# Patient Record
Sex: FEMALE | Race: BLACK OR AFRICAN AMERICAN | Employment: OTHER | URBAN - METROPOLITAN AREA
[De-identification: names, ages, dates, MRNs, and addresses within clinical notes are randomized per-mention and may not be internally consistent; named-entity substitution may affect disease eponyms.]

---

## 2017-01-09 ENCOUNTER — TELEPHONE (OUTPATIENT)
Dept: CARDIOLOGY CLINIC | Age: 82
End: 2017-01-09

## 2017-01-09 NOTE — TELEPHONE ENCOUNTER
Please CONTINUE WITH PLAVIX. Please check if she is on Plavix or brilinta. can hold asa for couple days. Please  Advise patient if bleeding continues  she needs to go the ER. She needs to call us back in couple days and inform how is she doing. Per Dr. Abdi Vela.

## 2017-01-09 NOTE — TELEPHONE ENCOUNTER
Patient c/o epistaxis in two times lat one this AM.     Last stent 06/16 RCA  RENEE she is on Plavix. Can she stop plavix? Or she needs to continue with this medication. She is concern because her nose bled. Thanks     FINDINGS:6/2016  5. 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 It  bifurcates into a large PL and PDA branch. We administered intracoronary  adenosine to evaluate for any spasm in there, which was negative. The  patient had critical stenosis. Hence, we performed PTCA using a Trek 2.0 mm  x 15 mm Sprinter balloon, followed by a Trek 2.75 mm x 15 mm balloon. A  Xience 3.5 mm x 23 mm stent was deployed about 13 atmospheres. Post-PCI  PTCA was performed using a noncompliant Trek 3.5 mm x 15 mm balloon at  about 18 atmospheres. Lesion reduced to 0%. DUSTIN-3 flow was noted at the  end of the procedure.
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
## 2017-01-09 NOTE — TELEPHONE ENCOUNTER
Patient states that she had a nose bleed twice yesterday morning. The one in the morning was not bad and didn't last long then last night patient had it again and it lasted about 50 minutes and it had clots in it. EMS came to her house and patient decided not to go to ER and it finally stopped about an half an hour after EMT's left. Patient is on Plavix and she wants to know if she needs to take her plavix today or not. Please Advise.

## 2017-01-17 ENCOUNTER — HOSPITAL ENCOUNTER (EMERGENCY)
Age: 82
Discharge: HOME | End: 2017-01-17
Attending: EMERGENCY MEDICINE
Payer: MEDICARE

## 2017-01-17 ENCOUNTER — APPOINTMENT (OUTPATIENT)
Dept: CT IMAGING | Age: 82
End: 2017-01-17
Attending: EMERGENCY MEDICINE
Payer: MEDICARE

## 2017-01-17 VITALS
SYSTOLIC BLOOD PRESSURE: 137 MMHG | OXYGEN SATURATION: 98 % | RESPIRATION RATE: 18 BRPM | WEIGHT: 252 LBS | BODY MASS INDEX: 39.55 KG/M2 | HEART RATE: 80 BPM | TEMPERATURE: 98.2 F | DIASTOLIC BLOOD PRESSURE: 62 MMHG | HEIGHT: 67 IN

## 2017-01-17 DIAGNOSIS — R51.9 NONINTRACTABLE HEADACHE, UNSPECIFIED CHRONICITY PATTERN, UNSPECIFIED HEADACHE TYPE: Primary | ICD-10-CM

## 2017-01-17 LAB
ANION GAP BLD CALC-SCNC: 5 MMOL/L (ref 3–18)
ATRIAL RATE: 100 BPM
BASOPHILS # BLD AUTO: 0 K/UL (ref 0–0.06)
BASOPHILS # BLD: 1 % (ref 0–2)
BUN SERPL-MCNC: 9 MG/DL (ref 7–18)
BUN/CREAT SERPL: 15 (ref 12–20)
CALCIUM SERPL-MCNC: 8.9 MG/DL (ref 8.5–10.1)
CALCULATED P AXIS, ECG09: 29 DEGREES
CALCULATED R AXIS, ECG10: -22 DEGREES
CALCULATED T AXIS, ECG11: 17 DEGREES
CHLORIDE SERPL-SCNC: 105 MMOL/L (ref 100–108)
CO2 SERPL-SCNC: 32 MMOL/L (ref 21–32)
CREAT SERPL-MCNC: 0.61 MG/DL (ref 0.6–1.3)
DIAGNOSIS, 93000: NORMAL
DIFFERENTIAL METHOD BLD: ABNORMAL
EOSINOPHIL # BLD: 0.1 K/UL (ref 0–0.4)
EOSINOPHIL NFR BLD: 3 % (ref 0–5)
ERYTHROCYTE [DISTWIDTH] IN BLOOD BY AUTOMATED COUNT: 11.6 % (ref 11.6–14.5)
GLUCOSE SERPL-MCNC: 138 MG/DL (ref 74–99)
HCT VFR BLD AUTO: 38.3 % (ref 35–45)
HGB BLD-MCNC: 12.1 G/DL (ref 12–16)
LYMPHOCYTES # BLD AUTO: 40 % (ref 21–52)
LYMPHOCYTES # BLD: 1.9 K/UL (ref 0.9–3.6)
MCH RBC QN AUTO: 30.7 PG (ref 24–34)
MCHC RBC AUTO-ENTMCNC: 31.6 G/DL (ref 31–37)
MCV RBC AUTO: 97.2 FL (ref 74–97)
MONOCYTES # BLD: 0.4 K/UL (ref 0.05–1.2)
MONOCYTES NFR BLD AUTO: 8 % (ref 3–10)
NEUTS SEG # BLD: 2.4 K/UL (ref 1.8–8)
NEUTS SEG NFR BLD AUTO: 48 % (ref 40–73)
P-R INTERVAL, ECG05: 116 MS
PLATELET # BLD AUTO: 230 K/UL (ref 135–420)
PMV BLD AUTO: 10.4 FL (ref 9.2–11.8)
POTASSIUM SERPL-SCNC: 3.5 MMOL/L (ref 3.5–5.5)
Q-T INTERVAL, ECG07: 368 MS
QRS DURATION, ECG06: 78 MS
QTC CALCULATION (BEZET), ECG08: 474 MS
RBC # BLD AUTO: 3.94 M/UL (ref 4.2–5.3)
SODIUM SERPL-SCNC: 142 MMOL/L (ref 136–145)
VENTRICULAR RATE, ECG03: 100 BPM
WBC # BLD AUTO: 4.8 K/UL (ref 4.6–13.2)

## 2017-01-17 PROCEDURE — 85025 COMPLETE CBC W/AUTO DIFF WBC: CPT | Performed by: EMERGENCY MEDICINE

## 2017-01-17 PROCEDURE — 93005 ELECTROCARDIOGRAM TRACING: CPT

## 2017-01-17 PROCEDURE — 99284 EMERGENCY DEPT VISIT MOD MDM: CPT

## 2017-01-17 PROCEDURE — 80048 BASIC METABOLIC PNL TOTAL CA: CPT | Performed by: EMERGENCY MEDICINE

## 2017-01-17 PROCEDURE — 70450 CT HEAD/BRAIN W/O DYE: CPT

## 2017-01-17 NOTE — DISCHARGE INSTRUCTIONS
Headache: Care Instructions  Your Care Instructions    Headaches have many possible causes. Most headaches aren't a sign of a more serious problem, and they will get better on their own. Home treatment may help you feel better faster. The doctor has checked you carefully, but problems can develop later. If you notice any problems or new symptoms, get medical treatment right away. Follow-up care is a key part of your treatment and safety. Be sure to make and go to all appointments, and call your doctor if you are having problems. It's also a good idea to know your test results and keep a list of the medicines you take. How can you care for yourself at home? · Do not drive if you have taken a prescription pain medicine. · Rest in a quiet, dark room until your headache is gone. Close your eyes and try to relax or go to sleep. Don't watch TV or read. · Put a cold, moist cloth or cold pack on the painful area for 10 to 20 minutes at a time. Put a thin cloth between the cold pack and your skin. · Use a warm, moist towel or a heating pad set on low to relax tight shoulder and neck muscles. · Have someone gently massage your neck and shoulders. · Take pain medicines exactly as directed. ¨ If the doctor gave you a prescription medicine for pain, take it as prescribed. ¨ If you are not taking a prescription pain medicine, ask your doctor if you can take an over-the-counter medicine. · Be careful not to take pain medicine more often than the instructions allow, because you may get worse or more frequent headaches when the medicine wears off. · Do not ignore new symptoms that occur with a headache, such as a fever, weakness or numbness, vision changes, or confusion. These may be signs of a more serious problem. To prevent headaches  · Keep a headache diary so you can figure out what triggers your headaches. Avoiding triggers may help you prevent headaches.  Record when each headache began, how long it lasted, and what the pain was like (throbbing, aching, stabbing, or dull). Write down any other symptoms you had with the headache, such as nausea, flashing lights or dark spots, or sensitivity to bright light or loud noise. Note if the headache occurred near your period. List anything that might have triggered the headache, such as certain foods (chocolate, cheese, wine) or odors, smoke, bright light, stress, or lack of sleep. · Find healthy ways to deal with stress. Headaches are most common during or right after stressful times. Take time to relax before and after you do something that has caused a headache in the past.  · Try to keep your muscles relaxed by keeping good posture. Check your jaw, face, neck, and shoulder muscles for tension, and try relaxing them. When sitting at a desk, change positions often, and stretch for 30 seconds each hour. · Get plenty of sleep and exercise. · Eat regularly and well. Long periods without food can trigger a headache. · Treat yourself to a massage. Some people find that regular massages are very helpful in relieving tension. · Limit caffeine by not drinking too much coffee, tea, or soda. But don't quit caffeine suddenly, because that can also give you headaches. · Reduce eyestrain from computers by blinking frequently and looking away from the computer screen every so often. Make sure you have proper eyewear and that your monitor is set up properly, about an arm's length away. · Seek help if you have depression or anxiety. Your headaches may be linked to these conditions. Treatment can both prevent headaches and help with symptoms of anxiety or depression. When should you call for help? Call 911 anytime you think you may need emergency care. For example, call if:  · You have signs of a stroke. These may include:  ¨ Sudden numbness, paralysis, or weakness in your face, arm, or leg, especially on only one side of your body. ¨ Sudden vision changes.   ¨ Sudden trouble speaking. ¨ Sudden confusion or trouble understanding simple statements. ¨ Sudden problems with walking or balance. ¨ A sudden, severe headache that is different from past headaches. Call your doctor now or seek immediate medical care if:  · You have a new or worse headache. · Your headache gets much worse. Where can you learn more? Go to http://cristina-mignon.info/. Enter M271 in the search box to learn more about \"Headache: Care Instructions. \"  Current as of: February 19, 2016  Content Version: 11.1  © 5421-2766 [employer]. Care instructions adapted under license by [employer] (which disclaims liability or warranty for this information). If you have questions about a medical condition or this instruction, always ask your healthcare professional. [address] any warranty or liability for your use of this information.
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
## 2017-01-17 NOTE — ED TRIAGE NOTES
Triage: dizziness x 3 days, right sided dull headache with intermittent sharp shooting pains. Pt denies injury. Pt reports nose bleed today and 3 days ago. Pt takes Plavix.

## 2017-01-17 NOTE — ED NOTES
Patient states headache at this time is just achy, talking with family and animated while waiting for lab results.

## 2017-01-20 ENCOUNTER — OFFICE VISIT (OUTPATIENT)
Dept: FAMILY MEDICINE CLINIC | Age: 82
End: 2017-01-20

## 2017-01-20 VITALS
BODY MASS INDEX: 41.44 KG/M2 | RESPIRATION RATE: 18 BRPM | SYSTOLIC BLOOD PRESSURE: 162 MMHG | DIASTOLIC BLOOD PRESSURE: 84 MMHG | OXYGEN SATURATION: 93 % | TEMPERATURE: 97.6 F | HEART RATE: 106 BPM | WEIGHT: 264 LBS | HEIGHT: 67 IN

## 2017-01-20 DIAGNOSIS — R42 DIZZINESS: Primary | ICD-10-CM

## 2017-01-20 DIAGNOSIS — H93.8X1 EAR FULLNESS, RIGHT: ICD-10-CM

## 2017-01-20 RX ORDER — FAMOTIDINE 20 MG/1
20 TABLET, FILM COATED ORAL
COMMUNITY
End: 2019-01-22
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
## 2017-01-20 NOTE — PROGRESS NOTES
1. Have you been to the ER, urgent care clinic since your last visit? Hospitalized since your last visit? Yes refer to nursing note    2. Have you seen or consulted any other health care providers outside of the [address] [place] [address] [place] since your last visit? Include any pap smears or colon screening. Yes Reason for visit: Dizziness    Is someone accompanying this pt? no    Is the patient using any DME equipment during OV? no      Chief Complaint   Patient presents with    Other     Pt was seen at [address] [place] [address] for Dizziness and H/A on 1/16/17.

## 2017-01-20 NOTE — MR AVS SNAPSHOT
Visit Information Date & Time Provider Department Dept. Phone Encounter #  
 1/20/2017 11:45 AM Sheri Sullivan [facility] 77 [id number] Follow-up Instructions Return in about 2 weeks (around 2/3/2017). Your Appointments 2/14/2017 10:15 AM  
Follow Up with Dayanara Kilgore MD  
[facility] [place] ([facility] [place]) Appt Note: 6 month follow up  
 [address] [place] [address] [facility] [address]  
  
   
 [address] [id number] [address] [address] Upcoming Health Maintenance Date Due MICROALBUMIN Q1 8/8/2015 HEMOGLOBIN A1C Q6M 11/28/2016 FOOT EXAM Q1 7/20/2017 EYE EXAM RETINAL OR DILATED Q1 7/25/2017 MEDICARE YEARLY EXAM 8/18/2017 Pneumococcal 65+ Low/Medium Risk (2 of 2 - PPSV23) 12/2/2017 LIPID PANEL Q1 12/3/2017 GLAUCOMA SCREENING Q2Y 7/25/2018 DTaP/Tdap/Td series (2 - Td) 11/15/2023 Allergies as of 1/20/2017  Review Complete On: 1/20/2017 By: Maxime Odor, LPN Severity Noted Reaction Type Reactions Niacin High 03/26/2013    Palpitations, Other (comments) Stomach irritation Ace Inhibitors  05/19/2010    Cough Avapro [Irbesartan]  05/19/2010    Myalgia Bystolic [Nebivolol]  60/03/7463    Other (comments) Felt like throat closing Catapres [Clonidine]  05/19/2010    Cough Codeine  05/19/2010    Nausea and Vomiting Cozaar [Losartan]    Not Reported This Time Crestor [Rosuvastatin]  05/19/2010    Other (comments) Cramps, aches Alec Hick [Propoxyphene N-acetaminophen]  05/19/2010    Unknown (comments) Diovan [Valsartan]  05/19/2010    Cough Flagyl [Metronidazole]  05/19/2010    Other (comments) Mouth and throat irritation Gabapentin  03/16/2016    Other (comments) Abdominal pain and burning Iodinated Contrast Media - Oral And Iv Dye    Other (comments) Throat swelling Iodine    Unknown (comments) Lescol [Fluvastatin]  05/19/2010    Other (comments) Leg cramps Lipitor [Atorvastatin]  05/19/2010    Myalgia, Other (comments) Cramps, aches Lovastatin  05/19/2010    Other (comments) Leg cramps Nexium [Esomeprazole Magnesium]  05/20/2010    Other (comments) Stomach upset, burning Pravachol [Pravastatin]  05/19/2010    Other (comments) Leg cramps Reglan [Metoclopramide]  05/19/2010    Nausea Only Trazodone  05/19/2010    Other (comments) Patient states she feels drugged Zetia [Ezetimibe]  05/19/2010    Other (comments) Cramps, aches Zocor [Simvastatin]  05/19/2010    Other (comments) Cramps, aches Current Immunizations  Reviewed on 7/20/2016 Name Date Influenza High Dose Vaccine PF 12/2/2016 Influenza Vaccine 12/16/2015 11:20 AM, 9/15/2014 Influenza Vaccine Split 11/6/2012, 10/5/2010 Influenza Vaccine Whole 9/1/2009 Tdap 11/15/2013 Not reviewed this visit You Were Diagnosed With   
  
 Codes Comments Dizziness    -  Primary ICD-10-CM: F04 ICD-9-CM: 780.4 Ear fullness, right     ICD-10-CM: H93.8X1 ICD-9-CM: 388. 8 Vitals BP Pulse Temp Resp Height(growth percentile) Weight(growth percentile) 162/84 (BP 1 Location: Right arm, BP Patient Position: Sitting) (!) 106 97.6 °F (36.4 °C) (Oral) 18 5' 7\" (1.702 m) 264 lb (119.7 kg) SpO2 BMI OB Status Smoking Status 93% 41.35 kg/m2 Hysterectomy Former Smoker BMI and BSA Data Body Mass Index Body Surface Area  
 41.35 kg/m 2 2.38 m 2 Preferred Pharmacy Pharmacy Name Phone [facility] [place], [place] [phone] [facility] Your Updated Medication List  
  
   
This list is accurate as of: 1/20/17 12:54 PM.  Always use your most recent med list.  
  
  
  
  
 albuterol 90 mcg/actuation inhaler Commonly known as:  PROAIR HFA Take 1 Puff by inhalation every four (4) hours as needed for Wheezing or Shortness of Breath. aspirin delayed-release 81 mg tablet Take 81 mg by mouth daily. capsaicin 0.075 % topical cream  
Apply  to affected area three (3) times daily. clopidogrel 75 mg Tab Commonly known as:  PLAVIX Take 1 Tab by mouth daily. FISH OIL PO Take 1,000 mg by mouth two (2) times a day. furosemide 20 mg tablet Commonly known as:  LASIX Use daily as needed for leg swelling LANTUS 100 unit/mL injection Generic drug:  insulin glargine Take 20 units every morning and 40 units at bedtime  Indications: TYPE 2 DIABETES MELLITUS  
  
 levothyroxine 88 mcg tablet Commonly known as:  SYNTHROID Take 1 Tab by mouth Daily (before breakfast). lidocaine 5 % Commonly known as:  LIDODERM  
1 Patch by TransDERmal route every twenty-four (24) hours. montelukast 10 mg tablet Commonly known as:  SINGULAIR Take 1 Tab by mouth daily as needed. Indications: ALLERGIC RHINITIS  
  
 NORVASC 5 mg tablet Generic drug:  amLODIPine Take 5 mg by mouth daily. PEPCID 20 mg tablet Generic drug:  famotidine Take 20 mg by mouth nightly. potassium chloride 20 mEq tablet Commonly known as:  K-DUR, KLOR-CON Take 1 Tab by mouth two (2) times a day. VITAMIN D3 1,000 unit tablet Generic drug:  cholecalciferol Take 5,000 Units by mouth two (2) times a day. Follow-up Instructions Return in about 2 weeks (around 2/3/2017). To-Do List   
 01/20/2017 Imaging:  MRI IAC WO CONT   
  
 01/27/2017 11:15 AM  
  Appointment with CORDELIA HENDRICKS  2 at [address] [place] [address] ([phone]) OUTSIDE FILMS  - Any outside films related to the study being scheduled should be brought with you on the day of the exam.  If this cannot be done there may be a delay in the reading of the study.   MEDICATIONS  - Patient must bring a complete list of all medications currently taking to include prescriptions, over-the-counter meds, herbals, vitamins & any dietary supplements  GENERAL INSTRUCTIONS  - On the day of your exam do not use any bath powder, deodorant or lotions on the armpit area. -Tenderness of breasts may cause an increase of discomfort during procedure. If you are experiencing breast tenderness on the day of your appointment and would like to reschedule, please call [phone]. Patient Instructions 1.) They will call to set up the MRI of the internal auditory canals. You can see if they can set it up for the same day as your mammogram.  
 
2.) Return in 2 weeks for follow up. Dizziness: Care Instructions Your Care Instructions Dizziness is the feeling of unsteadiness or fuzziness in your head. It is different than having vertigo, which is a feeling that the room is spinning or that you are moving or falling. It is also different from lightheadedness, which is the feeling that you are about to faint. It can be hard to know what causes dizziness. Some people feel dizzy when they have migraine headaches. Sometimes bouts of flu can make you feel dizzy. Some medical conditions, such as heart problems or high blood pressure, can make you feel dizzy. Many medicines can cause dizziness, including medicines for high blood pressure, pain, or anxiety. If a medicine causes your symptoms, your doctor may recommend that you stop or change the medicine. If it is a problem with your heart, you may need medicine to help your heart work better. If there is no clear reason for your symptoms, your doctor may suggest watching and waiting for a while to see if the dizziness goes away on its own. Follow-up care is a key part of your treatment and safety. Be sure to make and go to all appointments, and call your doctor if you are having problems. It's also a good idea to know your test results and keep a list of the medicines you take. How can you care for yourself at home? · If your doctor recommends or prescribes medicine, take it exactly as directed. Call your doctor if you think you are having a problem with your medicine. · Do not drive while you feel dizzy. · Try to prevent falls. Steps you can take include: ¨ Using nonskid mats, adding grab bars near the tub, and using night-lights. ¨ Clearing your home so that walkways are free of anything you might trip on. ¨ Letting family and friends know that you have been feeling dizzy. This will help them know how to help you. When should you call for help? Call 911 anytime you think you may need emergency care. For example, call if: 
· You passed out (lost consciousness). · You have dizziness along with symptoms of a heart attack. These may include: ¨ Chest pain or pressure, or a strange feeling in the chest. 
¨ Sweating. ¨ Shortness of breath. ¨ Nausea or vomiting. ¨ Pain, pressure, or a strange feeling in the back, neck, jaw, or upper belly or in one or both shoulders or arms. ¨ Lightheadedness or sudden weakness. ¨ A fast or irregular heartbeat. · You have symptoms of a stroke. These may include: 
¨ Sudden numbness, tingling, weakness, or loss of movement in your face, arm, or leg, especially on only one side of your body. ¨ Sudden vision changes. ¨ Sudden trouble speaking. ¨ Sudden confusion or trouble understanding simple statements. ¨ Sudden problems with walking or balance. ¨ A sudden, severe headache that is different from past headaches. Call your doctor now or seek immediate medical care if: 
· You feel dizzy and have a fever, headache, or ringing in your ears. · You have new or increased nausea and vomiting. · Your dizziness does not go away or comes back. Watch closely for changes in your health, and be sure to contact your doctor if: 
· You do not get better as expected. Where can you learn more? Go to http://cristina-mignon.info/. Enter E122 in the search box to learn more about \"Dizziness: Care Instructions. \" Current as of: May 27, 2016 Content Version: 11.1 © 8879-6738 Agent Partner, [employer]. Care instructions adapted under license by [employer] (which disclaims liability or warranty for this information). If you have questions about a medical condition or this instruction, always ask your healthcare professional. [address] any warranty or liability for your use of this information. Introducing [facility]! Dear Lilian Montes De Oca: Thank you for requesting a [email or web address] account. Our records indicate that you already have an active [email or web address] account. You can access your account anytime at [email or web address] Did you know that you can access your hospital and ER discharge instructions at any time in [email or web address]? You can also review all of your test results from your hospital stay or ER visit. Additional Information If you have questions, please visit the Frequently Asked Questions section of the [email or web address] website at [email or web address] Remember, [email or web address] is NOT to be used for urgent needs. For medical emergencies, dial 911. Now available from your iPhone and Android! Please provide this summary of care documentation to your next provider. Your primary care clinician is listed as Christina Miller. If you have any questions after today's visit, please call [phone].

## 2017-01-20 NOTE — PATIENT INSTRUCTIONS
1.) They will call to set up the MRI of the internal auditory canals. You can see if they can set it up for the same day as your mammogram.     2.) Return in 2 weeks for follow up. Dizziness: Care Instructions  Your Care Instructions  Dizziness is the feeling of unsteadiness or fuzziness in your head. It is different than having vertigo, which is a feeling that the room is spinning or that you are moving or falling. It is also different from lightheadedness, which is the feeling that you are about to faint. It can be hard to know what causes dizziness. Some people feel dizzy when they have migraine headaches. Sometimes bouts of flu can make you feel dizzy. Some medical conditions, such as heart problems or high blood pressure, can make you feel dizzy. Many medicines can cause dizziness, including medicines for high blood pressure, pain, or anxiety. If a medicine causes your symptoms, your doctor may recommend that you stop or change the medicine. If it is a problem with your heart, you may need medicine to help your heart work better. If there is no clear reason for your symptoms, your doctor may suggest watching and waiting for a while to see if the dizziness goes away on its own. Follow-up care is a key part of your treatment and safety. Be sure to make and go to all appointments, and call your doctor if you are having problems. It's also a good idea to know your test results and keep a list of the medicines you take. How can you care for yourself at home? · If your doctor recommends or prescribes medicine, take it exactly as directed. Call your doctor if you think you are having a problem with your medicine. · Do not drive while you feel dizzy. · Try to prevent falls. Steps you can take include:  ¨ Using nonskid mats, adding grab bars near the tub, and using night-lights. ¨ Clearing your home so that walkways are free of anything you might trip on.   ¨ Letting family and friends know that you have been feeling dizzy. This will help them know how to help you. When should you call for help? Call 911 anytime you think you may need emergency care. For example, call if:  · You passed out (lost consciousness). · You have dizziness along with symptoms of a heart attack. These may include:  ¨ Chest pain or pressure, or a strange feeling in the chest.  ¨ Sweating. ¨ Shortness of breath. ¨ Nausea or vomiting. ¨ Pain, pressure, or a strange feeling in the back, neck, jaw, or upper belly or in one or both shoulders or arms. ¨ Lightheadedness or sudden weakness. ¨ A fast or irregular heartbeat. · You have symptoms of a stroke. These may include:  ¨ Sudden numbness, tingling, weakness, or loss of movement in your face, arm, or leg, especially on only one side of your body. ¨ Sudden vision changes. ¨ Sudden trouble speaking. ¨ Sudden confusion or trouble understanding simple statements. ¨ Sudden problems with walking or balance. ¨ A sudden, severe headache that is different from past headaches. Call your doctor now or seek immediate medical care if:  · You feel dizzy and have a fever, headache, or ringing in your ears. · You have new or increased nausea and vomiting. · Your dizziness does not go away or comes back. Watch closely for changes in your health, and be sure to contact your doctor if:  · You do not get better as expected. Where can you learn more? Go to http://cristina-mignon.info/. Enter D585 in the search box to learn more about \"Dizziness: Care Instructions. \"  Current as of: May 27, 2016  Content Version: 11.1  © 8048-7526 [employer]weem[employer]. Care instructions adapted under license by [employer] (which disclaims liability or warranty for this information).  If you have questions about a medical condition or this instruction, always ask your healthcare professional. [address] any warranty or liability for your use of this information.

## 2017-01-24 ENCOUNTER — TELEPHONE (OUTPATIENT)
Dept: FAMILY MEDICINE CLINIC | Age: 82
End: 2017-01-24

## 2017-01-24 ENCOUNTER — TELEPHONE (OUTPATIENT)
Dept: CARDIOLOGY CLINIC | Age: 82
End: 2017-01-24

## 2017-01-24 DIAGNOSIS — D75.89 MACROCYTOSIS: ICD-10-CM

## 2017-01-24 DIAGNOSIS — R53.1 WEAKNESS: Primary | ICD-10-CM

## 2017-01-24 NOTE — TELEPHONE ENCOUNTER
Patient called and stated she has several nose bleeds and this pass Monday was so severe she had to go ER d/t bleeding over an hour and having a lot of clots. Patient states she feel very tired to put shes get very dizzy and light headed.  Please Advise

## 2017-01-24 NOTE — TELEPHONE ENCOUNTER
Pt has been feeling weak for a while now and wants to know if Dr. Manjeet Perez can prescribe something for her. She had mentioned this at her last appointment. Elvia.

## 2017-01-25 RX ORDER — LOPERAMIDE HCL 2 MG
1000 TABLET ORAL DAILY
Qty: 30 TAB | Refills: 3 | Status: SHIPPED | OUTPATIENT
Start: 2017-01-25

## 2017-01-25 NOTE — TELEPHONE ENCOUNTER
Geraldo Goldberg,   Please let the patient know that I called in Vitamin B12 extended release supplement to [facility]. Thanks.     JAMIE

## 2017-02-01 ENCOUNTER — TELEPHONE (OUTPATIENT)
Dept: CARDIOLOGY CLINIC | Age: 82
End: 2017-02-01

## 2017-02-01 NOTE — TELEPHONE ENCOUNTER
Patient calling to inquire about Plavix hold. She has an office visit on 2/14/17. She has had no further nose bleeding since Plavix hold began on 1/24/17. Please advise.

## 2017-02-01 NOTE — TELEPHONE ENCOUNTER
Patient called to discuss orders from Dr. Henrry Salazar. She will restart Plavix and call if any further bleeding occurs. She voices understanding and acceptance of this advice and will call back if any further questions or concerns.

## 2017-02-03 ENCOUNTER — OFFICE VISIT (OUTPATIENT)
Dept: FAMILY MEDICINE CLINIC | Age: 82
End: 2017-02-03

## 2017-02-03 VITALS
DIASTOLIC BLOOD PRESSURE: 83 MMHG | WEIGHT: 264 LBS | HEART RATE: 121 BPM | TEMPERATURE: 99.2 F | OXYGEN SATURATION: 98 % | RESPIRATION RATE: 18 BRPM | HEIGHT: 67 IN | SYSTOLIC BLOOD PRESSURE: 151 MMHG | BODY MASS INDEX: 41.44 KG/M2

## 2017-02-03 DIAGNOSIS — I25.10 ATHEROSCLEROSIS OF NATIVE CORONARY ARTERY OF NATIVE HEART WITHOUT ANGINA PECTORIS: ICD-10-CM

## 2017-02-03 DIAGNOSIS — H93.8X9 EAR FULLNESS, UNSPECIFIED LATERALITY: ICD-10-CM

## 2017-02-03 DIAGNOSIS — E03.8 OTHER SPECIFIED HYPOTHYROIDISM: ICD-10-CM

## 2017-02-03 DIAGNOSIS — M89.9 DISORDER OF BONE AND CARTILAGE: ICD-10-CM

## 2017-02-03 DIAGNOSIS — M16.11 PRIMARY OSTEOARTHRITIS OF RIGHT HIP: ICD-10-CM

## 2017-02-03 DIAGNOSIS — M94.9 DISORDER OF BONE AND CARTILAGE: ICD-10-CM

## 2017-02-03 DIAGNOSIS — R42 DIZZINESS: Primary | ICD-10-CM

## 2017-02-03 DIAGNOSIS — I10 ESSENTIAL HYPERTENSION: ICD-10-CM

## 2017-02-03 RX ORDER — MECLIZINE HYDROCHLORIDE 25 MG/1
25 TABLET ORAL
Qty: 30 TAB | Refills: 0 | Status: CANCELLED | OUTPATIENT
Start: 2017-02-03 | End: 2017-02-13
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
## 2017-02-03 NOTE — MR AVS SNAPSHOT
Visit Information Date & Time Provider Department Dept. Phone Encounter #  
 2/3/2017 10:30 AM Sheri Gonzalez 79 [id number] Follow-up Instructions Return in about 3 months (around 5/3/2017) for Regular Follow Up . Your Appointments 2/14/2017 10:15 AM  
Follow Up with Robert Aguilar MD  
[facility] ([address]) Appt Note: 6 month follow up  
 [address] [facility] [address]  
  
   
 [address] [place] [id number] Upcoming Health Maintenance Date Due MICROALBUMIN Q1 8/8/2015 HEMOGLOBIN A1C Q6M 11/28/2016 FOOT EXAM Q1 7/20/2017 EYE EXAM RETINAL OR DILATED Q1 7/25/2017 MEDICARE YEARLY EXAM 8/18/2017 Pneumococcal 65+ Low/Medium Risk (2 of 2 - PPSV23) 12/2/2017 LIPID PANEL Q1 12/3/2017 GLAUCOMA SCREENING Q2Y 7/25/2018 DTaP/Tdap/Td series (2 - Td) 11/15/2023 Allergies as of 2/3/2017  Review Complete On: 2/3/2017 By: Moris Floyd LPN Severity Noted Reaction Type Reactions Niacin High 03/26/2013    Palpitations, Other (comments) Stomach irritation Ace Inhibitors  05/19/2010    Cough Avapro [Irbesartan]  05/19/2010    Myalgia Bystolic [Nebivolol]  05/36/6737    Other (comments) Felt like throat closing Catapres [Clonidine]  05/19/2010    Cough Codeine  05/19/2010    Nausea and Vomiting Cozaar [Losartan]    Not Reported This Time Crestor [Rosuvastatin]  05/19/2010    Other (comments) Cramps, aches [place] Fair [Propoxyphene N-acetaminophen]  05/19/2010    Unknown (comments) Diovan [Valsartan]  05/19/2010    Cough Flagyl [Metronidazole]  05/19/2010    Other (comments) Mouth and throat irritation Gabapentin  03/16/2016    Other (comments) Abdominal pain and burning Iodinated Contrast Media - Oral And Iv Dye    Other (comments) Throat swelling Iodine    Unknown (comments) Lescol [Fluvastatin]  05/19/2010    Other (comments) Leg cramps Lipitor [Atorvastatin]  05/19/2010    Myalgia, Other (comments) Cramps, aches Lovastatin  05/19/2010    Other (comments) Leg cramps Nexium [Esomeprazole Magnesium]  05/20/2010    Other (comments) Stomach upset, burning Pravachol [Pravastatin]  05/19/2010    Other (comments) Leg cramps Reglan [Metoclopramide]  05/19/2010    Nausea Only Trazodone  05/19/2010    Other (comments) Patient states she feels drugged Zetia [Ezetimibe]  05/19/2010    Other (comments) Cramps, aches Zocor [Simvastatin]  05/19/2010    Other (comments) Cramps, aches Current Immunizations  Reviewed on 7/20/2016 Name Date Influenza High Dose Vaccine PF 12/2/2016 Influenza Vaccine 12/16/2015 11:20 AM, 9/15/2014 Influenza Vaccine Split 11/6/2012, 10/5/2010 Influenza Vaccine Whole 9/1/2009 Tdap 11/15/2013 Not reviewed this visit You Were Diagnosed With   
  
 Codes Comments Dizziness    -  Primary ICD-10-CM: C28 ICD-9-CM: 780.4 Ear fullness, unspecified laterality     ICD-10-CM: V06.3T2 ICD-9-CM: 388. 8 Atherosclerosis of native coronary artery of native heart without angina pectoris     ICD-10-CM: I25.10 ICD-9-CM: 414.01 Other specified hypothyroidism     ICD-10-CM: E03.8 ICD-9-CM: 244.8 Primary osteoarthritis of right hip     ICD-10-CM: M16.11 
ICD-9-CM: 715.15 Essential hypertension     ICD-10-CM: I10 
ICD-9-CM: 401.9 Disorder of bone and cartilage     ICD-10-CM: M89.9, M94.9 ICD-9-CM: 733.90 Insulin dependent diabetes mellitus (HCC)     ICD-10-CM: E11.9, Z79.4 ICD-9-CM: 250.00, V58.67 Vitals BP Pulse Temp Resp Height(growth percentile) Weight(growth percentile) 151/83 (BP 1 Location: Right arm, BP Patient Position: Sitting) (!) 121 99.2 °F (37.3 °C) (Oral) 18 5' 7\" (1.702 m) 264 lb (119.7 kg) SpO2 BMI OB Status Smoking Status 98% 41.35 kg/m2 Hysterectomy Former Smoker BMI and BSA Data Body Mass Index Body Surface Area  
 41.35 kg/m 2 2.38 m 2 Preferred Pharmacy Pharmacy Name Phone [facility] [address] [facility] Your Updated Medication List  
  
   
This list is accurate as of: 2/3/17 10:58 AM.  Always use your most recent med list.  
  
  
  
  
 albuterol 90 mcg/actuation inhaler Commonly known as:  PROAIR HFA Take 1 Puff by inhalation every four (4) hours as needed for Wheezing or Shortness of Breath. aspirin delayed-release 81 mg tablet Take 81 mg by mouth daily. capsaicin 0.075 % topical cream  
Apply  to affected area three (3) times daily. clopidogrel 75 mg Tab Commonly known as:  PLAVIX Take 1 Tab by mouth daily. cyanocobalamin ER 1,000 mcg tablet Take 1 Tab by mouth daily. FISH OIL PO Take 1,000 mg by mouth two (2) times a day. furosemide 20 mg tablet Commonly known as:  LASIX Use daily as needed for leg swelling LANTUS 100 unit/mL injection Generic drug:  insulin glargine Take 20 units every morning and 40 units at bedtime  Indications: TYPE 2 DIABETES MELLITUS  
  
 levothyroxine 88 mcg tablet Commonly known as:  SYNTHROID Take 1 Tab by mouth Daily (before breakfast). lidocaine 5 % Commonly known as:  LIDODERM  
1 Patch by TransDERmal route every twenty-four (24) hours. montelukast 10 mg tablet Commonly known as:  SINGULAIR Take 1 Tab by mouth daily as needed. Indications: ALLERGIC RHINITIS  
  
 NORVASC 5 mg tablet Generic drug:  amLODIPine Take 5 mg by mouth daily. PEPCID 20 mg tablet Generic drug:  famotidine Take 20 mg by mouth nightly. potassium chloride 20 mEq tablet Commonly known as:  K-DUR, KLOR-CON Take 1 Tab by mouth two (2) times a day. VITAMIN D3 1,000 unit tablet Generic drug:  cholecalciferol Take 5,000 Units by mouth two (2) times a day. Follow-up Instructions Return in about 3 months (around 5/3/2017) for Regular Follow Up . To-Do List   
 02/03/2017 Lab:  HEMOGLOBIN A1C WITH EAG   
  
 02/03/2017 Lab:  LIPID CASCADE W/REFLEX APO B   
  
 02/03/2017 Lab:  VITAMIN D, 25 HYDROXY Around 05/04/2017 Lab:  CBC WITH AUTOMATED DIFF Around 05/04/2017 Lab:  METABOLIC PANEL, COMPREHENSIVE Around 05/04/2017 Lab:  SED RATE (ESR) Around 05/04/2017 Lab:  T4, FREE Around 05/04/2017 Lab:  TSH 3RD GENERATION Patient Instructions 1.) Please get labs a week before next visit 2.) Return in 3 months for follow up Vertigo: Exercises Your Care Instructions Here are some examples of typical rehabilitation exercises for your condition. Start each exercise slowly. Ease off the exercise if you start to have pain. Your doctor or physical therapist will tell you when you can start these exercises and which ones will work best for you. How to do the exercises Note: Do these exercises twice a day. Try to progress to doing each head movement 15 to 20 times. Then try to do them with your eyes closed. Exercise 1 1. Stand with a chair in front of you and a wall behind you. If you begin to fall, you may use them for support. 2. Stand with your feet together and your arms at your sides. 3. Move your head up and down 10 times. Exercise 2 Move your head side to side 10 times. Exercise 3 Move your head diagonally up and down 10 times. Exercise 4 Move your head diagonally up and down 10 times on the other side. Follow-up care is a key part of your treatment and safety. Be sure to make and go to all appointments, and call your doctor if you are having problems. It's also a good idea to know your test results and keep a list of the medicines you take. Where can you learn more? Go to http://cristina-mignon.info/. Enter F349 in the search box to learn more about \"Vertigo: Exercises. \" Current as of: July 29, 2016 Content Version: 11.1 © 9264-9924 [employer]. Care instructions adapted under license by [employer] (which disclaims liability or warranty for this information). If you have questions about a medical condition or this instruction, always ask your healthcare professional. [facility] any warranty or liability for your use of this information. Introducing [facility]! Dear Fede Cevallos: Thank you for requesting a [employer] account. Our records indicate that you already have an active [employer] account. You can access your account anytime at [email or web address] Did you know that you can access your hospital and ER discharge instructions at any time in [employer]? You can also review all of your test results from your hospital stay or ER visit. Additional Information If you have questions, please visit the Frequently Asked Questions section of the [employer] website at [email or web address] Remember, [employer] is NOT to be used for urgent needs. For medical emergencies, dial 911. Now available from your iPhone and Android! Please provide this summary of care documentation to your next provider. Your primary care clinician is listed as Dia Coreas. If you have any questions after today's visit, please call [phone].

## 2017-02-03 NOTE — PATIENT INSTRUCTIONS
1.) Please get labs a week before next visit    2.) Return in 3 months for follow up      Vertigo: Exercises  Your Care Instructions  Here are some examples of typical rehabilitation exercises for your condition. Start each exercise slowly. Ease off the exercise if you start to have pain. Your doctor or physical therapist will tell you when you can start these exercises and which ones will work best for you. How to do the exercises  Note: Do these exercises twice a day. Try to progress to doing each head movement 15 to 20 times. Then try to do them with your eyes closed. Exercise 1    1. Stand with a chair in front of you and a wall behind you. If you begin to fall, you may use them for support. 2. Stand with your feet together and your arms at your sides. 3. Move your head up and down 10 times. Exercise 2    Move your head side to side 10 times. Exercise 3    Move your head diagonally up and down 10 times. Exercise 4    Move your head diagonally up and down 10 times on the other side. Follow-up care is a key part of your treatment and safety. Be sure to make and go to all appointments, and call your doctor if you are having problems. It's also a good idea to know your test results and keep a list of the medicines you take. Where can you learn more? Go to http://cristina-mignon.info/. Enter F349 in the search box to learn more about \"Vertigo: Exercises. \"  Current as of: July 29, 2016  Content Version: 11.1  © 8151-6985 [employer]. Care instructions adapted under license by [employer] (which disclaims liability or warranty for this information). If you have questions about a medical condition or this instruction, always ask your healthcare professional. [place] [address] any warranty or liability for your use of this information.

## 2017-02-03 NOTE — PROGRESS NOTES
1. Have you been to the ER, urgent care clinic since your last visit? Hospitalized since your last visit? No    2. Have you seen or consulted any other health care providers outside of the [address] [place] [address] since your last visit? Include any pap smears or colon screening. No    Is someone accompanying this pt? no    Is the patient using any DME equipment during OV? no      Chief Complaint   Patient presents with    Fatigue     Pt states that she has been feeling weak. Pt states that she has not picked up the B12 yet. Pt states that she got side tracked by Dr. Allen Bowden office.

## 2017-02-06 ENCOUNTER — HOSPITAL ENCOUNTER (INPATIENT)
Age: 82
LOS: 2 days | Discharge: HOME | DRG: 313 | End: 2017-02-08
Attending: EMERGENCY MEDICINE | Admitting: EMERGENCY MEDICINE
Payer: MEDICARE

## 2017-02-06 ENCOUNTER — APPOINTMENT (OUTPATIENT)
Dept: GENERAL RADIOLOGY | Age: 82
DRG: 313 | End: 2017-02-06
Attending: PHYSICIAN ASSISTANT
Payer: MEDICARE

## 2017-02-06 DIAGNOSIS — R07.9 CHEST PAIN, UNSPECIFIED TYPE: Primary | ICD-10-CM

## 2017-02-06 DIAGNOSIS — E03.9 HYPOTHYROIDISM, UNSPECIFIED TYPE: ICD-10-CM

## 2017-02-06 DIAGNOSIS — E87.6 HYPOKALEMIA: ICD-10-CM

## 2017-02-06 LAB
ANION GAP BLD CALC-SCNC: 11 MMOL/L (ref 3–18)
APPEARANCE UR: CLEAR
BACTERIA URNS QL MICRO: ABNORMAL /HPF
BASOPHILS # BLD AUTO: 0 K/UL (ref 0–0.06)
BASOPHILS # BLD: 0 % (ref 0–2)
BILIRUB UR QL: NEGATIVE
BUN SERPL-MCNC: 6 MG/DL (ref 7–18)
BUN/CREAT SERPL: 10 (ref 12–20)
CALCIUM SERPL-MCNC: 9.2 MG/DL (ref 8.5–10.1)
CAOX CRY URNS QL MICRO: ABNORMAL
CHLORIDE SERPL-SCNC: 103 MMOL/L (ref 100–108)
CK MB CFR SERPL CALC: ABNORMAL % (ref 0–4)
CK MB SERPL-MCNC: <0.5 NG/ML (ref 0.5–3.6)
CK SERPL-CCNC: 58 U/L (ref 26–192)
CO2 SERPL-SCNC: 28 MMOL/L (ref 21–32)
COLOR UR: YELLOW
CREAT SERPL-MCNC: 0.58 MG/DL (ref 0.6–1.3)
D DIMER PPP FEU-MCNC: 0.58 UG/ML(FEU)
DIFFERENTIAL METHOD BLD: ABNORMAL
EOSINOPHIL # BLD: 0.2 K/UL (ref 0–0.4)
EOSINOPHIL NFR BLD: 3 % (ref 0–5)
EPITH CASTS URNS QL MICRO: ABNORMAL /LPF (ref 0–5)
ERYTHROCYTE [DISTWIDTH] IN BLOOD BY AUTOMATED COUNT: 12 % (ref 11.6–14.5)
GLUCOSE SERPL-MCNC: 136 MG/DL (ref 74–99)
GLUCOSE UR STRIP.AUTO-MCNC: NEGATIVE MG/DL
HCT VFR BLD AUTO: 39.2 % (ref 35–45)
HGB BLD-MCNC: 12.5 G/DL (ref 12–16)
HGB UR QL STRIP: NEGATIVE
HYALINE CASTS URNS QL MICRO: ABNORMAL /LPF (ref 0–2)
KETONES UR QL STRIP.AUTO: NEGATIVE MG/DL
LEUKOCYTE ESTERASE UR QL STRIP.AUTO: ABNORMAL
LYMPHOCYTES # BLD AUTO: 47 % (ref 21–52)
LYMPHOCYTES # BLD: 2.6 K/UL (ref 0.9–3.6)
MCH RBC QN AUTO: 30.6 PG (ref 24–34)
MCHC RBC AUTO-ENTMCNC: 31.9 G/DL (ref 31–37)
MCV RBC AUTO: 95.8 FL (ref 74–97)
MONOCYTES # BLD: 0.4 K/UL (ref 0.05–1.2)
MONOCYTES NFR BLD AUTO: 7 % (ref 3–10)
MUCOUS THREADS URNS QL MICRO: ABNORMAL /LPF
NEUTS SEG # BLD: 2.4 K/UL (ref 1.8–8)
NEUTS SEG NFR BLD AUTO: 43 % (ref 40–73)
NITRITE UR QL STRIP.AUTO: NEGATIVE
PH UR STRIP: 5 [PH] (ref 5–8)
PLATELET # BLD AUTO: 232 K/UL (ref 135–420)
PMV BLD AUTO: 10.5 FL (ref 9.2–11.8)
POTASSIUM SERPL-SCNC: 3.1 MMOL/L (ref 3.5–5.5)
PROT UR STRIP-MCNC: 30 MG/DL
RBC # BLD AUTO: 4.09 M/UL (ref 4.2–5.3)
RBC #/AREA URNS HPF: ABNORMAL /HPF (ref 0–5)
SODIUM SERPL-SCNC: 142 MMOL/L (ref 136–145)
SP GR UR REFRACTOMETRY: 1.02 (ref 1–1.03)
TROPONIN I SERPL-MCNC: <0.02 NG/ML (ref 0–0.06)
UROBILINOGEN UR QL STRIP.AUTO: 1 EU/DL (ref 0.2–1)
WBC # BLD AUTO: 5.7 K/UL (ref 4.6–13.2)
WBC URNS QL MICRO: ABNORMAL /HPF (ref 0–4)

## 2017-02-06 PROCEDURE — 85379 FIBRIN DEGRADATION QUANT: CPT | Performed by: EMERGENCY MEDICINE

## 2017-02-06 PROCEDURE — 74011250637 HC RX REV CODE- 250/637: Performed by: EMERGENCY MEDICINE

## 2017-02-06 PROCEDURE — 71020 XR CHEST PA LAT: CPT

## 2017-02-06 PROCEDURE — 65660000000 HC RM CCU STEPDOWN

## 2017-02-06 PROCEDURE — 81001 URINALYSIS AUTO W/SCOPE: CPT | Performed by: EMERGENCY MEDICINE

## 2017-02-06 PROCEDURE — 74011250636 HC RX REV CODE- 250/636: Performed by: EMERGENCY MEDICINE

## 2017-02-06 PROCEDURE — 80048 BASIC METABOLIC PNL TOTAL CA: CPT | Performed by: PHYSICIAN ASSISTANT

## 2017-02-06 PROCEDURE — 82550 ASSAY OF CK (CPK): CPT | Performed by: PHYSICIAN ASSISTANT

## 2017-02-06 PROCEDURE — 93005 ELECTROCARDIOGRAM TRACING: CPT

## 2017-02-06 PROCEDURE — 85025 COMPLETE CBC W/AUTO DIFF WBC: CPT | Performed by: PHYSICIAN ASSISTANT

## 2017-02-06 PROCEDURE — 99285 EMERGENCY DEPT VISIT HI MDM: CPT

## 2017-02-06 PROCEDURE — 96360 HYDRATION IV INFUSION INIT: CPT

## 2017-02-06 RX ORDER — POTASSIUM CHLORIDE 20 MEQ/1
40 TABLET, EXTENDED RELEASE ORAL
Status: DISCONTINUED | OUTPATIENT
Start: 2017-02-06 | End: 2017-02-07

## 2017-02-06 RX ORDER — SODIUM CHLORIDE 9 MG/ML
125 INJECTION, SOLUTION INTRAVENOUS ONCE
Status: COMPLETED | OUTPATIENT
Start: 2017-02-06 | End: 2017-02-07

## 2017-02-06 RX ADMIN — SODIUM CHLORIDE 125 ML/HR: 900 INJECTION, SOLUTION INTRAVENOUS at 20:27

## 2017-02-06 RX ADMIN — NITROGLYCERIN 0.5 INCH: 20 OINTMENT TOPICAL at 20:27

## 2017-02-06 NOTE — IP AVS SNAPSHOT
[address] Patient: Jeniffer Jauregui MRN: [id number] You are allergic to the following Allergen Reactions Niacin Palpitations Other (comments) Stomach irritation Ace Inhibitors Cough Avapro (Irbesartan) Myalgia Bystolic (Nebivolol) Other (comments) Felt like throat closing Catapres (Clonidine) Cough Codeine Nausea and Vomiting Cozaar (Losartan) Not Reported This Time Crestor (Rosuvastatin) Other (comments) Cramps, aches Darvocet A500 (Propoxyphene N-Acetaminophen) Unknown (comments) Diovan (Valsartan) Cough Flagyl (Metronidazole) Other (comments) Mouth and throat irritation Gabapentin Other (comments) Abdominal pain and burning Iodinated Contrast Media - Oral And Iv Dye Other (comments) Throat swelling Iodine Unknown (comments) Lescol (Fluvastatin) Other (comments) Leg cramps Lipitor (Atorvastatin) Myalgia Other (comments) Cramps, aches Lovastatin Other (comments) Leg cramps Nexium (Esomeprazole Magnesium) Other (comments) Stomach upset, burning Pravachol (Pravastatin) Other (comments) Leg cramps Reglan (Metoclopramide) Nausea Only Trazodone Other (comments) Patient states she feels drugged Zetia (Ezetimibe) Other (comments) Cramps, aches Zocor (Simvastatin) Other (comments) Cramps, aches Recent Documentation Height Weight Breastfeeding? BMI OB Status Smoking Status 1.702 m 117.9 kg No 40.72 kg/m2 Hysterectomy Former Smoker Emergency Contacts Name Discharge Info Relation Home Work Mobile Subha Stephen DISCHARGE CAREGIVER [3] Other Relative [6] [phone] Kana Edge  Other Relative [6] [phone] [facility] Zafar  Other Relative [6] [phone] About your hospitalization You were admitted on:  February 6, 2017 You last received care in the:  [facility] [place] [facility] You were discharged on:  February 8, 2017 Unit phone number:  [phone] Why you were hospitalized Your primary diagnosis was:  Not on File Your diagnoses also included:  Chest Pain Providers Seen During Your Hospitalizations Provider Role Specialty Primary office phone Naima Hale MD Attending Provider Emergency Medicine [phone] Kobi Franco MD Attending Provider Internal Medicine [phone] Your Primary Care Physician (PCP) Primary Care Physician Office Phone Office Fax Jackie Flores [phone] Follow-up Information Follow up With Details Comments Contact Info Anuja Case DO On 2/14/2017 @ 9:15 am Partha Washington [address] 200 [facility] 
[phone] Matilde Montanez MD On 2/13/2017 @8:00 am.  Will be done at the [place] office: [address] [place] [address], 10 [facility] [phone]) [address] [place] [address] SUITE [facility] [address] 
[phone] Your Appointments Monday February 13, 2017  8:00 AM EST PROCEDURE with [facility] [place] ([facility] anniviit 112 200 [facility]  
[phone] Tuesday February 14, 2017  9:15 AM EST TRANSITIONAL CARE MANAGEMENT with Anuja Case DO [address] (--)  
 [address] [address] [address] [address] [phone] Tuesday February 14, 2017 10:15 AM EST Follow Up with Matilde Montanez MD  
[facility] [place] ([facility]) Q[address] 112 200 [facility]  
[phone] Current Discharge Medication List  
  
START taking these medications Dose & Instructions Dispensing Information Comments Morning Noon Evening Bedtime  
 cloNIDine HCl 0.1 mg tablet Commonly known as:  CATAPRES Your next dose is: Today, Tomorrow Other:  _________  
   
   
 0.1 mg po twice daily, do not take it if upper blood pressure is less than 110 Quantity:  60 Tab Refills:  0  
     
   
   
   
  
 meclizine 12.5 mg tablet Commonly known as:  ANTIVERT Your next dose is: Today, Tomorrow Other:  _________ Dose:  12.5 mg Take 1 Tab by mouth three (3) times daily as needed for Dizziness for up to 10 days. Quantity:  30 Tab Refills:  0 CONTINUE these medications which have CHANGED Dose & Instructions Dispensing Information Comments Morning Noon Evening Bedtime  
 amLODIPine 2.5 mg tablet Commonly known as:  Vannesa Fraction What changed:   
- medication strength 
- how much to take Your next dose is: Today, Tomorrow Other:  _________ Dose:  2.5 mg Take 1 Tab by mouth daily. Quantity:  30 Tab Refills:  0  
     
   
   
   
  
 insulin glargine 100 unit/mL injection Commonly known as:  LANTUS What changed:  additional instructions Your next dose is: Today, Tomorrow Other:  _________ Take 15 units every morning  Indications: type 2 diabetes mellitus Quantity:  1 Vial  
Refills:  0  
     
   
   
   
  
 levothyroxine 75 mcg tablet Commonly known as:  SYNTHROID What changed:   
- medication strength 
- how much to take Your next dose is: Today, Tomorrow Other:  _________ Dose:  75 mcg Take 1 Tab by mouth Daily (before breakfast). Quantity:  30 Tab Refills:  0  
     
   
   
   
  
 potassium chloride 20 mEq tablet Commonly known as:  K-JOB, RIO-MIRIAM What changed:  when to take this Your next dose is: Today, Tomorrow Other:  _________ Dose:  20 mEq Take 1 Tab by mouth daily. Quantity:  60 Tab Refills:  3 CONTINUE these medications which have NOT CHANGED Dose & Instructions Dispensing Information Comments Morning Noon Evening Bedtime  
 albuterol 90 mcg/actuation inhaler Commonly known as:  PROAIR HFA Your next dose is: Today, Tomorrow Other:  _________ Dose:  1 Puff Take 1 Puff by inhalation every four (4) hours as needed for Wheezing or Shortness of Breath. Quantity:  1 Inhaler Refills:  3 Only Branded version of ProAir  
    
   
   
   
  
 aspirin delayed-release 81 mg tablet Your next dose is: Today, Tomorrow Other:  _________ Dose:  81 mg Take 81 mg by mouth daily. Refills:  0  
     
   
   
   
  
 capsaicin 0.075 % topical cream  
   
Your next dose is: Today, Tomorrow Other:  _________ Apply  to affected area three (3) times daily. Quantity:  60 g Refills:  0  
     
   
   
   
  
 clopidogrel 75 mg Tab Commonly known as:  PLAVIX Your next dose is: Today, Tomorrow Other:  _________ Dose:  75 mg Take 1 Tab by mouth daily. Quantity:  30 Tab Refills:  3  
     
   
   
   
  
 cyanocobalamin ER 1,000 mcg tablet Your next dose is: Today, Tomorrow Other:  _________ Dose:  1000 mcg Take 1 Tab by mouth daily. Quantity:  30 Tab Refills:  3 FISH OIL PO Your next dose is: Today, Tomorrow Other:  _________ Dose:  1000 mg Take 1,000 mg by mouth two (2) times a day. Refills:  0  
     
   
   
   
  
 furosemide 20 mg tablet Commonly known as:  LASIX Your next dose is: Today, Tomorrow Other:  _________ Use daily as needed for leg swelling Quantity:  30 Tab Refills:  2  
     
   
   
   
  
 lidocaine 5 % Commonly known as:  Hamp [place] Your next dose is: Today, Tomorrow Other:  _________ Dose:  1 Patch 1 Patch by TransDERmal route every twenty-four (24) hours. Quantity:  90 Each Refills:  1  
     
   
   
   
  
 montelukast 10 mg tablet Commonly known as:  SINGULAIR Your next dose is: Today, Tomorrow Other:  _________ Dose:  10 mg Take 1 Tab by mouth daily as needed. Indications: ALLERGIC RHINITIS Quantity:  30 Tab Refills:  3 PEPCID 20 mg tablet Generic drug:  famotidine Your next dose is: Today, Tomorrow Other:  _________ Dose:  20 mg Take 20 mg by mouth nightly. Refills:  0  
     
   
   
   
  
 VITAMIN D3 1,000 unit tablet Generic drug:  cholecalciferol Your next dose is: Today, Tomorrow Other:  _________ Dose:  5000 Units Take 5,000 Units by mouth two (2) times a day. Refills:  0 Where to Get Your Medications Information on where to get these meds will be given to you by the nurse or doctor. ! Ask your nurse or doctor about these medications  
  amLODIPine 2.5 mg tablet  
 cloNIDine HCl 0.1 mg tablet  
 insulin glargine 100 unit/mL injection  
 levothyroxine 75 mcg tablet  
 meclizine 12.5 mg tablet  
 potassium chloride 20 mEq tablet Discharge Instructions DISCHARGE SUMMARY from Nurse The following personal items are in your possession at time of discharge: 
 
Dental Appliances: Uppers, With patient Visual Aid: With patient, Glasses Home Medications: None Jewelry: Earrings, Necklace, Ring, With patient, Other (comment) (4 yellow , anklet) Clothing: At bedside, Footwear, Jacket/Coat, Pants, Socks, Undergarments, Other (comment) (blouse) Other Valuables: Cell Phone, [employer], Money (comment), Purse, At bedside ($25.00) Personal Items Sent to Safe: no PATIENT INSTRUCTIONS: 
 
 
F-face looks uneven A-arms unable to move or move unevenly S-speech slurred or non-existent T-time-call 911 as soon as signs and symptoms begin-DO NOT go Back to bed or wait to see if you get better-TIME IS BRAIN. Warning Signs of HEART ATTACK Call 911 if you have these symptoms: 
? Chest discomfort. Most heart attacks involve discomfort in the center of the chest that lasts more than a few minutes, or that goes away and comes back. It can feel like uncomfortable pressure, squeezing, fullness, or pain. ? Discomfort in other areas of the upper body. Symptoms can include pain or discomfort in one or both arms, the back, neck, jaw, or stomach. ? Shortness of breath with or without chest discomfort. ? Other signs may include breaking out in a cold sweat, nausea, or lightheadedness. Don't wait more than five minutes to call [address]! Fast action can save your life. Calling 911 is almost always the fastest way to get lifesaving treatment. Emergency Medical Services staff can begin treatment when they arrive  up to an hour sooner than if someone gets to the hospital by car. The discharge information has been reviewed with the patient. The patient verbalized understanding. Discharge medications reviewed with the patient and appropriate educational materials and side effects teaching were provided. Patient armband removed and shredded MyChart Activation Thank you for requesting access to righTune. Please follow the instructions below to securely access and download your online medical record.  righTune allows you to send messages to your doctor, view your test results, renew your prescriptions, schedule appointments, and more. How Do I Sign Up? 1. In your internet browser, go to [email or web address] 
2. Click on the First Time User? Click Here link in the Sign In box. You will be redirect to the New Member Sign Up page. 3. Enter your [employer] Access Code exactly as it appears below. You will not need to use this code after youve completed the sign-up process. If you do not sign up before the expiration date, you must request a new code. MyChart Access Code: Activation code not generated Current [employer] Status: Active (This is the date your Navutt access code will ) 4. Enter the last four digits of your Social Security Number (xxxx) and Date of Birth (mm/dd/yyyy) as indicated and click Submit. You will be taken to the next sign-up page. 5. Create a [employer] ID. This will be your [employer] login ID and cannot be changed, so think of one that is secure and easy to remember. 6. Create a [employer] password. You can change your password at any time. 7. Enter your Password Reset Question and Answer. This can be used at a later time if you forget your password. 8. Enter your e-mail address. You will receive e-mail notification when new information is available in [address] 9. Click Sign Up. You can now view and download portions of your medical record. 10. Click the Download Summary menu link to download a portable copy of your medical information. Additional Information If you have questions, please visit the Frequently Asked Questions section of the [employer] website at [email or web address] "[email or web address] Remember, [employer] is NOT to be used for urgent needs. For medical emergencies, dial 911. Chest Pain: Care Instructions Your Care Instructions There are many things that can cause chest pain. Some are not serious and will get better on their own in a few days.  But some kinds of chest pain need more testing and treatment. Your doctor may have recommended a follow-up visit in the next 8 to 12 hours. If you are not getting better, you may need more tests or treatment. Even though your doctor has released you, you still need to watch for any problems. The doctor carefully checked you, but sometimes problems can develop later. If you have new symptoms or if your symptoms do not get better, get medical care right away. If you have worse or different chest pain or pressure that lasts more than 5 minutes or you passed out (lost consciousness), call 911 or seek other emergency help right away. A medical visit is only one step in your treatment. Even if you feel better, you still need to do what your doctor recommends, such as going to all suggested follow-up appointments and taking medicines exactly as directed. This will help you recover and help prevent future problems. How can you care for yourself at home? · Rest until you feel better. · Take your medicine exactly as prescribed. Call your doctor if you think you are having a problem with your medicine. · Do not drive after taking a prescription pain medicine. When should you call for help? Call 911 if: 
· You passed out (lost consciousness). · You have severe difficulty breathing. · You have symptoms of a heart attack. These may include: ¨ Chest pain or pressure, or a strange feeling in your chest. 
¨ Sweating. ¨ Shortness of breath. ¨ Nausea or vomiting. ¨ Pain, pressure, or a strange feeling in your back, neck, jaw, or upper belly or in one or both shoulders or arms. ¨ Lightheadedness or sudden weakness. ¨ A fast or irregular heartbeat. After you call 911, the  may tell you to chew 1 adult-strength or 2 to 4 low-dose aspirin. Wait for an ambulance. Do not try to drive yourself. Call your doctor today if: 
· You have any trouble breathing. · Your chest pain gets worse. · You are dizzy or lightheaded, or you feel like you may faint. · You are not getting better as expected. · You are having new or different chest pain. Where can you learn more? Go to http://cristina-mignon.info/. Enter A120 in the search box to learn more about \"Chest Pain: Care Instructions. \" Current as of: May 27, 2016 Content Version: 11.1 © 5458-1481 Link [employer]igger. Care instructions adapted under license by [employer] (which disclaims liability or warranty for this information). If you have questions about a medical condition or this instruction, always ask your healthcare professional. [place] [address] any warranty or liability for your use of this information. Discharge Orders Procedure Order Date Status Priority Quantity Spec Type Associated Dx METABOLIC PANEL, BASIC 21/42/88 1213 Future Routine 1 Blood Comments:  On 2/13/17. Call report to PCP Reason: DM, Hypokalemia DIET DIABETIC CONSISTENT CARB Regular; AHA-LOW-CHOL FAT 02/08/17 1213 Normal Routine 1 Questions: Texture:  Regular Cardiac:  AHA-LOW-CHOL FAT [employer] Announcement We are excited to announce that we are making your provider's discharge notes available to you in [employer]. You will see these notes when they are completed and signed by the physician that discharged you from your recent hospital stay. If you have any questions or concerns about any information you see in [employer], please call the Health Information Department where you were seen or reach out to your Primary Care Provider for more information about your plan of care. Introducing [facility]! Dear Tima Shine: Thank you for requesting a [employer] account. Our records indicate that you already have an active [employer] account. You can access your account anytime at [email or web address] [employer]/ViRTUAL INTERACTiVE Did you know that you can access your hospital and ER discharge instructions at any time in aitainment? You can also review all of your test results from your hospital stay or ER visit. Additional Information If you have questions, please visit the Frequently Asked Questions section of the aitainment website at [email or web address] Remember, PlayerTakesAllhart is NOT to be used for urgent needs. For medical emergencies, dial 911. Now available from your iPhone and Android! General Information Please provide this summary of care documentation to your next provider. Patient Signature:  ____________________________________________________________ Date:  ____________________________________________________________  
  
[place] [facility] Provider Signature:  ____________________________________________________________ Date:  ____________________________________________________________

## 2017-02-06 NOTE — IP AVS SNAPSHOT
Current Discharge Medication List  
  
Take these medications at their scheduled times Dose & Instructions Dispensing Information Comments Morning Noon Evening Bedtime  
 amLODIPine 2.5 mg tablet Commonly known as:  Wandra Brianda Your next dose is: Today, Tomorrow Other:  ____________ Dose:  2.5 mg Take 1 Tab by mouth daily. Quantity:  30 Tab Refills:  0  
     
   
   
   
  
 aspirin delayed-release 81 mg tablet Your next dose is: Today, Tomorrow Other:  ____________ Dose:  81 mg Take 81 mg by mouth daily. Refills:  0  
     
   
   
   
  
 capsaicin 0.075 % topical cream  
   
Your next dose is: Today, Tomorrow Other:  ____________ Apply  to affected area three (3) times daily. Quantity:  60 g Refills:  0  
     
   
   
   
  
 clopidogrel 75 mg Tab Commonly known as:  PLAVIX Your next dose is: Today, Tomorrow Other:  ____________ Dose:  75 mg Take 1 Tab by mouth daily. Quantity:  30 Tab Refills:  3  
     
   
   
   
  
 cyanocobalamin ER 1,000 mcg tablet Your next dose is: Today, Tomorrow Other:  ____________ Dose:  1000 mcg Take 1 Tab by mouth daily. Quantity:  30 Tab Refills:  3 FISH OIL PO Your next dose is: Today, Tomorrow Other:  ____________ Dose:  1000 mg Take 1,000 mg by mouth two (2) times a day. Refills:  0  
     
   
   
   
  
 levothyroxine 75 mcg tablet Commonly known as:  SYNTHROID Your next dose is: Today, Tomorrow Other:  ____________ Dose:  75 mcg Take 1 Tab by mouth Daily (before breakfast). Quantity:  30 Tab Refills:  0  
     
   
   
   
  
 lidocaine 5 % Commonly known as:  Jurline Nam Your next dose is: Today, Tomorrow Other:  ____________ Dose:  1 Patch 1 Patch by TransDERmal route every twenty-four (24) hours. Quantity:  90 Each Refills:  1 PEPCID 20 mg tablet Generic drug:  famotidine Your next dose is: Today, Tomorrow Other:  ____________ Dose:  20 mg Take 20 mg by mouth nightly. Refills:  0  
     
   
   
   
  
 potassium chloride 20 mEq tablet Commonly known as:  K-DUR, KLOR-CON Your next dose is: Today, Tomorrow Other:  ____________ Dose:  20 mEq Take 1 Tab by mouth daily. Quantity:  60 Tab Refills:  3 VITAMIN D3 1,000 unit tablet Generic drug:  cholecalciferol Your next dose is: Today, Tomorrow Other:  ____________ Dose:  5000 Units Take 5,000 Units by mouth two (2) times a day. Refills:  0 Take these medications as needed Dose & Instructions Dispensing Information Comments Morning Noon Evening Bedtime  
 albuterol 90 mcg/actuation inhaler Commonly known as:  PROAIR HFA Your next dose is: Today, Tomorrow Other:  ____________ Dose:  1 Puff Take 1 Puff by inhalation every four (4) hours as needed for Wheezing or Shortness of Breath. Quantity:  1 Inhaler Refills:  3 Only Branded version of ProAir  
    
   
   
   
  
 meclizine 12.5 mg tablet Commonly known as:  ANTIVERT Your next dose is: Today, Tomorrow Other:  ____________ Dose:  12.5 mg Take 1 Tab by mouth three (3) times daily as needed for Dizziness for up to 10 days. Quantity:  30 Tab Refills:  0  
     
   
   
   
  
 montelukast 10 mg tablet Commonly known as:  SINGULAIR Your next dose is: Today, Tomorrow Other:  ____________ Dose:  10 mg Take 1 Tab by mouth daily as needed. Indications: ALLERGIC RHINITIS Quantity:  30 Tab Refills:  3 Take these medications as directed Dose & Instructions Dispensing Information Comments Morning Noon Evening Bedtime  
 cloNIDine HCl 0.1 mg tablet Commonly known as:  CATAPRES Your next dose is: Today, Tomorrow Other:  ____________  
   
   
 0.1 mg po twice daily, do not take it if upper blood pressure is less than 110 Quantity:  60 Tab Refills:  0  
     
   
   
   
  
 furosemide 20 mg tablet Commonly known as:  LASIX Your next dose is: Today, Tomorrow Other:  ____________ Use daily as needed for leg swelling Quantity:  30 Tab Refills:  2  
     
   
   
   
  
 insulin glargine 100 unit/mL injection Commonly known as:  LANTUS Your next dose is: Today, Tomorrow Other:  ____________ Take 15 units every morning  Indications: type 2 diabetes mellitus Quantity:  1 Vial  
Refills:  0 Where to Get Your Medications Information about where to get these medications is not yet available ! Ask your nurse or doctor about these medications  
  amLODIPine 2.5 mg tablet  
 cloNIDine HCl 0.1 mg tablet  
 insulin glargine 100 unit/mL injection  
 levothyroxine 75 mcg tablet  
 meclizine 12.5 mg tablet  
 potassium chloride 20 mEq tablet

## 2017-02-06 NOTE — ED NOTES
I performed a brief evaluation, including history and physical, of the patient here in triage and I have determined that pt will need further treatment and evaluation from the main side ER physician. I have placed initial orders to help in expediting patients care.      February 06, 2017 at 4:38 PM - Becca Morris PA-C        Visit Vitals    /88 (BP 1 Location: Left arm, BP Patient Position: Sitting)    Pulse 95    Temp 98.5 °F (36.9 °C)    Resp 18    Ht 5' 7\" (1.702 m)    Wt 114.3 kg (252 lb)    SpO2 97%    BMI 39.47 kg/m2 Report given to CAROLINA Nayak.

## 2017-02-07 ENCOUNTER — APPOINTMENT (OUTPATIENT)
Dept: NUCLEAR MEDICINE | Age: 82
DRG: 313 | End: 2017-02-07
Attending: EMERGENCY MEDICINE
Payer: MEDICARE

## 2017-02-07 LAB
ATRIAL RATE: 110 BPM
ATRIAL RATE: 81 BPM
CALCULATED P AXIS, ECG09: 78 DEGREES
CALCULATED P AXIS, ECG09: 81 DEGREES
CALCULATED R AXIS, ECG10: -14 DEGREES
CALCULATED R AXIS, ECG10: 3 DEGREES
CALCULATED T AXIS, ECG11: 18 DEGREES
CALCULATED T AXIS, ECG11: 32 DEGREES
CHOLEST SERPL-MCNC: 179 MG/DL
CK MB CFR SERPL CALC: 1 % (ref 0–4)
CK MB CFR SERPL CALC: ABNORMAL % (ref 0–4)
CK MB SERPL-MCNC: 0.7 NG/ML (ref 0.5–3.6)
CK MB SERPL-MCNC: <0.5 NG/ML (ref 0.5–3.6)
CK SERPL-CCNC: 57 U/L (ref 26–192)
CK SERPL-CCNC: 69 U/L (ref 26–192)
DIAGNOSIS, 93000: NORMAL
DIAGNOSIS, 93000: NORMAL
EST. AVERAGE GLUCOSE BLD GHB EST-MCNC: 194 MG/DL
GLUCOSE BLD STRIP.AUTO-MCNC: 132 MG/DL (ref 70–110)
GLUCOSE BLD STRIP.AUTO-MCNC: 164 MG/DL (ref 70–110)
GLUCOSE BLD STRIP.AUTO-MCNC: 184 MG/DL (ref 70–110)
GLUCOSE BLD STRIP.AUTO-MCNC: 246 MG/DL (ref 70–110)
HBA1C MFR BLD: 8.4 % (ref 4.2–5.6)
HDLC SERPL-MCNC: 37 MG/DL (ref 40–60)
HDLC SERPL: 4.8 {RATIO} (ref 0–5)
LDLC SERPL CALC-MCNC: 126.6 MG/DL (ref 0–100)
LIPID PROFILE,FLP: ABNORMAL
P-R INTERVAL, ECG05: 138 MS
P-R INTERVAL, ECG05: 146 MS
Q-T INTERVAL, ECG07: 348 MS
Q-T INTERVAL, ECG07: 400 MS
QRS DURATION, ECG06: 80 MS
QRS DURATION, ECG06: 82 MS
QTC CALCULATION (BEZET), ECG08: 464 MS
QTC CALCULATION (BEZET), ECG08: 470 MS
T4 FREE SERPL-MCNC: 1.4 NG/DL (ref 0.7–1.5)
TRIGL SERPL-MCNC: 77 MG/DL (ref ?–150)
TROPONIN I SERPL-MCNC: 0.02 NG/ML (ref 0–0.04)
TROPONIN I SERPL-MCNC: <0.02 NG/ML (ref 0–0.06)
TSH SERPL DL<=0.05 MIU/L-ACNC: 0.02 UIU/ML (ref 0.36–3.74)
VENTRICULAR RATE, ECG03: 110 BPM
VENTRICULAR RATE, ECG03: 81 BPM
VLDLC SERPL CALC-MCNC: 15.4 MG/DL

## 2017-02-07 PROCEDURE — 93880 EXTRACRANIAL BILAT STUDY: CPT

## 2017-02-07 PROCEDURE — 83036 HEMOGLOBIN GLYCOSYLATED A1C: CPT | Performed by: EMERGENCY MEDICINE

## 2017-02-07 PROCEDURE — 74011250637 HC RX REV CODE- 250/637: Performed by: EMERGENCY MEDICINE

## 2017-02-07 PROCEDURE — 36415 COLL VENOUS BLD VENIPUNCTURE: CPT | Performed by: EMERGENCY MEDICINE

## 2017-02-07 PROCEDURE — 82550 ASSAY OF CK (CPK): CPT | Performed by: EMERGENCY MEDICINE

## 2017-02-07 PROCEDURE — 74011250637 HC RX REV CODE- 250/637: Performed by: INTERNAL MEDICINE

## 2017-02-07 PROCEDURE — A9567 TECHNETIUM TC-99M AEROSOL: HCPCS

## 2017-02-07 PROCEDURE — 82962 GLUCOSE BLOOD TEST: CPT

## 2017-02-07 PROCEDURE — 74011000258 HC RX REV CODE- 258: Performed by: EMERGENCY MEDICINE

## 2017-02-07 PROCEDURE — 84443 ASSAY THYROID STIM HORMONE: CPT | Performed by: INTERNAL MEDICINE

## 2017-02-07 PROCEDURE — 74011000250 HC RX REV CODE- 250: Performed by: EMERGENCY MEDICINE

## 2017-02-07 PROCEDURE — 93005 ELECTROCARDIOGRAM TRACING: CPT

## 2017-02-07 PROCEDURE — 65660000000 HC RM CCU STEPDOWN

## 2017-02-07 PROCEDURE — 74011250637 HC RX REV CODE- 250/637

## 2017-02-07 PROCEDURE — 80061 LIPID PANEL: CPT | Performed by: EMERGENCY MEDICINE

## 2017-02-07 PROCEDURE — 74011250636 HC RX REV CODE- 250/636: Performed by: EMERGENCY MEDICINE

## 2017-02-07 PROCEDURE — 74011636637 HC RX REV CODE- 636/637: Performed by: EMERGENCY MEDICINE

## 2017-02-07 PROCEDURE — 93306 TTE W/DOPPLER COMPLETE: CPT

## 2017-02-07 PROCEDURE — 84439 ASSAY OF FREE THYROXINE: CPT | Performed by: INTERNAL MEDICINE

## 2017-02-07 RX ORDER — MONTELUKAST SODIUM 10 MG/1
10 TABLET ORAL DAILY
Status: DISCONTINUED | OUTPATIENT
Start: 2017-02-07 | End: 2017-02-08 | Stop reason: HOSPADM

## 2017-02-07 RX ORDER — LANOLIN ALCOHOL/MO/W.PET/CERES
1000 CREAM (GRAM) TOPICAL DAILY
Status: DISCONTINUED | OUTPATIENT
Start: 2017-02-07 | End: 2017-02-08 | Stop reason: HOSPADM

## 2017-02-07 RX ORDER — ACETAMINOPHEN 325 MG/1
TABLET ORAL
Status: COMPLETED
Start: 2017-02-07 | End: 2017-02-07

## 2017-02-07 RX ORDER — HEPARIN SODIUM 5000 [USP'U]/ML
5000 INJECTION, SOLUTION INTRAVENOUS; SUBCUTANEOUS EVERY 8 HOURS
Status: DISCONTINUED | OUTPATIENT
Start: 2017-02-07 | End: 2017-02-08 | Stop reason: HOSPADM

## 2017-02-07 RX ORDER — CLOPIDOGREL BISULFATE 75 MG/1
75 TABLET ORAL DAILY
Status: DISCONTINUED | OUTPATIENT
Start: 2017-02-07 | End: 2017-02-08 | Stop reason: HOSPADM

## 2017-02-07 RX ORDER — ALBUTEROL SULFATE 90 UG/1
1 AEROSOL, METERED RESPIRATORY (INHALATION)
Status: DISCONTINUED | OUTPATIENT
Start: 2017-02-07 | End: 2017-02-07 | Stop reason: CLARIF

## 2017-02-07 RX ORDER — ACETAMINOPHEN 325 MG/1
650 TABLET ORAL
Status: DISCONTINUED | OUTPATIENT
Start: 2017-02-07 | End: 2017-02-08 | Stop reason: HOSPADM

## 2017-02-07 RX ORDER — DOCUSATE SODIUM 100 MG/1
100 CAPSULE, LIQUID FILLED ORAL 2 TIMES DAILY
Status: DISCONTINUED | OUTPATIENT
Start: 2017-02-07 | End: 2017-02-08 | Stop reason: HOSPADM

## 2017-02-07 RX ORDER — ASPIRIN 81 MG/1
81 TABLET ORAL DAILY
Status: DISCONTINUED | OUTPATIENT
Start: 2017-02-07 | End: 2017-02-08 | Stop reason: HOSPADM

## 2017-02-07 RX ORDER — INSULIN LISPRO 100 [IU]/ML
INJECTION, SOLUTION INTRAVENOUS; SUBCUTANEOUS
Status: DISCONTINUED | OUTPATIENT
Start: 2017-02-07 | End: 2017-02-08 | Stop reason: HOSPADM

## 2017-02-07 RX ORDER — POTASSIUM CHLORIDE 20 MEQ/1
40 TABLET, EXTENDED RELEASE ORAL
Status: COMPLETED | OUTPATIENT
Start: 2017-02-07 | End: 2017-02-07

## 2017-02-07 RX ORDER — MELATONIN
1000 2 TIMES DAILY
Status: DISCONTINUED | OUTPATIENT
Start: 2017-02-07 | End: 2017-02-08 | Stop reason: HOSPADM

## 2017-02-07 RX ORDER — LOPERAMIDE HCL 2 MG
1000 TABLET ORAL DAILY
Status: DISCONTINUED | OUTPATIENT
Start: 2017-02-07 | End: 2017-02-07 | Stop reason: RX

## 2017-02-07 RX ORDER — SODIUM CHLORIDE 9 MG/ML
10 INJECTION INTRAMUSCULAR; INTRAVENOUS; SUBCUTANEOUS
Status: DISPENSED | OUTPATIENT
Start: 2017-02-07 | End: 2017-02-07

## 2017-02-07 RX ORDER — AMLODIPINE BESYLATE 5 MG/1
5 TABLET ORAL DAILY
Status: DISCONTINUED | OUTPATIENT
Start: 2017-02-07 | End: 2017-02-08

## 2017-02-07 RX ORDER — DEXTROSE 50 % IN WATER (D50W) INTRAVENOUS SYRINGE
25-50 AS NEEDED
Status: DISCONTINUED | OUTPATIENT
Start: 2017-02-07 | End: 2017-02-08 | Stop reason: HOSPADM

## 2017-02-07 RX ORDER — CLONIDINE HYDROCHLORIDE 0.1 MG/1
0.1 TABLET ORAL 2 TIMES DAILY
Status: DISCONTINUED | OUTPATIENT
Start: 2017-02-07 | End: 2017-02-08 | Stop reason: HOSPADM

## 2017-02-07 RX ORDER — FAMOTIDINE 20 MG/1
20 TABLET, FILM COATED ORAL
Status: DISCONTINUED | OUTPATIENT
Start: 2017-02-07 | End: 2017-02-08 | Stop reason: HOSPADM

## 2017-02-07 RX ORDER — POTASSIUM CHLORIDE 1.5 G/1.77G
20 POWDER, FOR SOLUTION ORAL
Status: COMPLETED | OUTPATIENT
Start: 2017-02-07 | End: 2017-02-07

## 2017-02-07 RX ORDER — MAGNESIUM SULFATE 100 %
4 CRYSTALS MISCELLANEOUS AS NEEDED
Status: DISCONTINUED | OUTPATIENT
Start: 2017-02-07 | End: 2017-02-08 | Stop reason: HOSPADM

## 2017-02-07 RX ORDER — MECLIZINE HCL 12.5 MG 12.5 MG/1
12.5 TABLET ORAL
Status: DISCONTINUED | OUTPATIENT
Start: 2017-02-07 | End: 2017-02-08 | Stop reason: HOSPADM

## 2017-02-07 RX ORDER — INSULIN LISPRO 100 [IU]/ML
INJECTION, SOLUTION INTRAVENOUS; SUBCUTANEOUS EVERY 6 HOURS
Status: DISCONTINUED | OUTPATIENT
Start: 2017-02-07 | End: 2017-02-07

## 2017-02-07 RX ORDER — SODIUM CHLORIDE 9 MG/ML
40 INJECTION, SOLUTION INTRAVENOUS CONTINUOUS
Status: DISCONTINUED | OUTPATIENT
Start: 2017-02-07 | End: 2017-02-07

## 2017-02-07 RX ORDER — MONTELUKAST SODIUM 10 MG/1
10 TABLET ORAL
Status: DISCONTINUED | OUTPATIENT
Start: 2017-02-07 | End: 2017-02-07

## 2017-02-07 RX ORDER — ALBUTEROL SULFATE 0.83 MG/ML
2.5 SOLUTION RESPIRATORY (INHALATION)
Status: DISCONTINUED | OUTPATIENT
Start: 2017-02-07 | End: 2017-02-08 | Stop reason: HOSPADM

## 2017-02-07 RX ORDER — POTASSIUM CHLORIDE 1.5 G/1.77G
20 POWDER, FOR SOLUTION ORAL DAILY
Status: DISCONTINUED | OUTPATIENT
Start: 2017-02-07 | End: 2017-02-07

## 2017-02-07 RX ADMIN — VITAMIN D, TAB 1000IU (100/BT) 1000 UNITS: 25 TAB at 12:11

## 2017-02-07 RX ADMIN — VITAMIN D, TAB 1000IU (100/BT) 1000 UNITS: 25 TAB at 17:01

## 2017-02-07 RX ADMIN — POTASSIUM CHLORIDE 20 MEQ: 1.5 POWDER, FOR SOLUTION ORAL at 02:38

## 2017-02-07 RX ADMIN — HEPARIN SODIUM 5000 UNITS: 5000 INJECTION, SOLUTION INTRAVENOUS; SUBCUTANEOUS at 14:17

## 2017-02-07 RX ADMIN — ACETAMINOPHEN 650 MG: 325 TABLET, FILM COATED ORAL at 23:29

## 2017-02-07 RX ADMIN — MECLIZINE 12.5 MG: 12.5 TABLET ORAL at 17:01

## 2017-02-07 RX ADMIN — INSULIN LISPRO 2 UNITS: 100 INJECTION, SOLUTION INTRAVENOUS; SUBCUTANEOUS at 17:48

## 2017-02-07 RX ADMIN — SODIUM CHLORIDE 40 ML/HR: 900 INJECTION, SOLUTION INTRAVENOUS at 06:03

## 2017-02-07 RX ADMIN — DOCUSATE SODIUM 100 MG: 100 CAPSULE, LIQUID FILLED ORAL at 12:11

## 2017-02-07 RX ADMIN — Medication 1 CAPSULE: at 12:10

## 2017-02-07 RX ADMIN — HEPARIN SODIUM 5000 UNITS: 5000 INJECTION, SOLUTION INTRAVENOUS; SUBCUTANEOUS at 06:54

## 2017-02-07 RX ADMIN — LIDOCAINE HYDROCHLORIDE: 10 INJECTION, SOLUTION EPIDURAL; INFILTRATION; INTRACAUDAL; PERINEURAL at 12:10

## 2017-02-07 RX ADMIN — AMLODIPINE BESYLATE 5 MG: 5 TABLET ORAL at 12:10

## 2017-02-07 RX ADMIN — CLOPIDOGREL BISULFATE 75 MG: 75 TABLET ORAL at 12:10

## 2017-02-07 RX ADMIN — LIDOCAINE HYDROCHLORIDE: 10 INJECTION, SOLUTION EPIDURAL; INFILTRATION; INTRACAUDAL; PERINEURAL at 11:00

## 2017-02-07 RX ADMIN — Medication 1 CAPSULE: at 17:03

## 2017-02-07 RX ADMIN — POTASSIUM CHLORIDE 40 MEQ: 1500 TABLET, EXTENDED RELEASE ORAL at 14:17

## 2017-02-07 RX ADMIN — MONTELUKAST SODIUM 10 MG: 10 TABLET, FILM COATED ORAL at 12:11

## 2017-02-07 RX ADMIN — CLONIDINE HYDROCHLORIDE 0.1 MG: 0.1 TABLET ORAL at 17:01

## 2017-02-07 RX ADMIN — FAMOTIDINE 20 MG: 20 TABLET ORAL at 21:39

## 2017-02-07 RX ADMIN — CYANOCOBALAMIN TAB 1000 MCG 1000 MCG: 1000 TAB at 12:11

## 2017-02-07 RX ADMIN — ASPIRIN 81 MG: 81 TABLET, COATED ORAL at 12:10

## 2017-02-07 RX ADMIN — INSULIN LISPRO 4 UNITS: 100 INJECTION, SOLUTION INTRAVENOUS; SUBCUTANEOUS at 21:39

## 2017-02-07 NOTE — DIABETES MGMT
NUTRITIONAL ASSESSMENT & GLYCEMIC CONTROL CARE PLAN     Maria Elena Acosta           [de-identified] y.o.           2/6/2017                 Patient Active Problem List   Diagnosis Code    Essential hypertension I10    Unspecified hypothyroidism E03.9    Unspecified vitamin D deficiency E55.9    Unspecified asthma(493.90)     Esophageal reflux K21.9    Noncompliance with medication regimen Z91.14    Arm pain M79.603    Neck pain M54.2    Anxiety F41.9    S/P joint replacement Z96.60    DJD (degenerative joint disease) M19.90    Polyneuropathy in diabetes(357.2)     Dizziness R42    Chronic neck pain M54.2, G89.29    Chronic back pain M54.9, G89.29    Leg pain, right M79.604    Hypothyroidism E03.9    Diabetes mellitus type 2, controlled ([facility]) E11.9    Morbid obesity ([facility]) E66.01    Unspecified transient cerebral ischemia G45.9    Congestive heart failure ([facility]) I50.9    Mixed hyperlipidemia E78.2    Coronary atherosclerosis of native coronary artery I25.10    Chronic diastolic heart failure ([facility]) I50.32    Benign hypertensive heart disease without heart failure I11.9    Seasonal and perennial allergic rhinitis J30.89, J30.2    Medication monitoring encounter Z51.81    Tear of left rotator cuff K05.498    Uncomplicated asthma J23.164    Moderate persistent asthma with status asthmaticus J45.42    NSTEMI (non-ST elevated myocardial infarction) ([facility]) I21.4    S/P drug eluting coronary stent placement Z95.5    Advanced care planning/counseling discussion Z71.89    Chest pain R07.9      INTERVENTIONS/PLAN:     Diabetic education. [facility] now that pt has a diet order. Will continue to monitor inpatient for intervention. ASSESSMENT:   Nutritional Status:     Pt is overweight related to excess caloric intake as evidenced by 193% ideal weight and BMI= 40.8kg/m2. Pt meets criteria for morbid obesity.     Diabetes Management:   Pt is [de-identified] yr old female admitted on 2/6/17  With left sided chest pain. Pt with past medical history significant for polyneuropathy, DM, Body mass index is 40.69 kg/(m^2). - Morbid obesity, hypothyroidism, vitamin D deficiency, NSTEMI, DJD. Recent blood glucose:   Results for Nba Ivan (MRN [id number]) as of 2/7/2017 15:50   Ref.  Range 2/7/2017 06:01 2/7/2017 12:12   GLUCOSE,FAST - POC Latest Ref Range: 70 - 110 mg/dL 184 (H) 132 (H)     Within target range (non-ICU: <140; ICU<180): [] Yes   [x]  No    Current Insulin regimen: correctional lispro every 6 hours- normal insulin sensitivity scale    Home medication/insulin regimen per pt report: 20 units lantus in the morning, 35 units at night    HbA1c: 8.4% equivalent  to ave Blood Glucose of 194mg/dl for 2-3 months prior to admission    Adequate glycemic control PTA:  [] Yes  [x] No       SUBJECTIVE/OBJECTIVE:   Information obtained from: pt, chart review    Diet: diabetic consistent carbohydrate, aha low chol fat      Medications: [x]                Reviewed     Most Recent POC Glucose:   Recent Labs      02/06/17 1955   GLU  136*         Labs:   Lab Results   Component Value Date/Time    Hemoglobin A1c 8.4 02/07/2017 06:15 AM    Hemoglobin A1c, External 8.3 04/07/2015     Lab Results   Component Value Date/Time    Sodium 142 02/06/2017 07:55 PM    Potassium 3.1 02/06/2017 07:55 PM    Chloride 103 02/06/2017 07:55 PM    CO2 28 02/06/2017 07:55 PM    Anion gap 11 02/06/2017 07:55 PM    Glucose 136 02/06/2017 07:55 PM    BUN 6 02/06/2017 07:55 PM    Creatinine 0.58 02/06/2017 07:55 PM    Calcium 9.2 02/06/2017 07:55 PM    Magnesium 2.0 09/04/2016 04:55 PM    Phosphorus 4.2 06/10/2016 10:36 AM    Albumin 3.4 11/14/2016 01:11 PM       Anthropometrics: IBW : 61 kg (134 lb 7.7 oz), % IBW (Calculated): 193.19 %, BMI (calculated): 40.8  Wt Readings from Last 1 Encounters:   02/07/17 117.8 kg (259 lb 12.8 oz)      Ht Readings from Last 1 Encounters:   02/07/17 5' 7\" (1.702 m) Estimated Nutrition Needs:   [x] MSJ x 1.2-1.3 (minus 500 kcal for weight loss) 1632-7734 kcal Protein (0.8-1 g/kg)  g/day   Based on:   [x]          Actual BW (117.8 kg)   []          SBW   []            Adjusted BW        Nutrition Diagnoses:       Altered nutrition related lab values related to HbA1c as evidenced by HbA1c of 8.4%    Nutrition Interventions: Diabetic education  Goal: Patient/family will demonstrate understanding of Diabetes Self Management Skills by: (date) _2/14/17  Intervention :Food/Nutrient Delivery: Yes  Intervention: Nutrition Education: Yes  Intervention: Nutrition Counseling: Yes     Nutrition Monitoring and Evaluation      [x]     Monitor po intake   [x]     Continue inpatient monitoring and intervention  [x]     BG Within target range (non-ICU: <140; ICU<180) BY 2/10/17    True Tyson, MS, RD, CDE

## 2017-02-07 NOTE — PROGRESS NOTES
Complete 2D echocardiogram study was performed on patient. Report to follow. Patient went back to room with armband still in place.

## 2017-02-07 NOTE — INTERDISCIPLINARY ROUNDS
IDR/SLIDR Summary          Patient: Evan Moreira MRN: [id number]    Age: [de-identified] y.o. YOB: 1935 Room/Bed: [facility]/   Admit Diagnosis: Chest pain  Principal Diagnosis: <principal problem not specified>   Goals: safety, no ain  Readmission: NO  Quality Measure: Not applicable  VTE Prophylaxis: Not needed  Influenza Vaccine screening completed? YES  Pneumococcal Vaccine screening completed? NO  Mobility needs: No   Nutrition plan:No  Consults:P.T, O.T. and Case Management    Financial concerns:No  Escalated to CM? NO  RRAT Score: 48   Interventions:Home Health  Testing due for pt today?  NO  LOS: 1 days Expected length of stay 5 days  Discharge plan: home   PCP: Nishant Mg DO  Transportation needs: No    Days before discharge:discharge pending  Discharge disposition: Home    Signed:     Lori Ash  2/7/2017  5:08 AM
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
## 2017-02-07 NOTE — ED NOTES
Pt affect remains calm, skin warm/dry, respirations regular/non labored. Denies dizziness/shortness of breath/chest pain/other concerns. No distress noted.

## 2017-02-07 NOTE — ROUTINE PROCESS
Admitted from [facility] ED per stretcher, awake, alert and oriented with chief complaints of chest pain. Not in acute distress, will monitor.

## 2017-02-07 NOTE — PROGRESS NOTES
Care Management Interventions  PCP Verified by CM: Yes  Transition of Care Consult (CM Consult): Discharge Planning  Discharge Durable Medical Equipment: No (Pt uses a cane.)  Health Maintenance Reviewed: Yes  Physical Therapy Consult: Yes  Occupational Therapy Consult: Yes  Speech Therapy Consult: No  Current Support Network: Lives Alone  Confirm Follow Up Transport: Family  Plan discussed with Pt/Family/Caregiver: Yes  Discharge Location  Discharge Placement: Home  Pt is alone in room 216. Pt verified her home address as listed on face sheet. Pt's NOK and granddaughter is [place] Espinoza- [phone]. Pt states she cares for herself at home. Pt is planning to return home upon d/c.

## 2017-02-07 NOTE — ROUTINE PROCESS
TRANSFER - OUT REPORT:    Verbal report given to [address] RN(name) on Carina Pickett  being transferred to (unit) for routine progression of care for chest pain    Report consisted of patients Situation, Background, Assessment and   Recommendations(SBAR). Current treatments initiated/plan of care explained. Information from the following report(s) SBAR was reviewed with the receiving nurse. Lines:   Peripheral IV 02/06/17 Left Antecubital (Active)   Site Assessment Clean, dry, & intact 2/6/2017  7:55 PM   Phlebitis Assessment 0 2/6/2017  7:55 PM   Infiltration Assessment 0 2/6/2017  7:55 PM   Dressing Status Clean, dry, & intact; Occlusive 2/6/2017  7:55 PM   Dressing Type Tape;Transparent 2/6/2017  7:55 PM   Hub Color/Line Status Patent; Flushed;Blue 2/6/2017  7:55 PM   Action Taken Blood drawn 2/6/2017  7:55 PM        Opportunity for questions and clarification was provided.       Patient transported with:  Monitor/iv infusion/cannula

## 2017-02-07 NOTE — PROGRESS NOTES
SUBJECTIVE:    No chest pain. No SOB or cough. No nausea and vomiting. Off and on dizziness for last 2 months. She has caregivers during daytime. She states she does not have anyone to take her home today and would like to go home tomorrow. OBJECTIVE:    Visit Vitals    /82 (BP 1 Location: Right arm, BP Patient Position: Standing)    Pulse (!) 122    Temp 98.3 °F (36.8 °C)    Resp 18    Ht 5' 7\" (1.702 m)    Wt 117.8 kg (259 lb 12.8 oz)    SpO2 96%    Breastfeeding No    BMI 40.69 kg/m2     RRR  CTA bilaterally  No orthostatic hypotension  NT, BS +  No pedal edema  NAD    ASSESSMENT:    1. Atypical Chest pain, no ACS. 2. Coronary artery disease. 3. Hypothyroidism. 4. Hypertension. 5. Chronic diastolic congestive heart failure, with EF of 45 to 50%. 6. Type 2 diabetes. 7. Dizziness  8. Hypokalemia     PLAN:    Negative VQ scan. Talked to dr. Brian Gregory - stress test can be done as an outpatient  Carotid doppler ordered  ECHO report reviewed  Clonidine. Patient has tolerated it before. Only had cough.   Will monitor her her  Meclizine trial  Discharge home tomorrow if remains stable overnight    CMP:   Lab Results   Component Value Date/Time     02/06/2017 07:55 PM    K 3.1 (L) 02/06/2017 07:55 PM     02/06/2017 07:55 PM    CO2 28 02/06/2017 07:55 PM    AGAP 11 02/06/2017 07:55 PM     (H) 02/06/2017 07:55 PM    BUN 6 (L) 02/06/2017 07:55 PM    CREA 0.58 (L) 02/06/2017 07:55 PM    GFRAA >60 02/06/2017 07:55 PM    GFRNA >60 02/06/2017 07:55 PM    CA 9.2 02/06/2017 07:55 PM     CBC:   Lab Results   Component Value Date/Time    WBC 5.7 02/06/2017 07:55 PM    HGB 12.5 02/06/2017 07:55 PM    HCT 39.2 02/06/2017 07:55 PM     02/06/2017 07:55 PM

## 2017-02-07 NOTE — PROGRESS NOTES
conducted an initial consultation and Spiritual Assessment for Dominic Vasquez, who is a [de-identified] y.o.,female. Patients Primary Language is: [place]. According to the patients EMR Jain Affiliation is: [facility].     The reason the Patient came to the hospital is:   Patient Active Problem List    Diagnosis Date Noted    Chest pain 02/06/2017    Advanced care planning/counseling discussion 08/17/2016    S/P drug eluting coronary stent placement 06/10/2016    NSTEMI (non-ST elevated myocardial infarction) ([facility] [address] 05/28/2016    Moderate persistent asthma with status asthmaticus 98/05/3948    Uncomplicated asthma 38/86/4606    Tear of left rotator cuff 03/08/2016    Medication monitoring encounter 01/12/2015    Seasonal and perennial allergic rhinitis 07/30/2013    Morbid obesity ([facility] [address]     Unspecified transient cerebral ischemia     Congestive heart failure (HCC)     Mixed hyperlipidemia     Coronary atherosclerosis of native coronary artery     Chronic diastolic heart failure (HCC)     Benign hypertensive heart disease without heart failure     Diabetes mellitus type 2, controlled ([facility] [address] 12/04/2012    Hypothyroidism     Leg pain, right 09/05/2012    Polyneuropathy in diabetes(357.2) 04/20/2012    Dizziness 04/20/2012    Chronic neck pain 04/20/2012    Chronic back pain 04/20/2012    DJD (degenerative joint disease)     S/P joint replacement     Noncompliance with medication regimen 02/08/2011    Arm pain 02/08/2011    Neck pain 02/08/2011    Anxiety 02/08/2011    Essential hypertension 05/20/2010    Unspecified hypothyroidism 05/20/2010    Unspecified vitamin D deficiency 05/20/2010    Unspecified asthma(493.90) 05/20/2010    Esophageal reflux 05/20/2010        The  provided the following Interventions:  Initiated a relationship of care and support. Explored issues of vinod, belief, spirituality and Pentecostal/ritual needs while hospitalized.   Listened empathically. Offered prayer and assurance of continued prayers on patient's behalf. Chart reviewed. The following outcomes where achieved:  Patient shared limited information about both their medical narrative and spiritual journey/beliefs.  confirmed Patient's Moravian Affiliation. Patient processed feeling about current hospitalization. Patient expressed gratitude for 's visit. Assessment:  Patient does not have any Lutheran/cultural needs that will affect patients preferences in health care. There are no spiritual or Lutheran issues which require intervention at this time. Plan:  Chaplains will continue to follow and will provide pastoral care on an as needed/requested basis.  recommends bedside caregivers page  on duty if patient shows signs of acute spiritual or emotional distress.       [address] [facility]   [phone]

## 2017-02-07 NOTE — H&P
[facility]  ROUTINE H AND PS    Name:  Mary Vernon  MR#:  [id number]  :  1935  Account #:  [de-identified]  Date of Adm:  2017      CHIEF COMPLAINT: Left-sided chest pain. HISTORY OF PRESENT ILLNESS: This is an 73-year-old [facility]  American female who presented to [place] [address] ER with complaints  of left-sided chest pain. The patient states the pain started 2 days ago  and has been intermittent. The patient describes it in the left lower  chest. The patient describes it as a dull pain with episodes lasting  around 10 minutes. The patient describes it as nonradiating and  nonreproducible. No associated shortness of breath or palpitations. No  excessive sweating. No cough. No fever or chills. No nausea or  vomiting. No severe headaches, numbness, or focal weakness. No  history of abdominal pain, urinary complaints, rectal bleeding. The  patient does get some intermittent diarrhea. No history of any leg  swelling. No history of any rash. PAST MEDICAL HISTORY: Significant for coronary artery disease with  a history of stent placement in the past, hypothyroidism, dyslipidemia,  hypertension, chronic diastolic CHF, EF of 23%, degenerative joint  disease, type 2 diabetes, GERD, dyslipidemia, kidney stones, and  obesity. PAST SURGICAL HISTORY: Includes a hernia repair, a left elbow  surgery, right wrist surgery, left knee replacement, left hip replacement,  appendectomy, hysterectomy, cardiac catheterization and stent. ALLERGIES  INCLUDE:  1. NIACIN. 2. ACE INHIBITORS. 3. AVAPRO. 4. BYSTOLIC. 5. CATAPRES. 6. CODEINE. 7. COZAAR. 8. CRESTOR. 9. DARVOCET. 10. DIOVAN. 11. FLAGYL. 12. GABAPENTIN.  13. IODINATED CONTRAST MEDIA. 14. IODINE. 15. LESCOL. 16. LIPITOR. 17. LOVASTATIN.  18. NEXIUM. 19. PRAVACHOL. 20. REGLAN. 21. TRAZODONE. [address] 21. ZOCOR.     REVIEW OF SYSTEMS: Apart from complaints mentioned above, a  complete review of systems was done and is negative. FAMILY HISTORY: Mother had heart disease, heart failure and  diabetes, and father had heart disease and degenerative joint disease. SOCIAL HISTORY: The patient has a remote history of smoking, quit  around 35 years ago. the patient denies any alcohol use. The patient  denies any illicit drug use. PRIOR TO ADMISSION MEDICATIONS  As per the chart include:  1. Lasix 20 mg daily as needed. 2. Lidoderm patch as needed. 3. K-Dur 20 mEq p.o. twice daily. 4. Lantus insulin. The patient states she takes 20 units in the morning  and 35 units at bedtime. 5. Capsaicin topical cream.  6. Pepcid 20 mg p.o. daily. 8. Plavix 75 mg p.o. daily. 9. Norvasc 5 mg p.o. daily. 10. Levothyroxine 125 mcg p.o. daily. 11. Albuterol 1 puff inhaled every 4 hours as needed. 12. Singulair 10 mg p.o. daily. 13. Fish oil 1 capsule p.o. daily. 14. Aspirin 81 mg p.o. daily. 15. Vitamin D3, 5000 units p.o. twice daily. 16. Vitamin D. 17. Vitamin B12. List of medications was obtained from the chart. The patient states that  a few days ago she had some epistaxis and she had held the Plavix for  a few days, but restarted last Thursday. The patient denies any current  epistaxis. PHYSICAL EXAMINATION  VITAL SIGNS: Today show temperature of 97.8, pulse rate 82, blood  pressure 149/77, respiratory rate 20, oxygen saturations of 98%. GENERAL: This is an 82-year-old female who is lying in bed in no  apparent distress at this time. HEAD: Normocephalic, atraumatic. EYES: Pupils are reactive to light. EARS, NOSE AND THROAT: No ear or nasal discharge is seen. Mucous membranes are moist.  NECK: Supple. No JVD, no carotid bruit, no thyromegaly, no  lymphadenopathy. LUNGS: Clear to auscultation bilaterally. No rales, no rhonchi, no  wheezing is heard. HEART: S1, S2 were heard. Regular rate and rhythm. ABDOMEN: Soft. Bowel sounds positive. Obese, nontender,  nondistended.   EXTREMITIES: No lower extremity edema is noted. Pulses are 1+  bilaterally equal.  NEUROLOGIC: The patient is awake, alert and oriented x3. No focal  neurological deficit was identified. LABORATORY DATA: Today show WBC count of 5.7, hemoglobin  12.5, hematocrit 39.2, platelet count 357. Urinalysis showed negative  nitrites and small leukocyte esterase. D-dimer 0.58. Sodium 142,  potassium 3.1, chloride of 103, CO2 of 28, glucose 136, BUN 6,  creatinine 0.58. Troponin first 2 sets are negative. IMAGING: Chest x-ray, as per my reading, showed no infiltrate, no  effusion. DIAGNOSTIC DATA: EKG showed sinus tachycardia at the rate of 110  beats per minute, as per my reading, nonspecific ST-T changes were  noted. EKG was read myself. IMPRESSION  1. Chest pain. 2. Coronary artery disease. 3. Hypothyroidism. 4. Hypertension. 5. Chronic diastolic congestive heart failure, which is compensated. 6. Type 2 diabetes. PLAN: The patient has been admitted to telemetry. Cardiac biomarkers  will be trended. Cardiology has been consulted. We will keep the  patient n.p.o. in case Cardiology wants to proceed with a stress test  today. We will hold off on the patient's basal insulin. At this time, the  patient will be on sliding scale coverage. We will continue aspirin and  Plavix. We will continue other pre-admission medications. The patient  will be on DVT prophylaxis. While the patient is n.p.o., we will place the  patient on some very gentle IV fluids cautiously given the history of  heart failure. The rest depending on the patient's further hospital  course. Plan of care was discussed with the patient and she verbalized  understanding and agreed.  The patient wishes to be a MD Pa Marsh / Jeanine Aponte  D:  02/07/2017   08:00  T:  02/07/2017   08:37  Job #:  [id number]

## 2017-02-07 NOTE — ROUTINE PROCESS
TRANSFER - IN REPORT:    Verbal report received from [address] [place] [address] RN(name) on Krysta Setter  being received from [facility] ED(unit) for routine progression of care      Report consisted of patients Situation, Background, Assessment and   Recommendations(SBAR). Information from the following report(s) SBAR, Kardex, ED Summary, Intake/Output, MAR and Recent Results was reviewed with the receiving nurse. Opportunity for questions and clarification was provided. Assessment will be completed upon patients arrival to unit and care will be assumed.

## 2017-02-07 NOTE — PROGRESS NOTES
TRANSFER - OUT REPORT:    Verbal report given to VashtiRN(name) on Tiana Dasilva  being transferred to Angio Lab(unit) for ordered procedure       Report consisted of patients Situation, Background, Assessment and   Recommendations(SBAR). Information from the following report(s) SBAR, Kardex, Intake/Output, MAR, Accordion, Recent Results and Cardiac Rhythm NSR. was reviewed with the receiving nurse. Lines:   Peripheral IV 02/06/17 Left Antecubital (Active)   Site Assessment Clean, dry, & intact 2/7/2017  4:50 AM   Phlebitis Assessment 0 2/7/2017  4:50 AM   Infiltration Assessment 0 2/7/2017  4:50 AM   Dressing Status Clean, dry, & intact 2/7/2017  4:50 AM   Dressing Type Transparent 2/7/2017  4:50 AM   Hub Color/Line Status Blue 2/7/2017  4:50 AM   Action Taken Blood drawn 2/6/2017  7:55 PM        Opportunity for questions and clarification was provided. Patient transported with:   [facility] transport staff.

## 2017-02-07 NOTE — ED NOTES
Report received/care assumed. Pt is awake/alert/oriented; denies chest pain/dizziness/shortness of breath/other concerns. Plan of care/staff change explained/understood. No distress noted.
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
## 2017-02-07 NOTE — CONSULTS
[address]    Name:  Delmy Swift  MR#:  [id number]  :  1935  Account #:  [de-identified]  Date of Adm:  2017  Date of Consultation:  2017      REASON FOR EVALUATION:  Chest pain. HISTORY OF PRESENT ILLNESS:  The patient is an 82-year-old   patient with a history of coronary disease with a  coronary stent in 2016 for right coronary stenosis, and  hypertension. She came to the hospital with a complaint of chest pain  described as left-sided epigastric and pain in the left subcostal angle. The pain is on and off, going on for the past few days, although  yesterday was on and off, lasting a few minutes at a time. No  radiation. No nausea or vomiting. She has shortness of breath on  exertion. No orthopnea or PND. No fever, chills, or cough. PAST MEDICAL HISTORY:  She has a past history of coronary artery  disease with coronary intervention, hypothyroidism, dyslipidemia,  hypertension, chronic diastolic heart failure, arthritis, diabetes, GERD,  and dyslipidemia. PAST SURGICAL HISTORY:  Hernia repair, elbow surgery, knee  surgery, and stents. ALLERGIES TO  1. NIACIN. 2. ACE INHIBITOR. 3. AVAPRO. 4. BYSTOLIC. 5. CATAPRES. 6. CODEINE. 7. CRESTOR. 8. DARVOCET. 9. DIOVAN. 10. FLAGYL. 11. GABAPENTIN.  12. IODINE. 13. LESCOL. 14. LIPITOR. 15. LOVASTATIN.  16. NEXIUM. 17. PRAVACHOL. 18. REGLAN. 19. TRAZODONE. [address] 21. ZOCOR. MEDICATIONS PRIOR TO ADMISSION  1. Lasix. 2. Lidoderm. 3. K-Dur.  4. Lantus. 5. Pepcid. 6. Plavix. 7. Norvasc. 8. Levothyroxine. 9. Albuterol. 10. Singulair. 11. Fish oil. 12. Aspirin. 13. Vitamin. REVIEW OF SYSTEMS  CONSTITUTIONAL:  No fever or chills. HEENT:  No dizziness, headache, or visual disturbance. PULMONARY:  As above. GASTROINTESTINAL:  No nausea or vomiting. GENITOURINARY:  No dysuria or hematuria. MUSCULOSKELETAL:  No edema.     PHYSICAL EXAMINATION  GENERAL:  Physical exam reveals an alert patient in no acute distress  at the present time. VITAL SIGNS:  Blood pressure of 140/76. Pulse 80. Respiratory rate  20. Afebrile. HEAD:  Normal.  EYES:  No pallor. NECK:  No JVD. Carotids full. LUNGS:  Clear. HEART:  Sounds normal.  No gallop, murmur, or rub. ABDOMEN:  Soft and nontender. EXTREMITIES:  No edema. NEUROLOGIC:  Alert. PSYCHIATRIC:  Normal mood and affect. LABORATORY DATA:  Lab work revealed a white count of 5,  hemoglobin 12, and platelets 224,168. Electrolytes revealed potassium  3.1, BUN 6, and creatinine 0.5. Troponin x2 and CPK-MB were  negative. . EKG revealed sinus tachycardia, with nonspecific ST-T changes. Subsequent EKG was reported as normal.    IMPRESSION  1. Chest pain, atypical:  Possible atypical angina. Myocardial infarction  is ruled out. 2. Coronary artery disease, with coronary intervention of the right  coronary stent in 2016 in June, with possible restenosis. 3. Hypertensive heart disease. 4. Diastolic heart failure, chronic:  Compensated. 5. Dyslipidemia:  INTOLERANT TO STATINS AND ASPIRIN. RECOMMENDATIONS:  At the present time, I will proceed with a  nuclear stress test to assess for ischemia. Continue other  antihypertensive and antiplatelet therapy. Monitor for epistaxis. The  patient had a mild nosebleed today, which was self-limited. Further  plans based on clinic course. Many thanks for allowing us to participate in the care of this patient.         Ashley Castañeda MD    AP / [address]  D:  02/07/2017   10:10  T:  02/07/2017   10:37  Job #:  [id number]

## 2017-02-07 NOTE — DIABETES MGMT
Diabetes Patient/Family Education Record    Factors That  May Influence Patients Ability  to Learn or  Comply With  Recommendations:    []   Language barrier    []   Cultural needs   []   Motivation    []   Cognitive limitation    []   Physical   [x]   Education    []   Physiological factors   []   Hearing/vision/speaking impairment   []   Yarsani beliefs    []   Financial factors   []  Other:   []  No factors identified at this time. Person Instructed:   [x]   Patient   []   Family   []  Other     Preference for Learning:   [x]   Verbal   [x]   Written   []  Demonstration     Level of Comprehension & Competence:    []  Good                                      [x] Fair                                     []  Poor                             []  Needs Reinforcement   [x]  Teachback completed    Education Component:   [x]  Medication management, pt reports taking 20 units of lantus in the morning and 35 units at night night    [x]  Nutritional management Pt reports having diarrhea 20-30 minutes after eating no matter what she eats so she does not eat very much and often suffers from hypoglycemia.  Pt reports she made Dr. Mason Antoine and PCP aware.   []  Exercise   [x]  Signs, symptoms, and treatment of hyperglycemia and hypoglycemia   [x]  Prevention, recognition and treatment of hyperglycemia and hypoglycemia   [x]  Importance of blood glucose monitoring and how to obtain a blood glucose meter    []  Instruction on use of blood glucose meter   [x]  Discuss the importance of HbA1C monitoring and provide patient with  results   []  Sick day guidelines   []  Proper use and disposal of lancets, needles, syringes or insulin pens (if appropriate)   [x]  Potential long-term complications (retinopathy, kidney disease, neuropathy, heart disease, stroke, vascular disease, foot care)   [x] Provide emergency contact number and contact number for more information    [x]  Goal:  Patient/family will demonstrate understanding of Diabetes Self Management Skills by: (date) _2/14/17______  Plan for post-discharge education or self-management support:    [x] Outpatient class schedule provided            [] Patient Declined    [] Scheduled for outpatient classes (date) _______     Melinda Andres MS, RD, CDE

## 2017-02-08 VITALS
HEART RATE: 80 BPM | BODY MASS INDEX: 40.81 KG/M2 | RESPIRATION RATE: 20 BRPM | WEIGHT: 260.02 LBS | TEMPERATURE: 97.7 F | HEIGHT: 67 IN | SYSTOLIC BLOOD PRESSURE: 115 MMHG | OXYGEN SATURATION: 95 % | DIASTOLIC BLOOD PRESSURE: 71 MMHG

## 2017-02-08 LAB
ALBUMIN SERPL BCP-MCNC: 2.7 G/DL (ref 3.4–5)
ALBUMIN/GLOB SERPL: 0.7 {RATIO} (ref 0.8–1.7)
ALP SERPL-CCNC: 84 U/L (ref 45–117)
ALT SERPL-CCNC: 13 U/L (ref 13–56)
ANION GAP BLD CALC-SCNC: 6 MMOL/L (ref 3–18)
AST SERPL W P-5'-P-CCNC: 10 U/L (ref 15–37)
BILIRUB SERPL-MCNC: 0.6 MG/DL (ref 0.2–1)
BUN SERPL-MCNC: 8 MG/DL (ref 7–18)
BUN/CREAT SERPL: 13 (ref 12–20)
CALCIUM SERPL-MCNC: 8.9 MG/DL (ref 8.5–10.1)
CHLORIDE SERPL-SCNC: 108 MMOL/L (ref 100–108)
CO2 SERPL-SCNC: 27 MMOL/L (ref 21–32)
CREAT SERPL-MCNC: 0.61 MG/DL (ref 0.6–1.3)
GLOBULIN SER CALC-MCNC: 3.9 G/DL (ref 2–4)
GLUCOSE BLD STRIP.AUTO-MCNC: 140 MG/DL (ref 70–110)
GLUCOSE BLD STRIP.AUTO-MCNC: 191 MG/DL (ref 70–110)
GLUCOSE SERPL-MCNC: 156 MG/DL (ref 74–99)
INR PPP: 1.1 (ref 0.8–1.2)
MAGNESIUM SERPL-MCNC: 2.1 MG/DL (ref 1.8–2.4)
POTASSIUM SERPL-SCNC: 3.8 MMOL/L (ref 3.5–5.5)
PROT SERPL-MCNC: 6.6 G/DL (ref 6.4–8.2)
PROTHROMBIN TIME: 13.6 SEC (ref 11.5–15.2)
SODIUM SERPL-SCNC: 141 MMOL/L (ref 136–145)

## 2017-02-08 PROCEDURE — 97116 GAIT TRAINING THERAPY: CPT

## 2017-02-08 PROCEDURE — 80053 COMPREHEN METABOLIC PANEL: CPT | Performed by: EMERGENCY MEDICINE

## 2017-02-08 PROCEDURE — 97165 OT EVAL LOW COMPLEX 30 MIN: CPT

## 2017-02-08 PROCEDURE — 85610 PROTHROMBIN TIME: CPT | Performed by: EMERGENCY MEDICINE

## 2017-02-08 PROCEDURE — 74011250637 HC RX REV CODE- 250/637: Performed by: INTERNAL MEDICINE

## 2017-02-08 PROCEDURE — 97161 PT EVAL LOW COMPLEX 20 MIN: CPT

## 2017-02-08 PROCEDURE — 82962 GLUCOSE BLOOD TEST: CPT

## 2017-02-08 PROCEDURE — 74011636637 HC RX REV CODE- 636/637: Performed by: EMERGENCY MEDICINE

## 2017-02-08 PROCEDURE — 74011250637 HC RX REV CODE- 250/637: Performed by: HOSPITALIST

## 2017-02-08 PROCEDURE — 99218 HC RM OBSERVATION: CPT

## 2017-02-08 PROCEDURE — 36415 COLL VENOUS BLD VENIPUNCTURE: CPT | Performed by: EMERGENCY MEDICINE

## 2017-02-08 PROCEDURE — 74011250637 HC RX REV CODE- 250/637: Performed by: EMERGENCY MEDICINE

## 2017-02-08 PROCEDURE — 83735 ASSAY OF MAGNESIUM: CPT | Performed by: EMERGENCY MEDICINE

## 2017-02-08 RX ORDER — LEVOTHYROXINE SODIUM 75 UG/1
75 TABLET ORAL
Qty: 30 TAB | Refills: 0 | Status: ON HOLD | OUTPATIENT
Start: 2017-02-08 | End: 2020-02-25 | Stop reason: DRUGHIGH

## 2017-02-08 RX ORDER — MECLIZINE HCL 12.5 MG 12.5 MG/1
12.5 TABLET ORAL
Qty: 30 TAB | Refills: 0 | Status: SHIPPED | OUTPATIENT
Start: 2017-02-08 | End: 2017-02-18

## 2017-02-08 RX ORDER — AMLODIPINE BESYLATE 2.5 MG/1
2.5 TABLET ORAL DAILY
Qty: 30 TAB | Refills: 0 | Status: SHIPPED | OUTPATIENT
Start: 2017-02-08 | End: 2017-03-06 | Stop reason: DRUGHIGH

## 2017-02-08 RX ORDER — AMLODIPINE BESYLATE 2.5 MG/1
2.5 TABLET ORAL DAILY
Status: DISCONTINUED | OUTPATIENT
Start: 2017-02-09 | End: 2017-02-08 | Stop reason: HOSPADM

## 2017-02-08 RX ORDER — POTASSIUM CHLORIDE 20 MEQ/1
20 TABLET, EXTENDED RELEASE ORAL DAILY
Qty: 60 TAB | Refills: 3 | Status: SHIPPED
Start: 2017-02-08 | End: 2018-04-02

## 2017-02-08 RX ORDER — CLONIDINE HYDROCHLORIDE 0.1 MG/1
TABLET ORAL
Qty: 60 TAB | Refills: 0 | Status: SHIPPED | OUTPATIENT
Start: 2017-02-08 | End: 2017-04-06

## 2017-02-08 RX ORDER — INSULIN GLARGINE 100 [IU]/ML
INJECTION, SOLUTION SUBCUTANEOUS
Qty: 1 VIAL | Refills: 0 | Status: SHIPPED
Start: 2017-02-08 | End: 2019-01-17 | Stop reason: SDUPTHER

## 2017-02-08 RX ADMIN — LEVOTHYROXINE SODIUM 125 MCG: 100 TABLET ORAL at 08:23

## 2017-02-08 RX ADMIN — ASPIRIN 81 MG: 81 TABLET, COATED ORAL at 08:23

## 2017-02-08 RX ADMIN — AMLODIPINE BESYLATE 5 MG: 5 TABLET ORAL at 08:23

## 2017-02-08 RX ADMIN — VITAMIN D, TAB 1000IU (100/BT) 1000 UNITS: 25 TAB at 08:23

## 2017-02-08 RX ADMIN — CYANOCOBALAMIN TAB 1000 MCG 1000 MCG: 1000 TAB at 08:23

## 2017-02-08 RX ADMIN — INSULIN LISPRO 2 UNITS: 100 INJECTION, SOLUTION INTRAVENOUS; SUBCUTANEOUS at 11:51

## 2017-02-08 RX ADMIN — DOCUSATE SODIUM 100 MG: 100 CAPSULE, LIQUID FILLED ORAL at 08:23

## 2017-02-08 RX ADMIN — ACETAMINOPHEN 650 MG: 325 TABLET, FILM COATED ORAL at 11:51

## 2017-02-08 RX ADMIN — Medication 1 CAPSULE: at 08:23

## 2017-02-08 RX ADMIN — CLOPIDOGREL BISULFATE 75 MG: 75 TABLET ORAL at 08:23

## 2017-02-08 RX ADMIN — MONTELUKAST SODIUM 10 MG: 10 TABLET, FILM COATED ORAL at 08:23

## 2017-02-08 RX ADMIN — CLONIDINE HYDROCHLORIDE 0.1 MG: 0.1 TABLET ORAL at 08:23

## 2017-02-08 NOTE — PROGRESS NOTES
SUBJECTIVE:    Sitting up in chair. No chest or abdominal pain. No SOB and cough. No N/V/D. Only takes Lantus 20 units a day. OBJECTIVE:    Visit Vitals    /71 (BP 1 Location: Right arm, BP Patient Position: At rest)    Pulse 80    Temp 97.7 °F (36.5 °C)    Resp 20    Ht 5' 7\" (1.702 m)    Wt 117.9 kg (260 lb 0.3 oz)    SpO2 95%    Breastfeeding No    BMI 40.72 kg/m2     RRR  CTA bilaterally  No orthostatic hypotension  NT, BS +  No pedal edema  NAD    ASSESSMENT:    1. Atypical Chest pain, no ACS. 2. Coronary artery disease. 3. Hypothyroidism. 4. Hypertension. 5. Chronic diastolic congestive heart failure, with EF of 45 to 50%. 6. Type 2 diabetes. 7. Dizziness  8. Hypokalemia     PLAN:    Negative VQ scan. Talked to dr. Lyssa Sim on 2/7/17- stress test can be done as an outpatient  Carotid doppler normal per technician [talked to Khang]  ECHO report reviewed  Discharge patient home today. Does not want [facility].      CMP:   Lab Results   Component Value Date/Time     02/08/2017 02:07 AM    K 3.8 02/08/2017 02:07 AM     02/08/2017 02:07 AM    CO2 27 02/08/2017 02:07 AM    AGAP 6 02/08/2017 02:07 AM     (H) 02/08/2017 02:07 AM    BUN 8 02/08/2017 02:07 AM    CREA 0.61 02/08/2017 02:07 AM    GFRAA >60 02/08/2017 02:07 AM    GFRNA >60 02/08/2017 02:07 AM    CA 8.9 02/08/2017 02:07 AM    MG 2.1 02/08/2017 02:07 AM    ALB 2.7 (L) 02/08/2017 02:07 AM    TP 6.6 02/08/2017 02:07 AM    GLOB 3.9 02/08/2017 02:07 AM    AGRAT 0.7 (L) 02/08/2017 02:07 AM    SGOT 10 (L) 02/08/2017 02:07 AM    ALT 13 02/08/2017 02:07 AM     CBC:   No results found for: WBC, HGB, HGBEXT, HCT, HCTEXT, PLT, PLTEXT, HGBEXT, HCTEXT, PLTEXT    Current Discharge Medication List      START taking these medications    Details   cloNIDine HCl (CATAPRES) 0.1 mg tablet 0.1 mg po twice daily, do not take it if upper blood pressure is less than 110  Qty: 60 Tab, Refills: 0      meclizine (ANTIVERT) 12.5 mg tablet Take 1 Tab by mouth three (3) times daily as needed for Dizziness for up to 10 days. Qty: 30 Tab, Refills: 0         CONTINUE these medications which have CHANGED    Details   levothyroxine (SYNTHROID) 75 mcg tablet Take 1 Tab by mouth Daily (before breakfast). Qty: 30 Tab, Refills: 0    Associated Diagnoses: Hypothyroidism, unspecified type      amLODIPine (NORVASC) 2.5 mg tablet Take 1 Tab by mouth daily. Qty: 30 Tab, Refills: 0      potassium chloride (K-DUR, KLOR-CON) 20 mEq tablet Take 1 Tab by mouth daily. Qty: 60 Tab, Refills: 3    Associated Diagnoses: Hypokalemia      insulin glargine (LANTUS) 100 unit/mL injection Take 15 units every morning  Indications: type 2 diabetes mellitus  Qty: 1 Vial, Refills: 0         CONTINUE these medications which have NOT CHANGED    Details   famotidine (PEPCID) 20 mg tablet Take 20 mg by mouth nightly. clopidogrel (PLAVIX) 75 mg tablet Take 1 Tab by mouth daily. Qty: 30 Tab, Refills: 3    Associated Diagnoses: S/P coronary artery stent placement      furosemide (LASIX) 20 mg tablet Use daily as needed for leg swelling  Qty: 30 Tab, Refills: 2      lidocaine (LIDODERM) 5 % 1 Patch by TransDERmal route every twenty-four (24) hours. Qty: 90 Each, Refills: 1    Associated Diagnoses: Leg pain, right; Chronic neck pain      albuterol (PROAIR HFA) 90 mcg/actuation inhaler Take 1 Puff by inhalation every four (4) hours as needed for Wheezing or Shortness of Breath. Qty: 1 Inhaler, Refills: 3    Comments: Only Branded version of ProAir  Associated Diagnoses: Moderate intermittent asthma, with acute exacerbation      montelukast (SINGULAIR) 10 mg tablet Take 1 Tab by mouth daily as needed. Indications: ALLERGIC RHINITIS  Qty: 30 Tab, Refills: 3    Associated Diagnoses: Uncomplicated asthma, unspecified asthma severity      DOCOSAHEXANOIC ACID/EPA (FISH OIL PO) Take 1,000 mg by mouth two (2) times a day. aspirin delayed-release 81 mg tablet Take 81 mg by mouth daily. cholecalciferol, vitamin d3, (VITAMIN D) 1,000 unit tablet Take 5,000 Units by mouth two (2) times a day. cyanocobalamin ER 1,000 mcg tablet Take 1 Tab by mouth daily. Qty: 30 Tab, Refills: 3    Associated Diagnoses: Weakness; Macrocytosis      capsaicin 0.075 % topical cream Apply  to affected area three (3) times daily.   Qty: 60 g, Refills: 0

## 2017-02-08 NOTE — DISCHARGE INSTRUCTIONS
DISCHARGE SUMMARY from Nurse    The following personal items are in your possession at time of discharge:    Dental Appliances: Uppers, With patient  Visual Aid: With patient, Glasses     Home Medications: None  Jewelry: Earrings, Necklace, Ring, With patient, Other (comment) (4 yellow , anklet)  Clothing: At bedside, Footwear, Jacket/Coat, Pants, Socks, Undergarments, Other (comment) (blouse)  Other Valuables: Cell Phone, [address], Money (comment), Purse, At bedside ($25.00)  Personal Items Sent to Safe: no          PATIENT INSTRUCTIONS:    After general anesthesia or intravenous sedation, for 24 hours or while taking prescription Narcotics:  · Limit your activities  · Do not drive and operate hazardous machinery  · Do not make important personal or business decisions  · Do  not drink alcoholic beverages  · If you have not urinated within 8 hours after discharge, please contact your surgeon on call. Report the following to your surgeon:  · Excessive pain, swelling, redness or odor of or around the surgical area  · Temperature over 100.5  · Nausea and vomiting lasting longer than 4 hours or if unable to take medications  · Any signs of decreased circulation or nerve impairment to extremity: change in color, persistent  numbness, tingling, coldness or increase pain  · Any questions        What to do at Home:  Recommended activity: Activity as tolerated    If you experience any of the following symptoms short of breath, chest pain, faint/dizzy/weak, nausea/vomiting, abnormal bleeding, swelling to arms/hands/feet/legs, please follow up with primary care physician or emergency room. *  Please give a list of your current medications to your Primary Care Provider. *  Please update this list whenever your medications are discontinued, doses are      changed, or new medications (including over-the-counter products) are added.     *  Please carry medication information at all times in case of emergency situations. These are general instructions for a healthy lifestyle:    No smoking/ No tobacco products/ Avoid exposure to second hand smoke    Surgeon General's Warning:  Quitting smoking now greatly reduces serious risk to your health. Obesity, smoking, and sedentary lifestyle greatly increases your risk for illness    A healthy diet, regular physical exercise & weight monitoring are important for maintaining a healthy lifestyle    You may be retaining fluid if you have a history of heart failure or if you experience any of the following symptoms:  Weight gain of 3 pounds or more overnight or 5 pounds in a week, increased swelling in our hands or feet or shortness of breath while lying flat in bed. Please call your doctor as soon as you notice any of these symptoms; do not wait until your next office visit. Recognize signs and symptoms of STROKE:    F-face looks uneven    A-arms unable to move or move unevenly    S-speech slurred or non-existent    T-time-call 911 as soon as signs and symptoms begin-DO NOT go       Back to bed or wait to see if you get better-TIME IS BRAIN. Warning Signs of HEART ATTACK     Call 911 if you have these symptoms:   Chest discomfort. Most heart attacks involve discomfort in the center of the chest that lasts more than a few minutes, or that goes away and comes back. It can feel like uncomfortable pressure, squeezing, fullness, or pain.  Discomfort in other areas of the upper body. Symptoms can include pain or discomfort in one or both arms, the back, neck, jaw, or stomach.  Shortness of breath with or without chest discomfort.  Other signs may include breaking out in a cold sweat, nausea, or lightheadedness. Don't wait more than five minutes to call 911 - MINUTES MATTER! Fast action can save your life. Calling 911 is almost always the fastest way to get lifesaving treatment.  Emergency Medical Services staff can begin treatment when they arrive -- up to an hour sooner than if someone gets to the hospital by car. The discharge information has been reviewed with the patient. The patient verbalized understanding. Discharge medications reviewed with the patient and appropriate educational materials and side effects teaching were provided. Patient armband removed and shredded    MyChart Activation    Thank you for requesting access to [employer]. Please follow the instructions below to securely access and download your online medical record. [employer] allows you to send messages to your doctor, view your test results, renew your prescriptions, schedule appointments, and more. How Do I Sign Up? 1. In your internet browser, go to [email or web address]  2. Click on the First Time User? Click Here link in the Sign In box. You will be redirect to the New Member Sign Up page. 3. Enter your [employer] Access Code exactly as it appears below. You will not need to use this code after youve completed the sign-up process. If you do not sign up before the expiration date, you must request a new code. [employer] Access Code: Activation code not generated  Current [employer] Status: Active (This is the date your [employer] access code will )    4. Enter the last four digits of your Social Security Number (xxxx) and Date of Birth (mm/dd/yyyy) as indicated and click Submit. You will be taken to the next sign-up page. 5. Create a [employer] ID. This will be your [employer] login ID and cannot be changed, so think of one that is secure and easy to remember. 6. Create a [employer] password. You can change your password at any time. 7. Enter your Password Reset Question and Answer. This can be used at a later time if you forget your password. 8. Enter your e-mail address. You will receive e-mail notification when new information is available in [address] 9. Click Sign Up. You can now view and download portions of your medical record.   10. Click the Download Summary menu link to download a portable copy of your medical information. Additional Information    If you have questions, please visit the Frequently Asked Questions section of the [employer] website at [email or web address] Remember, [employer] is NOT to be used for urgent needs. For medical emergencies, dial 911. Chest Pain: Care Instructions  Your Care Instructions  There are many things that can cause chest pain. Some are not serious and will get better on their own in a few days. But some kinds of chest pain need more testing and treatment. Your doctor may have recommended a follow-up visit in the next 8 to 12 hours. If you are not getting better, you may need more tests or treatment. Even though your doctor has released you, you still need to watch for any problems. The doctor carefully checked you, but sometimes problems can develop later. If you have new symptoms or if your symptoms do not get better, get medical care right away. If you have worse or different chest pain or pressure that lasts more than 5 minutes or you passed out (lost consciousness), call 911 or seek other emergency help right away. A medical visit is only one step in your treatment. Even if you feel better, you still need to do what your doctor recommends, such as going to all suggested follow-up appointments and taking medicines exactly as directed. This will help you recover and help prevent future problems. How can you care for yourself at home? · Rest until you feel better. · Take your medicine exactly as prescribed. Call your doctor if you think you are having a problem with your medicine. · Do not drive after taking a prescription pain medicine. When should you call for help? Call 911 if:  · You passed out (lost consciousness). · You have severe difficulty breathing. · You have symptoms of a heart attack.  These may include:  ¨ Chest pain or pressure, or a strange feeling in your chest.  ¨ Sweating. ¨ Shortness of breath. ¨ Nausea or vomiting. ¨ Pain, pressure, or a strange feeling in your back, neck, jaw, or upper belly or in one or both shoulders or arms. ¨ Lightheadedness or sudden weakness. ¨ A fast or irregular heartbeat. After you call 911, the  may tell you to chew 1 adult-strength or 2 to 4 low-dose aspirin. Wait for an ambulance. Do not try to drive yourself. Call your doctor today if:  · You have any trouble breathing. · Your chest pain gets worse. · You are dizzy or lightheaded, or you feel like you may faint. · You are not getting better as expected. · You are having new or different chest pain. Where can you learn more? Go to http://cristina-mignon.info/. Enter A120 in the search box to learn more about \"Chest Pain: Care Instructions. \"  Current as of: May 27, 2016  Content Version: 11.1  © 7721-4004 [employer]. Care instructions adapted under license by [employer] (which disclaims liability or warranty for this information). If you have questions about a medical condition or this instruction, always ask your healthcare professional. [place] [address] any warranty or liability for your use of this information.
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
## 2017-02-08 NOTE — PROGRESS NOTES
Bedside and Verbal shift change report given to LYNN Ash RN (oncoming nurse) by Chiara Rai RN   (offgoing nurse). Report included the following information SBAR, Kardex, Intake/Output, MAR, Accordion, Recent Results, Med Rec Status and Cardiac Rhythm NSR. Nuha Ellis

## 2017-02-08 NOTE — DISCHARGE SUMMARY
PATIENT DISCHARGE INSTRUCTIONS      PATIENT DISCHARGE INSTRUCTIONS    Rayo Sanchez / [id number] : 1935    Admitted 2017 Discharged: 2017     Dictated # [id number]    · It is important that you take the medication exactly as they are prescribed. · Keep your medication in the bottles provided by the pharmacist and keep a list of the medication names, dosages, and times to be taken in your wallet. · Do not take other medications without consulting your doctor. What to do at Home    Recommended Diet: Cardiac Diet and Diabetic Diet    Recommended Activity: Activity as tolerated    Current Discharge Medication List      START taking these medications    Details   cloNIDine HCl (CATAPRES) 0.1 mg tablet 0.1 mg po twice daily, do not take it if upper blood pressure is less than 110  Qty: 60 Tab, Refills: 0      meclizine (ANTIVERT) 12.5 mg tablet Take 1 Tab by mouth three (3) times daily as needed for Dizziness for up to 10 days. Qty: 30 Tab, Refills: 0         CONTINUE these medications which have CHANGED    Details   levothyroxine (SYNTHROID) 75 mcg tablet Take 1 Tab by mouth Daily (before breakfast). Qty: 30 Tab, Refills: 0    Associated Diagnoses: Hypothyroidism, unspecified type      amLODIPine (NORVASC) 2.5 mg tablet Take 1 Tab by mouth daily. Qty: 30 Tab, Refills: 0      potassium chloride (K-DUR, KLOR-CON) 20 mEq tablet Take 1 Tab by mouth daily. Qty: 60 Tab, Refills: 3    Associated Diagnoses: Hypokalemia      insulin glargine (LANTUS) 100 unit/mL injection Take 15 units every morning  Indications: type 2 diabetes mellitus  Qty: 1 Vial, Refills: 0         CONTINUE these medications which have NOT CHANGED    Details   famotidine (PEPCID) 20 mg tablet Take 20 mg by mouth nightly. clopidogrel (PLAVIX) 75 mg tablet Take 1 Tab by mouth daily.   Qty: 30 Tab, Refills: 3    Associated Diagnoses: S/P coronary artery stent placement      furosemide (LASIX) 20 mg tablet Use daily as needed for leg swelling  Qty: 30 Tab, Refills: 2      lidocaine (LIDODERM) 5 % 1 Patch by TransDERmal route every twenty-four (24) hours. Qty: 90 Each, Refills: 1    Associated Diagnoses: Leg pain, right; Chronic neck pain      albuterol (PROAIR HFA) 90 mcg/actuation inhaler Take 1 Puff by inhalation every four (4) hours as needed for Wheezing or Shortness of Breath. Qty: 1 Inhaler, Refills: 3    Comments: Only Branded version of ProAir  Associated Diagnoses: Moderate intermittent asthma, with acute exacerbation      montelukast (SINGULAIR) 10 mg tablet Take 1 Tab by mouth daily as needed. Indications: ALLERGIC RHINITIS  Qty: 30 Tab, Refills: 3    Associated Diagnoses: Uncomplicated asthma, unspecified asthma severity      DOCOSAHEXANOIC ACID/EPA (FISH OIL PO) Take 1,000 mg by mouth two (2) times a day. aspirin delayed-release 81 mg tablet Take 81 mg by mouth daily. cholecalciferol, vitamin d3, (VITAMIN D) 1,000 unit tablet Take 5,000 Units by mouth two (2) times a day. cyanocobalamin ER 1,000 mcg tablet Take 1 Tab by mouth daily. Qty: 30 Tab, Refills: 3    Associated Diagnoses: Weakness; Macrocytosis      capsaicin 0.075 % topical cream Apply  to affected area three (3) times daily.   Qty: 60 g, Refills: 0               Signed By: Mick Watson MD     February 8, 2017

## 2017-02-08 NOTE — PROGRESS NOTES
3:04 PM  Reviewed discharge instructions and medications with patient, who verbalizes understanding. Patient to discharge home via wheelchair. IV and tele box removed.
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
## 2017-02-09 NOTE — PROCEDURES
[facility]  *** FINAL REPORT ***    Name: Anshu Marte  MRN: [id number]    Inpatient  : 1935  HIS Order #: [id number]  [address] [place] [facility] Visit #: [id number]  Date: 2017    TYPE OF TEST: Cerebrovascular Duplex    REASON FOR TEST    Right Carotid:-             Proximal               Mid                 Distal  cm/s  Systolic  Diastolic  Systolic  Diastolic  Systolic  Diastolic  CCA:     13.1      16.2       83.7      11.1       79.8      11.1  Bulb:  ECA:     75.2       7.5  ICA:     54.3      11.5       51.6      13.3       51.6      13.3  ICA/CCA:  0.6       0.7    ICA Stenosis: Normal    Right Vertebral:-  Finding: Antegrade  Sys:       42.5  Nicolle:        7.5    Right Subclavian: Normal    Left Carotid:-            Proximal                Mid                 Distal  cm/s  Systolic  Diastolic  Systolic  Diastolic  Systolic  Diastolic  CCA:     74.4      13.0       90.4      18.4       81.6      15.9  Bulb:  ECA:     72.0       8.9  ICA:     59.2      20.1       61.1      17.0       57.1      17.5  ICA/CCA:  0.7       0.9    ICA Stenosis: <50%    Left Vertebral:-  Finding: Antegrade  Sys:       55.5  Nicolle:       13.4    Left Subclavian: Normal    INTERPRETATION/FINDINGS  Duplex images were obtained using 2-D gray scale, color flow, and  spectral Doppler analysis. 1. No significant stenosis of the right internal carotid artery. 2. <50% stenosis in the left internal carotid artery. 3. No significant stenosis in the external carotid arteries  bilaterally. 4. Antegrade flow in both vertebral arteries. 5. Normal flow in both subclavian arteries. Plaque Morphology:  1. Irregular hyperechoic plaque in the bulb and left ICA. ADDITIONAL COMMENTS  No change when compared to study on 12. I have personally reviewed the data relevant to the interpretation of  this  study.     TECHNOLOGIST: Ernst Aguilar RVT  Signed:    PHYSICIAN: Nathalie Gomez MD  Signed: 2017 08:40 AM

## 2017-02-09 NOTE — DISCHARGE SUMMARY
[facility]  [address] [place] Jasbir Salomon SUMMARY    Name:  Arron Garcias  MR#:  [id number]  :  1935  Account #:  [de-identified]  Date of Adm:  2017  Date of Discharge:  2017      DISPOSITION:  Discharged to home. DISCHARGE CONDITION:  Stable. DISCHARGE DIAGNOSES  1. Atypical chest pain, resolved, most likely musculoskeletal.  2. Coronary artery disease:  No acute coronary syndrome at this time. 3. Hypothyroidism. 4. Hypertension. 5. Chronic diastolic congestive heart failure, with an ejection fraction of  45% to 50%, compensated. 6. Type 2 diabetes mellitus. 7. Intermittent dizziness for the last two months or more. 8. Hypokalemia. DISCHARGE MEDICATIONS  1. Norvasc 2.5 mg daily. 2. Clonidine 0.1 mg 2 times a day. (However, the patient has been  advised not to take it if systolic blood pressure is less than 110.)  3. Meclizine 12.5 mg 3 times a day as needed for dizziness. 4. Potassium chloride 20 mEq daily. 5. Insulin Lantus 15 units subcutaneous in the morning. 6. Synthroid 75 mcg daily. 7. Pepcid 20 mg p.o. daily. 8. Plavix 75 mg daily. 9. Lasix 20 mg daily as needed for leg swelling. 10. Lidoderm 5% patch every 24 hours. 11. Albuterol inhaler every 4 hours p.r.n. for wheezing or shortness of  breath. 12. Singular 10 mg daily as needed. 13. Fish oil 1000 mg twice a day. 14. Aspirin 81 mg daily. 15. Vitamin D 5000 units 2 times a day. 16. Vitamin B12 1000 mcg daily. 17. Bengay topically 3 times a day as needed for muscle pain. IMAGING AND PROCEDURES  1. A chest x-ray was done and reported no acute active  cardiopulmonary process. 2. A V/Q scan was done, with a low probability for pulmonary  embolism. 3. Bilateral carotid artery Dopplers were unremarkable. 4. An echocardiogram was done and showed an ejection fraction of  45% to 50%, with grade 1 diastolic dysfunction. [address] [facility], [facility]  Gerri Epps M.D.:  I spoke to him on February 7, 2017. He told me that a stress test can be done as an outpatient as  the patient has had three sets of cardiac enzymes negative and no  acute coronary syndrome. HOSPITAL COURSE:  The patient was admitted to the hospital with a  complaint of chest pain. Please refer to the hospital admission history  and physical done by Meche Manning M.D., for further details. The  patient had three sets of cardiac enzymes which were negative. The  initial plan was to do a stress test here, but the patient got a V/Q scan  in the emergency room, so she cannot have a stress test at least for 48  hours. Because of the patient having had three sets of cardiac  enzymes negative and no more chest pain, the Cardiologist decided to  do a stress test as an outpatient next week. Their office will arrange it. The patient has dyslipidemia, but SHE CANNOT TAKE ANY STATIN  DRUGS. She will be continued on fish oil. SHE IS ALSO ALLERGIC  TO BETA BLOCKER. However, she has had some cough with  clonidine in the past, but I gave her clonidine here without any  problem. She states she has had intermittent dizziness at least for the  last two to three months. She had a CT scan of the head last month  which was negative. She did not have any syncope. I have started  her on meclizine p.r.n. for that, as well as started her on clonidine. I  went down on Norvasc to 2.5 mg daily. The patient told me she was  taking Lantus 20 units only at home, which will be continued at 15 units  at this point. Then, the patient's primary care physician can rearrange  it. I did thyroid function tests here, which showed a TSH of 0.02 and  free T4 of 1.4. She was taking 88 mcg of Synthroid. I will cut it down  to 75. The patient may need another thyroid function test in the next  two to three weeks. The patient's Primary Care Physician can readjust  the medications again. The patient was walking without any problem.   I offered her PT, OT, and home health care. She does not want this. She will be continued on home medications for her other  comorbidities. She was feeling much better and wanted to go home. She has a caregiver at home. DISCHARGE INSTRUCTIONS  DIET:  ADA and cardiac diet. ACTIVITY:  As tolerated. FOLLOWUP  1. Follow with the Primary Care Physician in a week. 2. Follow up with the office of Melinda Andres. Amos Mauricio M.D., next week for an  outpatient stress test.    TIME SPENT:  The total time was greater than 35 minutes.         Anitha Stark MD     / [address]  D:  02/08/2017   12:34  T:  02/08/2017   22:38  Job #:  [id number]

## 2017-02-13 ENCOUNTER — CLINICAL SUPPORT (OUTPATIENT)
Dept: CARDIOLOGY CLINIC | Age: 82
End: 2017-02-13

## 2017-02-13 ENCOUNTER — OFFICE VISIT (OUTPATIENT)
Dept: CARDIOLOGY CLINIC | Age: 82
End: 2017-02-13

## 2017-02-13 VITALS
SYSTOLIC BLOOD PRESSURE: 151 MMHG | DIASTOLIC BLOOD PRESSURE: 79 MMHG | HEART RATE: 91 BPM | WEIGHT: 262 LBS | BODY MASS INDEX: 41.12 KG/M2 | HEIGHT: 67 IN

## 2017-02-13 DIAGNOSIS — E78.2 MIXED HYPERLIPIDEMIA: ICD-10-CM

## 2017-02-13 DIAGNOSIS — I25.10 ATHEROSCLEROSIS OF NATIVE CORONARY ARTERY OF NATIVE HEART WITHOUT ANGINA PECTORIS: Primary | ICD-10-CM

## 2017-02-13 DIAGNOSIS — I50.32 CHRONIC DIASTOLIC HEART FAILURE (HCC): ICD-10-CM

## 2017-02-13 DIAGNOSIS — R07.9 CHEST PAIN, UNSPECIFIED TYPE: Primary | ICD-10-CM

## 2017-02-13 DIAGNOSIS — I50.9 CONGESTIVE HEART FAILURE, UNSPECIFIED CONGESTIVE HEART FAILURE CHRONICITY, UNSPECIFIED CONGESTIVE HEART FAILURE TYPE: ICD-10-CM

## 2017-02-13 DIAGNOSIS — I10 ESSENTIAL HYPERTENSION: ICD-10-CM

## 2017-02-13 NOTE — PROGRESS NOTES
1. Have you been to the ER, urgent care clinic since your last visit? Hospitalized since your last visit? Yes Where: [facility] Reason for visit: Chest pain    2. Have you seen or consulted any other health care providers outside of the [address] [place] [address] since your last visit? Include any pap smears or colon screening. Yes Where: [facility]     3. Since your last visit, have you had any of the following symptoms? chest pains, palpitations, shortness of breath and dizziness. 4.  Have you had any blood work, X-rays or cardiac testing? Yes Where: [facility]              5.  Where do you normally have your labs drawn? [facility]    6. Do you need any refills today?    No

## 2017-02-13 NOTE — LETTER
April Schulte 1935 2/13/2017 Dear Lynne Schmidt, DO Odell Ferraris, MD Zorita Parry, MD Sheryle Putty, MD Sydelle Doom, MD Kristie Pilot, MD Teddi Berke, MD Charlsie Oiler, MD 
 
I had the pleasure of evaluating  Ms. Pawel Crump in office today. Below are the relevant portions of my assessment and plan of care. ICD-10-CM ICD-9-CM 1. Atherosclerosis of native coronary artery of native heart without angina pectoris I25.10 414.01   
 stable 2. Chronic diastolic heart failure (HCC) I50.32 428.32   
 stable 3. Mixed hyperlipidemia E78.2 272.2   
 discussed starting pcsk9 inhibitor Current Outpatient Prescriptions Medication Sig Dispense Refill  regadenoson (LEXISCAN) 0.4 mg/5 mL syrg injection 5 mL by IntraVENous route once for 1 dose. 5 mL 0  
 levothyroxine (SYNTHROID) 75 mcg tablet Take 1 Tab by mouth Daily (before breakfast). 30 Tab 0  
 meclizine (ANTIVERT) 12.5 mg tablet Take 1 Tab by mouth three (3) times daily as needed for Dizziness for up to 10 days. 30 Tab 0  
 potassium chloride (K-DUR, KLOR-CON) 20 mEq tablet Take 1 Tab by mouth daily. 60 Tab 3  
 insulin glargine (LANTUS) 100 unit/mL injection Take 15 units every morning  Indications: type 2 diabetes mellitus 1 Vial 0  
 cyanocobalamin ER 1,000 mcg tablet Take 1 Tab by mouth daily. 30 Tab 3  
 famotidine (PEPCID) 20 mg tablet Take 20 mg by mouth nightly.  furosemide (LASIX) 20 mg tablet Use daily as needed for leg swelling 30 Tab 2  
 lidocaine (LIDODERM) 5 % 1 Patch by TransDERmal route every twenty-four (24) hours. 90 Each 1  
 montelukast (SINGULAIR) 10 mg tablet Take 1 Tab by mouth daily as needed. Indications: ALLERGIC RHINITIS 30 Tab 3  
 DOCOSAHEXANOIC ACID/EPA (FISH OIL PO) Take 1,000 mg by mouth two (2) times a day.  aspirin delayed-release 81 mg tablet Take 81 mg by mouth daily.     
 cholecalciferol, vitamin d3, (VITAMIN D) 1,000 unit tablet Take 5,000 Units by mouth two (2) times a day.  amLODIPine (NORVASC) 2.5 mg tablet Take 1 Tab by mouth daily. 30 Tab 0  cloNIDine HCl (CATAPRES) 0.1 mg tablet 0.1 mg po twice daily, do not take it if upper blood pressure is less than 110 60 Tab 0  clopidogrel (PLAVIX) 75 mg tablet Take 1 Tab by mouth daily. 30 Tab 3  
 albuterol (PROAIR HFA) 90 mcg/actuation inhaler Take 1 Puff by inhalation every four (4) hours as needed for Wheezing or Shortness of Breath. 1 Inhaler 3  capsaicin 0.075 % topical cream Apply  to affected area three (3) times daily. 60 g 0 No orders of the defined types were placed in this encounter. If you have questions, please do not hesitate to call me. I look forward to following Ms. April Rabia along with you. Sincerely, Nani Rebollar MD
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
## 2017-02-13 NOTE — PROGRESS NOTES
[facility]  [address], [address] [place] [address], [place], [address] [place] [address]  [phone] [place]  [phone] [place]       Name: Flory Guy         MRN#: [id number]        YOB: 1935   Gender: female Ht:5'7\" Wt:260 lbs       . Date of Rest/Stress Images: 2/13/2017   Referring Physician: Aden Whitehead DO  Ordering Physician: Jannette Barnett. Shirlene Massey MD, [facility]  Technologist: Veronika Collins. GLORIA Hooper, C.N.M.T  Diagnosis:  1. Chest pain, unspecified type    2. Congestive heart failure, unspecified congestive heart failure chronicity, unspecified congestive heart failure type ([address]    3. Mixed hyperlipidemia    4. Essential hypertension            Rest/Stress Myoview SPECT Myocardial Perfusion Imaging with  Lexiscan Stress and gated SPECT Imaging      PROCEDURE:        Myocardial perfusion imaging was performed at rest approximately 30 mins following the intravenous injection,(Right hand ) of 12.0 mCi of Tc99m Myoview for evaluation of myocardial function and perfusion at rest.    Baseline Data:    Baseline EKG reveals sinus rhythm, within normal limits. Baseline heart rate is 89. Baseline blood pressure is 148/94. Procedure: The patient was injected with 0.4 mg IV Lexiscan over a 30 second period. The patient had no significant symptoms. Heart rate increased from baseline to a heart rate of 108. Blood pressure decreased to 142/68. Electrocardiogram showed no significant ST-T changes or arrhythmia during the procedure. Diagnosis:   1. Negative EKG portion of Lexiscan stress test.    2. Nuclear imaging report to follow. Pharmacological:  Patient was injected with . 4 mg/mL with Lexiscan intravenously over a period 10 to 20 sec. After pharmacologic stress, the patient was injected intravenously with 37.4 mCi of Tc99m Myoview.  Gating post stress tomographic imaging performed approximately 45 minutes post tracer injection. The data was reconstructed in the short, horizontal long and vertical long axis views and tomographic slices were generated. NUCLEAR IMAGING:    Findings:  1. Post-stress imaging in short axis, horizontal and vertical long axis views reveals mild decreased Isotope uptake along the inferior wall. 2. Resting images also show mild decreased Isotope uptake along the inferior wall. 3. Gated images show normal left ventricular size, wall motion and systolic function. The ejection fraction is 83%. Diagnosis:   1. Probably normal scan. 2. Evidence of mild fixed inferior wall defect noted from his nuclear study suggestive of tissue attenuation with normal wall motion in the area. 3. No reversible defects suggestive of ischemia noted from his nuclear study. 4. Low risk scan. Thank you for the referral.    E-signed and Interpreting Physician:    Hilaria Kussmaul.  Misty Mistry MD, [facility]     Date of interpretation: 2/13/2017  Date of final report: 2/13/2017

## 2017-02-13 NOTE — MR AVS SNAPSHOT
Visit Information Date & Time Provider Department Dept. Phone Encounter #  
 2/13/2017 10:45 AM Saumya Zapien MD [facility] [place] [id number] Follow-up Instructions Return in about 3 months (around 5/13/2017). Your Appointments 5/3/2017  9:30 AM  
Follow Up with Guzman Swann DO [address] (--) Appt Note: 3 month follow up Partha Padmini Lock[place] [id number]  
[phone]  
  
   
 Kianajavier Lockhart [id number] 5/11/2017 10:30 AM  
Follow Up with Saumya Zapien MD  
[facility] [place] ([facility]) Appt Note: 3 month [address] [place] [address] [facility] [address]  
  
   
 [address] [facility] [address]  
  
    
  
 2/14/2017  9:15 AM  
TRANSITIONAL CARE MANAGEMENT with Guzman Swann DO [facility] (--) Appt Note: hosp f/u, d'c from [facility] 02/08/2017--seen for chest pains Partha Padmini Lockhart [id number] [phone]  
  
   
 [place]javier Lock[place] [id number] Upcoming Health Maintenance Date Due MICROALBUMIN Q1 8/8/2015 FOOT EXAM Q1 7/20/2017 EYE EXAM RETINAL OR DILATED Q1 7/25/2017 HEMOGLOBIN A1C Q6M 8/7/2017 MEDICARE YEARLY EXAM 8/18/2017 Pneumococcal 65+ Low/Medium Risk (2 of 2 - PPSV23) 12/2/2017 LIPID PANEL Q1 2/7/2018 GLAUCOMA SCREENING Q2Y 7/25/2018 DTaP/Tdap/Td series (2 - Td) 11/15/2023 Allergies as of 2/13/2017  Review Complete On: 2/13/2017 By: Saumya Zapien MD  
  
 Severity Noted Reaction Type Reactions Niacin High 03/26/2013    Palpitations, Other (comments) Stomach irritation Ace Inhibitors  05/19/2010    Cough Avapro [Irbesartan]  05/19/2010    Myalgia Bystolic [Nebivolol]  06/69/1661    Other (comments) Felt like throat closing Catapres [Clonidine]  05/19/2010    Cough Codeine  05/19/2010    Nausea and Vomiting Cozaar [Losartan]    Not Reported This Time Crestor [Rosuvastatin]  05/19/2010    Other (comments) Cramps, aches [place] [Propoxyphene N-acetaminophen]  05/19/2010    Unknown (comments) Diovan [Valsartan]  05/19/2010    Cough Flagyl [Metronidazole]  05/19/2010    Other (comments) Mouth and throat irritation Gabapentin  03/16/2016    Other (comments) Abdominal pain and burning Iodinated Contrast Media - Oral And Iv Dye    Other (comments) Throat swelling Iodine    Unknown (comments) Lescol [Fluvastatin]  05/19/2010    Other (comments) Leg cramps Lipitor [Atorvastatin]  05/19/2010    Myalgia, Other (comments) Cramps, aches Lovastatin  05/19/2010    Other (comments) Leg cramps Nexium [Esomeprazole Magnesium]  05/20/2010    Other (comments) Stomach upset, burning Pravachol [Pravastatin]  05/19/2010    Other (comments) Leg cramps Reglan [Metoclopramide]  05/19/2010    Nausea Only Trazodone  05/19/2010    Other (comments) Patient states she feels drugged Zetia [Ezetimibe]  05/19/2010    Other (comments) Cramps, aches Zocor [Simvastatin]  05/19/2010    Other (comments) Cramps, aches Current Immunizations  Reviewed on 2/7/2017 Name Date Influenza High Dose Vaccine PF 12/2/2016 Influenza Vaccine 12/16/2015 11:20 AM, 9/15/2014 Influenza Vaccine Split 11/6/2012, 10/5/2010 Influenza Vaccine Whole 9/1/2009 Tdap 11/15/2013 Not reviewed this visit You Were Diagnosed With   
  
 Codes Comments Atherosclerosis of native coronary artery of native heart without angina pectoris    -  Primary ICD-10-CM: I25.10 ICD-9-CM: 414.01 stable Chronic diastolic heart failure (HCC)     ICD-10-CM: I50.32 
ICD-9-CM: 428.32 stable Mixed hyperlipidemia     ICD-10-CM: E78.2 ICD-9-CM: 272.2 discussed starting pcsk9 inhibitor Vitals BP Pulse Height(growth percentile) Weight(growth percentile) BMI OB Status 151/79 91 5' 7\" (1.702 m) 262 lb (118.8 kg) 41.04 kg/m2 Hysterectomy Smoking Status Former Smoker Vitals History BMI and BSA Data Body Mass Index Body Surface Area 41.04 kg/m 2 2.37 m 2 Preferred Pharmacy [facility]acy Name [facility] Your Updated Medication List  
  
   
This list is accurate as of: 2/13/17 11:59 AM.  Always use your most recent med list.  
  
  
  
  
 albuterol 90 mcg/actuation inhaler Commonly known as:  PROAIR HFA Take 1 Puff by inhalation every four (4) hours as needed for Wheezing or Shortness of Breath. amLODIPine 2.5 mg tablet Commonly known as:  Arlyss Madison Take 1 Tab by mouth daily. aspirin delayed-release 81 mg tablet Take 81 mg by mouth daily. capsaicin 0.075 % topical cream  
Apply  to affected area three (3) times daily. cloNIDine HCl 0.1 mg tablet Commonly known as:  CATAPRES  
0.1 mg po twice daily, do not take it if upper blood pressure is less than 110  
  
 clopidogrel 75 mg Tab Commonly known as:  PLAVIX Take 1 Tab by mouth daily. cyanocobalamin ER 1,000 mcg tablet Take 1 Tab by mouth daily. FISH OIL PO Take 1,000 mg by mouth two (2) times a day. furosemide 20 mg tablet Commonly known as:  LASIX Use daily as needed for leg swelling  
  
 insulin glargine 100 unit/mL injection Commonly known as:  LANTUS Take 15 units every morning  Indications: type 2 diabetes mellitus  
  
 levothyroxine 75 mcg tablet Commonly known as:  SYNTHROID Take 1 Tab by mouth Daily (before breakfast). lidocaine 5 % Commonly known as:  LIDODERM  
1 Patch by TransDERmal route every twenty-four (24) hours. meclizine 12.5 mg tablet Commonly known as:  ANTIVERT Take 1 Tab by mouth three (3) times daily as needed for Dizziness for up to 10 days. montelukast 10 mg tablet Commonly known as:  SINGULAIR Take 1 Tab by mouth daily as needed. Indications: ALLERGIC RHINITIS PEPCID 20 mg tablet Generic drug:  famotidine Take 20 mg by mouth nightly. potassium chloride 20 mEq tablet Commonly known as:  K-DUR, KLOR-CON Take 1 Tab by mouth daily. regadenoson 0.4 mg/5 mL Syrg injection Commonly known as:  LEXISCAN  
5 mL by IntraVENous route once for 1 dose. VITAMIN D3 1,000 unit tablet Generic drug:  cholecalciferol Take 5,000 Units by mouth two (2) times a day. Follow-up Instructions Return in about 3 months (around 5/13/2017). Introducing [facility]! Dear Mildred Mcdonnell: Thank you for requesting a [employer] account. Our records indicate that you already have an active [employer] account. You can access your account anytime at [email or web address] Did you know that you can access your hospital and ER discharge instructions at any time in [employer]? You can also review all of your test results from your hospital stay or ER visit. Additional Information If you have questions, please visit the Frequently Asked Questions section of the [employer] website at [email or web address] Remember, [employer] is NOT to be used for urgent needs. For medical emergencies, dial 911. Now available from your iPhone and Android! Please provide this summary of care documentation to your next provider. Your primary care clinician is listed as Anna Lao. If you have any questions after today's visit, please call [phone].

## 2017-03-05 ENCOUNTER — APPOINTMENT (OUTPATIENT)
Dept: GENERAL RADIOLOGY | Age: 82
End: 2017-03-05
Attending: EMERGENCY MEDICINE
Payer: MEDICARE

## 2017-03-05 ENCOUNTER — HOSPITAL ENCOUNTER (EMERGENCY)
Age: 82
Discharge: HOME | End: 2017-03-05
Attending: EMERGENCY MEDICINE | Admitting: EMERGENCY MEDICINE
Payer: MEDICARE

## 2017-03-05 VITALS
DIASTOLIC BLOOD PRESSURE: 88 MMHG | WEIGHT: 260 LBS | HEART RATE: 88 BPM | RESPIRATION RATE: 26 BRPM | TEMPERATURE: 98.3 F | OXYGEN SATURATION: 97 % | HEIGHT: 67 IN | SYSTOLIC BLOOD PRESSURE: 134 MMHG | BODY MASS INDEX: 40.81 KG/M2

## 2017-03-05 DIAGNOSIS — J20.9 ACUTE BRONCHITIS, UNSPECIFIED ORGANISM: Primary | ICD-10-CM

## 2017-03-05 PROCEDURE — 74011000250 HC RX REV CODE- 250: Performed by: EMERGENCY MEDICINE

## 2017-03-05 PROCEDURE — 99282 EMERGENCY DEPT VISIT SF MDM: CPT

## 2017-03-05 PROCEDURE — 77030013140 HC MSK NEB VYRM -A

## 2017-03-05 PROCEDURE — 74011250636 HC RX REV CODE- 250/636: Performed by: EMERGENCY MEDICINE

## 2017-03-05 PROCEDURE — 71020 XR CHEST PA LAT: CPT

## 2017-03-05 PROCEDURE — 94640 AIRWAY INHALATION TREATMENT: CPT

## 2017-03-05 PROCEDURE — 96372 THER/PROPH/DIAG INJ SC/IM: CPT

## 2017-03-05 RX ORDER — PREDNISONE 50 MG/1
50 TABLET ORAL DAILY
Qty: 5 TAB | Refills: 0 | Status: SHIPPED | OUTPATIENT
Start: 2017-03-05 | End: 2017-03-10

## 2017-03-05 RX ORDER — IPRATROPIUM BROMIDE AND ALBUTEROL SULFATE 2.5; .5 MG/3ML; MG/3ML
3 SOLUTION RESPIRATORY (INHALATION) ONCE
Status: COMPLETED | OUTPATIENT
Start: 2017-03-05 | End: 2017-03-05

## 2017-03-05 RX ADMIN — IPRATROPIUM BROMIDE AND ALBUTEROL SULFATE 3 ML: .5; 3 SOLUTION RESPIRATORY (INHALATION) at 12:31

## 2017-03-05 RX ADMIN — METHYLPREDNISOLONE SODIUM SUCCINATE 125 MG: 125 INJECTION, POWDER, FOR SOLUTION INTRAMUSCULAR; INTRAVENOUS at 11:39

## 2017-03-05 NOTE — ED NOTES
Introduced myself as Primary Nurse. Encouraged to voice any concerns and/or change in their condition. Call bell in easy reach. Patient's fall risk assessed, bed locked and in lowest position. Lights are on in room and area around bed dry and free of clutter. Informed staff will be making rounds every half hour or sooner if condition warrants. Will explain tests and reason why they are to be done. Patient knows that as soon as MD reviews results he will be in to update patient. Hand hygiene maintained prior to and after entering patient's room.
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
## 2017-03-05 NOTE — ED NOTES
I have reviewed discharge instructions with the patient. The patient verbalized understanding. Current Discharge Medication List      START taking these medications    Details   predniSONE (DELTASONE) 50 mg tablet Take 1 Tab by mouth daily for 5 days. Qty: 5 Tab, Refills: 0         CONTINUE these medications which have NOT CHANGED    Details   levothyroxine (SYNTHROID) 75 mcg tablet Take 1 Tab by mouth Daily (before breakfast). Qty: 30 Tab, Refills: 0    Associated Diagnoses: Hypothyroidism, unspecified type      amLODIPine (NORVASC) 2.5 mg tablet Take 1 Tab by mouth daily. Qty: 30 Tab, Refills: 0      cloNIDine HCl (CATAPRES) 0.1 mg tablet 0.1 mg po twice daily, do not take it if upper blood pressure is less than 110  Qty: 60 Tab, Refills: 0      potassium chloride (K-DUR, KLOR-CON) 20 mEq tablet Take 1 Tab by mouth daily. Qty: 60 Tab, Refills: 3    Associated Diagnoses: Hypokalemia      insulin glargine (LANTUS) 100 unit/mL injection Take 15 units every morning  Indications: type 2 diabetes mellitus  Qty: 1 Vial, Refills: 0      cyanocobalamin ER 1,000 mcg tablet Take 1 Tab by mouth daily. Qty: 30 Tab, Refills: 3    Associated Diagnoses: Weakness; Macrocytosis      famotidine (PEPCID) 20 mg tablet Take 20 mg by mouth nightly. clopidogrel (PLAVIX) 75 mg tablet Take 1 Tab by mouth daily. Qty: 30 Tab, Refills: 3    Associated Diagnoses: S/P coronary artery stent placement      furosemide (LASIX) 20 mg tablet Use daily as needed for leg swelling  Qty: 30 Tab, Refills: 2      lidocaine (LIDODERM) 5 % 1 Patch by TransDERmal route every twenty-four (24) hours. Qty: 90 Each, Refills: 1    Associated Diagnoses: Leg pain, right; Chronic neck pain      albuterol (PROAIR HFA) 90 mcg/actuation inhaler Take 1 Puff by inhalation every four (4) hours as needed for Wheezing or Shortness of Breath. Qty: 1 Inhaler, Refills: 3    Comments: Only Branded version of ProAir  Associated Diagnoses:  Moderate intermittent asthma, with acute exacerbation      montelukast (SINGULAIR) 10 mg tablet Take 1 Tab by mouth daily as needed. Indications: ALLERGIC RHINITIS  Qty: 30 Tab, Refills: 3    Associated Diagnoses: Uncomplicated asthma, unspecified asthma severity      capsaicin 0.075 % topical cream Apply  to affected area three (3) times daily. Qty: 60 g, Refills: 0      DOCOSAHEXANOIC ACID/EPA (FISH OIL PO) Take 1,000 mg by mouth two (2) times a day. aspirin delayed-release 81 mg tablet Take 81 mg by mouth daily. cholecalciferol, vitamin d3, (VITAMIN D) 1,000 unit tablet Take 5,000 Units by mouth two (2) times a day.          Patient armband removed and shredded

## 2017-03-05 NOTE — DISCHARGE INSTRUCTIONS
Bronchitis: Care Instructions  Your Care Instructions    Bronchitis is inflammation of the bronchial tubes, which carry air to the lungs. The tubes swell and produce mucus, or phlegm. The mucus and inflamed bronchial tubes make you cough. You may have trouble breathing. Most cases of bronchitis are caused by viruses like those that cause colds. Antibiotics usually do not help and they may be harmful. Bronchitis usually develops rapidly and lasts about 2 to 3 weeks in otherwise healthy people. Follow-up care is a key part of your treatment and safety. Be sure to make and go to all appointments, and call your doctor if you are having problems. It's also a good idea to know your test results and keep a list of the medicines you take. How can you care for yourself at home? · Take all medicines exactly as prescribed. Call your doctor if you think you are having a problem with your medicine. · Get some extra rest.  · Take an over-the-counter pain medicine, such as acetaminophen (Tylenol), ibuprofen (Advil, Motrin), or naproxen (Aleve) to reduce fever and relieve body aches. Read and follow all instructions on the label. · Do not take two or more pain medicines at the same time unless the doctor told you to. Many pain medicines have acetaminophen, which is Tylenol. Too much acetaminophen (Tylenol) can be harmful. · Take an over-the-counter cough medicine that contains dextromethorphan to help quiet a dry, hacking cough so that you can sleep. Avoid cough medicines that have more than one active ingredient. Read and follow all instructions on the label. · Breathe moist air from a humidifier, hot shower, or sink filled with hot water. The heat and moisture will thin mucus so you can cough it out. · Do not smoke. Smoking can make bronchitis worse. If you need help quitting, talk to your doctor about stop-smoking programs and medicines. These can increase your chances of quitting for good.   When should you call for help? Call 911 anytime you think you may need emergency care. For example, call if:  · You have severe trouble breathing. Call your doctor now or seek immediate medical care if:  · You have new or worse trouble breathing. · You cough up dark brown or bloody mucus (sputum). · You have a new or higher fever. · You have a new rash. Watch closely for changes in your health, and be sure to contact your doctor if:  · You cough more deeply or more often, especially if you notice more mucus or a change in the color of your mucus. · You are not getting better as expected. Where can you learn more? Go to http://cristina-mignon.info/. Enter H333 in the search box to learn more about \"Bronchitis: Care Instructions. \"  Current as of: May 23, 2016  Content Version: 11.1  © 8552-4429 [employer]. Care instructions adapted under license by [employer] (which disclaims liability or warranty for this information). If you have questions about a medical condition or this instruction, always ask your healthcare professional. [address] any warranty or liability for your use of this information.

## 2017-03-05 NOTE — ED TRIAGE NOTES
Pt presents to the ED with productive thick white cough onset x1 week. Pt reports increasing cough and SOB for x4 fever. Pt reports night sweats. Pt denies fever.

## 2017-03-06 ENCOUNTER — TELEPHONE (OUTPATIENT)
Dept: FAMILY MEDICINE CLINIC | Age: 82
End: 2017-03-06

## 2017-03-06 ENCOUNTER — OFFICE VISIT (OUTPATIENT)
Dept: FAMILY MEDICINE CLINIC | Age: 82
End: 2017-03-06

## 2017-03-06 VITALS
RESPIRATION RATE: 18 BRPM | HEART RATE: 91 BPM | SYSTOLIC BLOOD PRESSURE: 144 MMHG | TEMPERATURE: 98.6 F | WEIGHT: 258 LBS | BODY MASS INDEX: 40.49 KG/M2 | HEIGHT: 67 IN | OXYGEN SATURATION: 100 % | DIASTOLIC BLOOD PRESSURE: 82 MMHG

## 2017-03-06 DIAGNOSIS — J20.9 ACUTE BRONCHITIS, UNSPECIFIED ORGANISM: Primary | ICD-10-CM

## 2017-03-06 DIAGNOSIS — R05.9 COUGH: ICD-10-CM

## 2017-03-06 DIAGNOSIS — Z09 HOSPITAL DISCHARGE FOLLOW-UP: ICD-10-CM

## 2017-03-06 DIAGNOSIS — E11.21 CONTROLLED TYPE 2 DIABETES MELLITUS WITH DIABETIC NEPHROPATHY, WITHOUT LONG-TERM CURRENT USE OF INSULIN (HCC): ICD-10-CM

## 2017-03-06 DIAGNOSIS — I10 ESSENTIAL HYPERTENSION WITH GOAL BLOOD PRESSURE LESS THAN 140/90: ICD-10-CM

## 2017-03-06 RX ORDER — BENZONATATE 200 MG/1
200 CAPSULE ORAL
Qty: 30 CAP | Refills: 0 | Status: SHIPPED | OUTPATIENT
Start: 2017-03-06 | End: 2017-03-13

## 2017-03-06 RX ORDER — AMLODIPINE BESYLATE 5 MG/1
5 TABLET ORAL DAILY
Qty: 90 TAB | Refills: 3 | Status: SHIPPED | OUTPATIENT
Start: 2017-03-06 | End: 2018-01-24 | Stop reason: SDUPTHER

## 2017-03-06 NOTE — TELEPHONE ENCOUNTER
Geraldo Jenkins,   Please let the patient know that I will call in Tessalon pearls for her to use for any lingering cough. She can continue using her ProAir. Thanks.     JAMIE

## 2017-03-06 NOTE — PATIENT INSTRUCTIONS
Please contact our office if you have any questions about your visit today. 1.) Increase Amlodipine (Norvasc) to 5 mg per day. 2.) Please keep regular follow up in May.

## 2017-03-06 NOTE — MR AVS SNAPSHOT
Visit Information Date & Time Provider Department Dept. Phone Encounter #  
 3/6/2017 10:00 AM Julianna GrandeSheri Aretha 77 [id number] Follow-up Instructions Return for Patient will keep May appointment . Your Appointments 5/3/2017  9:30 AM  
Follow Up with Julianna Grande DO [address] (--) Appt Note: 3 month follow up Partha Washington [address] [place] [id number]  
[phone]  
  
   
 Partha Washington [address] [place] [id number] 5/11/2017 10:30 AM  
Follow Up with Clinton Arcos MD  
[facility] ([facility]) Appt Note: 3 month [address] [facility] [address]  
  
   
 [address] [facility]  
  
    
 8/21/2017 10:00 AM  
Office Visit with Julianna Grande DO [address] [facility] (--) Appt Note: Medicare Wellness Partha Washington [address] [address] [id number] [phone]  
  
   
 Partha Washington [address] [address] [address] Upcoming Health Maintenance Date Due MICROALBUMIN Q1 8/8/2015 FOOT EXAM Q1 7/20/2017 EYE EXAM RETINAL OR DILATED Q1 7/25/2017 HEMOGLOBIN A1C Q6M 8/7/2017 MEDICARE YEARLY EXAM 8/18/2017 Pneumococcal 65+ Low/Medium Risk (2 of 2 - PPSV23) 12/2/2017 LIPID PANEL Q1 2/7/2018 GLAUCOMA SCREENING Q2Y 7/25/2018 DTaP/Tdap/Td series (2 - Td) 11/15/2023 Allergies as of 3/6/2017  Review Complete On: 0/6/3698 By: Alejandra Mancilla. Sri Weems LPN Severity Noted Reaction Type Reactions Niacin High 03/26/2013    Palpitations, Other (comments) Stomach irritation Ace Inhibitors  05/19/2010    Cough Avapro [Irbesartan]  05/19/2010    Myalgia Bystolic [Nebivolol]  95/07/4381    Other (comments) Felt like throat closing Catapres [Clonidine]  05/19/2010    Cough Codeine  05/19/2010    Nausea and Vomiting Cozaar [Losartan]    Not Reported This Time Crestor [Rosuvastatin]  05/19/2010    Other (comments) Cramps, aches [place] [Propoxyphene N-acetaminophen]  05/19/2010    Unknown (comments) Diovan [Valsartan]  05/19/2010    Cough Flagyl [Metronidazole]  05/19/2010    Other (comments) Mouth and throat irritation Gabapentin  03/16/2016    Other (comments) Abdominal pain and burning Iodinated Contrast Media - Oral And Iv Dye    Other (comments) Throat swelling Iodine    Unknown (comments) Lescol [Fluvastatin]  05/19/2010    Other (comments) Leg cramps Lipitor [Atorvastatin]  05/19/2010    Myalgia, Other (comments) Cramps, aches Lovastatin  05/19/2010    Other (comments) Leg cramps Nexium [Esomeprazole Magnesium]  05/20/2010    Other (comments) Stomach upset, burning Pravachol [Pravastatin]  05/19/2010    Other (comments) Leg cramps Reglan [Metoclopramide]  05/19/2010    Nausea Only Trazodone  05/19/2010    Other (comments) Patient states she feels drugged Zetia [Ezetimibe]  05/19/2010    Other (comments) Cramps, aches Zocor [Simvastatin]  05/19/2010    Other (comments) Cramps, aches Current Immunizations  Reviewed on 2/7/2017 Name Date Influenza High Dose Vaccine PF 12/2/2016 Influenza Vaccine 12/16/2015 11:20 AM, 9/15/2014 Influenza Vaccine Split 11/6/2012, 10/5/2010 Influenza Vaccine Whole 9/1/2009 Tdap 11/15/2013 Not reviewed this visit You Were Diagnosed With   
  
 Codes Comments Acute bronchitis, unspecified organism    -  Primary ICD-10-CM: J20.9 ICD-9-CM: 466.0 Hospital discharge follow-up     ICD-10-CM: [facility] ICD-9-CM: V67.59 Controlled type 2 diabetes mellitus with diabetic nephropathy, without long-term current use of insulin (HCC)     ICD-10-CM: E11.21 
ICD-9-CM: 250.40, 583.81 Essential hypertension with goal blood pressure less than 140/90     ICD-10-CM: I10 
ICD-9-CM: 401.9 Vitals BP Pulse Temp Resp Height(growth percentile) Weight(growth percentile) 144/82 (BP 1 Location: Right arm, BP Patient Position: Sitting) 91 98.6 °F (37 °C) (Oral) 18 5' 6.5\" (1.689 m) 258 lb (117 kg) SpO2 BMI OB Status Smoking Status 100% 41.02 kg/m2 Hysterectomy Former Smoker BMI and BSA Data Body Mass Index Body Surface Area 41.02 kg/m 2 2.34 m 2 Preferred Pharmacy Pharmacy Name [facility] [address] [place] [address] Your Updated Medication List  
  
   
This list is accurate as of: 3/6/17 10:38 AM.  Always use your most recent med list.  
  
  
  
  
 albuterol 90 mcg/actuation inhaler Commonly known as:  PROAIR HFA Take 1 Puff by inhalation every four (4) hours as needed for Wheezing or Shortness of Breath. amLODIPine 5 mg tablet Commonly known as:  Lennis Eagles Take 1 Tab by mouth daily. aspirin delayed-release 81 mg tablet Take 81 mg by mouth daily. capsaicin 0.075 % topical cream  
Apply  to affected area three (3) times daily. cloNIDine HCl 0.1 mg tablet Commonly known as:  CATAPRES  
0.1 mg po twice daily, do not take it if upper blood pressure is less than 110  
  
 clopidogrel 75 mg Tab Commonly known as:  PLAVIX Take 1 Tab by mouth daily. cyanocobalamin ER 1,000 mcg tablet Take 1 Tab by mouth daily. FISH OIL PO Take 1,000 mg by mouth two (2) times a day. furosemide 20 mg tablet Commonly known as:  LASIX Use daily as needed for leg swelling  
  
 insulin glargine 100 unit/mL injection Commonly known as:  LANTUS Take 15 units every morning  Indications: type 2 diabetes mellitus  
  
 levothyroxine 75 mcg tablet Commonly known as:  SYNTHROID Take 1 Tab by mouth Daily (before breakfast). lidocaine 5 % Commonly known as:  LIDODERM  
1 Patch by TransDERmal route every twenty-four (24) hours. montelukast 10 mg tablet Commonly known as:  SINGULAIR Take 1 Tab by mouth daily as needed. Indications: ALLERGIC RHINITIS PEPCID 20 mg tablet Generic drug:  famotidine Take 20 mg by mouth nightly. potassium chloride 20 mEq tablet Commonly known as:  K-DUR, KLOR-CON Take 1 Tab by mouth daily. predniSONE 50 mg tablet Commonly known as:  Kadeem Kaur Take 1 Tab by mouth daily for 5 days. VITAMIN D3 1,000 unit tablet Generic drug:  cholecalciferol Take 5,000 Units by mouth two (2) times a day. Prescriptions Sent to Pharmacy Refills  
 amLODIPine (NORVASC) 5 mg tablet 3 Sig: Take 1 Tab by mouth daily. Class: Normal  
 Pharmacy: [address] #: [phone] Route: Oral  
  
Follow-up Instructions Return for Patient will keep May appointment . Patient Instructions Please contact our office if you have any questions about your visit today. 1.) Increase Amlodipine (Norvasc) to 5 mg per day. 2.) Please keep regular follow up in May. Introducing \[facility] [place] [facility]\"" & HEALTH SERVICES! Dear Urvashi Rizzo: Thank you for requesting a [employer] account. Our records indicate that you already have an active [employer] account. You can access your account anytime at [email or web address] Did you know that you can access your hospital and ER discharge instructions at any time in [employer] You can also review all of your test results from your hospital stay or ER visit. Additional Information If you have questions, please visit the Frequently Asked Questions section of the [employer] website at [email or web address] Remember, [employer] is NOT to be used for urgent needs. For medical emergencies, dial 911. Now available from your iPhone and Android! Please provide this summary of care documentation to your next provider. Your primary care clinician is listed as Julianna Grande.  If you have any questions after today's visit, please call [phone].

## 2017-03-06 NOTE — PROGRESS NOTES
Chief Complaint   Patient presents with   [facility] Follow Up     was seen yesterday HV DX bronchitis    High Blood Sugar     states that the reading this am was 330, this is after being on prednisone     1. Have you been to the ER, urgent care clinic since your last visit? Hospitalized since your last visit? No    2. Have you seen or consulted any other health care providers outside of the [address] [place] [address] since your last visit? Include any pap smears or colon screening.  No

## 2017-03-06 NOTE — PROGRESS NOTES
Progress Note    Patient: Charles Hernandez MRN: [id number]  SSN: [id number]    YOB: 1935  Age: [de-identified] y.o. Sex: female          Subjective:   Charles Hernandez is a [de-identified] y.o. female who is here for an acute care visit for follow up on ER visit. Charles Hernandez states that the incident occurred yesterday. She states that she developed cough yesterday. She mentions that she would feel strangled with coughing. She state that she tried all of her meds with no improvement. She states that she eventually was able to get something up. She later went to the ER and they did an x-ray and was diagnosed with bronchitis. She states that she was placed on prednisone which unfortunately spiked her blood sugar. She also believes that Clonidine may have been contributing to her cough as well. Objective:     Visit Vitals    /82 (BP 1 Location: Right arm, BP Patient Position: Sitting)    Pulse 91    Temp 98.6 °F (37 °C) (Oral)    Resp 18    Ht 5' 6.5\" (1.689 m)    Wt 258 lb (117 kg)    SpO2 100%    BMI 41.02 kg/m2           Review of Systems:  Pertinent items are noted in the History of Present Illness. Physical Exam:   GENERAL: alert, cooperative, no distress, appears stated age, moderately obese  ENT: No fluid behind tympanic membranes bilaterally. No cerumen impaction noted either    EYE: conjunctivae/corneas clear. PERRL, EOM's intact. Fundi benign  THROAT & NECK: normal and no erythema or exudates noted. LUNG: clear to auscultation bilaterally  HEART: regular rate and rhythm, S1, S2 normal, no murmur, click, rub or gallop  ABDOMEN: soft, non-tender. Bowel sounds normal. No masses,  no organomegaly, abnormal findings:  obese  EXTREMITIES:  extremities normal, atraumatic, no cyanosis or edema     Lab/Data Review:  No new labs to review        Assessment:     1.) Acute Bronchitis: Patient previously placed on prednisone---she will complete this course.      2.) Essential Hypertension with goal BP less than 150/90, Amlodipine reordered.      Patient will keep regular May appointment     Plan:     Orders Placed This Encounter    amLODIPine (NORVASC) 5 mg tablet           Signed By: Umesh Cuellar DO     March 6, 2017

## 2017-03-09 DIAGNOSIS — Z95.5 S/P CORONARY ARTERY STENT PLACEMENT: ICD-10-CM

## 2017-03-09 RX ORDER — CLOPIDOGREL BISULFATE 75 MG/1
TABLET ORAL
Qty: 30 TAB | Refills: 0 | Status: SHIPPED | OUTPATIENT
Start: 2017-03-09 | End: 2017-04-07

## 2017-03-11 ENCOUNTER — HOSPITAL ENCOUNTER (EMERGENCY)
Age: 82
Discharge: HOME | End: 2017-03-11
Attending: EMERGENCY MEDICINE
Payer: MEDICARE

## 2017-03-11 ENCOUNTER — APPOINTMENT (OUTPATIENT)
Dept: GENERAL RADIOLOGY | Age: 82
End: 2017-03-11
Attending: EMERGENCY MEDICINE
Payer: MEDICARE

## 2017-03-11 VITALS
HEIGHT: 67 IN | SYSTOLIC BLOOD PRESSURE: 130 MMHG | DIASTOLIC BLOOD PRESSURE: 75 MMHG | RESPIRATION RATE: 20 BRPM | WEIGHT: 256 LBS | HEART RATE: 112 BPM | OXYGEN SATURATION: 97 % | BODY MASS INDEX: 40.18 KG/M2

## 2017-03-11 DIAGNOSIS — J06.9 ACUTE UPPER RESPIRATORY INFECTION: Primary | ICD-10-CM

## 2017-03-11 LAB
ANION GAP BLD CALC-SCNC: 10 MMOL/L (ref 3–18)
BASOPHILS # BLD AUTO: 0 K/UL (ref 0–0.06)
BASOPHILS # BLD: 0 % (ref 0–2)
BNP SERPL-MCNC: 37 PG/ML (ref 0–1800)
BUN SERPL-MCNC: 13 MG/DL (ref 7–18)
BUN/CREAT SERPL: 14 (ref 12–20)
CALCIUM SERPL-MCNC: 9.7 MG/DL (ref 8.5–10.1)
CHLORIDE SERPL-SCNC: 101 MMOL/L (ref 100–108)
CO2 SERPL-SCNC: 29 MMOL/L (ref 21–32)
CREAT SERPL-MCNC: 0.9 MG/DL (ref 0.6–1.3)
DIFFERENTIAL METHOD BLD: NORMAL
EOSINOPHIL # BLD: 0.1 K/UL (ref 0–0.4)
EOSINOPHIL NFR BLD: 1 % (ref 0–5)
ERYTHROCYTE [DISTWIDTH] IN BLOOD BY AUTOMATED COUNT: 11.9 % (ref 11.6–14.5)
GLUCOSE SERPL-MCNC: 186 MG/DL (ref 74–99)
HCT VFR BLD AUTO: 41.1 % (ref 35–45)
HGB BLD-MCNC: 12.8 G/DL (ref 12–16)
LYMPHOCYTES # BLD AUTO: 31 % (ref 21–52)
LYMPHOCYTES # BLD: 2.8 K/UL (ref 0.9–3.6)
MCH RBC QN AUTO: 29.8 PG (ref 24–34)
MCHC RBC AUTO-ENTMCNC: 31.1 G/DL (ref 31–37)
MCV RBC AUTO: 95.8 FL (ref 74–97)
MONOCYTES # BLD: 0.5 K/UL (ref 0.05–1.2)
MONOCYTES NFR BLD AUTO: 6 % (ref 3–10)
NEUTS SEG # BLD: 5.7 K/UL (ref 1.8–8)
NEUTS SEG NFR BLD AUTO: 62 % (ref 40–73)
PLATELET # BLD AUTO: 310 K/UL (ref 135–420)
PMV BLD AUTO: 10.6 FL (ref 9.2–11.8)
POTASSIUM SERPL-SCNC: 3.3 MMOL/L (ref 3.5–5.5)
RBC # BLD AUTO: 4.29 M/UL (ref 4.2–5.3)
SODIUM SERPL-SCNC: 140 MMOL/L (ref 136–145)
TROPONIN I SERPL-MCNC: <0.02 NG/ML (ref 0–0.06)
WBC # BLD AUTO: 9.1 K/UL (ref 4.6–13.2)

## 2017-03-11 PROCEDURE — 84484 ASSAY OF TROPONIN QUANT: CPT | Performed by: EMERGENCY MEDICINE

## 2017-03-11 PROCEDURE — 80048 BASIC METABOLIC PNL TOTAL CA: CPT | Performed by: EMERGENCY MEDICINE

## 2017-03-11 PROCEDURE — 99284 EMERGENCY DEPT VISIT MOD MDM: CPT

## 2017-03-11 PROCEDURE — 85025 COMPLETE CBC W/AUTO DIFF WBC: CPT | Performed by: EMERGENCY MEDICINE

## 2017-03-11 PROCEDURE — 83880 ASSAY OF NATRIURETIC PEPTIDE: CPT | Performed by: EMERGENCY MEDICINE

## 2017-03-11 PROCEDURE — 93005 ELECTROCARDIOGRAM TRACING: CPT
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
## 2017-03-11 NOTE — DISCHARGE INSTRUCTIONS

## 2017-03-12 LAB
ATRIAL RATE: 103 BPM
CALCULATED P AXIS, ECG09: 68 DEGREES
CALCULATED R AXIS, ECG10: -18 DEGREES
CALCULATED T AXIS, ECG11: 24 DEGREES
DIAGNOSIS, 93000: NORMAL
P-R INTERVAL, ECG05: 126 MS
Q-T INTERVAL, ECG07: 346 MS
QRS DURATION, ECG06: 80 MS
QTC CALCULATION (BEZET), ECG08: 453 MS
VENTRICULAR RATE, ECG03: 103 BPM

## 2017-03-13 ENCOUNTER — TELEPHONE (OUTPATIENT)
Dept: FAMILY MEDICINE CLINIC | Age: 82
End: 2017-03-13

## 2017-03-13 NOTE — TELEPHONE ENCOUNTER
Geraldo Jenkins,   Please let the patient know that she can take OTC iron at least once a day but a max of three times a day. This may help with her weakness. Thanks.     JAMIE

## 2017-03-13 NOTE — TELEPHONE ENCOUNTER
Pt ask if dr Franklyn Nuñez can help with her extreme weakness,said she mentioned at last appt.  Is very weak

## 2017-03-20 NOTE — TELEPHONE ENCOUNTER
Called the patient and advised her that the provider has suggested that she try iron to help with her lack of energy.

## 2017-04-03 ENCOUNTER — TELEPHONE (OUTPATIENT)
Dept: CARDIOLOGY CLINIC | Age: 82
End: 2017-04-03

## 2017-04-03 NOTE — TELEPHONE ENCOUNTER
Patient states she is having a colonoscopy on 4/7/17 with Dr. Merlinda Ferrara. Patient would like to know when can she stop taking her Plavix for this procedure. Please Advise.

## 2017-04-03 NOTE — TELEPHONE ENCOUNTER
Please send to Dr. Pam Reed she had Double vessel coronary artery disease and S/P angioplasty/stent to mid right coronary artery using a drug-eluting stent in 06/16

## 2017-04-04 NOTE — TELEPHONE ENCOUNTER
Patient called and message left regarding holding plavix. Return call requested for any questions or concerns.

## 2017-04-11 DIAGNOSIS — Z95.5 S/P CORONARY ARTERY STENT PLACEMENT: ICD-10-CM

## 2017-04-12 RX ORDER — CLOPIDOGREL BISULFATE 75 MG/1
TABLET ORAL
Qty: 30 TAB | Refills: 6 | Status: SHIPPED | OUTPATIENT
Start: 2017-04-12 | End: 2017-11-09 | Stop reason: SDUPTHER

## 2017-04-22 ENCOUNTER — HOSPITAL ENCOUNTER (EMERGENCY)
Age: 82
Discharge: HOME | End: 2017-04-22
Attending: EMERGENCY MEDICINE
Payer: MEDICARE

## 2017-04-22 ENCOUNTER — APPOINTMENT (OUTPATIENT)
Dept: GENERAL RADIOLOGY | Age: 82
End: 2017-04-22
Attending: EMERGENCY MEDICINE
Payer: MEDICARE

## 2017-04-22 VITALS
OXYGEN SATURATION: 97 % | BODY MASS INDEX: 40.82 KG/M2 | HEART RATE: 104 BPM | TEMPERATURE: 98.9 F | DIASTOLIC BLOOD PRESSURE: 72 MMHG | HEIGHT: 66 IN | SYSTOLIC BLOOD PRESSURE: 165 MMHG | RESPIRATION RATE: 20 BRPM | WEIGHT: 254 LBS

## 2017-04-22 DIAGNOSIS — M54.31 SCIATICA OF RIGHT SIDE: Primary | ICD-10-CM

## 2017-04-22 PROCEDURE — 72100 X-RAY EXAM L-S SPINE 2/3 VWS: CPT

## 2017-04-22 PROCEDURE — 99282 EMERGENCY DEPT VISIT SF MDM: CPT

## 2017-04-22 PROCEDURE — 73502 X-RAY EXAM HIP UNI 2-3 VIEWS: CPT

## 2017-04-22 RX ORDER — PREDNISONE 10 MG/1
TABLET ORAL
Qty: 1 PACKAGE | Refills: 0 | Status: SHIPPED | OUTPATIENT
Start: 2017-04-22 | End: 2018-01-17 | Stop reason: ALTCHOICE
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
## 2017-04-22 NOTE — DISCHARGE INSTRUCTIONS
Sciatica: Care Instructions  Your Care Instructions    Sciatica (say \"orv-KL-zc-kuh\") is an irritation of one of the sciatic nerves, which come from the spinal cord in the lower back. The sciatic nerves and their branches extend down through the buttock to the foot. Sciatica can develop when an injured disc in the back presses against a spinal nerve root. Its main symptom is pain, numbness, or weakness that is often worse in the leg or foot than in the back. Sciatica often will improve and go away with time. Early treatment usually includes medicines and exercises to relieve pain. Follow-up care is a key part of your treatment and safety. Be sure to make and go to all appointments, and call your doctor if you are having problems. It's also a good idea to know your test results and keep a list of the medicines you take. How can you care for yourself at home? · Take pain medicines exactly as directed. ¨ If the doctor gave you a prescription medicine for pain, take it as prescribed. ¨ If you are not taking a prescription pain medicine, ask your doctor if you can take an over-the-counter medicine. · Use heat or ice to relieve pain. ¨ To apply heat, put a warm water bottle, heating pad set on low, or warm cloth on your back. Do not go to sleep with a heating pad on your skin. ¨ To use ice, put ice or a cold pack on the area for 10 to 20 minutes at a time. Put a thin cloth between the ice and your skin. · Avoid sitting if possible, unless it feels better than standing. · Alternate lying down with short walks. Increase your walking distance as you are able to without making your symptoms worse. · Do not do anything that makes your symptoms worse. When should you call for help? Call 911 anytime you think you may need emergency care. For example, call if:  · You are unable to move a leg at all.   Call your doctor now or seek immediate medical care if:  · You have new or worse symptoms in your legs or buttocks. Symptoms may include:  ¨ Numbness or tingling. ¨ Weakness. ¨ Pain. · You lose bladder or bowel control. Watch closely for changes in your health, and be sure to contact your doctor if:  · You are not getting better as expected. Where can you learn more? Go to http://cristian-mignon.info/. Enter [phone] in the search box to learn more about \"Sciatica: Care Instructions. \"  Current as of: May 23, 2016  Content Version: 11.2  © 9220-5286 [employer]. Care instructions adapted under license by [employer] (which disclaims liability or warranty for this information). If you have questions about a medical condition or this instruction, always ask your healthcare professional. [address] any warranty or liability for your use of this information.

## 2017-04-22 NOTE — ED TRIAGE NOTES
Pt c/o right leg pain from hip down to foot with tingling & \"burning\" x ~1 month s/p fall. Pt states she noted swelling to right leg a couple of nights ago. Took Tylenol @ 0600 with minimal relief from pain.

## 2017-04-22 NOTE — ED NOTES
Patient armband removed and shredded  Pt discharged home with after care instructions given to pt . Pt verbalizes understand of after care instructions.  Return to the ED for any worsening symptoms and follow with primary care doctor as directed   Gertrude Avila RN

## 2017-04-24 ENCOUNTER — PATIENT OUTREACH (OUTPATIENT)
Dept: FAMILY MEDICINE CLINIC | Age: 82
End: 2017-04-24

## 2017-04-24 NOTE — PROGRESS NOTES
NNTOCED    S/P Ed visit on 4/22/17 for sciatica of right side    Presenting symptoms: pain, swelling and numbness of right leg    New medications/changes to current medications: Prednisone; patient acknowledge filling script for and taking prednisone as prescribed. Rate current pain level 7.5/10 on 0-10 numeric scale. ED admissions in the past 6 months: > / = 5    Contacted patient for ED follow up. Verified 2 patient identifiers. Introduced self, role and reason for call. Patient reports: numbness and burning in right leg. Sate that her fasting blood sugar before breakfast today was 149; state that checks her blood glucose at least 4 times a day. Patient denies: signs and symptoms of hypoglycemia and hyperglycemia. Additionally, deny having raised toilet seat (s) and/or grab bars in shower. ADL's: Patient reports:  Feeds self: yes  Ambulates: yes  Self grooming: yes  Toileting: yes    DME/ Assistive devices: cane, bedside commode and shower chair. Support:  Grand-daughter, son      Educated patient to monitor and report the following Red flags: chest pain, dizziness, or any new or concerning symptoms. Patient verbalized understanding of information discussed and is aware of  when to seek medical attention from PCP, urgent care or ED. Reviewed new medications or changes to previous medications. Discussed fall precaution and reinforced the benefits of not taking showers while alone, wearing shoes with rubber soles and using cane as recommended to prevent falls. Opportunity to ask questions was provided. Contact information was provided for future reference or further questions. Appointment(s):  Dr. Kala Quiros on 4/25/17 at [address] [place] [address]  Patient aware. [address] daughter will provide transportation per patient.

## 2017-04-25 ENCOUNTER — OFFICE VISIT (OUTPATIENT)
Dept: FAMILY MEDICINE CLINIC | Age: 82
End: 2017-04-25

## 2017-04-25 VITALS
OXYGEN SATURATION: 98 % | TEMPERATURE: 98.6 F | BODY MASS INDEX: 41.14 KG/M2 | SYSTOLIC BLOOD PRESSURE: 152 MMHG | DIASTOLIC BLOOD PRESSURE: 85 MMHG | WEIGHT: 256 LBS | HEART RATE: 93 BPM | RESPIRATION RATE: 17 BRPM | HEIGHT: 66 IN

## 2017-04-25 DIAGNOSIS — M79.604 LEG PAIN, RIGHT: ICD-10-CM

## 2017-04-25 DIAGNOSIS — R60.0 BILATERAL LOWER EXTREMITY EDEMA: ICD-10-CM

## 2017-04-25 DIAGNOSIS — M79.2 NEUROPATHIC PAIN: ICD-10-CM

## 2017-04-25 DIAGNOSIS — M25.551 RIGHT HIP PAIN: Primary | ICD-10-CM

## 2017-04-25 DIAGNOSIS — F41.9 ANXIETY: ICD-10-CM

## 2017-04-25 DIAGNOSIS — G89.29 CHRONIC NECK PAIN: ICD-10-CM
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
## 2017-04-25 DIAGNOSIS — M54.2 CHRONIC NECK PAIN: ICD-10-CM

## 2017-04-25 DIAGNOSIS — E11.42 DIABETIC PERIPHERAL NEUROPATHY (HCC): ICD-10-CM

## 2017-04-25 RX ORDER — HYDROXYZINE 25 MG/1
25 TABLET, FILM COATED ORAL
Qty: 30 TAB | Refills: 0 | Status: SHIPPED | OUTPATIENT
Start: 2017-04-25 | End: 2018-02-27 | Stop reason: ALTCHOICE

## 2017-04-25 RX ORDER — LIDOCAINE 50 MG/G
1 PATCH TOPICAL EVERY 24 HOURS
Qty: 90 EACH | Refills: 1 | Status: SHIPPED | OUTPATIENT
Start: 2017-04-25 | End: 2018-02-27 | Stop reason: SDUPTHER

## 2017-04-25 RX ORDER — FUROSEMIDE 20 MG/1
TABLET ORAL
Qty: 30 TAB | Refills: 2 | Status: SHIPPED | OUTPATIENT
Start: 2017-04-25 | End: 2020-11-23 | Stop reason: ALTCHOICE

## 2017-04-25 NOTE — MR AVS SNAPSHOT
Visit Information Date & Time Provider Department Dept. Phone Encounter #  
 4/25/2017  9:45 AM Sheri Christy Sylvester Carias 77 [id number] Follow-up Instructions Return for Patient will keep May appointment. .  
  
Your Appointments 5/3/2017  9:30 AM  
Follow Up with Cassidy Meyer DO [address] (--) Appt Note: 3 month follow up Partha Washington [address] [address]  
  
    
 5/22/2017 11:30 AM  
Follow Up with Huey Swann MD  
[facility] ([facility]) Appt Note: 3 month; 3 month [address] [facility] [address]  
  
   
 [address] [facility] [address]  
  
    
 8/21/2017 10:00 AM  
Office Visit with Cassidy Meyer DO [address] (--) Appt Note: Medicare Wellness Partha Washington [facility] [phone]  
[phone]  
  
   
 Partha Washington [facility] [id number] Upcoming Health Maintenance Date Due MICROALBUMIN Q1 8/8/2015 FOOT EXAM Q1 7/20/2017 EYE EXAM RETINAL OR DILATED Q1 7/25/2017 HEMOGLOBIN A1C Q6M 8/7/2017 MEDICARE YEARLY EXAM 8/18/2017 Pneumococcal 65+ Low/Medium Risk (2 of 2 - PPSV23) 12/2/2017 LIPID PANEL Q1 2/7/2018 GLAUCOMA SCREENING Q2Y 7/25/2018 DTaP/Tdap/Td series (2 - Td) 11/15/2023 Allergies as of 4/25/2017  Review Complete On: 4/25/2017 By: Cassidy Meyer DO Severity Noted Reaction Type Reactions Niacin High 03/26/2013    Palpitations, Other (comments) Stomach irritation Ace Inhibitors  05/19/2010    Cough Avapro [Irbesartan]  05/19/2010    Myalgia Bystolic [Nebivolol]  31/62/4437    Other (comments) Felt like throat closing Catapres [Clonidine]  05/19/2010    Cough Codeine  05/19/2010    Nausea and Vomiting Cozaar [Losartan]    Not Reported This Time Crestor [Rosuvastatin]  05/19/2010    Other (comments) Cramps, aches Kandee Zhou [Propoxyphene N-acetaminophen]  05/19/2010    Unknown (comments) Diovan [Valsartan]  05/19/2010    Cough Flagyl [Metronidazole]  05/19/2010    Other (comments) Mouth and throat irritation Gabapentin  03/16/2016    Other (comments) Abdominal pain and burning Iodinated Contrast Media - Oral And Iv Dye    Other (comments) Throat swelling Iodine    Unknown (comments) Lescol [Fluvastatin]  05/19/2010    Other (comments) Leg cramps Lipitor [Atorvastatin]  05/19/2010    Myalgia, Other (comments) Cramps, aches Lovastatin  05/19/2010    Other (comments) Leg cramps Nexium [Esomeprazole Magnesium]  05/20/2010    Other (comments) Stomach upset, burning Pravachol [Pravastatin]  05/19/2010    Other (comments) Leg cramps Reglan [Metoclopramide]  05/19/2010    Nausea Only Trazodone  05/19/2010    Other (comments) Patient states she feels drugged Zetia [Ezetimibe]  05/19/2010    Other (comments) Cramps, aches Zocor [Simvastatin]  05/19/2010    Other (comments) Cramps, aches Current Immunizations  Reviewed on 2/7/2017 Name Date Influenza High Dose Vaccine PF 12/2/2016 Influenza Vaccine 12/16/2015 11:20 AM, 9/15/2014 Influenza Vaccine Split 11/6/2012, 10/5/2010 Influenza Vaccine Whole 9/1/2009 Tdap 11/15/2013 Not reviewed this visit You Were Diagnosed With   
  
 Codes Comments Right hip pain    -  Primary ICD-10-CM: M25.551 ICD-9-CM: 719.45 Fall, initial encounter     ICD-10-CM: W19. Mckayla Flakita ICD-9-CM: E888.9 Bilateral lower extremity edema     ICD-10-CM: R60.0 ICD-9-CM: 782.3 Neuropathic pain     ICD-10-CM: M79.2 ICD-9-CM: 729.2 Diabetic peripheral neuropathy (HCC)     ICD-10-CM: E11.42 
ICD-9-CM: 250.60, 357.2 Leg pain, right     ICD-10-CM: M79.604 ICD-9-CM: 729.5  Chronic neck pain     ICD-10-CM: M54.2, G89.29 
 ICD-9-CM: 723.1, 338.29 Anxiety     ICD-10-CM: F41.9 ICD-9-CM: 300.00 Vitals BP Pulse Temp Resp Height(growth percentile) Weight(growth percentile) 152/85 (BP 1 Location: Right arm, BP Patient Position: Sitting) 93 98.6 °F (37 °C) (Oral) 17 5' 6\" (1.676 m) 256 lb (116.1 kg) SpO2 BMI OB Status Smoking Status 98% 41.32 kg/m2 Hysterectomy Former Smoker BMI and BSA Data Body Mass Index Body Surface Area  
 41.32 kg/m 2 2.33 m 2 Preferred Pharmacy Pharmacy Name Phone Tre [place], [address] Your Updated Medication List  
  
   
This list is accurate as of: 4/25/17 10:27 AM.  Always use your most recent med list.  
  
  
  
  
 albuterol 90 mcg/actuation inhaler Commonly known as:  PROAIR HFA Take 1 Puff by inhalation every four (4) hours as needed for Wheezing or Shortness of Breath. amLODIPine 5 mg tablet Commonly known as:  Villagran Daniel Take 1 Tab by mouth daily. aspirin delayed-release 81 mg tablet Take 81 mg by mouth daily. capsaicin 0.075 % topical cream  
Apply  to affected area three (3) times daily. clopidogrel 75 mg Tab Commonly known as:  PLAVIX TAKE ONE TABLET BY MOUTH EVERY DAY  
  
 cyanocobalamin ER 1,000 mcg tablet Take 1 Tab by mouth daily. FISH OIL PO Take 1,000 mg by mouth two (2) times a day. furosemide 20 mg tablet Commonly known as:  LASIX Use daily as needed for leg swelling  
  
 hydrOXYzine HCl 25 mg tablet Commonly known as:  ATARAX Take 1 Tab by mouth two (2) times daily as needed for Anxiety. insulin glargine 100 unit/mL injection Commonly known as:  LANTUS Take 15 units every morning  Indications: type 2 diabetes mellitus  
  
 levothyroxine 75 mcg tablet Commonly known as:  SYNTHROID Take 1 Tab by mouth Daily (before breakfast). lidocaine 5 % Commonly known as:  Dorothy Yuen  
 1 Patch by TransDERmal route every twenty-four (24) hours. montelukast 10 mg tablet Commonly known as:  SINGULAIR Take 1 Tab by mouth daily as needed. Indications: ALLERGIC RHINITIS PEPCID 20 mg tablet Generic drug:  famotidine Take 20 mg by mouth nightly. potassium chloride 20 mEq tablet Commonly known as:  K-DUR, KLOR-CON Take 1 Tab by mouth daily. predniSONE 10 mg dose pack Commonly known as:  STERAPRED DS  
6 day dose pack per package instructions VITAMIN D3 1,000 unit tablet Generic drug:  cholecalciferol Take 5,000 Units by mouth two (2) times a day. Prescriptions Sent to Pharmacy Refills  
 furosemide (LASIX) 20 mg tablet 2 Sig: Use daily as needed for leg swelling Class: Normal  
 Pharmacy: [address] [place] [address] Ph #: [phone]  
 lidocaine (LIDODERM) 5 % 1 Si Patch by TransDERmal route every twenty-four (24) hours. Class: Normal  
 Pharmacy: [address], [place] Ph #: [phone] Route: TransDERmal  
 hydrOXYzine HCl (ATARAX) 25 mg tablet 0 Sig: Take 1 Tab by mouth two (2) times daily as needed for Anxiety. Class: Normal  
 Pharmacy: [address], [place] Ph #: [phone] Route: Oral  
  
Follow-up Instructions Return for Patient will keep May appointment. .  
  
  
Patient Instructions Please contact our office if you have any questions about your visit today. 1.) Sarah Chaparro will call concerning sending you the topical cream. 
 
2.) Use Hydroxyzine as needed for anxiety. 3.) Return in May for regular appointment. Neuropathic Pain: Care Instructions Your Care Instructions Neuropathic pain is caused by pressure on or damage to your nerves. It's often simply called nerve pain. Some people feel this type of pain all the time. For others, it comes and goes. Diabetes, shingles, or an injury can cause nerve pain. Many people say the pain feels sharp, burning, or stabbing. But some people feel it as a dull ache. In some cases, it makes your skin very sensitive. So touch, pressure, and other sensations that did not hurt before may now cause pain. It's important to know that this kind of pain is real and can affect your quality of life. It's also important to know that treatment can help. Treatment includes pain medicines, exercise, and physical therapy. Medicines can help reduce the number of pain signals that travel over the nerves. This can make the painful areas less sensitive. It can also help you sleep better and improve your mood. But medicines are only one part of successful treatment. Most people do best with more than one kind of treatment. Your doctor may recommend that you try cognitive-behavioral therapy and stress management. Or, if needed, you may decide to try to quit smoking, lower your blood pressure, or better control blood sugar. These kinds of healthy changes can also make a difference. If you feel that your treatment is not working, talk to your doctor. And be sure to tell your doctor if you think you might be depressed or anxious. These are common problems that can also be treated. Follow-up care is a key part of your treatment and safety. Be sure to make and go to all appointments, and call your doctor if you are having problems. It's also a good idea to know your test results and keep a list of the medicines you take. How can you care for yourself at home? · Be safe with medicines. Read and follow all instructions on the label. ¨ If the doctor gave you a prescription medicine for pain, take it as prescribed. ¨ If you are not taking a prescription pain medicine, ask your doctor if you can take an over-the-counter medicine. · Save hard tasks for days when you have less pain. Follow a hard task with an easy task. And remember to take breaks. · Relax, and reduce stress. You may want to try deep breathing or meditation. These can help. · Keep moving. Gentle, daily exercise can help reduce pain. Your doctor or physical therapist can tell you what type of exercise is best for you. This may include walking, swimming, and stationary biking. It may also include stretches and range-of-motion exercises. · Try heat, cold packs, and massage. · Get enough sleep. Constant pain can make you more tired. If the pain makes it hard to sleep, talk with your doctor. · Think positively. Your thoughts can affect your pain. Do fun things to distract yourself from the pain. See a movie, read a book, listen to music, or spend time with a friend. · Keep a pain diary. Try to write down how strong your pain is and what it feels like. Also try to notice and write down how your moods, thoughts, sleep, activities, and medicine affect your pain. These notes can help you and your doctor find the best ways to treat your pain. Reducing constipation caused by pain medicine Pain medicines often cause constipation. To reduce constipation: 
· Include fruits, vegetables, beans, and whole grains in your diet each day. These foods are high in fiber. · Drink plenty of fluids, enough so that your urine is light yellow or clear like water. If you have kidney, heart, or liver disease and have to limit fluids, talk with your doctor before you increase the amount of fluids you drink. · Get some exercise every day. Build up slowly to 30 to 60 minutes a day on 5 or more days of the week. · Take a fiber supplement, such as Citrucel or Metamucil, every day if needed. Read and follow all instructions on the label. · Schedule time each day for a bowel movement. Having a daily routine may help. Take your time and do not strain when having a bowel movement. · Ask your doctor about a laxative.  The goal is to have one easy bowel movement every 1 to 2 days. Do not let constipation go untreated for more than 3 days. When should you call for help? Call your doctor now or seek immediate medical care if: 
· You feel sad, anxious, or hopeless for more than a few days. This could mean you are depressed. Depression is common in people who have a lot of pain. But it can be treated. · You have trouble with bowel movements, such as: 
¨ No bowel movement in 3 days. ¨ Blood in the anal area, in your stool, or on the toilet paper. ¨ Diarrhea for more than 24 hours. Watch closely for changes in your health, and be sure to contact your doctor if: 
· Your pain is getting worse. · You can't sleep because of pain. · You are very worried or anxious about your pain. · You have trouble taking your pain medicine. · You have any concerns about your pain medicine or its side effects. · You have vomiting or cramps for more than 2 hours. Where can you learn more? Go to http://cristina-mignon.info/. Enter K614 in the search box to learn more about \"Neuropathic Pain: Care Instructions. \" Current as of: October 14, 2016 Content Version: 11.2 © 8612-3777 [employer]. Care instructions adapted under license by [employer] (which disclaims liability or warranty for this information). If you have questions about a medical condition or this instruction, always ask your healthcare professional. [place] [address] any warranty or liability for your use of this information. Introducing \[facility] [place] [facility]\"" & HEALTH SERVICES! Dear Zack Barnes: Thank you for requesting a [facility] account. Our records indicate that you already have an active [facility] account. You can access your account anytime at [email or web address] Did you know that you can access your hospital and ER discharge instructions at any time in [facility]? You can also review all of your test results from your hospital stay or ER visit. Additional Information If you have questions, please visit the Frequently Asked Questions section of the [email or web address] website at [email or web address] Remember, [email or web address] is NOT to be used for urgent needs. For medical emergencies, dial 911. Now available from your iPhone and Android! Please provide this summary of care documentation to your next provider. Your primary care clinician is listed as [address] [place] [address] . If you have any questions after today's visit, please call [phone].

## 2017-04-25 NOTE — PROGRESS NOTES
Chief Complaint   Patient presents with    ED Follow-up     was seen at [facility] for fall      1. Have you been to the ER, urgent care clinic since your last visit? Hospitalized since your last visit? Yes When: 4-22-17 Where: [facility] Reason for visit: fall    2. Have you seen or consulted any other health care providers outside of the [address] [place] [address] since your last visit? Include any pap smears or colon screening.  No

## 2017-04-25 NOTE — PROGRESS NOTES
Progress Note    Patient: Rika Nair MRN: [id number]  SSN: [id number]    YOB: 1935  Age: [de-identified] y.o. Sex: female          Subjective:   Rika Nair is a [de-identified] y.o. female who is here for an acute care visit. Rika Nair states that the incident occurred a few weeks ago. She has been having some right sided hip pain. She has been having to lift up the leg to move it around. The patient was seen in the ER for the persistent right hip pain. She states that they performed any x-ray but did not see anything on the x-ray. She also mentions that her knee has been bothering her as well. She states that she sleeps on her left side as well to help her symptoms. She also admits to some swelling in her legs from time to time. Additionally she does admit to some anxiety with fluttering in her stomach. Objective:     Vitals:    04/25/17 0957   BP: 152/85   Pulse: 93   Resp: 17   Temp: 98.6 °F (37 °C)   TempSrc: Oral   SpO2: 98%   Weight: 256 lb (116.1 kg)   Height: 5' 6\" (1.676 m)        Review of Systems:  Pertinent items are noted in the History of Present Illness. Physical Exam:   GENERAL: alert, cooperative, no distress, appears stated age, moderately obese  ENT: No fluid behind tympanic membranes bilaterally. No cerumen impaction noted either    EYE: conjunctivae/corneas clear. PERRL, EOM's intact. Fundi benign  THROAT & NECK: normal and no erythema or exudates noted. LUNG: clear to auscultation bilaterally  HEART: regular rate and rhythm, S1, S2 normal, no murmur, click, rub or gallop  ABDOMEN: soft, non-tender. Bowel sounds normal. No masses,  no organomegaly, abnormal findings:  obese  EXTREMITIES:  Mild edema in lower extremities bilaterally       Lab/Data Review:  I reviewed x-ray results from her ER visit.      XR Results (most recent):    Results from Hospital Encounter encounter on 04/22/17   XR HIP RT W OR WO PELV 2-3 VWS   Narrative Right Hip Two Views    HISTORY: Right leg pain from the hip to the foot with tingling and burning  sensation x1 month status post fall. COMPARISON: Right femur January 13, 2015, right hip October 30, 2007. CT abdomen  and pelvis October 15, 2016    FINDINGS:     AP pelvis and frog view of the right hip obtained. The bony pelvic ring is  intact. The right hip joint space is unremarkable. There is no evidence of  fracture or dislocation. Mineralization is normal. Left hip total arthroplasty  intact. Small linear metallic opacities project over the pelvis to overlapping  the right iliac wing and one in the midline. Each measures 2.3 x 0.3 cm. 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 Satisfactory appearance of a total left hip arthroplasty. 3 small metallic opacities project over the pelvis of unknown etiology. Are  they some type of ingested foreign body? Clinical correlation please. Possibly  within colon. Assessment:     1.) Right Hip Pain: Patient was placed on prednisone by the ER. She will contact her endocrinologist about adjusting her dose of insulin due to blood sugar fluctuation. 2.) Neuropathic Pain/ Diabetic Neuropathy: The patient was placed on Lidoderm patches and also Topical Cream was ordered from [facility]. 3.)  Peripheral Edema: Patient placed on furosemide for use as needed. 4.) Anxiety: Patient placed on hydroxyzine for use as needed. Patient will return in May for her regular appointment.      Plan:     Orders Placed This Encounter    furosemide (LASIX) 20 mg tablet    lidocaine (LIDODERM) 5 %    hydrOXYzine HCl (ATARAX) 25 mg tablet         Signed By: Gisele Ornelas DO     April 25, 2017

## 2017-04-25 NOTE — PATIENT INSTRUCTIONS
Please contact our office if you have any questions about your visit today. 1.) Elidia Gastelum will call concerning sending you the topical cream.    2.) Use Hydroxyzine as needed for anxiety. 3.) Return in May for regular appointment. Neuropathic Pain: Care Instructions  Your Care Instructions  Neuropathic pain is caused by pressure on or damage to your nerves. It's often simply called nerve pain. Some people feel this type of pain all the time. For others, it comes and goes. Diabetes, shingles, or an injury can cause nerve pain. Many people say the pain feels sharp, burning, or stabbing. But some people feel it as a dull ache. In some cases, it makes your skin very sensitive. So touch, pressure, and other sensations that did not hurt before may now cause pain. It's important to know that this kind of pain is real and can affect your quality of life. It's also important to know that treatment can help. Treatment includes pain medicines, exercise, and physical therapy. Medicines can help reduce the number of pain signals that travel over the nerves. This can make the painful areas less sensitive. It can also help you sleep better and improve your mood. But medicines are only one part of successful treatment. Most people do best with more than one kind of treatment. Your doctor may recommend that you try cognitive-behavioral therapy and stress management. Or, if needed, you may decide to try to quit smoking, lower your blood pressure, or better control blood sugar. These kinds of healthy changes can also make a difference. If you feel that your treatment is not working, talk to your doctor. And be sure to tell your doctor if you think you might be depressed or anxious. These are common problems that can also be treated. Follow-up care is a key part of your treatment and safety. Be sure to make and go to all appointments, and call your doctor if you are having problems.  It's also a good idea to know your test results and keep a list of the medicines you take. How can you care for yourself at home? · Be safe with medicines. Read and follow all instructions on the label. ¨ If the doctor gave you a prescription medicine for pain, take it as prescribed. ¨ If you are not taking a prescription pain medicine, ask your doctor if you can take an over-the-counter medicine. · Save hard tasks for days when you have less pain. Follow a hard task with an easy task. And remember to take breaks. · Relax, and reduce stress. You may want to try deep breathing or meditation. These can help. · Keep moving. Gentle, daily exercise can help reduce pain. Your doctor or physical therapist can tell you what type of exercise is best for you. This may include walking, swimming, and stationary biking. It may also include stretches and range-of-motion exercises. · Try heat, cold packs, and massage. · Get enough sleep. Constant pain can make you more tired. If the pain makes it hard to sleep, talk with your doctor. · Think positively. Your thoughts can affect your pain. Do fun things to distract yourself from the pain. See a movie, read a book, listen to music, or spend time with a friend. · Keep a pain diary. Try to write down how strong your pain is and what it feels like. Also try to notice and write down how your moods, thoughts, sleep, activities, and medicine affect your pain. These notes can help you and your doctor find the best ways to treat your pain. Reducing constipation caused by pain medicine  Pain medicines often cause constipation. To reduce constipation:  · Include fruits, vegetables, beans, and whole grains in your diet each day. These foods are high in fiber. · Drink plenty of fluids, enough so that your urine is light yellow or clear like water. If you have kidney, heart, or liver disease and have to limit fluids, talk with your doctor before you increase the amount of fluids you drink.   · Get some exercise every day. Build up slowly to 30 to 60 minutes a day on 5 or more days of the week. · Take a fiber supplement, such as Citrucel or Metamucil, every day if needed. Read and follow all instructions on the label. · Schedule time each day for a bowel movement. Having a daily routine may help. Take your time and do not strain when having a bowel movement. · Ask your doctor about a laxative. The goal is to have one easy bowel movement every 1 to 2 days. Do not let constipation go untreated for more than 3 days. When should you call for help? Call your doctor now or seek immediate medical care if:  · You feel sad, anxious, or hopeless for more than a few days. This could mean you are depressed. Depression is common in people who have a lot of pain. But it can be treated. · You have trouble with bowel movements, such as:  ¨ No bowel movement in 3 days. ¨ Blood in the anal area, in your stool, or on the toilet paper. ¨ Diarrhea for more than 24 hours. Watch closely for changes in your health, and be sure to contact your doctor if:  · Your pain is getting worse. · You can't sleep because of pain. · You are very worried or anxious about your pain. · You have trouble taking your pain medicine. · You have any concerns about your pain medicine or its side effects. · You have vomiting or cramps for more than 2 hours. Where can you learn more? Go to http://cristina-mignon.info/. Enter A417 in the search box to learn more about \"Neuropathic Pain: Care Instructions. \"  Current as of: October 14, 2016  Content Version: 11.2  © 1212-0331 [employer]. Care instructions adapted under license by [employer] (which disclaims liability or warranty for this information). If you have questions about a medical condition or this instruction, always ask your healthcare professional. [address] any warranty or liability for your use of this information.

## 2017-05-04 ENCOUNTER — PATIENT OUTREACH (OUTPATIENT)
Dept: FAMILY MEDICINE CLINIC | Age: 82
End: 2017-05-04

## 2017-05-04 NOTE — PROGRESS NOTES
S/P ED visit on 4/22/17 for sciatica of right side     ED admissions in the past 6 months: 7 with 2 hospitalizations. Contacted patient for post JJ follow up. Verified 2 patient identifiers. Introduced self, role and reason for call. Patient reports: that she is still having pain on her right hip, side and groin area. Rates current pain at 7/10 on 0-10 numeric scale. Report that she uses lidocaine patch and Tylenol as needed for pain relief with moderate effect. State that she wants to avoid using stronger pain medications including narcotics. Acknowledge that she completed steroid therapy and her blood sugar is \"back to normal.\" State that checks her blood sugar at least 4 times daily and her fasting blood sugar before breakfast today was 128. Report that her medic alert device is now working. Patient denies: Receiving phone call from [facility] . NN submitted referral to [facility] contact on 4/25/17. Patient encouraged to contact [facility]  to follow-up on unresolved issues from their last meeting. ADL's:  Feeds self: yes  Ambulates: yes    Self grooming: yes  Toileting: yes    DME: Cane and bedside commode    Support: family      Educated patient to monitor and contact Dr. Xander Medina about worsening/unrelieved pain or any new or concerning symptoms. Patient verbalized understanding of information discussed and is aware of  when to seek medical attention from PCP, urgent care or ED. Reviewed new medications or changes to previous medications and allergies reviewed. Opportunity to ask questions was provided. Contact information was provided for future reference or further questions. Appointment(s):  Dr. Xander Medina on 8/21/17 at 10:00 am. Patient to call for earlier appointment if needed. Patient aware and family will provide transportation.

## 2017-05-17 ENCOUNTER — HOSPITAL ENCOUNTER (EMERGENCY)
Age: 82
Discharge: HOME | End: 2017-05-17
Attending: EMERGENCY MEDICINE
Payer: MEDICARE

## 2017-05-17 VITALS
HEIGHT: 67 IN | SYSTOLIC BLOOD PRESSURE: 141 MMHG | HEART RATE: 95 BPM | DIASTOLIC BLOOD PRESSURE: 67 MMHG | OXYGEN SATURATION: 96 % | BODY MASS INDEX: 38.92 KG/M2 | RESPIRATION RATE: 22 BRPM | WEIGHT: 248 LBS | TEMPERATURE: 98.2 F

## 2017-05-17 DIAGNOSIS — M54.31 SCIATICA OF RIGHT SIDE: Primary | ICD-10-CM

## 2017-05-17 PROCEDURE — 99281 EMR DPT VST MAYX REQ PHY/QHP: CPT

## 2017-05-17 RX ORDER — TRAMADOL HYDROCHLORIDE 50 MG/1
50 TABLET ORAL
Qty: 12 TAB | Refills: 0 | Status: SHIPPED | OUTPATIENT
Start: 2017-05-17 | End: 2018-02-27 | Stop reason: ALTCHOICE

## 2017-05-17 NOTE — DISCHARGE INSTRUCTIONS
Back Pain: Care Instructions  Your Care Instructions    Back pain has many possible causes. It is often related to problems with muscles and ligaments of the back. It may also be related to problems with the nerves, discs, or bones of the back. Moving, lifting, standing, sitting, or sleeping in an awkward way can strain the back. Sometimes you don't notice the injury until later. Arthritis is another common cause of back pain. Although it may hurt a lot, back pain usually improves on its own within several weeks. Most people recover in 12 weeks or less. Using good home treatment and being careful not to stress your back can help you feel better sooner. Follow-up care is a key part of your treatment and safety. Be sure to make and go to all appointments, and call your doctor if you are having problems. Its also a good idea to know your test results and keep a list of the medicines you take. How can you care for yourself at home? · Sit or lie in positions that are most comfortable and reduce your pain. Try one of these positions when you lie down:  ¨ Lie on your back with your knees bent and supported by large pillows. ¨ Lie on the floor with your legs on the seat of a sofa or chair. Delia Donath on your side with your knees and hips bent and a pillow between your legs. ¨ Lie on your stomach if it does not make pain worse. · Do not sit up in bed, and avoid soft couches and twisted positions. Bed rest can help relieve pain at first, but it delays healing. Avoid bed rest after the first day of back pain. · Change positions every 30 minutes. If you must sit for long periods of time, take breaks from sitting. Get up and walk around, or lie in a comfortable position. · Try using a heating pad on a low or medium setting for 15 to 20 minutes every 2 or 3 hours. Try a warm shower in place of one session with the heating pad. · You can also try an ice pack for 10 to 15 minutes every 2 to 3 hours.  Put a thin cloth between the ice pack and your skin. · Take pain medicines exactly as directed. ¨ If the doctor gave you a prescription medicine for pain, take it as prescribed. ¨ If you are not taking a prescription pain medicine, ask your doctor if you can take an over-the-counter medicine. · Take short walks several times a day. You can start with 5 to 10 minutes, 3 or 4 times a day, and work up to longer walks. Walk on level surfaces and avoid hills and stairs until your back is better. · Return to work and other activities as soon as you can. Continued rest without activity is usually not good for your back. · To prevent future back pain, do exercises to stretch and strengthen your back and stomach. Learn how to use good posture, safe lifting techniques, and proper body mechanics. When should you call for help? Call your doctor now or seek immediate medical care if:  · You have new or worsening numbness in your legs. · You have new or worsening weakness in your legs. (This could make it hard to stand up.)  · You lose control of your bladder or bowels. Watch closely for changes in your health, and be sure to contact your doctor if:  · Your pain gets worse. · You are not getting better after 2 weeks. Where can you learn more? Go to http://cristina-mignon.info/. Enter X621 in the search box to learn more about \"Back Pain: Care Instructions. \"  Current as of: May 23, 2016  Content Version: 11.2  © 8092-2898 [employer]. Care instructions adapted under license by [employer] (which disclaims liability or warranty for this information). If you have questions about a medical condition or this instruction, always ask your healthcare professional. [place] [address] any warranty or liability for your use of this information.          Sciatica: Care Instructions  Your Care Instructions    Sciatica (say \"wfc-JV-rx-kuh\") is an irritation of one of the sciatic nerves, which come from the spinal cord in the lower back. The sciatic nerves and their branches extend down through the buttock to the foot. Sciatica can develop when an injured disc in the back presses against a spinal nerve root. Its main symptom is pain, numbness, or weakness that is often worse in the leg or foot than in the back. Sciatica often will improve and go away with time. Early treatment usually includes medicines and exercises to relieve pain. Follow-up care is a key part of your treatment and safety. Be sure to make and go to all appointments, and call your doctor if you are having problems. It's also a good idea to know your test results and keep a list of the medicines you take. How can you care for yourself at home? · Take pain medicines exactly as directed. ¨ If the doctor gave you a prescription medicine for pain, take it as prescribed. ¨ If you are not taking a prescription pain medicine, ask your doctor if you can take an over-the-counter medicine. · Use heat or ice to relieve pain. ¨ To apply heat, put a warm water bottle, heating pad set on low, or warm cloth on your back. Do not go to sleep with a heating pad on your skin. ¨ To use ice, put ice or a cold pack on the area for 10 to 20 minutes at a time. Put a thin cloth between the ice and your skin. · Avoid sitting if possible, unless it feels better than standing. · Alternate lying down with short walks. Increase your walking distance as you are able to without making your symptoms worse. · Do not do anything that makes your symptoms worse. When should you call for help? Call 911 anytime you think you may need emergency care. For example, call if:  · You are unable to move a leg at all. Call your doctor now or seek immediate medical care if:  · You have new or worse symptoms in your legs or buttocks. Symptoms may include:  ¨ Numbness or tingling. ¨ Weakness. ¨ Pain. · You lose bladder or bowel control.   Watch closely for changes in your health, and be sure to contact your doctor if:  · You are not getting better as expected. Where can you learn more? Go to http://cristina-mignon.info/. Enter [phone] in the search box to learn more about \"Sciatica: Care Instructions. \"  Current as of: May 23, 2016  Content Version: 11.2  © 6013-2183 [employer]. Care instructions adapted under license by [employer] (which disclaims liability or warranty for this information). If you have questions about a medical condition or this instruction, always ask your healthcare professional. [place] [address] any warranty or liability for your use of this information.

## 2017-05-17 NOTE — ED TRIAGE NOTES
Patient states pain to right hip that radiates into right groin and down right leg. She states pain has been present for greater than 3 weeks. Patient states:  \"it feels like something is pulling\". She states a fall 2 months ago.

## 2017-05-17 NOTE — ED NOTES
Alan Bah is a [de-identified] y.o. female that was discharged in good condition. The patients diagnosis, condition and treatment were explained to  patient and aftercare instructions were given. The patient verbalized understanding. Patient armband removed and shredded.
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
## 2017-06-07 ENCOUNTER — OFFICE VISIT (OUTPATIENT)
Dept: ORTHOPEDIC SURGERY | Age: 82
End: 2017-06-07

## 2017-06-07 VITALS
DIASTOLIC BLOOD PRESSURE: 90 MMHG | TEMPERATURE: 98 F | HEART RATE: 95 BPM | WEIGHT: 248 LBS | BODY MASS INDEX: 38.92 KG/M2 | SYSTOLIC BLOOD PRESSURE: 161 MMHG | HEIGHT: 67 IN

## 2017-06-07 DIAGNOSIS — R10.31 RIGHT GROIN PAIN: ICD-10-CM

## 2017-06-07 DIAGNOSIS — G89.29 CHRONIC LEFT SHOULDER PAIN: ICD-10-CM

## 2017-06-07 DIAGNOSIS — M12.812 LEFT ROTATOR CUFF TEAR ARTHROPATHY: ICD-10-CM

## 2017-06-07 DIAGNOSIS — M43.16 SPONDYLOLISTHESIS AT L4-L5 LEVEL: ICD-10-CM

## 2017-06-07 DIAGNOSIS — M25.551 RIGHT HIP PAIN: ICD-10-CM

## 2017-06-07 DIAGNOSIS — M25.512 CHRONIC LEFT SHOULDER PAIN: ICD-10-CM

## 2017-06-07 DIAGNOSIS — Z98.890 S/P INGUINAL HERNIA REPAIR: ICD-10-CM

## 2017-06-07 DIAGNOSIS — M75.102 LEFT ROTATOR CUFF TEAR ARTHROPATHY: ICD-10-CM

## 2017-06-07 DIAGNOSIS — Z96.642 S/P HIP REPLACEMENT, LEFT: ICD-10-CM

## 2017-06-07 DIAGNOSIS — M51.36 DDD (DEGENERATIVE DISC DISEASE), LUMBAR: ICD-10-CM

## 2017-06-07 DIAGNOSIS — M47.818 SI JOINT ARTHRITIS: ICD-10-CM

## 2017-06-07 DIAGNOSIS — Z87.19 S/P INGUINAL HERNIA REPAIR: ICD-10-CM

## 2017-06-07 DIAGNOSIS — M17.11 PRIMARY OSTEOARTHRITIS OF RIGHT KNEE: ICD-10-CM

## 2017-06-07 DIAGNOSIS — M75.102 TEAR OF LEFT ROTATOR CUFF, UNSPECIFIED TEAR EXTENT: ICD-10-CM

## 2017-06-07 DIAGNOSIS — M16.11 PRIMARY OSTEOARTHRITIS OF RIGHT HIP: Primary | ICD-10-CM

## 2017-06-07 RX ORDER — BETAMETHASONE SODIUM PHOSPHATE AND BETAMETHASONE ACETATE 3; 3 MG/ML; MG/ML
3 INJECTION, SUSPENSION INTRA-ARTICULAR; INTRALESIONAL; INTRAMUSCULAR; SOFT TISSUE ONCE
Qty: 0.5 ML | Refills: 0
Start: 2017-06-07 | End: 2017-06-07

## 2017-06-07 RX ORDER — BUPIVACAINE HYDROCHLORIDE 2.5 MG/ML
4 INJECTION, SOLUTION EPIDURAL; INFILTRATION; INTRACAUDAL ONCE
Qty: 4 ML | Refills: 0
Start: 2017-06-07 | End: 2017-06-07

## 2017-06-07 NOTE — MR AVS SNAPSHOT
Visit Information Date & Time Provider Department Dept. Phone Encounter #  
 6/7/2017 10:30 AM Bart Sutton MD [facility] [phone] [id number] Follow-up Instructions Return if symptoms worsen or fail to improve. Your Appointments 8/21/2017 10:00 AM  
Office Visit with Israel Alexis DO General acute hospital (--) Appt Note: Medicare Wellness [place] [id number]  
[phone]  
  
   
 [place] [id number] Upcoming Health Maintenance Date Due MICROALBUMIN Q1 8/8/2015 FOOT EXAM Q1 7/20/2017 EYE EXAM RETINAL OR DILATED Q1 7/25/2017 INFLUENZA AGE 9 TO ADULT 8/1/2017 HEMOGLOBIN A1C Q6M 8/7/2017 MEDICARE YEARLY EXAM 8/18/2017 Pneumococcal 65+ Low/Medium Risk (2 of 2 - PPSV23) 12/2/2017 LIPID PANEL Q1 2/7/2018 GLAUCOMA SCREENING Q2Y 7/25/2018 DTaP/Tdap/Td series (2 - Td) 11/15/2023 Allergies as of 6/7/2017  Review Complete On: 6/7/2017 By: Bart Sutton MD  
  
 Severity Noted Reaction Type Reactions Niacin High 03/26/2013    Palpitations, Other (comments) Stomach irritation Ace Inhibitors  05/19/2010    Cough Avapro [Irbesartan]  05/19/2010    Myalgia Bystolic [Nebivolol]  04/60/2077    Other (comments) Felt like throat closing Catapres [Clonidine]  05/19/2010    Cough Codeine  05/19/2010    Nausea and Vomiting Cozaar [Losartan]    Not Reported This Time Crestor [Rosuvastatin]  05/19/2010    Other (comments) Cramps, aches Noretta Diogenes [Propoxyphene N-acetaminophen]  05/19/2010    Unknown (comments) Diovan [Valsartan]  05/19/2010    Cough Flagyl [Metronidazole]  05/19/2010    Other (comments) Mouth and throat irritation Gabapentin  03/16/2016    Other (comments) Abdominal pain and burning Iodinated Contrast Media - Oral And Iv Dye    Other (comments) Throat swelling Iodine    Unknown (comments) Lescol [Fluvastatin]  05/19/2010    Other (comments) Leg cramps Lipitor [Atorvastatin]  05/19/2010    Myalgia, Other (comments) Cramps, aches Lovastatin  05/19/2010    Other (comments) Leg cramps Nexium [Esomeprazole Magnesium]  05/20/2010    Other (comments) Stomach upset, burning Pravachol [Pravastatin]  05/19/2010    Other (comments) Leg cramps Reglan [Metoclopramide]  05/19/2010    Nausea Only Trazodone  05/19/2010    Other (comments) Patient states she feels drugged Zetia [Ezetimibe]  05/19/2010    Other (comments) Cramps, aches Zocor [Simvastatin]  05/19/2010    Other (comments) Cramps, aches Current Immunizations  Reviewed on 2/7/2017 Name Date Influenza High Dose Vaccine PF 12/2/2016 Influenza Vaccine 12/16/2015 11:20 AM, 9/15/2014 Influenza Vaccine Split 11/6/2012, 10/5/2010 Influenza Vaccine Whole 9/1/2009 Tdap 11/15/2013 Not reviewed this visit You Were Diagnosed With   
  
 Codes Comments Primary osteoarthritis of right hip    -  Primary ICD-10-CM: M16.11 
ICD-9-CM: 715.15 Moderate Right hip pain     ICD-10-CM: M25.551 ICD-9-CM: 719.45 Left rotator cuff tear arthropathy     ICD-10-CM: M12.812 ICD-9-CM: 716.81 Tear of left rotator cuff, unspecified tear extent     ICD-10-CM: M75.102 ICD-9-CM: 308. 4 Chronic left shoulder pain     ICD-10-CM: M25.512, G89.29 ICD-9-CM: 719.41, 338.29 BMI 38.0-38.9,adult     ICD-10-CM: H74.91 
ICD-9-CM: V85.38 S/P hip replacement, left     ICD-10-CM: E05.742 ICD-9-CM: V43.64 Spondylolisthesis at L4-L5 level     ICD-10-CM: M43.16 
ICD-9-CM: 756.12 Grade 1  
 DDD (degenerative disc disease), lumbar     ICD-10-CM: M51.36 
ICD-9-CM: 722.52 Mild, multilevel SI joint arthritis ([address]     ICD-10-CM: M46.98 
ICD-9-CM: 721.3 Right groin pain     ICD-10-CM: R10.30 ICD-9-CM: 789.09   
 S/P inguinal hernia repair     ICD-10-CM: Z98.890, Z87.19 ICD-9-CM: V45.89 Primary osteoarthritis of right knee     ICD-10-CM: M17.11 ICD-9-CM: 715.16 Vitals BP Pulse Temp Height(growth percentile) Weight(growth percentile) BMI  
 161/90 95 98 °F (36.7 °C) (Oral) 5' 7\" (1.702 m) 248 lb (112.5 kg) 38.84 kg/m2 OB Status Smoking Status Hysterectomy Former Smoker BMI and BSA Data Body Mass Index Body Surface Area  
 38.84 kg/m 2 2.31 m 2 Preferred Pharmacy Pharmacy Name Phone [facility] [address] [facility] Your Updated Medication List  
  
   
This list is accurate as of: 6/7/17 11:12 AM.  Always use your most recent med list.  
  
  
  
  
 albuterol 90 mcg/actuation inhaler Commonly known as:  PROAIR HFA Take 1 Puff by inhalation every four (4) hours as needed for Wheezing or Shortness of Breath. amLODIPine 5 mg tablet Commonly known as:  Viktoriya Punter Take 1 Tab by mouth daily. aspirin delayed-release 81 mg tablet Take 81 mg by mouth daily. betamethasone 6 mg/mL injection Commonly known as:  CELESTONE SOLUSPAN  
0.5 mL by Intra artICUlar route once for 1 dose. bupivacaine (PF) 0.25 % (2.5 mg/mL) injection Commonly known as:  MARCAINE (PF)  
4 mL by Intra artICUlar route once for 1 dose. capsaicin 0.075 % topical cream  
Apply  to affected area three (3) times daily. clopidogrel 75 mg Tab Commonly known as:  PLAVIX TAKE ONE TABLET BY MOUTH EVERY DAY  
  
 cyanocobalamin ER 1,000 mcg tablet Take 1 Tab by mouth daily. FISH OIL PO Take 1,000 mg by mouth two (2) times a day. furosemide 20 mg tablet Commonly known as:  LASIX Use daily as needed for leg swelling  
  
 hydrOXYzine HCl 25 mg tablet Commonly known as:  ATARAX Take 1 Tab by mouth two (2) times daily as needed for Anxiety. insulin glargine 100 unit/mL injection Commonly known as:  LANTUS Take 15 units every morning  Indications: type 2 diabetes mellitus  
  
 levothyroxine 75 mcg tablet Commonly known as:  SYNTHROID Take 1 Tab by mouth Daily (before breakfast). lidocaine 5 % Commonly known as:  LIDODERM  
1 Patch by TransDERmal route every twenty-four (24) hours. montelukast 10 mg tablet Commonly known as:  SINGULAIR Take 1 Tab by mouth daily as needed. Indications: ALLERGIC RHINITIS PEPCID 20 mg tablet Generic drug:  famotidine Take 20 mg by mouth nightly. potassium chloride 20 mEq tablet Commonly known as:  K-DUR, KLOR-CON Take 1 Tab by mouth daily. predniSONE 10 mg dose pack Commonly known as:  STERAPRED DS  
6 day dose pack per package instructions  
  
 traMADol 50 mg tablet Commonly known as:  ULTRAM  
Take 1 Tab by mouth every six (6) hours as needed for Pain. Max Daily Amount: 200 mg. VITAMIN D3 1,000 unit tablet Generic drug:  cholecalciferol Take 5,000 Units by mouth two (2) times a day. We Performed the Following BETAMETHASONE ACETATE & SODIUM PHOSPHATE INJECTION 3 MG EA. [ [facility]] DRAIN/INJECT LARGE JOINT/BURSA [id number] CPT(R)] REFERRAL TO GENERAL SURGERY [REF27 Custom] Comments:  
 Please refer to Dr. Carlie Wood to r/o inguinal hernia Follow-up Instructions Return if symptoms worsen or fail to improve. Referral Information Referral ID Referred By Referred To  
  
 [id number] Susan CARIAS Not Available Visits Status Start Date End Date 1 New Request 6/7/17 6/7/18 If your referral has a status of pending review or denied, additional information will be sent to support the outcome of this decision. Patient Instructions Hip Arthritis: Exercises Your Care Instructions Here are some examples of exercises for hip arthritis. Start each exercise slowly. Ease off the exercise if you start to have pain. Your doctor or physical therapist will tell you when you can start these exercises and which ones will work best for you. How to do the exercises Straight-leg raises to the outside 1. Lie on your side, with your affected hip on top. 2. Tighten the front thigh muscles of your top leg to keep your knee straight. 3. Keep your hip and your leg straight in line with the rest of your body, and keep your knee pointing forward. Do not drop your hip back. 4. Lift your top leg straight up toward the ceiling, about 12 inches off the floor. Hold for about 6 seconds, then slowly lower your leg. 5. Repeat 8 to 12 times. 6. Switch legs and repeat steps 1 through 5, even if only one hip is sore. Straight-leg raises to the inside 1. Lie on your side with your affected hip on the floor. 2. You can either prop up your other leg on a chair, or you can bend that knee and put that foot in front of your other knee. Do not drop your hip back. 3. Tighten the muscles on the front thigh of your bottom leg to straighten that knee. 4. Keep your kneecap pointing forward and your leg straight, and lift your bottom leg up toward the ceiling about 6 inches. Hold for about 6 seconds, then lower slowly. 5. Repeat 8 to 12 times. 6. Switch legs and repeat steps 1 through 5, even if only one hip is sore. Hip hike 1. Stand sideways on the bottom step of a staircase, and hold on to the banister or wall. 2. Keeping both knees straight, lift your good leg off the step and let it hang down. Then hike your good hip up to the same level as your affected hip or a little higher. 3. Repeat 8 to 12 times. 4. Switch legs and repeat steps 1 through 3, even if only one hip is sore. Bridging 1. Lie on your back with both knees bent. Your knees should be bent about 90 degrees. 2. Then push your feet into the floor, squeeze your buttocks, and lift your hips off the floor until your shoulders, hips, and knees are all in a straight line. 3. Hold for about 6 seconds as you continue to breathe normally, and then slowly lower your hips back down to the floor and rest for up to 10 seconds. 4. Repeat 8 to 12 times. Hamstring stretch (lying down) 1. Lie flat on your back with your legs straight. If you feel discomfort in your back, place a small towel roll under your lower back. 2. Holding the back of your affected leg, lift your leg straight up and toward your body until you feel a stretch at the back of your thigh. 3. Hold the stretch for at least 30 seconds. 4. Repeat 2 to 4 times. 5. Switch legs and repeat steps 1 through 4, even if only one hip is sore. Standing quadriceps stretch 1. If you are not steady on your feet, hold on to a chair, counter, or wall. You can also lie on your stomach or your side to do this exercise. 2. Bend the knee of the leg you want to stretch, and reach behind you to grab the front of your foot or ankle with the hand on the same side. For example, if you are stretching your right leg, use your right hand. 3. Keeping your knees next to each other, pull your foot toward your buttock until you feel a gentle stretch across the front of your hip and down the front of your thigh. Your knee should be pointed directly to the ground, and not out to the side. 4. Hold the stretch for at least 15 to 30 seconds. 5. Repeat 2 to 4 times. 6. Switch legs and repeat steps 1 through 5, even if only one hip is sore. Hip rotator stretch 1. Lie on your back with both knees bent and your feet flat on the floor. 2. Put the ankle of your affected leg on your opposite thigh near your knee. 3. Use your hand to gently push your knee away from your body until you feel a gentle stretch around your hip. 4. Hold the stretch for 15 to 30 seconds. 5. Repeat 2 to 4 times. 6. Repeat steps 1 through 5, but this time use your hand to gently pull your knee toward your opposite shoulder. 7. Switch legs and repeat steps 1 through 6, even if only one hip is sore. Knee-to-chest 
 
1. Lie on your back with your knees bent and your feet flat on the floor. 2. Bring your affected leg to your chest, keeping the other foot flat on the floor (or keeping the other leg straight, whichever feels better on your lower back). 3. Keep your lower back pressed to the floor. Hold for at least 15 to 30 seconds. 4. Relax, and lower the knee to the starting position. 5. Repeat 2 to 4 times. 6. Switch legs and repeat steps 1 through 5, even if only one hip is sore. 7. To get more stretch, put your other leg flat on the floor while pulling your knee to your chest. 
Clamshell 1. Lie on your side, with your affected hip on top. Keep your feet and knees together and your knees bent. 2. Raise your top knee, but keep your feet together. Do not let your hips roll back. Your legs should open up like a clamshell. 3. Hold for 6 seconds. 4. Slowly lower your knee back down. Rest for 10 seconds. 5. Repeat 8 to 12 times. 6. Switch legs and repeat steps 1 through 5, even if only one hip is sore. Follow-up care is a key part of your treatment and safety. Be sure to make and go to all appointments, and call your doctor if you are having problems. It's also a good idea to know your test results and keep a list of the medicines you take. Where can you learn more? Go to http://cristina-mignon.info/. Enter X895 in the search box to learn more about \"Hip Arthritis: Exercises. \" Current as of: May 23, 2016 Content Version: 11.2 © 0987-7837 [employer]. Care instructions adapted under license by [employer] (which disclaims liability or warranty for this information). If you have questions about a medical condition or this instruction, always ask your healthcare professional. [address] any warranty or liability for your use of this information. Joint Injections: Care Instructions Your Care Instructions Joint injections are shots into a joint, such as the knee. They may be used to put in medicines, such as pain relievers. Or they can be used to take out fluid. Sometimes the fluid is tested in a lab. This can help find the cause of a joint problem. A corticosteroid, or steroid, shot is used to reduce inflammation in tendons or joints. It is often used to treat problems such as arthritis, tendinitis, and bursitis. Steroids can be injected directly into a painful, inflamed joint. They can also help reduce inflammation of a bursa. A bursa is a sac of fluid. It cushions and lubricates areas where tendons, ligaments, skin, muscles, or bones rub against each other. A steroid shot can sometimes help with short-term pain relief when other treatments haven't worked. If steroid shots help, pain may improve for weeks or months. Follow-up care is a key part of your treatment and safety. Be sure to make and go to all appointments, and call your doctor if you are having problems. It's also a good idea to know your test results and keep a list of the medicines you take. How can you care for yourself at home? · Put ice or a cold pack on the area for 10 to 20 minutes at a time. Put a thin cloth between the ice and your skin. · Take anti-inflammatory medicines to reduce pain, swelling, or inflammation. These include ibuprofen (Advil, Motrin) and naproxen (Aleve). Read and follow all instructions on the label. · Avoid strenuous activities for several days, especially those that put stress on the area where you got the shot. · If you have dressings over the area, keep them clean and dry. You may remove them when your doctor tells you to. When should you call for help? Call your doctor now or seek immediate medical care if: 
· You have signs of infection, such as: 
¨ Increased pain, swelling, warmth, or redness. ¨ Red streaks leading from the site. ¨ Pus draining from the site. ¨ A fever. Watch closely for changes in your health, and be sure to contact your doctor if you have any problems. Where can you learn more? Go to http://cristina-mignon.info/. Enter N616 in the search box to learn more about \"Joint Injections: Care Instructions. \" Current as of: May 23, 2016 Content Version: 11.2 © 3322-1229 [employer]. Care instructions adapted under license by [employer] (which disclaims liability or warranty for this information). If you have questions about a medical condition or this instruction, always ask your healthcare professional. [address] any warranty or liability for your use of this information. Introducing [facility]! Dear Gena Sánchez: Thank you for requesting a [employer] account. Our records indicate that you already have an active [employer] account. You can access your account anytime at [email or web address] Did you know that you can access your hospital and ER discharge instructions at any time in [employer]? You can also review all of your test results from your hospital stay or ER visit. Additional Information If you have questions, please visit the Frequently Asked Questions section of the [employer] website at [email or web address] Remember, [employer] is NOT to be used for urgent needs. For medical emergencies, dial 911. Now available from your iPhone and Android! Please provide this summary of care documentation to your next provider. Your primary care clinician is listed as Gisele Ornelas. If you have any questions after today's visit, please call [phone].

## 2017-06-07 NOTE — PATIENT INSTRUCTIONS
Hip Arthritis: Exercises  Your Care Instructions  Here are some examples of exercises for hip arthritis. Start each exercise slowly. Ease off the exercise if you start to have pain. Your doctor or physical therapist will tell you when you can start these exercises and which ones will work best for you. How to do the exercises  Straight-leg raises to the outside    1. Lie on your side, with your affected hip on top. 2. Tighten the front thigh muscles of your top leg to keep your knee straight. 3. Keep your hip and your leg straight in line with the rest of your body, and keep your knee pointing forward. Do not drop your hip back. 4. Lift your top leg straight up toward the ceiling, about 12 inches off the floor. Hold for about 6 seconds, then slowly lower your leg. 5. Repeat 8 to 12 times. 6. Switch legs and repeat steps 1 through 5, even if only one hip is sore. Straight-leg raises to the inside    1. Lie on your side with your affected hip on the floor. 2. You can either prop up your other leg on a chair, or you can bend that knee and put that foot in front of your other knee. Do not drop your hip back. 3. Tighten the muscles on the front thigh of your bottom leg to straighten that knee. 4. Keep your kneecap pointing forward and your leg straight, and lift your bottom leg up toward the ceiling about 6 inches. Hold for about 6 seconds, then lower slowly. 5. Repeat 8 to 12 times. 6. Switch legs and repeat steps 1 through 5, even if only one hip is sore. Hip hike    1. Stand sideways on the bottom step of a staircase, and hold on to the banister or wall. 2. Keeping both knees straight, lift your good leg off the step and let it hang down. Then hike your good hip up to the same level as your affected hip or a little higher. 3. Repeat 8 to 12 times. 4. Switch legs and repeat steps 1 through 3, even if only one hip is sore. Bridging    1. Lie on your back with both knees bent.  Your knees should be bent about 90 degrees. 2. Then push your feet into the floor, squeeze your buttocks, and lift your hips off the floor until your shoulders, hips, and knees are all in a straight line. 3. Hold for about 6 seconds as you continue to breathe normally, and then slowly lower your hips back down to the floor and rest for up to 10 seconds. 4. Repeat 8 to 12 times. Hamstring stretch (lying down)    1. Lie flat on your back with your legs straight. If you feel discomfort in your back, place a small towel roll under your lower back. 2. Holding the back of your affected leg, lift your leg straight up and toward your body until you feel a stretch at the back of your thigh. 3. Hold the stretch for at least 30 seconds. 4. Repeat 2 to 4 times. 5. Switch legs and repeat steps 1 through 4, even if only one hip is sore. Standing quadriceps stretch    1. If you are not steady on your feet, hold on to a chair, counter, or wall. You can also lie on your stomach or your side to do this exercise. 2. Bend the knee of the leg you want to stretch, and reach behind you to grab the front of your foot or ankle with the hand on the same side. For example, if you are stretching your right leg, use your right hand. 3. Keeping your knees next to each other, pull your foot toward your buttock until you feel a gentle stretch across the front of your hip and down the front of your thigh. Your knee should be pointed directly to the ground, and not out to the side. 4. Hold the stretch for at least 15 to 30 seconds. 5. Repeat 2 to 4 times. 6. Switch legs and repeat steps 1 through 5, even if only one hip is sore. Hip rotator stretch    1. Lie on your back with both knees bent and your feet flat on the floor. 2. Put the ankle of your affected leg on your opposite thigh near your knee. 3. Use your hand to gently push your knee away from your body until you feel a gentle stretch around your hip.   4. Hold the stretch for 15 to 30 seconds. 5. Repeat 2 to 4 times. 6. Repeat steps 1 through 5, but this time use your hand to gently pull your knee toward your opposite shoulder. 7. Switch legs and repeat steps 1 through 6, even if only one hip is sore. Knee-to-chest    1. Lie on your back with your knees bent and your feet flat on the floor. 2. Bring your affected leg to your chest, keeping the other foot flat on the floor (or keeping the other leg straight, whichever feels better on your lower back). 3. Keep your lower back pressed to the floor. Hold for at least 15 to 30 seconds. 4. Relax, and lower the knee to the starting position. 5. Repeat 2 to 4 times. 6. Switch legs and repeat steps 1 through 5, even if only one hip is sore. 7. To get more stretch, put your other leg flat on the floor while pulling your knee to your chest.  Clamshell    1. Lie on your side, with your affected hip on top. Keep your feet and knees together and your knees bent. 2. Raise your top knee, but keep your feet together. Do not let your hips roll back. Your legs should open up like a clamshell. 3. Hold for 6 seconds. 4. Slowly lower your knee back down. Rest for 10 seconds. 5. Repeat 8 to 12 times. 6. Switch legs and repeat steps 1 through 5, even if only one hip is sore. Follow-up care is a key part of your treatment and safety. Be sure to make and go to all appointments, and call your doctor if you are having problems. It's also a good idea to know your test results and keep a list of the medicines you take. Where can you learn more? Go to http://cristina-mignon.info/. Enter V421 in the search box to learn more about \"Hip Arthritis: Exercises. \"  Current as of: May 23, 2016  Content Version: 11.2  © 2254-3575 [employer]. Care instructions adapted under license by [employer] (which disclaims liability or warranty for this information).  If you have questions about a medical condition or this instruction, always ask your healthcare professional. [address] any warranty or liability for your use of this information. Joint Injections: Care Instructions  Your Care Instructions  Joint injections are shots into a joint, such as the knee. They may be used to put in medicines, such as pain relievers. Or they can be used to take out fluid. Sometimes the fluid is tested in a lab. This can help find the cause of a joint problem. A corticosteroid, or steroid, shot is used to reduce inflammation in tendons or joints. It is often used to treat problems such as arthritis, tendinitis, and bursitis. Steroids can be injected directly into a painful, inflamed joint. They can also help reduce inflammation of a bursa. A bursa is a sac of fluid. It cushions and lubricates areas where tendons, ligaments, skin, muscles, or bones rub against each other. A steroid shot can sometimes help with short-term pain relief when other treatments haven't worked. If steroid shots help, pain may improve for weeks or months. Follow-up care is a key part of your treatment and safety. Be sure to make and go to all appointments, and call your doctor if you are having problems. It's also a good idea to know your test results and keep a list of the medicines you take. How can you care for yourself at home? · Put ice or a cold pack on the area for 10 to 20 minutes at a time. Put a thin cloth between the ice and your skin. · Take anti-inflammatory medicines to reduce pain, swelling, or inflammation. These include ibuprofen (Advil, Motrin) and naproxen (Aleve). Read and follow all instructions on the label. · Avoid strenuous activities for several days, especially those that put stress on the area where you got the shot. · If you have dressings over the area, keep them clean and dry. You may remove them when your doctor tells you to. When should you call for help?   Call your doctor now or seek immediate medical care if:  · You have signs of infection, such as:  ¨ Increased pain, swelling, warmth, or redness. ¨ Red streaks leading from the site. ¨ Pus draining from the site. ¨ A fever. Watch closely for changes in your health, and be sure to contact your doctor if you have any problems. Where can you learn more? Go to http://cristina-mignon.info/. Enter N616 in the search box to learn more about \"Joint Injections: Care Instructions. \"  Current as of: May 23, 2016  Content Version: 11.2  © 6564-7815 [employer]. Care instructions adapted under license by [employer] (which disclaims liability or warranty for this information). If you have questions about a medical condition or this instruction, always ask your healthcare professional. [address] any warranty or liability for your use of this information.

## 2017-06-07 NOTE — PROGRESS NOTES
Patient: Lesly Luna                MRN: [id number]       SSN: [id number]  YOB: 1935        AGE: [de-identified] y.o. SEX: female    PCP: [id number] Jenkins DO  06/07/17    Chief Complaint   Patient presents with    Hip Pain     Right     HISTORY:  Lesly Luna is a [de-identified] y.o. female who is seen for right hip pain. She is s/p left OFE in 2006 after sustaining an acetabular fracture. She is s/p left TKR in 2005. She reports increased pain radiating from her right hip into her groin and down her right leg. She states that she slipped and fell about two months ago with increased right hip pain afterward. She was seen at [facility] ED on 4/22/17 where x rays revealed no fracture. She walks using a four-point cane. She notes pain she rolls over on her right side. Pain Assessment  6/7/2017   Location of Pain Hip   Location Modifiers Right   Severity of Pain 9   Quality of Pain Aching; Throbbing; Sharp   Quality of Pain Comment -   Duration of Pain -   Frequency of Pain Constant   Date Pain First Started -   Aggravating Factors Stairs; Walking;Standing;Exercise   Aggravating Factors Comment -   Limiting Behavior Yes   Relieving Factors Nothing   Result of Injury Yes   Work-Related Injury No   Type of Injury Slip     Occupation, etc:  Ms. 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 She is insulin-dependent diabetic and hypertensive. Current weight is 248 pounds. She is 5'7\" tall. She reports a h/o inguinal hernia repair by Dr. Adis Feliz. She reports that she had a cardiac stent placed and has a h/o heart disease for which she takes blood thinners. She enjoys writing poetry in her spare time. She no longer drives.       Lab Results   Component Value Date/Time    Hemoglobin A1c 8.4 02/07/2017 06:15 AM    Hemoglobin A1c, External 8.3 04/07/2015     Weight Metrics 6/7/2017 5/17/2017 4/25/2017 4/22/2017 4/7/2017 4/6/2017 3/11/2017 Weight 248 lb 248 lb 256 lb 254 lb 259 lb 4.2 oz 260 lb 256 lb   BMI 38.84 kg/m2 38.84 kg/m2 41.32 kg/m2 41 kg/m2 41.85 kg/m2 40.72 kg/m2 40.1 kg/m2     Patient Active Problem List   Diagnosis Code    Essential hypertension I10    Unspecified hypothyroidism E03.9    Unspecified vitamin D deficiency E55.9    Unspecified asthma J45.909    Esophageal reflux K21.9    Noncompliance with medication regimen Z91.14    Arm pain M79.603    Neck pain M54.2    Anxiety F41.9    S/P joint replacement Z96.60    DJD (degenerative joint disease) M19.90    Polyneuropathy in diabetes ([facility] [address] E11.42    Dizziness R42    Chronic neck pain M54.2, G89.29    Chronic back pain M54.9, G89.29    Leg pain, right M79.604    Hypothyroidism E03.9    Diabetes mellitus type 2, controlled ([facility]) E11.9    Morbid obesity ([facility]) E66.01    Unspecified transient cerebral ischemia G45.9    Congestive heart failure ([facility]) I50.9    Mixed hyperlipidemia E78.2    Coronary atherosclerosis of native coronary artery I25.10    Chronic diastolic heart failure ([facility]) I50.32    Benign hypertensive heart disease without heart failure I11.9    Seasonal and perennial allergic rhinitis J30.89, J30.2    Medication monitoring encounter Z51.81    Tear of left rotator cuff A34.520    Uncomplicated asthma U68.748    Moderate persistent asthma with status asthmaticus J45.42    NSTEMI (non-ST elevated myocardial infarction) ([facility]) I21.4    S/P drug eluting coronary stent placement Z95.5    Advanced care planning/counseling discussion Z71.89    Chest pain R07.9    Bilateral lower extremity edema R60.0    BMI 38.0-38.9,adult Z68.38     REVIEW OF SYSTEMS: All Below are Negative except: See HPI   Constitutional: negative for fever, chills, and weight loss.    Cardiovascular: negative for chest pain, claudication, leg swelling, SOB, INIGUEZ   Gastrointestinal: Negative for pain, N/V/C/D, Blood in stool or urine, dysuria,  hematuria, incontinence, pelvic pain.   Musculoskeletal: See HPI   Neurological: Negative for dizziness and weakness. Negative for headaches, Visual changes, confusion, seizures   Phychiatric/Behavioral: Negative for depression, memory loss, substance  abuse. Extremities: Negative for hair changes, rash, or skin lesion changes. Hematologic: Negative for bleeding problems, bruising, pallor or swollen lymph  nodes   Peripheral Vascular: No calf pain, no circulation deficits. Social History     Social History    Marital status:      Spouse name: N/A    Number of children: N/A    Years of education: N/A     Occupational History    Not on file.      Social History Main Topics    Smoking status: Former Smoker     Packs/day: 1.00     Years: 20.00     Types: Cigarettes     Quit date: 4/5/1980    Smokeless tobacco: Never Used    Alcohol use No    Drug use: Yes     Special: Prescription, OTC    Sexual activity: Not on file     Other Topics Concern    Not on file     Social History Narrative      Allergies   Allergen Reactions    Niacin Palpitations and Other (comments)     Stomach irritation    Ace Inhibitors Cough    Avapro [Irbesartan] Myalgia    Bystolic [Nebivolol] Other (comments)     Felt like throat closing    Catapres [Clonidine] Cough    Codeine Nausea and Vomiting    Cozaar [Losartan] Not Reported This Time    Crestor [Rosuvastatin] Other (comments)     Cramps, aches    Darvocet A500 [Propoxyphene N-Acetaminophen] Unknown (comments)    Diovan [Valsartan] Cough    Flagyl [Metronidazole] Other (comments)     Mouth and throat irritation    Gabapentin Other (comments)     Abdominal pain and burning     Iodinated Contrast Media - Oral And Iv Dye Other (comments)     Throat swelling    Iodine Unknown (comments)    Lescol [Fluvastatin] Other (comments)     Leg cramps    Lipitor [Atorvastatin] Myalgia and Other (comments)     Cramps, aches    Lovastatin Other (comments)     Leg cramps    Nexium [Esomeprazole Magnesium] Other (comments)     Stomach upset, burning    Pravachol [Pravastatin] Other (comments)     Leg cramps    Reglan [Metoclopramide] Nausea Only    Trazodone Other (comments)     Patient states she feels drugged    Zetia [Ezetimibe] Other (comments)     Cramps, aches    Zocor [Simvastatin] Other (comments)     Cramps, aches      Current Outpatient Prescriptions   Medication Sig    traMADol (ULTRAM) 50 mg tablet Take 1 Tab by mouth every six (6) hours as needed for Pain. Max Daily Amount: 200 mg.  furosemide (LASIX) 20 mg tablet Use daily as needed for leg swelling    lidocaine (LIDODERM) 5 % 1 Patch by TransDERmal route every twenty-four (24) hours.  hydrOXYzine HCl (ATARAX) 25 mg tablet Take 1 Tab by mouth two (2) times daily as needed for Anxiety.  predniSONE (STERAPRED DS) 10 mg dose pack 6 day dose pack per package instructions    clopidogrel (PLAVIX) 75 mg tab TAKE ONE TABLET BY MOUTH EVERY DAY    amLODIPine (NORVASC) 5 mg tablet Take 1 Tab by mouth daily.  levothyroxine (SYNTHROID) 75 mcg tablet Take 1 Tab by mouth Daily (before breakfast). (Patient taking differently: Take 125 mcg by mouth Daily (before breakfast). )    potassium chloride (K-DUR, KLOR-CON) 20 mEq tablet Take 1 Tab by mouth daily. (Patient taking differently: Take 20 mEq by mouth daily. When taking Lasix)    insulin glargine (LANTUS) 100 unit/mL injection Take 15 units every morning  Indications: type 2 diabetes mellitus (Patient taking differently: by SubCUTAneous route two (2) times a day. Takes 20 units in the morning and 35 units in the evening. Indications: type 2 diabetes mellitus)    cyanocobalamin ER 1,000 mcg tablet Take 1 Tab by mouth daily.  famotidine (PEPCID) 20 mg tablet Take 20 mg by mouth nightly.  albuterol (PROAIR HFA) 90 mcg/actuation inhaler Take 1 Puff by inhalation every four (4) hours as needed for Wheezing or Shortness of Breath.     montelukast (SINGULAIR) 10 mg tablet Take 1 Tab by mouth daily as needed. Indications: ALLERGIC RHINITIS    capsaicin 0.075 % topical cream Apply  to affected area three (3) times daily.  DOCOSAHEXANOIC ACID/EPA (FISH OIL PO) Take 1,000 mg by mouth two (2) times a day.  aspirin delayed-release 81 mg tablet Take 81 mg by mouth daily.  cholecalciferol, vitamin d3, (VITAMIN D) 1,000 unit tablet Take 5,000 Units by mouth two (2) times a day. No current facility-administered medications for this visit.        PHYSICAL EXAMINATION:  Visit Vitals    /90    Pulse 95    Temp 98 °F (36.7 °C) (Oral)    Ht 5' 7\" (1.702 m)    Wt 248 lb (112.5 kg)    BMI 38.84 kg/m2      ORTHO EXAMINATION:  Examination Lumbar   Skin Intact   Tenderness +   Tightness -   Lordosis Normal   Kyphosis N/A   Scoliosis -   Flexion Fingertips to ankle   Extension 10   Knee reflexes Normal   Ankle reflexes Normal   Straight leg raise -   Calf tenderness -     Examination Right hip Left hip   Skin Intact Intact   External Rotation ROM 10, with pain 20   Internal Rotation ROM 10, with pain 10   Trochanteric tenderness + -   Hip flexion contracture - -   Antalgic gait - -   Trendelenberg sign - -   Lumbar tenderness - -   Straight leg raise - -   Calf tenderness - -   Neurovascular Intact Intact   Ambulates using a four-point cane  Difficulty standing from a seated position  No pain on left hip rotation    Examination Right knee Left knee   Skin Intact Intact, well-healed anterior TKR incision site   Range of motion 115-0 100-0   Effusion - -   Medial joint line tenderness + +   Lateral joint line tenderness - -   Popliteal tenderness - -   Osteophytes palpable - -   Deions - -   Patella crepitus + -   Anterior drawer - -   Lateral laxity - -   Medial laxity - -   Varus deformity - -   Valgus deformity - -   Pretibial edema 2+ -   Calf tenderness - -     PROCEDURE:  After discussing treatment options, patient's right hip was injected with 4 cc Marcaine and 1/2 cc Celestone. Chart reviewed for the following:   Chance Alamo MD, have reviewed the History, Physical and updated the Allergic reactions for Marissa Espinoza performed immediately prior to start of procedure:  Chance Alamo MD, have performed the following reviews on [place] prior to the start of the procedure:            * Patient was identified by name and date of birth   * Agreement on procedure being performed was verified  * Risks and Benefits explained to the patient  * Procedure site verified and marked as necessary  * Patient was positioned for comfort  * Consent was obtained     Time: 11:08 AM     Date of procedure: 6/7/2017    Procedure performed by:  Anant Vieira MD    Ms. Minesh Dial tolerated the procedure well with no complications. RADIOGRAPHS:  XR RIGHT HIP 4/22/17  IMPRESSION:  Negative right hip. Satisfactory appearance of a total left hip arthroplasty. 3 small metallic opacities project over the pelvis of unknown etiology. Are they some type of ingested foreign body? Clinical correlation please. Possibly within colon. Per Dr. Maria Teresa Hoffman: Moderate joint space narrowing, sacroiliac sclerosis. -I have independently reviewed these images during this office visit. -Dr. Harriett Pereira 4/22/17  IMPRESSION:  Grade 1 spondylolisthesis L4/5. Mild multilevel degenerative disc changes. 4 small cylindrical shaped radiopaque foreign bodies of unknown etiology. Suspect ingested foreign bodies. Clinical correlation please. Arthritis of the SI joints. IMPRESSION:      ICD-10-CM ICD-9-CM    1. Primary osteoarthritis of right hip M16.11 715.15 betamethasone (CELESTONE SOLUSPAN) 6 mg/mL injection      BETAMETHASONE ACETATE & SODIUM PHOSPHATE INJECTION 3 MG EA.      DRAIN/INJECT LARGE JOINT/BURSA      bupivacaine, PF, (MARCAINE, PF,) 0.25 % (2.5 mg/mL) injection    Moderate   2.  Right hip pain M25.551 719.45 betamethasone (CELESTONE SOLUSPAN) 6 mg/mL injection      BETAMETHASONE ACETATE & SODIUM PHOSPHATE INJECTION 3 MG EA.      DRAIN/INJECT LARGE JOINT/BURSA      bupivacaine, PF, (MARCAINE, PF,) 0.25 % (2.5 mg/mL) injection   3. Left rotator cuff tear arthropathy M12.812 716.81    4. Tear of left rotator cuff, unspecified tear extent M75.102 840.4    5. Chronic left shoulder pain M25.512 719.41     G89.29 338.29    6. BMI 38.0-38.9,adult Z68.38 V85.38    7. S/P hip replacement, left Z96.642 V43.64    8. Spondylolisthesis at L4-L5 level M43.16 756.12     Grade 1   9. DDD (degenerative disc disease), lumbar M51.36 722. 52     Mild, multilevel   10. SI joint arthritis (HCC) M46.98 721.3    11. Right groin pain R10.30 789.09 REFERRAL TO GENERAL SURGERY   12. S/P inguinal hernia repair Z98.890 V45.89 REFERRAL TO GENERAL SURGERY    Z87.19     13. Primary osteoarthritis of right knee M17.11 715.16      PLAN:  After discussing treatment options, patient's lateral right hip was injected with 4 cc Marcaine and 1/2 cc Celestone. She will follow up as needed. She will follow up with general surgeon Dr. Jamey Gama to r/o inguinal hernia. She will follow up with her primary care physician for high blood pressure.       Scribed by [employer] ([address]) as dictated by Caitlyn Malagon MD

## 2017-06-16 ENCOUNTER — OFFICE VISIT (OUTPATIENT)
Dept: SURGERY | Age: 82
End: 2017-06-16

## 2017-06-16 VITALS
RESPIRATION RATE: 15 BRPM | WEIGHT: 248 LBS | BODY MASS INDEX: 38.92 KG/M2 | SYSTOLIC BLOOD PRESSURE: 142 MMHG | DIASTOLIC BLOOD PRESSURE: 70 MMHG | HEIGHT: 67 IN

## 2017-06-16 DIAGNOSIS — R10.31 RIGHT GROIN PAIN: Primary | ICD-10-CM
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
## 2017-07-03 RX ORDER — POTASSIUM CHLORIDE 750 MG/1
TABLET, FILM COATED, EXTENDED RELEASE ORAL
Qty: 120 TAB | Refills: 0 | Status: SHIPPED | OUTPATIENT
Start: 2017-07-03 | End: 2018-03-15 | Stop reason: SDUPTHER

## 2017-09-08 RX ORDER — ALBUTEROL SULFATE 90 UG/1
AEROSOL, METERED RESPIRATORY (INHALATION)
Qty: 1 INHALER | Refills: 3 | Status: SHIPPED | OUTPATIENT
Start: 2017-09-08 | End: 2019-01-22 | Stop reason: SDUPTHER

## 2017-10-25 ENCOUNTER — OFFICE VISIT (OUTPATIENT)
Dept: FAMILY MEDICINE CLINIC | Age: 82
End: 2017-10-25

## 2017-10-25 VITALS
BODY MASS INDEX: 39.39 KG/M2 | HEIGHT: 67 IN | HEART RATE: 102 BPM | TEMPERATURE: 98.5 F | WEIGHT: 251 LBS | SYSTOLIC BLOOD PRESSURE: 147 MMHG | OXYGEN SATURATION: 98 % | DIASTOLIC BLOOD PRESSURE: 80 MMHG | RESPIRATION RATE: 17 BRPM

## 2017-10-25 DIAGNOSIS — R79.9 ABNORMAL FINDING OF BLOOD CHEMISTRY: ICD-10-CM

## 2017-10-25 DIAGNOSIS — Z23 ENCOUNTER FOR IMMUNIZATION: ICD-10-CM

## 2017-10-25 DIAGNOSIS — M13.0 POLYARTICULAR ARTHRITIS: ICD-10-CM

## 2017-10-25 DIAGNOSIS — E09.610: ICD-10-CM

## 2017-10-25 DIAGNOSIS — Z00.00 MEDICARE ANNUAL WELLNESS VISIT, SUBSEQUENT: Primary | ICD-10-CM
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
## 2017-10-25 NOTE — MR AVS SNAPSHOT
Visit Information Date & Time Provider Department Dept. Phone Encounter #  
 10/25/2017 10:00 AM [facility] [phone] [id number] Follow-up Instructions Return in about 3 months (around 1/25/2018) for Regular Follow Up . Upcoming Health Maintenance Date Due Pneumococcal 65+ Low/Medium Risk (2 of 2 - PPSV23) 12/2/2017 LIPID PANEL Q1 4/14/2018 HEMOGLOBIN A1C Q6M 4/24/2018 EYE EXAM RETINAL OR DILATED Q1 10/12/2018 FOOT EXAM Q1 10/24/2018 MICROALBUMIN Q1 10/24/2018 MEDICARE YEARLY EXAM 10/26/2018 GLAUCOMA SCREENING Q2Y 10/12/2019 DTaP/Tdap/Td series (2 - Td) 11/15/2023 Allergies as of 10/25/2017  Review Complete On: 10/25/2017 By: Brenda Reason, DO Severity Noted Reaction Type Reactions Niacin High 03/26/2013    Palpitations, Other (comments) Stomach irritation Ace Inhibitors  05/19/2010    Cough Avapro [Irbesartan]  05/19/2010    Myalgia Bystolic [Nebivolol]  72/15/0894    Other (comments) Felt like throat closing Catapres [Clonidine]  05/19/2010    Cough Codeine  05/19/2010    Nausea and Vomiting Cozaar [Losartan]    Not Reported This Time Crestor [Rosuvastatin]  05/19/2010    Other (comments) Cramps, aches Wallace Gottron [Propoxyphene N-acetaminophen]  05/19/2010    Unknown (comments) Diovan [Valsartan]  05/19/2010    Cough Flagyl [Metronidazole]  05/19/2010    Other (comments) Mouth and throat irritation Gabapentin  03/16/2016    Other (comments) Abdominal pain and burning Iodinated Contrast- Oral And Iv Dye    Other (comments) Throat swelling Iodine    Unknown (comments) Lescol [Fluvastatin]  05/19/2010    Other (comments) Leg cramps Lipitor [Atorvastatin]  05/19/2010    Myalgia, Other (comments) Cramps, aches Lovastatin  05/19/2010    Other (comments) Leg cramps Nexium [Esomeprazole Magnesium]  05/20/2010    Other (comments) Stomach upset, burning Pravachol [Pravastatin]  05/19/2010    Other (comments) Leg cramps Reglan [Metoclopramide]  05/19/2010    Nausea Only Trazodone  05/19/2010    Other (comments) Patient states she feels drugged Zetia [Ezetimibe]  05/19/2010    Other (comments) Cramps, aches Zocor [Simvastatin]  05/19/2010    Other (comments) Cramps, aches Current Immunizations  Reviewed on 2/7/2017 Name Date Influenza High Dose Vaccine PF 12/2/2016 Influenza Vaccine 12/16/2015 11:20 AM, 9/15/2014 Influenza Vaccine Split 11/6/2012, 10/5/2010 Influenza Vaccine Whole 9/1/2009 Tdap 11/15/2013 Not reviewed this visit You Were Diagnosed With   
  
 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  
 39.31 kg/m 2 2.32 m 2 Preferred Pharmacy Pharmacy Name Phone [facility], [facility] Your Updated Medication List  
  
   
This list is accurate as of: 10/25/17 11:12 AM.  Always use your most recent med list.  
  
  
  
  
 amLODIPine 5 mg tablet Commonly known as:  Vannesa Fraction Take 1 Tab by mouth daily. aspirin delayed-release 81 mg tablet Take 81 mg by mouth daily. capsaicin 0.075 % topical cream  
Apply  to affected area three (3) times daily. clopidogrel 75 mg Tab Commonly known as:  PLAVIX TAKE ONE TABLET BY MOUTH EVERY DAY  
  
 cyanocobalamin ER 1,000 mcg tablet Take 1 Tab by mouth daily. FISH OIL PO Take 1,000 mg by mouth two (2) times a day. furosemide 20 mg tablet Commonly known as:  LASIX Use daily as needed for leg swelling  
  
 hydrOXYzine HCl 25 mg tablet Commonly known as:  ATARAX Take 1 Tab by mouth two (2) times daily as needed for Anxiety. insulin glargine 100 unit/mL injection Commonly known as:  LANTUS Take 15 units every morning  Indications: type 2 diabetes mellitus  
  
 levothyroxine 75 mcg tablet Commonly known as:  SYNTHROID Take 1 Tab by mouth Daily (before breakfast). lidocaine 5 % Commonly known as:  LIDODERM  
1 Patch by TransDERmal route every twenty-four (24) hours. montelukast 10 mg tablet Commonly known as:  SINGULAIR Take 1 Tab by mouth daily as needed. Indications: ALLERGIC RHINITIS PEPCID 20 mg tablet Generic drug:  famotidine Take 20 mg by mouth nightly. * potassium chloride 20 mEq tablet Commonly known as:  K-DUR, KLOR-CON Take 1 Tab by mouth daily. * potassium chloride SR 10 mEq tablet Commonly known as:  KLOR-CON 10  
TAKE TWO TABLETS BY MOUTH 2 TIMES A DAY  
  
 predniSONE 10 mg dose pack Commonly known as:  STERAPRED DS  
6 day dose pack per package instructions PROAIR HFA 90 mcg/actuation inhaler Generic drug:  albuterol INHALE 1 PUFF BY MOUTH EVERY 4 HOURS AS NEEDED FOR WHEEZING OR SHORTNESS OF BREATH  
  
 traMADol 50 mg tablet Commonly known as:  ULTRAM  
Take 1 Tab by mouth every six (6) hours as needed for Pain. Max Daily Amount: 200 mg. VITAMIN D3 1,000 unit tablet Generic drug:  cholecalciferol Take 5,000 Units by mouth two (2) times a day. * Notice: This list has 2 medication(s) that are the same as other medications prescribed for you. Read the directions carefully, and ask your doctor or other care provider to review them with you. Follow-up Instructions Return in about 3 months (around 1/25/2018) for Regular Follow Up . To-Do List   
 10/25/2017 Lab:  HEMOGLOBIN A1C WITH EAG Around 01/23/2018 Lab:  LIPID PANEL Around 01/23/2018 Lab:  METABOLIC PANEL, COMPREHENSIVE Around 01/23/2018 Lab:  SED RATE (ESR) Patient Instructions Please contact our office if you have any questions about your visit today. 1.) Please call if you would like a referral to the Rheumatologist. 
 
2.) Return in 3 months for next visit. Please get labs before next visit. Medicare Wellness Visit, Female The best way to live healthy is to have a healthy lifestyle by eating a well-balanced diet, exercising regularly, limiting alcohol and stopping smoking. Regular physical exams and screening tests are another way to keep healthy. Preventive exams provided by your health care provider can find health problems before they become diseases or illnesses. Preventive services including immunizations, screening tests, monitoring and exams can help you take care of your own health. All people over age 72 should have a pneumovax  and and a prevnar shot to prevent pneumonia. These are once in a lifetime unless you and your provider decide differently. All people over 65 should have a yearly flu shot and a tetanus vaccine every 10 years. A bone mass density to screen for osteoporosis or thinning of the bones should be done every 2 years after 65. Screening for diabetes mellitus with a blood sugar test should be done every year. Glaucoma is a disease of the eye due to increased ocular pressure that can lead to blindness and it should be done every year by an eye professional. 
 
Cardiovascular screening tests that check for elevated lipids (fatty part of blood) which can lead to heart disease and strokes should be done every 5 years. Colorectal screening that evaluates for blood or polyps in your colon should be done yearly as a stool test or every five years as a flexible sigmoidoscope or every 10 years as a colonoscopy up to age 76. Breast cancer screening with a mammogram is recommended biennially  for women age 54-69. Screening for cervical cancer with a pap smear and pelvic exam is recommended for women after age 72 years every 2 years up to age 79 or when the provider and patient decide to stop. If there is a history of cervical abnormalities or other increased risk for cancer then the test is recommended yearly. Hepatitis C screening is also recommended for anyone born between 80 through [address]. A shingles vaccine is also recommended once in a lifetime after age 61. Your Medicare Wellness Exam is recommended annually. Here is a list of your current Health Maintenance items with a due date: 
Health Maintenance Due Topic Date Due  
 Albumin Urine Test  08/08/2015 [address] [place] [address] Diabetic Foot Care  07/20/2017  Flu Vaccine  08/01/2017 [address] [place] [address] Annual Well Visit  08/18/2017  Hemoglobin A1C    10/14/2017 Introducing [facility]! Dear Goran Patel: Thank you for requesting a [employer] account. Our records indicate that you already have an active [employer] account. You can access your account anytime at [email or web address] Did you know that you can access your hospital and ER discharge instructions at any time in [employer]? You can also review all of your test results from your hospital stay or ER visit. Additional Information If you have questions, please visit the Frequently Asked Questions section of the [employer] website at [email or web address] Remember, [employer] is NOT to be used for urgent needs. For medical emergencies, dial 911. Now available from your iPhone and Android! Please provide this summary of care documentation to your next provider. Your primary care clinician is listed as Issa Dickey. If you have any questions after today's visit, please call [phone].

## 2017-10-25 NOTE — PATIENT INSTRUCTIONS
Please contact our office if you have any questions about your visit today. 1.) Please call if you would like a referral to the Rheumatologist.    2.) Return in 3 months for next visit. Please get labs before next visit. Medicare Wellness Visit, Female    The best way to live healthy is to have a healthy lifestyle by eating a well-balanced diet, exercising regularly, limiting alcohol and stopping smoking. Regular physical exams and screening tests are another way to keep healthy. Preventive exams provided by your health care provider can find health problems before they become diseases or illnesses. Preventive services including immunizations, screening tests, monitoring and exams can help you take care of your own health. All people over age 72 should have a pneumovax  and and a prevnar shot to prevent pneumonia. These are once in a lifetime unless you and your provider decide differently. All people over 65 should have a yearly flu shot and a tetanus vaccine every 10 years. A bone mass density to screen for osteoporosis or thinning of the bones should be done every 2 years after 65. Screening for diabetes mellitus with a blood sugar test should be done every year. Glaucoma is a disease of the eye due to increased ocular pressure that can lead to blindness and it should be done every year by an eye professional.    Cardiovascular screening tests that check for elevated lipids (fatty part of blood) which can lead to heart disease and strokes should be done every 5 years. Colorectal screening that evaluates for blood or polyps in your colon should be done yearly as a stool test or every five years as a flexible sigmoidoscope or every 10 years as a colonoscopy up to age 76. Breast cancer screening with a mammogram is recommended biennially  for women age 54-69.     Screening for cervical cancer with a pap smear and pelvic exam is recommended for women after age 72 years every 2 years up to age 79 or when the provider and patient decide to stop. If there is a history of cervical abnormalities or other increased risk for cancer then the test is recommended yearly. Hepatitis C screening is also recommended for anyone born between 80 through [address]. A shingles vaccine is also recommended once in a lifetime after age 61. Your Medicare Wellness Exam is recommended annually.     Here is a list of your current Health Maintenance items with a due date:  Health Maintenance Due   Topic Date Due    Albumin Urine Test  08/08/2015    Diabetic Foot Care  07/20/2017    Flu Vaccine  08/01/2017    Annual Well Visit  08/18/2017    Hemoglobin A1C    10/14/2017

## 2017-11-09 DIAGNOSIS — Z95.5 S/P CORONARY ARTERY STENT PLACEMENT: ICD-10-CM

## 2017-11-10 RX ORDER — CLOPIDOGREL BISULFATE 75 MG/1
TABLET ORAL
Qty: 30 TAB | Refills: 0 | Status: SHIPPED | OUTPATIENT
Start: 2017-11-10 | End: 2017-12-07 | Stop reason: SDUPTHER

## 2017-11-19 ENCOUNTER — APPOINTMENT (OUTPATIENT)
Dept: GENERAL RADIOLOGY | Age: 82
End: 2017-11-19
Attending: PHYSICIAN ASSISTANT
Payer: MEDICARE

## 2017-11-19 ENCOUNTER — HOSPITAL ENCOUNTER (EMERGENCY)
Age: 82
Discharge: HOME | End: 2017-11-19
Attending: EMERGENCY MEDICINE
Payer: MEDICARE

## 2017-11-19 VITALS
WEIGHT: 248 LBS | HEIGHT: 67 IN | BODY MASS INDEX: 38.92 KG/M2 | RESPIRATION RATE: 20 BRPM | HEART RATE: 99 BPM | TEMPERATURE: 98.5 F | DIASTOLIC BLOOD PRESSURE: 83 MMHG | SYSTOLIC BLOOD PRESSURE: 146 MMHG

## 2017-11-19 DIAGNOSIS — M25.511 PAIN IN JOINT OF RIGHT SHOULDER: Primary | ICD-10-CM

## 2017-11-19 PROCEDURE — 73030 X-RAY EXAM OF SHOULDER: CPT

## 2017-11-19 PROCEDURE — 99283 EMERGENCY DEPT VISIT LOW MDM: CPT

## 2017-11-19 PROCEDURE — 93005 ELECTROCARDIOGRAM TRACING: CPT

## 2017-11-19 RX ORDER — OXYCODONE HYDROCHLORIDE 5 MG/1
5 TABLET ORAL
Qty: 6 TAB | Refills: 0 | Status: SHIPPED | OUTPATIENT
Start: 2017-11-19 | End: 2017-11-19

## 2017-11-19 RX ORDER — PREDNISONE 10 MG/1
TABLET ORAL
Qty: 21 TAB | Refills: 0 | Status: SHIPPED | OUTPATIENT
Start: 2017-11-19 | End: 2018-01-17 | Stop reason: ALTCHOICE

## 2017-11-19 NOTE — ED NOTES
Matilde Tafoya is a [de-identified] y.o. female that was discharged in stable condition. The patients diagnosis, condition and treatment were explained to  patient and aftercare instructions were given. The patient verbalized understanding. Patient armband removed and shredded.

## 2017-11-19 NOTE — ED TRIAGE NOTES
Patient reports to ED with complaints of right shoulder pain. Patient state she fell approx 1.5 months ago and has not had any relief with any OTC medications.

## 2017-11-19 NOTE — DISCHARGE INSTRUCTIONS
Shoulder Pain: Care Instructions  Your Care Instructions    You can hurt your shoulder by using it too much during an activity, such as fishing or baseball. It can also happen as part of the everyday wear and tear of getting older. Shoulder injuries can be slow to heal, but your shoulder should get better with time. Your doctor may recommend a sling to rest your shoulder. If you have injured your shoulder, you may need testing and treatment. Follow-up care is a key part of your treatment and safety. Be sure to make and go to all appointments, and call your doctor if you are having problems. It's also a good idea to know your test results and keep a list of the medicines you take. How can you care for yourself at home? · Take pain medicines exactly as directed. ¨ If the doctor gave you a prescription medicine for pain, take it as prescribed. ¨ If you are not taking a prescription pain medicine, ask your doctor if you can take an over-the-counter medicine. ¨ Do not take two or more pain medicines at the same time unless the doctor told you to. Many pain medicines contain acetaminophen, which is Tylenol. Too much acetaminophen (Tylenol) can be harmful. · If your doctor recommends that you wear a sling, use it as directed. Do not take it off before your doctor tells you to. · Put ice or a cold pack on the sore area for 10 to 20 minutes at a time. Put a thin cloth between the ice and your skin. · If there is no swelling, you can put moist heat, a heating pad, or a warm cloth on your shoulder. Some doctors suggest alternating between hot and cold. · Rest your shoulder for a few days. If your doctor recommends it, you can then begin gentle exercise of the shoulder, but do not lift anything heavy. When should you call for help? Call 911 anytime you think you may need emergency care. For example, call if:  ? · You have chest pain or pressure. This may occur with:  ¨ Sweating.   ¨ Shortness of breath. ¨ Nausea or vomiting. ¨ Pain that spreads from the chest to the neck, jaw, or one or both shoulders or arms. ¨ Dizziness or lightheadedness. ¨ A fast or uneven pulse. After calling 911, chew 1 adult-strength aspirin. Wait for an ambulance. Do not try to drive yourself. ? · Your arm or hand is cool or pale or changes color. ?Call your doctor now or seek immediate medical care if:  ? · You have signs of infection, such as:  ¨ Increased pain, swelling, warmth, or redness in your shoulder. ¨ Red streaks leading from a place on your shoulder. ¨ Pus draining from an area of your shoulder. ¨ Swollen lymph nodes in your neck, armpits, or groin. ¨ A fever. ? Watch closely for changes in your health, and be sure to contact your doctor if:  ? · You cannot use your shoulder. ? · Your shoulder does not get better as expected. Where can you learn more? Go to http://cristina-mignon.info/. Enter T150 in the search box to learn more about \"Shoulder Pain: Care Instructions. \"  Current as of: March 21, 2017  Content Version: 11.4  © 7970-5162 [employer]. Care instructions adapted under license by [employer] (which disclaims liability or warranty for this information). If you have questions about a medical condition or this instruction, always ask your healthcare professional. [place] [address] any warranty or liability for your use of this information. Joint Pain: Care Instructions  Your Care Instructions    Many people have small aches and pains from overuse or injury to muscles and joints. Joint injuries often happen during sports or recreation, work tasks, or projects around the home. An overuse injury can happen when you put too much stress on a joint or when you do an activity that stresses the joint over and over, such as using the computer or rowing a boat. You can take action at home to help your muscles and joints get better.  You should feel better in 1 to 2 weeks, but it can take 3 months or more to heal completely. Follow-up care is a key part of your treatment and safety. Be sure to make and go to all appointments, and call your doctor if you are having problems. It's also a good idea to know your test results and keep a list of the medicines you take. How can you care for yourself at home? · Do not put weight on the injured joint for at least a day or two. · For the first day or two after an injury, do not take hot showers or baths, and do not use hot packs. The heat could make swelling worse. · Put ice or a cold pack on the sore joint for 10 to 20 minutes at a time. Try to do this every 1 to 2 hours for the next 3 days (when you are awake) or until the swelling goes down. Put a thin cloth between the ice and your skin. · Wrap the injury in an elastic bandage. Do not wrap it too tightly because this can cause more swelling. · Prop up the sore joint on a pillow when you ice it or anytime you sit or lie down during the next 3 days. Try to keep it above the level of your heart. This will help reduce swelling. · Take an over-the-counter pain medicine, such as acetaminophen (Tylenol), ibuprofen (Advil, Motrin), or naproxen (Aleve). Read and follow all instructions on the label. · After 1 or 2 days of rest, begin moving the joint gently. While the joint is still healing, you can begin to exercise using activities that do not strain or hurt the painful joint. When should you call for help? Call your doctor now or seek immediate medical care if:  ? · You have signs of infection, such as:  ¨ Increased pain, swelling, warmth, and redness. ¨ Red streaks leading from the joint. ¨ A fever. ? Watch closely for changes in your health, and be sure to contact your doctor if:  ? · Your movement or symptoms are not getting better after 1 to 2 weeks of home treatment. Where can you learn more?   Go to http://cristina-mignon.info/. Enter P205 in the search box to learn more about \"Joint Pain: Care Instructions. \"  Current as of: March 21, 2017  Content Version: 11.4  © 2984-2743 [employer]. Care instructions adapted under license by [employer] (which disclaims liability or warranty for this information). If you have questions about a medical condition or this instruction, always ask your healthcare professional. [place] [address] any warranty or liability for your use of this information.

## 2017-11-19 NOTE — ED NOTES
Pt asked if she could get an EKG done because she has a stent and had a little discomfort on her left side last night. Thinks it was indigestion. Took a pepcid. Denies any pain at this time.
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
## 2017-11-20 LAB
ATRIAL RATE: 90 BPM
CALCULATED P AXIS, ECG09: 77 DEGREES
CALCULATED R AXIS, ECG10: -2 DEGREES
CALCULATED T AXIS, ECG11: 22 DEGREES
DIAGNOSIS, 93000: NORMAL
P-R INTERVAL, ECG05: 140 MS
Q-T INTERVAL, ECG07: 376 MS
QRS DURATION, ECG06: 80 MS
QTC CALCULATION (BEZET), ECG08: 459 MS
VENTRICULAR RATE, ECG03: 90 BPM

## 2017-12-07 DIAGNOSIS — Z95.5 S/P CORONARY ARTERY STENT PLACEMENT: ICD-10-CM

## 2017-12-08 RX ORDER — CLOPIDOGREL BISULFATE 75 MG/1
TABLET ORAL
Qty: 30 TAB | Refills: 0 | Status: SHIPPED | OUTPATIENT
Start: 2017-12-08 | End: 2018-01-08 | Stop reason: SDUPTHER

## 2018-01-08 DIAGNOSIS — Z95.5 S/P CORONARY ARTERY STENT PLACEMENT: ICD-10-CM

## 2018-01-09 RX ORDER — CLOPIDOGREL BISULFATE 75 MG/1
TABLET ORAL
Qty: 30 TAB | Refills: 0 | Status: SHIPPED | OUTPATIENT
Start: 2018-01-09 | End: 2018-02-08 | Stop reason: SDUPTHER

## 2018-01-17 ENCOUNTER — APPOINTMENT (OUTPATIENT)
Dept: GENERAL RADIOLOGY | Age: 83
End: 2018-01-17
Attending: PHYSICIAN ASSISTANT
Payer: MEDICARE

## 2018-01-17 ENCOUNTER — HOSPITAL ENCOUNTER (EMERGENCY)
Age: 83
Discharge: HOME | End: 2018-01-17
Attending: EMERGENCY MEDICINE | Admitting: EMERGENCY MEDICINE
Payer: MEDICARE

## 2018-01-17 VITALS
SYSTOLIC BLOOD PRESSURE: 143 MMHG | TEMPERATURE: 98.4 F | HEIGHT: 67 IN | OXYGEN SATURATION: 95 % | HEART RATE: 85 BPM | DIASTOLIC BLOOD PRESSURE: 77 MMHG | BODY MASS INDEX: 37.67 KG/M2 | RESPIRATION RATE: 16 BRPM | WEIGHT: 240 LBS

## 2018-01-17 DIAGNOSIS — M19.011 OSTEOARTHRITIS OF BOTH SHOULDERS, UNSPECIFIED OSTEOARTHRITIS TYPE: Primary | ICD-10-CM

## 2018-01-17 DIAGNOSIS — M19.012 OSTEOARTHRITIS OF BOTH SHOULDERS, UNSPECIFIED OSTEOARTHRITIS TYPE: Primary | ICD-10-CM

## 2018-01-17 DIAGNOSIS — M25.511 PAIN OF BOTH SHOULDER JOINTS: ICD-10-CM

## 2018-01-17 DIAGNOSIS — M25.512 PAIN OF BOTH SHOULDER JOINTS: ICD-10-CM

## 2018-01-17 DIAGNOSIS — S29.019S THORACIC MYOFASCIAL STRAIN, SEQUELA: ICD-10-CM

## 2018-01-17 PROCEDURE — 99282 EMERGENCY DEPT VISIT SF MDM: CPT

## 2018-01-17 PROCEDURE — 73030 X-RAY EXAM OF SHOULDER: CPT

## 2018-01-17 RX ORDER — METHYLPREDNISOLONE 4 MG/1
TABLET ORAL
Qty: 1 DOSE PACK | Refills: 0 | Status: SHIPPED | OUTPATIENT
Start: 2018-01-17 | End: 2018-02-27 | Stop reason: ALTCHOICE
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
## 2018-01-17 NOTE — ED TRIAGE NOTES
Pt presents for ongoing bilateral shoulder pain that extends down her arms x 2 months. Patient denies any injury. Patient has been seen here before and dx with arthritis. Patient presents in NAD.

## 2018-01-17 NOTE — DISCHARGE INSTRUCTIONS
Joint Pain: Care Instructions  Your Care Instructions    Many people have small aches and pains from overuse or injury to muscles and joints. Joint injuries often happen during sports or recreation, work tasks, or projects around the home. An overuse injury can happen when you put too much stress on a joint or when you do an activity that stresses the joint over and over, such as using the computer or rowing a boat. You can take action at home to help your muscles and joints get better. You should feel better in 1 to 2 weeks, but it can take 3 months or more to heal completely. Follow-up care is a key part of your treatment and safety. Be sure to make and go to all appointments, and call your doctor if you are having problems. It's also a good idea to know your test results and keep a list of the medicines you take. How can you care for yourself at home? · Do not put weight on the injured joint for at least a day or two. · For the first day or two after an injury, do not take hot showers or baths, and do not use hot packs. The heat could make swelling worse. · Put ice or a cold pack on the sore joint for 10 to 20 minutes at a time. Try to do this every 1 to 2 hours for the next 3 days (when you are awake) or until the swelling goes down. Put a thin cloth between the ice and your skin. · Wrap the injury in an elastic bandage. Do not wrap it too tightly because this can cause more swelling. · Prop up the sore joint on a pillow when you ice it or anytime you sit or lie down during the next 3 days. Try to keep it above the level of your heart. This will help reduce swelling. · Take an over-the-counter pain medicine, such as acetaminophen (Tylenol), ibuprofen (Advil, Motrin), or naproxen (Aleve). Read and follow all instructions on the label. · After 1 or 2 days of rest, begin moving the joint gently.  While the joint is still healing, you can begin to exercise using activities that do not strain or hurt the painful joint. When should you call for help? Call your doctor now or seek immediate medical care if:  ? · You have signs of infection, such as:  ¨ Increased pain, swelling, warmth, and redness. ¨ Red streaks leading from the joint. ¨ A fever. ? Watch closely for changes in your health, and be sure to contact your doctor if:  ? · Your movement or symptoms are not getting better after 1 to 2 weeks of home treatment. Where can you learn more? Go to http://cristina-mignon.info/. Enter P205 in the search box to learn more about \"Joint Pain: Care Instructions. \"  Current as of: March 21, 2017  Content Version: 11.4  © 3834-3263 [employer]. Care instructions adapted under license by [employer] (which disclaims liability or warranty for this information). If you have questions about a medical condition or this instruction, always ask your healthcare professional. [place] [address] any warranty or liability for your use of this information. Osteoarthritis: Care Instructions  Your Care Instructions    Arthritis is a common health problem in which the joints are inflamed. There are several kinds of arthritis. Osteoarthritis is caused by a breakdown of cartilage, the hard, thick tissue that cushions the joints. It causes pain, stiffness, and swelling, often in the spine, fingers, hips, and knees. Osteoarthritis can happen at any age, but it is most common in older people. Osteoarthritis never goes away completely, but it can be controlled. Medicine and home treatment can reduce the pain and prevent the arthritis from getting worse. Follow-up care is a key part of your treatment and safety. Be sure to make and go to all appointments, and call your doctor if you are having problems. It's also a good idea to know your test results and keep a list of the medicines you take. How can you care for yourself at home?   · Take a warm shower or bath in the morning to relieve stiffness. Avoid sitting still afterwards. · If the joint is not swollen, use moist heat, like a warm, damp towel, for 20 to 30 minutes, 2 or 3 times a day. Do not use heat on a swollen joint. · If the joint is swollen, use ice or cold packs for 10 to 20 minutes, once an hour. Cold will help relieve pain and reduce inflammation. Put a thin cloth between the ice and your skin. · To prevent stiffness, gently move the joint through its full range of motion several times a day. · If the joint hurts, avoid activities that put a strain on it for a few days. Take rest breaks throughout the day. · Get regular exercise. Walking, swimming, yoga, biking, kal chi, and water aerobics are good exercises that are gentle on the joints. · Reach and stay at a healthy weight. If you need to lose or maintain weight, regular exercise and a healthy diet will help. Extra weight can strain the joints, especially the knees and hips, and make the pain worse. Losing even a few pounds may help. · Take pain medicines exactly as directed. ¨ If the doctor gave you a prescription medicine for pain, take it as prescribed. ¨ If you are not taking a prescription pain medicine, ask your doctor if you can take an over-the-counter medicine. When should you call for help? Call your doctor now or seek immediate medical care if:  ? · The pain is so bad that you cannot use the joint. ? · You have sudden back pain with weakness in your legs or loss of bowel or bladder control. ? · Your stools are black and tarlike or have streaks of blood. ? · You have severe pain and swelling in more than one joint. ? Watch closely for changes in your health, and be sure to contact your doctor if:  ? · You have side effects from the medicines, like belly pain, ongoing heartburn, or nausea. ? · Joint pain continues for more than 6 weeks, and home treatment is not helping. Where can you learn more?   Go to http://cristina-mignon.info/. Enter K194 in the search box to learn more about \"Osteoarthritis: Care Instructions. \"  Current as of: October 31, 2016  Content Version: 11.4  © 0466-1951 [employer], [employer]. Care instructions adapted under license by [employer] (which disclaims liability or warranty for this information). If you have questions about a medical condition or this instruction, always ask your healthcare professional. [place] [address] any warranty or liability for your use of this information.

## 2018-01-17 NOTE — ED NOTES
Jeffrey Nayak is a [de-identified] y.o. female that was discharged in stable condition. The patients diagnosis, condition and treatment were explained to  patient and aftercare instructions were given. The patient verbalized understanding. Patient armband removed and shredded.

## 2018-01-18 ENCOUNTER — PATIENT OUTREACH (OUTPATIENT)
Dept: FAMILY MEDICINE CLINIC | Age: 83
End: 2018-01-18

## 2018-01-18 NOTE — PROGRESS NOTES
Transition of care coordination/ED Admission    Patient admitted to [address] [place] [address], 1/17/18 to 1/17/18 for osteoarthritis, bilateral shoulders. Presenting symptoms:   Radiating bilateral arm pain  Back pain     New medications/changes to current medications:  methylPREDNISolone (MEDROL, LAURIE,) 4 mg tablet    ED utilization in past 6 months: 1    Contacted patient for transition of care post ED admission. Verified 2 patient identifiers. Introduced self, role and reason for call. Patient reports:  9.5/10 shoulder/shoulder blade pain  Taking Aleve and Tylenol arthritis  Started Medrol dose laurie this AM  Applying heat     Patient denies:  CP  SOB  N/V  Fever/chills  Diaphoresis  Dizziness  Headache  Changes in vision     ADL's:  Feeds self: independent  Ambulates: cane    Self grooming: independent  Toileting: independent    DME:   MYRNA Salas     Support:   Family     Educated patient to monitor and report the following Red flags: increased pain, chills, fever (> 100.5), nausea/vomiting that prevents eating/drinking/taking medications, SOB, diaphoresis, chest pain/pressure or any new or concerning symptoms. Patient verbalizes understanding of information discussed and is aware of  when to seek medical attention from ortho, PCP or ED. Educated patient on the importance of completing Medrol dose laurie. Patient reports she is also taking Tylenol arthritis for pain. Advised patient not to exceed 4000 mg of Tylenol daily. Informed patient of upcoming PCP appointment. Patient communicates she will call Dr. Alicia Landis, ortho today. Offered contact number. Patient voices she has it. Patient verbalizes her aide or brother will take her to appointments. Opportunity to ask questions was provided. Patient has PCP contact information for future reference or further questions. Appointment(s):  Dr. Ced Rodriguez on 1/25/18 at 10:15 AM  Will call Dr. Alicia Landis to schedule appointment  Patient aware. Aide or brother  will provide transportation. Plan:  Goals Addressed             Most Recent       Post ED     Coordinate Pain Management Plan for Patient. On track (1/18/2018)             Plan: Maintain tolerable pain level (6/10) Use capsaicin and warm moist heat. 1/18/18: Progressing towards goal: Yes: Comment: Patient taking Aleve and Tylenol Arthritis         Establish PCP relationships and regularly scheduled appointments. On track (1/18/2018)             Plan: Inform of upcoming PCP appointment and verify transportation. Encourage patient to schedule appointment with Dr. Libertad Nathan.  Knowledge and adherence to medication plan. On track (1/18/2018)             Plan: Educate patient on the importance completing full course of Prednisone. Post Hospitalization     COMPLETED: Attends follow-up appointments as directed.  COMPLETED: Prevent complications post hospitalization.  COMPLETED: Understands red flags post discharge.

## 2018-01-24 DIAGNOSIS — I10 ESSENTIAL HYPERTENSION WITH GOAL BLOOD PRESSURE LESS THAN 140/90: ICD-10-CM

## 2018-01-24 RX ORDER — AMLODIPINE BESYLATE 5 MG/1
TABLET ORAL
Qty: 90 TAB | Refills: 0 | Status: SHIPPED | OUTPATIENT
Start: 2018-01-24 | End: 2018-02-27 | Stop reason: DRUGHIGH

## 2018-02-08 DIAGNOSIS — Z95.5 S/P CORONARY ARTERY STENT PLACEMENT: ICD-10-CM

## 2018-02-08 RX ORDER — CLOPIDOGREL BISULFATE 75 MG/1
TABLET ORAL
Qty: 30 TAB | Refills: 0 | Status: SHIPPED | OUTPATIENT
Start: 2018-02-08 | End: 2018-03-13 | Stop reason: SDUPTHER

## 2018-02-19 ENCOUNTER — PATIENT OUTREACH (OUTPATIENT)
Dept: FAMILY MEDICINE CLINIC | Age: 83
End: 2018-02-19

## 2018-02-27 ENCOUNTER — HOSPITAL ENCOUNTER (OUTPATIENT)
Dept: LAB | Age: 83
Discharge: HOME | End: 2018-02-27
Payer: MEDICARE

## 2018-02-27 ENCOUNTER — OFFICE VISIT (OUTPATIENT)
Dept: FAMILY MEDICINE CLINIC | Age: 83
End: 2018-02-27

## 2018-02-27 VITALS
HEART RATE: 103 BPM | HEIGHT: 67 IN | DIASTOLIC BLOOD PRESSURE: 84 MMHG | WEIGHT: 252 LBS | SYSTOLIC BLOOD PRESSURE: 155 MMHG | RESPIRATION RATE: 17 BRPM | BODY MASS INDEX: 39.55 KG/M2 | OXYGEN SATURATION: 98 % | TEMPERATURE: 97 F

## 2018-02-27 DIAGNOSIS — M85.822 OTHER SPECIFIED DISORDERS OF BONE DENSITY AND STRUCTURE, LEFT UPPER ARM: ICD-10-CM

## 2018-02-27 DIAGNOSIS — R26.2 AMBULATORY DYSFUNCTION: ICD-10-CM

## 2018-02-27 DIAGNOSIS — M79.2 NEUROPATHIC PAIN: ICD-10-CM

## 2018-02-27 DIAGNOSIS — R79.9 ABNORMAL FINDING OF BLOOD CHEMISTRY: ICD-10-CM

## 2018-02-27 DIAGNOSIS — R68.89 OTHER GENERAL SYMPTOMS AND SIGNS: ICD-10-CM

## 2018-02-27 DIAGNOSIS — I10 ESSENTIAL HYPERTENSION WITH GOAL BLOOD PRESSURE LESS THAN 140/90: ICD-10-CM

## 2018-02-27 DIAGNOSIS — L65.9 ALOPECIA: ICD-10-CM
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
## 2018-02-27 DIAGNOSIS — R53.83 FATIGUE, UNSPECIFIED TYPE: Primary | ICD-10-CM

## 2018-02-27 DIAGNOSIS — E03.9 HYPOTHYROIDISM, UNSPECIFIED TYPE: ICD-10-CM

## 2018-02-27 DIAGNOSIS — E11.42 DIABETIC PERIPHERAL NEUROPATHY (HCC): ICD-10-CM

## 2018-02-27 DIAGNOSIS — R53.83 FATIGUE, UNSPECIFIED TYPE: ICD-10-CM

## 2018-02-27 LAB
ALBUMIN SERPL-MCNC: 3.6 G/DL (ref 3.4–5)
ALBUMIN/GLOB SERPL: 0.9 {RATIO} (ref 0.8–1.7)
ALP SERPL-CCNC: 108 U/L (ref 45–117)
ALT SERPL-CCNC: 13 U/L (ref 13–56)
ANION GAP SERPL CALC-SCNC: 5 MMOL/L (ref 3–18)
AST SERPL-CCNC: 9 U/L (ref 15–37)
BASOPHILS # BLD: 0 K/UL (ref 0–0.06)
BASOPHILS NFR BLD: 0 % (ref 0–2)
BILIRUB SERPL-MCNC: 0.5 MG/DL (ref 0.2–1)
BUN SERPL-MCNC: 7 MG/DL (ref 7–18)
BUN/CREAT SERPL: 12 (ref 12–20)
CALCIUM SERPL-MCNC: 9.3 MG/DL (ref 8.5–10.1)
CHLORIDE SERPL-SCNC: 103 MMOL/L (ref 100–108)
CO2 SERPL-SCNC: 31 MMOL/L (ref 21–32)
CREAT SERPL-MCNC: 0.6 MG/DL (ref 0.6–1.3)
DIFFERENTIAL METHOD BLD: ABNORMAL
EOSINOPHIL # BLD: 0.2 K/UL (ref 0–0.4)
EOSINOPHIL NFR BLD: 4 % (ref 0–5)
ERYTHROCYTE [DISTWIDTH] IN BLOOD BY AUTOMATED COUNT: 12.3 % (ref 11.6–14.5)
EST. AVERAGE GLUCOSE BLD GHB EST-MCNC: 209 MG/DL
FERRITIN SERPL-MCNC: 76 NG/ML (ref 8–388)
FOLATE SERPL-MCNC: 4.5 NG/ML (ref 3.1–17.5)
GLOBULIN SER CALC-MCNC: 4.2 G/DL (ref 2–4)
GLUCOSE SERPL-MCNC: 148 MG/DL (ref 74–99)
HBA1C MFR BLD: 8.9 % (ref 4.2–5.6)
HCT VFR BLD AUTO: 38.3 % (ref 35–45)
HGB BLD-MCNC: 12.3 G/DL (ref 12–16)
IRON SATN MFR SERPL: 37 %
IRON SERPL-MCNC: 115 UG/DL (ref 50–175)
LYMPHOCYTES # BLD: 2 K/UL (ref 0.9–3.6)
LYMPHOCYTES NFR BLD: 33 % (ref 21–52)
MCH RBC QN AUTO: 31.6 PG (ref 24–34)
MCHC RBC AUTO-ENTMCNC: 32.1 G/DL (ref 31–37)
MCV RBC AUTO: 98.5 FL (ref 74–97)
MONOCYTES # BLD: 0.4 K/UL (ref 0.05–1.2)
MONOCYTES NFR BLD: 7 % (ref 3–10)
NEUTS SEG # BLD: 3.4 K/UL (ref 1.8–8)
NEUTS SEG NFR BLD: 56 % (ref 40–73)
PLATELET # BLD AUTO: 251 K/UL (ref 135–420)
PMV BLD AUTO: 10.9 FL (ref 9.2–11.8)
POTASSIUM SERPL-SCNC: 3.6 MMOL/L (ref 3.5–5.5)
PROT SERPL-MCNC: 7.8 G/DL (ref 6.4–8.2)
RBC # BLD AUTO: 3.89 M/UL (ref 4.2–5.3)
SODIUM SERPL-SCNC: 139 MMOL/L (ref 136–145)
T4 FREE SERPL-MCNC: 1.3 NG/DL (ref 0.7–1.5)
TIBC SERPL-MCNC: 315 UG/DL (ref 250–450)
TSH SERPL DL<=0.05 MIU/L-ACNC: 0.51 UIU/ML (ref 0.36–3.74)
VIT B12 SERPL-MCNC: 992 PG/ML (ref 211–911)
WBC # BLD AUTO: 6 K/UL (ref 4.6–13.2)

## 2018-02-27 PROCEDURE — 84439 ASSAY OF FREE THYROXINE: CPT | Performed by: INTERNAL MEDICINE

## 2018-02-27 PROCEDURE — 86038 ANTINUCLEAR ANTIBODIES: CPT | Performed by: INTERNAL MEDICINE

## 2018-02-27 PROCEDURE — 83036 HEMOGLOBIN GLYCOSYLATED A1C: CPT | Performed by: INTERNAL MEDICINE

## 2018-02-27 PROCEDURE — 82306 VITAMIN D 25 HYDROXY: CPT | Performed by: INTERNAL MEDICINE

## 2018-02-27 PROCEDURE — 86225 DNA ANTIBODY NATIVE: CPT | Performed by: INTERNAL MEDICINE

## 2018-02-27 PROCEDURE — 36415 COLL VENOUS BLD VENIPUNCTURE: CPT | Performed by: INTERNAL MEDICINE

## 2018-02-27 PROCEDURE — 82607 VITAMIN B-12: CPT | Performed by: INTERNAL MEDICINE

## 2018-02-27 PROCEDURE — 83540 ASSAY OF IRON: CPT | Performed by: INTERNAL MEDICINE

## 2018-02-27 PROCEDURE — 80053 COMPREHEN METABOLIC PANEL: CPT | Performed by: INTERNAL MEDICINE

## 2018-02-27 PROCEDURE — 85025 COMPLETE CBC W/AUTO DIFF WBC: CPT | Performed by: INTERNAL MEDICINE

## 2018-02-27 PROCEDURE — 84425 ASSAY OF VITAMIN B-1: CPT | Performed by: INTERNAL MEDICINE

## 2018-02-27 PROCEDURE — 82728 ASSAY OF FERRITIN: CPT | Performed by: INTERNAL MEDICINE

## 2018-02-27 PROCEDURE — 84443 ASSAY THYROID STIM HORMONE: CPT | Performed by: INTERNAL MEDICINE

## 2018-02-27 RX ORDER — AMLODIPINE BESYLATE 5 MG/1
TABLET ORAL
Qty: 90 TAB | Refills: 0 | Status: CANCELLED | OUTPATIENT
Start: 2018-02-27

## 2018-02-27 RX ORDER — LIDOCAINE 50 MG/G
1 PATCH TOPICAL EVERY 24 HOURS
Qty: 90 EACH | Refills: 1 | Status: SHIPPED | OUTPATIENT
Start: 2018-02-27 | End: 2019-01-22

## 2018-02-27 RX ORDER — AMLODIPINE BESYLATE 10 MG/1
10 TABLET ORAL DAILY
Qty: 90 TAB | Refills: 1 | Status: SHIPPED | OUTPATIENT
Start: 2018-02-27 | End: 2018-06-18

## 2018-02-27 RX ORDER — LEVOTHYROXINE SODIUM 75 UG/1
75 TABLET ORAL
Qty: 30 TAB | Refills: 0 | Status: CANCELLED | OUTPATIENT
Start: 2018-02-27

## 2018-02-27 NOTE — PROGRESS NOTES
Progress Note    Patient: Opal Marks MRN: [id number]  SSN: [id number]    YOB: 1935  Age: 82 y.o. Sex: female          Subjective:   Opal Marks is a 82 y.o. female who is here for follow up. She states that her endocrinologist switched her to 112 mcg of Synthroid. She states that this change was made a few months ago. While she was in the hospital her synthroid was in 75 mcg dose range. She states that she also has had her insulin adjusted. She mentions that she feels fatigued constantly and she cannot rest. She mentions that she has been losing her hair as well--she reaches in her hair and strands come out. Visit from 10/25/2017  The patient is here for an acute care visit. Opal Marks states that the incident occurred a few weeks ago. She has been having some right sided hip pain. She has been having to lift up the leg to move it around. The patient was seen in the ER for the persistent right hip pain. She states that they performed any x-ray but did not see anything on the x-ray. She also mentions that her knee has been bothering her as well. She states that she sleeps on her left side as well to help her symptoms. She also admits to some swelling in her legs from time to time. Additionally she does admit to some anxiety with fluttering in her stomach. Objective:     Vitals:    02/27/18 0936   BP: 155/84   Pulse: (!) 103   Resp: 17   Temp: 97 °F (36.1 °C)   TempSrc: Oral   SpO2: 98%   Weight: 252 lb (114.3 kg)   Height: 5' 7\" (1.702 m)        Review of Systems:  Pertinent items are noted in the History of Present Illness. Physical Exam:   GENERAL: alert, cooperative, no distress, appears stated age, moderately obese  ENT: No fluid behind tympanic membranes bilaterally. No cerumen impaction noted either    EYE: conjunctivae/corneas clear. PERRL, EOM's intact. Fundi benign  THROAT & NECK: normal and no erythema or exudates noted.    LUNG: clear to auscultation bilaterally  HEART: regular rate and rhythm, S1, S2 normal, no murmur, click, rub or gallop  ABDOMEN: soft, non-tender. Bowel sounds normal. No masses,  no organomegaly, abnormal findings:  obese  EXTREMITIES:  No gross edema appreciated       Lab/Data Review:  I reviewed x-ray results from her ER visit. XR Results (most recent):    Results from Hospital Encounter encounter on 01/17/18   XR SHOULDER LT AP/LAT MIN 2 V   Narrative Left shoulder, 2 views:        INDICATION:    Pain in both shoulders. No known trauma. History of diabetes, hypertension and  osteoarthritis. COMPARISON STUDY: None. FINDINGS:    At the left acromioclavicular joint there are findings of mild-to-moderate  osteoarthritis. At the left glenohumeral joint there are also findings of mild  osteoarthritis. The greater tuberosity of head of left humerus there are significant sclerotic  changes with hypertrophic spur formation, most likely chronic reactive changes  secondary to chronic rotator cuff disease. There is no evidence of fracture, dislocation or subluxation at left shoulder. Impression IMPRESSION:    Significant sclerotic and hypertrophic changes at the superolateral aspect of  head of left humerus, most likely secondary to chronic rotator cuff disease. Clinical correlation suggested. Mild osteoarthritis in the left glenohumeral joint. Mild to moderate osteoarthritis at the left [facility] joint. Assessment:     1.) Essential Hypertension: Patient's amlodipine increased to 10 mg.      2.) Neuropathic Pain/ Diabetic Neuropathy: The patient was placed on Lidoderm patches--these were reordered. 3.) Ambulatory Dysfunction: Patient referred to [facility] Physical and Occupational Therapy follow up.    4.) Fatigue: Patient ordered thyroid studies, CBC, Ferritin, and B12 levels. Lupus work up ordered as well.      5.) Alopecia: Patient ordered Vitamin B1 (Thiamine) levels to assess for any deficiencies. 6.) Hypothyroidism: Thyroid studies ordered. She will continue to follow with her endocrinologist.     Patient will return in 1 week for follow up.      Plan:     Orders Placed This Encounter    IRON PROFILE    CBC WITH AUTOMATED DIFF    DAVINA QL, W/REFLEX CASCADE    DNA AB, DOUBLE STRANDED, IGG    VITAMIN D, 25 HYDROXY    HEMOGLOBIN A1C WITH EAG    TSH 3RD GENERATION    T4, FREE    VITAMIN B12 & FOLATE    VITAMIN B1, WHOLE BLOOD    FERRITIN    METABOLIC PANEL, COMPREHENSIVE    LIPID PANEL    REFERRAL TO HOME HEALTH    lidocaine (LIDODERM) 5 %    amLODIPine (NORVASC) 10 mg tablet         Signed By: Bert Solorzano DO     February 27, 2018

## 2018-02-27 NOTE — PROGRESS NOTES
Chief Complaint   Patient presents with    Hypertension    Fall     states that she will fall on/off denies injury   [facility]     would like to have a new referral for home health aid     1. Have you been to the ER, urgent care clinic since your last visit? Hospitalized since your last visit? No    2. Have you seen or consulted any other health care providers outside of the [address] [place] [address] since your last visit? Include any pap smears or colon screening.  No

## 2018-02-27 NOTE — PATIENT INSTRUCTIONS
Please contact our office if you have any questions about your visit today. 1.) Your Amlodipine has been increased to 10 mg.     2.) Return in 1 week for follow up on your labs and fatigue.

## 2018-02-27 NOTE — MR AVS SNAPSHOT
[facility] 
 
 
 [address] [facility] [place] [id number] [phone] Patient: Opal Marks MRN: [id number] YMN:[id number] Visit Information Date & Time Provider Department Dept. Phone Encounter #  
 2/27/2018  9:15 AM Sheri Rossiggenluis [id number] Follow-up Instructions Return in about 1 week (around 3/6/2018) for Follow Up on Labs . Upcoming Health Maintenance Date Due Pneumococcal 65+ Low/Medium Risk (2 of 2 - PPSV23) 12/2/2017 LIPID PANEL Q1 4/14/2018 HEMOGLOBIN A1C Q6M 4/24/2018 EYE EXAM RETINAL OR DILATED Q1 10/12/2018 FOOT EXAM Q1 10/24/2018 MICROALBUMIN Q1 10/24/2018 MEDICARE YEARLY EXAM 10/26/2018 GLAUCOMA SCREENING Q2Y 10/12/2019 DTaP/Tdap/Td series (2 - Td) 11/15/2023 Allergies as of 2/27/2018  Review Complete On: 8/00/4946 By: Nayeli Jacques. Audra Isidro LPN Severity Noted Reaction Type Reactions Niacin High 03/26/2013    Palpitations, Other (comments) Stomach irritation Ace Inhibitors  05/19/2010    Cough Avapro [Irbesartan]  05/19/2010    Myalgia Bystolic [Nebivolol]  08/99/1002    Other (comments) Felt like throat closing Catapres [Clonidine]  05/19/2010    Cough Codeine  05/19/2010    Nausea and Vomiting Cozaar [Losartan]    Not Reported This Time Crestor [Rosuvastatin]  05/19/2010    Other (comments) Cramps, aches Lary Koyanagi [Propoxyphene N-acetaminophen]  05/19/2010    Unknown (comments) Diovan [Valsartan]  05/19/2010    Cough Flagyl [Metronidazole]  05/19/2010    Other (comments) Mouth and throat irritation Gabapentin  03/16/2016    Other (comments) Abdominal pain and burning Iodinated Contrast- Oral And Iv Dye    Other (comments) Throat swelling Iodine    Unknown (comments) Lescol [Fluvastatin]  05/19/2010    Other (comments) Leg cramps Lipitor [Atorvastatin]  05/19/2010    Myalgia, Other (comments) Cramps, aches Lovastatin  05/19/2010    Other (comments) Leg cramps Nexium [Esomeprazole Magnesium]  05/20/2010    Other (comments) Stomach upset, burning Pravachol [Pravastatin]  05/19/2010    Other (comments) Leg cramps Reglan [Metoclopramide]  05/19/2010    Nausea Only Trazodone  05/19/2010    Other (comments) Patient states she feels drugged Zetia [Ezetimibe]  05/19/2010    Other (comments) Cramps, aches Zocor [Simvastatin]  05/19/2010    Other (comments) Cramps, aches Current Immunizations  Reviewed on 2/7/2017 Name Date Influenza High Dose Vaccine PF 12/2/2016 Influenza Vaccine 12/16/2015 11:20 AM, 9/15/2014 Influenza Vaccine Split 11/6/2012, 10/5/2010 Influenza Vaccine Whole 9/1/2009 Tdap 11/15/2013 Not reviewed this visit You Were Diagnosed With   
  
 Codes Comments Fatigue, unspecified type    -  Primary ICD-10-CM: R53.83 ICD-9-CM: 780.79 Essential hypertension with goal blood pressure less than 140/90     ICD-10-CM: I10 
ICD-9-CM: 401.9 Hypothyroidism, unspecified type     ICD-10-CM: E03.9 ICD-9-CM: 244.9 Diabetic peripheral neuropathy (HCC)     ICD-10-CM: E11.42 
ICD-9-CM: 250.60, 357.2 Neuropathic pain     ICD-10-CM: M79.2 ICD-9-CM: 729.2 Fall, subsequent encounter     ICD-10-CM: W19. Wilfrido Javier ICD-9-CM: V58.89, E888.9 Ambulatory dysfunction     ICD-10-CM: R26.2 ICD-9-CM: 719.7 Alopecia     ICD-10-CM: L65.9 ICD-9-CM: 704.00 Abnormal finding of blood chemistry     ICD-10-CM: R79.9 ICD-9-CM: 790.6 Abnormal finding of blood chemistry     ICD-10-CM: R79.9 ICD-9-CM: 790.6 Other specified disorders of bone density and structure, left upper arm     ICD-10-CM: D29.159 ICD-9-CM: 733.99 Other general symptoms and signs     ICD-10-CM: R68.89 ICD-9-CM: 780.99 Vitals BP Pulse Temp Resp Height(growth percentile) Weight(growth percentile) 155/84 (BP 1 Location: Right arm, BP Patient Position: Sitting) (!) 103 97 °F (36.1 °C) (Oral) 17 5' 7\" (1.702 m) 252 lb (114.3 kg) SpO2 BMI OB Status Smoking Status 98% 39.47 kg/m2 Hysterectomy Former Smoker BMI and BSA Data Body Mass Index Body Surface Area  
 39.47 kg/m 2 2.32 m 2 [facility] Your Updated Medication List  
  
   
This list is accurate as of 2/27/18 10:03 AM.  Always use your most recent med list. amLODIPine 10 mg tablet Commonly known as:  Cydney Bojorquez Take 1 Tab by mouth daily. aspirin delayed-release 81 mg tablet Take 81 mg by mouth daily. capsaicin 0.075 % topical cream  
Apply  to affected area three (3) times daily. clopidogrel 75 mg Tab Commonly known as:  PLAVIX TAKE ONE TABLET BY MOUTH EVERY DAY  
  
 cyanocobalamin ER 1,000 mcg tablet Take 1 Tab by mouth daily. FISH OIL PO Take 1,000 mg by mouth two (2) times a day. furosemide 20 mg tablet Commonly known as:  LASIX Use daily as needed for leg swelling  
  
 insulin glargine 100 unit/mL injection Commonly known as:  LANTUS U-100 INSULIN Take 15 units every morning  Indications: type 2 diabetes mellitus  
  
 levothyroxine 75 mcg tablet Commonly known as:  SYNTHROID Take 1 Tab by mouth Daily (before breakfast). lidocaine 5 % Commonly known as:  LIDODERM  
1 Patch by TransDERmal route every twenty-four (24) hours. montelukast 10 mg tablet Commonly known as:  SINGULAIR Take 1 Tab by mouth daily as needed. Indications: ALLERGIC RHINITIS PEPCID 20 mg tablet Generic drug:  famotidine Take 20 mg by mouth nightly. * potassium chloride 20 mEq tablet Commonly known as:  K-DUR, KLOR-CON Take 1 Tab by mouth daily. * potassium chloride SR 10 mEq tablet Commonly known as:  KLOR-CON 10  
TAKE TWO TABLETS BY MOUTH 2 TIMES A DAY PROAIR HFA 90 mcg/actuation inhaler Generic drug:  albuterol INHALE 1 PUFF BY MOUTH EVERY 4 HOURS AS NEEDED FOR WHEEZING OR SHORTNESS OF BREATH  
  
 VITAMIN D3 1,000 unit tablet Generic drug:  cholecalciferol Take 5,000 Units by mouth two (2) times a day. * Notice: This list has 2 medication(s) that are the same as other medications prescribed for you. Read the directions carefully, and ask your doctor or other care provider to review them with you. Prescriptions Sent to Pharmacy Refills  
 lidocaine (LIDODERM) 5 % 1 Si Patch by TransDERmal route every twenty-four (24) hours. Class: Normal  
 [facility] [address] #: [phone] Route: TransDERmal  
 amLODIPine (NORVASC) 10 mg tablet 1 Sig: Take 1 Tab by mouth daily. Class: Normal  
 Pharmacy: [address] #: [phone] Route: Oral  
  
We Performed the Following [address] [place] [address] Comments:  
 Referral for PT and OT evaluation. Referral to [facility]. Follow-up Instructions Return in about 1 week (around 3/6/2018) for Follow Up on Labs . To-Do List   
 2018 Lab:  DNA AB, DOUBLE STRANDED, IGG   
  
 2018 Lab:  FERRITIN   
  
 2018 Lab:  HEMOGLOBIN A1C WITH EAG   
  
 2018 Lab:  IRON PROFILE   
  
 2018 Lab:  VITAMIN B1, WHOLE BLOOD   
  
 2018 Lab:  VITAMIN B12 & FOLATE   
  
 2018 Lab:  VITAMIN D, 25 HYDROXY   
  
 2018 Lab:  DAVINA QL, W/REFLEX CASCADE Around 2018 Lab:  CBC WITH AUTOMATED DIFF Around 2018 Lab:  LIPID PANEL Around 2018 Lab:  METABOLIC PANEL, COMPREHENSIVE Around 2018   Lab:  T4, FREE   
  
 Around 05/28/2018 Lab:  TSH 3RD GENERATION Referral Information Referral ID Referred By Referred To  
  
 [id number] Estefany HargroveLINDA Not Available Visits Status Start Date End Date 1 New Request 2/27/18 2/27/19 If your referral has a status of pending review or denied, additional information will be sent to support the outcome of this decision. Patient Instructions Please contact our office if you have any questions about your visit today. 1.) Your Amlodipine has been increased to 10 mg.  
 
2.) Return in 1 week for follow up on your labs and fatigue. Introducing [facility]! Dear Yvrose Moy: Thank you for requesting a [employer] account. Our records indicate that you already have an active Therapy[employer] account. You can access your account anytime at [email or web address] Did you know that you can access your hospital and ER discharge instructions at any time in Therapydia? You can also review all of your test results from your hospital stay or ER visit. Additional Information If you have questions, please visit the Frequently Asked Questions section of the Therapy[employer] website at [email or web address] Remember, Therapydia is NOT to be used for urgent needs. For medical emergencies, dial 911. Now available from your iPhone and Android! Please provide this summary of care documentation to your next provider. Your primary care clinician is listed as Mony Hernandes. If you have any questions after today's visit, please call [phone].

## 2018-02-28 LAB
25(OH)D3 SERPL-MCNC: 24.8 NG/ML (ref 30–100)
ANA SER QL: NEGATIVE
DSDNA AB SER-ACNC: 1 IU/ML (ref 0–9)
SEE BELOW:, 164879: NORMAL

## 2018-03-01 LAB — VIT B1 BLD-SCNC: 71.3 NMOL/L (ref 66.5–200)

## 2018-03-05 ENCOUNTER — TELEPHONE (OUTPATIENT)
Dept: FAMILY MEDICINE CLINIC | Age: 83
End: 2018-03-05

## 2018-03-05 DIAGNOSIS — M62.838 MUSCLE SPASM: Primary | ICD-10-CM

## 2018-03-05 RX ORDER — CYCLOBENZAPRINE HCL 10 MG
10 TABLET ORAL
Qty: 30 TAB | Refills: 1 | Status: SHIPPED | OUTPATIENT
Start: 2018-03-05 | End: 2018-04-29

## 2018-03-05 RX ORDER — TIZANIDINE 4 MG/1
4 TABLET ORAL
Qty: 30 TAB | Refills: 1 | Status: SHIPPED | OUTPATIENT
Start: 2018-03-05 | End: 2018-03-05 | Stop reason: SINTOL

## 2018-03-05 NOTE — TELEPHONE ENCOUNTER
I called the patient to inform her of her lab results. She complains of continued body-wide aches. I will place her on Cyclobenzaprine for use as needed for muscle spasm. Will reassess in two days to see how she is doing before I place her on any steroid pack.      JAMIE

## 2018-03-07 ENCOUNTER — TELEPHONE (OUTPATIENT)
Dept: FAMILY MEDICINE CLINIC | Age: 83
End: 2018-03-07

## 2018-03-07 DIAGNOSIS — M79.10 MYALGIA: ICD-10-CM

## 2018-03-07 DIAGNOSIS — M13.0 POLYARTICULAR ARTHRITIS: Primary | ICD-10-CM

## 2018-03-07 NOTE — TELEPHONE ENCOUNTER
Pt called and stated that she wants to give results of medicatio Flexeril. Pt stated that she would like physician to give her a call. Please advise.

## 2018-03-13 DIAGNOSIS — Z95.5 S/P CORONARY ARTERY STENT PLACEMENT: ICD-10-CM

## 2018-03-13 RX ORDER — CLOPIDOGREL BISULFATE 75 MG/1
75 TABLET ORAL DAILY
Qty: 30 TAB | Refills: 3 | Status: ON HOLD | OUTPATIENT
Start: 2018-03-13 | End: 2018-06-18 | Stop reason: SDUPTHER

## 2018-03-13 NOTE — TELEPHONE ENCOUNTER
Requested Prescriptions     Pending Prescriptions Disp Refills    clopidogrel (PLAVIX) 75 mg tab 30 Tab 0   ;

## 2018-03-15 ENCOUNTER — OFFICE VISIT (OUTPATIENT)
Dept: CARDIOLOGY CLINIC | Age: 83
End: 2018-03-15

## 2018-03-15 VITALS
BODY MASS INDEX: 39.71 KG/M2 | HEIGHT: 67 IN | DIASTOLIC BLOOD PRESSURE: 71 MMHG | WEIGHT: 253 LBS | HEART RATE: 119 BPM | SYSTOLIC BLOOD PRESSURE: 140 MMHG

## 2018-03-15 DIAGNOSIS — I50.32 CHRONIC DIASTOLIC HEART FAILURE (HCC): ICD-10-CM

## 2018-03-15 DIAGNOSIS — I10 ESSENTIAL HYPERTENSION: ICD-10-CM

## 2018-03-15 DIAGNOSIS — I25.10 ATHEROSCLEROSIS OF NATIVE CORONARY ARTERY OF NATIVE HEART WITHOUT ANGINA PECTORIS: Primary | ICD-10-CM

## 2018-03-15 DIAGNOSIS — E78.2 MIXED HYPERLIPIDEMIA: ICD-10-CM

## 2018-03-15 DIAGNOSIS — Z95.5 S/P DRUG ELUTING CORONARY STENT PLACEMENT: ICD-10-CM

## 2018-03-15 NOTE — MR AVS SNAPSHOT
[address] [facility] 
 
 
 [address] [facility] 
[phone] Patient: Saba Nuñez MRN: [id number] Visit Information Date & Time Provider Department Dept. Phone Encounter #  
 3/15/2018 11:00 AM Rc Contreras MD [facility] [id number] Follow-up Instructions Return in about 6 months (around 9/15/2018). Upcoming Health Maintenance Date Due Pneumococcal 65+ Low/Medium Risk (2 of 2 - PPSV23) 12/2/2017 LIPID PANEL Q1 4/14/2018 HEMOGLOBIN A1C Q6M 8/27/2018 EYE EXAM RETINAL OR DILATED Q1 10/12/2018 FOOT EXAM Q1 10/24/2018 MICROALBUMIN Q1 10/24/2018 MEDICARE YEARLY EXAM 10/26/2018 GLAUCOMA SCREENING Q2Y 10/12/2019 DTaP/Tdap/Td series (2 - Td) 11/15/2023 Allergies as of 3/15/2018  Review Complete On: 3/15/2018 By: Rc Contreras MD  
  
 Severity Noted Reaction Type Reactions Niacin High 03/26/2013    Palpitations, Other (comments) Stomach irritation Ace Inhibitors  05/19/2010    Cough Avapro [Irbesartan]  05/19/2010    Myalgia Bystolic [Nebivolol]  13/17/5626    Other (comments) Felt like throat closing Catapres [Clonidine]  05/19/2010    Cough Codeine  05/19/2010    Nausea and Vomiting Cozaar [Losartan]    Not Reported This Time Crestor [Rosuvastatin]  05/19/2010    Other (comments) Cramps, aches Esdras Beams [Propoxyphene N-acetaminophen]  05/19/2010    Unknown (comments) Diovan [Valsartan]  05/19/2010    Cough Flagyl [Metronidazole]  05/19/2010    Other (comments) Mouth and throat irritation Gabapentin  03/16/2016    Other (comments) Abdominal pain and burning Iodinated Contrast- Oral And Iv Dye    Other (comments) Throat swelling Iodine    Unknown (comments) Lescol [Fluvastatin]  05/19/2010    Other (comments) Leg cramps Lipitor [Atorvastatin]  05/19/2010    Myalgia, Other (comments) Cramps, aches Lovastatin  05/19/2010    Other (comments) Leg cramps Nexium [Esomeprazole Magnesium]  05/20/2010    Other (comments) Stomach upset, burning Pravachol [Pravastatin]  05/19/2010    Other (comments) Leg cramps Reglan [Metoclopramide]  05/19/2010    Nausea Only Trazodone  05/19/2010    Other (comments) Patient states she feels drugged Zetia [Ezetimibe]  05/19/2010    Other (comments) Cramps, aches Zocor [Simvastatin]  05/19/2010    Other (comments) Cramps, aches Current Immunizations  Reviewed on 2/7/2017 Name Date Influenza High Dose Vaccine PF 12/2/2016 Influenza Vaccine 12/16/2015 11:20 AM, 9/15/2014 Influenza Vaccine Split 11/6/2012, 10/5/2010 Influenza Vaccine Whole 9/1/2009 Tdap 11/15/2013 Not reviewed this visit You Were Diagnosed With   
  
 Codes Comments Atherosclerosis of native coronary artery of native heart without angina pectoris    -  Primary ICD-10-CM: I25.10 ICD-9-CM: 414.01 stable Chronic diastolic heart failure (HCC)     ICD-10-CM: I50.32 
ICD-9-CM: 428.32 stable Essential hypertension     ICD-10-CM: I10 
ICD-9-CM: 401.9 stable Mixed hyperlipidemia     ICD-10-CM: E78.2 ICD-9-CM: 272.2 stable S/P drug eluting coronary stent placement     ICD-10-CM: Z95.5 ICD-9-CM: V45.82 Vitals BP Pulse Height(growth percentile) Weight(growth percentile) BMI OB Status 140/71 (!) 119 5' 7\" (1.702 m) 253 lb (114.8 kg) 39.63 kg/m2 Hysterectomy Smoking Status Former Smoker Vitals History BMI and BSA Data Body Mass Index Body Surface Area  
 39.63 kg/m 2 2.33 m 2 Preferred Pharmacy Pharmacy Name Phone [facility] [phone] [facility] Your Updated Medication List  
  
   
This list is accurate as of 3/15/18 11:29 AM.  Always use your most recent med list. amLODIPine 10 mg tablet Commonly known as:  Larayne Eitan Take 1 Tab by mouth daily. aspirin delayed-release 81 mg tablet Take 81 mg by mouth daily. capsaicin 0.075 % topical cream  
Apply  to affected area three (3) times daily. clopidogrel 75 mg Tab Commonly known as:  PLAVIX Take 1 Tab by mouth daily. cyanocobalamin ER 1,000 mcg tablet Take 1 Tab by mouth daily. cyclobenzaprine 10 mg tablet Commonly known as:  FLEXERIL Take 1 Tab by mouth two (2) times daily as needed for Muscle Spasm(s). FISH OIL PO Take 1,000 mg by mouth two (2) times a day. furosemide 20 mg tablet Commonly known as:  LASIX Use daily as needed for leg swelling  
  
 insulin glargine 100 unit/mL injection Commonly known as:  LANTUS U-100 INSULIN Take 15 units every morning  Indications: type 2 diabetes mellitus  
  
 levothyroxine 75 mcg tablet Commonly known as:  SYNTHROID Take 1 Tab by mouth Daily (before breakfast). lidocaine 5 % Commonly known as:  LIDODERM  
1 Patch by TransDERmal route every twenty-four (24) hours. montelukast 10 mg tablet Commonly known as:  SINGULAIR Take 1 Tab by mouth daily as needed. Indications: ALLERGIC RHINITIS PEPCID 20 mg tablet Generic drug:  famotidine Take 20 mg by mouth as needed. potassium chloride 20 mEq tablet Commonly known as:  K-DUR, KLOR-CON Take 1 Tab by mouth daily. PROAIR HFA 90 mcg/actuation inhaler Generic drug:  albuterol INHALE 1 PUFF BY MOUTH EVERY 4 HOURS AS NEEDED FOR WHEEZING OR SHORTNESS OF BREATH  
  
 VITAMIN D3 1,000 unit tablet Generic drug:  cholecalciferol Take 5,000 Units by mouth two (2) times a day. Follow-up Instructions Return in about 6 months (around 9/15/2018). Introducing [facility]! Dear Akbar Mabry: Thank you for requesting a [employer] account. Our records indicate that you already have an active [employer] account.   You can access your account anytime at [email or web address] Did you know that you can access your hospital and ER discharge instructions at any time in [employer]? You can also review all of your test results from your hospital stay or ER visit. Additional Information If you have questions, please visit the Frequently Asked Questions section of the [employer] website at [email or web address] Remember, [employer] is NOT to be used for urgent needs. For medical emergencies, dial 911. Now available from your iPhone and Android! Please provide this summary of care documentation to your next provider. Your primary care clinician is listed as Gely Rizzo. If you have any questions after today's visit, please call [phone].
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
## 2018-03-15 NOTE — PROGRESS NOTES
1. Have you been to the ER, urgent care clinic since your last visit? Hospitalized since your last visit?  no    2. Have you seen or consulted any other health care providers outside of the [address] [place] [address] since your last visit? Include any pap smears or colon screening. Yes Where: pcp     3. Since your last visit, have you had any of the following symptoms? chest pains and shortness of breath.

## 2018-03-15 NOTE — LETTER
[place] 1935 
 
3/15/2018 Dear Paulina Santiago, MD Stella Almazan MD Nilsa Boston, MD Darroll Perone, MD Elesa Butler, MD Maximo Amezcua MD 
 
I had the pleasure of evaluating  Ms. Liliana Rodriguez in office today. Below are the relevant portions of my assessment and plan of care. ICD-10-CM ICD-9-CM 1. Atherosclerosis of native coronary artery of native heart without angina pectoris I25.10 414.01   
 stable 2. Chronic diastolic heart failure (HCC) I50.32 428.32   
 stable 3. Essential hypertension I10 401.9   
 stable 4. Mixed hyperlipidemia E78.2 272.2   
 stable 5. S/P drug eluting coronary stent placement Z95.5 V45.82 Current Outpatient Prescriptions Medication Sig Dispense Refill  clopidogrel (PLAVIX) 75 mg tab Take 1 Tab by mouth daily. 30 Tab 3  cyclobenzaprine (FLEXERIL) 10 mg tablet Take 1 Tab by mouth two (2) times daily as needed for Muscle Spasm(s). (Patient taking differently: Take 10 mg by mouth as needed for Muscle Spasm(s).) 30 Tab 1  
 amLODIPine (NORVASC) 10 mg tablet Take 1 Tab by mouth daily. 90 Tab 1  
 PROAIR HFA 90 mcg/actuation inhaler INHALE 1 PUFF BY MOUTH EVERY 4 HOURS AS NEEDED FOR WHEEZING OR SHORTNESS OF BREATH 1 Inhaler 3  
 furosemide (LASIX) 20 mg tablet Use daily as needed for leg swelling 30 Tab 2  
 levothyroxine (SYNTHROID) 75 mcg tablet Take 1 Tab by mouth Daily (before breakfast). (Patient taking differently: Take 125 mcg by mouth Daily (before breakfast). ) 30 Tab 0  
 potassium chloride (K-DUR, KLOR-CON) 20 mEq tablet Take 1 Tab by mouth daily. (Patient taking differently: Take 20 mEq by mouth daily. When taking Lasix) 60 Tab 3  
 insulin glargine (LANTUS) 100 unit/mL injection Take 15 units every morning  Indications: type 2 diabetes mellitus (Patient taking differently: by SubCUTAneous route two (2) times a day.  Takes 20 units in the morning and 35 units in the evening. Indications: type 2 diabetes mellitus) 1 Vial 0  
 cyanocobalamin ER 1,000 mcg tablet Take 1 Tab by mouth daily. 30 Tab 3  
 famotidine (PEPCID) 20 mg tablet Take 20 mg by mouth as needed.  montelukast (SINGULAIR) 10 mg tablet Take 1 Tab by mouth daily as needed. Indications: ALLERGIC RHINITIS 30 Tab 3  capsaicin 0.075 % topical cream Apply  to affected area three (3) times daily. 60 g 0  
 DOCOSAHEXANOIC ACID/EPA (FISH OIL PO) Take 1,000 mg by mouth two (2) times a day.  aspirin delayed-release 81 mg tablet Take 81 mg by mouth daily.  cholecalciferol, vitamin d3, (VITAMIN D) 1,000 unit tablet Take 5,000 Units by mouth two (2) times a day.  lidocaine (LIDODERM) 5 % 1 Patch by TransDERmal route every twenty-four (24) hours. 90 Each 1 No orders of the defined types were placed in this encounter. If you have questions, please do not hesitate to call me. I look forward to following Ms. Maria Isabel Osorio along with you. Sincerely, Delon Isaac MD

## 2018-03-18 ENCOUNTER — HOSPITAL ENCOUNTER (EMERGENCY)
Age: 83
Discharge: HOME | End: 2018-03-18
Attending: EMERGENCY MEDICINE | Admitting: EMERGENCY MEDICINE
Payer: MEDICARE

## 2018-03-18 VITALS
HEART RATE: 112 BPM | OXYGEN SATURATION: 96 % | SYSTOLIC BLOOD PRESSURE: 132 MMHG | DIASTOLIC BLOOD PRESSURE: 95 MMHG | HEIGHT: 67 IN | TEMPERATURE: 98.5 F | WEIGHT: 250 LBS | RESPIRATION RATE: 18 BRPM | BODY MASS INDEX: 39.24 KG/M2

## 2018-03-18 DIAGNOSIS — M54.10 RADICULOPATHY AFFECTING UPPER EXTREMITY: Primary | ICD-10-CM

## 2018-03-18 DIAGNOSIS — M12.812 ROTATOR CUFF ARTHROPATHY, LEFT: ICD-10-CM

## 2018-03-18 PROCEDURE — 74011250636 HC RX REV CODE- 250/636

## 2018-03-18 PROCEDURE — 96372 THER/PROPH/DIAG INJ SC/IM: CPT

## 2018-03-18 PROCEDURE — 74011636637 HC RX REV CODE- 636/637: Performed by: EMERGENCY MEDICINE

## 2018-03-18 PROCEDURE — 74011250636 HC RX REV CODE- 250/636: Performed by: EMERGENCY MEDICINE

## 2018-03-18 PROCEDURE — A9270 NON-COVERED ITEM OR SERVICE: HCPCS | Performed by: EMERGENCY MEDICINE

## 2018-03-18 PROCEDURE — 99283 EMERGENCY DEPT VISIT LOW MDM: CPT

## 2018-03-18 RX ORDER — PREDNISONE 20 MG/1
20 TABLET ORAL DAILY
Qty: 5 TAB | Refills: 0 | Status: SHIPPED | OUTPATIENT
Start: 2018-03-18 | End: 2018-03-23

## 2018-03-18 RX ORDER — PREDNISONE 20 MG/1
60 TABLET ORAL
Status: COMPLETED | OUTPATIENT
Start: 2018-03-18 | End: 2018-03-18

## 2018-03-18 RX ORDER — HYDROMORPHONE HYDROCHLORIDE 2 MG/ML
0.5 INJECTION, SOLUTION INTRAMUSCULAR; INTRAVENOUS; SUBCUTANEOUS ONCE
Status: DISCONTINUED | OUTPATIENT
Start: 2018-03-18 | End: 2018-03-18 | Stop reason: HOSPADM

## 2018-03-18 RX ORDER — ONDANSETRON 2 MG/ML
4 INJECTION INTRAMUSCULAR; INTRAVENOUS
Status: COMPLETED | OUTPATIENT
Start: 2018-03-18 | End: 2018-03-18

## 2018-03-18 RX ORDER — PREDNISONE 20 MG/1
20 TABLET ORAL DAILY
Qty: 5 TAB | Refills: 0 | Status: SHIPPED | OUTPATIENT
Start: 2018-03-18 | End: 2018-03-18

## 2018-03-18 RX ORDER — HYDROMORPHONE HYDROCHLORIDE 1 MG/ML
INJECTION, SOLUTION INTRAMUSCULAR; INTRAVENOUS; SUBCUTANEOUS
Status: COMPLETED
Start: 2018-03-18 | End: 2018-03-18

## 2018-03-18 RX ADMIN — ONDANSETRON 4 MG: 2 INJECTION INTRAMUSCULAR; INTRAVENOUS at 10:14

## 2018-03-18 RX ADMIN — HYDROMORPHONE HYDROCHLORIDE 0.5 MG: 1 INJECTION, SOLUTION INTRAMUSCULAR; INTRAVENOUS; SUBCUTANEOUS at 10:14

## 2018-03-18 RX ADMIN — PREDNISONE 60 MG: 20 TABLET ORAL at 11:53
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
## 2018-03-18 NOTE — ED NOTES
Jinny Henderson is a 82 y.o. female that was discharged in stable condition. The patients diagnosis, condition and treatment were explained to  patient and aftercare instructions were given. The patient verbalized understanding. Patient armband removed and shredded.

## 2018-03-18 NOTE — ED TRIAGE NOTES
Left shoulder pain that \"I have been dealing with a long time\". States pain radiates down left arm to wrist and down left chest which is newer (3-4 days). Denies injury.

## 2018-03-19 NOTE — TELEPHONE ENCOUNTER
The patient mentions that the Flexeril did not help with her pain and spasm. She was later seen in the ER for the pain. She was placed on a 5-day prednisone pack. She mentions that she has polyarticular joint pain still along with muscle aches. She was referred to Dr. Elicia Orellana for further evaluation. I will also refer her to rheumatology for further evaluation.     JAMIE

## 2018-03-20 ENCOUNTER — PATIENT OUTREACH (OUTPATIENT)
Dept: FAMILY MEDICINE CLINIC | Age: 83
End: 2018-03-20

## 2018-03-20 NOTE — PROGRESS NOTES
ED Discharge Follow-Up    Date/Time:  3/20/2018 3:46 PM    Patient was admitted to [facility] ED on 3/18/18 and discharged on 3/18/18 for radiculopathy of upper extremity; rotator cuff arthropathy. Presenting symptoms: arm pain, radiating left arm pain to wrist and across left chest    Nurse Navigator(NN) contacted the patient  by telephone to perform post ED discharge assessment. Verified name and  with patient as identifiers. Provided introduction to self, and explanation of the Nurse Navigator role. Patient contacted within 2 business day(s) of discharge. Subjective data: 8/10 left shoulder pain (\"goes up the back of my neck\"), numbness/tingling to left arm/hand, right arm pain 6/10    Pertinent negatives: CP, SOB,  N/V, fever, chills    Medication:   New medications at discharge include:  Prednisone   Medication changes (dose adjustments or discontinued meds): none     Reviewed discharge instructions and red flags with  patient who provided understanding. Patient given an opportunity to ask questions and does not have any further questions or concerns at this time. The patient agrees to contact the PCP office for questions related to their healthcare. Patient reminded that there are physicians on call 24 hours a day / 7 days a week (M-F 5pm to 8am and from Friday 5pm until Monday 8a for the weekend) should the patient have questions or concerns. NN provided contact information for future reference. Offered follow up appointment with PCP: no; to follow up with ortho 3/21/18     Future Appointments  Date Time Provider Chelle Reeves   3/21/2018 10:40 AM MD [facility] [address]   3/26/2018 10:45 AM Jose Manuel Cottrell DO [facility] None   2018 11:15 AM Rc Contreras MD [address] [place] [address] ED     Establish PCP relationships and regularly scheduled appointments. Plan: Notify of appt with ortho and verify transportation.   3/20/18: Patient aware of ortho appt. Aide or family member will provide transportation.

## 2018-03-21 ENCOUNTER — TELEPHONE (OUTPATIENT)
Dept: CARDIOLOGY CLINIC | Age: 83
End: 2018-03-21

## 2018-03-21 NOTE — TELEPHONE ENCOUNTER
Patient called to discuss orders from Dr. Stella Jackson. She will hold plavix for 2-3 day and report any further bleeding. She voices understanding and acceptance of this advice and will call back if any further questions or concerns.

## 2018-03-21 NOTE — TELEPHONE ENCOUNTER
Patient called to report nose bleed that lasted 45minutes last night, she was taken to the ER, but it stopped and they left, it started bleeding again when she got home, bleeding time about 45 minutes. She stated that she did ot take her Plavix yesterday and wonders is she should take it today. Please advise.

## 2018-03-26 ENCOUNTER — OFFICE VISIT (OUTPATIENT)
Dept: FAMILY MEDICINE CLINIC | Age: 83
End: 2018-03-26

## 2018-03-26 VITALS
DIASTOLIC BLOOD PRESSURE: 79 MMHG | WEIGHT: 250 LBS | HEART RATE: 64 BPM | TEMPERATURE: 97.6 F | RESPIRATION RATE: 18 BRPM | BODY MASS INDEX: 39.24 KG/M2 | OXYGEN SATURATION: 97 % | SYSTOLIC BLOOD PRESSURE: 155 MMHG | HEIGHT: 67 IN

## 2018-03-26 DIAGNOSIS — M13.0 POLYARTICULAR ARTHRITIS: Primary | ICD-10-CM

## 2018-03-26 DIAGNOSIS — E66.01 MORBID OBESITY (HCC): ICD-10-CM

## 2018-03-26 NOTE — PROGRESS NOTES
Progress Note    Patient: Soheila Hernandes MRN: [id number]  SSN: [id number]    YOB: 1935  Age: 82 y.o. Sex: female          Subjective:   Soheila Hernandes is a 82 y.o. female who is here for follow up. She was recently dealing with a long nosebleed that last 45 minutes. She was later instructed by her cardiologist to stop Plavix for 2-3 days. She mentions that she previously was on Brilinta and had similar episodes of nosebleeds. She states that she was later shifted to Plavix due to the nosebleeds. She mentions that her cardiologist suggested that she go back on the Plavix if her bleeding stopped. In regard to her polyarticular joint pain she states that she just completed the prednisone pack. She mentions that the highest her BS went up during her prednisone use it got up to the 390 mg/dL range. She mentions that she does feel weak and tired. She has tried [facility] as well. She mentions that she still gets refills from the compound pharmacy. The patient mentions that she does use Fish Oil and a probiotic. She does take a Vitamin B supplement as well. Visit from 2/27/18  She states that her endocrinologist switched her to 112 mcg of Synthroid. She states that this change was made a few months ago. While she was in the hospital her synthroid was in 75 mcg dose range. She states that she also has had her insulin adjusted. She mentions that she feels fatigued constantly and she cannot rest. She mentions that she has been losing her hair as well--she reaches in her hair and strands come out. Visit from 10/25/2017  The patient is here for an acute care visit. Soheila Hernandes states that the incident occurred a few weeks ago. She has been having some right sided hip pain. She has been having to lift up the leg to move it around. The patient was seen in the ER for the persistent right hip pain.  She states that they performed any x-ray but did not see anything on the x-ray. She also mentions that her knee has been bothering her as well. She states that she sleeps on her left side as well to help her symptoms. She also admits to some swelling in her legs from time to time. Additionally she does admit to some anxiety with fluttering in her stomach. Objective:     Vitals:    03/26/18 1101   BP: 155/79   Pulse: 64   Resp: 18   Temp: 97.6 °F (36.4 °C)   TempSrc: Oral   SpO2: 97%   Weight: 250 lb (113.4 kg)   Height: 5' 7\" (1.702 m)        Review of Systems:  Pertinent items are noted in the History of Present Illness. Physical Exam:   GENERAL: alert, cooperative, no distress, appears stated age, moderately obese  ENT: No fluid behind tympanic membranes bilaterally. No cerumen impaction noted either    EYE: conjunctivae/corneas clear. PERRL, EOM's intact. Fundi benign  THROAT & NECK: normal and no erythema or exudates noted. LUNG: clear to auscultation bilaterally  HEART: regular rate and rhythm, S1, S2 normal, no murmur, click, rub or gallop  ABDOMEN: soft, non-tender. Bowel sounds normal. No masses,  no organomegaly, abnormal findings:  obese  EXTREMITIES:  No gross edema appreciated       Lab/Data Review:  I reviewed x-ray results from her ER visit. XR Results (most recent):    Results from Hospital Encounter encounter on 01/17/18   XR SHOULDER LT AP/LAT MIN 2 V   Narrative Left shoulder, 2 views:        INDICATION:    Pain in both shoulders. No known trauma. History of diabetes, hypertension and  osteoarthritis. COMPARISON STUDY: None. FINDINGS:    At the left acromioclavicular joint there are findings of mild-to-moderate  osteoarthritis. At the left glenohumeral joint there are also findings of mild  osteoarthritis. The greater tuberosity of head of left humerus there are significant sclerotic  changes with hypertrophic spur formation, most likely chronic reactive changes  secondary to chronic rotator cuff disease.     There is no evidence of fracture, dislocation or subluxation at left shoulder. Impression IMPRESSION:    Significant sclerotic and hypertrophic changes at the superolateral aspect of  head of left humerus, most likely secondary to chronic rotator cuff disease. Clinical correlation suggested. Mild osteoarthritis in the left glenohumeral joint. Mild to moderate osteoarthritis at the left [facility] joint. Assessment:     1.) Polyarticular Arthritis: Patient will continue using muscle relaxer as well as topical cream from [facility] [address]. Patient will return in 3 months for follow up. Plan:   No orders of the defined types were placed in this encounter.       Signed By: Clementine Garrett DO     March 26, 2018

## 2018-03-26 NOTE — PATIENT INSTRUCTIONS
Please contact our office if you have any questions about your visit today. 1.) Please call if you continue to have the arthritis flare up. We can refer you to a rheumatologist.     2.) Return in 3 months for follow up.

## 2018-03-26 NOTE — MR AVS SNAPSHOT
[address] 
 
 
 [address] [address] [address] [address] [phone] Patient: Piper Bailey MRN: [id number] Visit Information Date & Time Provider Department Dept. Phone Encounter #  
 3/26/2018 10:45 AM Sheri Floyd 77 [id number] Follow-up Instructions Return in about 3 months (around 6/26/2018) for Regular Follow Up . Your Appointments 9/11/2018 11:15 AM  
Office Visit with Delon Isaac MD  
[facility] ([address]) Appt Note: 6 month follow up  
 [address] [facility] [address]  
  
   
 [address] [address] Upcoming Health Maintenance Date Due Pneumococcal 65+ Low/Medium Risk (2 of 2 - PPSV23) 12/2/2017 LIPID PANEL Q1 4/14/2018 HEMOGLOBIN A1C Q6M 8/27/2018 EYE EXAM RETINAL OR DILATED Q1 10/12/2018 FOOT EXAM Q1 10/24/2018 MICROALBUMIN Q1 10/24/2018 MEDICARE YEARLY EXAM 10/26/2018 GLAUCOMA SCREENING Q2Y 10/12/2019 DTaP/Tdap/Td series (2 - Td) 11/15/2023 Allergies as of 3/26/2018  Review Complete On: 5/09/9519 By: Jenaro Calix. Irina Arechiga LPN Severity Noted Reaction Type Reactions Niacin High 03/26/2013    Palpitations, Other (comments) Stomach irritation Ace Inhibitors  05/19/2010    Cough Avapro [Irbesartan]  05/19/2010    Myalgia Bystolic [Nebivolol]  83/39/6813    Other (comments) Felt like throat closing Catapres [Clonidine]  05/19/2010    Cough Codeine  05/19/2010    Nausea and Vomiting Cozaar [Losartan]    Not Reported This Time Crestor [Rosuvastatin]  05/19/2010    Other (comments) Cramps, aches Samuel Hem [Propoxyphene N-acetaminophen]  05/19/2010    Unknown (comments) Diovan [Valsartan]  05/19/2010    Cough Flagyl [Metronidazole]  05/19/2010    Other (comments) Mouth and throat irritation Gabapentin  03/16/2016    Other (comments) Abdominal pain and burning Iodinated Contrast- Oral And Iv Dye    Other (comments) Throat swelling Iodine    Unknown (comments) Lescol [Fluvastatin]  05/19/2010    Other (comments) Leg cramps Lipitor [Atorvastatin]  05/19/2010    Myalgia, Other (comments) Cramps, aches Lovastatin  05/19/2010    Other (comments) Leg cramps Nexium [Esomeprazole Magnesium]  05/20/2010    Other (comments) Stomach upset, burning Pravachol [Pravastatin]  05/19/2010    Other (comments) Leg cramps Reglan [Metoclopramide]  05/19/2010    Nausea Only Trazodone  05/19/2010    Other (comments) Patient states she feels drugged Zetia [Ezetimibe]  05/19/2010    Other (comments) Cramps, aches Zocor [Simvastatin]  05/19/2010    Other (comments) Cramps, aches Current Immunizations  Reviewed on 2/7/2017 Name Date Influenza High Dose Vaccine PF 12/2/2016 Influenza Vaccine 12/16/2015 11:20 AM, 9/15/2014 Influenza Vaccine Split 11/6/2012, 10/5/2010 Influenza Vaccine Whole 9/1/2009 Tdap 11/15/2013 Not reviewed this visit You Were Diagnosed With   
  
 Codes Comments Polyarticular arthritis    -  Primary ICD-10-CM: M13.0 ICD-9-CM: 716.50 [facility]     ICD-10-CM: E66.01 
ICD-9-CM: 278.01 Vitals BP Pulse Temp Resp Height(growth percentile) Weight(growth percentile) 155/79 (BP 1 Location: Right arm, BP Patient Position: Sitting) 64 97.6 °F (36.4 °C) (Oral) 18 5' 7\" (1.702 m) 250 lb (113.4 kg) SpO2 BMI OB Status Smoking Status 97% 39.16 kg/m2 Hysterectomy Former Smoker BMI and BSA Data Body Mass Index Body Surface Area  
 39.16 kg/m 2 2.32 m 2 Preferred Pharmacy Pharmacy Name Phone [facility] [place] [address] [facility] Your Updated Medication List  
  
   
 This list is accurate as of 3/26/18 11:37 AM.  Always use your most recent med list. amLODIPine 10 mg tablet Commonly known as:  Cindy Denton Take 1 Tab by mouth daily. aspirin delayed-release 81 mg tablet Take 81 mg by mouth daily. capsaicin 0.075 % topical cream  
Apply  to affected area three (3) times daily. clopidogrel 75 mg Tab Commonly known as:  PLAVIX Take 1 Tab by mouth daily. cyanocobalamin ER 1,000 mcg tablet Take 1 Tab by mouth daily. cyclobenzaprine 10 mg tablet Commonly known as:  FLEXERIL Take 1 Tab by mouth two (2) times daily as needed for Muscle Spasm(s). FISH OIL PO Take 1,000 mg by mouth two (2) times a day. furosemide 20 mg tablet Commonly known as:  LASIX Use daily as needed for leg swelling  
  
 insulin glargine 100 unit/mL injection Commonly known as:  LANTUS U-100 INSULIN Take 15 units every morning  Indications: type 2 diabetes mellitus  
  
 levothyroxine 75 mcg tablet Commonly known as:  SYNTHROID Take 1 Tab by mouth Daily (before breakfast). lidocaine 5 % Commonly known as:  LIDODERM  
1 Patch by TransDERmal route every twenty-four (24) hours. montelukast 10 mg tablet Commonly known as:  SINGULAIR Take 1 Tab by mouth daily as needed. Indications: ALLERGIC RHINITIS PEPCID 20 mg tablet Generic drug:  famotidine Take 20 mg by mouth as needed. potassium chloride 20 mEq tablet Commonly known as:  K-DUR, KLOR-CON Take 1 Tab by mouth daily. PROAIR HFA 90 mcg/actuation inhaler Generic drug:  albuterol INHALE 1 PUFF BY MOUTH EVERY 4 HOURS AS NEEDED FOR WHEEZING OR SHORTNESS OF BREATH  
  
 VITAMIN D3 1,000 unit tablet Generic drug:  cholecalciferol Take 5,000 Units by mouth two (2) times a day. Follow-up Instructions Return in about 3 months (around 6/26/2018) for Regular Follow Up . Patient Instructions Please contact our office if you have any questions about your visit today. 1.) Please call if you continue to have the arthritis flare up. We can refer you to a rheumatologist.  
 
2.) Return in 3 months for follow up. Introducing [facility]! Dear Corey Villavicencio: Thank you for requesting a [employer] account. Our records indicate that you already have an active [employer] account. You can access your account anytime at [email or web address] Did you know that you can access your hospital and ER discharge instructions at any time in [employer]? You can also review all of your test results from your hospital stay or ER visit. Additional Information If you have questions, please visit the Frequently Asked Questions section of the [employer] website at [email or web address] Remember, [employer] is NOT to be used for urgent needs. For medical emergencies, dial 911. Now available from your iPhone and Android! Please provide this summary of care documentation to your next provider. Your primary care clinician is listed as Jes Torres. If you have any questions after today's visit, please call [phone].

## 2018-03-26 NOTE — PROGRESS NOTES
Chief Complaint   Patient presents with    Hypertension    Shoulder Pain     states that this has improved with meds    Arm Pain     improved with meds     1. Have you been to the ER, urgent care clinic since your last visit? Hospitalized since your last visit? Yes When: 3/15/18 Where: [facility] Reason for visit: arm and shoulder and chest pain    2. Have you seen or consulted any other health care providers outside of the [address] [place] [address] since your last visit? Include any pap smears or colon screening.  No

## 2018-04-02 ENCOUNTER — APPOINTMENT (OUTPATIENT)
Dept: GENERAL RADIOLOGY | Age: 83
End: 2018-04-02
Attending: NURSE PRACTITIONER
Payer: MEDICARE

## 2018-04-02 ENCOUNTER — HOSPITAL ENCOUNTER (EMERGENCY)
Age: 83
Discharge: HOME | End: 2018-04-02
Attending: EMERGENCY MEDICINE
Payer: MEDICARE

## 2018-04-02 VITALS
OXYGEN SATURATION: 95 % | DIASTOLIC BLOOD PRESSURE: 77 MMHG | SYSTOLIC BLOOD PRESSURE: 154 MMHG | RESPIRATION RATE: 25 BRPM | BODY MASS INDEX: 38.92 KG/M2 | HEIGHT: 67 IN | WEIGHT: 248 LBS | HEART RATE: 97 BPM | TEMPERATURE: 98.6 F

## 2018-04-02 DIAGNOSIS — R07.9 ACUTE CHEST PAIN: Primary | ICD-10-CM

## 2018-04-02 DIAGNOSIS — E87.6 HYPOKALEMIA: ICD-10-CM

## 2018-04-02 DIAGNOSIS — R73.9 HYPERGLYCEMIA: ICD-10-CM

## 2018-04-02 LAB
ALBUMIN SERPL-MCNC: 3.1 G/DL (ref 3.4–5)
ALBUMIN/GLOB SERPL: 0.7 {RATIO} (ref 0.8–1.7)
ALP SERPL-CCNC: 101 U/L (ref 45–117)
ALT SERPL-CCNC: 14 U/L (ref 13–56)
ANION GAP SERPL CALC-SCNC: 10 MMOL/L (ref 3–18)
AST SERPL-CCNC: 10 U/L (ref 15–37)
ATRIAL RATE: 103 BPM
BASOPHILS # BLD: 0 K/UL (ref 0–0.06)
BASOPHILS NFR BLD: 0 % (ref 0–2)
BILIRUB SERPL-MCNC: 0.4 MG/DL (ref 0.2–1)
BNP SERPL-MCNC: 29 PG/ML (ref 0–1800)
BUN SERPL-MCNC: 7 MG/DL (ref 7–18)
BUN/CREAT SERPL: 11 (ref 12–20)
CALCIUM SERPL-MCNC: 9.3 MG/DL (ref 8.5–10.1)
CALCULATED P AXIS, ECG09: 64 DEGREES
CALCULATED R AXIS, ECG10: -19 DEGREES
CALCULATED T AXIS, ECG11: 0 DEGREES
CHLORIDE SERPL-SCNC: 104 MMOL/L (ref 100–108)
CK MB CFR SERPL CALC: NORMAL % (ref 0–4)
CK MB SERPL-MCNC: <1 NG/ML (ref 5–25)
CK SERPL-CCNC: 44 U/L (ref 26–192)
CO2 SERPL-SCNC: 28 MMOL/L (ref 21–32)
CREAT SERPL-MCNC: 0.61 MG/DL (ref 0.6–1.3)
DIAGNOSIS, 93000: NORMAL
DIFFERENTIAL METHOD BLD: ABNORMAL
EOSINOPHIL # BLD: 0.2 K/UL (ref 0–0.4)
EOSINOPHIL NFR BLD: 5 % (ref 0–5)
ERYTHROCYTE [DISTWIDTH] IN BLOOD BY AUTOMATED COUNT: 12 % (ref 11.6–14.5)
GLOBULIN SER CALC-MCNC: 4.3 G/DL (ref 2–4)
GLUCOSE SERPL-MCNC: 234 MG/DL (ref 74–99)
HCT VFR BLD AUTO: 38.6 % (ref 35–45)
HGB BLD-MCNC: 12 G/DL (ref 12–16)
LYMPHOCYTES # BLD: 1.9 K/UL (ref 0.9–3.6)
LYMPHOCYTES NFR BLD: 38 % (ref 21–52)
MCH RBC QN AUTO: 30.3 PG (ref 24–34)
MCHC RBC AUTO-ENTMCNC: 31.1 G/DL (ref 31–37)
MCV RBC AUTO: 97.5 FL (ref 74–97)
MONOCYTES # BLD: 0.4 K/UL (ref 0.05–1.2)
MONOCYTES NFR BLD: 7 % (ref 3–10)
NEUTS SEG # BLD: 2.4 K/UL (ref 1.8–8)
NEUTS SEG NFR BLD: 50 % (ref 40–73)
P-R INTERVAL, ECG05: 138 MS
PLATELET # BLD AUTO: 232 K/UL (ref 135–420)
PMV BLD AUTO: 10.2 FL (ref 9.2–11.8)
POTASSIUM SERPL-SCNC: 3.3 MMOL/L (ref 3.5–5.5)
PROT SERPL-MCNC: 7.4 G/DL (ref 6.4–8.2)
Q-T INTERVAL, ECG07: 354 MS
QRS DURATION, ECG06: 78 MS
QTC CALCULATION (BEZET), ECG08: 463 MS
RBC # BLD AUTO: 3.96 M/UL (ref 4.2–5.3)
SODIUM SERPL-SCNC: 142 MMOL/L (ref 136–145)
TROPONIN I SERPL-MCNC: <0.02 NG/ML (ref 0–0.06)
VENTRICULAR RATE, ECG03: 103 BPM
WBC # BLD AUTO: 4.9 K/UL (ref 4.6–13.2)

## 2018-04-02 PROCEDURE — 99284 EMERGENCY DEPT VISIT MOD MDM: CPT

## 2018-04-02 PROCEDURE — 82550 ASSAY OF CK (CPK): CPT | Performed by: NURSE PRACTITIONER

## 2018-04-02 PROCEDURE — 83880 ASSAY OF NATRIURETIC PEPTIDE: CPT | Performed by: NURSE PRACTITIONER

## 2018-04-02 PROCEDURE — 93005 ELECTROCARDIOGRAM TRACING: CPT

## 2018-04-02 PROCEDURE — 85025 COMPLETE CBC W/AUTO DIFF WBC: CPT | Performed by: NURSE PRACTITIONER

## 2018-04-02 PROCEDURE — 80053 COMPREHEN METABOLIC PANEL: CPT | Performed by: NURSE PRACTITIONER

## 2018-04-02 PROCEDURE — 71046 X-RAY EXAM CHEST 2 VIEWS: CPT

## 2018-04-02 RX ORDER — POTASSIUM CHLORIDE 1500 MG/1
20 TABLET, FILM COATED, EXTENDED RELEASE ORAL DAILY
Qty: 7 TAB | Refills: 0 | Status: SHIPPED | OUTPATIENT
Start: 2018-04-02 | End: 2018-04-09
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
## 2018-04-02 NOTE — DISCHARGE INSTRUCTIONS
Chest Pain: Care Instructions  Your Care Instructions    There are many things that can cause chest pain. Some are not serious and will get better on their own in a few days. But some kinds of chest pain need more testing and treatment. Your doctor may have recommended a follow-up visit in the next 8 to 12 hours. If you are not getting better, you may need more tests or treatment. Even though your doctor has released you, you still need to watch for any problems. The doctor carefully checked you, but sometimes problems can develop later. If you have new symptoms or if your symptoms do not get better, get medical care right away. If you have worse or different chest pain or pressure that lasts more than 5 minutes or you passed out (lost consciousness), call 911 or seek other emergency help right away. A medical visit is only one step in your treatment. Even if you feel better, you still need to do what your doctor recommends, such as going to all suggested follow-up appointments and taking medicines exactly as directed. This will help you recover and help prevent future problems. How can you care for yourself at home? · Rest until you feel better. · Take your medicine exactly as prescribed. Call your doctor if you think you are having a problem with your medicine. · Do not drive after taking a prescription pain medicine. When should you call for help? Call 911 if:  ? · You passed out (lost consciousness). ? · You have severe difficulty breathing. ? · You have symptoms of a heart attack. These may include:  ¨ Chest pain or pressure, or a strange feeling in your chest.  ¨ Sweating. ¨ Shortness of breath. ¨ Nausea or vomiting. ¨ Pain, pressure, or a strange feeling in your back, neck, jaw, or upper belly or in one or both shoulders or arms. ¨ Lightheadedness or sudden weakness. ¨ A fast or irregular heartbeat.   After you call 911, the  may tell you to chew 1 adult-strength or 2 to 4 low-dose aspirin. Wait for an ambulance. Do not try to drive yourself. ?Call your doctor today if:  ? · You have any trouble breathing. ? · Your chest pain gets worse. ? · You are dizzy or lightheaded, or you feel like you may faint. ? · You are not getting better as expected. ? · You are having new or different chest pain. Where can you learn more? Go to http://cristina-mignon.info/. Enter A120 in the search box to learn more about \"Chest Pain: Care Instructions. \"  Current as of: 2017  Content Version: 11.4  © 3241-1788 [employer]. Care instructions adapted under license by [employer] (which disclaims liability or warranty for this information). If you have questions about a medical condition or this instruction, always ask your healthcare professional. [address] any warranty or liability for your use of this information. ONtheAIR Activation    Thank you for requesting access to ONtheAIR. Please follow the instructions below to securely access and download your online medical record. ONtheAIR allows you to send messages to your doctor, view your test results, renew your prescriptions, schedule appointments, and more. How Do I Sign Up? 1. In your internet browser, go to [email or web address]  2. Click on the First Time User? Click Here link in the Sign In box. You will be redirect to the New Member Sign Up page. 3. Enter your ONtheAIR Access Code exactly as it appears below. You will not need to use this code after youve completed the sign-up process. If you do not sign up before the expiration date, you must request a new code. ONtheAIR Access Code: Activation code not generated  Current ONtheAIR Status: Active (This is the date your ONtheAIR access code will )    4. Enter the last four digits of your Social Security Number (xxxx) and Date of Birth (mm/dd/yyyy) as indicated and click Submit.  You will be taken to the next sign-up page. 5. Create a Sarata ID. This will be your Sarata login ID and cannot be changed, so think of one that is secure and easy to remember. 6. Create a Sarata password. You can change your password at any time. 7. Enter your Password Reset Question and Answer. This can be used at a later time if you forget your password. 8. Enter your e-mail address. You will receive e-mail notification when new information is available in [address]. 9. Click Sign Up. You can now view and download portions of your medical record. 10. Click the Download Summary menu link to download a portable copy of your medical information. Additional Information    If you have questions, please visit the Frequently Asked Questions section of the Sarata website at [email or web address] Remember, Sarata is NOT to be used for urgent needs. For medical emergencies, dial 911.

## 2018-04-02 NOTE — ED NOTES
Rashi Hills is a 82 y.o. female that was discharged in stable. Pt was accompanied by daughter. Pt is not driving. The patients diagnosis, condition and treatment were explained to  patient and aftercare instructions were given. The patient verbalized understanding. Patient armband removed and shredded.

## 2018-04-02 NOTE — ED TRIAGE NOTES
Pt presents with left side chest pain that started about 4am today. Pt denies shortness of breath at present time states was short of breath earlier today.

## 2018-04-11 ENCOUNTER — OFFICE VISIT (OUTPATIENT)
Dept: ORTHOPEDIC SURGERY | Age: 83
End: 2018-04-11

## 2018-04-11 VITALS
TEMPERATURE: 98.5 F | DIASTOLIC BLOOD PRESSURE: 73 MMHG | RESPIRATION RATE: 20 BRPM | HEART RATE: 101 BPM | SYSTOLIC BLOOD PRESSURE: 149 MMHG | OXYGEN SATURATION: 95 %

## 2018-04-11 DIAGNOSIS — M19.011 PRIMARY OSTEOARTHRITIS OF RIGHT SHOULDER: ICD-10-CM

## 2018-04-11 DIAGNOSIS — M79.7 FIBROMYALGIA: ICD-10-CM

## 2018-04-11 DIAGNOSIS — M25.512 CHRONIC LEFT SHOULDER PAIN: ICD-10-CM

## 2018-04-11 DIAGNOSIS — G89.29 CHRONIC LEFT SHOULDER PAIN: ICD-10-CM

## 2018-04-11 DIAGNOSIS — M19.012 PRIMARY OSTEOARTHRITIS OF LEFT SHOULDER: Primary | ICD-10-CM

## 2018-04-11 DIAGNOSIS — M19.012 ARTHROSIS OF LEFT ACROMIOCLAVICULAR JOINT: ICD-10-CM

## 2018-04-11 RX ORDER — BETAMETHASONE SODIUM PHOSPHATE AND BETAMETHASONE ACETATE 3; 3 MG/ML; MG/ML
6 INJECTION, SUSPENSION INTRA-ARTICULAR; INTRALESIONAL; INTRAMUSCULAR; SOFT TISSUE ONCE
Qty: 0.5 ML | Refills: 0 | Status: CANCELLED
Start: 2018-04-11 | End: 2018-04-11

## 2018-04-11 RX ORDER — BUPIVACAINE HYDROCHLORIDE 2.5 MG/ML
4 INJECTION, SOLUTION EPIDURAL; INFILTRATION; INTRACAUDAL ONCE
Qty: 4 ML | Refills: 0
Start: 2018-04-11 | End: 2018-04-11

## 2018-04-11 RX ORDER — BUPIVACAINE HYDROCHLORIDE 2.5 MG/ML
8 INJECTION, SOLUTION EPIDURAL; INFILTRATION; INTRACAUDAL ONCE
Qty: 8 ML | Refills: 0 | Status: CANCELLED
Start: 2018-04-11 | End: 2018-04-11

## 2018-04-11 RX ORDER — BETAMETHASONE SODIUM PHOSPHATE AND BETAMETHASONE ACETATE 3; 3 MG/ML; MG/ML
6 INJECTION, SUSPENSION INTRA-ARTICULAR; INTRALESIONAL; INTRAMUSCULAR; SOFT TISSUE ONCE
Qty: 0.5 ML | Refills: 0
Start: 2018-04-11 | End: 2018-04-11

## 2018-04-11 NOTE — MR AVS SNAPSHOT
[address], [address] 200 [facility] 
[phone] Patient: Hazel Osorio MRN: [id number] WLN:[id number] Visit Information Date & Time Provider Department Dept. Phone Encounter #  
 4/11/2018 11:00 AM Eve Briscoe, 27 [facility] [phone] [id number] Follow-up Instructions Return if symptoms worsen or fail to improve. Your Appointments 6/26/2018  9:30 AM  
Follow Up with Nayely Castellanos DO [facility] (--) Appt Note: fernando  
 [address] [id number] [address] [id number] [phone]  
  
   
 [address] [phone] [address] [address] 9/11/2018 11:15 AM  
Office Visit with Za Hong MD  
[facility] ([facility]) Appt Note: 6 month follow up  
 [address] [facility] [address]  
  
   
 [address] [address] [address] [id number] Upcoming Health Maintenance Date Due Pneumococcal 65+ Low/Medium Risk (2 of 2 - PPSV23) 12/2/2017 LIPID PANEL Q1 4/14/2018 HEMOGLOBIN A1C Q6M 8/27/2018 EYE EXAM RETINAL OR DILATED Q1 10/12/2018 FOOT EXAM Q1 10/24/2018 MICROALBUMIN Q1 10/24/2018 MEDICARE YEARLY EXAM 10/26/2018 GLAUCOMA SCREENING Q2Y 10/12/2019 DTaP/Tdap/Td series (2 - Td) 11/15/2023 Allergies as of 4/11/2018  Review Complete On: 4/11/2018 By: Eve Briscoe MD  
  
 Severity Noted Reaction Type Reactions Niacin High 03/26/2013    Palpitations, Other (comments) Stomach irritation Ace Inhibitors  05/19/2010    Cough Avapro [Irbesartan]  05/19/2010    Myalgia Bystolic [Nebivolol]  83/71/2578    Other (comments) Felt like throat closing Catapres [Clonidine]  05/19/2010    Cough Codeine  05/19/2010    Nausea and Vomiting Cozaar [Losartan]    Not Reported This Time Crestor [Rosuvastatin]  05/19/2010    Other (comments) Cramps, aches Mueller Downy [Propoxyphene N-acetaminophen]  05/19/2010    Unknown (comments) Diovan [Valsartan]  05/19/2010    Cough Flagyl [Metronidazole]  05/19/2010    Other (comments) Mouth and throat irritation Gabapentin  03/16/2016    Other (comments) Abdominal pain and burning Iodinated Contrast- Oral And Iv Dye    Other (comments) Throat swelling Iodine    Unknown (comments) Lescol [Fluvastatin]  05/19/2010    Other (comments) Leg cramps Lipitor [Atorvastatin]  05/19/2010    Myalgia, Other (comments) Cramps, aches Lovastatin  05/19/2010    Other (comments) Leg cramps Nexium [Esomeprazole Magnesium]  05/20/2010    Other (comments) Stomach upset, burning Pravachol [Pravastatin]  05/19/2010    Other (comments) Leg cramps Reglan [Metoclopramide]  05/19/2010    Nausea Only Trazodone  05/19/2010    Other (comments) Patient states she feels drugged Zetia [Ezetimibe]  05/19/2010    Other (comments) Cramps, aches Zocor [Simvastatin]  05/19/2010    Other (comments) Cramps, aches Current Immunizations  Reviewed on 2/7/2017 Name Date Influenza High Dose Vaccine PF 12/2/2016 Influenza Vaccine 12/16/2015 11:20 AM, 9/15/2014 Influenza Vaccine Split 11/6/2012, 10/5/2010 Influenza Vaccine Whole 9/1/2009 Tdap 11/15/2013 Not reviewed this visit You Were Diagnosed With   
  
 Codes Comments Primary osteoarthritis of left shoulder    -  Primary ICD-10-CM: M19.012 
ICD-9-CM: 715.11 moderate Chronic left shoulder pain     ICD-10-CM: M25.512, G89.29 ICD-9-CM: 719.41, 338.29 Arthrosis of left acromioclavicular joint     ICD-10-CM: M19.012 
ICD-9-CM: 715.91   
 BMI 38.0-38.9,adult     ICD-10-CM: F76.85 
ICD-9-CM: V85.38 Primary osteoarthritis of right shoulder     ICD-10-CM: M19.011 
ICD-9-CM: 715.11 moderate Fibromyalgia     ICD-10-CM: M79.7 ICD-9-CM: 729.1 Vitals BP Pulse Temp Resp SpO2 OB Status 149/73 (BP 1 Location: Right arm, BP Patient Position: Sitting) (!) 101 98.5 °F (36.9 °C) (Oral) 20 95% Hysterectomy Smoking Status Former Smoker Preferred Pharmacy Pharmacy Name Phone [facility] Your Updated Medication List  
  
   
This list is accurate as of 4/11/18 11:37 AM.  Always use your most recent med list. amLODIPine 10 mg tablet Commonly known as:  [place] Take 1 Tab by mouth daily. aspirin delayed-release 81 mg tablet Take 81 mg by mouth daily. capsaicin 0.075 % topical cream  
Apply  to affected area three (3) times daily. clopidogrel 75 mg Tab Commonly known as:  PLAVIX Take 1 Tab by mouth daily. cyanocobalamin ER 1,000 mcg tablet Take 1 Tab by mouth daily. cyclobenzaprine 10 mg tablet Commonly known as:  FLEXERIL Take 1 Tab by mouth two (2) times daily as needed for Muscle Spasm(s). FISH OIL PO Take 1,000 mg by mouth two (2) times a day. furosemide 20 mg tablet Commonly known as:  LASIX Use daily as needed for leg swelling  
  
 insulin glargine 100 unit/mL injection Commonly known as:  LANTUS U-100 INSULIN Take 15 units every morning  Indications: type 2 diabetes mellitus  
  
 levothyroxine 75 mcg tablet Commonly known as:  SYNTHROID Take 1 Tab by mouth Daily (before breakfast). lidocaine 5 % Commonly known as:  LIDODERM  
1 Patch by TransDERmal route every twenty-four (24) hours. montelukast 10 mg tablet Commonly known as:  SINGULAIR Take 1 Tab by mouth daily as needed. Indications: ALLERGIC RHINITIS PEPCID 20 mg tablet Generic drug:  famotidine Take 20 mg by mouth as needed. PROAIR HFA 90 mcg/actuation inhaler Generic drug:  albuterol INHALE 1 PUFF BY MOUTH EVERY 4 HOURS AS NEEDED FOR WHEEZING OR SHORTNESS OF BREATH  
  
 VITAMIN D3 1,000 unit tablet Generic drug:  cholecalciferol Take 5,000 Units by mouth two (2) times a day. Follow-up Instructions Return if symptoms worsen or fail to improve. Patient Instructions Shoulder Arthritis: Exercises Your Care Instructions Here are some examples of typical rehabilitation exercises for your condition. Start each exercise slowly. Ease off the exercise if you start to have pain. Your doctor or physical therapist will tell you when you can start these exercises and which ones will work best for you. How to do the exercises Shoulder flexion (lying down) To make a wand for this exercise, use a piece of PVC pipe or a broom handle with the broom removed. Make the wand about a foot wider than your shoulders. 1. Lie on your back, holding a wand with both hands. Your palms should face down as you hold the wand. 2. Keeping your elbows straight, slowly raise your arms over your head. Raise them until you feel a stretch in your shoulders, upper back, and chest. 
3. Hold for 15 to 30 seconds. 4. Repeat 2 to 4 times. Shoulder rotation (lying down) To make a wand for this exercise, use a piece of PVC pipe or a broom handle with the broom removed. Make the wand about a foot wider than your shoulders. 1. Lie on your back. Hold a wand with both hands with your elbows bent and palms up. 2. Keep your elbows close to your body, and move the wand across your body toward the sore arm. 3. Hold for 8 to 12 seconds. 4. Repeat 2 to 4 times. Shoulder internal rotation with towel 1. Hold a towel above and behind your head with the arm that is not sore. 2. With your sore arm, reach behind your back and grasp the towel. 3. With the arm above your head, pull the towel upward. Do this until you feel a stretch on the front and outside of your sore shoulder. 4. Hold 15 to 30 seconds. 5. Repeat 2 to 4 times. Shoulder blade squeeze 1. Stand with your arms at your sides, and squeeze your shoulder blades together. Do not raise your shoulders up as you squeeze. 2. Hold 6 seconds. 3. Repeat 8 to 12 times. Resisted rows For this exercise, you will need elastic exercise material, such as surgical tubing or Thera-Band. 1. Put the band around a solid object at about waist level. (A bedpost will work well.) Each hand should hold an end of the band. 2. With your elbows at your sides and bent to 90 degrees, pull the band back. Your shoulder blades should move toward each other. Return to the starting position. 3. Repeat 8 to 12 times. External rotator strengthening exercise 1. Start by tying a piece of elastic exercise material to a doorknob. You can use surgical tubing or Thera-Band. (You may also hold one end of the band in each hand.) 2. Stand or sit with your shoulder relaxed and your elbow bent 90 degrees. Your upper arm should rest comfortably against your side. Squeeze a rolled towel between your elbow and your body for comfort. This will help keep your arm at your side. 3. Hold one end of the elastic band with the hand of the painful arm. 4. Start with your forearm across your belly. Slowly rotate the forearm out away from your body. Keep your elbow and upper arm tucked against the towel roll or the side of your body until you begin to feel tightness in your shoulder. Slowly move your arm back to where you started. 5. Repeat 8 to 12 times. Internal rotator strengthening exercise 1. Start by tying a piece of elastic exercise material to a doorknob. You can use surgical tubing or Thera-Band. 2. Stand or sit with your shoulder relaxed and your elbow bent 90 degrees. Your upper arm should rest comfortably against your side. Squeeze a rolled towel between your elbow and your body for comfort. This will help keep your arm at your side. 3. Hold one end of the elastic band in the hand of the painful arm. 4. Slowly rotate your forearm toward your body until it touches your belly. Slowly move it back to where you started. 5. Keep your elbow and upper arm firmly tucked against the towel roll or at your side. 6. Repeat 8 to 12 times. Pendulum swing If you have pain in your back, do not do this exercise. 1. Hold on to a table or the back of a chair with your good arm. Then bend forward a little and let your sore arm hang straight down. This exercise does not use the arm muscles. Rather, use your legs and your hips to create movement that makes your arm swing freely. 2. Use the movement from your hips and legs to guide the slightly swinging arm back and forth like a pendulum (or elephant trunk). Then guide it in circles that start small (about the size of a dinner plate). Make the circles a bit larger each day, as your pain allows. 3. Do this exercise for 5 minutes, 5 to 7 times each day. 4. As you have less pain, try bending over a little farther to do this exercise. This will increase the amount of movement at your shoulder. Follow-up care is a key part of your treatment and safety. Be sure to make and go to all appointments, and call your doctor if you are having problems. It's also a good idea to know your test results and keep a list of the medicines you take. Where can you learn more? Go to http://cristina-mignon.info/. Enter H562 in the search box to learn more about \"Shoulder Arthritis: Exercises. \" Current as of: March 21, 2017 Content Version: 11.4 © 4895-3556 [employer]. Care instructions adapted under license by [employer] (which disclaims liability or warranty for this information). If you have questions about a medical condition or this instruction, always ask your healthcare professional. [address] any warranty or liability for your use of this information. Joint Injections: Care Instructions Your Care Instructions Joint injections are shots into a joint, such as the knee. They may be used to put in medicines, such as pain relievers. Or they can be used to take out fluid. Sometimes the fluid is tested in a lab. This can help find the cause of a joint problem. A corticosteroid, or steroid, shot is used to reduce inflammation in tendons or joints. It is often used to treat problems such as arthritis, tendinitis, and bursitis. Steroids can be injected directly into a painful, inflamed joint. They can also help reduce inflammation of a bursa. A bursa is a sac of fluid. It cushions and lubricates areas where tendons, ligaments, skin, muscles, or bones rub against each other. A steroid shot can sometimes help with short-term pain relief when other treatments haven't worked. If steroid shots help, pain may improve for weeks or months. Follow-up care is a key part of your treatment and safety. Be sure to make and go to all appointments, and call your doctor if you are having problems. It's also a good idea to know your test results and keep a list of the medicines you take. How can you care for yourself at home? · Put ice or a cold pack on the area for 10 to 20 minutes at a time. Put a thin cloth between the ice and your skin. · Take anti-inflammatory medicines to reduce pain, swelling, or inflammation. These include ibuprofen (Advil, Motrin) and naproxen (Aleve). Read and follow all instructions on the label. · Avoid strenuous activities for several days, especially those that put stress on the area where you got the shot. · If you have dressings over the area, keep them clean and dry. You may remove them when your doctor tells you to. When should you call for help? Call your doctor now or seek immediate medical care if: 
? · You have signs of infection, such as: 
¨ Increased pain, swelling, warmth, or redness. ¨ Red streaks leading from the site. ¨ Pus draining from the site. ¨ A fever. ?Watch closely for changes in your health, and be sure to contact your doctor if you have any problems. Where can you learn more? Go to http://cristina-mignon.info/. Enter N616 in the search box to learn more about \"Joint Injections: Care Instructions. \" Current as of: March 21, 2017 Content Version: 11.4 © 7139-7716 Met[employer]. Care instructions adapted under license by [employer] (which disclaims liability or warranty for this information). If you have questions about a medical condition or this instruction, always ask your healthcare professional. [address] any warranty or liability for your use of this information. Introducing [facility]! Dear Merna Mike: Thank you for requesting a [employer] account. Our records indicate that you already have an active [employer] account. You can access your account anytime at [email or web address] Did you know that you can access your hospital and ER discharge instructions at any time in [employer]? You can also review all of your test results from your hospital stay or ER visit. Additional Information If you have questions, please visit the Frequently Asked Questions section of the [employer] website at [email or web address] Remember, [employer] is NOT to be used for urgent needs. For medical emergencies, dial 911. Now available from your iPhone and Android! Please provide this summary of care documentation to your next provider. Your primary care clinician is listed as [facility]. If you have any questions after today's visit, please call [phone].

## 2018-04-11 NOTE — PATIENT INSTRUCTIONS
Shoulder Arthritis: Exercises  Your Care Instructions  Here are some examples of typical rehabilitation exercises for your condition. Start each exercise slowly. Ease off the exercise if you start to have pain. Your doctor or physical therapist will tell you when you can start these exercises and which ones will work best for you. How to do the exercises  Shoulder flexion (lying down)    To make a wand for this exercise, use a piece of PVC pipe or a broom handle with the broom removed. Make the wand about a foot wider than your shoulders. 1. Lie on your back, holding a wand with both hands. Your palms should face down as you hold the wand. 2. Keeping your elbows straight, slowly raise your arms over your head. Raise them until you feel a stretch in your shoulders, upper back, and chest.  3. Hold for 15 to 30 seconds. 4. Repeat 2 to 4 times. Shoulder rotation (lying down)    To make a wand for this exercise, use a piece of PVC pipe or a broom handle with the broom removed. Make the wand about a foot wider than your shoulders. 1. Lie on your back. Hold a wand with both hands with your elbows bent and palms up. 2. Keep your elbows close to your body, and move the wand across your body toward the sore arm. 3. Hold for 8 to 12 seconds. 4. Repeat 2 to 4 times. Shoulder internal rotation with towel    1. Hold a towel above and behind your head with the arm that is not sore. 2. With your sore arm, reach behind your back and grasp the towel. 3. With the arm above your head, pull the towel upward. Do this until you feel a stretch on the front and outside of your sore shoulder. 4. Hold 15 to 30 seconds. 5. Repeat 2 to 4 times. Shoulder blade squeeze    1. Stand with your arms at your sides, and squeeze your shoulder blades together. Do not raise your shoulders up as you squeeze. 2. Hold 6 seconds. 3. Repeat 8 to 12 times.   Resisted rows    For this exercise, you will need elastic exercise material, such as surgical tubing or Thera-Band. 1. Put the band around a solid object at about waist level. (A bedpost will work well.) Each hand should hold an end of the band. 2. With your elbows at your sides and bent to 90 degrees, pull the band back. Your shoulder blades should move toward each other. Return to the starting position. 3. Repeat 8 to 12 times. External rotator strengthening exercise    1. Start by tying a piece of elastic exercise material to a doorknob. You can use surgical tubing or Thera-Band. (You may also hold one end of the band in each hand.)  2. Stand or sit with your shoulder relaxed and your elbow bent 90 degrees. Your upper arm should rest comfortably against your side. Squeeze a rolled towel between your elbow and your body for comfort. This will help keep your arm at your side. 3. Hold one end of the elastic band with the hand of the painful arm. 4. Start with your forearm across your belly. Slowly rotate the forearm out away from your body. Keep your elbow and upper arm tucked against the towel roll or the side of your body until you begin to feel tightness in your shoulder. Slowly move your arm back to where you started. 5. Repeat 8 to 12 times. Internal rotator strengthening exercise    1. Start by tying a piece of elastic exercise material to a doorknob. You can use surgical tubing or Thera-Band. 2. Stand or sit with your shoulder relaxed and your elbow bent 90 degrees. Your upper arm should rest comfortably against your side. Squeeze a rolled towel between your elbow and your body for comfort. This will help keep your arm at your side. 3. Hold one end of the elastic band in the hand of the painful arm. 4. Slowly rotate your forearm toward your body until it touches your belly. Slowly move it back to where you started. 5. Keep your elbow and upper arm firmly tucked against the towel roll or at your side. 6. Repeat 8 to 12 times.   Pendulum swing    If you have pain in your back, do not do this exercise. 1. Hold on to a table or the back of a chair with your good arm. Then bend forward a little and let your sore arm hang straight down. This exercise does not use the arm muscles. Rather, use your legs and your hips to create movement that makes your arm swing freely. 2. Use the movement from your hips and legs to guide the slightly swinging arm back and forth like a pendulum (or elephant trunk). Then guide it in circles that start small (about the size of a dinner plate). Make the circles a bit larger each day, as your pain allows. 3. Do this exercise for 5 minutes, 5 to 7 times each day. 4. As you have less pain, try bending over a little farther to do this exercise. This will increase the amount of movement at your shoulder. Follow-up care is a key part of your treatment and safety. Be sure to make and go to all appointments, and call your doctor if you are having problems. It's also a good idea to know your test results and keep a list of the medicines you take. Where can you learn more? Go to http://cristina-mignon.info/. Enter H562 in the search box to learn more about \"Shoulder Arthritis: Exercises. \"  Current as of: March 21, 2017  Content Version: 11.4  © 8518-3407 [employer]. Care instructions adapted under license by [employer] (which disclaims liability or warranty for this information). If you have questions about a medical condition or this instruction, always ask your healthcare professional. [place] [address] any warranty or liability for your use of this information. Joint Injections: Care Instructions  Your Care Instructions  Joint injections are shots into a joint, such as the knee. They may be used to put in medicines, such as pain relievers. Or they can be used to take out fluid. Sometimes the fluid is tested in a lab. This can help find the cause of a joint problem.   A corticosteroid, or steroid, shot is used to reduce inflammation in tendons or joints. It is often used to treat problems such as arthritis, tendinitis, and bursitis. Steroids can be injected directly into a painful, inflamed joint. They can also help reduce inflammation of a bursa. A bursa is a sac of fluid. It cushions and lubricates areas where tendons, ligaments, skin, muscles, or bones rub against each other. A steroid shot can sometimes help with short-term pain relief when other treatments haven't worked. If steroid shots help, pain may improve for weeks or months. Follow-up care is a key part of your treatment and safety. Be sure to make and go to all appointments, and call your doctor if you are having problems. It's also a good idea to know your test results and keep a list of the medicines you take. How can you care for yourself at home? · Put ice or a cold pack on the area for 10 to 20 minutes at a time. Put a thin cloth between the ice and your skin. · Take anti-inflammatory medicines to reduce pain, swelling, or inflammation. These include ibuprofen (Advil, Motrin) and naproxen (Aleve). Read and follow all instructions on the label. · Avoid strenuous activities for several days, especially those that put stress on the area where you got the shot. · If you have dressings over the area, keep them clean and dry. You may remove them when your doctor tells you to. When should you call for help? Call your doctor now or seek immediate medical care if:  ? · You have signs of infection, such as:  ¨ Increased pain, swelling, warmth, or redness. ¨ Red streaks leading from the site. ¨ Pus draining from the site. ¨ A fever. ? Watch closely for changes in your health, and be sure to contact your doctor if you have any problems. Where can you learn more? Go to http://cristina-mignon.info/. Enter N616 in the search box to learn more about \"Joint Injections: Care Instructions. \"  Current as of: March 21, 2017  Content Version: 11.4  © 8750-8800 [employer]. Care instructions adapted under license by [employer] (which disclaims liability or warranty for this information). If you have questions about a medical condition or this instruction, always ask your healthcare professional. [address] any warranty or liability for your use of this information.

## 2018-04-11 NOTE — PROGRESS NOTES
Patient: Alexei Tobias                MRN: [id number]       SSN: [id number]  YOB: 1935        AGE: 82 y.o. SEX: female    PCP: Kary Escobar DO  04/11/18    Chief Complaint   Patient presents with    Shoulder Pain     left     HISTORY:  Alexei Tobias is a 82 y.o. female who is seen for bilateral shoulder L>R pain. She reports pain radiating down through her left arm. She reports pain for the past three months with no history of injury. She notes some relief with Tylenol and topical treatments. She notes increased pain at night. She reports pain with shoulder ROM. She reports a h/o fibromyalgia. She states she was recently prescribed a prednisone dose pack at the ED with minimal relief. She was previously seen for right hip pain. She is s/p left OFE in 2006 after sustaining an acetabular fracture. She is s/p left TKR in 2005. She reports increased pain radiating from her right hip into her groin and down her right leg. She states that she slipped and fell about two months ago with increased right hip pain afterward. She was [facility] on 4/22/17 where x rays revealed no fracture. She walks using a four-point cane. She notes pain when she rolls over on her right side. Pain Assessment  6/7/2017   Location of Pain Hip   Location Modifiers Right   Severity of Pain 9   Quality of Pain Aching; Throbbing; Sharp   Quality of Pain Comment -   Duration of Pain -   Frequency of Pain Constant   Date Pain First Started -   Aggravating Factors Stairs; Walking;Standing;Exercise   Aggravating Factors Comment -   Limiting Behavior Yes   Relieving Factors Nothing   Result of Injury Yes   Work-Related Injury No   Type of Injury Slip     Occupation, etc:  Ms. 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  She is insulin-dependent diabetic and hypertensive. She states she lost her brother on 12/11/17 and is still mourning his loss. She states that she was talking to the doctor on the phone when her brother started coding. Current weight is 248 pounds. She is 5'7\" tall. She reports a h/o inguinal hernia repair by Dr. Demetri Garcia. She reports that she had a cardiac stent placed and has a h/o heart disease for which she takes blood thinners. She enjoys writing poetry in her spare time. She no longer drives.      Lab Results   Component Value Date/Time    Hemoglobin A1c 8.9 (H) 02/27/2018 10:11 AM    Hemoglobin A1c, External 8.3 04/07/2015     Weight Metrics 4/2/2018 3/26/2018 3/18/2018 3/15/2018 2/27/2018 1/17/2018 11/19/2017   Weight 248 lb 250 lb 250 lb 253 lb 252 lb 240 lb 248 lb   BMI 38.84 kg/m2 39.16 kg/m2 39.16 kg/m2 39.63 kg/m2 39.47 kg/m2 37.59 kg/m2 38.84 kg/m2     Patient Active Problem List   Diagnosis Code    Essential hypertension I10    Unspecified hypothyroidism E03.9    Unspecified vitamin D deficiency E55.9    Unspecified asthma(493.90) J45.909    Esophageal reflux K21.9    Noncompliance with medication regimen Z91.14    Arm pain M79.603    Neck pain M54.2    Anxiety F41.9    S/P joint replacement Z96.60    DJD (degenerative joint disease) M19.90    Polyneuropathy in diabetes(357.2) E11.42    Dizziness R42    Chronic neck pain M54.2, G89.29    Chronic back pain M54.9, G89.29    Leg pain, right M79.604    Hypothyroidism E03.9    Diabetes mellitus type 2, controlled (HCC) E11.9    Morbid obesity (HCC) E66.01    Unspecified transient cerebral ischemia G45.9    Congestive heart failure (HCC) I50.9    Mixed hyperlipidemia E78.2    Coronary atherosclerosis of native coronary artery I25.10    Chronic diastolic heart failure (HCC) I50.32    Benign hypertensive heart disease without heart failure I11.9    Seasonal and perennial allergic rhinitis J30.89, J30.2    Medication monitoring encounter Z51.81    Tear of left rotator cuff H25.394    Uncomplicated asthma E61.697    Moderate persistent asthma with status asthmaticus J45.42    NSTEMI (non-ST elevated myocardial infarction) ([facility]) I21.4    S/P drug eluting coronary stent placement Z95.5    Advanced care planning/counseling discussion Z71.89    Chest pain R07.9    Bilateral lower extremity edema R60.0    BMI 38.0-38.9,adult Z68.38    Right groin pain R10.31     REVIEW OF SYSTEMS: All Below are Negative except: See HPI   Constitutional: negative for fever, chills, and weight loss. Cardiovascular: negative for chest pain, claudication, leg swelling, SOB, INIGUEZ   Gastrointestinal: Negative for pain, N/V/C/D, Blood in stool or urine, dysuria,  hematuria, incontinence, pelvic pain. Musculoskeletal: See HPI   Neurological: Negative for dizziness and weakness. Negative for headaches, Visual changes, confusion, seizures   Phychiatric/Behavioral: Negative for depression, memory loss, substance  abuse. Extremities: Negative for hair changes, rash, or skin lesion changes. Hematologic: Negative for bleeding problems, bruising, pallor or swollen lymph  nodes   Peripheral Vascular: No calf pain, no circulation deficits. Social History     Social History    Marital status: SINGLE     Spouse name: N/A    Number of children: N/A    Years of education: N/A     Occupational History    Not on file.      Social History Main Topics    Smoking status: Former Smoker     Packs/day: 1.00     Years: 20.00     Types: Cigarettes     Quit date: 4/5/1980    Smokeless tobacco: Never Used    Alcohol use No    Drug use: Yes     Special: Prescription, OTC    Sexual activity: Not on file     Other Topics Concern    Not on file     Social History Narrative      Allergies   Allergen Reactions    Niacin Palpitations and Other (comments)     Stomach irritation    Ace Inhibitors Cough    Avapro [Irbesartan] Myalgia    Bystolic [Nebivolol] Other (comments)     Felt like throat closing  Catapres [Clonidine] Cough    Codeine Nausea and Vomiting    Cozaar [Losartan] Not Reported This Time    Crestor [Rosuvastatin] Other (comments)     Cramps, aches    Darvocet A500 [Propoxyphene N-Acetaminophen] Unknown (comments)    Diovan [Valsartan] Cough    Flagyl [Metronidazole] Other (comments)     Mouth and throat irritation    Gabapentin Other (comments)     Abdominal pain and burning     Iodinated Contrast- Oral And Iv Dye Other (comments)     Throat swelling    Iodine Unknown (comments)    Lescol [Fluvastatin] Other (comments)     Leg cramps    Lipitor [Atorvastatin] Myalgia and Other (comments)     Cramps, aches    Lovastatin Other (comments)     Leg cramps    Nexium [Esomeprazole Magnesium] Other (comments)     Stomach upset, burning    Pravachol [Pravastatin] Other (comments)     Leg cramps    Reglan [Metoclopramide] Nausea Only    Trazodone Other (comments)     Patient states she feels drugged    Zetia [Ezetimibe] Other (comments)     Cramps, aches    Zocor [Simvastatin] Other (comments)     Cramps, aches      Current Outpatient Prescriptions   Medication Sig    clopidogrel (PLAVIX) 75 mg tab Take 1 Tab by mouth daily.  cyclobenzaprine (FLEXERIL) 10 mg tablet Take 1 Tab by mouth two (2) times daily as needed for Muscle Spasm(s). (Patient taking differently: Take 10 mg by mouth as needed for Muscle Spasm(s). )    lidocaine (LIDODERM) 5 % 1 Patch by TransDERmal route every twenty-four (24) hours.  amLODIPine (NORVASC) 10 mg tablet Take 1 Tab by mouth daily.  PROAIR HFA 90 mcg/actuation inhaler INHALE 1 PUFF BY MOUTH EVERY 4 HOURS AS NEEDED FOR WHEEZING OR SHORTNESS OF BREATH    furosemide (LASIX) 20 mg tablet Use daily as needed for leg swelling    levothyroxine (SYNTHROID) 75 mcg tablet Take 1 Tab by mouth Daily (before breakfast). (Patient taking differently: Take 125 mcg by mouth Daily (before breakfast). )    insulin glargine (LANTUS) 100 unit/mL injection Take 15 units every morning  Indications: type 2 diabetes mellitus (Patient taking differently: by SubCUTAneous route two (2) times a day. Takes 20 units in the morning and 35 units in the evening. Indications: type 2 diabetes mellitus)    cyanocobalamin ER 1,000 mcg tablet Take 1 Tab by mouth daily.  famotidine (PEPCID) 20 mg tablet Take 20 mg by mouth as needed.  montelukast (SINGULAIR) 10 mg tablet Take 1 Tab by mouth daily as needed. Indications: ALLERGIC RHINITIS    capsaicin 0.075 % topical cream Apply  to affected area three (3) times daily.  DOCOSAHEXANOIC ACID/EPA (FISH OIL PO) Take 1,000 mg by mouth two (2) times a day.  aspirin delayed-release 81 mg tablet Take 81 mg by mouth daily.  cholecalciferol, vitamin d3, (VITAMIN D) 1,000 unit tablet Take 5,000 Units by mouth two (2) times a day. No current facility-administered medications for this visit. PHYSICAL EXAMINATION:  Visit Vitals    /73 (BP 1 Location: Right arm, BP Patient Position: Sitting)    Pulse (!) 101    Temp 98.5 °F (36.9 °C) (Oral)    Resp 20    SpO2 95%      ORTHO EXAMINATION:  Examination Right shoulder Left shoulder   Skin Intact Intact   Effusion - -   Biceps deformity - -   Atrophy - -   AC joint tenderness - -   Acromial tenderness + +   Biceps tenderness - -   Forward flexion/Elevation , with crepitation 165, with crepitation   Active abduction  165   External rotation ROM 15 15   Internal rotation ROM 80 80   Apprehension - -   Impingement - -   Drop Arm Test - -   Neurovascular Intact Intact   Pain with returning arm to side.    Examination Lumbar   Skin Intact   Tenderness +   Tightness -   Lordosis Normal   Kyphosis N/A   Scoliosis -   Flexion Fingertips to mid shin   Extension 10   Knee reflexes Normal   Ankle reflexes Normal   Straight leg raise -   Calf tenderness -     Examination Right hip Left hip   Skin Intact Intact   External Rotation ROM 10, with pain 20   Internal Rotation ROM 10, with pain 10   Trochanteric tenderness + -   Hip flexion contracture - -   Antalgic gait - -   Trendelenberg sign - -   Lumbar tenderness - -   Straight leg raise - -   Calf tenderness - -   Neurovascular Intact Intact   Ambulates using a four-point cane  Difficulty standing from a seated position  No pain on left hip rotation    Examination Right knee Left knee   Skin Intact Intact, well-healed anterior TKR incision site   Range of motion 115-0 100-0   Effusion - -   Medial joint line tenderness + +   Lateral joint line tenderness - -   Popliteal tenderness - -   Osteophytes palpable - -   Deions - -   Patella crepitus + -   Anterior drawer - -   Lateral laxity - -   Medial laxity - -   Varus deformity - -   Valgus deformity - -   Pretibial edema 2+ -   Calf tenderness - -     PROCEDURE:  After discussing treatment options, patient's left shoulder was injected with 4 cc Marcaine and 1/2 cc Celestone. Chart reviewed for the following:   Ozzy Zapata MD, have reviewed the History, Physical and updated the Allergic reactions for Marissa Tomasz [address] performed immediately prior to start of procedure:  Ozzy Zapata MD, have performed the following reviews on [facility] prior to the start of the procedure:            * Patient was identified by name and date of birth   * Agreement on procedure being performed was verified  * Risks and Benefits explained to the patient  * Procedure site verified and marked as necessary  * Patient was positioned for comfort  * Consent was obtained     Time: 11:31 AM     Date of procedure: 4/11/2018    Procedure performed by:  Libertad Warren MD    Ms. Minesh Dial tolerated the procedure well with no complications. RADIOGRAPHS:  XR LEFT SHOULDER 1/17/18  IMPRESSION:   Significant sclerotic and hypertrophic changes at the superolateral aspect of head of left humerus, most likely secondary to chronic rotator cuff disease.  Clinical correlation suggested. Mild osteoarthritis in the left glenohumeral joint. Mild to moderate osteoarthritis at the left [facility] joint.  -I have independently reviewed these images during this office visit. -Dr. Daryn Lou  Moderate OA    XR RIGHT SHOULDER 1/17/18  IMPRESSION:   Osteoarthritis in right shoulder as described. Findings suggestive of chronic reactive changes in superolateral aspect of head of right humerus most likely secondary to chronic rotator cuff disease.  -I have independently reviewed these images during this office visit. -Dr. Daryn Lou  Moderate OA    XR RIGHT HIP 4/22/17  IMPRESSION:  Negative right hip. Satisfactory appearance of a total left hip arthroplasty. 3 small metallic opacities project over the pelvis of unknown etiology. Are they some type of ingested foreign body? Clinical correlation please. Possibly within colon. Per Dr. Daryn Lou: Moderate joint space narrowing, sacroiliac sclerosis. -I have independently reviewed these images during this office visit. -Dr. Susi Bernardo 4/22/17  IMPRESSION:  Grade 1 spondylolisthesis L4/5. Mild multilevel degenerative disc changes. 4 small cylindrical shaped radiopaque foreign bodies of unknown etiology. Suspect ingested foreign bodies. Clinical correlation please. Arthritis of the SI joints. IMPRESSION:      ICD-10-CM ICD-9-CM    1. Primary osteoarthritis of right shoulder M19.011 715.11 betamethasone (CELESTONE SOLUSPAN) 6 mg/mL injection      BETAMETHASONE ACETATE & SODIUM PHOSPHATE INJECTION 3 MG EA.      DRAIN/INJECT LARGE JOINT/BURSA      bupivacaine, PF, (MARCAINE, PF,) 0.25 % (2.5 mg/mL) injection    moderate    2. Left rotator cuff tear arthropathy M12.812 716.81 betamethasone (CELESTONE SOLUSPAN) 6 mg/mL injection      BETAMETHASONE ACETATE & SODIUM PHOSPHATE INJECTION 3 MG EA.      DRAIN/INJECT LARGE JOINT/BURSA      bupivacaine, PF, (MARCAINE, PF,) 0.25 % (2.5 mg/mL) injection   3.  Chronic left shoulder pain M25.512 719.41 betamethasone (CELESTONE SOLUSPAN) 6 mg/mL injection    G89.29 338.29 BETAMETHASONE ACETATE & SODIUM PHOSPHATE INJECTION 3 MG EA.      DRAIN/INJECT LARGE JOINT/BURSA      bupivacaine, PF, (MARCAINE, PF,) 0.25 % (2.5 mg/mL) injection   4. Arthrosis of left acromioclavicular joint M19.012 715.91 betamethasone (CELESTONE SOLUSPAN) 6 mg/mL injection      BETAMETHASONE ACETATE & SODIUM PHOSPHATE INJECTION 3 MG EA.      DRAIN/INJECT LARGE JOINT/BURSA      bupivacaine, PF, (MARCAINE, PF,) 0.25 % (2.5 mg/mL) injection   5. Primary osteoarthritis of left shoulder M19.012 715.11 betamethasone (CELESTONE SOLUSPAN) 6 mg/mL injection      BETAMETHASONE ACETATE & SODIUM PHOSPHATE INJECTION 3 MG EA.      DRAIN/INJECT LARGE JOINT/BURSA      bupivacaine, PF, (MARCAINE, PF,) 0.25 % (2.5 mg/mL) injection    moderate    6. BMI 38.0-38.9,adult Z68.38 V85.38    7. Fibromyalgia M79.7 729.1      PLAN:  After discussing treatment options, patient's left shoulder was injected with 4 cc Marcaine and 1/2 cc Celestone. She will follow up as needed. She will follow up with her primary care physician for high blood pressure.       Scribed by Chepe Jimenez ([facility]) as dictated by Gradie Aase, MD

## 2018-04-12 RX ORDER — LIDOCAINE 50 MG/G
PATCH TOPICAL
Qty: 90 EACH | Refills: 1 | Status: SHIPPED | OUTPATIENT
Start: 2018-04-12 | End: 2018-09-09

## 2018-04-12 NOTE — TELEPHONE ENCOUNTER
Pt called requesting refill for medication . Requested Prescriptions     Pending Prescriptions Disp Refills    lidocaine (LIDODERM) 5 % 90 Each 1     Sig: Apply patch to the affected area for 12 hours a day and remove for 12 hours a day.

## 2018-04-19 ENCOUNTER — PATIENT OUTREACH (OUTPATIENT)
Dept: INTERNAL MEDICINE CLINIC | Age: 83
End: 2018-04-19

## 2018-04-19 NOTE — PROGRESS NOTES
4/19/2018 Left message with Jennifer Ferreira to have her grandmother (patient) call me so that I can check on how she's doing. Subha aware that she had gone to ED on 4/2/18 & to Dr. Shae Molina on 4/11/18, but she's not sure how she's doing now. As far as she knows she's ok. The role of the nurse navigator was explained. She stated that she would tell her (patient) to return my call.

## 2018-04-20 ENCOUNTER — PATIENT OUTREACH (OUTPATIENT)
Dept: INTERNAL MEDICINE CLINIC | Age: 83
End: 2018-04-20

## 2018-04-20 DIAGNOSIS — E11.42 DIABETIC PERIPHERAL NEUROPATHY (HCC): ICD-10-CM

## 2018-04-20 DIAGNOSIS — M79.2 NEUROPATHIC PAIN: ICD-10-CM

## 2018-04-20 RX ORDER — DEXTROMETHORPHAN HYDROBROMIDE, GUAIFENESIN 5; 100 MG/5ML; MG/5ML
650 LIQUID ORAL EVERY 8 HOURS
COMMUNITY
End: 2021-01-25

## 2018-04-20 NOTE — PROGRESS NOTES
ED Discharge Follow-Up    Date/Time:  2018 10:50 AM    Patient was admitted to [place] [address] ED on 18 and discharged on 18 for chest pain and SOB. Nurse Navigator(NN) contacted the patient  by telephone to perform post ED discharge assessment. Verified name and  with patient as identifiers. Provided introduction to self, and explanation of the Nurse Navigator role. Subjective data: Currently, she reports that she's not doing well. She complaints of bilateral shoulder (L>R) chronic pain that it's worse than usual. States \"I don't know what I did to it. \" Reports that she only sleeps approx. 1 1/2 hr. Related to pain. Tylenol & flexeril not effective. She also doesn't like the \"grogginess\" she feels with flexeril. She also states that the injection performed by Dr. Ryan Arellano (on 18) on her L. Shoulder so far has not helped. She states that the Lidoderm helps, but it hasn't been refilled by her pharmacy because \"the doctor needs to send a letter. \" She also reports occasional nose bleeds. Disease management (CHF, HTN, & DM) was discussed. She states that she's unable to weight herself daily because the battery in her scale  today- her granddaughter stated that she'll get new batteries. Weight from this morning was 150lbs. Reinforced the importance of weighing daily. Glucose range 102-214 in last few days. /70 from this morning. Pertinent negatives: listed in assessment of each disease process    Barriers: The patient states that her aide has not been showing up. She's considering changing the company. Advised to notify company because she needs to have an aide since she lives alone, and reports needing help. Medication:   Taking medication(s) prescribed at discharge: yes     patient verbalizes understanding of administration of home medications. There were barriers to obtaining medications identified at this time.     Pharmacy consult for polypharm needed?: no   Medication changes (dose adjustments or discontinued meds): Lantus 30u in AM & Lantus 36u PM as per pt. Instructed by endocrinology    Reviewed discharge instructions and red flags with  patient who provided teach back. Patient given an opportunity to ask questions and does not have any further questions or concerns at this time. The patient agrees to contact the PCP office for questions related to their healthcare. Patient reminded that there are physicians on call 24 hours a day / 7 days a week (M-F 5pm to 8am and from Friday 5pm until Monday 8a for the weekend) should the patient have questions or concerns. NN provided contact information for future reference. Offered follow up appointment with PCP: yes     Future Appointments  Date Time Provider Chelle Reeves   4/27/2018 11:15 AM Taylor Kennedy DO [facility] None   6/26/2018 9:30 AM Taylor Kennedy DO [facility] None   9/11/2018 11:15 AM Eugene Pitt MD [address] [place] [address]    Initial rheumatology appt. 5/4/18. Goals      Establish PCP relationships and regularly scheduled appointments. Plan: Notify of appt with ortho and verify transportation. 3/20/18: Patient aware of ortho appt. Aide or family member will provide transportation. 3/26/18 Attended appt. W/ Dr. Walt Miranda, PCP  4/20/2018 F/U appt.  Scheduled w/ Dr. Walt Miranda

## 2018-04-27 ENCOUNTER — OFFICE VISIT (OUTPATIENT)
Dept: FAMILY MEDICINE CLINIC | Age: 83
End: 2018-04-27

## 2018-04-27 ENCOUNTER — PATIENT OUTREACH (OUTPATIENT)
Dept: FAMILY MEDICINE CLINIC | Age: 83
End: 2018-04-27

## 2018-04-27 VITALS
OXYGEN SATURATION: 96 % | BODY MASS INDEX: 38.92 KG/M2 | TEMPERATURE: 98.9 F | WEIGHT: 248 LBS | RESPIRATION RATE: 17 BRPM | DIASTOLIC BLOOD PRESSURE: 87 MMHG | SYSTOLIC BLOOD PRESSURE: 145 MMHG | HEIGHT: 67 IN | HEART RATE: 102 BPM

## 2018-04-27 DIAGNOSIS — M13.0 POLYARTICULAR ARTHRITIS: Primary | ICD-10-CM

## 2018-04-27 DIAGNOSIS — E11.21 CONTROLLED TYPE 2 DIABETES MELLITUS WITH DIABETIC NEPHROPATHY, WITHOUT LONG-TERM CURRENT USE OF INSULIN (HCC): ICD-10-CM

## 2018-04-27 NOTE — PATIENT INSTRUCTIONS
Please contact our office if you have any questions about your visit today. 1.) OK to use topical cream as needed for arthritis pain. 2.) Take half a tab as needed for muscle spasms of the Flexeril (Cyclobenzaprine). 3.) Please make sure to keep the Rheumatologist appointment on May 4th with Dr. Neri Marx at 11 am. Their phone number is [phone]     4.) Please keep June appointment.

## 2018-04-27 NOTE — PROGRESS NOTES
Goals Addressed             Most Recent     Establish PCP relationships and regularly scheduled appointments. On track (4/27/2018)             Plan: Notify of appt with ortho and verify transportation. 3/20/18: Patient aware of ortho appt. Aide or family member will provide transportation. 3/26/18 Attended appt. W/ Dr. Marely Lama, PCP  4/27/18 F/U appt. Scheduled w/ Dr. Marely Lama          Patient attended appt. With Dr. Marely Lama today.

## 2018-04-27 NOTE — MR AVS SNAPSHOT
[facility] 
 
 
 [place] [address] 
[phone] Patient: José Miguel Coto MRN: [id number] WFF:[id number] Visit Information Date & Time Provider Department Dept. Phone Encounter #  
 4/27/2018 11:15 AM Sheri Ac 77 [id number] Follow-up Instructions Return for Patient will return in June for regular appointment . Your Appointments 6/26/2018  9:30 AM  
Follow Up with Alphonso Jenkins,  [address] (--) Appt Note: [place] [id number] [phone]  
  
   
 [place] [address] 9/11/2018 11:15 AM  
Office Visit with Jaycob See MD  
[facility] ([facility]) Appt Note: 6 month follow up  
 [address] [facility] [address]  
  
   
 [address] [place] [id number] Upcoming Health Maintenance Date Due Pneumococcal 65+ Low/Medium Risk (2 of 2 - PPSV23) 12/2/2017 LIPID PANEL Q1 4/14/2018 Influenza Age 5 to Adult 8/1/2018 HEMOGLOBIN A1C Q6M 8/27/2018 EYE EXAM RETINAL OR DILATED Q1 10/12/2018 FOOT EXAM Q1 10/24/2018 MICROALBUMIN Q1 10/24/2018 MEDICARE YEARLY EXAM 10/26/2018 GLAUCOMA SCREENING Q2Y 10/12/2019 DTaP/Tdap/Td series (2 - Td) 11/15/2023 Allergies as of 4/27/2018  Review Complete On: 3/69/7430 By: Lteicia Cervantes. Viktoria Forbes LPN Severity Noted Reaction Type Reactions Niacin High 03/26/2013    Palpitations, Other (comments) Stomach irritation Ace Inhibitors  05/19/2010    Cough Avapro [Irbesartan]  05/19/2010    Myalgia Bystolic [Nebivolol]  64/20/0370    Other (comments) Felt like throat closing Catapres [Clonidine]  05/19/2010    Cough Codeine  05/19/2010    Nausea and Vomiting Cozaar [Losartan]    Not Reported This Time Crestor [Rosuvastatin]  05/19/2010    Other (comments) Cramps, aches Pavel Olp [Propoxyphene N-acetaminophen]  05/19/2010    Unknown (comments) Diovan [Valsartan]  05/19/2010    Cough Flagyl [Metronidazole]  05/19/2010    Other (comments) Mouth and throat irritation Gabapentin  03/16/2016    Other (comments) Abdominal pain and burning Iodinated Contrast- Oral And Iv Dye    Other (comments) Throat swelling Iodine    Unknown (comments) Lescol [Fluvastatin]  05/19/2010    Other (comments) Leg cramps Lipitor [Atorvastatin]  05/19/2010    Myalgia, Other (comments) Cramps, aches Lovastatin  05/19/2010    Other (comments) Leg cramps Nexium [Esomeprazole Magnesium]  05/20/2010    Other (comments) Stomach upset, burning Pravachol [Pravastatin]  05/19/2010    Other (comments) Leg cramps Reglan [Metoclopramide]  05/19/2010    Nausea Only Trazodone  05/19/2010    Other (comments) Patient states she feels drugged Zetia [Ezetimibe]  05/19/2010    Other (comments) Cramps, aches Zocor [Simvastatin]  05/19/2010    Other (comments) Cramps, aches Current Immunizations  Reviewed on 2/7/2017 Name Date Influenza High Dose Vaccine PF 12/2/2016 Influenza Vaccine 12/16/2015 11:20 AM, 9/15/2014 Influenza Vaccine Split 11/6/2012, 10/5/2010 Influenza Vaccine Whole 9/1/2009 Tdap 11/15/2013 Not reviewed this visit You Were Diagnosed With   
  
 Codes Comments Polyarticular arthritis    -  Primary ICD-10-CM: M13.0 ICD-9-CM: 716.50 Controlled type 2 diabetes mellitus with diabetic nephropathy, without long-term current use of insulin (HCC)     ICD-10-CM: E11.21 
ICD-9-CM: 250.40, 583.81 Vitals BP Pulse Temp Resp Height(growth percentile) Weight(growth percentile) 145/87 (BP 1 Location: Right arm, BP Patient Position: Sitting) (!) 102 98.9 °F (37.2 °C) (Oral) 17 5' 7\" (1.702 m) 248 lb (112.5 kg) SpO2 BMI OB Status Smoking Status 96% 38.84 kg/m2 Hysterectomy Former Smoker BMI and BSA Data Body Mass Index Body Surface Area  
 38.84 kg/m 2 2.31 m 2 [facility] Your Updated Medication List  
  
   
This list is accurate as of 4/27/18 12:16 PM.  Always use your most recent med list. amLODIPine 10 mg tablet Commonly known as:  Madeline Ape Take 1 Tab by mouth daily. aspirin delayed-release 81 mg tablet Take 81 mg by mouth daily. capsaicin 0.075 % topical cream  
Apply  to affected area three (3) times daily. clopidogrel 75 mg Tab Commonly known as:  PLAVIX Take 1 Tab by mouth daily. cyanocobalamin ER 1,000 mcg tablet Take 1 Tab by mouth daily. cyclobenzaprine 10 mg tablet Commonly known as:  FLEXERIL Take 1 Tab by mouth two (2) times daily as needed for Muscle Spasm(s). FISH OIL PO Take 1,000 mg by mouth two (2) times a day. furosemide 20 mg tablet Commonly known as:  LASIX Use daily as needed for leg swelling  
  
 insulin glargine 100 unit/mL injection Commonly known as:  LANTUS U-100 INSULIN Take 15 units every morning  Indications: type 2 diabetes mellitus  
  
 levothyroxine 75 mcg tablet Commonly known as:  SYNTHROID Take 1 Tab by mouth Daily (before breakfast). * lidocaine 5 % Commonly known as:  LIDODERM  
1 Patch by TransDERmal route every twenty-four (24) hours. * lidocaine 5 % Commonly known as:  LIDODERM  
APPLY 3 PATCHES TO AFFECTED AREA(S) EVERY 24 HOURS FOR 30 DAYS. WEAR FOR 12 HOURS THEN REMOVE FOR 12 HOURS.  
  
 montelukast 10 mg tablet Commonly known as:  SINGULAIR Take 1 Tab by mouth daily as needed. Indications: ALLERGIC RHINITIS PEPCID 20 mg tablet Generic drug:  famotidine Take 20 mg by mouth as needed. PROAIR HFA 90 mcg/actuation inhaler Generic drug:  albuterol INHALE 1 PUFF BY MOUTH EVERY 4 HOURS AS NEEDED FOR WHEEZING OR SHORTNESS OF BREATH  
  
 TYLENOL ARTHRITIS PAIN 650 mg Tber Generic drug:  acetaminophen Take 650 mg by mouth every eight (8) hours. VITAMIN D3 1,000 unit tablet Generic drug:  cholecalciferol Take 5,000 Units by mouth two (2) times a day. * Notice: This list has 2 medication(s) that are the same as other medications prescribed for you. Read the directions carefully, and ask your doctor or other care provider to review them with you. Follow-up Instructions Return for Patient will return in June for regular appointment . Patient Instructions Please contact our office if you have any questions about your visit today. 1.) OK to use topical cream as needed for arthritis pain. 2.) Take half a tab as needed for muscle spasms of the Flexeril (Cyclobenzaprine). 3.) Please make sure to keep the Rheumatologist appointment on May 4th with Dr. Lang Stark at 11 am. Their phone number is [phone]  
 
4.) Please keep June appointment. Introducing [facility] & HEALTH [facility]! Dear Radha Ramires: Thank you for requesting a [employer] account. Our records indicate that you already have an active [employer] account. You can access your account anytime at [email or web address] Did you know that you can access your hospital and ER discharge instructions at any time in [employer]? You can also review all of your test results from your hospital stay or ER visit. Additional Information If you have questions, please visit the Frequently Asked Questions section of the [employer] website at [email or web address] Remember, [employer] is NOT to be used for urgent needs. For medical emergencies, dial 911. Now available from your iPhone and Android! Please provide this summary of care documentation to your next provider. Your primary care clinician is listed as [id number] Wong Jenkins. If you have any questions after today's visit, please call [phone].

## 2018-04-27 NOTE — PROGRESS NOTES
Chief Complaint   Patient presents with    ED Follow-up     was seen 4/2/18 for chest pain, which is now resolved    Pain (Chronic)     states that she has pain all over her body and her insurance no longer pays for the patches that really help with the pain     1. Have you been to the ER, urgent care clinic since your last visit? Hospitalized since your last visit? Yes When: 4/2/18 Where: [facility] Reason for visit: chest pain    2. Have you seen or consulted any other health care providers outside of the [address] [place] [address] since your last visit? Include any pap smears or colon screening.  No

## 2018-04-27 NOTE — PROGRESS NOTES
Progress Note    Patient: Julieta Hernandez MRN: [id number]  SSN: [id number]    YOB: 1935  Age: 82 y.o. Sex: female          Subjective:   Julieta Hernandez is a 82 y.o. female who is here for follow up for bilateral shoulder osteoarthritis. The patient was previously referred to rheumatologist. She mentions hat she did get a left shoulder injection as well. The patient mentions that her insurance company no longer covers the compound cream. She mentions that sometimes the Flexeril works but it can make her feel drowsy. Visit from 3/26/18  She was recently dealing with a long nosebleed that last 45 minutes. She was later instructed by her cardiologist to stop Plavix for 2-3 days. She mentions that she previously was on Brilinta and had similar episodes of nosebleeds. She states that she was later shifted to Plavix due to the nosebleeds. She mentions that her cardiologist suggested that she go back on the Plavix if her bleeding stopped. In regard to her polyarticular joint pain she states that she just completed the prednisone pack. She mentions that the highest her BS went up during her prednisone use it got up to the 390 mg/dL range. She mentions that she does feel weak and tired. She has tried [employer] as well. She mentions that she still gets refills from the compound pharmacy. The patient mentions that she does use Fish Oil and a probiotic. She does take a Vitamin B supplement as well. Visit from 2/27/18  She states that her endocrinologist switched her to 112 mcg of Synthroid. She states that this change was made a few months ago. While she was in the hospital her synthroid was in 75 mcg dose range. She states that she also has had her insulin adjusted. She mentions that she feels fatigued constantly and she cannot rest. She mentions that she has been losing her hair as well--she reaches in her hair and strands come out.       Visit from 10/25/2017  The patient is here for an acute care visit. Ehsan Chiu states that the incident occurred a few weeks ago. She has been having some right sided hip pain. She has been having to lift up the leg to move it around. The patient was seen in the ER for the persistent right hip pain. She states that they performed any x-ray but did not see anything on the x-ray. She also mentions that her knee has been bothering her as well. She states that she sleeps on her left side as well to help her symptoms. She also admits to some swelling in her legs from time to time. Additionally she does admit to some anxiety with fluttering in her stomach. Objective:     Vitals:    04/27/18 1122   BP: 145/87   Pulse: (!) 102   Resp: 17   Temp: 98.9 °F (37.2 °C)   TempSrc: Oral   SpO2: 96%   Weight: 248 lb (112.5 kg)   Height: 5' 7\" (1.702 m)        Review of Systems:  Pertinent items are noted in the History of Present Illness. Physical Exam:   GENERAL: alert, cooperative, no distress, appears stated age, moderately obese  ENT: No fluid behind tympanic membranes bilaterally. No cerumen impaction noted either    EYE: conjunctivae/corneas clear. PERRL, EOM's intact. Fundi benign  THROAT & NECK: normal and no erythema or exudates noted. LUNG: clear to auscultation bilaterally  HEART: regular rate and rhythm, S1, S2 normal, no murmur, click, rub or gallop  ABDOMEN: soft, non-tender. Bowel sounds normal. No masses,  no organomegaly, abnormal findings:  obese  EXTREMITIES:  No gross edema appreciated       Lab/Data Review:  I reviewed x-ray results from her ER visit. XR Results (most recent):    Results from Hospital Encounter encounter on 04/02/18   XR CHEST PA LAT   Narrative PROCEDURE:  Chest Frontal and Lateral.    CPT code 99886. INDICATION:  Chest pain      COMPARISON:  3/5/17       FINDINGS:  No airspace opacities are identified. The cardiac silhouette is  mildly prominent.   Costophrenic angles and posterior sulci are sharp.  No  pneumothorax is detected. Impression IMPRESSION:    1. Mild prominence of the cardiac silhouette. 2.  No acute findings. Assessment:     1.) Polyarticular Arthritis: Patient will continue using muscle relaxer as well as over the counter arthritis cream.     Patient will return in June for regular follow up    Plan:     No orders of the defined types were placed in this encounter.         Signed By: [id number] Wong Jenkins DO     April 27, 2018

## 2018-04-29 ENCOUNTER — HOSPITAL ENCOUNTER (EMERGENCY)
Age: 83
Discharge: HOME | End: 2018-04-29
Attending: EMERGENCY MEDICINE
Payer: MEDICARE

## 2018-04-29 VITALS
WEIGHT: 249 LBS | HEART RATE: 104 BPM | SYSTOLIC BLOOD PRESSURE: 139 MMHG | OXYGEN SATURATION: 95 % | TEMPERATURE: 98.8 F | RESPIRATION RATE: 28 BRPM | DIASTOLIC BLOOD PRESSURE: 89 MMHG | BODY MASS INDEX: 39 KG/M2

## 2018-04-29 DIAGNOSIS — R04.0 EPISTAXIS: Primary | ICD-10-CM

## 2018-04-29 LAB
BASOPHILS # BLD: 0 K/UL (ref 0–0.06)
BASOPHILS NFR BLD: 1 % (ref 0–2)
DIFFERENTIAL METHOD BLD: ABNORMAL
EOSINOPHIL # BLD: 0.3 K/UL (ref 0–0.4)
EOSINOPHIL NFR BLD: 5 % (ref 0–5)
ERYTHROCYTE [DISTWIDTH] IN BLOOD BY AUTOMATED COUNT: 11.8 % (ref 11.6–14.5)
HCT VFR BLD AUTO: 37.8 % (ref 35–45)
HGB BLD-MCNC: 12 G/DL (ref 12–16)
INR PPP: 0.9 (ref 0.8–1.2)
LYMPHOCYTES # BLD: 2.4 K/UL (ref 0.9–3.6)
LYMPHOCYTES NFR BLD: 42 % (ref 21–52)
MCH RBC QN AUTO: 31.2 PG (ref 24–34)
MCHC RBC AUTO-ENTMCNC: 31.7 G/DL (ref 31–37)
MCV RBC AUTO: 98.2 FL (ref 74–97)
MONOCYTES # BLD: 0.4 K/UL (ref 0.05–1.2)
MONOCYTES NFR BLD: 8 % (ref 3–10)
NEUTS SEG # BLD: 2.7 K/UL (ref 1.8–8)
NEUTS SEG NFR BLD: 44 % (ref 40–73)
PLATELET # BLD AUTO: 221 K/UL (ref 135–420)
PMV BLD AUTO: 10.4 FL (ref 9.2–11.8)
PROTHROMBIN TIME: 11.5 SEC (ref 11.5–15.2)
RBC # BLD AUTO: 3.85 M/UL (ref 4.2–5.3)
WBC # BLD AUTO: 5.8 K/UL (ref 4.6–13.2)

## 2018-04-29 PROCEDURE — 85025 COMPLETE CBC W/AUTO DIFF WBC: CPT | Performed by: EMERGENCY MEDICINE

## 2018-04-29 PROCEDURE — 85610 PROTHROMBIN TIME: CPT | Performed by: EMERGENCY MEDICINE

## 2018-04-29 PROCEDURE — 99282 EMERGENCY DEPT VISIT SF MDM: CPT

## 2018-04-29 NOTE — ED TRIAGE NOTES
Pt reports intermittent nose bleeds; 3 episodes since Tuesday, an episode the previous.   States she has applied pressure which has stopped bleeding

## 2018-04-29 NOTE — ED TRIAGE NOTES
Dr Ita Doty notified of positive sepsis checklist; per MD no sepsis protocol to be done at this time.

## 2018-04-29 NOTE — ED NOTES
Pt instructed to follow up with her PCP in the AM, to take her medications as prescribed, and to return for worsening/uncontrollable bleeding/other concerns. Pt affect remains calm, respirations regular/non labored/skin warm and dry. Pt able to transition to/from bed to wheelchair to car. No bleeding noted since her arrival to ED. Recommendation made to have family stay with her until seen by her PCP.
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
## 2018-05-21 ENCOUNTER — PATIENT OUTREACH (OUTPATIENT)
Dept: FAMILY MEDICINE CLINIC | Age: 83
End: 2018-05-21

## 2018-05-21 NOTE — PROGRESS NOTES
Transitions of Care Coordination  Follow-Up    Date/Time:  5/21/2018 3:38 PM     NN contacted patient for Transitions of Care Coordination  follow up. Spoke to patient. Introduced self/role and reason for call. Patient reported: \"I'm not feeling too good. I have palpitations and L. Chest spasms for a few months now\"- (it's intermittent, and usually goes away on it's own as per patient). \"I'm on prednisone & flexeril- it's helping a little (with L. Chest spasms). I went to Dr. Jami Herrera, rheumatology last week and she did an EKG- I have an irregular rhythm it skips a beat. She referred me to pain management last week- they called me today to set-up an appt. I've been having nose bleeds for a while the last one was last Tuesday- I held pressure for 45 minutes, and it stopped. So far this year I've had 3 nose bleeds. My blood sugar the other day was around 80- I made sure I ate. \"    Pertinent negatives: SOB, chest pain, edema, hypo/hyperglycemia    Medication:   Medications reconciliation during this outreach:no   patient verbalizes understanding of administration of home medications. New medications since last outreach: no  Does patient need refills on any medications: no  Medication changes since last outreach (dose adjustments or discontinued meds): no    Home Health company: n/a    Barriers to care? 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 yes  If so, specialist and date: Dr. Jami Herrera, rheumatology- last week    Patient reminded that there are physicians on call 24 hours a day / 7 days a week (M-F 5pm to 8am and from Friday 5pm until Monday 8a for the weekend) should the patient have questions or concerns. She stated that she will call if she needs to.     Future Appointments  Date Time Provider Chelle Reeves   6/7/2018 11:00 AM Nayely Castellanos DO [facility] None   9/11/2018 11:15 AM Za Hong MD [address] [place] [address] Advised patient to call if she needed to see PCP sooner- though I moved her appt. Earlier to this date from her original date 6/26/18- this was the first available with Dr. Anu Matos based on the patient's availability. Other upcoming appointments:  Pending pain management appt. as per patient    Goals     None        Patient has graduated from the Transitions of Care Coordination  program on 5/21/18.

## 2018-06-07 ENCOUNTER — OFFICE VISIT (OUTPATIENT)
Dept: FAMILY MEDICINE CLINIC | Age: 83
End: 2018-06-07

## 2018-06-07 VITALS
OXYGEN SATURATION: 96 % | HEIGHT: 67 IN | WEIGHT: 255 LBS | HEART RATE: 87 BPM | RESPIRATION RATE: 17 BRPM | BODY MASS INDEX: 40.02 KG/M2 | TEMPERATURE: 97 F | SYSTOLIC BLOOD PRESSURE: 151 MMHG | DIASTOLIC BLOOD PRESSURE: 81 MMHG

## 2018-06-07 DIAGNOSIS — M13.0 POLYARTICULAR ARTHRITIS: ICD-10-CM

## 2018-06-07 DIAGNOSIS — I50.32 CHRONIC DIASTOLIC HEART FAILURE (HCC): ICD-10-CM

## 2018-06-07 DIAGNOSIS — I21.4 NSTEMI (NON-ST ELEVATED MYOCARDIAL INFARCTION) (HCC): ICD-10-CM

## 2018-06-07 DIAGNOSIS — R06.02 SHORTNESS OF BREATH ON EXERTION: Primary | ICD-10-CM

## 2018-06-07 NOTE — MR AVS SNAPSHOT
[address] 
 
 
 [address] [address] [address] [address] [phone] Patient: Keith Bartlett MRN: [id number] Visit Information Date & Time Provider Department Dept. Phone Encounter #  
 6/7/2018 11:00 AM [address] [address] [id number] Follow-up Instructions Return for patient will call for follow up after getting echo . Your Appointments 9/11/2018 11:15 AM  
Office Visit with Joseph Gallagher MD  
[facility] ([address] [facility]) Appt Note: 6 month follow up  
 [address] [place] [address] [facility] [address]  
  
   
 [address] [address] [address] [address] Upcoming Health Maintenance Date Due Pneumococcal 65+ Low/Medium Risk (2 of 2 - PPSV23) 12/2/2017 LIPID PANEL Q1 4/14/2018 Influenza Age 5 to Adult 8/1/2018 HEMOGLOBIN A1C Q6M 8/27/2018 EYE EXAM RETINAL OR DILATED Q1 10/12/2018 FOOT EXAM Q1 10/24/2018 MICROALBUMIN Q1 10/24/2018 MEDICARE YEARLY EXAM 10/26/2018 GLAUCOMA SCREENING Q2Y 10/12/2019 DTaP/Tdap/Td series (2 - Td) 11/15/2023 Allergies as of 6/7/2018  Review Complete On: 4/29/2018 By: [id number] Wong Jenkins, DO Severity Noted Reaction Type Reactions Niacin High 03/26/2013    Palpitations, Other (comments) Stomach irritation Ace Inhibitors  05/19/2010    Cough Avapro [Irbesartan]  05/19/2010    Myalgia Bystolic [Nebivolol]  92/33/3444    Other (comments) Felt like throat closing Catapres [Clonidine]  05/19/2010    Cough Codeine  05/19/2010    Nausea and Vomiting Cozaar [Losartan]    Not Reported This Time Crestor [Rosuvastatin]  05/19/2010    Other (comments) Cramps, aches [place] [address] [Propoxyphene N-acetaminophen]  05/19/2010    Unknown (comments) Diovan [Valsartan]  05/19/2010    Cough Flagyl [Metronidazole]  05/19/2010    Other (comments) Mouth and throat irritation Gabapentin  03/16/2016    Other (comments) Abdominal pain and burning Iodinated Contrast- Oral And Iv Dye    Other (comments) Throat swelling Iodine    Unknown (comments) Lescol [Fluvastatin]  05/19/2010    Other (comments) Leg cramps Lipitor [Atorvastatin]  05/19/2010    Myalgia, Other (comments) Cramps, aches Lovastatin  05/19/2010    Other (comments) Leg cramps Nexium [Esomeprazole Magnesium]  05/20/2010    Other (comments) Stomach upset, burning Pravachol [Pravastatin]  05/19/2010    Other (comments) Leg cramps Reglan [Metoclopramide]  05/19/2010    Nausea Only Trazodone  05/19/2010    Other (comments) Patient states she feels drugged Zetia [Ezetimibe]  05/19/2010    Other (comments) Cramps, aches Zocor [Simvastatin]  05/19/2010    Other (comments) Cramps, aches Current Immunizations  Reviewed on 2/7/2017 Name Date Influenza High Dose Vaccine PF 12/2/2016 Influenza Vaccine 12/16/2015 11:20 AM, 9/15/2014 Influenza Vaccine Split 11/6/2012, 10/5/2010 Influenza Vaccine Whole 9/1/2009 Tdap 11/15/2013 Not reviewed this visit You Were Diagnosed With   
  
 Codes Comments Polyarticular arthritis    -  Primary ICD-10-CM: M13.0 ICD-9-CM: 716.50 NSTEMI (non-ST elevated myocardial infarction) ([facility]     ICD-10-CM: I21.4 ICD-9-CM: 410.70 Chronic diastolic heart failure (HCC)     ICD-10-CM: I50.32 
ICD-9-CM: 428.32 Shortness of breath on exertion     ICD-10-CM: R06.02 
ICD-9-CM: 786.05 Vitals BP Pulse Temp Resp Height(growth percentile) Weight(growth percentile) 151/81 (BP 1 Location: Right arm, BP Patient Position: Sitting) 87 97 °F (36.1 °C) (Oral) 17 5' 7\" (1.702 m) 255 lb (115.7 kg) SpO2 BMI OB Status Smoking Status 96% 39.94 kg/m2 Hysterectomy Former Smoker BMI and BSA Data  Body Mass Index Body Surface Area  
 39.94 kg/m 2 2.34 m 2  
  
  
 Preferred Pharmacy Pharmacy Name Phone [facility] [address] [facility] Your Updated Medication List  
  
   
This list is accurate as of 6/7/18 12:15 PM.  Always use your most recent med list. amLODIPine 10 mg tablet Commonly known as:  Janet Sevilla Take 1 Tab by mouth daily. aspirin delayed-release 81 mg tablet Take 81 mg by mouth daily. capsaicin 0.075 % topical cream  
Apply  to affected area three (3) times daily. clopidogrel 75 mg Tab Commonly known as:  PLAVIX Take 1 Tab by mouth daily. cyanocobalamin ER 1,000 mcg tablet Take 1 Tab by mouth daily. FISH OIL PO Take 1,000 mg by mouth two (2) times a day. furosemide 20 mg tablet Commonly known as:  LASIX Use daily as needed for leg swelling  
  
 insulin glargine 100 unit/mL injection Commonly known as:  LANTUS U-100 INSULIN Take 15 units every morning  Indications: type 2 diabetes mellitus  
  
 levothyroxine 75 mcg tablet Commonly known as:  SYNTHROID Take 1 Tab by mouth Daily (before breakfast). * lidocaine 5 % Commonly known as:  LIDODERM  
1 Patch by TransDERmal route every twenty-four (24) hours. * lidocaine 5 % Commonly known as:  LIDODERM  
APPLY 3 PATCHES TO AFFECTED AREA(S) EVERY 24 HOURS FOR 30 DAYS. WEAR FOR 12 HOURS THEN REMOVE FOR 12 HOURS.  
  
 montelukast 10 mg tablet Commonly known as:  SINGULAIR Take 1 Tab by mouth daily as needed. Indications: ALLERGIC RHINITIS PEPCID 20 mg tablet Generic drug:  famotidine Take 20 mg by mouth as needed. PROAIR HFA 90 mcg/actuation inhaler Generic drug:  albuterol INHALE 1 PUFF BY MOUTH EVERY 4 HOURS AS NEEDED FOR WHEEZING OR SHORTNESS OF BREATH  
  
 TYLENOL ARTHRITIS PAIN 650 mg Tber Generic drug:  acetaminophen Take 650 mg by mouth every eight (8) hours. VITAMIN D3 1,000 unit tablet Generic drug:  cholecalciferol Take 5,000 Units by mouth two (2) times a day. * Notice: This list has 2 medication(s) that are the same as other medications prescribed for you. Read the directions carefully, and ask your doctor or other care provider to review them with you. Follow-up Instructions Return for patient will call for follow up after getting echo . To-Do List   
 07/08/2018 ECHO:  2D ECHO COMPLETE ADULT (TTE) W OR WO CONTR Patient Instructions 1.) They will call to set up your echo.  
 
2.) Please call for follow up after getting the echo. Transthoracic Echocardiogram: About This Test 
What is it? An echocardiogram (also called an echo) uses sound waves to make an image of your heart. A device called a transducer sends sound waves that echo off your heart and back to the transducer. These echoes are turned into moving pictures of your heart that can be seen on a video screen. In a transthoracic echocardiogram (TTE), the transducer is moved across your chest or belly. A TTE is the most common type of echocardiogram. 
Why is this test done? This test is done to check your heart health. It's used for many reasons. Your doctor may do an echocardiogram to: · Check a heart murmur. · Look for the cause of shortness of breath or unexplained chest pain or pressure. · Check how well your heart is pumping blood. · Check to see how well your heart valves are working. · Look for blood clots inside your heart. What happens during the test? 
· You will remove your clothes above your waist. You may be given a cloth or paper covering to use during the test. 
· You will lie on your back or on your left side on a bed or table. · You may receive medicine through a vein (intravenously, or IV). The IV can be used to give you a contrast material, which helps your doctor get good views of your heart.  
· Small pads or patches (electrodes) will be taped to your arms and legs to record your heart rate during the test. 
· A small amount of gel will be rubbed on the side of your chest to help  the sound waves. · The transducer will be pressed firmly against your chest and moved slowly back and forth. It is usually moved to different areas on your chest or belly to get specific views of your heart. · You will be asked to do several things, such as hold very still, breathe in and out very slowly, hold your breath, or lie on your left side. This test usually takes 30 to 60 minutes. What else should you know about the test? 
· You will not have any pain from an echocardiogram. You may have a brief, sharp pain if an intravenous (IV) needle is placed in a vein in your arm. · No electricity passes through your body during the test. There is no danger of getting an electrical shock. · You do not receive any radiation. What happens after the test? 
· You will probably be able to go home right away. · You can go back to your usual activities right away. Follow-up care is a key part of your treatment and safety. Be sure to make and go to all appointments, and call your doctor if you are having problems. It's also a good idea to keep a list of the medicines you take. Ask your doctor when you can expect to have your test results. Where can you learn more? Go to http://cristina-mignon.info/. Enter E130 in the search box to learn more about \"Transthoracic Echocardiogram: About This Test.\" Current as of: September 21, 2016 Content Version: 11.4 © 3483-7543 [employer]. Care instructions adapted under license by [employer] (which disclaims liability or warranty for this information). If you have questions about a medical condition or this instruction, always ask your healthcare professional. [place] [address] any warranty or liability for your use of this information. Introducing [facility]! Dear Dariusz Thomas: Thank you for requesting a [employer] account. Our records indicate that you already have an active [employer] account. You can access your account anytime at [email or web address] Did you know that you can access your hospital and ER discharge instructions at any time in [employer]? You can also review all of your test results from your hospital stay or ER visit. Additional Information If you have questions, please visit the Frequently Asked Questions section of the [employer] website at [email or web address] Remember, [employer] is NOT to be used for urgent needs. For medical emergencies, dial 911. Now available from your iPhone and Android! Please provide this summary of care documentation to your next provider. Your primary care clinician is listed as Tiffanie Herrmann. If you have any questions after today's visit, please call [phone].

## 2018-06-07 NOTE — PROGRESS NOTES
Progress Note    Patient: Elizabeth Tavares MRN: [id number]  SSN: [id number]    YOB: 1935  Age: 82 y.o. Sex: female          Subjective:   Elizabeth Tavares is a 82 y.o. female who is here for follow up. The patient was seen in the ER recently for epistaxis. She mentions that she has had these episodes since being placed on blood thinners. She is currently on Plavix. She mentions that she did skip a day of the Plavix after being discharged from the ER. Additionally, the patient mentions that she saw the rheumatologist. She states that the experience went well. She mentions that the rheumatologist placed her on prednisone and also referred her to Pain Management---Dr. Sidra Barker. She mentions that she has been busy with helping with [employer] activities. She mentions that she sometimes can go to the door and get short of breath. She mentions that she has not had an echo recently. Visit from 4/27/18  Patient is here for follow up on bilateral shoulder osteoarthritis. The patient was previously referred to rheumatologist. She mentions hat she did get a left shoulder injection as well. The patient mentions that her insurance company no longer covers the compound cream. She mentions that sometimes the Flexeril works but it can make her feel drowsy. Visit from 3/26/18  She was recently dealing with a long nosebleed that last 45 minutes. She was later instructed by her cardiologist to stop Plavix for 2-3 days. She mentions that she previously was on Brilinta and had similar episodes of nosebleeds. She states that she was later shifted to Plavix due to the nosebleeds. She mentions that her cardiologist suggested that she go back on the Plavix if her bleeding stopped. In regard to her polyarticular joint pain she states that she just completed the prednisone pack. She mentions that the highest her BS went up during her prednisone use it got up to the 390 mg/dL range.  She mentions that she does feel weak and tired. She has tried [employer] as well. She mentions that she still gets refills from the compound pharmacy. The patient mentions that she does use Fish Oil and a probiotic. She does take a Vitamin B supplement as well. Visit from 2/27/18  She states that her endocrinologist switched her to 112 mcg of Synthroid. She states that this change was made a few months ago. While she was in the hospital her synthroid was in 75 mcg dose range. She states that she also has had her insulin adjusted. She mentions that she feels fatigued constantly and she cannot rest. She mentions that she has been losing her hair as well--she reaches in her hair and strands come out. Visit from 10/25/2017  The patient is here for an acute care visit. Matt Loving states that the incident occurred a few weeks ago. She has been having some right sided hip pain. She has been having to lift up the leg to move it around. The patient was seen in the ER for the persistent right hip pain. She states that they performed any x-ray but did not see anything on the x-ray. She also mentions that her knee has been bothering her as well. She states that she sleeps on her left side as well to help her symptoms. She also admits to some swelling in her legs from time to time. Additionally she does admit to some anxiety with fluttering in her stomach. Objective:     Vitals:    06/07/18 1132   BP: 151/81   Pulse: 87   Resp: 17   Temp: 97 °F (36.1 °C)   TempSrc: Oral   SpO2: 96%   Weight: 255 lb (115.7 kg)   Height: 5' 7\" (1.702 m)        Review of Systems:  Pertinent items are noted in the History of Present Illness. Physical Exam:   GENERAL: alert, cooperative, no distress, appears stated age, moderately obese  ENT: No fluid behind tympanic membranes bilaterally. No cerumen impaction noted either    EYE: conjunctivae/corneas clear. PERRL, EOM's intact.  Fundi benign  THROAT & NECK: normal and no erythema or exudates noted. LUNG: clear to auscultation bilaterally  HEART: regular rate and rhythm, S1, S2 normal, no murmur, click, rub or gallop  ABDOMEN: soft, non-tender. Bowel sounds normal. No masses,  no organomegaly, abnormal findings:  obese  EXTREMITIES:  No gross edema appreciated       Lab/Data Review:  I reviewed x-ray results from her ER visit. XR Results (most recent):    Results from Hospital Encounter encounter on 04/02/18   XR CHEST PA LAT   Narrative PROCEDURE:  Chest Frontal and Lateral.    CPT code 58239. INDICATION:  Chest pain      COMPARISON:  3/5/17       FINDINGS:  No airspace opacities are identified. The cardiac silhouette is  mildly prominent. Costophrenic angles and posterior sulci are sharp. No  pneumothorax is detected. Impression IMPRESSION:    1. Mild prominence of the cardiac silhouette. 2.  No acute findings. Assessment:     1.) Shortness of Breath on Exertion: Patient ordered an echo because of her previous history of heart failure. 2.) Polyarticular Arthritis: Patient will continue to follow up with Rheumatology. 3.) Essential Hypertension: Patient stable on amlodipine 10 mg.     4.) NSTEMI: Patient will continue to follow up with cardiology. Patient will call for follow up after getting echo.      Plan:     Orders Placed This Encounter    2D ECHO COMPLETE ADULT (TTE) W OR WO CONTR         Signed By: Tiffanie Herrmann DO     June 7, 2018

## 2018-06-07 NOTE — PROGRESS NOTES
Chief Complaint   Patient presents with    Epistaxis     states that this has resolved for the most part but was a problem about 2-3 weeks ago    Breathing Problem     INIGUEZ with minmal activity     1. Have you been to the ER, urgent care clinic since your last visit? Hospitalized since your last visit? Yes When: 4/29/18 Where:  Reason for visit: eptaxis    2. Have you seen or consulted any other health care providers outside of the [address] [place] [address] since your last visit? Include any pap smears or colon screening.  Yes at a [facility]

## 2018-06-07 NOTE — PATIENT INSTRUCTIONS
1.) They will call to set up your echo.     2.) Please call for follow up after getting the echo. Transthoracic Echocardiogram: About This Test  What is it? An echocardiogram (also called an echo) uses sound waves to make an image of your heart. A device called a transducer sends sound waves that echo off your heart and back to the transducer. These echoes are turned into moving pictures of your heart that can be seen on a video screen. In a transthoracic echocardiogram (TTE), the transducer is moved across your chest or belly. A TTE is the most common type of echocardiogram.  Why is this test done? This test is done to check your heart health. It's used for many reasons. Your doctor may do an echocardiogram to:  · Check a heart murmur. · Look for the cause of shortness of breath or unexplained chest pain or pressure. · Check how well your heart is pumping blood. · Check to see how well your heart valves are working. · Look for blood clots inside your heart. What happens during the test?  · You will remove your clothes above your waist. You may be given a cloth or paper covering to use during the test.  · You will lie on your back or on your left side on a bed or table. · You may receive medicine through a vein (intravenously, or IV). The IV can be used to give you a contrast material, which helps your doctor get good views of your heart. · Small pads or patches (electrodes) will be taped to your arms and legs to record your heart rate during the test.  · A small amount of gel will be rubbed on the side of your chest to help  the sound waves. · The transducer will be pressed firmly against your chest and moved slowly back and forth. It is usually moved to different areas on your chest or belly to get specific views of your heart. · You will be asked to do several things, such as hold very still, breathe in and out very slowly, hold your breath, or lie on your left side.   This test usually takes 30 to 60 minutes. What else should you know about the test?  · You will not have any pain from an echocardiogram. You may have a brief, sharp pain if an intravenous (IV) needle is placed in a vein in your arm. · No electricity passes through your body during the test. There is no danger of getting an electrical shock. · You do not receive any radiation. What happens after the test?  · You will probably be able to go home right away. · You can go back to your usual activities right away. Follow-up care is a key part of your treatment and safety. Be sure to make and go to all appointments, and call your doctor if you are having problems. It's also a good idea to keep a list of the medicines you take. Ask your doctor when you can expect to have your test results. Where can you learn more? Go to http://cristina-mignon.info/. Enter E130 in the search box to learn more about \"Transthoracic Echocardiogram: About This Test.\"  Current as of: September 21, 2016  Content Version: 11.4  © 3794-8447 [employer]. Care instructions adapted under license by [employer] (which disclaims liability or warranty for this information). If you have questions about a medical condition or this instruction, always ask your healthcare professional. [address] any warranty or liability for your use of this information.

## 2018-06-10 ENCOUNTER — HOSPITAL ENCOUNTER (INPATIENT)
Age: 83
LOS: 8 days | Discharge: NURSING, REHAB OR LONG-TERM CARE | DRG: 480 | End: 2018-06-18
Attending: EMERGENCY MEDICINE | Admitting: INTERNAL MEDICINE
Payer: MEDICARE

## 2018-06-10 ENCOUNTER — APPOINTMENT (OUTPATIENT)
Dept: GENERAL RADIOLOGY | Age: 83
DRG: 480 | End: 2018-06-10
Attending: EMERGENCY MEDICINE
Payer: MEDICARE

## 2018-06-10 DIAGNOSIS — M97.8XXA PERIPROSTHETIC FRACTURE OF FEMUR AT TIP OF PROSTHESIS, INITIAL ENCOUNTER: Primary | ICD-10-CM

## 2018-06-10 DIAGNOSIS — G89.18 POST-OPERATIVE PAIN: ICD-10-CM

## 2018-06-10 DIAGNOSIS — Z96.649 PERIPROSTHETIC FRACTURE OF FEMUR AT TIP OF PROSTHESIS, INITIAL ENCOUNTER: Primary | ICD-10-CM

## 2018-06-10 LAB
ALBUMIN SERPL-MCNC: 2.9 G/DL (ref 3.4–5)
ALBUMIN/GLOB SERPL: 0.7 {RATIO} (ref 0.8–1.7)
ALP SERPL-CCNC: 94 U/L (ref 45–117)
ALT SERPL-CCNC: 12 U/L (ref 13–56)
ANION GAP SERPL CALC-SCNC: 7 MMOL/L (ref 3–18)
AST SERPL-CCNC: 9 U/L (ref 15–37)
BASOPHILS # BLD: 0 K/UL (ref 0–0.1)
BASOPHILS NFR BLD: 0 % (ref 0–2)
BILIRUB SERPL-MCNC: 0.4 MG/DL (ref 0.2–1)
BUN SERPL-MCNC: 8 MG/DL (ref 7–18)
BUN/CREAT SERPL: 12 (ref 12–20)
CALCIUM SERPL-MCNC: 8.8 MG/DL (ref 8.5–10.1)
CHLORIDE SERPL-SCNC: 108 MMOL/L (ref 100–108)
CO2 SERPL-SCNC: 26 MMOL/L (ref 21–32)
CREAT SERPL-MCNC: 0.69 MG/DL (ref 0.6–1.3)
DIFFERENTIAL METHOD BLD: ABNORMAL
EOSINOPHIL # BLD: 0.1 K/UL (ref 0–0.4)
EOSINOPHIL NFR BLD: 1 % (ref 0–5)
ERYTHROCYTE [DISTWIDTH] IN BLOOD BY AUTOMATED COUNT: 11.9 % (ref 11.6–14.5)
EST. AVERAGE GLUCOSE BLD GHB EST-MCNC: 232 MG/DL
GLOBULIN SER CALC-MCNC: 4.2 G/DL (ref 2–4)
GLUCOSE BLD STRIP.AUTO-MCNC: 238 MG/DL (ref 70–110)
GLUCOSE BLD STRIP.AUTO-MCNC: 256 MG/DL (ref 70–110)
GLUCOSE BLD STRIP.AUTO-MCNC: 281 MG/DL (ref 70–110)
GLUCOSE SERPL-MCNC: 244 MG/DL (ref 74–99)
HBA1C MFR BLD: 9.7 % (ref 4.2–5.6)
HCT VFR BLD AUTO: 34.2 % (ref 35–45)
HGB BLD-MCNC: 11.4 G/DL (ref 12–16)
INR PPP: 1 (ref 0.8–1.2)
LYMPHOCYTES # BLD: 2.6 K/UL (ref 0.9–3.6)
LYMPHOCYTES NFR BLD: 36 % (ref 21–52)
MCH RBC QN AUTO: 31 PG (ref 24–34)
MCHC RBC AUTO-ENTMCNC: 33.3 G/DL (ref 31–37)
MCV RBC AUTO: 92.9 FL (ref 74–97)
MONOCYTES # BLD: 0.4 K/UL (ref 0.05–1.2)
MONOCYTES NFR BLD: 6 % (ref 3–10)
NEUTS SEG # BLD: 4 K/UL (ref 1.8–8)
NEUTS SEG NFR BLD: 57 % (ref 40–73)
PLATELET # BLD AUTO: 236 K/UL (ref 135–420)
PMV BLD AUTO: 10.3 FL (ref 9.2–11.8)
POTASSIUM SERPL-SCNC: 3.2 MMOL/L (ref 3.5–5.5)
PROT SERPL-MCNC: 7.1 G/DL (ref 6.4–8.2)
PROTHROMBIN TIME: 12.7 SEC (ref 11.5–15.2)
RBC # BLD AUTO: 3.68 M/UL (ref 4.2–5.3)
SODIUM SERPL-SCNC: 141 MMOL/L (ref 136–145)
WBC # BLD AUTO: 7.2 K/UL (ref 4.6–13.2)

## 2018-06-10 PROCEDURE — 71045 X-RAY EXAM CHEST 1 VIEW: CPT

## 2018-06-10 PROCEDURE — 85025 COMPLETE CBC W/AUTO DIFF WBC: CPT | Performed by: EMERGENCY MEDICINE

## 2018-06-10 PROCEDURE — 85610 PROTHROMBIN TIME: CPT | Performed by: EMERGENCY MEDICINE

## 2018-06-10 PROCEDURE — 80053 COMPREHEN METABOLIC PANEL: CPT | Performed by: EMERGENCY MEDICINE

## 2018-06-10 PROCEDURE — 74011250636 HC RX REV CODE- 250/636: Performed by: EMERGENCY MEDICINE

## 2018-06-10 PROCEDURE — 74011250636 HC RX REV CODE- 250/636: Performed by: INTERNAL MEDICINE

## 2018-06-10 PROCEDURE — 82962 GLUCOSE BLOOD TEST: CPT

## 2018-06-10 PROCEDURE — 96374 THER/PROPH/DIAG INJ IV PUSH: CPT

## 2018-06-10 PROCEDURE — 74011250637 HC RX REV CODE- 250/637: Performed by: INTERNAL MEDICINE

## 2018-06-10 PROCEDURE — 99284 EMERGENCY DEPT VISIT MOD MDM: CPT

## 2018-06-10 PROCEDURE — 65270000029 HC RM PRIVATE

## 2018-06-10 PROCEDURE — 83036 HEMOGLOBIN GLYCOSYLATED A1C: CPT | Performed by: INTERNAL MEDICINE

## 2018-06-10 PROCEDURE — 73562 X-RAY EXAM OF KNEE 3: CPT

## 2018-06-10 PROCEDURE — 74011636637 HC RX REV CODE- 636/637: Performed by: INTERNAL MEDICINE

## 2018-06-10 RX ORDER — ALBUTEROL SULFATE 90 UG/1
2 AEROSOL, METERED RESPIRATORY (INHALATION)
Status: DISCONTINUED | OUTPATIENT
Start: 2018-06-10 | End: 2018-06-10 | Stop reason: CLARIF

## 2018-06-10 RX ORDER — MORPHINE SULFATE 2 MG/ML
1 INJECTION, SOLUTION INTRAMUSCULAR; INTRAVENOUS
Status: DISCONTINUED | OUTPATIENT
Start: 2018-06-10 | End: 2018-06-18 | Stop reason: HOSPADM

## 2018-06-10 RX ORDER — MELATONIN
5000 2 TIMES DAILY
Status: DISCONTINUED | OUTPATIENT
Start: 2018-06-11 | End: 2018-06-11 | Stop reason: DRUGHIGH

## 2018-06-10 RX ORDER — INSULIN GLARGINE 100 [IU]/ML
30 INJECTION, SOLUTION SUBCUTANEOUS 2 TIMES DAILY
Status: DISCONTINUED | OUTPATIENT
Start: 2018-06-10 | End: 2018-06-13

## 2018-06-10 RX ORDER — ALBUTEROL SULFATE 0.83 MG/ML
2.5 SOLUTION RESPIRATORY (INHALATION)
Status: DISCONTINUED | OUTPATIENT
Start: 2018-06-10 | End: 2018-06-18 | Stop reason: HOSPADM

## 2018-06-10 RX ORDER — ONDANSETRON 2 MG/ML
4 INJECTION INTRAMUSCULAR; INTRAVENOUS
Status: DISCONTINUED | OUTPATIENT
Start: 2018-06-10 | End: 2018-06-18 | Stop reason: HOSPADM

## 2018-06-10 RX ORDER — FUROSEMIDE 20 MG/1
20 TABLET ORAL DAILY
Status: DISCONTINUED | OUTPATIENT
Start: 2018-06-11 | End: 2018-06-11

## 2018-06-10 RX ORDER — LOPERAMIDE HCL 2 MG
1000 TABLET ORAL DAILY
Status: DISCONTINUED | OUTPATIENT
Start: 2018-06-11 | End: 2018-06-11 | Stop reason: DRUGHIGH

## 2018-06-10 RX ORDER — DEXTROSE 50 % IN WATER (D50W) INTRAVENOUS SYRINGE
25-50 AS NEEDED
Status: DISCONTINUED | OUTPATIENT
Start: 2018-06-10 | End: 2018-06-12 | Stop reason: SDUPTHER

## 2018-06-10 RX ORDER — TRAMADOL HYDROCHLORIDE 50 MG/1
50 TABLET ORAL
Status: DISCONTINUED | OUTPATIENT
Start: 2018-06-10 | End: 2018-06-11 | Stop reason: SDUPTHER

## 2018-06-10 RX ORDER — HEPARIN SODIUM 5000 [USP'U]/ML
5000 INJECTION, SOLUTION INTRAVENOUS; SUBCUTANEOUS EVERY 8 HOURS
Status: DISCONTINUED | OUTPATIENT
Start: 2018-06-10 | End: 2018-06-12

## 2018-06-10 RX ORDER — ONDANSETRON HYDROCHLORIDE 4 MG/2ML
4 INJECTION, SOLUTION INTRAMUSCULAR; INTRAVENOUS
Status: COMPLETED | OUTPATIENT
Start: 2018-06-10 | End: 2018-06-10

## 2018-06-10 RX ORDER — MAGNESIUM SULFATE 100 %
4 CRYSTALS MISCELLANEOUS AS NEEDED
Status: DISCONTINUED | OUTPATIENT
Start: 2018-06-10 | End: 2018-06-12 | Stop reason: CLARIF

## 2018-06-10 RX ORDER — INSULIN LISPRO 100 [IU]/ML
INJECTION, SOLUTION INTRAVENOUS; SUBCUTANEOUS
Status: DISCONTINUED | OUTPATIENT
Start: 2018-06-10 | End: 2018-06-18 | Stop reason: HOSPADM

## 2018-06-10 RX ORDER — MONTELUKAST SODIUM 10 MG/1
10 TABLET ORAL
Status: DISCONTINUED | OUTPATIENT
Start: 2018-06-10 | End: 2018-06-18 | Stop reason: HOSPADM

## 2018-06-10 RX ORDER — FENTANYL CITRATE 50 UG/ML
50 INJECTION, SOLUTION INTRAMUSCULAR; INTRAVENOUS
Status: DISCONTINUED | OUTPATIENT
Start: 2018-06-10 | End: 2018-06-10 | Stop reason: SDUPTHER

## 2018-06-10 RX ORDER — IBUPROFEN 400 MG/1
400 TABLET ORAL
Status: DISCONTINUED | OUTPATIENT
Start: 2018-06-10 | End: 2018-06-16 | Stop reason: SDUPTHER

## 2018-06-10 RX ORDER — FAMOTIDINE 20 MG/1
20 TABLET, FILM COATED ORAL
Status: DISCONTINUED | OUTPATIENT
Start: 2018-06-10 | End: 2018-06-16 | Stop reason: SDUPTHER

## 2018-06-10 RX ORDER — BISACODYL 5 MG
10 TABLET, DELAYED RELEASE (ENTERIC COATED) ORAL DAILY PRN
Status: DISCONTINUED | OUTPATIENT
Start: 2018-06-10 | End: 2018-06-18 | Stop reason: HOSPADM

## 2018-06-10 RX ORDER — LEVOTHYROXINE SODIUM 125 UG/1
125 TABLET ORAL
Status: DISCONTINUED | OUTPATIENT
Start: 2018-06-11 | End: 2018-06-18 | Stop reason: HOSPADM

## 2018-06-10 RX ORDER — AMLODIPINE BESYLATE 10 MG/1
10 TABLET ORAL DAILY
Status: DISCONTINUED | OUTPATIENT
Start: 2018-06-11 | End: 2018-06-11

## 2018-06-10 RX ORDER — FENTANYL CITRATE 50 UG/ML
100 INJECTION, SOLUTION INTRAMUSCULAR; INTRAVENOUS ONCE
Status: DISPENSED | OUTPATIENT
Start: 2018-06-10 | End: 2018-06-10

## 2018-06-10 RX ADMIN — INSULIN LISPRO 6 UNITS: 100 INJECTION, SOLUTION INTRAVENOUS; SUBCUTANEOUS at 19:20

## 2018-06-10 RX ADMIN — ONDANSETRON 4 MG: 2 INJECTION INTRAMUSCULAR; INTRAVENOUS at 22:25

## 2018-06-10 RX ADMIN — TRAMADOL HYDROCHLORIDE 50 MG: 50 TABLET, FILM COATED ORAL at 19:23

## 2018-06-10 RX ADMIN — MONTELUKAST SODIUM 10 MG: 10 TABLET, FILM COATED ORAL at 21:11

## 2018-06-10 RX ADMIN — INSULIN GLARGINE 30 UNITS: 100 INJECTION, SOLUTION SUBCUTANEOUS at 21:13

## 2018-06-10 RX ADMIN — HEPARIN SODIUM 5000 UNITS: 5000 INJECTION, SOLUTION INTRAVENOUS; SUBCUTANEOUS at 21:12

## 2018-06-10 RX ADMIN — Medication 1 MG: at 22:27

## 2018-06-10 RX ADMIN — INSULIN LISPRO 4 UNITS: 100 INJECTION, SOLUTION INTRAVENOUS; SUBCUTANEOUS at 23:18

## 2018-06-10 RX ADMIN — ONDANSETRON HYDROCHLORIDE 4 MG: 4 INJECTION, SOLUTION INTRAMUSCULAR; INTRAVENOUS at 11:26

## 2018-06-10 RX ADMIN — IBUPROFEN 400 MG: 400 TABLET, FILM COATED ORAL at 21:12

## 2018-06-10 RX ADMIN — FAMOTIDINE 20 MG: 20 TABLET ORAL at 21:11

## 2018-06-10 NOTE — IP AVS SNAPSHOT
[address] Patient: Marvin Berumen MRN: [id number] BRENNA:[id number] About your hospitalization You were admitted on:  Milady 10, 2018 You last received care in the:  [facility] 5 [facility] 1978 You were discharged on:  June 18, 2018 Why you were hospitalized Your primary diagnosis was:  Periprosthetic Supracondylar Fracture Of Femur Your diagnoses also included:  Essential Hypertension, Hypovitaminosis D, Noncompliance With Medication Regimen, Anxiety, S/P Joint Replacement, Djd (Degenerative Joint Disease), Diabetic Neuropathy (Hcc), Iddm (Insulin Dependent Diabetes Mellitus) (Hcc), Morbid Obesity (Hcc), Chronic Diastolic Heart Failure (Hcc), Coronary Atherosclerosis Of Native Coronary Artery, Uncomplicated Asthma, Bilateral Lower Extremity Edema, Callie-Prosthetic Femur Fracture At Tip Of Prosthesis, Preoperative Cardiovascular Examination Follow-up Information Follow up With Details Comments Contact Info Lakeisha Banks DO  Patient is being transferred to [facility]. No need for appointment. [address] [facility] 
[phone] 966 [facility]   [address] [place] [address] 200 [facility] 
[phone] Braden Cee PA-C On 6/27/2018 Appointment at 3:30 pm [address] [place] [address] [address] [facility] [place] [address] 
[phone] Your Scheduled Appointments Wednesday June 27, 2018  3:30 PM EDT  
POST OP with Braden Cee PA-C  
[facility] ([facility]) 340 [facility] 1 [facility] [address]  
[phone] Thursday July 05, 2018  9:15 AM EDT Office Visit with Lyly Mace MD  
[facility] ([facility]) [address] 200 [facility]  
[phone] Discharge Orders None A check chey indicates which time of day the medication should be taken. My Medications START taking these medications Instructions Each Dose to Equal  
 Morning Noon Evening Bedtime  
 dilTIAZem  mg ER capsule Commonly known as:  CARDIZEM CD Your last dose was:  6/18/2018 @ 8:00 a.m. Take 1 Cap by mouth daily. 120 mg  
    
   
   
6/18/2018 @ 5:00 p.m.  
   
  
 docusate sodium 100 mg capsule Commonly known as:  Cecilio Nishant Your last dose was:  6/18/2018 @ 8:00 a.m. Take 1 Cap by mouth two (2) times a day for 90 days. 100 mg  
    
   
   
6/18/2018 @ 5:00 p.m. * enoxaparin 30 mg/0.3 mL injection Commonly known as:  LOVENOX  
   
 0.3 mL by SubCUTAneous route daily. Indications: DEEP VEIN THROMBOSIS PREVENTION 30 mg  
    
   
   
   
  
 * enoxaparin 30 mg/0.3 mL injection Commonly known as:  LOVENOX  
   
 0.3 mL by SubCUTAneous route daily. Indications: DEEP VEIN THROMBOSIS PREVENTION 30 mg  
    
   
   
   
  
 oxyCODONE IR 5 mg immediate release tablet Commonly known as:  Arti Maid Your last dose was:  6/18/2018 @ 7:08 a.m. Take 1 Tab by mouth every four (4) hours as needed. Max Daily Amount: 30 mg.  
 5 mg * Notice: This list has 2 medication(s) that are the same as other medications prescribed for you. Read the directions carefully, and ask your doctor or other care provider to review them with you. CHANGE how you take these medications Instructions Each Dose to Equal  
 Morning Noon Evening Bedtime  
 insulin glargine 100 unit/mL injection Commonly known as:  LANTUS U-100 INSULIN What changed:   
- how to take this - when to take this 
- additional instructions Your last dose was:  6/18/2018 @ 8:00 a.m. Take 15 units every morning  Indications: type 2 diabetes mellitus  
     
   
   
   
  
 levothyroxine 75 mcg tablet Commonly known as:  SYNTHROID  
 What changed:  how much to take Your last dose was:  6/18/2018 before breakfast.   
   
 Take 1 Tab by mouth Daily (before breakfast). 75 mcg 6/19/2018 before breakfast.   
   
   
   
  
  
CONTINUE taking these medications Instructions Each Dose to Equal  
 Morning Noon Evening Bedtime  
 aspirin delayed-release 81 mg tablet Take 81 mg by mouth daily. 81 mg  
    
6/19/2018 
  
   
   
   
  
 capsaicin 0.075 % topical cream  
   
 Apply  to affected area three (3) times daily. cyanocobalamin ER 1,000 mcg tablet Take 1 Tab by mouth daily. 1000 mcg FISH OIL PO Take 1,000 mg by mouth two (2) times a day. 1000 mg  
    
   
   
   
  
 furosemide 20 mg tablet Commonly known as:  LASIX Your last dose was:  6/18/2018 @ 8:00 a.m. Use daily as needed for leg swelling 6/19/2018 use daily as needed. * lidocaine 5 % Commonly known as:  LIDODERM  
   
 1 Patch by TransDERmal route every twenty-four (24) hours. 1 Patch * lidocaine 5 % Commonly known as:  LIDODERM  
   
 APPLY 3 PATCHES TO AFFECTED AREA(S) EVERY 24 HOURS FOR 30 DAYS. WEAR FOR 12 HOURS THEN REMOVE FOR 12 HOURS.  
     
   
   
   
  
 montelukast 10 mg tablet Commonly known as:  SINGULAIR Take 1 Tab by mouth daily as needed. Indications: ALLERGIC RHINITIS 10 mg PEPCID 20 mg tablet Generic drug:  famotidine Take 20 mg by mouth as needed. 20 mg  
    
   
   
   
  
 PROAIR HFA 90 mcg/actuation inhaler Generic drug:  albuterol INHALE 1 PUFF BY MOUTH EVERY 4 HOURS AS NEEDED FOR WHEEZING OR SHORTNESS OF BREATH  
     
   
   
   
  
 TYLENOL ARTHRITIS PAIN 650 mg Tber Generic drug:  acetaminophen Take 650 mg by mouth every eight (8) hours. 650 mg  
    
   
   
   
  
 VITAMIN D3 1,000 unit tablet Generic drug:  cholecalciferol Your last dose was:  6/18/2018 @ 8:00 a. .m. Take 5,000 Units by mouth two (2) times a day. 5000 Units 6/18/2018 @ 5:00 p.m. * Notice: This list has 2 medication(s) that are the same as other medications prescribed for you. Read the directions carefully, and ask your doctor or other care provider to review them with you. STOP taking these medications   
 amLODIPine 10 mg tablet Commonly known as:  NORVASC  
   
  
 clopidogrel 75 mg Tab Commonly known as:  PLAVIX Where to Get Your Medications These medications were sent to [address], [address] [place] [id number] Phone:  [phone]  
  docusate sodium 100 mg capsule  
 enoxaparin 30 mg/0.3 mL injection Information on where to get these meds will be given to you by the nurse or doctor. ! Ask your nurse or doctor about these medications  
  dilTIAZem  mg ER capsule  
 enoxaparin 30 mg/0.3 mL injection  
 oxyCODONE IR 5 mg immediate release tablet Opioid Education Prescription Opioids: What You Need to Know: 
 
 
 
F-face looks uneven A-arms unable to move or move unevenly S-speech slurred or non-existent T-time-call 911 as soon as signs and symptoms begin-DO NOT go Back to bed or wait to see if you get better-TIME IS BRAIN. Warning Signs of HEART ATTACK Call 911 if you have these symptoms: ? Chest discomfort. Most heart attacks involve discomfort in the center of the chest that lasts more than a few minutes, or that goes away and comes back. It can feel like uncomfortable pressure, squeezing, fullness, or pain. ? Discomfort in other areas of the upper body. Symptoms can include pain or discomfort in one or both arms, the back, neck, jaw, or stomach. ? Shortness of breath with or without chest discomfort. ? Other signs may include breaking out in a cold sweat, nausea, or lightheadedness. Don't wait more than five minutes to call [address]! Fast action can save your life. Calling 911 is almost always the fastest way to get lifesaving treatment. Emergency Medical Services staff can begin treatment when they arrive  up to an hour sooner than if someone gets to the hospital by car. The discharge information has been reviewed with the patient. The patient verbalized understanding. Discharge medications reviewed with the patient and appropriate educational materials and side effects teaching were provided. ___________________________________________________________________________________________________________________________________ PulseSockshart Announcement We are excited to announce that we are making your provider's discharge notes available to you in Fluxome. You will see these notes when they are completed and signed by the physician that discharged you from your recent hospital stay. If you have any questions or concerns about any information you see in PulseSockshart, please call the Health Information Department where you were seen or reach out to your Primary Care Provider for more information about your plan of care. Introducing [facility]! Dear Ernestine Mike: Thank you for requesting a Fluxome account. Our records indicate that you already have an active Fluxome account. You can access your account anytime at [email or web address] Did you know that you can access your hospital and ER discharge instructions at any time in Damballa? You can also review all of your test results from your hospital stay or ER visit. Additional Information If you have questions, please visit the Frequently Asked Questions section of the [employer] Swedent website at [email or web address] Remember, Damballa is NOT to be used for urgent needs. For medical emergencies, dial 911. Now available from your iPhone and Android! Introducing Cameron Valle As a [facility] patient, I wanted to make you aware of our electronic visit tool called Cameron Valle. [facility] 24/7 allows you to connect within minutes with a medical provider 24 hours a day, seven days a week via a mobile device or tablet or logging into a secure website from your computer. You can access Cameron Valle from anywhere in the [place]. A virtual visit might be right for you when you have a simple condition and feel like you just dont want to get out of bed, or cant get away from work for an appointment, when your regular [facility] provider is not available (evenings, weekends or holidays), or when youre out of town and need minor care. Electronic visits cost only $49 and if the [place] [address]/ provider determines a prescription is needed to treat your condition, one can be electronically transmitted to a nearby pharmacy*. Please take a moment to enroll today if you have not already done so. The enrollment process is free and takes just a few minutes. To enroll, please download the [facility] 24/7 vanesa to your tablet or phone, or visit [email or web address] to enroll on your computer. And, as an [address] [place] [address] patient with a AppSocially account, the results of your visits will be scanned into your electronic medical record and your primary care provider will be able to view the scanned results. We urge you to continue to see your regular [employer] provider for your ongoing medical care. And while your primary care provider may not be the one available when you seek a Cameron Maritomadison virtual visit, the peace of mind you get from getting a real diagnosis real time can be priceless. For more information on Cameron Devianatoliyfin, view our Frequently Asked Questions (FAQs) at [email or web address]. Sincerely, 
 
Ziggy Azul MD 
Chief Medical Officer Jose Rene *:  certain medications cannot be prescribed via Cameron Valle Unresulted tests-please follow up with your PCP on these results Procedure/Test Authorizing Provider  2D ECHO COMPLETE ADULT (TTE) W OR WO CONTR John Pickett MD  
 CARDIAC PANEL,(CK, CKMB & TROPONIN) Ling Thomas NP  
 CARDIAC PANEL,(CK, CKMB & TROPONIN) Ling Thomas NP  
 CBC W/O DIFF Jenn Lowery MD  
 CBC WITH AUTOMATED DIFF Gina Baez MD  
 CBC WITH AUTOMATED DIFF Jose Matthews MD  
 EKG, 12 LEAD, INITIAL John Pickett MD  
 EKG, 12 LEAD, SUBSEQUENT Grady Palacios MD  
 EKG, 12 LEAD, SUBSEQUENT Grady Palacios MD  
 32 Venus Scott MD  
 HEMOGLOBIN A1C WITH EAG Jenn Lowery MD  
 HGB & HCT Eve Briscoe MD  
 METABOLIC PANEL, BASIC Jenn Lowery MD  
 METABOLIC PANEL, COREY Lowery MD  
 METABOLIC PANEL, BASIC Jenn Lowery MD  
 METABOLIC PANEL, COREY Lowery MD  
 METABOLIC PANEL, COMPREHENSIVE Gina Baez MD  
 NC XR TECHNOLOGIST Laurie Camarillo MD  
 NT-PRO BNP Muna Hull NP  
 PROTHROMBIN TIME + INR Gina Baez MD  
 URINALYSIS W/MICROSCOPIC Jenn Lowery MD  
 XR CHEST PA LAT Jenn Lowery MD  
 XR CHEST PORT Gina Baez MD  
 XR KNEE LT 3 V Gina Baez MD  
 XR KNEE LT MAX 2 VWS Eve Briscoe MD  
  
 Providers Seen During Your Hospitalization Provider Specialty Primary office phone Bynum Bosworth, MD Emergency Medicine [phone] Autumn Rizo MD Internal Medicine [phone] Tamela Gray MD [facility] [phone] Tressa Mancera MD Internal Medicine [phone] Fabiana Fontanez MD Infectious Diseases [phone] Grace Ndiaye MD [facility] [phone] Your Primary Care Physician (PCP) Primary Care Physician Office Phone Office Fax Evangelina Melara [phone] You are allergic to the following Allergen Reactions Niacin Palpitations Other (comments) Stomach irritation Ace Inhibitors Cough Avapro (Irbesartan) Myalgia Bystolic (Nebivolol) Other (comments) Felt like throat closing Catapres (Clonidine) Cough Codeine Nausea and Vomiting Cozaar (Losartan) Not Reported This Time Crestor (Rosuvastatin) Other (comments) Cramps, aches Darvocet A500 (Propoxyphene N-Acetaminophen) Unknown (comments) Diovan (Valsartan) Cough Flagyl (Metronidazole) Other (comments) Mouth and throat irritation Gabapentin Other (comments) Abdominal pain and burning Iodinated Contrast- Oral And Iv Dye Other (comments) Throat swelling Iodine Unknown (comments) Lescol (Fluvastatin) Other (comments) Leg cramps Lipitor (Atorvastatin) Myalgia Other (comments) Cramps, aches Lovastatin Other (comments) Leg cramps Nexium (Esomeprazole Magnesium) Other (comments) Stomach upset, burning Pravachol (Pravastatin) Other (comments) Leg cramps Reglan (Metoclopramide) Nausea Only Trazodone Other (comments) Patient states she feels drugged Zetia (Ezetimibe) Other (comments) Cramps, aches Zocor (Simvastatin) Other (comments) Cramps, aches Recent Documentation Weight Breastfeeding? BMI OB Status Smoking Status 117.9 kg No 40.72 kg/m2 Hysterectomy Former Smoker Emergency Contacts Name Discharge Info Relation Home Work Mobile Subha Stephen DISCHARGE CAREGIVER [3] Other Relative [6] [phone] Kana Edge N/A  AT THIS TIME [6] Other Relative [6] [phone] Patient Belongings The following personal items are in your possession at time of discharge: 
  Dental Appliances: At bedside  Visual Aid: Glasses      Home Medications: None   Jewelry: None  Clothing: None (inpt)    Other Valuables: None  Personal Items Sent to Safe: n/a Discharge Instructions Attachments/References ORIF (OPEN REDUCTION WITH INTERNAL FIXATION): POST-OP (ENGLISH) DIABETES DIET MEAL PLANNING: GENERAL INFO (ENGLISH) MEFS - OXYCODONE, RAPID RELEASE (ETH-OXYDOSE, OXY IR, ROXICODONE) - (BY MOUTH) (ENGLISH) ENOXAPARIN (BY INJECTION) (ENGLISH) MEFS - DILTIAZEM (CARDIZEM, CARDIZEM CD, CARDIZEM LA, CARDIZEM SR) - (BY MOUTH) (ENGLISH) Patient Handouts Open Reduction With Internal Fixation of a Limb: What to Expect at Home Your Recovery You can expect some pain and swelling around the cut (incision) the doctor made. This should get better within a few days after your surgery. But it is normal to have some pain for 2 to 3 weeks after surgery and mild pain for up to 6 weeks after surgery. How soon you can return to work and your normal routine depends on your job and how long it takes the bone to heal. For example, if you have a fractured leg and you sit at work, you may be able to go back in 1 to 2 weeks. But if your job requires you to walk or stand a lot, you will need to wait until your fracture has healed before you go back to work. This care sheet gives you a general idea about how long it will take for you to recover. But each person recovers at a different pace.  Follow the steps below to get better as quickly as possible. How can you care for yourself at home? Activity ? · Rest when you feel tired. Getting enough sleep will help you recover. ? · Increase your activity as recommended by your doctor. Being active boosts blood flow and helps prevent pneumonia and constipation. It is usually okay to exercise other parts of your body as soon as you feel well enough. ? · Avoid putting weight on your repaired bone until your doctor says it is okay. ? · You will probably need to take 1 to 2 weeks off from work. It depends on the type of work you do and how you feel. ? · Do not shower for 1 or 2 days after surgery. When you shower, keep your dressing and incisions dry. If you have a cast, tape a sheet of plastic to cover it so that it does not get wet. It may help to sit on a shower stool. ? · Do not take a bath, swim, use a hot tub, or soak your affected limb until your incision is healed. This usually takes 1 to 2 weeks. Diet ? · You can eat your normal diet. If your stomach is upset, try bland, low-fat foods like plain rice, broiled chicken, toast, and yogurt. Medicines ? · Your doctor will tell you if and when you can restart your medicines. He or she will also give you instructions about taking any new medicines. ? · If you take blood thinners, such as warfarin (Coumadin), clopidogrel (Plavix), or aspirin, be sure to talk to your doctor. He or she will tell you if and when to start taking those medicines again. Make sure that you understand exactly what your doctor wants you to do. ? · Take pain medicines exactly as directed. ¨ If the doctor gave you a prescription medicine for pain, take it as prescribed. ¨ If you are not taking a prescription pain medicine, ask your doctor if you can take an over-the-counter medicine.   
? · If you think your pain medicine is making you sick to your stomach: 
 ¨ Take your medicine after meals (unless your doctor has told you not to). ¨ Ask your doctor for a different pain medicine. ? · If your doctor prescribed antibiotics, take them as directed. Do not stop taking them just because you feel better. You need to take the full course of antibiotics. Incision care ? · If you have strips of tape on the incision, leave the tape on for a week or until it falls off.  
? · If you do not have a cast, clean the incision 2 times a day after your doctor allows you to remove the bandage. Use only soap and water to clean the incision unless your doctor gives you different instructions. Don't use hydrogen peroxide or alcohol, which can slow healing. Exercise ? · Do exercises as instructed by your doctor or physical therapist. These exercises will help keep your muscles strong and your joints flexible while your bone is healing. ? · Wiggle your fingers or toes on the injured arm or leg often. This helps reduce swelling and stiffness. Ice and elevation ? · Prop up the injured arm or leg on a pillow when you ice it or anytime you sit or lie down during the first 1 to 2 weeks after your surgery. Try to keep it above the level of your heart. This will help reduce swelling and pain. Other instructions ? · If you have a cast or splint: 
¨ Keep it dry. ¨ If you have a removable splint, ask your doctor if it is okay to take it off to bathe. Your doctor may want you to keep it on as much as possible. Be careful not to put the splint on too tight. ¨ Do not stick objects such as pencils or coat hangers in your cast or splint to scratch your skin. ¨ Do not put powder into your cast or splint to relieve itchy skin. ¨ Never cut or alter your cast or splint. Follow-up care is a key part of your treatment and safety. Be sure to make and go to all appointments, and call your doctor if you are having problems.  It's also a good idea to know your test results and keep a list of the medicines you take. When should you call for help? Call 911 anytime you think you may need emergency care. For example, call if: 
? · You passed out (lost consciousness). ? · You have severe trouble breathing. ? · You have sudden chest pain and shortness of breath, or you cough up blood. ?Call your doctor now or seek immediate medical care if: 
? · You have pain that does not get better after you take pain medicine. ? · Your fingers or toes on the injured arm or leg are cool, pale, or change color. ? · You have tingling or numbness in your fingers or toes. ? · You cannot move your fingers or toes. ? · Your cast or splint feels too tight. ? · The skin under your cast or splint is burning or stinging. ? · You have signs of infection, such as: 
¨ Increased pain, swelling, warmth, or redness. ¨ Red streaks leading from the incision. ¨ Pus draining from the incision. ¨ A fever. ? · You have drainage or a bad smell coming from the cast or splint. ? · You have signs of a blood clot, such as: 
¨ Pain in your calf, back of the knee, thigh, or groin. ¨ Redness and swelling in your leg or groin. ? Watch closely for any changes in your health, and be sure to contact your doctor if: 
? · You have any problems with your cast or splint. Where can you learn more? Go to http://cristina-mignon.info/. Enter F467 in the search box to learn more about \"Open Reduction With Internal Fixation of a Limb: What to Expect at Home. \" Current as of: March 21, 2017 Content Version: 11.4 © 9239-4655 Legions. Care instructions adapted under license by [employer]ideMail (which disclaims liability or warranty for this information). If you have questions about a medical condition or this instruction, always ask your healthcare professional. [place] [address] any warranty or liability for your use of this information. Learning About Meal Planning for Diabetes Why plan your meals? Meal planning can be a key part of managing diabetes. Planning meals and snacks with the right balance of carbohydrate, protein, and fat can help you keep your blood sugar at the target level you set with your doctor. You don't have to eat special foods. You can eat what your family eats, including sweets once in a while. But you do have to pay attention to how often you eat and how much you eat of certain foods. You may want to work with a dietitian or a certified diabetes educator. He or she can give you tips and meal ideas and can answer your questions about meal planning. This health professional can also help you reach a healthy weight if that is one of your goals. What plan is right for you? Your dietitian or diabetes educator may suggest that you start with the plate format or carbohydrate counting. The plate format The plate format is a simple way to help you manage how you eat. You plan meals by learning how much space each food should take on a plate. Using the plate format helps you spread carbohydrate throughout the day. It can make it easier to keep your blood sugar level within your target range. It also helps you see if you're eating healthy portion sizes. To use the plate format, you put non-starchy vegetables on half your plate. Add meat or meat substitutes on one-quarter of the plate. Put a grain or starchy vegetable (such as brown rice or a potato) on the final quarter of the plate. You can add a small piece of fruit and some low-fat or fat-free milk or yogurt, depending on your carbohydrate goal for each meal. 
Here are some tips for using the plate format: · Make sure that you are not using an oversized plate. A 9-inch plate is best. Many restaurants use larger plates. · Get used to using the plate format at home. Then you can use it when you eat out. · Write down your questions about using the plate format.  Talk to your doctor, a dietitian, or a diabetes educator about your concerns. Carbohydrate counting With carbohydrate counting, you plan meals based on the amount of carbohydrate in each food. Carbohydrate raises blood sugar higher and more quickly than any other nutrient. It is found in desserts, breads and cereals, and fruit. It's also found in starchy vegetables such as potatoes and corn, grains such as rice and pasta, and milk and yogurt. Spreading carbohydrate throughout the day helps keep your blood sugar levels within your target range. Your daily amount depends on several things, including your weight, how active you are, which diabetes medicines you take, and what your goals are for your blood sugar levels. A registered dietitian or diabetes educator can help you plan how much carbohydrate to include in each meal and snack. A guideline for your daily amount of carbohydrate is: · 45 to 60 grams at each meal. That's about the same as 3 to 4 carbohydrate servings. · 15 to 20 grams at each snack. That's about the same as 1 carbohydrate serving. The Nutrition Facts label on packaged foods tells you how much carbohydrate is in a serving of the food. First, look at the serving size on the food label. Is that the amount you eat in a serving? All of the nutrition information on a food label is based on that serving size. So if you eat more or less than that, you'll need to adjust the other numbers. Total carbohydrate is the next thing you need to look for on the label. If you count carbohydrate servings, one serving of carbohydrate is 15 grams. For foods that don't come with labels, such as fresh fruits and vegetables, you'll need a guide that lists carbohydrate in these foods. Ask your doctor, dietitian, or diabetes educator about books or other nutrition guides you can use.  
If you take insulin, you need to know how many grams of carbohydrate are in a meal. This lets you know how much rapid-acting insulin to take before you eat. If you use an insulin pump, you get a constant rate of insulin during the day. So the pump must be programmed at meals to give you extra insulin to cover the rise in blood sugar after meals. When you know how much carbohydrate you will eat, you can take the right amount of insulin. Or, if you always use the same amount of insulin, you need to make sure that you eat the same amount of carbohydrate at meals. If you need more help to understand carbohydrate counting and food labels, ask your doctor, dietitian, or diabetes educator. How do you get started with meal planning? Here are some tips to get started: 
· Plan your meals a week at a time. Don't forget to include snacks too. · Use cookbooks or online recipes to plan several main meals. Plan some quick meals for busy nights. You also can double some recipes that freeze well. Then you can save half for other busy nights when you don't have time to cook. · Make sure you have the ingredients you need for your recipes. If you're running low on basic items, put these items on your shopping list too. · List foods that you use to make breakfasts, lunches, and snacks. List plenty of fruits and vegetables. · Post this list on the refrigerator. Add to it as you think of more things you need. · Take the list to the store to do your weekly shopping. Follow-up care is a key part of your treatment and safety. Be sure to make and go to all appointments, and call your doctor if you are having problems. It's also a good idea to know your test results and keep a list of the medicines you take. Where can you learn more? Go to http://cristina-mignon.info/. Guzman Murguia in the search box to learn more about \"Learning About Meal Planning for Diabetes. \" Current as of: March 13, 2017 Content Version: 11.4 © 7696-9132 [employer].  Care instructions adapted under license by [address] (which disclaims liability or warranty for this information). If you have questions about a medical condition or this instruction, always ask your healthcare professional. [address] any warranty or liability for your use of this information. Oxycodone, Rapid Release (ETH-Oxydose, Oxy IR, Roxicodone) - (By mouth) Why this medicine is used:  
Treats moderate to severe pain. This medicine is a narcotic pain reliever. Contact a nurse or doctor right away if you have: 
· Fast or slow heart beat, shallow breathing, blue lips, skin or fingernails · Anxiety, restlessness, fever, sweating, muscle spasms, twitching, seeing or hearing things that are not there · Extreme weakness, shallow breathing, slow heartbeat · Severe confusion, lightheadedness, dizziness, fainting · Sweating or cold, clammy skin, seizures · Severe constipation, stomach pain, nausea, vomiting Common side effects: · Mild constipation · Sleepiness, tiredness © 2017 [facility] Information is for End User's use only and may not be sold, redistributed or otherwise used for commercial purposes. Enoxaparin (By injection) Enoxaparin (ee-nox-a-PAR-in) Prevents and treats blood clots. Also treats heart attacks. This medicine is a blood thinner. Brand Name(s): Amerinet Choice Enoxaparin Sodium, Enoxaparin Sodium Novaplus, Lovenox, PremierPro Rx Lovenox There may be other brand names for this medicine. When This Medicine Should Not Be Used: You should not use this medicine if you have had an allergic reaction to enoxaparin, heparin, benzyl alcohol, or products made from pork. You should not use enoxaparin if you have bleeding disorders or any active bleeding. How to Use This Medicine:  
Injectable · Your doctor will prescribe your exact dose and tell you how often it should be given. This medicine is given as a shot under your skin. · A nurse or other health provider will give you this medicine. It may also be given by a home health caregiver. · You may be taught how to give your medicine at home. Make sure you understand all instructions before giving yourself an injection. Do not use more medicine or use it more often than your doctor tells you to. · You will be shown the body areas where this shot can be given. Use a different body area each time you give yourself a shot. Keep track of where you give each shot to make sure you rotate body areas. · Use a new needle and syringe each time you inject your medicine. If a dose is missed:  
· You must use this medicine on a fixed schedule. Call your doctor or pharmacist if you miss a dose. How to Store and Dispose of This Medicine: · If you store this medicine at home, keep it at room temperature, away from heat and direct light. · If you were given a bottle of medicine to use with your syringes, you must use the medicine within 28 days after the first shot. Throw away the unused medicine in the bottle after 28 days. · Throw away used needles in a hard, closed container that the needles cannot poke through. Keep this container away from children and pets. · Ask your pharmacist, doctor, or health caregiver about the best way to dispose of any leftover medicine, containers, and other supplies. Throw away old medicine after the expiration date has passed. · Keep all medicine out of the reach of children. Never share your medicine with anyone. Drugs and Foods to Avoid: Ask your doctor or pharmacist before using any other medicine, including over-the-counter medicines, vitamins, and herbal products. · Make sure your doctor knows if you are also using blood thinners (such as clopidogrel, warfarin, or Coumadin®). Tell your doctor if you are also using dipyridamole (Persantine®), ketorolac (Toradol®), or sulfinpyrazone (Anturane®). · Make sure your doctor knows if you are using pain or arthritis medicine (such as aspirin, Advil®, Aleve®, Motrin®, Orudis®, Dolobid®, West ashly, Indocin®, Relafen®, or Voltaren®). Avoid taking aspirin or medicines that contain aspirin, unless your doctor tells you to. Warnings While Using This Medicine: · Make sure your doctor knows if you are pregnant or breastfeeding, or if you have liver disease, kidney disease, blood vessel problems, diabetes, a heart infection, uncontrolled high blood pressure, a stomach ulcer or bleeding, or a bleeding disorder such as hemophilia. Tell your doctor if you have a bleeding disorder caused by heparin. · Make sure your doctor knows if you have recently had a stroke, or surgery on your eyes, brain, or spine. Tell your doctor if you have had a heart valve replaced. · This medicine may cause bleeding or bruising. This risk is higher if you have a catheter in your back for pain medicine or anesthesia (sometimes called an \"epidural\"), or if you have kidney problems. The risk of bleeding increases if your kidney problems get worse. Discuss this with your doctor if you are concerned. · You may bleed and bruise more easily while you are using this medicine. Be extra careful to avoid injuries until the effects of the medicine have worn off. Stay away from rough sports or other situations where you could be bruised, cut, or injured. Brush and floss your teeth gently. Be careful when using sharp objects, including razors and fingernail clippers. Avoid picking your nose. If you need to blow your nose, blow it gently. · Watch for any bleeding from open areas such as around the injection site. Also check for blood in your urine or stool. If you have any bleeding or injuries, tell your doctor right away. · Tell any doctor or dentist who treats you that you are using this medicine. You may need to stop using this medicine several days before you have surgery or medical tests. · Your doctor will do lab tests at regular visits to check on the effects of this medicine. Keep all appointments. Possible Side Effects While Using This Medicine:  
Call your doctor right away if you notice any of these side effects: · Allergic reaction: Itching or hives, swelling in your face or hands, swelling or tingling in your mouth or throat, chest tightness, trouble breathing · Blood in your urine. · Bloody or black, tarry stools. · Chest pain, shortness of breath, or coughing up blood. · Fever. · Large, flat, blue or purplish patches in the skin. · Numbness or weakness in your arm or leg, or on one side of your body. · Pain in your lower leg (calf). · Sudden or severe headache, problems with vision, speech, or walking. · Swelling in your hands, ankles, or feet. · Uneven heartbeat. · Unusual bleeding or bruising. · Vomiting blood or material that looks like coffee grounds. · Warmth or redness in your face, neck, arms, or upper chest. 
If you notice these less serious side effects, talk with your doctor: · Confusion. · Nausea or diarrhea. · Pain, redness, bruising, swelling, or a lump under your skin where the shot was given. If you notice other side effects that you think are caused by this medicine, tell your doctor. Call your doctor for medical advice about side effects. You may report side effects to FDA at 6-862-FDA-8779 © 2017 [facility] Information is for End User's use only and may not be sold, redistributed or otherwise used for commercial purposes. The above information is an  only. It is not intended as medical advice for individual conditions or treatments. Talk to your doctor, nurse or pharmacist before following any medical regimen to see if it is safe and effective for you. Diltiazem (Cardizem, Cardizem CD, Cardizem LA, Cardizem SR) - (By mouth) Why this medicine is used:  
Treats high blood pressure and angina (chest pain). Contact a nurse or doctor right away if you have: 
· Fast, slow, uneven, or pounding heartbeat · Rapid weight gain; swelling in your hands, ankles, or feet · Dark urine or pale stools · Nausea, vomiting, loss of appetite, stomach pain, · Yellow skin or eyes Common side effects: 
· Dizziness · Tiredness © 2017 [facility] Information is for End User's use only and may not be sold, redistributed or otherwise used for commercial purposes. Please provide this summary of care documentation to your next provider. Signatures-by signing, you are acknowledging that this After Visit Summary has been reviewed with you and you have received a copy. Patient Signature:  ____________________________________________________________ Date:  ____________________________________________________________  
  
Ivana Henderson Provider Signature:  ____________________________________________________________ Date:  ____________________________________________________________
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
## 2018-06-10 NOTE — IP AVS SNAPSHOT
[address] [place] [address] Patient: Keith Bartlett MRN: [id number] UCP:1/32/8915 A check chey indicates which time of day the medication should be taken. My Medications START taking these medications Instructions Each Dose to Equal  
 Morning Noon Evening Bedtime  
 dilTIAZem  mg ER capsule Commonly known as:  CARDIZEM CD Your last dose was:  6/18/2018 @ 8:00 a.m. Take 1 Cap by mouth daily. 120 mg  
    
   
   
6/18/2018 @ 5:00 p.m.  
   
  
 docusate sodium 100 mg capsule Commonly known as:  Aminta Haro Your last dose was:  6/18/2018 @ 8:00 a.m. Take 1 Cap by mouth two (2) times a day for 90 days. 100 mg  
    
   
   
6/18/2018 @ 5:00 p.m. * enoxaparin 30 mg/0.3 mL injection Commonly known as:  LOVENOX  
   
 0.3 mL by SubCUTAneous route daily. Indications: DEEP VEIN THROMBOSIS PREVENTION 30 mg  
    
   
   
   
  
 * enoxaparin 30 mg/0.3 mL injection Commonly known as:  LOVENOX  
   
 0.3 mL by SubCUTAneous route daily. Indications: DEEP VEIN THROMBOSIS PREVENTION 30 mg  
    
   
   
   
  
 oxyCODONE IR 5 mg immediate release tablet Commonly known as:  Claressa Dills Your last dose was:  6/18/2018 @ 7:08 a.m. Take 1 Tab by mouth every four (4) hours as needed. Max Daily Amount: 30 mg.  
 5 mg * Notice: This list has 2 medication(s) that are the same as other medications prescribed for you. Read the directions carefully, and ask your doctor or other care provider to review them with you. CHANGE how you take these medications Instructions Each Dose to Equal  
 Morning Noon Evening Bedtime  
 insulin glargine 100 unit/mL injection Commonly known as:  LANTUS U-100 INSULIN What changed:   
- how to take this - when to take this 
- additional instructions Your last dose was:  6/18/2018 @ 8:00 a.m. Take 15 units every morning  Indications: type 2 diabetes mellitus  
     
   
   
   
  
 levothyroxine 75 mcg tablet Commonly known as:  SYNTHROID What changed:  how much to take Your last dose was:  6/18/2018 before breakfast.   
   
 Take 1 Tab by mouth Daily (before breakfast). 75 mcg 6/19/2018 before breakfast.   
   
   
   
  
  
CONTINUE taking these medications Instructions Each Dose to Equal  
 Morning Noon Evening Bedtime  
 aspirin delayed-release 81 mg tablet Take 81 mg by mouth daily. 81 mg  
    
6/19/2018 
  
   
   
   
  
 capsaicin 0.075 % topical cream  
   
 Apply  to affected area three (3) times daily. cyanocobalamin ER 1,000 mcg tablet Take 1 Tab by mouth daily. 1000 mcg FISH OIL PO Take 1,000 mg by mouth two (2) times a day. 1000 mg  
    
   
   
   
  
 furosemide 20 mg tablet Commonly known as:  LASIX Your last dose was:  6/18/2018 @ 8:00 a.m. Use daily as needed for leg swelling 6/19/2018 use daily as needed. * lidocaine 5 % Commonly known as:  LIDODERM  
   
 1 Patch by TransDERmal route every twenty-four (24) hours. 1 Patch * lidocaine 5 % Commonly known as:  LIDODERM  
   
 APPLY 3 PATCHES TO AFFECTED AREA(S) EVERY 24 HOURS FOR 30 DAYS. WEAR FOR 12 HOURS THEN REMOVE FOR 12 HOURS.  
     
   
   
   
  
 montelukast 10 mg tablet Commonly known as:  SINGULAIR Take 1 Tab by mouth daily as needed. Indications: ALLERGIC RHINITIS 10 mg PEPCID 20 mg tablet Generic drug:  famotidine Take 20 mg by mouth as needed. 20 mg  
    
   
   
   
  
 PROAIR HFA 90 mcg/actuation inhaler Generic drug:  albuterol INHALE 1 PUFF BY MOUTH EVERY 4 HOURS AS NEEDED FOR WHEEZING OR SHORTNESS OF BREATH  
     
   
   
   
  
 TYLENOL ARTHRITIS PAIN 650 mg Tber Generic drug:  acetaminophen Take 650 mg by mouth every eight (8) hours. 650 mg  
    
   
   
   
  
 VITAMIN D3 1,000 unit tablet Generic drug:  cholecalciferol Your last dose was:  6/18/2018 @ 8:00 a. .m. Take 5,000 Units by mouth two (2) times a day. 5000 Units 6/18/2018 @ 5:00 p.m. * Notice: This list has 2 medication(s) that are the same as other medications prescribed for you. Read the directions carefully, and ask your doctor or other care provider to review them with you. STOP taking these medications   
 amLODIPine 10 mg tablet Commonly known as:  NORVASC  
   
  
 clopidogrel 75 mg Tab Commonly known as:  PLAVIX Where to Get Your Medications These medications were sent to [address], [address] [place] [id number] Phone:  [phone]  
  docusate sodium 100 mg capsule  
 enoxaparin 30 mg/0.3 mL injection Information on where to get these meds will be given to you by the nurse or doctor. ! Ask your nurse or doctor about these medications  
  dilTIAZem  mg ER capsule  
 enoxaparin 30 mg/0.3 mL injection  
 oxyCODONE IR 5 mg immediate release tablet

## 2018-06-10 NOTE — ED NOTES
TRANSFER - OUT REPORT:    Verbal report given to Dorothy Cook RN (name) on León Fulton  being transferred to [facility] (unit) for routine progression of care       Report consisted of patients Situation, Background, Assessment and   Recommendations(SBAR). Information from the following report(s) SBAR, ED Summary, [facility] and Recent Results was reviewed with the receiving nurse. Lines:   Peripheral IV 06/10/18 Left Hand (Active)   Site Assessment Clean, dry, & intact 6/10/2018  3:14 PM   Phlebitis Assessment 0 6/10/2018  3:14 PM   Infiltration Assessment 0 6/10/2018  3:14 PM   Dressing Status Clean, dry, & intact 6/10/2018  3:14 PM   Dressing Type Transparent 6/10/2018  3:14 PM        Opportunity for questions and clarification was provided.       Patient transported with:
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
## 2018-06-10 NOTE — CONSULTS
83 yo female with L periprosthetic distal femur fracture just above L TKR  Insulin dependent diabetic with HGA1C 8.9 2/27/18  CAD + stent  Hold Plavix and ASA for ORIF Tuesday 6/12/18  Admit to hospitalist service  Full note to follow

## 2018-06-10 NOTE — ED TRIAGE NOTES
Pt brought in via EMS c/o a fall that caused left knee trauma with deformity. Pt was in kitchen when she slipped on some liquid. Pt denies hip pain.

## 2018-06-11 ENCOUNTER — ANESTHESIA EVENT (OUTPATIENT)
Dept: SURGERY | Age: 83
DRG: 480 | End: 2018-06-11
Payer: MEDICARE

## 2018-06-11 LAB
ANION GAP SERPL CALC-SCNC: 6 MMOL/L (ref 3–18)
ATRIAL RATE: 105 BPM
BNP SERPL-MCNC: 38 PG/ML (ref 0–1800)
BUN SERPL-MCNC: 13 MG/DL (ref 7–18)
BUN/CREAT SERPL: 15 (ref 12–20)
CALCIUM SERPL-MCNC: 8.3 MG/DL (ref 8.5–10.1)
CALCULATED P AXIS, ECG09: 67 DEGREES
CALCULATED R AXIS, ECG10: -8 DEGREES
CALCULATED T AXIS, ECG11: 24 DEGREES
CHLORIDE SERPL-SCNC: 110 MMOL/L (ref 100–108)
CK MB CFR SERPL CALC: NORMAL % (ref 0–4)
CK MB CFR SERPL CALC: NORMAL % (ref 0–4)
CK MB SERPL-MCNC: <1 NG/ML (ref 5–25)
CK MB SERPL-MCNC: <1 NG/ML (ref 5–25)
CK SERPL-CCNC: 56 U/L (ref 26–192)
CK SERPL-CCNC: 57 U/L (ref 26–192)
CO2 SERPL-SCNC: 27 MMOL/L (ref 21–32)
CREAT SERPL-MCNC: 0.85 MG/DL (ref 0.6–1.3)
DIAGNOSIS, 93000: NORMAL
ERYTHROCYTE [DISTWIDTH] IN BLOOD BY AUTOMATED COUNT: 12.2 % (ref 11.6–14.5)
GLUCOSE BLD STRIP.AUTO-MCNC: 107 MG/DL (ref 70–110)
GLUCOSE BLD STRIP.AUTO-MCNC: 110 MG/DL (ref 70–110)
GLUCOSE BLD STRIP.AUTO-MCNC: 124 MG/DL (ref 70–110)
GLUCOSE BLD STRIP.AUTO-MCNC: 125 MG/DL (ref 70–110)
GLUCOSE SERPL-MCNC: 127 MG/DL (ref 74–99)
HCT VFR BLD AUTO: 30.5 % (ref 35–45)
HGB BLD-MCNC: 9.9 G/DL (ref 12–16)
MCH RBC QN AUTO: 30.7 PG (ref 24–34)
MCHC RBC AUTO-ENTMCNC: 32.5 G/DL (ref 31–37)
MCV RBC AUTO: 94.4 FL (ref 74–97)
P-R INTERVAL, ECG05: 122 MS
PLATELET # BLD AUTO: 206 K/UL (ref 135–420)
PMV BLD AUTO: 10.2 FL (ref 9.2–11.8)
POTASSIUM SERPL-SCNC: 3.5 MMOL/L (ref 3.5–5.5)
Q-T INTERVAL, ECG07: 346 MS
QRS DURATION, ECG06: 80 MS
QTC CALCULATION (BEZET), ECG08: 457 MS
RBC # BLD AUTO: 3.23 M/UL (ref 4.2–5.3)
SODIUM SERPL-SCNC: 143 MMOL/L (ref 136–145)
TROPONIN I SERPL-MCNC: <0.02 NG/ML (ref 0–0.04)
TROPONIN I SERPL-MCNC: <0.02 NG/ML (ref 0–0.04)
VENTRICULAR RATE, ECG03: 105 BPM
WBC # BLD AUTO: 6 K/UL (ref 4.6–13.2)

## 2018-06-11 PROCEDURE — 85027 COMPLETE CBC AUTOMATED: CPT | Performed by: INTERNAL MEDICINE

## 2018-06-11 PROCEDURE — 80048 BASIC METABOLIC PNL TOTAL CA: CPT | Performed by: INTERNAL MEDICINE

## 2018-06-11 PROCEDURE — 83880 ASSAY OF NATRIURETIC PEPTIDE: CPT | Performed by: NURSE PRACTITIONER

## 2018-06-11 PROCEDURE — 74011250637 HC RX REV CODE- 250/637: Performed by: NURSE PRACTITIONER

## 2018-06-11 PROCEDURE — 36415 COLL VENOUS BLD VENIPUNCTURE: CPT | Performed by: INTERNAL MEDICINE

## 2018-06-11 PROCEDURE — 74011636637 HC RX REV CODE- 636/637: Performed by: INTERNAL MEDICINE

## 2018-06-11 PROCEDURE — 82962 GLUCOSE BLOOD TEST: CPT

## 2018-06-11 PROCEDURE — 74011250637 HC RX REV CODE- 250/637: Performed by: INTERNAL MEDICINE

## 2018-06-11 PROCEDURE — 82550 ASSAY OF CK (CPK): CPT | Performed by: NURSE PRACTITIONER

## 2018-06-11 PROCEDURE — C8929 TTE W OR WO FOL WCON,DOPPLER: HCPCS

## 2018-06-11 PROCEDURE — 74011250636 HC RX REV CODE- 250/636: Performed by: INTERNAL MEDICINE

## 2018-06-11 PROCEDURE — 65270000029 HC RM PRIVATE

## 2018-06-11 PROCEDURE — 93005 ELECTROCARDIOGRAM TRACING: CPT

## 2018-06-11 RX ORDER — POTASSIUM CHLORIDE 20 MEQ/1
40 TABLET, EXTENDED RELEASE ORAL
Status: COMPLETED | OUTPATIENT
Start: 2018-06-11 | End: 2018-06-11

## 2018-06-11 RX ORDER — FUROSEMIDE 40 MG/1
40 TABLET ORAL DAILY
Status: DISCONTINUED | OUTPATIENT
Start: 2018-06-12 | End: 2018-06-11

## 2018-06-11 RX ORDER — LANOLIN ALCOHOL/MO/W.PET/CERES
500 CREAM (GRAM) TOPICAL DAILY
Status: DISCONTINUED | OUTPATIENT
Start: 2018-06-11 | End: 2018-06-18 | Stop reason: HOSPADM

## 2018-06-11 RX ORDER — CEFAZOLIN SODIUM 2 G/50ML
2 SOLUTION INTRAVENOUS
Status: COMPLETED | OUTPATIENT
Start: 2018-06-12 | End: 2018-06-12

## 2018-06-11 RX ORDER — AMLODIPINE BESYLATE 2.5 MG/1
2.5 TABLET ORAL DAILY
Status: DISCONTINUED | OUTPATIENT
Start: 2018-06-12 | End: 2018-06-15

## 2018-06-11 RX ORDER — MELATONIN
5000 DAILY
Status: DISCONTINUED | OUTPATIENT
Start: 2018-06-11 | End: 2018-06-18 | Stop reason: HOSPADM

## 2018-06-11 RX ORDER — FUROSEMIDE 20 MG/1
20 TABLET ORAL DAILY
Status: DISCONTINUED | OUTPATIENT
Start: 2018-06-12 | End: 2018-06-18 | Stop reason: HOSPADM

## 2018-06-11 RX ORDER — TRAMADOL HYDROCHLORIDE 50 MG/1
50 TABLET ORAL
Status: DISCONTINUED | OUTPATIENT
Start: 2018-06-11 | End: 2018-06-12 | Stop reason: SDUPTHER

## 2018-06-11 RX ORDER — FUROSEMIDE 20 MG/1
20 TABLET ORAL ONCE
Status: COMPLETED | OUTPATIENT
Start: 2018-06-11 | End: 2018-06-11

## 2018-06-11 RX ADMIN — PERFLUTREN 2 ML: 6.52 INJECTION, SUSPENSION INTRAVENOUS at 15:32

## 2018-06-11 RX ADMIN — INSULIN GLARGINE 30 UNITS: 100 INJECTION, SOLUTION SUBCUTANEOUS at 08:33

## 2018-06-11 RX ADMIN — Medication 500 MCG: at 16:12

## 2018-06-11 RX ADMIN — INSULIN GLARGINE 30 UNITS: 100 INJECTION, SOLUTION SUBCUTANEOUS at 18:26

## 2018-06-11 RX ADMIN — POTASSIUM CHLORIDE 40 MEQ: 1500 TABLET, FILM COATED, EXTENDED RELEASE ORAL at 10:28

## 2018-06-11 RX ADMIN — MONTELUKAST SODIUM 10 MG: 10 TABLET, FILM COATED ORAL at 21:12

## 2018-06-11 RX ADMIN — ONDANSETRON 4 MG: 2 INJECTION INTRAMUSCULAR; INTRAVENOUS at 10:54

## 2018-06-11 RX ADMIN — TRAMADOL HYDROCHLORIDE 50 MG: 50 TABLET, FILM COATED ORAL at 21:12

## 2018-06-11 RX ADMIN — HEPARIN SODIUM 5000 UNITS: 5000 INJECTION, SOLUTION INTRAVENOUS; SUBCUTANEOUS at 03:38

## 2018-06-11 RX ADMIN — LEVOTHYROXINE SODIUM 125 MCG: 25 TABLET ORAL at 08:28

## 2018-06-11 RX ADMIN — HEPARIN SODIUM 5000 UNITS: 5000 INJECTION, SOLUTION INTRAVENOUS; SUBCUTANEOUS at 18:27

## 2018-06-11 RX ADMIN — TRAMADOL HYDROCHLORIDE 50 MG: 50 TABLET, FILM COATED ORAL at 12:25

## 2018-06-11 RX ADMIN — Medication 1 MG: at 10:28

## 2018-06-11 RX ADMIN — Medication 1 MG: at 16:11

## 2018-06-11 RX ADMIN — AMLODIPINE BESYLATE 10 MG: 10 TABLET ORAL at 08:30

## 2018-06-11 RX ADMIN — VITAMIN D, TAB 1000IU (100/BT) 5000 UNITS: 25 TAB at 16:12

## 2018-06-11 RX ADMIN — HEPARIN SODIUM 5000 UNITS: 5000 INJECTION, SOLUTION INTRAVENOUS; SUBCUTANEOUS at 10:28

## 2018-06-11 RX ADMIN — FUROSEMIDE 20 MG: 20 TABLET ORAL at 16:12

## 2018-06-11 NOTE — ROUTINE PROCESS
Bedside and Verbal shift change report given to Oswaldo Cee RN (oncoming nurse) by Pop Vasquez RN (offgoing nurse). Report included the following information SBAR, Kardex, Intake/Output and MAR.
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
## 2018-06-11 NOTE — PROGRESS NOTES
Echocardiogram with definity completed by Jaret Lazcano. Report to follow. Patient to be transported back to room.

## 2018-06-11 NOTE — PROGRESS NOTES
Report given by Shae Candelaria RN regarding patients current medical situations for continuations of care.

## 2018-06-11 NOTE — ROUTINE PROCESS
Bedside and Verbal shift change report given to Oswaldo Cee RN (oncoming nurse) by Kaylee Keen RN (offgoing nurse). Report included the following information SBAR, Kardex, Intake/Output and MAR.

## 2018-06-11 NOTE — PROGRESS NOTES
[facility] Hospitalist Group  Progress Note    Patient: Tori Arzate Age: 82 y.o. : 1935 MR#: [id number] SSN: [id number]  Date/Time: 2018     Subjective:     Review of systems    No CP   NO NVD - improved since admission    NO Cough     Progressive SOB developed even before fall       Assessment/Plan:   1. S/p Left femur fracture / following fall  - followed by ortho   2 Chronic systolic heart failure   3 HTN   4 DM2  5 hypothyroid   6 morbid obesity - Body mass index is 40.25 kg/(m^2). PLAN   - Last ECHO reviewed - \"Systolic function was mildly reduced. Ejection fraction  was estimated in the range of 45 % to 50 %. There was mild diffuse  hypokinesis. Wall thickness was mildly to moderately increased. There was  mild concentric hypertrophy. Doppler parameters were consistent with  abnormal left ventricular relaxation (grade 1 diastolic dysfunction\"    - Follow ECG - no admission ECG     - Consult cardiology - cardiology clearance     - C/o SOB or decrease ET - chest xray no acute findings reported , follow ECHO     - For surgery in AM     - [facility]     Continue home meds       Case discussed with:  [x]Patient  []Family  []Nursing  []Case Management  DVT Prophylaxis:  []Lovenox  []Hep SQ  []SCDs  []Coumadin   []On Heparin gtt          Objective:   VS:   Visit Vitals    /66    Pulse 100    Temp 97.6 °F (36.4 °C)    Resp 24    Wt 116.6 kg (257 lb)    SpO2 93%    BMI 40.25 kg/m2      Tmax/24hrs: Temp (24hrs), Av.7 °F (36.5 °C), Min:97 °F (36.1 °C), Max:98.1 °F (36.7 °C)  IOBRIEF  Intake/Output Summary (Last 24 hours) at 18 0925  Last data filed at 18 0618   Gross per 24 hour   Intake              140 ml   Output              300 ml   Net             -160 ml       General:  Alert, cooperative, no acute distress   HEENT:  NC, Atraumatic. PERRLA, anicteric sclerae. Pulmonary:  Bilateral air entry present .  No Wheezing/Rhonchi/Rales. Cardiovascular: Regular rate and Rhythm. GI:  Soft, Non distended, Non tender. + Bowel sounds. Extremities:  Left LL cast   Psych:  Not anxious or agitated. Neurologic: Grossly - Motor and Sensory functions are intact .       Medications:   Current Facility-Administered Medications   Medication Dose Route Frequency    potassium chloride (K-DUR, KLOR-CON) SR tablet 40 mEq  40 mEq Oral NOW    [START ON 6/12/2018] vancomycin (VANCOCIN) 1,000 mg in 0.9% sodium chloride (MBP/ADV) 250 mL adv  1,000 mg IntraVENous ONCE    [START ON 6/12/2018] ceFAZolin (ANCEF) 1 g in sterile water (preservative free) 10 mL IV syringe  1 g IntraVENous ON CALL TO OR    amLODIPine (NORVASC) tablet 10 mg  10 mg Oral DAILY    cholecalciferol (VITAMIN D3) tablet 5,000 Units (Patient Supplied)  5,000 Units Oral BID    cyanocobalamin ER tablet 1,000 mcg (Patient Supplied)  1,000 mcg Oral DAILY    famotidine (PEPCID) tablet 20 mg  20 mg Oral DAILY PRN    furosemide (LASIX) tablet 20 mg  20 mg Oral DAILY    insulin glargine (LANTUS) injection 30 Units  30 Units SubCUTAneous BID    levothyroxine (SYNTHROID) tablet 125 mcg  125 mcg Oral ACB    montelukast (SINGULAIR) tablet 10 mg  10 mg Oral QHS    ibuprofen (MOTRIN) tablet 400 mg  400 mg Oral Q4H PRN    traMADol (ULTRAM) tablet 50 mg  50 mg Oral Q6H PRN    morphine injection 1 mg  1 mg IntraVENous Q4H PRN    heparin (porcine) injection 5,000 Units  5,000 Units SubCUTAneous Q8H    ondansetron (ZOFRAN) injection 4 mg  4 mg IntraVENous Q4H PRN    bisacodyl (DULCOLAX) tablet 10 mg  10 mg Oral DAILY PRN    insulin lispro (HUMALOG) injection   SubCUTAneous AC&HS    glucose chewable tablet 16 g  4 Tab Oral PRN    glucagon (GLUCAGEN) injection 1 mg  1 mg IntraMUSCular PRN    dextrose (D50W) injection syrg 12.5-25 g  25-50 mL IntraVENous PRN    albuterol (PROVENTIL VENTOLIN) nebulizer solution 2.5 mg  2.5 mg Nebulization Q6H PRN       Labs:    Recent Labs 06/11/18   0240  06/10/18   1132   WBC  6.0  7.2   HGB  9.9*  11.4*   HCT  30.5*  34.2*   PLT  206  236     Recent Labs      06/11/18   0240  06/10/18   1132   NA  143  141   K  3.5  3.2*   CL  110*  108   CO2  27  26   GLU  127*  244*   BUN  13  8   CREA  0.85  0.69   CA  8.3*  8.8   ALB   --   2.9*   SGOT   --   9*   ALT   --   12*   INR   --   1.0         Signed By: Salma Worthy MD     June 11, 2018

## 2018-06-11 NOTE — PROGRESS NOTES
Procedural:    LLE poorly fitting posterior splint. Trimmed at ankle and foot component removed    Skin intact LLE posterior / anterior from mid thigh LLE to ankle. No evidence skin breakdown no cellulitis. (+) soft tissue swelling noted circumferential left knee. Cast padding re placed LLE and posterior splint replaced with PROPERLY fitting ACE wrap.     Recommend elevation LLE to comfort

## 2018-06-11 NOTE — PROGRESS NOTES
Patients arrives via bed from ED alert awake and oriented,R leg on soft fiber glass able to wiggle foot and warm to touch. No s/s of distress or sob.

## 2018-06-11 NOTE — ROUTINE PROCESS
Bedside and Verbal shift change report given to Emili Gottlieb (oncoming nurse) by Karan Dunn (offgoing nurse).  Report included the following information SBAR, Kardex, Intake/Output, MAR and Cardiac Rhythm ST.

## 2018-06-12 ENCOUNTER — APPOINTMENT (OUTPATIENT)
Dept: GENERAL RADIOLOGY | Age: 83
DRG: 480 | End: 2018-06-12
Attending: SPECIALIST
Payer: MEDICARE

## 2018-06-12 ENCOUNTER — ANESTHESIA (OUTPATIENT)
Dept: SURGERY | Age: 83
DRG: 480 | End: 2018-06-12
Payer: MEDICARE

## 2018-06-12 LAB
GLUCOSE BLD STRIP.AUTO-MCNC: 108 MG/DL (ref 70–110)
GLUCOSE BLD STRIP.AUTO-MCNC: 123 MG/DL (ref 70–110)
GLUCOSE BLD STRIP.AUTO-MCNC: 129 MG/DL (ref 70–110)
GLUCOSE BLD STRIP.AUTO-MCNC: 189 MG/DL (ref 70–110)
GLUCOSE BLD STRIP.AUTO-MCNC: 92 MG/DL (ref 70–110)
HCT VFR BLD AUTO: 27.5 % (ref 35–45)
HGB BLD-MCNC: 8.9 G/DL (ref 12–16)

## 2018-06-12 PROCEDURE — 74011250636 HC RX REV CODE- 250/636: Performed by: SPECIALIST

## 2018-06-12 PROCEDURE — 74011250637 HC RX REV CODE- 250/637: Performed by: NURSE ANESTHETIST, CERTIFIED REGISTERED

## 2018-06-12 PROCEDURE — 74011250637 HC RX REV CODE- 250/637: Performed by: INTERNAL MEDICINE

## 2018-06-12 PROCEDURE — 36415 COLL VENOUS BLD VENIPUNCTURE: CPT | Performed by: SPECIALIST

## 2018-06-12 PROCEDURE — 76060000036 HC ANESTHESIA 2.5 TO 3 HR: Performed by: SPECIALIST

## 2018-06-12 PROCEDURE — C1713 ANCHOR/SCREW BN/BN,TIS/BN: HCPCS | Performed by: SPECIALIST

## 2018-06-12 PROCEDURE — 74011250636 HC RX REV CODE- 250/636: Performed by: NURSE ANESTHETIST, CERTIFIED REGISTERED

## 2018-06-12 PROCEDURE — 65270000029 HC RM PRIVATE

## 2018-06-12 PROCEDURE — 77030003862 HC BIT DRL SYNT -B: Performed by: SPECIALIST

## 2018-06-12 PROCEDURE — 74011000250 HC RX REV CODE- 250: Performed by: SPECIALIST

## 2018-06-12 PROCEDURE — 77030026438 HC STYL ET INTUB CARD -A: Performed by: NURSE ANESTHETIST, CERTIFIED REGISTERED

## 2018-06-12 PROCEDURE — 74011250637 HC RX REV CODE- 250/637: Performed by: SPECIALIST

## 2018-06-12 PROCEDURE — 74011250636 HC RX REV CODE- 250/636

## 2018-06-12 PROCEDURE — 74011000250 HC RX REV CODE- 250

## 2018-06-12 PROCEDURE — 77030016470 HC BIT DRL QC1 SYNT -C: Performed by: SPECIALIST

## 2018-06-12 PROCEDURE — 77030011265 HC ELECTRD BLD HEX COVD -A: Performed by: SPECIALIST

## 2018-06-12 PROCEDURE — 77030008467 HC STPLR SKN COVD -B: Performed by: SPECIALIST

## 2018-06-12 PROCEDURE — 73560 X-RAY EXAM OF KNEE 1 OR 2: CPT

## 2018-06-12 PROCEDURE — 76210000006 HC OR PH I REC 0.5 TO 1 HR: Performed by: SPECIALIST

## 2018-06-12 PROCEDURE — 74011636637 HC RX REV CODE- 636/637: Performed by: INTERNAL MEDICINE

## 2018-06-12 PROCEDURE — 76010000132 HC OR TIME 2.5 TO 3 HR: Performed by: SPECIALIST

## 2018-06-12 PROCEDURE — 74011250636 HC RX REV CODE- 250/636: Performed by: PHYSICIAN ASSISTANT

## 2018-06-12 PROCEDURE — C1769 GUIDE WIRE: HCPCS | Performed by: SPECIALIST

## 2018-06-12 PROCEDURE — 77030011640 HC PAD GRND REM COVD -A: Performed by: SPECIALIST

## 2018-06-12 PROCEDURE — 0QSC04Z REPOSITION LEFT LOWER FEMUR WITH INTERNAL FIXATION DEVICE, OPEN APPROACH: ICD-10-PCS | Performed by: SPECIALIST

## 2018-06-12 PROCEDURE — 82962 GLUCOSE BLOOD TEST: CPT

## 2018-06-12 PROCEDURE — 77030008683 HC TU ET CUF COVD -A: Performed by: NURSE ANESTHETIST, CERTIFIED REGISTERED

## 2018-06-12 PROCEDURE — 85018 HEMOGLOBIN: CPT | Performed by: SPECIALIST

## 2018-06-12 PROCEDURE — 74011000258 HC RX REV CODE- 258: Performed by: SPECIALIST

## 2018-06-12 PROCEDURE — 77030018836 HC SOL IRR NACL ICUM -A: Performed by: SPECIALIST

## 2018-06-12 PROCEDURE — 77030012012 HC AIRWY OP SFT TELE -A: Performed by: NURSE ANESTHETIST, CERTIFIED REGISTERED

## 2018-06-12 PROCEDURE — 77030020782 HC GWN BAIR PAWS FLX 3M -B: Performed by: SPECIALIST

## 2018-06-12 DEVICE — SCREW BNE L36MM DIA4.5MM PROX CORT TIB S STL ST LOK FULL: Type: IMPLANTABLE DEVICE | Site: FEMUR | Status: FUNCTIONAL

## 2018-06-12 DEVICE — IMPLANTABLE DEVICE: Type: IMPLANTABLE DEVICE | Site: FEMUR | Status: FUNCTIONAL

## 2018-06-12 DEVICE — PLATE BNE L195MM 8 H ST L CNDYL S STL CRV LOK COMPR VAR ANG: Type: IMPLANTABLE DEVICE | Site: FEMUR | Status: FUNCTIONAL

## 2018-06-12 DEVICE — SCREW BNE L40MM DIA4.5MM PROX CORT TIB S STL ST LOK FULL: Type: IMPLANTABLE DEVICE | Site: FEMUR | Status: FUNCTIONAL

## 2018-06-12 DEVICE — SCREW BNE L38MM DIA5MM CNDYL S STL ST VAR ANG LOK T25: Type: IMPLANTABLE DEVICE | Site: FEMUR | Status: FUNCTIONAL

## 2018-06-12 RX ORDER — OXYCODONE HYDROCHLORIDE 15 MG/1
TABLET ORAL
Status: DISPENSED
Start: 2018-06-12 | End: 2018-06-13

## 2018-06-12 RX ORDER — FAMOTIDINE 20 MG/1
20 TABLET, FILM COATED ORAL ONCE
Status: COMPLETED | OUTPATIENT
Start: 2018-06-12 | End: 2018-06-12

## 2018-06-12 RX ORDER — MAGNESIUM SULFATE 100 %
4 CRYSTALS MISCELLANEOUS AS NEEDED
Status: DISCONTINUED | OUTPATIENT
Start: 2018-06-12 | End: 2018-06-12 | Stop reason: HOSPADM

## 2018-06-12 RX ORDER — PHENYLEPHRINE HCL IN 0.9% NACL 1 MG/10 ML
SYRINGE (ML) INTRAVENOUS AS NEEDED
Status: DISCONTINUED | OUTPATIENT
Start: 2018-06-12 | End: 2018-06-12 | Stop reason: HOSPADM

## 2018-06-12 RX ORDER — LIDOCAINE HYDROCHLORIDE 10 MG/ML
3 INJECTION, SOLUTION EPIDURAL; INFILTRATION; INTRACAUDAL; PERINEURAL ONCE
Status: DISCONTINUED | OUTPATIENT
Start: 2018-06-12 | End: 2018-06-12 | Stop reason: HOSPADM

## 2018-06-12 RX ORDER — DEXTROSE 50 % IN WATER (D50W) INTRAVENOUS SYRINGE
25-50 AS NEEDED
Status: DISCONTINUED | OUTPATIENT
Start: 2018-06-12 | End: 2018-06-12 | Stop reason: HOSPADM

## 2018-06-12 RX ORDER — ROPIVACAINE HYDROCHLORIDE 5 MG/ML
30 INJECTION, SOLUTION EPIDURAL; INFILTRATION; PERINEURAL
Status: DISCONTINUED | OUTPATIENT
Start: 2018-06-12 | End: 2018-06-12 | Stop reason: HOSPADM

## 2018-06-12 RX ORDER — FENTANYL CITRATE 50 UG/ML
100 INJECTION, SOLUTION INTRAMUSCULAR; INTRAVENOUS ONCE
Status: DISCONTINUED | OUTPATIENT
Start: 2018-06-12 | End: 2018-06-12 | Stop reason: SDUPTHER

## 2018-06-12 RX ORDER — EPHEDRINE SULFATE/0.9% NACL/PF 25 MG/5 ML
SYRINGE (ML) INTRAVENOUS AS NEEDED
Status: DISCONTINUED | OUTPATIENT
Start: 2018-06-12 | End: 2018-06-12 | Stop reason: HOSPADM

## 2018-06-12 RX ORDER — SODIUM CHLORIDE 0.9 % (FLUSH) 0.9 %
5-10 SYRINGE (ML) INJECTION EVERY 8 HOURS
Status: DISCONTINUED | OUTPATIENT
Start: 2018-06-12 | End: 2018-06-12 | Stop reason: HOSPADM

## 2018-06-12 RX ORDER — OXYCODONE HYDROCHLORIDE 5 MG/1
5 TABLET ORAL
Status: DISCONTINUED | OUTPATIENT
Start: 2018-06-12 | End: 2018-06-12

## 2018-06-12 RX ORDER — SODIUM CHLORIDE, SODIUM LACTATE, POTASSIUM CHLORIDE, CALCIUM CHLORIDE 600; 310; 30; 20 MG/100ML; MG/100ML; MG/100ML; MG/100ML
50 INJECTION, SOLUTION INTRAVENOUS CONTINUOUS
Status: DISPENSED | OUTPATIENT
Start: 2018-06-12 | End: 2018-06-13

## 2018-06-12 RX ORDER — SUCCINYLCHOLINE CHLORIDE 20 MG/ML
INJECTION INTRAMUSCULAR; INTRAVENOUS AS NEEDED
Status: DISCONTINUED | OUTPATIENT
Start: 2018-06-12 | End: 2018-06-12 | Stop reason: HOSPADM

## 2018-06-12 RX ORDER — OXYCODONE HYDROCHLORIDE 5 MG/1
5-15 TABLET ORAL
Status: DISCONTINUED | OUTPATIENT
Start: 2018-06-12 | End: 2018-06-18 | Stop reason: HOSPADM

## 2018-06-12 RX ORDER — SODIUM CHLORIDE 0.9 % (FLUSH) 0.9 %
5-10 SYRINGE (ML) INJECTION EVERY 8 HOURS
Status: DISCONTINUED | OUTPATIENT
Start: 2018-06-12 | End: 2018-06-18 | Stop reason: HOSPADM

## 2018-06-12 RX ORDER — SODIUM CHLORIDE, SODIUM LACTATE, POTASSIUM CHLORIDE, CALCIUM CHLORIDE 600; 310; 30; 20 MG/100ML; MG/100ML; MG/100ML; MG/100ML
75 INJECTION, SOLUTION INTRAVENOUS CONTINUOUS
Status: DISCONTINUED | OUTPATIENT
Start: 2018-06-12 | End: 2018-06-12 | Stop reason: HOSPADM

## 2018-06-12 RX ORDER — MORPHINE SULFATE 8 MG/ML
INJECTION, SOLUTION INTRAMUSCULAR; INTRAVENOUS AS NEEDED
Status: DISCONTINUED | OUTPATIENT
Start: 2018-06-12 | End: 2018-06-12 | Stop reason: HOSPADM

## 2018-06-12 RX ORDER — ROCURONIUM BROMIDE 10 MG/ML
INJECTION, SOLUTION INTRAVENOUS AS NEEDED
Status: DISCONTINUED | OUTPATIENT
Start: 2018-06-12 | End: 2018-06-12 | Stop reason: HOSPADM

## 2018-06-12 RX ORDER — PROPOFOL 10 MG/ML
INJECTION, EMULSION INTRAVENOUS AS NEEDED
Status: DISCONTINUED | OUTPATIENT
Start: 2018-06-12 | End: 2018-06-12 | Stop reason: HOSPADM

## 2018-06-12 RX ORDER — LABETALOL HYDROCHLORIDE 5 MG/ML
INJECTION, SOLUTION INTRAVENOUS AS NEEDED
Status: DISCONTINUED | OUTPATIENT
Start: 2018-06-12 | End: 2018-06-12 | Stop reason: HOSPADM

## 2018-06-12 RX ORDER — ROPIVACAINE HYDROCHLORIDE 2 MG/ML
30 INJECTION, SOLUTION EPIDURAL; INFILTRATION; PERINEURAL
Status: DISCONTINUED | OUTPATIENT
Start: 2018-06-12 | End: 2018-06-12 | Stop reason: SDUPTHER

## 2018-06-12 RX ORDER — SODIUM CHLORIDE 0.9 % (FLUSH) 0.9 %
5-10 SYRINGE (ML) INJECTION AS NEEDED
Status: DISCONTINUED | OUTPATIENT
Start: 2018-06-12 | End: 2018-06-12 | Stop reason: HOSPADM

## 2018-06-12 RX ORDER — MIDAZOLAM HYDROCHLORIDE 1 MG/ML
2 INJECTION, SOLUTION INTRAMUSCULAR; INTRAVENOUS ONCE
Status: DISCONTINUED | OUTPATIENT
Start: 2018-06-12 | End: 2018-06-12 | Stop reason: SDUPTHER

## 2018-06-12 RX ORDER — SCOLOPAMINE TRANSDERMAL SYSTEM 1 MG/1
1 PATCH, EXTENDED RELEASE TRANSDERMAL ONCE
Status: COMPLETED | OUTPATIENT
Start: 2018-06-12 | End: 2018-06-13

## 2018-06-12 RX ORDER — ONDANSETRON 2 MG/ML
4 INJECTION INTRAMUSCULAR; INTRAVENOUS ONCE
Status: DISCONTINUED | OUTPATIENT
Start: 2018-06-12 | End: 2018-06-12 | Stop reason: HOSPADM

## 2018-06-12 RX ORDER — ONDANSETRON 2 MG/ML
INJECTION INTRAMUSCULAR; INTRAVENOUS AS NEEDED
Status: DISCONTINUED | OUTPATIENT
Start: 2018-06-12 | End: 2018-06-12 | Stop reason: HOSPADM

## 2018-06-12 RX ORDER — ENOXAPARIN SODIUM 100 MG/ML
30 INJECTION SUBCUTANEOUS EVERY 24 HOURS
Status: DISCONTINUED | OUTPATIENT
Start: 2018-06-13 | End: 2018-06-17

## 2018-06-12 RX ORDER — MAGNESIUM SULFATE 100 %
4 CRYSTALS MISCELLANEOUS AS NEEDED
Status: DISCONTINUED | OUTPATIENT
Start: 2018-06-12 | End: 2018-06-12 | Stop reason: SDUPTHER

## 2018-06-12 RX ORDER — FAMOTIDINE 20 MG/1
20 TABLET, FILM COATED ORAL ONCE
Status: DISCONTINUED | OUTPATIENT
Start: 2018-06-12 | End: 2018-06-12 | Stop reason: SDUPTHER

## 2018-06-12 RX ORDER — INSULIN LISPRO 100 [IU]/ML
INJECTION, SOLUTION INTRAVENOUS; SUBCUTANEOUS ONCE
Status: DISCONTINUED | OUTPATIENT
Start: 2018-06-12 | End: 2018-06-12 | Stop reason: HOSPADM

## 2018-06-12 RX ORDER — DEXTROSE 50 % IN WATER (D50W) INTRAVENOUS SYRINGE
25-50 AS NEEDED
Status: DISCONTINUED | OUTPATIENT
Start: 2018-06-12 | End: 2018-06-12 | Stop reason: SDUPTHER

## 2018-06-12 RX ORDER — LIDOCAINE HYDROCHLORIDE 10 MG/ML
3 INJECTION INFILTRATION; PERINEURAL ONCE
Status: DISCONTINUED | OUTPATIENT
Start: 2018-06-12 | End: 2018-06-12 | Stop reason: SDUPTHER

## 2018-06-12 RX ORDER — ROPIVACAINE HYDROCHLORIDE 5 MG/ML
30 INJECTION, SOLUTION EPIDURAL; INFILTRATION; PERINEURAL
Status: DISCONTINUED | OUTPATIENT
Start: 2018-06-12 | End: 2018-06-12 | Stop reason: SDUPTHER

## 2018-06-12 RX ORDER — SODIUM CHLORIDE 0.9 % (FLUSH) 0.9 %
5-10 SYRINGE (ML) INJECTION AS NEEDED
Status: DISCONTINUED | OUTPATIENT
Start: 2018-06-12 | End: 2018-06-18 | Stop reason: HOSPADM

## 2018-06-12 RX ORDER — ACETAMINOPHEN 325 MG/1
TABLET ORAL
Status: DISPENSED
Start: 2018-06-12 | End: 2018-06-13

## 2018-06-12 RX ORDER — FENTANYL CITRATE 50 UG/ML
INJECTION, SOLUTION INTRAMUSCULAR; INTRAVENOUS AS NEEDED
Status: DISCONTINUED | OUTPATIENT
Start: 2018-06-12 | End: 2018-06-12 | Stop reason: HOSPADM

## 2018-06-12 RX ORDER — LIDOCAINE HYDROCHLORIDE 20 MG/ML
INJECTION, SOLUTION EPIDURAL; INFILTRATION; INTRACAUDAL; PERINEURAL AS NEEDED
Status: DISCONTINUED | OUTPATIENT
Start: 2018-06-12 | End: 2018-06-12 | Stop reason: HOSPADM

## 2018-06-12 RX ORDER — SODIUM CHLORIDE 0.9 % (FLUSH) 0.9 %
5-10 SYRINGE (ML) INJECTION AS NEEDED
Status: DISCONTINUED | OUTPATIENT
Start: 2018-06-12 | End: 2018-06-12 | Stop reason: SDUPTHER

## 2018-06-12 RX ORDER — ACETAMINOPHEN 325 MG/1
650 TABLET ORAL EVERY 6 HOURS
Status: DISCONTINUED | OUTPATIENT
Start: 2018-06-12 | End: 2018-06-18 | Stop reason: HOSPADM

## 2018-06-12 RX ORDER — INSULIN LISPRO 100 [IU]/ML
INJECTION, SOLUTION INTRAVENOUS; SUBCUTANEOUS ONCE
Status: DISCONTINUED | OUTPATIENT
Start: 2018-06-12 | End: 2018-06-12 | Stop reason: SDUPTHER

## 2018-06-12 RX ORDER — DOCUSATE SODIUM 100 MG/1
100 CAPSULE, LIQUID FILLED ORAL 2 TIMES DAILY
Status: DISCONTINUED | OUTPATIENT
Start: 2018-06-12 | End: 2018-06-18 | Stop reason: HOSPADM

## 2018-06-12 RX ORDER — ROPIVACAINE HYDROCHLORIDE 2 MG/ML
30 INJECTION, SOLUTION EPIDURAL; INFILTRATION; PERINEURAL
Status: DISCONTINUED | OUTPATIENT
Start: 2018-06-12 | End: 2018-06-12 | Stop reason: HOSPADM

## 2018-06-12 RX ORDER — FENTANYL CITRATE 50 UG/ML
25 INJECTION, SOLUTION INTRAMUSCULAR; INTRAVENOUS
Status: DISCONTINUED | OUTPATIENT
Start: 2018-06-12 | End: 2018-06-12 | Stop reason: HOSPADM

## 2018-06-12 RX ORDER — MIDAZOLAM HYDROCHLORIDE 1 MG/ML
2 INJECTION, SOLUTION INTRAMUSCULAR; INTRAVENOUS ONCE
Status: DISCONTINUED | OUTPATIENT
Start: 2018-06-12 | End: 2018-06-12 | Stop reason: HOSPADM

## 2018-06-12 RX ORDER — CEFAZOLIN SODIUM 2 G/50ML
2 SOLUTION INTRAVENOUS EVERY 8 HOURS
Status: COMPLETED | OUTPATIENT
Start: 2018-06-12 | End: 2018-06-13

## 2018-06-12 RX ORDER — FENTANYL CITRATE 50 UG/ML
100 INJECTION, SOLUTION INTRAMUSCULAR; INTRAVENOUS ONCE
Status: DISCONTINUED | OUTPATIENT
Start: 2018-06-12 | End: 2018-06-12 | Stop reason: HOSPADM

## 2018-06-12 RX ORDER — SODIUM CHLORIDE, SODIUM LACTATE, POTASSIUM CHLORIDE, CALCIUM CHLORIDE 600; 310; 30; 20 MG/100ML; MG/100ML; MG/100ML; MG/100ML
75 INJECTION, SOLUTION INTRAVENOUS CONTINUOUS
Status: DISCONTINUED | OUTPATIENT
Start: 2018-06-12 | End: 2018-06-12 | Stop reason: SDUPTHER

## 2018-06-12 RX ORDER — SODIUM CHLORIDE 0.9 % (FLUSH) 0.9 %
5-10 SYRINGE (ML) INJECTION EVERY 8 HOURS
Status: DISCONTINUED | OUTPATIENT
Start: 2018-06-12 | End: 2018-06-12 | Stop reason: SDUPTHER

## 2018-06-12 RX ADMIN — LEVOTHYROXINE SODIUM 125 MCG: 25 TABLET ORAL at 06:46

## 2018-06-12 RX ADMIN — FENTANYL CITRATE 50 MCG: 50 INJECTION, SOLUTION INTRAMUSCULAR; INTRAVENOUS at 13:03

## 2018-06-12 RX ADMIN — CEFAZOLIN SODIUM 2 G: 2 SOLUTION INTRAVENOUS at 18:24

## 2018-06-12 RX ADMIN — SODIUM CHLORIDE, SODIUM LACTATE, POTASSIUM CHLORIDE, AND CALCIUM CHLORIDE 50 ML/HR: 600; 310; 30; 20 INJECTION, SOLUTION INTRAVENOUS at 22:08

## 2018-06-12 RX ADMIN — TRANEXAMIC ACID 1 G: 100 INJECTION, SOLUTION INTRAVENOUS at 12:35

## 2018-06-12 RX ADMIN — MONTELUKAST SODIUM 10 MG: 10 TABLET, FILM COATED ORAL at 21:51

## 2018-06-12 RX ADMIN — FENTANYL CITRATE 50 MCG: 50 INJECTION, SOLUTION INTRAMUSCULAR; INTRAVENOUS at 12:18

## 2018-06-12 RX ADMIN — ACETAMINOPHEN 650 MG: 325 TABLET ORAL at 21:52

## 2018-06-12 RX ADMIN — SODIUM CHLORIDE, SODIUM LACTATE, POTASSIUM CHLORIDE, AND CALCIUM CHLORIDE 75 ML/HR: 600; 310; 30; 20 INJECTION, SOLUTION INTRAVENOUS at 10:45

## 2018-06-12 RX ADMIN — Medication 10 ML: at 15:54

## 2018-06-12 RX ADMIN — Medication 10 MG: at 11:42

## 2018-06-12 RX ADMIN — MORPHINE SULFATE 1 MG: 8 INJECTION, SOLUTION INTRAMUSCULAR; INTRAVENOUS at 13:47

## 2018-06-12 RX ADMIN — CEFAZOLIN SODIUM 2 G: 2 SOLUTION INTRAVENOUS at 11:22

## 2018-06-12 RX ADMIN — LIDOCAINE HYDROCHLORIDE 60 MG: 20 INJECTION, SOLUTION EPIDURAL; INFILTRATION; INTRACAUDAL; PERINEURAL at 11:31

## 2018-06-12 RX ADMIN — DOCUSATE SODIUM 100 MG: 100 CAPSULE, LIQUID FILLED ORAL at 18:24

## 2018-06-12 RX ADMIN — LABETALOL HYDROCHLORIDE 5 MG: 5 INJECTION, SOLUTION INTRAVENOUS at 12:00

## 2018-06-12 RX ADMIN — ONDANSETRON 4 MG: 2 INJECTION INTRAMUSCULAR; INTRAVENOUS at 13:52

## 2018-06-12 RX ADMIN — Medication 10 MG: at 11:51

## 2018-06-12 RX ADMIN — TRAMADOL HYDROCHLORIDE 50 MG: 50 TABLET, FILM COATED ORAL at 06:46

## 2018-06-12 RX ADMIN — OXYCODONE HYDROCHLORIDE 15 MG: 15 TABLET ORAL at 16:46

## 2018-06-12 RX ADMIN — SODIUM CHLORIDE, SODIUM LACTATE, POTASSIUM CHLORIDE, AND CALCIUM CHLORIDE 50 ML/HR: 600; 310; 30; 20 INJECTION, SOLUTION INTRAVENOUS at 17:13

## 2018-06-12 RX ADMIN — PROPOFOL 120 MG: 10 INJECTION, EMULSION INTRAVENOUS at 11:31

## 2018-06-12 RX ADMIN — Medication 100 MCG: at 11:50

## 2018-06-12 RX ADMIN — Medication 10 MG: at 11:36

## 2018-06-12 RX ADMIN — INSULIN LISPRO 2 UNITS: 100 INJECTION, SOLUTION INTRAVENOUS; SUBCUTANEOUS at 21:58

## 2018-06-12 RX ADMIN — LABETALOL HYDROCHLORIDE 5 MG: 5 INJECTION, SOLUTION INTRAVENOUS at 13:25

## 2018-06-12 RX ADMIN — FAMOTIDINE 20 MG: 20 TABLET ORAL at 10:47

## 2018-06-12 RX ADMIN — ROCURONIUM BROMIDE 5 MG: 10 INJECTION, SOLUTION INTRAVENOUS at 11:31

## 2018-06-12 RX ADMIN — Medication 100 MCG: at 12:01

## 2018-06-12 RX ADMIN — ACETAMINOPHEN 650 MG: 325 TABLET ORAL at 16:45

## 2018-06-12 RX ADMIN — INSULIN GLARGINE 30 UNITS: 100 INJECTION, SOLUTION SUBCUTANEOUS at 18:24

## 2018-06-12 RX ADMIN — TRANEXAMIC ACID 1 G: 100 INJECTION, SOLUTION INTRAVENOUS at 13:31

## 2018-06-12 RX ADMIN — Medication 10 ML: at 21:53

## 2018-06-12 RX ADMIN — Medication 100 MCG: at 11:55

## 2018-06-12 RX ADMIN — Medication 100 MCG: at 11:45

## 2018-06-12 RX ADMIN — SUCCINYLCHOLINE CHLORIDE 100 MG: 20 INJECTION INTRAMUSCULAR; INTRAVENOUS at 11:32

## 2018-06-12 RX ADMIN — FENTANYL CITRATE 100 MCG: 50 INJECTION, SOLUTION INTRAMUSCULAR; INTRAVENOUS at 11:31

## 2018-06-12 RX ADMIN — MORPHINE SULFATE 2 MG: 8 INJECTION, SOLUTION INTRAMUSCULAR; INTRAVENOUS at 13:33

## 2018-06-12 NOTE — PROGRESS NOTES
Problem: Falls - Risk of  Goal: *Absence of Falls  Document Terence Fall Risk and appropriate interventions in the flowsheet. Outcome: Progressing Towards Goal  Fall Risk Interventions:  Mobility Interventions: Bed/chair exit alarm, Patient to call before getting OOB         Medication Interventions: Patient to call before getting OOB, Teach patient to arise slowly, Bed/chair exit alarm    Elimination Interventions: Call light in reach, Patient to call for help with toileting needs    History of Falls Interventions: Door open when patient unattended, Room close to nurse's station        Problem: Pressure Injury - Risk of  Goal: *Prevention of pressure injury  Document Nilton Scale and appropriate interventions in the flowsheet.    Pressure Injury Interventions:  Sensory Interventions: Check visual cues for pain    Moisture Interventions: Absorbent underpads    Activity Interventions: Pressure redistribution bed/mattress(bed type)    Mobility Interventions: HOB 30 degrees or less, Pressure redistribution bed/mattress (bed type)    Nutrition Interventions: Document food/fluid/supplement intake

## 2018-06-12 NOTE — PROGRESS NOTES
2114  Alert, NAD, stable, BLE: neuro intact, able to wiggle toes and pedal pulse palpable. 3750  AOx3, NAD, stable. No changes in condition.

## 2018-06-12 NOTE — INTERVAL H&P NOTE
H&P Update:  Belinda Perry was seen and examined. History and physical has been reviewed. The patient has been examined. There have been no significant clinical changes since the completion of the originally dated History and Physical.  Patient identified by surgeon; surgical site was confirmed by patient and surgeon.     Signed By: Charlotte Barnett MD     June 12, 2018 10:16 AM

## 2018-06-12 NOTE — BRIEF OP NOTE
BRIEF OPERATIVE NOTE    Date of Procedure: 6/12/2018   Preoperative Diagnosis: S72.409A LEFT DISTAL FEMUR FRACTURE  Postoperative Diagnosis: S72.409A LEFT DISTAL FEMUR FRACTURE    Procedure(s):  OPEN REDUCTION INTERNAL FIXATION LEFT DISTAL FEMUR FRACTURE  Surgeon(s) and Role:     * Rodrigo Hussein MD     Surgical Staff:  Gilberto Law: Erendira Batres RN  Circ-Relief: Vincent Khalil RN  Scrub Tech-1: Bladimir Romero  Scrub Tech-Relief: Beatriz Wilhelm  Surg Asst-1: Callie Lauren  Event Time In   Incision Start 1216   Incision Close 1410     Anesthesia: General   Estimated Blood Loss: 150 cc  Specimens: * No specimens in log *   Findings: above   Complications: none  Implants:   Implant Name Type Inv.  Item Serial No.  Lot No. LRB No. Used Action   PLATE CONDYLR 8H [id number] LT -- VA-LCP STRL - [id number]  PLATE CONDYLR 8H [id number] LT -- VA-LCP STRL  SY[employer]THES [place] NA Left 1 Implanted   SCR [place] LCK VA 5X80MM --  - [id number]  SCR [place] LCK VA 5X80MM --   SY[employer]TH[employer] [place] NA Left 2 Implanted   SCR BNE CRTX ST 4.5X36MM SS --  - [id number]  SCR BNE CRTX ST 4.5X36MM SS --   SY[employer]THES [place] NA Left 1 Implanted   SCR [place] LCK VA 5X35MM --  - [id number]  SCR [place] LCK VA 5X35MM --   SY[employer]THES [place] NA Left 1 Implanted   SCR [place] LCK VA 5X75MM --  - [id number]  SCR [place] LCK VA 5X75MM --   SY[employer]TH[employer] [place] NA Left 2 Implanted   SCR [place] LCK VA 5X65MM --  - [id number]  SCR [place] LCK VA 5X65MM --   SYNTHES [place] N/A Left 1 Implanted   SCR BNE CRTX ST 4.5X40MM SS --  - [id number]  SCR BNE CRTX ST 4.5X40MM SS --   SYNTHES [place] N/S Left 1 Implanted   SCR LCK VA ST STARDRV 5X38MM --  - [id number]   SCR LCK VA ST STARDRV 5X38MM --    SYNTHES [place] N/A Left 3 Implanted

## 2018-06-12 NOTE — PROGRESS NOTES
[facility] Hospitalist Group  Progress Note    Patient: Chase Barker Age: 82 y.o. : 1935 MR#: [id number] SSN: [id number]  Date/Time: 2018     Subjective:     Review of systems    No CP   NO NVD - improved since admission    NO Cough     Progressive SOB developed even before fall       Assessment/Plan:   1. S/p Left femur fracture / following fall  - followed by ortho   2 Chronic systolic heart failure   3 HTN   4 DM2  5 hypothyroid   6 morbid obesity - Body mass index is 40.25 kg/(m^2). PLAN   - cardiology consult appreciated   - For surgery to day - off Plavix   - Continue SSI   - PT/OT and DVT prophylaxis per ortho     Case discussed with:  [x]Patient  []Family  []Nursing  []Case Management  DVT Prophylaxis:  []Lovenox  []Hep SQ  []SCDs  []Coumadin   []On Heparin gtt          Objective:   VS:   Visit Vitals    /75 (BP 1 Location: Left arm, BP Patient Position: At rest)    Pulse 75    Temp 99 °F (37.2 °C)    Resp 18    Wt 116.6 kg (257 lb)    SpO2 94%    Breastfeeding No    BMI 40.25 kg/m2      Tmax/24hrs: Temp (24hrs), Av.6 °F (37 °C), Min:98 °F (36.7 °C), Max:99 °F (37.2 °C)  IOBRIEF    Intake/Output Summary (Last 24 hours) at 18 1016  Last data filed at 18 9264   Gross per 24 hour   Intake              680 ml   Output              850 ml   Net             -170 ml       General:  Alert, cooperative, no acute distress   HEENT:  NC, Atraumatic. PERRLA, anicteric sclerae. Pulmonary:  Bilateral air entry present . No Wheezing/Rhonchi/Rales. Cardiovascular: Regular rate and Rhythm. GI:  Soft, Non distended, Non tender. + Bowel sounds. Extremities:  Left LL cast   Psych:  Not anxious or agitated. Neurologic: Grossly - Motor and Sensory functions are intact .       Medications:   Current Facility-Administered Medications   Medication Dose Route Frequency    vancomycin (VANCOCIN) 1,000 mg in 0.9% sodium chloride (MBP/ADV) 250 mL adv 1,000 mg IntraVENous ONCE    ceFAZolin (ANCEF) 2g IVPB in 50 mL D5W  2 g IntraVENous ON CALL TO OR    traMADol (ULTRAM) tablet 50 mg  50 mg Oral Q6H PRN    amLODIPine (NORVASC) tablet 2.5 mg  2.5 mg Oral DAILY    furosemide (LASIX) tablet 20 mg  20 mg Oral DAILY    cyanocobalamin (VITAMIN B12) tablet 500 mcg  500 mcg Oral DAILY    cholecalciferol (VITAMIN D3) tablet 5,000 Units  5,000 Units Oral DAILY    famotidine (PEPCID) tablet 20 mg  20 mg Oral DAILY PRN    insulin glargine (LANTUS) injection 30 Units  30 Units SubCUTAneous BID    levothyroxine (SYNTHROID) tablet 125 mcg  125 mcg Oral ACB    montelukast (SINGULAIR) tablet 10 mg  10 mg Oral QHS    ibuprofen (MOTRIN) tablet 400 mg  400 mg Oral Q4H PRN    morphine injection 1 mg  1 mg IntraVENous Q4H PRN    heparin (porcine) injection 5,000 Units  5,000 Units SubCUTAneous Q8H    ondansetron (ZOFRAN) injection 4 mg  4 mg IntraVENous Q4H PRN    bisacodyl (DULCOLAX) tablet 10 mg  10 mg Oral DAILY PRN    insulin lispro (HUMALOG) injection   SubCUTAneous AC&HS    glucose chewable tablet 16 g  4 Tab Oral PRN    glucagon (GLUCAGEN) injection 1 mg  1 mg IntraMUSCular PRN    dextrose (D50W) injection syrg 12.5-25 g  25-50 mL IntraVENous PRN    albuterol (PROVENTIL VENTOLIN) nebulizer solution 2.5 mg  2.5 mg Nebulization Q6H PRN       Labs:    Recent Labs      06/11/18   0240  06/10/18   1132   WBC  6.0  7.2   HGB  9.9*  11.4*   HCT  30.5*  34.2*   PLT  206  236     Recent Labs      06/11/18   0240  06/10/18   1132   NA  143  141   K  3.5  3.2*   CL  110*  108   CO2  27  26   GLU  127*  244*   BUN  13  8   CREA  0.85  0.69   CA  8.3*  8.8   ALB   --   2.9*   SGOT   --   9*   ALT   --   12*   INR   --   1.0         Signed By: Petros Mata MD     June 12, 2018

## 2018-06-12 NOTE — PROGRESS NOTES
Bedside and Verbal shift change report given to America Hernandez RN (oncoming nurse) by Kristie Diallo RN (offgoing nurse). Report included the following information SBAR, Kardex, Intake/Output and MAR.

## 2018-06-12 NOTE — ROUTINE PROCESS
Bedside and Verbal shift change report given to Josefa Colvin (oncoming nurse) by Belkys Marquez (offgoing nurse).  Report included the following information SBAR, Kardex, Intake/Output, MAR and Cardiac Rhythm ST.

## 2018-06-12 NOTE — ANESTHESIA POSTPROCEDURE EVALUATION
Post-Anesthesia Evaluation and Assessment    Patient: Randolph Guevara MRN: [id number]  SSN: [id number]    YOB: 1935  Age: 82 y.o. Sex: female       Cardiovascular Function/Vital Signs  Visit Vitals    /69    Pulse 99    Temp 37 °C (98.6 °F)    Resp 17    Wt 117.5 kg (259 lb)    SpO2 99%    Breastfeeding No    BMI 40.57 kg/m2       Patient is status post general anesthesia for Procedure(s):  OPEN REDUCTION INTERNAL FIXATION LEFT DISTAL FEMUR FRACTURE. Nausea/Vomiting: None    Postoperative hydration reviewed and adequate. Pain:  Pain Scale 1: Numeric (0 - 10) (06/12/18 1646)  Pain Intensity 1: 10 (06/12/18 1646)   Managed    Neurological Status:   Neuro (WDL): Within Defined Limits (06/12/18 1413)  Neuro  LLE Motor Response: Numbness; Purposeful;Spontaneous ; Tingling;Weak (06/11/18 1607)   At baseline    Mental Status and Level of Consciousness: Arousable    Pulmonary Status:   O2 Device: Oxygen mask (06/12/18 1419)   Adequate oxygenation and airway patent    Complications related to anesthesia: None    Post-anesthesia assessment completed.  No concerns      Signed By: Merry Puentes MD     June 12, 2018

## 2018-06-12 NOTE — PROGRESS NOTES
PT orders received, chart reviewed. Patient currently has active bed rest orders s/p L distal femur ORIF. Called PA Mackenzie Sloan who requests to maintain bed rest at this time and hold PT evaluation. Will continue to follow as appropriate.     Thank you, Reji Patel PT, DPT

## 2018-06-12 NOTE — ANESTHESIA PREPROCEDURE EVALUATION
Anesthetic History     PONV          Review of Systems / Medical History  Patient summary reviewed and pertinent labs reviewed    Pulmonary        Sleep apnea: No treatment    Asthma        Neuro/Psych       CVA       Cardiovascular    Hypertension      CHF    Past MI, CAD and cardiac stents    Exercise tolerance: <4 METS     GI/Hepatic/Renal     GERD           Endo/Other    Diabetes: type 2  Hypothyroidism: well controlled  Morbid obesity     Other Findings   Comments: Documentation of current medication  Current medications obtained, documented and obtained? YES      Risk Factors for Postoperative nausea/vomiting:       History of postoperative nausea/vomiting? YES       Female? YES       Motion sickness? NO       Intended opioid administration for postoperative analgesia? YES      Smoking Abstinence:  Current Smoker? NO  Elective Surgery? YES  Seen preoperatively by anesthesiologist or proxy prior to day of surgery? YES  Pt abstained from smoking 24 hours prior to anesthesia?  N/A    Preventive care/screening for High Blood Pressure:  Aged 18 years and older: YES  Screened for high blood pressure: YES  Patients with high blood pressure referred to primary care provider   for BP management: YES               Physical Exam    Airway  Mallampati: II  TM Distance: 4 - 6 cm  Neck ROM: normal range of motion   Mouth opening: Normal     Cardiovascular  Regular rate and rhythm,  S1 and S2 normal,  no murmur, click, rub, or gallop  Rhythm: regular  Rate: normal         Dental    Dentition: Edentulous     Pulmonary  Breath sounds clear to auscultation               Abdominal  GI exam deferred       Other Findings            Anesthetic Plan    ASA: 4  Anesthesia type: general          Induction: Intravenous  Anesthetic plan and risks discussed with: Patient

## 2018-06-12 NOTE — PROGRESS NOTES
Rounded on patient preop for fracture repair Did teaching prior to surgery  Activity: Reinforced importance of repositioning and moving unaffected limbs to prevent DVT. Also calling for help to reposition to prevent bedsore every 2 hours or as needed. Also encouraged patient to prevent skin from being moist and changing clothes/linen to prevent skin problems with decreased mobility. VTE prophylaxis: Instructed patient to use their SCD's  On unaffected let. To use while in bed and in the chair. Educated re: ankle pumps to assist with circulation when in hospital and at home. Medications: Reviewed pain medications patient is taking and the importance of keeping pain under control to help with getting OOB and/or therapy. Reminded the patient to always eat a snack with their pain medication to help to prevent nausea. Encouraged patient to monitor for constipation and to take a stool softner/laxative while recovering and on pain medication. Encouraged to stay on stool softener/laxative as long as taking narcotic for pain. Also encouraged patient to drink 8 glasses of water a day(unless on a fluid restriction). Incentive Spirometry: Reinforced use of incentive spirometer with return demonstration by patient. Wound Care: Dressing to fracture site  Dry and intact ace wrap. Heel raised off bed due to complaint of pain by patient. . Patient instructed not to take dressing off at home. Patient given CHG wash to use in hospital and at home. Stressed importance of using a clean towel and washcloth daily. Reminded to put on clean clothes and night clothes daily. Ice Protocol: Ice solution in place per protocol. Patient Safety: Call light and personal belongings in reach. Patient  reminded to call for help toget OOB or when leaving bathroom for safety.  Phone and other items also within reach per patient's request.     Diet: Educated patient on the importance of eating three well balanced meals a day with protein to promote bone/muscle healing. Reminded patient to drink lots of fluids to protect kidneys from all the medications being taken currently with recovery. Patient given more educational material to reinforce the things discussed. Patient encouraged. to continue doing them at home to prevent post op complications and have a successful recovery. Patient  verbalized understand of all information and education discussed. Patient  given the opportunity for asking questions.       Got incentive spirometer up to 700ml
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
## 2018-06-12 NOTE — ROUTINE PROCESS
TRANSFER - OUT REPORT:    Verbal report given to Teresa Frances on [place]  being transferred to room 503 for routine progression of care       Report consisted of patients Situation, Background, Assessment and   Recommendations(SBAR). Information from the following report(s) SBAR, OR Summary, Intake/Output, MAR and Recent Results was reviewed with the receiving nurse. Opportunity for questions and clarification was provided.       Patient transported with:   O2 @ 3 liters  Tech

## 2018-06-12 NOTE — OP NOTES
[facility] REPORT    Name:ARMANDO Ordonez  MR#: [id number]  : 1935  ACCOUNT #: [de-identified]   DATE OF SERVICE: 2018    PREOPERATIVE DIAGNOSIS:  Left distal femur fracture. POSTOPERATIVE DIAGNOSIS:  Left distal femur fracture. PROCEDURE:  Open reduction internal fixation of left distal femur fracture. SURGEON:  Maggi Song MD.    ASSISTANT:  none      ANESTHESIA:  General.    CONDITION:  Good. ESTIMATED BLOOD LOSS:  150 cc     SPECIMENS REMOVED:  none     COMPLICATIONS:  none     IMPLANTS:  As noted     DESCRIPTION OF PROCEDURE:  Under satisfactory general anesthesia, the patient in the supine position, patient was positioned on the fracture table with her left lower extremity in gentle stirrup traction. A crutch behind the fracture site was used to assist reduction since there was a posterior sag on the lateral view. C-arm views were taken to obtain provisional reduction of the fracture. Patient's left lower extremity was prepped with ChloraPrep solution and draped with a Betadine impregnated clear drape. A lateral skin incision was made. It was carried down through the subcutaneous tissue to the IT band. The iliotibial band was incised and the lateral aspect of the distal femur was exposed. The medial condylar fracture was palpated and found to be displaced. The fracture was reduced with a combination of C-arm, the crutch, and a [place] clamp placed at the proximal portion of the fracture site. Adequate reduction was obtained. A lateral condylar plate was affixed to the lateral aspect of the femur and pulled toward the femur with one nonlocking screw placed in one of the middle holes. The remainder of the holes were filled with locking screws giving stability to the fracture construct. Adequacy of reduction and fixation was verified using the C-arm image intensifier in 2 views.   The wound was irrigated thoroughly including dilute Betadine lavage. Closure was obtained using #2 Vicryl for the fascia, 2-0 Vicryl for the subcutaneous tissue, and staples for the skin. A bulky sterile dressing was applied and the patient was transported back to the recovery room in good condition. Sponge and needle counts were correct.       Garret COLON  D: 06/12/2018 18:03     T: 06/12/2018 19:12  JOB #: [id number]

## 2018-06-12 NOTE — PROGRESS NOTES
Bedside shift change report given to this RN (oncoming nurse) by Jackie Wren (offgoing nurse).  Report included the following information SBAR, Kardex and Cardiac Rhythm SR,ST.

## 2018-06-13 ENCOUNTER — APPOINTMENT (OUTPATIENT)
Dept: GENERAL RADIOLOGY | Age: 83
DRG: 480 | End: 2018-06-13
Attending: INTERNAL MEDICINE
Payer: MEDICARE

## 2018-06-13 LAB
APPEARANCE UR: ABNORMAL
BACTERIA URNS QL MICRO: ABNORMAL /HPF
BILIRUB UR QL: NEGATIVE
COLOR UR: YELLOW
EPITH CASTS URNS QL MICRO: ABNORMAL /LPF (ref 0–5)
GLUCOSE BLD STRIP.AUTO-MCNC: 168 MG/DL (ref 70–110)
GLUCOSE BLD STRIP.AUTO-MCNC: 187 MG/DL (ref 70–110)
GLUCOSE BLD STRIP.AUTO-MCNC: 214 MG/DL (ref 70–110)
GLUCOSE BLD STRIP.AUTO-MCNC: 221 MG/DL (ref 70–110)
GLUCOSE UR STRIP.AUTO-MCNC: 100 MG/DL
HGB UR QL STRIP: ABNORMAL
HYALINE CASTS URNS QL MICRO: ABNORMAL /LPF (ref 0–2)
KETONES UR QL STRIP.AUTO: NEGATIVE MG/DL
LEUKOCYTE ESTERASE UR QL STRIP.AUTO: ABNORMAL
MUCOUS THREADS URNS QL MICRO: ABNORMAL /LPF
NITRITE UR QL STRIP.AUTO: NEGATIVE
PH UR STRIP: 5 [PH] (ref 5–8)
PROT UR STRIP-MCNC: ABNORMAL MG/DL
RBC #/AREA URNS HPF: ABNORMAL /HPF (ref 0–5)
SP GR UR REFRACTOMETRY: 1.03 (ref 1–1.03)
UROBILINOGEN UR QL STRIP.AUTO: 0.2 EU/DL (ref 0.2–1)
WBC URNS QL MICRO: ABNORMAL /HPF (ref 0–4)
YEAST URNS QL MICRO: ABNORMAL

## 2018-06-13 PROCEDURE — 71046 X-RAY EXAM CHEST 2 VIEWS: CPT

## 2018-06-13 PROCEDURE — 74011636637 HC RX REV CODE- 636/637: Performed by: INTERNAL MEDICINE

## 2018-06-13 PROCEDURE — 74011250637 HC RX REV CODE- 250/637: Performed by: SPECIALIST

## 2018-06-13 PROCEDURE — 77030027138 HC INCENT SPIROMETER -A

## 2018-06-13 PROCEDURE — 74011250636 HC RX REV CODE- 250/636: Performed by: SPECIALIST

## 2018-06-13 PROCEDURE — 74011250637 HC RX REV CODE- 250/637: Performed by: INTERNAL MEDICINE

## 2018-06-13 PROCEDURE — 82962 GLUCOSE BLOOD TEST: CPT

## 2018-06-13 PROCEDURE — 74011250637 HC RX REV CODE- 250/637: Performed by: NURSE PRACTITIONER

## 2018-06-13 PROCEDURE — 65270000029 HC RM PRIVATE

## 2018-06-13 PROCEDURE — 81001 URINALYSIS AUTO W/SCOPE: CPT | Performed by: INTERNAL MEDICINE

## 2018-06-13 RX ORDER — INSULIN GLARGINE 100 [IU]/ML
30 INJECTION, SOLUTION SUBCUTANEOUS EVERY 12 HOURS
Status: DISCONTINUED | OUTPATIENT
Start: 2018-06-13 | End: 2018-06-18 | Stop reason: HOSPADM

## 2018-06-13 RX ORDER — POLYETHYLENE GLYCOL 3350 17 G/17G
17 POWDER, FOR SOLUTION ORAL ONCE
Status: COMPLETED | OUTPATIENT
Start: 2018-06-13 | End: 2018-06-13

## 2018-06-13 RX ORDER — POLYETHYLENE GLYCOL 3350 17 G/17G
17 POWDER, FOR SOLUTION ORAL DAILY
Status: DISCONTINUED | OUTPATIENT
Start: 2018-06-14 | End: 2018-06-18 | Stop reason: HOSPADM

## 2018-06-13 RX ADMIN — INSULIN LISPRO 4 UNITS: 100 INJECTION, SOLUTION INTRAVENOUS; SUBCUTANEOUS at 21:23

## 2018-06-13 RX ADMIN — AMLODIPINE BESYLATE 2.5 MG: 2.5 TABLET ORAL at 09:29

## 2018-06-13 RX ADMIN — Medication 10 ML: at 21:26

## 2018-06-13 RX ADMIN — DOCUSATE SODIUM 100 MG: 100 CAPSULE, LIQUID FILLED ORAL at 18:24

## 2018-06-13 RX ADMIN — OXYCODONE HYDROCHLORIDE 15 MG: 15 TABLET ORAL at 21:25

## 2018-06-13 RX ADMIN — OXYCODONE HYDROCHLORIDE 15 MG: 15 TABLET ORAL at 02:39

## 2018-06-13 RX ADMIN — ACETAMINOPHEN 650 MG: 325 TABLET ORAL at 16:31

## 2018-06-13 RX ADMIN — INSULIN LISPRO 2 UNITS: 100 INJECTION, SOLUTION INTRAVENOUS; SUBCUTANEOUS at 09:36

## 2018-06-13 RX ADMIN — Medication 10 ML: at 03:00

## 2018-06-13 RX ADMIN — Medication 500 MCG: at 09:29

## 2018-06-13 RX ADMIN — OXYCODONE HYDROCHLORIDE 15 MG: 15 TABLET ORAL at 16:31

## 2018-06-13 RX ADMIN — ENOXAPARIN SODIUM 30 MG: 30 INJECTION SUBCUTANEOUS at 09:27

## 2018-06-13 RX ADMIN — ACETAMINOPHEN 650 MG: 325 TABLET ORAL at 03:00

## 2018-06-13 RX ADMIN — CEFAZOLIN SODIUM 2 G: 2 SOLUTION INTRAVENOUS at 02:39

## 2018-06-13 RX ADMIN — INSULIN LISPRO 2 UNITS: 100 INJECTION, SOLUTION INTRAVENOUS; SUBCUTANEOUS at 16:31

## 2018-06-13 RX ADMIN — Medication 10 ML: at 13:07

## 2018-06-13 RX ADMIN — INSULIN GLARGINE 30 UNITS: 100 INJECTION, SOLUTION SUBCUTANEOUS at 09:26

## 2018-06-13 RX ADMIN — ACETAMINOPHEN 650 MG: 325 TABLET ORAL at 09:28

## 2018-06-13 RX ADMIN — FUROSEMIDE 20 MG: 20 TABLET ORAL at 09:29

## 2018-06-13 RX ADMIN — MONTELUKAST SODIUM 10 MG: 10 TABLET, FILM COATED ORAL at 21:25

## 2018-06-13 RX ADMIN — VITAMIN D, TAB 1000IU (100/BT) 5000 UNITS: 25 TAB at 09:28

## 2018-06-13 RX ADMIN — POLYETHYLENE GLYCOL 3350 17 G: 17 POWDER, FOR SOLUTION ORAL at 13:02

## 2018-06-13 RX ADMIN — LEVOTHYROXINE SODIUM 125 MCG: 25 TABLET ORAL at 09:28

## 2018-06-13 RX ADMIN — ACETAMINOPHEN 650 MG: 325 TABLET ORAL at 21:25

## 2018-06-13 RX ADMIN — INSULIN GLARGINE 30 UNITS: 100 INJECTION, SOLUTION SUBCUTANEOUS at 21:24

## 2018-06-13 RX ADMIN — INSULIN LISPRO 4 UNITS: 100 INJECTION, SOLUTION INTRAVENOUS; SUBCUTANEOUS at 13:01

## 2018-06-13 RX ADMIN — DOCUSATE SODIUM 100 MG: 100 CAPSULE, LIQUID FILLED ORAL at 09:28

## 2018-06-13 NOTE — PROGRESS NOTES
Visited Patient who seemed alert and in good spirits at the time. No family was present. Patient spoke fondly of granddaughters and other family members. Spoke about vinod in God and her belief in the power of prayer. I prayed and lifted up her concerns before God. Assured her that spiritual care team could be reached at any time for further support and prayer if needed. Patient requested a Daily Bread which I will bring her.      Reymundo Tabor

## 2018-06-13 NOTE — PROGRESS NOTES
Admit Date: 6/10/2018  Date of Service: 6/13/2018    Reason for follow-up: femur fracture      Assessment:         Left periprosthetic distal femur fracture:  Near site of prior left TKR                        - s/p ORIF 6/12  Low grade temps:  No obvious infection, suspect benign post-op process  DM type 2, on insulin  HTN  Acute on chronic diastolic heart failure: mildly decompensated per cardiology, cont po lasix  CAD s/p cardiac cath and stenting  Hyperlipidemia  Hypothyroidism    Plan:   Check UA, CXR  Nursing requesting miralax for BM  PT/OT  SSI and accuchecks  Cardiology following; monitor post-op tachycardia  Re-initate ASA and Plavix when ok with surgery  CBC, BMP in am    Current Antibtiocs:   None    Lines:   Peripheral    I spent 45 minutes with the patient in face-to-face consultation, of which greater than 50% was spent in counseling and coordination of care as described above. Case discussed with:  [x]Patient  [x]Family  [x]Nursing  []Case Management  DVT Prophylaxis:  []Lovenox  []Hep SQ  []SCDs  []Coumadin   []On Heparin gtt  I have independently examined the patient and reviewed all lab studies and imgaing as well as review of nursing notes and physican notes from the past 24 hours. The plan of care has been discussed with the patient and all questions are answered. Jeffry Molina D.O.   Pager [phone]      Allergies   Allergen Reactions    Niacin Palpitations and Other (comments)     Stomach irritation    Ace Inhibitors Cough    Avapro [Irbesartan] Myalgia    Bystolic [Nebivolol] Other (comments)     Felt like throat closing    Catapres [Clonidine] Cough    Codeine Nausea and Vomiting    Cozaar [Losartan] Not Reported This Time    Crestor [Rosuvastatin] Other (comments)     Cramps, aches    Darvocet A500 [Propoxyphene N-Acetaminophen] Unknown (comments)    Diovan [Valsartan] Cough    Flagyl [Metronidazole] Other (comments)     Mouth and throat irritation    Gabapentin Other (comments)     Abdominal pain and burning     Iodinated Contrast- Oral And Iv Dye Other (comments)     Throat swelling    Iodine Unknown (comments)    Lescol [Fluvastatin] Other (comments)     Leg cramps    Lipitor [Atorvastatin] Myalgia and Other (comments)     Cramps, aches    Lovastatin Other (comments)     Leg cramps    Nexium [Esomeprazole Magnesium] Other (comments)     Stomach upset, burning    Pravachol [Pravastatin] Other (comments)     Leg cramps    Reglan [Metoclopramide] Nausea Only    Trazodone Other (comments)     Patient states she feels drugged    Zetia [Ezetimibe] Other (comments)     Cramps, aches    Zocor [Simvastatin] Other (comments)     Cramps, aches           Subjective:      Pt seen and examined. Feeling ok. Has some urinary urgency. Constipated per nursing but patient has not complained. Denies fevers or chill. In good spirits. Family at bedside. Denies SOB, cough, abdominal pain. Objective:        Visit Vitals    /63    Pulse (!) 123    Temp 100.2 °F (37.9 °C)    Resp 18    Wt 117.5 kg (259 lb)    SpO2 90%    Breastfeeding No    BMI 40.57 kg/m2     Temp (24hrs), Av.8 °F (37.1 °C), Min:97.3 °F (36.3 °C), Max:100.2 °F (37.9 °C)        General:   awake alert and oriented, non-toxic   Skin:   no rashes or skin lesions noted on limited exam, dry and warm   HEENT:  No scleral icterus or pallor; oral mucosa moist, lips moist   Lymph Nodes:   not assessed today   Lungs:   non, labored; bilaterally clear to aspiration- no crackles wheezes rales or rhonchi   Heart:  RRR, s1 and s2; no murmurs rubs or gallops; no edema, + pedal pulses   Abdomen:  soft, non-distended, active bowel sounds, non-tender   Genitourinary:  deferred   Extremities:   average muscle tone; no contractures, no joint effusions; right leg with ACE wrap over surgical incision- no obvious bleeding, modest pain   Neurologic:  No gross focal motor or sensory abnormalities; CN 2-12 intact;   Follows commands. Psychiatric:   appropriate and interactive.        Labs: Results:   Chemistry Recent Labs      06/11/18   0240  06/10/18   1132   GLU  127*  244*   NA  143  141   K  3.5  3.2*   CL  110*  108   CO2  27  26   BUN  13  8   CREA  0.85  0.69   CA  8.3*  8.8   AGAP  6  7   BUCR  15  12   AP   --   94   TP   --   7.1   ALB   --   2.9*   GLOB   --   4.2*   AGRAT   --   0.7*      CBC w/Diff Recent Labs      06/12/18   1616  06/11/18   0240  06/10/18   1132   WBC   --   6.0  7.2   RBC   --   3.23*  3.68*   HGB  8.9*  9.9*  11.4*   HCT  27.5*  30.5*  34.2*   PLT   --   206  236   GRANS   --    --   57   LYMPH   --    --   36   EOS   --    --   1        Lab Results   Component Value Date/Time    Specimen Description: CLEAN [facility] 12/22/2009 10:29 AM    Lab Results   Component Value Date/Time    Culture result: [id number]  COLONIES/mL  DIPHTHEROIDS   10/11/2016 01:06 PM    Culture result: NO GROWTH 2 DAYS 12/22/2009 10:29 AM          Imaging:     None to review today

## 2018-06-13 NOTE — ROUTINE PROCESS
Discussed patient elevated heart rate with Dr Jami Trevino at [phone] she instructed to continue to monitor. Discussed patient heart with the cardiologist Dr. Zaira Abad at [phone], she instructed to monitor patient and instruct to page her at [phone] if patient show signs of distress or heart rate become sustain high.

## 2018-06-13 NOTE — PROGRESS NOTES
PT orders received, chart reviewed. Patient remains on active bed rest orders, will require activity parameters to be updated for participation in PT evaluation. Will continue to follow as appropriate. Thank you, Jamil Norton PT, DPT. none

## 2018-06-13 NOTE — PROGRESS NOTES
Per Ortho    Patient seen at 1215 hours 13 June 18    Family present    Unable to document encounter due to EPIC issue. No CP. No SOB. Pain well managed    Sx site intact LLE.  Distal NVI LLE, Sx dressing lateral distal left thight    Plan: Absolute NON WEIGHT BEARING LLE!!!

## 2018-06-13 NOTE — PROGRESS NOTES
OT eval order received and chart reviewed.  Patient has active bedrest, please D/C bedrest to allow full participation in eval.    [place] OTR\L

## 2018-06-13 NOTE — ROUTINE PROCESS
Bedside and Verbal shift change report given to Pierre Mancia (oncoming nurse) by Prabhakar Lakhani (offgoing nurse).  Report included the following information SBAR, Kardex, Intake/Output, MAR and Cardiac Rhythm ST.

## 2018-06-13 NOTE — PROGRESS NOTES
[facility]      CARDIOLOGY PROGRESS NOTE  RECS:    1. Hx of CAD with  post percutaneous transluminal coronary angioplasty/stent to mid right coronary artery using a drug-eluting stent done in 2016. Stable no chest pain or chest pressure.  Follow serial enzymes EKG revealed NSR  w/o acute ischemic changes. Ok to hold Plavix and asa - resume when OK with ortho. 2. Sinus Tachycardia  100-115 - isolated brief episode of 120 - asymptomatic, may be related to febrile state or pain, post op  - will monitor for now. Per patient has a h/o sinus tach. 3. Acute on chronic diastolic heart failure- mildly decompensated. c/o orthopnea and INIGUEZ. Did not take lasix in the last few days. mildly decompensated. Continue with lasix po. Monitor I&O. Last echo 2017 with Mildly LV dysfunction EF% 45-50%. Not on bb or ARB due to allergies  4. Hypertension with borderline BP- possible related to pain medications reduced Norvasc to 2.5 mg daily. Will monitor. 5. Diabetes- medications and w/u per medical team   6. Hyperlipidemia- unable to tolerated statins- discussed with patient about pcsk9 starting-medications was approved but approved. But she refused. 7. Left femur fracture- s/p ORIF - stable post op         ASSESSMENT:  Hospital Problems  Date Reviewed: 6/12/2018          Codes Class Noted POA    Preoperative cardiovascular examination ICD-10-CM: Z01.810  ICD-9-CM: V72.81  6/11/2018 Unknown        * (Principal)Periprosthetic left distal femur fracture ICD-10-CM: M97Agata Wolf  ICD-9-CM: 996.44, V43.65  6/10/2018 Yes        Callie-prosthetic femur fracture at tip of prosthesis ICD-10-CM: M97. Rosalinda Oliver [id number]  ICD-9-CM: 996.44, V43.64  6/10/2018 Unknown        Bilateral lower extremity edema ICD-10-CM: R60.0  ICD-9-CM: 782.3  4/25/2017 Yes        Uncomplicated asthma [facility]-09-CQ: J45.909  ICD-9-CM: 493.90  4/8/2016 Yes        Morbid obesity ([facility] ICD-10-CM: E66.01  ICD-9-CM: 278.01  Unknown Yes    Overview Signed 5/9/2013  8:54 AM by Cathie Mcintyre     Weight loss has been strongly encouraged by following dietary restrictions and an exercise routine. Coronary atherosclerosis of native coronary artery ICD-10-CM: I25.10  ICD-9-CM: 414.01  Unknown Yes        Chronic diastolic heart failure ([facility] ICD-10-CM: I50.32  ICD-9-CM: 428.32  Unknown Yes    Overview Signed 5/9/2013  8:58 AM by Cathie Mcintyre     Stable, edema better, uses PRN Lasix             IDDM (insulin dependent diabetes mellitus) ([facility] ICD-10-CM: E11.9, Z79.4  ICD-9-CM: 250.00, V58.67  12/4/2012 Yes        Diabetic neuropathy ([facility] ICD-10-CM: E11.40  ICD-9-CM: 250.60, 357.2  4/20/2012 Yes        DJD (degenerative joint disease) ICD-10-CM: M19.90  ICD-9-CM: 715.90  Unknown Yes        S/P joint replacement ICD-10-CM: Z96.60  ICD-9-CM: V43.60  Unknown Yes    Overview Signed 3/4/2011  9:40 AM by Deuce Anthony     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)             Noncompliance with medication regimen ICD-10-CM: Z91.14  ICD-9-CM: V15.81  2/8/2011 Yes        Anxiety ICD-10-CM: F41.9  ICD-9-CM: 300.00  2/8/2011 Yes        Essential hypertension ICD-10-CM: I10  ICD-9-CM: 401.9  5/20/2010 Yes    Overview Signed 7/28/2016 10:34 AM by Jae Price MD     controlled             Hypovitaminosis D ICD-10-CM: E55.9  ICD-9-CM: 268.9  5/20/2010 Yes                SUBJECTIVE:  No CP or SOB, slight left hip pain    OBJECTIVE:    VS:   Visit Vitals    /63    Pulse (!) 123    Temp 100.2 °F (37.9 °C)    Resp 18    Wt 117.5 kg (259 lb)    SpO2 90%    Breastfeeding No    BMI 40.57 kg/m2         Intake/Output Summary (Last 24 hours) at 06/13/18 1059  Last data filed at 06/13/18 0329   Gross per 24 hour   Intake              200 ml   Output              550 ml   Net             -350 ml     TELE: sinus tachycardia    General: alert and in no apparent distress  HENT: Normocephalic, atraumatic. Normal external eye.   Neck :  no JVD  Cardiac:  tachy rate and regular rhythm, S1, S2 normal, no murmur, click, rub or gallop  Lungs: clear to auscultation bilaterally  Abdomen: Soft, nontender, no masses  Extremities: LLE trace edema in ACE - drain in place      Labs: Results:       Chemistry Recent Labs      06/11/18   0240  06/10/18   1132   GLU  127*  244*   NA  143  141   K  3.5  3.2*   CL  110*  108   CO2  27  26   BUN  13  8   CREA  0.85  0.69   CA  8.3*  8.8   AGAP  6  7   BUCR  15  12   AP   --   94   TP   --   7.1   ALB   --   2.9*   GLOB   --   4.2*   AGRAT   --   0.7*      CBC w/Diff Recent Labs      06/12/18   1616  06/11/18   0240  06/10/18   1132   WBC   --   6.0  7.2   RBC   --   3.23*  3.68*   HGB  8.9*  9.9*  11.4*   HCT  27.5*  30.5*  34.2*   PLT   --   206  236   GRANS   --    --   57   LYMPH   --    --   36   EOS   --    --   1      Cardiac Enzymes Recent Labs      06/11/18   1900  06/11/18   1455   CPK  57  56   CKND1  CALCULATION NOT PERFORMED WHEN RESULT IS BELOW LINEAR LIMIT  CALCULATION NOT PERFORMED WHEN RESULT IS BELOW LINEAR LIMIT      Coagulation Recent Labs      06/10/18   1132   PTP  12.7   INR  1.0       Lipid Panel Lab Results   Component Value Date/Time    Cholesterol, total 179 02/07/2017 06:15 AM    HDL Cholesterol 37 (L) 02/07/2017 06:15 AM    LDL, calculated 126.6 (H) 02/07/2017 06:15 AM    VLDL, calculated 15.4 02/07/2017 06:15 AM    Triglyceride 77 02/07/2017 06:15 AM    CHOL/HDL Ratio 4.8 02/07/2017 06:15 AM      BNP No results for input(s): BNPP in the last 72 hours. Liver Enzymes Recent Labs      06/10/18   1132   TP  7.1   ALB  2.9*   AP  94   SGOT  9*      Thyroid Studies Lab Results   Component Value Date/Time    TSH 0.51 02/27/2018 10:11 AM              Mitali Stokes NP   Pager # [phone]      I have independently evaluated taken history and examined the patient. All relevant labs and testing data's are reviewed. Care plan discussed and updated after review.   Za Hong MD

## 2018-06-13 NOTE — PROGRESS NOTES
Bedside and Verbal shift change report given to JEANNIE Dickinson (oncoming nurse) by Deirdre Salinas RN (offgoing nurse). Report included the following information SBAR, Kardex, OR Summary, Intake/Output and MAR.

## 2018-06-13 NOTE — PROGRESS NOTES
2151  Received pt in stable condition,   General: lying in bed in supine, not apparent distress  Neuro: AOx4, denies numbness, 0 tingling, able to wiggle toes to bilateral lower extremities  Cardio: on cardiac monitor, tachycardic (cardiologist is currently following the patient), pedal pulses palpable, denies chest pain, asymptomatic. Respiratory: denies shortness of breath  Skin: Dressing to LLE clean, dry and intact  : denies nausea and vomiting  Mus: bed rest  Bed in low position and call bell within reach. 8534  Alert, NAD, stable, voiding. Bath given and tolerated well. No changes in condition.

## 2018-06-13 NOTE — PROGRESS NOTES
Patient is alert and oriented x 4, incision clean dry and intact, lungs diminish on base bilaterally, bowel sound hypoactive in all quadrants. Patient was offer prune juice but she decline. Patient is voiding without difficulties, tolerating regular food well, no complain of nausea or vomiting. Patient denies any acute distress,SOB and chest pain. Patient in stable condition, heart rate has been trending high but shows  No signs and symptoms of tachycardia, no apparent distress noted, no neurological deficit noted, instruct patient to use the ICS, patient demonstrate appropriately. Educate patient on fall precaution, and the need to call for assistance. Placed call bell within reach, will continue to monitor.

## 2018-06-13 NOTE — PROGRESS NOTES
ARU/IPR REFERRAL CONTACT NOTE  [address] [place] [address] for Physical Rehabilitation    RE: Mario Varela  Referral received to review this patient's case for admission to [address] [place] [address] for Physical Rehabilitation. Current status reviewed.  When appropriate, will need PT/OT evaluation/treatment on this patient to complete the pre-admission evaluation.  Will continue to follow. Thank you for this referral.  Should you have any questions please do not hesitate to call. Sincerely,  Sapna Isaac.  [address] [place] [address] Physical Rehabilitation  [phone]
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
## 2018-06-13 NOTE — DIABETES MGMT
Diabetes Patient/Family Education Record    Factors That  May Influence Patients Ability  to Learn or  Comply with Recommendations   []   Language barrier    []   Cultural needs   []   Motivation    []   Cognitive limitation    []   Physical   [x]   Education    []   Physiological factors   []   Hearing/vision/speaking impairment   []   Jewish beliefs    []   Financial factors   []  Other:   []  No factors identified at this time. Person Instructed:   [x]   Patient   [x]   Family   []  Other     Preference for Learning:   [x]   Verbal   [x]   Written   []  Demonstration     Level of Comprehension & Competence:    [x]  Good                                      [] Fair                                     []  Poor                             []  Needs Reinforcement   [x]  Teachback completed    Education Component:   [x]  Medication management, including how to administer insulin (if appropriate) and potential medication interactions: Patient reported home diabetes meds:  Lantus insulin twice daily: 30 units daily every morning and 36 units daily at bedtime. Patient also reported that she was prescribed to take a fast acting insulin 20 units 3x daily before meals. She cannot remember the name of insulin: not humalog, novolog, or Apidra. Patient verbalized understanding about the importance of eating meal within 15 minutes after taking a fast acting insulin. Patient reported that she does not take the fast acting insulin if she is not planning to eat a meal.   [x]  Nutritional management: Patient stated, \"I've been very bad. \" Patient reported eating [employer]'s peanut butter cups everyday and multiple times. Encouraged nutritional compliance to help achieve diabetes control as evidence by lower A1c level. Patient agreed not to eat Kend[employer]'s peanut butter cups anymore. She will also tell her family and friends not to bring them to her in order to help resist temptation.     [x]  Exercise: Patient is status post hip surgery and will follow exercise recommendations by PT and her medical provider. [x]  Signs, symptoms, and treatment of hyperglycemia and hypoglycemia   [x] Prevention, recognition and treatment of hyperglycemia and hypoglycemia   [x]  Importance of blood glucose monitoring and how to obtain a blood glucose meter    []  Instruction on use of the blood glucose meter   [x]  Discuss the importance of HbA1C monitoring: Patient verbalized understanding of A1c. Discussed with patient that her current A1c level of 9.7% (06/10/2018) which is equivalent to estimated average blood glucose of 232 mg/dL during the past 2-3 months. Patient stated, \"I've been very bad. \" Patient reported eating Reeses peanut butter multiple times daily. Encouraged patient to comply with diabetes treatment plan to help prevent complications of uncontrolled diabetes. Also explained to patient about potential delay wound healing due to elevated blood sugar. Patient verbalized understanding and stated that she will not eat Resees peanut butter anymore. Patient stated, \"I need to get well. \"   []  Sick day guidelines   [x]  Proper use and disposal of lancets, needles, syringes or insulin pens (if appropriate)   [x]  Potential long-term complications (retinopathy, kidney disease, neuropathy, foot care)   [x] Information about whom to contact in case of emergency or for more information    [x]  Goal:  Patient/family will demonstrate understanding of Diabetes Self Management Skills by: 06/20/2018  Plan for post-discharge education or self-management support:    [x] Outpatient class schedule provided            [] Patient Declined    [] Scheduled for outpatient classes (date) _______     Arsenio Grace RN  pgr [phone]

## 2018-06-14 LAB
ANION GAP SERPL CALC-SCNC: 7 MMOL/L (ref 3–18)
BUN SERPL-MCNC: 12 MG/DL (ref 7–18)
BUN/CREAT SERPL: 16 (ref 12–20)
CALCIUM SERPL-MCNC: 8.9 MG/DL (ref 8.5–10.1)
CHLORIDE SERPL-SCNC: 105 MMOL/L (ref 100–108)
CO2 SERPL-SCNC: 29 MMOL/L (ref 21–32)
CREAT SERPL-MCNC: 0.74 MG/DL (ref 0.6–1.3)
ERYTHROCYTE [DISTWIDTH] IN BLOOD BY AUTOMATED COUNT: 12.5 % (ref 11.6–14.5)
GLUCOSE BLD STRIP.AUTO-MCNC: 145 MG/DL (ref 70–110)
GLUCOSE BLD STRIP.AUTO-MCNC: 183 MG/DL (ref 70–110)
GLUCOSE BLD STRIP.AUTO-MCNC: 228 MG/DL (ref 70–110)
GLUCOSE SERPL-MCNC: 117 MG/DL (ref 74–99)
HCT VFR BLD AUTO: 26.1 % (ref 35–45)
HGB BLD-MCNC: 8.1 G/DL (ref 12–16)
MCH RBC QN AUTO: 30.8 PG (ref 24–34)
MCHC RBC AUTO-ENTMCNC: 31 G/DL (ref 31–37)
MCV RBC AUTO: 99.2 FL (ref 74–97)
PLATELET # BLD AUTO: 219 K/UL (ref 135–420)
PMV BLD AUTO: 10.2 FL (ref 9.2–11.8)
POTASSIUM SERPL-SCNC: 3.8 MMOL/L (ref 3.5–5.5)
RBC # BLD AUTO: 2.63 M/UL (ref 4.2–5.3)
SODIUM SERPL-SCNC: 141 MMOL/L (ref 136–145)
WBC # BLD AUTO: 8.3 K/UL (ref 4.6–13.2)

## 2018-06-14 PROCEDURE — 74011250637 HC RX REV CODE- 250/637: Performed by: NURSE PRACTITIONER

## 2018-06-14 PROCEDURE — 74011250636 HC RX REV CODE- 250/636: Performed by: SPECIALIST

## 2018-06-14 PROCEDURE — 74011250637 HC RX REV CODE- 250/637: Performed by: INTERNAL MEDICINE

## 2018-06-14 PROCEDURE — 74011636637 HC RX REV CODE- 636/637: Performed by: INTERNAL MEDICINE

## 2018-06-14 PROCEDURE — 97165 OT EVAL LOW COMPLEX 30 MIN: CPT

## 2018-06-14 PROCEDURE — 80048 BASIC METABOLIC PNL TOTAL CA: CPT | Performed by: INTERNAL MEDICINE

## 2018-06-14 PROCEDURE — 74011250637 HC RX REV CODE- 250/637: Performed by: SPECIALIST

## 2018-06-14 PROCEDURE — 36415 COLL VENOUS BLD VENIPUNCTURE: CPT | Performed by: INTERNAL MEDICINE

## 2018-06-14 PROCEDURE — 82962 GLUCOSE BLOOD TEST: CPT

## 2018-06-14 PROCEDURE — 65270000029 HC RM PRIVATE

## 2018-06-14 PROCEDURE — 51798 US URINE CAPACITY MEASURE: CPT

## 2018-06-14 PROCEDURE — 85027 COMPLETE CBC AUTOMATED: CPT | Performed by: INTERNAL MEDICINE

## 2018-06-14 RX ORDER — LEVOFLOXACIN 500 MG/1
500 TABLET, FILM COATED ORAL EVERY 24 HOURS
Status: COMPLETED | OUTPATIENT
Start: 2018-06-14 | End: 2018-06-16

## 2018-06-14 RX ADMIN — POLYETHYLENE GLYCOL 3350 17 G: 17 POWDER, FOR SOLUTION ORAL at 09:36

## 2018-06-14 RX ADMIN — AMLODIPINE BESYLATE 2.5 MG: 2.5 TABLET ORAL at 09:37

## 2018-06-14 RX ADMIN — OXYCODONE HYDROCHLORIDE 10 MG: 15 TABLET ORAL at 04:44

## 2018-06-14 RX ADMIN — ACETAMINOPHEN 650 MG: 325 TABLET ORAL at 09:37

## 2018-06-14 RX ADMIN — ACETAMINOPHEN 650 MG: 325 TABLET ORAL at 15:05

## 2018-06-14 RX ADMIN — ENOXAPARIN SODIUM 30 MG: 30 INJECTION SUBCUTANEOUS at 09:37

## 2018-06-14 RX ADMIN — Medication 10 ML: at 21:48

## 2018-06-14 RX ADMIN — MONTELUKAST SODIUM 10 MG: 10 TABLET, FILM COATED ORAL at 21:48

## 2018-06-14 RX ADMIN — VITAMIN D, TAB 1000IU (100/BT) 5000 UNITS: 25 TAB at 09:37

## 2018-06-14 RX ADMIN — DOCUSATE SODIUM 100 MG: 100 CAPSULE, LIQUID FILLED ORAL at 17:43

## 2018-06-14 RX ADMIN — Medication 10 ML: at 13:41

## 2018-06-14 RX ADMIN — Medication 500 MCG: at 09:37

## 2018-06-14 RX ADMIN — ACETAMINOPHEN 650 MG: 325 TABLET ORAL at 04:44

## 2018-06-14 RX ADMIN — ACETAMINOPHEN 650 MG: 325 TABLET ORAL at 21:48

## 2018-06-14 RX ADMIN — Medication 10 ML: at 05:06

## 2018-06-14 RX ADMIN — LEVOFLOXACIN 500 MG: 500 TABLET, FILM COATED ORAL at 15:05

## 2018-06-14 RX ADMIN — INSULIN GLARGINE 30 UNITS: 100 INJECTION, SOLUTION SUBCUTANEOUS at 21:45

## 2018-06-14 RX ADMIN — DOCUSATE SODIUM 100 MG: 100 CAPSULE, LIQUID FILLED ORAL at 09:38

## 2018-06-14 RX ADMIN — INSULIN LISPRO 4 UNITS: 100 INJECTION, SOLUTION INTRAVENOUS; SUBCUTANEOUS at 17:08

## 2018-06-14 RX ADMIN — INSULIN GLARGINE 30 UNITS: 100 INJECTION, SOLUTION SUBCUTANEOUS at 09:37

## 2018-06-14 RX ADMIN — FUROSEMIDE 20 MG: 20 TABLET ORAL at 09:37

## 2018-06-14 RX ADMIN — INSULIN LISPRO 2 UNITS: 100 INJECTION, SOLUTION INTRAVENOUS; SUBCUTANEOUS at 21:45

## 2018-06-14 RX ADMIN — LEVOTHYROXINE SODIUM 125 MCG: 25 TABLET ORAL at 07:42

## 2018-06-14 RX ADMIN — OXYCODONE HYDROCHLORIDE 10 MG: 15 TABLET ORAL at 21:48

## 2018-06-14 NOTE — PROGRESS NOTES
Patient has not urinated since night shift. Bladder scanned patient and found 375 ml in bladder. Patient stated she wanted to be able to go on her own without any type of intervention. Will contact MD with findings.

## 2018-06-14 NOTE — PROGRESS NOTES
Rounded on patient post fracture repair   Activity: Reinforced importance of repositioning and moving unaffected limbs to prevent DVT. Also calling for help to reposition to prevent bedsore every 2 hours or as needed. Also encouraged patient to prevent skin from being moist and changing clothes/linen to prevent skin problems with decreased mobility. VTE prophylaxis: Instructed patient to use their SCD's  On unaffected let. To use while in bed and in the chair. Educated re: ankle pumps to assist with circulation when in hospital and at home. Medications: Reviewed pain medications patient is taking and the importance of keeping pain under control to help with getting OOB and/or therapy. Reminded the patient to always eat a snack with their pain medication to help to prevent nausea. Encouraged patient to monitor for constipation and to take a stool softner/laxative while recovering and on pain medication. Encouraged to stay on stool softener/laxative as long as taking narcotic for pain. Also encouraged patient to drink 8 glasses of water a day(unless on a fluid restriction). Incentive Spirometry: Reinforced use of incentive spirometer with return demonstration by patient. Wound Care: Dressing to fracture site  Dry and intact. . Patient instructed not to take dressing off at home. Patient given CHG wash to use in hospital and at home. Stressed importance of using a clean towel and washcloth daily. Reminded to put on clean clothes and night clothes daily. Ice Protocol: Ice solution in place per protocol. Patient Safety: Call light and personal belongings in reach. Patient  reminded to call for help toget OOB or when leaving bathroom for safety. Phone and other items also within reach per patient's request.     Diet: Educated patient on the importance of eating three well balanced meals a day with protein to promote bone/muscle healing.  Reminded patient to drink lots of fluids to protect kidneys from all the medications being taken currently with recovery. Patient given more educational material to reinforce the things discussed. Patient encouraged. to continue doing them at home to prevent post op complications and have a successful recovery. Patient  verbalized understand of all information and education discussed. Patient  given the opportunity for asking questions.

## 2018-06-14 NOTE — PROGRESS NOTES
[facility]  [address], [address] [place] [address]  [phone] Office  [phone] Fax                  Orthopaedic Surgery Daily Progress Note      Subjective: 82 y.o., female, 2 Days Post-Op, Procedure(s):  OPEN REDUCTION INTERNAL FIXATION LEFT DISTAL FEMUR FRACTURE     [x]I have reviewed the flowsheet and previous days notes. Events overnight reviewed and discussed with nursing staff. Vital signs and records reviewed.     Vital Signs:  Visit Vitals    /61 (BP 1 Location: Left arm, BP Patient Position: At rest)    Pulse (!) 116    Temp 99.1 °F (37.3 °C)    Resp 20    Wt 265 lb (120.2 kg)    SpO2 94%    Breastfeeding No    BMI 41.5 kg/m2       Pain Scale:    3/10 AT REST    6/10 With ACTIVITY    [XX] Dull, [  ]Thobbing, [  ]Stabbing, [  ]Shooting, [  ] Lissette Pickard, [  ] Electrical     Chrystie Martha         [  Mao Frame   [XX] Left  [  ] Hip           [  Mao Frame  [  ] Left  [  ] Katheran Lard        [  Mao Frame  [  ] Left  [  ] Wrist        [  Mao Frame  [  ] Left  [  ] Jacqualin Erichsen       [  Mao Frame  [  ] Left  [  ] Shoulder  [  Mao Frame  [  ] Left  [  ] Ankle       [  Mao Frame   [  ] Left   [  ] Leartis           [  Mao Frame  [  ] Left         Critical / Reportable event(s) occurring  past 24 hours: None      Review of Systems:      Pain as above,   [No] CP, [No] SOB, [No] F/C/NS, [No] Headache,  [No]Cough (  ) Productive / (   ) Non Productive  [No] dizziness, (+) voiding [No] torres, [No] Stool, (+) Flatus  [No] numbness / tingling, [No] double nor blurry vision    Lab Results   Component Value Date/Time    Specimen Description: [facility] 12/22/2009 10:29 AM    Lab Results   Component Value Date/Time    Culture result: [id number]  COLONIES/mL  DIPHTHEROIDS   10/11/2016 01:06 PM    Culture result: NO GROWTH 2 DAYS 12/22/2009 10:29 AM        Labs:  Recent Results (from the past 24 hour(s))   URINALYSIS W/MICROSCOPIC    Collection Time: 06/13/18  2:50 PM   Result Value Ref Range    Color YELLOW Appearance CLOUDY      Specific gravity 1.026 1.005 - 1.030      pH (UA) 5.0 5.0 - 8.0      Protein TRACE (A) NEG mg/dL    Glucose 100 (A) NEG mg/dL    Ketone NEGATIVE  NEG mg/dL    Bilirubin NEGATIVE  NEG      Blood MODERATE (A) NEG      Urobilinogen 0.2 0.2 - 1.0 EU/dL    Nitrites NEGATIVE  NEG      Leukocyte Esterase MODERATE (A) NEG      WBC 10 to 20 0 - 4 /hpf    RBC 0 to 2 0 - 5 /hpf    Epithelial cells 1+ 0 - 5 /lpf    Bacteria FEW (A) NEG /hpf    Mucus FEW (A) NEG /lpf    Hyaline cast 1 to 3 0 - 2 /lpf    Yeast FEW (A) NEG     GLUCOSE, POC    Collection Time: 06/13/18  3:55 PM   Result Value Ref Range    Glucose (POC) 168 (H) 70 - 110 mg/dL   GLUCOSE, POC    Collection Time: 06/13/18  9:23 PM   Result Value Ref Range    Glucose (POC) 214 (H) 70 - 110 mg/dL   CBC W/O DIFF    Collection Time: 06/14/18  5:03 AM   Result Value Ref Range    WBC 8.3 4.6 - 13.2 K/uL    RBC 2.63 (L) 4.20 - 5.30 M/uL    HGB 8.1 (L) 12.0 - 16.0 g/dL    HCT 26.1 (L) 35.0 - 45.0 %    MCV 99.2 (H) 74.0 - 97.0 FL    MCH 30.8 24.0 - 34.0 PG    MCHC 31.0 31.0 - 37.0 g/dL    RDW 12.5 11.6 - 14.5 %    PLATELET 025 707 - 956 K/uL    MPV 10.2 9.2 - 21.5 FL   METABOLIC PANEL, BASIC    Collection Time: 06/14/18  5:03 AM   Result Value Ref Range    Sodium 141 136 - 145 mmol/L    Potassium 3.8 3.5 - 5.5 mmol/L    Chloride 105 100 - 108 mmol/L    CO2 29 21 - 32 mmol/L    Anion gap 7 3.0 - 18 mmol/L    Glucose 117 (H) 74 - 99 mg/dL    BUN 12 7.0 - 18 MG/DL    Creatinine 0.74 0.6 - 1.3 MG/DL    BUN/Creatinine ratio 16 12 - 20      GFR est AA >60 >60 ml/min/1.73m2    GFR est non-AA >60 >60 ml/min/1.73m2    Calcium 8.9 8.5 - 10.1 MG/DL   GLUCOSE, POC    Collection Time: 06/14/18  6:21 AM   Result Value Ref Range    Glucose (POC) 145 (H) 70 - 110 mg/dL       Procedure(s):  Wound site(s) examined:   [No] surgical dressing [was] removed    Objective findings / Data:   Patient is awake, pleasant, and cooperative sociable and oriented  x 3, sitting up in bed, left knee sx site intact with ICE MAN clear noted well fitting and functioning properly, OpSite dressing with minimal bloody strike thru, no indurations, no erythema, no evidence cellulitis, noted improved soft tissue sx swelling bracketing sx site, skin staples visualized due to cover dressing, no calf pain nor evidence DVT Tra LE, distal NVI fully Tra LE, cap refill < 2 sec Tra LE.     DIAGNOSIS / IMPRESSIONS:    2 Days Post-Op, Procedure(s):  OPEN REDUCTION INTERNAL FIXATION LEFT DISTAL FEMUR FRACTURE - stable  Post Operative / Post Procedural Pain -  on opiate analgesics  Post Procedure / Operative Range of Motion - working with PT/OT per protocol  Anemia - [  ] Chronic, [XX] Acute Post Op Blood Loss    Patient Active Problem List   Diagnosis Code    Essential hypertension I10    Unspecified hypothyroidism E03.9    Hypovitaminosis D E55.9    Unspecified asthma(493.90) J45.909    Esophageal reflux K21.9    Noncompliance with medication regimen Z91.14    Arm pain M79.603    Neck pain M54.2    Anxiety F41.9    S/P joint replacement Z96.60    DJD (degenerative joint disease) M19.90    Diabetic neuropathy ([facility] E11.40    Dizziness R42    Chronic neck pain M54.2, G89.29    Chronic back pain M54.9, G89.29    Leg pain, right M79.604    Hypothyroidism E03.9    IDDM (insulin dependent diabetes mellitus) ([facility] E11.9, Z79.4    Morbid obesity ([facility] E66.01    Unspecified transient cerebral ischemia G45.9    Congestive heart failure (HCC) I50.9    Mixed hyperlipidemia E78.2    Coronary atherosclerosis of native coronary artery I25.10    Chronic diastolic heart failure (HCC) I50.32    Benign hypertensive heart disease without heart failure I11.9    Seasonal and perennial allergic rhinitis J30.89, J30.2    Medication monitoring encounter Z51.81    Tear of left rotator cuff B95.343    Uncomplicated asthma O71.366    Moderate persistent asthma with status asthmaticus J45.42    NSTEMI (non-ST elevated myocardial infarction) ([facility] I21.4    S/P drug eluting coronary stent placement Z95.5    Advanced care planning/counseling discussion Z71.89    Chest pain R07.9    Bilateral lower extremity edema R60.0    BMI 38.0-38.9,adult Z68.38    Right groin pain R10.31    Periprosthetic left distal femur fracture M97. 8XXA, [id number]    Callie-prosthetic femur fracture at tip of prosthesis M97. 8XXA, [id number]    Preoperative cardiovascular examination Z01.810            Plan:    1. Justification for continued stay: Fair progression towards established rehabilitation goals. 2. Medical Issues being followed closely:   [X] Fall and safety precautions    [place] ] Wound Care per protocol   [X] Bowel and Bladder Function    [X] Fluid Electrolyte and Nutrition Balance    [X] Pain Control per protocol   3. Issues that 24 hour nursing is following:   [X] Fall and safety precautions    [X] Wound Care    [X] Bowel and Bladder Function    [X] Fluid Electrolyte and Nutrition Balance    [X] Pain Control    [X] Assistance with and education on in-room safety with transfers to and from the bed, wheelchair, toilet and shower. 4. Plan of care:   [X] Continue current care    [X] Physical Therapy    [X] Occupational Therapy    [X] Thrombo Embolism Prophylaxis     [X] Discharge planning per patient protocol  [   ] Discharge / Transfer from Acute Stay [address] [place] [address] to [  ] Home,  [PLAN] SNF   5. Medications:   [X] MAR Reviewed    [X] Continue Present Medications      6. DVT Prophylaxis:   [XX] Lovenox    [ ] Unfractionated Heparin    [ ] Coumadin    [ ] Xarelto   [ Westley Nickels  [ ] Eliquis   [ ] WALTER Stockings    [XX] Sequential Compression Device(s): [XX Right]Full calf  [Left]Foot only   [ ] Ambulation        Orthopaedically, this patient is stable and their recovery is on track for a successful outcome per established rehabilitation goals. [No] Additional Orthopaedic surgical intervention(s) are warranted /  indicated at this time.     Medicine, will continue to follow the patient as necessary. Thromboembolism prophylaxis will continue per protocol and noted above. 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 I have independently examined the patient and reviewed all lab studies and imgaing as well as review of nursing notes and physican notes from the past 24 hours. The plan of care has been discussed with the following below and all questions are answered.      Case and finding(s) discussed with attending Orthopaedic Surgeon and:    [  Lucia Azar and or attending Speciatist  [XX]Patient   [  ]Family   [XX]Nursing   [XX]Case Management      Medical Decision Making     Comprehensive review of patients current presenting condition(s) with a thorough review of the patient's previously known comorbidities:    Patient Active Problem List   Diagnosis Code    Essential hypertension I10    Unspecified hypothyroidism E03.9    Hypovitaminosis D E55.9    Unspecified asthma(493.90) J45.909    Esophageal reflux K21.9    Noncompliance with medication regimen Z91.14    Arm pain M79.603    Neck pain M54.2    Anxiety F41.9    S/P joint replacement Z96.60    DJD (degenerative joint disease) M19.90    Diabetic neuropathy ([facility] E11.40    Dizziness R42    Chronic neck pain M54.2, G89.29    Chronic back pain M54.9, G89.29    Leg pain, right M79.604    Hypothyroidism E03.9    IDDM (insulin dependent diabetes mellitus) ([facility] E11.9, Z79.4    Morbid obesity ([facility] E66.01    Unspecified transient cerebral ischemia G45.9    Congestive heart failure ([facility] I50.9    Mixed hyperlipidemia E78.2    Coronary atherosclerosis of native coronary artery I25.10    Chronic diastolic heart failure (HCC) I50.32    Benign hypertensive heart disease without heart failure I11.9    Seasonal and perennial allergic rhinitis J30.89, J30.2    Medication monitoring encounter Z51.81    Tear of left rotator cuff T18.862    Uncomplicated asthma F29.068    Moderate persistent asthma with status asthmaticus J45.42    NSTEMI (non-ST elevated myocardial infarction) (HCC) I21.4    S/P drug eluting coronary stent placement Z95.5    Advanced care planning/counseling discussion Z71.89    Chest pain R07.9    Bilateral lower extremity edema R60.0    BMI 38.0-38.9,adult Z68.38    Right groin pain R10.31    Periprosthetic left distal femur fracture M97. 8XXA, [id number]    Callie-prosthetic femur fracture at tip of prosthesis M97. 9XXA, [id number]    Preoperative cardiovascular examination Z01.810           Per Rocío Nicole Protocol this case was discussed with attending surgeon, and reflects the attending surgeon's agreement with orders and (plan of care)  as confirmed by their cosignature.            Tank Jiménez, APA, APC, MPAS,  Orthopaedic Surgical Physician Assistant-C  Department of [facility]       6/14/2018  2:45 PM

## 2018-06-14 NOTE — PROGRESS NOTES
PT orders received, chart reviewed. Pt unable to participate with PT due to:  []  Nausea/vomiting  []  Eating  []  Pain  []  Pt lethargic  []  Off Unit  []  Pt refused  [x]  Other, Please remove the active continuous bedrest order to participate in PT. Will f/u later as patient's schedule allows.  Thank you for this referral.  Jayshree Villanueva, PT, DPT

## 2018-06-14 NOTE — PROGRESS NOTES
[facility] REFERRAL CONTACT NOTE  [address] [place] [address] for Physical Rehabilitation    RE: Estevan Gonzales  Referral received to review this patient's case for admission to [address] [place] [address] for Physical Rehabilitation. Current status reviewed.  When appropriate, will need PT/OT evaluation/treatment on this patient to complete the pre-admission evaluation.  Will continue to follow. Thank you for this referral.  Should you have any questions please do not hesitate to call. Sincerely,  Dottie Henderson.  [address] [place] [address] Physical Rehabilitation  [phone]

## 2018-06-14 NOTE — PROGRESS NOTES
[facility]      CARDIOLOGY PROGRESS NOTE  RECS:    1. Hx of CAD with  post percutaneous transluminal coronary angioplasty/stent to mid right coronary artery using a drug-eluting stent done in 2016. Stable no chest pain or chest pressure.  Follow serial enzymes EKG revealed NSR  w/o acute ischemic changes. Ok to hold Plavix and asa - resume when OK with ortho. 2. Sinus Tachycardia  100-110 - isolated brief episode of 120 - asymptomatic, may be related to febrile state or pain, post op  - will monitor for now. Per patient has a h/o sinus tach. 3. Acute on chronic diastolic heart failure- mildly decompensated. c/o orthopnea and INIGUEZ. Did not take lasix in the last few days. mildly decompensated. Continue with lasix po. Monitor I&O. Last echo 2017 with Mildly LV dysfunction EF% 45-50%. Not on bb or ARB due to allergies  4. Hypertension with borderline BP- possible related to pain medications reduced Norvasc to 2.5 mg daily. Will monitor. 5. Diabetes- medications and w/u per medical team   6. Hyperlipidemia- unable to tolerated statins- discussed with patient about pcsk9 starting-medications was approved but approved. But she refused. 7. Left femur fracture- s/p ORIF - stable post op  ASSESSMENT:  Hospital Problems  Date Reviewed: 6/12/2018          Codes Class Noted POA    Preoperative cardiovascular examination ICD-10-CM: Z01.810  ICD-9-CM: V72.81  6/11/2018 Unknown        * (Principal)Periprosthetic left distal femur fracture ICD-10-CM: M97. Dee Barker  ICD-9-CM: 996.44, V43.65  6/10/2018 Yes        Callie-prosthetic femur fracture at tip of prosthesis ICD-10-CM: M97. Pam Thomas, [id number]  ICD-9-CM: 996.44, V43.64  6/10/2018 Unknown        Bilateral lower extremity edema ICD-10-CM: R60.0  ICD-9-CM: 782.3  4/25/2017 Yes        Uncomplicated asthma CBA-36-GG: J45.909  ICD-9-CM: 493.90  4/8/2016 Yes        Morbid obesity ([facility] ICD-10-CM: E66.01  ICD-9-CM: 278.01  Unknown Yes    Overview Signed 5/9/2013  8:54 AM by Von Fontenot     Weight loss has been strongly encouraged by following dietary restrictions and an exercise routine.              Coronary atherosclerosis of native coronary artery ICD-10-CM: I25.10  ICD-9-CM: 414.01  Unknown Yes        Chronic diastolic heart failure ([facility] ICD-10-CM: I50.32  ICD-9-CM: 428.32  Unknown Yes    Overview Signed 5/9/2013  8:58 AM by Von Fontenot     Stable, edema better, uses PRN Lasix             IDDM (insulin dependent diabetes mellitus) ([facility] ICD-10-CM: E11.9, Z79.4  ICD-9-CM: 250.00, V58.67  12/4/2012 Yes        Diabetic neuropathy ([facility] ICD-10-CM: E11.40  ICD-9-CM: 250.60, 357.2  4/20/2012 Yes        DJD (degenerative joint disease) ICD-10-CM: M19.90  ICD-9-CM: 715.90  Unknown Yes        S/P joint replacement ICD-10-CM: Z96.60  ICD-9-CM: V43.60  Unknown Yes    Overview Signed 3/4/2011  9:40 AM by Radames Lagunas     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)             Noncompliance with medication regimen ICD-10-CM: Z91.14  ICD-9-CM: V15.81  2/8/2011 Yes        Anxiety ICD-10-CM: F41.9  ICD-9-CM: 300.00  2/8/2011 Yes        Essential hypertension ICD-10-CM: I10  ICD-9-CM: 401.9  5/20/2010 Yes    Overview Signed 7/28/2016 10:34 AM by Sandra Win MD     controlled             Hypovitaminosis D ICD-10-CM: E55.9  ICD-9-CM: 268.9  5/20/2010 Yes                SUBJECTIVE:  No CP or SOB  Left hip pain with movement    OBJECTIVE:    VS:   Visit Vitals    /61 (BP 1 Location: Left arm, BP Patient Position: At rest)    Pulse (!) 116    Temp 99.1 °F (37.3 °C)    Resp 20    Wt 120.2 kg (265 lb)    SpO2 94%    Breastfeeding No    BMI 41.5 kg/m2         Intake/Output Summary (Last 24 hours) at 06/14/18 1309  Last data filed at 06/14/18 1230   Gross per 24 hour   Intake              580 ml   Output              450 ml   Net              130 ml     TELE: sinus tachycardia    General: alert and in no apparent distress  HENT: Normocephalic, atraumatic. Normal external eye. Neck :  no JVD  Cardiac:  regular rate and rhythm, S1, S2 normal, no murmur, click, rub or gallop  Lungs: clear to auscultation bilaterally  Abdomen: Soft, nontender, no masses  Extremities:  Trace LLE edema in ACE      Labs: Results:       Chemistry Recent Labs      06/14/18   0503   GLU  117*   NA  141   K  3.8   CL  105   CO2  29   BUN  12   CREA  0.74   CA  8.9   AGAP  7   BUCR  16      CBC w/Diff Recent Labs      06/14/18   0503  06/12/18   1616   WBC  8.3   --    RBC  2.63*   --    HGB  8.1*  8.9*   HCT  26.1*  27.5*   PLT  219   --       Cardiac Enzymes Recent Labs      06/11/18   1900  06/11/18   1455   CPK  57  56   CKND1  CALCULATION NOT PERFORMED WHEN RESULT IS BELOW LINEAR LIMIT  CALCULATION NOT PERFORMED WHEN RESULT IS BELOW LINEAR LIMIT      Coagulation No results for input(s): PTP, INR, APTT in the last 72 hours. No lab exists for component: INREXT    Lipid Panel Lab Results   Component Value Date/Time    Cholesterol, total 179 02/07/2017 06:15 AM    HDL Cholesterol 37 (L) 02/07/2017 06:15 AM    LDL, calculated 126.6 (H) 02/07/2017 06:15 AM    VLDL, calculated 15.4 02/07/2017 06:15 AM    Triglyceride 77 02/07/2017 06:15 AM    CHOL/HDL Ratio 4.8 02/07/2017 06:15 AM      BNP No results for input(s): BNPP in the last 72 hours. Liver Enzymes No results for input(s): TP, ALB, TBIL, AP, SGOT, GPT in the last 72 hours. No lab exists for component: DBIL   Thyroid Studies Lab Results   Component Value Date/Time    TSH 0.51 02/27/2018 10:11 AM              Macdonald Gitelman, NP   Pager # [phone]      I have independently evaluated taken history and examined the patient. All relevant labs and testing data's are reviewed. Care plan discussed and updated after review.   Aravind Izaguirre MD
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
## 2018-06-14 NOTE — PROGRESS NOTES
Care Management Specialist reviewed over the Important Medicare Rights Letter with patient. Patient signed the letter. Patient given a copy of letter at bedside. Signed copy placed into patient chart.

## 2018-06-14 NOTE — PROGRESS NOTES
Problem: Falls - Risk of  Goal: *Absence of Falls  Document Terence Fall Risk and appropriate interventions in the flowsheet.    Outcome: Progressing Towards Goal  Fall Risk Interventions:  Mobility Interventions: Patient to call before getting OOB         Medication Interventions: Teach patient to arise slowly, Patient to call before getting OOB    Elimination Interventions: Call light in reach    History of Falls Interventions: Door open when patient unattended

## 2018-06-14 NOTE — PROGRESS NOTES
Admit Date: 6/10/2018  Date of Service: 6/14/2018    Reason for follow-up: femur fracture      Assessment:         Left periprosthetic distal femur fracture:  Near site of prior left TKR                        - s/p ORIF 6/12  Low grade temps: UA with 20 WBC concerning for UTI, CXR with atlectasis but no infilterate  DM type 2, on insulin  HTN  Acute on chronic diastolic heart failure: mildly decompensated per cardiology, cont po lasix  CAD s/p cardiac cath and stenting  Hyperlipidemia  Hypothyroidism    Plan:   SSI and accuchecks  Cardiology following; monitor post-op tachycardia  Re-initate ASA and Plavix when ok with surgery  CBC, BMP in am  Start levofloxacin for UTI    Current Antibtiocs:   None    Lines:   Peripheral    I spent 45 minutes with the patient in face-to-face consultation, of which greater than 50% was spent in counseling and coordination of care as described above. Case discussed with:  [x]Patient  [x]Family  [x]Nursing  []Case Management  DVT Prophylaxis:  []Lovenox  []Hep SQ  []SCDs  []Coumadin   []On Heparin gtt  I have independently examined the patient and reviewed all lab studies and imgaing as well as review of nursing notes and physican notes from the past 24 hours. The plan of care has been discussed with the patient and all questions are answered. Robyn Jimenez D.O.   Pager [phone]      Allergies   Allergen Reactions    Niacin Palpitations and Other (comments)     Stomach irritation    Ace Inhibitors Cough    Avapro [Irbesartan] Myalgia    Bystolic [Nebivolol] Other (comments)     Felt like throat closing    Catapres [Clonidine] Cough    Codeine Nausea and Vomiting    Cozaar [Losartan] Not Reported This Time    Crestor [Rosuvastatin] Other (comments)     Cramps, aches    Darvocet A500 [Propoxyphene N-Acetaminophen] Unknown (comments)    Diovan [Valsartan] Cough    Flagyl [Metronidazole] Other (comments)     Mouth and throat irritation    Gabapentin Other (comments) Abdominal pain and burning     Iodinated Contrast- Oral And Iv Dye Other (comments)     Throat swelling    Iodine Unknown (comments)    Lescol [Fluvastatin] Other (comments)     Leg cramps    Lipitor [Atorvastatin] Myalgia and Other (comments)     Cramps, aches    Lovastatin Other (comments)     Leg cramps    Nexium [Esomeprazole Magnesium] Other (comments)     Stomach upset, burning    Pravachol [Pravastatin] Other (comments)     Leg cramps    Reglan [Metoclopramide] Nausea Only    Trazodone Other (comments)     Patient states she feels drugged    Zetia [Ezetimibe] Other (comments)     Cramps, aches    Zocor [Simvastatin] Other (comments)     Cramps, aches           Subjective:      Pt seen and examined. Feeling ok. Constipated per nursing but patient has not complained. Denies fevers or chill. In good spirits. Denies SOB, cough, abdominal pain.      Objective:        Visit Vitals    /61 (BP 1 Location: Left arm, BP Patient Position: At rest)    Pulse (!) 116  Comment: nurse aware    Temp 99.1 °F (37.3 °C)    Resp 20    Wt 120.2 kg (265 lb)    SpO2 94%    Breastfeeding No    BMI 41.5 kg/m2     Temp (24hrs), Av.4 °F (37.4 °C), Min:97.7 °F (36.5 °C), Max:100.5 °F (38.1 °C)        General:   awake alert and oriented, non-toxic   Skin:   no rashes or skin lesions noted on limited exam, dry and warm   HEENT:  No scleral icterus or pallor; oral mucosa moist, lips moist   Lymph Nodes:   not assessed today   Lungs:   non, labored; bilaterally clear to aspiration- no crackles wheezes rales or rhonchi   Heart:  RRR, s1 and s2; no murmurs rubs or gallops; no edema, + pedal pulses   Abdomen:  soft, non-distended, active bowel sounds, non-tender   Genitourinary:  deferred   Extremities:   average muscle tone; no contractures, no joint effusions; right leg with ACE wrap over surgical incision- no obvious bleeding, modest pain   Neurologic:  No gross focal motor or sensory abnormalities; CN 2-12 intact; Follows commands. Psychiatric:   appropriate and interactive.        Labs: Results:   Chemistry Recent Labs      18   0503   GLU  117*   NA  141   K  3.8   CL  105   CO2  29   BUN  12   CREA  0.74   CA  8.9   AGAP  7   BUCR  16      CBC w/Diff Recent Labs      18   0503  18   1616   WBC  8.3   --    RBC  2.63*   --    HGB  8.1*  8.9*   HCT  26.1*  27.5*   PLT  219   --         Lab Results   Component Value Date/Time    Specimen Description: [facility] 2009 10:29 AM    Lab Results   Component Value Date/Time    Culture result: [id number]  COLONIES/mL  DIPHTHEROIDS   10/11/2016 01:06 PM    Culture result: NO GROWTH 2 DAYS 2009 10:29 AM          Imagin/13 CXR: Mild bibasilar atelectasis and mild interstitial edema
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
## 2018-06-14 NOTE — PROGRESS NOTES
PT orders received, chart reviewed. Pt unable to participate with PT due to:  []  Nausea/vomiting  []  Eating  []  Pain  []  Pt lethargic  []  Off Unit  []  Pt refused  [x]  Other, continues to have active bedrest orders. Please remove for PT evaluation. Will f/u later as patient's schedule allows.  Thank you for this referral.  Daniel Ty, PT, DPT

## 2018-06-14 NOTE — PROGRESS NOTES
NUTRITION    Nursing Referral: [facility]     RECOMMENDATIONS / PLAN:     - Add cardiac restriction to current diet order.  - Add supplement: Glucerna Shake once daily. - Continue RD inpatient monitoring and evaluation. NUTRITION INTERVENTIONS & DIAGNOSIS:     [x] Meals/snacks: modify composition  [x] Medical food supplementation: initiate     Nutrition Diagnosis: Inadequate energy intake related to appetite as evidenced by pt usually consuming about 50% of meals, inadequate to meet estimated energy needs. ASSESSMENT:     6/14: Tolerating diet, appetite is fair, slightly better than usual appetite PTA. Encouraged intake, especially of protein; pt verbalized an understanding. Pt states she will try to eat a more, focusing on the protein. 6/11: Pt admitted s/p fall. Plan for TKR tomorrow. Pt reports not being \"a big eater\" for most of her life. Denies changes in appetite or quantity of food eaten. Some nausea since admission, now improved. Tolerating clear liquids. Noted diet to be advanced as tolerated, pt wants solid food for dinner prior to NPO order, discussed with nursing.       Average po intake adequate to meet patients estimated nutritional needs:   [] Yes     [x] No   [] Unable to determine at this time    Diet: DIET DIABETIC CONSISTENT CARB Regular      Food Allergies: NKFA  Current Appetite:   [] Good     [x] Fair      [] Poor     [] Other:  Appetite/meal intake prior to admission:   [] Good     [x] Fair     [] Poor     [] Other:  Feeding Limitations:  [] Swallowing difficulty    [] Chewing difficulty    [] Other:   Current Meal Intake: Patient Vitals for the past 100 hrs:   % Diet Eaten   06/13/18 1236 50 %   06/13/18 0941 50 %   06/12/18 1023 0 %   06/11/18 1358 100 %   06/11/18 0900 50 %       BM: 6/9  Skin Integrity: surgical incision to left leg  Edema: none  Pertinent Medications: Reviewed: cholecalciferol, cyanocobalamin, lantus, SSI    Recent Labs      06/14/18   0503   NA  141   K  3.8 CL  105   CO2  29   GLU  117*   BUN  12   CREA  0.74   CA  8.9       Intake/Output Summary (Last 24 hours) at 06/14/18 1145  Last data filed at 06/14/18 0444   Gross per 24 hour   Intake              460 ml   Output              450 ml   Net               10 ml       Anthropometrics:  Ht Readings from Last 1 Encounters:   06/07/18 5' 7\" (1.702 m)     Last 3 Recorded Weights in this Encounter    06/10/18 1927 06/12/18 1728 06/13/18 1953   Weight: 116.6 kg (257 lb) 117.5 kg (259 lb) 120.2 kg (265 lb)     Body mass index is 41.5 kg/(m^2). Obese, class III    Weight History: Hx of wt fluctuations, but stable for the past year per pt. Weight Metrics 6/13/2018 6/7/2018 4/29/2018 4/27/2018 4/2/2018 3/26/2018 3/18/2018   Weight 265 lb 255 lb 249 lb 248 lb 248 lb 250 lb 250 lb   BMI 41.5 kg/m2 39.94 kg/m2 39 kg/m2 38.84 kg/m2 38.84 kg/m2 39.16 kg/m2 39.16 kg/m2        Admitting Diagnosis: Callie-prosthetic femur fracture at tip of prosthesis  S72.409A LEFT DISTAL FEMUR FRACTURE  Pertinent PMHx: HTN, DM, CHF, atherosclerosis, esophageal reflux, NSTEMI with stent placement    Education Needs:        [x] None identified  [] Identified - Not appropriate at this time  []  Identified and addressed - refer to education log  Learning Limitations:   [x] None identified  [] Identified    Cultural, Mandaeism & ethnic food preferences:  [x] None identified    [] Identified and addressed     ESTIMATED NUTRITION NEEDS:     Calories: 3274-8258 kcal (MSJx1.2-1.3) based on  [x] Actual  kg     [] IBW   Protein:  gm (0.8-1.2 gm/kg) based on  [x] Actual BW      [] IBW   Fluid: 1 mL/kcal     MONITORING & EVALUATION:     Nutrition Goal(s):   1. Po intake of meals will meet >75% of patient estimated nutritional needs within the next 7 days.   Outcome:  [] Met/Ongoing    [x]  Not Met    [] New/Initial Goal     Monitoring:   [x] Food and beverage intake   [x] Diet order   [x] Nutrition-focused physical findings   [x] Treatment/therapy   [] Weight   [] Enteral nutrition intake        Previous Recommendations (for follow-up assessments only):     []   Implemented       [x]   Not Implemented (RD to address)      [] No Longer Appropriate     [] No Recommendation Made     Discharge Planning: cardiac, diabetic diet   [x] Participated in care planning, discharge planning, & interdisciplinary rounds as appropriate      Burt Walker RD, [address]    Pager: [phone]

## 2018-06-14 NOTE — PROGRESS NOTES
0400-No pain at rest.  Extreme pain left leg with any movement. Good CMS to toes. Pt mobility in bed very poor.

## 2018-06-15 LAB
ANION GAP SERPL CALC-SCNC: 7 MMOL/L (ref 3–18)
BUN SERPL-MCNC: 11 MG/DL (ref 7–18)
BUN/CREAT SERPL: 18 (ref 12–20)
CALCIUM SERPL-MCNC: 8.4 MG/DL (ref 8.5–10.1)
CHLORIDE SERPL-SCNC: 104 MMOL/L (ref 100–108)
CO2 SERPL-SCNC: 27 MMOL/L (ref 21–32)
CREAT SERPL-MCNC: 0.61 MG/DL (ref 0.6–1.3)
ERYTHROCYTE [DISTWIDTH] IN BLOOD BY AUTOMATED COUNT: 12.6 % (ref 11.6–14.5)
GLUCOSE BLD STRIP.AUTO-MCNC: 132 MG/DL (ref 70–110)
GLUCOSE BLD STRIP.AUTO-MCNC: 141 MG/DL (ref 70–110)
GLUCOSE BLD STRIP.AUTO-MCNC: 162 MG/DL (ref 70–110)
GLUCOSE BLD STRIP.AUTO-MCNC: 98 MG/DL (ref 70–110)
GLUCOSE SERPL-MCNC: 78 MG/DL (ref 74–99)
HCT VFR BLD AUTO: 24.2 % (ref 35–45)
HGB BLD-MCNC: 7.6 G/DL (ref 12–16)
MCH RBC QN AUTO: 30.8 PG (ref 24–34)
MCHC RBC AUTO-ENTMCNC: 31.4 G/DL (ref 31–37)
MCV RBC AUTO: 98 FL (ref 74–97)
PLATELET # BLD AUTO: 222 K/UL (ref 135–420)
PMV BLD AUTO: 9.8 FL (ref 9.2–11.8)
POTASSIUM SERPL-SCNC: 4.1 MMOL/L (ref 3.5–5.5)
RBC # BLD AUTO: 2.47 M/UL (ref 4.2–5.3)
SODIUM SERPL-SCNC: 138 MMOL/L (ref 136–145)
WBC # BLD AUTO: 7.4 K/UL (ref 4.6–13.2)

## 2018-06-15 PROCEDURE — 65270000029 HC RM PRIVATE

## 2018-06-15 PROCEDURE — 97535 SELF CARE MNGMENT TRAINING: CPT

## 2018-06-15 PROCEDURE — 97162 PT EVAL MOD COMPLEX 30 MIN: CPT

## 2018-06-15 PROCEDURE — 82962 GLUCOSE BLOOD TEST: CPT

## 2018-06-15 PROCEDURE — 97110 THERAPEUTIC EXERCISES: CPT

## 2018-06-15 PROCEDURE — 74011250636 HC RX REV CODE- 250/636: Performed by: SPECIALIST

## 2018-06-15 PROCEDURE — 74011636637 HC RX REV CODE- 636/637: Performed by: INTERNAL MEDICINE

## 2018-06-15 PROCEDURE — 36415 COLL VENOUS BLD VENIPUNCTURE: CPT | Performed by: INTERNAL MEDICINE

## 2018-06-15 PROCEDURE — 80048 BASIC METABOLIC PNL TOTAL CA: CPT | Performed by: INTERNAL MEDICINE

## 2018-06-15 PROCEDURE — 74011250637 HC RX REV CODE- 250/637: Performed by: NURSE PRACTITIONER

## 2018-06-15 PROCEDURE — 97530 THERAPEUTIC ACTIVITIES: CPT

## 2018-06-15 PROCEDURE — 74011250637 HC RX REV CODE- 250/637: Performed by: INTERNAL MEDICINE

## 2018-06-15 PROCEDURE — 85027 COMPLETE CBC AUTOMATED: CPT | Performed by: INTERNAL MEDICINE

## 2018-06-15 PROCEDURE — 74011250637 HC RX REV CODE- 250/637: Performed by: SPECIALIST

## 2018-06-15 RX ORDER — OXYCODONE HYDROCHLORIDE 5 MG/1
5 TABLET ORAL
Qty: 60 TAB | Refills: 0 | Status: ON HOLD | OUTPATIENT
Start: 2018-06-15 | End: 2018-09-06

## 2018-06-15 RX ORDER — ENOXAPARIN SODIUM 100 MG/ML
30 INJECTION SUBCUTANEOUS DAILY
Qty: 10 SYRINGE | Refills: 0 | Status: SHIPPED | OUTPATIENT
Start: 2018-06-16 | End: 2018-09-09

## 2018-06-15 RX ORDER — DILTIAZEM HYDROCHLORIDE 120 MG/1
120 CAPSULE, COATED, EXTENDED RELEASE ORAL DAILY
Status: DISCONTINUED | OUTPATIENT
Start: 2018-06-16 | End: 2018-06-18 | Stop reason: HOSPADM

## 2018-06-15 RX ORDER — DOCUSATE SODIUM 100 MG/1
100 CAPSULE, LIQUID FILLED ORAL 2 TIMES DAILY
Qty: 60 CAP | Refills: 2 | Status: SHIPPED | OUTPATIENT
Start: 2018-06-15 | End: 2018-09-06

## 2018-06-15 RX ADMIN — ACETAMINOPHEN 650 MG: 325 TABLET ORAL at 04:30

## 2018-06-15 RX ADMIN — INSULIN GLARGINE 30 UNITS: 100 INJECTION, SOLUTION SUBCUTANEOUS at 08:44

## 2018-06-15 RX ADMIN — INSULIN GLARGINE 30 UNITS: 100 INJECTION, SOLUTION SUBCUTANEOUS at 22:38

## 2018-06-15 RX ADMIN — LEVOFLOXACIN 500 MG: 500 TABLET, FILM COATED ORAL at 17:42

## 2018-06-15 RX ADMIN — VITAMIN D, TAB 1000IU (100/BT) 5000 UNITS: 25 TAB at 08:43

## 2018-06-15 RX ADMIN — Medication 500 MCG: at 08:43

## 2018-06-15 RX ADMIN — INSULIN LISPRO 2 UNITS: 100 INJECTION, SOLUTION INTRAVENOUS; SUBCUTANEOUS at 22:38

## 2018-06-15 RX ADMIN — ACETAMINOPHEN 650 MG: 325 TABLET ORAL at 17:42

## 2018-06-15 RX ADMIN — DOCUSATE SODIUM 100 MG: 100 CAPSULE, LIQUID FILLED ORAL at 17:42

## 2018-06-15 RX ADMIN — Medication 10 ML: at 17:45

## 2018-06-15 RX ADMIN — DOCUSATE SODIUM 100 MG: 100 CAPSULE, LIQUID FILLED ORAL at 08:42

## 2018-06-15 RX ADMIN — Medication 10 ML: at 06:49

## 2018-06-15 RX ADMIN — LEVOTHYROXINE SODIUM 125 MCG: 25 TABLET ORAL at 06:49

## 2018-06-15 RX ADMIN — ENOXAPARIN SODIUM 30 MG: 30 INJECTION SUBCUTANEOUS at 08:44

## 2018-06-15 RX ADMIN — OXYCODONE HYDROCHLORIDE 10 MG: 15 TABLET ORAL at 04:30

## 2018-06-15 RX ADMIN — Medication 10 ML: at 22:40

## 2018-06-15 RX ADMIN — FUROSEMIDE 20 MG: 20 TABLET ORAL at 08:43

## 2018-06-15 RX ADMIN — ACETAMINOPHEN 650 MG: 325 TABLET ORAL at 11:23

## 2018-06-15 RX ADMIN — OXYCODONE HYDROCHLORIDE 10 MG: 15 TABLET ORAL at 22:38

## 2018-06-15 RX ADMIN — POLYETHYLENE GLYCOL 3350 17 G: 17 POWDER, FOR SOLUTION ORAL at 08:44

## 2018-06-15 RX ADMIN — AMLODIPINE BESYLATE 2.5 MG: 2.5 TABLET ORAL at 08:43

## 2018-06-15 RX ADMIN — MONTELUKAST SODIUM 10 MG: 10 TABLET, FILM COATED ORAL at 22:39

## 2018-06-15 NOTE — PROGRESS NOTES
[facility]      CARDIOLOGY PROGRESS NOTE  RECS:    1. Hx of CAD with  post percutaneous transluminal coronary angioplasty/stent to mid right coronary artery using a drug-eluting stent done in 2016. Stable no chest pain or chest pressure.  Follow serial enzymes EKG revealed NSR  w/o acute ischemic changes. Ok to hold Plavix and asa - resume when OK with ortho. 2. Sinus Tachycardia  100-110 - isolated brief episode of 120 - asymptomatic, may be related to febrile state or pain, post op  - will d/c norvasc and start Diltiazem monitor for now. Per patient has a h/o sinus tach. 3. Acute on chronic diastolic heart failure- mildly decompensated. c/o orthopnea and INIGUEZ. Did not take lasix in the last few days. mildly decompensated. Continue with lasix po. Monitor I&O. Last echo 2017 with Mildly LV dysfunction EF% 45-50%. Not on bb or ARB due to allergies  4. Hypertension with borderline BP- possible related to pain medications reduced Norvasc to 2.5 mg daily. Will monitor. 5. Diabetes- medications and w/u per medical team   6. Hyperlipidemia- unable to tolerated statins- discussed with patient about pcsk9 starting-medications was approved but approved. But she refused. 7. Left femur fracture- s/p ORIF - stable post op   Plan for rehab    ASSESSMENT:  Hospital Problems  Date Reviewed: 6/12/2018          Codes Class Noted POA    Preoperative cardiovascular examination ICD-10-CM: Z01.810  ICD-9-CM: V72.81  6/11/2018 Unknown        * (Principal)Periprosthetic left distal femur fracture ICD-10-CM: M97. Ortega Linda  ICD-9-CM: 996.44, V43.65  6/10/2018 Yes        Callie-prosthetic femur fracture at tip of prosthesis ICD-10-CM: M97. Jose Alfredo Melendez, [id number]  ICD-9-CM: 996.44, V43.64  6/10/2018 Unknown        Bilateral lower extremity edema ICD-10-CM: R60.0  ICD-9-CM: 782.3  4/25/2017 Yes        Uncomplicated asthma OXX-51-XO: J45.909  ICD-9-CM: 493.90  4/8/2016 Yes        Morbid obesity ([address] ICD-10-CM: E66.01  ICD-9-CM: 278.01  Unknown Yes    Overview Signed 5/9/2013  8:54 AM by Supriya Bergeron     Weight loss has been strongly encouraged by following dietary restrictions and an exercise routine.              Coronary atherosclerosis of native coronary artery ICD-10-CM: I25.10  ICD-9-CM: 414.01  Unknown Yes        Chronic diastolic heart failure (HCC) ICD-10-CM: I50.32  ICD-9-CM: 428.32  Unknown Yes    Overview Signed 5/9/2013  8:58 AM by Supriya Bergeron     Stable, edema better, uses PRN Lasix             IDDM (insulin dependent diabetes mellitus) ([facility] ICD-10-CM: E11.9, Z79.4  ICD-9-CM: 250.00, V58.67  12/4/2012 Yes        Diabetic neuropathy ([facility] ICD-10-CM: E11.40  ICD-9-CM: 250.60, 357.2  4/20/2012 Yes        DJD (degenerative joint disease) ICD-10-CM: M19.90  ICD-9-CM: 715.90  Unknown Yes        S/P joint replacement ICD-10-CM: Z96.60  ICD-9-CM: V43.60  Unknown Yes    Overview Signed 3/4/2011  9:40 AM by Eliazar Harris     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)             Noncompliance with medication regimen ICD-10-CM: Z91.14  ICD-9-CM: V15.81  2/8/2011 Yes        Anxiety ICD-10-CM: F41.9  ICD-9-CM: 300.00  2/8/2011 Yes        Essential hypertension ICD-10-CM: I10  ICD-9-CM: 401.9  5/20/2010 Yes    Overview Signed 7/28/2016 10:34 AM by Dyana Hernandez MD     controlled             Hypovitaminosis D ICD-10-CM: E55.9  ICD-9-CM: 268.9  5/20/2010 Yes                SUBJECTIVE:  No CP or SOB    OBJECTIVE:    VS:   Visit Vitals    /78 (BP 1 Location: Left arm, BP Patient Position: At rest)    Pulse 73    Temp 99.1 °F (37.3 °C)    Resp 16    Wt 121.6 kg (268 lb)    SpO2 92%    Breastfeeding No    BMI 41.97 kg/m2         Intake/Output Summary (Last 24 hours) at 06/15/18 1200  Last data filed at 06/15/18 0948   Gross per 24 hour   Intake              600 ml   Output              400 ml   Net              200 ml     TELE: ST    General: alert and in no apparent distress  HENT: Normocephalic, atraumatic. Normal external eye. Neck :  no JVD, JVD difficult to assess due to obesity  Cardiac:Tachy rate and regular rhythm  Lungs: clear to auscultation bilaterally  Abdomen: Soft, nontender, no masses  Extremities: Left leg in ACE      Labs: Results:       Chemistry Recent Labs      06/15/18   0438  06/14/18   0503   GLU  78  117*   NA  138  141   K  4.1  3.8   CL  104  105   CO2  27  29   BUN  11  12   CREA  0.61  0.74   CA  8.4*  8.9   AGAP  7  7   BUCR  18  16      CBC w/Diff Recent Labs      06/15/18   0438  06/14/18   0503  06/12/18   1616   WBC  7.4  8.3   --    RBC  2.47*  2.63*   --    HGB  7.6*  8.1*  8.9*   HCT  24.2*  26.1*  27.5*   PLT  222  219   --       Cardiac Enzymes No results for input(s): CPK, CKND1, VICTOR M in the last 72 hours. No lab exists for component: CKRMB, TROIP   Coagulation No results for input(s): PTP, INR, APTT in the last 72 hours. No lab exists for component: INREXT    Lipid Panel Lab Results   Component Value Date/Time    Cholesterol, total 179 02/07/2017 06:15 AM    HDL Cholesterol 37 (L) 02/07/2017 06:15 AM    LDL, calculated 126.6 (H) 02/07/2017 06:15 AM    VLDL, calculated 15.4 02/07/2017 06:15 AM    Triglyceride 77 02/07/2017 06:15 AM    CHOL/HDL Ratio 4.8 02/07/2017 06:15 AM      BNP No results for input(s): BNPP in the last 72 hours. Liver Enzymes No results for input(s): TP, ALB, TBIL, AP, SGOT, GPT in the last 72 hours. No lab exists for component: DBIL   Thyroid Studies Lab Results   Component Value Date/Time    TSH 0.51 02/27/2018 10:11 AM              Italia Mir NP   Pager # [phone]      I have independently evaluated taken history and examined the patient. All relevant labs and testing data's are reviewed. Care plan discussed and updated after review.   Conception MD Dane

## 2018-06-15 NOTE — PROGRESS NOTES
Problem: Self Care Deficits Care Plan (Adult)  Goal: *Acute Goals and Plan of Care (Insert Text)  Occupational Therapy Goals  Initiated 6/14/2018 within 7 day(s). 1.  Patient will perform grooming, EOB with supervision/set-up   2. Patient will perform upper body dressing with supervision/set-up. 3.  Patient will perform lower body dressing with minimal assistance. 4.  Patient will perform toilet transfers with minimal assistance  5. Patient will perform all aspects of toileting with minimal assistance  6. Patient will participate in upper extremity therapeutic exercise/activities with supervision/set-up for 8 minutes. 7.  Patient will utilize energy conservation techniques during functional activities with verbal cues. Outcome: Progressing Towards Goal  Occupational Therapy TREATMENT    Patient: Adolfo Avilez (18 y.o. female)  Date: 6/15/2018  Diagnosis: Callie-prosthetic femur fracture at tip of prosthesis  S72.409A LEFT DISTAL FEMUR FRACTURE Periprosthetic supracondylar fracture of femur  Procedure(s) (LRB):  OPEN REDUCTION INTERNAL FIXATION LEFT DISTAL FEMUR FRACTURE (Left) 3 Days Post-Op  Precautions: Fall, NWB (LLE)  Chart, occupational therapy assessment, plan of care, and goals were reviewed. ASSESSMENT:  Pt is agreeable to participate in therapy this morning. Bedrest orders were removed and pt is agreeable to attempt EOB/OOB activities. Pt required min/Mod A to maneuver to EOB, required initially Min/CGA to maintain sitting balance while NWB to LLE. Pt performed ADL grooming and UB dressing task requiring Min/CGA for balance during dynamic task. Pt attempted to std in preparation for functional txfr to the toilet, however, was not able to achieve in spite of Max Ax2.  Pt returned to sup at the end of the session, reviewed BUE TherEx program w/pt, pt demonstrated understanding of UE TherEx and importance of the performing TherEx to improve endurance and activity tolerance for carryover w/ADLs. Progression toward goals:  []          Improving appropriately and progressing toward goals  [x]          Improving slowly and progressing toward goals  []          Not making progress toward goals and plan of care will be adjusted     PLAN:  Patient continues to benefit from skilled intervention to address the above impairments. Continue treatment per established plan of care. Discharge Recommendations:  Xavi Suarez  Further Equipment Recommendations for Discharge:  N/A      G-CODES:     Self Care  Current  CM= 80-99%   Goal  CI= 1-19%. The severity rating is based on the Level of Assistance required for Functional Mobility and ADLs. SUBJECTIVE:   Patient stated I feel weak.     OBJECTIVE DATA SUMMARY:   Cognitive/Behavioral Status:  Neurologic State: Alert  Orientation Level: Oriented X4  Cognition: Follows commands  Safety/Judgement: Awareness of environment, Fall prevention, Insight into deficits    Functional Mobility and Transfers for ADLs:   Bed Mobility:     Supine to Sit: Minimum assistance; Moderate assistance;Assist x2  Sit to Supine: Moderate assistance;Assist x2  Scooting: Maximum assistance;Assist x2   Transfers:  Sit to Stand: Maximum assistance;Assist x2 (Unable to complete)   Balance:  Sitting: Impaired  Sitting - Static: Good (unsupported)  Sitting - Dynamic: Fair (occasional)  Standing: Impaired; With support  Standing - Static: Poor;Constant support    ADL Intervention:   Grooming  Grooming Assistance: Contact guard assistance  Washing Face: Contact guard assistance  Washing Hands: Contact guard assistance  Upper Body [address]: Minimum  assistance (seated EOB)    Cognitive Retraining  Safety/Judgement: Awareness of environment; Fall prevention; Insight into deficits    Therapeutic Exercises:   See above  Pain:  Pt didn't rate pain or discomfort prior to treatment.    Pt didn't rate pain or discomfort post treatment.      Activity Tolerance:    Fair-    Please refer to the flowsheet for vital signs taken during this treatment.   After treatment:   []  Patient left in no apparent distress sitting up in chair  [x]  Patient left in no apparent distress in bed  [x]  Call bell left within reach  [x]  Nursing notified  []  Caregiver present  []  Bed alarm activated  RAYSHAWN Garcia  Time Calculation: 40 mins

## 2018-06-15 NOTE — PROGRESS NOTES
This writer spoke with admissions coordinator Radha, [facility], who reports a decision regarding acceptance will be made by Monday regarding placement of this pt.

## 2018-06-15 NOTE — PROGRESS NOTES
Rounded on patient post left leg fracture repair   Activity: Reinforced importance of repositioning and moving unaffected limbs to prevent DVT. Also calling for help to reposition to prevent bedsore every 2 hours or as needed. Also encouraged patient to prevent skin from being moist and changing clothes/linen to prevent skin problems with decreased mobility. VTE prophylaxis: Instructed patient to use their SCD's  On unaffected let. To use while in bed and in the chair. Educated re: ankle pumps to assist with circulation when in hospital and at home. Medications: Reviewed pain medications patient is taking and the importance of keeping pain under control to help with getting OOB and/or therapy. Reminded the patient to always eat a snack with their pain medication to help to prevent nausea. Encouraged patient to monitor for constipation and to take a stool softner/laxative while recovering and on pain medication. Encouraged to stay on stool softener/laxative as long as taking narcotic for pain. Also encouraged patient to drink 8 glasses of water a day(unless on a fluid restriction). Incentive Spirometry: Reinforced use of incentive spirometer with return demonstration by patient. Wound Care: Dressing to fracture site  Dry and intact. Patient instructed not to take dressing off at home. Patient given CHG wash to use in hospital and at home. Stressed importance of using a clean towel and washcloth daily. Reminded to put on clean clothes and night clothes daily. Ice Protocol: Ice solution in place per protocol. Patient Safety: Call light and personal belongings in reach. Patient  reminded to call for help toget OOB or when leaving bathroom for safety. Phone and other items also within reach per patient's request.     Diet: Educated patient on the importance of eating three well balanced meals a day with protein to promote bone/muscle healing.  Reminded patient to drink lots of fluids to protect kidneys from all the medications being taken currently with recovery. Patient given more educational material to reinforce the things discussed. Patient encouraged. to continue doing them at home to prevent post op complications and have a successful recovery. Patient  verbalized understand of all information and education discussed. Patient  given the opportunity for asking questions.

## 2018-06-15 NOTE — PROGRESS NOTES
[facility]U/IPR REFERRAL CONTACT NOTE  [address] [place] [address] for Physical Rehabilitation    RE: Ailene Brunner     Thank you for the opportunity to review this patient's case for admission to [address] [place] [address] for Physical Rehabilitation. Based on our pre-admission screening:     [x ] This patient does not meet criteria for admission to [facility] for Physical  Rehabilitation due to:    [x ] Too low level, per documentation, patient has not demonstrated tolerance for acute rehabilitation level of intensity. [x ] We recommend the following:    [x ] Xavi Suarez    Again, Thank you for this referral. Should you have any questions please do not hesitate to call. Sincerely,  Teddy Rivas. Kadi Sifuentes, [address] [address]   Kadi Sifuentes, RN  Admissions [facility] for Physical Rehabilitation  [phone]
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
## 2018-06-15 NOTE — PROGRESS NOTES
[facility]    Progress Note      Patient: Ron Patel               Sex: female          DOA: 6/10/2018         YOB: 1935      Age:  82 y.o.        LOS:  LOS: 5 days         S/P  Procedure(s):  OPEN REDUCTION INTERNAL FIXATION LEFT DISTAL FEMUR FRACTURE               Subjective:     No complaints Tolerating diet Voiding q shift. She is doing well today. Her pain is managed on current medications. She is aware she will be NWB on operative leg. She has verbalized readiness to go SNF today. Denies cp, sob, abd pain   Objective:      Blood pressure 123/78, pulse 73, temperature 99.1 °F (37.3 °C), resp. rate 16, weight 268 lb (121.6 kg), SpO2 92 %, not currently breastfeeding.    Well developed, Well Nourished alert, cooperative, no distress  Incision clean, dry, no drainage, dressing in place  swelling and tenderness noted in left lower extremity  Sensory is intact   Motor is intact  nv intact  2+distal pulses  Neg calf tenderness  Neg for facial asymmetry     Labs:  CBC  @  CBC:   Lab Results   Component Value Date/Time    WBC 7.4 06/15/2018 04:38 AM    RBC 2.47 (L) 06/15/2018 04:38 AM    HGB 7.6 (L) 06/15/2018 04:38 AM    HCT 24.2 (L) 06/15/2018 04:38 AM    PLATELET 130 67/07/4039 04:38 AM     BMP:   Lab Results   Component Value Date/Time    Glucose 78 06/15/2018 04:38 AM    Sodium 138 06/15/2018 04:38 AM    Potassium 4.1 06/15/2018 04:38 AM    Chloride 104 06/15/2018 04:38 AM    CO2 27 06/15/2018 04:38 AM    BUN 11 06/15/2018 04:38 AM    Creatinine 0.61 06/15/2018 04:38 AM    Calcium 8.4 (L) 06/15/2018 04:38 AM   @  Coagulation  Lab Results   Component Value Date    INR 1.0 06/10/2018    APTT 31.5 07/25/2016      Basic Metabolic Profile  Lab Results   Component Value Date     06/15/2018    CO2 27 06/15/2018    BUN 11 06/15/2018       Medications Reviewed      Assessment/Plan     Principal Problem:    Periprosthetic left distal femur fracture (6/10/2018)    Active Problems:    Essential hypertension (5/20/2010)      Overview: controlled      Hypovitaminosis D (5/20/2010)      Noncompliance with medication regimen (2/8/2011)      Anxiety (2/8/2011)      S/P joint replacement ()      Overview: 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)      DJD (degenerative joint disease) ()      Diabetic neuropathy ([facility] [address] (4/20/2012)      IDDM (insulin dependent diabetes mellitus) ([facility] (12/4/2012)      Morbid obesity ([facility] [address] ()      Overview: Weight loss has been strongly encouraged by following dietary restrictions       and an exercise routine. Coronary atherosclerosis of native coronary artery ()      Chronic diastolic heart failure (HCC) ()      Overview: Stable, edema better, uses PRN Lasix      Uncomplicated asthma (7/4/3810)      Bilateral lower extremity edema (4/25/2017)      Callie-prosthetic femur fracture at tip of prosthesis (6/10/2018)      Preoperative cardiovascular examination (6/11/2018)        Procedure(s):  OPEN REDUCTION INTERNAL FIXATION LEFT DISTAL FEMUR FRACTURE :     1. PT and/or OT Consults: YES continue PT/OT: oob to chair, gentle rom, NWB operative leg                                  2. Incision Care:  Routine Incision Care and Dressing Changes as necessary: dressing changes POD#2 and as needed  3. Pain control - Roxicodone 5 mg in chart for discharge  4. DVT prophylaxis - SCD in place, Lovenox 30mg  5. Acute blood loss anemia - will continue to monitor    4.  DISCHARGE PLANNING     Intervention : [place] ([place]) today from an orthopedic standpoint        [facility]  [phone]

## 2018-06-15 NOTE — PROGRESS NOTES
Admit Date: 6/10/2018  Date of Service: 6/15/2018    Reason for follow-up: femur fracture      Assessment:         Left periprosthetic distal femur fracture:  Near site of prior left TKR                        - s/p ORIF 6/12  Low grade temps: UA with 20 WBC concerning for UTI, CXR with atlectasis but no infiltrate; resolved  Acute blood loss:  Source is unclear, slowly trending down. Transfusion not needed at this point. DM type 2, on insulin  HTN  Acute on chronic diastolic heart failure: mildly decompensated per cardiology, cont po lasix  CAD s/p cardiac cath and stenting  Hyperlipidemia  Hypothyroidism    Plan:   SSI and accuchecks  Cardiology following; monitor post-op tachycardia  Re-initate ASA and Plavix on 6/25/18  Continue with lovenox 40 every day for now until transitioned over  CBC, BMP in am   continue levofloxacin for UTI (day 2/5)    **follow H/H- hb has dropped slowly since surgery. No w 7.6**    Discussed transfer to SNF with  and ortho    Current Antibtiocs:   None    Lines:   Peripheral    I spent 45 minutes with the patient in face-to-face consultation, of which greater than 50% was spent in counseling and coordination of care as described above. Case discussed with:  [x]Patient  [x]Family  [x]Nursing  []Case Management  DVT Prophylaxis:  [x]Lovenox  []Hep SQ  [x]SCDs  []Coumadin   []On Heparin gtt  I have independently examined the patient and reviewed all lab studies and imgaing as well as review of nursing notes and physican notes from the past 24 hours. The plan of care has been discussed with the patient and all questions are answered. Oscar Amaro D.O.   Pager [phone]      Allergies   Allergen Reactions    Niacin Palpitations and Other (comments)     Stomach irritation    Ace Inhibitors Cough    Avapro [Irbesartan] Myalgia    Bystolic [Nebivolol] Other (comments)     Felt like throat closing    Catapres [Clonidine] Cough    Codeine Nausea and Vomiting    Cozaar [Losartan] Not Reported This Time    Crestor [Rosuvastatin] Other (comments)     Cramps, aches    Darvocet A500 [Propoxyphene N-Acetaminophen] Unknown (comments)    Diovan [Valsartan] Cough    Flagyl [Metronidazole] Other (comments)     Mouth and throat irritation    Gabapentin Other (comments)     Abdominal pain and burning     Iodinated Contrast- Oral And Iv Dye Other (comments)     Throat swelling    Iodine Unknown (comments)    Lescol [Fluvastatin] Other (comments)     Leg cramps    Lipitor [Atorvastatin] Myalgia and Other (comments)     Cramps, aches    Lovastatin Other (comments)     Leg cramps    Nexium [Esomeprazole Magnesium] Other (comments)     Stomach upset, burning    Pravachol [Pravastatin] Other (comments)     Leg cramps    Reglan [Metoclopramide] Nausea Only    Trazodone Other (comments)     Patient states she feels drugged    Zetia [Ezetimibe] Other (comments)     Cramps, aches    Zocor [Simvastatin] Other (comments)     Cramps, aches           Subjective:      Pt seen and examined. Feeling ok. Denies fevers or chill. In good spirits. Denies SOB, cough, abdominal pain. Had to be straight cathed last night.      Objective:        Visit Vitals    /65 (BP 1 Location: Left arm, BP Patient Position: At rest)    Pulse (!) 110    Temp 98.4 °F (36.9 °C)    Resp 20    Wt 121.6 kg (268 lb)    SpO2 93%    Breastfeeding No    BMI 41.97 kg/m2     Temp (24hrs), Av.7 °F (37.1 °C), Min:98.2 °F (36.8 °C), Max:99.5 °F (37.5 °C)        General:   awake alert and oriented, non-toxic   Skin:   no rashes or skin lesions noted on limited exam, dry and warm   HEENT:  No scleral icterus or pallor; oral mucosa moist, lips moist   Lymph Nodes:   not assessed today   Lungs:   non, labored; bilaterally clear to aspiration- no crackles wheezes rales or rhonchi   Heart:  RRR, s1 and s2; no murmurs rubs or gallops; no edema, + pedal pulses   Abdomen:  soft, non-distended, active bowel sounds, non-tender   Genitourinary:  deferred   Extremities:   average muscle tone; no contractures, no joint effusions; right leg with ACE wrap over surgical incision- no obvious bleeding, modest pain   Neurologic:  No gross focal motor or sensory abnormalities; CN 2-12 intact; Follows commands. Psychiatric:   appropriate and interactive.        Labs: Results:   Chemistry Recent Labs      06/15/18   0438  18   0503   GLU  78  117*   NA  138  141   K  4.1  3.8   CL  104  105   CO2  27  29   BUN  11  12   CREA  0.61  0.74   CA  8.4*  8.9   AGAP  7  7   BUCR  18  16      CBC w/Diff Recent Labs      06/15/18   0438  18   0503  18   1616   WBC  7.4  8.3   --    RBC  2.47*  2.63*   --    HGB  7.6*  8.1*  8.9*   HCT  24.2*  26.1*  27.5*   PLT  222  219   --         Lab Results   Component Value Date/Time    Specimen Description: CLEAN [facility] 2009 10:29 AM    Lab Results   Component Value Date/Time    Culture result: [id number]  COLONIES/mL  DIPHTHEROIDS   10/11/2016 01:06 PM    Culture result: NO GROWTH 2 DAYS 2009 10:29 AM          Imagin/13 CXR: Mild bibasilar atelectasis and mild interstitial edema

## 2018-06-15 NOTE — PROGRESS NOTES
ARU/IPR REFERRAL CONTACT NOTE  [address] [place] [address] for Physical Rehabilitation    RE: Neida Jim    Referral received to review this patient's case for admission to [address] [place] [address] for Physical Rehabilitation. Current status reviewed.  When appropriate, will need PT evaluation/treatment on this patient to complete the pre-admission evaluation.  Will continue to follow. Thank you for this referral.  Should you have any questions please do not hesitate to call. Sincerely,  Danial Do.  [address] [place] [address] Physical Rehabilitation  [phone]

## 2018-06-15 NOTE — PROGRESS NOTES
Care Management Interventions  PCP Verified by CM: Yes  Palliative Care Criteria Met (RRAT>21 & CHF Dx)?: No  Transition of Care Consult (CM Consult): Discharge Planning  Discharge Durable Medical Equipment: No  Physical Therapy Consult: Yes  Occupational Therapy Consult: Yes  Speech Therapy Consult: No  Current Support Network: Lives Alone  Discharge Location  Discharge Placement: Skilled nursing facility     Reason for Admission:   Open reduction internal fixation of left distal femur fracture               RRAT Score:     RED             Resources/supports as identified by patient/family:   Patient lives alone                Top Challenges facing patient (as identified by patient/family and CM): Finances/Medication cost?     Has Medicare and Medicaid               Transportation? Support system or lack thereof? She lives close to her brother and [facility]                     Living arrangements? Patient lives alone and has a personal care aide the comes everyday from [facility]            Self-care/ADLs/Cognition? Alert and oriented          Current Advanced Directive/Advance Care Plan:  Not on file                          Plan for utilizing home health:   Looking for SNF placement                      Likelihood of readmission: high                 Transition of Care Plan:                  Spoke with patient in room, she stated that she lives alone and is interested in SNF placement she would like [facility] or [facility] nursing home. She verified her demographics, insurance and PCP as correct on the facesheet. Patient has designated ____[facility]____________________ to participate in his/her discharge plan and to receive any needed information. Nandini whyte [phone].  [facility] is following
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
## 2018-06-16 LAB
ANION GAP SERPL CALC-SCNC: 6 MMOL/L (ref 3–18)
BUN SERPL-MCNC: 10 MG/DL (ref 7–18)
BUN/CREAT SERPL: 18 (ref 12–20)
CALCIUM SERPL-MCNC: 8.5 MG/DL (ref 8.5–10.1)
CHLORIDE SERPL-SCNC: 104 MMOL/L (ref 100–108)
CO2 SERPL-SCNC: 29 MMOL/L (ref 21–32)
CREAT SERPL-MCNC: 0.56 MG/DL (ref 0.6–1.3)
ERYTHROCYTE [DISTWIDTH] IN BLOOD BY AUTOMATED COUNT: 12.6 % (ref 11.6–14.5)
GLUCOSE BLD STRIP.AUTO-MCNC: 138 MG/DL (ref 70–110)
GLUCOSE BLD STRIP.AUTO-MCNC: 152 MG/DL (ref 70–110)
GLUCOSE BLD STRIP.AUTO-MCNC: 92 MG/DL (ref 70–110)
GLUCOSE BLD STRIP.AUTO-MCNC: 95 MG/DL (ref 70–110)
GLUCOSE SERPL-MCNC: 77 MG/DL (ref 74–99)
HCT VFR BLD AUTO: 23.5 % (ref 35–45)
HGB BLD-MCNC: 7.3 G/DL (ref 12–16)
MCH RBC QN AUTO: 30.9 PG (ref 24–34)
MCHC RBC AUTO-ENTMCNC: 31.1 G/DL (ref 31–37)
MCV RBC AUTO: 99.6 FL (ref 74–97)
PLATELET # BLD AUTO: 237 K/UL (ref 135–420)
PMV BLD AUTO: 10.2 FL (ref 9.2–11.8)
POTASSIUM SERPL-SCNC: 3.8 MMOL/L (ref 3.5–5.5)
RBC # BLD AUTO: 2.36 M/UL (ref 4.2–5.3)
SODIUM SERPL-SCNC: 139 MMOL/L (ref 136–145)
WBC # BLD AUTO: 6.4 K/UL (ref 4.6–13.2)

## 2018-06-16 PROCEDURE — 97535 SELF CARE MNGMENT TRAINING: CPT

## 2018-06-16 PROCEDURE — 74011250637 HC RX REV CODE- 250/637: Performed by: SPECIALIST

## 2018-06-16 PROCEDURE — 74011250637 HC RX REV CODE- 250/637: Performed by: INTERNAL MEDICINE

## 2018-06-16 PROCEDURE — 74011250636 HC RX REV CODE- 250/636: Performed by: SPECIALIST

## 2018-06-16 PROCEDURE — 85027 COMPLETE CBC AUTOMATED: CPT | Performed by: INTERNAL MEDICINE

## 2018-06-16 PROCEDURE — 97530 THERAPEUTIC ACTIVITIES: CPT

## 2018-06-16 PROCEDURE — 36415 COLL VENOUS BLD VENIPUNCTURE: CPT | Performed by: INTERNAL MEDICINE

## 2018-06-16 PROCEDURE — 74011250636 HC RX REV CODE- 250/636: Performed by: INTERNAL MEDICINE

## 2018-06-16 PROCEDURE — 82962 GLUCOSE BLOOD TEST: CPT

## 2018-06-16 PROCEDURE — 74011250637 HC RX REV CODE- 250/637: Performed by: NURSE PRACTITIONER

## 2018-06-16 PROCEDURE — 80048 BASIC METABOLIC PNL TOTAL CA: CPT | Performed by: INTERNAL MEDICINE

## 2018-06-16 PROCEDURE — 74011636637 HC RX REV CODE- 636/637: Performed by: INTERNAL MEDICINE

## 2018-06-16 PROCEDURE — 93005 ELECTROCARDIOGRAM TRACING: CPT

## 2018-06-16 PROCEDURE — 65270000029 HC RM PRIVATE

## 2018-06-16 RX ADMIN — ACETAMINOPHEN 650 MG: 325 TABLET ORAL at 06:56

## 2018-06-16 RX ADMIN — MONTELUKAST SODIUM 10 MG: 10 TABLET, FILM COATED ORAL at 21:55

## 2018-06-16 RX ADMIN — INSULIN LISPRO 2 UNITS: 100 INJECTION, SOLUTION INTRAVENOUS; SUBCUTANEOUS at 23:00

## 2018-06-16 RX ADMIN — ACETAMINOPHEN 650 MG: 325 TABLET ORAL at 21:55

## 2018-06-16 RX ADMIN — VITAMIN D, TAB 1000IU (100/BT) 5000 UNITS: 25 TAB at 11:08

## 2018-06-16 RX ADMIN — DILTIAZEM HYDROCHLORIDE 120 MG: 120 CAPSULE, COATED, EXTENDED RELEASE ORAL at 11:10

## 2018-06-16 RX ADMIN — INSULIN GLARGINE 30 UNITS: 100 INJECTION, SOLUTION SUBCUTANEOUS at 23:00

## 2018-06-16 RX ADMIN — Medication 500 MCG: at 11:09

## 2018-06-16 RX ADMIN — ONDANSETRON 4 MG: 2 INJECTION INTRAMUSCULAR; INTRAVENOUS at 09:32

## 2018-06-16 RX ADMIN — POLYETHYLENE GLYCOL 3350 17 G: 17 POWDER, FOR SOLUTION ORAL at 11:14

## 2018-06-16 RX ADMIN — INSULIN GLARGINE 30 UNITS: 100 INJECTION, SOLUTION SUBCUTANEOUS at 11:17

## 2018-06-16 RX ADMIN — LEVOTHYROXINE SODIUM 125 MCG: 25 TABLET ORAL at 11:10

## 2018-06-16 RX ADMIN — ACETAMINOPHEN 650 MG: 325 TABLET ORAL at 11:11

## 2018-06-16 RX ADMIN — Medication 10 ML: at 06:45

## 2018-06-16 RX ADMIN — Medication 10 ML: at 21:58

## 2018-06-16 RX ADMIN — OXYCODONE HYDROCHLORIDE 10 MG: 15 TABLET ORAL at 07:19

## 2018-06-16 RX ADMIN — LEVOFLOXACIN 500 MG: 500 TABLET, FILM COATED ORAL at 17:10

## 2018-06-16 RX ADMIN — ACETAMINOPHEN 650 MG: 325 TABLET ORAL at 17:09

## 2018-06-16 RX ADMIN — ENOXAPARIN SODIUM 30 MG: 30 INJECTION SUBCUTANEOUS at 01:00

## 2018-06-16 RX ADMIN — Medication 10 ML: at 14:00

## 2018-06-16 RX ADMIN — DOCUSATE SODIUM 100 MG: 100 CAPSULE, LIQUID FILLED ORAL at 11:10

## 2018-06-16 RX ADMIN — FUROSEMIDE 20 MG: 20 TABLET ORAL at 11:10

## 2018-06-16 NOTE — PROGRESS NOTES
Problem: Mobility Impaired (Adult and Pediatric)  Goal: *Acute Goals and Plan of Care (Insert Text)  Physical Therapy Goals  Initiated 6/15/2018 and to be accomplished within 7 day(s)  1. Patient will move from supine to sit and sit to supine , scoot up and down and roll side to side in bed with minimal assistance/contact guard assist.     2.  Patient will transfer from bed to chair and chair to bed with moderate assistance  using the least restrictive device. 3.  Patient will perform sit to stand with moderate assistance . 4. Patient will ambulate with moderate assistance for 5 feet with the least restrictive device. 5.  Patient will transfer bed <> chair using slide board with minimal assistance. Outcome: Progressing Towards Goal  physical Therapy TREATMENT    Patient: Tori Arzate (13 y.o. female)  Date: 6/16/2018  Diagnosis: Callie-prosthetic femur fracture at tip of prosthesis  S72.409A LEFT DISTAL FEMUR FRACTURE Periprosthetic supracondylar fracture of femur  Procedure(s) (LRB):  OPEN REDUCTION INTERNAL FIXATION LEFT DISTAL FEMUR FRACTURE (Left) 4 Days Post-Op  Precautions: Fall, NWB (LLE)   Chart, physical therapy assessment, plan of care and goals were reviewed. OBJECTIVE/ ASSESSMENT:  Patient found supine in bed willing to work with PT. Patient seen with OT to maximize safety of patient and staff. Pt sat at EOB this tx and reports nausea in sitting. Pt's vital were taken and all are in normal range except for HR which is 104BPM while sitting. Pt remains motivated and plans to stand, but upon every attempt to scoot forward, her nausea increases. Pt was returned to supine this tx without attempting standing. Pt was lef tin bed with needs in reach. Progress is limited at this time. Education: bed mobility.   Progression toward goals:  []      Improving appropriately and progressing toward goals  [x]      Improving slowly and progressing toward goals  []      Not making progress toward goals and plan of care will be adjusted     PLAN:  Patient continues to benefit from skilled intervention to address the above impairments. Continue treatment per established plan of care. Discharge Recommendations:  Skilled Nursing Facility  Further Equipment Recommendations for Discharge:  rolling walker     SUBJECTIVE:   Patient stated Christina Valdez took some pain medication and just feel so bad.     OBJECTIVE DATA SUMMARY:   Critical Behavior:  Neurologic State: Alert  Orientation Level: Oriented X4  Cognition: Follows commands  Safety/Judgement: Awareness of environment, Fall prevention, Insight into deficits  Functional Mobility Training:  Bed Mobility:  Supine to Sit: Minimum assistance; Moderate assistance  Sit to Supine: Moderate assistance  Scooting: Contact guard assistance  Balance:  Sitting: Impaired  Sitting - Static: Good (unsupported)  Sitting - Dynamic: Fair (occasional)  Pain:  Pre tx pain: not rated  Post tx pain: not rated  Activity Tolerance:   Fair  Please refer to the flowsheet for vital signs taken during this treatment. After treatment:   [] Patient left in no apparent distress sitting up in chair  [x] Patient left in no apparent distress in bed  [x] Call bell left within reach  [x] Nursing notified  [] Caregiver present  [] Bed alarm activated  [] SCDs applied  [] Ice applied      Jesus Longoria PTA   Time Calculation: 25 mins    Mobility [id number] Current  CL= 60-79%. The severity rating is based on the Level of Assistance required for Functional Mobility and ADLs. Mobility   Goal  CK= 40-59%. The severity rating is based on the Level of Assistance required for Functional Mobility and ADLs.

## 2018-06-16 NOTE — PROGRESS NOTES
2140  Received pt in stable condition,   General: lying in bed in supine, not apparent distress  Neuro: AOx4, denies numbness, 0 tingling, able to wiggle toes to bilateral lower extremities  Cardio: on cardiac monitor, (cardiologist is currently following the patient), pedal pulses palpable, denies chest pain  Respiratory: denies shortness of breath  Skin: Dressing to LLE clean, dry and intact  : denies nausea and vomiting  Mus: Very limited   Bed in low position and call bell within reach. 0207  Alert, NAD, stable. No changes in condition.

## 2018-06-16 NOTE — PROGRESS NOTES
[facility]    Progress Note      Patient: José Miguel Coto               Sex: female          DOA: 6/10/2018         YOB: 1935      Age:  82 y.o.        LOS:  LOS: 6 days         S/P  Procedure(s):  OPEN REDUCTION INTERNAL FIXATION LEFT DISTAL FEMUR FRACTURE               Subjective:     No complaints Tolerating diet Voiding q shift. She is doing well today. Her pain is managed on current medications. She understands she will be transferred SNF on Monday. Denies cp, sob, abd pain   Objective:      Blood pressure 124/71, pulse 96, temperature 99.1 °F (37.3 °C), resp. rate 18, weight 268 lb (121.6 kg), SpO2 96 %, not currently breastfeeding.    Well developed, Well Nourished alert, cooperative, no distress  Incision clean, dry, no drainage, dressing in place  swelling and tenderness noted in left lower extremity  Sensory is intact   Motor is intact  nv intact  2+distal pulses  Neg calf tenderness  Neg for facial asymmetry     Labs:  CBC  @  CBC:   Lab Results   Component Value Date/Time    WBC 6.4 06/16/2018 04:21 AM    RBC 2.36 (L) 06/16/2018 04:21 AM    HGB 7.3 (L) 06/16/2018 04:21 AM    HCT 23.5 (L) 06/16/2018 04:21 AM    PLATELET 600 62/24/0455 04:21 AM     BMP:   Lab Results   Component Value Date/Time    Glucose 77 06/16/2018 04:21 AM    Sodium 139 06/16/2018 04:21 AM    Potassium 3.8 06/16/2018 04:21 AM    Chloride 104 06/16/2018 04:21 AM    CO2 29 06/16/2018 04:21 AM    BUN 10 06/16/2018 04:21 AM    Creatinine 0.56 (L) 06/16/2018 04:21 AM    Calcium 8.5 06/16/2018 04:21 AM   @  Coagulation  Lab Results   Component Value Date    INR 1.0 06/10/2018    APTT 31.5 07/25/2016      Basic Metabolic Profile  Lab Results   Component Value Date     06/16/2018    CO2 29 06/16/2018    BUN 10 06/16/2018       Medications Reviewed      Assessment/Plan     Principal Problem:    Periprosthetic left distal femur fracture (6/10/2018)    Active Problems:    Essential hypertension (5/20/2010)      Overview: controlled      Hypovitaminosis D (5/20/2010)      Noncompliance with medication regimen (2/8/2011)      Anxiety (2/8/2011)      S/P joint replacement ()      Overview: 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)      DJD (degenerative joint disease) ()      Diabetic neuropathy ([facility] (4/20/2012)      IDDM (insulin dependent diabetes mellitus) ([facility] (12/4/2012)      Morbid obesity ([facility] ()      Overview: Weight loss has been strongly encouraged by following dietary restrictions       and an exercise routine. Coronary atherosclerosis of native coronary artery ()      Chronic diastolic heart failure (HCC) ()      Overview: Stable, edema better, uses PRN Lasix      Uncomplicated asthma (6/3/7814)      Bilateral lower extremity edema (4/25/2017)      Callie-prosthetic femur fracture at tip of prosthesis (6/10/2018)      Preoperative cardiovascular examination (6/11/2018)        Procedure(s):  OPEN REDUCTION INTERNAL FIXATION LEFT DISTAL FEMUR FRACTURE :     1. PT and/or OT Consults: YES continue PT/OT: oob to chair, gentle rom, NWB operative leg             2. Incision Care:  Routine Incision Care and Dressing Changes as necessary: dressing changes POD#2 and as needed  3. Pain control  4. DVT Prophylaxis - SCD in place, Lovenox 30mg  5.  Acute blood loss anemia - will continue to monitor    DISCHARGE PLANNING     Intervention : [place] ([facility]) on Monday        [facility]  [phone]

## 2018-06-16 NOTE — PROGRESS NOTES
Bedside and Verbal shift change report given to JEANNIE Hanson (oncoming nurse) by Josefa Colvin RN (offgoing nurse). Report included the following information SBAR, Kardex, OR Summary, Intake/Output and MAR.

## 2018-06-16 NOTE — PROGRESS NOTES
[facility]      CARDIOLOGY PROGRESS NOTE  RECS:  1. Hx of CAD with  post percutaneous transluminal coronary angioplasty/stent to mid right coronary artery using a drug-eluting stent done in 2016. Stable no chest pain or chest pressure.  Follow serial enzymes EKG revealed NSR  w/o acute ischemic changes. Ok to hold Plavix and asa - resume when OK with ortho. 2. Indgestion/CP : check EKG today and in am  3. Sinus Tachycardia - rates now SR 90 with PO Diltiazem. monitor for now. Per patient has a h/o sinus tach. 4. Acute on chronic diastolic heart failure- mildly decompensated. c/o orthopnea and INIGUEZ. Did not take lasix in the last few days. mildly decompensated. Continue with lasix po. Monitor I&O. Last echo 2017 with Mildly LV dysfunction EF% 45-50%. Not on bb or ARB due to allergies  5. Hypertension with borderline BP- possible related to pain medications reduced Norvasc to 2.5 mg daily. Will monitor. 6. Diabetes- medications and w/u per medical team   7. Hyperlipidemia- unable to tolerated statins- discussed with patient about pcsk9 starting-medications was approved but approved. But she refused. 8. Left femur fracture- s/p ORIF - stable post op                        Plan for rehab    ASSESSMENT:  Hospital Problems  Date Reviewed: 6/12/2018          Codes Class Noted POA    Preoperative cardiovascular examination ICD-10-CM: Z01.810  ICD-9-CM: V72.81  6/11/2018 Unknown        * (Principal)Periprosthetic left distal femur fracture ICD-10-CM: M97. Minesh Delgado  ICD-9-CM: 996.44, V43.65  6/10/2018 Yes        Callie-prosthetic femur fracture at tip of prosthesis ICD-10-CM: M97. [id number], [id number]  ICD-9-CM: 996.44, V43.64  6/10/2018 Unknown        Bilateral lower extremity edema ICD-10-CM: R60.0  ICD-9-CM: 782.3  4/25/2017 Yes        Uncomplicated asthma HKF-86-AL: J45.909  ICD-9-CM: 493.90  4/8/2016 Yes        Morbid obesity ([address] ICD-10-CM: E66.01  ICD-9-CM: 278.01  Unknown Yes Overview Signed 5/9/2013  8:54 AM by Cathie Mcintyre     Weight loss has been strongly encouraged by following dietary restrictions and an exercise routine. Coronary atherosclerosis of native coronary artery ICD-10-CM: I25.10  ICD-9-CM: 414.01  Unknown Yes        Chronic diastolic heart failure (HCC) ICD-10-CM: I50.32  ICD-9-CM: 428.32  Unknown Yes    Overview Signed 5/9/2013  8:58 AM by aCthie Mcintyre     Stable, edema better, uses PRN Lasix             IDDM (insulin dependent diabetes mellitus) ([facility] ICD-10-CM: E11.9, Z79.4  ICD-9-CM: 250.00, V58.67  12/4/2012 Yes        Diabetic neuropathy ([facility] ICD-10-CM: E11.40  ICD-9-CM: 250.60, 357.2  4/20/2012 Yes        DJD (degenerative joint disease) ICD-10-CM: M19.90  ICD-9-CM: 715.90  Unknown Yes        S/P joint replacement ICD-10-CM: Z96.60  ICD-9-CM: V43.60  Unknown Yes    Overview Signed 3/4/2011  9:40 AM by Deuce Anthony     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)             Noncompliance with medication regimen ICD-10-CM: Z91.14  ICD-9-CM: V15.81  2/8/2011 Yes        Anxiety ICD-10-CM: F41.9  ICD-9-CM: 300.00  2/8/2011 Yes        Essential hypertension ICD-10-CM: I10  ICD-9-CM: 401.9  5/20/2010 Yes    Overview Signed 7/28/2016 10:34 AM by Jae Price MD     controlled             Hypovitaminosis D ICD-10-CM: E55.9  ICD-9-CM: 268.9  5/20/2010 Yes                SUBJECTIVE:  No CP or SOB, mild left hip pain with movement.   Had mild indigestion feeling this am    OBJECTIVE:    VS:   Visit Vitals    /67 (BP 1 Location: Right arm, BP Patient Position: At rest)    Pulse 98    Temp 99.2 °F (37.3 °C)    Resp 17    Wt 121.6 kg (268 lb)    SpO2 95%    Breastfeeding No    BMI 41.97 kg/m2         Intake/Output Summary (Last 24 hours) at 06/16/18 1029  Last data filed at 06/16/18 0824   Gross per 24 hour   Intake              520 ml   Output              700 ml   Net             -180 ml     TELE: normal sinus rhythm    General: alert and in no apparent distress  HENT: Normocephalic, atraumatic. Normal external eye. Neck :  no bruit, no JVD  Cardiac:  regular rate and rhythm, S1, S2 normal, no murmur, click, rub or gallop  Lungs: clear to auscultation bilaterally  Abdomen: Soft, nontender, no masses  Extremities:  Left leg in ACE      Labs: Results:       Chemistry Recent Labs      06/16/18   0421  06/15/18   0438  06/14/18   0503   GLU  77  78  117*   NA  139  138  141   K  3.8  4.1  3.8   CL  104  104  105   CO2  29  27  29   BUN  10  11  12   CREA  0.56*  0.61  0.74   CA  8.5  8.4*  8.9   AGAP  6  7  7   BUCR  18  18  16      CBC w/Diff Recent Labs      06/16/18   0421  06/15/18   0438  06/14/18   0503   WBC  6.4  7.4  8.3   RBC  2.36*  2.47*  2.63*   HGB  7.3*  7.6*  8.1*   HCT  23.5*  24.2*  26.1*   PLT  237  222  219      Cardiac Enzymes No results for input(s): CPK, CKND1, VICTOR M in the last 72 hours. No lab exists for component: CKRMB, TROIP   Coagulation No results for input(s): PTP, INR, APTT in the last 72 hours. No lab exists for component: INREXT    Lipid Panel Lab Results   Component Value Date/Time    Cholesterol, total 179 02/07/2017 06:15 AM    HDL Cholesterol 37 (L) 02/07/2017 06:15 AM    LDL, calculated 126.6 (H) 02/07/2017 06:15 AM    VLDL, calculated 15.4 02/07/2017 06:15 AM    Triglyceride 77 02/07/2017 06:15 AM    CHOL/HDL Ratio 4.8 02/07/2017 06:15 AM      BNP No results for input(s): BNPP in the last 72 hours. Liver Enzymes No results for input(s): TP, ALB, TBIL, AP, SGOT, GPT in the last 72 hours.     No lab exists for component: DBIL   Thyroid Studies Lab Results   Component Value Date/Time    TSH 0.51 02/27/2018 10:11 AM              Tammy Barrera NP   Pager # [phone]

## 2018-06-16 NOTE — PROGRESS NOTES
Problem: Self Care Deficits Care Plan (Adult)  Goal: *Acute Goals and Plan of Care (Insert Text)  Occupational Therapy Goals  Initiated 6/14/2018 within 7 day(s). 1.  Patient will perform grooming, EOB with supervision/set-up   2. Patient will perform upper body dressing with supervision/set-up. 3.  Patient will perform lower body dressing with minimal assistance. 4.  Patient will perform toilet transfers with minimal assistance  5. Patient will perform all aspects of toileting with minimal assistance  6. Patient will participate in upper extremity therapeutic exercise/activities with supervision/set-up for 8 minutes. 7.  Patient will utilize energy conservation techniques during functional activities with verbal cues. Outcome: Progressing Towards Goal  Occupational Therapy TREATMENT    Patient: Cornelius Patel (35 y.o. female)  Date: 6/16/2018  Diagnosis: Callie-prosthetic femur fracture at tip of prosthesis  S72.409A LEFT DISTAL FEMUR FRACTURE Periprosthetic supracondylar fracture of femur  Procedure(s) (LRB):  OPEN REDUCTION INTERNAL FIXATION LEFT DISTAL FEMUR FRACTURE (Left) 4 Days Post-Op  Precautions: Fall, NWB (LLE)  Chart, occupational therapy assessment, plan of care, and goals were reviewed. ASSESSMENT:  Pt is motivated to participate in OT, however c/o nausea. Pt required Min/Mod A to maneuver to EOB, demonstrates improved sitting balance during EOB ADL tasks, requiring mult RBs 2/2 nausea. Pt required additional time to scoot to EOB, however reports she cont to be nauseous and dizzy. Pt returned to sup, was able to scoot up to [facility] w/use of bilateral bed rails and good following NWB for LLE. Pt's nurse notified of pt's nausea.   Progression toward goals:   []          Improving appropriately and progressing toward goals  [x]          Improving slowly and progressing toward goals  []          Not making progress toward goals and plan of care will be adjusted     PLAN:  Patient continues to benefit from skilled intervention to address the above impairments. Continue treatment per established plan of care. Discharge Recommendations:  Xavi Tateuel  Further Equipment Recommendations for Discharge:  N/A      G-CODES:     Self Care  Current  CM= 80-99%   Goal  CI= 1-19%. The severity rating is based on the Level of Assistance required for Functional Mobility and ADLs. SUBJECTIVE:   Patient stated I don't know why I don't feel good.     OBJECTIVE DATA SUMMARY:   Cognitive/Behavioral Status:  Neurologic State: Alert  Orientation Level: Oriented X4  Cognition: Follows commands  Safety/Judgement: Awareness of environment, Fall prevention, Insight into deficits    Functional Mobility and Transfers for ADLs:   Bed Mobility:     Supine to Sit: Minimum assistance; Moderate assistance  Sit to Supine: Moderate assistance  Scooting: Contact guard assistance  Balance:  Sitting: Impaired  Sitting - Static: Good (unsupported)  Sitting - Dynamic: Fair (occasional)    ADL Intervention:   Grooming  Grooming Assistance: Stand-by assistance (seated EOB)  Washing Face: Stand-by assistance  Washing Hands: Stand-by assistance     Pain:  Pt reports 0/10 pain or discomfort prior to treatment.    Pt reports 0/10 pain or discomfort post treatment. Activity Tolerance:    Fair-    Please refer to the flowsheet for vital signs taken during this treatment.   After treatment:   []  Patient left in no apparent distress sitting up in chair  [x]  Patient left in no apparent distress in bed  [x]  Call bell left within reach  [x]  Nursing notified  []  Caregiver present  []  Bed alarm activated        RAYSHAWN Warren  Time Calculation: 26 mins

## 2018-06-17 LAB
GLUCOSE BLD STRIP.AUTO-MCNC: 100 MG/DL (ref 70–110)
GLUCOSE BLD STRIP.AUTO-MCNC: 92 MG/DL (ref 70–110)
GLUCOSE BLD STRIP.AUTO-MCNC: 93 MG/DL (ref 70–110)
GLUCOSE BLD STRIP.AUTO-MCNC: 98 MG/DL (ref 70–110)
HGB BLD-MCNC: 7.4 G/DL (ref 12–16)

## 2018-06-17 PROCEDURE — 74011250637 HC RX REV CODE- 250/637: Performed by: INTERNAL MEDICINE

## 2018-06-17 PROCEDURE — 74011250637 HC RX REV CODE- 250/637: Performed by: SPECIALIST

## 2018-06-17 PROCEDURE — 85018 HEMOGLOBIN: CPT | Performed by: FAMILY MEDICINE

## 2018-06-17 PROCEDURE — 65270000029 HC RM PRIVATE

## 2018-06-17 PROCEDURE — 74011250637 HC RX REV CODE- 250/637: Performed by: NURSE PRACTITIONER

## 2018-06-17 PROCEDURE — 74011250636 HC RX REV CODE- 250/636: Performed by: SPECIALIST

## 2018-06-17 PROCEDURE — 82962 GLUCOSE BLOOD TEST: CPT

## 2018-06-17 PROCEDURE — 97530 THERAPEUTIC ACTIVITIES: CPT

## 2018-06-17 PROCEDURE — 93005 ELECTROCARDIOGRAM TRACING: CPT

## 2018-06-17 PROCEDURE — 97110 THERAPEUTIC EXERCISES: CPT

## 2018-06-17 PROCEDURE — 74011636637 HC RX REV CODE- 636/637: Performed by: INTERNAL MEDICINE

## 2018-06-17 RX ORDER — SODIUM CHLORIDE 9 MG/ML
500 INJECTION, SOLUTION INTRAVENOUS ONCE
Status: COMPLETED | OUTPATIENT
Start: 2018-06-17 | End: 2018-06-17

## 2018-06-17 RX ORDER — ENOXAPARIN SODIUM 100 MG/ML
30 INJECTION SUBCUTANEOUS DAILY
Qty: 10 SYRINGE | Refills: 0 | Status: SHIPPED | OUTPATIENT
Start: 2018-06-17 | End: 2018-09-09

## 2018-06-17 RX ADMIN — ACETAMINOPHEN 650 MG: 325 TABLET ORAL at 18:25

## 2018-06-17 RX ADMIN — SODIUM CHLORIDE 500 ML: 900 INJECTION, SOLUTION INTRAVENOUS at 20:00

## 2018-06-17 RX ADMIN — ACETAMINOPHEN 650 MG: 325 TABLET ORAL at 09:08

## 2018-06-17 RX ADMIN — VITAMIN D, TAB 1000IU (100/BT) 5000 UNITS: 25 TAB at 09:08

## 2018-06-17 RX ADMIN — DOCUSATE SODIUM 100 MG: 100 CAPSULE, LIQUID FILLED ORAL at 09:21

## 2018-06-17 RX ADMIN — INSULIN GLARGINE 30 UNITS: 100 INJECTION, SOLUTION SUBCUTANEOUS at 09:09

## 2018-06-17 RX ADMIN — OXYCODONE HYDROCHLORIDE 10 MG: 15 TABLET ORAL at 18:23

## 2018-06-17 RX ADMIN — POLYETHYLENE GLYCOL 3350 17 G: 17 POWDER, FOR SOLUTION ORAL at 09:09

## 2018-06-17 RX ADMIN — FUROSEMIDE 20 MG: 20 TABLET ORAL at 09:09

## 2018-06-17 RX ADMIN — Medication 10 ML: at 18:24

## 2018-06-17 RX ADMIN — ACETAMINOPHEN 650 MG: 325 TABLET ORAL at 05:34

## 2018-06-17 RX ADMIN — INSULIN GLARGINE 30 UNITS: 100 INJECTION, SOLUTION SUBCUTANEOUS at 21:47

## 2018-06-17 RX ADMIN — LEVOTHYROXINE SODIUM 125 MCG: 25 TABLET ORAL at 09:08

## 2018-06-17 RX ADMIN — DOCUSATE SODIUM 100 MG: 100 CAPSULE, LIQUID FILLED ORAL at 18:23

## 2018-06-17 RX ADMIN — ENOXAPARIN SODIUM 30 MG: 30 INJECTION SUBCUTANEOUS at 09:21

## 2018-06-17 RX ADMIN — Medication 500 MCG: at 09:08

## 2018-06-17 RX ADMIN — Medication 10 ML: at 05:35

## 2018-06-17 RX ADMIN — DILTIAZEM HYDROCHLORIDE 120 MG: 120 CAPSULE, COATED, EXTENDED RELEASE ORAL at 09:21

## 2018-06-17 NOTE — PROGRESS NOTES
[facility] Hospitalist Group  Progress Note    Patient: Hazel Osorio Age: 82 y.o. : 1935 MR#: [id number] SSN: [id number]  Date/Time: 2018 6:50 AM    Subjective/24-hour events:     No complaints, pain in good control    Assessment:   L distal femur fracture  Anemia of acute blood loss  Acute on chronic diastolic CHF  HTN  DM 2  HLD  CAD with history of RCA stent placement  Hypothyroidism    Plan:  Continue medical management as ordered. Anticipate SNF disposition early in coming week if stable. Not ARU candidate. Case discussed with:  [x]Patient  []Family  []Nursing  []Case Management  DVT Prophylaxis:  []Lovenox  []Hep SQ  []SCDs  []Coumadin   []On Heparin gtt    Objective:   VS:   Visit Vitals    /67 (BP 1 Location: Left arm, BP Patient Position: At rest)    Pulse 98    Temp 99.2 °F (37.3 °C)    Resp 17    Wt 116.3 kg (256 lb 6.4 oz)    SpO2 95%    Breastfeeding No    BMI 40.16 kg/m2      Tmax/24hrs: Temp (24hrs), Av.2 °F (37.3 °C), Min:98.2 °F (36.8 °C), Max:100 °F (37.8 °C)    General:  In NAD. Cardiovascular: RRR. Pulmonary:  Clear, no wheezes. GI:  Abdomen soft, NTTP. Extremities:  Warm, no ischemia. Neuro:  Awake and alert, moves extremities spontaneously.         Signed By: Jose Matthews MD     2018 6:50 AM

## 2018-06-17 NOTE — ROUTINE PROCESS
0600: TRANSFER - IN REPORT:    Verbal report received from Desire Rooney RN (name) on José Miguel Coto  being received from [facility] (unit) for routine progression of care      Report consisted of patients Situation, Background, Assessment and   Recommendations(SBAR). Information from the following report(s) SBAR, MAR and Recent Results was reviewed with the receiving nurse. Opportunity for questions and clarification was provided. 0630: Patient moved down via bed to room 314. Tolerated transfer well without complications. 0715: Verbal shift change report given to Johny Hernandez RN (oncoming nurse) by Jud Eason RN (offgoing nurse). Report included the following information SBAR.

## 2018-06-17 NOTE — ROUTINE PROCESS
Bedside and Verbal shift change report given to Brian Mcdonnell RN (oncoming nurse) by Juan Alberto Roman RN (off going nurse). Report included the following information SBAR, Kardex, Intake/Output and MAR.

## 2018-06-17 NOTE — PROGRESS NOTES
Bedside and Verbal shift change report given to Tracey Plunkett RN ( ICU nurse) (oncoming nurse) by Josefa Colvin RN (offgoing nurse). Report included the following information SBAR, Kardex, OR Summary, Intake/Output and MAR.

## 2018-06-17 NOTE — PROGRESS NOTES
[facility] Hospitalist Group  Progress Note    Patient: Ron Patel Age: 82 y.o. : 1935 MR#: [id number] SSN: [id number]  Date/Time: 2018 8:40 AM    Subjective/24-hour events:     No new issues/complaints. Assessment:   L distal femur fracture  Anemia of acute blood loss  Acute on chronic diastolic CHF  HTN  DM 2  HLD  CAD with history of RCA stent placement  Hypothyroidism    Plan:  ASA plavix on hold - resume when ok with ortho. Hopeful for disposition early this week if stable. Medical management as ordered in interim. Case discussed with:  [x]Patient  []Family  []Nursing  []Case Management  DVT Prophylaxis:  [x]Lovenox  []Hep SQ  []SCDs  []Coumadin   []On Heparin gtt    Objective:   VS:   Visit Vitals    /62 (BP 1 Location: Left arm, BP Patient Position: At rest)    Pulse 99    Temp 99.3 °F (37.4 °C)    Resp 17    Wt 116.3 kg (256 lb 6.4 oz)    SpO2 95%    Breastfeeding No    BMI 40.16 kg/m2      Tmax/24hrs: Temp (24hrs), Av.2 °F (37.3 °C), Min:98.2 °F (36.8 °C), Max:100 °F (37.8 °C)    Intake/Output Summary (Last 24 hours) at 18 0840  Last data filed at 18 0554   Gross per 24 hour   Intake              180 ml   Output              975 ml   Net             -795 ml       General:  In NAD. Cardiovascular: RRR. Pulmonary:  No wheezes, effort nonlabored. GI:  Abdomen soft, NTTP. Extremities:  Warm, no ischemia.   Neuro:  Awake and alert, motor nonfocal.    Labs:    Recent Results (from the past 24 hour(s))   GLUCOSE, POC    Collection Time: 18 11:19 AM   Result Value Ref Range    Glucose (POC) 92 70 - 110 mg/dL   EKG, 12 LEAD, SUBSEQUENT    Collection Time: 18  2:13 PM   Result Value Ref Range    Ventricular Rate 96 BPM    Atrial Rate 96 BPM    P-R Interval 124 ms    QRS Duration 80 ms    Q-T Interval 352 ms    QTC Calculation (Bezet) 444 ms    Calculated P Axis 81 degrees    Calculated T Axis 9 degrees Diagnosis       Normal sinus rhythm with sinus arrhythmia  Normal ECG  When compared with ECG of 11-JUN-2018 10:42,  premature atrial complexes are no longer present  Nonspecific T wave abnormality no longer evident in Lateral leads     GLUCOSE, POC    Collection Time: 06/16/18  3:30 PM   Result Value Ref Range    Glucose (POC) 138 (H) 70 - 110 mg/dL   GLUCOSE, POC    Collection Time: 06/16/18 10:05 PM   Result Value Ref Range    Glucose (POC) 152 (H) 70 - 110 mg/dL   GLUCOSE, POC    Collection Time: 06/17/18  5:47 AM   Result Value Ref Range    Glucose (POC) 92 70 - 110 mg/dL       Signed By: Tj Henderson MD     June 17, 2018 8:40 AM

## 2018-06-17 NOTE — PROGRESS NOTES
[facility]      CARDIOLOGY PROGRESS NOTE  RECS:    1. Hx of CAD with  post percutaneous transluminal coronary angioplasty/stent to mid right coronary artery using a drug-eluting stent done in 2016. Stable no chest pain or chest pressure.  Follow serial enzymes EKG revealed NSR  w/o acute ischemic changes. Ok to hold Plavix and asa - resume when OK with ortho. If any anticoag planned by ortho, start only asa 81 mg/day. D/w pt also. 2. Indgestion/CP : Resolved,  EKG without ischemic changes, including repeat this AM   3. Sinus Tachycardia - rates now SR 90 with PO Diltiazem.  monitor for now. Per patient has a h/o sinus tach. 4. Acute on chronic diastolic heart failure- mildly decompensated. c/o orthopnea and INIGUEZ. Did not take lasix in the last few days. mildly decompensated. Continue with lasix po. Monitor I&O. Last echo 2017 with Mildly LV dysfunction EF% 45-50%. Not on bb or ARB due to allergies  5. Hypertension- controlled. 6. Diabetes- medications and w/u per medical team   7. Hyperlipidemia- unable to tolerated statins- discussed with patient about pcsk9 starting-medications was approved but approved. But she refused. 8. Left femur fracture- s/p ORIF - stable post op                        Plan for rehab    ASSESSMENT:  Hospital Problems  Date Reviewed: 6/12/2018          Codes Class Noted POA    Preoperative cardiovascular examination ICD-10-CM: Z01.810  ICD-9-CM: V72.81  6/11/2018 Unknown        * (Principal)Periprosthetic left distal femur fracture ICD-10-CM: M97. Trudy Saco  ICD-9-CM: 996.44, V43.65  6/10/2018 Yes        Callie-prosthetic femur fracture at tip of prosthesis ICD-10-CM: M97. 9XXA, [id number]  ICD-9-CM: 996.44, V43.64  6/10/2018 Unknown        Bilateral lower extremity edema ICD-10-CM: R60.0  ICD-9-CM: 782.3  4/25/2017 Yes        Uncomplicated asthma TLK-93-RI: J45.909  ICD-9-CM: 493.90  4/8/2016 Yes        Morbid obesity ([address] ICD-10-CM: E66.01  ICD-9-CM: 278.01  Unknown Yes    Overview Signed 5/9/2013  8:54 AM by Milton Redd     Weight loss has been strongly encouraged by following dietary restrictions and an exercise routine.              Coronary atherosclerosis of native coronary artery ICD-10-CM: I25.10  ICD-9-CM: 414.01  Unknown Yes        Chronic diastolic heart failure (HCC) ICD-10-CM: I50.32  ICD-9-CM: 428.32  Unknown Yes    Overview Signed 5/9/2013  8:58 AM by Milton Redd     Stable, edema better, uses PRN Lasix             IDDM (insulin dependent diabetes mellitus) ([facility] ICD-10-CM: E11.9, Z79.4  ICD-9-CM: 250.00, V58.67  12/4/2012 Yes        Diabetic neuropathy ([facility] ICD-10-CM: E11.40  ICD-9-CM: 250.60, 357.2  4/20/2012 Yes        DJD (degenerative joint disease) ICD-10-CM: M19.90  ICD-9-CM: 715.90  Unknown Yes        S/P joint replacement ICD-10-CM: Z96.60  ICD-9-CM: V43.60  Unknown Yes    Overview Signed 3/4/2011  9:40 AM by Hany Contreras     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3 kg (256 lb 6.4 oz)    SpO2 95%    Breastfeeding No    BMI 40.16 kg/m2         Intake/Output Summary (Last 24 hours) at 06/17/18 0845  Last data filed at 06/17/18 0536   Gross per 24 hour   Intake              180 ml   Output              975 ml   Net             -795 ml     TELE: SR-ST    General: alert and in no apparent distress  HENT: Normocephalic, atraumatic. Normal external eye. Neck :  no JVD  Cardiac:  regular rate and rhythm, S1, S2 normal, no murmur, click, rub or gallop  Lungs: clear to auscultation bilaterally  Abdomen: Soft, nontender, no masses  Extremities:  Left leg in AC peripheral pulses present      Labs: Results:       Chemistry Recent Labs      06/16/18   0421  06/15/18   0438   GLU  77  78   NA  139  138   K  3.8  4.1   CL  104  104   CO2  29  27   BUN  10  11   CREA  0.56*  0.61   CA  8.5  8.4*   AGAP  6  7   BUCR  18  18      CBC w/Diff Recent Labs      06/16/18   0421  06/15/18   0438   WBC  6.4  7.4   RBC  2.36*  2.47*   HGB  7.3*  7.6*   HCT  23.5*  24.2*   PLT  237  222      Cardiac Enzymes No results for input(s): CPK, CKND1, VICTOR M in the last 72 hours. No lab exists for component: CKRMB, TROIP   Coagulation No results for input(s): PTP, INR, APTT in the last 72 hours. No lab exists for component: INREXT    Lipid Panel Lab Results   Component Value Date/Time    Cholesterol, total 179 02/07/2017 06:15 AM    HDL Cholesterol 37 (L) 02/07/2017 06:15 AM    LDL, calculated 126.6 (H) 02/07/2017 06:15 AM    VLDL, calculated 15.4 02/07/2017 06:15 AM    Triglyceride 77 02/07/2017 06:15 AM    CHOL/HDL Ratio 4.8 02/07/2017 06:15 AM      BNP No results for input(s): BNPP in the last 72 hours. Liver Enzymes No results for input(s): TP, ALB, TBIL, AP, SGOT, GPT in the last 72 hours. No lab exists for component: DBIL   Thyroid Studies Lab Results   Component Value Date/Time    TSH 0.51 02/27/2018 10:11 AM              Maria Isabel Murphy NP   Pager # [phone]  Patient seen independently  Discussed the details with NP and patient.  Please see orders & recommendations  Javier Grant MD

## 2018-06-17 NOTE — PROGRESS NOTES
Patients is lying comfortable alert awake and oriented. Dressings to Lhip dry and intact. CMS+ No s/s of distress or sob.

## 2018-06-17 NOTE — PROGRESS NOTES
[facility]    Progress Note      Patient: Cecilia Tello               Sex: female          DOA: 6/10/2018         YOB: 1935      Age:  82 y.o.        LOS:  LOS: 7 days         S/P  Procedure(s):  OPEN REDUCTION INTERNAL FIXATION LEFT DISTAL FEMUR FRACTURE               Subjective:     No complaints Tolerating diet Ambulating Voiding q shift. She is doing well this morning. Her pain medication is managed on current medications. She is ready to go home tomorrow. Has participated in PT. Denies cp, sob, abd pain   Objective:      Blood pressure 134/62, pulse 99, temperature 99.3 °F (37.4 °C), resp. rate 17, weight 256 lb 6.4 oz (116.3 kg), SpO2 95 %, not currently breastfeeding.    Well developed, Well Nourished alert, cooperative, no distress  Incision clean, dry, no drainage, dressing in place  swelling and tenderness noted in left lower extremity  Sensory is intact   Motor is intact  nv intact  2+distal pulses  Neg calf tenderness  Neg for facial asymmetry     Labs:  CBC  @  CBC:   Lab Results   Component Value Date/Time    WBC 6.4 06/16/2018 04:21 AM    RBC 2.36 (L) 06/16/2018 04:21 AM    HGB 7.3 (L) 06/16/2018 04:21 AM    HCT 23.5 (L) 06/16/2018 04:21 AM    PLATELET 744 70/73/5728 04:21 AM   @  Coagulation  Lab Results   Component Value Date    INR 1.0 06/10/2018    APTT 31.5 07/25/2016      Basic Metabolic Profile  Lab Results   Component Value Date     06/16/2018    CO2 29 06/16/2018    BUN 10 06/16/2018       Medications Reviewed      Assessment/Plan     Principal Problem:    Periprosthetic left distal femur fracture (6/10/2018)    Active Problems:    Essential hypertension (5/20/2010)      Overview: controlled      Hypovitaminosis D (5/20/2010)      Noncompliance with medication regimen (2/8/2011)      Anxiety (2/8/2011)      S/P joint replacement ()      Overview: 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 Coronary atherosclerosis of native coronary artery ()      Chronic diastolic heart failure (HCC) ()      Overview: Stable, edema better, uses PRN Lasix      Uncomplicated asthma (5/0/5982)      Bilateral lower extremity edema (4/25/2017)      Callie-prosthetic femur fracture at tip of prosthesis (6/10/2018)      Preoperative cardiovascular examination (6/11/2018)        Procedure(s):  OPEN REDUCTION INTERNAL FIXATION LEFT DISTAL FEMUR FRACTURE :      1. PT and/or OT Consults: YES continue PT/OT: oob to chair, gentle rom, NWB operative leg             2. Incision Care:  Routine Incision Care and Dressing Changes as necessary: dressing changes POD#2 and as needed  3. Pain control - percocet 5 mg in chart for discharge  4. DVT Prophylaxis - SCD in place, Plavix and Aspirin per cardiology  5.  Acute blood loss anemia - will continue to monitor     DISCHARGE PLANNING      Intervention : [place] ([facility]) on Monday         DELIA Shaw [phone]  [phone]

## 2018-06-17 NOTE — PROGRESS NOTES
Problem: Mobility Impaired (Adult and Pediatric)  Goal: *Acute Goals and Plan of Care (Insert Text)  Physical Therapy Goals  Initiated 6/15/2018 and to be accomplished within 7 day(s)  1. Patient will move from supine to sit and sit to supine , scoot up and down and roll side to side in bed with minimal assistance/contact guard assist.     2.  Patient will transfer from bed to chair and chair to bed with moderate assistance  using the least restrictive device. 3.  Patient will perform sit to stand with moderate assistance . 4. Patient will ambulate with moderate assistance for 5 feet with the least restrictive device. 5.  Patient will transfer bed <> chair using slide board with minimal assistance. Outcome: Progressing Towards Goal  physical Therapy TREATMENT    Patient: Ifeoma Che (41 y.o. female)  Date: 6/17/2018  Diagnosis: Callie-prosthetic femur fracture at tip of prosthesis  S72.409A LEFT DISTAL FEMUR FRACTURE Periprosthetic supracondylar fracture of femur  Procedure(s) (LRB):  OPEN REDUCTION INTERNAL FIXATION LEFT DISTAL FEMUR FRACTURE (Left) 5 Days Post-Op  Precautions: Fall, NWB (LLE)  Chart, physical therapy assessment, plan of care and goals were reviewed. ASSESSMENT:  Pt was agreeable to PT treatment and was seen with nurse present in the room. PT performed ankle pumps and glute sets prior to mobilization. Pt transferred supine to sit with mod A and increased time and multiple rest breaks. After sitting edge of bed for several minutes patient stood with max A to the RW so that bed linens could be changed. Pt was not using UEs adequately to maintain NWB on left LE and her heart rate increased to 149 bpm so she was returned to sitting. Pt required mod/max A for sit to supine transfer and total assist for scooting up in bed. Pt was left with needs in reach and nursing present.   Progression toward goals:  []      Improving appropriately and progressing toward goals  [x] Improving slowly and progressing toward goals  []      Not making progress toward goals and plan of care will be adjusted     PLAN:  Patient continues to benefit from skilled intervention to address the above impairments. Continue treatment per established plan of care. Discharge Recommendations:  Xavi Suarez  Further Equipment Recommendations for Discharge:  N/A     G-CODES:     Mobility  Current  CM= 80-99%   Goal  CK= 40-59%. The severity rating is based on the Level of Assistance required for Functional Mobility and ADLs. SUBJECTIVE:   Patient stated I get short winded so easily.     OBJECTIVE DATA SUMMARY:   Critical Behavior:  Neurologic State: Alert  Orientation Level: Oriented X4  Cognition: Follows commands  Safety/Judgement: Awareness of environment, Fall prevention, Insight into deficits  Functional Mobility Training:  Bed Mobility:  Supine to Sit: Moderate assistance  Sit to Supine: Moderate assistance;Maximum assistance      Transfers:  Sit to Stand: Maximum assistance  Stand to Sit: Moderate assistance  Balance:  Sitting - Static: Good (unsupported)  Standing - Static: Poor  Ambulation/Gait Training:   unable to ambulate at this time  Therapeutic Exercises:   See above  Pain:  Pt reports 3/10 pain or discomfort prior to treatment.    Pt reports 5/10 pain or discomfort post treatment. Activity Tolerance:   poor  Please refer to the flowsheet for vital signs taken during this treatment.   After treatment:   [] Patient left in no apparent distress sitting up in chair  [x] Patient left in no apparent distress in bed  [x] Call bell left within reach  [x] Nursing notified  [] Caregiver present  [x] Bed alarm activated    Educated patient on transfer techniques, exercises and calling for assistance with mobility    Lizzie Goyal, PT   Time Calculation: 40 mins

## 2018-06-17 NOTE — ROUTINE PROCESS
Carly Barrow 1 Patient complaint of dizziness Patient BP 88/67. PA Elmer Galeazzi aware Stat H/H and 500ml Normal saline bolus given. MD to be called with results.

## 2018-06-17 NOTE — PROGRESS NOTES
2140  Received pt in stable condition,   General: lying in bed in supine, not apparent distress  Neuro: AOx4, denies numbness, 0 tingling, able to wiggle toes to bilateral lower extremities  Cardio: on cardiac monitor, (cardiologist is currently following the patient), pedal pulses palpable, denies chest pain  Respiratory: denies shortness of breath  Skin: dressing to LLE change with Opsite dresing per MD order. : denies nausea and vomiting  Mus: Very limited   Bed in low position and call bell within reach. 0330  Alert, NAD, stable. No changes in condition.

## 2018-06-18 VITALS
WEIGHT: 260 LBS | SYSTOLIC BLOOD PRESSURE: 123 MMHG | TEMPERATURE: 98.7 F | BODY MASS INDEX: 40.72 KG/M2 | HEART RATE: 88 BPM | OXYGEN SATURATION: 97 % | RESPIRATION RATE: 18 BRPM | DIASTOLIC BLOOD PRESSURE: 71 MMHG

## 2018-06-18 LAB
ATRIAL RATE: 95 BPM
ATRIAL RATE: 96 BPM
CALCULATED P AXIS, ECG09: 63 DEGREES
CALCULATED P AXIS, ECG09: 81 DEGREES
CALCULATED R AXIS, ECG10: -3 DEGREES
CALCULATED T AXIS, ECG11: 25 DEGREES
CALCULATED T AXIS, ECG11: 9 DEGREES
DIAGNOSIS, 93000: NORMAL
DIAGNOSIS, 93000: NORMAL
GLUCOSE BLD STRIP.AUTO-MCNC: 114 MG/DL (ref 70–110)
GLUCOSE BLD STRIP.AUTO-MCNC: 91 MG/DL (ref 70–110)
P-R INTERVAL, ECG05: 124 MS
P-R INTERVAL, ECG05: 126 MS
Q-T INTERVAL, ECG07: 352 MS
Q-T INTERVAL, ECG07: 356 MS
QRS DURATION, ECG06: 80 MS
QRS DURATION, ECG06: 84 MS
QTC CALCULATION (BEZET), ECG08: 444 MS
QTC CALCULATION (BEZET), ECG08: 447 MS
VENTRICULAR RATE, ECG03: 95 BPM
VENTRICULAR RATE, ECG03: 96 BPM

## 2018-06-18 PROCEDURE — 74011250637 HC RX REV CODE- 250/637: Performed by: SPECIALIST

## 2018-06-18 PROCEDURE — 74011250637 HC RX REV CODE- 250/637: Performed by: INTERNAL MEDICINE

## 2018-06-18 PROCEDURE — 97535 SELF CARE MNGMENT TRAINING: CPT

## 2018-06-18 PROCEDURE — 74011636637 HC RX REV CODE- 636/637: Performed by: INTERNAL MEDICINE

## 2018-06-18 PROCEDURE — 74011250637 HC RX REV CODE- 250/637: Performed by: NURSE PRACTITIONER

## 2018-06-18 PROCEDURE — 82962 GLUCOSE BLOOD TEST: CPT

## 2018-06-18 PROCEDURE — 97530 THERAPEUTIC ACTIVITIES: CPT

## 2018-06-18 RX ORDER — DILTIAZEM HYDROCHLORIDE 120 MG/1
120 CAPSULE, COATED, EXTENDED RELEASE ORAL DAILY
Qty: 30 CAP | Refills: 0 | Status: SHIPPED
Start: 2018-06-18 | End: 2018-09-06

## 2018-06-18 RX ORDER — GUAIFENESIN 100 MG/5ML
81 LIQUID (ML) ORAL DAILY
Status: DISCONTINUED | OUTPATIENT
Start: 2018-06-18 | End: 2018-06-18 | Stop reason: HOSPADM

## 2018-06-18 RX ORDER — CLOPIDOGREL BISULFATE 75 MG/1
75 TABLET ORAL DAILY
Status: DISCONTINUED | OUTPATIENT
Start: 2018-06-18 | End: 2018-06-18 | Stop reason: HOSPADM

## 2018-06-18 RX ADMIN — Medication 10 ML: at 14:00

## 2018-06-18 RX ADMIN — OXYCODONE HYDROCHLORIDE 5 MG: 15 TABLET ORAL at 07:05

## 2018-06-18 RX ADMIN — INSULIN GLARGINE 30 UNITS: 100 INJECTION, SOLUTION SUBCUTANEOUS at 08:28

## 2018-06-18 RX ADMIN — DILTIAZEM HYDROCHLORIDE 120 MG: 120 CAPSULE, COATED, EXTENDED RELEASE ORAL at 08:28

## 2018-06-18 RX ADMIN — ASPIRIN 81 MG CHEWABLE TABLET 81 MG: 81 TABLET CHEWABLE at 12:23

## 2018-06-18 RX ADMIN — VITAMIN D, TAB 1000IU (100/BT) 5000 UNITS: 25 TAB at 08:28

## 2018-06-18 RX ADMIN — POLYETHYLENE GLYCOL 3350 17 G: 17 POWDER, FOR SOLUTION ORAL at 08:31

## 2018-06-18 RX ADMIN — Medication 10 ML: at 06:42

## 2018-06-18 RX ADMIN — Medication 500 MCG: at 11:39

## 2018-06-18 RX ADMIN — FUROSEMIDE 20 MG: 20 TABLET ORAL at 08:28

## 2018-06-18 RX ADMIN — ACETAMINOPHEN 650 MG: 325 TABLET ORAL at 09:40

## 2018-06-18 RX ADMIN — LEVOTHYROXINE SODIUM 125 MCG: 25 TABLET ORAL at 07:45

## 2018-06-18 RX ADMIN — CLOPIDOGREL BISULFATE 75 MG: 75 TABLET ORAL at 12:23

## 2018-06-18 RX ADMIN — DOCUSATE SODIUM 100 MG: 100 CAPSULE, LIQUID FILLED ORAL at 08:28

## 2018-06-18 NOTE — PROGRESS NOTES
CMS was asked to arrange transportation to [facility]), per Tiburcio Quinn RN (care manager). CMS first spoke with \"[employer]\" ([phone]) representative Bridget Levi) in regards to prior auth for non-emergency medical transportation ([facility] #[id number]). CMS spoke with \"[facility]\" representative (Luz Gonzalez), and scheduled a p/u time of 1500, via stretcher. \"[facility]\" packet was initiated and placed in the pt chart for completion. Tiburcio Quinn RN (care manager) were made aware of the scheduled p/u.

## 2018-06-18 NOTE — PROGRESS NOTES
[facility]      CARDIOLOGY PROGRESS NOTE  RECS:  1. Hx of CAD with  post percutaneous transluminal coronary angioplasty/stent to mid right coronary artery using a drug-eluting stent done in 2016. Stable no chest pain or chest pressure.  Follow serial enzymes EKG revealed NSR  w/o acute ischemic changes. Discussed with SOLITARIO Cesar - may resume Plavix and asa.  2. Indgestion/CP : Resolved,  EKG without ischemic changes,    3. Sinus Tachycardia - rates now SR 90 with PO Diltiazem.  monitor for now. Per patient has a h/o sinus tach. 4. Acute on chronic diastolic heart failure- mildly decompensated. c/o orthopnea and INIGUEZ. Did not take lasix in the last few days. mildly decompensated. Continue with lasix po. Monitor I&O. Last echo 2017 with Mildly LV dysfunction EF% 45-50%. Not on bb or ARB due to allergies  5. Hypertension- controlled. 6. Diabetes- medications and w/u per medical team   7. Hyperlipidemia- unable to tolerated statins- discussed with patient about pcsk9 starting-medications was approved but approved. But she refused. 8. Left femur fracture- s/p ORIF - stable post op  9. Plan for rehab    ASSESSMENT:  Hospital Problems  Date Reviewed: 6/12/2018          Codes Class Noted POA    Preoperative cardiovascular examination ICD-10-CM: Z01.810  ICD-9-CM: V72.81  6/11/2018 Unknown        * (Principal)Periprosthetic left distal femur fracture ICD-10-CM: M97. Karla Jones  ICD-9-CM: 996.44, V43.65  6/10/2018 Yes        Callie-prosthetic femur fracture at tip of prosthesis ICD-10-CM: M97. Raynell Blocker [id number]  ICD-9-CM: 996.44, V43.64  6/10/2018 Unknown        Bilateral lower extremity edema ICD-10-CM: R60.0  ICD-9-CM: 782.3  4/25/2017 Yes        Uncomplicated asthma VFU-17-QN: J45.909  ICD-9-CM: 493.90  4/8/2016 Yes        Morbid obesity ([address] ICD-10-CM: E66.01  ICD-9-CM: 278.01  Unknown Yes    Overview Signed 5/9/2013  8:54 AM by Citlali Bower     Weight loss has been strongly encouraged by following dietary restrictions and an exercise routine. Coronary atherosclerosis of native coronary artery ICD-10-CM: I25.10  ICD-9-CM: 414.01  Unknown Yes        Chronic diastolic heart failure (HCC) ICD-10-CM: I50.32  ICD-9-CM: 428.32  Unknown Yes    Overview Signed 5/9/2013  8:58 AM by Justus Lott     Stable, edema better, uses PRN Lasix             IDDM (insulin dependent diabetes mellitus) ([facility] ICD-10-CM: E11.9, Z79.4  ICD-9-CM: 250.00, V58.67  12/4/2012 Yes        Diabetic neuropathy ([facility] ICD-10-CM: E11.40  ICD-9-CM: 250.60, 357.2  4/20/2012 Yes        DJD (degenerative joint disease) ICD-10-CM: M19.90  ICD-9-CM: 715.90  Unknown Yes        S/P joint replacement ICD-10-CM: Z96.60  ICD-9-CM: V43.60  Unknown Yes    Overview Signed 3/4/2011  9:40 AM by Arianne Cisneros     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)             Noncompliance with medication regimen ICD-10-CM: Z91.14  ICD-9-CM: V15.81  2/8/2011 Yes        Anxiety ICD-10-CM: F41.9  ICD-9-CM: 300.00  2/8/2011 Yes        Essential hypertension ICD-10-CM: I10  ICD-9-CM: 401.9  5/20/2010 Yes    Overview Signed 7/28/2016 10:34 AM by Nichol Marshall MD     controlled             Hypovitaminosis D ICD-10-CM: E55.9  ICD-9-CM: 268.9  5/20/2010 Yes                SUBJECTIVE:  No CP or SOB    OBJECTIVE:    VS:   Visit Vitals    /68 (BP 1 Location: Left arm, BP Patient Position: At rest)    Pulse 92    Temp 99 °F (37.2 °C)    Resp 18    Wt 117.9 kg (260 lb)    SpO2 93%    Breastfeeding No    BMI 40.72 kg/m2         Intake/Output Summary (Last 24 hours) at 06/18/18 1143  Last data filed at 06/18/18 0708   Gross per 24 hour   Intake              190 ml   Output              200 ml   Net              -10 ml     TELE: normal sinus rhythm    General: alert and in no apparent distress  HENT: Normocephalic, atraumatic. Normal external eye.   Neck :  no JVD  Cardiac:  regular rate and rhythm, S1, S2 normal, no murmur, click, rub or gallop  Lungs: clear to auscultation bilaterally  Abdomen: Soft, nontender, no masses  Extremities:  Left leg in [facility] - dressing intact. Labs: Results:       Chemistry Recent Labs      06/16/18   0421   GLU  77   NA  139   K  3.8   CL  104   CO2  29   BUN  10   CREA  0.56*   CA  8.5   AGAP  6   BUCR  18      CBC w/Diff Recent Labs      06/17/18   1900  06/16/18   0421   WBC   --   6.4   RBC   --   2.36*   HGB  7.4*  7.3*   HCT   --   23.5*   PLT   --   237      Cardiac Enzymes No results for input(s): CPK, CKND1, VICTOR M in the last 72 hours. No lab exists for component: CKRMB, TROIP   Coagulation No results for input(s): PTP, INR, APTT in the last 72 hours. No lab exists for component: INREXT    Lipid Panel Lab Results   Component Value Date/Time    Cholesterol, total 179 02/07/2017 06:15 AM    HDL Cholesterol 37 (L) 02/07/2017 06:15 AM    LDL, calculated 126.6 (H) 02/07/2017 06:15 AM    VLDL, calculated 15.4 02/07/2017 06:15 AM    Triglyceride 77 02/07/2017 06:15 AM    CHOL/HDL Ratio 4.8 02/07/2017 06:15 AM      BNP No results for input(s): BNPP in the last 72 hours. Liver Enzymes No results for input(s): TP, ALB, TBIL, AP, SGOT, GPT in the last 72 hours. No lab exists for component: DBIL   Thyroid Studies Lab Results   Component Value Date/Time    TSH 0.51 02/27/2018 10:11 AM              King Stinson NP   Pager # [phone]      Patient seen independently  Discussed the details with NP and patient.  Please see orders & recommendations  Nhung Diaz MD

## 2018-06-18 NOTE — PROGRESS NOTES
This  placed a call to Audrey[facility] VM and Maren's VM at [facility] for their decision on placement. Received call back from [facility] who felt that patient wasn't doing much in PT, read her the 6/17 notes that state did get to a standing position. Also, that the patient is very independent and intends to return to her home that is bought and paid for of 44 years.

## 2018-06-18 NOTE — PROGRESS NOTES
Bedside and Verbal shift change report given to Natasha Zaman RN (oncoming nurse) by JEANNIE Araiza (offgoing nurse). Report included the following information SBAR, Kardex, OR Summary, Intake/Output and MAR.

## 2018-06-18 NOTE — PROGRESS NOTES
Problem: Self Care Deficits Care Plan (Adult)  Goal: *Acute Goals and Plan of Care (Insert Text)  Occupational Therapy Goals  Initiated 6/14/2018 within 7 day(s). 1.  Patient will perform grooming, EOB with supervision/set-up   2. Patient will perform upper body dressing with supervision/set-up. 3.  Patient will perform lower body dressing with minimal assistance. 4.  Patient will perform toilet transfers with minimal assistance  5. Patient will perform all aspects of toileting with minimal assistance  6. Patient will participate in upper extremity therapeutic exercise/activities with supervision/set-up for 8 minutes. 7.  Patient will utilize energy conservation techniques during functional activities with verbal cues. Outcome: Progressing Towards Goal  Occupational Therapy TREATMENT    Patient: Ron Patel (19 y.o. female)  Date: 6/18/2018  Diagnosis: Callie-prosthetic femur fracture at tip of prosthesis  S72.409A LEFT DISTAL FEMUR FRACTURE Periprosthetic supracondylar fracture of femur  Procedure(s) (LRB):  OPEN REDUCTION INTERNAL FIXATION LEFT DISTAL FEMUR FRACTURE (Left) 6 Days Post-Op  Precautions: Fall, NWB (LLE)  Chart, occupational therapy assessment, plan of care, and goals were reviewed. PLOF: Assist w/BADLs    ASSESSMENT:  Pt is pleasant and cooperative, meeting goals # 1, 2 and 7. Pt is very talkative and requires moderate vc's to redirect. Pt requires assist w/RLE to maneuver to EOB, tolerating ~ 20 minutes performing ADL grooming tasks. Attempted functional transfer to standing 2x, unable to clear buttocks. Pt returned to supine, demonstrating compensatory strategy \"hooking technique\" w/RLE. BOdy habitus requires Mod Assist w/bed mobility rolling L/R for pad reset. Appreciate, [id number] [facility], assist. Pt left w/all needs witihin reach   EDUCATION Pt educated on energy conservation techniques w/ADLs.   Progression toward goals:  []          Improving appropriately and progressing toward goals  [x]          Improving slowly and progressing toward goals  []          Not making progress toward goals and plan of care will be adjusted     PLAN:  Patient continues to benefit from skilled intervention to address the above impairments. Continue treatment per established plan of care. Discharge Recommendations:  Skilled Nursing Facility  Further Equipment Recommendations for Discharge:  wheelchair      SUBJECTIVE:   Patient stated I know that I'm not suppose to stand on this leg.     OBJECTIVE DATA SUMMARY:     G CODES:  Self Care  Current  CL= 60-79%  [id number] Goal  CK= 40-59%. The severity rating is based on the Other NWB RLE    Cognitive/Behavioral Status:  Neurologic State: Alert  Orientation Level: Oriented X4  Cognition: Follows commands  Safety/Judgement: Awareness of environment, Fall prevention, Insight into deficits  Functional Mobility and Transfers for ADLs:   Bed Mobility:  Supine to Sit: Additional time; Moderate assistance  Sit to Supine: Additional time;Minimum assistance (assist w/LLE)  Scooting: Maximum assistance;Assist x2   Transfers:   Attempted functional transfer to standing 2x w/RW and bed elevated, pt unable to clear buttocks from mattress. Balance:  Sitting: Impaired  Sitting - Static: Good (unsupported)  Sitting - Dynamic: Fair (occasional)  ADL Intervention:  Grooming  Washing Face: Supervision/set-up  Washing Hands: Supervision/set-up  Brushing Teeth: Supervision/set-up    Upper Body Dressing Assistance  Hospital Gown: Stand-by assistance    Pain  Pre Treatment:2  Post Treatment:2  Pain Scale 1: Numeric (0 - 10)  Pain Intensity 1: 2  Pain Location 1: Leg  Pain Orientation 1: Left  Pain Description 1: Aching  Pain Intervention(s) 1: Medication (see MAR)    Activity Tolerance:    Fair    Please refer to the flowsheet for vital signs taken during this treatment.   After treatment:   []  Patient left in no apparent distress sitting up in chair  [x]  Patient left in no apparent distress in bed  [x]  Call bell left within reach  [x]  Nursing notified  []  Caregiver present  []  Bed alarm activated    KEISHA Ceballos  Time Calculation: 37 mins

## 2018-06-18 NOTE — DISCHARGE INSTRUCTIONS
DISCHARGE SUMMARY from Nurse    PATIENT INSTRUCTIONS:    After general anesthesia or intravenous sedation, for 24 hours or while taking prescription Narcotics:  · Limit your activities  · Do not drive and operate hazardous machinery  · Do not make important personal or business decisions  · Do  not drink alcoholic beverages  · If you have not urinated within 8 hours after discharge, please contact your surgeon on call. Report the following to your surgeon:  · Excessive pain, swelling, redness or odor of or around the surgical area  · Temperature over 100.5  · Nausea and vomiting lasting longer than 4 hours or if unable to take medications  · Any signs of decreased circulation or nerve impairment to extremity: change in color, persistent  numbness, tingling, coldness or increase pain  · Any questions    What to do at Home:  Recommended activity: Activity as tolerated and PT/OT Eval and Treat,     If you experience any of the following symptoms , please follow up with primary care physician  . *  Please give a list of your current medications to your Primary Care Provider. *  Please update this list whenever your medications are discontinued, doses are      changed, or new medications (including over-the-counter products) are added. *  Please carry medication information at all times in case of emergency situations. These are general instructions for a healthy lifestyle:    No smoking/ No tobacco products/ Avoid exposure to second hand smoke  Surgeon General's Warning:  Quitting smoking now greatly reduces serious risk to your health.     Obesity, smoking, and sedentary lifestyle greatly increases your risk for illness    A healthy diet, regular physical exercise & weight monitoring are important for maintaining a healthy lifestyle    You may be retaining fluid if you have a history of heart failure or if you experience any of the following symptoms:  Weight gain of 3 pounds or more overnight or 5 pounds in a week, increased swelling in our hands or feet or shortness of breath while lying flat in bed. Please call your doctor as soon as you notice any of these symptoms; do not wait until your next office visit. Recognize signs and symptoms of STROKE:    F-face looks uneven    A-arms unable to move or move unevenly    S-speech slurred or non-existent    T-time-call 911 as soon as signs and symptoms begin-DO NOT go       Back to bed or wait to see if you get better-TIME IS BRAIN. Warning Signs of HEART ATTACK     Call 911 if you have these symptoms:   Chest discomfort. Most heart attacks involve discomfort in the center of the chest that lasts more than a few minutes, or that goes away and comes back. It can feel like uncomfortable pressure, squeezing, fullness, or pain.  Discomfort in other areas of the upper body. Symptoms can include pain or discomfort in one or both arms, the back, neck, jaw, or stomach.  Shortness of breath with or without chest discomfort.  Other signs may include breaking out in a cold sweat, nausea, or lightheadedness. Don't wait more than five minutes to call 911 - MINUTES MATTER! Fast action can save your life. Calling 911 is almost always the fastest way to get lifesaving treatment. Emergency Medical Services staff can begin treatment when they arrive -- up to an hour sooner than if someone gets to the hospital by car. The discharge information has been reviewed with the patient. The patient verbalized understanding. Discharge medications reviewed with the patient and appropriate educational materials and side effects teaching were provided.   ___________________________________________________________________________________________________________________________________

## 2018-06-18 NOTE — DISCHARGE SUMMARY
Discharge Summary    Patient: Rashi Hills MRN: [id number]  CSN: [id number]    YOB: 1935  Age: 82 y.o. Sex: female    DOA: 6/10/2018 LOS:  LOS: 8 days   Discharge Date: 6/18/2018     Admission Diagnoses: Callie-prosthetic femur fracture at tip of prosthesis  S72.409A LEFT DISTAL FEMUR FRACTURE    Discharge Diagnoses:    L distal femur fracture  Anemia of acute blood loss  Acute on chronic diastolic CHF  HTN  DM 2  HLD  CAD with history of RCA stent placement  Hypothyroidism      Discharge Condition: Stable    PHYSICAL EXAM  Visit Vitals    /68 (BP 1 Location: Left arm, BP Patient Position: At rest)    Pulse 92    Temp 99 °F (37.2 °C)    Resp 18    Wt 117.9 kg (260 lb)    SpO2 93%    Breastfeeding No    BMI 40.72 kg/m2       General: In NAD. HEENT: NCAT. Sclerae anicteric. Lungs:  Clear, no wheezes. Effort nonlabored. Heart:  RRR. Abdomen: Soft, NTTP. Extremities: Warm, no ischemia. Psych:   Mood normal, good insight. Neurologic:  Awake and alert, moves extremities spontaneously. Hospital Course:   See admission H&P for full details of HPI. Patient admitted to medical bed with cardiology and orthopedic surgery in consultation. She underwent ORIF of L distal femur fracture on 6/12/2018. Patient was noted to have some low-grade fevers postoperatively has not shown any evidence of acute infection. PT/OT evaluations have been obtained and recommendation is for SNF placement. Patient is medically stable for discharge today and recommendation is for aspirin and plavix to be resumed as previously taking PTA. Consults:   [place]  [facility]      Significant Diagnostic Studies:   2D echo:  SUMMARY:  Procedure information: Intravenous contrast (Definity) was administered. Left ventricle: Systolic function was mildly reduced by visual assessment. Ejection fraction was estimated to be 50 %. There was mild diffuse hypokinesis.  Wall thickness was mildly increased. COMPARISONS:  There has been no significant change. Comparison was made with the previous   study of 07-Feb-2017. XR L knee:  IMPRESSION:  1. Interval fixation of distal femur fracture as above.                 XR L knee:  IMPRESSION:  1. Acute fracture of the distal femoral metaphysis extending to the prosthesis. CXR:  IMPRESSION  Impression:  1. Mild bibasilar atelectasis and mild interstitial edema. Discharge Medications:     Current Discharge Medication List      START taking these medications    Details   dilTIAZem CD (CARDIZEM CD) 120 mg ER capsule Take 1 Cap by mouth daily. Qty: 30 Cap, Refills: 0      !! enoxaparin (LOVENOX) 30 mg/0.3 mL injection 0.3 mL by SubCUTAneous route daily. Indications: DEEP VEIN THROMBOSIS PREVENTION  Qty: 10 Syringe, Refills: 0      !! enoxaparin (LOVENOX) 30 mg/0.3 mL injection 0.3 mL by SubCUTAneous route daily. Indications: DEEP VEIN THROMBOSIS PREVENTION  Qty: 10 Syringe, Refills: 0      docusate sodium (COLACE) 100 mg capsule Take 1 Cap by mouth two (2) times a day for 90 days. Qty: 60 Cap, Refills: 2      oxyCODONE IR (ROXICODONE) 5 mg immediate release tablet Take 1 Tab by mouth every four (4) hours as needed. Max Daily Amount: 30 mg.  Qty: 60 Tab, Refills: 0    Associated Diagnoses: Post-operative pain       !! - Potential duplicate medications found. Please discuss with provider. CONTINUE these medications which have NOT CHANGED    Details   acetaminophen (TYLENOL ARTHRITIS PAIN) 650 mg TbER Take 650 mg by mouth every eight (8) hours. !! lidocaine (LIDODERM) 5 % APPLY 3 PATCHES TO AFFECTED AREA(S) EVERY 24 HOURS FOR 30 DAYS. WEAR FOR 12 HOURS THEN REMOVE FOR 12 HOURS. Qty: 90 Each, Refills: 1      clopidogrel (PLAVIX) 75 mg tab Take 1 Tab by mouth daily.   Qty: 30 Tab, Refills: 3    Associated Diagnoses: S/P coronary artery stent placement      !! lidocaine (LIDODERM) 5 % 1 Patch by TransDERmal route every twenty-four (24) hours.  Qty: 90 Each, Refills: 1    Associated Diagnoses: Diabetic peripheral neuropathy ([address] Neuropathic pain      PROAIR HFA 90 mcg/actuation inhaler INHALE 1 PUFF BY MOUTH EVERY 4 HOURS AS NEEDED FOR WHEEZING OR SHORTNESS OF BREATH  Qty: 1 Inhaler, Refills: 3    Associated Diagnoses: Moderate intermittent asthma, with acute exacerbation      furosemide (LASIX) 20 mg tablet Use daily as needed for leg swelling  Qty: 30 Tab, Refills: 2    Associated Diagnoses: Bilateral lower extremity edema      levothyroxine (SYNTHROID) 75 mcg tablet Take 1 Tab by mouth Daily (before breakfast). Qty: 30 Tab, Refills: 0    Associated Diagnoses: Hypothyroidism, unspecified type      insulin glargine (LANTUS) 100 unit/mL injection Take 15 units every morning  Indications: type 2 diabetes mellitus  Qty: 1 Vial, Refills: 0      cyanocobalamin ER 1,000 mcg tablet Take 1 Tab by mouth daily. Qty: 30 Tab, Refills: 3    Associated Diagnoses: Weakness; Macrocytosis      famotidine (PEPCID) 20 mg tablet Take 20 mg by mouth as needed. montelukast (SINGULAIR) 10 mg tablet Take 1 Tab by mouth daily as needed. Indications: ALLERGIC RHINITIS  Qty: 30 Tab, Refills: 3    Associated Diagnoses: Uncomplicated asthma, unspecified asthma severity      capsaicin 0.075 % topical cream Apply  to affected area three (3) times daily. Qty: 60 g, Refills: 0      DOCOSAHEXANOIC ACID/EPA (FISH OIL PO) Take 1,000 mg by mouth two (2) times a day. aspirin delayed-release 81 mg tablet Take 81 mg by mouth daily. cholecalciferol, vitamin d3, (VITAMIN D) 1,000 unit tablet Take 5,000 Units by mouth two (2) times a day. !! - Potential duplicate medications found. Please discuss with provider. STOP taking these medications       amLODIPine (NORVASC) 10 mg tablet Comments:   Reason for Stopping:                 Activity: As tolerated    Diet: Cardiac Diet.     Wound Care:  Routine Incision Care and Dressing Changes as necessary: dressing changes POD #2 and as needed. Follow-up: with PCP, [id number] Jenkins DO following SNF discharge. Minutes spent on discharge: >30 minutes spent coordinating this discharge (review instructions/follow-up, prescriptions, preparing report for sign off)       James Mcintosh.  Mookie Morton MD  [facility]

## 2018-06-18 NOTE — PROGRESS NOTES
[facility]    Progress Note      Patient: Chase Barker               Sex: female          DOA: 6/10/2018         YOB: 1935      Age:  82 y.o.        LOS:  LOS: 8 days         S/P  Procedure(s):  OPEN REDUCTION INTERNAL FIXATION LEFT DISTAL FEMUR FRACTURE               Subjective:     No complaints Tolerating diet Ambulating Voiding q shift. She is doing well today. Her pain is managed on current medications. She has participated in PT. She has verbalized understanding and readiness about going to a SNF today. Denies cp, sob, abd pain   Objective:      Blood pressure 122/66, pulse 92, temperature 99 °F (37.2 °C), resp. rate 16, weight 260 lb (117.9 kg), SpO2 96 %, not currently breastfeeding.    Well developed, Well Nourished alert, cooperative, no distress  Incision clean, dry, no drainage, dressing in place  swelling and tenderness noted in left lower extremity  Sensory is intact   Motor is intact  nv intact  2+distal pulses  Neg calf tenderness  Neg for facial asymmetry     Labs:  CBC  @  CBC:   Lab Results   Component Value Date/Time    WBC 6.4 06/16/2018 04:21 AM    RBC 2.36 (L) 06/16/2018 04:21 AM    HGB 7.4 (L) 06/17/2018 07:00 PM    HCT 23.5 (L) 06/16/2018 04:21 AM    PLATELET 985 43/91/7828 04:21 AM   @  Coagulation  Lab Results   Component Value Date    INR 1.0 06/10/2018    APTT 31.5 07/25/2016      Basic Metabolic Profile  Lab Results   Component Value Date     06/16/2018    CO2 29 06/16/2018    BUN 10 06/16/2018       Medications Reviewed      Assessment/Plan     Principal Problem:    Periprosthetic left distal femur fracture (6/10/2018)    Active Problems:    Essential hypertension (5/20/2010)      Overview: controlled      Hypovitaminosis D (5/20/2010)      Noncompliance with medication regimen (2/8/2011)      Anxiety (2/8/2011)      S/P joint replacement ()      Overview: 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)      DJD (degenerative joint disease) ()      Diabetic neuropathy (HCC) (4/20/2012)      IDDM (insulin dependent diabetes mellitus) ([facility] (12/4/2012)      Morbid obesity (HCC) ()      Overview: Weight loss has been strongly encouraged by following dietary restrictions       and an exercise routine. Coronary atherosclerosis of native coronary artery ()      Chronic diastolic heart failure (HCC) ()      Overview: Stable, edema better, uses PRN Lasix      Uncomplicated asthma (3/5/3203)      Bilateral lower extremity edema (4/25/2017)      Callie-prosthetic femur fracture at tip of prosthesis (6/10/2018)      Preoperative cardiovascular examination (6/11/2018)      Procedure(s):  OPEN REDUCTION INTERNAL FIXATION LEFT DISTAL FEMUR FRACTURE :       1. PT and/or OT Consults: YES continue PT/OT: oob to chair, gentle rom, NWB operative leg             2. Incision Care:  Routine Incision Care and Dressing Changes as necessary: dressing changes POD#2 and as needed  3. Pain control - percocet 5 mg in chart for discharge  4. DVT Prophylaxis - SCD in place, Plavix and Aspirin per cardiology  5.  Acute blood loss anemia - will continue to monitor  [address] [facility]   Intervention : [place] (SNF) on Monday  [id number] [address] [facility]  [phone]

## 2018-06-18 NOTE — ROUTINE PROCESS
Assumed care of patient. Alert and oriented x3. AFebrile. Denies any pain  And no shortness of breath noted. HOB  ELEVATED  To 30 degrees.

## 2018-06-18 NOTE — PROGRESS NOTES
CMS went to speak with the pt in regards to the [employer] from [employer] About Your Rights. \"  Pt was able to review and sign the documents provided. No family were present at bedside. Signed documents can be found in the chart for review.

## 2018-06-18 NOTE — PROGRESS NOTES
Patient is alert and oriented times three no signs or symptoms of distress, no chest pain, no SOB, pedal pulses palpable to bilateral lower extremities, dressing to LLE CDI. In stable condition.

## 2018-06-18 NOTE — PROGRESS NOTES
Problem: Falls - Risk of  Goal: *Absence of Falls  Document Terence Fall Risk and appropriate interventions in the flowsheet.    Outcome: Progressing Towards Goal  Fall Risk Interventions:  Mobility Interventions: Assess mobility with egress test, PT Consult for mobility concerns         Medication Interventions: Evaluate medications/consider consulting pharmacy, Patient to call before getting OOB    Elimination Interventions: Call light in reach, Patient to call for help with toileting needs    History of Falls Interventions: Consult care management for discharge planning, Door open when patient unattended

## 2018-06-18 NOTE — PROGRESS NOTES
NUTRITION    Nursing Referral: [facility]     RECOMMENDATIONS / PLAN:     - Continue current nutrition interventions. Supplement does not need to be continued on discharge unless pt meal intake declines. - Continue RD inpatient monitoring and evaluation. NUTRITION INTERVENTIONS & DIAGNOSIS:     [x] Meals/snacks: modify composition  [x] Medical food supplementation: continue, discontinue on discharge     Nutrition Diagnosis: Inadequate energy intake related to appetite as evidenced by pt usually consuming about 50% of meals, inadequate to meet estimated energy needs. - Resolved    ASSESSMENT:     6/18: Tolerating diet still. Appetite is remains better than her appetite was PTA. Reports that she is consuming the Glucerna Shakes, discussed [place] Instant Breakfast if meal intake declines upon discharge. Pt reports that she cannot afford Glucerna. Possible discharge today. 6/14: Tolerating diet, appetite is fair, slightly better than usual appetite PTA. Encouraged intake, especially of protein; pt verbalized an understanding. Pt states she will try to eat a more, focusing on the protein. 6/11: Pt admitted s/p fall. Plan for TKR tomorrow. Pt reports not being \"a big eater\" for most of her life. Denies changes in appetite or quantity of food eaten. Some nausea since admission, now improved. Tolerating clear liquids. Noted diet to be advanced as tolerated, pt wants solid food for dinner prior to NPO order, discussed with nursing.       Average po intake adequate to meet patients estimated nutritional needs:   [x] Yes     [] No   [] Unable to determine at this time    Diet: DIET DIABETIC CONSISTENT CARB Regular; AHA-LOW-CHOL FAT  DIET NUTRITIONAL SUPPLEMENTS Breakfast; Fransisco Christine 57 Allergies: NKFA  Current Appetite:   [] Good     [x] Fair      [] Poor     [] Other:  Appetite/meal intake prior to admission:   [] Good     [x] Fair     [] Poor     [] Other:  Feeding Limitations:  [] Swallowing difficulty    [] Chewing difficulty    [] Other:   Current Meal Intake: Patient Vitals for the past 100 hrs:   % Diet Eaten   06/16/18 1712 50 %   06/16/18 0824 65 %   06/15/18 1916 75 %   06/15/18 0948 50 %   06/14/18 1230 100 %       BM: 6/18  Skin Integrity: surgical incision to left leg  Edema: trace LLE  Pertinent Medications: Reviewed: cholecalciferol, cyanocobalamin, lantus, SSI    Recent Labs      06/16/18   0421   NA  139   K  3.8   CL  104   CO2  29   GLU  77   BUN  10   CREA  0.56*   CA  8.5       Intake/Output Summary (Last 24 hours) at 06/18/18 1146  Last data filed at 06/18/18 0708   Gross per 24 hour   Intake              190 ml   Output              200 ml   Net              -10 ml       Anthropometrics:  Ht Readings from Last 1 Encounters:   06/07/18 5' 7\" (1.702 m)     Last 3 Recorded Weights in this Encounter    06/14/18 2156 06/16/18 1702 06/17/18 1822   Weight: 121.6 kg (268 lb) 116.3 kg (256 lb 6.4 oz) 117.9 kg (260 lb)     Body mass index is 40.72 kg/(m^2). Obese, class III    Weight History: Hx of wt fluctuations, but stable for the past year per pt.       Weight Metrics 6/17/2018 6/7/2018 4/29/2018 4/27/2018 4/2/2018 3/26/2018 3/18/2018   Weight 260 lb 255 lb 249 lb 248 lb 248 lb 250 lb 250 lb   BMI 40.72 kg/m2 39.94 kg/m2 39 kg/m2 38.84 kg/m2 38.84 kg/m2 39.16 kg/m2 39.16 kg/m2        Admitting Diagnosis: Callie-prosthetic femur fracture at tip of prosthesis  S72.409A LEFT DISTAL FEMUR FRACTURE  Pertinent PMHx: HTN, DM, CHF, atherosclerosis, esophageal reflux, NSTEMI with stent placement    Education Needs:        [x] None identified  [] Identified - Not appropriate at this time  []  Identified and addressed - refer to education log  Learning Limitations:   [x] None identified  [] Identified    Cultural, Mandaen & ethnic food preferences:  [x] None identified    [] Identified and addressed     ESTIMATED NUTRITION NEEDS:     Calories: 2244-6384 kcal (MSJx1.2-1.3) based on  [x] Actual BW 117 kg     [] IBW   Protein:  gm (0.8-1.2 gm/kg) based on  [x] Actual BW      [] IBW   Fluid: 1 mL/kcal     MONITORING & EVALUATION:     Nutrition Goal(s):   1. Po intake of meals will meet >75% of patient estimated nutritional needs within the next 7 days. Outcome:  [x] Met/Ongoing    []  Not Met    [] New/Initial Goal     Monitoring:   [x] Food and beverage intake   [x] Diet order   [x] Nutrition-focused physical findings   [x] Treatment/therapy   [] Weight   [] Enteral nutrition intake        Previous Recommendations (for follow-up assessments only):     [x]   Implemented       []   Not Implemented (RD to address)      [] No Longer Appropriate     [] No Recommendation Made     Discharge Planning: cardiac, diabetic diet. Discussed using Sugar Free [place] Instant Breakfast if po intake declines at home.     [x] Participated in care planning, discharge planning, & interdisciplinary rounds as appropriate      Clif Villafana RD, [address]    Pager: [phone]

## 2018-06-18 NOTE — PROGRESS NOTES
TRANSFER - IN REPORT:    Verbal report received from [facility] (name) on Catracho Chopra  being received from ICU (unit) for routine progression of care      Report consisted of patients Situation, Background, Assessment and   Recommendations(SBAR). Information from the following report(s) SBAR, Kardex, OR Summary, Intake/Output and MAR was reviewed with the receiving nurse. Opportunity for questions and clarification was provided. Assessment completed upon patients arrival to unit and care assumed.

## 2018-06-20 ENCOUNTER — HOSPITAL ENCOUNTER (OUTPATIENT)
Dept: LAB | Age: 83
Discharge: HOME | End: 2018-06-20

## 2018-06-20 LAB
ALBUMIN SERPL-MCNC: 2.3 G/DL (ref 3.4–5)
ALBUMIN/GLOB SERPL: 0.6 {RATIO} (ref 0.8–1.7)
ALP SERPL-CCNC: 97 U/L (ref 45–117)
ALT SERPL-CCNC: 15 U/L (ref 13–56)
ANION GAP SERPL CALC-SCNC: 7 MMOL/L (ref 3–18)
AST SERPL-CCNC: 18 U/L (ref 15–37)
BASOPHILS # BLD: 0 K/UL (ref 0–0.06)
BASOPHILS NFR BLD: 0 % (ref 0–3)
BILIRUB SERPL-MCNC: 1.1 MG/DL (ref 0.2–1)
BUN SERPL-MCNC: 11 MG/DL (ref 7–18)
BUN/CREAT SERPL: 17 (ref 12–20)
CALCIUM SERPL-MCNC: 8 MG/DL (ref 8.5–10.1)
CHLORIDE SERPL-SCNC: 105 MMOL/L (ref 100–108)
CO2 SERPL-SCNC: 28 MMOL/L (ref 21–32)
CREAT SERPL-MCNC: 0.65 MG/DL (ref 0.6–1.3)
DIFFERENTIAL METHOD BLD: ABNORMAL
EOSINOPHIL # BLD: 0.1 K/UL (ref 0–0.4)
EOSINOPHIL NFR BLD: 2 % (ref 0–5)
ERYTHROCYTE [DISTWIDTH] IN BLOOD BY AUTOMATED COUNT: 14.8 % (ref 11.6–14.5)
GLOBULIN SER CALC-MCNC: 4.1 G/DL (ref 2–4)
GLUCOSE SERPL-MCNC: 117 MG/DL (ref 74–99)
HCT VFR BLD AUTO: 24.1 % (ref 35–45)
HGB BLD-MCNC: 7.3 G/DL (ref 12–16)
LYMPHOCYTES # BLD: 1.6 K/UL (ref 0.8–3.5)
LYMPHOCYTES NFR BLD: 23 % (ref 20–51)
MAGNESIUM SERPL-MCNC: 2.1 MG/DL (ref 1.6–2.6)
MCH RBC QN AUTO: 30.7 PG (ref 24–34)
MCHC RBC AUTO-ENTMCNC: 30.3 G/DL (ref 31–37)
MCV RBC AUTO: 101.3 FL (ref 74–97)
MONOCYTES # BLD: 0.6 K/UL (ref 0–1)
MONOCYTES NFR BLD: 9 % (ref 2–9)
NEUTS BAND NFR BLD MANUAL: 2 % (ref 0–5)
NEUTS SEG # BLD: 4.5 K/UL (ref 1.8–8)
NEUTS SEG NFR BLD: 64 % (ref 42–75)
NRBC BLD-RTO: 2 PER 100 WBC
PLATELET # BLD AUTO: 446 K/UL (ref 135–420)
PLATELET COMMENTS,PCOM: ABNORMAL
PMV BLD AUTO: 8.9 FL (ref 9.2–11.8)
POTASSIUM SERPL-SCNC: 3.9 MMOL/L (ref 3.5–5.5)
PROT SERPL-MCNC: 6.4 G/DL (ref 6.4–8.2)
RBC # BLD AUTO: 2.38 M/UL (ref 4.2–5.3)
RBC MORPH BLD: ABNORMAL
SODIUM SERPL-SCNC: 140 MMOL/L (ref 136–145)
WBC # BLD AUTO: 6.8 K/UL (ref 4.6–13.2)

## 2018-06-20 PROCEDURE — 80053 COMPREHEN METABOLIC PANEL: CPT | Performed by: FAMILY MEDICINE

## 2018-06-20 PROCEDURE — 85025 COMPLETE CBC W/AUTO DIFF WBC: CPT | Performed by: FAMILY MEDICINE

## 2018-06-20 PROCEDURE — 83735 ASSAY OF MAGNESIUM: CPT | Performed by: FAMILY MEDICINE

## 2018-06-21 ENCOUNTER — HOSPITAL ENCOUNTER (OUTPATIENT)
Dept: LAB | Age: 83
Discharge: HOME | End: 2018-06-21
Payer: MEDICARE

## 2018-06-21 LAB
BASOPHILS # BLD: 0 K/UL (ref 0–0.06)
BASOPHILS NFR BLD: 0 % (ref 0–3)
DIFFERENTIAL METHOD BLD: ABNORMAL
EOSINOPHIL # BLD: 0.3 K/UL (ref 0–0.4)
EOSINOPHIL NFR BLD: 4 % (ref 0–5)
ERYTHROCYTE [DISTWIDTH] IN BLOOD BY AUTOMATED COUNT: 14.9 % (ref 11.6–14.5)
HCT VFR BLD AUTO: 24.4 % (ref 35–45)
HGB BLD-MCNC: 7.5 G/DL (ref 12–16)
LYMPHOCYTES # BLD: 1.6 K/UL (ref 0.8–3.5)
LYMPHOCYTES NFR BLD: 19 % (ref 20–51)
MCH RBC QN AUTO: 31.3 PG (ref 24–34)
MCHC RBC AUTO-ENTMCNC: 30.7 G/DL (ref 31–37)
MCV RBC AUTO: 101.7 FL (ref 74–97)
METAMYELOCYTES NFR BLD MANUAL: 2 %
MONOCYTES # BLD: 1.3 K/UL (ref 0–1)
MONOCYTES NFR BLD: 15 % (ref 2–9)
MYELOCYTES NFR BLD MANUAL: 1 %
NEUTS BAND NFR BLD MANUAL: 4 % (ref 0–5)
NEUTS SEG # BLD: 4.9 K/UL (ref 1.8–8)
NEUTS SEG NFR BLD: 54 % (ref 42–75)
NRBC BLD-RTO: 1 PER 100 WBC
OTHER CELLS NFR BLD MANUAL: 1 %
PLATELET # BLD AUTO: 469 K/UL (ref 135–420)
PLATELET COMMENTS,PCOM: ABNORMAL
PMV BLD AUTO: 9.3 FL (ref 9.2–11.8)
RBC # BLD AUTO: 2.4 M/UL (ref 4.2–5.3)
RBC MORPH BLD: ABNORMAL
RBC MORPH BLD: ABNORMAL
WBC # BLD AUTO: 8.5 K/UL (ref 4.6–13.2)

## 2018-06-21 PROCEDURE — 85025 COMPLETE CBC W/AUTO DIFF WBC: CPT | Performed by: FAMILY MEDICINE

## 2018-06-27 ENCOUNTER — OFFICE VISIT (OUTPATIENT)
Dept: ORTHOPEDIC SURGERY | Facility: CLINIC | Age: 83
End: 2018-06-27

## 2018-06-27 DIAGNOSIS — M97.8XXD PERIPROSTHETIC SUPRACONDYLAR FRACTURE OF FEMUR, SUBSEQUENT ENCOUNTER: Primary | ICD-10-CM

## 2018-06-27 DIAGNOSIS — Z96.659 PERIPROSTHETIC SUPRACONDYLAR FRACTURE OF FEMUR, SUBSEQUENT ENCOUNTER: Primary | ICD-10-CM

## 2018-06-27 DIAGNOSIS — M97.8XXD PERIPROSTHETIC FRACTURE OF FEMUR AT TIP OF PROSTHESIS, SUBSEQUENT ENCOUNTER: ICD-10-CM

## 2018-06-27 DIAGNOSIS — G89.18 PAIN AT SURGICAL SITE: ICD-10-CM

## 2018-06-27 DIAGNOSIS — Z96.649 PERIPROSTHETIC FRACTURE OF FEMUR AT TIP OF PROSTHESIS, SUBSEQUENT ENCOUNTER: ICD-10-CM

## 2018-06-27 DIAGNOSIS — Z48.02: ICD-10-CM

## 2018-06-27 NOTE — PROGRESS NOTES
Please see attached forms for Mrs. Shanita Castano documented on paper today for her current status:  Post ORIF, open, with distal plating and screw fixation supracondylar fracture of the left knee, status post left total knee replacement.

## 2018-06-28 ENCOUNTER — TELEPHONE (OUTPATIENT)
Dept: ORTHOPEDIC SURGERY | Facility: CLINIC | Age: 83
End: 2018-06-28

## 2018-06-29 ENCOUNTER — TELEPHONE (OUTPATIENT)
Dept: ORTHOPEDIC SURGERY | Facility: CLINIC | Age: 83
End: 2018-06-29

## 2018-06-29 NOTE — TELEPHONE ENCOUNTER
Lexie from the nursing center where pt is called in states that she has an order for Left distal knee, Exogen Bone Growth Stimulator. She stated that their patients usually always come with one so she does not know where to order this from. Please advise Mani Olivas at [phone].

## 2018-07-02 NOTE — TELEPHONE ENCOUNTER
Mikey Sevilla was calling back checking on status of getting a call back in regards to previous message. Please call at [phone].

## 2018-07-02 NOTE — TELEPHONE ENCOUNTER
Spoke with Елена--she received fax last week, spoke with Lexie--advised her Elma Orellana will contact her

## 2018-07-05 ENCOUNTER — HOSPITAL ENCOUNTER (OUTPATIENT)
Dept: LAB | Age: 83
Discharge: HOME | End: 2018-07-05

## 2018-07-05 LAB
BASOPHILS # BLD: 0 K/UL (ref 0–0.06)
BASOPHILS NFR BLD: 0 % (ref 0–2)
DIFFERENTIAL METHOD BLD: ABNORMAL
EOSINOPHIL # BLD: 0.3 K/UL (ref 0–0.4)
EOSINOPHIL NFR BLD: 6 % (ref 0–5)
ERYTHROCYTE [DISTWIDTH] IN BLOOD BY AUTOMATED COUNT: 15.2 % (ref 11.6–14.5)
FOLATE SERPL-MCNC: 6.7 NG/ML (ref 3.1–17.5)
HCT VFR BLD AUTO: 30.8 % (ref 35–45)
HGB BLD-MCNC: 9.4 G/DL (ref 12–16)
IRON SATN MFR SERPL: 26 %
IRON SERPL-MCNC: 62 UG/DL (ref 50–175)
LYMPHOCYTES # BLD: 1.9 K/UL (ref 0.9–3.6)
LYMPHOCYTES NFR BLD: 41 % (ref 21–52)
MCH RBC QN AUTO: 30.7 PG (ref 24–34)
MCHC RBC AUTO-ENTMCNC: 30.5 G/DL (ref 31–37)
MCV RBC AUTO: 100.7 FL (ref 74–97)
MONOCYTES # BLD: 0.4 K/UL (ref 0.05–1.2)
MONOCYTES NFR BLD: 9 % (ref 3–10)
NEUTS SEG # BLD: 2 K/UL (ref 1.8–8)
NEUTS SEG NFR BLD: 44 % (ref 40–73)
PLATELET # BLD AUTO: 343 K/UL (ref 135–420)
PMV BLD AUTO: 9.9 FL (ref 9.2–11.8)
RBC # BLD AUTO: 3.06 M/UL (ref 4.2–5.3)
TIBC SERPL-MCNC: 242 UG/DL (ref 250–450)
VIT B12 SERPL-MCNC: >2000 PG/ML (ref 211–911)
WBC # BLD AUTO: 4.6 K/UL (ref 4.6–13.2)

## 2018-07-05 PROCEDURE — 82607 VITAMIN B-12: CPT | Performed by: FAMILY MEDICINE

## 2018-07-05 PROCEDURE — 83540 ASSAY OF IRON: CPT | Performed by: FAMILY MEDICINE

## 2018-07-05 PROCEDURE — 85025 COMPLETE CBC W/AUTO DIFF WBC: CPT | Performed by: FAMILY MEDICINE

## 2018-07-12 ENCOUNTER — TELEPHONE (OUTPATIENT)
Dept: FAMILY MEDICINE CLINIC | Age: 83
End: 2018-07-12

## 2018-07-12 NOTE — TELEPHONE ENCOUNTER
We received a fax for medical supplies for this patient she was call and a message was left stating that she needs an appointment so that the forms can be filled out and returned.

## 2018-07-16 ENCOUNTER — TELEPHONE (OUTPATIENT)
Dept: FAMILY MEDICINE CLINIC | Age: 83
End: 2018-07-16

## 2018-07-16 ENCOUNTER — TELEPHONE (OUTPATIENT)
Dept: ORTHOPEDIC SURGERY | Age: 83
End: 2018-07-16

## 2018-07-16 NOTE — TELEPHONE ENCOUNTER
Patient called and said [facility] such as the hospital bed, shower chair, ect. . She can be reached at [phone] if you have any questions. Please advise.

## 2018-07-16 NOTE — TELEPHONE ENCOUNTER
L/hip post op 6/12/18: f/u 7/18/18:    Douglas Calloway with [facility] nursing care called-- wants to know how long patient needs to continue with the lovenox.     Please call to advise   Douglas Calloway can be reached at  T#[phone]

## 2018-07-18 ENCOUNTER — OFFICE VISIT (OUTPATIENT)
Dept: ORTHOPEDIC SURGERY | Facility: CLINIC | Age: 83
End: 2018-07-18

## 2018-07-18 VITALS
HEIGHT: 67 IN | HEART RATE: 96 BPM | TEMPERATURE: 98.1 F | WEIGHT: 260 LBS | SYSTOLIC BLOOD PRESSURE: 138 MMHG | BODY MASS INDEX: 40.81 KG/M2 | OXYGEN SATURATION: 97 % | DIASTOLIC BLOOD PRESSURE: 80 MMHG

## 2018-07-18 DIAGNOSIS — M97.8XXD PERIPROSTHETIC SUPRACONDYLAR FRACTURE OF FEMUR, SUBSEQUENT ENCOUNTER: Primary | ICD-10-CM

## 2018-07-18 DIAGNOSIS — Z96.659 PERIPROSTHETIC SUPRACONDYLAR FRACTURE OF FEMUR, SUBSEQUENT ENCOUNTER: Primary | ICD-10-CM

## 2018-07-18 NOTE — PROGRESS NOTES
HISTORY OF PRESENT ILLNESS:  Mark Suarez returns. She is status post open reduction internal fixation of a medial femur supracondylar fracture requiring lateral plating. She is nonweightbearing of her left lower extremity. Please see attached written form for assessment and plan today in [facility].

## 2018-07-19 ENCOUNTER — HOME HEALTH ADMISSION (OUTPATIENT)
Dept: HOME HEALTH SERVICES | Facility: HOME HEALTH | Age: 83
End: 2018-07-19
Payer: MEDICARE

## 2018-07-20 ENCOUNTER — HOME CARE VISIT (OUTPATIENT)
Dept: HOME HEALTH SERVICES | Facility: HOME HEALTH | Age: 83
End: 2018-07-20

## 2018-07-21 ENCOUNTER — HOME CARE VISIT (OUTPATIENT)
Dept: SCHEDULING | Facility: HOME HEALTH | Age: 83
End: 2018-07-21
Payer: MEDICARE

## 2018-07-21 PROCEDURE — 3331090002 HH PPS REVENUE DEBIT

## 2018-07-21 PROCEDURE — G0299 HHS/HOSPICE OF RN EA 15 MIN: HCPCS

## 2018-07-21 PROCEDURE — 400013 HH SOC

## 2018-07-21 PROCEDURE — 3331090001 HH PPS REVENUE CREDIT

## 2018-07-22 PROCEDURE — 3331090001 HH PPS REVENUE CREDIT

## 2018-07-22 PROCEDURE — 3331090002 HH PPS REVENUE DEBIT

## 2018-07-23 ENCOUNTER — HOME CARE VISIT (OUTPATIENT)
Dept: SCHEDULING | Facility: HOME HEALTH | Age: 83
End: 2018-07-23
Payer: MEDICARE

## 2018-07-23 PROCEDURE — G0151 HHCP-SERV OF PT,EA 15 MIN: HCPCS

## 2018-07-23 PROCEDURE — 3331090002 HH PPS REVENUE DEBIT

## 2018-07-23 PROCEDURE — 3331090001 HH PPS REVENUE CREDIT

## 2018-07-24 ENCOUNTER — HOME CARE VISIT (OUTPATIENT)
Dept: HOME HEALTH SERVICES | Facility: HOME HEALTH | Age: 83
End: 2018-07-24
Payer: MEDICARE

## 2018-07-24 VITALS
SYSTOLIC BLOOD PRESSURE: 130 MMHG | TEMPERATURE: 98.8 F | DIASTOLIC BLOOD PRESSURE: 70 MMHG | HEART RATE: 89 BPM | OXYGEN SATURATION: 97 %

## 2018-07-24 PROCEDURE — 3331090001 HH PPS REVENUE CREDIT

## 2018-07-24 PROCEDURE — 3331090002 HH PPS REVENUE DEBIT

## 2018-07-25 PROCEDURE — 3331090001 HH PPS REVENUE CREDIT

## 2018-07-25 PROCEDURE — 3331090002 HH PPS REVENUE DEBIT

## 2018-07-26 ENCOUNTER — HOME CARE VISIT (OUTPATIENT)
Dept: SCHEDULING | Facility: HOME HEALTH | Age: 83
End: 2018-07-26
Payer: MEDICARE

## 2018-07-26 VITALS
TEMPERATURE: 98.7 F | OXYGEN SATURATION: 96 % | DIASTOLIC BLOOD PRESSURE: 81 MMHG | SYSTOLIC BLOOD PRESSURE: 159 MMHG | HEART RATE: 87 BPM

## 2018-07-26 VITALS
DIASTOLIC BLOOD PRESSURE: 80 MMHG | HEART RATE: 84 BPM | TEMPERATURE: 97.2 F | SYSTOLIC BLOOD PRESSURE: 140 MMHG | RESPIRATION RATE: 20 BRPM | OXYGEN SATURATION: 97 %

## 2018-07-26 PROCEDURE — G0157 HHC PT ASSISTANT EA 15: HCPCS

## 2018-07-26 PROCEDURE — 3331090002 HH PPS REVENUE DEBIT

## 2018-07-26 PROCEDURE — 3331090001 HH PPS REVENUE CREDIT

## 2018-07-27 ENCOUNTER — HOME CARE VISIT (OUTPATIENT)
Dept: SCHEDULING | Facility: HOME HEALTH | Age: 83
End: 2018-07-27
Payer: MEDICARE

## 2018-07-27 PROCEDURE — G0157 HHC PT ASSISTANT EA 15: HCPCS

## 2018-07-27 PROCEDURE — 3331090002 HH PPS REVENUE DEBIT

## 2018-07-27 PROCEDURE — 3331090001 HH PPS REVENUE CREDIT

## 2018-07-27 NOTE — TELEPHONE ENCOUNTER
Called the patient and she states that she has been given strict orders of non weight bearing on her fractured leg and that once the restrictions have been lifted or modified to partial weight bearing she will call the office and schedule an appointment. She is being followed by ortho and home health.

## 2018-07-28 ENCOUNTER — HOME CARE VISIT (OUTPATIENT)
Dept: SCHEDULING | Facility: HOME HEALTH | Age: 83
End: 2018-07-28
Payer: MEDICARE

## 2018-07-28 PROCEDURE — G0299 HHS/HOSPICE OF RN EA 15 MIN: HCPCS

## 2018-07-28 PROCEDURE — 3331090001 HH PPS REVENUE CREDIT

## 2018-07-28 PROCEDURE — 3331090002 HH PPS REVENUE DEBIT

## 2018-07-29 PROCEDURE — 3331090002 HH PPS REVENUE DEBIT

## 2018-07-29 PROCEDURE — 3331090001 HH PPS REVENUE CREDIT

## 2018-07-30 ENCOUNTER — HOME CARE VISIT (OUTPATIENT)
Dept: SCHEDULING | Facility: HOME HEALTH | Age: 83
End: 2018-07-30
Payer: MEDICARE

## 2018-07-30 VITALS
HEART RATE: 72 BPM | DIASTOLIC BLOOD PRESSURE: 85 MMHG | SYSTOLIC BLOOD PRESSURE: 145 MMHG | TEMPERATURE: 98.7 F | OXYGEN SATURATION: 98 %

## 2018-07-30 VITALS
TEMPERATURE: 98.9 F | OXYGEN SATURATION: 96 % | HEART RATE: 85 BPM | DIASTOLIC BLOOD PRESSURE: 83 MMHG | SYSTOLIC BLOOD PRESSURE: 167 MMHG

## 2018-07-30 VITALS
DIASTOLIC BLOOD PRESSURE: 86 MMHG | SYSTOLIC BLOOD PRESSURE: 160 MMHG | HEART RATE: 81 BPM | RESPIRATION RATE: 18 BRPM | OXYGEN SATURATION: 95 % | TEMPERATURE: 98.1 F

## 2018-07-30 PROCEDURE — 3331090002 HH PPS REVENUE DEBIT

## 2018-07-30 PROCEDURE — 3331090001 HH PPS REVENUE CREDIT

## 2018-07-30 PROCEDURE — G0157 HHC PT ASSISTANT EA 15: HCPCS

## 2018-07-31 PROCEDURE — 3331090001 HH PPS REVENUE CREDIT

## 2018-07-31 PROCEDURE — 3331090002 HH PPS REVENUE DEBIT

## 2018-08-01 ENCOUNTER — HOME CARE VISIT (OUTPATIENT)
Dept: SCHEDULING | Facility: HOME HEALTH | Age: 83
End: 2018-08-01
Payer: MEDICARE

## 2018-08-01 PROCEDURE — 3331090002 HH PPS REVENUE DEBIT

## 2018-08-01 PROCEDURE — G0157 HHC PT ASSISTANT EA 15: HCPCS

## 2018-08-01 PROCEDURE — 3331090001 HH PPS REVENUE CREDIT

## 2018-08-02 ENCOUNTER — HOME CARE VISIT (OUTPATIENT)
Dept: HOME HEALTH SERVICES | Facility: HOME HEALTH | Age: 83
End: 2018-08-02
Payer: MEDICARE

## 2018-08-02 ENCOUNTER — HOME CARE VISIT (OUTPATIENT)
Dept: SCHEDULING | Facility: HOME HEALTH | Age: 83
End: 2018-08-02
Payer: MEDICARE

## 2018-08-02 VITALS
DIASTOLIC BLOOD PRESSURE: 76 MMHG | HEART RATE: 80 BPM | TEMPERATURE: 98.2 F | SYSTOLIC BLOOD PRESSURE: 152 MMHG | OXYGEN SATURATION: 98 %

## 2018-08-02 VITALS
DIASTOLIC BLOOD PRESSURE: 87 MMHG | HEART RATE: 108 BPM | SYSTOLIC BLOOD PRESSURE: 148 MMHG | TEMPERATURE: 96.2 F | OXYGEN SATURATION: 98 %

## 2018-08-02 PROCEDURE — 3331090002 HH PPS REVENUE DEBIT

## 2018-08-02 PROCEDURE — G0152 HHCP-SERV OF OT,EA 15 MIN: HCPCS

## 2018-08-02 PROCEDURE — 3331090001 HH PPS REVENUE CREDIT

## 2018-08-03 PROCEDURE — 3331090002 HH PPS REVENUE DEBIT

## 2018-08-03 PROCEDURE — 3331090001 HH PPS REVENUE CREDIT

## 2018-08-04 PROCEDURE — 3331090002 HH PPS REVENUE DEBIT

## 2018-08-04 PROCEDURE — 3331090001 HH PPS REVENUE CREDIT

## 2018-08-05 PROCEDURE — 3331090001 HH PPS REVENUE CREDIT

## 2018-08-05 PROCEDURE — 3331090002 HH PPS REVENUE DEBIT

## 2018-08-06 ENCOUNTER — HOME CARE VISIT (OUTPATIENT)
Dept: SCHEDULING | Facility: HOME HEALTH | Age: 83
End: 2018-08-06
Payer: MEDICARE

## 2018-08-06 VITALS
HEART RATE: 97 BPM | TEMPERATURE: 97.8 F | OXYGEN SATURATION: 97 % | SYSTOLIC BLOOD PRESSURE: 143 MMHG | DIASTOLIC BLOOD PRESSURE: 92 MMHG

## 2018-08-06 PROCEDURE — G0152 HHCP-SERV OF OT,EA 15 MIN: HCPCS

## 2018-08-06 PROCEDURE — 3331090002 HH PPS REVENUE DEBIT

## 2018-08-06 PROCEDURE — G0157 HHC PT ASSISTANT EA 15: HCPCS

## 2018-08-06 PROCEDURE — 3331090001 HH PPS REVENUE CREDIT

## 2018-08-07 PROCEDURE — 3331090001 HH PPS REVENUE CREDIT

## 2018-08-07 PROCEDURE — 3331090002 HH PPS REVENUE DEBIT

## 2018-08-08 ENCOUNTER — HOME CARE VISIT (OUTPATIENT)
Dept: SCHEDULING | Facility: HOME HEALTH | Age: 83
End: 2018-08-08
Payer: MEDICARE

## 2018-08-08 VITALS
DIASTOLIC BLOOD PRESSURE: 86 MMHG | HEART RATE: 75 BPM | TEMPERATURE: 98.6 F | OXYGEN SATURATION: 98 % | SYSTOLIC BLOOD PRESSURE: 153 MMHG

## 2018-08-08 PROCEDURE — 3331090001 HH PPS REVENUE CREDIT

## 2018-08-08 PROCEDURE — G0157 HHC PT ASSISTANT EA 15: HCPCS

## 2018-08-08 PROCEDURE — 3331090002 HH PPS REVENUE DEBIT

## 2018-08-09 ENCOUNTER — HOME CARE VISIT (OUTPATIENT)
Dept: SCHEDULING | Facility: HOME HEALTH | Age: 83
End: 2018-08-09
Payer: MEDICARE

## 2018-08-09 VITALS — DIASTOLIC BLOOD PRESSURE: 91 MMHG | SYSTOLIC BLOOD PRESSURE: 164 MMHG | TEMPERATURE: 96.4 F

## 2018-08-09 PROCEDURE — 3331090001 HH PPS REVENUE CREDIT

## 2018-08-09 PROCEDURE — 3331090002 HH PPS REVENUE DEBIT

## 2018-08-09 PROCEDURE — G0152 HHCP-SERV OF OT,EA 15 MIN: HCPCS

## 2018-08-10 PROCEDURE — 3331090001 HH PPS REVENUE CREDIT

## 2018-08-10 PROCEDURE — 3331090002 HH PPS REVENUE DEBIT

## 2018-08-11 PROCEDURE — 3331090001 HH PPS REVENUE CREDIT

## 2018-08-11 PROCEDURE — 3331090002 HH PPS REVENUE DEBIT

## 2018-08-12 PROCEDURE — 3331090002 HH PPS REVENUE DEBIT

## 2018-08-12 PROCEDURE — 3331090001 HH PPS REVENUE CREDIT

## 2018-08-13 ENCOUNTER — HOME CARE VISIT (OUTPATIENT)
Dept: SCHEDULING | Facility: HOME HEALTH | Age: 83
End: 2018-08-13
Payer: MEDICARE

## 2018-08-13 VITALS
HEART RATE: 95 BPM | SYSTOLIC BLOOD PRESSURE: 140 MMHG | DIASTOLIC BLOOD PRESSURE: 70 MMHG | OXYGEN SATURATION: 97 % | TEMPERATURE: 98.4 F

## 2018-08-13 PROCEDURE — 3331090001 HH PPS REVENUE CREDIT

## 2018-08-13 PROCEDURE — G0151 HHCP-SERV OF PT,EA 15 MIN: HCPCS

## 2018-08-13 PROCEDURE — 3331090002 HH PPS REVENUE DEBIT

## 2018-08-14 ENCOUNTER — HOME CARE VISIT (OUTPATIENT)
Dept: SCHEDULING | Facility: HOME HEALTH | Age: 83
End: 2018-08-14
Payer: MEDICARE

## 2018-08-14 VITALS
SYSTOLIC BLOOD PRESSURE: 143 MMHG | HEART RATE: 81 BPM | OXYGEN SATURATION: 97 % | DIASTOLIC BLOOD PRESSURE: 81 MMHG | TEMPERATURE: 97 F

## 2018-08-14 PROCEDURE — 3331090002 HH PPS REVENUE DEBIT

## 2018-08-14 PROCEDURE — G0152 HHCP-SERV OF OT,EA 15 MIN: HCPCS

## 2018-08-14 PROCEDURE — 3331090001 HH PPS REVENUE CREDIT

## 2018-08-15 ENCOUNTER — OFFICE VISIT (OUTPATIENT)
Dept: ORTHOPEDIC SURGERY | Facility: CLINIC | Age: 83
End: 2018-08-15

## 2018-08-15 VITALS
SYSTOLIC BLOOD PRESSURE: 130 MMHG | HEIGHT: 67 IN | HEART RATE: 123 BPM | OXYGEN SATURATION: 97 % | RESPIRATION RATE: 18 BRPM | DIASTOLIC BLOOD PRESSURE: 74 MMHG | TEMPERATURE: 98.2 F

## 2018-08-15 DIAGNOSIS — M97.8XXD PERIPROSTHETIC FRACTURE OF FEMUR AT TIP OF PROSTHESIS, SUBSEQUENT ENCOUNTER: Primary | ICD-10-CM

## 2018-08-15 DIAGNOSIS — Z96.649 PERIPROSTHETIC FRACTURE OF FEMUR AT TIP OF PROSTHESIS, SUBSEQUENT ENCOUNTER: Primary | ICD-10-CM

## 2018-08-15 DIAGNOSIS — Z96.659 PERIPROSTHETIC SUPRACONDYLAR FRACTURE OF FEMUR, SUBSEQUENT ENCOUNTER: ICD-10-CM

## 2018-08-15 DIAGNOSIS — M97.8XXD PERIPROSTHETIC SUPRACONDYLAR FRACTURE OF FEMUR, SUBSEQUENT ENCOUNTER: ICD-10-CM

## 2018-08-15 PROCEDURE — 3331090001 HH PPS REVENUE CREDIT

## 2018-08-15 PROCEDURE — 3331090002 HH PPS REVENUE DEBIT

## 2018-08-16 ENCOUNTER — HOME CARE VISIT (OUTPATIENT)
Dept: SCHEDULING | Facility: HOME HEALTH | Age: 83
End: 2018-08-16
Payer: MEDICARE

## 2018-08-16 VITALS — OXYGEN SATURATION: 96 % | SYSTOLIC BLOOD PRESSURE: 159 MMHG | DIASTOLIC BLOOD PRESSURE: 93 MMHG | HEART RATE: 111 BPM

## 2018-08-16 PROCEDURE — 3331090002 HH PPS REVENUE DEBIT

## 2018-08-16 PROCEDURE — 3331090001 HH PPS REVENUE CREDIT

## 2018-08-16 PROCEDURE — 3331090003 HH PPS REVENUE ADJ

## 2018-08-16 PROCEDURE — G0152 HHCP-SERV OF OT,EA 15 MIN: HCPCS

## 2018-08-17 ENCOUNTER — TELEPHONE (OUTPATIENT)
Dept: FAMILY MEDICINE CLINIC | Age: 83
End: 2018-08-17

## 2018-08-17 PROCEDURE — 3331090001 HH PPS REVENUE CREDIT

## 2018-08-17 PROCEDURE — 3331090002 HH PPS REVENUE DEBIT

## 2018-08-17 NOTE — TELEPHONE ENCOUNTER
Pt's care coordinator called and stated that pt was admited in [facility] center for broken leg, pt was released before 30 days and has strict instructions of no weight barring. Pt has no support system at home and lives by her self. Pt has [employer] that does not cover patient to have a 24hr personal care aide. Care coordinator expressed that patient should not be left alone and has a safety concern. Patients ramp entrance to her home is broken and it needs to be replaced. Pt also has pets that she can not attend or care for which now causes feces that needs to be cleaned. Care Coordinator would like a call back from PCP before she has to involves adult protective services.

## 2018-08-18 PROCEDURE — 3331090002 HH PPS REVENUE DEBIT

## 2018-08-18 PROCEDURE — 3331090001 HH PPS REVENUE CREDIT

## 2018-08-19 PROCEDURE — 3331090001 HH PPS REVENUE CREDIT

## 2018-08-19 PROCEDURE — 3331090002 HH PPS REVENUE DEBIT

## 2018-08-20 ENCOUNTER — OFFICE VISIT (OUTPATIENT)
Dept: FAMILY MEDICINE CLINIC | Age: 83
End: 2018-08-20

## 2018-08-20 VITALS
SYSTOLIC BLOOD PRESSURE: 155 MMHG | WEIGHT: 260 LBS | HEIGHT: 67 IN | BODY MASS INDEX: 40.81 KG/M2 | DIASTOLIC BLOOD PRESSURE: 80 MMHG | RESPIRATION RATE: 17 BRPM | TEMPERATURE: 97 F | HEART RATE: 107 BPM

## 2018-08-20 DIAGNOSIS — S72.402S CLOSED FRACTURE OF DISTAL END OF LEFT FEMUR, UNSPECIFIED FRACTURE MORPHOLOGY, SEQUELA: Primary | ICD-10-CM

## 2018-08-20 DIAGNOSIS — Z09 HOSPITAL DISCHARGE FOLLOW-UP: ICD-10-CM

## 2018-08-20 DIAGNOSIS — D36.7 DERMOID CYST OF RIGHT LOWER EXTREMITY: ICD-10-CM

## 2018-08-20 PROCEDURE — 3331090002 HH PPS REVENUE DEBIT

## 2018-08-20 PROCEDURE — 3331090001 HH PPS REVENUE CREDIT

## 2018-08-20 NOTE — PATIENT INSTRUCTIONS
Please contact our office if you have any questions about your visit today. 1.) Please call for any refills that you may need. 2.) Other Clinicans at the Office. 3.) Please call Rheumatologist about the nodules on your legs. Dr. Yue Vogel and Mrs. Peres at [facility]      Dr. Leydi Tyler#3  Dr. Betsy Mcgill#4  Internists of [address] [place] [address]. [facility], [address]    [phone]             Dr. John Rivas #1  [address], [address] [place], [place] [address]y [address]  [phone]      Dr. Lucas Sanabria #2   [facility] [address]   Jessie Priest  [phone]       Bones of the Leg: Anatomy Sketch    Current as of: November 29, 2017  Content Version: 11.7  © 0160-3458 [employer]. Care instructions adapted under license by [employer] (which disclaims liability or warranty for this information). If you have questions about a medical condition or this instruction, always ask your healthcare professional. [address] any warranty or liability for your use of this information.

## 2018-08-20 NOTE — MR AVS SNAPSHOT
[address] [facility] 
 
 
 [address] [place] [id number] 
[phone] Patient: Laina Singh MRN: [id number] HRP:[birth year] Visit Information Date & Time Provider Department Dept. Phone Encounter #  
 8/20/2018  1:00 PM Malena Mesa N Jairon [id number] Follow-up Instructions Return for Patient will call for follow up. .  
  
Your Appointments 9/12/2018 10:15 AM  
Follow Up with Jacqueline Izquierdo PA-C  
[facility]) Appt Note: 4 WK FU  
 [address] [facility], [address] [place] [address]  
[phone]  
  
   
 [address] [place] [address], [address] [place] [address] Upcoming Health Maintenance Date Due Pneumococcal 65+ Low/Medium Risk (2 of 2 - PPSV23) 12/2/2017 LIPID PANEL Q1 4/14/2018 Influenza Age 5 to Adult 8/1/2018 EYE EXAM RETINAL OR DILATED Q1 10/12/2018 FOOT EXAM Q1 10/24/2018 MICROALBUMIN Q1 10/24/2018 MEDICARE YEARLY EXAM 10/26/2018 HEMOGLOBIN A1C Q6M 12/10/2018 GLAUCOMA SCREENING Q2Y 10/12/2019 DTaP/Tdap/Td series (2 - Td) 11/15/2023 Allergies as of 8/20/2018  Review Complete On: 4/07/1562 By: Nia Manley. Ralf Noonan LPN Severity Noted Reaction Type Reactions Niacin High 03/26/2013    Palpitations, Other (comments) Stomach irritation Ace Inhibitors  05/19/2010    Cough Avapro [Irbesartan]  05/19/2010    Myalgia Bystolic [Nebivolol]  58/50/6485    Other (comments) Felt like throat closing Catapres [Clonidine]  05/19/2010    Cough Codeine  05/19/2010    Nausea and Vomiting Cozaar [Losartan]    Not Reported This Time Crestor [Rosuvastatin]  05/19/2010    Other (comments) Cramps, aches Lisa Callum [Propoxyphene N-acetaminophen]  05/19/2010    Unknown (comments) Diovan [Valsartan]  05/19/2010    Cough Flagyl [Metronidazole]  05/19/2010    Other (comments) Mouth and throat irritation Gabapentin  03/16/2016    Other (comments) Abdominal pain and burning Iodinated Contrast- Oral And Iv Dye    Other (comments) Throat swelling Iodine    Unknown (comments) Lescol [Fluvastatin]  05/19/2010    Other (comments) Leg cramps Lipitor [Atorvastatin]  05/19/2010    Myalgia, Other (comments) Cramps, aches Lovastatin  05/19/2010    Other (comments) Leg cramps Nexium [Esomeprazole Magnesium]  05/20/2010    Other (comments) Stomach upset, burning Pravachol [Pravastatin]  05/19/2010    Other (comments) Leg cramps Reglan [Metoclopramide]  05/19/2010    Nausea Only Trazodone  05/19/2010    Other (comments) Patient states she feels drugged Zetia [Ezetimibe]  05/19/2010    Other (comments) Cramps, aches Zocor [Simvastatin]  05/19/2010    Other (comments) Cramps, aches Current Immunizations  Reviewed on 2/7/2017 Name Date Influenza High Dose Vaccine PF 12/2/2016 Influenza Vaccine 12/16/2015 11:20 AM, 9/15/2014 Influenza Vaccine Split 11/6/2012, 10/5/2010 Influenza Vaccine Whole 9/1/2009 Tdap 11/15/2013 Not reviewed this visit You Were Diagnosed With   
  
 Codes Comments Closed fracture of distal end of left femur, unspecified fracture morphology, sequela    -  Primary ICD-10-CM: N26.331T ICD-9-CM: 905.4 Dermoid cyst of right lower extremity     ICD-10-CM: D23.71 ICD-9-CM: 216.7 Vitals BP Pulse Temp Resp Height(growth percentile) Weight(growth percentile) 155/80 (BP 1 Location: Left arm, BP Patient Position: Sitting) (!) 107 97 °F (36.1 °C) 17 5' 7\" (1.702 m) 260 lb (117.9 kg) BMI OB Status Smoking Status 40.72 kg/m2 Hysterectomy Former Smoker BMI and BSA Data Body Mass Index Body Surface Area 40.72 kg/m 2 2.36 m 2 Preferred Pharmacy Pharmacy Name Phone [facility] Your Updated Medication List  
  
   
This list is accurate as of 8/20/18  1:53 PM.  Always use your most recent med list. amLODIPine 10 mg tablet Commonly known as:  Frank Presser Take 10 mg by mouth daily. ascorbic acid (vitamin C) 250 mg tablet Commonly known as:  VITAMIN C Take 250 mg by mouth daily. aspirin delayed-release 81 mg tablet Take 81 mg by mouth daily. capsaicin 0.075 % topical cream  
Apply  to affected area three (3) times daily. cyanocobalamin ER 1,000 mcg tablet Take 1 Tab by mouth daily. dilTIAZem  mg ER capsule Commonly known as:  CARDIZEM CD Take 1 Cap by mouth daily. docusate sodium 100 mg capsule Commonly known as:  Nilton Pata Take 1 Cap by mouth two (2) times a day for 90 days. * enoxaparin 30 mg/0.3 mL injection Commonly known as:  LOVENOX  
0.3 mL by SubCUTAneous route daily. Indications: DEEP VEIN THROMBOSIS PREVENTION  
  
 * enoxaparin 30 mg/0.3 mL injection Commonly known as:  LOVENOX  
0.3 mL by SubCUTAneous route daily. Indications: DEEP VEIN THROMBOSIS PREVENTION  
  
 ferrous sulfate 325 mg (65 mg iron) tablet Take 325 mg by mouth Daily (before breakfast). FISH OIL PO Take 1,000 mg by mouth two (2) times a day. furosemide 20 mg tablet Commonly known as:  LASIX Use daily as needed for leg swelling  
  
 insulin glargine 100 unit/mL injection Commonly known as:  LANTUS U-100 INSULIN Take 15 units every morning  Indications: type 2 diabetes mellitus  
  
 levothyroxine 75 mcg tablet Commonly known as:  SYNTHROID Take 1 Tab by mouth Daily (before breakfast). * ASPERCREME (LIDOCAINE) 4 % patch Generic drug:  lidocaine 1 Patch by TransDERmal route every eight (8) hours. * lidocaine 5 % Commonly known as:  LIDODERM  
1 Patch by TransDERmal route every twenty-four (24) hours. * lidocaine 5 % Commonly known as:  Janell Mercado  
 APPLY 3 PATCHES TO AFFECTED AREA(S) EVERY 24 HOURS FOR 30 DAYS. WEAR FOR 12 HOURS THEN REMOVE FOR 12 HOURS.  
  
 montelukast 10 mg tablet Commonly known as:  SINGULAIR Take 1 Tab by mouth daily as needed. Indications: ALLERGIC RHINITIS  
  
 oxyCODONE IR 5 mg immediate release tablet Commonly known as:  Dondra Malaika Take 1 Tab by mouth every four (4) hours as needed. Max Daily Amount: 30 mg. PEPCID 20 mg tablet Generic drug:  famotidine Take 20 mg by mouth as needed (reflux and or indigestion). PLAVIX 75 mg Tab Generic drug:  clopidogrel Take 75 mg by mouth daily. potassium chloride SR 10 mEq tablet Commonly known as:  KLOR-CON 10 Take 10 mEq by mouth daily as needed (muscle spasms, with Lasix). PROAIR HFA 90 mcg/actuation inhaler Generic drug:  albuterol INHALE 1 PUFF BY MOUTH EVERY 4 HOURS AS NEEDED FOR WHEEZING OR SHORTNESS OF BREATH  
  
 TYLENOL ARTHRITIS PAIN 650 mg Tber Generic drug:  acetaminophen Take 650 mg by mouth every eight (8) hours. Indications: Fever, Pain VITAMIN D3 1,000 unit tablet Generic drug:  cholecalciferol Take 5,000 Units by mouth two (2) times a day. * Notice: This list has 5 medication(s) that are the same as other medications prescribed for you. Read the directions carefully, and ask your doctor or other care provider to review them with you. Follow-up Instructions Return for Patient will call for follow up. .  
  
  
Patient Instructions Please contact our office if you have any questions about your visit today. 1.) Please call for any refills that you may need. 2.) Other Clinicans at the Office. 3.) Please call Rheumatologist about the nodules on your legs. Dr. Trent Burkitt and Agata Ja at [facility] Dr. Lawanda Rubinstein Mason#3 Dr. Misty Mcgill#4 Internists of [address] [place] [address] [place], [address] 
 
[phone] Dr. Travis Herrera #1 [facility] [address] [facility] [address] 
[phone] Dr. Jagdish Dunn #2 [facility] [address] Jessie Priest 
[phone] Bones of the Leg: Anatomy Sketch Current as of: November 29, 2017 Content Version: 11.7 © 1917-2746 [employer]. Care instructions adapted under license by [employer] (which disclaims liability or warranty for this information). If you have questions about a medical condition or this instruction, always ask your healthcare professional. [address] any warranty or liability for your use of this information. Introducing [facility]! Dear Sheldon: Thank you for requesting a [employer] account. Our records indicate that you already have an active [employer] account. You can access your account anytime at [email or web address] Did you know that you can access your hospital and ER discharge instructions at any time in [employer]? You can also review all of your test results from your hospital stay or ER visit. Additional Information If you have questions, please visit the Frequently Asked Questions section of the [employer] website at [email or web address] Remember, [employer] is NOT to be used for urgent needs. For medical emergencies, dial 911. Now available from your iPhone and Android! Please provide this summary of care documentation to your next provider. Your primary care clinician is listed as Denise Vee. If you have any questions after today's visit, please call [phone].

## 2018-08-20 NOTE — PROGRESS NOTES
Chief Complaint   Patient presents with    Follow-up     1. Have you been to the ER, urgent care clinic since your last visit? Hospitalized since your last visit? No    2. Have you seen or consulted any other health care providers outside of the [address] [place] [address] since your last visit? Include any pap smears or colon screening.  No

## 2018-08-20 NOTE — PROGRESS NOTES
Progress Note    Patient: Roxanne Foster MRN: [id number]  SSN: [id number]    YOB: 1935  Age: 82 y.o. Sex: female          Subjective:   Roxanne Foster is a 82 y.o. female who is here for follow up from SNF discharge for distal femur fracture. The inciting event was a fall that occurred in the house. She denies tripping over anything. She mentions that she was favoring her right knee when the fall occurred. She was hospitalized for 8 days and was discharge to rehab facility. The fracture repair surgery was done by Dr. Yan Blake surgery had to be delayed a little bit because the patient was on anticoagulation. She was advised to be non-weight bearing for 6 weeks on the left leg. While at the rehab facility she developed some severe left shoulder pain. This was similar to the chronic left shoulder pain occurred in the past.    Since discharge she states that she was discharged with home health. She states that she gets aids at the house daily. Visit from 6/7/18  The patient was seen in the ER recently for epistaxis. She mentions that she has had these episodes since being placed on blood thinners. She is currently on Plavix. She mentions that she did skip a day of the Plavix after being discharged from the ER. Additionally, the patient mentions that she saw the rheumatologist. She states that the experience went well. She mentions that the rheumatologist placed her on prednisone and also referred her to Pain Management---Dr. Bridget Aj. She mentions that she has been busy with helping with [employer] activities. She mentions that she sometimes can go to the door and get short of breath. She mentions that she has not had an echo recently. Visit from 4/27/18  Patient is here for follow up on bilateral shoulder osteoarthritis. The patient was previously referred to rheumatologist. She mentions hat she did get a left shoulder injection as well.  The patient mentions that her insurance company no longer covers the compound cream. She mentions that sometimes the Flexeril works but it can make her feel drowsy. Visit from 3/26/18  She was recently dealing with a long nosebleed that last 45 minutes. She was later instructed by her cardiologist to stop Plavix for 2-3 days. She mentions that she previously was on Brilinta and had similar episodes of nosebleeds. She states that she was later shifted to Plavix due to the nosebleeds. She mentions that her cardiologist suggested that she go back on the Plavix if her bleeding stopped. In regard to her polyarticular joint pain she states that she just completed the prednisone pack. She mentions that the highest her BS went up during her prednisone use it got up to the 390 mg/dL range. She mentions that she does feel weak and tired. She has tried [employer] as well. She mentions that she still gets refills from the compound pharmacy. The patient mentions that she does use Fish Oil and a probiotic. She does take a Vitamin B supplement as well. Visit from 2/27/18  She states that her endocrinologist switched her to 112 mcg of Synthroid. She states that this change was made a few months ago. While she was in the hospital her synthroid was in 75 mcg dose range. She states that she also has had her insulin adjusted. She mentions that she feels fatigued constantly and she cannot rest. She mentions that she has been losing her hair as well--she reaches in her hair and strands come out. Visit from 10/25/2017  The patient is here for an acute care visit. Charmaineadithya Pedro states that the incident occurred a few weeks ago. She has been having some right sided hip pain. She has been having to lift up the leg to move it around. The patient was seen in the ER for the persistent right hip pain. She states that they performed any x-ray but did not see anything on the x-ray. She also mentions that her knee has been bothering her as well. She states that she sleeps on her left side as well to help her symptoms. She also admits to some swelling in her legs from time to time. Additionally she does admit to some anxiety with fluttering in her stomach. Objective:     Vitals:    08/20/18 1317   BP: 155/80   Pulse: (!) 107   Resp: 17   Temp: 97 °F (36.1 °C)   Weight: 260 lb (117.9 kg)   Height: 5' 7\" (1.702 m)        Review of Systems:  Pertinent items are noted in the History of Present Illness. Physical Exam:   GENERAL: alert, cooperative, no distress, appears stated age, moderately obese  ENT: No fluid behind tympanic membranes bilaterally. No cerumen impaction noted either    EYE: conjunctivae/corneas clear. PERRL, EOM's intact. Fundi benign  THROAT & NECK: normal and no erythema or exudates noted. LUNG: clear to auscultation bilaterally  HEART: regular rate and rhythm, S1, S2 normal, no murmur, click, rub or gallop  ABDOMEN: soft, non-tender. Bowel sounds normal. No masses,  no organomegaly, abnormal findings:  obese  EXTREMITIES:  Well healed surgical scar along lateral aspect of left leg and well as the medial aspect of the left leg       Lab/Data Review:    I reviewed patient's discharge summary from [facility]       Assessment:     1.) Distal Femur Fracture: Patient will follow up with Ortho. She also will continue with home health and physical therapy. The patient's handicap ramp will also be built which should help her as well.     2.) Hospital Discharge Follow-Up: Patient has adequate home health staffing available. 3.)  Cysts of Leg: Patient advised to contact rheumatology due to concerns about possible rheumatologic causes of cysts such as Erythema Nodosum. This encounter including interview, exam, evaluation and discussion/ counseling was approximately 30 minutes. Patient will call for follow up. Plan:     No orders of the defined types were placed in this encounter.         Signed By: Magda Miguel DO August 20, 2018

## 2018-08-21 PROCEDURE — 3331090002 HH PPS REVENUE DEBIT

## 2018-08-21 PROCEDURE — 3331090001 HH PPS REVENUE CREDIT

## 2018-08-22 PROCEDURE — 3331090001 HH PPS REVENUE CREDIT

## 2018-08-22 PROCEDURE — 3331090002 HH PPS REVENUE DEBIT

## 2018-08-23 PROCEDURE — 3331090002 HH PPS REVENUE DEBIT

## 2018-08-23 PROCEDURE — 3331090001 HH PPS REVENUE CREDIT

## 2018-08-24 PROCEDURE — 3331090002 HH PPS REVENUE DEBIT

## 2018-08-24 PROCEDURE — 3331090001 HH PPS REVENUE CREDIT

## 2018-08-25 PROCEDURE — 3331090002 HH PPS REVENUE DEBIT

## 2018-08-25 PROCEDURE — 3331090001 HH PPS REVENUE CREDIT

## 2018-08-26 PROCEDURE — 3331090001 HH PPS REVENUE CREDIT

## 2018-08-26 PROCEDURE — 3331090002 HH PPS REVENUE DEBIT

## 2018-08-27 ENCOUNTER — TELEPHONE (OUTPATIENT)
Dept: ORTHOPEDIC SURGERY | Facility: CLINIC | Age: 83
End: 2018-08-27

## 2018-08-27 PROCEDURE — 3331090001 HH PPS REVENUE CREDIT

## 2018-08-27 PROCEDURE — 3331090002 HH PPS REVENUE DEBIT

## 2018-08-27 NOTE — TELEPHONE ENCOUNTER
Ramya Kebede , Nurse Care Coordinator from [facility] called for Celena Ordonez. Karla Lee said that the patient lives alone and Medicaid  will not provide 24 hour care services for the patient. Karla Lee said that the patient used up 95 % and is tapped out on medicaid. Karla Lee said that the patient is still No Weight bearing. That it is unsafe for the patient to be alone. That she went to see the patient and she just found out that the Ramp the patient had in her home is also broken. Karla Lee said the patient has a grand daughter that is unable to come to be with the patient. That the grand daughter has invited the patient to stay with her , but that the patient has three dogs that she does not want to leave alone, and the grand daughter does not want her to come with the dogs. Karla Lee is asking for a call back at tel. [phone].

## 2018-08-28 PROCEDURE — 3331090001 HH PPS REVENUE CREDIT

## 2018-08-28 PROCEDURE — 3331090002 HH PPS REVENUE DEBIT

## 2018-08-29 PROCEDURE — 3331090001 HH PPS REVENUE CREDIT

## 2018-08-29 PROCEDURE — 3331090002 HH PPS REVENUE DEBIT

## 2018-08-29 NOTE — TELEPHONE ENCOUNTER
Per Nimesh--Katalina can contact PCP to see if they can get her into SNF--attempted to call again--this is a generic voice mail

## 2018-08-30 PROCEDURE — 3331090001 HH PPS REVENUE CREDIT

## 2018-08-30 PROCEDURE — 3331090002 HH PPS REVENUE DEBIT

## 2018-08-30 NOTE — TELEPHONE ENCOUNTER
Since we do not do direct admitsI tried to call Maria C Patton again to advise her to contact patient's PCP.   Voice Mail was full and I could not leave a essage

## 2018-08-30 NOTE — TELEPHONE ENCOUNTER
Sia Zambrano called again . Said she was returning Renetta Janneth call. Sia Zambrano is asking if Renetta Janneth could call her back. Sia Zambrano was given the message below but is still requesting to speak with Renetta Janneth. Katalina tel. [phone].

## 2018-08-31 PROCEDURE — 3331090001 HH PPS REVENUE CREDIT

## 2018-08-31 PROCEDURE — 3331090002 HH PPS REVENUE DEBIT

## 2018-09-01 PROCEDURE — 3331090001 HH PPS REVENUE CREDIT

## 2018-09-01 PROCEDURE — 3331090002 HH PPS REVENUE DEBIT

## 2018-09-02 PROCEDURE — 3331090002 HH PPS REVENUE DEBIT

## 2018-09-02 PROCEDURE — 3331090001 HH PPS REVENUE CREDIT

## 2018-09-03 PROCEDURE — 3331090002 HH PPS REVENUE DEBIT

## 2018-09-03 PROCEDURE — 3331090001 HH PPS REVENUE CREDIT

## 2018-09-04 PROCEDURE — 3331090002 HH PPS REVENUE DEBIT

## 2018-09-04 PROCEDURE — 3331090001 HH PPS REVENUE CREDIT

## 2018-09-05 PROCEDURE — 3331090001 HH PPS REVENUE CREDIT

## 2018-09-05 PROCEDURE — 3331090002 HH PPS REVENUE DEBIT

## 2018-09-06 ENCOUNTER — HOSPITAL ENCOUNTER (OUTPATIENT)
Age: 83
Setting detail: OBSERVATION
Discharge: HOME HEALTH | End: 2018-09-09
Attending: EMERGENCY MEDICINE | Admitting: INTERNAL MEDICINE
Payer: MEDICARE

## 2018-09-06 ENCOUNTER — APPOINTMENT (OUTPATIENT)
Dept: GENERAL RADIOLOGY | Age: 83
End: 2018-09-06
Attending: EMERGENCY MEDICINE
Payer: MEDICARE

## 2018-09-06 DIAGNOSIS — R07.9 CHEST PAIN, UNSPECIFIED TYPE: Primary | ICD-10-CM

## 2018-09-06 LAB
ALBUMIN SERPL-MCNC: 3 G/DL (ref 3.4–5)
ALBUMIN/GLOB SERPL: 0.7 {RATIO} (ref 0.8–1.7)
ALP SERPL-CCNC: 139 U/L (ref 45–117)
ALT SERPL-CCNC: 14 U/L (ref 13–56)
ANION GAP SERPL CALC-SCNC: 4 MMOL/L (ref 3–18)
AST SERPL-CCNC: 19 U/L (ref 15–37)
ATRIAL RATE: 111 BPM
BASOPHILS # BLD: 0 K/UL (ref 0–0.1)
BASOPHILS NFR BLD: 0 % (ref 0–2)
BILIRUB SERPL-MCNC: 0.5 MG/DL (ref 0.2–1)
BUN SERPL-MCNC: 7 MG/DL (ref 7–18)
BUN/CREAT SERPL: 15 (ref 12–20)
CALCIUM SERPL-MCNC: 9.2 MG/DL (ref 8.5–10.1)
CALCULATED P AXIS, ECG09: 55 DEGREES
CALCULATED R AXIS, ECG10: 3 DEGREES
CALCULATED T AXIS, ECG11: 28 DEGREES
CHLORIDE SERPL-SCNC: 104 MMOL/L (ref 100–108)
CO2 SERPL-SCNC: 31 MMOL/L (ref 21–32)
CREAT SERPL-MCNC: 0.46 MG/DL (ref 0.6–1.3)
D DIMER PPP FEU-MCNC: 2.05 UG/ML(FEU)
DIAGNOSIS, 93000: NORMAL
DIFFERENTIAL METHOD BLD: ABNORMAL
EOSINOPHIL # BLD: 0.2 K/UL (ref 0–0.4)
EOSINOPHIL NFR BLD: 4 % (ref 0–5)
ERYTHROCYTE [DISTWIDTH] IN BLOOD BY AUTOMATED COUNT: 12.9 % (ref 11.6–14.5)
GLOBULIN SER CALC-MCNC: 4.4 G/DL (ref 2–4)
GLUCOSE SERPL-MCNC: 162 MG/DL (ref 74–99)
HCT VFR BLD AUTO: 36.5 % (ref 35–45)
HGB BLD-MCNC: 11.3 G/DL (ref 12–16)
LYMPHOCYTES # BLD: 1.9 K/UL (ref 0.9–3.6)
LYMPHOCYTES NFR BLD: 29 % (ref 21–52)
MCH RBC QN AUTO: 30.2 PG (ref 24–34)
MCHC RBC AUTO-ENTMCNC: 31 G/DL (ref 31–37)
MCV RBC AUTO: 97.6 FL (ref 74–97)
MONOCYTES # BLD: 0.6 K/UL (ref 0.05–1.2)
MONOCYTES NFR BLD: 9 % (ref 3–10)
NEUTS SEG # BLD: 3.9 K/UL (ref 1.8–8)
NEUTS SEG NFR BLD: 58 % (ref 40–73)
P-R INTERVAL, ECG05: 136 MS
PLATELET # BLD AUTO: 219 K/UL (ref 135–420)
PMV BLD AUTO: 10.4 FL (ref 9.2–11.8)
POTASSIUM SERPL-SCNC: 3.7 MMOL/L (ref 3.5–5.5)
PROT SERPL-MCNC: 7.4 G/DL (ref 6.4–8.2)
Q-T INTERVAL, ECG07: 364 MS
QRS DURATION, ECG06: 80 MS
QTC CALCULATION (BEZET), ECG08: 495 MS
RBC # BLD AUTO: 3.74 M/UL (ref 4.2–5.3)
SODIUM SERPL-SCNC: 139 MMOL/L (ref 136–145)
TROPONIN I SERPL-MCNC: 0.02 NG/ML (ref 0–0.06)
VENTRICULAR RATE, ECG03: 111 BPM
WBC # BLD AUTO: 6.6 K/UL (ref 4.6–13.2)

## 2018-09-06 PROCEDURE — 74011250637 HC RX REV CODE- 250/637: Performed by: EMERGENCY MEDICINE

## 2018-09-06 PROCEDURE — 85025 COMPLETE CBC W/AUTO DIFF WBC: CPT | Performed by: EMERGENCY MEDICINE

## 2018-09-06 PROCEDURE — 85379 FIBRIN DEGRADATION QUANT: CPT | Performed by: EMERGENCY MEDICINE

## 2018-09-06 PROCEDURE — 74011250636 HC RX REV CODE- 250/636: Performed by: EMERGENCY MEDICINE

## 2018-09-06 PROCEDURE — 99218 HC RM OBSERVATION: CPT

## 2018-09-06 PROCEDURE — 71045 X-RAY EXAM CHEST 1 VIEW: CPT

## 2018-09-06 PROCEDURE — 99284 EMERGENCY DEPT VISIT MOD MDM: CPT

## 2018-09-06 PROCEDURE — 84484 ASSAY OF TROPONIN QUANT: CPT | Performed by: EMERGENCY MEDICINE

## 2018-09-06 PROCEDURE — 80053 COMPREHEN METABOLIC PANEL: CPT | Performed by: EMERGENCY MEDICINE

## 2018-09-06 PROCEDURE — 93005 ELECTROCARDIOGRAM TRACING: CPT

## 2018-09-06 RX ORDER — GUAIFENESIN 100 MG/5ML
162 LIQUID (ML) ORAL
Status: COMPLETED | OUTPATIENT
Start: 2018-09-06 | End: 2018-09-06

## 2018-09-06 RX ORDER — GUAIFENESIN 100 MG/5ML
324 LIQUID (ML) ORAL
Status: DISCONTINUED | OUTPATIENT
Start: 2018-09-06 | End: 2018-09-06

## 2018-09-06 RX ADMIN — ASPIRIN 81 MG 162 MG: 81 TABLET ORAL at 19:03

## 2018-09-06 RX ADMIN — SODIUM CHLORIDE 500 ML: 900 INJECTION, SOLUTION INTRAVENOUS at 17:52
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
## 2018-09-06 NOTE — ROUTINE PROCESS
TRANSFER - OUT REPORT:    Verbal report given to Leti Riddle RN(name) on Lisa Fitting  being transferred to [facility] room 202(unit) for routine progression of care       Report consisted of patients Situation, Background, Assessment and   Recommendations(SBAR). Information from the following report(s) SBAR, ED Summary, [facility] and Recent Results was reviewed with the receiving nurse. Opportunity for questions and clarification was provided.

## 2018-09-06 NOTE — IP AVS SNAPSHOT
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 
[phone] Yola Aguayo MD In 2 weeks  [address] [place] [address] [address] [facility] [address] 
[phone] Your Scheduled Appointments Wednesday September 12, 2018 10:15 AM EDT Follow Up with Keith Ward PA-C  
[facility] [place] [address] [place] [address] 1 [facility] [address]  
[phone] Monday October 01, 2018 10:45 AM EDT Follow Up with Tonya Velásquez MD  
[facility] (--)  
 Partha Victor [address]  
[phone] Discharge Orders None A check chey indicates which time of day the medication should be taken. My Medications START taking these medications Instructions Each Dose to Equal  
 Morning Noon Evening Bedtime  
 enoxaparin 120 mg/0.8 mL injection Commonly known as:  LOVENOX Replaces:  enoxaparin 30 mg/0.3 mL injection Your last dose was:     
   
Your next dose is:    
   
   
 110.1 mg by SubCUTAneous route every twelve (12) hours every twelve (12) hours for 7 days. Indications: deep venous thrombosis 1 mg/kg  
    
   
   
   
  
 ranolazine  mg SR tablet Commonly known as:  RANEXA Your last dose was: Your next dose is: Take 1 Tab by mouth two (2) times a day. 500 mg CHANGE how you take these medications Instructions Each Dose to Equal  
 Morning Noon Evening Bedtime  
 insulin glargine 100 unit/mL injection Commonly known as:  LANTUS U-100 INSULIN What changed:   
- how to take this - when to take this 
- additional instructions Your last dose was: Your next dose is: Take 15 units every morning  Indications: type 2 diabetes mellitus  
     
   
   
   
  
 levothyroxine 75 mcg tablet Commonly known as:  SYNTHROID What changed:  how much to take Your last dose was: Your next dose is: Take 1 Tab by mouth Daily (before breakfast). 75 mcg * ASPERCREME (LIDOCAINE) 4 % patch Generic drug:  lidocaine What changed:  Another medication with the same name was removed. Continue taking this medication, and follow the directions you see here. Your last dose was: Your next dose is:    
   
   
 1 Patch by TransDERmal route every eight (8) hours. 1 Patch * lidocaine 5 % Commonly known as:  Dufm Fusi What changed:  Another medication with the same name was removed. Continue taking this medication, and follow the directions you see here. Your last dose was: Your next dose is:    
   
   
 1 Patch by TransDERmal route every twenty-four (24) hours. 1 Patch * Notice: This list has 2 medication(s) that are the same as other medications prescribed for you. Read the directions carefully, and ask your doctor or other care provider to review them with you. CONTINUE taking these medications  Instructions Each Dose to Equal  
 Morning Noon Evening Bedtime  
 amLODIPine 10 mg tablet Commonly known as:  Luis Oliver Your last dose was: Your next dose is: Take 10 mg by mouth daily. 10 mg  
    
   
   
   
  
 ascorbic acid (vitamin C) 250 mg tablet Commonly known as:  VITAMIN C Your last dose was: Your next dose is: Take 250 mg by mouth daily. 250 mg  
    
   
   
   
  
 aspirin delayed-release 81 mg tablet Your last dose was: Your next dose is: Take 81 mg by mouth daily. 81 mg  
    
   
   
   
  
 capsaicin 0.075 % topical cream  
   
Your last dose was: Your next dose is:    
   
   
 Apply  to affected area three (3) times daily. cyanocobalamin ER 1,000 mcg tablet Your last dose was: Your next dose is: Take 1 Tab by mouth daily. 1000 mcg  
    
   
   
   
  
 ferrous sulfate 325 mg (65 mg iron) tablet Your last dose was: Your next dose is: Take 325 mg by mouth Daily (before breakfast). 325 mg FISH OIL PO Your last dose was: Your next dose is: Take 1,000 mg by mouth two (2) times a day. 1000 mg  
    
   
   
   
  
 furosemide 20 mg tablet Commonly known as:  LASIX Your last dose was: Your next dose is:    
   
   
 Use daily as needed for leg swelling  
     
   
   
   
  
 montelukast 10 mg tablet Commonly known as:  SINGULAIR Your last dose was: Your next dose is: Take 1 Tab by mouth daily as needed. Indications: ALLERGIC RHINITIS 10 mg PEPCID 20 mg tablet Generic drug:  famotidine Your last dose was: Your next dose is: Take 20 mg by mouth as needed (reflux and or indigestion). 20 mg  
    
   
   
   
  
 PLAVIX 75 mg Tab Generic drug:  clopidogrel Your last dose was: Your next dose is: Take 75 mg by mouth daily. 75 mg  
    
   
   
   
  
 potassium chloride SR 10 mEq tablet Commonly known as:  KLOR-CON 10 Your last dose was: Your next dose is: Take 10 mEq by mouth daily as needed (muscle spasms, with Lasix). 10 mEq PROAIR HFA 90 mcg/actuation inhaler Generic drug:  albuterol Your last dose was: Your next dose is:    
   
   
 INHALE 1 PUFF BY MOUTH EVERY 4 HOURS AS NEEDED FOR WHEEZING OR SHORTNESS OF BREATH  
     
   
   
   
  
 TYLENOL ARTHRITIS PAIN 650 mg Tber Generic drug:  acetaminophen Your last dose was: Your next dose is: Take 650 mg by mouth every eight (8) hours. Indications: Fever, Pain 650 mg  
    
   
   
   
  
 VITAMIN D3 1,000 unit tablet Generic drug:  cholecalciferol Your last dose was: Your next dose is: Take 5,000 Units by mouth two (2) times a day. 5000 Units STOP taking these medications   
 enoxaparin 30 mg/0.3 mL injection Commonly known as:  LOVENOX Replaced by:  enoxaparin 120 mg/0.8 mL injection Where to Get Your Medications These medications were sent to [address], [address] [place] [address] Phone:  [phone]  
  ranolazine  mg SR tablet Information on where to get these meds will be given to you by the nurse or doctor. ! Ask your nurse or doctor about these medications  
  enoxaparin 120 mg/0.8 mL injection Discharge Instructions None [facility] Announcement We are excited to announce that we are making your provider's discharge notes available to you in [facility]. You will see these notes when they are completed and signed by the physician that discharged you from your recent hospital stay.   If you have any questions or concerns about any information you see in eCozy, please call the Health Information Department where you were seen or reach out to your Primary Care Provider for more information about your plan of care. Introducing [facility]! Dear Birdie Smallwood: Thank you for requesting a eCozy account. Our records indicate that you already have an active eCozy account. You can access your account anytime at [email or web address] Did you know that you can access your hospital and ER discharge instructions at any time in eCozy? You can also review all of your test results from your hospital stay or ER visit. Additional Information If you have questions, please visit the Frequently Asked Questions section of the eCozy website at [email or web address] Remember, eCozy is NOT to be used for urgent needs. For medical emergencies, dial 911. Now available from your iPhone and Android! Introducing Cameron Valle As a [employer] patient, I wanted to make you aware of our electronic visit tool called Cameron Valle. [employer] 24/7 allows you to connect within minutes with a medical provider 24 hours a day, seven days a week via a mobile device or tablet or logging into a secure website from your computer. You can access Cameron Valle from anywhere in the [place]. A virtual visit might be right for you when you have a simple condition and feel like you just dont want to get out of bed, or cant get away from work for an appointment, when your regular [employer] provider is not available (evenings, weekends or holidays), or when youre out of town and need minor care. Electronic visits cost only $49 and if the [employer] 24/7 provider determines a prescription is needed to treat your condition, one can be electronically transmitted to a nearby pharmacy*. Please take a moment to enroll today if you have not already done so.   The enrollment process is free and takes just a few minutes. To enroll, please download the [employer] 24/7 vanesa to your tablet or phone, or visit [email or web address] to enroll on your computer. And, as an [address] [place] [address] patient with a [facility] account, the results of your visits will be scanned into your electronic medical record and your primary care provider will be able to view the scanned results. We urge you to continue to see your regular [employer] [facility] provider for your ongoing medical care. And while your primary care provider may not be the one available when you seek a [facility] virtual visit, the peace of mind you get from getting a real diagnosis real time can be priceless. For more information on [facility], view our Frequently Asked Questions (FAQs) at [email or web address]. Sincerely, 
 
Mihai Syed MD 
Chief Medical Officer Leyla Cristy Rene *:  certain medications cannot be prescribed via [facility] Providers Seen During Your Hospitalization Provider Specialty Primary office phone Addie Horne MD Emergency Medicine [phone] Sheldon Queen MD Emergency Medicine [phone] Sylwia Guzman MD Internal Medicine [phone] Lili Ferrara, [address] [place] [address] Internal Medicine [phone] Seth Marquez MD Infectious Diseases [phone] Immunizations Administered for This Admission Name Date Influenza Vaccine (Quad) PF 9/9/2018 Your Primary Care Physician (PCP) Primary Care Physician Office Phone Office Fax Nathen Kirkpatrick [phone] You are allergic to the following Allergen Reactions Niacin Palpitations Other (comments) Stomach irritation Ace Inhibitors Cough Avapro (Irbesartan) Myalgia Bystolic (Nebivolol) Other (comments) Felt like throat closing Catapres (Clonidine) Cough Codeine Nausea and Vomiting Cozaar (Losartan) Not Reported This Time Crestor (Rosuvastatin) Other (comments) Cramps, aches Darvocet A500 (Propoxyphene N-Acetaminophen) Unknown (comments) Diovan (Valsartan) Cough Flagyl (Metronidazole) Other (comments) Mouth and throat irritation Gabapentin Other (comments) Abdominal pain and burning Iodinated Contrast- Oral And Iv Dye Other (comments) Throat swelling Iodine Unknown (comments) Lescol (Fluvastatin) Other (comments) Leg cramps Lipitor (Atorvastatin) Myalgia Other (comments) Cramps, aches Lovastatin Other (comments) Leg cramps Nexium (Esomeprazole Magnesium) Other (comments) Stomach upset, burning Pravachol (Pravastatin) Other (comments) Leg cramps Reglan (Metoclopramide) Nausea Only Trazodone Other (comments) Patient states she feels drugged Zetia (Ezetimibe) Other (comments) Cramps, aches Zocor (Simvastatin) Other (comments) Cramps, aches Recent Documentation Height Weight Breastfeeding? BMI OB Status Smoking Status 1.702 m 110.5 kg No 38.15 kg/m2 Hysterectomy Former Smoker Emergency Contacts Name Discharge Info Relation Home Work Mobile Subha Stephen DISCHARGE CAREGIVER [3] Other Relative [6] [phone] Kana Edge N/A  AT THIS TIME [6] Other Relative [6] [phone] [facility] CAREGIVER [3] Other Relative [6] [phone] Patient Belongings The following personal items are in your possession at time of discharge: 
  Dental Appliances: Uppers  Visual Aid: Glasses, With patient      Home Medications: None   Jewelry: Bracelet, Earrings, Necklace, Ring  Clothing: None    Other Valuables: Cell Phone Discharge Instructions Attachments/References ANGINA (ENGLISH) Patient Handouts Angina: Care Instructions Your Care Instructions You have a problem called angina. Angina happens when there is not enough blood flow to your heart muscle. Angina is a sign of coronary artery disease (CAD). CAD occurs when blood vessels that supply the heart become narrowed. Having CAD increases your risk of a heart attack. Chest pain or pressure is the most common symptom of angina. But some people have other symptoms, like: 
· Pain, pressure, or a strange feeling in the back, neck, jaw, or upper belly, or in one or both shoulders or arms. · Shortness of breath. · Nausea or vomiting. · Lightheadedness or sudden weakness. · Fast or irregular heartbeat. Women are somewhat more likely than men to have angina symptoms like shortness of breath, nausea, and back or jaw pain. Angina can be dangerous. That's why it is important to pay attention to your symptoms. Know what is typical for you, learn how to control your symptoms, and understand when you need to get treatment. A change in your usual pattern of symptoms is an emergency. It may mean that you are having a heart attack. The doctor has checked you carefully, but problems can develop later. If you notice any problems or new symptoms, get medical treatment right away. Follow-up care is a key part of your treatment and safety. Be sure to make and go to all appointments, and call your doctor if you are having problems. It's also a good idea to know your test results and keep a list of the medicines you take. How can you care for yourself at home? Medicines 
  · If your doctor has given you nitroglycerin for angina symptoms, keep it with you at all times. If you have symptoms, sit down and rest, and take the first dose of nitroglycerin as directed. If your symptoms get worse or are not getting better within 5 minutes, call 911 right away. Stay on the phone. The emergency  will give you further instructions.   · If your doctor advises it, take 1 low-dose aspirin a day to prevent heart attack.  
  · Be safe with medicines. Take your medicines exactly as prescribed. Call your doctor if you think you are having a problem with your medicine. You will get more details on the specific medicines your doctor prescribes.  
 Lifestyle changes 
  · Do not smoke. If you need help quitting, talk to your doctor about stop-smoking programs and medicines. These can increase your chances of quitting for good.  
  · Eat a heart-healthy diet that is low in saturated fat and salt, and is high in fiber. Talk to your doctor or a dietitian about healthy eating.  
  · Stay at a healthy weight. Or lose weight if you need to. Activity 
  · Talk to your doctor about a level of activity that is safe for you.  
  · If an activity causes angina symptoms, stop and rest.  
When should you call for help? Call 911 anytime you think you may need emergency care. For example, call if: 
  · You passed out (lost consciousness).  
  · You have symptoms of a heart attack. These may include: ¨ Chest pain or pressure, or a strange feeling in the chest. 
¨ Sweating. ¨ Shortness of breath. ¨ Nausea or vomiting. ¨ Pain, pressure, or a strange feeling in the back, neck, jaw, or upper belly or in one or both shoulders or arms. ¨ Lightheadedness or sudden weakness. ¨ A fast or irregular heartbeat. After you call 911, the  may tell you to chew 1 adult-strength or 2 to 4 low-dose aspirin. Wait for an ambulance. Do not try to drive yourself.  
  · You have angina symptoms that do not go away with rest or are not getting better within 5 minutes after you take a dose of nitroglycerin.  
 Call your doctor now or seek immediate medical care if: 
  · You are having angina symptoms more often than usual, or they are different or worse than usual.  
  · You feel dizzy or lightheaded, or you feel like you may faint.  Watch closely for changes in your health, and be sure to contact your doctor if you have any problems. Where can you learn more? Go to http://cristina-mignon.info/. Enter H129 in the search box to learn more about \"Angina: Care Instructions. \" Current as of: December 6, 2017 Content Version: 11.7 © 3345-4922 [employer]. Care instructions adapted under license by [employer] (which disclaims liability or warranty for this information). If you have questions about a medical condition or this instruction, always ask your healthcare professional. [address] any warranty or liability for your use of this information. Please provide this summary of care documentation to your next provider. Signatures-by signing, you are acknowledging that this After Visit Summary has been reviewed with you and you have received a copy. Patient Signature:  ____________________________________________________________ Date:  ____________________________________________________________  
  
Subha Singleton Provider Signature:  ____________________________________________________________ Date:  ____________________________________________________________

## 2018-09-06 NOTE — IP AVS SNAPSHOT
[address] [address] Gar[address] Brar Patient: Roddy Jordan MRN: [id number] PTW:[birth year] A check chey indicates which time of day the medication should be taken. My Medications START taking these medications Instructions Each Dose to Equal  
 Morning Noon Evening Bedtime  
 enoxaparin 120 mg/0.8 mL injection Commonly known as:  LOVENOX Replaces:  enoxaparin 30 mg/0.3 mL injection Your last dose was: Your next dose is:    
   
   
 110.1 mg by SubCUTAneous route every twelve (12) hours every twelve (12) hours for 7 days. Indications: deep venous thrombosis 1 mg/kg  
    
   
   
   
  
 ranolazine  mg SR tablet Commonly known as:  RANEXA Your last dose was: Your next dose is: Take 1 Tab by mouth two (2) times a day. 500 mg CHANGE how you take these medications Instructions Each Dose to Equal  
 Morning Noon Evening Bedtime  
 insulin glargine 100 unit/mL injection Commonly known as:  LANTUS U-100 INSULIN What changed:   
- how to take this - when to take this 
- additional instructions Your last dose was: Your next dose is: Take 15 units every morning  Indications: type 2 diabetes mellitus  
     
   
   
   
  
 levothyroxine 75 mcg tablet Commonly known as:  SYNTHROID What changed:  how much to take Your last dose was: Your next dose is: Take 1 Tab by mouth Daily (before breakfast). 75 mcg * ASPERCREME (LIDOCAINE) 4 % patch Generic drug:  lidocaine What changed:  Another medication with the same name was removed. Continue taking this medication, and follow the directions you see here. Your last dose was: Your next dose is:    
   
   
 1 Patch by TransDERmal route every eight (8) hours. 1 Patch * lidocaine 5 % Commonly known as:  Monisha Mancilla What changed:  Another medication with the same name was removed. Continue taking this medication, and follow the directions you see here. Your last dose was: Your next dose is:    
   
   
 1 Patch by TransDERmal route every twenty-four (24) hours. 1 Patch * Notice: This list has 2 medication(s) that are the same as other medications prescribed for you. Read the directions carefully, and ask your doctor or other care provider to review them with you. CONTINUE taking these medications Instructions Each Dose to Equal  
 Morning Noon Evening Bedtime  
 amLODIPine 10 mg tablet Commonly known as:  Corie Pulidodon Your last dose was: Your next dose is: Take 10 mg by mouth daily. 10 mg  
    
   
   
   
  
 ascorbic acid (vitamin C) 250 mg tablet Commonly known as:  VITAMIN C Your last dose was: Your next dose is: Take 250 mg by mouth daily. 250 mg  
    
   
   
   
  
 aspirin delayed-release 81 mg tablet Your last dose was: Your next dose is: Take 81 mg by mouth daily. 81 mg  
    
   
   
   
  
 capsaicin 0.075 % topical cream  
   
Your last dose was: Your next dose is:    
   
   
 Apply  to affected area three (3) times daily. cyanocobalamin ER 1,000 mcg tablet Your last dose was: Your next dose is: Take 1 Tab by mouth daily. 1000 mcg  
    
   
   
   
  
 ferrous sulfate 325 mg (65 mg iron) tablet Your last dose was: Your next dose is: Take 325 mg by mouth Daily (before breakfast). 325 mg FISH OIL PO Your last dose was: Your next dose is: Take 1,000 mg by mouth two (2) times a day. 1000 mg  
    
   
   
   
  
 furosemide 20 mg tablet Commonly known as:  LASIX Your last dose was: Your next dose is:    
   
   
 Use daily as needed for leg swelling  
     
   
   
   
  
 montelukast 10 mg tablet Commonly known as:  SINGULAIR Your last dose was: Your next dose is: Take 1 Tab by mouth daily as needed. Indications: ALLERGIC RHINITIS 10 mg PEPCID 20 mg tablet Generic drug:  famotidine Your last dose was: Your next dose is: Take 20 mg by mouth as needed (reflux and or indigestion). 20 mg  
    
   
   
   
  
 PLAVIX 75 mg Tab Generic drug:  clopidogrel Your last dose was: Your next dose is: Take 75 mg by mouth daily. 75 mg  
    
   
   
   
  
 potassium chloride SR 10 mEq tablet Commonly known as:  KLOR-CON 10 Your last dose was: Your next dose is: Take 10 mEq by mouth daily as needed (muscle spasms, with Lasix). 10 mEq PROAIR HFA 90 mcg/actuation inhaler Generic drug:  albuterol Your last dose was: Your next dose is:    
   
   
 INHALE 1 PUFF BY MOUTH EVERY 4 HOURS AS NEEDED FOR WHEEZING OR SHORTNESS OF BREATH  
     
   
   
   
  
 TYLENOL ARTHRITIS PAIN 650 mg Tber Generic drug:  acetaminophen Your last dose was: Your next dose is: Take 650 mg by mouth every eight (8) hours. Indications: Fever, Pain 650 mg  
    
   
   
   
  
 VITAMIN D3 1,000 unit tablet Generic drug:  cholecalciferol Your last dose was: Your next dose is: Take 5,000 Units by mouth two (2) times a day. 5000 Units STOP taking these medications   
 enoxaparin 30 mg/0.3 mL injection Commonly known as:  LOVENOX Replaced by:  enoxaparin 120 mg/0.8 mL injection Where to Get Your Medications These medications were sent to [address], [address] [address] Phone:  [phone]  
  ranolazine  mg SR tablet Information on where to get these meds will be given to you by the nurse or doctor. ! Ask your nurse or doctor about these medications  
  enoxaparin 120 mg/0.8 mL injection

## 2018-09-06 NOTE — ED TRIAGE NOTES
Pt reports intermittent left chest & left arm pain x ~1 week. States she has been taking Tylenol Arthritis without relief; became concerned that the pain seems to be \"moving further into the chest\". Denies any worsening or exacerbating factors. States that she has SOB sometimes but has COPD and it does not seem to be any different since the pain has started.

## 2018-09-07 ENCOUNTER — APPOINTMENT (OUTPATIENT)
Dept: NON INVASIVE DIAGNOSTICS | Age: 83
End: 2018-09-07
Attending: HOSPITALIST
Payer: MEDICARE

## 2018-09-07 ENCOUNTER — APPOINTMENT (OUTPATIENT)
Dept: VASCULAR SURGERY | Age: 83
End: 2018-09-07
Attending: INTERNAL MEDICINE
Payer: MEDICARE

## 2018-09-07 ENCOUNTER — APPOINTMENT (OUTPATIENT)
Dept: NUCLEAR MEDICINE | Age: 83
End: 2018-09-07
Attending: EMERGENCY MEDICINE
Payer: MEDICARE

## 2018-09-07 LAB
ANION GAP SERPL CALC-SCNC: 8 MMOL/L (ref 3–18)
APTT PPP: 30.7 SEC (ref 23–36.4)
BASOPHILS # BLD: 0 K/UL (ref 0–0.1)
BASOPHILS NFR BLD: 0 % (ref 0–2)
BUN SERPL-MCNC: 5 MG/DL (ref 7–18)
BUN/CREAT SERPL: 10 (ref 12–20)
CALCIUM SERPL-MCNC: 8.4 MG/DL (ref 8.5–10.1)
CHLORIDE SERPL-SCNC: 105 MMOL/L (ref 100–108)
CHOLEST SERPL-MCNC: 178 MG/DL
CO2 SERPL-SCNC: 29 MMOL/L (ref 21–32)
CREAT SERPL-MCNC: 0.48 MG/DL (ref 0.6–1.3)
DIFFERENTIAL METHOD BLD: ABNORMAL
EOSINOPHIL # BLD: 0.2 K/UL (ref 0–0.4)
EOSINOPHIL NFR BLD: 4 % (ref 0–5)
ERYTHROCYTE [DISTWIDTH] IN BLOOD BY AUTOMATED COUNT: 12.8 % (ref 11.6–14.5)
EST. AVERAGE GLUCOSE BLD GHB EST-MCNC: 177 MG/DL
GLUCOSE BLD STRIP.AUTO-MCNC: 107 MG/DL (ref 70–110)
GLUCOSE BLD STRIP.AUTO-MCNC: 119 MG/DL (ref 70–110)
GLUCOSE BLD STRIP.AUTO-MCNC: 162 MG/DL (ref 70–110)
GLUCOSE BLD STRIP.AUTO-MCNC: 167 MG/DL (ref 70–110)
GLUCOSE SERPL-MCNC: 151 MG/DL (ref 74–99)
HBA1C MFR BLD: 7.8 % (ref 4.2–5.6)
HCT VFR BLD AUTO: 33.1 % (ref 35–45)
HDLC SERPL-MCNC: 41 MG/DL (ref 40–60)
HDLC SERPL: 4.3 {RATIO} (ref 0–5)
HGB BLD-MCNC: 10.7 G/DL (ref 12–16)
INR PPP: 1 (ref 0.8–1.2)
LDLC SERPL CALC-MCNC: 117.8 MG/DL (ref 0–100)
LIPID PROFILE,FLP: ABNORMAL
LYMPHOCYTES # BLD: 1.9 K/UL (ref 0.9–3.6)
LYMPHOCYTES NFR BLD: 35 % (ref 21–52)
MCH RBC QN AUTO: 30.1 PG (ref 24–34)
MCHC RBC AUTO-ENTMCNC: 32.3 G/DL (ref 31–37)
MCV RBC AUTO: 93.2 FL (ref 74–97)
MONOCYTES # BLD: 0.4 K/UL (ref 0.05–1.2)
MONOCYTES NFR BLD: 7 % (ref 3–10)
NEUTS SEG # BLD: 2.9 K/UL (ref 1.8–8)
NEUTS SEG NFR BLD: 54 % (ref 40–73)
PLATELET # BLD AUTO: 247 K/UL (ref 135–420)
PMV BLD AUTO: 10.7 FL (ref 9.2–11.8)
POTASSIUM SERPL-SCNC: 2.9 MMOL/L (ref 3.5–5.5)
POTASSIUM SERPL-SCNC: 3.1 MMOL/L (ref 3.5–5.5)
PROTHROMBIN TIME: 12.9 SEC (ref 11.5–15.2)
RBC # BLD AUTO: 3.55 M/UL (ref 4.2–5.3)
SODIUM SERPL-SCNC: 142 MMOL/L (ref 136–145)
TRIGL SERPL-MCNC: 96 MG/DL (ref ?–150)
TROPONIN I SERPL-MCNC: 0.03 NG/ML (ref 0–0.04)
VLDLC SERPL CALC-MCNC: 19.2 MG/DL
WBC # BLD AUTO: 5.3 K/UL (ref 4.6–13.2)

## 2018-09-07 PROCEDURE — A9539 TC99M PENTETATE: HCPCS

## 2018-09-07 PROCEDURE — C8923 2D TTE W OR W/O FOL W/CON,CO: HCPCS

## 2018-09-07 PROCEDURE — 96372 THER/PROPH/DIAG INJ SC/IM: CPT

## 2018-09-07 PROCEDURE — 85025 COMPLETE CBC W/AUTO DIFF WBC: CPT | Performed by: INTERNAL MEDICINE

## 2018-09-07 PROCEDURE — 85610 PROTHROMBIN TIME: CPT | Performed by: INTERNAL MEDICINE

## 2018-09-07 PROCEDURE — 82962 GLUCOSE BLOOD TEST: CPT

## 2018-09-07 PROCEDURE — 85730 THROMBOPLASTIN TIME PARTIAL: CPT | Performed by: INTERNAL MEDICINE

## 2018-09-07 PROCEDURE — 99218 HC RM OBSERVATION: CPT

## 2018-09-07 PROCEDURE — 36415 COLL VENOUS BLD VENIPUNCTURE: CPT | Performed by: INTERNAL MEDICINE

## 2018-09-07 PROCEDURE — 74011250636 HC RX REV CODE- 250/636: Performed by: HOSPITALIST

## 2018-09-07 PROCEDURE — 83036 HEMOGLOBIN GLYCOSYLATED A1C: CPT | Performed by: INTERNAL MEDICINE

## 2018-09-07 PROCEDURE — 84484 ASSAY OF TROPONIN QUANT: CPT | Performed by: INTERNAL MEDICINE

## 2018-09-07 PROCEDURE — 84132 ASSAY OF SERUM POTASSIUM: CPT | Performed by: INTERNAL MEDICINE

## 2018-09-07 PROCEDURE — 74011250636 HC RX REV CODE- 250/636: Performed by: INTERNAL MEDICINE

## 2018-09-07 PROCEDURE — 93970 EXTREMITY STUDY: CPT

## 2018-09-07 PROCEDURE — 80061 LIPID PANEL: CPT | Performed by: INTERNAL MEDICINE

## 2018-09-07 PROCEDURE — 74011250637 HC RX REV CODE- 250/637: Performed by: INTERNAL MEDICINE

## 2018-09-07 PROCEDURE — 74011000250 HC RX REV CODE- 250: Performed by: INTERNAL MEDICINE

## 2018-09-07 PROCEDURE — A9567 TECHNETIUM TC-99M AEROSOL: HCPCS

## 2018-09-07 PROCEDURE — 74011636637 HC RX REV CODE- 636/637: Performed by: INTERNAL MEDICINE

## 2018-09-07 RX ORDER — CAPSAICIN 0.07 G/100G
CREAM TOPICAL 3 TIMES DAILY
Status: DISCONTINUED | OUTPATIENT
Start: 2018-09-07 | End: 2018-09-09 | Stop reason: HOSPADM

## 2018-09-07 RX ORDER — INSULIN GLARGINE 100 [IU]/ML
30 INJECTION, SOLUTION SUBCUTANEOUS DAILY
Status: DISCONTINUED | OUTPATIENT
Start: 2018-09-07 | End: 2018-09-09 | Stop reason: HOSPADM

## 2018-09-07 RX ORDER — FUROSEMIDE 20 MG/1
20 TABLET ORAL
Status: DISCONTINUED | OUTPATIENT
Start: 2018-09-07 | End: 2018-09-09 | Stop reason: HOSPADM

## 2018-09-07 RX ORDER — SODIUM CHLORIDE 0.9 % (FLUSH) 0.9 %
5-10 SYRINGE (ML) INJECTION EVERY 8 HOURS
Status: DISCONTINUED | OUTPATIENT
Start: 2018-09-07 | End: 2018-09-09 | Stop reason: HOSPADM

## 2018-09-07 RX ORDER — ALBUTEROL SULFATE 90 UG/1
1 AEROSOL, METERED RESPIRATORY (INHALATION)
Status: DISCONTINUED | OUTPATIENT
Start: 2018-09-07 | End: 2018-09-07 | Stop reason: CLARIF

## 2018-09-07 RX ORDER — LANOLIN ALCOHOL/MO/W.PET/CERES
325 CREAM (GRAM) TOPICAL
Status: DISCONTINUED | OUTPATIENT
Start: 2018-09-07 | End: 2018-09-09 | Stop reason: HOSPADM

## 2018-09-07 RX ORDER — LANOLIN ALCOHOL/MO/W.PET/CERES
1000 CREAM (GRAM) TOPICAL DAILY
Status: DISCONTINUED | OUTPATIENT
Start: 2018-09-07 | End: 2018-09-09 | Stop reason: HOSPADM

## 2018-09-07 RX ORDER — ACETAMINOPHEN 325 MG/1
650 TABLET ORAL
Status: DISCONTINUED | OUTPATIENT
Start: 2018-09-07 | End: 2018-09-09 | Stop reason: HOSPADM

## 2018-09-07 RX ORDER — HEPARIN SODIUM 5000 [USP'U]/ML
5000 INJECTION, SOLUTION INTRAVENOUS; SUBCUTANEOUS EVERY 8 HOURS
Status: DISCONTINUED | OUTPATIENT
Start: 2018-09-07 | End: 2018-09-08

## 2018-09-07 RX ORDER — ASPIRIN 81 MG/1
81 TABLET ORAL DAILY
Status: DISCONTINUED | OUTPATIENT
Start: 2018-09-07 | End: 2018-09-09 | Stop reason: HOSPADM

## 2018-09-07 RX ORDER — ASCORBIC ACID 250 MG
250 TABLET ORAL DAILY
Status: DISCONTINUED | OUTPATIENT
Start: 2018-09-07 | End: 2018-09-09 | Stop reason: HOSPADM

## 2018-09-07 RX ORDER — MONTELUKAST SODIUM 10 MG/1
10 TABLET ORAL
Status: DISCONTINUED | OUTPATIENT
Start: 2018-09-07 | End: 2018-09-09 | Stop reason: HOSPADM

## 2018-09-07 RX ORDER — SODIUM CHLORIDE 0.9 % (FLUSH) 0.9 %
5-10 SYRINGE (ML) INJECTION AS NEEDED
Status: DISCONTINUED | OUTPATIENT
Start: 2018-09-07 | End: 2018-09-09 | Stop reason: HOSPADM

## 2018-09-07 RX ORDER — SODIUM CHLORIDE 9 MG/ML
50 INJECTION, SOLUTION INTRAVENOUS CONTINUOUS
Status: DISCONTINUED | OUTPATIENT
Start: 2018-09-07 | End: 2018-09-08

## 2018-09-07 RX ORDER — CLOPIDOGREL BISULFATE 75 MG/1
75 TABLET ORAL DAILY
Status: DISCONTINUED | OUTPATIENT
Start: 2018-09-07 | End: 2018-09-09 | Stop reason: HOSPADM

## 2018-09-07 RX ORDER — DOCUSATE SODIUM 100 MG/1
100 CAPSULE, LIQUID FILLED ORAL 2 TIMES DAILY
Status: DISCONTINUED | OUTPATIENT
Start: 2018-09-07 | End: 2018-09-09 | Stop reason: HOSPADM

## 2018-09-07 RX ORDER — ENOXAPARIN SODIUM 100 MG/ML
30 INJECTION SUBCUTANEOUS DAILY
Status: DISCONTINUED | OUTPATIENT
Start: 2018-09-07 | End: 2018-09-07

## 2018-09-07 RX ORDER — LEVOTHYROXINE SODIUM 112 UG/1
112 TABLET ORAL
Status: DISCONTINUED | OUTPATIENT
Start: 2018-09-07 | End: 2018-09-09 | Stop reason: HOSPADM

## 2018-09-07 RX ORDER — FAMOTIDINE 20 MG/1
20 TABLET, FILM COATED ORAL DAILY PRN
Status: DISCONTINUED | OUTPATIENT
Start: 2018-09-07 | End: 2018-09-08

## 2018-09-07 RX ORDER — ONDANSETRON 2 MG/ML
4 INJECTION INTRAMUSCULAR; INTRAVENOUS
Status: DISCONTINUED | OUTPATIENT
Start: 2018-09-07 | End: 2018-09-09 | Stop reason: HOSPADM

## 2018-09-07 RX ORDER — INSULIN GLARGINE 100 [IU]/ML
36 INJECTION, SOLUTION SUBCUTANEOUS
Status: DISCONTINUED | OUTPATIENT
Start: 2018-09-07 | End: 2018-09-09 | Stop reason: HOSPADM

## 2018-09-07 RX ORDER — ALBUTEROL SULFATE 0.83 MG/ML
2.5 SOLUTION RESPIRATORY (INHALATION)
Status: DISCONTINUED | OUTPATIENT
Start: 2018-09-07 | End: 2018-09-09 | Stop reason: HOSPADM

## 2018-09-07 RX ORDER — AMLODIPINE BESYLATE 10 MG/1
10 TABLET ORAL DAILY
Status: DISCONTINUED | OUTPATIENT
Start: 2018-09-07 | End: 2018-09-09 | Stop reason: HOSPADM

## 2018-09-07 RX ORDER — MELATONIN
5000 2 TIMES DAILY
Status: DISCONTINUED | OUTPATIENT
Start: 2018-09-07 | End: 2018-09-09 | Stop reason: HOSPADM

## 2018-09-07 RX ORDER — LOPERAMIDE HCL 2 MG
1000 TABLET ORAL DAILY
Status: DISCONTINUED | OUTPATIENT
Start: 2018-09-07 | End: 2018-09-07 | Stop reason: RX

## 2018-09-07 RX ORDER — LIDOCAINE 4 G/100G
1 PATCH TOPICAL EVERY 8 HOURS
Status: DISCONTINUED | OUTPATIENT
Start: 2018-09-07 | End: 2018-09-09 | Stop reason: HOSPADM

## 2018-09-07 RX ADMIN — Medication 10 ML: at 22:35

## 2018-09-07 RX ADMIN — Medication 10 ML: at 17:16

## 2018-09-07 RX ADMIN — SODIUM CHLORIDE 50 ML/HR: 900 INJECTION, SOLUTION INTRAVENOUS at 02:35

## 2018-09-07 RX ADMIN — HEPARIN SODIUM 5000 UNITS: 5000 INJECTION, SOLUTION INTRAVENOUS; SUBCUTANEOUS at 20:50

## 2018-09-07 RX ADMIN — INSULIN GLARGINE 36 UNITS: 100 INJECTION, SOLUTION SUBCUTANEOUS at 22:26

## 2018-09-07 RX ADMIN — PERFLUTREN 2 ML: 6.52 INJECTION, SUSPENSION INTRAVENOUS at 15:15

## 2018-09-07 RX ADMIN — HEPARIN SODIUM 5000 UNITS: 5000 INJECTION, SOLUTION INTRAVENOUS; SUBCUTANEOUS at 02:35

## 2018-09-07 RX ADMIN — CLOPIDOGREL BISULFATE 75 MG: 75 TABLET ORAL at 08:49

## 2018-09-07 RX ADMIN — VITAMIN D, TAB 1000IU (100/BT) 5000 UNITS: 25 TAB at 08:46

## 2018-09-07 RX ADMIN — AMLODIPINE BESYLATE 10 MG: 10 TABLET ORAL at 08:49

## 2018-09-07 RX ADMIN — HEPARIN SODIUM 5000 UNITS: 5000 INJECTION, SOLUTION INTRAVENOUS; SUBCUTANEOUS at 14:14

## 2018-09-07 RX ADMIN — LEVOTHYROXINE SODIUM 112 MCG: 112 TABLET ORAL at 08:49

## 2018-09-07 RX ADMIN — INSULIN GLARGINE 30 UNITS: 100 INJECTION, SOLUTION SUBCUTANEOUS at 08:46

## 2018-09-07 RX ADMIN — FERROUS SULFATE TAB 325 MG (65 MG ELEMENTAL FE) 325 MG: 325 (65 FE) TAB at 08:49

## 2018-09-07 RX ADMIN — VITAMIN D, TAB 1000IU (100/BT) 5000 UNITS: 25 TAB at 17:17

## 2018-09-07 RX ADMIN — ASPIRIN 81 MG: 81 TABLET, COATED ORAL at 08:49

## 2018-09-07 RX ADMIN — Medication 1 CAPSULE: at 17:16

## 2018-09-07 RX ADMIN — Medication 10 ML: at 22:33
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
## 2018-09-07 NOTE — PROGRESS NOTES
conducted an initial consultation and Spiritual Assessment for Gisele Szymanski, who is a 82 y.o.,female. Patients Primary Language is: [place].    According to the patients EMR Taoism Affiliation is: [facility].     The reason the Patient came to the hospital is:   Patient Active Problem List    Diagnosis Date Noted    Preoperative cardiovascular examination 06/11/2018    Periprosthetic left distal femur fracture 06/10/2018    Callie-prosthetic femur fracture at tip of prosthesis 06/10/2018    Right groin pain 06/16/2017    BMI 38.0-38.9,adult 06/07/2017    Bilateral lower extremity edema 04/25/2017    Chest pain 02/06/2017    Advanced care planning/counseling discussion 08/17/2016    S/P drug eluting coronary stent placement 06/10/2016    NSTEMI (non-ST elevated myocardial infarction) ([facility]) 05/28/2016    Moderate persistent asthma with status asthmaticus 10/76/4282    Uncomplicated asthma 51/30/8573    Tear of left rotator cuff 03/08/2016    Medication monitoring encounter 01/12/2015    Seasonal and perennial allergic rhinitis 07/30/2013    Morbid obesity ([facility])     Unspecified transient cerebral ischemia     Congestive heart failure (HCC)     Mixed hyperlipidemia     Coronary atherosclerosis of native coronary artery     Chronic diastolic heart failure (HCC)     Benign hypertensive heart disease without heart failure     IDDM (insulin dependent diabetes mellitus) ([facility]) 12/04/2012    Hypothyroidism     Leg pain, right 09/05/2012    Diabetic neuropathy ([facility]) 04/20/2012    Dizziness 04/20/2012    Chronic neck pain 04/20/2012    Chronic back pain 04/20/2012    DJD (degenerative joint disease)     S/P joint replacement     Noncompliance with medication regimen 02/08/2011    Arm pain 02/08/2011    Neck pain 02/08/2011    Anxiety 02/08/2011    Essential hypertension 05/20/2010    Unspecified hypothyroidism 05/20/2010    Hypovitaminosis D 05/20/2010    Unspecified asthma(493.90) 05/20/2010    Esophageal reflux 05/20/2010        The  provided the following Interventions:  Initiated a relationship of care and support. Listened empathically. Provided chaplaincy education. Provided information about Spiritual Care Services. Chart reviewed. The following outcomes where achieved:  Patient processed feeling about current hospitalization. Patient expressed gratitude for 's visit. Assessment:  Patient does not have any Christian/cultural needs that will affect patients preferences in health care. There are no spiritual or Christian issues which require intervention at this time. Plan:  Chaplains will continue to follow and will provide pastoral care on an as needed/requested basis.  recommends bedside caregivers page  on duty if patient shows signs of acute spiritual or emotional distress.     Chaplain Resident [address] [place] [address]   [phone]

## 2018-09-07 NOTE — PROGRESS NOTES
conducted a Follow up consultation and Spiritual Assessment for Krysta Hotron, who is a 82 y.o.,female. The  provided the following Interventions:  Continued the relationship of care and support. Listened empathically. Offered prayer and assurance of continued prayer on patients behalf. Chart reviewed. The following outcomes were achieved:  Patient expressed gratitude for 's visit. Assessment:  There are no further spiritual or Latter-day issues which require Spiritual Care Services interventions at this time. Plan:  Chaplains will continue to follow and will provide pastoral care on an as needed/requested basis.  recommends bedside caregivers page  on duty if patient shows signs of acute spiritual or emotional distress.        Chaplain Resident [address] [place] [address]   [phone]

## 2018-09-07 NOTE — ROUTINE PROCESS
Bedside and Verbal shift change report given to Roro Benitez (oncoming nurse) by Valeriano Haro   (offgoing nurse). Report included the following information SBAR, Intake/Output, MAR, Recent Results and Cardiac Rhythm Sinus Rhythm.

## 2018-09-07 NOTE — DIABETES MGMT
GLYCEMIC CONTROL AND NUTRITION    Assessment/Recommendations:  Fasting lab glucose 151 mg/dl  Noted patient started on her home dose of lantus insulin. Recommend adding lispro corrective insulin coverage per protocol. Will continue inpatient monitoring. Most recent blood glucose values:      Current A1C of 7.8 % is equivalent to average blood glucose of 177 mg/dl over the past 2-3 months. Current hospital diabetes medications:  Lantus 30 units daily  Lantus 36 units every bedtime. Home diabetes medications: According to pta med list.  Will verify with pt when she returns to room. Lantus 30 units every morning   Lantus 36 units every bedtime  Diet:    Currently NPO  Education:  ____Refer to Diabetes Education Record             ____Education not indicated at this time  Pt currently not in room. \"Went down for a test\" according to nursing staff.     Ana Lemus RN CDE  Ext [phone]  Pager [phone]

## 2018-09-07 NOTE — PROGRESS NOTES
Reason for Admission:   Chest pain               RRAT Score:     53             Resources/supports as identified by patient/family:  Medicare part A&B, [employer] Medicare/[employer]. The has 2 sons, brother and granddaughter                 Top Challenges facing patient (as identified by patient/family and CM): Finances/Medication cost?      no              Transportation? Depends on her brother, a neighbor and personal care aid               Support system or lack thereof? Children, brother and grand daughter                     Living arrangements? Lives alone in a story building              Self-care/ADLs/Cognition? AOx4, self care but has 24 hours aid          Current Advanced Directive/Advance Care Plan:                            Plan for utilizing home health:    Yes. Pt states she is active with [facility]                      Likelihood of readmission: red/high                 Transition of Care Plan:   Pt states she lives alone but has 24 hours aid with [facility]. She was at [facility] for rehab from 6/19 to 7/19 after a left distal femur fx. Her brother Jose Batres lives across the street from her and her grand daughter Pearl Cee lives in [address] [place] [address] Road but they are always checking on her. Her sons Marry Gillis and Monico Aguirre lives out of town. She has neighbors that check up on her too. She states the ortho doctor told her no weight bearing on her left leg. She has appointment on the 12th. She has a cane, walker, bsc, hospital bed and wheelchair at home. Pt states she will like to go back home after discharge from the hospital and when she can bear weight on her left leg, she will benefit from PT/OTShe denied that her home is unsafe for her to live in and denied any contact with APS. Called the [facility] and left a message for a return call. Care Management Interventions  PCP Verified by CM: Yes  Mode of Transport at Discharge:  Other (see comment) (family)  Current Support Network: Own Home, Lives Alone  Confirm Follow Up Transport: Family  Plan discussed with Pt/Family/Caregiver: Yes  Freedom of Choice Offered: Yes          1018:  Ms Bhargavi Herring from [facility] called back and states they do not have any record of complaint concerning pt.

## 2018-09-07 NOTE — PROGRESS NOTES
Observation notice provided in writing to patient and/or caregiver as well as verbal explanation of the policy. Patients who are in outpatient status also receive the Observation notice    Pt signed the notice and a copy given to pt while original was filed in pt's blue chart.

## 2018-09-07 NOTE — PROGRESS NOTES
Hospitalist Progress Note    Patient: Mushtaq Monroe Age: 82 y.o. : 1935 MR#: [id number] SSN: [id number]  Date/Time: 2018 6:25 PM    Subjective:   Ms Melony Batres is a very pleasant 82year old female with CAD s/p PCI, diastolic CHF DM2 s/p left distal femur fracture repair 3 weeks ago. She came to the Ed last night with intermittent sharp chest pains. D dimer was 2.5, and she was kept for observation and further studies.     Review of Systems -    Denies headache, shortness of breath or nausea  No chest pain at present  Denies nausea + constipation x 1 day, declines softener, will take prune juice      Objective:   VS:   Visit Vitals    /73    Pulse 88    Temp 97.8 °F (36.6 °C)    Resp 18    Ht 5' 7\" (1.702 m)    Wt 111.1 kg (245 lb)    SpO2 98%    Breastfeeding No    BMI 38.37 kg/m2      Tmax/24hrs: Temp (24hrs), Av.6 °F (36.4 °C), Min:97.3 °F (36.3 °C), Max:98 °F (36.7 °C)   No intake or output data in the 24 hours ending 18 1825    General: alert, oriented x 3, speech clear  HEENT:LETITIA, conjunctiva clear, no JVD  Cardiovascular: reg rate 88 with ectopi   Pulmonary: clear to bases, no wheeze   GI:  Soft, BS+, non tender  Extremities: + calf tenderness bilateral, poor turgor, no erythema or edema    Labs:    Recent Results (from the past 24 hour(s))   HEMOGLOBIN A1C WITH EAG    Collection Time: 18  2:56 AM   Result Value Ref Range    Hemoglobin A1c 7.8 (H) 4.2 - 5.6 %    Est. average glucose 177 mg/dL   TROPONIN I    Collection Time: 18  2:56 AM   Result Value Ref Range    Troponin-I, Qt. 0.03 0.0 - 8.167 NG/ML   METABOLIC PANEL, BASIC    Collection Time: 18  2:56 AM   Result Value Ref Range    Sodium 142 136 - 145 mmol/L    Potassium 2.9 (LL) 3.5 - 5.5 mmol/L    Chloride 105 100 - 108 mmol/L    CO2 29 21 - 32 mmol/L    Anion gap 8 3.0 - 18 mmol/L    Glucose 151 (H) 74 - 99 mg/dL    BUN 5 (L) 7.0 - 18 MG/DL    Creatinine 0.48 (L) 0.6 - 1.3 MG/DL BUN/Creatinine ratio 10 (L) 12 - 20      GFR est AA >60 >60 ml/min/1.73m2    GFR est non-AA >60 >60 ml/min/1.73m2    Calcium 8.4 (L) 8.5 - 10.1 MG/DL   LIPID PANEL    Collection Time: 09/07/18  2:56 AM   Result Value Ref Range    LIPID PROFILE          Cholesterol, total 178 <200 MG/DL    Triglyceride 96 <150 MG/DL    HDL Cholesterol 41 40 - 60 MG/DL    LDL, calculated 117.8 (H) 0 - 100 MG/DL    VLDL, calculated 19.2 MG/DL    CHOL/HDL Ratio 4.3 0 - 5.0     CBC WITH AUTOMATED DIFF    Collection Time: 09/07/18  2:56 AM   Result Value Ref Range    WBC 5.3 4.6 - 13.2 K/uL    RBC 3.55 (L) 4.20 - 5.30 M/uL    HGB 10.7 (L) 12.0 - 16.0 g/dL    HCT 33.1 (L) 35.0 - 45.0 %    MCV 93.2 74.0 - 97.0 FL    MCH 30.1 24.0 - 34.0 PG    MCHC 32.3 31.0 - 37.0 g/dL    RDW 12.8 11.6 - 14.5 %    PLATELET 509 326 - 844 K/uL    MPV 10.7 9.2 - 11.8 FL    NEUTROPHILS 54 40 - 73 %    LYMPHOCYTES 35 21 - 52 %    MONOCYTES 7 3 - 10 %    EOSINOPHILS 4 0 - 5 %    BASOPHILS 0 0 - 2 %    ABS. NEUTROPHILS 2.9 1.8 - 8.0 K/UL    ABS. LYMPHOCYTES 1.9 0.9 - 3.6 K/UL    ABS. MONOCYTES 0.4 0.05 - 1.2 K/UL    ABS. EOSINOPHILS 0.2 0.0 - 0.4 K/UL    ABS.  BASOPHILS 0.0 0.0 - 0.1 K/UL    DF AUTOMATED     PROTHROMBIN TIME + INR    Collection Time: 09/07/18  2:56 AM   Result Value Ref Range    Prothrombin time 12.9 11.5 - 15.2 sec    INR 1.0 0.8 - 1.2     PTT    Collection Time: 09/07/18  2:56 AM   Result Value Ref Range    aPTT 30.7 23.0 - 36.4 SEC   POTASSIUM    Collection Time: 09/07/18  5:38 AM   Result Value Ref Range    Potassium 3.1 (L) 3.5 - 5.5 mmol/L   GLUCOSE, POC    Collection Time: 09/07/18  8:36 AM   Result Value Ref Range    Glucose (POC) 167 (H) 70 - 110 mg/dL   TROPONIN I    Collection Time: 09/07/18 10:20 AM   Result Value Ref Range    Troponin-I, Qt. 0.03 0.0 - 0.045 NG/ML   GLUCOSE, POC    Collection Time: 09/07/18  1:37 PM   Result Value Ref Range    Glucose (POC) 119 (H) 70 - 110 mg/dL   ECHO ADULT FOLLOW-UP OR LIMITED    Collection Time: 09/07/18  3:14 PM   Result Value Ref Range    LVIDd 4.23 3.9 - 5.3 cm    LVPWd 1.24 (A) 0.6 - 0.9 cm    LVIDs 3.39 cm    IVSd 1.20 (A) 0.6 - 0.9 cm    LVOT Peak Velocity 95.43 cm/s    LVOT Peak Gradient 3.6 mmHg    LVOT VTI 17.55 cm    LV Mass .9 (A) 67 - 162 g    LV Mass AL Index 80.7 g/m2   GLUCOSE, POC    Collection Time: 09/07/18  3:37 PM   Result Value Ref Range    Glucose (POC) 107 70 - 110 mg/dL   TROPONIN I    Collection Time: 09/07/18  4:20 PM   Result Value Ref Range    Troponin-I, Qt. 0.03 0.0 - 0.045 NG/ML       Imaging:  Nm Lung Perfusion W Vent    Result Date: 9/7/2018  EXAM: LUNG SCAN VENTILATION AND PERFUSION CLINICAL HISTORY/INDICATION: Chest pain, elevated d-dimer  Shortness of breath, one week history of left arm pain radiating to the left chest wall described as intermittent, cough, history of asthma, history of prior smoker COMPARISON: Chest x-ray day prior, ventilation perfusion lung scan September 10, 2013 and 2/7/2017, CT chest 6/29/2016. TECHNIQUE: 72.3 millicuries of technetium 99m DTPA was delivered via aerosol. Planar images were obtained in AP, PA, lateral, and oblique projections. 5.5 millicuries of technetium 99m MAA was intravenously injected into the left hand IV and multiple images were obtained in the same projections. FINDINGS: There is a small amount of retained isotope within the mouth. There is isotope within the esophagus and stomach. There is also clumped isotope in the major bronchi. There is markedly abnormal ventilation with multiple bilateral segmental ventilation defects. The perfusion is actually markedly better than the ventilation. Small perfusion defect at the left base posteriorly not significantly changed back to 2013. Enlarged cardiac silhouette produces ventilation and perfusion defect at the left mid and lower posterior chest.     IMPRESSION: Low probability for pulmonary embolism. .    9/7/18 preliminary echo report:  · Technically difficult study with poor endocardial visualization. Definity contrast was given to enhance imaging. · Left ventricular mild-to-moderately decreased systolic function. Estimated left ventricular ejection fraction is 41 - 45%. Visually measured ejection fraction. Left ventricular mild concentric hypertrophy. Left ventricular global hypokinesis. 9/7/2018 doppler BLE:  · No evidence of acute deep vein thrombosis in the right common femoral, superficial femoral, popliteal, posterior tibial, and peroneal veins. The veins were imaged in the transverse and longitudinal planes. The vessels showed normal color filling and compressibility. Doppler interrogation showed phasic and spontaneous flow. · Subacute non-occlusive thrombus present in the left popliteal vein.      Subacute nonocclusive DVT left popliteal vein. Left femoral vein with no DVT. Right lower extremity with no evidence of DVT      Assessment/Plan:      Hospital Problems  Date Reviewed: 8/21/2018          Codes Class Noted POA    Deep vein thrombosis (DVT) of popliteal vein of left lower extremity ([facility] [address] ICD-10-CM: O10.801  ICD-9-CM: 453.41  9/8/2018 Yes    Overview Signed 9/8/2018  6:12 AM by Dana Aly DO     Subacute nonocclusive DVT left popliteal vein 9/7/2018               * (Principal)Chest pain ICD-10-CM: R07.9  ICD-9-CM: 786.50  2/6/2017 Yes        Coronary atherosclerosis of native coronary artery ICD-10-CM: I25.10  ICD-9-CM: 414.01  Unknown Yes        Chronic diastolic heart failure ([facility] [address] ICD-10-CM: I50.32  ICD-9-CM: 428.32  Unknown Yes    Overview Signed 5/9/2013  8:58 AM by Raul Melendez     Stable, edema better, uses PRN Lasix             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    Will initiate Lovenox   Patient sees GREGG Scott for cardiology, will consult for outpatient plan and   Follow up for this \"subacute\" finding in left popliteal  Continue current Lantus add POC glucose and coverage    Additional Notes:      Full Code    Disposition:   Home with home health    Case discussed with:  []Patient  []Family  []Nursing  []Case Management  DVT Prophylaxis:  []Lovenox  []Hep SQ  []SCDs  []Coumadin   []On Heparin gtt    Signed By: Xena Langston DO     September 7, 2018 6:25 PM

## 2018-09-08 LAB
ALBUMIN SERPL-MCNC: 2.7 G/DL (ref 3.4–5)
ALBUMIN/GLOB SERPL: 0.6 {RATIO} (ref 0.8–1.7)
ALP SERPL-CCNC: 126 U/L (ref 45–117)
ALT SERPL-CCNC: 10 U/L (ref 13–56)
ANION GAP SERPL CALC-SCNC: 9 MMOL/L (ref 3–18)
APPEARANCE UR: ABNORMAL
AST SERPL-CCNC: 11 U/L (ref 15–37)
ATRIAL RATE: 84 BPM
BACTERIA URNS QL MICRO: ABNORMAL /HPF
BASOPHILS # BLD: 0 K/UL (ref 0–0.1)
BASOPHILS NFR BLD: 0 % (ref 0–2)
BILIRUB SERPL-MCNC: 0.7 MG/DL (ref 0.2–1)
BILIRUB UR QL: NEGATIVE
BUN SERPL-MCNC: 7 MG/DL (ref 7–18)
BUN/CREAT SERPL: 13 (ref 12–20)
CALCIUM SERPL-MCNC: 8.8 MG/DL (ref 8.5–10.1)
CALCULATED P AXIS, ECG09: 66 DEGREES
CALCULATED R AXIS, ECG10: -8 DEGREES
CALCULATED T AXIS, ECG11: 11 DEGREES
CHLORIDE SERPL-SCNC: 105 MMOL/L (ref 100–108)
CK MB CFR SERPL CALC: NORMAL % (ref 0–4)
CK MB SERPL-MCNC: <1 NG/ML (ref 5–25)
CK SERPL-CCNC: 66 U/L (ref 26–192)
CO2 SERPL-SCNC: 28 MMOL/L (ref 21–32)
COLOR UR: YELLOW
CREAT SERPL-MCNC: 0.54 MG/DL (ref 0.6–1.3)
DIAGNOSIS, 93000: NORMAL
DIFFERENTIAL METHOD BLD: ABNORMAL
EOSINOPHIL # BLD: 0.2 K/UL (ref 0–0.4)
EOSINOPHIL NFR BLD: 4 % (ref 0–5)
EPITH CASTS URNS QL MICRO: ABNORMAL /LPF (ref 0–5)
ERYTHROCYTE [DISTWIDTH] IN BLOOD BY AUTOMATED COUNT: 13.2 % (ref 11.6–14.5)
GLOBULIN SER CALC-MCNC: 4.5 G/DL (ref 2–4)
GLUCOSE BLD STRIP.AUTO-MCNC: 109 MG/DL (ref 70–110)
GLUCOSE BLD STRIP.AUTO-MCNC: 121 MG/DL (ref 70–110)
GLUCOSE BLD STRIP.AUTO-MCNC: 128 MG/DL (ref 70–110)
GLUCOSE SERPL-MCNC: 82 MG/DL (ref 74–99)
GLUCOSE UR STRIP.AUTO-MCNC: NEGATIVE MG/DL
HCT VFR BLD AUTO: 33 % (ref 35–45)
HGB BLD-MCNC: 10.5 G/DL (ref 12–16)
HGB UR QL STRIP: NEGATIVE
KETONES UR QL STRIP.AUTO: NEGATIVE MG/DL
LEUKOCYTE ESTERASE UR QL STRIP.AUTO: ABNORMAL
LYMPHOCYTES # BLD: 1.9 K/UL (ref 0.9–3.6)
LYMPHOCYTES NFR BLD: 37 % (ref 21–52)
MAGNESIUM SERPL-MCNC: 1.8 MG/DL (ref 1.6–2.6)
MCH RBC QN AUTO: 30 PG (ref 24–34)
MCHC RBC AUTO-ENTMCNC: 31.8 G/DL (ref 31–37)
MCV RBC AUTO: 94.3 FL (ref 74–97)
MONOCYTES # BLD: 0.5 K/UL (ref 0.05–1.2)
MONOCYTES NFR BLD: 10 % (ref 3–10)
NEUTS SEG # BLD: 2.5 K/UL (ref 1.8–8)
NEUTS SEG NFR BLD: 49 % (ref 40–73)
NITRITE UR QL STRIP.AUTO: NEGATIVE
P-R INTERVAL, ECG05: 140 MS
PH UR STRIP: 6 [PH] (ref 5–8)
PHOSPHATE SERPL-MCNC: 3 MG/DL (ref 2.5–4.9)
PLATELET # BLD AUTO: 238 K/UL (ref 135–420)
PMV BLD AUTO: 10.8 FL (ref 9.2–11.8)
POTASSIUM SERPL-SCNC: 3 MMOL/L (ref 3.5–5.5)
PROT SERPL-MCNC: 7.2 G/DL (ref 6.4–8.2)
PROT UR STRIP-MCNC: 30 MG/DL
Q-T INTERVAL, ECG07: 414 MS
QRS DURATION, ECG06: 86 MS
QTC CALCULATION (BEZET), ECG08: 489 MS
RBC # BLD AUTO: 3.5 M/UL (ref 4.2–5.3)
RBC #/AREA URNS HPF: NEGATIVE /HPF (ref 0–5)
SODIUM SERPL-SCNC: 142 MMOL/L (ref 136–145)
SP GR UR REFRACTOMETRY: 1.02 (ref 1–1.03)
TROPONIN I SERPL-MCNC: 0.02 NG/ML (ref 0–0.04)
TSH SERPL DL<=0.05 MIU/L-ACNC: 0.49 UIU/ML (ref 0.36–3.74)
UROBILINOGEN UR QL STRIP.AUTO: 1 EU/DL (ref 0.2–1)
VENTRICULAR RATE, ECG03: 84 BPM
WBC # BLD AUTO: 5.1 K/UL (ref 4.6–13.2)
WBC URNS QL MICRO: ABNORMAL /HPF (ref 0–4)

## 2018-09-08 PROCEDURE — 99218 HC RM OBSERVATION: CPT

## 2018-09-08 PROCEDURE — 82550 ASSAY OF CK (CPK): CPT | Performed by: HOSPITALIST

## 2018-09-08 PROCEDURE — 74011250637 HC RX REV CODE- 250/637: Performed by: INTERNAL MEDICINE

## 2018-09-08 PROCEDURE — 74011636637 HC RX REV CODE- 636/637: Performed by: INTERNAL MEDICINE

## 2018-09-08 PROCEDURE — 84443 ASSAY THYROID STIM HORMONE: CPT | Performed by: HOSPITALIST

## 2018-09-08 PROCEDURE — 93005 ELECTROCARDIOGRAM TRACING: CPT

## 2018-09-08 PROCEDURE — 74011250636 HC RX REV CODE- 250/636: Performed by: HOSPITALIST

## 2018-09-08 PROCEDURE — 74011250637 HC RX REV CODE- 250/637: Performed by: HOSPITALIST

## 2018-09-08 PROCEDURE — 81001 URINALYSIS AUTO W/SCOPE: CPT | Performed by: HOSPITALIST

## 2018-09-08 PROCEDURE — 85025 COMPLETE CBC W/AUTO DIFF WBC: CPT | Performed by: HOSPITALIST

## 2018-09-08 PROCEDURE — 82962 GLUCOSE BLOOD TEST: CPT

## 2018-09-08 PROCEDURE — 36415 COLL VENOUS BLD VENIPUNCTURE: CPT | Performed by: HOSPITALIST

## 2018-09-08 PROCEDURE — 74011000250 HC RX REV CODE- 250: Performed by: INTERNAL MEDICINE

## 2018-09-08 PROCEDURE — 84100 ASSAY OF PHOSPHORUS: CPT | Performed by: HOSPITALIST

## 2018-09-08 PROCEDURE — 96372 THER/PROPH/DIAG INJ SC/IM: CPT

## 2018-09-08 PROCEDURE — 80053 COMPREHEN METABOLIC PANEL: CPT | Performed by: HOSPITALIST

## 2018-09-08 PROCEDURE — 83735 ASSAY OF MAGNESIUM: CPT | Performed by: HOSPITALIST

## 2018-09-08 RX ORDER — ENOXAPARIN SODIUM 150 MG/ML
1 INJECTION SUBCUTANEOUS EVERY 12 HOURS
Status: DISCONTINUED | OUTPATIENT
Start: 2018-09-08 | End: 2018-09-09 | Stop reason: HOSPADM

## 2018-09-08 RX ORDER — DEXTROSE 50 % IN WATER (D50W) INTRAVENOUS SYRINGE
25-50 AS NEEDED
Status: DISCONTINUED | OUTPATIENT
Start: 2018-09-08 | End: 2018-09-09 | Stop reason: HOSPADM

## 2018-09-08 RX ORDER — FAMOTIDINE 20 MG/1
20 TABLET, FILM COATED ORAL 2 TIMES DAILY
Status: DISCONTINUED | OUTPATIENT
Start: 2018-09-08 | End: 2018-09-09 | Stop reason: HOSPADM

## 2018-09-08 RX ORDER — MAGNESIUM SULFATE 100 %
4 CRYSTALS MISCELLANEOUS AS NEEDED
Status: DISCONTINUED | OUTPATIENT
Start: 2018-09-08 | End: 2018-09-09 | Stop reason: HOSPADM

## 2018-09-08 RX ORDER — POTASSIUM CHLORIDE 1.5 G/1.77G
20 POWDER, FOR SOLUTION ORAL 2 TIMES DAILY WITH MEALS
Status: DISCONTINUED | OUTPATIENT
Start: 2018-09-08 | End: 2018-09-09 | Stop reason: HOSPADM

## 2018-09-08 RX ORDER — INSULIN LISPRO 100 [IU]/ML
INJECTION, SOLUTION INTRAVENOUS; SUBCUTANEOUS
Status: DISCONTINUED | OUTPATIENT
Start: 2018-09-08 | End: 2018-09-09 | Stop reason: HOSPADM

## 2018-09-08 RX ADMIN — FAMOTIDINE 20 MG: 20 TABLET ORAL at 08:37

## 2018-09-08 RX ADMIN — Medication 10 ML: at 21:36

## 2018-09-08 RX ADMIN — ASPIRIN 81 MG: 81 TABLET, COATED ORAL at 09:00

## 2018-09-08 RX ADMIN — POTASSIUM CHLORIDE 20 MEQ: 1.5 POWDER, FOR SOLUTION ORAL at 17:24

## 2018-09-08 RX ADMIN — AMLODIPINE BESYLATE 10 MG: 10 TABLET ORAL at 08:37

## 2018-09-08 RX ADMIN — INSULIN GLARGINE 36 UNITS: 100 INJECTION, SOLUTION SUBCUTANEOUS at 21:34

## 2018-09-08 RX ADMIN — FERROUS SULFATE TAB 325 MG (65 MG ELEMENTAL FE) 325 MG: 325 (65 FE) TAB at 08:37

## 2018-09-08 RX ADMIN — VITAMIN D, TAB 1000IU (100/BT) 5000 UNITS: 25 TAB at 17:24

## 2018-09-08 RX ADMIN — Medication 1 CAPSULE: at 08:36

## 2018-09-08 RX ADMIN — ENOXAPARIN SODIUM 109.5 MG: 120 INJECTION SUBCUTANEOUS at 20:25

## 2018-09-08 RX ADMIN — Medication 10 ML: at 15:32

## 2018-09-08 RX ADMIN — ENOXAPARIN SODIUM 109.5 MG: 120 INJECTION SUBCUTANEOUS at 08:35

## 2018-09-08 RX ADMIN — LEVOTHYROXINE SODIUM 112 MCG: 112 TABLET ORAL at 08:36

## 2018-09-08 RX ADMIN — CYANOCOBALAMIN TAB 1000 MCG 1000 MCG: 1000 TAB at 08:37

## 2018-09-08 RX ADMIN — VITAMIN D, TAB 1000IU (100/BT) 5000 UNITS: 25 TAB at 08:36

## 2018-09-08 RX ADMIN — Medication 10 ML: at 05:20

## 2018-09-08 RX ADMIN — Medication 1 CAPSULE: at 17:23

## 2018-09-08 RX ADMIN — Medication 10 ML: at 02:37

## 2018-09-08 RX ADMIN — Medication 10 ML: at 05:18

## 2018-09-08 RX ADMIN — INSULIN GLARGINE 30 UNITS: 100 INJECTION, SOLUTION SUBCUTANEOUS at 08:35

## 2018-09-08 RX ADMIN — CLOPIDOGREL BISULFATE 75 MG: 75 TABLET ORAL at 08:36

## 2018-09-08 RX ADMIN — Medication 250 MG: at 10:56

## 2018-09-08 RX ADMIN — FAMOTIDINE 20 MG: 20 TABLET ORAL at 17:24

## 2018-09-08 RX ADMIN — ACETAMINOPHEN 650 MG: 325 TABLET ORAL at 14:42

## 2018-09-08 NOTE — ROUTINE PROCESS
Bedside and Verbal shift change report given to Claudetta Mathieu, RN (oncoming nurse) by Rajinder Cowart RN (offgoing nurse). Report included the following information SBAR, Kardex, Accordion and Cardiac Rhythm SR with PVCs.

## 2018-09-08 NOTE — ROUTINE PROCESS
Bedside and Verbal shift change report given to Benjamin Garcia  (oncoming nurse) by Rowan Cabello   (offgoing nurse). Report included the following information SBAR, Intake/Output, MAR, Recent Results and Cardiac Rhythm Sinus Rhythm.
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
## 2018-09-08 NOTE — ROUTINE PROCESS
Shift change report given to Fritz RN RN (oncoming nurse) by Lieutenant Mcdowell (offgoing nurse). Report included the following information on the SBAR, VS, LABS and summary of care.  Patient resting quietly in bed

## 2018-09-08 NOTE — PROGRESS NOTES
Problem: Falls - Risk of  Goal: *Absence of Falls  Document Terence Fall Risk and appropriate interventions in the flowsheet.    Outcome: Progressing Towards Goal  Fall Risk Interventions:  Mobility Interventions: Patient to call before getting OOB, Bed/chair exit alarm                   History of Falls Interventions: Consult care management for discharge planning, Room close to nurse's station, Evaluate medications/consider consulting pharmacy

## 2018-09-08 NOTE — PROGRESS NOTES
Hospitalist Progress Note    Patient: Yoko Heart Age: 82 y.o. : 1935 MR#: [id number] SSN: [id number]  Date/Time: 2018 6:25 PM    Subjective:   Ms Elizabeth Hung is a very pleasant 82year old female with CAD s/p PCI, diastolic CHF DM2 s/p left distal femur fracture repair 3 weeks ago. She came to the Ed last night with intermittent sharp chest pains. D dimer was 2.5, and she was kept for observation and further studies. Review of Systems -    Denies headache, shortness of breath or nausea  No chest pain at present  Having left shoulder pain following transport from E today. Got tylenol which helped.    Concerned about \"lumps\" in her calf      Objective:   VS:   Visit Vitals    /71 (BP 1 Location: Right arm, BP Patient Position: At rest)    Pulse 71    Temp 97.7 °F (36.5 °C)    Resp 18    Ht 5' 7\" (1.702 m)    Wt 110.1 kg (242 lb 11.2 oz)    SpO2 96%    Breastfeeding No    BMI 38.01 kg/m2      Tmax/24hrs: Temp (24hrs), Av.1 °F (36.7 °C), Min:97.7 °F (36.5 °C), Max:98.8 °F (37.1 °C)       Intake/Output Summary (Last 24 hours) at 18 1711  Last data filed at 18 0700   Gross per 24 hour   Intake                0 ml   Output              450 ml   Net             -450 ml       General: alert, oriented x 3, speech clear  HEENT:LETITIA, conjunctiva clear, no JVD  Cardiovascular: reg rate 88 with ectopi   Pulmonary: clear to bases, no wheeze   GI:  Soft, BS+, non tender  Extremities: + calf tenderness bilateral, poor turgor, no erythema or edema    Labs:    Recent Results (from the past 24 hour(s))   GLUCOSE, POC    Collection Time: 18  9:46 PM   Result Value Ref Range    Glucose (POC) 162 (H) 70 - 146 mg/dL   METABOLIC PANEL, COMPREHENSIVE    Collection Time: 18  4:12 AM   Result Value Ref Range    Sodium 142 136 - 145 mmol/L    Potassium 3.0 (L) 3.5 - 5.5 mmol/L    Chloride 105 100 - 108 mmol/L    CO2 28 21 - 32 mmol/L    Anion gap 9 3.0 - 18 mmol/L    Glucose 82 74 - 99 mg/dL    BUN 7 7.0 - 18 MG/DL    Creatinine 0.54 (L) 0.6 - 1.3 MG/DL    BUN/Creatinine ratio 13 12 - 20      GFR est AA >60 >60 ml/min/1.73m2    GFR est non-AA >60 >60 ml/min/1.73m2    Calcium 8.8 8.5 - 10.1 MG/DL    Bilirubin, total 0.7 0.2 - 1.0 MG/DL    ALT (SGPT) 10 (L) 13 - 56 U/L    AST (SGOT) 11 (L) 15 - 37 U/L    Alk. phosphatase 126 (H) 45 - 117 U/L    Protein, total 7.2 6.4 - 8.2 g/dL    Albumin 2.7 (L) 3.4 - 5.0 g/dL    Globulin 4.5 (H) 2.0 - 4.0 g/dL    A-G Ratio 0.6 (L) 0.8 - 1.7     MAGNESIUM    Collection Time: 09/08/18  4:12 AM   Result Value Ref Range    Magnesium 1.8 1.6 - 2.6 mg/dL   PHOSPHORUS    Collection Time: 09/08/18  4:12 AM   Result Value Ref Range    Phosphorus 3.0 2.5 - 4.9 MG/DL   TSH 3RD GENERATION    Collection Time: 09/08/18  4:12 AM   Result Value Ref Range    TSH 0.49 0.36 - 3.74 uIU/mL   CBC WITH AUTOMATED DIFF    Collection Time: 09/08/18  4:12 AM   Result Value Ref Range    WBC 5.1 4.6 - 13.2 K/uL    RBC 3.50 (L) 4.20 - 5.30 M/uL    HGB 10.5 (L) 12.0 - 16.0 g/dL    HCT 33.0 (L) 35.0 - 45.0 %    MCV 94.3 74.0 - 97.0 FL    MCH 30.0 24.0 - 34.0 PG    MCHC 31.8 31.0 - 37.0 g/dL    RDW 13.2 11.6 - 14.5 %    PLATELET 108 268 - 055 K/uL    MPV 10.8 9.2 - 11.8 FL    NEUTROPHILS 49 40 - 73 %    LYMPHOCYTES 37 21 - 52 %    MONOCYTES 10 3 - 10 %    EOSINOPHILS 4 0 - 5 %    BASOPHILS 0 0 - 2 %    ABS. NEUTROPHILS 2.5 1.8 - 8.0 K/UL    ABS. LYMPHOCYTES 1.9 0.9 - 3.6 K/UL    ABS. MONOCYTES 0.5 0.05 - 1.2 K/UL    ABS. EOSINOPHILS 0.2 0.0 - 0.4 K/UL    ABS.  BASOPHILS 0.0 0.0 - 0.1 K/UL    DF AUTOMATED     CARDIAC PANEL,(CK, CKMB & TROPONIN)    Collection Time: 09/08/18  4:12 AM   Result Value Ref Range    CK 66 26 - 192 U/L    CK - MB <1.0 <3.6 ng/ml    CK-MB Index  0.0 - 4.0 %     CALCULATION NOT PERFORMED WHEN RESULT IS BELOW LINEAR LIMIT    Troponin-I, Qt. 0.02 0.0 - 0.045 NG/ML   URINALYSIS W/ RFLX MICROSCOPIC    Collection Time: 09/08/18  7:35 AM   Result Value Ref Range Color YELLOW      Appearance TURBID      Specific gravity 1.017 1.005 - 1.030      pH (UA) 6.0 5.0 - 8.0      Protein 30 (A) NEG mg/dL    Glucose NEGATIVE  NEG mg/dL    Ketone NEGATIVE  NEG mg/dL    Bilirubin NEGATIVE  NEG      Blood NEGATIVE  NEG      Urobilinogen 1.0 0.2 - 1.0 EU/dL    Nitrites NEGATIVE  NEG      Leukocyte Esterase LARGE (A) NEG     URINE MICROSCOPIC ONLY    Collection Time: 09/08/18  7:35 AM   Result Value Ref Range    WBC TOO NUMEROUS TO COUNT 0 - 4 /hpf    RBC NEGATIVE  0 - 5 /hpf    Epithelial cells 1+ 0 - 5 /lpf    Bacteria 4+ (A) NEG /hpf   EKG, 12 LEAD, SUBSEQUENT    Collection Time: 09/08/18  7:48 AM   Result Value Ref Range    Ventricular Rate 84 BPM    Atrial Rate 84 BPM    P-R Interval 140 ms    QRS Duration 86 ms    Q-T Interval 414 ms    QTC Calculation (Bezet) 489 ms    Calculated P Axis 66 degrees    Calculated R Axis -8 degrees    Calculated T Axis 11 degrees    Diagnosis       Sinus rhythm with premature atrial complexes  Nonspecific ST and T wave abnormality  Prolonged QT  Abnormal ECG  When compared with ECG of 06-SEP-2018 14:10,  No significant change was found  Confirmed by Hilario Bowles MD, Will Rinaldi ([id number]) on 9/8/2018 4:12:12 PM     GLUCOSE, POC    Collection Time: 09/08/18 11:03 AM   Result Value Ref Range    Glucose (POC) 121 (H) 70 - 110 mg/dL   GLUCOSE, POC    Collection Time: 09/08/18  3:34 PM   Result Value Ref Range    Glucose (POC) 109 70 - 110 mg/dL       Imaging:  No results found. 9/7/18 preliminary echo report:  · Technically difficult study with poor endocardial visualization. Definity contrast was given to enhance imaging. · Left ventricular mild-to-moderately decreased systolic function. Estimated left ventricular ejection fraction is 41 - 45%. Visually measured ejection fraction. Left ventricular mild concentric hypertrophy. Left ventricular global hypokinesis.     9/7/2018 doppler BLE:  · No evidence of acute deep vein thrombosis in the right common femoral, superficial femoral, popliteal, posterior tibial, and peroneal veins. The veins were imaged in the transverse and longitudinal planes. The vessels showed normal color filling and compressibility. Doppler interrogation showed phasic and spontaneous flow. · Subacute non-occlusive thrombus present in the left popliteal vein.      Subacute nonocclusive DVT left popliteal vein. Left femoral vein with no DVT. Right lower extremity with no evidence of DVT      Assessment/Plan:      Hospital Problems  Date Reviewed: 8/21/2018          Codes Class Noted POA    Deep vein thrombosis (DVT) of popliteal vein of left lower extremity ([facility] ICD-10-CM: H37.933  ICD-9-CM: 453.41  9/8/2018 Yes    Overview Signed 9/8/2018  6:12 AM by Dana Aly DO     Subacute nonocclusive DVT left popliteal vein 9/7/2018               * (Principal)Chest pain ICD-10-CM: R07.9  ICD-9-CM: 786.50  2/6/2017 Yes        Coronary atherosclerosis of native coronary artery ICD-10-CM: I25.10  ICD-9-CM: 414.01  Unknown Yes        Chronic diastolic heart failure ([facility] ICD-10-CM: I50.32  ICD-9-CM: 428.32  Unknown Yes    Overview Signed 5/9/2013  8:58 AM by Raul Melendez     Stable, edema better, uses PRN Lasix             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 Continue current Lantus add POC glucose and coverage  TTE reviewed.    Discussed with cardiology- ok for d/c from their standpoint    Additional Notes:      Full Code    Disposition:   Home with home health in am    Case discussed with:  [x]Patient  []Family  []Nursing  []Case Management  DVT Prophylaxis:  []Lovenox  []Hep SQ  []SCDs  []Coumadin   []On Heparin gtt PLAVIX and ASA    Signed By: Yvrose Mcallister MD     September 8, 2018 6:25 PM

## 2018-09-09 ENCOUNTER — HOME HEALTH ADMISSION (OUTPATIENT)
Dept: HOME HEALTH SERVICES | Facility: HOME HEALTH | Age: 83
End: 2018-09-09
Payer: MEDICARE

## 2018-09-09 VITALS
WEIGHT: 243.6 LBS | BODY MASS INDEX: 38.24 KG/M2 | HEIGHT: 67 IN | SYSTOLIC BLOOD PRESSURE: 149 MMHG | TEMPERATURE: 97.4 F | HEART RATE: 94 BPM | OXYGEN SATURATION: 96 % | RESPIRATION RATE: 20 BRPM | DIASTOLIC BLOOD PRESSURE: 83 MMHG

## 2018-09-09 LAB
ECHO LV INTERNAL DIMENSION DIASTOLIC: 4.23 CM (ref 3.9–5.3)
ECHO LV INTERNAL DIMENSION SYSTOLIC: 3.39 CM
ECHO LV IVSD: 1.2 CM (ref 0.6–0.9)
ECHO LV MASS 2D: 216.9 G (ref 67–162)
ECHO LV MASS INDEX 2D: 98.4 G/M2
ECHO LV POSTERIOR WALL DIASTOLIC: 1.24 CM (ref 0.6–0.9)
ECHO LVOT PEAK GRADIENT: 3.6 MMHG
ECHO LVOT PEAK VELOCITY: 95.43 CM/S
ECHO LVOT VTI: 17.55 CM
GLUCOSE BLD STRIP.AUTO-MCNC: 130 MG/DL (ref 70–110)
GLUCOSE BLD STRIP.AUTO-MCNC: 71 MG/DL (ref 70–110)

## 2018-09-09 PROCEDURE — 74011250637 HC RX REV CODE- 250/637: Performed by: HOSPITALIST

## 2018-09-09 PROCEDURE — 96372 THER/PROPH/DIAG INJ SC/IM: CPT

## 2018-09-09 PROCEDURE — 74011250636 HC RX REV CODE- 250/636: Performed by: INTERNAL MEDICINE

## 2018-09-09 PROCEDURE — 74011636637 HC RX REV CODE- 636/637: Performed by: INTERNAL MEDICINE

## 2018-09-09 PROCEDURE — G0008 ADMIN INFLUENZA VIRUS VAC: HCPCS

## 2018-09-09 PROCEDURE — 90686 IIV4 VACC NO PRSV 0.5 ML IM: CPT | Performed by: INTERNAL MEDICINE

## 2018-09-09 PROCEDURE — 99218 HC RM OBSERVATION: CPT

## 2018-09-09 PROCEDURE — 74011250636 HC RX REV CODE- 250/636: Performed by: HOSPITALIST

## 2018-09-09 PROCEDURE — 74011000250 HC RX REV CODE- 250: Performed by: INTERNAL MEDICINE

## 2018-09-09 PROCEDURE — 74011250637 HC RX REV CODE- 250/637: Performed by: INTERNAL MEDICINE

## 2018-09-09 PROCEDURE — 82962 GLUCOSE BLOOD TEST: CPT

## 2018-09-09 PROCEDURE — 90471 IMMUNIZATION ADMIN: CPT

## 2018-09-09 RX ORDER — ENOXAPARIN SODIUM 150 MG/ML
1 INJECTION SUBCUTANEOUS EVERY 12 HOURS
Qty: 10.4 ML | Refills: 0 | Status: SHIPPED | OUTPATIENT
Start: 2018-09-09 | End: 2018-09-16

## 2018-09-09 RX ORDER — RANOLAZINE 500 MG/1
500 TABLET, EXTENDED RELEASE ORAL 2 TIMES DAILY
Qty: 60 TAB | Refills: 0 | Status: SHIPPED | OUTPATIENT
Start: 2018-09-09 | End: 2018-09-10 | Stop reason: SDUPTHER

## 2018-09-09 RX ORDER — RANOLAZINE 500 MG/1
500 TABLET, EXTENDED RELEASE ORAL 2 TIMES DAILY
Status: DISCONTINUED | OUTPATIENT
Start: 2018-09-09 | End: 2018-09-09 | Stop reason: HOSPADM

## 2018-09-09 RX ADMIN — INSULIN GLARGINE 30 UNITS: 100 INJECTION, SOLUTION SUBCUTANEOUS at 09:32

## 2018-09-09 RX ADMIN — CYANOCOBALAMIN TAB 1000 MCG 1000 MCG: 1000 TAB at 08:42

## 2018-09-09 RX ADMIN — INFLUENZA VIRUS VACCINE 0.5 ML: 15; 15; 15; 15 SUSPENSION INTRAMUSCULAR at 12:11

## 2018-09-09 RX ADMIN — ASPIRIN 81 MG: 81 TABLET, COATED ORAL at 08:42

## 2018-09-09 RX ADMIN — Medication 250 MG: at 08:42

## 2018-09-09 RX ADMIN — VITAMIN D, TAB 1000IU (100/BT) 5000 UNITS: 25 TAB at 08:42

## 2018-09-09 RX ADMIN — AMLODIPINE BESYLATE 10 MG: 10 TABLET ORAL at 08:42

## 2018-09-09 RX ADMIN — FAMOTIDINE 20 MG: 20 TABLET ORAL at 08:42

## 2018-09-09 RX ADMIN — POTASSIUM CHLORIDE 20 MEQ: 1.5 POWDER, FOR SOLUTION ORAL at 08:42

## 2018-09-09 RX ADMIN — FERROUS SULFATE TAB 325 MG (65 MG ELEMENTAL FE) 325 MG: 325 (65 FE) TAB at 08:43

## 2018-09-09 RX ADMIN — ENOXAPARIN SODIUM 109.5 MG: 120 INJECTION SUBCUTANEOUS at 08:42

## 2018-09-09 RX ADMIN — ACETAMINOPHEN 650 MG: 325 TABLET ORAL at 04:36

## 2018-09-09 RX ADMIN — DOCUSATE SODIUM 100 MG: 100 CAPSULE, LIQUID FILLED ORAL at 08:41

## 2018-09-09 RX ADMIN — CLOPIDOGREL BISULFATE 75 MG: 75 TABLET ORAL at 08:42

## 2018-09-09 RX ADMIN — LEVOTHYROXINE SODIUM 112 MCG: 112 TABLET ORAL at 08:42

## 2018-09-09 RX ADMIN — RANOLAZINE 500 MG: 500 TABLET, FILM COATED, EXTENDED RELEASE ORAL at 09:32

## 2018-09-09 RX ADMIN — Medication 10 ML: at 08:44

## 2018-09-09 RX ADMIN — Medication 1 CAPSULE: at 08:42

## 2018-09-09 NOTE — DISCHARGE SUMMARY
Discharge Summary    Patient: Zaynab Jules MRN: [id number]  CSN: [id number]    YOB: 1935  Age: 82 y.o. Sex: female    DOA: 9/6/2018 LOS:  LOS: 0 days   Discharge Date:      Admission Diagnoses: Chest pain    Discharge Diagnoses:    Deep vein thrombosis (DVT) of popliteal vein of left lower extremity ([address] ICD-10-CM: P13.321  ICD-9-CM: 453.41   9/8/2018 Yes      Overview Signed 9/8/2018  6:12 AM by Lili Ferrara DO       Subacute nonocclusive DVT left popliteal vein 9/7/2018                  * (Principal)Chest pain ICD-10-CM: R07.9  ICD-9-CM: 786.50   2/6/2017 Yes           Coronary atherosclerosis of native coronary artery ICD-10-CM: I25.10  ICD-9-CM: 414.01   Unknown Yes           Chronic diastolic heart failure (HCC) ICD-10-CM: I50.32  ICD-9-CM: 428.32   Unknown Yes     Overview Signed 5/9/2013  8:58 AM by Keenan Pitt       Stable, edema better, uses PRN Lasix               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, with long-term current use of insulin (HCC) ICD-10-CM: E11.59, Z79.4  ICD-9-CM: 250.70, V58.67   12/4/2012 Yes             Discharge Condition: Stable    PHYSICAL EXAM  Visit Vitals    /80 (BP 1 Location: Right arm, BP Patient Position: Lying left side)    Pulse 81    Temp 97.7 °F (36.5 °C)    Resp 20    Ht 5' 7\" (1.702 m)    Wt 110.5 kg (243 lb 9.6 oz)    SpO2 95%    Breastfeeding No    BMI 38.15 kg/m2       General: alert, oriented x 3, speech clear  HEENT:LETITIA, conjunctiva clear, no JVD  Cardiovascular: reg rate 88 with ectopi   Pulmonary: clear to bases, no wheeze   GI:  Soft, BS+, non tender  Extremities: + calf tenderness bilateral, poor turgor, no erythema or edema    Hospital Course: This is a 82 y.o.    female with a medical history significant for CAD, s/p PCI, CHF, diastolic, DM2, GERD, HTN, hypothyroidism and HLD who presents to [facility] ED with a 1 week history of left arm spasm that she states radiated to the left chest wall. She states that the pain is intermittent, and nothing makes it better or worse. She states that she has had a cough with what she felt was a chest cold but the spasms are not related to the cough. (-) F/C (-) N/V.     Cardiology evaluated patient for chest pain. Ranexa was added to her regimen. TTE revealed left ventricular ejection fraction is 41 - 45%. Visually measured ejection fraction. Left ventricular mild concentric hypertrophy. Left ventricular global hypokinesis. Cardiac enzymes were negative. She is to f/u with cardilogy in 2-4 weeks. Of note, she recently had a left distal femur fracture, s/p repair and was in rehab until ~ 2 weeks ago. She is still non weight bearing and moves around her home in a wheelchair and pivots from bed to bedside commode on her right leg. She was on Lovenox after surgery  And while in rehab, but was not continued on it on d/c from rehab. She was noted to have subacute popliteal DVT in the left leg. She was started on Lovenox at 1mg/kg daily. Home health ordered for teaching and medication administration. VQ scan low probability for PE. CXR without concerning findings. Consults:   Cardiology:  Dr. Esteban Funk    Significant Diagnostic Studies:    9/6 CXR: IMPRESSION:  Cardiomegaly without acute findings.     9/7 TTE:   Left Ventricle  Normal cavity size. Mild concentric hypertrophy observed. There is mild-to-moderately decreased systolic function. The estimated ejection fraction is 41 - 45%. Visually measured ejection fraction. Global hypokinesis observed.       Pericardium  No evidence of pericardial effusion       9/7 VQ scan: Low probability for pulmonary embolism. .    9/7 LE doppler US:    Right Lower Venous        No evidence of deep vein thrombosis in the common femoral, profunda femoral, superficial femoral, popliteal, posterior tibial, and peroneal veins. The veins were imaged in the transverse and longitudinal planes.  The vessels showed normal color filling and compressibility. Doppler interrogation showed phasic and spontaneous flow.          Left Lower Venous        Technically difficult exam due to: inability to externally rotate leg. Subacute non-occlusive thrombus present in the left popliteal vein. The distal femoral, posterior tibial and peroneal vein(s) are grossly patent but cannot exclude non-occlusive thrombus. The common femoral, greater saphenous, proximal femoral and mid femoral vein(s) were imaged in the transverse and longitudinal planes. The vessels showed normal color filling and compressibility. Doppler interrogation of the veins showed phasic and spontaneous flow. The distal femoris, posterior tibial and peroneal vein(s) were not well visualized. Discharge Medications:     Current Discharge Medication List      START taking these medications    Details   ranolazine ER (RANEXA) 500 mg SR tablet Take 1 Tab by mouth two (2) times a day. Qty: 60 Tab, Refills: 0      enoxaparin (LOVENOX) 120 mg/0.8 mL injection 110.1 mg by SubCUTAneous route every twelve (12) hours every twelve (12) hours for 7 days. Indications: deep venous thrombosis  Qty: 10.4 mL, Refills: 0         CONTINUE these medications which have NOT CHANGED    Details   cyanocobalamin ER 1,000 mcg tablet Take 1 Tab by mouth daily. Qty: 30 Tab, Refills: 3    Associated Diagnoses: Weakness; Macrocytosis      capsaicin 0.075 % topical cream Apply  to affected area three (3) times daily. Qty: 60 g, Refills: 0      lidocaine (ASPERCREME, LIDOCAINE,) 4 % patch 1 Patch by TransDERmal route every eight (8) hours. amLODIPine (NORVASC) 10 mg tablet Take 10 mg by mouth daily. potassium chloride SR (KLOR-CON 10) 10 mEq tablet Take 10 mEq by mouth daily as needed (muscle spasms, with Lasix). ferrous sulfate 325 mg (65 mg iron) tablet Take 325 mg by mouth Daily (before breakfast).       ascorbic acid, vitamin C, (VITAMIN C) 250 mg tablet Take 250 mg by mouth daily.      clopidogrel (PLAVIX) 75 mg tab Take 75 mg by mouth daily. acetaminophen (TYLENOL ARTHRITIS PAIN) 650 mg TbER Take 650 mg by mouth every eight (8) hours. Indications: Fever, Pain      lidocaine (LIDODERM) 5 % 1 Patch by TransDERmal route every twenty-four (24) hours. Qty: 90 Each, Refills: 1    Associated Diagnoses: Diabetic peripheral neuropathy ([address] Neuropathic pain      PROAIR HFA 90 mcg/actuation inhaler INHALE 1 PUFF BY MOUTH EVERY 4 HOURS AS NEEDED FOR WHEEZING OR SHORTNESS OF BREATH  Qty: 1 Inhaler, Refills: 3    Associated Diagnoses: Moderate intermittent asthma, with acute exacerbation      furosemide (LASIX) 20 mg tablet Use daily as needed for leg swelling  Qty: 30 Tab, Refills: 2    Associated Diagnoses: Bilateral lower extremity edema      levothyroxine (SYNTHROID) 75 mcg tablet Take 1 Tab by mouth Daily (before breakfast). Qty: 30 Tab, Refills: 0    Associated Diagnoses: Hypothyroidism, unspecified type      insulin glargine (LANTUS) 100 unit/mL injection Take 15 units every morning  Indications: type 2 diabetes mellitus  Qty: 1 Vial, Refills: 0      famotidine (PEPCID) 20 mg tablet Take 20 mg by mouth as needed (reflux and or indigestion). montelukast (SINGULAIR) 10 mg tablet Take 1 Tab by mouth daily as needed. Indications: ALLERGIC RHINITIS  Qty: 30 Tab, Refills: 3    Associated Diagnoses: Uncomplicated asthma, unspecified asthma severity      DOCOSAHEXANOIC ACID/EPA (FISH OIL PO) Take 1,000 mg by mouth two (2) times a day. aspirin delayed-release 81 mg tablet Take 81 mg by mouth daily. cholecalciferol, vitamin d3, (VITAMIN D) 1,000 unit tablet Take 5,000 Units by mouth two (2) times a day.          STOP taking these medications       enoxaparin (LOVENOX) 30 mg/0.3 mL injection Comments:   Reason for Stopping:         enoxaparin (LOVENOX) 30 mg/0.3 mL injection Comments:   Reason for Stopping:               Activity: activity as tolerated    Diet: Diabetic Diet    Wound Care: None needed    Follow-up: with PCP, Shi Caldera DO in 7-10days   - Dr. Manjeet Howe 2-4 weeks   - repeat Doppler US of left LE in 2 weeks    Minutes spent on discharge: >30 minutes spent coordinating this discharge (review instructions/follow-up, prescriptions, preparing report for sign off)    Disposition: home with home health

## 2018-09-09 NOTE — ROUTINE PROCESS
Bedside and Verbal shift change report given JEANNIE Wei (oncoming nurse) by Michael Heaton RN (offgoing nurse). Report included the following information SBAR, Kardex, Accordion and Cardiac Rhythm SR with PVCs.

## 2018-09-09 NOTE — ROUTINE PROCESS
Bedside and Verbal shift change report given to Nikunj Ponce RN (oncoming nurse) by Barron Rodriguez RN (offgoing nurse). Report included the following information SBAR, Kardex and MAR. Patient had no acute events overnight. Currently resting in NAD. No express needs reported at this time.

## 2018-09-09 NOTE — PROGRESS NOTES
Problem: Falls - Risk of  Goal: *Absence of Falls  Document Terence Fall Risk and appropriate interventions in the flowsheet. Outcome: Progressing Towards Goal  Fall Risk Interventions:  Mobility Interventions: Assess mobility with egress test, Communicate number of staff needed for ambulation/transfer, Patient to call before getting OOB, PT Consult for mobility concerns, PT Consult for assist device competence, Utilize walker, cane, or other assistive device         Medication Interventions: Assess postural VS orthostatic hypotension, Patient to call before getting OOB, Teach patient to arise slowly    Elimination Interventions: Bed/chair exit alarm, Call light in reach, Patient to call for help with toileting needs, Toileting schedule/hourly rounds    History of Falls Interventions: Consult care management for discharge planning, Door open when patient unattended, Room close to nurse's station        Problem: Pressure Injury - Risk of  Goal: *Prevention of pressure injury  Document Nilton Scale and appropriate interventions in the flowsheet. Outcome: Progressing Towards Goal  Pressure Injury Interventions:             Activity Interventions: Increase time out of bed, Pressure redistribution bed/mattress(bed type)    Mobility Interventions: Pressure redistribution bed/mattress (bed type)    Nutrition Interventions: Document food/fluid/supplement intake    Friction and Shear Interventions: Apply protective barrier, creams and emollients, Foam dressings/transparent film/skin sealants

## 2018-09-09 NOTE — PROGRESS NOTES
This pt returning home with home health services with [facility]. Liaison contacted and pt placed in the referral que for services. Pt reports that she has personal care services in the home through [facility]. Pt will be transported home by family members.

## 2018-09-10 ENCOUNTER — HOME CARE VISIT (OUTPATIENT)
Dept: HOME HEALTH SERVICES | Facility: HOME HEALTH | Age: 83
End: 2018-09-10

## 2018-09-10 ENCOUNTER — HOME CARE VISIT (OUTPATIENT)
Dept: SCHEDULING | Facility: HOME HEALTH | Age: 83
End: 2018-09-10
Payer: MEDICARE

## 2018-09-10 ENCOUNTER — PATIENT OUTREACH (OUTPATIENT)
Dept: FAMILY MEDICINE CLINIC | Age: 83
End: 2018-09-10

## 2018-09-10 VITALS
OXYGEN SATURATION: 96 % | RESPIRATION RATE: 18 BRPM | HEART RATE: 98 BPM | DIASTOLIC BLOOD PRESSURE: 60 MMHG | TEMPERATURE: 97.8 F | SYSTOLIC BLOOD PRESSURE: 136 MMHG

## 2018-09-10 PROCEDURE — 3331090002 HH PPS REVENUE DEBIT

## 2018-09-10 PROCEDURE — 3331090001 HH PPS REVENUE CREDIT

## 2018-09-10 PROCEDURE — G0299 HHS/HOSPICE OF RN EA 15 MIN: HCPCS

## 2018-09-10 PROCEDURE — 400013 HH SOC

## 2018-09-10 RX ORDER — RANOLAZINE 500 MG/1
500 TABLET, EXTENDED RELEASE ORAL 2 TIMES DAILY
Qty: 60 TAB | Refills: 5 | Status: SHIPPED | OUTPATIENT
Start: 2018-09-10 | End: 2019-01-17 | Stop reason: SDUPTHER
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
## 2018-09-11 ENCOUNTER — HOME CARE VISIT (OUTPATIENT)
Dept: HOME HEALTH SERVICES | Facility: HOME HEALTH | Age: 83
End: 2018-09-11
Payer: MEDICARE

## 2018-09-11 ENCOUNTER — TELEPHONE (OUTPATIENT)
Dept: FAMILY MEDICINE CLINIC | Age: 83
End: 2018-09-11

## 2018-09-11 ENCOUNTER — HOME CARE VISIT (OUTPATIENT)
Dept: SCHEDULING | Facility: HOME HEALTH | Age: 83
End: 2018-09-11
Payer: MEDICARE

## 2018-09-11 VITALS
OXYGEN SATURATION: 95 % | RESPIRATION RATE: 18 BRPM | HEART RATE: 95 BPM | TEMPERATURE: 96.8 F | SYSTOLIC BLOOD PRESSURE: 130 MMHG | DIASTOLIC BLOOD PRESSURE: 70 MMHG

## 2018-09-11 PROCEDURE — 3331090002 HH PPS REVENUE DEBIT

## 2018-09-11 PROCEDURE — 3331090001 HH PPS REVENUE CREDIT

## 2018-09-11 PROCEDURE — G0299 HHS/HOSPICE OF RN EA 15 MIN: HCPCS

## 2018-09-11 NOTE — TELEPHONE ENCOUNTER
Received a call from the nurse navigator- Dashawn Álvarez at [facility] and she states that the patient was recently in the ED and was diagnosed with a DVT and that the provider in the ED has prescribed Lovenox for the patient to self administer at home but the patients pharmacy will not fill the prescription as they are not comfortable with the dosage ordered and feel as though the patient will be able to safely at home. I have asked NILS ROBLERO if she would be willing to write a new prescription for the as she is scheduled to see NILS ROBLERO this coming Thursday and she answered no as she is not familiar with the patient and did not feel comfortable without having examining the patient first. I have relayed the answer to  Your and she states that she would attempt to reach out to the provider in the ED again.

## 2018-09-12 ENCOUNTER — OFFICE VISIT (OUTPATIENT)
Dept: ORTHOPEDIC SURGERY | Facility: CLINIC | Age: 83
End: 2018-09-12

## 2018-09-12 ENCOUNTER — HOME CARE VISIT (OUTPATIENT)
Dept: SCHEDULING | Facility: HOME HEALTH | Age: 83
End: 2018-09-12
Payer: MEDICARE

## 2018-09-12 VITALS
HEART RATE: 98 BPM | DIASTOLIC BLOOD PRESSURE: 82 MMHG | OXYGEN SATURATION: 96 % | TEMPERATURE: 98.2 F | SYSTOLIC BLOOD PRESSURE: 130 MMHG

## 2018-09-12 VITALS
SYSTOLIC BLOOD PRESSURE: 133 MMHG | RESPIRATION RATE: 16 BRPM | DIASTOLIC BLOOD PRESSURE: 73 MMHG | TEMPERATURE: 98.4 F | BODY MASS INDEX: 37.83 KG/M2 | WEIGHT: 241 LBS | HEIGHT: 67 IN | OXYGEN SATURATION: 91 % | HEART RATE: 87 BPM

## 2018-09-12 DIAGNOSIS — M79.605 LEFT LEG PAIN: Primary | ICD-10-CM

## 2018-09-12 DIAGNOSIS — Z96.659 PERIPROSTHETIC SUPRACONDYLAR FRACTURE OF FEMUR, SUBSEQUENT ENCOUNTER: ICD-10-CM

## 2018-09-12 DIAGNOSIS — M97.8XXD PERIPROSTHETIC SUPRACONDYLAR FRACTURE OF FEMUR, SUBSEQUENT ENCOUNTER: ICD-10-CM

## 2018-09-12 DIAGNOSIS — Z95.5 S/P CORONARY ARTERY STENT PLACEMENT: ICD-10-CM

## 2018-09-12 PROCEDURE — 3331090002 HH PPS REVENUE DEBIT

## 2018-09-12 PROCEDURE — 3331090001 HH PPS REVENUE CREDIT

## 2018-09-12 PROCEDURE — G0151 HHCP-SERV OF PT,EA 15 MIN: HCPCS

## 2018-09-12 NOTE — PROGRESS NOTES
HISTORY OF PRESENT ILLNESS:  Dariusz Marroquin returns. We are following her for recoveries from a fall resulting in a severe, left knee periprosthetic fracture resulting in a supracondylar plate placement. She is at home currently. She is nonweightbearing of the left lower extremity. Physical therapy has been discontinued awaiting weightbearing status. Her date of surgery was June 12, 2018. The patient is using a bone growth stimulator twice daily associated with either side of the femur on the left. The patient was recently admitted to [facility]"" for chest pain. She was found to have a subacute, nonocclusive thrombus of the left popliteus vein. She is currently on Lovenox b.i.d. of 110 mg. She was also on Plavix previously. PHYSICAL EXAM:  Her wound is healed over the lateral aspect of her left leg distal lateral femur. She has guarded range of motion at 90-10° today. There is crepitation through ranging. There is no instability on forced varus or valgus stressing. Distal sensation is intact fully to the right lower extremity. Capillary refill is brisk and less than 2 seconds to the right lower extremity. RADIOGRAPHS:  The AP and lateral imaging today reveal total knee replacement in place with anatomical alignment. Of the distal, left femur, there is a long, supracondylar, lateral plating construct. All screws are intact with no broken hardware. There is a diagonal fracture associated through from the distal third of the femoral shaft extending to the lateral condyle of the left knee. There is evidence of callus inlay associated of the proximal portion of the fracture. Alignment is acceptable. PLAN:   The patient has been toe-touch weightbearing at home, which she will continue. We will start physical therapy for general gait training with a four-post rolling walker, and we will see her back in about six weeks and re-x-ray at next office visit.   She will continue with the bone growth stimulator.

## 2018-09-13 ENCOUNTER — OFFICE VISIT (OUTPATIENT)
Dept: FAMILY MEDICINE CLINIC | Age: 83
End: 2018-09-13

## 2018-09-13 ENCOUNTER — HOME CARE VISIT (OUTPATIENT)
Dept: HOME HEALTH SERVICES | Facility: HOME HEALTH | Age: 83
End: 2018-09-13
Payer: MEDICARE

## 2018-09-13 VITALS
TEMPERATURE: 99 F | SYSTOLIC BLOOD PRESSURE: 144 MMHG | DIASTOLIC BLOOD PRESSURE: 74 MMHG | OXYGEN SATURATION: 95 % | HEART RATE: 90 BPM

## 2018-09-13 VITALS
HEIGHT: 67 IN | SYSTOLIC BLOOD PRESSURE: 128 MMHG | DIASTOLIC BLOOD PRESSURE: 78 MMHG | RESPIRATION RATE: 16 BRPM | HEART RATE: 103 BPM | TEMPERATURE: 98.7 F

## 2018-09-13 DIAGNOSIS — I82.432 EMBOLISM AND THROMBOSIS OF LEFT POPLITEAL VEIN (HCC): Primary | ICD-10-CM

## 2018-09-13 DIAGNOSIS — Z09 HOSPITAL DISCHARGE FOLLOW-UP: ICD-10-CM

## 2018-09-13 PROCEDURE — 3331090001 HH PPS REVENUE CREDIT

## 2018-09-13 PROCEDURE — G0157 HHC PT ASSISTANT EA 15: HCPCS

## 2018-09-13 PROCEDURE — 3331090002 HH PPS REVENUE DEBIT

## 2018-09-13 RX ORDER — CLOPIDOGREL BISULFATE 75 MG/1
TABLET ORAL
Qty: 30 TAB | Refills: 0 | Status: SHIPPED | OUTPATIENT
Start: 2018-09-13 | End: 2018-10-10 | Stop reason: SDUPTHER

## 2018-09-13 NOTE — PROGRESS NOTES
Chief Complaint   Patient presents with   [facility] Follow Up     patient was seen in the ER for chest and admitted to  on 9/6/18. She was dx with a DVT. she was discharged on 9/8/18. Health Maintenance Due   Topic Date Due    Pneumococcal 65+ Low/Medium Risk (2 of 2 - PPSV23) 12/02/2017    EYE EXAM RETINAL OR DILATED Q1  10/12/2018       Health Maintenance reviewed       1. Have you been to the ER, urgent care clinic since your last visit? Hospitalized since your last visit? Yes When: 9/18 Where:  Reason for visit: DVT    2. Have you seen or consulted any other health care providers outside of the [facility] since your last visit? Include any pap smears or colon screening.  No      Learning screening updated
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
## 2018-09-13 NOTE — MR AVS SNAPSHOT
Amarjit Bryson 
 
 
 [address] [address] [place] [id number] [phone] Patient: Katy Mcclelaln MRN: [id number] Visit Information Date & Time Provider Department Dept. Phone Encounter #  
 9/13/2018  1:00 PM Meetamggy Mayberry, NP Carry Sylvester Carias 77 [id number] Your Appointments 9/27/2018 11:30 AM  
Office Visit with Saumya Zapien MD  
[facility] ([facility]) Appt Note: h/f  
 [address] [facility] [address] [facility] [address]  
  
   
 [address] [facility]  
  
    
 10/1/2018 10:45 AM  
Follow Up with Aliya Abebe MD  
[address] [place] [address] (--) Appt Note: fernando  
 [address] [facility] [id number] [phone]  
  
   
 [address] [place] [id number]  
  
    
 10/10/2018 10:15 AM  
Follow Up with Kathy Landry PA-C  
[facility] ([facility]) Appt Note: 4 wk f/u  
 [address] [facility], [address] [place] [address]  
[phone]  
  
   
 [address] [facility], [address] [place] [address] Road [facility] Upcoming Health Maintenance Date Due Pneumococcal 65+ Low/Medium Risk (2 of 2 - PPSV23) 12/2/2017 EYE EXAM RETINAL OR DILATED Q1 10/12/2018 FOOT EXAM Q1 10/24/2018 MICROALBUMIN Q1 10/24/2018 MEDICARE YEARLY EXAM 10/26/2018 HEMOGLOBIN A1C Q6M 3/7/2019 LIPID PANEL Q1 9/7/2019 GLAUCOMA SCREENING Q2Y 10/12/2019 DTaP/Tdap/Td series (2 - Td) 11/15/2023 Allergies as of 9/13/2018  Review Complete On: 9/12/2018 By: Charla Snellen Severity Noted Reaction Type Reactions Niacin High 03/26/2013    Palpitations, Other (comments) Stomach irritation Ace Inhibitors  05/19/2010    Cough Avapro [Irbesartan]  05/19/2010    Myalgia Bystolic [Nebivolol]  62/13/5217    Other (comments) Felt like throat closing Catapres [Clonidine]  05/19/2010    Cough Codeine  05/19/2010    Nausea and Vomiting Cozaar [Losartan]    Not Reported This Time Crestor [Rosuvastatin]  05/19/2010    Other (comments) Cramps, aches Tiffanie Simmons [Propoxyphene N-acetaminophen]  05/19/2010    Unknown (comments) Diovan [Valsartan]  05/19/2010    Cough Flagyl [Metronidazole]  05/19/2010    Other (comments) Mouth and throat irritation Gabapentin  03/16/2016    Other (comments) Abdominal pain and burning Iodinated Contrast- Oral And Iv Dye    Other (comments) Throat swelling Iodine    Unknown (comments) Lescol [Fluvastatin]  05/19/2010    Other (comments) Leg cramps Lipitor [Atorvastatin]  05/19/2010    Myalgia, Other (comments) Cramps, aches Lovastatin  05/19/2010    Other (comments) Leg cramps Nexium [Esomeprazole Magnesium]  05/20/2010    Other (comments) Stomach upset, burning Pravachol [Pravastatin]  05/19/2010    Other (comments) Leg cramps Reglan [Metoclopramide]  05/19/2010    Nausea Only Trazodone  05/19/2010    Other (comments) Patient states she feels drugged Zetia [Ezetimibe]  05/19/2010    Other (comments) Cramps, aches Zocor [Simvastatin]  05/19/2010    Other (comments) Cramps, aches Current Immunizations  Reviewed on 2/7/2017 Name Date Influenza High Dose Vaccine PF 12/2/2016 Influenza Vaccine 12/16/2015 11:20 AM, 9/15/2014 Influenza Vaccine (Quad) PF 9/9/2018 12:11 PM  
 Influenza Vaccine Split 11/6/2012, 10/5/2010 Influenza Vaccine Whole 9/1/2009 Tdap 11/15/2013 Not reviewed this visit You Were Diagnosed With   
  
 Codes Comments Embolism and thrombosis of left popliteal vein (HCC)    -  Primary ICD-10-CM: K94.243 ICD-9-CM: 453.41 Hospital discharge follow-up     ICD-10-CM: [facility] ICD-9-CM: V67.59 Vitals BP Pulse Temp Resp Height(growth percentile) OB Status  128/78 (BP 1 Location: Left arm, BP Patient Position: Sitting) (!) 103 98.7 °F (37.1 °C) (Oral) 16 5' 7\" (1.702 m) Hysterectomy Smoking Status Former Smoker Vitals History Preferred Pharmacy Pharmacy Name Phone Tere Rodriguez [facility] Your Updated Medication List  
  
   
This list is accurate as of 9/13/18  1:52 PM.  Always use your most recent med list. amLODIPine 10 mg tablet Commonly known as:  [place] Take 10 mg by mouth daily. ascorbic acid (vitamin C) 250 mg tablet Commonly known as:  VITAMIN C Take 250 mg by mouth daily. aspirin delayed-release 81 mg tablet Take 81 mg by mouth daily. capsaicin 0.075 % topical cream  
Apply  to affected area three (3) times daily. clopidogrel 75 mg Tab Commonly known as:  PLAVIX TAKE ONE TABLET BY MOUTH DAILY  
  
 cyanocobalamin ER 1,000 mcg tablet Take 1 Tab by mouth daily. enoxaparin 120 mg/0.8 mL injection Commonly known as:  LOVENOX  
110.1 mg by SubCUTAneous route every twelve (12) hours every twelve (12) hours for 7 days. Indications: deep venous thrombosis  
  
 ferrous sulfate 325 mg (65 mg iron) tablet Take 325 mg by mouth Daily (before breakfast). FISH OIL PO Take 1,000 mg by mouth two (2) times a day. furosemide 20 mg tablet Commonly known as:  LASIX Use daily as needed for leg swelling  
  
 insulin glargine 100 unit/mL injection Commonly known as:  LANTUS U-100 INSULIN Take 15 units every morning  Indications: type 2 diabetes mellitus  
  
 levothyroxine 75 mcg tablet Commonly known as:  SYNTHROID Take 1 Tab by mouth Daily (before breakfast). * ASPERCREME (LIDOCAINE) 4 % patch Generic drug:  lidocaine 1 Patch by TransDERmal route every eight (8) hours. * lidocaine 5 % Commonly known as:  LIDODERM  
1 Patch by TransDERmal route every twenty-four (24) hours. montelukast 10 mg tablet Commonly known as:  SINGULAIR  
 Take 1 Tab by mouth daily as needed. Indications: ALLERGIC RHINITIS PEPCID 20 mg tablet Generic drug:  famotidine Take 20 mg by mouth as needed (reflux and or indigestion). potassium chloride SR 10 mEq tablet Commonly known as:  KLOR-CON 10 Take 10 mEq by mouth daily as needed (muscle spasms, with Lasix). PROAIR HFA 90 mcg/actuation inhaler Generic drug:  albuterol INHALE 1 PUFF BY MOUTH EVERY 4 HOURS AS NEEDED FOR WHEEZING OR SHORTNESS OF BREATH  
  
 ranolazine  mg SR tablet Commonly known as:  RANEXA Take 1 Tab by mouth two (2) times a day. TYLENOL ARTHRITIS PAIN 650 mg [place] Generic drug:  acetaminophen Take 650 mg by mouth every eight (8) hours. Indications: Fever, Pain VITAMIN D3 1,000 unit tablet Generic drug:  cholecalciferol Take 5,000 Units by mouth two (2) times a day. * Notice: This list has 2 medication(s) that are the same as other medications prescribed for you. Read the directions carefully, and ask your doctor or other care provider to review them with you. To-Do List   
 09/14/2018 To Be Determined Appointment with Winston Hoskins PT at [address] [place] [address] MED CTR  
  
 09/17/2018 To Be Determined Appointment with Winston Hoskins PT at [address] [place] [address] MED CTR  
  
 09/18/2018 To Be Determined Appointment with Winston Hoskins PT at [address] [place] [address] MED CTR  
  
 09/19/2018 To Be Determined Appointment with Winston Hoskins PT at [facility]  
  
 09/19/2018 To Be Determined Appointment with Tressa Vang LPN at [address] [place] [address] MED CTR  
  
 09/20/2018 To Be Determined Appointment with Winston Hoskins PT at [address] [place] [address] MED CTR  
  
 09/21/2018 To Be Determined Appointment with Gloria Vo PT at [address] [facility] MED CTR  
  
 09/26/2018 To Be Determined Appointment with Rufina Pascual LPN at [address] [facility] MED CTR  
  
 10/03/2018 To Be Determined Appointment with Rufina Pascual LPN at [address] Patient Instructions Please contact our office if you have any questions about your visit today. Introducing [facility]! Dear Birdie Smallwood: Thank you for requesting a [employer] account. Our records indicate that you already have an active [employer] account. You can access your account anytime at [email or web address] Did you know that you can access your hospital and ER discharge instructions at any time in [employer]? You can also review all of your test results from your hospital stay or ER visit. Additional Information If you have questions, please visit the Frequently Asked Questions section of the [employer] website at [email or web address] Remember, [employer] is NOT to be used for urgent needs. For medical emergencies, dial 911. Now available from your iPhone and Android! Please provide this summary of care documentation to your next provider. Your primary care clinician is listed as Aden Whitehead. If you have any questions after today's visit, please call [phone].

## 2018-09-14 ENCOUNTER — HOME CARE VISIT (OUTPATIENT)
Dept: SCHEDULING | Facility: HOME HEALTH | Age: 83
End: 2018-09-14
Payer: MEDICARE

## 2018-09-14 PROCEDURE — 3331090001 HH PPS REVENUE CREDIT

## 2018-09-14 PROCEDURE — 3331090002 HH PPS REVENUE DEBIT

## 2018-09-14 PROCEDURE — G0157 HHC PT ASSISTANT EA 15: HCPCS

## 2018-09-15 ENCOUNTER — HOME CARE VISIT (OUTPATIENT)
Dept: SCHEDULING | Facility: HOME HEALTH | Age: 83
End: 2018-09-15
Payer: MEDICARE

## 2018-09-15 VITALS
DIASTOLIC BLOOD PRESSURE: 91 MMHG | SYSTOLIC BLOOD PRESSURE: 149 MMHG | OXYGEN SATURATION: 97 % | TEMPERATURE: 98.2 F | HEART RATE: 87 BPM

## 2018-09-15 PROCEDURE — 3331090002 HH PPS REVENUE DEBIT

## 2018-09-15 PROCEDURE — 3331090001 HH PPS REVENUE CREDIT

## 2018-09-15 PROCEDURE — G0157 HHC PT ASSISTANT EA 15: HCPCS

## 2018-09-16 ENCOUNTER — HOME CARE VISIT (OUTPATIENT)
Dept: SCHEDULING | Facility: HOME HEALTH | Age: 83
End: 2018-09-16
Payer: MEDICARE

## 2018-09-16 PROCEDURE — 3331090002 HH PPS REVENUE DEBIT

## 2018-09-16 PROCEDURE — G0157 HHC PT ASSISTANT EA 15: HCPCS

## 2018-09-16 PROCEDURE — 3331090001 HH PPS REVENUE CREDIT

## 2018-09-17 ENCOUNTER — HOME CARE VISIT (OUTPATIENT)
Dept: SCHEDULING | Facility: HOME HEALTH | Age: 83
End: 2018-09-17
Payer: MEDICARE

## 2018-09-17 VITALS
TEMPERATURE: 98.1 F | SYSTOLIC BLOOD PRESSURE: 143 MMHG | DIASTOLIC BLOOD PRESSURE: 79 MMHG | OXYGEN SATURATION: 94 % | HEART RATE: 96 BPM

## 2018-09-17 VITALS
OXYGEN SATURATION: 97 % | HEART RATE: 92 BPM | SYSTOLIC BLOOD PRESSURE: 148 MMHG | DIASTOLIC BLOOD PRESSURE: 84 MMHG | TEMPERATURE: 98.8 F

## 2018-09-17 PROCEDURE — 3331090002 HH PPS REVENUE DEBIT

## 2018-09-17 PROCEDURE — 3331090001 HH PPS REVENUE CREDIT

## 2018-09-17 PROCEDURE — G0157 HHC PT ASSISTANT EA 15: HCPCS

## 2018-09-18 ENCOUNTER — HOME CARE VISIT (OUTPATIENT)
Dept: SCHEDULING | Facility: HOME HEALTH | Age: 83
End: 2018-09-18
Payer: MEDICARE

## 2018-09-18 PROCEDURE — 3331090001 HH PPS REVENUE CREDIT

## 2018-09-18 PROCEDURE — G0157 HHC PT ASSISTANT EA 15: HCPCS

## 2018-09-18 PROCEDURE — 3331090002 HH PPS REVENUE DEBIT

## 2018-09-19 ENCOUNTER — HOME CARE VISIT (OUTPATIENT)
Dept: SCHEDULING | Facility: HOME HEALTH | Age: 83
End: 2018-09-19
Payer: MEDICARE

## 2018-09-19 PROCEDURE — G0300 HHS/HOSPICE OF LPN EA 15 MIN: HCPCS

## 2018-09-19 PROCEDURE — 3331090001 HH PPS REVENUE CREDIT

## 2018-09-19 PROCEDURE — G0157 HHC PT ASSISTANT EA 15: HCPCS

## 2018-09-19 PROCEDURE — 3331090002 HH PPS REVENUE DEBIT

## 2018-09-20 ENCOUNTER — HOME CARE VISIT (OUTPATIENT)
Dept: SCHEDULING | Facility: HOME HEALTH | Age: 83
End: 2018-09-20
Payer: MEDICARE

## 2018-09-20 VITALS
TEMPERATURE: 98.9 F | SYSTOLIC BLOOD PRESSURE: 132 MMHG | OXYGEN SATURATION: 96 % | OXYGEN SATURATION: 97 % | DIASTOLIC BLOOD PRESSURE: 70 MMHG | TEMPERATURE: 98.6 F | HEART RATE: 90 BPM | HEART RATE: 90 BPM | DIASTOLIC BLOOD PRESSURE: 63 MMHG | SYSTOLIC BLOOD PRESSURE: 130 MMHG

## 2018-09-20 PROCEDURE — 3331090002 HH PPS REVENUE DEBIT

## 2018-09-20 PROCEDURE — 3331090001 HH PPS REVENUE CREDIT

## 2018-09-20 PROCEDURE — G0157 HHC PT ASSISTANT EA 15: HCPCS

## 2018-09-21 ENCOUNTER — HOME CARE VISIT (OUTPATIENT)
Dept: SCHEDULING | Facility: HOME HEALTH | Age: 83
End: 2018-09-21
Payer: MEDICARE

## 2018-09-21 VITALS
SYSTOLIC BLOOD PRESSURE: 132 MMHG | OXYGEN SATURATION: 97 % | DIASTOLIC BLOOD PRESSURE: 82 MMHG | TEMPERATURE: 98.9 F | HEART RATE: 95 BPM

## 2018-09-21 PROCEDURE — 3331090001 HH PPS REVENUE CREDIT

## 2018-09-21 PROCEDURE — 3331090002 HH PPS REVENUE DEBIT

## 2018-09-21 PROCEDURE — G0151 HHCP-SERV OF PT,EA 15 MIN: HCPCS

## 2018-09-22 VITALS
TEMPERATURE: 98.6 F | HEART RATE: 92 BPM | OXYGEN SATURATION: 96 % | SYSTOLIC BLOOD PRESSURE: 160 MMHG | DIASTOLIC BLOOD PRESSURE: 80 MMHG

## 2018-09-22 PROCEDURE — 3331090001 HH PPS REVENUE CREDIT

## 2018-09-22 PROCEDURE — 3331090002 HH PPS REVENUE DEBIT

## 2018-09-23 VITALS — SYSTOLIC BLOOD PRESSURE: 136 MMHG | HEART RATE: 67 BPM | OXYGEN SATURATION: 98 % | DIASTOLIC BLOOD PRESSURE: 72 MMHG

## 2018-09-23 PROCEDURE — 3331090002 HH PPS REVENUE DEBIT

## 2018-09-23 PROCEDURE — 3331090001 HH PPS REVENUE CREDIT

## 2018-09-24 ENCOUNTER — TELEPHONE (OUTPATIENT)
Dept: ORTHOPEDIC SURGERY | Facility: CLINIC | Age: 83
End: 2018-09-24

## 2018-09-24 PROCEDURE — 3331090002 HH PPS REVENUE DEBIT

## 2018-09-24 PROCEDURE — 3331090001 HH PPS REVENUE CREDIT

## 2018-09-24 NOTE — TELEPHONE ENCOUNTER
Sx 18 left femur fx ORIF-(global  18)    Micheline Boudreaux with [facility] called to discuss 24 hour skilled care. States 24 hour care is not available. Patient has no family that can take care of her. She states patient commented she is still non weight bearing.      Please advise -- call Micheline Boudreaux w/[facility] at,  P# [phone]

## 2018-09-24 NOTE — TELEPHONE ENCOUNTER
I called Ms. Nany Kohlitammy with WB status,  She said the patient told her she has an order to not be left alone. She wants to know if she still should not be left alone because they will need to proivide someone to be with her because her family is not coming.

## 2018-09-25 ENCOUNTER — HOME CARE VISIT (OUTPATIENT)
Dept: SCHEDULING | Facility: HOME HEALTH | Age: 83
End: 2018-09-25
Payer: MEDICARE

## 2018-09-25 PROCEDURE — 3331090001 HH PPS REVENUE CREDIT

## 2018-09-25 PROCEDURE — G0300 HHS/HOSPICE OF LPN EA 15 MIN: HCPCS

## 2018-09-25 PROCEDURE — 3331090002 HH PPS REVENUE DEBIT

## 2018-09-25 NOTE — TELEPHONE ENCOUNTER
Spoke with Marisol--pt does not know who told her not to be left alone--informed Soham Ashford pt is toe touch weight bearing with walker and next appt is 10-10-18

## 2018-09-26 ENCOUNTER — HOME CARE VISIT (OUTPATIENT)
Dept: SCHEDULING | Facility: HOME HEALTH | Age: 83
End: 2018-09-26
Payer: MEDICARE

## 2018-09-26 VITALS
DIASTOLIC BLOOD PRESSURE: 88 MMHG | SYSTOLIC BLOOD PRESSURE: 132 MMHG | TEMPERATURE: 98.3 F | HEART RATE: 78 BPM | OXYGEN SATURATION: 96 %

## 2018-09-26 PROCEDURE — 3331090001 HH PPS REVENUE CREDIT

## 2018-09-26 PROCEDURE — 3331090002 HH PPS REVENUE DEBIT

## 2018-09-26 PROCEDURE — G0157 HHC PT ASSISTANT EA 15: HCPCS

## 2018-09-27 PROCEDURE — 3331090002 HH PPS REVENUE DEBIT

## 2018-09-27 PROCEDURE — 3331090001 HH PPS REVENUE CREDIT

## 2018-09-28 ENCOUNTER — HOME CARE VISIT (OUTPATIENT)
Dept: SCHEDULING | Facility: HOME HEALTH | Age: 83
End: 2018-09-28
Payer: MEDICARE

## 2018-09-28 PROCEDURE — G0157 HHC PT ASSISTANT EA 15: HCPCS

## 2018-09-28 PROCEDURE — 3331090001 HH PPS REVENUE CREDIT

## 2018-09-28 PROCEDURE — 3331090002 HH PPS REVENUE DEBIT

## 2018-09-29 PROCEDURE — 3331090002 HH PPS REVENUE DEBIT

## 2018-09-29 PROCEDURE — 3331090001 HH PPS REVENUE CREDIT

## 2018-09-30 VITALS
TEMPERATURE: 98.9 F | OXYGEN SATURATION: 98 % | SYSTOLIC BLOOD PRESSURE: 120 MMHG | HEART RATE: 86 BPM | DIASTOLIC BLOOD PRESSURE: 73 MMHG

## 2018-09-30 PROCEDURE — 3331090002 HH PPS REVENUE DEBIT

## 2018-09-30 PROCEDURE — 3331090001 HH PPS REVENUE CREDIT

## 2018-10-01 ENCOUNTER — OFFICE VISIT (OUTPATIENT)
Dept: FAMILY MEDICINE CLINIC | Age: 83
End: 2018-10-01

## 2018-10-01 VITALS
RESPIRATION RATE: 16 BRPM | HEART RATE: 99 BPM | DIASTOLIC BLOOD PRESSURE: 83 MMHG | TEMPERATURE: 98.6 F | HEIGHT: 67 IN | SYSTOLIC BLOOD PRESSURE: 136 MMHG

## 2018-10-01 VITALS — SYSTOLIC BLOOD PRESSURE: 148 MMHG | HEART RATE: 83 BPM | DIASTOLIC BLOOD PRESSURE: 87 MMHG | OXYGEN SATURATION: 98 %

## 2018-10-01 DIAGNOSIS — I82.402 ACUTE DEEP VEIN THROMBOSIS (DVT) OF LEFT LOWER EXTREMITY, UNSPECIFIED VEIN (HCC): ICD-10-CM

## 2018-10-01 DIAGNOSIS — J44.9 CHRONIC OBSTRUCTIVE PULMONARY DISEASE, UNSPECIFIED COPD TYPE (HCC): ICD-10-CM

## 2018-10-01 DIAGNOSIS — I10 ESSENTIAL HYPERTENSION: Primary | ICD-10-CM

## 2018-10-01 DIAGNOSIS — E78.2 MIXED HYPERLIPIDEMIA: ICD-10-CM

## 2018-10-01 DIAGNOSIS — R06.09 DOE (DYSPNEA ON EXERTION): ICD-10-CM

## 2018-10-01 PROCEDURE — 3331090002 HH PPS REVENUE DEBIT

## 2018-10-01 PROCEDURE — 3331090001 HH PPS REVENUE CREDIT

## 2018-10-01 NOTE — PROGRESS NOTES
Jennifer Quijano presents today for   Chief Complaint   Patient presents with    Hypertension    Thyroid Problem     Under care of DR. Robbins    Diabetes     Under care of Endo DR. Robbins    Shortness of Breath     SOB upon exertion. Jennifer Rankina preferred language for health care discussion is english/other. Is someone accompanying this pt? Yes     Is the patient using any DME equipment during OV? wheelchair    Depression Screening:  PHQ over the last two weeks 9/12/2018   PHQ Not Done -   Little interest or pleasure in doing things Not at all   Feeling down, depressed, irritable, or hopeless Not at all   Total Score PHQ 2 0       Learning Assessment:  Learning Assessment 9/13/2018   PRIMARY LEARNER Patient   HIGHEST LEVEL OF EDUCATION - PRIMARY LEARNER  SOME COLLEGE   BARRIERS PRIMARY LEARNER [address] CAREGIVER No   CO-LEARNER NAME -   PRIMARY LANGUAGE ENGLISH    NEED No   LEARNER PREFERENCE PRIMARY DEMONSTRATION     -     -     -     -   ANSWERED BY patient   RELATIONSHIP SELF       Abuse Screening:  Abuse Screening Questionnaire 8/20/2018   Do you ever feel afraid of your partner? N   Are you in a relationship with someone who physically or mentally threatens you? N   Is it safe for you to go home? Y       Fall Risk  Fall Risk Assessment, last 12 mths 9/12/2018   Able to walk? Yes   Fall in past 12 months? Yes   Fall with injury? Yes   Number of falls in past 12 months 1   Fall Risk Score 2             Coordination of Care:  1. Have you been to the ER, urgent care clinic since your last visit? Hospitalized since your last visit? no    2. Have you seen or consulted any other health care providers outside of the [address] [place] [address] [place] since your last visit? Include any pap smears or colon screening. yes        Advance Directive:  1. Do you have an advance directive in place? Patient Reply:no    2.  If not, would you like material regarding how to put one in place?  Patient Reply: no

## 2018-10-01 NOTE — PROGRESS NOTES
Chief Complaint   Patient presents with    Hypertension    Thyroid Problem     Under care of DR. Robbins    Diabetes     Under care of Endo DR. Robbins    Shortness of Breath     SOB upon exertion. HISTORY OF PRESENT ILLNESS  Yovanny Frausto is a 82 y.o. female. HPI  Patient is here for a routine follow up of htn, dm, and thyroid dz. Pt reports that her dm and thyroid are controlled. Pt recently had a fall that resulted in a fracture of her L femur. She later developed a dvt in that same leg. She has not seen anyone for f/u of the dvt. New concerns today: pt c/o INIGUEZ. She reports that she has been dx with copd. She is using her inhalers as instructed. She has discussed this with cards in the past also and states that her dyspnea is not significantly different. Pt had a pulmonologist but has not seen one for quite some years. ROS  Review of Systems   Constitutional: Negative. HENT: Negative. Respiratory: Negative. Cardiovascular: Negative. All other systems reviewed and are negative. Physical Exam  Physical Exam   Nursing note and vitals reviewed. Constitutional: She is oriented to person, place, and time. She appears well-developed and well-nourished. HENT:   Head: Normocephalic and atraumatic. Right Ear: External ear normal.   Left Ear: External ear normal.   Nose: Nose normal.   Eyes: Conjunctivae and EOM are normal.   Neck: Normal range of motion. Neck supple. No JVD present. Carotid bruit is not present. No thyromegaly present. Cardiovascular: Normal rate, regular rhythm, normal heart sounds and intact distal pulses. Exam reveals no gallop and no friction rub. No murmur heard. Pulmonary/Chest: Effort normal and breath sounds normal. She has no wheezes. She has no rhonchi. She has no rales. Abdominal: Soft. Bowel sounds are normal.   Musculoskeletal: Normal range of motion. Neurological: She is alert and oriented to person, place, and time.  In wheelchair  Skin: Skin is warm and dry. Psychiatric: She has a normal mood and affect. Her behavior is normal. Judgment and thought content normal.     ASSESSMENT and PLAN  Diagnoses and all orders for this visit:    1. Essential hypertension  Stable, cont pres tx plan. Await labs from endo    2. Mixed hyperlipidemia  Await labs from endo    3. INIGUEZ (dyspnea on exertion)  -     REFERRAL TO PULMONARY DISEASE    4. Chronic obstructive pulmonary disease, unspecified COPD type ([facility] [address]  -     REFERRAL TO PULMONARY DISEASE    5.  Acute deep vein thrombosis (DVT) of left lower extremity, unspecified vein (HCC)  -     Delona Lundborg ONC Ref [facility]      Follow-up Disposition: 3 months; sooner prn

## 2018-10-02 ENCOUNTER — HOME CARE VISIT (OUTPATIENT)
Dept: SCHEDULING | Facility: HOME HEALTH | Age: 83
End: 2018-10-02
Payer: MEDICARE

## 2018-10-02 PROCEDURE — 3331090002 HH PPS REVENUE DEBIT

## 2018-10-02 PROCEDURE — G0299 HHS/HOSPICE OF RN EA 15 MIN: HCPCS

## 2018-10-02 PROCEDURE — 3331090001 HH PPS REVENUE CREDIT

## 2018-10-02 PROCEDURE — G0157 HHC PT ASSISTANT EA 15: HCPCS

## 2018-10-03 VITALS
OXYGEN SATURATION: 94 % | TEMPERATURE: 97.3 F | HEART RATE: 89 BPM | SYSTOLIC BLOOD PRESSURE: 130 MMHG | DIASTOLIC BLOOD PRESSURE: 80 MMHG

## 2018-10-03 PROCEDURE — 3331090002 HH PPS REVENUE DEBIT

## 2018-10-03 PROCEDURE — 3331090001 HH PPS REVENUE CREDIT

## 2018-10-04 ENCOUNTER — OFFICE VISIT (OUTPATIENT)
Dept: CARDIOLOGY CLINIC | Age: 83
End: 2018-10-04

## 2018-10-04 ENCOUNTER — HOME CARE VISIT (OUTPATIENT)
Dept: SCHEDULING | Facility: HOME HEALTH | Age: 83
End: 2018-10-04
Payer: MEDICARE

## 2018-10-04 VITALS
SYSTOLIC BLOOD PRESSURE: 145 MMHG | HEIGHT: 67 IN | WEIGHT: 239 LBS | DIASTOLIC BLOOD PRESSURE: 74 MMHG | HEART RATE: 93 BPM | BODY MASS INDEX: 37.51 KG/M2

## 2018-10-04 VITALS
TEMPERATURE: 97.8 F | OXYGEN SATURATION: 98 % | SYSTOLIC BLOOD PRESSURE: 142 MMHG | HEART RATE: 79 BPM | DIASTOLIC BLOOD PRESSURE: 82 MMHG

## 2018-10-04 DIAGNOSIS — Z95.5 S/P DRUG ELUTING CORONARY STENT PLACEMENT: ICD-10-CM

## 2018-10-04 DIAGNOSIS — E78.2 MIXED HYPERLIPIDEMIA: ICD-10-CM

## 2018-10-04 DIAGNOSIS — I25.118 ATHEROSCLEROSIS OF NATIVE CORONARY ARTERY OF NATIVE HEART WITH STABLE ANGINA PECTORIS (HCC): ICD-10-CM

## 2018-10-04 DIAGNOSIS — I10 ESSENTIAL HYPERTENSION: Primary | ICD-10-CM

## 2018-10-04 DIAGNOSIS — I50.32 CHRONIC DIASTOLIC CONGESTIVE HEART FAILURE (HCC): ICD-10-CM

## 2018-10-04 PROCEDURE — 3331090001 HH PPS REVENUE CREDIT

## 2018-10-04 PROCEDURE — 3331090002 HH PPS REVENUE DEBIT

## 2018-10-04 PROCEDURE — G0157 HHC PT ASSISTANT EA 15: HCPCS

## 2018-10-04 NOTE — PROGRESS NOTES
1. Have you been to the ER, urgent care clinic since your last visit? Hospitalized since your last visit? Yes Where: [facility]/Chest Pain    2. Have you seen or consulted any other health care providers outside of the [address] [place] [address] since your last visit? Include any pap smears or colon screening. Yes Where: Dr Brenden Marin     3. Since your last visit, have you had any of the following symptoms? .          4. Have you had any blood work, X-rays or cardiac testing? Yes Where: [facility] Reason for visit: Labs/EKG        5. Where do you normally have your labs drawn? Endocrinologist    6. Do you need any refills today?    No

## 2018-10-04 NOTE — PATIENT INSTRUCTIONS
High Blood Pressure: Care Instructions  Your Care Instructions    If your blood pressure is usually above 130/80, you have high blood pressure, or hypertension. That means the top number is 130 or higher or the bottom number is 80 or higher, or both. Despite what a lot of people think, high blood pressure usually doesn't cause headaches or make you feel dizzy or lightheaded. It usually has no symptoms. But it does increase your risk for heart attack, stroke, and kidney or eye damage. The higher your blood pressure, the more your risk increases. Your doctor will give you a goal for your blood pressure. Your goal will be based on your health and your age. Lifestyle changes, such as eating healthy and being active, are always important to help lower blood pressure. You might also take medicine to reach your blood pressure goal.  Follow-up care is a key part of your treatment and safety. Be sure to make and go to all appointments, and call your doctor if you are having problems. It's also a good idea to know your test results and keep a list of the medicines you take. How can you care for yourself at home? Medical treatment  · If you stop taking your medicine, your blood pressure will go back up. You may take one or more types of medicine to lower your blood pressure. Be safe with medicines. Take your medicine exactly as prescribed. Call your doctor if you think you are having a problem with your medicine. · Talk to your doctor before you start taking aspirin every day. Aspirin can help certain people lower their risk of a heart attack or stroke. But taking aspirin isn't right for everyone, because it can cause serious bleeding. · See your doctor regularly. You may need to see the doctor more often at first or until your blood pressure comes down. · If you are taking blood pressure medicine, talk to your doctor before you take decongestants or anti-inflammatory medicine, such as ibuprofen.  Some of these medicines can raise blood pressure. · Learn how to check your blood pressure at home. Lifestyle changes  · Stay at a healthy weight. This is especially important if you put on weight around the waist. Losing even 10 pounds can help you lower your blood pressure. · If your doctor recommends it, get more exercise. Walking is a good choice. Bit by bit, increase the amount you walk every day. Try for at least 30 minutes on most days of the week. You also may want to swim, bike, or do other activities. · Avoid or limit alcohol. Talk to your doctor about whether you can drink any alcohol. · Try to limit how much sodium you eat to less than 2,300 milligrams (mg) a day. Your doctor may ask you to try to eat less than 1,500 mg a day. · Eat plenty of fruits (such as bananas and oranges), vegetables, legumes, whole grains, and low-fat dairy products. · Lower the amount of saturated fat in your diet. Saturated fat is found in animal products such as milk, cheese, and meat. Limiting these foods may help you lose weight and also lower your risk for heart disease. · Do not smoke. Smoking increases your risk for heart attack and stroke. If you need help quitting, talk to your doctor about stop-smoking programs and medicines. These can increase your chances of quitting for good. When should you call for help? Call 911 anytime you think you may need emergency care. This may mean having symptoms that suggest that your blood pressure is causing a serious heart or blood vessel problem. Your blood pressure may be over 180/120.   For example, call 911 if:    · You have symptoms of a heart attack. These may include:  ¨ Chest pain or pressure, or a strange feeling in the chest.  ¨ Sweating. ¨ Shortness of breath. ¨ Nausea or vomiting. ¨ Pain, pressure, or a strange feeling in the back, neck, jaw, or upper belly or in one or both shoulders or arms. ¨ Lightheadedness or sudden weakness.   ¨ A fast or irregular heartbeat.     · You have symptoms of a stroke. These may include:  ¨ Sudden numbness, tingling, weakness, or loss of movement in your face, arm, or leg, especially on only one side of your body. ¨ Sudden vision changes. ¨ Sudden trouble speaking. ¨ Sudden confusion or trouble understanding simple statements. ¨ Sudden problems with walking or balance. ¨ A sudden, severe headache that is different from past headaches.     · You have severe back or belly pain.    Do not wait until your blood pressure comes down on its own. Get help right away.   Call your doctor now or seek immediate care if:    · Your blood pressure is much higher than normal (such as 180/120 or higher), but you don't have symptoms.     · You think high blood pressure is causing symptoms, such as:  ¨ Severe headache. ¨ Blurry vision.    Watch closely for changes in your health, and be sure to contact your doctor if:    · Your blood pressure measures higher than your doctor recommends at least 2 times. That means the top number is higher or the bottom number is higher, or both.     · You think you may be having side effects from your blood pressure medicine. Where can you learn more? Go to http://cristina-mignon.info/. Enter Q192 in the search box to learn more about \"High Blood Pressure: Care Instructions. \"  Current as of: December 6, 2017  Content Version: 11.8  © 2008-6253 [employer]. Care instructions adapted under license by [employer] (which disclaims liability or warranty for this information). If you have questions about a medical condition or this instruction, always ask your healthcare professional. [place] [address] any warranty or liability for your use of this information.

## 2018-10-04 NOTE — MR AVS SNAPSHOT
[address] [facility] 
 
 
 [address] [facility] 
[phone] Patient: Rosana Trevino MRN: [id number] LINN:[id number] Visit Information Date & Time Provider Department Dept. Phone Encounter #  
 10/4/2018  9:45 AM Tram Vasquez MD [facility] [phone] Your Appointments 10/10/2018 10:15 AM  
Follow Up with Lillie Wood PA-C  
[facility] [address]) Appt Note: 4 wk f/u  
 [address], [address] [facility] [address]  
[phone]  
  
   
 [address], [address] [facility] [address]  
  
    
 1/22/2019 10:30 AM  
Office Visit with Sangita Burnett MD  
[facility] (G[address] Ladd) Appt Note: 3 month follow up  
 [address] [facility] [address]  
  
   
 [address] [id number] [place] [address] Upcoming Health Maintenance Date Due Pneumococcal 65+ Low/Medium Risk (2 of 2 - PPSV23) 12/2/2017 EYE EXAM RETINAL OR DILATED Q1 10/12/2018 FOOT EXAM Q1 10/24/2018 MICROALBUMIN Q1 10/24/2018 Shingrix Vaccine Age 50> (1 of 2) 10/31/2019* MEDICARE YEARLY EXAM 10/26/2018 HEMOGLOBIN A1C Q6M 3/7/2019 LIPID PANEL Q1 9/7/2019 GLAUCOMA SCREENING Q2Y 10/12/2019 DTaP/Tdap/Td series (2 - Td) 11/15/2023 *Topic was postponed. The date shown is not the original due date. Allergies as of 10/4/2018  Review Complete On: 10/4/2018 By: Uli Gonsales Severity Noted Reaction Type Reactions Niacin High 03/26/2013    Palpitations, Other (comments) Stomach irritation Ace Inhibitors  05/19/2010    Cough Avapro [Irbesartan]  05/19/2010    Myalgia Bystolic [Nebivolol]  92/57/5630    Other (comments) Felt like throat closing Catapres [Clonidine]  05/19/2010    Cough Codeine  05/19/2010    Nausea and Vomiting Cozaar [Losartan]    Not Reported This Time Crestor [Rosuvastatin]  05/19/2010    Other (comments) Cramps, aches Guilherme Guiles [Propoxyphene N-acetaminophen]  05/19/2010    Unknown (comments) Diovan [Valsartan]  05/19/2010    Cough Flagyl [Metronidazole]  05/19/2010    Other (comments) Mouth and throat irritation Gabapentin  03/16/2016    Other (comments) Abdominal pain and burning Iodinated Contrast- Oral And Iv Dye    Other (comments) Throat swelling Iodine    Unknown (comments) Lescol [Fluvastatin]  05/19/2010    Other (comments) Leg cramps Lipitor [Atorvastatin]  05/19/2010    Myalgia, Other (comments) Cramps, aches Lovastatin  05/19/2010    Other (comments) Leg cramps Nexium [Esomeprazole Magnesium]  05/20/2010    Other (comments) Stomach upset, burning Pravachol [Pravastatin]  05/19/2010    Other (comments) Leg cramps Reglan [Metoclopramide]  05/19/2010    Nausea Only Trazodone  05/19/2010    Other (comments) Patient states she feels drugged Zetia [Ezetimibe]  05/19/2010    Other (comments) Cramps, aches Zocor [Simvastatin]  05/19/2010    Other (comments) Cramps, aches Current Immunizations  Reviewed on 2/7/2017 Name Date Influenza High Dose Vaccine PF 12/2/2016 Influenza Vaccine 12/16/2015 11:20 AM, 9/15/2014 Influenza Vaccine (Quad) PF 9/9/2018 12:11 PM  
 Influenza Vaccine Split 11/6/2012, 10/5/2010 Influenza Vaccine Whole 9/1/2009 Tdap 11/15/2013 Not reviewed this visit You Were Diagnosed With   
  
 Codes Comments Essential hypertension    -  Primary ICD-10-CM: I10 
ICD-9-CM: 401.9 Chronic diastolic congestive heart failure (HCC)     ICD-10-CM: I50.32 
ICD-9-CM: 428.32, 428.0 Mixed hyperlipidemia     ICD-10-CM: E78.2 ICD-9-CM: 272.2 Atherosclerosis of native coronary artery of native heart with stable angina pectoris ([facility]     ICD-10-CM: I25.118 
ICD-9-CM: 414.01, 413.9 S/P drug eluting coronary stent placement     ICD-10-CM: Z95.5 ICD-9-CM: V45.82 Vitals BP Pulse Height(growth percentile) Weight(growth percentile) BMI OB Status 145/74 93 5' 7\" (1.702 m) 239 lb (108.4 kg) 37.43 kg/m2 Hysterectomy Smoking Status Former Smoker Vitals History BMI and BSA Data Body Mass Index Body Surface Area  
 37.43 kg/m 2 2.26 m 2 Preferred Pharmacy Pharmacy Name Phone [facility] [address] [place] [address] Your Updated Medication List  
  
   
This list is accurate as of 10/4/18 10:22 AM.  Always use your most recent med list. amLODIPine 10 mg tablet Commonly known as:  Connye Squibb Take 10 mg by mouth daily. ascorbic acid (vitamin C) 250 mg tablet Commonly known as:  VITAMIN C Take 250 mg by mouth daily. aspirin delayed-release 81 mg tablet Take 81 mg by mouth daily. capsaicin 0.075 % topical cream  
Apply  to affected area three (3) times daily. clopidogrel 75 mg Tab Commonly known as:  PLAVIX TAKE ONE TABLET BY MOUTH DAILY  
  
 cyanocobalamin ER 1,000 mcg tablet Take 1 Tab by mouth daily. ferrous sulfate 325 mg (65 mg iron) tablet Take 325 mg by mouth Daily (before breakfast). FISH OIL PO Take 1,000 mg by mouth two (2) times a day. furosemide 20 mg tablet Commonly known as:  LASIX Use daily as needed for leg swelling  
  
 insulin glargine 100 unit/mL injection Commonly known as:  LANTUS U-100 INSULIN Take 15 units every morning  Indications: type 2 diabetes mellitus  
  
 levothyroxine 75 mcg tablet Commonly known as:  SYNTHROID Take 1 Tab by mouth Daily (before breakfast). * ASPERCREME (LIDOCAINE) 4 % patch Generic drug:  lidocaine 1 Patch by TransDERmal route every eight (8) hours. * lidocaine 5 % Commonly known as:  Marry Tacho  
 1 Patch by TransDERmal route every twenty-four (24) hours. montelukast 10 mg tablet Commonly known as:  SINGULAIR Take 1 Tab by mouth daily as needed. Indications: ALLERGIC RHINITIS PEPCID 20 mg tablet Generic drug:  famotidine Take 20 mg by mouth daily as needed (acid reflux). potassium chloride SR 10 mEq tablet Commonly known as:  KLOR-CON 10 Take 10 mEq by mouth daily as needed (muscle spasms, with Lasix). PROAIR HFA 90 mcg/actuation inhaler Generic drug:  albuterol INHALE 1 PUFF BY MOUTH EVERY 4 HOURS AS NEEDED FOR WHEEZING OR SHORTNESS OF BREATH  
  
 ranolazine  mg SR tablet Commonly known as:  RANEXA Take 1 Tab by mouth two (2) times a day. TYLENOL ARTHRITIS PAIN 650 mg Hildegard Cowper Generic drug:  acetaminophen Take 650 mg by mouth every eight (8) hours. Indications: Fever, Pain VITAMIN D3 1,000 unit tablet Generic drug:  cholecalciferol Take 5,000 Units by mouth two (2) times a day. * Notice: This list has 2 medication(s) that are the same as other medications prescribed for you. Read the directions carefully, and ask your doctor or other care provider to review them with you. To-Do List   
 10/08/2018 To Be Determined Appointment with Luis Eduardo Santiago PT at [facility]  
  
 10/10/2018 To Be Determined Appointment with Luis Eduardo Santiago PT at [address] [facility]  
  
 10/15/2018 To Be Determined Appointment with Luis Eduardo Santiago PT at [facility]  
  
 10/17/2018 To Be Determined Appointment with Luis Eduardo Santiago PT at [facility]  
  
 10/22/2018 To Be Determined Appointment with Isidro Phillips PT at [address] Patient Instructions High Blood Pressure: Care Instructions Your Care Instructions If your blood pressure is usually above 130/80, you have high blood pressure, or hypertension. That means the top number is 130 or higher or the bottom number is 80 or higher, or both. Despite what a lot of people think, high blood pressure usually doesn't cause headaches or make you feel dizzy or lightheaded. It usually has no symptoms. But it does increase your risk for heart attack, stroke, and kidney or eye damage. The higher your blood pressure, the more your risk increases. Your doctor will give you a goal for your blood pressure. Your goal will be based on your health and your age. Lifestyle changes, such as eating healthy and being active, are always important to help lower blood pressure. You might also take medicine to reach your blood pressure goal. 
Follow-up care is a key part of your treatment and safety. Be sure to make and go to all appointments, and call your doctor if you are having problems. It's also a good idea to know your test results and keep a list of the medicines you take. How can you care for yourself at home? Medical treatment · If you stop taking your medicine, your blood pressure will go back up. You may take one or more types of medicine to lower your blood pressure. Be safe with medicines. Take your medicine exactly as prescribed. Call your doctor if you think you are having a problem with your medicine. · Talk to your doctor before you start taking aspirin every day. Aspirin can help certain people lower their risk of a heart attack or stroke. But taking aspirin isn't right for everyone, because it can cause serious bleeding. · See your doctor regularly. You may need to see the doctor more often at first or until your blood pressure comes down. · If you are taking blood pressure medicine, talk to your doctor before you take decongestants or anti-inflammatory medicine, such as ibuprofen. Some of these medicines can raise blood pressure. · Learn how to check your blood pressure at home. Lifestyle changes · Stay at a healthy weight. This is especially important if you put on weight around the waist. Losing even 10 pounds can help you lower your blood pressure. · If your doctor recommends it, get more exercise. Walking is a good choice. Bit by bit, increase the amount you walk every day. Try for at least 30 minutes on most days of the week. You also may want to swim, bike, or do other activities. · Avoid or limit alcohol. Talk to your doctor about whether you can drink any alcohol. · Try to limit how much sodium you eat to less than 2,300 milligrams (mg) a day. Your doctor may ask you to try to eat less than 1,500 mg a day. · Eat plenty of fruits (such as bananas and oranges), vegetables, legumes, whole grains, and low-fat dairy products. · Lower the amount of saturated fat in your diet. Saturated fat is found in animal products such as milk, cheese, and meat. Limiting these foods may help you lose weight and also lower your risk for heart disease. · Do not smoke. Smoking increases your risk for heart attack and stroke. If you need help quitting, talk to your doctor about stop-smoking programs and medicines. These can increase your chances of quitting for good. When should you call for help? Call 911 anytime you think you may need emergency care. This may mean having symptoms that suggest that your blood pressure is causing a serious heart or blood vessel problem. Your blood pressure may be over 180/120. 
 For example, call 911 if: 
  · You have symptoms of a heart attack. These may include: ¨ Chest pain or pressure, or a strange feeling in the chest. 
¨ Sweating. ¨ Shortness of breath. ¨ Nausea or vomiting. ¨ Pain, pressure, or a strange feeling in the back, neck, jaw, or upper belly or in one or both shoulders or arms. ¨ Lightheadedness or sudden weakness. ¨ A fast or irregular heartbeat.   · You have symptoms of a stroke. These may include: 
¨ Sudden numbness, tingling, weakness, or loss of movement in your face, arm, or leg, especially on only one side of your body. ¨ Sudden vision changes. ¨ Sudden trouble speaking. ¨ Sudden confusion or trouble understanding simple statements. ¨ Sudden problems with walking or balance. ¨ A sudden, severe headache that is different from past headaches.  
  · You have severe back or belly pain.  
 Do not wait until your blood pressure comes down on its own. Get help right away. 
 Call your doctor now or seek immediate care if: 
  · Your blood pressure is much higher than normal (such as 180/120 or higher), but you don't have symptoms.  
  · You think high blood pressure is causing symptoms, such as: ¨ Severe headache. ¨ Blurry vision.  
 Watch closely for changes in your health, and be sure to contact your doctor if: 
  · Your blood pressure measures higher than your doctor recommends at least 2 times. That means the top number is higher or the bottom number is higher, or both.  
  · You think you may be having side effects from your blood pressure medicine. Where can you learn more? Go to http://cristina-mignon.info/. Enter W335 in the search box to learn more about \"High Blood Pressure: Care Instructions. \" Current as of: December 6, 2017 Content Version: 11.8 © 7604-9821 [employer]. Care instructions adapted under license by [employer] (which disclaims liability or warranty for this information). If you have questions about a medical condition or this instruction, always ask your healthcare professional. [place] [address] any warranty or liability for your use of this information. Introducing [address]! Dear Heather Garcia: Thank you for requesting a [employer] account. Our records indicate that you already have an active [employer] account.   You can access your account anytime at [email or web address] Did you know that you can access your hospital and ER discharge instructions at any time in [employer]? You can also review all of your test results from your hospital stay or ER visit. Additional Information If you have questions, please visit the Frequently Asked Questions section of the [employer] website at [email or web address] Remember, [employer] is NOT to be used for urgent needs. For medical emergencies, dial 911. Now available from your iPhone and Android! Please provide this summary of care documentation to your next provider. Your primary care clinician is listed as Hallie Barcenas. If you have any questions after today's visit, please call [phone].
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
## 2018-10-05 PROCEDURE — 3331090002 HH PPS REVENUE DEBIT

## 2018-10-05 PROCEDURE — 3331090001 HH PPS REVENUE CREDIT

## 2018-10-06 PROCEDURE — 3331090001 HH PPS REVENUE CREDIT

## 2018-10-06 PROCEDURE — 3331090002 HH PPS REVENUE DEBIT

## 2018-10-07 PROCEDURE — 3331090002 HH PPS REVENUE DEBIT

## 2018-10-07 PROCEDURE — 3331090001 HH PPS REVENUE CREDIT

## 2018-10-08 PROCEDURE — 3331090002 HH PPS REVENUE DEBIT

## 2018-10-08 PROCEDURE — 3331090001 HH PPS REVENUE CREDIT

## 2018-10-09 PROCEDURE — 3331090001 HH PPS REVENUE CREDIT

## 2018-10-09 PROCEDURE — 3331090002 HH PPS REVENUE DEBIT

## 2018-10-10 ENCOUNTER — OFFICE VISIT (OUTPATIENT)
Dept: ORTHOPEDIC SURGERY | Facility: CLINIC | Age: 83
End: 2018-10-10

## 2018-10-10 VITALS
TEMPERATURE: 97.8 F | SYSTOLIC BLOOD PRESSURE: 149 MMHG | OXYGEN SATURATION: 96 % | RESPIRATION RATE: 16 BRPM | DIASTOLIC BLOOD PRESSURE: 69 MMHG | HEIGHT: 67 IN | HEART RATE: 86 BPM

## 2018-10-10 DIAGNOSIS — Z96.649 PERIPROSTHETIC FRACTURE OF FEMUR AT TIP OF PROSTHESIS, SUBSEQUENT ENCOUNTER: Primary | ICD-10-CM

## 2018-10-10 DIAGNOSIS — Z95.5 S/P CORONARY ARTERY STENT PLACEMENT: ICD-10-CM

## 2018-10-10 DIAGNOSIS — M97.8XXD PERIPROSTHETIC FRACTURE OF FEMUR AT TIP OF PROSTHESIS, SUBSEQUENT ENCOUNTER: Primary | ICD-10-CM

## 2018-10-10 PROCEDURE — 3331090002 HH PPS REVENUE DEBIT

## 2018-10-10 PROCEDURE — 3331090001 HH PPS REVENUE CREDIT

## 2018-10-10 RX ORDER — CLOPIDOGREL BISULFATE 75 MG/1
TABLET ORAL
Qty: 30 TAB | Refills: 0 | Status: SHIPPED | OUTPATIENT
Start: 2018-10-10 | End: 2018-11-12 | Stop reason: SDUPTHER

## 2018-10-10 NOTE — PROGRESS NOTES
HISTORY OF PRESENT ILLNESS:  Merna Romeo returns. She is just over four months status post her periprosthetic fracture associated with her left knee with a previous left total knee replacement. We have had her using a bone growth stimulator now for just over one month. RADIOGRAPHS:  The imaging today, reveals significant callus inlay associated with the distal periprosthetic supracondylar fracture of the left knee. This is, when compared to prior imaging, a significant interval improvement. The plate and screw fixation of the distal femur is intact with no hardware failure noted. PLAN:   The patient has been toe-touch weightbearing of her left lower extremity. She is going to continue to advance beginning 50% weightbearing, walker-assisted, and physical therapy for gait and strength training. Over three weeks she will advance to 100% weightbearing of the left lower extremity. We are going to see her back in about one month, and I am hopeful that she can walk into the office at that point since she has primarily been wheelchair-bound since her injury and post-surgery recoveries. Today, her x-rays were reviewed, and all her questions were answered to her satisfaction.

## 2018-10-11 ENCOUNTER — HOSPITAL ENCOUNTER (EMERGENCY)
Age: 83
Discharge: HOME | End: 2018-10-11
Attending: EMERGENCY MEDICINE
Payer: MEDICARE

## 2018-10-11 ENCOUNTER — TELEPHONE (OUTPATIENT)
Dept: ORTHOPEDIC SURGERY | Facility: CLINIC | Age: 83
End: 2018-10-11

## 2018-10-11 ENCOUNTER — HOME CARE VISIT (OUTPATIENT)
Dept: SCHEDULING | Facility: HOME HEALTH | Age: 83
End: 2018-10-11
Payer: MEDICARE

## 2018-10-11 VITALS
OXYGEN SATURATION: 95 % | RESPIRATION RATE: 19 BRPM | DIASTOLIC BLOOD PRESSURE: 72 MMHG | WEIGHT: 239 LBS | TEMPERATURE: 98.2 F | BODY MASS INDEX: 37.51 KG/M2 | SYSTOLIC BLOOD PRESSURE: 160 MMHG | HEIGHT: 67 IN | HEART RATE: 85 BPM

## 2018-10-11 DIAGNOSIS — R07.89 ATYPICAL CHEST PAIN: Primary | ICD-10-CM

## 2018-10-11 LAB
ANION GAP SERPL CALC-SCNC: 6 MMOL/L (ref 3–18)
ATRIAL RATE: 99 BPM
BASOPHILS # BLD: 0 K/UL (ref 0–0.1)
BASOPHILS NFR BLD: 1 % (ref 0–2)
BUN SERPL-MCNC: 8 MG/DL (ref 7–18)
BUN/CREAT SERPL: 13 (ref 12–20)
CALCIUM SERPL-MCNC: 9 MG/DL (ref 8.5–10.1)
CALCULATED P AXIS, ECG09: 68 DEGREES
CALCULATED R AXIS, ECG10: -14 DEGREES
CALCULATED T AXIS, ECG11: 12 DEGREES
CHLORIDE SERPL-SCNC: 103 MMOL/L (ref 100–108)
CO2 SERPL-SCNC: 30 MMOL/L (ref 21–32)
CREAT SERPL-MCNC: 0.64 MG/DL (ref 0.6–1.3)
DIAGNOSIS, 93000: NORMAL
DIFFERENTIAL METHOD BLD: ABNORMAL
EOSINOPHIL # BLD: 0.2 K/UL (ref 0–0.4)
EOSINOPHIL NFR BLD: 4 % (ref 0–5)
ERYTHROCYTE [DISTWIDTH] IN BLOOD BY AUTOMATED COUNT: 13.1 % (ref 11.6–14.5)
GLUCOSE SERPL-MCNC: 229 MG/DL (ref 74–99)
HCT VFR BLD AUTO: 34.6 % (ref 35–45)
HGB BLD-MCNC: 10.7 G/DL (ref 12–16)
LYMPHOCYTES # BLD: 1.8 K/UL (ref 0.9–3.6)
LYMPHOCYTES NFR BLD: 31 % (ref 21–52)
MAGNESIUM SERPL-MCNC: 1.8 MG/DL (ref 1.6–2.6)
MCH RBC QN AUTO: 30.7 PG (ref 24–34)
MCHC RBC AUTO-ENTMCNC: 30.9 G/DL (ref 31–37)
MCV RBC AUTO: 99.1 FL (ref 74–97)
MONOCYTES # BLD: 0.5 K/UL (ref 0.05–1.2)
MONOCYTES NFR BLD: 8 % (ref 3–10)
NEUTS SEG # BLD: 3.2 K/UL (ref 1.8–8)
NEUTS SEG NFR BLD: 56 % (ref 40–73)
P-R INTERVAL, ECG05: 172 MS
PLATELET # BLD AUTO: 235 K/UL (ref 135–420)
PMV BLD AUTO: 10.6 FL (ref 9.2–11.8)
POTASSIUM SERPL-SCNC: 3.3 MMOL/L (ref 3.5–5.5)
Q-T INTERVAL, ECG07: 356 MS
QRS DURATION, ECG06: 84 MS
QTC CALCULATION (BEZET), ECG08: 456 MS
RBC # BLD AUTO: 3.49 M/UL (ref 4.2–5.3)
SODIUM SERPL-SCNC: 139 MMOL/L (ref 136–145)
TROPONIN I SERPL-MCNC: 0.02 NG/ML (ref 0–0.06)
VENTRICULAR RATE, ECG03: 99 BPM
WBC # BLD AUTO: 5.6 K/UL (ref 4.6–13.2)

## 2018-10-11 PROCEDURE — 84484 ASSAY OF TROPONIN QUANT: CPT | Performed by: EMERGENCY MEDICINE

## 2018-10-11 PROCEDURE — 80048 BASIC METABOLIC PNL TOTAL CA: CPT | Performed by: EMERGENCY MEDICINE

## 2018-10-11 PROCEDURE — 85025 COMPLETE CBC W/AUTO DIFF WBC: CPT | Performed by: EMERGENCY MEDICINE

## 2018-10-11 PROCEDURE — 99284 EMERGENCY DEPT VISIT MOD MDM: CPT

## 2018-10-11 PROCEDURE — 83735 ASSAY OF MAGNESIUM: CPT | Performed by: EMERGENCY MEDICINE

## 2018-10-11 PROCEDURE — G0151 HHCP-SERV OF PT,EA 15 MIN: HCPCS

## 2018-10-11 PROCEDURE — 3331090002 HH PPS REVENUE DEBIT

## 2018-10-11 PROCEDURE — 93005 ELECTROCARDIOGRAM TRACING: CPT

## 2018-10-11 PROCEDURE — 3331090001 HH PPS REVENUE CREDIT

## 2018-10-11 RX ORDER — PREDNISONE 5 MG/1
TABLET ORAL 2 TIMES DAILY
COMMUNITY
End: 2019-01-13

## 2018-10-11 NOTE — TELEPHONE ENCOUNTER
Debby from [facility] called for  or Darryl Avilez. Tawny Gracia said that the patient said that Barbara Jennings referred her to them , but that she does not see anything that shows that Barbara Jennings wants the patient to be seen by [facility] in [facility]. Tawny Gracia would like to know if Barbara Jennings wants the patient seen by [facility]. Tawny Gracia would like a call back at tel. [phone].

## 2018-10-11 NOTE — DISCHARGE INSTRUCTIONS
Chest Pain: Care Instructions  Your Care Instructions    There are many things that can cause chest pain. Some are not serious and will get better on their own in a few days. But some kinds of chest pain need more testing and treatment. Your doctor may have recommended a follow-up visit in the next 8 to 12 hours. If you are not getting better, you may need more tests or treatment. Even though your doctor has released you, you still need to watch for any problems. The doctor carefully checked you, but sometimes problems can develop later. If you have new symptoms or if your symptoms do not get better, get medical care right away. If you have worse or different chest pain or pressure that lasts more than 5 minutes or you passed out (lost consciousness), call 911 or seek other emergency help right away. A medical visit is only one step in your treatment. Even if you feel better, you still need to do what your doctor recommends, such as going to all suggested follow-up appointments and taking medicines exactly as directed. This will help you recover and help prevent future problems. How can you care for yourself at home? · Rest until you feel better. · Take your medicine exactly as prescribed. Call your doctor if you think you are having a problem with your medicine. · Do not drive after taking a prescription pain medicine. When should you call for help? Call 911 if:    · You passed out (lost consciousness).     · You have severe difficulty breathing.     · You have symptoms of a heart attack. These may include:  ¨ Chest pain or pressure, or a strange feeling in your chest.  ¨ Sweating. ¨ Shortness of breath. ¨ Nausea or vomiting. ¨ Pain, pressure, or a strange feeling in your back, neck, jaw, or upper belly or in one or both shoulders or arms. ¨ Lightheadedness or sudden weakness. ¨ A fast or irregular heartbeat.   After you call 911, the  may tell you to chew 1 adult-strength or 2 to 4 low-dose aspirin. Wait for an ambulance. Do not try to drive yourself.    Call your doctor today if:    · You have any trouble breathing.     · Your chest pain gets worse.     · You are dizzy or lightheaded, or you feel like you may faint.     · You are not getting better as expected.     · You are having new or different chest pain. Where can you learn more? Go to http://cristina-mignon.info/. Enter A120 in the search box to learn more about \"Chest Pain: Care Instructions. \"  Current as of: November 20, 2017  Content Version: 11.8  © 9215-3465 [employer]. Care instructions adapted under license by [employer] (which disclaims liability or warranty for this information). If you have questions about a medical condition or this instruction, always ask your healthcare professional. [address] any warranty or liability for your use of this information.
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
## 2018-10-11 NOTE — ED NOTES
Rosana Trevino is a 82 y.o. female that was discharged in stable. Pt was accompanied by friend. Pt is not driving. The patients diagnosis, condition and treatment were explained to  patient and aftercare instructions were given. The patient verbalized understanding. Patient armband removed and shredded.

## 2018-10-12 ENCOUNTER — HOME CARE VISIT (OUTPATIENT)
Dept: SCHEDULING | Facility: HOME HEALTH | Age: 83
End: 2018-10-12
Payer: MEDICARE

## 2018-10-12 ENCOUNTER — TELEPHONE (OUTPATIENT)
Dept: ORTHOPEDIC SURGERY | Facility: CLINIC | Age: 83
End: 2018-10-12

## 2018-10-12 VITALS
OXYGEN SATURATION: 98 % | DIASTOLIC BLOOD PRESSURE: 94 MMHG | TEMPERATURE: 98.2 F | SYSTOLIC BLOOD PRESSURE: 162 MMHG | HEART RATE: 90 BPM

## 2018-10-12 PROCEDURE — 3331090002 HH PPS REVENUE DEBIT

## 2018-10-12 PROCEDURE — 3331090001 HH PPS REVENUE CREDIT

## 2018-10-12 PROCEDURE — G0157 HHC PT ASSISTANT EA 15: HCPCS

## 2018-10-12 NOTE — TELEPHONE ENCOUNTER
Patient called in states that she received a call from [facility] about her PT and now she is confused. She would like a call back at [phone].

## 2018-10-12 NOTE — TELEPHONE ENCOUNTER
Called patient back and asked her what her question was. She stated that everything was fine, that it was just some confusion that had already been cleared up. I told her to give us a call back if anything else came up.

## 2018-10-12 NOTE — TELEPHONE ENCOUNTER
Called to speak to [facility] in regards to this, however she was out of the office so I gave Paul Bare the information. She said she would let [facility] know and if she had any more questions that she would give us a call back.

## 2018-10-13 VITALS
HEART RATE: 84 BPM | DIASTOLIC BLOOD PRESSURE: 88 MMHG | OXYGEN SATURATION: 98 % | TEMPERATURE: 98.5 F | SYSTOLIC BLOOD PRESSURE: 148 MMHG

## 2018-10-13 PROCEDURE — 3331090001 HH PPS REVENUE CREDIT

## 2018-10-13 PROCEDURE — 3331090002 HH PPS REVENUE DEBIT

## 2018-10-14 PROCEDURE — 3331090001 HH PPS REVENUE CREDIT

## 2018-10-14 PROCEDURE — 3331090002 HH PPS REVENUE DEBIT

## 2018-10-15 ENCOUNTER — HOME CARE VISIT (OUTPATIENT)
Dept: SCHEDULING | Facility: HOME HEALTH | Age: 83
End: 2018-10-15
Payer: MEDICARE

## 2018-10-15 VITALS
SYSTOLIC BLOOD PRESSURE: 142 MMHG | DIASTOLIC BLOOD PRESSURE: 82 MMHG | TEMPERATURE: 97.7 F | HEART RATE: 90 BPM | OXYGEN SATURATION: 92 %

## 2018-10-15 PROCEDURE — G0159 HHC PT MAINT EA 15 MIN: HCPCS

## 2018-10-15 PROCEDURE — 3331090002 HH PPS REVENUE DEBIT

## 2018-10-15 PROCEDURE — 3331090003 HH PPS REVENUE ADJ

## 2018-10-15 PROCEDURE — 3331090001 HH PPS REVENUE CREDIT

## 2018-10-18 ENCOUNTER — HOSPITAL ENCOUNTER (OUTPATIENT)
Dept: PHYSICAL THERAPY | Age: 83
Discharge: HOME | End: 2018-10-18
Payer: MEDICARE

## 2018-10-18 PROCEDURE — G8978 MOBILITY CURRENT STATUS: HCPCS

## 2018-10-18 PROCEDURE — 97110 THERAPEUTIC EXERCISES: CPT

## 2018-10-18 PROCEDURE — G8979 MOBILITY GOAL STATUS: HCPCS

## 2018-10-18 PROCEDURE — 97162 PT EVAL MOD COMPLEX 30 MIN: CPT
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
## 2018-10-19 ENCOUNTER — OFFICE VISIT (OUTPATIENT)
Dept: VASCULAR SURGERY | Age: 83
End: 2018-10-19

## 2018-10-19 VITALS
HEIGHT: 67 IN | RESPIRATION RATE: 18 BRPM | SYSTOLIC BLOOD PRESSURE: 142 MMHG | HEART RATE: 92 BPM | DIASTOLIC BLOOD PRESSURE: 70 MMHG | BODY MASS INDEX: 37.51 KG/M2 | WEIGHT: 239 LBS

## 2018-10-19 DIAGNOSIS — I82.432 DEEP VEIN THROMBOSIS (DVT) OF POPLITEAL VEIN OF LEFT LOWER EXTREMITY, UNSPECIFIED CHRONICITY (HCC): Primary | ICD-10-CM
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
## 2018-10-19 NOTE — PROGRESS NOTES
1. Have you been to an emergency room or urgent care clinic since your last visit? NO    Hospitalized since your last visit? If yes, where, when, and reason for visit? NO  2. Have you seen or consulted any other health care providers outside of the [facility] since your last visit including any procedures, health maintenance items. If yes, where, when and reason for visit?  NO

## 2018-10-23 ENCOUNTER — HOSPITAL ENCOUNTER (OUTPATIENT)
Dept: PHYSICAL THERAPY | Age: 83
Discharge: HOME | End: 2018-10-23
Payer: MEDICARE

## 2018-10-23 PROCEDURE — 97110 THERAPEUTIC EXERCISES: CPT

## 2018-10-23 NOTE — PROGRESS NOTES
PT DAILY TREATMENT NOTE - [facility]     Patient Name: Maliha Gotti  Date:10/23/2018  : 1935  [x]  Patient  Verified  Payor: [place] MEDICARE / Plan: [place] MEDICARE PART A & B / Product Type: Medicare /    In time:12:01  Out time:12:45 (though patient remained in clinic to near 1pm due to conversation). Total Treatment Time (min): 44  Total Timed Codes (min): 44  1:1 Treatment Time ( only): 44   Visit #: 2 of 8    Treatment Area: Left knee pain [M25.562]    SUBJECTIVE  Pain Level (0-10 scale): 6/10  Any medication changes, allergies to medications, adverse drug reactions, diagnosis change, or new procedure performed?: [x] No    [] Yes (see summary sheet for update)  Subjective functional status/changes:   [] No changes reported  The patient states that she has had some burning through her left lower abdomen that began a few days ago, and has been persistent. She indicated she took some medication which helped with this. No new reports concerning left knee, with exception of patient indicating she feels it has less swelling today. OBJECTIVE  44 min Therapeutic Exercise:  [x] See flow sheet :   Rationale: increase ROM and increase strength to improve the patients ability to improve ADL ease. With   [] TE   [] TA   [] neuro   [] other: Patient Education: [x] Review HEP    [] Progressed/Changed HEP based on:   [] positioning   [] body mechanics   [] transfers   [] heat/ice application    [] other:      Other Objective/Functional Measures:   Performed sit to stands with extensive education regarding 50% weight bearing precautions. Pain Level (0-10 scale) post treatment: 4/10    ASSESSMENT/Changes in Function:  Questioned patient as to if she had bowel movement and she indicated she has remained regular with these and that they have looked fine. Advised the patient, due to her persistent left lower quadrant abdominal pain, she needs to make her referring Physician aware.  She indicated that she would do so today and give them a call. Patient will continue to benefit from skilled PT services to modify and progress therapeutic interventions, address functional mobility deficits, address ROM deficits, address strength deficits, analyze and address soft tissue restrictions, analyze and cue movement patterns, analyze and modify body mechanics/ergonomics, assess and modify postural abnormalities and instruct in home and community integration to attain remaining goals. []  See Plan of Care  []  See progress note/recertification  []  See Discharge Summary         Progress towards goals / Updated goals:  Short Term Goals: To be accomplished in 2 weeks:              1. Patient will be ind and compliant with HEp 1-2x/day to increase ease with ADLs. 2. Patient will improve Left knee AROM flexion to 110deg to increase ease with dressing. Long Term Goals: To be accomplished in 6 weeks:              1. Patient will improve FOTO to at least 40 to assist with return to PLOF. 2. Patient will be able to maintain standing for 2 minutes without requiring a rest break to increase ease with self care. 3. Patient will be able to ambulate 100 feet with RW with SBA to increase ease with home navigation.      PLAN  []  Upgrade activities as tolerated     [x]  Continue plan of care  []  Update interventions per flow sheet       []  Discharge due to:_  []  Other:_      Ketty Murphy PT 10/23/2018  1:31 PM    Future Appointments   Date Time Provider Chelle Reeves   10/25/2018 11:30 AM Lety Guerrier PTA [facility]   10/30/2018 11:00 AM Vee Gonzalez PTA [facility]   11/1/2018 11:30 AM Vee Gonzalez PTA [facility]   11/2/2018  1:30 PM Laila Perry MD [address] [facility]   11/6/2018 11:00 AM Mary Sim, LALITA [facility]   11/7/2018 10:15 AM [facility]-C [facility]   11/8/2018 11:00 AM Vee Gonzalez PTA [facility]   11/13/2018 11:30 AM Taylor Becerra, PT [facility]   11/15/2018 11:00 AM Selin Jansen, PTA [facility]   11/20/2018 11:30 AM Taylor Becerra, PT [facility]   11/27/2018 11:30 AM Taylor Becerra, PT [facility]   11/29/2018 11:00 AM Selin Jansen, PTA [facility]   1/22/2019 10:30 AM Ny Dan MD [id number]

## 2018-10-25 ENCOUNTER — HOSPITAL ENCOUNTER (OUTPATIENT)
Dept: PHYSICAL THERAPY | Age: 83
Discharge: HOME | End: 2018-10-25
Payer: MEDICARE

## 2018-10-25 PROCEDURE — 97110 THERAPEUTIC EXERCISES: CPT

## 2018-10-25 NOTE — PROGRESS NOTES
PT DAILY TREATMENT NOTE 10-18    Patient Name: Tom Pitt  Date:10/25/2018  : 1935  [x]  Patient  Verified  Payor: VA MEDICARE / Plan: VA MEDICARE PART A & B / Product Type: Medicare /    In time:11:30  Out time:12:15  Total Treatment Time (min): 45  Visit #: 3 of 8    Medicare/BCBS Only   Total Timed Codes (min):  45 1:1 Treatment Time:  36       Treatment Area: Left knee pain [M25.562]    SUBJECTIVE  Pain Level (0-10 scale): 4  Any medication changes, allergies to medications, adverse drug reactions, diagnosis change, or new procedure performed?: [x] No    [] Yes (see summary sheet for update)  Subjective functional status/changes:   [] No changes reported  Pt reports feeling good today, has been doing her exercises a lot at home. OBJECTIVE    45 min Therapeutic Exercise:  [x] See flow sheet :   Rationale: increase ROM and increase strength to improve the patients ability to perform ADLs            With   [] TE   [] TA   [] neuro   [] other: Patient Education: [x] Review HEP    [] Progressed/Changed HEP based on:   [] positioning   [] body mechanics   [] transfers   [] heat/ice application    [] other:      Other Objective/Functional Measures:   Reviewed 50% WB status and safety with transfers     Pain Level (0-10 scale) post treatment: 4    ASSESSMENT/Changes in Function: Pt performed well with therex today with cues to stay on task and for safety with W/C to mat transfers. Pt reports compliance with 50% WB and with HEP. Patient will continue to benefit from skilled PT services to modify and progress therapeutic interventions, address functional mobility deficits, address ROM deficits, address strength deficits, analyze and address soft tissue restrictions and analyze and cue movement patterns to attain remaining goals.      []  See Plan of Care  []  See progress note/recertification  []  See Discharge Summary         Progress towards goals / Updated goals:  Short Term Goals: To be accomplished in 2 weeks:              1. Patient will be ind and compliant with HEp 1-2x/day to increase ease with ADLs. Met- pt reports compliance              2. Patient will improve Left knee AROM flexion to 110deg to increase ease with dressing. Long Term Goals: To be accomplished in 6 weeks:              1. Patient will improve FOTO to at least 40 to assist with return to PLOF.             2. Patient will be able to maintain standing for 2 minutes without requiring a rest break to increase ease with self care.              3. Patient will be able to ambulate 100 feet with RW with SBA to increase ease with home navigation.          PLAN  [x]  Upgrade activities as tolerated     [x]  Continue plan of care  []  Update interventions per flow sheet       []  Discharge due to:_  []  Other:_      Lenora Carrasco PTA 10/25/2018  11:42 AM    Future Appointments   Date Time Provider Chelle Reeves   10/30/2018 11:00 AM Chilango Palacios, PTA [facility]   11/1/2018 11:30 AM Chilango Palacios, PTA [facility]   11/2/2018  1:30 PM Rosie Elkins MD [facility]   11/6/2018 11:00 AM Lina Barefoot, PT [facility]   11/7/2018 10:15 AM Michael Avilez PA-C [facility] SCHED   11/8/2018 11:00 AM Chilango Palacios, PTA [facility]   11/13/2018 11:30 AM Lina Barefoot, PT [facility]   11/15/2018 11:00 AM Chilango Philip, PTA [facility]   11/20/2018 11:30 AM Lina Barefoot, PT [facility]   11/27/2018 11:30 AM Lina Barefoot, PT [facility]   11/29/2018 11:00 AM Chilango Palacios, PTA [facility]   1/22/2019 10:30 AM Elina Davis MD [address] [place] [address]

## 2018-10-30 ENCOUNTER — HOSPITAL ENCOUNTER (OUTPATIENT)
Dept: PHYSICAL THERAPY | Age: 83
Discharge: HOME | End: 2018-10-30
Payer: MEDICARE

## 2018-10-30 PROCEDURE — 97112 NEUROMUSCULAR REEDUCATION: CPT

## 2018-10-30 PROCEDURE — 97110 THERAPEUTIC EXERCISES: CPT

## 2018-10-30 NOTE — PROGRESS NOTES
PT DAILY TREATMENT NOTE 10-18    Patient Name: Abimbola Tate  Date:10/30/2018  : 1935  [x]  Patient  Verified  Payor: [place] Part / Plan: [place] & B / Product Type: Medicare /    In time:11:06  Out time:11:45  Total Treatment Time (min): 39  Visit #: 4 of 8    Medicare/BCBS Only   Total Timed Codes (min):  39 1:1 Treatment Time:  39       Treatment Area: Left knee pain [M25.562]    SUBJECTIVE  Pain Level (0-10 scale): 0/10  Any medication changes, allergies to medications, adverse drug reactions, diagnosis change, or new procedure performed?: [x] No    [] Yes (see summary sheet for update)  Subjective functional status/changes:   [] No changes reported  Pt denies any pain currently. Pt reports compliance with HEP. OBJECTIVE    39 min Therapeutic Exercise:  [] See flow sheet :   Rationale: increase ROM and increase strength to improve the patients ability to tolerate ADLs. With   [] TE   [] TA   [] neuro   [] other: Patient Education: [x] Review HEP    [] Progressed/Changed HEP based on:   [] positioning   [] body mechanics   [] transfers   [] heat/ice application    [] other:      Other Objective/Functional Measures: unable to perform gait training due to time constraints. Pain Level (0-10 scale) post treatment: 0/10    ASSESSMENT/Changes in Function: Pt has fair tolerance to to weight bearing activities due to left knee pain. Patient will continue to benefit from skilled PT services to modify and progress therapeutic interventions, address functional mobility deficits, address ROM deficits, address strength deficits, analyze and address soft tissue restrictions, analyze and cue movement patterns and analyze and modify body mechanics/ergonomics to attain remaining goals.      []  See Plan of Care  []  See progress note/recertification  []  See Discharge Summary         Progress towards goals / Updated goals:  Short Term Goals: To be accomplished in 2 weeks:              2. Patient will be ind and compliant with HEp 1-2x/day to increase ease with ADLs. Met- pt reports compliance              2. Patient will improve Left knee AROM flexion to 110deg to increase ease with dressing. Long Term Goals: To be accomplished in 6 weeks:              1. Patient will improve FOTO to at least 40 to assist with return to PLOF.               2. Patient will be able to maintain standing for 2 minutes without requiring a rest break to increase ease with self care.              3. Patient will be able to ambulate 100 feet with RW with SBA to increase ease with home navigation.      PLAN  []  Upgrade activities as tolerated     [x]  Continue plan of care  []  Update interventions per flow sheet       []  Discharge due to:_  []  Other:_      Juan Carlos Chambers PTA 10/30/2018  11:52 AM    Future Appointments   Date Time Provider Chelle Reeves   11/2/2018 12:00 PM ADAM SCOTT [facility]   11/6/2018 11:00 AM Keyla Cyr, PT [facility]   11/7/2018 10:15 AM Zakiya Avilez PA-C [facility]   11/8/2018 11:00 AM Aure Cota PTA [facility]   11/12/2018 10:30 AM Simin Santana MD [id number] [facility]   11/13/2018 11:30 AM Martine Patel, PT [facility]   11/15/2018 11:00 AM Aure Cota PTA [facility]   11/20/2018 11:30 AM Martine Patel, PT [facility]   11/27/2018 11:30 AM Martine Patel, PT [facility]   11/29/2018 11:00 AM Aure Cota PTA [facility]   1/22/2019 10:30 AM Claribel Ivan MD [id number] [facility]

## 2018-11-01 ENCOUNTER — APPOINTMENT (OUTPATIENT)
Dept: PHYSICAL THERAPY | Age: 83
End: 2018-11-01
Payer: MEDICARE

## 2018-11-06 ENCOUNTER — HOSPITAL ENCOUNTER (OUTPATIENT)
Dept: PHYSICAL THERAPY | Age: 83
Discharge: HOME | End: 2018-11-06
Payer: MEDICARE

## 2018-11-06 PROCEDURE — 97110 THERAPEUTIC EXERCISES: CPT

## 2018-11-06 PROCEDURE — 97116 GAIT TRAINING THERAPY: CPT

## 2018-11-06 NOTE — PROGRESS NOTES
PT DAILY TREATMENT NOTE - [facility]     Patient Name: Yusuf So  Date:2018  : 1935  [x]  Patient  Verified  Payor: [place] MEDICARE / Plan: [place] MEDICARE PART A & B / Product Type: Medicare /    In time: 11:03  Out time: 11:51  Total Treatment Time (min): 48  Total Timed Codes (min): 48  1:1 Treatment Time ( [address] only): 30   Visit #: 5 of 8    Treatment Area: Left knee pain [M25.562]    SUBJECTIVE  Pain Level (0-10 scale): 6/10  Any medication changes, allergies to medications, adverse drug reactions, diagnosis change, or new procedure performed?: [x] No    [] Yes (see summary sheet for update)  Subjective functional status/changes:   [] No changes reported  The patient reports that her knee is sore on the outside today, but she states she can tell she is improving. OBJECTIVE  32 min Therapeutic Exercise:  [x] See flow sheet :   Rationale: increase ROM and increase strength to improve the patients ability to improve ADL ease. 8 min Manual Therapy:  Left hip PROM ABD/flexion ER/IR   Rationale: decrease pain, increase ROM and increase tissue extensibility to improve ADL ease. 8 min Gait Training:  _78__ feet with _RW__ device on level surfaces with __supervision_ level of assist   Rationale: to maximize ambulation ease and efficiency. With   [] TE   [] TA   [] neuro   [] other: Patient Education: [x] Review HEP    [] Progressed/Changed HEP based on:   [] positioning   [] body mechanics   [] transfers   [] heat/ice application    [] other:      Other Objective/Functional Measures:   Performed gait training at 50% WB. She did require fairly frequent cues to remain at 50% weight bearing. FOTO: 50    Pain Level (0-10 scale) post treatment: 4/10    ASSESSMENT/Changes in Function: The patient is progressing well with ambulation efficiency. She does require frequent cues to remain at 50% weight bearing.  No increase in pain post-session with FOTO score improving indicating improved quality of life. Patient will continue to benefit from skilled PT services to modify and progress therapeutic interventions, address functional mobility deficits, address ROM deficits, address strength deficits, analyze and address soft tissue restrictions, analyze and cue movement patterns, analyze and modify body mechanics/ergonomics, assess and modify postural abnormalities and instruct in home and community integration to attain remaining goals. []  See Plan of Care  []  See progress note/recertification  []  See Discharge Summary         Progress towards goals / Updated goals:  Short Term Goals: To be accomplished in 2 weeks:              2. Patient will be ind and compliant with HEp 1-2x/day to increase ease with ADLs. Met- pt reports compliance              2. Patient will improve Left knee AROM flexion to 110deg to increase ease with dressing. Long Term Goals: To be accomplished in 6 weeks:              1. Patient will improve FOTO to at least 40 to assist with return to PLOF.  Met 50 11/06/2018              2. Patient will be able to maintain standing for 2 minutes without requiring a rest break to increase ease with self care.              3. Patient will be able to ambulate 100 feet with RW with SBA to increase ease with home navigation.         PLAN  []  Upgrade activities as tolerated     [x]  Continue plan of care  []  Update interventions per flow sheet       []  Discharge due to:_  []  Other:_      Amrik Spangler, PT 11/6/2018  1:10 PM    Future Appointments   Date Time Provider Chelle Reeves   11/8/2018  9:30 AM Hallie Avilez PA-C [address]   11/8/2018 11:00 AM Raheem Kaufman PTA [facility]   11/12/2018 10:30 AM MD Donna Magdaleno [address]   11/13/2018 11:30 AM [facility]   11/15/2018 11:00 AM [facility]   11/20/2018 11:30 AM [facility]   11/27/2018 11:30 AM Benja Mays, PT [facility]   11/29/2018 11:00 AM Paige Rees, PTA [facility]   1/22/2019 10:30 AM Fariba Marks MD [id number] [place] [address]

## 2018-11-08 ENCOUNTER — HOSPITAL ENCOUNTER (OUTPATIENT)
Dept: PHYSICAL THERAPY | Age: 83
Discharge: HOME | End: 2018-11-08
Payer: MEDICARE

## 2018-11-08 ENCOUNTER — OFFICE VISIT (OUTPATIENT)
Dept: ORTHOPEDIC SURGERY | Age: 83
End: 2018-11-08

## 2018-11-08 VITALS
RESPIRATION RATE: 16 BRPM | DIASTOLIC BLOOD PRESSURE: 75 MMHG | SYSTOLIC BLOOD PRESSURE: 169 MMHG | HEIGHT: 67 IN | HEART RATE: 95 BPM | OXYGEN SATURATION: 99 % | WEIGHT: 239 LBS | TEMPERATURE: 97.5 F | BODY MASS INDEX: 37.51 KG/M2

## 2018-11-08 DIAGNOSIS — M97.8XXD PERIPROSTHETIC FRACTURE OF FEMUR AT TIP OF PROSTHESIS, SUBSEQUENT ENCOUNTER: Primary | ICD-10-CM

## 2018-11-08 DIAGNOSIS — Z96.649 PERIPROSTHETIC FRACTURE OF FEMUR AT TIP OF PROSTHESIS, SUBSEQUENT ENCOUNTER: Primary | ICD-10-CM

## 2018-11-08 PROCEDURE — 97110 THERAPEUTIC EXERCISES: CPT

## 2018-11-08 PROCEDURE — 97116 GAIT TRAINING THERAPY: CPT

## 2018-11-08 NOTE — PROGRESS NOTES
PT DAILY TREATMENT NOTE 10-18    Patient Name: Neno Sagastume  Date:2018  : 1935  [x]  Patient  Verified  Payor: [place] MEDICARE / Plan: [place] MEDICARE PART A & B / Product Type: Medicare /    In time:11:00  Out time:11:49  Total Treatment Time (min): 49  Visit #: 6 of 8    Medicare/BCBS Only   Total Timed Codes (min):  49 1:1 Treatment Time:  30       Treatment Area: Left knee pain [M25.562]    SUBJECTIVE  Pain Level (0-10 scale): 6/10  Any medication changes, allergies to medications, adverse drug reactions, diagnosis change, or new procedure performed?: [x] No    [] Yes (see summary sheet for update)  Subjective functional status/changes:   [] No changes reported  Pt reports stiffness in her left leg and knee. Pt reports compliance with HEP. OBJECTIVE    41 min Therapeutic Exercise:  [] See flow sheet :   Rationale: increase ROM and increase strength to improve the patients ability to tolerate ADLs. 8 min Gait Trainin feet with FWW device on level surfaces with SBA level of assist   Rationale: With   [] TE   [] TA   [] neuro   [] other: Patient Education: [x] Review HEP    [] Progressed/Changed HEP based on:   [] positioning   [] body mechanics   [] transfers   [] heat/ice application    [] other:      Other Objective/Functional Measures: Pt required 1 rest break during gait training due to fatigue. Verbal orders from Jitendra Enamorado to progress toward full weight bearing of Left LE and progress toward LRAD as tolerated. Pain Level (0-10 scale) post treatment: 0/10    ASSESSMENT/Changes in Function: Pt has fair gait mechanics with gait training and continues to progress with weight bearing through left LE.      Patient will continue to benefit from skilled PT services to modify and progress therapeutic interventions, address functional mobility deficits, address ROM deficits, address strength deficits, analyze and address soft tissue restrictions, analyze and cue movement patterns and analyze and modify body mechanics/ergonomics to attain remaining goals. []  See Plan of Care  []  See progress note/recertification  []  See Discharge Summary         Progress towards goals / Updated goals:  Short Term Goals: To be accomplished in 2 weeks:              9. Patient will be ind and compliant with HEp 1-2x/day to increase ease with ADLs. Met- pt reports compliance              2. Patient will improve Left knee AROM flexion to 110deg to increase ease with dressing. Long Term Goals: To be accomplished in 6 weeks:              1. Patient will improve FOTO to at least 40 to assist with return to PLOF.  Met 50 11/06/2018              2. Patient will be able to maintain standing for 2 minutes without requiring a rest break to increase ease with self care.              3. Patient will be able to ambulate 100 feet with RW with SBA to increase ease with home navigation. - Met. 11/08/18        PLAN  []  Upgrade activities as tolerated     []  Continue plan of care  []  Update interventions per flow sheet       []  Discharge due to:_  []  Other:_      Frantz Santo PTA 11/8/2018  12:55 PM    Future Appointments   Date Time Provider Chelle Quani   11/12/2018 10:30 AM MD Donna Mcclure [address]   11/13/2018 11:30 AM Nicole Manriquez, PT [facility]   11/15/2018 11:00 AM Paige Rees PTA [facility]   11/20/2018 11:30 AM Nicole Manriquez, PT [facility]   11/27/2018 11:30 AM Nicole Manriquez PT [facility]   11/29/2018 11:00 AM Paige Rees PTA [facility]   12/20/2018  9:30 AM Tri Rabago PA-C [address]   1/22/2019 10:30 AM Fariba Marks MD [address] [place] [address]

## 2018-11-12 ENCOUNTER — HOSPITAL ENCOUNTER (OUTPATIENT)
Dept: ONCOLOGY | Age: 83
Discharge: HOME | End: 2018-11-12

## 2018-11-12 ENCOUNTER — OFFICE VISIT (OUTPATIENT)
Dept: ONCOLOGY | Age: 83
End: 2018-11-12

## 2018-11-12 ENCOUNTER — HOSPITAL ENCOUNTER (OUTPATIENT)
Dept: LAB | Age: 83
Discharge: HOME | End: 2018-11-12
Payer: MEDICARE

## 2018-11-12 VITALS
DIASTOLIC BLOOD PRESSURE: 83 MMHG | TEMPERATURE: 98.1 F | OXYGEN SATURATION: 97 % | BODY MASS INDEX: 37.51 KG/M2 | HEART RATE: 94 BPM | WEIGHT: 239 LBS | RESPIRATION RATE: 20 BRPM | SYSTOLIC BLOOD PRESSURE: 151 MMHG | HEIGHT: 67 IN

## 2018-11-12 DIAGNOSIS — I82.532 CHRONIC DEEP VEIN THROMBOSIS (DVT) OF POPLITEAL VEIN OF LEFT LOWER EXTREMITY (HCC): Primary | ICD-10-CM

## 2018-11-12 DIAGNOSIS — I82.532 CHRONIC DEEP VEIN THROMBOSIS (DVT) OF POPLITEAL VEIN OF LEFT LOWER EXTREMITY (HCC): ICD-10-CM

## 2018-11-12 LAB
ALBUMIN SERPL-MCNC: 3.7 G/DL (ref 3.4–5)
ALBUMIN/GLOB SERPL: 0.9 {RATIO} (ref 0.8–1.7)
ALP SERPL-CCNC: 130 U/L (ref 45–117)
ALT SERPL-CCNC: 14 U/L (ref 13–56)
ANION GAP SERPL CALC-SCNC: 9 MMOL/L (ref 3–18)
AST SERPL-CCNC: 10 U/L (ref 15–37)
BASO+EOS+MONOS # BLD AUTO: 0.5 K/UL (ref 0–2.3)
BASO+EOS+MONOS # BLD AUTO: 10 % (ref 0.1–17)
BILIRUB SERPL-MCNC: 0.5 MG/DL (ref 0.2–1)
BUN SERPL-MCNC: 10 MG/DL (ref 7–18)
BUN/CREAT SERPL: 17 (ref 12–20)
CALCIUM SERPL-MCNC: 9.4 MG/DL (ref 8.5–10.1)
CHLORIDE SERPL-SCNC: 105 MMOL/L (ref 100–108)
CO2 SERPL-SCNC: 28 MMOL/L (ref 21–32)
CREAT SERPL-MCNC: 0.58 MG/DL (ref 0.6–1.3)
DIFFERENTIAL METHOD BLD: ABNORMAL
ERYTHROCYTE [DISTWIDTH] IN BLOOD BY AUTOMATED COUNT: 12.7 % (ref 11.5–14.5)
GLOBULIN SER CALC-MCNC: 4.1 G/DL (ref 2–4)
GLUCOSE SERPL-MCNC: 200 MG/DL (ref 74–99)
HCT VFR BLD AUTO: 38 % (ref 36–48)
HGB BLD-MCNC: 12.3 G/DL (ref 12–16)
LYMPHOCYTES # BLD: 2 K/UL (ref 1.1–5.9)
LYMPHOCYTES NFR BLD: 38 % (ref 14–44)
MCH RBC QN AUTO: 31.8 PG (ref 25–35)
MCHC RBC AUTO-ENTMCNC: 32.4 G/DL (ref 31–37)
MCV RBC AUTO: 98.2 FL (ref 78–102)
NEUTS SEG # BLD: 2.8 K/UL (ref 1.8–9.5)
NEUTS SEG NFR BLD: 52 % (ref 40–70)
PLATELET # BLD AUTO: 223 K/UL (ref 140–440)
POTASSIUM SERPL-SCNC: 3.4 MMOL/L (ref 3.5–5.5)
PROT SERPL-MCNC: 7.8 G/DL (ref 6.4–8.2)
RBC # BLD AUTO: 3.87 M/UL (ref 4.1–5.1)
SODIUM SERPL-SCNC: 142 MMOL/L (ref 136–145)
WBC # BLD AUTO: 5.3 K/UL (ref 4.5–13)

## 2018-11-12 PROCEDURE — 80053 COMPREHEN METABOLIC PANEL: CPT

## 2018-11-12 PROCEDURE — 36415 COLL VENOUS BLD VENIPUNCTURE: CPT

## 2018-11-12 NOTE — PATIENT INSTRUCTIONS
Deep Vein Thrombosis: Care Instructions  Your Care Instructions    A deep vein thrombosis (DVT) is a blood clot in certain veins of the legs, pelvis, or arms. Blood clots in these veins need to be treated because they can get bigger, break loose, and travel through the bloodstream to the lungs. A blood clot in a lung can be life-threatening. The doctor may have given you a blood thinner (anticoagulant). A blood thinner can stop the blood clot from growing larger and prevent new clots from forming. You will need to take a blood thinner for 3 to 6 months or longer. The doctor has checked you carefully, but problems can develop later. If you notice any problems or new symptoms, get medical treatment right away. Follow-up care is a key part of your treatment and safety. Be sure to make and go to all appointments, and call your doctor if you are having problems. It's also a good idea to know your test results and keep a list of the medicines you take. How can you care for yourself at home? · Take your medicines exactly as prescribed. Call your doctor if you think you are having a problem with your medicine. · If you are taking a blood thinner, be sure you get instructions about how to take your medicine safely. Blood thinners can cause serious bleeding problems. · Wear compression stockings if your doctor recommends them. These stockings are tighter at the feet than on the legs. They may reduce pain and swelling in your legs. But there are different types of stockings, and they need to fit right. So your doctor will recommend what you need. · When you sit, use a pillow to raise the arm or leg that has the blood clot. Try to keep it above the level of your heart. When should you call for help? Call 911 anytime you think you may need emergency care. For example, call if:    · You passed out (lost consciousness).     · You have symptoms of a blood clot in your lung (called a pulmonary embolism).  These include:  ? Sudden chest pain. ? Trouble breathing. ? Coughing up blood.    Call your doctor now or seek immediate medical care if:    · You have new or worse trouble breathing.     · You are dizzy or lightheaded, or you feel like you may faint.     · You have symptoms of a blood clot in your arm or leg. These may include:  ? Pain in the arm, calf, back of the knee, thigh, or groin. ? Redness and swelling in the arm, leg, or groin.    Watch closely for changes in your health, and be sure to contact your doctor if:    · You do not get better as expected. Where can you learn more? Go to http://cristina-mignon.info/. Enter B622 in the search box to learn more about \"Deep Vein Thrombosis: Care Instructions. \"  Current as of: November 21, 2017  Content Version: 11.8  © 3268-9693 [employer]. Care instructions adapted under license by [employer] (which disclaims liability or warranty for this information). If you have questions about a medical condition or this instruction, always ask your healthcare professional. [address] any warranty or liability for your use of this information.
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
## 2018-11-13 ENCOUNTER — HOSPITAL ENCOUNTER (OUTPATIENT)
Dept: PHYSICAL THERAPY | Age: 83
Discharge: HOME | End: 2018-11-13
Payer: MEDICARE

## 2018-11-13 PROCEDURE — 97116 GAIT TRAINING THERAPY: CPT

## 2018-11-13 PROCEDURE — 97140 MANUAL THERAPY 1/> REGIONS: CPT

## 2018-11-13 NOTE — PROGRESS NOTES
PT DAILY TREATMENT NOTE - [facility]     Patient Name: Fadi Abraham  Date:2018  : 1935  [x]  Patient  Verified  Payor: Milly Brito / Plan: [place] & B / Product Type: Medicare /    In time:11:30  Out time:12:19  Total Treatment Time (min): 49  Total Timed Codes (min): 49  1:1 Treatment Time ( [address] only): 32   Visit #: 7 of 8    Treatment Area: Left knee pain [M25.562]    SUBJECTIVE  Pain Level (0-10 scale): 3-4/10  Any medication changes, allergies to medications, adverse drug reactions, diagnosis change, or new procedure performed?: [x] No    [] Yes (see summary sheet for update)  Subjective functional status/changes:   [] No changes reported  The patient indicates she has continued to perform exercises at home, but has held off using RW due to fearing she may fall on her own. OBJECTIVE  31 min Therapeutic Exercise:  [x] See flow sheet :   Rationale: increase ROM and increase strength to improve the patients ability to improve ADL ease. 10 min Gait Training:  __80_ feet with _RW__ device on level surfaces with _supervision__ level of assist   Rationale: to maximize ambulation ease. 8 min Manual Therapy:  Left knee flexion/extension, emphasis of gentle flexion stretching. Rationale: decrease pain, increase ROM and increase tissue extensibility to improve ADL ease. With   [] TE   [] TA   [] neuro   [] other: Patient Education: [x] Review HEP    [] Progressed/Changed HEP based on:   [] positioning   [] body mechanics   [] transfers   [] heat/ice application    [] other:      Other Objective/Functional Measures: The patient became fatigued during [de-identified]' RW ambulation today, thus held distance to 80'. She did require cues in order to weight bearing accordingly. Performed between 50-60% weight bearing left lower extremity.       Pain Level (0-10 scale) post treatment: 0/10    ASSESSMENT/Changes in Function: Attained 103 degrees knee flexion during session post manual. Patient will continue to benefit from skilled PT services to modify and progress therapeutic interventions, address functional mobility deficits, address ROM deficits, address strength deficits, analyze and address soft tissue restrictions, analyze and cue movement patterns, analyze and modify body mechanics/ergonomics, assess and modify postural abnormalities, address imbalance/dizziness and instruct in home and community integration to attain remaining goals. []  See Plan of Care  []  See progress note/recertification  []  See Discharge Summary         Progress towards goals / Updated goals:  Short Term Goals: To be accomplished in 2 weeks:              3. Patient will be ind and compliant with HEp 1-2x/day to increase ease with ADLs. Met- pt reports compliance              2. Patient will improve Left knee AROM flexion to 110deg to increase ease with dressing. Progressed to 103 degrees 11/13/2018  Long Term Goals: To be accomplished in 6 weeks:              1.  Patient will improve FOTO to at least 40 to assist with return to PLOF. Met 50 11/06/2018              2. Patient will be able to maintain standing for 2 minutes without requiring a rest break to increase ease with self care.              3. Patient will be able to ambulate 100 feet with RW with SBA to increase ease with home navigation. - Met. 11/08/18      PLAN  []  Upgrade activities as tolerated     [x]  Continue plan of care  []  Update interventions per flow sheet       []  Discharge due to:_  []  Other:_      Greg Kaur PT 11/13/2018  1:04 PM    Future Appointments   Date Time Provider Chelle Reeves   11/15/2018 11:00 AM Onleodan De La Garza, PTA [facility]   11/20/2018 11:30 AM Warren Poag, PT [facility]   11/27/2018 11:30 AM Warren Poag, PT [facility]   11/29/2018 11:00 AM Onita Roughrain, PTA [facility]   12/19/2018 10:00 AM [facility] [address]   12/19/2018 10:30 AM Belinda Diana MD [address]   12/20/2018  9:30 AM Hallie Avilez PA-C [facility]   1/22/2019 10:30 AM Filiberto Sanchez MD [facility]   2/13/2019 10:15 AM Marleen Campos MD [place] [id number]

## 2018-11-15 ENCOUNTER — HOSPITAL ENCOUNTER (OUTPATIENT)
Dept: PHYSICAL THERAPY | Age: 83
Discharge: HOME | End: 2018-11-15
Payer: MEDICARE

## 2018-11-15 PROCEDURE — 97110 THERAPEUTIC EXERCISES: CPT

## 2018-11-15 PROCEDURE — 97116 GAIT TRAINING THERAPY: CPT

## 2018-11-15 NOTE — PROGRESS NOTES
PT DAILY TREATMENT NOTE 10-18    Patient Name: Fadi Abraham  Date:11/15/2018  : 1935  [x]  Patient  Verified  Payor: [place] MEDICARE / Plan: [place] MEDICARE PART A & B / Product Type: Medicare /    In time:11:00  Out time:11:38  Total Treatment Time (min): 38  Visit #: 7 of 12    Medicare/BCBS Only   Total Timed Codes (min):  38 1:1 Treatment Time:  28       Treatment Area: Left knee pain [M25.562]    SUBJECTIVE  Pain Level (0-10 scale): 2-3/10  Any medication changes, allergies to medications, adverse drug reactions, diagnosis change, or new procedure performed?: [x] No    [] Yes (see summary sheet for update)  Subjective functional status/changes:   [] No changes reported  Pt reports no new complaints of pain. Pt reports increased stiffness. OBJECTIVE     28 min Therapeutic Exercise:  [x] See flow sheet :   Rationale: increase ROM and increase strength to improve the patients ability to tolerate ADLs. 10 min Gait Trainin feet with FWW device on level surfaces with SBA level of assist   Rationale: With   [] TE   [] TA   [] neuro   [] other: Patient Education: [x] Review HEP    [] Progressed/Changed HEP based on:   [] positioning   [] body mechanics   [] transfers   [] heat/ice application    [] other:      Other Objective/Functional Measures: Added standing exercises as per flow sheet. Pain Level (0-10 scale) post treatment: 0/10    ASSESSMENT/Changes in Function: Pt is able to stand for 5 minutes with UE support     Patient will continue to benefit from skilled PT services to modify and progress therapeutic interventions, address functional mobility deficits, address ROM deficits, address strength deficits, analyze and address soft tissue restrictions, analyze and cue movement patterns and analyze and modify body mechanics/ergonomics to attain remaining goals.      []  See Plan of Care  []  See progress note/recertification  []  See Discharge Summary         Progress towards goals / Updated goals:  Short Term Goals: To be accomplished in 2 weeks:              6. Patient will be ind and compliant with HEp 1-2x/day to increase ease with ADLs. Met- pt reports compliance              2. Patient will improve Left knee AROM flexion to 110deg to increase ease with dressing. Progressed to 103 degrees 11/13/2018  Long Term Goals: To be accomplished in 6 weeks:              1. Patient will improve FOTO to at least 40 to assist with return to PLOF. Met 50 11/06/2018              2. Patient will be able to maintain standing for 2 minutes without requiring a rest break to increase ease with self care. - progressing pt able to stand for 5 minutes with UE support. 11/15/18              3. Patient will be able to ambulate 100 feet with RW with SBA to increase ease with home navigation. - Met. 11/08/18    PLAN  []  Upgrade activities as tolerated     [x]  Continue plan of care  []  Update interventions per flow sheet       []  Discharge due to:_  []  Other:_      Hope Draft, PTA 11/15/2018  11:19 AM    Future Appointments   Date Time Provider Chelle Reeves   11/20/2018 11:30 AM Laurent Edgar, PT [facility]   11/27/2018 11:30 [facility]   11/29/2018 11:00 AM Alo Nixon PTA [facility]   12/19/2018 10:00 AM [facility]   12/19/2018 10:30 AM Jesus Maciel MD [address]   12/20/2018  9:30 AM Bernard Chanel PA-C [facility]   1/22/2019 10:30 AM Jordan Castillo MD [facility]   2/13/2019 10:15 AM MD [address]

## 2018-11-20 ENCOUNTER — HOSPITAL ENCOUNTER (OUTPATIENT)
Dept: PHYSICAL THERAPY | Age: 83
End: 2018-11-20
Payer: MEDICARE

## 2018-11-27 ENCOUNTER — HOSPITAL ENCOUNTER (OUTPATIENT)
Dept: PHYSICAL THERAPY | Age: 83
Discharge: HOME | End: 2018-11-27
Payer: MEDICARE

## 2018-11-27 PROCEDURE — G8979 MOBILITY GOAL STATUS: HCPCS

## 2018-11-27 PROCEDURE — G8978 MOBILITY CURRENT STATUS: HCPCS

## 2018-11-27 PROCEDURE — 97110 THERAPEUTIC EXERCISES: CPT

## 2018-11-27 PROCEDURE — 97112 NEUROMUSCULAR REEDUCATION: CPT

## 2018-11-27 NOTE — PROGRESS NOTES
PT DAILY TREATMENT NOTE 10-18    Patient Name: Fany Rodriguez  Date:2018  : 1935  [x]  Patient  Verified  Payor: [place] MEDICARE / Plan: [place] MEDICARE PART A & B / Product Type: Medicare /    In time:11:30  Out time:12:11  Total Treatment Time (min): 41  Visit #: 9 of     Medicare/BCBS Only   Total Timed Codes (min):  41 1:1 Treatment Time:  31       Treatment Area: Left knee pain [M25.562]    SUBJECTIVE  Pain Level (0-10 scale): 5/10 \"stiff\"  Any medication changes, allergies to medications, adverse drug reactions, diagnosis change, or new procedure performed?: [x] No    [] Yes (see summary sheet for update)  Subjective functional status/changes:   [] No changes reported  The patient has a chief complaint of left knee stiffness upon arrival.    OBJECTIVE  31 min Therapeutic Exercise:  [x] See flow sheet :   Rationale: increase ROM and increase strength to improve the patients ability to improve ADL ease. 10 min Neuromuscular Re-education:  [x]  See flow sheet :   Rationale: increase ROM and increase strength  to improve the patients ability to improve ADL ease. With   [] TE   [] TA   [] neuro   [] other: Patient Education: [x] Review HEP    [] Progressed/Changed HEP based on:   [] positioning   [] body mechanics   [] transfers   [] heat/ice application    [] other:      Other Objective/Functional Measures: The patient ambulated 79 with RW and became short of breath, and does have known COPD. She is cleared for FWB at this time, thus utilized quad cane, as she stated that she has ambulated at this level prior to her injury. The patient attained 45' with the quad cane, but again, what appeared more due to COPD and fatigue, required a rest break. She indicates she has a follow-up with her Pulmonologist over the next week or two with related testing to follow.        Pain Level (0-10 scale) post treatment: 4/10    ASSESSMENT/Changes in Function: Fairly good progress to date regarding mobility, with the patient at one session, attaining 120' (11/08/2018). Since this time, she has been limited more from cardiovascular origin, and requires rest breaks. We have been able to progress to nearly full weight-bearing, but opted to use a quad cane at contact guard today due to concerns of stability through her left side. Her chief complaint is stiffness oriented around her knee with notable edema. She will continue to benefit from PT in order to progress mobility and strengthening. Patient will continue to benefit from skilled PT services to modify and progress therapeutic interventions, address functional mobility deficits, address ROM deficits, address strength deficits, analyze and address soft tissue restrictions, analyze and cue movement patterns, analyze and modify body mechanics/ergonomics, assess and modify postural abnormalities and instruct in home and community integration to attain remaining goals. []  See Plan of Care  []  See progress note/recertification  []  See Discharge Summary         Progress towards goals / Updated goals:  Short Term Goals: To be accomplished in 2 weeks:              9. Patient will be ind and compliant with HEp 1-2x/day to increase ease with ADLs. Met- pt reports compliance              2. Patient will improve Left knee AROM flexion to 110deg to increase ease with dressing. Progressed to 103 degrees 11/13/2018  Long Term Goals: To be accomplished in 6 weeks:              1. Patient will improve FOTO to at least 40 to assist with return to PLOF. Met 50 11/06/2018              2. Patient will be able to maintain standing for 2 minutes without requiring a rest break to increase ease with self care. - progressing pt able to stand for 5 minutes with UE support. 11/15/18              3. Patient will be able to ambulate 100 feet with RW with SBA to increase ease with home navigation. - Met. 11/08/18;  Nearly met - regressed largely due to cardiovascular 11/27/2018    PLAN  []  Upgrade activities as tolerated     [x]  Continue plan of care  []  Update interventions per flow sheet       []  Discharge due to:_  []  Other:_      Ozzy Garcia, PT 11/27/2018  1:30 PM    Future Appointments   Date Time Provider Chelle Reeves   11/29/2018 11:00 AM Quincy Kida, PTA [facility]   12/4/2018 10:30 AM Maury Marrero, PT [facility]   12/6/2018 10:30 AM Quincy Kida, PTA [facility]   12/11/2018 10:00 AM Quincy Kida, PTA [facility]   12/13/2018 10:30 AM Quincy Kida, PTA [facility]   12/18/2018 12:00 PM Maury Marrero, PT [facility]   12/19/2018 10:00 AM [facility]   12/19/2018 10:30 AM Sandro Cruz MD [address]   12/20/2018  9:30 AM Pema Garcia, Nelli Marcos [facility]   12/20/2018 11:30 AM Quincy Kida, PTA [facility]   12/27/2018 11:00 AM Quincy Kida, PTA [facility]   1/22/2019 10:30 AM Antoinette Contreras MD [facility]   2/13/2019 10:15 AM Jordyn Luis MD [id number] [facility]
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
## 2018-11-29 ENCOUNTER — HOSPITAL ENCOUNTER (OUTPATIENT)
Dept: PHYSICAL THERAPY | Age: 83
Discharge: HOME | End: 2018-11-29
Payer: MEDICARE

## 2018-11-29 PROCEDURE — 97116 GAIT TRAINING THERAPY: CPT

## 2018-11-29 PROCEDURE — 97110 THERAPEUTIC EXERCISES: CPT

## 2018-11-29 NOTE — PROGRESS NOTES
PT DAILY TREATMENT NOTE 10-18    Patient Name: Philip Duarte  Date:2018  : 1935  [x]  Patient  Verified  Payor: Lyssa Lombardi / Plan: [place] MEDICARE PART A & B / Product Type: Medicare /    In time:11:01  Out time:11:47  Total Treatment Time (min): 52  Visit #: 1 of 8    Medicare/BCBS Only   Total Timed Codes (min):  47 1:1 Treatment Time:  38       Treatment Area: Left knee pain [M25.562]    SUBJECTIVE  Pain Level (0-10 scale): 10  Any medication changes, allergies to medications, adverse drug reactions, diagnosis change, or new procedure performed?: [x] No    [] Yes (see summary sheet for update)  Subjective functional status/changes:   [] No changes reported  Pt reports she is using the walker at home to go to the bathroom. OBJECTIVE    30 min Therapeutic Exercise:  [] See flow sheet :   Rationale: increase ROM and increase strength to improve the patients ability to tolerate ADLs. 17 min Gait Trainin feet with FWW device on level surfaces with supervision level of assist   Rationale: With   [] TE   [] TA   [] neuro   [] other: Patient Education: [x] Review HEP    [] Progressed/Changed HEP based on:   [] positioning   [] body mechanics   [] transfers   [] heat/ice application    [] other:      Other Objective/Functional Measures: Pt was able to ambulate 100 feet with 2 rest breaks due to fatigue and SOB. Pain Level (0-10 scale) post treatment: 4/10    ASSESSMENT/Changes in Function: Pt continues to shift to right with weight bearing exercises. Patient will continue to benefit from skilled PT services to modify and progress therapeutic interventions, address functional mobility deficits, address ROM deficits, address strength deficits, analyze and address soft tissue restrictions, analyze and cue movement patterns and analyze and modify body mechanics/ergonomics to attain remaining goals.      []  See Plan of Care  []  See progress note/recertification  [] See Discharge Summary         Progress towards goals / Updated goals:  Updated Goals to be accomplished in 4 weeks:  1. The patient will ambulate mod I at 80' in order to negotiate home with greater efficiency. 2. The patient will demonstrate hip strength to 4/5 MMT ABD/flexion to improve ease of car transfers. 3. The patient will improve knee flexion to 110 degrees to improve ease of transfers.     PLAN  []  Upgrade activities as tolerated     []  Continue plan of care  []  Update interventions per flow sheet       []  Discharge due to:_  []  Other:_      En Edwards, PTA 11/29/2018  11:10 AM    Future Appointments   Date Time Provider Chelle Quani   12/4/2018 10:30 AM Yael Cyr, PT [facility]   12/6/2018 10:30 AM Vernell Bound, PTA [facility]   12/11/2018 10:00 AM Vernell Bound, PTA [facility]   12/13/2018 10:30 AM Vernell Bound, PTA [facility]   12/18/2018 12:00 PM Ryan Fulton, PT [facility]   12/19/2018 10:00 AM [facility]   12/19/2018 10:30 AM Sheela Leone MD [address]   12/20/2018  9:30 AM Yesenia Edmond, PAP[facility]   12/20/2018 11:30 AM Vernell Bound, PTA [facility]   12/27/2018 11:00 AM Vernell Bound, PTA [facility]   1/22/2019 10:30 AM Diony Peterson MD [facility]   2/13/2019 10:15 AM Kellie Chowdhury MD [facility]

## 2018-12-04 ENCOUNTER — HOSPITAL ENCOUNTER (OUTPATIENT)
Dept: PHYSICAL THERAPY | Age: 83
Discharge: HOME | End: 2018-12-04
Payer: MEDICARE

## 2018-12-04 PROCEDURE — 97116 GAIT TRAINING THERAPY: CPT

## 2018-12-04 PROCEDURE — 97110 THERAPEUTIC EXERCISES: CPT

## 2018-12-04 NOTE — PROGRESS NOTES
PT DAILY TREATMENT NOTE - [facility]     Patient Name: Linda Ward  Date:2018  : 1935  [x]  Patient  Verified  Payor: Jered Filler / Plan: [place] MEDICARE PART A & B / Product Type: Medicare /    In time:10:30  Out time:11:09  Total Treatment Time (min): 39  Total Timed Codes (min): 39  1:1 Treatment Time ( [address] only): 44   Visit #: 2 of 8    Treatment Area: Pain in left knee [M25.562]  Unsteadiness on feet [R26.81]    SUBJECTIVE  Pain Level (0-10 scale): 3/10  Any medication changes, allergies to medications, adverse drug reactions, diagnosis change, or new procedure performed?: [x] No    [] Yes (see summary sheet for update)  Subjective functional status/changes:   [] No changes reported  The patient states that her left knee is sore and she is still getting a click when she extends it, though it has begun to be more painful. She indicates she will call her doctor today. OBJECTIVE  29 min Therapeutic Exercise:  [x] See flow sheet :   Rationale: increase ROM and increase strength to improve the patients ability to improve ADL ease. 10 min Gait Training:  __140'_ feet with __RW_ device on level surfaces with _supervision__ level of assist; negotiated 5 stairs with B HR utilizing step to pattern. Rationale: to maximize ambulation ease and improve ease of stair negotiation. With   [] TE   [] TA   [] neuro   [] other: Patient Education: [x] Review HEP    [] Progressed/Changed HEP based on:   [] positioning   [] body mechanics   [] transfers   [] heat/ice application    [] other:      Other Objective/Functional Measures:   Progressed ambulation to 140'  Negotiated 5 stairs utilizing B HR with verbal cues only. Pain Level (0-10 scale) post treatment: 4/10    ASSESSMENT/Changes in Function: The patient has progressed to 140' with ambulation. Additionally, negotiated 5 stairs utilizing step to pattern with cues for utilizing right LE for concentric and eccentric phases of negotiation. Patient will continue to benefit from skilled PT services to modify and progress therapeutic interventions, address functional mobility deficits, address ROM deficits, address strength deficits, analyze and address soft tissue restrictions, analyze and cue movement patterns, analyze and modify body mechanics/ergonomics, assess and modify postural abnormalities and instruct in home and community integration to attain remaining goals. []  See Plan of Care  []  See progress note/recertification  []  See Discharge Summary         Progress towards goals / Updated goals:  Updated Goals to be accomplished in 4 weeks:  1. The patient will ambulate mod I at [facility] 42' in order to negotiate home with greater efficiency. Nearly met supervision 140' RW 12/04/2018  2. The patient will demonstrate hip strength to 4/5 MMT ABD/flexion to improve ease of car transfers. 3. The patient will improve knee flexion to 110 degrees to improve ease of transfers.     PLAN  []  Upgrade activities as tolerated     [x]  Continue plan of care  []  Update interventions per flow sheet       []  Discharge due to:_  []  Other:_      Percy Patient, PT 12/4/2018  1:34 PM    Future Appointments   Date Time Provider Chelle Reeves   12/6/2018 10:30 AM [facility]occo[facility]   12/11/2018 10:00 AM [facility]ba Rocco, [facility]   12/13/2018 10:30 AM Nba Wheaton, PTA [facility]   12/18/2018 12:00 PM [facility]   12/19/2018 10:00 AM [facility] SP[facility]OM[facility]   12/19/2018 10:30 AM Gen Lao MD [address]   12/20/2018  9:30 AM Jeramie Jacobo PA-C [facility]   12/20/2018 11:30 AM Nba Rocco, PTA [facility]   12/27/2018 11:00 AM Nba Wheaton, PTA [facility]   1/22/2019 10:30 AM Jadiel Chong MD [facility]   2/13/2019 10:15 AM Trell Beach MD [place] [id number]

## 2018-12-06 ENCOUNTER — HOSPITAL ENCOUNTER (OUTPATIENT)
Dept: PHYSICAL THERAPY | Age: 83
Discharge: HOME | End: 2018-12-06
Payer: MEDICARE

## 2018-12-06 PROCEDURE — 97116 GAIT TRAINING THERAPY: CPT

## 2018-12-06 PROCEDURE — 97110 THERAPEUTIC EXERCISES: CPT

## 2018-12-06 NOTE — PROGRESS NOTES
PT DAILY TREATMENT NOTE 10-18    Patient Name: Kelli Tavares  Date:2018  : 1935  [x]  Patient  Verified  Payor: VA MEDICARE / Plan: VA MEDICARE PART A & B / Product Type: Medicare /    In time:10:30  Out time:11:15  Total Treatment Time (min): 45  Visit #: 3 of 8    Medicare/BCBS Only   Total Timed Codes (min):  45 1:1 Treatment Time:  35       Treatment Area: Pain in left knee [M25.562]  Unsteadiness on feet [R26.81]    SUBJECTIVE  Pain Level (0-10 scale): 3/10  Any medication changes, allergies to medications, adverse drug reactions, diagnosis change, or new procedure performed?: [x] No    [] Yes (see summary sheet for update)  Subjective functional status/changes:   [] No changes reported  Pt reports grinding in her left knee when walking and squatting. OBJECTIVE    37 min Therapeutic Exercise:  [] See flow sheet :   Rationale: increase ROM and increase strength to improve the patients ability to tolerate ADLs. 8 min Gait Trainin feet with FWW device on level surfaces with SBA level of assist   Rationale: With   [] TE   [] TA   [] neuro   [] other: Patient Education: [x] Review HEP    [] Progressed/Changed HEP based on:   [] positioning   [] body mechanics   [] transfers   [] heat/ice application    [] other:      Other Objective/Functional Measures: Pt required decreased rest breaks today with ambulation. Pain Level (0-10 scale) post treatment: 3/10    ASSESSMENT/Changes in Function: Pt demonstrates improved gait mechanics and efficiency when ambulating with FWW. Pt able to negotiate 4 steps with step to gait pattern using bilateral handrails.      Patient will continue to benefit from skilled PT services to modify and progress therapeutic interventions, address functional mobility deficits, address ROM deficits, address strength deficits, analyze and address soft tissue restrictions, analyze and cue movement patterns and analyze and modify body mechanics/ergonomics to attain remaining goals. []  See Plan of Care  []  See progress note/recertification  []  See Discharge Summary         Progress towards goals / Updated goals:  Updated Goals to be accomplished in 4 weeks:  1. The patient will ambulate mod I at 80' in order to negotiate home with greater efficiency. Nearly met supervision 140' RW 12/04/2018  2. The patient will demonstrate hip strength to 4/5 MMT ABD/flexion to improve ease of car transfers. 3. The patient will improve knee flexion to 110 degrees to improve ease of transfers.     PLAN  []  Upgrade activities as tolerated     [x]  Continue plan of care  []  Update interventions per flow sheet       []  Discharge due to:_  []  Other:_      Graham Forte, DONY 12/6/2018  11:11 AM    Future Appointments   Date Time Provider Chelle Reeves   12/11/2018 10:00 AM Shadia Jacobo PTA [facility]   12/13/2018 10:30 AM [facility]   12/18/2018 12:00 PM [facility] Regan[facility] PT [facility]   12/19/2018 10:00 AM [facility]   12/19/2018 10:30 AM Jacky Sanderson MD [address]   12/20/2018  9:30 AM Kat Lund PA-C [facility]   12/20/2018 11:30 AM [facility]   12/27/2018 11:00 AM [facility]erra[facility]   1/22/2019 10:30 AM Riri Flood MD [facility]   2/13/2019 10:15 AM MD Aristides Schmitt

## 2018-12-11 ENCOUNTER — APPOINTMENT (OUTPATIENT)
Dept: PHYSICAL THERAPY | Age: 83
End: 2018-12-11
Payer: MEDICARE

## 2018-12-13 ENCOUNTER — APPOINTMENT (OUTPATIENT)
Dept: PHYSICAL THERAPY | Age: 83
End: 2018-12-13
Payer: MEDICARE

## 2018-12-18 ENCOUNTER — HOSPITAL ENCOUNTER (OUTPATIENT)
Dept: PHYSICAL THERAPY | Age: 83
Discharge: HOME | End: 2018-12-18
Payer: MEDICARE

## 2018-12-18 PROCEDURE — 97116 GAIT TRAINING THERAPY: CPT

## 2018-12-18 PROCEDURE — 97110 THERAPEUTIC EXERCISES: CPT

## 2018-12-18 NOTE — PROGRESS NOTES
PT DAILY TREATMENT NOTE - [facility]     Patient Name: Tin Tobar  Date:2018  : 1935  [x]  Patient  Verified  Payor: VA MEDICARE / Plan: VA MEDICARE PART A & B / Product Type: Medicare /    In time:12:00  Out time:12:44  Total Treatment Time (min): 44  Total Timed Codes (min): 44  1:1 Treatment Time ( [address] only): 45   Visit #: 4 of 8    Treatment Area: Pain in left knee [M25.562]  Unsteadiness on feet [R26.81]    SUBJECTIVE  Pain Level (0-10 scale): 0/10  Any medication changes, allergies to medications, adverse drug reactions, diagnosis change, or new procedure performed?: [x] No    [] Yes (see summary sheet for update)  Subjective functional status/changes:   [] No changes reported  \"I don't trust myself getting up and around with the walker when someone is not around. \"     OBJECTIVE  36 min Therapeutic Exercise:  [x] See flow sheet :   Rationale: increase ROM and increase strength to improve the patients ability to improve ADL ease. 8 min Gait Training:  __150_ feet with _RW__ device on level surfaces with ___ level of assist   Rationale: maximize stance phase through left side to improve efficiency of gait thus increasing ambulation efficiency.            With   [] TE   [] TA   [] neuro   [] other: Patient Education: [x] Review HEP    [] Progressed/Changed HEP based on:   [] positioning   [] body mechanics   [] transfers   [] heat/ice application    [] other:      Other Objective/Functional Measures:   Limited by right LE during standing exercises     BP during exercise: 140/80 mmHG  O2 sats 97% after standing exercises    O2 sats: 91% after 150' ambulation with RW    3/5 MMT hip flexion bilaterally    Pain Level (0-10 scale) post treatment: 3/10    ASSESSMENT/Changes in Function: The patient continues to require multiple rest breaks due to fatigue, though no significant change regarding O2 sats accounting for fatigue after standing exercise, though apparent associated symptomatology following ambulation. Improvement in stance phase during gait through left today however. She is experiencing knee pain of her right during stance exercises with probably excessive recruitment during bout of recovery. The patient indicates she has an appointment with her pulmonologist tomorrow. Patient will continue to benefit from skilled PT services to modify and progress therapeutic interventions, address functional mobility deficits, address ROM deficits, address strength deficits, analyze and address soft tissue restrictions, analyze and cue movement patterns, analyze and modify body mechanics/ergonomics, assess and modify postural abnormalities and instruct in home and community integration to attain remaining goals. []  See Plan of Care  []  See progress note/recertification  []  See Discharge Summary         Progress towards goals / Updated goals:  Updated Goals to be accomplished in 4 weeks:  1. The patient will ambulate mod I at 80' in order to negotiate home with greater efficiency. Nearly met supervision 140' RW 12/04/2018  2. The patient will demonstrate hip strength to 4/5 MMT ABD/flexion to improve ease of car transfers. Not met - 3/5 MMT 12/18/2018  3. The patient will improve knee flexion to 110 degrees to improve ease of transfers.     PLAN  []  Upgrade activities as tolerated     [x]  Continue plan of care  []  Update interventions per flow sheet       []  Discharge due to:_  []  Other:_      Carroll Flatness, PT 12/18/2018  11:49 AM    Future Appointments   Date Time Provider Chelle Quani   12/18/2018 12:00 PM Lina Arora, PT [facility]   12/19/2018 10:00 AM [facility] SP[facility]OM[facility]   12/19/2018 10:30 AM Latrice Andres MD [address]   12/20/2018  9:30 AM Michael Aranda PA-C [address]   12/20/2018 11:30 AM Chilango Palacios[facility]   12/27/2018 11:00 AM Mary Barrow, PT [facility]   1/22/2019 10:30 AM Elina Davis MD [facility] SCHED   2/13/2019 10:15 AM MD Donna Woodall [address]

## 2018-12-19 ENCOUNTER — OFFICE VISIT (OUTPATIENT)
Dept: PULMONOLOGY | Age: 83
End: 2018-12-19

## 2018-12-19 VITALS
DIASTOLIC BLOOD PRESSURE: 70 MMHG | HEART RATE: 100 BPM | WEIGHT: 239 LBS | HEIGHT: 67 IN | RESPIRATION RATE: 18 BRPM | TEMPERATURE: 98.5 F | BODY MASS INDEX: 37.51 KG/M2 | SYSTOLIC BLOOD PRESSURE: 134 MMHG | OXYGEN SATURATION: 94 %

## 2018-12-19 DIAGNOSIS — G47.33 OSA (OBSTRUCTIVE SLEEP APNEA): ICD-10-CM

## 2018-12-19 DIAGNOSIS — E66.9 OBESITY (BMI 30-39.9): ICD-10-CM

## 2018-12-19 DIAGNOSIS — Z91.14 NONCOMPLIANCE WITH CPAP TREATMENT: ICD-10-CM

## 2018-12-19 DIAGNOSIS — I51.7 CARDIOMEGALY: ICD-10-CM

## 2018-12-19 DIAGNOSIS — I50.22 CHRONIC SYSTOLIC CONGESTIVE HEART FAILURE (HCC): ICD-10-CM

## 2018-12-19 DIAGNOSIS — J45.20 MILD INTERMITTENT ASTHMA WITHOUT COMPLICATION: Primary | ICD-10-CM

## 2018-12-19 NOTE — PROGRESS NOTES
Chief Complaint   Patient presents with   [address] [place] [address] Referral / Consult     referred by Dr. Lisa Mabry for [facility], CXR 9/6/2018       1. Have you been to the ER, urgent care clinic since your last visit? Hospitalized since your last visit? Yes When: 10/11/2018 Where: [facility]  Reason for visit: Chest Pain    2. Have you seen or consulted any other health care providers outside of the [address] [place] [address] since your last visit? Include any pap smears or colon screening.  No
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
## 2018-12-19 NOTE — PROGRESS NOTES
Verbal Order with read back per Yani Castañeda MD  For PFT smart panel. AMB POC PFT complete w/ bronchodilator  AMB POC PFT complete w/o bronchodilator    Dr. Jayne Loera MD will co-sign the orders.

## 2018-12-20 ENCOUNTER — OFFICE VISIT (OUTPATIENT)
Dept: ORTHOPEDIC SURGERY | Age: 83
End: 2018-12-20

## 2018-12-20 ENCOUNTER — HOSPITAL ENCOUNTER (OUTPATIENT)
Dept: PHYSICAL THERAPY | Age: 83
Discharge: HOME | End: 2018-12-20
Payer: MEDICARE

## 2018-12-20 VITALS
WEIGHT: 239 LBS | BODY MASS INDEX: 37.51 KG/M2 | TEMPERATURE: 98.1 F | RESPIRATION RATE: 16 BRPM | OXYGEN SATURATION: 91 % | HEIGHT: 67 IN | HEART RATE: 98 BPM | SYSTOLIC BLOOD PRESSURE: 146 MMHG | DIASTOLIC BLOOD PRESSURE: 86 MMHG

## 2018-12-20 DIAGNOSIS — Z96.659 PERIPROSTHETIC SUPRACONDYLAR FRACTURE OF FEMUR, SUBSEQUENT ENCOUNTER: Primary | ICD-10-CM

## 2018-12-20 DIAGNOSIS — M97.8XXD PERIPROSTHETIC SUPRACONDYLAR FRACTURE OF FEMUR, SUBSEQUENT ENCOUNTER: Primary | ICD-10-CM

## 2018-12-20 PROCEDURE — 97110 THERAPEUTIC EXERCISES: CPT

## 2018-12-20 PROCEDURE — 97116 GAIT TRAINING THERAPY: CPT

## 2018-12-20 NOTE — PROGRESS NOTES
HISTORY OF PRESENT ILLNESS:  Aliya Lenz returns. She is just over six months status post her periprosthetic fracture associated with her left knee with a previous left total knee replacement. We have had her using a bone growth stimulator now for just over 3 months. RADIOGRAPHS:  The imaging today, reveals significant callus inlay associated with the distal periprosthetic supracondylar fracture of the left knee. This is, when compared to prior imaging, a significant interval improvement. The plate and screw fixation of the distal femur is intact with no hardware failure noted. There is a stable noted on lateral image a posterior fracture deformity of femoral neck / distal shaft with complete healing noted when compared to prior imaging    PLAN:   The patient has been in PT and is 100 percent weight bearing with 4 post walker, and will continue. Today, her x-rays were reviewed, and all her questions were answered to her satisfaction. OK to stop BGS and follow up PRN.

## 2018-12-20 NOTE — PROGRESS NOTES
PT DAILY TREATMENT NOTE 10-18    Patient Name: Alexus Chinchilla  Date:2018  : 1935  [x]  Patient  Verified  Payor: [place] MEDICARE / Plan: [place] MEDICARE PART A & B / Product Type: Medicare /    In time:11:30  Out time:12:13  Total Treatment Time (min): 43  Visit #: 5 of 8    Medicare/BCBS Only   Total Timed Codes (min):  43 1:1 Treatment Time:  39       Treatment Area: Pain in left knee [M25.562]  Unsteadiness on feet [R26.81]    SUBJECTIVE  Pain Level (0-10 scale): 4  Any medication changes, allergies to medications, adverse drug reactions, diagnosis change, or new procedure performed?: [x] No    [] Yes (see summary sheet for update)  Subjective functional status/changes:   [] No changes reported  \"Its stiff, hurting and all the other stuff that goes with it\"    OBJECTIVE    35 min Therapeutic Exercise:  [] See flow sheet :   Rationale: increase ROM and increase strength to improve the patients ability to perform daily tasks and self care      8 min Gait Traininfeet with _RW_ device on level surfaces with SBA level of assist   Rationale: to improve gait efficiency and functional independence          With   [] TE   [] TA   [] neuro   [] other: Patient Education: [x] Review HEP    [] Progressed/Changed HEP based on:   [] positioning   [] body mechanics   [] transfers   [] heat/ice application    [] other:      Other Objective/Functional Measures: pt with good maintenance of SpO2 midway through walk, decreased slightly to 94% at end of walk      Pain Level (0-10 scale) post treatment: 6    ASSESSMENT/Changes in Function: Pt limited today by reports of left knee pain thus held some standing interventions. She does demonstrate good overall speed and sequencing with RW. Slight antalgic gait noted due to knee pain.  Progress as tolerated with functional mobility and task performance    Patient will continue to benefit from skilled PT services to modify and progress therapeutic interventions, address functional mobility deficits, address ROM deficits, address strength deficits, analyze and address soft tissue restrictions, analyze and cue movement patterns, analyze and modify body mechanics/ergonomics, address imbalance/dizziness and instruct in home and community integration to attain remaining goals. []  See Plan of Care  []  See progress note/recertification  []  See Discharge Summary         Updated Goals to be accomplished in 4 weeks:  1. The patient will ambulate mod I at 80' in order to negotiate home with greater efficiency. Nearly met supervision 140' RW 12/04/2018  2. The patient will demonstrate hip strength to 4/5 MMT ABD/flexion to improve ease of car transfers. Not met - 3/5 MMT 12/18/2018  3. The patient will improve knee flexion to 110 degrees to improve ease of transfers. PLAN  []  Upgrade activities as tolerated     []  Continue plan of care  []  Update interventions per flow sheet       []  Discharge due to:_  []  Other:_      Marquis Peacock.  BETTIE, CMTPT 12/20/2018  11:34 AM    Future Appointments   Date Time Provider Chelle Quani   12/27/2018 11:00 AM Sunshine Arndt PT [id number]   1/22/2019 10:30 AM Leah Burden MD [place] [id number]   2/13/2019 10:15 AM Gt Santos MD [id number] [facility]

## 2018-12-20 NOTE — PROGRESS NOTES
1. Have you been to the ER, urgent care clinic since your last visit? Hospitalized since your last visit? No    2. Have you seen or consulted any other health care providers outside of the [address] [place] [address] since your last visit? Include any pap smears or colon screening.  No

## 2018-12-27 ENCOUNTER — HOSPITAL ENCOUNTER (OUTPATIENT)
Dept: PHYSICAL THERAPY | Age: 83
Discharge: HOME | End: 2018-12-27
Payer: MEDICARE

## 2018-12-27 PROCEDURE — 97110 THERAPEUTIC EXERCISES: CPT

## 2018-12-27 PROCEDURE — 97116 GAIT TRAINING THERAPY: CPT

## 2018-12-27 NOTE — PROGRESS NOTES
PT DAILY TREATMENT NOTE 10-18    Patient Name: Eva Villar  Date:2018  : 1935  [x]  Patient  Verified  Payor: Pauly Berman / Plan: [place] MEDICARE PART A & B / Product Type: Medicare /    In time:11:00  Out time:11:45  Total Treatment Time (min): 45  Visit #: 6 of 8    Medicare/BCBS Only   Total Timed Codes (min):  45 1:1 Treatment Time: 40       Treatment Area: Pain in left knee [M25.562]  Unsteadiness on feet [R26.81]    SUBJECTIVE  Pain Level (0-10 scale): 2  Any medication changes, allergies to medications, adverse drug reactions, diagnosis change, or new procedure performed?: [x] No    [] Yes (see summary sheet for update)  Subjective functional status/changes:   [] No changes reported  Pt reports she is doing okay. She reports she feels she can do the exercises at home. OBJECTIVE    37 min Therapeutic Exercise:  [x] See flow sheet :   Rationale: increase ROM and increase strength to improve the patients ability to perform daily tasks and self care      8 min Gait Trainin feet with _RW_ device on level surfaces with SBA level of assist   Rationale: to improve gait efficiency and functional independence          With   [] TE   [] TA   [] neuro   [] other: Patient Education: [x] Review HEP    [] Progressed/Changed HEP based on:   [] positioning   [] body mechanics   [] transfers   [] heat/ice application    [] other:      Other Objective/Functional Measures: AROM to 100 degrees     Pain Level (0-10 scale) post treatment: 2    ASSESSMENT/Changes in Function: pt fatigued with therex and gait, requiring a couple of seated rests. Gait with RW and SBA to supervision with slight antalgic pattern.     []  See Plan of Care  []  See progress note/recertification  [x]  See Discharge Summary         Updated Goals to be accomplished in 4 weeks:  1.  The patient will ambulate mod I at 80' in order to negotiate home with greater efficiency. - progressing 150' with SBA to supervision on 12-27-18  2. The patient will demonstrate hip strength to 4/5 MMT ABD/flexion to improve ease of car transfers. Not met - 3/5 MMT 12/18/2018  3. The patient will improve knee flexion to 110 degrees to improve ease of transfers. NOT met 100 degrees on 12-27-18    PLAN  []  Upgrade activities as tolerated     []  Continue plan of care  []  Update interventions per flow sheet       [x]  Discharge due to:_  []  Other:_      Mook Metcalf, PT  12/27/2018  11:14 AM    Future Appointments   Date Time Provider Chelle Reeves   1/22/2019 10:30 AM Anthony Rodriguez MD [id number] [facility]   2/13/2019 10:15 AM Lonny Greene MD [id number] [facility]
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
## 2019-01-13 ENCOUNTER — HOSPITAL ENCOUNTER (EMERGENCY)
Age: 84
Discharge: HOME | End: 2019-01-13
Attending: EMERGENCY MEDICINE
Payer: MEDICARE

## 2019-01-13 ENCOUNTER — APPOINTMENT (OUTPATIENT)
Dept: GENERAL RADIOLOGY | Age: 84
End: 2019-01-13
Attending: EMERGENCY MEDICINE
Payer: MEDICARE

## 2019-01-13 VITALS
TEMPERATURE: 98.1 F | HEART RATE: 92 BPM | HEIGHT: 67 IN | DIASTOLIC BLOOD PRESSURE: 70 MMHG | RESPIRATION RATE: 20 BRPM | OXYGEN SATURATION: 96 % | WEIGHT: 229 LBS | BODY MASS INDEX: 35.94 KG/M2 | SYSTOLIC BLOOD PRESSURE: 142 MMHG

## 2019-01-13 DIAGNOSIS — R07.89 ATYPICAL CHEST PAIN: Primary | ICD-10-CM

## 2019-01-13 LAB
ANION GAP SERPL CALC-SCNC: 12 MMOL/L (ref 3–18)
ATRIAL RATE: 104 BPM
BASOPHILS # BLD: 0 K/UL (ref 0–0.1)
BASOPHILS NFR BLD: 1 % (ref 0–2)
BUN SERPL-MCNC: 9 MG/DL (ref 7–18)
BUN/CREAT SERPL: 17 (ref 12–20)
CALCIUM SERPL-MCNC: 9.4 MG/DL (ref 8.5–10.1)
CALCULATED P AXIS, ECG09: 68 DEGREES
CALCULATED R AXIS, ECG10: -31 DEGREES
CALCULATED T AXIS, ECG11: 11 DEGREES
CHLORIDE SERPL-SCNC: 102 MMOL/L (ref 100–108)
CO2 SERPL-SCNC: 27 MMOL/L (ref 21–32)
CREAT SERPL-MCNC: 0.52 MG/DL (ref 0.6–1.3)
DIAGNOSIS, 93000: NORMAL
DIFFERENTIAL METHOD BLD: ABNORMAL
EOSINOPHIL # BLD: 0.3 K/UL (ref 0–0.4)
EOSINOPHIL NFR BLD: 5 % (ref 0–5)
ERYTHROCYTE [DISTWIDTH] IN BLOOD BY AUTOMATED COUNT: 12.3 % (ref 11.6–14.5)
GLUCOSE SERPL-MCNC: 145 MG/DL (ref 74–99)
HCT VFR BLD AUTO: 39.5 % (ref 35–45)
HGB BLD-MCNC: 12.4 G/DL (ref 12–16)
LYMPHOCYTES # BLD: 1.9 K/UL (ref 0.9–3.6)
LYMPHOCYTES NFR BLD: 37 % (ref 21–52)
MAGNESIUM SERPL-MCNC: 1.7 MG/DL (ref 1.6–2.6)
MCH RBC QN AUTO: 31.3 PG (ref 24–34)
MCHC RBC AUTO-ENTMCNC: 31.4 G/DL (ref 31–37)
MCV RBC AUTO: 99.7 FL (ref 74–97)
MONOCYTES # BLD: 0.4 K/UL (ref 0.05–1.2)
MONOCYTES NFR BLD: 8 % (ref 3–10)
NEUTS SEG # BLD: 2.6 K/UL (ref 1.8–8)
NEUTS SEG NFR BLD: 49 % (ref 40–73)
P-R INTERVAL, ECG05: 172 MS
PLATELET # BLD AUTO: 248 K/UL (ref 135–420)
PMV BLD AUTO: 10.6 FL (ref 9.2–11.8)
POTASSIUM SERPL-SCNC: 3.6 MMOL/L (ref 3.5–5.5)
Q-T INTERVAL, ECG07: 362 MS
QRS DURATION, ECG06: 84 MS
QTC CALCULATION (BEZET), ECG08: 476 MS
RBC # BLD AUTO: 3.96 M/UL (ref 4.2–5.3)
SODIUM SERPL-SCNC: 141 MMOL/L (ref 136–145)
TROPONIN I SERPL-MCNC: 0.03 NG/ML (ref 0–0.06)
VENTRICULAR RATE, ECG03: 104 BPM
WBC # BLD AUTO: 5.1 K/UL (ref 4.6–13.2)

## 2019-01-13 PROCEDURE — 80048 BASIC METABOLIC PNL TOTAL CA: CPT

## 2019-01-13 PROCEDURE — 93005 ELECTROCARDIOGRAM TRACING: CPT

## 2019-01-13 PROCEDURE — 71046 X-RAY EXAM CHEST 2 VIEWS: CPT

## 2019-01-13 PROCEDURE — 84484 ASSAY OF TROPONIN QUANT: CPT

## 2019-01-13 PROCEDURE — 83735 ASSAY OF MAGNESIUM: CPT

## 2019-01-13 PROCEDURE — 85025 COMPLETE CBC W/AUTO DIFF WBC: CPT

## 2019-01-13 PROCEDURE — 99283 EMERGENCY DEPT VISIT LOW MDM: CPT
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
## 2019-01-13 NOTE — ED TRIAGE NOTES
Pt c/o sharp pain under her left breast and chest that started this morning at 0630. Took a baby ASA. Pain comes and goes like a spasm. Has had this pain before.

## 2019-01-13 NOTE — DISCHARGE INSTRUCTIONS

## 2019-01-17 ENCOUNTER — OFFICE VISIT (OUTPATIENT)
Dept: FAMILY MEDICINE CLINIC | Age: 84
End: 2019-01-17

## 2019-01-17 ENCOUNTER — HOSPITAL ENCOUNTER (OUTPATIENT)
Dept: LAB | Age: 84
Discharge: HOME | End: 2019-01-17
Payer: MEDICARE

## 2019-01-17 ENCOUNTER — TELEPHONE (OUTPATIENT)
Dept: VASCULAR SURGERY | Age: 84
End: 2019-01-17

## 2019-01-17 VITALS
TEMPERATURE: 98.3 F | BODY MASS INDEX: 39.22 KG/M2 | DIASTOLIC BLOOD PRESSURE: 94 MMHG | RESPIRATION RATE: 20 BRPM | SYSTOLIC BLOOD PRESSURE: 160 MMHG | HEIGHT: 67 IN | HEART RATE: 107 BPM | WEIGHT: 249.9 LBS

## 2019-01-17 DIAGNOSIS — E55.9 HYPOVITAMINOSIS D: ICD-10-CM

## 2019-01-17 DIAGNOSIS — R07.9 CHEST PAIN, UNSPECIFIED TYPE: Primary | ICD-10-CM

## 2019-01-17 DIAGNOSIS — D75.89 MACROCYTOSIS: ICD-10-CM

## 2019-01-17 DIAGNOSIS — I82.532 CHRONIC DEEP VEIN THROMBOSIS (DVT) OF POPLITEAL VEIN OF LEFT LOWER EXTREMITY (HCC): ICD-10-CM

## 2019-01-17 DIAGNOSIS — E11.59 CONTROLLED TYPE 2 DIABETES MELLITUS WITH OTHER CIRCULATORY COMPLICATION, WITH LONG-TERM CURRENT USE OF INSULIN (HCC): ICD-10-CM

## 2019-01-17 DIAGNOSIS — R79.0 LOW MAGNESIUM LEVEL: ICD-10-CM

## 2019-01-17 DIAGNOSIS — E78.2 MIXED HYPERLIPIDEMIA: ICD-10-CM

## 2019-01-17 DIAGNOSIS — I50.22 CHRONIC SYSTOLIC CONGESTIVE HEART FAILURE (HCC): ICD-10-CM

## 2019-01-17 DIAGNOSIS — E03.8 OTHER SPECIFIED HYPOTHYROIDISM: ICD-10-CM

## 2019-01-17 DIAGNOSIS — I10 ESSENTIAL HYPERTENSION: ICD-10-CM

## 2019-01-17 DIAGNOSIS — Z79.4 CONTROLLED TYPE 2 DIABETES MELLITUS WITH OTHER CIRCULATORY COMPLICATION, WITH LONG-TERM CURRENT USE OF INSULIN (HCC): ICD-10-CM

## 2019-01-17 LAB
BNP SERPL-MCNC: 52 PG/ML (ref 0–1800)
CHOLEST SERPL-MCNC: 239 MG/DL
HBA1C MFR BLD HPLC: 8.5 %
HDLC SERPL-MCNC: 46 MG/DL (ref 40–60)
HDLC SERPL: 5.2 {RATIO} (ref 0–5)
LDLC SERPL CALC-MCNC: 172.8 MG/DL (ref 0–100)
LIPID PROFILE,FLP: ABNORMAL
O2 AMOUNT, POCO2: 86
POC PULSE OXIMETRY, POCSPO2: 97 %
T4 FREE SERPL-MCNC: 1 NG/DL (ref 0.7–1.5)
TRIGL SERPL-MCNC: 101 MG/DL (ref ?–150)
TSH SERPL DL<=0.05 MIU/L-ACNC: 1.3 UIU/ML (ref 0.36–3.74)
VIT B12 SERPL-MCNC: 726 PG/ML (ref 211–911)
VLDLC SERPL CALC-MCNC: 20.2 MG/DL

## 2019-01-17 PROCEDURE — 83880 ASSAY OF NATRIURETIC PEPTIDE: CPT

## 2019-01-17 PROCEDURE — 82306 VITAMIN D 25 HYDROXY: CPT

## 2019-01-17 PROCEDURE — 84439 ASSAY OF FREE THYROXINE: CPT

## 2019-01-17 PROCEDURE — 82607 VITAMIN B-12: CPT

## 2019-01-17 PROCEDURE — 80061 LIPID PANEL: CPT

## 2019-01-17 PROCEDURE — 36415 COLL VENOUS BLD VENIPUNCTURE: CPT

## 2019-01-17 RX ORDER — INSULIN GLARGINE 100 [IU]/ML
INJECTION, SOLUTION SUBCUTANEOUS
Qty: 1 VIAL | Refills: 0
Start: 2019-01-17 | End: 2020-09-11 | Stop reason: SDUPTHER

## 2019-01-17 RX ORDER — RANOLAZINE 500 MG/1
500 TABLET, EXTENDED RELEASE ORAL 2 TIMES DAILY
Qty: 180 TAB | Refills: 3 | Status: SHIPPED | OUTPATIENT
Start: 2019-01-17 | End: 2020-04-08 | Stop reason: SDUPTHER

## 2019-01-17 RX ORDER — LANOLIN ALCOHOL/MO/W.PET/CERES
400 CREAM (GRAM) TOPICAL 2 TIMES DAILY
Qty: 180 TAB | Refills: 3 | Status: SHIPPED | OUTPATIENT
Start: 2019-01-17 | End: 2021-01-12

## 2019-01-17 NOTE — PROGRESS NOTES
Chief Complaint   Patient presents with   [facility] Follow Up     seen in [facility] on 1/13/19 for chest pains       Health Maintenance Due   Topic Date Due    Pneumococcal 65+ Low/Medium Risk (2 of 2 - PPSV23) 12/02/2017    FOOT EXAM Q1  10/24/2018    MICROALBUMIN Q1  10/24/2018    MEDICARE YEARLY EXAM  10/26/2018       Health Maintenance reviewed     1. Have you been to the ER, urgent care clinic since your last visit? Hospitalized since your last visit? Yes When: 1/19 Where: [facility] Reason for visit: chest pain    2. Have you seen or consulted any other health care providers outside of the [address] [place] [address] since your last visit? Include any pap smears or colon screening.  No
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
## 2019-01-17 NOTE — PATIENT INSTRUCTIONS
Chest Pain (Angina): After Your Visit to the Emergency Room  Your Care Instructions  You have chest pain called angina because at times your heart does not get enough blood. This is caused by coronary artery disease (CAD). It causes major blood vessels to the heart to become narrow or blocked. You did not have a heart attack, but CAD does increase your risk for a heart attack. Even though you have been released from the emergency room, you still need to watch for any problems. The doctor carefully checked you. But sometimes problems can develop later. A visit to the emergency room is only one step in your treatment. Even if you feel better, you still need to do what your doctor recommends, such as going to all suggested follow-up appointments and taking medicines exactly as directed. This will help you recover and help prevent future problems. How can you care for yourself at home? · If your doctor has given you nitroglycerin or other nitrate medicine, keep it with you at all times. If you have chest pain, sit down and rest, and take your nitroglycerin or other nitrates as directed. If your chest pain does not get better after 5 minutes, call 911. · Take your medicine exactly as prescribed. Call your doctor if you think you are having a problem with your medicine. When should you call for help? Call 911 if:  · You passed out (lost consciousness). · You have chest pain that does not go away with rest or is not getting better within 5 minutes after you take a dose of nitroglycerin. · You have symptoms of a heart attack. These may include:  ¨ Chest pain or pressure, or a strange feeling in the chest.  ¨ Sweating. ¨ Shortness of breath. ¨ Nausea or vomiting. ¨ Pain, pressure, or a strange feeling in the back, neck, jaw, or upper belly or in one or both shoulders or arms. ¨ Lightheadedness or sudden weakness. ¨ A fast or irregular pulse.   After you call 911, the  may tell you to chew 1 adult-strength or 2 to 4 low-dose aspirin. Wait for an ambulance. Do not try to drive yourself. · You have other symptoms that you think are a medical emergency. Return to the emergency room now if:  · You are having chest pain more often than usual, even if it goes away when you rest or take nitroglycerin. · You feel dizzy or lightheaded, or you feel like you may faint. Where can you learn more? Go to IsoPlexis.be  Enter Q716 in the search box to learn more about \"Chest Pain (Angina): After Your Visit to the Emergency Room. \"   © 3646-8372 Healthwise, [employer]orporated. Care instructions adapted under license by [employer] (which disclaims liability or warranty for this information). This care instruction is for use with your licensed healthcare professional. If you have questions about a medical condition or this instruction, always ask your healthcare professional. [place] [address] any warranty or liability for your use of this information. Content Version: 9.4.25411; Last Revised: February 23, 2012       Counting Carbohydrates: Care Instructions  Your Care Instructions    You don't have to eat special foods when you have diabetes. You just have to be careful to eat healthy foods. Carbohydrates (carbs) raise blood sugar higher and quicker than any other nutrient. Carbs are found in desserts, breads and cereals, and fruit. They're also in starchy vegetables. These include potatoes, corn, and grains such as rice and pasta. Carbs are also in milk and yogurt. The more carbs you eat at one time, the higher your blood sugar will rise. Spreading carbs all through the day helps keep your blood sugar levels within your target range. Counting carbs is one of the best ways to keep your blood sugar under control.   If you use insulin, counting carbs helps you match the right amount of insulin to the number of grams of carbs in a meal. Then you can change your diet and insulin dose as needed. Testing your blood sugar several times a day can help you learn how carbs affect your blood sugar. A registered dietitian or certified diabetes educator can help you plan meals and snacks. Follow-up care is a key part of your treatment and safety. Be sure to make and go to all appointments, and call your doctor if you are having problems. It's also a good idea to know your test results and keep a list of the medicines you take. How can you care for yourself at home? Know your daily amount of carbohydrates  Your daily amount depends on several things, such as your weight, how active you are, which diabetes medicines you take, and what your goals are for your blood sugar levels. A registered dietitian or certified diabetes educator can help you plan how many carbs to include in each meal and snack. For most adults, a guideline for the daily amount of carbs is:  · 45 to 60 grams at each meal. That's about the same as 3 to 4 carbohydrate servings. · 15 to 20 grams at each snack. That's about the same as 1 carbohydrate serving. Count carbs  Counting carbs lets you know how much rapid-acting insulin to take before you eat. If you use an insulin pump, you get a constant rate of insulin during the day. So the pump must be programmed at meals. This gives you extra insulin to cover the rise in blood sugar after meals. If you take insulin:  · Learn your own insulin-to-carb ratio. You and your diabetes health professional will figure out the ratio. You can do this by testing your blood sugar after meals. For example, you may need a certain amount of insulin for every 15 grams of carbs. · Add up the carb grams in a meal. Then you can figure out how many units of insulin to take based on your insulin-to-carb ratio. · Exercise lowers blood sugar. You can use less insulin than you would if you were not doing exercise. Keep in mind that timing matters.  If you exercise within 1 hour after a meal, your body may need less insulin for that meal than it would if you exercised 3 hours after the meal. Test your blood sugar to find out how exercise affects your need for insulin. If you do or don't take insulin:  · Look at labels on packaged foods. This can tell you how many carbs are in a serving. You can also use guides from the American Diabetes Association. · Be aware of portions, or serving sizes. If a package has two servings and you eat the whole package, you need to double the number of grams of carbohydrate listed for one serving. · Protein, fat, and fiber do not raise blood sugar as much as carbs do. If you eat a lot of these nutrients in a meal, your blood sugar will rise more slowly than it would otherwise. Eat from all food groups  · Eat at least three meals a day. · Plan meals to include food from all the food groups. The food groups include grains, fruits, dairy, proteins, and vegetables. · Talk to your dietitian or diabetes educator about ways to add limited amounts of sweets into your meal plan. · If you drink alcohol, talk to your doctor. It may not be recommended when you are taking certain diabetes medicines. Where can you learn more? Go to http://cristina-mignon.info/. Enter E272 in the search box to learn more about \"Counting Carbohydrates: Care Instructions. \"  Current as of: December 7, 2017  Content Version: 11.8  © 3283-5423 [employer]. Care instructions adapted under license by [employer] (which disclaims liability or warranty for this information). If you have questions about a medical condition or this instruction, always ask your healthcare professional. [place] [address] any warranty or liability for your use of this information. DASH Diet: Care Instructions  Your Care Instructions    The DASH diet is an eating plan that can help lower your blood pressure. DASH stands for Dietary Approaches to Stop Hypertension. Hypertension is high blood pressure. The DASH diet focuses on eating foods that are high in calcium, potassium, and magnesium. These nutrients can lower blood pressure. The foods that are highest in these nutrients are fruits, vegetables, low-fat dairy products, nuts, seeds, and legumes. But taking calcium, potassium, and magnesium supplements instead of eating foods that are high in those nutrients does not have the same effect. The DASH diet also includes whole grains, fish, and poultry. The DASH diet is one of several lifestyle changes your doctor may recommend to lower your high blood pressure. Your doctor may also want you to decrease the amount of sodium in your diet. Lowering sodium while following the DASH diet can lower blood pressure even further than just the DASH diet alone. Follow-up care is a key part of your treatment and safety. Be sure to make and go to all appointments, and call your doctor if you are having problems. It's also a good idea to know your test results and keep a list of the medicines you take. How can you care for yourself at home? Following the DASH diet  · Eat 4 to 5 servings of fruit each day. A serving is 1 medium-sized piece of fruit, ½ cup chopped or canned fruit, 1/4 cup dried fruit, or 4 ounces (½ cup) of fruit juice. Choose fruit more often than fruit juice. · Eat 4 to 5 servings of vegetables each day. A serving is 1 cup of lettuce or raw leafy vegetables, ½ cup of chopped or cooked vegetables, or 4 ounces (½ cup) of vegetable juice. Choose vegetables more often than vegetable juice. · Get 2 to 3 servings of low-fat and fat-free dairy each day. A serving is 8 ounces of milk, 1 cup of yogurt, or 1 ½ ounces of cheese. · Eat 6 to 8 servings of grains each day. A serving is 1 slice of bread, 1 ounce of dry cereal, or ½ cup of cooked rice, pasta, or cooked cereal. Try to choose whole-grain products as much as possible.   · Limit lean meat, poultry, and fish to 2 servings each day. A serving is 3 ounces, about the size of a deck of cards. · Eat 4 to 5 servings of nuts, seeds, and legumes (cooked dried beans, lentils, and split peas) each week. A serving is 1/3 cup of nuts, 2 tablespoons of seeds, or ½ cup of cooked beans or peas. · Limit fats and oils to 2 to 3 servings each day. A serving is 1 teaspoon of vegetable oil or 2 tablespoons of salad dressing. · Limit sweets and added sugars to 5 servings or less a week. A serving is 1 tablespoon jelly or jam, ½ cup sorbet, or 1 cup of lemonade. · Eat less than 2,300 milligrams (mg) of sodium a day. If you limit your sodium to 1,500 mg a day, you can lower your blood pressure even more. Tips for success  · Start small. Do not try to make dramatic changes to your diet all at once. You might feel that you are missing out on your favorite foods and then be more likely to not follow the plan. Make small changes, and stick with them. Once those changes become habit, add a few more changes. · Try some of the following:  ? Make it a goal to eat a fruit or vegetable at every meal and at snacks. This will make it easy to get the recommended amount of fruits and vegetables each day. ? Try yogurt topped with fruit and nuts for a snack or healthy dessert. ? Add lettuce, tomato, cucumber, and onion to sandwiches. ? Combine a ready-made pizza crust with low-fat mozzarella cheese and lots of vegetable toppings. Try using tomatoes, squash, spinach, broccoli, carrots, cauliflower, and onions. ? Have a variety of cut-up vegetables with a low-fat dip as an appetizer instead of chips and dip. ? Sprinkle sunflower seeds or chopped almonds over salads. Or try adding chopped walnuts or almonds to cooked vegetables. ? Try some vegetarian meals using beans and peas. Add garbanzo or kidney beans to salads. Make burritos and tacos with mashed guy beans or black beans. Where can you learn more?   Go to http://cristina-mignon.info/. Enter O872 in the search box to learn more about \"DASH Diet: Care Instructions. \"  Current as of: December 6, 2017  Content Version: 11.8  © 4460-1844 [email or web address]. Care instructions adapted under license by [employer] (which disclaims liability or warranty for this information). If you have questions about a medical condition or this instruction, always ask your healthcare professional. [address] any warranty or liability for your use of this information. Counting Carbohydrates: Care Instructions  Your Care Instructions    You don't have to eat special foods when you have diabetes. You just have to be careful to eat healthy foods. Carbohydrates (carbs) raise blood sugar higher and quicker than any other nutrient. Carbs are found in desserts, breads and cereals, and fruit. They're also in starchy vegetables. These include potatoes, corn, and grains such as rice and pasta. Carbs are also in milk and yogurt. The more carbs you eat at one time, the higher your blood sugar will rise. Spreading carbs all through the day helps keep your blood sugar levels within your target range. Counting carbs is one of the best ways to keep your blood sugar under control. If you use insulin, counting carbs helps you match the right amount of insulin to the number of grams of carbs in a meal. Then you can change your diet and insulin dose as needed. Testing your blood sugar several times a day can help you learn how carbs affect your blood sugar. A registered dietitian or certified diabetes educator can help you plan meals and snacks. Follow-up care is a key part of your treatment and safety. Be sure to make and go to all appointments, and call your doctor if you are having problems. It's also a good idea to know your test results and keep a list of the medicines you take. How can you care for yourself at home?   Know your daily amount of carbohydrates  Your daily amount depends on several things, such as your weight, how active you are, which diabetes medicines you take, and what your goals are for your blood sugar levels. A registered dietitian or certified diabetes educator can help you plan how many carbs to include in each meal and snack. For most adults, a guideline for the daily amount of carbs is:  · 45 to 60 grams at each meal. That's about the same as 3 to 4 carbohydrate servings. · 15 to 20 grams at each snack. That's about the same as 1 carbohydrate serving. Count carbs  Counting carbs lets you know how much rapid-acting insulin to take before you eat. If you use an insulin pump, you get a constant rate of insulin during the day. So the pump must be programmed at meals. This gives you extra insulin to cover the rise in blood sugar after meals. If you take insulin:  · Learn your own insulin-to-carb ratio. You and your diabetes health professional will figure out the ratio. You can do this by testing your blood sugar after meals. For example, you may need a certain amount of insulin for every 15 grams of carbs. · Add up the carb grams in a meal. Then you can figure out how many units of insulin to take based on your insulin-to-carb ratio. · Exercise lowers blood sugar. You can use less insulin than you would if you were not doing exercise. Keep in mind that timing matters. If you exercise within 1 hour after a meal, your body may need less insulin for that meal than it would if you exercised 3 hours after the meal. Test your blood sugar to find out how exercise affects your need for insulin. If you do or don't take insulin:  · Look at labels on packaged foods. This can tell you how many carbs are in a serving. You can also use guides from the American Diabetes Association. · Be aware of portions, or serving sizes.  If a package has two servings and you eat the whole package, you need to double the number of grams of carbohydrate listed for one serving. · Protein, fat, and fiber do not raise blood sugar as much as carbs do. If you eat a lot of these nutrients in a meal, your blood sugar will rise more slowly than it would otherwise. Eat from all food groups  · Eat at least three meals a day. · Plan meals to include food from all the food groups. The food groups include grains, fruits, dairy, proteins, and vegetables. · Talk to your dietitian or diabetes educator about ways to add limited amounts of sweets into your meal plan. · If you drink alcohol, talk to your doctor. It may not be recommended when you are taking certain diabetes medicines. Where can you learn more? Go to http://cristina-mignon.info/. Enter G735 in the search box to learn more about \"Counting Carbohydrates: Care Instructions. \"  Current as of: December 7, 2017  Content Version: 11.8  © 6716-3091 [employer]. Care instructions adapted under license by [employer] (which disclaims liability or warranty for this information). If you have questions about a medical condition or this instruction, always ask your healthcare professional. [place] [address] any warranty or liability for your use of this information.

## 2019-01-18 LAB — 25(OH)D3 SERPL-MCNC: 25.7 NG/ML (ref 30–100)

## 2019-01-18 NOTE — PROGRESS NOTES
The following message was sent via [employer] to inform patient of lab results:      Dear Ms. Antonieta Rey cholesterol is too high. Please keep your appointment in 2 weeks to discuss. I recommend that you decrease your intake of starchy foods such as pasta, bread, rice, potatoes, corn (limit portions of these foods to a portion the size of your fist). Also, I recommend substituting foods with lower glycemic index (sweet potato instead of white potato, whole grain bread instead of white bread, brown rice instead of white rice, zucchini or other veggie ribbons instead of pasta, mashed cauliflower instead of mashed potatoes). I also recommend avoiding juices and sodas and sweet tea. Also, decrease eating out and avoid fried foods. It is recommended that you decrease packaged foods. Also avoid marinades and sauces since they are often high in sugar. Avoid canned foods or pre-made meals since you cannot control the ingredients in those. Cook more meals at home. In addition, avoid sweets (cookies, candies, cakes, ice cream). Here is an example of a sample meal plan as follows: Breakfast:  1-2 hardboiled or scrambled  Or poached eggs, ground turkey jomar/turkey sausage/turkey stephenson, cup of high fiber fruit with water/black coffee/unsweetened tea. Lunch: Salad with veggies and lean meat (fish, chicken, turkey) without croutons, dressing on the side or use lemon juice instead of dressing with water and/or unsweetened iced tea. Dinner: Lean meat such as turkey, white meat chicken, fish, white meat pork with side of mixed green veggies and side salad (w/o croutons, dressing on side) and water and/or unsweetened iced tea. Skip dessert, do not eat after 8PM. I also recommend weight loss.  Your thyroid is normal. Test for heart failure is normal. Your vitamin B12 is normal.

## 2019-01-18 NOTE — PROGRESS NOTES
The following message was sent via [employer] to inform patient of lab results:      Dear Ms. Dora Mora,    Your vitamin D is still low so please increase the dose of vitamin D supplements you are taking and take two tabs daily.      Sincerely,  Dr. Lisa Paredes

## 2019-01-22 ENCOUNTER — OFFICE VISIT (OUTPATIENT)
Dept: CARDIOLOGY CLINIC | Age: 84
End: 2019-01-22

## 2019-01-22 VITALS
BODY MASS INDEX: 39.24 KG/M2 | SYSTOLIC BLOOD PRESSURE: 154 MMHG | DIASTOLIC BLOOD PRESSURE: 94 MMHG | HEART RATE: 98 BPM | HEIGHT: 67 IN | WEIGHT: 250 LBS

## 2019-01-22 DIAGNOSIS — I20.8 OTHER FORMS OF ANGINA PECTORIS (HCC): ICD-10-CM

## 2019-01-22 DIAGNOSIS — I50.32 CHRONIC DIASTOLIC HEART FAILURE (HCC): ICD-10-CM

## 2019-01-22 DIAGNOSIS — E78.2 MIXED HYPERLIPIDEMIA: ICD-10-CM

## 2019-01-22 DIAGNOSIS — I10 ESSENTIAL HYPERTENSION: ICD-10-CM

## 2019-01-22 DIAGNOSIS — Z95.5 S/P CORONARY ARTERY STENT PLACEMENT: Primary | ICD-10-CM

## 2019-01-22 DIAGNOSIS — I25.10 ATHEROSCLEROSIS OF NATIVE CORONARY ARTERY OF NATIVE HEART WITHOUT ANGINA PECTORIS: ICD-10-CM

## 2019-01-22 DIAGNOSIS — I50.32 CHRONIC DIASTOLIC CONGESTIVE HEART FAILURE (HCC): ICD-10-CM

## 2019-01-22 RX ORDER — ISOSORBIDE MONONITRATE 30 MG/1
30 TABLET, EXTENDED RELEASE ORAL
Qty: 30 TAB | Refills: 6 | Status: SHIPPED | OUTPATIENT
Start: 2019-01-22 | End: 2019-09-21 | Stop reason: SDUPTHER

## 2019-01-22 NOTE — PROGRESS NOTES
1. Have you been to the ER, urgent care clinic since your last visit? Hospitalized since your last visit? Yes mv    2. Have you seen or consulted any other health care providers outside of the [address] [place] [address] since your last visit? Include any pap smears or colon screening. Yes Where: Dr. Jo Degroot     3. Since your last visit, have you had any of the following symptoms? chest pains.

## 2019-01-22 NOTE — TELEPHONE ENCOUNTER
Patient needs a medication refill.  Please advise    Requested Prescriptions     Pending Prescriptions Disp Refills    albuterol (PROAIR HFA) 90 mcg/actuation inhaler 1 Inhaler 3

## 2019-01-22 NOTE — LETTER
Festus Duncan 1935 1/22/2019 Dear MD Soha Reyes MD Darene Dynes, MD Berta Chaco, MD Bernadine Coles, MD Marca Lek, MD 
[facility], DO Warden Deyvi MD 
 
I had the pleasure of evaluating  Ms. Ana María Stratton in office today. Below are the relevant portions of my assessment and plan of care. ICD-10-CM ICD-9-CM 1. S/P coronary artery stent placement Z95.5 V45.82   
 NST to r/o ischemia 2. Atherosclerosis of native coronary artery of native heart without angina pectoris I25.10 414.01   
 PCI 2016 3. Chronic diastolic congestive heart failure (HCC) I50.32 428.32   
  428.0 Compensated 4. Essential hypertension I10 401.9 Elevated 5. Mixed hyperlipidemia E78.2 272.2  - allergic to statins, she will consider PCSK 9 inhibitor 6. Chronic diastolic heart failure (HCC) I50.32 428.32 Stable 7. Other forms of angina pectoris (HCC) I20.8 413.9 isosorbide mononitrate ER (IMDUR) 30 mg tablet NUCLEAR CARDIAC STRESS TEST Current Outpatient Medications Medication Sig Dispense Refill  isosorbide mononitrate ER (IMDUR) 30 mg tablet Take 1 Tab by mouth every morning. 30 Tab 6  
 magnesium oxide (MAG-OX) 400 mg tablet Take 1 Tab by mouth two (2) times a day. 180 Tab 3  
 ranolazine ER (RANEXA) 500 mg SR tablet Take 1 Tab by mouth two (2) times a day. 180 Tab 3  
 insulin glargine (LANTUS U-100 INSULIN) 100 unit/mL injection Take 32 units every morning and 36 units qhs 1 Vial 0  
 clopidogrel (PLAVIX) 75 mg tab TAKE ONE TABLET BY MOUTH DAILY 30 Tab 6  
 lidocaine (ASPERCREME, LIDOCAINE,) 4 % patch 1 Patch by TransDERmal route every eight (8) hours.  amLODIPine (NORVASC) 10 mg tablet Take 10 mg by mouth daily.  potassium chloride SR (KLOR-CON 10) 10 mEq tablet Take 10 mEq by mouth daily as needed (muscle spasms, with Lasix).  ferrous sulfate 325 mg (65 mg iron) tablet Take 325 mg by mouth Daily (before breakfast).  ascorbic acid, vitamin C, (VITAMIN C) 250 mg tablet Take 250 mg by mouth daily.  acetaminophen (TYLENOL ARTHRITIS PAIN) 650 mg TbER Take 650 mg by mouth every eight (8) hours. Indications: Fever, Pain  PROAIR HFA 90 mcg/actuation inhaler INHALE 1 PUFF BY MOUTH EVERY 4 HOURS AS NEEDED FOR WHEEZING OR SHORTNESS OF BREATH 1 Inhaler 3  
 furosemide (LASIX) 20 mg tablet Use daily as needed for leg swelling (Patient taking differently: Take 20 mg by mouth daily. Use daily as needed for leg swelling) 30 Tab 2  
 levothyroxine (SYNTHROID) 75 mcg tablet Take 1 Tab by mouth Daily (before breakfast). (Patient taking differently: Take 112 mcg by mouth Daily (before breakfast). ) 30 Tab 0  
 cyanocobalamin ER 1,000 mcg tablet Take 1 Tab by mouth daily. 30 Tab 3  
 montelukast (SINGULAIR) 10 mg tablet Take 1 Tab by mouth daily as needed. Indications: ALLERGIC RHINITIS 30 Tab 3  capsaicin 0.075 % topical cream Apply  to affected area three (3) times daily. (Patient taking differently: Apply 1 Each to affected area three (3) times daily. apply thin layer to area) 60 g 0  
 DOCOSAHEXANOIC ACID/EPA (FISH OIL PO) Take 1,000 mg by mouth two (2) times a day.  aspirin delayed-release 81 mg tablet Take 81 mg by mouth daily.  cholecalciferol, vitamin d3, (VITAMIN D) 1,000 unit tablet Take 5,000 Units by mouth two (2) times a day. Orders Placed This Encounter  isosorbide mononitrate ER (IMDUR) 30 mg tablet Sig: Take 1 Tab by mouth every morning. Dispense:  30 Tab Refill:  6 If you have questions, please do not hesitate to call me. I look forward to following Ms. Selbyl Angel along with you. Sincerely, Miguel Newton MD

## 2019-01-22 NOTE — TELEPHONE ENCOUNTER
PCP: Julio Cesar Barrios MD    Last appt: 1/17/2019  Future Appointments   Date Time Provider Chelle Reeves   1/25/2019  8:30 AM [facility] NATACHA KATHERINE SCHED   1/29/2019 10:00 AM SOLITARIO Patel 2VV [facility]   1/31/2019 10:30 AM Adri Linn MD [facility] None   2/12/2019 10:15 AM Pauly Salmon MD [facility] [place] SCHED   2/12/2019 10:30 AM Adri Linn MD [facility] None   2/13/2019 10:15 AM Joshua Rock MD [facility] [address]       Requested Prescriptions     Pending Prescriptions Disp Refills    albuterol (PROAIR HFA) 90 mcg/actuation inhaler 1 Inhaler 3
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
## 2019-01-23 RX ORDER — ALBUTEROL SULFATE 90 UG/1
AEROSOL, METERED RESPIRATORY (INHALATION)
Qty: 1 INHALER | Refills: 3 | Status: SHIPPED | OUTPATIENT
Start: 2019-01-23 | End: 2020-02-16

## 2019-01-29 ENCOUNTER — OFFICE VISIT (OUTPATIENT)
Dept: VASCULAR SURGERY | Age: 84
End: 2019-01-29

## 2019-01-29 VITALS
HEIGHT: 67 IN | RESPIRATION RATE: 17 BRPM | BODY MASS INDEX: 39.24 KG/M2 | SYSTOLIC BLOOD PRESSURE: 116 MMHG | DIASTOLIC BLOOD PRESSURE: 76 MMHG | WEIGHT: 250 LBS | HEART RATE: 70 BPM

## 2019-01-29 DIAGNOSIS — R09.89 DIMINISHED PULSES IN LOWER EXTREMITY: Primary | ICD-10-CM

## 2019-01-29 NOTE — PROGRESS NOTES
Ms Desire Valdez is referred by her PCP for what was suggested as notable diminished pulses  We had seen her for a post op DVT and a follow up study with us had shown resolution so we did not arrange further follow up. She was maintained on plavix and aspirin at the discretion of cardiology    I cannot find the stated documentation about decreased pulses, other than the concern noted in recent PCP visit that this was a previous assessment, so Ms Desire Valdez is being referred back for this. What she can recall is that a point when she was working with physical therapy that this had come up. I do feel her pedal and PT pulses bilaterally today.     Nonetheless, she does have risk factors for PAD, including diabetes, HTN  We can certainly obtain baseline arterial studies, as I do not see any on file  She is agreeable so we will arrange for this and I will call with resutls/recommendations for any additional follow up

## 2019-01-29 NOTE — PROGRESS NOTES
1. Have you been to an emergency room or urgent care clinic since your last visit? 2 weeks HBV spasm pain    Hospitalized since your last visit? If yes, where, when, and reason for visit? NO  2. Have you seen or consulted any other health care providers outside of the [facility] since your last visit including any procedures, health maintenance items. If yes, where, when and reason for visit?    NO

## 2019-01-31 ENCOUNTER — OFFICE VISIT (OUTPATIENT)
Dept: FAMILY MEDICINE CLINIC | Age: 84
End: 2019-01-31

## 2019-01-31 VITALS
TEMPERATURE: 98.3 F | BODY MASS INDEX: 39.16 KG/M2 | HEART RATE: 91 BPM | SYSTOLIC BLOOD PRESSURE: 135 MMHG | HEIGHT: 67 IN | DIASTOLIC BLOOD PRESSURE: 83 MMHG | RESPIRATION RATE: 16 BRPM

## 2019-01-31 DIAGNOSIS — Z79.4 CONTROLLED TYPE 2 DIABETES MELLITUS WITH OTHER CIRCULATORY COMPLICATION, WITH LONG-TERM CURRENT USE OF INSULIN (HCC): ICD-10-CM

## 2019-01-31 DIAGNOSIS — R74.8 ELEVATED ALKALINE PHOSPHATASE LEVEL: ICD-10-CM

## 2019-01-31 DIAGNOSIS — E78.2 MIXED HYPERLIPIDEMIA: ICD-10-CM

## 2019-01-31 DIAGNOSIS — E03.8 OTHER SPECIFIED HYPOTHYROIDISM: ICD-10-CM

## 2019-01-31 DIAGNOSIS — I10 ESSENTIAL HYPERTENSION: Primary | ICD-10-CM

## 2019-01-31 DIAGNOSIS — E55.9 HYPOVITAMINOSIS D: ICD-10-CM

## 2019-01-31 DIAGNOSIS — E11.59 CONTROLLED TYPE 2 DIABETES MELLITUS WITH OTHER CIRCULATORY COMPLICATION, WITH LONG-TERM CURRENT USE OF INSULIN (HCC): ICD-10-CM

## 2019-01-31 RX ORDER — NITROFURANTOIN 25; 75 MG/1; MG/1
CAPSULE ORAL
COMMUNITY
End: 2019-01-31

## 2019-01-31 NOTE — PROGRESS NOTES
Chief Complaint   Patient presents with    Follow-up     2 week     Hypertension          1. Have you been to the ER, urgent care clinic since your last visit? Hospitalized since your last visit? No    2. Have you seen or consulted any other health care providers outside of the [address] [place] [address] since your last visit? Include any pap smears or colon screening.  No

## 2019-01-31 NOTE — PROGRESS NOTES
Internal Medicine  [facility]  [address] [facility], [place], [address]  Phone [phone]; Fax [phone]    Vic Gómez is here for follow up of HTN and lab results. BP is good today, she is taking her norvasc and compliant with meds    She checked her home blood sugar yesterday and it is improving. Home Glu=96. At last visit she was here for ER f/u visit for episodes of CP. Since her last visit she saw her cardiologist Dr. 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 No reversible areas of ischemia. EF=83%. Low risk result. She has f/u with cardiologist on 2/12/19 to discuss those results. She reports she is taking Ranexa BID per cardiologist's recs. No current CP. She does have some muscle spasming type pains in the L chest intermittently. Her bloodwork showed low vit D so she was told to increase her vitamin D supplement from 1 pill (1000 units)  to 2 pills daily (2000 units), Pro-BNP normal. TFTs normal on current dose of levothyroxine. Total chol high at 239 and LDL high at 172 but she had bad myalgias in response to statins and Niacin and zetia and  so she was told to make dietary changes. Vitamin B12 was normal. She does not eat red meats or fried foods. She eats chicken, fish and pork mainly. She does not eat out and does not eat packaged foods. She is already taking fish oils once a day. BP: She is allergic to ACE-I so she is taking Imdur 30mg daily, Amlodipine 10mg daily, Furosemide 20mg PRN edema  Home Bps: 130s/70s    She also saw the vascular PA for the pulses in her LE not being palpable - he ordered arterial doppler studies of the LE which have not been done yet. He agreed with continuing just the ASA and plavix for the L leg DVT because most recent LE doppler showed resolution of the DVT on that therapy.        Surgical History  Past Surgical History:   Procedure Laterality Date    CARDIAC SURG PROCEDURE UNLIST      COLONOSCOPY N/A 4/7/2017    COLONOSCOPY, SURVEILLANCE with hot snare polypectomies and clip placement x5 performed by Kareen Church MD at [facility] HX APPENDECTOMY      HX CORONARY STENT PLACEMENT      HX CYST REMOVAL      Right wrist    HX FEMUR FRACTURE [address] Left 06/2018    HX HEART CATHETERIZATION      HX HERNIA REPAIR      HX HIP REPLACEMENT  Nov 2006    Left hip    HX HYSTERECTOMY  1976    HX KNEE REPLACEMENT  May 2005    Left knee    HX OTHER SURGICAL      Left elbow epicondylectomy    HX OTHER SURGICAL      radioactive iodine tx of thyroid    HX POLYPECTOMY      HX TUMOR REMOVAL      Fatty tumor removal from right arm        Medications  Current Outpatient Medications   Medication Sig Dispense Refill    albuterol (PROAIR HFA) 90 mcg/actuation inhaler INHALE 1 PUFF BY MOUTH EVERY 4 HOURS AS NEEDED FOR WHEEZING OR SHORTNESS OF BREATH 1 Inhaler 3    isosorbide mononitrate ER (IMDUR) 30 mg tablet Take 1 Tab by mouth every morning. 30 Tab 6    magnesium oxide (MAG-OX) 400 mg tablet Take 1 Tab by mouth two (2) times a day. 180 Tab 3    ranolazine ER (RANEXA) 500 mg SR tablet Take 1 Tab by mouth two (2) times a day. 180 Tab 3    insulin glargine (LANTUS U-100 INSULIN) 100 unit/mL injection Take 32 units every morning and 36 units qhs 1 Vial 0    clopidogrel (PLAVIX) 75 mg tab TAKE ONE TABLET BY MOUTH DAILY 30 Tab 6    lidocaine (ASPERCREME, LIDOCAINE,) 4 % patch 1 Patch by TransDERmal route every eight (8) hours.  amLODIPine (NORVASC) 10 mg tablet Take 10 mg by mouth daily.  potassium chloride SR (KLOR-CON 10) 10 mEq tablet Take 10 mEq by mouth daily as needed (muscle spasms, with Lasix).  ferrous sulfate 325 mg (65 mg iron) tablet Take 325 mg by mouth Daily (before breakfast).  ascorbic acid, vitamin C, (VITAMIN C) 250 mg tablet Take 250 mg by mouth daily.       acetaminophen (TYLENOL ARTHRITIS PAIN) 650 mg TbER Take 650 mg by mouth every eight (8) hours. Indications: Fever, Pain      furosemide (LASIX) 20 mg tablet Use daily as needed for leg swelling (Patient taking differently: Take 20 mg by mouth daily. Use daily as needed for leg swelling) 30 Tab 2    levothyroxine (SYNTHROID) 75 mcg tablet Take 1 Tab by mouth Daily (before breakfast). (Patient taking differently: Take 112 mcg by mouth Daily (before breakfast). ) 30 Tab 0    cyanocobalamin ER 1,000 mcg tablet Take 1 Tab by mouth daily. 30 Tab 3    montelukast (SINGULAIR) 10 mg tablet Take 1 Tab by mouth daily as needed. Indications: ALLERGIC RHINITIS 30 Tab 3    capsaicin 0.075 % topical cream Apply  to affected area three (3) times daily. (Patient taking differently: Apply 1 Each to affected area three (3) times daily. apply thin layer to area) 60 g 0    DOCOSAHEXANOIC ACID/EPA (FISH OIL PO) Take 1,000 mg by mouth two (2) times a day.  aspirin delayed-release 81 mg tablet Take 81 mg by mouth daily.  cholecalciferol, vitamin d3, (VITAMIN D) 1,000 unit tablet Take 5,000 Units by mouth two (2) times a day.          Allergies  Allergies   Allergen Reactions    Niacin Palpitations and Other (comments)     Stomach irritation    Ace Inhibitors Cough    Avapro [Irbesartan] Myalgia    Bystolic [Nebivolol] Other (comments)     Felt like throat closing    Catapres [Clonidine] Cough    Codeine Nausea and Vomiting    Cozaar [Losartan] Not Reported This Time    Crestor [Rosuvastatin] Other (comments)     Cramps, aches    Darvocet A500 [Propoxyphene N-Acetaminophen] Unknown (comments)    Diovan [Valsartan] Cough    Flagyl [Metronidazole] Other (comments)     Mouth and throat irritation    Gabapentin Other (comments)     Abdominal pain and burning     Iodinated Contrast- Oral And Iv Dye Other (comments)     Throat swelling    Iodine Unknown (comments)    Lescol [Fluvastatin] Other (comments)     Leg cramps    Lipitor [Atorvastatin] Myalgia and Other (comments)     Cramps, aches    Lovastatin Other (comments)     Leg cramps    Nexium [Esomeprazole Magnesium] Other (comments)     Stomach upset, burning    Pravachol [Pravastatin] Other (comments)     Leg cramps    Reglan [Metoclopramide] Nausea Only    Trazodone Other (comments)     Patient states she feels drugged    Zetia [Ezetimibe] Other (comments)     Cramps, aches    Zocor [Simvastatin] Other (comments)     Cramps, aches       Family History  Family History   Problem Relation Age of Onset    Hypertension Mother     Heart Disease Mother         CHF     Diabetes Mother     Arthritis-osteo Mother     Coronary Artery Disease Father     Heart Disease Father         CHF age 80    Asthma Father     Arthritis-osteo Father     Other Father         Stomach problems/Ulcers    Hypertension Brother     Diabetes Maternal Aunt     Breast Cancer Maternal Aunt     Breast Cancer Other     Colon Cancer Other     Hypertension Other     Stroke Other     Thyroid Disease Brother        Social History  Social History     Socioeconomic History    Marital status:      Spouse name: Not on file    Number of children: Not on file    Years of education: Not on file    Highest education level: Not on file   Social Needs    Financial resource strain: Not on file    Food insecurity - worry: Not on file    Food insecurity - inability: Not on file   [employer] needs - medical: Not on file   [employer] needs - non-medical: Not on file   Occupational History    Occupation: nurse   Tobacco Use    Smoking status: Former Smoker     Packs/day: 1.00     Years: 20.00     Pack years: 20.00     Types: Cigarettes     Last attempt to quit: 1980     Years since quittin.8    Smokeless tobacco: Never Used   Substance and Sexual Activity    Alcohol use: No    Drug use: Yes     Types: Prescription, OTC    Sexual activity: Not on file   Other Topics Concern    Not on file   Social History Narrative    Not on file       Problem List  Patient Active Problem List   Diagnosis Code    Essential hypertension I10    Unspecified hypothyroidism E03.9    Hypovitaminosis D E55.9    Unspecified asthma(493.90) J45.909    Esophageal reflux K21.9    Noncompliance with medication regimen Z91.14    Arm pain M79.603    Neck pain M54.2    Anxiety F41.9    S/P joint replacement Z96.60    DJD (degenerative joint disease) M19.90    Diabetic neuropathy ([facility] [address] E11.40    Dizziness R42    Chronic neck pain M54.2, G89.29    Chronic back pain M54.9, G89.29    Leg pain, right M79.604    Hypothyroidism E03.9    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, with long-term current use of insulin ([facility]) E11.59, Z79.4    Morbid obesity ([facility]) E66.01    Unspecified transient cerebral ischemia G45.9    Congestive heart failure ([facility]) I50.9    Mixed hyperlipidemia E78.2    Coronary atherosclerosis of native coronary artery I25.10    Chronic diastolic heart failure ([facility]) I50.32    Benign hypertensive heart disease without heart failure I11.9    Seasonal and perennial allergic rhinitis J30.89, J30.2    Medication monitoring encounter Z51.81    Tear of left rotator cuff Z66.554    Uncomplicated asthma P94.323    Moderate persistent asthma with status asthmaticus J45.42    NSTEMI (non-ST elevated myocardial infarction) ([facility]) I21.4    S/P drug eluting coronary stent placement Z95.5    Advanced care planning/counseling discussion Z71.89    Chest pain R07.9    Bilateral lower extremity edema R60.0    BMI 38.0-38.9,adult Z68.38    Right groin pain R10.31    Periprosthetic left distal femur fracture M97. 8XXA, [id number]    Callie-prosthetic femur fracture at tip of prosthesis M97. 8XXA, [id number]    Preoperative cardiovascular examination Z01.810    Deep vein thrombosis (DVT) of popliteal vein of left lower extremity ([facility]) I82.432       Review of Systems  ROS   Review of Systems   Constitutional: Negative. HENT: Negative. Respiratory: Negative. Cardiovascular: Negative. All other systems reviewed and are negative. Vital Signs    Visit Vitals  /83 (BP 1 Location: Left arm, BP Patient Position: Sitting)   Pulse 91   Temp 98.3 °F (36.8 °C) (Oral)   Resp 16   Ht 5' 7\" (1.702 m)   BMI 39.16 kg/m²         Physical Exam  Physical Exam   General:   Sitting in wheelchair, calm, in no acute distress. HEENT:  NC/AT, Anicteric, MMM, OP clear, NL nares, TMs pearly gray B/L, Neck supple. No cervical LAD. Cardiovasc:   RRR, no MRG  Pulmonary:   Lungs clear bilaterally. Normal respiratory effort. No wheezing, ronchi or rales, equal BS B/L, Good air entry. Extremities:   No edema, no TTP bilateral calves. LEs warm and well-perfused. Neuro:   Alert and oriented, no focal deficits. Results  Results for orders placed or performed in visit on 01/25/19   NUCLEAR CARDIAC STRESS TEST   Result Value Ref Range    Target  bpm    Baseline HR 92 bpm    Baseline  mmHg    Percent HR 96 %    Post peak  bpm    Stress Base Diastolic BP 76 mmHg    Stress Base Systolic  mmHg    Stress Base Diastolic BP 80 mmHg    Stress Rate Pressure Product 19,152 bpm*mmHg    Stress Stage 1  bpm    Stress Stage 1 /80 mmHg    Recovery Stage 1  bpm    Recovery Stage 1 /72 mmHg    Recovery Stage 2  bpm    Recovery Stage 2 /70 mmHg    Nuc Stress Diastolic Volume Index 68.66 mL/m2    Nuc Stress Systolic Volume Index 03.59 mL/m2       Nuclear Stress Test 1/25/19 by Dr. Roni Wood    Nuclear Stress Test     Abnormal myocardial perfusion imaging. Fixed defect consistent with prior myocardial infarction. Myocardial perfusion imaging supports an intermediate risk stress test.   There is a prior study available for comparison. Findings:  1.  Post-stress imaging in short axis, horizontal and vertical long axis views reveals mild decreased Isotope uptake along the inferior wall. 2. Resting images also show mild decreased Isotope uptake along the inferior wall. 3. Gated images show normal left ventricular size, wall motion and systolic function. The ejection fraction is 83%.      Diagnosis:   1. Probably normal scan. 2. Evidence of mild fixed inferior wall defect noted from his nuclear study suggestive of tissue attenuation with normal wall motion in the area. 3. No reversible defects suggestive of ischemia noted from his nuclear study. 4. Low risk scan     Lab Results   Component Value Date/Time    WBC 5.1 01/13/2019 09:34 AM    HGB 12.4 01/13/2019 09:34 AM    HCT 39.5 01/13/2019 09:34 AM    PLATELET 472 40/32/0744 09:34 AM    MCV 99.7 (H) 01/13/2019 09:34 AM     Lab Results   Component Value Date/Time    Sodium 141 01/13/2019 09:34 AM    Potassium 3.6 01/13/2019 09:34 AM    Chloride 102 01/13/2019 09:34 AM    CO2 27 01/13/2019 09:34 AM    Anion gap 12 01/13/2019 09:34 AM    Glucose 145 (H) 01/13/2019 09:34 AM    BUN 9 01/13/2019 09:34 AM    Creatinine 0.52 (L) 01/13/2019 09:34 AM    BUN/Creatinine ratio 17 01/13/2019 09:34 AM    GFR est AA >60 01/13/2019 09:34 AM    GFR est non-AA >60 01/13/2019 09:34 AM    Calcium 9.4 01/13/2019 09:34 AM    Bilirubin, total 0.5 11/12/2018 11:55 AM    AST (SGOT) 10 (L) 11/12/2018 11:55 AM    Alk.  phosphatase 130 (H) 11/12/2018 11:55 AM    Protein, total 7.8 11/12/2018 11:55 AM    Albumin 3.7 11/12/2018 11:55 AM    Globulin 4.1 (H) 11/12/2018 11:55 AM    A-G Ratio 0.9 11/12/2018 11:55 AM    ALT (SGPT) 14 11/12/2018 11:55 AM     Lab Results   Component Value Date/Time    Cholesterol, total 239 (H) 01/17/2019 11:48 AM    HDL Cholesterol 46 01/17/2019 11:48 AM    LDL, calculated 172.8 (H) 01/17/2019 11:48 AM    VLDL, calculated 20.2 01/17/2019 11:48 AM    Triglyceride 101 01/17/2019 11:48 AM    CHOL/HDL Ratio 5.2 (H) 01/17/2019 11:48 AM     Lab Results   Component Value Date/Time    Hemoglobin A1c 7.8 (H) 09/07/2018 02:56 AM Hemoglobin A1c (POC) 8.5 01/17/2019 12:19 PM    Hemoglobin A1c, External 8.3 04/07/2015     Lab Results   Component Value Date/Time    Vitamin D 25-Hydroxy 25.7 (L) 01/17/2019 11:48 AM       Lab Results   Component Value Date/Time    TSH 1.30 01/17/2019 11:48 AM         Assessment and Plan      ICD-10-CM ICD-9-CM    1. Essential hypertension I10 401.9    2. Controlled type 2 diabetes mellitus with other circulatory complication, with long-term current use of insulin (HCC) E11.59 250.70 URINALYSIS W/ RFLX MICROSCOPIC    Z79.4 V58.67 MICROALBUMIN, UR, RAND W/ MICROALB/CREAT RATIO   3. Other specified hypothyroidism E03.8 244.8    4. Hypovitaminosis D E55.9 268.9    5. Mixed hyperlipidemia E78.2 272.2    6. Elevated alkaline phosphatase level R74.8 790.5      Orders Placed This Encounter    URINALYSIS W/ RFLX MICROSCOPIC     Standing Status:   Future     Standing Expiration Date:   2/1/2020    MICROALBUMIN, UR, RAND W/ MICROALB/CREAT RATIO     Standing Status:   Future     Standing Expiration Date:   2/1/2020    nitrofurantoin, macrocrystal-monohydrate, (MACROBID) 100 mg capsule     Sig: nitrofurantoin monohydrate/macrocrystals 100 mg capsule       1. HTN  BP well controlled  Continue current regimen  DASH diet discussed    2. TYPE 2 DM  A1c was a bit above goal at last visit  She was instructed to increase lantus to 32u qAM and 36u QHS which she has been doing and her blood sugars are improving  Low carb diet and weight loss discussed, she is unable to exercise due to being wheelchair bound  Check microalbumin and UA today bc she could not leave a urine sample at last visit. 3. HYPOTHYROID  All lab results dated 1/17/19 reviewed today with patient  TFTs well controlled - continue current dose of  Levothyroxine    4. VIT D DEF  All lab results dated 1/17/19 reviewed today with patient  Vit D is low on OTC D3 1000 U daily - advised to increase to 2000 U daily    5.  HLD  All lab results dated 1/17/19 reviewed today with patient  Tot chol and LDL above goal  Low sat fat and low carb diet and weight loss discussed  She has had myalgias and side effects to all statins and Zetia  Advised to increase her Fish oils from once a day to BID    6. ELEVATED ALK PHOS  All lab results dated 1/17/19 reviewed today with patient  She has elevated Alk phos level on labs  Will repeat Alk phos, fractionated alk phos and do further labs to eval that at her [address] which she has scheduled on 2/12/19    Return for [address] as scheduled 2/12/19    Next DM/HTN/HLD F/U in 3 months fasting. After care summary printed and reviewed with patient. Plan reviewed with patient. Questions answered. Patient verbalized understanding of plan and is in agreement with plan. Patient to follow up in one week or earlier if symptoms worsen. Return to work letter given. Encouraged the use of my chart. Lu Mohamud MD, [place]  Internal Medicine/Pediatrics

## 2019-01-31 NOTE — PATIENT INSTRUCTIONS
Learning About High Cholesterol  What is high cholesterol? Cholesterol is a type of fat in your blood. It is needed for many body functions, such as making new cells. Cholesterol is made by your body. It also comes from food you eat. If you have too much cholesterol, it starts to build up in your arteries. This is called hardening of the arteries, or atherosclerosis. High cholesterol raises your risk of a heart attack and stroke. There are different types of cholesterol. LDL is the \"bad\" cholesterol. High LDL can raise your risk for heart disease, heart attack, and stroke. HDL is the \"good\" cholesterol. High HDL is linked with a lower risk for heart disease, heart attack, and stroke. Your cholesterol levels help your doctor find out your risk for having a heart attack or stroke. How can you prevent high cholesterol? A heart-healthy lifestyle can help you prevent high cholesterol. This lifestyle helps lower your risk for a heart attack and stroke. · Eat heart-healthy foods. ? Eat fruits, vegetables, whole grains (like oatmeal), dried beans and peas, nuts and seeds, soy products (like tofu), and fat-free or low-fat dairy products. ? Replace butter, margarine, and hydrogenated or partially hydrogenated oils with olive and canola oils. (Canola oil margarine without trans fat is fine.)  ? Replace red meat with fish, poultry, and soy protein (like tofu). ? Limit processed and packaged foods like chips, crackers, and cookies. · Be active. Exercise can improve your cholesterol level. Get at least 30 minutes of exercise on most days of the week. Walking is a good choice. You also may want to do other activities, such as running, swimming, cycling, or playing tennis or team sports. · Stay at a healthy weight. Lose weight if you need to. · Don't smoke. If you need help quitting, talk to your doctor about stop-smoking programs and medicines. These can increase your chances of quitting for good.   How is high cholesterol treated? The goal of treatment is to reduce your chances of having a heart attack or stroke. The goal is not to lower your cholesterol numbers only. · You may make lifestyle changes, such as eating healthy foods, not smoking, losing weight, and being more active. · You may have to take medicine. Follow-up care is a key part of your treatment and safety. Be sure to make and go to all appointments, and call your doctor if you are having problems. It's also a good idea to know your test results and keep a list of the medicines you take. Where can you learn more? Go to http://cristina-mignon.info/. Enter F573 in the search box to learn more about \"Learning About High Cholesterol. \"  Current as of: July 22, 2018  Content Version: 11.9  © 9172-3726 [employer]. Care instructions adapted under license by [employer] (which disclaims liability or warranty for this information). If you have questions about a medical condition or this instruction, always ask your healthcare professional. [place] [address] any warranty or liability for your use of this information. Counting Carbohydrates: Care Instructions  Your Care Instructions    You don't have to eat special foods when you have diabetes. You just have to be careful to eat healthy foods. Carbohydrates (carbs) raise blood sugar higher and quicker than any other nutrient. Carbs are found in desserts, breads and cereals, and fruit. They're also in starchy vegetables. These include potatoes, corn, and grains such as rice and pasta. Carbs are also in milk and yogurt. The more carbs you eat at one time, the higher your blood sugar will rise. Spreading carbs all through the day helps keep your blood sugar levels within your target range. Counting carbs is one of the best ways to keep your blood sugar under control.   If you use insulin, counting carbs helps you match the right amount of insulin to the number of grams of carbs in a meal. Then you can change your diet and insulin dose as needed. Testing your blood sugar several times a day can help you learn how carbs affect your blood sugar. A registered dietitian or certified diabetes educator can help you plan meals and snacks. Follow-up care is a key part of your treatment and safety. Be sure to make and go to all appointments, and call your doctor if you are having problems. It's also a good idea to know your test results and keep a list of the medicines you take. How can you care for yourself at home? Know your daily amount of carbohydrates  Your daily amount depends on several things, such as your weight, how active you are, which diabetes medicines you take, and what your goals are for your blood sugar levels. A registered dietitian or certified diabetes educator can help you plan how many carbs to include in each meal and snack. For most adults, a guideline for the daily amount of carbs is:  · 45 to 60 grams at each meal. That's about the same as 3 to 4 carbohydrate servings. · 15 to 20 grams at each snack. That's about the same as 1 carbohydrate serving. Count carbs  Counting carbs lets you know how much rapid-acting insulin to take before you eat. If you use an insulin pump, you get a constant rate of insulin during the day. So the pump must be programmed at meals. This gives you extra insulin to cover the rise in blood sugar after meals. If you take insulin:  · Learn your own insulin-to-carb ratio. You and your diabetes health professional will figure out the ratio. You can do this by testing your blood sugar after meals. For example, you may need a certain amount of insulin for every 15 grams of carbs. · Add up the carb grams in a meal. Then you can figure out how many units of insulin to take based on your insulin-to-carb ratio. · Exercise lowers blood sugar. You can use less insulin than you would if you were not doing exercise.  Keep in mind that timing matters. If you exercise within 1 hour after a meal, your body may need less insulin for that meal than it would if you exercised 3 hours after the meal. Test your blood sugar to find out how exercise affects your need for insulin. If you do or don't take insulin:  · Look at labels on packaged foods. This can tell you how many carbs are in a serving. You can also use guides from the American Diabetes Association. · Be aware of portions, or serving sizes. If a package has two servings and you eat the whole package, you need to double the number of grams of carbohydrate listed for one serving. · Protein, fat, and fiber do not raise blood sugar as much as carbs do. If you eat a lot of these nutrients in a meal, your blood sugar will rise more slowly than it would otherwise. Eat from all food groups  · Eat at least three meals a day. · Plan meals to include food from all the food groups. The food groups include grains, fruits, dairy, proteins, and vegetables. · Talk to your dietitian or diabetes educator about ways to add limited amounts of sweets into your meal plan. · If you drink alcohol, talk to your doctor. It may not be recommended when you are taking certain diabetes medicines. Where can you learn more? Go to http://cristina-mignon.info/. Enter I750 in the search box to learn more about \"Counting Carbohydrates: Care Instructions. \"  Current as of: July 25, 2018  Content Version: 11.9  © 1452-2751 [employer]. Care instructions adapted under license by [email or web address] (which disclaims liability or warranty for this information). If you have questions about a medical condition or this instruction, always ask your healthcare professional. [place] [address] any warranty or liability for your use of this information.          DASH Diet: Care Instructions  Your Care Instructions    The DASH diet is an eating plan that can help lower your blood pressure. DASH stands for Dietary Approaches to Stop Hypertension. Hypertension is high blood pressure. The DASH diet focuses on eating foods that are high in calcium, potassium, and magnesium. These nutrients can lower blood pressure. The foods that are highest in these nutrients are fruits, vegetables, low-fat dairy products, nuts, seeds, and legumes. But taking calcium, potassium, and magnesium supplements instead of eating foods that are high in those nutrients does not have the same effect. The DASH diet also includes whole grains, fish, and poultry. The DASH diet is one of several lifestyle changes your doctor may recommend to lower your high blood pressure. Your doctor may also want you to decrease the amount of sodium in your diet. Lowering sodium while following the DASH diet can lower blood pressure even further than just the DASH diet alone. Follow-up care is a key part of your treatment and safety. Be sure to make and go to all appointments, and call your doctor if you are having problems. It's also a good idea to know your test results and keep a list of the medicines you take. How can you care for yourself at home? Following the DASH diet  · Eat 4 to 5 servings of fruit each day. A serving is 1 medium-sized piece of fruit, ½ cup chopped or canned fruit, 1/4 cup dried fruit, or 4 ounces (½ cup) of fruit juice. Choose fruit more often than fruit juice. · Eat 4 to 5 servings of vegetables each day. A serving is 1 cup of lettuce or raw leafy vegetables, ½ cup of chopped or cooked vegetables, or 4 ounces (½ cup) of vegetable juice. Choose vegetables more often than vegetable juice. · Get 2 to 3 servings of low-fat and fat-free dairy each day. A serving is 8 ounces of milk, 1 cup of yogurt, or 1 ½ ounces of cheese. · Eat 6 to 8 servings of grains each day.  A serving is 1 slice of bread, 1 ounce of dry cereal, or ½ cup of cooked rice, pasta, or cooked cereal. Try to choose whole-grain products as much as possible. · Limit lean meat, poultry, and fish to 2 servings each day. A serving is 3 ounces, about the size of a deck of cards. · Eat 4 to 5 servings of nuts, seeds, and legumes (cooked dried beans, lentils, and split peas) each week. A serving is 1/3 cup of nuts, 2 tablespoons of seeds, or ½ cup of cooked beans or peas. · Limit fats and oils to 2 to 3 servings each day. A serving is 1 teaspoon of vegetable oil or 2 tablespoons of salad dressing. · Limit sweets and added sugars to 5 servings or less a week. A serving is 1 tablespoon jelly or jam, ½ cup sorbet, or 1 cup of lemonade. · Eat less than 2,300 milligrams (mg) of sodium a day. If you limit your sodium to 1,500 mg a day, you can lower your blood pressure even more. Tips for success  · Start small. Do not try to make dramatic changes to your diet all at once. You might feel that you are missing out on your favorite foods and then be more likely to not follow the plan. Make small changes, and stick with them. Once those changes become habit, add a few more changes. · Try some of the following:  ? Make it a goal to eat a fruit or vegetable at every meal and at snacks. This will make it easy to get the recommended amount of fruits and vegetables each day. ? Try yogurt topped with fruit and nuts for a snack or healthy dessert. ? Add lettuce, tomato, cucumber, and onion to sandwiches. ? Combine a ready-made pizza crust with low-fat mozzarella cheese and lots of vegetable toppings. Try using tomatoes, squash, spinach, broccoli, carrots, cauliflower, and onions. ? Have a variety of cut-up vegetables with a low-fat dip as an appetizer instead of chips and dip. ? Sprinkle sunflower seeds or chopped almonds over salads. Or try adding chopped walnuts or almonds to cooked vegetables. ? Try some vegetarian meals using beans and peas. Add garbanzo or kidney beans to salads. Make burritos and tacos with mashed guy beans or black beans.   Where can you learn more? Go to http://cristina-mignon.info/. Enter D847 in the search box to learn more about \"DASH Diet: Care Instructions. \"  Current as of: July 22, 2018  Content Version: 11.9  © 2899-0361 [employer]. Care instructions adapted under license by [employer] (which disclaims liability or warranty for this information). If you have questions about a medical condition or this instruction, always ask your healthcare professional. [place] [address] any warranty or liability for your use of this information.

## 2019-02-07 ENCOUNTER — CLINICAL SUPPORT (OUTPATIENT)
Dept: VASCULAR SURGERY | Age: 84
End: 2019-02-07

## 2019-02-07 DIAGNOSIS — R09.89 DIMINISHED PULSES IN LOWER EXTREMITY: ICD-10-CM

## 2019-02-07 LAB
LEFT ABI: 1.09
LEFT ANTERIOR TIBIAL: 181 MMHG
LEFT ARM BP: 166 MMHG
LEFT CALF PRESSURE: 230 MMHG
LEFT POSTERIOR TIBIAL: 173 MMHG
LEFT TBI: 0.57
LEFT TOE PRESSURE: 94 MMHG
RIGHT ABI: 1.2
RIGHT ANTERIOR TIBIAL: 200 MMHG
RIGHT ARM BP: 166 MMHG
RIGHT CALF PRESSURE: 197 MMHG
RIGHT POSTERIOR TIBIAL: 181 MMHG
RIGHT TBI: 0.99
RIGHT TOE PRESSURE: 164 MMHG

## 2019-02-08 ENCOUNTER — DOCUMENTATION ONLY (OUTPATIENT)
Dept: VASCULAR SURGERY | Age: 84
End: 2019-02-08

## 2019-02-08 NOTE — PROGRESS NOTES
Patient was contacted with results of her arterial study  I let her know that there is no evidence of any underlying artery disease, therefore meaning that she has normal circulation  There is no follow-up that is required at this point

## 2019-02-12 ENCOUNTER — OFFICE VISIT (OUTPATIENT)
Dept: CARDIOLOGY CLINIC | Age: 84
End: 2019-02-12

## 2019-02-12 VITALS
HEIGHT: 67 IN | DIASTOLIC BLOOD PRESSURE: 80 MMHG | SYSTOLIC BLOOD PRESSURE: 154 MMHG | WEIGHT: 248 LBS | BODY MASS INDEX: 38.92 KG/M2 | HEART RATE: 99 BPM

## 2019-02-12 DIAGNOSIS — I25.10 ATHEROSCLEROSIS OF NATIVE CORONARY ARTERY OF NATIVE HEART WITHOUT ANGINA PECTORIS: Primary | ICD-10-CM

## 2019-02-12 DIAGNOSIS — I50.32 CHRONIC DIASTOLIC CONGESTIVE HEART FAILURE (HCC): ICD-10-CM

## 2019-02-12 DIAGNOSIS — Z95.5 S/P CORONARY ARTERY STENT PLACEMENT: ICD-10-CM

## 2019-02-12 DIAGNOSIS — I10 ESSENTIAL HYPERTENSION: ICD-10-CM

## 2019-02-12 DIAGNOSIS — E78.2 MIXED HYPERLIPIDEMIA: ICD-10-CM

## 2019-02-12 NOTE — PROGRESS NOTES
1. Have you been to the ER, urgent care clinic since your last visit? Hospitalized since your last visit?     no    2. Have you seen or consulted any other health care providers outside of the [address] [place] [address] since your last visit? Include any pap smears or colon screening. no        3. Since your last visit, have you had any of the following symptoms? chest pains.
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
## 2019-02-12 NOTE — LETTER
Lily Winchesterbach 1935 2/12/2019 Dear MD Bony Lucio MD Lossie Grimmer, MD Raeanne Maltos, MD Ruffin Roses, MD Gweneth Sarin, MD 
[facility], DO Jeramy Pedroza MD 
 
I had the pleasure of evaluating  Ms. Gianna Rizzo in office today. Below are the relevant portions of my assessment and plan of care. ICD-10-CM ICD-9-CM 1. Atherosclerosis of native coronary artery of native heart without angina pectoris I25.10 414.01 Fixed defect on stress test continue monitoring clinically stable now 2. S/P coronary artery stent placement Z95.5 V45.82   
3. Chronic diastolic congestive heart failure (HCC) I50.32 428.32   
  428.0 Stable normal ejection fraction 4. Essential hypertension I10 401.9 Mildly elevated. Monitor 5. Mixed hyperlipidemia E78.2 272.2 Continue treatment Current Outpatient Medications Medication Sig Dispense Refill  albuterol (PROAIR HFA) 90 mcg/actuation inhaler INHALE 1 PUFF BY MOUTH EVERY 4 HOURS AS NEEDED FOR WHEEZING OR SHORTNESS OF BREATH 1 Inhaler 3  
 isosorbide mononitrate ER (IMDUR) 30 mg tablet Take 1 Tab by mouth every morning. 30 Tab 6  
 magnesium oxide (MAG-OX) 400 mg tablet Take 1 Tab by mouth two (2) times a day. 180 Tab 3  
 ranolazine ER (RANEXA) 500 mg SR tablet Take 1 Tab by mouth two (2) times a day. 180 Tab 3  
 insulin glargine (LANTUS U-100 INSULIN) 100 unit/mL injection Take 32 units every morning and 36 units qhs 1 Vial 0  
 clopidogrel (PLAVIX) 75 mg tab TAKE ONE TABLET BY MOUTH DAILY 30 Tab 6  
 lidocaine (ASPERCREME, LIDOCAINE,) 4 % patch 1 Patch by TransDERmal route every eight (8) hours.  amLODIPine (NORVASC) 10 mg tablet Take 10 mg by mouth daily.  potassium chloride SR (KLOR-CON 10) 10 mEq tablet Take 10 mEq by mouth daily as needed (muscle spasms, with Lasix).     
 ferrous sulfate 325 mg (65 mg iron) tablet Take 325 mg by mouth Daily (before breakfast).  ascorbic acid, vitamin C, (VITAMIN C) 250 mg tablet Take 250 mg by mouth daily.  acetaminophen (TYLENOL ARTHRITIS PAIN) 650 mg TbER Take 650 mg by mouth every eight (8) hours. Indications: Fever, Pain  furosemide (LASIX) 20 mg tablet Use daily as needed for leg swelling (Patient taking differently: Take 20 mg by mouth daily. Use daily as needed for leg swelling) 30 Tab 2  
 levothyroxine (SYNTHROID) 75 mcg tablet Take 1 Tab by mouth Daily (before breakfast). (Patient taking differently: Take 112 mcg by mouth Daily (before breakfast). ) 30 Tab 0  
 cyanocobalamin ER 1,000 mcg tablet Take 1 Tab by mouth daily. 30 Tab 3  
 montelukast (SINGULAIR) 10 mg tablet Take 1 Tab by mouth daily as needed. Indications: ALLERGIC RHINITIS 30 Tab 3  capsaicin 0.075 % topical cream Apply  to affected area three (3) times daily. (Patient taking differently: Apply 1 Each to affected area three (3) times daily. apply thin layer to area) 60 g 0  
 DOCOSAHEXANOIC ACID/EPA (FISH OIL PO) Take 1,000 mg by mouth two (2) times a day.  aspirin delayed-release 81 mg tablet Take 81 mg by mouth daily.  cholecalciferol, vitamin d3, (VITAMIN D) 1,000 unit tablet Take 5,000 Units by mouth two (2) times a day. No orders of the defined types were placed in this encounter. If you have questions, please do not hesitate to call me. I look forward to following Ms. Yolis Andino along with you. Sincerely, Jayla Mauricio MD

## 2019-02-13 ENCOUNTER — HOSPITAL ENCOUNTER (OUTPATIENT)
Dept: LAB | Age: 84
Discharge: HOME | End: 2019-02-13
Payer: MEDICARE

## 2019-02-13 ENCOUNTER — OFFICE VISIT (OUTPATIENT)
Dept: ONCOLOGY | Age: 84
End: 2019-02-13

## 2019-02-13 ENCOUNTER — HOSPITAL ENCOUNTER (OUTPATIENT)
Dept: ONCOLOGY | Age: 84
Discharge: HOME | End: 2019-02-13

## 2019-02-13 VITALS
RESPIRATION RATE: 16 BRPM | WEIGHT: 245 LBS | HEART RATE: 95 BPM | SYSTOLIC BLOOD PRESSURE: 149 MMHG | HEIGHT: 67 IN | TEMPERATURE: 98.4 F | BODY MASS INDEX: 38.45 KG/M2 | DIASTOLIC BLOOD PRESSURE: 80 MMHG

## 2019-02-13 DIAGNOSIS — I82.532 CHRONIC DEEP VEIN THROMBOSIS (DVT) OF POPLITEAL VEIN OF LEFT LOWER EXTREMITY (HCC): Primary | ICD-10-CM

## 2019-02-13 DIAGNOSIS — I82.532 CHRONIC DEEP VEIN THROMBOSIS (DVT) OF POPLITEAL VEIN OF LEFT LOWER EXTREMITY (HCC): ICD-10-CM

## 2019-02-13 LAB
ALBUMIN SERPL-MCNC: 3.5 G/DL (ref 3.4–5)
ALBUMIN/GLOB SERPL: 0.8 {RATIO} (ref 0.8–1.7)
ALP SERPL-CCNC: 129 U/L (ref 45–117)
ALT SERPL-CCNC: 14 U/L (ref 13–56)
ANION GAP SERPL CALC-SCNC: 10 MMOL/L (ref 3–18)
AST SERPL-CCNC: 9 U/L (ref 15–37)
BASO+EOS+MONOS # BLD AUTO: 0.5 K/UL (ref 0–2.3)
BASO+EOS+MONOS NFR BLD AUTO: 11 % (ref 0.1–17)
BILIRUB SERPL-MCNC: 0.6 MG/DL (ref 0.2–1)
BUN SERPL-MCNC: 8 MG/DL (ref 7–18)
BUN/CREAT SERPL: 13 (ref 12–20)
CALCIUM SERPL-MCNC: 9.1 MG/DL (ref 8.5–10.1)
CHLORIDE SERPL-SCNC: 104 MMOL/L (ref 100–108)
CO2 SERPL-SCNC: 25 MMOL/L (ref 21–32)
CREAT SERPL-MCNC: 0.64 MG/DL (ref 0.6–1.3)
DIFFERENTIAL METHOD BLD: ABNORMAL
ERYTHROCYTE [DISTWIDTH] IN BLOOD BY AUTOMATED COUNT: 12.8 % (ref 11.5–14.5)
GLOBULIN SER CALC-MCNC: 4.4 G/DL (ref 2–4)
GLUCOSE SERPL-MCNC: 180 MG/DL (ref 74–99)
HCT VFR BLD AUTO: 35.9 % (ref 36–48)
HGB BLD-MCNC: 11.7 G/DL (ref 12–16)
LYMPHOCYTES # BLD: 1.6 K/UL (ref 1.1–5.9)
LYMPHOCYTES NFR BLD: 34 % (ref 14–44)
MCH RBC QN AUTO: 32.6 PG (ref 25–35)
MCHC RBC AUTO-ENTMCNC: 32.6 G/DL (ref 31–37)
MCV RBC AUTO: 100 FL (ref 78–102)
NEUTS SEG # BLD: 2.5 K/UL (ref 1.8–9.5)
NEUTS SEG NFR BLD: 55 % (ref 40–70)
PLATELET # BLD AUTO: 239 K/UL (ref 140–440)
POTASSIUM SERPL-SCNC: 3.6 MMOL/L (ref 3.5–5.5)
PROT SERPL-MCNC: 7.9 G/DL (ref 6.4–8.2)
RBC # BLD AUTO: 3.59 M/UL (ref 4.1–5.1)
SODIUM SERPL-SCNC: 139 MMOL/L (ref 136–145)
WBC # BLD AUTO: 4.6 K/UL (ref 4.5–13)

## 2019-02-13 PROCEDURE — 80053 COMPREHEN METABOLIC PANEL: CPT

## 2019-02-13 PROCEDURE — 36415 COLL VENOUS BLD VENIPUNCTURE: CPT

## 2019-02-13 NOTE — PROGRESS NOTES
Hematology/medical oncology progress note    2/13/2019  Ortiz Vivar  YOB: 1935    PCP: Dr. Luz Christianson    Diagnosis: Lower extremity DVT, left leg    Ms. Philly German is an 82-year-old woman who has a history of left lower extremity DVT. She remains on antiplatelet therapy with Plavix at a dose of 75 mg daily and aspirin 81 mg. She is doing well and has no new complaints. We will continue to monitor at 2-month intervals. I have explained to the patient that she should call immediately if she notices excessive swelling and all pain in the lower extremities. She had her questions answered to her satisfaction. Total time 20 minutes, greater than 50% of the time was in counseling and coordination of care. Magdy Crum MD, [address]

## 2019-02-13 NOTE — PATIENT INSTRUCTIONS
Deep Vein Thrombosis: Care Instructions  Your Care Instructions    A deep vein thrombosis (DVT) is a blood clot in certain veins of the legs, pelvis, or arms. Blood clots in these veins need to be treated because they can get bigger, break loose, and travel through the bloodstream to the lungs. A blood clot in a lung can be life-threatening. The doctor may have given you a blood thinner (anticoagulant). A blood thinner can stop the blood clot from growing larger and prevent new clots from forming. You will need to take a blood thinner for 3 to 6 months or longer. The doctor has checked you carefully, but problems can develop later. If you notice any problems or new symptoms, get medical treatment right away. Follow-up care is a key part of your treatment and safety. Be sure to make and go to all appointments, and call your doctor if you are having problems. It's also a good idea to know your test results and keep a list of the medicines you take. How can you care for yourself at home? · Take your medicines exactly as prescribed. Call your doctor if you think you are having a problem with your medicine. · If you are taking a blood thinner, be sure you get instructions about how to take your medicine safely. Blood thinners can cause serious bleeding problems. · Wear compression stockings if your doctor recommends them. These stockings are tighter at the feet than on the legs. They may reduce pain and swelling in your legs. But there are different types of stockings, and they need to fit right. So your doctor will recommend what you need. · When you sit, use a pillow to raise the arm or leg that has the blood clot. Try to keep it above the level of your heart. When should you call for help? Call 911 anytime you think you may need emergency care. For example, call if:    · You passed out (lost consciousness).     · You have symptoms of a blood clot in your lung (called a pulmonary embolism).  These include:  ? Sudden chest pain. ? Trouble breathing. ? Coughing up blood.    Call your doctor now or seek immediate medical care if:    · You have new or worse trouble breathing.     · You are dizzy or lightheaded, or you feel like you may faint.     · You have symptoms of a blood clot in your arm or leg. These may include:  ? Pain in the arm, calf, back of the knee, thigh, or groin. ? Redness and swelling in the arm, leg, or groin.    Watch closely for changes in your health, and be sure to contact your doctor if:    · You do not get better as expected. Where can you learn more? Go to http://cristina-mignon.info/. Enter G389 in the search box to learn more about \"Deep Vein Thrombosis: Care Instructions. \"  Current as of: September 26, 2018  Content Version: 11.9  © 7228-8410 [employer]. Care instructions adapted under license by [employer] (which disclaims liability or warranty for this information). If you have questions about a medical condition or this instruction, always ask your healthcare professional. [address] any warranty or liability for your use of this information.

## 2019-02-19 ENCOUNTER — OFFICE VISIT (OUTPATIENT)
Dept: FAMILY MEDICINE CLINIC | Age: 84
End: 2019-02-19

## 2019-02-19 VITALS
RESPIRATION RATE: 20 BRPM | HEIGHT: 67 IN | BODY MASS INDEX: 39.33 KG/M2 | SYSTOLIC BLOOD PRESSURE: 146 MMHG | DIASTOLIC BLOOD PRESSURE: 86 MMHG | HEART RATE: 99 BPM | WEIGHT: 250.6 LBS | TEMPERATURE: 98 F

## 2019-02-19 DIAGNOSIS — M81.0 AGE-RELATED OSTEOPOROSIS WITHOUT CURRENT PATHOLOGICAL FRACTURE: ICD-10-CM

## 2019-02-19 DIAGNOSIS — Z71.89 ACP (ADVANCE CARE PLANNING): ICD-10-CM

## 2019-02-19 DIAGNOSIS — Z23 ENCOUNTER FOR IMMUNIZATION: ICD-10-CM

## 2019-02-19 DIAGNOSIS — E11.59 CONTROLLED TYPE 2 DIABETES MELLITUS WITH OTHER CIRCULATORY COMPLICATION, WITH LONG-TERM CURRENT USE OF INSULIN (HCC): ICD-10-CM

## 2019-02-19 DIAGNOSIS — I10 ESSENTIAL HYPERTENSION: ICD-10-CM

## 2019-02-19 DIAGNOSIS — M25.562 CHRONIC PAIN OF LEFT KNEE: ICD-10-CM

## 2019-02-19 DIAGNOSIS — Z13.820 SCREENING FOR OSTEOPOROSIS: ICD-10-CM

## 2019-02-19 DIAGNOSIS — G89.29 CHRONIC PAIN OF LEFT KNEE: ICD-10-CM

## 2019-02-19 DIAGNOSIS — R74.8 ELEVATED ALKALINE PHOSPHATASE LEVEL: ICD-10-CM

## 2019-02-19 DIAGNOSIS — Z12.39 SCREENING BREAST EXAMINATION: ICD-10-CM

## 2019-02-19 DIAGNOSIS — Z79.4 CONTROLLED TYPE 2 DIABETES MELLITUS WITH OTHER CIRCULATORY COMPLICATION, WITH LONG-TERM CURRENT USE OF INSULIN (HCC): ICD-10-CM

## 2019-02-19 DIAGNOSIS — Z00.00 MEDICARE ANNUAL WELLNESS VISIT, SUBSEQUENT: Primary | ICD-10-CM

## 2019-02-19 RX ORDER — LIDOCAINE 4 G/100G
1 PATCH TOPICAL EVERY 8 HOURS
Qty: 1 PACKAGE | Refills: 3 | Status: SHIPPED | OUTPATIENT
Start: 2019-02-19 | End: 2020-01-27 | Stop reason: SDUPTHER

## 2019-02-19 NOTE — PROGRESS NOTES
Advance Care Planning (ACP) Provider Note - Comprehensive     Date of ACP Conversation: 02/19/19  Persons included in Conversation:  patient  Length of ACP Conversation in minutes:  16 minutes    Authorized Decision Maker (if patient is incapable of making informed decisions):    This person is:  Healthcare Agent/Medical Power of  under Advance Directive: She is getting ready to change her POA legally            General ACP for ALL Patients with Decision Making Capacity:   Importance of advance care planning, including choosing a healthcare agent to communicate patient's healthcare decisions if patient lost the ability to make decisions, such as after a sudden illness or accident    Review of Existing Advance Directive:  None on file    For Serious or Chronic Illness:  Understanding of medical condition      Interventions Provided:  Recommended completion of Advance Directive form after review of ACP materials and conversation with prospective healthcare agent
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
## 2019-02-19 NOTE — PROGRESS NOTES
172 [facility]  [address] [place] [address], [place], [address]  Phone [phone]; Fax [phone]      Merrill Crow is here for follow up. Since last visit she followed up with her cardiologist dr. Mahnaz Gates on 2/12/19 - he added Imdur and discussed putting her back on Rapatha for the elevated LDL and inability to tolerate zetia, niacin, statins in the past - pt will think about re-starting Repatha but did not agree to it yet. He wanted to continue to see her every 6 months. Stress test result was intermediate risk with a fixed defect. She also had PVRs of the B/L LE which were normal.     She also followed up with Dr. Arti Glez for her LLE DVT - she had no new complaints or issues and was doing well on Plavix 75mg daily and ASA 81mg daily. He made no changes, advised her to continue current meds and continue q2 month follow ups with him. She has upcoming appt 3/28/19 with Pulm Dr. Dana Kumar  She hs upcoming appt on 4/15/19 with Dr. Arti Glez    Here to f/u HTN - at home her BP at home is 130s/80s at home. She states her BP goes higher when she comes in to the office due to the traveling and having to get on the scale. SBP is never above 140s at home. Here to F/U elevated Alk phos - need to check alk phos isoenzymes for that    Her Last A1c was high at 8.3 in [facility] January 2019 - She states her blood sugar was 136 fasting this AM since increasing her lantus from 32 units to 36 units. Her blood sugar dropped to 78 (she states she gets the shakes if her BS is <100). She had some shakes with this episode. She states that she intermittently feels her L knee is \"slipping. \" while walking with walker. She did not fall. She has a Left hip and knee replacement. She plans to schedule appt with her ortho for that.      Surgical History  Past Surgical History:   Procedure Laterality Date    CARDIAC SURG PROCEDURE UNLIST      COLONOSCOPY N/A 4/7/2017    COLONOSCOPY, SURVEILLANCE with hot snare polypectomies and clip placement x5 performed by Adan Barnes MD at [facility] 106 HX APPENDECTOMY      HX CORONARY STENT PLACEMENT      HX CYST REMOVAL      Right wrist    HX FEMUR FRACTURE [address] Left 06/2018    HX HEART CATHETERIZATION      HX HERNIA REPAIR      HX HIP REPLACEMENT  Nov 2006    Left hip    HX HYSTERECTOMY  1976    HX KNEE REPLACEMENT  May 2005    Left knee    HX OTHER SURGICAL      Left elbow epicondylectomy    HX OTHER SURGICAL      radioactive iodine tx of thyroid    HX POLYPECTOMY      HX TUMOR REMOVAL      Fatty tumor removal from right arm        Medications  Current Outpatient Medications   Medication Sig Dispense Refill    albuterol (PROAIR HFA) 90 mcg/actuation inhaler INHALE 1 PUFF BY MOUTH EVERY 4 HOURS AS NEEDED FOR WHEEZING OR SHORTNESS OF BREATH 1 Inhaler 3    isosorbide mononitrate ER (IMDUR) 30 mg tablet Take 1 Tab by mouth every morning. 30 Tab 6    magnesium oxide (MAG-OX) 400 mg tablet Take 1 Tab by mouth two (2) times a day. 180 Tab 3    ranolazine ER (RANEXA) 500 mg SR tablet Take 1 Tab by mouth two (2) times a day. 180 Tab 3    insulin glargine (LANTUS U-100 INSULIN) 100 unit/mL injection Take 32 units every morning and 36 units qhs 1 Vial 0    clopidogrel (PLAVIX) 75 mg tab TAKE ONE TABLET BY MOUTH DAILY 30 Tab 6    lidocaine (ASPERCREME, LIDOCAINE,) 4 % patch 1 Patch by TransDERmal route every eight (8) hours.  amLODIPine (NORVASC) 10 mg tablet Take 10 mg by mouth daily.  potassium chloride SR (KLOR-CON 10) 10 mEq tablet Take 10 mEq by mouth daily as needed (muscle spasms, with Lasix).  ferrous sulfate 325 mg (65 mg iron) tablet Take 325 mg by mouth Daily (before breakfast).  ascorbic acid, vitamin C, (VITAMIN C) 250 mg tablet Take 250 mg by mouth daily.  acetaminophen (TYLENOL ARTHRITIS PAIN) 650 mg TbER Take 650 mg by mouth every eight (8) hours.  Indications: Fever, Pain  furosemide (LASIX) 20 mg tablet Use daily as needed for leg swelling (Patient taking differently: Take 20 mg by mouth daily. Use daily as needed for leg swelling) 30 Tab 2    levothyroxine (SYNTHROID) 75 mcg tablet Take 1 Tab by mouth Daily (before breakfast). (Patient taking differently: Take 112 mcg by mouth Daily (before breakfast). ) 30 Tab 0    cyanocobalamin ER 1,000 mcg tablet Take 1 Tab by mouth daily. 30 Tab 3    montelukast (SINGULAIR) 10 mg tablet Take 1 Tab by mouth daily as needed. Indications: ALLERGIC RHINITIS 30 Tab 3    capsaicin 0.075 % topical cream Apply  to affected area three (3) times daily. (Patient taking differently: Apply 1 Each to affected area three (3) times daily. apply thin layer to area) 60 g 0    DOCOSAHEXANOIC ACID/EPA (FISH OIL PO) Take 1,000 mg by mouth two (2) times a day.  aspirin delayed-release 81 mg tablet Take 81 mg by mouth daily.  cholecalciferol, vitamin d3, (VITAMIN D) 1,000 unit tablet Take 5,000 Units by mouth two (2) times a day.          Allergies  Allergies   Allergen Reactions    Niacin Palpitations and Other (comments)     Stomach irritation    Ace Inhibitors Cough    Avapro [Irbesartan] Myalgia    Bystolic [Nebivolol] Other (comments)     Felt like throat closing    Catapres [Clonidine] Cough    Codeine Nausea and Vomiting    Cozaar [Losartan] Not Reported This Time    Crestor [Rosuvastatin] Other (comments)     Cramps, aches    Darvocet A500 [Propoxyphene N-Acetaminophen] Unknown (comments)    Diovan [Valsartan] Cough    Flagyl [Metronidazole] Other (comments)     Mouth and throat irritation    Gabapentin Other (comments)     Abdominal pain and burning     Iodinated Contrast- Oral And Iv Dye Other (comments)     Throat swelling    Iodine Unknown (comments)    Lescol [Fluvastatin] Other (comments)     Leg cramps    Lipitor [Atorvastatin] Myalgia and Other (comments)     Cramps, aches    Lovastatin Other (comments)     Leg cramps    Nexium [Esomeprazole Magnesium] Other (comments)     Stomach upset, burning    Pravachol [Pravastatin] Other (comments)     Leg cramps    Reglan [Metoclopramide] Nausea Only    Trazodone Other (comments)     Patient states she feels drugged    Zetia [Ezetimibe] Other (comments)     Cramps, aches    Zocor [Simvastatin] Other (comments)     Cramps, aches       Family History  Family History   Problem Relation Age of Onset    Hypertension Mother     Heart Disease Mother         [facility] Diabetes Mother     Arthritis-osteo Mother     Coronary Artery Disease Father     Heart Disease Father         CHF age 80    Asthma Father     Arthritis-osteo Father     Other Father         Stomach problems/Ulcers    Hypertension Brother     Diabetes Maternal Aunt     Breast Cancer Maternal Aunt     Breast Cancer Other     Colon Cancer Other     Hypertension Other     Stroke Other     Thyroid Disease Brother        Social History  Social History     Socioeconomic History    Marital status:      Spouse name: Not on file    Number of children: Not on file    Years of education: Not on file    Highest education level: Not on file   Social Needs    Financial resource strain: Not on file    Food insecurity - worry: Not on file    Food insecurity - inability: Not on file   [employer] needs - medical: Not on file   [employer] needs - non-medical: Not on file   Occupational History    Occupation: nurse   Tobacco Use    Smoking status: Former Smoker     Packs/day: 1.00     Years: 20.00     Pack years: 20.00     Types: Cigarettes     Last attempt to quit: 1980     Years since quittin.9    Smokeless tobacco: Never Used   Substance and Sexual Activity    Alcohol use: No    Drug use: Yes     Types: Prescription, OTC    Sexual activity: Not on file   Other Topics Concern    Not on file   Social History Narrative    Not on file       Problem List  Patient Active Problem List Diagnosis Code    Essential hypertension I10    Unspecified hypothyroidism E03.9    Hypovitaminosis D E55.9    Unspecified asthma(493.90) J45.909    Esophageal reflux K21.9    Noncompliance with medication regimen Z91.14    Arm pain M79.603    Neck pain M54.2    Anxiety F41.9    S/P joint replacement Z96.60    DJD (degenerative joint disease) M19.90    Diabetic neuropathy ([facility]) E11.40    Dizziness R42    Chronic neck pain M54.2, G89.29    Chronic back pain M54.9, G89.29    Leg pain, right M79.604    Hypothyroidism E03.9    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, with long-term current use of insulin ([facility]) E11.59, Z79.4    Morbid obesity ([facility]) E66.01    Unspecified transient cerebral ischemia G45.9    Congestive heart failure ([facility]) I50.9    Mixed hyperlipidemia E78.2    Coronary atherosclerosis of native coronary artery I25.10    Chronic diastolic heart failure ([facility]) I50.32    Benign hypertensive heart disease without heart failure I11.9    Seasonal and perennial allergic rhinitis J30.89, J30.2    Medication monitoring encounter Z51.81    Tear of left rotator cuff P35.546    Uncomplicated asthma T66.909    Moderate persistent asthma with status asthmaticus J45.42    NSTEMI (non-ST elevated myocardial infarction) ([facility]) I21.4    S/P drug eluting coronary stent placement Z95.5    Advanced care planning/counseling discussion Z71.89    Chest pain R07.9    Bilateral lower extremity edema R60.0    BMI 38.0-38.9,adult Z68.38    Right groin pain R10.31    Periprosthetic left distal femur fracture M97. 8XXA, [id number]    Callie-prosthetic femur fracture at tip of prosthesis M97. 8XXA, [id number]    Preoperative cardiovascular examination Z01.810    Deep vein thrombosis (DVT) of popliteal vein of left lower extremity ([facility]) I82.432       Review of Systems  ROS   Review of Systems   Constitutional: Negative. HENT: Negative. Respiratory: Negative.     Cardiovascular: Negative. GI: negative  : negative  Neuro: negative  All other systems reviewed and are negative. Vital Signs    Visit Vitals  /86 (BP 1 Location: Left arm, BP Patient Position: Sitting)   Pulse 99   Temp 98 °F (36.7 °C) (Oral)   Resp 20   Ht 5' 7\" (1.702 m)   Wt 250 lb 9.6 oz (113.7 kg)   BMI 39.25 kg/m²         Physical Exam  Physical Exam  Well-appearing, well-groomed, well-nourished age appropriate female seated comfortably in wheelchair. Wheelchair bound  General:  Alert, cooperative, no distress, appears stated age. Head:  Normocephalic, without obvious abnormality, atraumatic. Eyes:  Conjunctivae/corneas clear. PERRL, EOMs intact. Fundi benign. Ears:  Normal TMs and external ear canals both ears. Nose: Nares normal. Septum midline. Mucosa normal. No drainage or sinus tenderness. Throat: Lips, mucosa, and tongue normal. Teeth and gums normal.   Neck: Supple, symmetrical, trachea midline, no adenopathy, thyroid: no enlargement/tenderness/nodules, no carotid bruit and no JVD. Back:   Symmetric, No CVA tenderness. Breast: Declines this portion of exam due to body habitus and difficulty positioning since she is wheelchair bound - agrees to get mammogram   Lungs:   Clear to auscultation bilaterally. No wheezing, ronchi or rales, equal BS B/L, Good air entry. Chest wall:  No tenderness or deformity. Heart:  Regular rate and rhythm, S1, S2 normal, no murmur, click, rub or gallop. Abdomen:   Soft, non-tender. Bowel sounds normal. No masses,  No organomegaly. No abd wall bruits B/L   Extremities: Extremities normal, atraumatic, no cyanosis or edema. No calf tenderness B/L   Pulses: 2+ and symmetric all extremities. Skin: Skin color, texture, turgor normal. No rashes or lesions. Lymph nodes: Cervical and supraclavicular nodes normal. No axillary or inguinal LAD B/L   Neurologic: CNII-XII grossly intact. Normal reflexes throughout.   MSK: Well-healed post-surgical scar from L knee replacement. No tenderness. Mild swelling. No erythema. No warmth. No crepitus. Some pain with full extension and full flexion. No joint instability             Results  Results for orders placed or performed during the hospital encounter of 09/31/73   METABOLIC PANEL, COMPREHENSIVE   Result Value Ref Range    Sodium 139 136 - 145 mmol/L    Potassium 3.6 3.5 - 5.5 mmol/L    Chloride 104 100 - 108 mmol/L    CO2 25 21 - 32 mmol/L    Anion gap 10 3.0 - 18 mmol/L    Glucose 180 (H) 74 - 99 mg/dL    BUN 8 7.0 - 18 MG/DL    Creatinine 0.64 0.6 - 1.3 MG/DL    BUN/Creatinine ratio 13 12 - 20      GFR est AA >60 >60 ml/min/1.73m2    GFR est non-AA >60 >60 ml/min/1.73m2    Calcium 9.1 8.5 - 10.1 MG/DL    Bilirubin, total 0.6 0.2 - 1.0 MG/DL    ALT (SGPT) 14 13 - 56 U/L    AST (SGOT) 9 (L) 15 - 37 U/L    Alk. phosphatase 129 (H) 45 - 117 U/L    Protein, total 7.9 6.4 - 8.2 g/dL    Albumin 3.5 3.4 - 5.0 g/dL    Globulin 4.4 (H) 2.0 - 4.0 g/dL    A-G Ratio 0.8 0.8 - 1.7         Lab Results   Component Value Date/Time    WBC 4.6 02/13/2019 10:30 AM    HGB 11.7 (L) 02/13/2019 10:30 AM    HCT 35.9 (L) 02/13/2019 10:30 AM    PLATELET 101 15/75/1642 10:30 AM    .0 02/13/2019 10:30 AM     Lab Results   Component Value Date/Time    Sodium 139 02/13/2019 10:30 AM    Potassium 3.6 02/13/2019 10:30 AM    Chloride 104 02/13/2019 10:30 AM    CO2 25 02/13/2019 10:30 AM    Anion gap 10 02/13/2019 10:30 AM    Glucose 180 (H) 02/13/2019 10:30 AM    BUN 8 02/13/2019 10:30 AM    Creatinine 0.64 02/13/2019 10:30 AM    BUN/Creatinine ratio 13 02/13/2019 10:30 AM    GFR est AA >60 02/13/2019 10:30 AM    GFR est non-AA >60 02/13/2019 10:30 AM    Calcium 9.1 02/13/2019 10:30 AM    Bilirubin, total 0.6 02/13/2019 10:30 AM    AST (SGOT) 9 (L) 02/13/2019 10:30 AM    Alk.  phosphatase 129 (H) 02/13/2019 10:30 AM    Protein, total 7.9 02/13/2019 10:30 AM    Albumin 3.5 02/13/2019 10:30 AM    Globulin 4.4 (H) 02/13/2019 10:30 AM    A-G Ratio 0.8 02/13/2019 10:30 AM    ALT (SGPT) 14 02/13/2019 10:30 AM     Lab Results   Component Value Date/Time    Hemoglobin A1c 7.8 (H) 09/07/2018 02:56 AM    Hemoglobin A1c (POC) 8.5 01/17/2019 12:19 PM    Hemoglobin A1c, External 8.3 04/07/2015     Lab Results   Component Value Date/Time    Cholesterol, total 239 (H) 01/17/2019 11:48 AM    HDL Cholesterol 46 01/17/2019 11:48 AM    LDL, calculated 172.8 (H) 01/17/2019 11:48 AM    VLDL, calculated 20.2 01/17/2019 11:48 AM    Triglyceride 101 01/17/2019 11:48 AM    CHOL/HDL Ratio 5.2 (H) 01/17/2019 11:48 AM     Lab Results   Component Value Date/Time    TSH 1.30 01/17/2019 11:48 AM         Assessment and Plan  1. Controlled type 2 diabetes mellitus with other circulatory complication, with long-term current use of insulin (HCC)  A1c was slightly above goal at last check but her blood sugars have improved on higher dose of insulin  Podiatry ref given  Ophtho UTD  Microalbumin ordered today  Low carb diet discussed, weight loss advised  Unable to exercise due to being wheelchair bound  Discussed drinkin 4 oz of juice for symptomatic low blood sugars or any blood sugar <70 and recheck BS 30 min later to make sure it is improving    - MICROALBUMIN, UR, RAND W/ MICROALB/CREAT RATIO; Future  - REFERRAL TO PODIATRY  - URINALYSIS W/ RFLX MICROSCOPIC; Future    2. Encounter for immunization  PCV-13 given today. risks and benefits discussed, denies prior vaccine reactions    - PNEUMOCOCCAL CONJ VACCINE 13 VALENT IM    3. Essential hypertension  BP is high today but at goal at home  Continue current regimen  DASH diet discussed    4. Screening for osteoporosis  She states she was told by Dr. Albino Rosen in the past that she had Osteoporosis  She is due for DEXA - gave ref but she states she will likely get that done thru Dr. Albino Rosen    - Gato Gardner; Future    5. Elevated alkaline phosphatase level  Check CMP and fractionated alk phos    - ALK PHOS ISOENZYMES;  Future  - METABOLIC PANEL, COMPREHENSIVE; Future    6. Chronic pain of left knee  Check L knee xray  Pt will arrange f/U appt with ortho  Refilled Lidocaine patches today    - XR KNEE LT MIN 4 V; Future    7. Age-related osteoporosis without current pathological fracture   DEXA ordered as above    - DEXA BONE DENSITY STUDY AXIAL; Future      An After Visit Summary was printed and given to the patient. F/U as scheduled end of April 2019    Wagner Ramsey MD, [address] [place] [address]   Internal Medicine/Pediatrics  2/19/2019, 10:33 AM    [facility] [address]  Bjorn garcia, [address]  Phone [phone]  Fax [phone]    After care summary printed and reviewed with patient. Plan reviewed with patient. Questions answered. Patient verbalized understanding of plan and is in agreement with plan. Patient to follow up in one week or earlier if symptoms worsen. Return to work letter given. Encouraged the use of my chart. Lu Ramsey MD, [address] [place] [address]  Internal Medicine/Pediatrics
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
## 2019-02-19 NOTE — PROGRESS NOTES
Carleeadithya Nieves 1935 female who presents for routine immunizations. Patient denies any symptoms , reactions or allergies that would exclude them from being immunized today. Risks and adverse reactions were discussed and the VIS was given to them. All questions were addressed. Order placed for Pneu 13,  per Verbal Order from Dr. Lashell Newman with read back. Patient was observed for 15 min post injection. There were no reactions observed.     Sukhdeep Atkins

## 2019-02-19 NOTE — PATIENT INSTRUCTIONS
Medicare Wellness Visit, Female     The best way to live healthy is to have a lifestyle where you eat a well-balanced diet, exercise regularly, limit alcohol use, and quit all forms of tobacco/nicotine, if applicable. Regular preventive services are another way to keep healthy. Preventive services (vaccines, screening tests, monitoring & exams) can help personalize your care plan, which helps you manage your own care. Screening tests can find health problems at the earliest stages, when they are easiest to treat. [facility] follows the current, evidence-based guidelines published by the [facility] ([facility]) when recommending preventive services for our patients. Because we follow these guidelines, sometimes recommendations change over time as research supports it. (For example, mammograms used to be recommended annually. Even though Medicare will still pay for an annual mammogram, the newer guidelines recommend a mammogram every two years for women of average risk.)  Of course, you and your doctor may decide to screen more often for some diseases, based on your risk and your health status. Preventive services for you include:  - Medicare offers their members a free annual wellness visit, which is time for you and your primary care provider to discuss and plan for your preventive service needs. Take advantage of this benefit every year!  -All adults over the age of 72 should receive the recommended pneumonia vaccines. Current USPSTF guidelines recommend a series of two vaccines for the best pneumonia protection.   -All adults should have a flu vaccine yearly and a tetanus vaccine every 10 years. All adults age 61 and older should receive a shingles vaccine once in their lifetime.    -A bone mass density test is recommended when a woman turns 65 to screen for osteoporosis. This test is only recommended one time, as a screening.  Some providers will use this same test as a disease monitoring tool if you already have osteoporosis. -All adults age 38-68 who are overweight should have a diabetes screening test once every three years.   -Other screening tests and preventive services for persons with diabetes include: an eye exam to screen for diabetic retinopathy, a kidney function test, a foot exam, and stricter control over your cholesterol.   -Cardiovascular screening for adults with routine risk involves an electrocardiogram (ECG) at intervals determined by your doctor.   -Colorectal cancer screenings should be done for adults age 54-65 with no increased risk factors for colorectal cancer. There are a number of acceptable methods of screening for this type of cancer. Each test has its own benefits and drawbacks. Discuss with your doctor what is most appropriate for you during your annual wellness visit. The different tests include: colonoscopy (considered the best screening method), a fecal occult blood test, a fecal DNA test, and sigmoidoscopy. -Breast cancer screenings are recommended every other year for women of normal risk, age 54-69.  -Cervical cancer screenings for women over age 72 are only recommended with certain risk factors.   -All adults born between [facility] should be screened once for Hepatitis C.      Here is a list of your current Health Maintenance items (your personalized list of preventive services) with a due date:  Health Maintenance Due   Topic Date Due    Pneumococcal Vaccine (2 of 2 - PPSV23) 12/02/2017    Diabetic Foot Care  10/24/2018    Albumin Urine Test  10/24/2018    Annual Well Visit  10/26/2018

## 2019-02-25 ENCOUNTER — HOSPITAL ENCOUNTER (OUTPATIENT)
Dept: LAB | Age: 84
Discharge: HOME | End: 2019-02-25
Payer: MEDICARE

## 2019-02-25 DIAGNOSIS — Z79.4 CONTROLLED TYPE 2 DIABETES MELLITUS WITH OTHER CIRCULATORY COMPLICATION, WITH LONG-TERM CURRENT USE OF INSULIN (HCC): ICD-10-CM

## 2019-02-25 DIAGNOSIS — E11.59 CONTROLLED TYPE 2 DIABETES MELLITUS WITH OTHER CIRCULATORY COMPLICATION, WITH LONG-TERM CURRENT USE OF INSULIN (HCC): ICD-10-CM

## 2019-02-25 LAB
APPEARANCE UR: ABNORMAL
BACTERIA URNS QL MICRO: ABNORMAL /HPF
BILIRUB UR QL: NEGATIVE
CAOX CRY URNS QL MICRO: ABNORMAL
COLOR UR: YELLOW
EPITH CASTS URNS QL MICRO: ABNORMAL /LPF (ref 0–5)
GLUCOSE UR STRIP.AUTO-MCNC: NEGATIVE MG/DL
HGB UR QL STRIP: NEGATIVE
KETONES UR QL STRIP.AUTO: NEGATIVE MG/DL
LEUKOCYTE ESTERASE UR QL STRIP.AUTO: ABNORMAL
NITRITE UR QL STRIP.AUTO: NEGATIVE
PH UR STRIP: 6 [PH] (ref 5–8)
PROT UR STRIP-MCNC: NEGATIVE MG/DL
RBC #/AREA URNS HPF: 0 /HPF (ref 0–5)
SP GR UR REFRACTOMETRY: 1.02 (ref 1–1.03)
UROBILINOGEN UR QL STRIP.AUTO: 0.2 EU/DL (ref 0.2–1)
WBC URNS QL MICRO: ABNORMAL /HPF (ref 0–4)

## 2019-02-25 PROCEDURE — 81001 URINALYSIS AUTO W/SCOPE: CPT

## 2019-02-25 PROCEDURE — 82043 UR ALBUMIN QUANTITATIVE: CPT

## 2019-02-26 DIAGNOSIS — R82.998 URINE LEUKOCYTES INCREASED: Primary | ICD-10-CM

## 2019-02-26 LAB
CREAT UR-MCNC: 150 MG/DL (ref 30–125)
MICROALBUMIN UR-MCNC: 7.17 MG/DL (ref 0–3)
MICROALBUMIN/CREAT UR-RTO: 48 MG/G (ref 0–30)

## 2019-02-26 NOTE — PROGRESS NOTES
The following message was sent informing pt of results thru MyChart:     Dear MsAgata Sandip Peters,    Your urine test shows some white blood cells in the urine which may indicate a UTI so I recommend that you return to the office this week for a lab visit to give another urine sample so we can check a urine culture and confirm if it is a true infection and if it requires treatment.      Sincerely,  Dr. Vlad Ramos

## 2019-02-26 NOTE — PROGRESS NOTES
The following message was sent via [facility] to inform patient of lab results:      Dear Ms. Barron De Leon,    Your urine microalbumin test is still high which means that your diabetes is affecting your kidney function negatively but the microalbumin is lower (improved) when compared to previous. Continue the higher dose of your insulin that we discussed at your last visit to better control your blood sugars. Please avoid sweets such as cookies, candies, cakes, pies, ice cream. Also avoid juices and sodas. Also avoid fried foods. Also, reduce starchy foods such as bread, pasta, rice, potatoes and corn.      Sincerely,  Dr. Casimir Blizzard

## 2019-03-05 ENCOUNTER — HOSPITAL ENCOUNTER (OUTPATIENT)
Dept: BONE DENSITY | Age: 84
Discharge: HOME | End: 2019-03-05
Attending: INTERNAL MEDICINE
Payer: MEDICARE

## 2019-03-05 ENCOUNTER — HOSPITAL ENCOUNTER (OUTPATIENT)
Dept: MAMMOGRAPHY | Age: 84
Discharge: HOME | End: 2019-03-05
Attending: INTERNAL MEDICINE
Payer: MEDICARE

## 2019-03-05 DIAGNOSIS — Z13.820 SCREENING FOR OSTEOPOROSIS: ICD-10-CM

## 2019-03-05 DIAGNOSIS — M81.0 AGE-RELATED OSTEOPOROSIS WITHOUT CURRENT PATHOLOGICAL FRACTURE: ICD-10-CM

## 2019-03-05 DIAGNOSIS — Z12.39 SCREENING BREAST EXAMINATION: ICD-10-CM

## 2019-03-05 PROCEDURE — 77080 DXA BONE DENSITY AXIAL: CPT

## 2019-03-06 NOTE — PROGRESS NOTES
The following message was sent via [facility] to inform patient of lab results:      Dear Ms. Deanna Peace,    Your Bone Density Test (DEXA) shows osteopenia (thinning of the bone). It is recommended that you take Over-The-Counter Vitamin D3 2000 Units daily and Calcium Citrate 600mg twice a day to strengthen your bones.     Sincerely,  Dr. Jimenez Precise

## 2019-03-20 ENCOUNTER — HOSPITAL ENCOUNTER (OUTPATIENT)
Dept: LAB | Age: 84
Discharge: HOME | End: 2019-03-20
Payer: MEDICARE

## 2019-03-20 LAB
ANION GAP SERPL CALC-SCNC: 6 MMOL/L (ref 3–18)
BUN SERPL-MCNC: 9 MG/DL (ref 7–18)
BUN/CREAT SERPL: 11 (ref 12–20)
CALCIUM SERPL-MCNC: 9.7 MG/DL (ref 8.5–10.1)
CHLORIDE SERPL-SCNC: 105 MMOL/L (ref 100–108)
CO2 SERPL-SCNC: 29 MMOL/L (ref 21–32)
CREAT SERPL-MCNC: 0.82 MG/DL (ref 0.6–1.3)
GLUCOSE SERPL-MCNC: 143 MG/DL (ref 74–99)
POTASSIUM SERPL-SCNC: 3.7 MMOL/L (ref 3.5–5.5)
SODIUM SERPL-SCNC: 140 MMOL/L (ref 136–145)
VIT B12 SERPL-MCNC: 953 PG/ML (ref 211–911)

## 2019-03-20 PROCEDURE — 82607 VITAMIN B-12: CPT

## 2019-03-20 PROCEDURE — 80048 BASIC METABOLIC PNL TOTAL CA: CPT

## 2019-03-20 PROCEDURE — 36415 COLL VENOUS BLD VENIPUNCTURE: CPT

## 2019-04-02 ENCOUNTER — OFFICE VISIT (OUTPATIENT)
Dept: PULMONOLOGY | Age: 84
End: 2019-04-02

## 2019-04-02 VITALS
BODY MASS INDEX: 38.61 KG/M2 | DIASTOLIC BLOOD PRESSURE: 90 MMHG | RESPIRATION RATE: 21 BRPM | SYSTOLIC BLOOD PRESSURE: 160 MMHG | OXYGEN SATURATION: 97 % | HEART RATE: 96 BPM | TEMPERATURE: 98.2 F | HEIGHT: 67 IN | WEIGHT: 246 LBS

## 2019-04-02 DIAGNOSIS — Z72.821 POOR SLEEP HYGIENE: ICD-10-CM

## 2019-04-02 DIAGNOSIS — H65.00 ACUTE SEROUS OTITIS MEDIA, RECURRENCE NOT SPECIFIED, UNSPECIFIED LATERALITY: ICD-10-CM

## 2019-04-02 DIAGNOSIS — G47.33 OSA (OBSTRUCTIVE SLEEP APNEA): ICD-10-CM

## 2019-04-02 DIAGNOSIS — E66.9 OBESITY (BMI 30-39.9): ICD-10-CM

## 2019-04-02 DIAGNOSIS — J45.20 MILD INTERMITTENT ASTHMA WITHOUT COMPLICATION: Primary | ICD-10-CM

## 2019-04-02 RX ORDER — AZITHROMYCIN 250 MG/1
TABLET, FILM COATED ORAL
Qty: 6 TAB | Refills: 0 | Status: SHIPPED | OUTPATIENT
Start: 2019-04-02 | End: 2019-04-23 | Stop reason: ALTCHOICE

## 2019-04-02 NOTE — PROGRESS NOTES
Ms. Sheridan Walker has a reminder for a \"due or due soon\" health maintenance. I have asked that she contact her primary care provider for follow-up on this health maintenance. Chief Complaint   Patient presents with    Asthma    COPD     1. Have you been to the ER, urgent care clinic since your last visit? Hospitalized since your last visit? No    2. Have you seen or consulted any other health care providers outside of the [address] [place] [address] since your last visit? Include any pap smears or colon screening.  No

## 2019-04-02 NOTE — PROGRESS NOTES
Verbal Order with read back per Dr. Naila Greenberg MD  For PFT smart panel. Gas Dilute/ wash out lung vol w/wo distrib vet & vol  Diffusing capacity    Dr. Naila Greenberg MD will co-sign the orders.
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
## 2019-04-15 ENCOUNTER — TELEPHONE (OUTPATIENT)
Dept: ONCOLOGY | Age: 84
End: 2019-04-15

## 2019-04-15 ENCOUNTER — HOSPITAL ENCOUNTER (OUTPATIENT)
Dept: ONCOLOGY | Age: 84
Discharge: HOME | End: 2019-04-15

## 2019-04-15 DIAGNOSIS — I82.532 CHRONIC DEEP VEIN THROMBOSIS (DVT) OF POPLITEAL VEIN OF LEFT LOWER EXTREMITY (HCC): ICD-10-CM

## 2019-04-15 NOTE — TELEPHONE ENCOUNTER
Per reminder call message was left on machine. I left  home number [phone] tried calling cell [phone] mailbox full.

## 2019-04-23 ENCOUNTER — OFFICE VISIT (OUTPATIENT)
Dept: FAMILY MEDICINE CLINIC | Age: 84
End: 2019-04-23

## 2019-04-23 VITALS
WEIGHT: 244 LBS | DIASTOLIC BLOOD PRESSURE: 84 MMHG | SYSTOLIC BLOOD PRESSURE: 156 MMHG | HEIGHT: 67 IN | TEMPERATURE: 98.1 F | OXYGEN SATURATION: 100 % | RESPIRATION RATE: 18 BRPM | BODY MASS INDEX: 38.3 KG/M2 | HEART RATE: 96 BPM

## 2019-04-23 DIAGNOSIS — R42 INTERMITTENT LIGHTHEADEDNESS: ICD-10-CM

## 2019-04-23 DIAGNOSIS — Z79.4 CONTROLLED TYPE 2 DIABETES MELLITUS WITH OTHER CIRCULATORY COMPLICATION, WITH LONG-TERM CURRENT USE OF INSULIN (HCC): ICD-10-CM

## 2019-04-23 DIAGNOSIS — I10 ESSENTIAL HYPERTENSION: Primary | ICD-10-CM

## 2019-04-23 DIAGNOSIS — E11.59 CONTROLLED TYPE 2 DIABETES MELLITUS WITH OTHER CIRCULATORY COMPLICATION, WITH LONG-TERM CURRENT USE OF INSULIN (HCC): ICD-10-CM

## 2019-04-23 DIAGNOSIS — R10.30 LOWER ABDOMINAL PAIN: ICD-10-CM

## 2019-04-23 RX ORDER — NAPROXEN SODIUM 220 MG
TABLET ORAL
COMMUNITY
End: 2020-11-30

## 2019-04-23 NOTE — PROGRESS NOTES
Mian Miguel presents today for   Chief Complaint   Patient presents with    Diabetes     Follow up    Hypertension     Follow up    Cholesterol Problem     Follow Up       Is someone accompanying this pt? Yes; Aide    Is the patient using any DME equipment during OV? Yes; Wheelchair    Depression Screening:  3 most recent PHQ Screens 1/17/2019   PHQ Not Done -   Little interest or pleasure in doing things Not at all   Feeling down, depressed, irritable, or hopeless Not at all   Total Score PHQ 2 0       Learning Assessment:  Learning Assessment 1/29/2019   PRIMARY LEARNER Patient   HIGHEST LEVEL OF EDUCATION - PRIMARY LEARNER  -   BARRIERS PRIMARY LEARNER -   [address] CAREGIVER -   CO-LEARNER NAME -   PRIMARY LANGUAGE ENGLISH    NEED -   LEARNER PREFERENCE PRIMARY LISTENING     -     -     -     -   ANSWERED BY patient   RELATIONSHIP SELF       Abuse Screening:  Abuse Screening Questionnaire 4/23/2019   Do you ever feel afraid of your partner? N   Are you in a relationship with someone who physically or mentally threatens you? N   Is it safe for you to go home? Y       Fall Screening  Fall Risk Assessment, last 12 mths 4/23/2019   Able to walk? Yes   Fall in past 12 months? Yes   Fall with injury? Yes   Number of falls in past 12 months 1   Fall Risk Score 2         Health Maintenance Due   Topic Date Due    FOOT EXAM Q1  10/24/2018   . Health Maintenance reviewed and discussed and ordered per Provider. Mian Miguel is updated on all     Coordination of Care  1. Have you been to the ER, urgent care clinic since your last visit? Hospitalized since your last visit? No    2. Have you seen or consulted any other health care providers outside of the [address] [place] [address] since your last visit? Include any pap smears or colon screening. No    Advance Directive:  1. Do you have an advance directive in place? Patient Reply:No    2.  If not, would you like material regarding how to put one in place?  Patient Reply: No
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
## 2019-05-02 ENCOUNTER — HOSPITAL ENCOUNTER (OUTPATIENT)
Dept: LAB | Age: 84
Discharge: HOME | End: 2019-05-02
Payer: MEDICARE

## 2019-05-02 DIAGNOSIS — R10.30 LOWER ABDOMINAL PAIN: ICD-10-CM

## 2019-05-02 DIAGNOSIS — E11.59 CONTROLLED TYPE 2 DIABETES MELLITUS WITH OTHER CIRCULATORY COMPLICATION, WITH LONG-TERM CURRENT USE OF INSULIN (HCC): ICD-10-CM

## 2019-05-02 DIAGNOSIS — I10 ESSENTIAL HYPERTENSION: ICD-10-CM

## 2019-05-02 DIAGNOSIS — Z79.4 CONTROLLED TYPE 2 DIABETES MELLITUS WITH OTHER CIRCULATORY COMPLICATION, WITH LONG-TERM CURRENT USE OF INSULIN (HCC): ICD-10-CM

## 2019-05-02 LAB
ALBUMIN SERPL-MCNC: 3.6 G/DL (ref 3.4–5)
ALBUMIN/GLOB SERPL: 0.8 {RATIO} (ref 0.8–1.7)
ALP SERPL-CCNC: 108 U/L (ref 45–117)
ALT SERPL-CCNC: 13 U/L (ref 13–56)
ANION GAP SERPL CALC-SCNC: 5 MMOL/L (ref 3–18)
AST SERPL-CCNC: 12 U/L (ref 15–37)
BASOPHILS # BLD: 0 K/UL (ref 0–0.1)
BASOPHILS NFR BLD: 1 % (ref 0–2)
BILIRUB SERPL-MCNC: 0.7 MG/DL (ref 0.2–1)
BUN SERPL-MCNC: 12 MG/DL (ref 7–18)
BUN/CREAT SERPL: 14 (ref 12–20)
CALCIUM SERPL-MCNC: 9.6 MG/DL (ref 8.5–10.1)
CHLORIDE SERPL-SCNC: 103 MMOL/L (ref 100–108)
CO2 SERPL-SCNC: 31 MMOL/L (ref 21–32)
CREAT SERPL-MCNC: 0.85 MG/DL (ref 0.6–1.3)
DIFFERENTIAL METHOD BLD: ABNORMAL
EOSINOPHIL # BLD: 0.2 K/UL (ref 0–0.4)
EOSINOPHIL NFR BLD: 4 % (ref 0–5)
ERYTHROCYTE [DISTWIDTH] IN BLOOD BY AUTOMATED COUNT: 12.4 % (ref 11.6–14.5)
GLOBULIN SER CALC-MCNC: 4.6 G/DL (ref 2–4)
GLUCOSE SERPL-MCNC: 163 MG/DL (ref 74–99)
HCT VFR BLD AUTO: 38.8 % (ref 35–45)
HGB BLD-MCNC: 12.5 G/DL (ref 12–16)
LYMPHOCYTES # BLD: 2.3 K/UL (ref 0.9–3.6)
LYMPHOCYTES NFR BLD: 44 % (ref 21–52)
MCH RBC QN AUTO: 31.5 PG (ref 24–34)
MCHC RBC AUTO-ENTMCNC: 32.2 G/DL (ref 31–37)
MCV RBC AUTO: 97.7 FL (ref 74–97)
MONOCYTES # BLD: 0.3 K/UL (ref 0.05–1.2)
MONOCYTES NFR BLD: 6 % (ref 3–10)
NEUTS SEG # BLD: 2.4 K/UL (ref 1.8–8)
NEUTS SEG NFR BLD: 45 % (ref 40–73)
PLATELET # BLD AUTO: 256 K/UL (ref 135–420)
PMV BLD AUTO: 11.1 FL (ref 9.2–11.8)
POTASSIUM SERPL-SCNC: 3.8 MMOL/L (ref 3.5–5.5)
PROT SERPL-MCNC: 8.2 G/DL (ref 6.4–8.2)
RBC # BLD AUTO: 3.97 M/UL (ref 4.2–5.3)
SODIUM SERPL-SCNC: 139 MMOL/L (ref 136–145)
WBC # BLD AUTO: 5.2 K/UL (ref 4.6–13.2)

## 2019-05-02 PROCEDURE — 36415 COLL VENOUS BLD VENIPUNCTURE: CPT

## 2019-05-02 PROCEDURE — 85025 COMPLETE CBC W/AUTO DIFF WBC: CPT

## 2019-05-02 PROCEDURE — 80053 COMPREHEN METABOLIC PANEL: CPT

## 2019-05-03 ENCOUNTER — HOSPITAL ENCOUNTER (OUTPATIENT)
Dept: LAB | Age: 84
Discharge: HOME | End: 2019-05-03
Payer: MEDICARE

## 2019-05-03 DIAGNOSIS — R82.998 URINE LEUKOCYTES INCREASED: ICD-10-CM

## 2019-05-03 PROCEDURE — 36415 COLL VENOUS BLD VENIPUNCTURE: CPT

## 2019-05-03 PROCEDURE — 87186 SC STD MICRODIL/AGAR DIL: CPT

## 2019-05-03 PROCEDURE — 87077 CULTURE AEROBIC IDENTIFY: CPT

## 2019-05-03 PROCEDURE — 87086 URINE CULTURE/COLONY COUNT: CPT

## 2019-05-05 LAB
BACTERIA SPEC CULT: ABNORMAL
SERVICE CMNT-IMP: ABNORMAL

## 2019-05-06 ENCOUNTER — HOSPITAL ENCOUNTER (OUTPATIENT)
Dept: LAB | Age: 84
Discharge: HOME | End: 2019-05-06
Payer: MEDICARE

## 2019-05-06 ENCOUNTER — HOSPITAL ENCOUNTER (OUTPATIENT)
Dept: ONCOLOGY | Age: 84
Discharge: HOME | End: 2019-05-06

## 2019-05-06 ENCOUNTER — OFFICE VISIT (OUTPATIENT)
Dept: ONCOLOGY | Age: 84
End: 2019-05-06

## 2019-05-06 VITALS
RESPIRATION RATE: 16 BRPM | OXYGEN SATURATION: 98 % | TEMPERATURE: 98.4 F | DIASTOLIC BLOOD PRESSURE: 77 MMHG | WEIGHT: 243.8 LBS | BODY MASS INDEX: 43.2 KG/M2 | HEART RATE: 117 BPM | SYSTOLIC BLOOD PRESSURE: 122 MMHG | HEIGHT: 63 IN

## 2019-05-06 DIAGNOSIS — I82.532 CHRONIC DEEP VEIN THROMBOSIS (DVT) OF POPLITEAL VEIN OF LEFT LOWER EXTREMITY (HCC): Primary | ICD-10-CM

## 2019-05-06 DIAGNOSIS — I82.532 CHRONIC DEEP VEIN THROMBOSIS (DVT) OF POPLITEAL VEIN OF LEFT LOWER EXTREMITY (HCC): ICD-10-CM

## 2019-05-06 LAB
BASO+EOS+MONOS # BLD AUTO: 0.5 K/UL (ref 0–2.3)
BASO+EOS+MONOS NFR BLD AUTO: 9 % (ref 0.1–17)
DIFFERENTIAL METHOD BLD: ABNORMAL
ERYTHROCYTE [DISTWIDTH] IN BLOOD BY AUTOMATED COUNT: 12.3 % (ref 11.5–14.5)
HCT VFR BLD AUTO: 37.1 % (ref 36–48)
HGB BLD-MCNC: 12 G/DL (ref 12–16)
LYMPHOCYTES # BLD: 1.9 K/UL (ref 1.1–5.9)
LYMPHOCYTES NFR BLD: 34 % (ref 14–44)
MCH RBC QN AUTO: 32.3 PG (ref 25–35)
MCHC RBC AUTO-ENTMCNC: 32.3 G/DL (ref 31–37)
MCV RBC AUTO: 99.7 FL (ref 78–102)
NEUTS SEG # BLD: 3 K/UL (ref 1.8–9.5)
NEUTS SEG NFR BLD: 57 % (ref 40–70)
PLATELET # BLD AUTO: 199 K/UL (ref 140–440)
RBC # BLD AUTO: 3.72 M/UL (ref 4.1–5.1)
WBC # BLD AUTO: 5.4 K/UL (ref 4.5–13)

## 2019-05-06 PROCEDURE — 80053 COMPREHEN METABOLIC PANEL: CPT

## 2019-05-06 NOTE — PATIENT INSTRUCTIONS
Deep Vein Thrombosis: Care Instructions  Your Care Instructions    A deep vein thrombosis (DVT) is a blood clot in certain veins of the legs, pelvis, or arms. Blood clots in these veins need to be treated because they can get bigger, break loose, and travel through the bloodstream to the lungs. A blood clot in a lung can be life-threatening. The doctor may have given you a blood thinner (anticoagulant). A blood thinner can stop the blood clot from growing larger and prevent new clots from forming. You will need to take a blood thinner for 3 to 6 months or longer. The doctor has checked you carefully, but problems can develop later. If you notice any problems or new symptoms, get medical treatment right away. Follow-up care is a key part of your treatment and safety. Be sure to make and go to all appointments, and call your doctor if you are having problems. It's also a good idea to know your test results and keep a list of the medicines you take. How can you care for yourself at home? · Take your medicines exactly as prescribed. Call your doctor if you think you are having a problem with your medicine. · If you are taking a blood thinner, be sure you get instructions about how to take your medicine safely. Blood thinners can cause serious bleeding problems. · Wear compression stockings if your doctor recommends them. These stockings are tighter at the feet than on the legs. They may reduce pain and swelling in your legs. But there are different types of stockings, and they need to fit right. So your doctor will recommend what you need. · When you sit, use a pillow to raise the arm or leg that has the blood clot. Try to keep it above the level of your heart. When should you call for help? Call 911 anytime you think you may need emergency care. For example, call if:    · You passed out (lost consciousness).     · You have symptoms of a blood clot in your lung (called a pulmonary embolism).  These include:  ? Sudden chest pain. ? Trouble breathing. ? Coughing up blood.    Call your doctor now or seek immediate medical care if:    · You have new or worse trouble breathing.     · You are dizzy or lightheaded, or you feel like you may faint.     · You have symptoms of a blood clot in your arm or leg. These may include:  ? Pain in the arm, calf, back of the knee, thigh, or groin. ? Redness and swelling in the arm, leg, or groin.    Watch closely for changes in your health, and be sure to contact your doctor if:    · You do not get better as expected. Where can you learn more? Go to http://cristina-mignon.info/. Enter V721 in the search box to learn more about \"Deep Vein Thrombosis: Care Instructions. \"  Current as of: September 26, 2018  Content Version: 11.9  © 8388-1397 [employer]. Care instructions adapted under license by [employer] (which disclaims liability or warranty for this information). If you have questions about a medical condition or this instruction, always ask your healthcare professional. [address] any warranty or liability for your use of this information.
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
## 2019-05-06 NOTE — PROGRESS NOTES
Patient was notified of normal lab results and issue with urine sample. Patient states she is still experiencing urinary issues and notice her urine is a little cloudy, and she feels like she may have stones again. NP Ar Mariscal request that patient schedules an appointment and come in to be seen and Jose Frederick will call and schedule appointment.

## 2019-05-07 ENCOUNTER — HOSPITAL ENCOUNTER (OUTPATIENT)
Dept: LAB | Age: 84
Discharge: HOME | End: 2019-05-07

## 2019-05-07 ENCOUNTER — OFFICE VISIT (OUTPATIENT)
Dept: FAMILY MEDICINE CLINIC | Age: 84
End: 2019-05-07

## 2019-05-07 VITALS
OXYGEN SATURATION: 98 % | TEMPERATURE: 98.4 F | BODY MASS INDEX: 43.23 KG/M2 | RESPIRATION RATE: 18 BRPM | DIASTOLIC BLOOD PRESSURE: 86 MMHG | SYSTOLIC BLOOD PRESSURE: 164 MMHG | HEART RATE: 118 BPM | HEIGHT: 63 IN | WEIGHT: 244 LBS

## 2019-05-07 DIAGNOSIS — E11.59 CONTROLLED TYPE 2 DIABETES MELLITUS WITH OTHER CIRCULATORY COMPLICATION, WITH LONG-TERM CURRENT USE OF INSULIN (HCC): ICD-10-CM

## 2019-05-07 DIAGNOSIS — R31.9 URINARY TRACT INFECTION WITH HEMATURIA, SITE UNSPECIFIED: Primary | ICD-10-CM

## 2019-05-07 DIAGNOSIS — R35.0 FREQUENT URINATION: ICD-10-CM

## 2019-05-07 DIAGNOSIS — R82.90 BAD ODOR OF URINE: ICD-10-CM

## 2019-05-07 DIAGNOSIS — N39.0 URINARY TRACT INFECTION WITH HEMATURIA, SITE UNSPECIFIED: Primary | ICD-10-CM

## 2019-05-07 DIAGNOSIS — Z79.4 CONTROLLED TYPE 2 DIABETES MELLITUS WITH OTHER CIRCULATORY COMPLICATION, WITH LONG-TERM CURRENT USE OF INSULIN (HCC): ICD-10-CM

## 2019-05-07 LAB
ALBUMIN SERPL-MCNC: 3.6 G/DL (ref 3.4–5)
ALBUMIN/GLOB SERPL: 0.8 {RATIO} (ref 0.8–1.7)
ALP SERPL-CCNC: 109 U/L (ref 45–117)
ALT SERPL-CCNC: 16 U/L (ref 13–56)
ANION GAP SERPL CALC-SCNC: 5 MMOL/L (ref 3–18)
AST SERPL-CCNC: 10 U/L (ref 15–37)
BILIRUB SERPL-MCNC: 0.5 MG/DL (ref 0.2–1)
BILIRUB UR QL STRIP: NEGATIVE
BUN SERPL-MCNC: 9 MG/DL (ref 7–18)
BUN/CREAT SERPL: 9 (ref 12–20)
CALCIUM SERPL-MCNC: 9.3 MG/DL (ref 8.5–10.1)
CHLORIDE SERPL-SCNC: 105 MMOL/L (ref 100–108)
CO2 SERPL-SCNC: 28 MMOL/L (ref 21–32)
CREAT SERPL-MCNC: 0.95 MG/DL (ref 0.6–1.3)
GLOBULIN SER CALC-MCNC: 4.5 G/DL (ref 2–4)
GLUCOSE SERPL-MCNC: 173 MG/DL (ref 74–99)
GLUCOSE UR-MCNC: NORMAL MG/DL
KETONES P FAST UR STRIP-MCNC: NEGATIVE MG/DL
PH UR STRIP: 6 [PH] (ref 4.6–8)
POTASSIUM SERPL-SCNC: 3.7 MMOL/L (ref 3.5–5.5)
PROT SERPL-MCNC: 8.1 G/DL (ref 6.4–8.2)
PROT UR QL STRIP: NORMAL
SODIUM SERPL-SCNC: 138 MMOL/L (ref 136–145)
SP GR UR STRIP: 1.03 (ref 1–1.03)
UA UROBILINOGEN AMB POC: NORMAL (ref 0.2–1)
URINALYSIS CLARITY POC: NORMAL
URINALYSIS COLOR POC: NORMAL
URINE BLOOD POC: NORMAL
URINE LEUKOCYTES POC: NORMAL
URINE NITRITES POC: POSITIVE

## 2019-05-07 RX ORDER — CIPROFLOXACIN 250 MG/1
250 TABLET, FILM COATED ORAL EVERY 12 HOURS
Qty: 10 TAB | Refills: 0 | Status: SHIPPED | OUTPATIENT
Start: 2019-05-07 | End: 2019-05-12
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
## 2019-05-07 NOTE — PROGRESS NOTES
[place] presents today for   Chief Complaint   Patient presents with    Urinary Odor    Urgency       Is someone accompanying this pt? Yes; Nurse    Is the patient using any DME equipment during OV? Yes; Wheelchair    Depression Screening:  3 most recent PHQ Screens 5/6/2019   PHQ Not Done -   Little interest or pleasure in doing things Not at all   Feeling down, depressed, irritable, or hopeless Not at all   Total Score PHQ 2 0       Learning Assessment:  Learning Assessment 1/29/2019   PRIMARY LEARNER Patient   HIGHEST LEVEL OF EDUCATION - PRIMARY LEARNER  -   BARRIERS PRIMARY LEARNER -   [address]  CAREGIVER -   [address]    NEED -   LEARNER PREFERENCE PRIMARY LISTENING     -     -     -     -   ANSWERED BY patient   RELATIONSHIP SELF       Abuse Screening:  Abuse Screening Questionnaire 4/23/2019   Do you ever feel afraid of your partner? N   Are you in a relationship with someone who physically or mentally threatens you? N   Is it safe for you to go home? Y       Fall Screening:  Fall Risk Assessment, last 12 mths 4/23/2019   Able to walk? Yes   Fall in past 12 months? Yes   Fall with injury? Yes   Number of falls in past 12 months 1   Fall Risk Score 2         Health Maintenance Due   Topic Date Due    FOOT EXAM Q1  10/24/2018   . Health Maintenance reviewed and discussed and ordered per Provider. Coordination of Care  1. Have you been to the ER, urgent care clinic since your last visit? Hospitalized since your last visit? No    2. Have you seen or consulted any other health care providers outside of the [address] [place] [address] since your last visit? Include any pap smears or colon screening. No        Advance Directive:  1. Do you have an advance directive in place? Patient Reply:No    2. If not, would you like material regarding how to put one in place?  Patient Reply: No

## 2019-05-14 ENCOUNTER — APPOINTMENT (OUTPATIENT)
Dept: GENERAL RADIOLOGY | Age: 84
End: 2019-05-14
Attending: EMERGENCY MEDICINE
Payer: MEDICARE

## 2019-05-14 ENCOUNTER — HOSPITAL ENCOUNTER (EMERGENCY)
Age: 84
Discharge: HOME | End: 2019-05-14
Attending: EMERGENCY MEDICINE
Payer: MEDICARE

## 2019-05-14 VITALS
RESPIRATION RATE: 22 BRPM | BODY MASS INDEX: 37.83 KG/M2 | DIASTOLIC BLOOD PRESSURE: 78 MMHG | WEIGHT: 241 LBS | SYSTOLIC BLOOD PRESSURE: 159 MMHG | TEMPERATURE: 97.9 F | HEART RATE: 86 BPM | HEIGHT: 67 IN | OXYGEN SATURATION: 99 %

## 2019-05-14 DIAGNOSIS — R07.89 ATYPICAL CHEST PAIN: Primary | ICD-10-CM

## 2019-05-14 LAB
ALBUMIN SERPL-MCNC: 3.2 G/DL (ref 3.4–5)
ALBUMIN/GLOB SERPL: 0.7 {RATIO} (ref 0.8–1.7)
ALP SERPL-CCNC: 107 U/L (ref 45–117)
ALT SERPL-CCNC: 14 U/L (ref 13–56)
ANION GAP SERPL CALC-SCNC: 8 MMOL/L (ref 3–18)
AST SERPL-CCNC: 14 U/L (ref 15–37)
ATRIAL RATE: 111 BPM
BASOPHILS # BLD: 0 K/UL (ref 0–0.1)
BASOPHILS NFR BLD: 0 % (ref 0–2)
BILIRUB SERPL-MCNC: 0.5 MG/DL (ref 0.2–1)
BNP SERPL-MCNC: 59 PG/ML (ref 0–1800)
BUN SERPL-MCNC: 8 MG/DL (ref 7–18)
BUN/CREAT SERPL: 10 (ref 12–20)
CALCIUM SERPL-MCNC: 9 MG/DL (ref 8.5–10.1)
CALCULATED P AXIS, ECG09: 63 DEGREES
CALCULATED R AXIS, ECG10: -22 DEGREES
CALCULATED T AXIS, ECG11: 26 DEGREES
CHLORIDE SERPL-SCNC: 102 MMOL/L (ref 100–108)
CO2 SERPL-SCNC: 29 MMOL/L (ref 21–32)
CREAT SERPL-MCNC: 0.77 MG/DL (ref 0.6–1.3)
DIAGNOSIS, 93000: NORMAL
DIFFERENTIAL METHOD BLD: ABNORMAL
EOSINOPHIL # BLD: 0.2 K/UL (ref 0–0.4)
EOSINOPHIL NFR BLD: 4 % (ref 0–5)
ERYTHROCYTE [DISTWIDTH] IN BLOOD BY AUTOMATED COUNT: 12.3 % (ref 11.6–14.5)
GLOBULIN SER CALC-MCNC: 4.8 G/DL (ref 2–4)
GLUCOSE SERPL-MCNC: 257 MG/DL (ref 74–99)
HCT VFR BLD AUTO: 35.7 % (ref 35–45)
HGB BLD-MCNC: 11.3 G/DL (ref 12–16)
LYMPHOCYTES # BLD: 2.1 K/UL (ref 0.9–3.6)
LYMPHOCYTES NFR BLD: 38 % (ref 21–52)
MCH RBC QN AUTO: 31.9 PG (ref 24–34)
MCHC RBC AUTO-ENTMCNC: 31.7 G/DL (ref 31–37)
MCV RBC AUTO: 100.8 FL (ref 74–97)
MONOCYTES # BLD: 0.4 K/UL (ref 0.05–1.2)
MONOCYTES NFR BLD: 7 % (ref 3–10)
NEUTS SEG # BLD: 2.8 K/UL (ref 1.8–8)
NEUTS SEG NFR BLD: 51 % (ref 40–73)
P-R INTERVAL, ECG05: 150 MS
PLATELET # BLD AUTO: 214 K/UL (ref 135–420)
PMV BLD AUTO: 10.5 FL (ref 9.2–11.8)
POTASSIUM SERPL-SCNC: 3.3 MMOL/L (ref 3.5–5.5)
PROT SERPL-MCNC: 8 G/DL (ref 6.4–8.2)
Q-T INTERVAL, ECG07: 364 MS
QRS DURATION, ECG06: 84 MS
QTC CALCULATION (BEZET), ECG08: 495 MS
RBC # BLD AUTO: 3.54 M/UL (ref 4.2–5.3)
SODIUM SERPL-SCNC: 139 MMOL/L (ref 136–145)
TROPONIN I SERPL-MCNC: 0.02 NG/ML (ref 0–0.04)
TROPONIN I SERPL-MCNC: 0.02 NG/ML (ref 0–0.04)
VENTRICULAR RATE, ECG03: 111 BPM
WBC # BLD AUTO: 5.5 K/UL (ref 4.6–13.2)

## 2019-05-14 PROCEDURE — 74011250637 HC RX REV CODE- 250/637: Performed by: EMERGENCY MEDICINE

## 2019-05-14 PROCEDURE — 74011250636 HC RX REV CODE- 250/636: Performed by: EMERGENCY MEDICINE

## 2019-05-14 PROCEDURE — 85025 COMPLETE CBC W/AUTO DIFF WBC: CPT

## 2019-05-14 PROCEDURE — 93005 ELECTROCARDIOGRAM TRACING: CPT

## 2019-05-14 PROCEDURE — 80053 COMPREHEN METABOLIC PANEL: CPT

## 2019-05-14 PROCEDURE — 94762 N-INVAS EAR/PLS OXIMTRY CONT: CPT

## 2019-05-14 PROCEDURE — 71046 X-RAY EXAM CHEST 2 VIEWS: CPT

## 2019-05-14 PROCEDURE — 96374 THER/PROPH/DIAG INJ IV PUSH: CPT

## 2019-05-14 PROCEDURE — 84484 ASSAY OF TROPONIN QUANT: CPT

## 2019-05-14 PROCEDURE — 83880 ASSAY OF NATRIURETIC PEPTIDE: CPT

## 2019-05-14 PROCEDURE — 99285 EMERGENCY DEPT VISIT HI MDM: CPT

## 2019-05-14 RX ORDER — MORPHINE SULFATE 4 MG/ML
4 INJECTION INTRAVENOUS
Status: DISCONTINUED | OUTPATIENT
Start: 2019-05-14 | End: 2019-05-14

## 2019-05-14 RX ORDER — FUROSEMIDE 10 MG/ML
40 INJECTION INTRAMUSCULAR; INTRAVENOUS
Status: COMPLETED | OUTPATIENT
Start: 2019-05-14 | End: 2019-05-14

## 2019-05-14 RX ORDER — NITROGLYCERIN 0.4 MG/1
0.4 TABLET SUBLINGUAL AS NEEDED
Status: COMPLETED | OUTPATIENT
Start: 2019-05-14 | End: 2019-05-14

## 2019-05-14 RX ORDER — GUAIFENESIN 100 MG/5ML
324 LIQUID (ML) ORAL
Status: DISCONTINUED | OUTPATIENT
Start: 2019-05-14 | End: 2019-05-14 | Stop reason: DRUGHIGH

## 2019-05-14 RX ORDER — GUAIFENESIN 100 MG/5ML
243 LIQUID (ML) ORAL
Status: COMPLETED | OUTPATIENT
Start: 2019-05-14 | End: 2019-05-14

## 2019-05-14 RX ORDER — CIPROFLOXACIN 750 MG/1
750 TABLET, FILM COATED ORAL 2 TIMES DAILY
COMMUNITY
Start: 2019-05-10 | End: 2019-05-15

## 2019-05-14 RX ADMIN — FUROSEMIDE 40 MG: 10 INJECTION, SOLUTION INTRAMUSCULAR; INTRAVENOUS at 14:33

## 2019-05-14 RX ADMIN — ASPIRIN 81 MG 243 MG: 81 TABLET ORAL at 13:58

## 2019-05-14 RX ADMIN — NITROGLYCERIN 0.4 MG: 0.4 TABLET SUBLINGUAL at 13:59

## 2019-05-14 RX ADMIN — NITROGLYCERIN 0.4 MG: 0.4 TABLET SUBLINGUAL at 14:06

## 2019-05-14 RX ADMIN — NITROGLYCERIN 0.4 MG: 0.4 TABLET SUBLINGUAL at 14:11
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
## 2019-05-16 ENCOUNTER — OFFICE VISIT (OUTPATIENT)
Dept: FAMILY MEDICINE CLINIC | Age: 84
End: 2019-05-16

## 2019-05-16 VITALS
SYSTOLIC BLOOD PRESSURE: 131 MMHG | RESPIRATION RATE: 18 BRPM | DIASTOLIC BLOOD PRESSURE: 87 MMHG | HEART RATE: 105 BPM | HEIGHT: 66 IN | BODY MASS INDEX: 38.9 KG/M2 | TEMPERATURE: 98.8 F | OXYGEN SATURATION: 97 %

## 2019-05-16 DIAGNOSIS — R07.9 RIGHT-SIDED CHEST PAIN: Primary | ICD-10-CM
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
## 2019-05-16 NOTE — PROGRESS NOTES
Francehoma Brown presents today for   Chief Complaint   Patient presents with   [facility] Follow up       Is someone accompanying this pt? Yes; Patients Nurse    Is the patient using any DME equipment during OV? Yes; Wheelchair    Depression Screening:  3 most recent PHQ Screens 5/6/2019   PHQ Not Done -   Little interest or pleasure in doing things Not at all   Feeling down, depressed, irritable, or hopeless Not at all   Total Score PHQ 2 0       Learning Assessment:  Learning Assessment 1/29/2019   PRIMARY LEARNER Patient   HIGHEST LEVEL OF EDUCATION - PRIMARY LEARNER  -   BARRIERS PRIMARY LEARNER -   [address]  CAREGIVER -   Aracely Gross 49    NEED -   LEARNER PREFERENCE PRIMARY LISTENING     -     -     -     -   ANSWERED BY patient   RELATIONSHIP SELF       Abuse Screening:  Abuse Screening Questionnaire 4/23/2019   Do you ever feel afraid of your partner? N   Are you in a relationship with someone who physically or mentally threatens you? N   Is it safe for you to go home? Y       Fall Screening:  Fall Risk Assessment, last 12 mths 4/23/2019   Able to walk? Yes   Fall in past 12 months? Yes   Fall with injury? Yes   Number of falls in past 12 months 1   Fall Risk Score 2         Health Maintenance Due   Topic Date Due    FOOT EXAM Q1  10/24/2018   . Health Maintenance reviewed and discussed and ordered per Provider. France Brown is updated on all     Coordination of Care  1. Have you been to the ER, urgent care clinic since your last visit? Hospitalized since your last visit? Yes; ER chest pain    2. Have you seen or consulted any other health care providers outside of the [address] [place] [address] [place] since your last visit? Include any pap smears or colon screening. No      Advance Directive:  1. Do you have an advance directive in place? Patient Reply:No    2. If not, would you like material regarding how to put one in place?  Patient Reply: No      Patient is hospital follow from chest pain on right side.

## 2019-05-23 ENCOUNTER — OFFICE VISIT (OUTPATIENT)
Dept: CARDIOLOGY CLINIC | Age: 84
End: 2019-05-23

## 2019-05-23 VITALS
HEART RATE: 112 BPM | DIASTOLIC BLOOD PRESSURE: 80 MMHG | HEIGHT: 66 IN | WEIGHT: 241 LBS | SYSTOLIC BLOOD PRESSURE: 138 MMHG | BODY MASS INDEX: 38.73 KG/M2

## 2019-05-23 DIAGNOSIS — I50.32 CHRONIC DIASTOLIC CONGESTIVE HEART FAILURE (HCC): ICD-10-CM

## 2019-05-23 DIAGNOSIS — Z95.5 S/P CORONARY ARTERY STENT PLACEMENT: ICD-10-CM

## 2019-05-23 DIAGNOSIS — I25.118 ATHEROSCLEROSIS OF NATIVE CORONARY ARTERY OF NATIVE HEART WITH STABLE ANGINA PECTORIS (HCC): Primary | ICD-10-CM

## 2019-05-23 DIAGNOSIS — I10 ESSENTIAL HYPERTENSION: ICD-10-CM

## 2019-05-23 NOTE — PROGRESS NOTES
1. Have you been to the ER, urgent care clinic since your last visit? Hospitalized since your last visit? Yes [facility]     2. Have you seen or consulted any other health care providers outside of the [address] [place] [address] since your last visit? Include any pap smears or colon screening. No     3. Since your last visit, have you had any of the following symptoms? chest pains.
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
## 2019-06-23 ENCOUNTER — APPOINTMENT (OUTPATIENT)
Dept: GENERAL RADIOLOGY | Age: 84
DRG: 287 | End: 2019-06-23
Attending: EMERGENCY MEDICINE
Payer: MEDICARE

## 2019-06-23 ENCOUNTER — HOSPITAL ENCOUNTER (INPATIENT)
Age: 84
LOS: 1 days | Discharge: HOME HEALTH | DRG: 287 | End: 2019-06-26
Attending: EMERGENCY MEDICINE | Admitting: INTERNAL MEDICINE
Payer: MEDICARE

## 2019-06-23 DIAGNOSIS — R07.9 CHEST PAIN, UNSPECIFIED TYPE: Primary | ICD-10-CM

## 2019-06-23 DIAGNOSIS — I10 HYPERTENSION, UNSPECIFIED TYPE: ICD-10-CM

## 2019-06-23 LAB
ANION GAP SERPL CALC-SCNC: 7 MMOL/L (ref 3–18)
BASOPHILS # BLD: 0 K/UL (ref 0–0.1)
BASOPHILS NFR BLD: 0 % (ref 0–2)
BNP SERPL-MCNC: 45 PG/ML (ref 0–1800)
BUN SERPL-MCNC: 7 MG/DL (ref 7–18)
BUN/CREAT SERPL: 9 (ref 12–20)
CALCIUM SERPL-MCNC: 9.3 MG/DL (ref 8.5–10.1)
CHLORIDE SERPL-SCNC: 105 MMOL/L (ref 100–108)
CK MB CFR SERPL CALC: NORMAL % (ref 0–4)
CK MB SERPL-MCNC: <1 NG/ML (ref 5–25)
CK SERPL-CCNC: 76 U/L (ref 26–192)
CO2 SERPL-SCNC: 29 MMOL/L (ref 21–32)
CREAT SERPL-MCNC: 0.78 MG/DL (ref 0.6–1.3)
D DIMER PPP FEU-MCNC: 1.12 UG/ML(FEU)
DIFFERENTIAL METHOD BLD: ABNORMAL
EOSINOPHIL # BLD: 0.2 K/UL (ref 0–0.4)
EOSINOPHIL NFR BLD: 5 % (ref 0–5)
ERYTHROCYTE [DISTWIDTH] IN BLOOD BY AUTOMATED COUNT: 12.6 % (ref 11.6–14.5)
GLUCOSE SERPL-MCNC: 257 MG/DL (ref 74–99)
HCT VFR BLD AUTO: 37.4 % (ref 35–45)
HGB BLD-MCNC: 11.6 G/DL (ref 12–16)
LIPASE SERPL-CCNC: 76 U/L (ref 73–393)
LYMPHOCYTES # BLD: 2.1 K/UL (ref 0.9–3.6)
LYMPHOCYTES NFR BLD: 39 % (ref 21–52)
MCH RBC QN AUTO: 31.5 PG (ref 24–34)
MCHC RBC AUTO-ENTMCNC: 31 G/DL (ref 31–37)
MCV RBC AUTO: 101.6 FL (ref 74–97)
MONOCYTES # BLD: 0.3 K/UL (ref 0.05–1.2)
MONOCYTES NFR BLD: 6 % (ref 3–10)
NEUTS SEG # BLD: 2.7 K/UL (ref 1.8–8)
NEUTS SEG NFR BLD: 50 % (ref 40–73)
PLATELET # BLD AUTO: 253 K/UL (ref 135–420)
PMV BLD AUTO: 10.4 FL (ref 9.2–11.8)
POTASSIUM SERPL-SCNC: 3.2 MMOL/L (ref 3.5–5.5)
RBC # BLD AUTO: 3.68 M/UL (ref 4.2–5.3)
SODIUM SERPL-SCNC: 141 MMOL/L (ref 136–145)
TROPONIN I SERPL-MCNC: <0.02 NG/ML (ref 0–0.04)
WBC # BLD AUTO: 5.3 K/UL (ref 4.6–13.2)

## 2019-06-23 PROCEDURE — 85379 FIBRIN DEGRADATION QUANT: CPT

## 2019-06-23 PROCEDURE — 65390000012 HC CONDITION CODE 44 OBSERVATION

## 2019-06-23 PROCEDURE — 93005 ELECTROCARDIOGRAM TRACING: CPT

## 2019-06-23 PROCEDURE — 74011250636 HC RX REV CODE- 250/636: Performed by: EMERGENCY MEDICINE

## 2019-06-23 PROCEDURE — 82550 ASSAY OF CK (CPK): CPT

## 2019-06-23 PROCEDURE — 80048 BASIC METABOLIC PNL TOTAL CA: CPT

## 2019-06-23 PROCEDURE — 71045 X-RAY EXAM CHEST 1 VIEW: CPT

## 2019-06-23 PROCEDURE — 83690 ASSAY OF LIPASE: CPT

## 2019-06-23 PROCEDURE — 85025 COMPLETE CBC W/AUTO DIFF WBC: CPT

## 2019-06-23 PROCEDURE — 65660000000 HC RM CCU STEPDOWN

## 2019-06-23 PROCEDURE — 99285 EMERGENCY DEPT VISIT HI MDM: CPT

## 2019-06-23 PROCEDURE — 83880 ASSAY OF NATRIURETIC PEPTIDE: CPT

## 2019-06-23 PROCEDURE — 74011250637 HC RX REV CODE- 250/637: Performed by: EMERGENCY MEDICINE

## 2019-06-23 RX ORDER — SODIUM CHLORIDE 9 MG/ML
125 INJECTION, SOLUTION INTRAVENOUS ONCE
Status: COMPLETED | OUTPATIENT
Start: 2019-06-23 | End: 2019-06-23

## 2019-06-23 RX ADMIN — NITROGLYCERIN 1 INCH: 20 OINTMENT TOPICAL at 20:45

## 2019-06-23 RX ADMIN — SODIUM CHLORIDE 125 ML/HR: 900 INJECTION, SOLUTION INTRAVENOUS at 20:45

## 2019-06-23 NOTE — Clinical Note
TRANSFER - IN REPORT:  
 
Verbal report received from: Darshana Negro RN. Report consisted of patient's Situation, Background, Assessment and  
Recommendations(SBAR). Opportunity for questions and clarification was provided. Assessment completed upon patient's arrival to unit and care assumed. Patient transported with a Cardiac Cath Tech / Patient Care Tech.

## 2019-06-23 NOTE — Clinical Note
TRANSFER - OUT REPORT:  
 
Verbal report given to: Jose Alfredo Arndt RN. Report consisted of patient's Situation, Background, Assessment and  
Recommendations(SBAR). Opportunity for questions and clarification was provided. Patient transported with a Cardiac Cath Tech / Patient Care Tech. Patient transported to: [address]

## 2019-06-24 ENCOUNTER — APPOINTMENT (OUTPATIENT)
Dept: NUCLEAR MEDICINE | Age: 84
DRG: 287 | End: 2019-06-24
Attending: EMERGENCY MEDICINE
Payer: MEDICARE

## 2019-06-24 ENCOUNTER — APPOINTMENT (OUTPATIENT)
Dept: NON INVASIVE DIAGNOSTICS | Age: 84
DRG: 287 | End: 2019-06-24
Attending: INTERNAL MEDICINE
Payer: MEDICARE

## 2019-06-24 LAB
ANION GAP SERPL CALC-SCNC: 8 MMOL/L (ref 3–18)
BUN SERPL-MCNC: 6 MG/DL (ref 7–18)
BUN/CREAT SERPL: 9 (ref 12–20)
CALCIUM SERPL-MCNC: 8.7 MG/DL (ref 8.5–10.1)
CHLORIDE SERPL-SCNC: 106 MMOL/L (ref 100–108)
CK MB CFR SERPL CALC: NORMAL % (ref 0–4)
CK MB SERPL-MCNC: <1 NG/ML (ref 5–25)
CK SERPL-CCNC: 74 U/L (ref 26–192)
CO2 SERPL-SCNC: 25 MMOL/L (ref 21–32)
CREAT SERPL-MCNC: 0.7 MG/DL (ref 0.6–1.3)
ECHO AO ASC DIAM: 3 CM
ECHO AO ROOT DIAM: 3.26 CM
ECHO LA AREA 4C: 21 CM2
ECHO LA VOL 2C: 43.94 ML (ref 22–52)
ECHO LA VOL 4C: 59.72 ML (ref 22–52)
ECHO LA VOL BP: 55.4 ML (ref 22–52)
ECHO LA VOL/BSA BIPLANE: 25.59 ML/M2 (ref 16–28)
ECHO LA VOLUME INDEX A2C: 20.29 ML/M2 (ref 16–28)
ECHO LA VOLUME INDEX A4C: 27.58 ML/M2 (ref 16–28)
ECHO LV INTERNAL DIMENSION DIASTOLIC: 4.8 CM (ref 3.9–5.3)
ECHO LV INTERNAL DIMENSION SYSTOLIC: 3.59 CM
ECHO LV IVSD: 1.27 CM (ref 0.6–0.9)
ECHO LV MASS 2D: 288.5 G (ref 67–162)
ECHO LV MASS INDEX 2D: 133.2 G/M2 (ref 43–95)
ECHO LV POSTERIOR WALL DIASTOLIC: 1.31 CM (ref 0.6–0.9)
ECHO LVOT DIAM: 2.12 CM
ECHO LVOT PEAK GRADIENT: 2.9 MMHG
ECHO LVOT PEAK VELOCITY: 85.24 CM/S
ECHO LVOT VTI: 16.44 CM
ECHO MV A VELOCITY: 84.62 CM/S
ECHO MV E DECELERATION TIME (DT): 198.6 MS
ECHO MV E VELOCITY: 61.6 CM/S
ECHO MV E/A RATIO: 0.73
EST. AVERAGE GLUCOSE BLD GHB EST-MCNC: 169 MG/DL
GLUCOSE BLD STRIP.AUTO-MCNC: 147 MG/DL (ref 70–110)
GLUCOSE BLD STRIP.AUTO-MCNC: 155 MG/DL (ref 70–110)
GLUCOSE BLD STRIP.AUTO-MCNC: 168 MG/DL (ref 70–110)
GLUCOSE BLD STRIP.AUTO-MCNC: 168 MG/DL (ref 70–110)
GLUCOSE BLD STRIP.AUTO-MCNC: 196 MG/DL (ref 70–110)
GLUCOSE SERPL-MCNC: 163 MG/DL (ref 74–99)
HBA1C MFR BLD: 7.5 % (ref 4.2–5.6)
MAGNESIUM SERPL-MCNC: 1.9 MG/DL (ref 1.6–2.6)
POTASSIUM SERPL-SCNC: 3.2 MMOL/L (ref 3.5–5.5)
SODIUM SERPL-SCNC: 139 MMOL/L (ref 136–145)
TROPONIN I SERPL-MCNC: <0.02 NG/ML (ref 0–0.04)

## 2019-06-24 PROCEDURE — A9567 TECHNETIUM TC-99M AEROSOL: HCPCS

## 2019-06-24 PROCEDURE — 74011636637 HC RX REV CODE- 636/637: Performed by: INTERNAL MEDICINE

## 2019-06-24 PROCEDURE — 65390000012 HC CONDITION CODE 44 OBSERVATION

## 2019-06-24 PROCEDURE — 74011250637 HC RX REV CODE- 250/637: Performed by: INTERNAL MEDICINE

## 2019-06-24 PROCEDURE — 82553 CREATINE MB FRACTION: CPT

## 2019-06-24 PROCEDURE — 82962 GLUCOSE BLOOD TEST: CPT

## 2019-06-24 PROCEDURE — 83735 ASSAY OF MAGNESIUM: CPT

## 2019-06-24 PROCEDURE — C8929 TTE W OR WO FOL WCON,DOPPLER: HCPCS

## 2019-06-24 PROCEDURE — 83036 HEMOGLOBIN GLYCOSYLATED A1C: CPT

## 2019-06-24 PROCEDURE — 80048 BASIC METABOLIC PNL TOTAL CA: CPT

## 2019-06-24 PROCEDURE — 99218 HC RM OBSERVATION: CPT

## 2019-06-24 PROCEDURE — 74011250636 HC RX REV CODE- 250/636: Performed by: INTERNAL MEDICINE

## 2019-06-24 PROCEDURE — 36415 COLL VENOUS BLD VENIPUNCTURE: CPT

## 2019-06-24 RX ORDER — CLOPIDOGREL BISULFATE 75 MG/1
75 TABLET ORAL DAILY
Status: DISCONTINUED | OUTPATIENT
Start: 2019-06-24 | End: 2019-06-26 | Stop reason: HOSPADM

## 2019-06-24 RX ORDER — RANOLAZINE 500 MG/1
500 TABLET, EXTENDED RELEASE ORAL 2 TIMES DAILY
Status: DISCONTINUED | OUTPATIENT
Start: 2019-06-24 | End: 2019-06-26 | Stop reason: HOSPADM

## 2019-06-24 RX ORDER — POTASSIUM CHLORIDE 20 MEQ/1
40 TABLET, EXTENDED RELEASE ORAL
Status: DISPENSED | OUTPATIENT
Start: 2019-06-24 | End: 2019-06-24

## 2019-06-24 RX ORDER — ISOSORBIDE MONONITRATE 30 MG/1
30 TABLET, EXTENDED RELEASE ORAL
Status: DISCONTINUED | OUTPATIENT
Start: 2019-06-24 | End: 2019-06-26 | Stop reason: HOSPADM

## 2019-06-24 RX ORDER — MORPHINE SULFATE 2 MG/ML
1 INJECTION, SOLUTION INTRAMUSCULAR; INTRAVENOUS
Status: DISCONTINUED | OUTPATIENT
Start: 2019-06-24 | End: 2019-06-24

## 2019-06-24 RX ORDER — HYDRALAZINE HYDROCHLORIDE 25 MG/1
25 TABLET, FILM COATED ORAL 3 TIMES DAILY
Status: DISCONTINUED | OUTPATIENT
Start: 2019-06-24 | End: 2019-06-26 | Stop reason: HOSPADM

## 2019-06-24 RX ORDER — ACETAMINOPHEN 325 MG/1
650 TABLET ORAL
Status: DISCONTINUED | OUTPATIENT
Start: 2019-06-24 | End: 2019-06-26 | Stop reason: HOSPADM

## 2019-06-24 RX ORDER — AMLODIPINE BESYLATE 10 MG/1
10 TABLET ORAL DAILY
Status: DISCONTINUED | OUTPATIENT
Start: 2019-06-24 | End: 2019-06-26 | Stop reason: HOSPADM

## 2019-06-24 RX ORDER — NALOXONE HYDROCHLORIDE 0.4 MG/ML
0.4 INJECTION, SOLUTION INTRAMUSCULAR; INTRAVENOUS; SUBCUTANEOUS AS NEEDED
Status: DISCONTINUED | OUTPATIENT
Start: 2019-06-24 | End: 2019-06-26 | Stop reason: HOSPADM

## 2019-06-24 RX ORDER — FUROSEMIDE 20 MG/1
20 TABLET ORAL DAILY
Status: DISCONTINUED | OUTPATIENT
Start: 2019-06-24 | End: 2019-06-26 | Stop reason: HOSPADM

## 2019-06-24 RX ORDER — DOCUSATE SODIUM 100 MG/1
100 CAPSULE, LIQUID FILLED ORAL
Status: DISCONTINUED | OUTPATIENT
Start: 2019-06-24 | End: 2019-06-26 | Stop reason: HOSPADM

## 2019-06-24 RX ORDER — DEXTROSE MONOHYDRATE 100 MG/ML
125-250 INJECTION, SOLUTION INTRAVENOUS AS NEEDED
Status: DISCONTINUED | OUTPATIENT
Start: 2019-06-24 | End: 2019-06-26 | Stop reason: HOSPADM

## 2019-06-24 RX ORDER — MAGNESIUM SULFATE 100 %
4 CRYSTALS MISCELLANEOUS AS NEEDED
Status: DISCONTINUED | OUTPATIENT
Start: 2019-06-24 | End: 2019-06-26 | Stop reason: HOSPADM

## 2019-06-24 RX ORDER — INSULIN GLARGINE 100 [IU]/ML
20 INJECTION, SOLUTION SUBCUTANEOUS 2 TIMES DAILY
Status: DISCONTINUED | OUTPATIENT
Start: 2019-06-24 | End: 2019-06-26

## 2019-06-24 RX ORDER — ENOXAPARIN SODIUM 100 MG/ML
40 INJECTION SUBCUTANEOUS EVERY 24 HOURS
Status: DISCONTINUED | OUTPATIENT
Start: 2019-06-24 | End: 2019-06-26 | Stop reason: HOSPADM

## 2019-06-24 RX ORDER — OXYCODONE AND ACETAMINOPHEN 5; 325 MG/1; MG/1
1 TABLET ORAL
Status: DISCONTINUED | OUTPATIENT
Start: 2019-06-24 | End: 2019-06-24

## 2019-06-24 RX ORDER — POTASSIUM CHLORIDE 20 MEQ/1
40 TABLET, EXTENDED RELEASE ORAL EVERY 4 HOURS
Status: DISCONTINUED | OUTPATIENT
Start: 2019-06-24 | End: 2019-06-24

## 2019-06-24 RX ORDER — ASPIRIN 81 MG/1
81 TABLET ORAL DAILY
Status: DISCONTINUED | OUTPATIENT
Start: 2019-06-24 | End: 2019-06-26 | Stop reason: HOSPADM

## 2019-06-24 RX ORDER — LEVOTHYROXINE SODIUM 112 UG/1
112 TABLET ORAL
Status: DISCONTINUED | OUTPATIENT
Start: 2019-06-24 | End: 2019-06-26 | Stop reason: HOSPADM

## 2019-06-24 RX ORDER — INSULIN LISPRO 100 [IU]/ML
INJECTION, SOLUTION INTRAVENOUS; SUBCUTANEOUS
Status: DISCONTINUED | OUTPATIENT
Start: 2019-06-24 | End: 2019-06-26 | Stop reason: HOSPADM

## 2019-06-24 RX ORDER — POTASSIUM CHLORIDE 20 MEQ/1
40 TABLET, EXTENDED RELEASE ORAL DAILY
Status: DISCONTINUED | OUTPATIENT
Start: 2019-06-24 | End: 2019-06-25

## 2019-06-24 RX ORDER — LANOLIN ALCOHOL/MO/W.PET/CERES
325 CREAM (GRAM) TOPICAL
Status: DISCONTINUED | OUTPATIENT
Start: 2019-06-24 | End: 2019-06-26 | Stop reason: HOSPADM

## 2019-06-24 RX ORDER — ONDANSETRON 2 MG/ML
4 INJECTION INTRAMUSCULAR; INTRAVENOUS
Status: DISCONTINUED | OUTPATIENT
Start: 2019-06-24 | End: 2019-06-26 | Stop reason: HOSPADM

## 2019-06-24 RX ADMIN — CLOPIDOGREL BISULFATE 75 MG: 75 TABLET, FILM COATED ORAL at 08:26

## 2019-06-24 RX ADMIN — INSULIN LISPRO 2 UNITS: 100 INJECTION, SOLUTION INTRAVENOUS; SUBCUTANEOUS at 17:22

## 2019-06-24 RX ADMIN — FERROUS SULFATE TAB 325 MG (65 MG ELEMENTAL FE) 325 MG: 325 (65 FE) TAB at 08:25

## 2019-06-24 RX ADMIN — AMLODIPINE BESYLATE 10 MG: 10 TABLET ORAL at 08:25

## 2019-06-24 RX ADMIN — LEVOTHYROXINE SODIUM 112 MCG: 112 TABLET ORAL at 05:28

## 2019-06-24 RX ADMIN — HYDRALAZINE HYDROCHLORIDE 25 MG: 25 TABLET, FILM COATED ORAL at 08:25

## 2019-06-24 RX ADMIN — ASPIRIN 81 MG: 81 TABLET, COATED ORAL at 08:24

## 2019-06-24 RX ADMIN — RANOLAZINE 500 MG: 500 TABLET, FILM COATED, EXTENDED RELEASE ORAL at 08:25

## 2019-06-24 RX ADMIN — POTASSIUM CHLORIDE 40 MEQ: 20 TABLET, EXTENDED RELEASE ORAL at 08:25

## 2019-06-24 RX ADMIN — RANOLAZINE 500 MG: 500 TABLET, FILM COATED, EXTENDED RELEASE ORAL at 17:21

## 2019-06-24 RX ADMIN — INSULIN LISPRO 2 UNITS: 100 INJECTION, SOLUTION INTRAVENOUS; SUBCUTANEOUS at 22:42

## 2019-06-24 RX ADMIN — ENOXAPARIN SODIUM 40 MG: 40 INJECTION SUBCUTANEOUS at 05:29

## 2019-06-24 RX ADMIN — INSULIN LISPRO 2 UNITS: 100 INJECTION, SOLUTION INTRAVENOUS; SUBCUTANEOUS at 12:25

## 2019-06-24 RX ADMIN — HYDRALAZINE HYDROCHLORIDE 25 MG: 25 TABLET, FILM COATED ORAL at 17:21

## 2019-06-24 RX ADMIN — HYDRALAZINE HYDROCHLORIDE 25 MG: 25 TABLET, FILM COATED ORAL at 22:02

## 2019-06-24 RX ADMIN — INSULIN GLARGINE 20 UNITS: 100 INJECTION, SOLUTION SUBCUTANEOUS at 08:32

## 2019-06-24 RX ADMIN — PERFLUTREN 2 ML: 6.52 INJECTION, SUSPENSION INTRAVENOUS at 10:17

## 2019-06-24 RX ADMIN — INSULIN GLARGINE 20 UNITS: 100 INJECTION, SOLUTION SUBCUTANEOUS at 17:22

## 2019-06-24 RX ADMIN — ISOSORBIDE MONONITRATE 30 MG: 30 TABLET, EXTENDED RELEASE ORAL at 08:25
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
## 2019-06-24 NOTE — ROUTINE PROCESS
TRANSFER - OUT REPORT:    Verbal report given to Gema Bob RN (name) on Renetta Mendez  being transferred to [address] [place] [address] (unit) for routine progression of care       Report consisted of patients Situation, Background, Assessment and   Recommendations(SBAR). Information from the following report(s) SBAR, ED Summary, Procedure Summary, Intake/Output, MAR, Recent Results, Med Rec Status and Cardiac Rhythm Sinus rhythm  was reviewed with the receiving nurse. Lines:   Peripheral IV 06/23/19 Right Arm (Active)   Site Assessment Clean, dry, & intact 6/23/2019  8:49 PM   Phlebitis Assessment 0 6/23/2019  8:49 PM   Infiltration Assessment 0 6/23/2019  8:49 PM   Dressing Status Clean, dry, & intact 6/23/2019  8:49 PM       Peripheral IV 06/23/19 Left Hand (Active)        Opportunity for questions and clarification was provided.       Patient transported with:   Monitor   IV NS at 125/ hr         M

## 2019-06-24 NOTE — PROGRESS NOTES
Bedside and Verbal shift change report given to [address] (oncoming nurse) by Jessica Lou (offgoing nurse). Report included the following information SBAR, Kardex, Intake/Output, MAR and Recent Results.

## 2019-06-24 NOTE — ED NOTES
Nuclear medicine tech called again and advised patient is leaving [facility] at this time, NM tech advised at this time she has a 30 min ETA to arriving at [facility]. CN and Supervisor notified.

## 2019-06-24 NOTE — PROGRESS NOTES
The patient was seen and examined independently. Please read my note for additional detail. The plan was discussed with APC. Feeling better. No more chest or abdominal pain. No SOB or cough. Patient has multiple allergies listed. BP (!) 160/91   Pulse 80   Temp 97 °F (36.1 °C)   Resp 18   Ht 5' 6\" (1.676 m)   Wt 109.3 kg (241 lb)   SpO2 95%   Breastfeeding? No   BMI 38.90 kg/m²     General appearance - alert, well appearing, and in no distress  Eyes - sclera anicteric, no pallor  Nose - no nasal discharge. Neck - supple, no JVD, trachea is midline  Chest - Good air entry noted in bases, no wheezes  Heart - S1 and S2 normal  Abdomen - soft, nontender, nondistended, Bowel sounds present  Neurological - alert, oriented, normal speech, no focal findings noted  Extremities - no pedal edema noted    ASSESSMENT:    1. Atypical Chest pain - no ACS  2. Hypertensive urgency, improved  3. Tachycardia, improved  4. Tachypnea, resolved  5. +d-dimer WITH low probability VQ scan  6. Acute on chronic diastolic HF secondary to hypertensive urgency. Resolved clinically  7. Hypothyroidism  8. DM2  9. Hypokalemia    PLAN:    Cont current management - tolerating Imdur and Hydralazine  Can be changed to Cardizem from [facility] if okay with a cardiologist for better HR/BP control. Replace K  Pending ECHO report  Can be discharged home if okay with cardiology.

## 2019-06-24 NOTE — ED NOTES
Pt currently resting in bed watching TV. Affect is calm/conversant, respirations regular/non labored/skin warm/dry. Able to transition to bedside commode with minimal assistance. Rails up x 2 with call light in reach. Allergy and Fall Risk bands applied. Nuclear med tech has been contacted for VQ scan. Preparing pt for ED to ED transfer.

## 2019-06-24 NOTE — HOME CARE
Rounded on this \"[facility]\" patient , explained to patient about [facility] services offered and left a brochure on [facility]. MARCELA MAHARAJ.

## 2019-06-24 NOTE — PROGRESS NOTES
attempted to visit patient and was not able to do a spiritual assessment. Will follow up with patient as needed.   Shauna Holland, [address] [place] [address]  Spiritual Care Department  [phone]

## 2019-06-24 NOTE — ED NOTES
Pt to be admitted as inpatient at [address] for telemetry admission. Pt will be transferred to Nuclear Medicine for VQ scan and from there go to room 467.  Awaiting [facility] transport [facility].

## 2019-06-24 NOTE — ED TRIAGE NOTES
Acute onset mid sternal chest pain with radiation to left breast; described as sharp pain.   States experiencing some dizziness with this
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
## 2019-06-24 NOTE — PROGRESS NOTES
Reason for Admission:   Chest pain [R07.9]  Chest pain [R07.9]               RRAT Score:     53             Resources/supports as identified by patient/family:       Top Challenges facing patient (as identified by patient/family and CM):     none    Finances/Medication cost?     Medicare and Medicaid  Transportation      family  Support system or lack thereof? Granddaughter neighbor  And frient  Living arrangements? Live alone   Self-care/ADLs/Cognition? Self care        Current Advanced Directive/Advance Care Plan:   no                          Plan for utilizing home health:    yes If ordered [address] signed for [facility]                      Likelihood of readmission:   HIGH    Transition of Care Plan:                    Initial assessment completed with patient. Cognitive status of patient: oriented to time, place, person and situation. Face sheet information confirmed:  yes. The patient designates granddaughter Asher Found [phone] to participate in her discharge plan and to receive any needed information. This patient lives in a single family home with self. Patient is not able to navigate steps as needed. Prior to hospitalization, patient was considered to be independent with ADLs/IADLS : yes Patient has a current ACP document on file: no  The granddaughter will be available to transport patient home upon discharge. The patient already has Candy Pretty, Shower chair, [facility],  medical equipment available in the home. Patient is not currently active with home health. .  Patient has not stayed in a skilled nursing facility or rehab. Was  stay within last 60 days : no. This patient is on dialysis :no        List of available Home Health agencies were provided and reviewed with the patient prior to discharge. Freedom of choice signed: yes, for [facility]. Currently, the discharge plan is Home with [address] [place] [address] [facility] [place].     The patient states that she can obtain her medications from the pharmacy, and take her medications as directed. Patient's current insurance is Medicare and medicaid. Pt signed Code 40, TALIA and observation letters. Care Management Interventions  PCP Verified by CM:  Yes  Mode of Transport at Discharge: (friend or granddaughter)  Transition of Care Consult (CM Consult): Discharge Planning, [address]: Yes  Current Support Network: Lives Alone  Confirm Follow Up Transport: Family  Plan discussed with Pt/Family/Caregiver: Yes  Freedom of Choice Offered: Yes  Discharge Location  Discharge Placement: Home with home health        Erick Jiménez RN BSN  Case Management  [phone]

## 2019-06-24 NOTE — PROGRESS NOTES
[facility]  [facility]      Subjective:   Denies CP/chest discomfort or SOB. Decrease in appetite. Denies abdominal pain, N/V. Arthritic joint pain left knee and left shoulder. Use of walker, hospital bed at home and aide in the home. Objective:   VS: BP (!) 160/91   Pulse 80   Temp 97 °F (36.1 °C)   Resp 18   Ht 5' 6\" (1.676 m)   Wt 109.3 kg (241 lb)   SpO2 95%   Breastfeeding? No   BMI 38.90 kg/m²        General:  Alert, NAD  Cardiovascular:  RRR  Pulmonary:  LSC throughout; respiratory effort WNL  GI:  +BS in all four quadrants, soft, non-tender  Extremities:  No edema; 2+ dorsalis pedis pulses bilaterally  Neuro: alert and oriented    Assessment/Plan:   1. Chest pain  2. Hypertensive urgency, improved  3. Tachycardia, improved  4. Tachypnea, resolved  5. +d-dimer  6. Acute on chronic diastolic HF secondary to hypertensive urgency. 7. Hypothyroid  8. DM2  9. hypokalemia    Plan  1. Cardiology consult. Echo pending. Serial cardiac enzymes  2. Low probability PE on VQ scan  3. hydralazine initiated this admission. Continue home cardiac medications, Imdur, Ranexa, monitor BP, improvement with BP and HR. 4. Continue ASA and plavix  5. POC glucose, SSI, lantus  6. Synthroid  7. Monitor metabolic panel. Potassium daily.    7. Lovenox, prophylaxis
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
## 2019-06-24 NOTE — ED NOTES
Per Dr. Holger Carvajal ED physician, pt may be off telemetry monitoring for VQ testing. Pt is to be transferred to Nuclear Medicine dept via [facility] and pt to be off tele for test. Nuclear medicine tech is on way to [facility] for testing- eta 35 minutes.

## 2019-06-24 NOTE — PROGRESS NOTES
Echocardiogram completed. Patient returned to room with armband in place. Report to follow.     [address] [facility]

## 2019-06-24 NOTE — ED NOTES
Ambulance Transport Paramedic called Colie Cranker stating the Nuclear medicine tech is not on site and asked if we could recall the tech, tech was recalled at this time and stated she now has a 10 minute ETA. Nursing Supervisor notified and CN notified.

## 2019-06-24 NOTE — PHYSICIAN ADVISORY
Letter of admission status determination    Renetta Mendez   Age: 82 y.o. MRN: [id number]    Date of admission: 6/23/2019    I have reviewed this case as it involves a Medicare patient that was admitted as Inpatient and was subsequently changed to Observation. The patient has already spent one midnight in the hospital. Cardiac cath is planned. Therefore, since medically necessary hospitalization for a second midnight is anticipated, I recommend upgrading the hospitalization status to Inpatient. The final decision regarding the patient's hospitalization status depends on the attending physician's judgment.       Sujey Arreaga MD, ALF, [place], [place] [facility], [address] [place] [address]   [phone]

## 2019-06-24 NOTE — ROUTINE PROCESS
Bedside shift change report given to Mai (oncoming nurse) by Stephanie Felipe (offgoing nurse). Report included the following information SBAR, Kardex, Intake/Output, MAR and Recent Results.

## 2019-06-24 NOTE — ED NOTES
Pt reports chest pain is improving. Reports it as a dull pain to left chest. Much better than when she arrived. A/o x4.

## 2019-06-24 NOTE — ROUTINE PROCESS
Pt's potassium 3.2. Lasix held. MD ordered lasix and potassium again. Called 4 times to clarify lasix and inform MD that pt refused second dose of potassium. MD beltre notified. Awaiting cardiology response. Cardiology Paged 5 times. Ordered to give lasix despite low potassium. NP stated it is [place] and pt is agreeable. \" Pt refused after MD left.

## 2019-06-25 LAB
ANION GAP SERPL CALC-SCNC: 6 MMOL/L (ref 3–18)
ATRIAL RATE: 121 BPM
ATRIAL RATE: 73 BPM
BASOPHILS # BLD: 0 K/UL (ref 0–0.1)
BASOPHILS NFR BLD: 0 % (ref 0–2)
BUN SERPL-MCNC: 8 MG/DL (ref 7–18)
BUN/CREAT SERPL: 10 (ref 12–20)
CALCIUM SERPL-MCNC: 9.5 MG/DL (ref 8.5–10.1)
CALCULATED P AXIS, ECG09: 93 DEGREES
CALCULATED R AXIS, ECG10: -15 DEGREES
CALCULATED R AXIS, ECG10: -25 DEGREES
CALCULATED T AXIS, ECG11: -25 DEGREES
CALCULATED T AXIS, ECG11: 144 DEGREES
CHLORIDE SERPL-SCNC: 108 MMOL/L (ref 100–108)
CO2 SERPL-SCNC: 28 MMOL/L (ref 21–32)
CREAT SERPL-MCNC: 0.8 MG/DL (ref 0.6–1.3)
DIAGNOSIS, 93000: NORMAL
DIAGNOSIS, 93000: NORMAL
DIFFERENTIAL METHOD BLD: ABNORMAL
END DIASTOLIC PRESSURE: 16
EOSINOPHIL # BLD: 0.2 K/UL (ref 0–0.4)
EOSINOPHIL NFR BLD: 5 % (ref 0–5)
ERYTHROCYTE [DISTWIDTH] IN BLOOD BY AUTOMATED COUNT: 12.8 % (ref 11.6–14.5)
GLUCOSE BLD STRIP.AUTO-MCNC: 153 MG/DL (ref 70–110)
GLUCOSE BLD STRIP.AUTO-MCNC: 189 MG/DL (ref 70–110)
GLUCOSE BLD STRIP.AUTO-MCNC: 257 MG/DL (ref 70–110)
GLUCOSE SERPL-MCNC: 158 MG/DL (ref 74–99)
HCT VFR BLD AUTO: 34 % (ref 35–45)
HGB BLD-MCNC: 10.7 G/DL (ref 12–16)
LYMPHOCYTES # BLD: 1.9 K/UL (ref 0.9–3.6)
LYMPHOCYTES NFR BLD: 37 % (ref 21–52)
MAGNESIUM SERPL-MCNC: 2 MG/DL (ref 1.6–2.6)
MCH RBC QN AUTO: 31.1 PG (ref 24–34)
MCHC RBC AUTO-ENTMCNC: 31.5 G/DL (ref 31–37)
MCV RBC AUTO: 98.8 FL (ref 74–97)
MONOCYTES # BLD: 0.4 K/UL (ref 0.05–1.2)
MONOCYTES NFR BLD: 8 % (ref 3–10)
NEUTS SEG # BLD: 2.5 K/UL (ref 1.8–8)
NEUTS SEG NFR BLD: 50 % (ref 40–73)
P-R INTERVAL, ECG05: 120 MS
P-R INTERVAL, ECG05: 138 MS
PLATELET # BLD AUTO: 262 K/UL (ref 135–420)
PMV BLD AUTO: 10.7 FL (ref 9.2–11.8)
POTASSIUM SERPL-SCNC: 3.3 MMOL/L (ref 3.5–5.5)
Q-T INTERVAL, ECG07: 352 MS
Q-T INTERVAL, ECG07: 374 MS
QRS DURATION, ECG06: 84 MS
QRS DURATION, ECG06: 88 MS
QTC CALCULATION (BEZET), ECG08: 387 MS
QTC CALCULATION (BEZET), ECG08: 531 MS
RBC # BLD AUTO: 3.44 M/UL (ref 4.2–5.3)
SODIUM SERPL-SCNC: 142 MMOL/L (ref 136–145)
VENTRICULAR RATE, ECG03: 121 BPM
VENTRICULAR RATE, ECG03: 73 BPM
WBC # BLD AUTO: 5 K/UL (ref 4.6–13.2)

## 2019-06-25 PROCEDURE — 93005 ELECTROCARDIOGRAM TRACING: CPT

## 2019-06-25 PROCEDURE — 74011250637 HC RX REV CODE- 250/637: Performed by: INTERNAL MEDICINE

## 2019-06-25 PROCEDURE — 99218 HC RM OBSERVATION: CPT

## 2019-06-25 PROCEDURE — 99152 MOD SED SAME PHYS/QHP 5/>YRS: CPT | Performed by: INTERNAL MEDICINE

## 2019-06-25 PROCEDURE — 74011250637 HC RX REV CODE- 250/637

## 2019-06-25 PROCEDURE — 85025 COMPLETE CBC W/AUTO DIFF WBC: CPT

## 2019-06-25 PROCEDURE — 36415 COLL VENOUS BLD VENIPUNCTURE: CPT

## 2019-06-25 PROCEDURE — 4A023N7 MEASUREMENT OF CARDIAC SAMPLING AND PRESSURE, LEFT HEART, PERCUTANEOUS APPROACH: ICD-10-PCS | Performed by: INTERNAL MEDICINE

## 2019-06-25 PROCEDURE — B2111ZZ FLUOROSCOPY OF MULTIPLE CORONARY ARTERIES USING LOW OSMOLAR CONTRAST: ICD-10-PCS | Performed by: INTERNAL MEDICINE

## 2019-06-25 PROCEDURE — 74011250636 HC RX REV CODE- 250/636: Performed by: INTERNAL MEDICINE

## 2019-06-25 PROCEDURE — 82962 GLUCOSE BLOOD TEST: CPT

## 2019-06-25 PROCEDURE — 74011250636 HC RX REV CODE- 250/636

## 2019-06-25 PROCEDURE — 74011636637 HC RX REV CODE- 636/637: Performed by: INTERNAL MEDICINE

## 2019-06-25 PROCEDURE — 77030013797 HC KT TRNSDUC PRSSR EDWD -A: Performed by: INTERNAL MEDICINE

## 2019-06-25 PROCEDURE — 77030016699 HC CATH ANGI DX INFN1 CARD -A: Performed by: INTERNAL MEDICINE

## 2019-06-25 PROCEDURE — 77030013744: Performed by: INTERNAL MEDICINE

## 2019-06-25 PROCEDURE — 76937 US GUIDE VASCULAR ACCESS: CPT | Performed by: INTERNAL MEDICINE

## 2019-06-25 PROCEDURE — 83735 ASSAY OF MAGNESIUM: CPT

## 2019-06-25 PROCEDURE — 93458 L HRT ARTERY/VENTRICLE ANGIO: CPT | Performed by: INTERNAL MEDICINE

## 2019-06-25 PROCEDURE — 99153 MOD SED SAME PHYS/QHP EA: CPT | Performed by: INTERNAL MEDICINE

## 2019-06-25 PROCEDURE — 74011636320 HC RX REV CODE- 636/320: Performed by: INTERNAL MEDICINE

## 2019-06-25 PROCEDURE — 80048 BASIC METABOLIC PNL TOTAL CA: CPT

## 2019-06-25 PROCEDURE — C1894 INTRO/SHEATH, NON-LASER: HCPCS | Performed by: INTERNAL MEDICINE

## 2019-06-25 PROCEDURE — 65660000000 HC RM CCU STEPDOWN

## 2019-06-25 RX ORDER — LIDOCAINE HYDROCHLORIDE 10 MG/ML
INJECTION, SOLUTION EPIDURAL; INFILTRATION; INTRACAUDAL; PERINEURAL AS NEEDED
Status: DISCONTINUED | OUTPATIENT
Start: 2019-06-25 | End: 2019-06-25 | Stop reason: HOSPADM

## 2019-06-25 RX ORDER — SODIUM CHLORIDE 9 MG/ML
150 INJECTION, SOLUTION INTRAVENOUS CONTINUOUS
Status: DISCONTINUED | OUTPATIENT
Start: 2019-06-25 | End: 2019-06-25 | Stop reason: HOSPADM

## 2019-06-25 RX ORDER — FENTANYL CITRATE 50 UG/ML
INJECTION, SOLUTION INTRAMUSCULAR; INTRAVENOUS AS NEEDED
Status: DISCONTINUED | OUTPATIENT
Start: 2019-06-25 | End: 2019-06-25 | Stop reason: HOSPADM

## 2019-06-25 RX ORDER — GUAIFENESIN 100 MG/5ML
81 LIQUID (ML) ORAL ONCE
Status: COMPLETED | OUTPATIENT
Start: 2019-06-25 | End: 2019-06-25

## 2019-06-25 RX ORDER — SODIUM CHLORIDE 0.9 % (FLUSH) 0.9 %
5-40 SYRINGE (ML) INJECTION EVERY 8 HOURS
Status: DISCONTINUED | OUTPATIENT
Start: 2019-06-25 | End: 2019-06-26 | Stop reason: HOSPADM

## 2019-06-25 RX ORDER — SODIUM CHLORIDE 9 MG/ML
125 INJECTION, SOLUTION INTRAVENOUS CONTINUOUS
Status: DISCONTINUED | OUTPATIENT
Start: 2019-06-25 | End: 2019-06-25

## 2019-06-25 RX ORDER — SPIRONOLACTONE 25 MG/1
25 TABLET ORAL DAILY
Status: DISCONTINUED | OUTPATIENT
Start: 2019-06-25 | End: 2019-06-26 | Stop reason: HOSPADM

## 2019-06-25 RX ORDER — PREDNISONE 20 MG/1
40 TABLET ORAL ONCE
Status: COMPLETED | OUTPATIENT
Start: 2019-06-25 | End: 2019-06-25

## 2019-06-25 RX ORDER — GUAIFENESIN 100 MG/5ML
LIQUID (ML) ORAL
Status: COMPLETED
Start: 2019-06-25 | End: 2019-06-25

## 2019-06-25 RX ORDER — POTASSIUM CHLORIDE 20 MEQ/1
20 TABLET, EXTENDED RELEASE ORAL
Status: COMPLETED | OUTPATIENT
Start: 2019-06-25 | End: 2019-06-25

## 2019-06-25 RX ORDER — NITROGLYCERIN 400 UG/1
1 SPRAY ORAL
Status: DISCONTINUED | OUTPATIENT
Start: 2019-06-25 | End: 2019-06-26 | Stop reason: HOSPADM

## 2019-06-25 RX ORDER — DIPHENHYDRAMINE HYDROCHLORIDE 50 MG/ML
25 INJECTION, SOLUTION INTRAMUSCULAR; INTRAVENOUS ONCE
Status: COMPLETED | OUTPATIENT
Start: 2019-06-25 | End: 2019-06-25

## 2019-06-25 RX ORDER — DIPHENHYDRAMINE HYDROCHLORIDE 50 MG/ML
INJECTION, SOLUTION INTRAMUSCULAR; INTRAVENOUS
Status: COMPLETED
Start: 2019-06-25 | End: 2019-06-25

## 2019-06-25 RX ORDER — DILTIAZEM HYDROCHLORIDE 30 MG/1
60 TABLET, FILM COATED ORAL
Status: DISCONTINUED | OUTPATIENT
Start: 2019-06-25 | End: 2019-06-26

## 2019-06-25 RX ORDER — TEMAZEPAM 15 MG/1
15 CAPSULE ORAL
Status: DISCONTINUED | OUTPATIENT
Start: 2019-06-25 | End: 2019-06-26 | Stop reason: HOSPADM

## 2019-06-25 RX ORDER — DIPHENHYDRAMINE HCL 25 MG
25 CAPSULE ORAL 3 TIMES DAILY
Status: DISCONTINUED | OUTPATIENT
Start: 2019-06-25 | End: 2019-06-25

## 2019-06-25 RX ORDER — MIDAZOLAM HYDROCHLORIDE 1 MG/ML
INJECTION, SOLUTION INTRAMUSCULAR; INTRAVENOUS AS NEEDED
Status: DISCONTINUED | OUTPATIENT
Start: 2019-06-25 | End: 2019-06-25 | Stop reason: HOSPADM

## 2019-06-25 RX ORDER — ADHESIVE BANDAGE
30 BANDAGE TOPICAL
Status: DISCONTINUED | OUTPATIENT
Start: 2019-06-25 | End: 2019-06-26 | Stop reason: HOSPADM

## 2019-06-25 RX ORDER — POTASSIUM CHLORIDE 7.45 MG/ML
10 INJECTION INTRAVENOUS
Status: DISCONTINUED | OUTPATIENT
Start: 2019-06-25 | End: 2019-06-25

## 2019-06-25 RX ORDER — HYDRALAZINE HYDROCHLORIDE 20 MG/ML
20 INJECTION INTRAMUSCULAR; INTRAVENOUS
Status: DISCONTINUED | OUTPATIENT
Start: 2019-06-25 | End: 2019-06-26 | Stop reason: HOSPADM

## 2019-06-25 RX ORDER — SODIUM CHLORIDE 0.9 % (FLUSH) 0.9 %
5-40 SYRINGE (ML) INJECTION AS NEEDED
Status: DISCONTINUED | OUTPATIENT
Start: 2019-06-25 | End: 2019-06-26 | Stop reason: HOSPADM

## 2019-06-25 RX ADMIN — CLOPIDOGREL BISULFATE 75 MG: 75 TABLET, FILM COATED ORAL at 08:43

## 2019-06-25 RX ADMIN — METHYLPREDNISOLONE SODIUM SUCCINATE 125 MG: 125 INJECTION, POWDER, FOR SOLUTION INTRAMUSCULAR; INTRAVENOUS at 08:43

## 2019-06-25 RX ADMIN — ASPIRIN 81 MG 81 MG: 81 TABLET ORAL at 08:50

## 2019-06-25 RX ADMIN — RANOLAZINE 500 MG: 500 TABLET, FILM COATED, EXTENDED RELEASE ORAL at 18:08

## 2019-06-25 RX ADMIN — HYDRALAZINE HYDROCHLORIDE 25 MG: 25 TABLET, FILM COATED ORAL at 22:37

## 2019-06-25 RX ADMIN — SODIUM CHLORIDE 150 ML/HR: 900 INJECTION, SOLUTION INTRAVENOUS at 11:00

## 2019-06-25 RX ADMIN — ISOSORBIDE MONONITRATE 30 MG: 30 TABLET, EXTENDED RELEASE ORAL at 08:43

## 2019-06-25 RX ADMIN — POTASSIUM CHLORIDE 10 MEQ: 10 INJECTION, SOLUTION INTRAVENOUS at 10:53

## 2019-06-25 RX ADMIN — SODIUM CHLORIDE 125 ML/HR: 900 INJECTION, SOLUTION INTRAVENOUS at 13:26

## 2019-06-25 RX ADMIN — INSULIN LISPRO 6 UNITS: 100 INJECTION, SOLUTION INTRAVENOUS; SUBCUTANEOUS at 22:38

## 2019-06-25 RX ADMIN — Medication 10 ML: at 18:12

## 2019-06-25 RX ADMIN — DILTIAZEM HYDROCHLORIDE 60 MG: 30 TABLET, FILM COATED ORAL at 22:37

## 2019-06-25 RX ADMIN — HYDRALAZINE HYDROCHLORIDE 25 MG: 25 TABLET, FILM COATED ORAL at 18:09

## 2019-06-25 RX ADMIN — INSULIN GLARGINE 20 UNITS: 100 INJECTION, SOLUTION SUBCUTANEOUS at 18:18

## 2019-06-25 RX ADMIN — DIPHENHYDRAMINE HYDROCHLORIDE 25 MG: 50 INJECTION, SOLUTION INTRAMUSCULAR; INTRAVENOUS at 08:52

## 2019-06-25 RX ADMIN — PREDNISONE 40 MG: 20 TABLET ORAL at 18:09

## 2019-06-25 RX ADMIN — Medication 81 MG: at 08:50

## 2019-06-25 RX ADMIN — INSULIN LISPRO 2 UNITS: 100 INJECTION, SOLUTION INTRAVENOUS; SUBCUTANEOUS at 18:17

## 2019-06-25 RX ADMIN — DIPHENHYDRAMINE HYDROCHLORIDE 25 MG: 50 INJECTION INTRAMUSCULAR; INTRAVENOUS at 08:52

## 2019-06-25 RX ADMIN — POTASSIUM CHLORIDE 20 MEQ: 20 TABLET, EXTENDED RELEASE ORAL at 18:08

## 2019-06-25 RX ADMIN — SPIRONOLACTONE 25 MG: 25 TABLET ORAL at 11:09

## 2019-06-25 RX ADMIN — DILTIAZEM HYDROCHLORIDE 60 MG: 30 TABLET, FILM COATED ORAL at 18:09

## 2019-06-25 RX ADMIN — LEVOTHYROXINE SODIUM 112 MCG: 112 TABLET ORAL at 06:01

## 2019-06-25 NOTE — PROGRESS NOTES
[facility]  Progress Note      Subjective:   Denies CP/ SOB or Decrease in appetite. Denies abdominal pain, N/V. Arthritic joint to left knee and shoulder, no changes    Objective:   VS: /90   Pulse 100   Temp 97.5 °F (36.4 °C)   Resp 17   Ht 5' 6\" (1.676 m)   Wt 109.3 kg (241 lb)   SpO2 94%   Breastfeeding? No   BMI 38.90 kg/m²      General:  Alert, NAD  Cardiovascular:  RRR  Pulmonary:  LSC throughout; respiratory effort WNL  GI:  +BS in all four quadrants, soft, non-tender  Extremities:  No edema; 2+ dorsalis pedis pulses bilaterally; R groin cath dressing C/D/I  Neuro: alert and oriented x 3; tangential    Assessment/Plan:   1. Chest pain  2. Hypertensive urgency, improved  3. Tachycardia, improved  4. Tachypnea, resolved  5. +d-dimer  6. Acute on chronic diastolic HF secondary to hypertensive urgency. 7. Hypothyroid  8. DM2  9. hypokalemia    Plan  1. Cardiology following. Troponin neg x 3; s/p LHC on 06/25 no critical dx  2. Low probability PE on VQ scan  3. Hydralazine /cardizem initiated this admission. Continue home cardiac medications, Imdur, Ranexa, monitor BP. Monitor overnight BP and for any allergic rx from diltiazem start  4. Continue ASA and plavix  5. POC glucose, SSI, lantus  6. Synthroid  7. Monitor metabolic panel. Kcl replaced  8.  Anticipate home with [address] in AM provided medically stable    Signed By: Laith Allen NP     June 25, 2019

## 2019-06-25 NOTE — PROGRESS NOTES
Problem: Falls - Risk of  Goal: *Absence of Falls  Description  Document Jenise Shipley Fall Risk and appropriate interventions in the flowsheet. Outcome: Progressing Towards Goal     Problem: Diabetes Self-Management  Goal: *Disease process and treatment process  Description  Define diabetes and identify own type of diabetes; list 3 options for treating diabetes.   Outcome: Progressing Towards Goal

## 2019-06-25 NOTE — PROGRESS NOTES
1300: TRANSFER - IN REPORT:    Verbal report received from [address] [facility] [facility] [address], [address] [facility]. (name) on Mian Miguel  being received from Cath Lab(unit) for routine post - op      Report consisted of patients Situation, Background, Assessment and   Recommendations(SBAR). Information from the following report(s) SBAR and Procedure Summary was reviewed with the receiving nurse. Opportunity for questions and clarification was provided. Assessment completed upon patients arrival to unit and care assumed. 1305: Pt returned from procedure w/ dressing to right groin. No acute distress noted. No bleeding or hematoma noted. NS gtt restarted. Pt denies pain. TRANSFER - OUT REPORT:    Verbal report given to JEANNIE Barnes. (name) on Mian Miguel  being transferred to  ([facility]) for routine progression of care       Report consisted of patients Situation, Background, Assessment and   Recommendations(SBAR). Information from the following report(s) SBAR, Procedure Summary and MAR was reviewed with the receiving nurse. Sheath site and care discussed. Bedrest time of 4 hours.        Lines:   Peripheral IV 06/23/19 Right Arm (Active)   Site Assessment Clean, dry, & intact 6/25/2019  9:26 AM   Phlebitis Assessment 0 6/25/2019  9:26 AM   Infiltration Assessment 0 6/25/2019  9:26 AM   Dressing Status Clean, dry, & intact 6/25/2019  9:26 AM   Dressing Type Transparent;Tape 6/24/2019  7:50 PM   Hub Color/Line Status Blue;Flushed 6/24/2019  7:50 PM   Action Taken Open ports on tubing capped 6/24/2019  7:50 PM   Alcohol Cap Used Yes 6/24/2019  7:50 PM       Peripheral IV 06/23/19 Left Hand (Active)   Site Assessment Clean, dry, & intact 6/25/2019  9:26 AM   Phlebitis Assessment 0 6/25/2019  9:26 AM   Infiltration Assessment 0 6/25/2019  9:26 AM   Dressing Status Clean, dry, & intact 6/25/2019  9:26 AM   Dressing Type Transparent 6/24/2019  7:50 PM   Hub Color/Line Status Pink;Flushed 6/24/2019  7:50 PM   Action Taken Open ports on tubing capped 6/24/2019  7:50 PM   Alcohol Cap Used Yes 6/24/2019  7:50 PM        Opportunity for questions and clarification was provided.

## 2019-06-25 NOTE — PROGRESS NOTES
DISCHARGE PLANNING:  FOC signed for this pt for home health with [facility]. Pt placed in referral liaison contacted and placed in que. And liaison contacted regarding this service. Pt's daughter will transport when this [facility]

## 2019-06-25 NOTE — PROGRESS NOTES
The patient was seen and examined independently. Please read my note for additional detail. The plan was discussed with APC. Feeling better. No more chest or abdominal pain. No SOB or cough. Patient states she does not feel comfortable going home tonight but will go home tomorrow. /90   Pulse 100   Temp 97.5 °F (36.4 °C)   Resp 17   Ht 5' 6\" (1.676 m)   Wt 109.3 kg (241 lb)   SpO2 94%   Breastfeeding? No   BMI 38.90 kg/m²     General appearance - alert, well appearing, and in no distress  Chest - Good air entry noted in bases, no wheezes  Heart - S1 and S2 normal  Abdomen - soft, nontender, nondistended, Bowel sounds present  Extremities - no pedal edema noted    ASSESSMENT:    1. Atypical Chest pain - no ACS. Normal cath  2. Hypertensive urgency, improved  3. Tachycardia, improved  4. Tachypnea, resolved  5. +d-dimer WITH low probability VQ scan  6. Acute on chronic diastolic HF secondary to hypertensive urgency. Resolved clinically  7. Hypothyroidism  8. DM2  9.  Hypokalemia    PLAN:    Cont current management   D/w dr. Emily Stapleton - it is better to monitor her overnight on Cardizem due to multiple allergies  Replace K  Can be discharged home tomorrow

## 2019-06-25 NOTE — ROUTINE PROCESS
Bedside and Verbal shift change report given to JEANNIE Oneill (oncoming nurse) by Tyson Borges RN (offgoing nurse). Report included the following information SBAR, Kardex, Procedure Summary, Intake/Output and Recent Results.

## 2019-06-25 NOTE — ROUTINE PROCESS
Bedside and Verbal shift change report given to Terrance Velásquez RN (oncoming nurse) by Octaviano Merchant RN (offgoing nurse). Report included the following information SBAR, Kardex, MAR and Recent Results.

## 2019-06-25 NOTE — PROGRESS NOTES
Pt escorted to 4N w/ myself and Britany Calderon RN. Pt in no distress upon leaving [facility]. Denies CP and SOB. Pt A&Ox4. Dressing to right groin clean, dry, and intact. No hematoma noted. 2nd check done by Janey Bullard RN.

## 2019-06-25 NOTE — PROGRESS NOTES
0830: TRANSFER - IN REPORT:    Verbal report received from Napoleon Degroot, RN.(name) on Mauri Crump  being received from 4N (unit) for ordered procedure      Report consisted of patients Situation, Background, Assessment and   Recommendations(SBAR). Information from the following report(s) SBAR, MAR and Recent Results was reviewed with the receiving nurse. Opportunity for questions and clarification was provided. Assessment completed upon patients arrival to unit and care assumed. Meds to be given in [facility].     0900: Pt arrived to [facility]. No acute distress noted. PIV x 2 flushed. Meds given for pretreatment. Dr. Fanny Klein made aware pt doesn't want PO potassium. IVPB order obtained. Consent signed. Groin shaved. Pt procedure pushed back 2 hours to allow solumedrol to begin to work. 1150: TRANSFER - OUT REPORT:    Verbal report given to Shavonne Henley (name) on Mauri Crump  being transferred to Cath Lab(unit) for ordered procedure       Report consisted of patients Situation, Background, Assessment and   Recommendations(SBAR). Information from the following report(s) SBAR, MAR and Pre Procedure Checklist was reviewed with the receiving nurse.     Lines:   Peripheral IV 06/23/19 Right Arm (Active)   Site Assessment Clean, dry, & intact 6/25/2019  9:26 AM   Phlebitis Assessment 0 6/25/2019  9:26 AM   Infiltration Assessment 0 6/25/2019  9:26 AM   Dressing Status Clean, dry, & intact 6/25/2019  9:26 AM   Dressing Type Transparent;Tape 6/24/2019  7:50 PM   Hub Color/Line Status Blue;Flushed 6/24/2019  7:50 PM   Action Taken Open ports on tubing capped 6/24/2019  7:50 PM   Alcohol Cap Used Yes 6/24/2019  7:50 PM       Peripheral IV 06/23/19 Left Hand (Active)   Site Assessment Clean, dry, & intact 6/25/2019  9:26 AM   Phlebitis Assessment 0 6/25/2019  9:26 AM   Infiltration Assessment 0 6/25/2019  9:26 AM   Dressing Status Clean, dry, & intact 6/25/2019  9:26 AM   Dressing Type Transparent 6/24/2019  7:50 PM   Hub Color/Line Status Pink;Flushed 6/24/2019  7:50 PM   Action Taken Open ports on tubing capped 6/24/2019  7:50 PM   Alcohol Cap Used Yes 6/24/2019  7:50 PM        Opportunity for questions and clarification was provided.

## 2019-06-26 ENCOUNTER — HOME HEALTH ADMISSION (OUTPATIENT)
Dept: HOME HEALTH SERVICES | Facility: HOME HEALTH | Age: 84
End: 2019-06-26
Payer: MEDICARE

## 2019-06-26 ENCOUNTER — APPOINTMENT (OUTPATIENT)
Dept: CT IMAGING | Age: 84
DRG: 287 | End: 2019-06-26
Attending: INTERNAL MEDICINE
Payer: MEDICARE

## 2019-06-26 VITALS
HEIGHT: 66 IN | HEART RATE: 82 BPM | DIASTOLIC BLOOD PRESSURE: 85 MMHG | TEMPERATURE: 97.6 F | BODY MASS INDEX: 38.97 KG/M2 | WEIGHT: 242.5 LBS | OXYGEN SATURATION: 96 % | SYSTOLIC BLOOD PRESSURE: 164 MMHG | RESPIRATION RATE: 18 BRPM

## 2019-06-26 LAB
ANION GAP SERPL CALC-SCNC: 5 MMOL/L (ref 3–18)
APPEARANCE UR: ABNORMAL
BACTERIA URNS QL MICRO: ABNORMAL /HPF
BASOPHILS # BLD: 0 K/UL (ref 0–0.1)
BASOPHILS NFR BLD: 0 % (ref 0–2)
BILIRUB UR QL: NEGATIVE
BUN SERPL-MCNC: 11 MG/DL (ref 7–18)
BUN/CREAT SERPL: 14 (ref 12–20)
CALCIUM SERPL-MCNC: 9.2 MG/DL (ref 8.5–10.1)
CHLORIDE SERPL-SCNC: 106 MMOL/L (ref 100–108)
CO2 SERPL-SCNC: 26 MMOL/L (ref 21–32)
COLOR UR: ABNORMAL
CREAT SERPL-MCNC: 0.81 MG/DL (ref 0.6–1.3)
DIFFERENTIAL METHOD BLD: ABNORMAL
EOSINOPHIL # BLD: 0 K/UL (ref 0–0.4)
EOSINOPHIL NFR BLD: 0 % (ref 0–5)
EPITH CASTS URNS QL MICRO: ABNORMAL /LPF (ref 0–5)
ERYTHROCYTE [DISTWIDTH] IN BLOOD BY AUTOMATED COUNT: 12.5 % (ref 11.6–14.5)
GLUCOSE BLD STRIP.AUTO-MCNC: 303 MG/DL (ref 70–110)
GLUCOSE BLD STRIP.AUTO-MCNC: 305 MG/DL (ref 70–110)
GLUCOSE SERPL-MCNC: 302 MG/DL (ref 74–99)
GLUCOSE UR STRIP.AUTO-MCNC: 500 MG/DL
HCT VFR BLD AUTO: 35.1 % (ref 35–45)
HGB BLD-MCNC: 11.3 G/DL (ref 12–16)
HGB UR QL STRIP: ABNORMAL
KETONES UR QL STRIP.AUTO: NEGATIVE MG/DL
LEUKOCYTE ESTERASE UR QL STRIP.AUTO: ABNORMAL
LYMPHOCYTES # BLD: 1.1 K/UL (ref 0.9–3.6)
LYMPHOCYTES NFR BLD: 24 % (ref 21–52)
MCH RBC QN AUTO: 31.1 PG (ref 24–34)
MCHC RBC AUTO-ENTMCNC: 32.2 G/DL (ref 31–37)
MCV RBC AUTO: 96.7 FL (ref 74–97)
MONOCYTES # BLD: 0.1 K/UL (ref 0.05–1.2)
MONOCYTES NFR BLD: 3 % (ref 3–10)
NEUTS SEG # BLD: 3.5 K/UL (ref 1.8–8)
NEUTS SEG NFR BLD: 73 % (ref 40–73)
NITRITE UR QL STRIP.AUTO: NEGATIVE
PH UR STRIP: 5 [PH] (ref 5–8)
PLATELET # BLD AUTO: 252 K/UL (ref 135–420)
PMV BLD AUTO: 10.5 FL (ref 9.2–11.8)
POTASSIUM SERPL-SCNC: 4.2 MMOL/L (ref 3.5–5.5)
PROT UR STRIP-MCNC: 30 MG/DL
RBC # BLD AUTO: 3.63 M/UL (ref 4.2–5.3)
RBC #/AREA URNS HPF: ABNORMAL /HPF (ref 0–5)
SODIUM SERPL-SCNC: 137 MMOL/L (ref 136–145)
SP GR UR REFRACTOMETRY: 1.03 (ref 1–1.03)
UROBILINOGEN UR QL STRIP.AUTO: 0.2 EU/DL (ref 0.2–1)
WBC # BLD AUTO: 4.8 K/UL (ref 4.6–13.2)
WBC URNS QL MICRO: ABNORMAL /HPF (ref 0–4)

## 2019-06-26 PROCEDURE — 74011636637 HC RX REV CODE- 636/637: Performed by: INTERNAL MEDICINE

## 2019-06-26 PROCEDURE — 81001 URINALYSIS AUTO W/SCOPE: CPT

## 2019-06-26 PROCEDURE — 74176 CT ABD & PELVIS W/O CONTRAST: CPT

## 2019-06-26 PROCEDURE — 80048 BASIC METABOLIC PNL TOTAL CA: CPT

## 2019-06-26 PROCEDURE — 74011250637 HC RX REV CODE- 250/637: Performed by: INTERNAL MEDICINE

## 2019-06-26 PROCEDURE — 87077 CULTURE AEROBIC IDENTIFY: CPT

## 2019-06-26 PROCEDURE — 82962 GLUCOSE BLOOD TEST: CPT

## 2019-06-26 PROCEDURE — 74011250636 HC RX REV CODE- 250/636: Performed by: INTERNAL MEDICINE

## 2019-06-26 PROCEDURE — 85025 COMPLETE CBC W/AUTO DIFF WBC: CPT

## 2019-06-26 PROCEDURE — 87186 SC STD MICRODIL/AGAR DIL: CPT

## 2019-06-26 PROCEDURE — 77030011943

## 2019-06-26 PROCEDURE — 87086 URINE CULTURE/COLONY COUNT: CPT

## 2019-06-26 PROCEDURE — 36415 COLL VENOUS BLD VENIPUNCTURE: CPT

## 2019-06-26 RX ORDER — HYDRALAZINE HYDROCHLORIDE 25 MG/1
25 TABLET, FILM COATED ORAL 3 TIMES DAILY
Qty: 90 TAB | Refills: 0 | Status: SHIPPED | OUTPATIENT
Start: 2019-06-26 | End: 2020-01-27

## 2019-06-26 RX ORDER — BISACODYL 5 MG
10 TABLET, DELAYED RELEASE (ENTERIC COATED) ORAL
Qty: 30 TAB | Refills: 0 | Status: SHIPPED | OUTPATIENT
Start: 2019-06-26 | End: 2020-11-30

## 2019-06-26 RX ORDER — INSULIN GLARGINE 100 [IU]/ML
32 INJECTION, SOLUTION SUBCUTANEOUS 2 TIMES DAILY
Status: DISCONTINUED | OUTPATIENT
Start: 2019-06-26 | End: 2019-06-26 | Stop reason: HOSPADM

## 2019-06-26 RX ORDER — AMOXICILLIN 250 MG
1 CAPSULE ORAL EVERY EVENING
Qty: 30 TAB | Refills: 0 | Status: SHIPPED | OUTPATIENT
Start: 2019-06-26 | End: 2020-01-27

## 2019-06-26 RX ORDER — INSULIN GLARGINE 100 [IU]/ML
15 INJECTION, SOLUTION SUBCUTANEOUS
Status: COMPLETED | OUTPATIENT
Start: 2019-06-26 | End: 2019-06-26

## 2019-06-26 RX ORDER — CEFPODOXIME PROXETIL 200 MG/1
200 TABLET, FILM COATED ORAL EVERY 12 HOURS
Qty: 10 TAB | Refills: 0 | Status: SHIPPED | OUTPATIENT
Start: 2019-06-26 | End: 2019-07-01

## 2019-06-26 RX ORDER — CEFPODOXIME PROXETIL 200 MG/1
200 TABLET, FILM COATED ORAL EVERY 12 HOURS
Status: DISCONTINUED | OUTPATIENT
Start: 2019-06-26 | End: 2019-06-26 | Stop reason: HOSPADM

## 2019-06-26 RX ORDER — DILTIAZEM HYDROCHLORIDE 30 MG/1
60 TABLET, FILM COATED ORAL
Status: DISCONTINUED | OUTPATIENT
Start: 2019-06-26 | End: 2019-06-26 | Stop reason: HOSPADM

## 2019-06-26 RX ORDER — DILTIAZEM HYDROCHLORIDE 60 MG/1
60 TABLET, FILM COATED ORAL
Qty: 90 TAB | Refills: 0 | Status: SHIPPED | OUTPATIENT
Start: 2019-06-26 | End: 2020-01-27

## 2019-06-26 RX ORDER — ONDANSETRON 4 MG/1
4 TABLET, FILM COATED ORAL
Qty: 30 TAB | Refills: 0 | Status: SHIPPED | OUTPATIENT
Start: 2019-06-26 | End: 2020-01-27

## 2019-06-26 RX ADMIN — AMLODIPINE BESYLATE 10 MG: 10 TABLET ORAL at 09:00

## 2019-06-26 RX ADMIN — ENOXAPARIN SODIUM 40 MG: 40 INJECTION SUBCUTANEOUS at 06:25

## 2019-06-26 RX ADMIN — CLOPIDOGREL BISULFATE 75 MG: 75 TABLET, FILM COATED ORAL at 09:00

## 2019-06-26 RX ADMIN — Medication 10 ML: at 00:28

## 2019-06-26 RX ADMIN — ONDANSETRON 4 MG: 2 INJECTION INTRAMUSCULAR; INTRAVENOUS at 07:39

## 2019-06-26 RX ADMIN — CEFPODOXIME PROXETIL 200 MG: 200 TABLET, FILM COATED ORAL at 12:56

## 2019-06-26 RX ADMIN — HYDRALAZINE HYDROCHLORIDE 20 MG: 20 INJECTION, SOLUTION INTRAMUSCULAR; INTRAVENOUS at 12:44

## 2019-06-26 RX ADMIN — RANOLAZINE 500 MG: 500 TABLET, FILM COATED, EXTENDED RELEASE ORAL at 09:00

## 2019-06-26 RX ADMIN — INSULIN LISPRO 8 UNITS: 100 INJECTION, SOLUTION INTRAVENOUS; SUBCUTANEOUS at 12:51

## 2019-06-26 RX ADMIN — INSULIN GLARGINE 15 UNITS: 100 INJECTION, SOLUTION SUBCUTANEOUS at 10:11

## 2019-06-26 RX ADMIN — INSULIN LISPRO 8 UNITS: 100 INJECTION, SOLUTION INTRAVENOUS; SUBCUTANEOUS at 10:10

## 2019-06-26 RX ADMIN — INSULIN GLARGINE 20 UNITS: 100 INJECTION, SOLUTION SUBCUTANEOUS at 10:13

## 2019-06-26 RX ADMIN — LEVOTHYROXINE SODIUM 112 MCG: 112 TABLET ORAL at 06:24

## 2019-06-26 RX ADMIN — ASPIRIN 81 MG: 81 TABLET, COATED ORAL at 09:00

## 2019-06-26 RX ADMIN — Medication 10 ML: at 06:33

## 2019-06-26 NOTE — PROGRESS NOTES
conducted an initial consultation and Spiritual Assessment for Schuyler Clarke, who is a 82 y.o.,female. Patients Primary Language is: [place].    According to the patients EMR Confucianist Affiliation is: [facility].     The reason the Patient came to the hospital is:   Patient Active Problem List    Diagnosis Date Noted    Hypertensive urgency 06/24/2019    Tachycardia 06/24/2019    Tachypnea 06/24/2019    Right-sided chest pain 05/16/2019    Urinary tract infection with hematuria 05/07/2019    Frequent urination 05/07/2019    Bad odor of urine 05/07/2019    Lower abdominal pain 04/23/2019    Intermittent lightheadedness 04/23/2019    Deep vein thrombosis (DVT) of popliteal vein of left lower extremity ([address] 09/08/2018    Preoperative cardiovascular examination 06/11/2018    Periprosthetic left distal femur fracture 06/10/2018    Callie-prosthetic femur fracture at tip of prosthesis 06/10/2018    Right groin pain 06/16/2017    BMI 38.0-38.9,adult 06/07/2017    Bilateral lower extremity edema 04/25/2017    Chest pain 02/06/2017    Advanced care planning/counseling discussion 08/17/2016    S/P drug eluting coronary stent placement 06/10/2016    NSTEMI (non-ST elevated myocardial infarction) ([address] 05/28/2016    Moderate persistent asthma with status asthmaticus 11/82/9895    Uncomplicated asthma 84/98/4321    Tear of left rotator cuff 03/08/2016    Medication monitoring encounter 01/12/2015    Seasonal and perennial allergic rhinitis 07/30/2013    Morbid obesity ([address]     Unspecified transient cerebral ischemia     Congestive heart failure (HCC)     Mixed hyperlipidemia     Coronary atherosclerosis of native coronary artery     Chronic diastolic heart failure (HCC)     Benign hypertensive heart disease without heart failure     Type 2 diabetes mellitus with hyperglycemia, with long-term current use of insulin ([address] 12/04/2012    Hypothyroidism     Leg pain, right 09/05/2012  Diabetic neuropathy ([facility] 04/20/2012    Dizziness 04/20/2012    Chronic neck pain 04/20/2012    Chronic back pain 04/20/2012    DJD (degenerative joint disease)     S/P joint replacement     Noncompliance with medication regimen 02/08/2011    Arm pain 02/08/2011    Neck pain 02/08/2011    Anxiety 02/08/2011    Essential hypertension 05/20/2010    Unspecified hypothyroidism 05/20/2010    Hypovitaminosis D 05/20/2010    Unspecified asthma(493.90) 05/20/2010    Esophageal reflux 05/20/2010        The  provided the following Interventions:  Initiated a relationship of care and support. Explored issues of vinod, belief, spirituality and Sabianist/ritual needs while hospitalized. Listened empathically. Provided chaplaincy education. Provided information about Spiritual Care Services. Offered prayer and assurance of continued prayers on patient's behalf. Chart reviewed. The following outcomes where achieved:  Patient shared limited information about both their medical narrative and spiritual journey/beliefs. Patient processed feeling about current hospitalization. Patient expressed gratitude for 's visit. Assessment:  Patient does not have any Sabianist/cultural needs that will affect patients preferences in health care. There are no spiritual or Sabianist issues which require intervention at this time. Plan:  Chaplains will continue to follow and will provide pastoral care on an as needed/requested basis.  recommends bedside caregivers page  on duty if patient shows signs of acute spiritual or emotional distress.     [id number] [facility]   [phone]

## 2019-06-26 NOTE — PROGRESS NOTES
Discharge order noted for today. Pt has been accepted to [facility] agency. Met with patient  and she is agreeable to the transition plan today, daughter is aware per patient. Transport has been arranged through friend of patient daughter is at school. Patient's discharge summary and home health  orders have been forwarded to [address] [place] [address] via [facility]. Updated bedside RN, Carol Mcknight,  to the transition plan.   Discharge information has been documented on the AVS.       Denton Lee RN BSN  Case Management  [phone]

## 2019-06-26 NOTE — PROGRESS NOTES
Pt discharged to home, to be transported by her . AVS reviewed with pt, to include medications and follow up appointments. Pt verbalized understanding.

## 2019-06-26 NOTE — DIABETES MGMT
Diabetes Patient/Family Education Record  Factors That  May Influence Patients Ability  to Learn or  Comply with Recommendations   []   Language barrier    []   Cultural needs   []   Motivation    []   Cognitive limitation    []   Physical   []   Education    []   Physiological factors   []   Hearing/vision/speaking impairment   []   Jewish beliefs    []   Financial factors   []  Other:   [x]  No factors identified at this time. Person Instructed:   [x]   Patient   []   Family   []  Other     Preference for Learning:   [x]   Verbal   [x]   Written   []  Demonstration     Level of Comprehension & Competence:    [x]  Good                                      [] Fair                                     []  Poor                             []  Needs Reinforcement   [x]  Teachback completed    Education Component:   [x]  Medication management, including how to administer insulin (if appropriate) and potential medication interactions   Pt taking Lantus 32 units every morning and 36 units every evening via insulin pen. Reviewed correct pen technique with pt. Pt able to repeat back steps without difficulty. []  Nutritional management -obtain usual meal pattern   []  Exercise   [x]  Signs, symptoms, and treatment of hyperglycemia and hypoglycemia   [x] Prevention, recognition and treatment of hyperglycemia and hypoglycemia   [x]  Importance of blood glucose monitoring and how to obtain a blood glucose meter   States she monitors her BG daily and as needed.    []  Instruction on use of the blood glucose meter   [x]  Discuss the importance of HbA1C monitoring   7.5% equivalent to an average blood glucose of 169 mg/dl over the past 2-3 months   [x]  Sick day guidelines   [x]  Proper use and disposal of lancets, needles, syringes or insulin pens (if appropriate)   [x]  Potential long-term complications (retinopathy, kidney disease, neuropathy, foot care)   [x] Information about whom to contact in case of emergency or for more information    [x]  Goal:  Patient/family will demonstrate understanding of Diabetes Self Management Skills by: (date) _______  Plan for post-discharge education or self-management support:    [x] Outpatient class schedule provided            [] Patient Declined    [] Scheduled for outpatient classes (date) _______  Verify:  Does patient understand how diabetes medications work? yes________________________  Does patient know what their most recent A1c is?  yes____________________________  Does patient monitor glucose at home? yes__________________________________  Does patient have difficulty obtaining diabetes medications or testing supplies?  No complaints voiced

## 2019-06-26 NOTE — PROGRESS NOTES
The patient was seen and examined independently. Please read my note for additional detail. The plan was discussed with APC. Feeling okay. Dysuria present with some abdominal discomfort. No vomiting. No chest pain or SOB or cough. Takes lantus 32 units in am and 36 units in evening. /77 (BP 1 Location: Right arm, BP Patient Position: At rest)   Pulse 76   Temp 97.1 °F (36.2 °C)   Resp 18   Ht 5' 6\" (1.676 m)   Wt 110 kg (242 lb 8 oz)   SpO2 95%   Breastfeeding? No   BMI 39.14 kg/m²     General appearance - alert, well appearing, and in no distress  Chest - Good air entry noted in bases, no wheezes  Heart - S1 and S2 normal  Abdomen - soft, no guarding or rigidity nondistended, Bowel sounds present  Extremities - no pedal edema noted    ASSESSMENT:    1. Atypical Chest pain - no ACS. Normal cath  2. Hypertensive urgency, improved  3. Tachycardia, improved  4. Tachypnea, resolved  5. +d-dimer WITH low probability VQ scan  6. Acute on chronic diastolic HF secondary to hypertensive urgency. Resolved clinically  7. Hypothyroidism  8. DM2  9.  Dysuria     PLAN:    Cont current management   Check stat UA  Increase lantus  Can be discharged home with outpatient follow up

## 2019-06-26 NOTE — HOME CARE
Received [place] referral ; Discharge orders noted for today, [facility] will follow for SN,PT,OT ; pt states she already has a RW,BSC,shower chair,W/C and a glucometer at home ; pt lives alone but grandsalvador Bailey) states she checks on her grandmother at home. MARCELA MAHARAJ.

## 2019-06-26 NOTE — DISCHARGE SUMMARY
[facility]  Discharge Summary       Patient: Edson Muñoz Age: 82 y.o. : 1935 MR#: [id number] SSN: [id number]  PCP on record: Rahul Melendez MD  Admit date: 2019  Discharge date: 2019    Disposition:    []Home   [x]Home with Home Health   []SNF/NH   []Rehab   []Home with family   []Alternate Facility:____________________    Admission Diagnoses:  Chest pain [R07.9]  Chest pain [R07.9]  Chest pain [R07.9]    Discharge Diagnoses:                             1. Atypical Chest pain   2. Hypertensive urgency, improved  3. Tachycardia, improved  4. Tachypnea, resolved  5. +d-dimer WITH low probability VQ scan  6. Acute on chronic diastolic HF secondary to hypertensive urgency. Resolved clinically  7. Hypothyroidism  8. DM2  9. Dysuria     Discharge Medications:     Current Discharge Medication List      START taking these medications    Details   hydrALAZINE (APRESOLINE) 25 mg tablet Take 1 Tab by mouth three (3) times daily. Qty: 90 Tab, Refills: 0      senna-docusate (PERICOLACE) 8.6-50 mg per tablet Take 1 Tab by mouth every evening. Hold for loose stools  Qty: 30 Tab, Refills: 0      dilTIAZem (CARDIZEM) 60 mg tablet Take 1 Tab by mouth Before breakfast, lunch, and dinner. Qty: 90 Tab, Refills: 0      bisacodyl (DULCOLAX, BISACODYL,) 5 mg EC tablet Take 2 Tabs by mouth daily as needed for Constipation. Qty: 30 Tab, Refills: 0      ondansetron hcl (ZOFRAN) 4 mg tablet Take 1 Tab by mouth every eight (8) hours as needed for Nausea. Qty: 30 Tab, Refills: 0         CONTINUE these medications which have NOT CHANGED    Details   acetaminophen (TYLENOL ARTHRITIS PAIN) 650 mg TbER Take 650 mg by mouth every eight (8) hours.  Indications: Fever, Pain      Insulin Syringe-Needle U-100 (BD INSULIN SYRINGE ULTRA-FINE) 0.5 mL 31 gauge x 5/16\" syrg BD Insulin Syringe Ultra-Fine 0.5 mL 31 gauge x 5/16\"      lidocaine (ASPERCREME, LIDOCAINE,) 4 % patch 1 Patch by TransDERmal route every eight (8) hours. Qty: 1 Package, Refills: 3    Associated Diagnoses: Chronic pain of left knee      albuterol (PROAIR HFA) 90 mcg/actuation inhaler INHALE 1 PUFF BY MOUTH EVERY 4 HOURS AS NEEDED FOR WHEEZING OR SHORTNESS OF BREATH  Qty: 1 Inhaler, Refills: 3    Associated Diagnoses: Moderate intermittent asthma, with acute exacerbation      isosorbide mononitrate ER (IMDUR) 30 mg tablet Take 1 Tab by mouth every morning. Qty: 30 Tab, Refills: 6    Associated Diagnoses: Other forms of angina pectoris ([facility])      magnesium oxide (MAG-OX) 400 mg tablet Take 1 Tab by mouth two (2) times a day. Qty: 180 Tab, Refills: 3    Associated Diagnoses: Low magnesium level      ranolazine ER (RANEXA) 500 mg SR tablet Take 1 Tab by mouth two (2) times a day. Qty: 180 Tab, Refills: 3    Associated Diagnoses: Chest pain, unspecified type      insulin glargine (LANTUS U-100 INSULIN) 100 unit/mL injection Take 32 units every morning and 36 units qhs  Qty: 1 Vial, Refills: 0    Associated Diagnoses: Controlled type 2 diabetes mellitus with other circulatory complication, with long-term current use of insulin ([facility])      clopidogrel (PLAVIX) 75 mg tab TAKE ONE TABLET BY MOUTH DAILY  Qty: 30 Tab, Refills: 6    Associated Diagnoses: S/P coronary artery stent placement      amLODIPine (NORVASC) 10 mg tablet Take 10 mg by mouth daily. potassium chloride SR (KLOR-CON 10) 10 mEq tablet Take 10 mEq by mouth daily as needed (muscle spasms, with Lasix). ferrous sulfate 325 mg (65 mg iron) tablet Take 325 mg by mouth Daily (before breakfast). ascorbic acid, vitamin C, (VITAMIN C) 250 mg tablet Take 250 mg by mouth daily. furosemide (LASIX) 20 mg tablet Use daily as needed for leg swelling  Qty: 30 Tab, Refills: 2    Associated Diagnoses: Bilateral lower extremity edema      levothyroxine (SYNTHROID) 75 mcg tablet Take 1 Tab by mouth Daily (before breakfast).   Qty: 30 Tab, Refills: 0    Associated Diagnoses: Hypothyroidism, unspecified type      cyanocobalamin ER 1,000 mcg tablet Take 1 Tab by mouth daily. Qty: 30 Tab, Refills: 3    Associated Diagnoses: Weakness; Macrocytosis      montelukast (SINGULAIR) 10 mg tablet Take 1 Tab by mouth daily as needed. Indications: ALLERGIC RHINITIS  Qty: 30 Tab, Refills: 3    Associated Diagnoses: Uncomplicated asthma, unspecified asthma severity      capsaicin 0.075 % topical cream Apply  to affected area three (3) times daily. Qty: 60 g, Refills: 0      DOCOSAHEXANOIC ACID/EPA (FISH OIL PO) Take 1,000 mg by mouth two (2) times a day. aspirin delayed-release 81 mg tablet Take 81 mg by mouth daily. cholecalciferol, vitamin d3, (VITAMIN D) 1,000 unit tablet Take 5,000 Units by mouth two (2) times a day. Consults:    - Cardiology     Procedures:  -   CARDIAC PROCEDURES on 06/25/2019  CORONARY ANGIOGRAPHY   LEFT HEART CATH   · No critical disease in epicardial coronary arteries other than 50% mid LAD stenosis and widely patent previous proximal right coronary stent. · Luminal irregularities and other vessels. · Severely tortuous coronary arteries likely from hypertensive heart disease  · Mildly elevated LV end-diastolic pressure at 16 mmHg. Risk factor modification and medical treatment for hypertensive heart disease. Will add Cardizem to Norvasc in order to reduce the blood pressure as well as heart rate. Follow-up closely clinically. Significant Diagnostic Studies:   -  Ct Abd Pelv Wo Cont    Result Date: 6/26/2019  IMPRESSION: No renal calculi or obstructive uropathy. The gallbladder surgically absent and the appendix is surgically absent. No dilated loops of bowel, free fluid or free air in the peritoneum. Numerous diverticula in the sigmoid colon without evidence for diverticulitis. No evidence for pathologic lymphadenopathy. The osseous structures appear intact. Xr Chest Port    Result Date: 6/23/2019  IMPRESSION:  1.   Borderline heart size      Nm Lung Perfusion W Vent    Result Date: 6/24/2019  IMPRESSION: Low probability for pulmonary embolism. · Definity contrast was given to enhance imaging. · Left Ventricle: Mild concentric hypertrophy. Low normal systolic dysfunction. Estimated left ventricular ejection fraction is 51 - 55%. Visually measured ejection fraction. No regional wall motion abnormality noted. Inconclusive left ventricular diastolic function. · Tricuspid regurgitation is inadequate for estimation of right ventricular systolic pressure. Hospital Course by Problem   Per H&P \"Pt presented to ER with on and off typical chest pain squeezing like and radiating to her Lt shoulder lasting 3-4 minutes each. First was yesterday morning followed by another at noon then another in the evening then she came in to [facility] ER. Pt took her imdur at first then ASA and a hot drink thinking it's a reflux disease and decided to come in after her 3 rd episode. In ER, Pt had tachypnea and her BP was highly elevated with tachycardia. Pt also had elevated D-dimer\". 1. Atypical Chest pain - presented with chest / shoulder pain with troponins negative x 2. Patient followed closely by cardiology and underwent left heart catheterization with recommendation for medical management only. Resolved at time of discharge. Close outpatient follow up requested    2. Hypertensive urgency, improved - elevated during admission and improved with addition of hydralazine and cardizem. Monitored while inpatient due to extensive list of allergies  3. Tachycardia - improved on discharge, cont cardizem  4. Tachypnea, resolved  5. +d-dimer WITH low probability VQ scan - ddimer elevated at 1.12 at time of admission with no evidence of PE via VQ scan. 6. Acute on chronic diastolic HF secondary to hypertensive urgency. Resolved clinically  7. Hypothyroidism  8.  DM2, type 2 - treated with lower dose lantus and SSI during admission with some noted spikes in blood sugar following steroid administration for cardiac cath procedure. Resolved at time of discharge. Fine to resume home dosing as A1c reasonably controlled at 7.5  9. Dysuria - complaint of mild dysuria for which patient was treated with vantin on discharge. No evidence of kidney stones per CT. Today's examination of the patient revealed:     Subjective:     Objective:   VS:   Visit Vitals  /77 (BP 1 Location: Right arm, BP Patient Position: At rest)   Pulse 76   Temp 97.1 °F (36.2 °C)   Resp 18   Ht 5' 6\" (1.676 m)   Wt 110 kg (242 lb 8 oz)   SpO2 95%   Breastfeeding?  No   BMI 39.14 kg/m²      Tmax/24hrs: Temp (24hrs), Av.5 °F (36.4 °C), Min:97.1 °F (36.2 °C), Max:98.3 °F (36.8 °C)     Input/Output:     Intake/Output Summary (Last 24 hours) at 2019 0950  Last data filed at 2019 1859  Gross per 24 hour   Intake 1173.75 ml   Output --   Net 1173.75 ml       General:  Alert, NAD  Cardiovascular:  RRR   Pulmonary:  LSC throughout; respiratory effort WNL  GI:  +BS in all four quadrants, soft, non-tender  Extremities:  No edema; 2+ dorsalis pedis pulses bilaterally  Neuro: oriented x 3    Labs:    Recent Results (from the past 24 hour(s))   GLUCOSE, POC    Collection Time: 19 11:15 AM   Result Value Ref Range    Glucose (POC) 153 (H) 70 - 110 mg/dL   CARDIAC PROCEDURE    Collection Time: 19  1:17 PM   Result Value Ref Range    EDP 16    GLUCOSE, POC    Collection Time: 19  5:08 PM   Result Value Ref Range    Glucose (POC) 189 (H) 70 - 110 mg/dL   GLUCOSE, POC    Collection Time: 19  9:45 PM   Result Value Ref Range    Glucose (POC) 257 (H) 70 - 368 mg/dL   METABOLIC PANEL, BASIC    Collection Time: 19  3:45 AM   Result Value Ref Range    Sodium 137 136 - 145 mmol/L    Potassium 4.2 3.5 - 5.5 mmol/L    Chloride 106 100 - 108 mmol/L    CO2 26 21 - 32 mmol/L    Anion gap 5 3.0 - 18 mmol/L    Glucose 302 (H) 74 - 99 mg/dL    BUN 11 7.0 - 18 MG/DL    Creatinine 0. 81 0.6 - 1.3 MG/DL    BUN/Creatinine ratio 14 12 - 20      GFR est AA >60 >60 ml/min/1.73m2    GFR est non-AA >60 >60 ml/min/1.73m2    Calcium 9.2 8.5 - 10.1 MG/DL   CBC WITH AUTOMATED DIFF    Collection Time: 06/26/19  3:45 AM   Result Value Ref Range    WBC 4.8 4.6 - 13.2 K/uL    RBC 3.63 (L) 4.20 - 5.30 M/uL    HGB 11.3 (L) 12.0 - 16.0 g/dL    HCT 35.1 35.0 - 45.0 %    MCV 96.7 74.0 - 97.0 FL    MCH 31.1 24.0 - 34.0 PG    MCHC 32.2 31.0 - 37.0 g/dL    RDW 12.5 11.6 - 14.5 %    PLATELET 010 216 - 286 K/uL    MPV 10.5 9.2 - 11.8 FL    NEUTROPHILS 73 40 - 73 %    LYMPHOCYTES 24 21 - 52 %    MONOCYTES 3 3 - 10 %    EOSINOPHILS 0 0 - 5 %    BASOPHILS 0 0 - 2 %    ABS. NEUTROPHILS 3.5 1.8 - 8.0 K/UL    ABS. LYMPHOCYTES 1.1 0.9 - 3.6 K/UL    ABS. MONOCYTES 0.1 0.05 - 1.2 K/UL    ABS. EOSINOPHILS 0.0 0.0 - 0.4 K/UL    ABS. BASOPHILS 0.0 0.0 - 0.1 K/UL    DF AUTOMATED     GLUCOSE, POC    Collection Time: 06/26/19  7:58 AM   Result Value Ref Range    Glucose (POC) 305 (H) 70 - 110 mg/dL     Additional Data Reviewed:     Condition: Stable  Follow-up Appointments:   1.  Your PCP: Shirley Morales MD, within 5-7 days  Dr. Malinda Raymond in 7-10 days    >30 minutes spent coordinating this discharge (review instructions/follow-up, prescriptions, preparing report for sign off)    Signed:  Zuleyka Salas NP  6/26/2019  9:50 AM

## 2019-06-26 NOTE — PROGRESS NOTES
[facility]      CARDIOLOGY PROGRESS NOTE  RECS:    1. Chest pain, atypical: resolved. S/p cardiac cath that showed no critical CAD other than 50% mid LAD stenosis and widely patent previous proximal right coronary stent- continue medical management. Recent echo with  EF%  51%-55% and mild concentric hypertrophy. 2. Sinus tachycardia- resolved. 3. Elevated D-dimer- V/Q negative for PE  4. Mild LV dysfunction- on echo 9/18 with EF 45%- 50%. Recent echo with EF% 51-55%  5. CAD with  post percutaneous transluminal coronary angioplasty/stent to mid right coronary artery using a drug-eluting stent done in 2016. On  Plavix and asa  6. Chronic diastolic heart failure-compensated. Resume  lasix po. Monitor I&O. Last echo 2017 with Mildly LV dysfunction EF% 45-50%. Not on  BB  or ARB due to allergies  7. Uncontrolled Hypertension- better  continue Norvasc, Imdur  and hydralazine   8. Diabetes- medications and w/u per medical team  9. Hyperlipidemia- unable to tolerated statins- discussed with patient about pcsk9 starting-medications was approved but approved. But she refused. Cardiac cath 6/25/19  · No critical disease in epicardial coronary arteries other than 50% mid LAD stenosis and widely patent previous proximal right coronary stent. · Luminal irregularities and other vessels. · Severely tortuous coronary arteries likely from hypertensive heart disease  · Mildly elevated LV end-diastolic pressure at 16 mmHg. Risk factor modification and medical treatment for hypertensive heart disease. Will add Cardizem to Norvasc in order to reduce the blood pressure as well as heart rate. Follow-up closely clinically. Echo 6/19  · Definity contrast was given to enhance imaging. · Left Ventricle: Mild concentric hypertrophy. Low normal systolic dysfunction. Estimated left ventricular ejection fraction is 51 - 55%. Visually measured ejection fraction.  No regional wall motion abnormality noted. Inconclusive left ventricular diastolic function. · Tricuspid regurgitation is inadequate for estimation of right ventricular systolic pressure. ASSESSMENT:  Hospital Problems  Date Reviewed: 5/23/2019          Codes Class Noted POA    Hypertensive urgency ICD-10-CM: I16.0  ICD-9-CM: 401.9  6/24/2019 Unknown        Tachycardia ICD-10-CM: R00.0  ICD-9-CM: 785.0  6/24/2019 Unknown        Tachypnea ICD-10-CM: R06.82  ICD-9-CM: 786.06  6/24/2019 Unknown        * (Principal) Chest pain ICD-10-CM: R07.9  ICD-9-CM: 786.50  2/6/2017 Unknown        Type 2 diabetes mellitus with hyperglycemia, with long-term current use of insulin ([facility]) ICD-10-CM: E11.65, Z79.4  ICD-9-CM: 250.00, 790.29, V58.67  12/4/2012 Unknown                SUBJECTIVE:  No CP  No SOB    OBJECTIVE:    VS:   Visit Vitals  /77 (BP 1 Location: Right arm, BP Patient Position: At rest)   Pulse 76   Temp 97.1 °F (36.2 °C)   Resp 18   Ht 5' 6\" (1.676 m)   Wt 110 kg (242 lb 8 oz)   SpO2 95%   Breastfeeding? No   BMI 39.14 kg/m²         Intake/Output Summary (Last 24 hours) at 6/26/2019 1126  Last data filed at 6/26/2019 0959  Gross per 24 hour   Intake 1413.75 ml   Output --   Net 1413.75 ml     TELE: normal sinus rhythm    General: alert, well developed, pleasant and in no apparent distress  HENT: Normocephalic, atraumatic. Normal external eye. Neck :  JVD difficult to assess due to obesity  Cardiac:  regular rate and rhythm, S1, S2 normal, no murmur, click, rub or gallop  Lungs: clear to auscultation bilaterally  Abdomen: Soft, nontender, no masses  Extremities:  No c/c/e, peripheral pulses present.  Right groin no hematoma no s/s infection       Labs: Results:       Chemistry Recent Labs     06/26/19  0345 06/25/19  0350 06/24/19  0511   * 158* 163*    142 139   K 4.2 3.3* 3.2*    108 106   CO2 26 28 25   BUN 11 8 6*   CREA 0.81 0.80 0.70   CA 9.2 9.5 8.7   AGAP 5 6 8   BUCR 14 10* 9*      CBC w/Diff Recent Labs 06/26/19  0345 06/25/19  0350 06/23/19 2045   WBC 4.8 5.0 5.3   RBC 3.63* 3.44* 3.68*   HGB 11.3* 10.7* 11.6*   HCT 35.1 34.0* 37.4    262 253   GRANS 73 50 50   LYMPH 24 37 39   EOS 0 5 5      Cardiac Enzymes Recent Labs     06/24/19  0511 06/23/19 2045   CPK 74 76   CKND1 CALCULATION NOT PERFORMED WHEN RESULT IS BELOW LINEAR LIMIT CALCULATION NOT PERFORMED WHEN RESULT IS BELOW LINEAR LIMIT      Coagulation No results for input(s): PTP, INR, APTT in the last 72 hours. No lab exists for component: INREXT    Lipid Panel Lab Results   Component Value Date/Time    Cholesterol, total 239 (H) 01/17/2019 11:48 AM    HDL Cholesterol 46 01/17/2019 11:48 AM    LDL, calculated 172.8 (H) 01/17/2019 11:48 AM    VLDL, calculated 20.2 01/17/2019 11:48 AM    Triglyceride 101 01/17/2019 11:48 AM    CHOL/HDL Ratio 5.2 (H) 01/17/2019 11:48 AM      BNP No results for input(s): BNPP in the last 72 hours. Liver Enzymes No results for input(s): TP, ALB, TBIL, AP, SGOT, GPT in the last 72 hours.     No lab exists for component: DBIL   Thyroid Studies Lab Results   Component Value Date/Time    TSH 1.30 01/17/2019 11:48 AM              [address] NP-C Mireille:[phone]

## 2019-06-26 NOTE — DIABETES MGMT
GLYCEMIC CONTROL AND NUTRITION    Assessment/Recommendations:  Blood glucose this am 305 mg/dl  Noted morning Lantus held yesterday related to procedure/pt not in room. Patient also received steroids yesterday. Noted Lantus increased to 32 units bid  Continue corrective insulin coverage Ac&HS  Will continue inpatient monitoring. Pt states she is to be discharged today. Most recent blood glucose values:  Results for Desi Weiss (MRN [id number]) as of 6/26/2019 11:01   Ref. Range 6/24/2019 22:31 6/25/2019 11:15 6/25/2019 17:08 6/25/2019 21:45 6/26/2019 07:58   GLUCOSE,FAST - POC Latest Ref Range: 70 - 110 mg/dL 168 (H) 153 (H) 189 (H) 257 (H) 305 (H)     Current A1C of 7.5 % is equivalent to average blood glucose of 169 mg/dl over the past 2-3 months.     Current hospital diabetes medications:   lantus 20 units bid  Lispro corrective insulin coverage AC&HS  Home diabetes medications:  Lantus insulin 32 units every morning and 36 units every evening  Diet:    Cardiac regular consistent carb  Education:  __x_Refer to Diabetes Education Record             ____Education not indicated at this time      Jarocho Dubose [facility] CDE  Ext [phone]

## 2019-06-26 NOTE — ROUTINE PROCESS
Bedside and Verbal shift change report given to Prasad Rizzo RN (oncoming nurse) by Will Rodas RN (offgoing nurse). Report included the following information SBAR, Kardex, MAR and Recent Results.

## 2019-06-26 NOTE — PROGRESS NOTES
Problem: Falls - Risk of  Goal: *Absence of Falls  Description  Document Loretta Nuñzener Fall Risk and appropriate interventions in the flowsheet. Outcome: Progressing Towards Goal     Problem: Diabetes Self-Management  Goal: *Disease process and treatment process  Description  Define diabetes and identify own type of diabetes; list 3 options for treating diabetes.   Outcome: Progressing Towards Goal     Problem: Patient Education: Go to Patient Education Activity  Goal: Patient/Family Education  Outcome: Progressing Towards Goal

## 2019-06-26 NOTE — PROGRESS NOTES
Obtained signature on TALIA letter from patient.     Belia Rodarte RN BSN  Case Management  [phone]

## 2019-06-27 ENCOUNTER — HOME CARE VISIT (OUTPATIENT)
Dept: SCHEDULING | Facility: HOME HEALTH | Age: 84
End: 2019-06-27
Payer: MEDICARE

## 2019-06-27 ENCOUNTER — PATIENT OUTREACH (OUTPATIENT)
Dept: FAMILY MEDICINE CLINIC | Age: 84
End: 2019-06-27

## 2019-06-27 ENCOUNTER — HOME CARE VISIT (OUTPATIENT)
Dept: HOME HEALTH SERVICES | Facility: HOME HEALTH | Age: 84
End: 2019-06-27

## 2019-06-27 VITALS
SYSTOLIC BLOOD PRESSURE: 128 MMHG | OXYGEN SATURATION: 95 % | DIASTOLIC BLOOD PRESSURE: 74 MMHG | RESPIRATION RATE: 18 BRPM | TEMPERATURE: 97.8 F | HEART RATE: 89 BPM

## 2019-06-27 PROCEDURE — G0299 HHS/HOSPICE OF RN EA 15 MIN: HCPCS

## 2019-06-27 PROCEDURE — 3331090001 HH PPS REVENUE CREDIT

## 2019-06-27 PROCEDURE — 3331090002 HH PPS REVENUE DEBIT

## 2019-06-27 PROCEDURE — 400013 HH SOC

## 2019-06-27 NOTE — PROGRESS NOTES
Hospital Discharge Follow-Up      Date/Time:  6/27/2019 11:41 AM    Patient was admitted to [facility] on 6/23/19 and discharged on 6/26/19 for chest pain. The physician discharge summary was not available at the time of outreach. Nurse Navigator attempted to contact patient. Message left introducing myself, the purpose of the call and giving my contact information. Requested that patient call back at her earliest convenience    Current Outpatient Medications   Medication Sig    hydrALAZINE (APRESOLINE) 25 mg tablet Take 1 Tab by mouth three (3) times daily.  senna-docusate (PERICOLACE) 8.6-50 mg per tablet Take 1 Tab by mouth every evening. Hold for loose stools    dilTIAZem (CARDIZEM) 60 mg tablet Take 1 Tab by mouth Before breakfast, lunch, and dinner.  bisacodyl (DULCOLAX, BISACODYL,) 5 mg EC tablet Take 2 Tabs by mouth daily as needed for Constipation.  ondansetron hcl (ZOFRAN) 4 mg tablet Take 1 Tab by mouth every eight (8) hours as needed for Nausea.  cefpodoxime (VANTIN) 200 mg tablet Take 1 Tab by mouth every twelve (12) hours for 5 days.  Insulin Syringe-Needle U-100 (BD INSULIN SYRINGE ULTRA-FINE) 0.5 mL 31 gauge x 5/16\" syrg BD Insulin Syringe Ultra-Fine 0.5 mL 31 gauge x 5/16\"    lidocaine (ASPERCREME, LIDOCAINE,) 4 % patch 1 Patch by TransDERmal route every eight (8) hours.  albuterol (PROAIR HFA) 90 mcg/actuation inhaler INHALE 1 PUFF BY MOUTH EVERY 4 HOURS AS NEEDED FOR WHEEZING OR SHORTNESS OF BREATH    isosorbide mononitrate ER (IMDUR) 30 mg tablet Take 1 Tab by mouth every morning.  magnesium oxide (MAG-OX) 400 mg tablet Take 1 Tab by mouth two (2) times a day.  ranolazine ER (RANEXA) 500 mg SR tablet Take 1 Tab by mouth two (2) times a day.     insulin glargine (LANTUS U-100 INSULIN) 100 unit/mL injection Take 32 units every morning and 36 units qhs    clopidogrel (PLAVIX) 75 mg tab TAKE ONE TABLET BY MOUTH DAILY    amLODIPine (NORVASC) 10 mg tablet Take 10 mg by mouth daily.  potassium chloride SR (KLOR-CON 10) 10 mEq tablet Take 10 mEq by mouth daily as needed (muscle spasms, with Lasix).  ferrous sulfate 325 mg (65 mg iron) tablet Take 325 mg by mouth Daily (before breakfast).  ascorbic acid, vitamin C, (VITAMIN C) 250 mg tablet Take 250 mg by mouth daily.  acetaminophen (TYLENOL ARTHRITIS PAIN) 650 mg TbER Take 650 mg by mouth every eight (8) hours. Indications: Fever, Pain    furosemide (LASIX) 20 mg tablet Use daily as needed for leg swelling (Patient taking differently: Take 20 mg by mouth daily. Use daily as needed for leg swelling)    levothyroxine (SYNTHROID) 75 mcg tablet Take 1 Tab by mouth Daily (before breakfast). (Patient taking differently: Take 112 mcg by mouth Daily (before breakfast). )    cyanocobalamin ER 1,000 mcg tablet Take 1 Tab by mouth daily.  montelukast (SINGULAIR) 10 mg tablet Take 1 Tab by mouth daily as needed. Indications: ALLERGIC RHINITIS    capsaicin 0.075 % topical cream Apply  to affected area three (3) times daily. (Patient taking differently: Apply 1 Each to affected area three (3) times daily. apply thin layer to area)    DOCOSAHEXANOIC ACID/EPA (FISH OIL PO) Take 1,000 mg by mouth two (2) times a day.  aspirin delayed-release 81 mg tablet Take 81 mg by mouth daily.  cholecalciferol, vitamin d3, (VITAMIN D) 1,000 unit tablet Take 5,000 Units by mouth two (2) times a day. No current facility-administered medications for this visit. There are no discontinued medications.     [facility] follow up appointment(s):   Future Appointments   Date Time Provider Chelle Reeves   6/27/2019 12:45 PM JEANNIE Cote 59   7/5/2019 10:30 AM Luz CARIAS NP [facility] None   7/8/2019 11:00 AM MD Donna Ford 71   7/9/2019 11:30 AM MD NATACHA Flores SCHED   7/23/2019 10:00 AM Luz CARIAS NP [facility] None      Non-[facility] follow up appointment(s): none  [facility]:  out of service area       Goals      Attends follow-up appointments as directed.  Prevent complications post hospitalization.       Patient will attend all doctors appointments as scheduled  Nurse Navigator will contact patient weekly for at least 4 weeks after discharge

## 2019-06-28 ENCOUNTER — HOME CARE VISIT (OUTPATIENT)
Dept: HOME HEALTH SERVICES | Facility: HOME HEALTH | Age: 84
End: 2019-06-28
Payer: MEDICARE

## 2019-06-28 LAB
BACTERIA SPEC CULT: ABNORMAL
SERVICE CMNT-IMP: ABNORMAL

## 2019-06-28 PROCEDURE — 3331090001 HH PPS REVENUE CREDIT

## 2019-06-28 PROCEDURE — 3331090002 HH PPS REVENUE DEBIT

## 2019-06-29 PROCEDURE — 3331090001 HH PPS REVENUE CREDIT

## 2019-06-29 PROCEDURE — 3331090002 HH PPS REVENUE DEBIT

## 2019-06-30 PROCEDURE — 3331090001 HH PPS REVENUE CREDIT

## 2019-06-30 PROCEDURE — 3331090002 HH PPS REVENUE DEBIT

## 2019-07-01 ENCOUNTER — PATIENT OUTREACH (OUTPATIENT)
Dept: FAMILY MEDICINE CLINIC | Age: 84
End: 2019-07-01

## 2019-07-01 PROCEDURE — 3331090001 HH PPS REVENUE CREDIT

## 2019-07-01 PROCEDURE — 3331090002 HH PPS REVENUE DEBIT

## 2019-07-01 NOTE — PROGRESS NOTES
Hospital Discharge Follow-Up      Date/Time:  7/1/2019 2:21 PM    Patient was admitted to [facility] on 6/23/19 and discharged on 6/26/19 for chest pain. The physician discharge summary was not available at the time of outreach. Nurse Navigator attempted to contact patient. Message left introducing myself, the purpose of the call and giving my contact information. Requested that patient call back at her earliest convenience    Current Outpatient Medications   Medication Sig    hydrALAZINE (APRESOLINE) 25 mg tablet Take 1 Tab by mouth three (3) times daily.  senna-docusate (PERICOLACE) 8.6-50 mg per tablet Take 1 Tab by mouth every evening. Hold for loose stools    dilTIAZem (CARDIZEM) 60 mg tablet Take 1 Tab by mouth Before breakfast, lunch, and dinner.  bisacodyl (DULCOLAX, BISACODYL,) 5 mg EC tablet Take 2 Tabs by mouth daily as needed for Constipation.  ondansetron hcl (ZOFRAN) 4 mg tablet Take 1 Tab by mouth every eight (8) hours as needed for Nausea.  cefpodoxime (VANTIN) 200 mg tablet Take 1 Tab by mouth every twelve (12) hours for 5 days.  Insulin Syringe-Needle U-100 (BD INSULIN SYRINGE ULTRA-FINE) 0.5 mL 31 gauge x 5/16\" syrg BD Insulin Syringe Ultra-Fine 0.5 mL 31 gauge x 5/16\"    lidocaine (ASPERCREME, LIDOCAINE,) 4 % patch 1 Patch by TransDERmal route every eight (8) hours.  albuterol (PROAIR HFA) 90 mcg/actuation inhaler INHALE 1 PUFF BY MOUTH EVERY 4 HOURS AS NEEDED FOR WHEEZING OR SHORTNESS OF BREATH    isosorbide mononitrate ER (IMDUR) 30 mg tablet Take 1 Tab by mouth every morning.  magnesium oxide (MAG-OX) 400 mg tablet Take 1 Tab by mouth two (2) times a day.  ranolazine ER (RANEXA) 500 mg SR tablet Take 1 Tab by mouth two (2) times a day.     insulin glargine (LANTUS U-100 INSULIN) 100 unit/mL injection Take 32 units every morning and 36 units qhs    clopidogrel (PLAVIX) 75 mg tab TAKE ONE TABLET BY MOUTH DAILY    amLODIPine (NORVASC) 10 mg tablet Take 10 mg by mouth daily.  potassium chloride SR (KLOR-CON 10) 10 mEq tablet Take 10 mEq by mouth daily as needed (muscle spasms, with Lasix).  ferrous sulfate 325 mg (65 mg iron) tablet Take 325 mg by mouth Daily (before breakfast).  ascorbic acid, vitamin C, (VITAMIN C) 250 mg tablet Take 250 mg by mouth daily.  acetaminophen (TYLENOL ARTHRITIS PAIN) 650 mg TbER Take 650 mg by mouth every eight (8) hours. Indications: Fever, Pain    furosemide (LASIX) 20 mg tablet Use daily as needed for leg swelling (Patient taking differently: Take 20 mg by mouth daily. Use daily as needed for leg swelling)    levothyroxine (SYNTHROID) 75 mcg tablet Take 1 Tab by mouth Daily (before breakfast). (Patient taking differently: Take 112 mcg by mouth Daily (before breakfast). )    cyanocobalamin ER 1,000 mcg tablet Take 1 Tab by mouth daily.  montelukast (SINGULAIR) 10 mg tablet Take 1 Tab by mouth daily as needed. Indications: ALLERGIC RHINITIS    capsaicin 0.075 % topical cream Apply  to affected area three (3) times daily. (Patient taking differently: Apply 1 Each to affected area three (3) times daily. apply thin layer to area)    DOCOSAHEXANOIC ACID/EPA (FISH OIL PO) Take 1,000 mg by mouth two (2) times a day.  aspirin delayed-release 81 mg tablet Take 81 mg by mouth daily.  cholecalciferol, vitamin d3, (VITAMIN D) 1,000 unit tablet Take 5,000 Units by mouth two (2) times a day. No current facility-administered medications for this visit. There are no discontinued medications.     MG follow up appointment(s):   Future Appointments   Date Time Provider Department Center   7/2/2019 To Be Determined MARCELA Salas   7/4/2019 To Be Determined MARCELA Salas   7/5/2019 10:30 AM [facility]   7/8/2019 To Be Determined M[facility]ELA Salas   7/8/2019 11:00 AM Tiffanie Blackburn MD [facility] 71 7/9/2019 11:30 AM Stacie Frank MD [facility] SCHED   7/10/2019 To Be Determined MARCELA Wills 59   7/23/2019 10:00 AM ANAND MathiasP None        Goals      Attends follow-up appointments as directed.  Prevent complications post hospitalization. Patient will attend all doctors appointments as scheduled  Nurse Navigator will contact patient weekly for at least 4 weeks after discharge           Unable to reach patient by phone x 2 attempts letter sent to arrange SHANON BRENNAN appointment.

## 2019-07-01 NOTE — LETTER
7/1/2019 2:25 PM 
 
 
 
Ms. Thalia Fuentes 52 [address] [address] Dear Thaliadanelle Fuentes I am a Nurse Navigator working with Steffi Santa MD. Part of my job is to follow up with patients who have been in the emergency department or hospital.  I also work with patients to make sure that they understand their discharge instructions, have follow up appointments in place, and have gotten all of their medications. Your upcoming appointments with [facility] or home health are as follows: Future Appointments Date Time Provider Chelle Reeves 7/2/2019 To Be Determined MARCELA Valdez 59  
7/4/2019 To Be Determined Laurence Hanks LPN [address] [facility]  
7/5/2019 10:30 AM ANAND Mercedes None 7/8/2019 To Be Determined MARCELA Valdez 59  
7/8/2019 11:00 AM Meli Celeste MD [facility] Dar[place] 71  
7/9/2019 11:30 AM Ghada Ponce MD [facility] KATHERINE SCHED  
7/10/2019 To Be Determined MARCELA Valdezva 59  
7/23/2019 10:00 AM Bridgette Pace NP [facility] None I have been unable to reach you by phone. If you would like to follow up with Steffi Santa MD or have any questions concerning you recent procedure please feel free to call. My direct number is listed below. Thank you for allowing us to participate in your care! Sincerely, Eric Álvarez RN

## 2019-07-02 ENCOUNTER — HOME CARE VISIT (OUTPATIENT)
Dept: SCHEDULING | Facility: HOME HEALTH | Age: 84
End: 2019-07-02
Payer: MEDICARE

## 2019-07-02 VITALS
OXYGEN SATURATION: 96 % | HEART RATE: 121 BPM | SYSTOLIC BLOOD PRESSURE: 132 MMHG | TEMPERATURE: 97.6 F | DIASTOLIC BLOOD PRESSURE: 84 MMHG

## 2019-07-02 PROCEDURE — G0300 HHS/HOSPICE OF LPN EA 15 MIN: HCPCS

## 2019-07-02 PROCEDURE — 3331090001 HH PPS REVENUE CREDIT

## 2019-07-02 PROCEDURE — 3331090002 HH PPS REVENUE DEBIT

## 2019-07-03 ENCOUNTER — HOME CARE VISIT (OUTPATIENT)
Dept: SCHEDULING | Facility: HOME HEALTH | Age: 84
End: 2019-07-03
Payer: MEDICARE

## 2019-07-03 ENCOUNTER — HOME CARE VISIT (OUTPATIENT)
Dept: HOME HEALTH SERVICES | Facility: HOME HEALTH | Age: 84
End: 2019-07-03
Payer: MEDICARE

## 2019-07-03 VITALS
WEIGHT: 241.5 LBS | OXYGEN SATURATION: 94 % | BODY MASS INDEX: 38.98 KG/M2 | HEART RATE: 113 BPM | DIASTOLIC BLOOD PRESSURE: 79 MMHG | SYSTOLIC BLOOD PRESSURE: 149 MMHG

## 2019-07-03 VITALS — TEMPERATURE: 96.7 F | OXYGEN SATURATION: 99 % | HEART RATE: 96 BPM

## 2019-07-03 PROCEDURE — 3331090002 HH PPS REVENUE DEBIT

## 2019-07-03 PROCEDURE — 3331090001 HH PPS REVENUE CREDIT

## 2019-07-03 PROCEDURE — G0495 RN CARE TRAIN/EDU IN HH: HCPCS

## 2019-07-03 PROCEDURE — G0152 HHCP-SERV OF OT,EA 15 MIN: HCPCS

## 2019-07-04 PROCEDURE — 3331090002 HH PPS REVENUE DEBIT

## 2019-07-04 PROCEDURE — 3331090001 HH PPS REVENUE CREDIT

## 2019-07-05 ENCOUNTER — OFFICE VISIT (OUTPATIENT)
Dept: FAMILY MEDICINE CLINIC | Age: 84
End: 2019-07-05

## 2019-07-05 ENCOUNTER — HOME CARE VISIT (OUTPATIENT)
Dept: SCHEDULING | Facility: HOME HEALTH | Age: 84
End: 2019-07-05
Payer: MEDICARE

## 2019-07-05 VITALS
SYSTOLIC BLOOD PRESSURE: 127 MMHG | BODY MASS INDEX: 39.21 KG/M2 | HEIGHT: 66 IN | RESPIRATION RATE: 20 BRPM | TEMPERATURE: 98.6 F | DIASTOLIC BLOOD PRESSURE: 68 MMHG | WEIGHT: 244 LBS | OXYGEN SATURATION: 98 % | HEART RATE: 89 BPM

## 2019-07-05 DIAGNOSIS — I10 ESSENTIAL HYPERTENSION: ICD-10-CM

## 2019-07-05 DIAGNOSIS — R31.9 URINARY TRACT INFECTION WITH HEMATURIA, SITE UNSPECIFIED: Primary | ICD-10-CM

## 2019-07-05 DIAGNOSIS — Z91.14 NONCOMPLIANCE WITH MEDICATION REGIMEN: ICD-10-CM

## 2019-07-05 DIAGNOSIS — I50.32 CHRONIC DIASTOLIC HEART FAILURE (HCC): ICD-10-CM

## 2019-07-05 DIAGNOSIS — N39.0 URINARY TRACT INFECTION WITH HEMATURIA, SITE UNSPECIFIED: Primary | ICD-10-CM

## 2019-07-05 PROCEDURE — 3331090001 HH PPS REVENUE CREDIT

## 2019-07-05 PROCEDURE — G0151 HHCP-SERV OF PT,EA 15 MIN: HCPCS

## 2019-07-05 PROCEDURE — 3331090002 HH PPS REVENUE DEBIT

## 2019-07-05 RX ORDER — PEN NEEDLE, DIABETIC 32GX 5/32"
NEEDLE, DISPOSABLE MISCELLANEOUS
Refills: 4 | COMMUNITY
Start: 2019-06-17

## 2019-07-05 NOTE — PROGRESS NOTES
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 regimen  Patient strongly encouraged to follow-up with cardiology to discuss her concerns about her medication, was given much support and encouragement regarding medication regime. After care summary printed and reviewed with patient. Plan reviewed with patient. Questions answered. Patient verbalized understanding of plan and is in agreement with plan. Patient to follow up with PCP as planned or earlier if symptoms worsen. Encouraged the use of my chart.     MILY PalC

## 2019-07-05 NOTE — PROGRESS NOTES
Ruth Ann Almazan presents today for   Chief Complaint   Patient presents with   [facility] Follow Up     Follow Up       Is someone accompanying this pt? No    Is the patient using any DME equipment during OV? Yes; Wheelchair    Depression Screening:  3 most recent PHQ Screens 5/6/2019   PHQ Not Done -   Little interest or pleasure in doing things Not at all   Feeling down, depressed, irritable, or hopeless Not at all   Total Score PHQ 2 0       Learning Assessment:  Learning Assessment 1/29/2019   PRIMARY LEARNER Patient   HIGHEST LEVEL OF EDUCATION - PRIMARY LEARNER  -   BARRIERS PRIMARY LEARNER -   Madelyn Hutchinson CAREGIVER -   Aracely Glynn    NEED -   LEARNER PREFERENCE PRIMARY LISTENING     -     -     -     -   ANSWERED BY patient   RELATIONSHIP SELF       Abuse Screening:  Abuse Screening Questionnaire 4/23/2019   Do you ever feel afraid of your partner? N   Are you in a relationship with someone who physically or mentally threatens you? N   Is it safe for you to go home? Y       Fall Screening:  Fall Risk Assessment, last 12 mths 4/23/2019   Able to walk? Yes   Fall in past 12 months? Yes   Fall with injury? Yes   Number of falls in past 12 months 1   Fall Risk Score 2         Health Maintenance Due   Topic Date Due    FOOT EXAM Q1  10/24/2018   . Health Maintenance reviewed and discussed and ordered per Provider. Ruth Ann Almazan is updated on all     Coordination of Care  1. Have you been to the ER, urgent care clinic since your last visit? Hospitalized since your last visit? Yes; [facility]    2. Have you seen or consulted any other health care providers outside of the [address] [place] [address] since your last visit? Include any pap smears or colon screening. No    Advance Directive:  1. Do you have an advance directive in place? Patient Reply:No    2. If not, would you like material regarding how to put one in place?  Patient Reply: No

## 2019-07-06 ENCOUNTER — HOME CARE VISIT (OUTPATIENT)
Dept: SCHEDULING | Facility: HOME HEALTH | Age: 84
End: 2019-07-06
Payer: MEDICARE

## 2019-07-06 PROCEDURE — 3331090002 HH PPS REVENUE DEBIT

## 2019-07-06 PROCEDURE — 3331090001 HH PPS REVENUE CREDIT

## 2019-07-07 PROCEDURE — 3331090001 HH PPS REVENUE CREDIT

## 2019-07-07 PROCEDURE — 3331090002 HH PPS REVENUE DEBIT

## 2019-07-08 ENCOUNTER — HOSPITAL ENCOUNTER (OUTPATIENT)
Dept: ONCOLOGY | Age: 84
Discharge: HOME | End: 2019-07-08

## 2019-07-08 ENCOUNTER — HOSPITAL ENCOUNTER (OUTPATIENT)
Dept: LAB | Age: 84
Discharge: HOME | End: 2019-07-08
Payer: MEDICARE

## 2019-07-08 ENCOUNTER — HOME CARE VISIT (OUTPATIENT)
Dept: SCHEDULING | Facility: HOME HEALTH | Age: 84
End: 2019-07-08
Payer: MEDICARE

## 2019-07-08 ENCOUNTER — OFFICE VISIT (OUTPATIENT)
Dept: ONCOLOGY | Age: 84
End: 2019-07-08

## 2019-07-08 VITALS
WEIGHT: 240 LBS | HEIGHT: 66 IN | HEART RATE: 88 BPM | BODY MASS INDEX: 38.57 KG/M2 | RESPIRATION RATE: 16 BRPM | OXYGEN SATURATION: 98 % | SYSTOLIC BLOOD PRESSURE: 151 MMHG | DIASTOLIC BLOOD PRESSURE: 82 MMHG | TEMPERATURE: 98 F

## 2019-07-08 DIAGNOSIS — Z95.5 S/P CORONARY ARTERY STENT PLACEMENT: ICD-10-CM

## 2019-07-08 DIAGNOSIS — I82.532 CHRONIC DEEP VEIN THROMBOSIS (DVT) OF POPLITEAL VEIN OF LEFT LOWER EXTREMITY (HCC): Primary | ICD-10-CM

## 2019-07-08 DIAGNOSIS — D64.9 CHRONIC ANEMIA: ICD-10-CM

## 2019-07-08 DIAGNOSIS — I82.532 CHRONIC DEEP VEIN THROMBOSIS (DVT) OF POPLITEAL VEIN OF LEFT LOWER EXTREMITY (HCC): ICD-10-CM

## 2019-07-08 LAB
ALBUMIN SERPL-MCNC: 3.3 G/DL (ref 3.4–5)
ALBUMIN/GLOB SERPL: 0.8 {RATIO} (ref 0.8–1.7)
ALP SERPL-CCNC: 105 U/L (ref 45–117)
ALT SERPL-CCNC: 13 U/L (ref 13–56)
ANION GAP SERPL CALC-SCNC: 8 MMOL/L (ref 3–18)
AST SERPL-CCNC: 14 U/L (ref 15–37)
BASO+EOS+MONOS # BLD AUTO: 0.3 K/UL (ref 0–2.3)
BASO+EOS+MONOS NFR BLD AUTO: 6 % (ref 0.1–17)
BILIRUB SERPL-MCNC: 0.4 MG/DL (ref 0.2–1)
BUN SERPL-MCNC: 6 MG/DL (ref 7–18)
BUN/CREAT SERPL: 8 (ref 12–20)
CALCIUM SERPL-MCNC: 8.8 MG/DL (ref 8.5–10.1)
CHLORIDE SERPL-SCNC: 102 MMOL/L (ref 100–108)
CO2 SERPL-SCNC: 27 MMOL/L (ref 21–32)
CREAT SERPL-MCNC: 0.74 MG/DL (ref 0.6–1.3)
DIFFERENTIAL METHOD BLD: ABNORMAL
ERYTHROCYTE [DISTWIDTH] IN BLOOD BY AUTOMATED COUNT: 12.6 % (ref 11.5–14.5)
FERRITIN SERPL-MCNC: 60 NG/ML (ref 8–388)
GLOBULIN SER CALC-MCNC: 4.3 G/DL (ref 2–4)
GLUCOSE SERPL-MCNC: 212 MG/DL (ref 74–99)
HCT VFR BLD AUTO: 35.8 % (ref 36–48)
HGB BLD-MCNC: 11.5 G/DL (ref 12–16)
IRON SATN MFR SERPL: 21 %
IRON SERPL-MCNC: 66 UG/DL (ref 50–175)
LYMPHOCYTES # BLD: 1.7 K/UL (ref 1.1–5.9)
LYMPHOCYTES NFR BLD: 32 % (ref 14–44)
MCH RBC QN AUTO: 32.1 PG (ref 25–35)
MCHC RBC AUTO-ENTMCNC: 32.1 G/DL (ref 31–37)
MCV RBC AUTO: 100 FL (ref 78–102)
NEUTS SEG # BLD: 3.2 K/UL (ref 1.8–9.5)
NEUTS SEG NFR BLD: 62 % (ref 40–70)
PLATELET # BLD AUTO: 225 K/UL (ref 140–440)
POTASSIUM SERPL-SCNC: 3.7 MMOL/L (ref 3.5–5.5)
PROT SERPL-MCNC: 7.6 G/DL (ref 6.4–8.2)
RBC # BLD AUTO: 3.58 M/UL (ref 4.1–5.1)
SODIUM SERPL-SCNC: 137 MMOL/L (ref 136–145)
TIBC SERPL-MCNC: 309 UG/DL (ref 250–450)
WBC # BLD AUTO: 5.2 K/UL (ref 4.5–13)

## 2019-07-08 PROCEDURE — 3331090002 HH PPS REVENUE DEBIT

## 2019-07-08 PROCEDURE — 80053 COMPREHEN METABOLIC PANEL: CPT

## 2019-07-08 PROCEDURE — 3331090001 HH PPS REVENUE CREDIT

## 2019-07-08 PROCEDURE — 82728 ASSAY OF FERRITIN: CPT

## 2019-07-08 PROCEDURE — 83540 ASSAY OF IRON: CPT

## 2019-07-08 PROCEDURE — G0158 HHC OT ASSISTANT EA 15: HCPCS

## 2019-07-08 PROCEDURE — 36415 COLL VENOUS BLD VENIPUNCTURE: CPT
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
## 2019-07-08 NOTE — PATIENT INSTRUCTIONS
Anemia: Care Instructions  Your Care Instructions    Anemia is a low level of red blood cells, which carry oxygen throughout your body. Many things can cause anemia. Lack of iron is one of the most common causes. Your body needs iron to make hemoglobin, a substance in red blood cells that carries oxygen from the lungs to your body's cells. Without enough iron, the body produces fewer and smaller red blood cells. As a result, your body's cells do not get enough oxygen, and you feel tired and weak. And you may have trouble concentrating. Bleeding is the most common cause of a lack of iron. You may have heavy menstrual bleeding or bleeding caused by conditions such as ulcers, hemorrhoids, or cancer. Regular use of aspirin or other anti-inflammatory medicines (such as ibuprofen) also can cause bleeding in some people. A lack of iron in your diet also can cause anemia, especially at times when the body needs more iron, such as during pregnancy, infancy, and the teen years. Your doctor may have prescribed iron pills. It may take several months of treatment for your iron levels to return to normal. Your doctor also may suggest that you eat foods that are rich in iron, such as meat and beans. There are many other causes of anemia. It is not always due to a lack of iron. Finding the specific cause of your anemia will help your doctor find the right treatment for you. Follow-up care is a key part of your treatment and safety. Be sure to make and go to all appointments, and call your doctor if you are having problems. It's also a good idea to know your test results and keep a list of the medicines you take. How can you care for yourself at home? · Take your medicines exactly as prescribed. Call your doctor if you think you are having a problem with your medicine. · If your doctor recommends iron pills, take them as directed:  ? Try to take the pills on an empty stomach about 1 hour before or 2 hours after meals. But you may need to take iron with food to avoid an upset stomach. ? Do not take antacids or drink milk or caffeine drinks (such as coffee, tea, or cola) at the same time or within 2 hours of the time that you take your iron. They can make it hard for your body to absorb the iron. ? Vitamin C (from food or supplements) helps your body absorb iron. Try taking iron pills with a glass of orange juice or some other food that is high in vitamin C, such as citrus fruits. ? Iron pills may cause stomach problems, such as heartburn, nausea, diarrhea, constipation, and cramps. Be sure to drink plenty of fluids, and include fruits, vegetables, and fiber in your diet each day. Iron pills often make your bowel movements dark or green. ? If you forget to take an iron pill, do not take a double dose of iron the next time you take a pill. ? Keep iron pills out of the reach of small children. An overdose of iron can be very dangerous. · Follow your doctor's advice about eating iron-rich foods. These include red meat, shellfish, poultry, eggs, beans, raisins, whole-grain bread, and leafy green vegetables. · Steam vegetables to help them keep their iron content. When should you call for help? Call 911 anytime you think you may need emergency care. For example, call if:    · You have symptoms of a heart attack. These may include:  ? Chest pain or pressure, or a strange feeling in the chest.  ? Sweating. ? Shortness of breath. ? Nausea or vomiting. ? Pain, pressure, or a strange feeling in the back, neck, jaw, or upper belly or in one or both shoulders or arms. ? Lightheadedness or sudden weakness. ? A fast or irregular heartbeat. After you call 911, the  may tell you to chew 1 adult-strength or 2 to 4 low-dose aspirin. Wait for an ambulance.  Do not try to drive yourself.     · You passed out (lost consciousness).    Call your doctor now or seek immediate medical care if:    · You have new or increased shortness of breath.     · You are dizzy or lightheaded, or you feel like you may faint.     · Your fatigue and weakness continue or get worse.     · You have any abnormal bleeding, such as:  ? Nosebleeds. ? Vaginal bleeding that is different (heavier, more frequent, at a different time of the month) than what you are used to.  ? Bloody or black stools, or rectal bleeding. ? Bloody or pink urine.    Watch closely for changes in your health, and be sure to contact your doctor if:    · You do not get better as expected. Where can you learn more? Go to http://cristina-mignon.info/. Enter R301 in the search box to learn more about \"Anemia: Care Instructions. \"  Current as of: May 6, 2018  Content Version: 11.9  © 5834-0911 [employer]. Care instructions adapted under license by [employer] (which disclaims liability or warranty for this information). If you have questions about a medical condition or this instruction, always ask your healthcare professional. [address] any warranty or liability for your use of this information.

## 2019-07-09 ENCOUNTER — HOME CARE VISIT (OUTPATIENT)
Dept: SCHEDULING | Facility: HOME HEALTH | Age: 84
End: 2019-07-09
Payer: MEDICARE

## 2019-07-09 ENCOUNTER — OFFICE VISIT (OUTPATIENT)
Dept: CARDIOLOGY CLINIC | Age: 84
End: 2019-07-09

## 2019-07-09 ENCOUNTER — HOME CARE VISIT (OUTPATIENT)
Dept: HOME HEALTH SERVICES | Facility: HOME HEALTH | Age: 84
End: 2019-07-09
Payer: MEDICARE

## 2019-07-09 VITALS
HEIGHT: 66 IN | BODY MASS INDEX: 38.89 KG/M2 | WEIGHT: 242 LBS | HEART RATE: 105 BPM | DIASTOLIC BLOOD PRESSURE: 81 MMHG | SYSTOLIC BLOOD PRESSURE: 147 MMHG

## 2019-07-09 DIAGNOSIS — I10 ESSENTIAL HYPERTENSION: ICD-10-CM

## 2019-07-09 DIAGNOSIS — I25.118 ATHEROSCLEROSIS OF NATIVE CORONARY ARTERY OF NATIVE HEART WITH STABLE ANGINA PECTORIS (HCC): Primary | ICD-10-CM

## 2019-07-09 DIAGNOSIS — Z95.5 S/P CORONARY ARTERY STENT PLACEMENT: ICD-10-CM

## 2019-07-09 DIAGNOSIS — I50.32 CHRONIC DIASTOLIC CONGESTIVE HEART FAILURE (HCC): ICD-10-CM

## 2019-07-09 PROCEDURE — 3331090002 HH PPS REVENUE DEBIT

## 2019-07-09 PROCEDURE — 3331090001 HH PPS REVENUE CREDIT

## 2019-07-09 RX ORDER — CLOPIDOGREL BISULFATE 75 MG/1
TABLET ORAL
Qty: 30 TAB | Refills: 0 | Status: SHIPPED | OUTPATIENT
Start: 2019-07-09 | End: 2019-08-09 | Stop reason: SDUPTHER

## 2019-07-09 NOTE — PROGRESS NOTES
1. Have you been to the ER, urgent care clinic since your last visit? Hospitalized since your last visit? Yes [facility]     2. Have you seen or consulted any other health care providers outside of the [address] [place] [address] since your last visit? Include any pap smears or colon screening. No     3. Since your last visit, have you had any of the following symptoms? chest pains.
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
## 2019-07-10 ENCOUNTER — HOME CARE VISIT (OUTPATIENT)
Dept: SCHEDULING | Facility: HOME HEALTH | Age: 84
End: 2019-07-10
Payer: MEDICARE

## 2019-07-10 VITALS — HEART RATE: 97 BPM | SYSTOLIC BLOOD PRESSURE: 140 MMHG | OXYGEN SATURATION: 97 % | DIASTOLIC BLOOD PRESSURE: 88 MMHG

## 2019-07-10 PROCEDURE — G0151 HHCP-SERV OF PT,EA 15 MIN: HCPCS

## 2019-07-10 PROCEDURE — 3331090001 HH PPS REVENUE CREDIT

## 2019-07-10 PROCEDURE — 3331090002 HH PPS REVENUE DEBIT

## 2019-07-11 ENCOUNTER — HOME CARE VISIT (OUTPATIENT)
Dept: SCHEDULING | Facility: HOME HEALTH | Age: 84
End: 2019-07-11
Payer: MEDICARE

## 2019-07-11 PROCEDURE — 3331090002 HH PPS REVENUE DEBIT

## 2019-07-11 PROCEDURE — 3331090001 HH PPS REVENUE CREDIT

## 2019-07-11 PROCEDURE — G0300 HHS/HOSPICE OF LPN EA 15 MIN: HCPCS

## 2019-07-12 ENCOUNTER — HOME CARE VISIT (OUTPATIENT)
Dept: SCHEDULING | Facility: HOME HEALTH | Age: 84
End: 2019-07-12
Payer: MEDICARE

## 2019-07-12 VITALS
OXYGEN SATURATION: 96 % | HEART RATE: 110 BPM | DIASTOLIC BLOOD PRESSURE: 80 MMHG | TEMPERATURE: 98.4 F | SYSTOLIC BLOOD PRESSURE: 130 MMHG

## 2019-07-12 PROCEDURE — 3331090001 HH PPS REVENUE CREDIT

## 2019-07-12 PROCEDURE — G0157 HHC PT ASSISTANT EA 15: HCPCS

## 2019-07-12 PROCEDURE — 3331090002 HH PPS REVENUE DEBIT

## 2019-07-12 PROCEDURE — G0158 HHC OT ASSISTANT EA 15: HCPCS

## 2019-07-13 PROCEDURE — 3331090002 HH PPS REVENUE DEBIT

## 2019-07-13 PROCEDURE — 3331090001 HH PPS REVENUE CREDIT

## 2019-07-14 VITALS — OXYGEN SATURATION: 96 % | DIASTOLIC BLOOD PRESSURE: 85 MMHG | HEART RATE: 98 BPM | SYSTOLIC BLOOD PRESSURE: 165 MMHG

## 2019-07-14 PROCEDURE — 3331090002 HH PPS REVENUE DEBIT

## 2019-07-14 PROCEDURE — 3331090001 HH PPS REVENUE CREDIT

## 2019-07-15 ENCOUNTER — HOME CARE VISIT (OUTPATIENT)
Dept: SCHEDULING | Facility: HOME HEALTH | Age: 84
End: 2019-07-15
Payer: MEDICARE

## 2019-07-15 VITALS — OXYGEN SATURATION: 96 % | SYSTOLIC BLOOD PRESSURE: 134 MMHG | DIASTOLIC BLOOD PRESSURE: 84 MMHG | HEART RATE: 110 BPM

## 2019-07-15 PROCEDURE — 3331090002 HH PPS REVENUE DEBIT

## 2019-07-15 PROCEDURE — G0158 HHC OT ASSISTANT EA 15: HCPCS

## 2019-07-15 PROCEDURE — 3331090001 HH PPS REVENUE CREDIT

## 2019-07-16 ENCOUNTER — HOME CARE VISIT (OUTPATIENT)
Dept: SCHEDULING | Facility: HOME HEALTH | Age: 84
End: 2019-07-16
Payer: MEDICARE

## 2019-07-16 VITALS
SYSTOLIC BLOOD PRESSURE: 132 MMHG | TEMPERATURE: 98 F | HEART RATE: 102 BPM | DIASTOLIC BLOOD PRESSURE: 88 MMHG | OXYGEN SATURATION: 96 %

## 2019-07-16 VITALS
OXYGEN SATURATION: 97 % | DIASTOLIC BLOOD PRESSURE: 80 MMHG | HEART RATE: 107 BPM | TEMPERATURE: 99.5 F | SYSTOLIC BLOOD PRESSURE: 140 MMHG

## 2019-07-16 PROCEDURE — 3331090002 HH PPS REVENUE DEBIT

## 2019-07-16 PROCEDURE — G0157 HHC PT ASSISTANT EA 15: HCPCS

## 2019-07-16 PROCEDURE — 3331090001 HH PPS REVENUE CREDIT

## 2019-07-17 ENCOUNTER — TELEPHONE (OUTPATIENT)
Dept: FAMILY MEDICINE CLINIC | Age: 84
End: 2019-07-17

## 2019-07-17 ENCOUNTER — HOME CARE VISIT (OUTPATIENT)
Dept: SCHEDULING | Facility: HOME HEALTH | Age: 84
End: 2019-07-17
Payer: MEDICARE

## 2019-07-17 PROCEDURE — 3331090001 HH PPS REVENUE CREDIT

## 2019-07-17 PROCEDURE — G0158 HHC OT ASSISTANT EA 15: HCPCS

## 2019-07-17 PROCEDURE — 3331090002 HH PPS REVENUE DEBIT

## 2019-07-17 NOTE — TELEPHONE ENCOUNTER
Patient called and chart review was done. Patient stated that she need a urinalysis done due to having urinary problems when she was taking her medication and was advised by her Home nurse to get a urinalysis.

## 2019-07-18 VITALS
SYSTOLIC BLOOD PRESSURE: 141 MMHG | DIASTOLIC BLOOD PRESSURE: 84 MMHG | OXYGEN SATURATION: 96 % | HEART RATE: 122 BPM | TEMPERATURE: 97.1 F

## 2019-07-18 PROCEDURE — 3331090002 HH PPS REVENUE DEBIT

## 2019-07-18 PROCEDURE — 3331090001 HH PPS REVENUE CREDIT

## 2019-07-19 ENCOUNTER — HOME CARE VISIT (OUTPATIENT)
Dept: SCHEDULING | Facility: HOME HEALTH | Age: 84
End: 2019-07-19
Payer: MEDICARE

## 2019-07-19 VITALS
TEMPERATURE: 98.1 F | SYSTOLIC BLOOD PRESSURE: 150 MMHG | OXYGEN SATURATION: 96 % | DIASTOLIC BLOOD PRESSURE: 80 MMHG | HEART RATE: 94 BPM

## 2019-07-19 PROCEDURE — G0157 HHC PT ASSISTANT EA 15: HCPCS

## 2019-07-19 PROCEDURE — 3331090001 HH PPS REVENUE CREDIT

## 2019-07-19 PROCEDURE — 3331090002 HH PPS REVENUE DEBIT

## 2019-07-20 PROCEDURE — 3331090002 HH PPS REVENUE DEBIT

## 2019-07-20 PROCEDURE — 3331090001 HH PPS REVENUE CREDIT

## 2019-07-21 ENCOUNTER — HOME CARE VISIT (OUTPATIENT)
Dept: SCHEDULING | Facility: HOME HEALTH | Age: 84
End: 2019-07-21
Payer: MEDICARE

## 2019-07-21 PROCEDURE — 3331090002 HH PPS REVENUE DEBIT

## 2019-07-21 PROCEDURE — 3331090001 HH PPS REVENUE CREDIT

## 2019-07-22 PROCEDURE — 3331090001 HH PPS REVENUE CREDIT

## 2019-07-22 PROCEDURE — 3331090002 HH PPS REVENUE DEBIT

## 2019-07-23 ENCOUNTER — HOME CARE VISIT (OUTPATIENT)
Dept: SCHEDULING | Facility: HOME HEALTH | Age: 84
End: 2019-07-23
Payer: MEDICARE

## 2019-07-23 PROCEDURE — 3331090002 HH PPS REVENUE DEBIT

## 2019-07-23 PROCEDURE — G0158 HHC OT ASSISTANT EA 15: HCPCS

## 2019-07-23 PROCEDURE — 3331090001 HH PPS REVENUE CREDIT

## 2019-07-24 VITALS
SYSTOLIC BLOOD PRESSURE: 183 MMHG | DIASTOLIC BLOOD PRESSURE: 92 MMHG | OXYGEN SATURATION: 98 % | TEMPERATURE: 97.7 F | HEART RATE: 97 BPM

## 2019-07-24 PROCEDURE — 3331090002 HH PPS REVENUE DEBIT

## 2019-07-24 PROCEDURE — 3331090001 HH PPS REVENUE CREDIT

## 2019-07-25 ENCOUNTER — HOME CARE VISIT (OUTPATIENT)
Dept: SCHEDULING | Facility: HOME HEALTH | Age: 84
End: 2019-07-25
Payer: MEDICARE

## 2019-07-25 PROCEDURE — 3331090002 HH PPS REVENUE DEBIT

## 2019-07-25 PROCEDURE — G0158 HHC OT ASSISTANT EA 15: HCPCS

## 2019-07-25 PROCEDURE — G0151 HHCP-SERV OF PT,EA 15 MIN: HCPCS

## 2019-07-25 PROCEDURE — 3331090001 HH PPS REVENUE CREDIT

## 2019-07-26 VITALS
SYSTOLIC BLOOD PRESSURE: 138 MMHG | OXYGEN SATURATION: 97 % | HEART RATE: 97 BPM | TEMPERATURE: 97.4 F | DIASTOLIC BLOOD PRESSURE: 85 MMHG

## 2019-07-26 PROCEDURE — 3331090001 HH PPS REVENUE CREDIT

## 2019-07-26 PROCEDURE — 3331090002 HH PPS REVENUE DEBIT

## 2019-07-27 ENCOUNTER — HOSPITAL ENCOUNTER (OUTPATIENT)
Dept: LAB | Age: 84
Discharge: HOME | End: 2019-07-27
Payer: MEDICARE

## 2019-07-27 ENCOUNTER — HOME CARE VISIT (OUTPATIENT)
Dept: SCHEDULING | Facility: HOME HEALTH | Age: 84
End: 2019-07-27
Payer: MEDICARE

## 2019-07-27 LAB
APPEARANCE UR: CLEAR
BACTERIA URNS QL MICRO: NEGATIVE /HPF
BILIRUB UR QL: NEGATIVE
COLOR UR: YELLOW
EPITH CASTS URNS QL MICRO: NORMAL /LPF (ref 0–5)
GLUCOSE UR STRIP.AUTO-MCNC: 100 MG/DL
HGB UR QL STRIP: NEGATIVE
HYALINE CASTS URNS QL MICRO: NORMAL /LPF (ref 0–2)
KETONES UR QL STRIP.AUTO: NEGATIVE MG/DL
LEUKOCYTE ESTERASE UR QL STRIP.AUTO: NEGATIVE
NITRITE UR QL STRIP.AUTO: NEGATIVE
PH UR STRIP: 5.5 [PH] (ref 5–8)
PROT UR STRIP-MCNC: ABNORMAL MG/DL
RBC #/AREA URNS HPF: NEGATIVE /HPF (ref 0–5)
SP GR UR REFRACTOMETRY: 1.02 (ref 1–1.03)
UROBILINOGEN UR QL STRIP.AUTO: 0.2 EU/DL (ref 0.2–1)
WBC URNS QL MICRO: NORMAL /HPF (ref 0–4)

## 2019-07-27 PROCEDURE — 3331090001 HH PPS REVENUE CREDIT

## 2019-07-27 PROCEDURE — 87086 URINE CULTURE/COLONY COUNT: CPT

## 2019-07-27 PROCEDURE — 81001 URINALYSIS AUTO W/SCOPE: CPT

## 2019-07-27 PROCEDURE — 3331090002 HH PPS REVENUE DEBIT

## 2019-07-28 PROCEDURE — 3331090002 HH PPS REVENUE DEBIT

## 2019-07-28 PROCEDURE — 3331090001 HH PPS REVENUE CREDIT

## 2019-07-29 ENCOUNTER — HOME CARE VISIT (OUTPATIENT)
Dept: SCHEDULING | Facility: HOME HEALTH | Age: 84
End: 2019-07-29
Payer: MEDICARE

## 2019-07-29 ENCOUNTER — OFFICE VISIT (OUTPATIENT)
Dept: FAMILY MEDICINE CLINIC | Age: 84
End: 2019-07-29

## 2019-07-29 VITALS
TEMPERATURE: 98.1 F | SYSTOLIC BLOOD PRESSURE: 132 MMHG | HEIGHT: 66 IN | BODY MASS INDEX: 38.96 KG/M2 | OXYGEN SATURATION: 98 % | WEIGHT: 242.4 LBS | HEART RATE: 91 BPM | RESPIRATION RATE: 20 BRPM | DIASTOLIC BLOOD PRESSURE: 77 MMHG

## 2019-07-29 VITALS
RESPIRATION RATE: 17 BRPM | OXYGEN SATURATION: 96 % | DIASTOLIC BLOOD PRESSURE: 75 MMHG | HEART RATE: 75 BPM | SYSTOLIC BLOOD PRESSURE: 127 MMHG | TEMPERATURE: 98.6 F

## 2019-07-29 VITALS
SYSTOLIC BLOOD PRESSURE: 136 MMHG | DIASTOLIC BLOOD PRESSURE: 80 MMHG | HEART RATE: 105 BPM | OXYGEN SATURATION: 97 % | TEMPERATURE: 97 F

## 2019-07-29 LAB
BACTERIA SPEC CULT: NORMAL
SERVICE CMNT-IMP: NORMAL

## 2019-07-29 PROCEDURE — 3331090001 HH PPS REVENUE CREDIT

## 2019-07-29 PROCEDURE — 3331090002 HH PPS REVENUE DEBIT

## 2019-07-29 PROCEDURE — G0152 HHCP-SERV OF OT,EA 15 MIN: HCPCS

## 2019-07-29 NOTE — PROGRESS NOTES
Dave Clarke presents today for   Chief Complaint   Patient presents with    Diabetes    Hypertension    Cholesterol Problem     high chol       Is someone accompanying this pt? no    Is the patient using any DME equipment during OV? no    Depression Screening:  3 most recent PHQ Screens 5/6/2019   PHQ Not Done -   Little interest or pleasure in doing things Not at all   Feeling down, depressed, irritable, or hopeless Not at all   Total Score PHQ 2 0       Learning Assessment:  Learning Assessment 1/29/2019   PRIMARY LEARNER Patient   HIGHEST LEVEL OF EDUCATION - PRIMARY LEARNER  -   BARRIERS PRIMARY LEARNER -   [address]     NEED -   LEARNER PREFERENCE PRIMARY LISTENING     -     -     -     -   ANSWERED BY patient   RELATIONSHIP SELF       Abuse Screening:  Abuse Screening Questionnaire 4/23/2019   Do you ever feel afraid of your partner? N   Are you in a relationship with someone who physically or mentally threatens you? N   Is it safe for you to go home? Y       Fall Screening  4/23/2019 1/17/2019   Yes Yes   Yes No   Yes -   1 -   2 -         Health Maintenance Due   Topic Date Due    FOOT EXAM Q1  10/24/2018   . Health Maintenance reviewed and discussed and ordered per Provider. Coordination of Care  1. Have you been to the ER, urgent care clinic since your last visit? Hospitalized since your last visit? no    2. Have you seen or consulted any other health care providers outside of the [address] [place] [address] since your last visit? Include any pap smears or colon screening.  no      Advance Directive discussed 7/5/19

## 2019-07-30 PROCEDURE — 3331090002 HH PPS REVENUE DEBIT

## 2019-07-30 PROCEDURE — 3331090001 HH PPS REVENUE CREDIT

## 2019-07-31 PROCEDURE — 3331090001 HH PPS REVENUE CREDIT

## 2019-07-31 PROCEDURE — 3331090002 HH PPS REVENUE DEBIT

## 2019-08-01 PROCEDURE — 3331090002 HH PPS REVENUE DEBIT

## 2019-08-01 PROCEDURE — 3331090001 HH PPS REVENUE CREDIT

## 2019-08-02 PROCEDURE — 3331090001 HH PPS REVENUE CREDIT

## 2019-08-02 PROCEDURE — 3331090002 HH PPS REVENUE DEBIT

## 2019-08-03 PROCEDURE — 3331090001 HH PPS REVENUE CREDIT

## 2019-08-03 PROCEDURE — 3331090002 HH PPS REVENUE DEBIT

## 2019-08-04 PROCEDURE — 3331090002 HH PPS REVENUE DEBIT

## 2019-08-04 PROCEDURE — 3331090001 HH PPS REVENUE CREDIT

## 2019-08-05 PROCEDURE — 3331090001 HH PPS REVENUE CREDIT

## 2019-08-05 PROCEDURE — 3331090002 HH PPS REVENUE DEBIT

## 2019-08-06 PROCEDURE — 3331090002 HH PPS REVENUE DEBIT

## 2019-08-06 PROCEDURE — 3331090001 HH PPS REVENUE CREDIT

## 2019-08-07 PROCEDURE — 3331090001 HH PPS REVENUE CREDIT

## 2019-08-07 PROCEDURE — 3331090002 HH PPS REVENUE DEBIT

## 2019-08-08 PROCEDURE — 3331090002 HH PPS REVENUE DEBIT

## 2019-08-08 PROCEDURE — 3331090001 HH PPS REVENUE CREDIT

## 2019-08-09 DIAGNOSIS — Z95.5 S/P CORONARY ARTERY STENT PLACEMENT: ICD-10-CM

## 2019-08-09 PROCEDURE — 3331090002 HH PPS REVENUE DEBIT

## 2019-08-09 PROCEDURE — 3331090001 HH PPS REVENUE CREDIT

## 2019-08-09 RX ORDER — CLOPIDOGREL BISULFATE 75 MG/1
TABLET ORAL
Qty: 30 TAB | Refills: 0 | Status: SHIPPED | OUTPATIENT
Start: 2019-08-09 | End: 2019-09-10 | Stop reason: SDUPTHER

## 2019-08-10 PROCEDURE — 3331090002 HH PPS REVENUE DEBIT

## 2019-08-10 PROCEDURE — 3331090001 HH PPS REVENUE CREDIT

## 2019-08-11 PROCEDURE — 3331090002 HH PPS REVENUE DEBIT

## 2019-08-11 PROCEDURE — 3331090001 HH PPS REVENUE CREDIT

## 2019-08-12 PROCEDURE — 3331090002 HH PPS REVENUE DEBIT

## 2019-08-12 PROCEDURE — 3331090001 HH PPS REVENUE CREDIT

## 2019-08-13 PROCEDURE — 3331090002 HH PPS REVENUE DEBIT

## 2019-08-13 PROCEDURE — 3331090001 HH PPS REVENUE CREDIT

## 2019-08-14 ENCOUNTER — HOME CARE VISIT (OUTPATIENT)
Dept: SCHEDULING | Facility: HOME HEALTH | Age: 84
End: 2019-08-14
Payer: MEDICARE

## 2019-08-14 PROCEDURE — 3331090003 HH PPS REVENUE ADJ

## 2019-08-14 PROCEDURE — 3331090001 HH PPS REVENUE CREDIT

## 2019-08-14 PROCEDURE — 3331090002 HH PPS REVENUE DEBIT

## 2019-08-15 RX ORDER — AMLODIPINE BESYLATE 10 MG/1
10 TABLET ORAL DAILY
Qty: 90 TAB | Refills: 3 | Status: ON HOLD | OUTPATIENT
Start: 2019-08-15 | End: 2020-02-27 | Stop reason: SDUPTHER

## 2019-09-09 ENCOUNTER — HOSPITAL ENCOUNTER (OUTPATIENT)
Dept: LAB | Age: 84
Discharge: HOME | End: 2019-09-09
Payer: MEDICARE

## 2019-09-09 ENCOUNTER — OFFICE VISIT (OUTPATIENT)
Dept: ONCOLOGY | Age: 84
End: 2019-09-09

## 2019-09-09 VITALS
WEIGHT: 240 LBS | HEIGHT: 66 IN | SYSTOLIC BLOOD PRESSURE: 138 MMHG | RESPIRATION RATE: 16 BRPM | HEART RATE: 97 BPM | TEMPERATURE: 99.1 F | BODY MASS INDEX: 38.57 KG/M2 | OXYGEN SATURATION: 97 % | DIASTOLIC BLOOD PRESSURE: 80 MMHG

## 2019-09-09 DIAGNOSIS — D64.9 CHRONIC ANEMIA: ICD-10-CM

## 2019-09-09 DIAGNOSIS — I82.532 CHRONIC DEEP VEIN THROMBOSIS (DVT) OF POPLITEAL VEIN OF LEFT LOWER EXTREMITY (HCC): ICD-10-CM

## 2019-09-09 DIAGNOSIS — I82.532 CHRONIC DEEP VEIN THROMBOSIS (DVT) OF POPLITEAL VEIN OF LEFT LOWER EXTREMITY (HCC): Primary | ICD-10-CM

## 2019-09-09 LAB
ALBUMIN SERPL-MCNC: 3.7 G/DL (ref 3.4–5)
ALBUMIN/GLOB SERPL: 0.8 {RATIO} (ref 0.8–1.7)
ALP SERPL-CCNC: 99 U/L (ref 45–117)
ALT SERPL-CCNC: 16 U/L (ref 13–56)
ANION GAP SERPL CALC-SCNC: 3 MMOL/L (ref 3–18)
AST SERPL-CCNC: 10 U/L (ref 10–38)
BILIRUB SERPL-MCNC: 0.5 MG/DL (ref 0.2–1)
BUN SERPL-MCNC: 11 MG/DL (ref 7–18)
BUN/CREAT SERPL: 15 (ref 12–20)
CALCIUM SERPL-MCNC: 9.8 MG/DL (ref 8.5–10.1)
CHLORIDE SERPL-SCNC: 103 MMOL/L (ref 100–111)
CO2 SERPL-SCNC: 33 MMOL/L (ref 21–32)
CREAT SERPL-MCNC: 0.75 MG/DL (ref 0.6–1.3)
GLOBULIN SER CALC-MCNC: 4.4 G/DL (ref 2–4)
GLUCOSE SERPL-MCNC: 175 MG/DL (ref 74–99)
POTASSIUM SERPL-SCNC: 3.6 MMOL/L (ref 3.5–5.5)
PROT SERPL-MCNC: 8.1 G/DL (ref 6.4–8.2)
SODIUM SERPL-SCNC: 139 MMOL/L (ref 136–145)

## 2019-09-09 PROCEDURE — 82728 ASSAY OF FERRITIN: CPT

## 2019-09-09 PROCEDURE — 83540 ASSAY OF IRON: CPT

## 2019-09-09 PROCEDURE — 36415 COLL VENOUS BLD VENIPUNCTURE: CPT

## 2019-09-09 PROCEDURE — 80053 COMPREHEN METABOLIC PANEL: CPT
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
## 2019-09-09 NOTE — PATIENT INSTRUCTIONS
Anemia: Care Instructions  Your Care Instructions    Anemia is a low level of red blood cells, which carry oxygen throughout your body. Many things can cause anemia. Lack of iron is one of the most common causes. Your body needs iron to make hemoglobin, a substance in red blood cells that carries oxygen from the lungs to your body's cells. Without enough iron, the body produces fewer and smaller red blood cells. As a result, your body's cells do not get enough oxygen, and you feel tired and weak. And you may have trouble concentrating. Bleeding is the most common cause of a lack of iron. You may have heavy menstrual bleeding or bleeding caused by conditions such as ulcers, hemorrhoids, or cancer. Regular use of aspirin or other anti-inflammatory medicines (such as ibuprofen) also can cause bleeding in some people. A lack of iron in your diet also can cause anemia, especially at times when the body needs more iron, such as during pregnancy, infancy, and the teen years. Your doctor may have prescribed iron pills. It may take several months of treatment for your iron levels to return to normal. Your doctor also may suggest that you eat foods that are rich in iron, such as meat and beans. There are many other causes of anemia. It is not always due to a lack of iron. Finding the specific cause of your anemia will help your doctor find the right treatment for you. Follow-up care is a key part of your treatment and safety. Be sure to make and go to all appointments, and call your doctor if you are having problems. It's also a good idea to know your test results and keep a list of the medicines you take. How can you care for yourself at home? · Take your medicines exactly as prescribed. Call your doctor if you think you are having a problem with your medicine. · If your doctor recommends iron pills, take them as directed:  ? Try to take the pills on an empty stomach about 1 hour before or 2 hours after meals. But you may need to take iron with food to avoid an upset stomach. ? Do not take antacids or drink milk or caffeine drinks (such as coffee, tea, or cola) at the same time or within 2 hours of the time that you take your iron. They can make it hard for your body to absorb the iron. ? Vitamin C (from food or supplements) helps your body absorb iron. Try taking iron pills with a glass of orange juice or some other food that is high in vitamin C, such as citrus fruits. ? Iron pills may cause stomach problems, such as heartburn, nausea, diarrhea, constipation, and cramps. Be sure to drink plenty of fluids, and include fruits, vegetables, and fiber in your diet each day. Iron pills often make your bowel movements dark or green. ? If you forget to take an iron pill, do not take a double dose of iron the next time you take a pill. ? Keep iron pills out of the reach of small children. An overdose of iron can be very dangerous. · Follow your doctor's advice about eating iron-rich foods. These include red meat, shellfish, poultry, eggs, beans, raisins, whole-grain bread, and leafy green vegetables. · Steam vegetables to help them keep their iron content. When should you call for help? Call 911 anytime you think you may need emergency care. For example, call if:    · You have symptoms of a heart attack. These may include:  ? Chest pain or pressure, or a strange feeling in the chest.  ? Sweating. ? Shortness of breath. ? Nausea or vomiting. ? Pain, pressure, or a strange feeling in the back, neck, jaw, or upper belly or in one or both shoulders or arms. ? Lightheadedness or sudden weakness. ? A fast or irregular heartbeat. After you call 911, the  may tell you to chew 1 adult-strength or 2 to 4 low-dose aspirin. Wait for an ambulance.  Do not try to drive yourself.     · You passed out (lost consciousness).    Call your doctor now or seek immediate medical care if:    · You have new or increased shortness of breath.     · You are dizzy or lightheaded, or you feel like you may faint.     · Your fatigue and weakness continue or get worse.     · You have any abnormal bleeding, such as:  ? Nosebleeds. ? Vaginal bleeding that is different (heavier, more frequent, at a different time of the month) than what you are used to.  ? Bloody or black stools, or rectal bleeding. ? Bloody or pink urine.    Watch closely for changes in your health, and be sure to contact your doctor if:    · You do not get better as expected. Where can you learn more? Go to http://cristina-mignon.info/. Enter R301 in the search box to learn more about \"Anemia: Care Instructions. \"  Current as of: March 28, 2019  Content Version: 12.1  © 4876-4789 [employer]. Care instructions adapted under license by [employer] (which disclaims liability or warranty for this information). If you have questions about a medical condition or this instruction, always ask your healthcare professional. [place] [address] any warranty or liability for your use of this information. Deep Vein Thrombosis: Care Instructions  Your Care Instructions    A deep vein thrombosis (DVT) is a blood clot in certain veins of the legs, pelvis, or arms. Blood clots in these veins need to be treated because they can get bigger, break loose, and travel through the bloodstream to the lungs. A blood clot in a lung can be life-threatening. The doctor may have given you a blood thinner (anticoagulant). A blood thinner can stop the blood clot from growing larger and prevent new clots from forming. You will need to take a blood thinner for 3 to 6 months or longer. The doctor has checked you carefully, but problems can develop later. If you notice any problems or new symptoms, get medical treatment right away. Follow-up care is a key part of your treatment and safety.  Be sure to make and go to all appointments, and call your doctor if you are having problems. It's also a good idea to know your test results and keep a list of the medicines you take. How can you care for yourself at home? · Take your medicines exactly as prescribed. Call your doctor if you think you are having a problem with your medicine. · If you are taking a blood thinner, be sure you get instructions about how to take your medicine safely. Blood thinners can cause serious bleeding problems. · Wear compression stockings if your doctor recommends them. These stockings are tighter at the feet than on the legs. They may reduce pain and swelling in your legs. But there are different types of stockings, and they need to fit right. So your doctor will recommend what you need. · When you sit, use a pillow to raise the arm or leg that has the blood clot. Try to keep it above the level of your heart. When should you call for help? Call 911 anytime you think you may need emergency care. For example, call if:    · You passed out (lost consciousness).     · You have symptoms of a blood clot in your lung (called a pulmonary embolism). These include:  ? Sudden chest pain. ? Trouble breathing. ? Coughing up blood.    Call your doctor now or seek immediate medical care if:    · You have new or worse trouble breathing.     · You are dizzy or lightheaded, or you feel like you may faint.     · You have symptoms of a blood clot in your arm or leg. These may include:  ? Pain in the arm, calf, back of the knee, thigh, or groin. ? Redness and swelling in the arm, leg, or groin.    Watch closely for changes in your health, and be sure to contact your doctor if:    · You do not get better as expected. Where can you learn more? Go to http://cristina-mignon.info/. Enter A715 in the search box to learn more about \"Deep Vein Thrombosis: Care Instructions. \"  Current as of: September 26, 2018  Content Version: 12.1  © 5967-3761 [employer].  Care instructions adapted under license by [employer] (which disclaims liability or warranty for this information). If you have questions about a medical condition or this instruction, always ask your healthcare professional. [address] any warranty or liability for your use of this information.

## 2019-09-10 DIAGNOSIS — Z95.5 S/P CORONARY ARTERY STENT PLACEMENT: ICD-10-CM

## 2019-09-10 DIAGNOSIS — I82.532 CHRONIC DEEP VEIN THROMBOSIS (DVT) OF POPLITEAL VEIN OF LEFT LOWER EXTREMITY (HCC): Primary | ICD-10-CM

## 2019-09-10 DIAGNOSIS — D64.9 CHRONIC ANEMIA: ICD-10-CM

## 2019-09-10 LAB
FERRITIN SERPL-MCNC: 63 NG/ML (ref 8–388)
IRON SATN MFR SERPL: 19 %
IRON SERPL-MCNC: 59 UG/DL (ref 50–175)
TIBC SERPL-MCNC: 308 UG/DL (ref 250–450)

## 2019-09-11 RX ORDER — CLOPIDOGREL BISULFATE 75 MG/1
TABLET ORAL
Qty: 30 TAB | Refills: 0 | Status: SHIPPED | OUTPATIENT
Start: 2019-09-11 | End: 2019-10-16 | Stop reason: SDUPTHER

## 2019-09-16 ENCOUNTER — OFFICE VISIT (OUTPATIENT)
Dept: FAMILY MEDICINE CLINIC | Age: 84
End: 2019-09-16

## 2019-09-16 VITALS
SYSTOLIC BLOOD PRESSURE: 121 MMHG | HEIGHT: 66 IN | HEART RATE: 111 BPM | OXYGEN SATURATION: 100 % | BODY MASS INDEX: 39.34 KG/M2 | RESPIRATION RATE: 20 BRPM | DIASTOLIC BLOOD PRESSURE: 83 MMHG | WEIGHT: 244.8 LBS | TEMPERATURE: 98.3 F

## 2019-09-16 DIAGNOSIS — E03.9 ACQUIRED HYPOTHYROIDISM: ICD-10-CM

## 2019-09-16 DIAGNOSIS — Z79.4 CONTROLLED TYPE 2 DIABETES MELLITUS WITH OTHER CIRCULATORY COMPLICATION, WITH LONG-TERM CURRENT USE OF INSULIN (HCC): Primary | ICD-10-CM

## 2019-09-16 DIAGNOSIS — I10 ESSENTIAL HYPERTENSION: ICD-10-CM

## 2019-09-16 DIAGNOSIS — E78.2 MIXED HYPERLIPIDEMIA: ICD-10-CM

## 2019-09-16 DIAGNOSIS — E11.59 CONTROLLED TYPE 2 DIABETES MELLITUS WITH OTHER CIRCULATORY COMPLICATION, WITH LONG-TERM CURRENT USE OF INSULIN (HCC): Primary | ICD-10-CM

## 2019-09-16 DIAGNOSIS — J30.9 ALLERGIC RHINITIS, UNSPECIFIED SEASONALITY, UNSPECIFIED TRIGGER: ICD-10-CM

## 2019-09-16 DIAGNOSIS — Z23 ENCOUNTER FOR IMMUNIZATION: ICD-10-CM

## 2019-09-16 RX ORDER — LORATADINE 10 MG/1
10 TABLET ORAL DAILY
Qty: 30 TAB | Refills: 5 | Status: SHIPPED | OUTPATIENT
Start: 2019-09-16 | End: 2019-12-04 | Stop reason: SDUPTHER

## 2019-09-16 NOTE — PROGRESS NOTES
Chief Complaint   Patient presents with    Diabetes    Hypertension    Cholesterol Problem     high chol         HPI    Gennette Romberg is a 82 y.o. female presenting today for  follow up of dm, htn, hld. Home bs readings are usually 130s in the morning. She will see higher readings if she has candy. Pt still sees endo for her dm as well as hypothyroidism. Patient had labs with hematology recently and during her June 2019 hospital admission. Labs reviewed in detail with patient     Patient does not need medication refills today. New concerns today: none      Pt c/o pnd that is worse at night. ROS  Review of Systems   Constitutional: Negative. HENT: Negative. Respiratory: Negative. Cardiovascular: Negative. All other systems reviewed and are negative. Physical Exam  Physical Exam   Nursing note and vitals reviewed. Constitutional: She is oriented to person, place, and time. She appears well-developed and well-nourished. HENT:   Head: Normocephalic and atraumatic. Right Ear: External ear normal.   Left Ear: External ear normal.   Nose: Nose normal.   Eyes: Conjunctivae and EOM are normal.   Neck: Normal range of motion. Neck supple. No JVD present. Carotid bruit is not present. No thyromegaly present. Cardiovascular: Normal rate, regular rhythm, normal heart sounds and intact distal pulses. Exam reveals no gallop and no friction rub. No murmur heard. Pulmonary/Chest: Effort normal and breath sounds normal. She has no wheezes. She has no rhonchi. She has no rales. Abdominal: Soft. Bowel sounds are normal.   Musculoskeletal: Normal range of motion. Neurological: She is alert and oriented to person, place, and time. Coordination normal.   Skin: Skin is warm and dry. Psychiatric: She has a normal mood and affect. Her behavior is normal. Judgment and thought content normal.     Diagnoses and all orders for this visit:    1.  Controlled type 2 diabetes mellitus with other circulatory complication, with long-term current use of insulin (HCC)  -     METABOLIC PANEL, COMPREHENSIVE; Future  -     LIPID PANEL; Future  A1c drawn by endo    2. Essential hypertension  -     METABOLIC PANEL, COMPREHENSIVE; Future  -     LIPID PANEL; Future    3. Mixed hyperlipidemia  -     METABOLIC PANEL, COMPREHENSIVE; Future  -     LIPID PANEL; Future    4. Acquired hypothyroidism  Labs drawn by endo    5. Allergic rhinitis, unspecified seasonality, unspecified trigger  -     loratadine (CLARITIN) 10 mg tablet; Take 1 Tab by mouth daily. Follow-up and Dispositions    · Return in about 4 months (around 1/16/2020) for diabetes, high blood pressure, high cholesterol, thyroid disease.

## 2019-09-16 NOTE — PROGRESS NOTES
Dave Clarke 1935 female who presents for routine immunizations. Patient denies any symptoms , reactions or allergies that would exclude them from being immunized today. Risks and adverse reactions were discussed and the VIS was given to them. All questions were addressed. Order placed for HD flu vaccine,  per Verbal Order from Dr Boogie Record with read back. Patient was observed for 15 min post injection. There were no reactions observed.     Pilar Graves

## 2019-09-16 NOTE — PROGRESS NOTES
Lizz Wilson presents today for   Chief Complaint   Patient presents with    Diabetes    Hypertension    Cholesterol Problem     high chol       Is someone accompanying this pt? Yes, caregiver    Is the patient using any DME equipment during [address]? wheelchair    Depression Screening:  3 most recent PHQ Screens 5/6/2019   PHQ Not Done -   Little interest or pleasure in doing things Not at all   Feeling down, depressed, irritable, or hopeless Not at all   Total Score PHQ 2 0       Learning Assessment:  Learning Assessment 1/29/2019   PRIMARY LEARNER Patient   HIGHEST LEVEL OF EDUCATION - PRIMARY LEARNER  -   BARRIERS PRIMARY LEARNER -   [address] CAREGIVER -   Aracely Gross 49    NEED -   LEARNER PREFERENCE PRIMARY LISTENING     -     -     -     -   ANSWERED BY patient   RELATIONSHIP SELF       Abuse Screening:  Abuse Screening Questionnaire 4/23/2019   Do you ever feel afraid of your partner? N   Are you in a relationship with someone who physically or mentally threatens you? N   Is it safe for you to go home? Y       Fall Screening  Fall Risk Assessment, last 12 mths 4/23/2019   Able to walk? Yes   Fall in past 12 months? Yes   Fall with injury? Yes   Number of falls in past 12 months 1   Fall Risk Score 2       Generalized Anxiety  No flowsheet data found. Health Maintenance Due   Topic Date Due    FOOT EXAM Q1  10/24/2018    Influenza Age 5 to Adult  08/01/2019   . Health Maintenance reviewed and discussed and ordered per Provider. Coordination of Care  1. Have you been to the ER, urgent care clinic since your last visit? Hospitalized since your last visit? no    2. Have you seen or consulted any other health care providers outside of the [address] [place] [address] [place] since your last visit? Include any pap smears or colon screening.  no      Advance Directive discussed 7/5/19

## 2019-09-21 DIAGNOSIS — I20.8 OTHER FORMS OF ANGINA PECTORIS (HCC): ICD-10-CM

## 2019-09-23 RX ORDER — ISOSORBIDE MONONITRATE 30 MG/1
TABLET, EXTENDED RELEASE ORAL
Qty: 30 TAB | Refills: 0 | Status: SHIPPED | OUTPATIENT
Start: 2019-09-23 | End: 2019-10-25 | Stop reason: SDUPTHER

## 2019-09-24 ENCOUNTER — HOSPITAL ENCOUNTER (EMERGENCY)
Age: 84
Discharge: HOME | End: 2019-09-24
Attending: EMERGENCY MEDICINE
Payer: MEDICARE

## 2019-09-24 ENCOUNTER — APPOINTMENT (OUTPATIENT)
Dept: CT IMAGING | Age: 84
End: 2019-09-24
Attending: PHYSICIAN ASSISTANT
Payer: MEDICARE

## 2019-09-24 VITALS
TEMPERATURE: 98.3 F | BODY MASS INDEX: 37.83 KG/M2 | WEIGHT: 241 LBS | HEIGHT: 67 IN | DIASTOLIC BLOOD PRESSURE: 74 MMHG | SYSTOLIC BLOOD PRESSURE: 151 MMHG | OXYGEN SATURATION: 97 % | HEART RATE: 80 BPM | RESPIRATION RATE: 16 BRPM

## 2019-09-24 DIAGNOSIS — H93.19 TINNITUS, UNSPECIFIED LATERALITY: Primary | ICD-10-CM

## 2019-09-24 PROCEDURE — 99283 EMERGENCY DEPT VISIT LOW MDM: CPT

## 2019-09-24 PROCEDURE — 70450 CT HEAD/BRAIN W/O DYE: CPT

## 2019-09-24 NOTE — DISCHARGE INSTRUCTIONS
Patient Education        Tinnitus: Care Instructions  Your Care Instructions    Many people have some ringing sounds in their ears once in a while. You may hear a roar, a hiss, a tinkle, or a buzz. The sound usually lasts only a few minutes. If it goes on all the time, you may have tinnitus. Tinnitus is usually caused by long-term exposure to loud noise. This damages the nerves in the inner ear. It can occur with all types of hearing loss. It may be a symptom of almost any ear problem. Tinnitus may be caused by a buildup of earwax. Or it may be caused by ear infections or certain medicines (especially antibiotics or large amounts of aspirin). You can also hear noises in your ears because of an injury to the ears, drinking too much alcohol or caffeine, or a medical condition. You may need tests to evaluate your hearing and to find causes of long-lasting tinnitus. Your doctor may suggest one or more treatments to help you cope with it. You can also do things at home to help reduce symptoms. Follow-up care is a key part of your treatment and safety. Be sure to make and go to all appointments, and call your doctor if you are having problems. It's also a good idea to know your test results and keep a list of the medicines you take. How can you care for yourself at home? · Limit or cut out alcohol and caffeine. They can make your symptoms worse. · Do not smoke or use other tobacco products. Nicotine reduces blood flow to the ear and makes tinnitus worse. If you need help quitting, talk to your doctor about stop-smoking programs and medicines. These can increase your chances of quitting for good. · Talk to your doctor about whether to stop taking aspirin and similar products such as ibuprofen or naproxen. · Get exercise often. It can improve blood flow to the ear. Ways to cope with noise  Some tinnitus may last a long time. To cope with noise, try to:  · Avoid noises that you think caused your tinnitus.  If you can't avoid loud noises, wear earplugs or earmuffs. · Ignore the sound by paying attention to other things. · Relax using biofeedback, meditation, or yoga. Feeling stressed and being tired can make tinnitus worse. · Play music or white noise to help you sleep. Background noise may cover up the noise that you hear in your ears. You can buy a machine that makes soothing sounds, such as ocean waves. When should you call for help? Call 911 anytime you think you may need emergency care. For example, call if:    · You have symptoms of a stroke. These may include:  ? Sudden numbness, tingling, weakness, or loss of movement in your face, arm, or leg, especially on only one side of your body. ? Sudden vision changes. ? Sudden trouble speaking. ? Sudden confusion or trouble understanding simple statements. ? Sudden problems with walking or balance. ? A sudden, severe headache that is different from past headaches.    Call your doctor now or seek immediate medical care if:    · You develop other symptoms. These may include hearing loss (or worse hearing loss), balance problems, dizziness, nausea, or vomiting.    Watch closely for changes in your health, and be sure to contact your doctor if:    · Your tinnitus moves from both ears to one ear.     · Your hearing loss gets worse within 1 day after an ear injury.     · Your tinnitus or hearing loss does not get better within 1 week after an ear injury.     · Your tinnitus bothers you enough that you want to take medicines to help you cope with it. Where can you learn more? Go to http://cristina-mignon.info/. Enter S165 in the search box to learn more about \"Tinnitus: Care Instructions. \"  Current as of: October 21, 2018  Content Version: 12.2  © 8989-4786 [employer]. Care instructions adapted under license by [employer] (which disclaims liability or warranty for this information).  If you have questions about a medical condition or this instruction, always ask your healthcare professional. [place] [address] any warranty or liability for your use of this information.

## 2019-09-24 NOTE — ED TRIAGE NOTES
Patient states she has had a ringing in her right ear for over a week, and occasionally gets a sharp pain in the ear. She states she also feels some nasal congestion from allergies today.

## 2019-09-24 NOTE — ED NOTES
Anthony Hobbs is a 82 y.o. female that was discharged in stable condition. The patients diagnosis, condition and treatment were explained to  patient and aftercare instructions were given. The patient verbalized understanding. Patient armband removed and shredded.
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
## 2019-10-09 ENCOUNTER — OFFICE VISIT (OUTPATIENT)
Dept: FAMILY MEDICINE CLINIC | Age: 84
End: 2019-10-09

## 2019-10-09 VITALS
DIASTOLIC BLOOD PRESSURE: 85 MMHG | SYSTOLIC BLOOD PRESSURE: 153 MMHG | TEMPERATURE: 98.1 F | HEIGHT: 67 IN | HEART RATE: 92 BPM | WEIGHT: 241 LBS | RESPIRATION RATE: 18 BRPM | BODY MASS INDEX: 37.83 KG/M2

## 2019-10-09 DIAGNOSIS — J06.9 UPPER RESPIRATORY TRACT INFECTION, UNSPECIFIED TYPE: ICD-10-CM

## 2019-10-09 DIAGNOSIS — H93.13 TINNITUS OF BOTH EARS: Primary | ICD-10-CM

## 2019-10-09 NOTE — PATIENT INSTRUCTIONS
High Blood Pressure: Care Instructions  Overview    It's normal for blood pressure to go up and down throughout the day. But if it stays up, you have high blood pressure. Another name for high blood pressure is hypertension. Despite what a lot of people think, high blood pressure usually doesn't cause headaches or make you feel dizzy or lightheaded. It usually has no symptoms. But it does increase your risk of stroke, heart attack, and other problems. You and your doctor will talk about your risks of these problems based on your blood pressure. Your doctor will give you a goal for your blood pressure. Your goal will be based on your health and your age. Lifestyle changes, such as eating healthy and being active, are always important to help lower blood pressure. You might also take medicine to reach your blood pressure goal.  Follow-up care is a key part of your treatment and safety. Be sure to make and go to all appointments, and call your doctor if you are having problems. It's also a good idea to know your test results and keep a list of the medicines you take. How can you care for yourself at home? Medical treatment  · If you stop taking your medicine, your blood pressure will go back up. You may take one or more types of medicine to lower your blood pressure. Be safe with medicines. Take your medicine exactly as prescribed. Call your doctor if you think you are having a problem with your medicine. · Talk to your doctor before you start taking aspirin every day. Aspirin can help certain people lower their risk of a heart attack or stroke. But taking aspirin isn't right for everyone, because it can cause serious bleeding. · See your doctor regularly. You may need to see the doctor more often at first or until your blood pressure comes down. · If you are taking blood pressure medicine, talk to your doctor before you take decongestants or anti-inflammatory medicine, such as ibuprofen.  Some of these medicines can raise blood pressure. · Learn how to check your blood pressure at home. Lifestyle changes  · Stay at a healthy weight. This is especially important if you put on weight around the waist. Losing even 10 pounds can help you lower your blood pressure. · If your doctor recommends it, get more exercise. Walking is a good choice. Bit by bit, increase the amount you walk every day. Try for at least 30 minutes on most days of the week. You also may want to swim, bike, or do other activities. · Avoid or limit alcohol. Talk to your doctor about whether you can drink any alcohol. · Try to limit how much sodium you eat to less than 2,300 milligrams (mg) a day. Your doctor may ask you to try to eat less than 1,500 mg a day. · Eat plenty of fruits (such as bananas and oranges), vegetables, legumes, whole grains, and low-fat dairy products. · Lower the amount of saturated fat in your diet. Saturated fat is found in animal products such as milk, cheese, and meat. Limiting these foods may help you lose weight and also lower your risk for heart disease. · Do not smoke. Smoking increases your risk for heart attack and stroke. If you need help quitting, talk to your doctor about stop-smoking programs and medicines. These can increase your chances of quitting for good. When should you call for help? Call  911 anytime you think you may need emergency care. This may mean having symptoms that suggest that your blood pressure is causing a serious heart or blood vessel problem. Your blood pressure may be over 180/120.   For example, call  911 if:    · You have symptoms of a heart attack. These may include:  ? Chest pain or pressure, or a strange feeling in the chest.  ? Sweating. ? Shortness of breath. ? Nausea or vomiting. ? Pain, pressure, or a strange feeling in the back, neck, jaw, or upper belly or in one or both shoulders or arms. ? Lightheadedness or sudden weakness.   ? A fast or irregular heartbeat.     · You have symptoms of a stroke. These may include:  ? Sudden numbness, tingling, weakness, or loss of movement in your face, arm, or leg, especially on only one side of your body. ? Sudden vision changes. ? Sudden trouble speaking. ? Sudden confusion or trouble understanding simple statements. ? Sudden problems with walking or balance. ? A sudden, severe headache that is different from past headaches.     · You have severe back or belly pain.    Do not wait until your blood pressure comes down on its own. Get help right away.   Call your doctor now or seek immediate care if:    · Your blood pressure is much higher than normal (such as 180/120 or higher), but you don't have symptoms.     · You think high blood pressure is causing symptoms, such as:  ? Severe headache.  ? Blurry vision.    Watch closely for changes in your health, and be sure to contact your doctor if:    · Your blood pressure measures higher than your doctor recommends at least 2 times. That means the top number is higher or the bottom number is higher, or both.     · You think you may be having side effects from your blood pressure medicine. Where can you learn more? Go to http://cristina-mignon.info/. Enter A774 in the search box to learn more about \"High Blood Pressure: Care Instructions. \"  Current as of: April 9, 2019  Content Version: 12.2  © 8335-3244 [employer]. Care instructions adapted under license by [employer] (which disclaims liability or warranty for this information). If you have questions about a medical condition or this instruction, always ask your healthcare professional. [address] any warranty or liability for your use of this information.

## 2019-10-09 NOTE — PROGRESS NOTES
Chief Complaint   Patient presents with   Crissy Victore in Ear     Patient stated that she been having ringing in the ears and sinus infection. HISTORY OF PRESENT ILLNESS  Zunilda Pierson is a 82 y.o. female. HPI  Pt here for f/u of ER visit for ringing in the ears and possible sinus infection. She was seen on 9/24/19 and dx with tinnitus. CT of the head was done to r/o mass. No acute abn were noted. Pt has seen ent already for f/u of the ER visit. Review of Systems   Constitutional: Negative. HENT: Positive for congestion and tinnitus. Respiratory: Negative. Cardiovascular: Negative. All other systems reviewed and are negative. Physical Exam   Constitutional: She is oriented to person, place, and time. She appears well-developed and well-nourished. No distress. HENT:   Head: Normocephalic and atraumatic. Right Ear: Hearing, tympanic membrane, external ear and ear canal normal.   Left Ear: Hearing, tympanic membrane, external ear and ear canal normal.   Nose: Mucosal edema present. Right sinus exhibits no maxillary sinus tenderness and no frontal sinus tenderness. Left sinus exhibits no maxillary sinus tenderness and no frontal sinus tenderness. Mouth/Throat: Uvula is midline, oropharynx is clear and moist and mucous membranes are normal.   Eyes: Conjunctivae and EOM are normal.   Neck: Normal range of motion. Neck supple. No JVD present. No thyromegaly present. Cardiovascular: Normal rate, regular rhythm and normal heart sounds. Exam reveals no gallop and no friction rub. No murmur heard. Pulmonary/Chest: Effort normal and breath sounds normal. She has no wheezes. She has no rhonchi. She has no rales. Musculoskeletal: Normal range of motion. Lymphadenopathy:     She has no cervical adenopathy. Neurological: She is alert and oriented to person, place, and time. Coordination normal.   Skin: Skin is warm and dry. Psychiatric: She has a normal mood and affect.  Her behavior is normal. Judgment and thought content normal.   Nursing note and vitals reviewed. ASSESSMENT and PLAN  Diagnoses and all orders for this visit:    1. Tinnitus of both ears  Patient reassured. Care as per ENT. 2. Upper respiratory tract infection, unspecified type  Recommend symptomatic treatment as needed. Follow-up and Dispositions    · Return if symptoms worsen or fail to improve.

## 2019-10-16 ENCOUNTER — PATIENT OUTREACH (OUTPATIENT)
Dept: FAMILY MEDICINE CLINIC | Age: 84
End: 2019-10-16

## 2019-10-16 DIAGNOSIS — Z95.5 S/P CORONARY ARTERY STENT PLACEMENT: ICD-10-CM

## 2019-10-16 RX ORDER — CLOPIDOGREL BISULFATE 75 MG/1
75 TABLET ORAL DAILY
Qty: 30 TAB | Refills: 0 | Status: SHIPPED | OUTPATIENT
Start: 2019-10-16 | End: 2019-11-18 | Stop reason: SDUPTHER

## 2019-10-16 NOTE — TELEPHONE ENCOUNTER
Requested Prescriptions     Pending Prescriptions Disp Refills    clopidogrel (PLAVIX) 75 mg tab 30 Tab 0     Sig: Take 1 Tab by mouth daily.

## 2019-10-16 NOTE — PROGRESS NOTES
Contacted patient for Complex Case Management  follow up. No answer. Left message introducing self, role and reason for call. Requested return call. Contact information provided. Will attempt to contact at a later time.

## 2019-10-21 ENCOUNTER — PATIENT OUTREACH (OUTPATIENT)
Dept: FAMILY MEDICINE CLINIC | Age: 84
End: 2019-10-21

## 2019-10-21 NOTE — PROGRESS NOTES
Complex Case Management      Date/Time:  10/21/2019 10:07 AM    Method of communication with patient:phone    Nurse Navigator (NN) contacted the patient by telephone to perform Ambulatory Care Coordination. Verified name and  with patient as identifiers. Provided introduction to self, and explanation of the Ambulatory Care Manager's role. Reviewed most recent clinic visit w/ patient who verbalized understanding. Patient given an opportunity to ask questions. Top Challenges reviewed with the patient   1. Diet     Patient reports she has good days and bad days. States she saw Dr. Ej Vo, ENT, about a constant \"buzzing\" in her ears. Dr. Ej Vo advised her to take Nasacort, Singular and Claritin daily. Patient relates she finally had some relief from the buzzing noise last night. She will continue to monitor. She uses caution when changing position and states she tries not to drink too many liquids in the evening to try to eliminate the need to get out of the bed in the middle of the night to use the restroom. Patient states she has occasional nose bleeds, the most recent one being two nights ago. She was diagnosed with nasal polyps 5 or 6 years ago. She had a nosebleed episode that lasted about 55 minutes for which she had to seek emergency medical care, but that was a few years ago when she was on Brilinta. She is now on Plavix and has not had a nosebleed that severe since. Patient reports that she checks her blood sugar 5-6 times per day. She is followed by endocrinologist Dr. Teofilo Romberg. She states her blood sugars are usually good. She tries to adhere to a low sodium diet. Patient reports she does not have that great of an appetite. She admits to drinking V8 and cranberry juice and states she does like to eat a Kendrick's candy \"every now and then\": Patient was advised to try to watch carb and sugar intake. She reports that her next follow up with endocrinology is next month.   Patient uses a walker when ambulating around the house and a wheelchair when she leaves her home. She states her neighbor provides transportation to her appointments. Writer spoke with patient over 25 minutes and then phone call disconnected on the patient's end. Writer called patient back and left voicemail to return call. The patient agrees to contact the PCP office or the [address] [place] [address] for questions related to their healthcare. Provided contact information for future reference. Disease Specific:   CHF    Home Health Active: No    DME Active: Yes    Barriers to care? None at this time    Advance Care Planning:   Does patient have an Advance Directive:  not on file. Will discuss at a later date when relationship has been established. Medication(s):   Medication reconciliation was performed with patient, who verbalizes understanding of administration of home medications. There were no barriers to obtaining medications identified at this time. Referral to Pharm D needed: no     Current Outpatient Medications   Medication Sig    clopidogrel (PLAVIX) 75 mg tab Take 1 Tab by mouth daily.  isosorbide mononitrate ER (IMDUR) 30 mg tablet TAKE ONE TABLET BY MOUTH EVERY MORNING    loratadine (CLARITIN) 10 mg tablet Take 1 Tab by mouth daily.  amLODIPine (NORVASC) 10 mg tablet Take 1 Tab by mouth daily.  RONNELL PEN NEEDLE 32 gauge x 5/32\" ndle     senna-docusate (PERICOLACE) 8.6-50 mg per tablet Take 1 Tab by mouth every evening. Hold for loose stools    bisacodyl (DULCOLAX, BISACODYL,) 5 mg EC tablet Take 2 Tabs by mouth daily as needed for Constipation.  ondansetron hcl (ZOFRAN) 4 mg tablet Take 1 Tab by mouth every eight (8) hours as needed for Nausea.  Insulin Syringe-Needle U-100 (BD INSULIN SYRINGE ULTRA-FINE) 0.5 mL 31 gauge x 5/16\" syrg BD Insulin Syringe Ultra-Fine 0.5 mL 31 gauge x 5/16\"    lidocaine (ASPERCREME, LIDOCAINE,) 4 % patch 1 Patch by TransDERmal route every eight (8) hours.     albuterol (PROAIR HFA) 90 mcg/actuation inhaler INHALE 1 PUFF BY MOUTH EVERY 4 HOURS AS NEEDED FOR WHEEZING OR SHORTNESS OF BREATH    magnesium oxide (MAG-OX) 400 mg tablet Take 1 Tab by mouth two (2) times a day.  ranolazine ER (RANEXA) 500 mg SR tablet Take 1 Tab by mouth two (2) times a day.  insulin glargine (LANTUS U-100 INSULIN) 100 unit/mL injection Take 32 units every morning and 36 units qhs    potassium chloride SR (KLOR-CON 10) 10 mEq tablet Take 10 mEq by mouth daily as needed (muscle spasms, with Lasix).  ferrous sulfate 325 mg (65 mg iron) tablet Take 325 mg by mouth Daily (before breakfast).  ascorbic acid, vitamin C, (VITAMIN C) 250 mg tablet Take 250 mg by mouth daily.  acetaminophen (TYLENOL ARTHRITIS PAIN) 650 mg TbER Take 650 mg by mouth every eight (8) hours. Indications: Fever, Pain    furosemide (LASIX) 20 mg tablet Use daily as needed for leg swelling (Patient taking differently: Take 20 mg by mouth as needed. Use daily as needed for leg swelling)    levothyroxine (SYNTHROID) 75 mcg tablet Take 1 Tab by mouth Daily (before breakfast). (Patient taking differently: Take 112 mcg by mouth Daily (before breakfast). )    cyanocobalamin ER 1,000 mcg tablet Take 1 Tab by mouth daily.  montelukast (SINGULAIR) 10 mg tablet Take 1 Tab by mouth daily as needed. Indications: ALLERGIC RHINITIS    capsaicin 0.075 % topical cream Apply  to affected area three (3) times daily. (Patient taking differently: Apply 1 Each to affected area three (3) times daily. apply thin layer to area)    DOCOSAHEXANOIC ACID/EPA (FISH OIL PO) Take 1,000 mg by mouth two (2) times a day.  cholecalciferol, vitamin d3, (VITAMIN D) 1,000 unit tablet Take 5,000 Units by mouth two (2) times a day.  hydrALAZINE (APRESOLINE) 25 mg tablet Take 1 Tab by mouth three (3) times daily.  dilTIAZem (CARDIZEM) 60 mg tablet Take 1 Tab by mouth Before breakfast, lunch, and dinner.      No current facility-administered medications for this visit. [facility] follow up appointment(s):   Future Appointments   Date Time Provider Chelle Reeves   12/9/2019  1:15 PM [facility]   1/13/2020 10:30 AM Zachary Hadley MD [facility] None   1/27/2020 10:00 AM MD NATACHA Willams        Non-[facility] follow up appointment(s): Patient reports she has an appointment with endocrinology \"sometime next month\"     Goals      Pt will take all medications prescribed to be evaluated on each outreach      10/21/19 Med rec done with patient. She reports taking all medications as prescribed.  Understands and adheres to diet. 10/21/19 Patient states she drinks fruit juices and eats candy on occasion.  Discussed with importance of reducing carb/sweet intake

## 2019-10-25 DIAGNOSIS — I20.8 OTHER FORMS OF ANGINA PECTORIS (HCC): ICD-10-CM

## 2019-10-25 DIAGNOSIS — E03.9 HYPOTHYROIDISM, UNSPECIFIED TYPE: ICD-10-CM

## 2019-10-25 RX ORDER — ISOSORBIDE MONONITRATE 30 MG/1
TABLET, EXTENDED RELEASE ORAL
Qty: 30 TAB | Refills: 2 | Status: SHIPPED | OUTPATIENT
Start: 2019-10-25 | End: 2019-10-25 | Stop reason: SDUPTHER

## 2019-10-25 RX ORDER — ISOSORBIDE MONONITRATE 30 MG/1
TABLET, EXTENDED RELEASE ORAL
Qty: 90 TAB | Refills: 2 | Status: SHIPPED | OUTPATIENT
Start: 2019-10-25 | End: 2020-10-13 | Stop reason: SDUPTHER

## 2019-10-28 ENCOUNTER — PATIENT OUTREACH (OUTPATIENT)
Dept: FAMILY MEDICINE CLINIC | Age: 84
End: 2019-10-28

## 2019-10-28 NOTE — PROGRESS NOTES
Complex Case Management  Follow-Up    Date/Time:  10/28/2019 1:14 PM     ACM (Ambulatory Care Manager) contacted patient for Complex Case Management  follow up. Spoke to patient. Introduced self/role and reason for call. Patient reported:   She is still hearing \"buzzing\" noise in her ears and increased dizziness. Appt scheduled on patient's behalf with Dr. Tj Nunez (ENT) on 11/29/19 at 0830. This was the first available appt. Patient added to cancellation list. Patient advised of appt date and time. Writer also offered to schedule PCP appt for this issue in the mean time since the appt with Dr. Sharri Shaw is a month out. Patient declined. Patient reports she is taking all medications as prescribed. She denies needing any refills at this time. Pertinent negatives:  Cp, sob, n/v, fever, urinary or bowel issues. Specialist appointments since last outreach? no  If so, specialist and date: n/a    Medications:   New medications since last outreach: no  Does patient need refills on any medications: no  Medication changes since last outreach (dose adjustments or discontinued meds): no    Home Health company: n/a  Date of discharge: n/a    Barriers to care? None at this time      Patient reminded that there are physicians on call 24 hours a day / 7 days a week (M-F 5pm to 8am and from Friday 5pm until Monday 8a for the weekend) should the patient have questions or concerns. Future Appointments   Date Time Provider Chelle Reeves   12/9/2019  1:15 PM Trinity Bates MD [id number] [facility]   1/13/2020 10:30 AM Augie Hooper MD [facility] None   1/27/2020 10:00 AM Juan Stafford MD [id number] [facility]        Other upcoming appointments:  Dr. Sharri Shaw ENT 11/29/19 @4430    Goals      Pt will take all medications prescribed to be evaluated on each outreach      10/21/19 Med rec done with patient. She reports taking all medications as prescribed.  Understands and adheres to diet.       10/21/19 Patient states she drinks fruit juices and eats candy on occasion.  Discussed with importance of reducing carb/sweet intake

## 2019-10-29 ENCOUNTER — APPOINTMENT (OUTPATIENT)
Dept: GENERAL RADIOLOGY | Age: 84
End: 2019-10-29
Attending: EMERGENCY MEDICINE
Payer: MEDICARE

## 2019-10-29 ENCOUNTER — HOSPITAL ENCOUNTER (EMERGENCY)
Age: 84
Discharge: HOME | End: 2019-10-29
Attending: EMERGENCY MEDICINE
Payer: MEDICARE

## 2019-10-29 VITALS
WEIGHT: 242 LBS | BODY MASS INDEX: 38.89 KG/M2 | SYSTOLIC BLOOD PRESSURE: 171 MMHG | TEMPERATURE: 98.1 F | HEART RATE: 81 BPM | HEIGHT: 66 IN | OXYGEN SATURATION: 97 % | RESPIRATION RATE: 23 BRPM | DIASTOLIC BLOOD PRESSURE: 74 MMHG

## 2019-10-29 DIAGNOSIS — R42 DIZZINESS: Primary | ICD-10-CM

## 2019-10-29 DIAGNOSIS — I48.0 INTERMITTENT ATRIAL FIBRILLATION (HCC): ICD-10-CM

## 2019-10-29 LAB
ANION GAP SERPL CALC-SCNC: 6 MMOL/L (ref 3–18)
BASOPHILS # BLD: 0 K/UL (ref 0–0.1)
BASOPHILS NFR BLD: 1 % (ref 0–2)
BNP SERPL-MCNC: 60 PG/ML (ref 0–1800)
BUN SERPL-MCNC: 9 MG/DL (ref 7–18)
BUN/CREAT SERPL: 12 (ref 12–20)
CALCIUM SERPL-MCNC: 9.2 MG/DL (ref 8.5–10.1)
CHLORIDE SERPL-SCNC: 107 MMOL/L (ref 100–111)
CO2 SERPL-SCNC: 29 MMOL/L (ref 21–32)
CREAT SERPL-MCNC: 0.77 MG/DL (ref 0.6–1.3)
DIFFERENTIAL METHOD BLD: ABNORMAL
EOSINOPHIL # BLD: 0.2 K/UL (ref 0–0.4)
EOSINOPHIL NFR BLD: 4 % (ref 0–5)
ERYTHROCYTE [DISTWIDTH] IN BLOOD BY AUTOMATED COUNT: 12.5 % (ref 11.6–14.5)
GLUCOSE SERPL-MCNC: 176 MG/DL (ref 74–99)
HCT VFR BLD AUTO: 36.8 % (ref 35–45)
HGB BLD-MCNC: 11.5 G/DL (ref 12–16)
LYMPHOCYTES # BLD: 2.3 K/UL (ref 0.9–3.6)
LYMPHOCYTES NFR BLD: 41 % (ref 21–52)
MAGNESIUM SERPL-MCNC: 1.9 MG/DL (ref 1.6–2.6)
MCH RBC QN AUTO: 31.7 PG (ref 24–34)
MCHC RBC AUTO-ENTMCNC: 31.3 G/DL (ref 31–37)
MCV RBC AUTO: 101.4 FL (ref 74–97)
MONOCYTES # BLD: 0.4 K/UL (ref 0.05–1.2)
MONOCYTES NFR BLD: 7 % (ref 3–10)
NEUTS SEG # BLD: 2.6 K/UL (ref 1.8–8)
NEUTS SEG NFR BLD: 47 % (ref 40–73)
PLATELET # BLD AUTO: 221 K/UL (ref 135–420)
PMV BLD AUTO: 10.6 FL (ref 9.2–11.8)
POTASSIUM SERPL-SCNC: 3.5 MMOL/L (ref 3.5–5.5)
RBC # BLD AUTO: 3.63 M/UL (ref 4.2–5.3)
SODIUM SERPL-SCNC: 142 MMOL/L (ref 136–145)
TROPONIN I SERPL-MCNC: 0.02 NG/ML (ref 0–0.04)
WBC # BLD AUTO: 5.5 K/UL (ref 4.6–13.2)

## 2019-10-29 PROCEDURE — 84484 ASSAY OF TROPONIN QUANT: CPT

## 2019-10-29 PROCEDURE — 71046 X-RAY EXAM CHEST 2 VIEWS: CPT

## 2019-10-29 PROCEDURE — 83880 ASSAY OF NATRIURETIC PEPTIDE: CPT

## 2019-10-29 PROCEDURE — 93005 ELECTROCARDIOGRAM TRACING: CPT

## 2019-10-29 PROCEDURE — 83735 ASSAY OF MAGNESIUM: CPT

## 2019-10-29 PROCEDURE — 99285 EMERGENCY DEPT VISIT HI MDM: CPT

## 2019-10-29 PROCEDURE — 85025 COMPLETE CBC W/AUTO DIFF WBC: CPT

## 2019-10-29 PROCEDURE — 74011250636 HC RX REV CODE- 250/636: Performed by: EMERGENCY MEDICINE

## 2019-10-29 PROCEDURE — 80048 BASIC METABOLIC PNL TOTAL CA: CPT

## 2019-10-29 RX ORDER — MECLIZINE HCL 12.5 MG 12.5 MG/1
TABLET ORAL
Qty: 12 TAB | Refills: 0 | Status: SHIPPED | OUTPATIENT
Start: 2019-10-29 | End: 2020-01-27

## 2019-10-29 RX ORDER — MECLIZINE HCL 12.5 MG 12.5 MG/1
12.5 TABLET ORAL
Status: COMPLETED | OUTPATIENT
Start: 2019-10-29 | End: 2019-10-29

## 2019-10-29 RX ADMIN — MECLIZINE 12.5 MG: 12.5 TABLET ORAL at 19:10

## 2019-10-29 NOTE — ED NOTES
I have reviewed discharge instructions with the patient. The patient verbalized understanding. Medication teaching given, to include name, dose, action, and side effects. Patient verbalized understanding of medications. Encouraged patient to voice any concerns with reassurance provided. Instructed not to drive or use heavy machinery while taking this medication. Patient states they have someone to drive them home. Patient discharged without removing armband. Informed of privacy risks if armband lost or stolen     Patient Discharged in stable condition.

## 2019-10-29 NOTE — DISCHARGE INSTRUCTIONS
Patient Education        Dizziness: Care Instructions  Your Care Instructions  Dizziness is the feeling of unsteadiness or fuzziness in your head. It is different than having vertigo, which is a feeling that the room is spinning or that you are moving or falling. It is also different from lightheadedness, which is the feeling that you are about to faint. It can be hard to know what causes dizziness. Some people feel dizzy when they have migraine headaches. Sometimes bouts of flu can make you feel dizzy. Some medical conditions, such as heart problems or high blood pressure, can make you feel dizzy. Many medicines can cause dizziness, including medicines for high blood pressure, pain, or anxiety. If a medicine causes your symptoms, your doctor may recommend that you stop or change the medicine. If it is a problem with your heart, you may need medicine to help your heart work better. If there is no clear reason for your symptoms, your doctor may suggest watching and waiting for a while to see if the dizziness goes away on its own. Follow-up care is a key part of your treatment and safety. Be sure to make and go to all appointments, and call your doctor if you are having problems. It's also a good idea to know your test results and keep a list of the medicines you take. How can you care for yourself at home? · If your doctor recommends or prescribes medicine, take it exactly as directed. Call your doctor if you think you are having a problem with your medicine. · Do not drive while you feel dizzy. · Try to prevent falls. Steps you can take include:  ? Using nonskid mats, adding grab bars near the tub, and using night-lights. ? Clearing your home so that walkways are free of anything you might trip on.  ? Letting family and friends know that you have been feeling dizzy. This will help them know how to help you. When should you call for help? Call 911 anytime you think you may need emergency care.  For example, call if:    · You passed out (lost consciousness).     · You have dizziness along with symptoms of a heart attack. These may include:  ? Chest pain or pressure, or a strange feeling in the chest.  ? Sweating. ? Shortness of breath. ? Nausea or vomiting. ? Pain, pressure, or a strange feeling in the back, neck, jaw, or upper belly or in one or both shoulders or arms. ? Lightheadedness or sudden weakness. ? A fast or irregular heartbeat.     · You have symptoms of a stroke. These may include:  ? Sudden numbness, tingling, weakness, or loss of movement in your face, arm, or leg, especially on only one side of your body. ? Sudden vision changes. ? Sudden trouble speaking. ? Sudden confusion or trouble understanding simple statements. ? Sudden problems with walking or balance. ? A sudden, severe headache that is different from past headaches.    Call your doctor now or seek immediate medical care if:    · You feel dizzy and have a fever, headache, or ringing in your ears.     · You have new or increased nausea and vomiting.     · Your dizziness does not go away or comes back.    Watch closely for changes in your health, and be sure to contact your doctor if:    · You do not get better as expected. Where can you learn more? Go to http://cristina-mignon.info/. Enter F887 in the search box to learn more about \"Dizziness: Care Instructions. \"  Current as of: June 26, 2019  Content Version: 12.2  © 3099-5606 [employer]. Care instructions adapted under license by [employer] (which disclaims liability or warranty for this information). If you have questions about a medical condition or this instruction, always ask your healthcare professional. [place] [address] any warranty or liability for your use of this information.
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
## 2019-10-31 LAB
ATRIAL RATE: 108 BPM
CALCULATED R AXIS, ECG10: -18 DEGREES
DIAGNOSIS, 93000: NORMAL
HBA1C MFR BLD HPLC: 7.5 %
Q-T INTERVAL, ECG07: 330 MS
QRS DURATION, ECG06: 82 MS
QTC CALCULATION (BEZET), ECG08: 442 MS
VENTRICULAR RATE, ECG03: 108 BPM

## 2019-11-05 ENCOUNTER — PATIENT OUTREACH (OUTPATIENT)
Dept: FAMILY MEDICINE CLINIC | Age: 84
End: 2019-11-05

## 2019-11-12 ENCOUNTER — PATIENT OUTREACH (OUTPATIENT)
Dept: FAMILY MEDICINE CLINIC | Age: 84
End: 2019-11-12

## 2019-11-12 NOTE — PROGRESS NOTES
Complex Case Management  Follow-Up    Date/Time:  11/12/2019 10:56 AM     Ambulatory Care Manager contacted patient for Complex Case Management  follow up. Spoke to patient. Introduced self/role and reason for call. Patient reported:   She is still having issues with buzzing noise in her ear and dizziness. She followed up with Dr. Jeff Jacob ENT last week. She is using a wheelchair and walker in her home. She tries to prevent getting out of bed at night by reducing her liquid intake at night to help prevent falls. She states she takes meclizine for the dizziness but did not take it today because she has an eye appointment with Dr. Pippa Messer at 2:00 pm today and the meclizine makes her drowsy. However, patient is considering rescheduling the appointment because of the weather. Patient reports that she has a pulmonary appointment with Dr. Callum Hale on 11/14/19. Patient reports taking medications as prescribed and denies needing any refills at this time. 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 Specialist appointments since last outreach? yes  If so, specialist and date: Dr. Jeff Jacob ENT last week    Medications:   New medications since last outreach: yes Meclizine  Does patient need refills on any medications: no  Medication changes since last outreach (dose adjustments or discontinued meds): no    Home Health company: n/a  Date of discharge: n/a    Barriers to care? None at this time      Patient reminded that there are physicians on call 24 hours a day / 7 days a week (M-F 5pm to 8am and from Friday 5pm until Monday 8a for the weekend) should the patient have questions or concerns.      Future Appointments   Date Time Provider Chelle Reeves   11/14/2019 10:15 AM Mingo Lyons MD [address]   12/9/2019  1:15 PM Derian Marie MD [facility]   1/13/2020 10:30 AM Uma Cartagena MD [facility] None   1/27/2020 10:00 AM Fletcher Chris MD [facility]        Other upcoming appointments:  Dr. Smith Grade eye today  Dr. Alanna Amaro pulmonary 11/14/19    Goals      Pt will take all medications prescribed to be evaluated on each outreach      10/21/19 Med rec done with patient. She reports taking all medications as prescribed. 10/28/19 Patient reports she is taking all medications as prescribed. She denies needing any refills at this time.  Understands and adheres to diet. 10/21/19 Patient states she drinks fruit juices and eats candy on occasion.  Discussed with importance of reducing carb/sweet intake

## 2019-11-14 ENCOUNTER — OFFICE VISIT (OUTPATIENT)
Dept: PULMONOLOGY | Age: 84
End: 2019-11-14

## 2019-11-14 VITALS
OXYGEN SATURATION: 95 % | SYSTOLIC BLOOD PRESSURE: 169 MMHG | WEIGHT: 249.6 LBS | HEART RATE: 89 BPM | TEMPERATURE: 97.8 F | HEIGHT: 66 IN | RESPIRATION RATE: 18 BRPM | BODY MASS INDEX: 40.11 KG/M2 | DIASTOLIC BLOOD PRESSURE: 86 MMHG

## 2019-11-14 DIAGNOSIS — G47.33 OSA (OBSTRUCTIVE SLEEP APNEA): ICD-10-CM

## 2019-11-14 DIAGNOSIS — E66.01 MORBID OBESITY WITH BMI OF 40.0-44.9, ADULT (HCC): ICD-10-CM

## 2019-11-14 DIAGNOSIS — Z91.14 NONCOMPLIANCE WITH CPAP TREATMENT: ICD-10-CM

## 2019-11-14 DIAGNOSIS — J45.30 MILD PERSISTENT ASTHMA WITHOUT COMPLICATION: Primary | ICD-10-CM

## 2019-11-14 RX ORDER — AMOXICILLIN AND CLAVULANATE POTASSIUM 875; 125 MG/1; MG/1
TABLET, FILM COATED ORAL
Refills: 0 | COMMUNITY
Start: 2019-10-31 | End: 2019-11-28
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
## 2019-11-14 NOTE — PROGRESS NOTES
Roula Johnston presents today for   Chief Complaint   Patient presents with    Asthma     lung scan done 6/24, CXR done 10/29    Sleep Apnea       Is someone accompanying this pt? Caregiver Mila Monroe    Is the patient using any DME equipment during OV? w/c    -DME Company n/a    Depression Screening:  3 most recent PHQ Screens 11/14/2019   PHQ Not Done -   Little interest or pleasure in doing things Not at all   Feeling down, depressed, irritable, or hopeless Not at all   Total Score PHQ 2 0       Learning Assessment:  Learning Assessment 10/9/2019   PRIMARY LEARNER Patient   HIGHEST LEVEL OF EDUCATION - PRIMARY LEARNER  SOME COLLEGE   BARRIERS PRIMARY LEARNER NONE   CO-LEARNER CAREGIVER No   CO-LEARNER NAME -   PRIMARY LANGUAGE ENGLISH    NEED -   LEARNER PREFERENCE PRIMARY LISTENING     -     -     -     -   ANSWERED BY self   RELATIONSHIP SELF       Abuse Screening:  Abuse Screening Questionnaire 10/9/2019   Do you ever feel afraid of your partner? N   Are you in a relationship with someone who physically or mentally threatens you? N   Is it safe for you to go home? Y       Fall Risk  Fall Risk Assessment, last 12 mths 11/14/2019   Able to walk? Yes   Fall in past 12 months? No   Fall with injury? -   Number of falls in past 12 months -   Fall Risk Score -         Coordination of Care:  1. Have you been to the ER, urgent care clinic since your last visit? Hospitalized since your last visit? Yes; Name: [facility] on 10/29 Afib    2. Have you seen or consulted any other health care providers outside of the [address] [place] [address] [place] since your last visit? Include any pap smears or colon screening.  No

## 2019-11-18 DIAGNOSIS — Z95.5 S/P CORONARY ARTERY STENT PLACEMENT: ICD-10-CM

## 2019-11-18 RX ORDER — CLOPIDOGREL BISULFATE 75 MG/1
75 TABLET ORAL DAILY
Qty: 30 TAB | Refills: 5 | Status: SHIPPED | OUTPATIENT
Start: 2019-11-18 | End: 2019-11-18 | Stop reason: SDUPTHER

## 2019-11-18 NOTE — TELEPHONE ENCOUNTER
Follow up is scheduled for   January 27, 2020          Requested Prescriptions     Pending Prescriptions Disp Refills    clopidogrel (PLAVIX) 75 mg tab 30 Tab 5     Sig: Take 1 Tab by mouth daily.

## 2019-11-19 ENCOUNTER — PATIENT OUTREACH (OUTPATIENT)
Dept: FAMILY MEDICINE CLINIC | Age: 84
End: 2019-11-19

## 2019-11-19 ENCOUNTER — APPOINTMENT (OUTPATIENT)
Dept: PHYSICAL THERAPY | Age: 84
End: 2019-11-19

## 2019-12-03 ENCOUNTER — PATIENT OUTREACH (OUTPATIENT)
Dept: FAMILY MEDICINE CLINIC | Age: 84
End: 2019-12-03

## 2019-12-03 NOTE — PROGRESS NOTES
Patient scheduled appt w/LORAINE [facility] for tomorrow for ear buzzing noise.

## 2019-12-03 NOTE — PROGRESS NOTES
Complex Case Management  Follow-Up    Date/Time:  12/3/2019 11:20 AM     Ambulatory Care Manager contacted patient for Complex Case Management  follow up. Spoke to patient. Introduced self/role and reason for call. Patient reported:   She is still having a \"buzzing noise\" in her head. She feels like it is getting louder. States she took Tylenol for it today. She is not sure if it has provided relief. Patient mentioned that she was thinking about going to the emergency room again for this issue and she was encouraged to seek treatment from PCP first. She reported that she will call and schedule an appointment. Patient reports taking medications as prescribed and denies needing any refills at this time. Upcoming appointments were reviewed with patient. Patient verbalized acknowledgement. Specialist appointments since last outreach? no  If so, specialist and date: n/a    Medications:   New medications since last outreach: no  Does patient need refills on any medications: no  Medication changes since last outreach (dose adjustments or discontinued meds): no    Home Health company: n/a  Date of discharge: n/a    Barriers to care? None at this time. Patient reminded that there are physicians on call 24 hours a day / 7 days a week (M-F 5pm to 8am and from Friday 5pm until Monday 8a for the weekend) should the patient have questions or concerns. Future Appointments   Date Time Provider Chelle Lala   12/9/2019  1:15 PM America Howell MD [id number] [facility]   1/13/2020 10:30 AM Rachel Welch MD [facility] None   1/27/2020 10:00 AM Jing Lanza MD [id number] [facility]        Other upcoming appointments:  n/a    Goals      Pt will take all medications prescribed to be evaluated on each outreach      10/21/19 Med rec done with patient. She reports taking all medications as prescribed. 10/28/19 Patient reports she is taking all medications as prescribed.  She denies needing any refills at this time.  11/12/19 Patient reports taking medications as prescribed and denies needing any refills at this time. 12/3/19 Patient reports taking medications as prescribed and denies needing any refills at this time.  Understands and adheres to diet. 10/21/19 Patient states she drinks fruit juices and eats candy on occasion.  Discussed with importance of reducing carb/sweet intake

## 2019-12-04 ENCOUNTER — OFFICE VISIT (OUTPATIENT)
Dept: FAMILY MEDICINE CLINIC | Age: 84
End: 2019-12-04

## 2019-12-04 VITALS
OXYGEN SATURATION: 96 % | DIASTOLIC BLOOD PRESSURE: 88 MMHG | HEIGHT: 66 IN | BODY MASS INDEX: 39.37 KG/M2 | RESPIRATION RATE: 22 BRPM | SYSTOLIC BLOOD PRESSURE: 138 MMHG | HEART RATE: 91 BPM | TEMPERATURE: 97.5 F | WEIGHT: 245 LBS

## 2019-12-04 DIAGNOSIS — J34.89 SINUS PRESSURE: ICD-10-CM

## 2019-12-04 DIAGNOSIS — J45.20 MILD INTERMITTENT ASTHMA WITHOUT COMPLICATION: ICD-10-CM

## 2019-12-04 DIAGNOSIS — R42 INTERMITTENT LIGHTHEADEDNESS: ICD-10-CM

## 2019-12-04 DIAGNOSIS — H93.13 TINNITUS OF BOTH EARS: Primary | ICD-10-CM

## 2019-12-04 DIAGNOSIS — J30.9 ALLERGIC RHINITIS, UNSPECIFIED SEASONALITY, UNSPECIFIED TRIGGER: ICD-10-CM

## 2019-12-04 RX ORDER — MONTELUKAST SODIUM 10 MG/1
10 TABLET ORAL DAILY
Qty: 90 TAB | Refills: 0 | Status: SHIPPED | OUTPATIENT
Start: 2019-12-04 | End: 2020-03-09 | Stop reason: SDUPTHER

## 2019-12-04 RX ORDER — LORATADINE 10 MG/1
10 TABLET ORAL DAILY
Qty: 90 TAB | Refills: 1 | Status: SHIPPED | OUTPATIENT
Start: 2019-12-04 | End: 2020-11-23 | Stop reason: ALTCHOICE

## 2019-12-04 NOTE — PROGRESS NOTES
Cori Reyes presents today for   Chief Complaint   Patient presents with   [employer] ED Follow-up     f/u related to recent visit. Continued c/o dizziness at times and ringing in ears sound\"       Cori Reyes preferred language for health care discussion is english/other. Is someone accompanying this pt? No    Is the patient using any DME equipment during OV? PCA in lobby. Depression Screening:  3 most recent PHQ Screens 11/14/2019   PHQ Not Done -   Little interest or pleasure in doing things Not at all   Feeling down, depressed, irritable, or hopeless Not at all   Total Score PHQ 2 0       Learning Assessment:  Learning Assessment 10/9/2019   PRIMARY LEARNER Patient   HIGHEST LEVEL OF EDUCATION - PRIMARY LEARNER  SOME COLLEGE   BARRIERS PRIMARY LEARNER NONE   CO-LEARNER CAREGIVER No   CO-LEARNER NAME -   PRIMARY LANGUAGE ENGLISH    NEED -   LEARNER PREFERENCE PRIMARY LISTENING     -     -     -     -   ANSWERED BY self   RELATIONSHIP SELF       Abuse Screening:  Abuse Screening Questionnaire 10/9/2019   Do you ever feel afraid of your partner? N   Are you in a relationship with someone who physically or mentally threatens you? N   Is it safe for you to go home? Y       Fall Risk  Fall Risk Assessment, last 12 mths 11/14/2019   Able to walk? Yes   Fall in past 12 months? No   Fall with injury? -   Number of falls in past 12 months -   Fall Risk Score -         Coordination of Care:  1. Have you been to the ER, urgent care clinic since your last visit? Hospitalized since your last visit? Recent ED Visit related to Dizziness. 2. Have you seen or consulted any other health care providers outside of the [address] [place] [address] [place] since your last visit? Include any pap smears or colon screening. No      Advance Directive:  1. Do you have an advance directive in place? Patient Reply:No    2. If not, would you like material regarding how to put one in place?  Patient Reply: No
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
## 2019-12-09 ENCOUNTER — HOSPITAL ENCOUNTER (OUTPATIENT)
Dept: LAB | Age: 84
Discharge: HOME | End: 2019-12-09
Payer: MEDICARE

## 2019-12-09 ENCOUNTER — HOSPITAL ENCOUNTER (OUTPATIENT)
Dept: ONCOLOGY | Age: 84
Discharge: HOME | End: 2019-12-09

## 2019-12-09 ENCOUNTER — OFFICE VISIT (OUTPATIENT)
Dept: ONCOLOGY | Age: 84
End: 2019-12-09

## 2019-12-09 VITALS
BODY MASS INDEX: 39.37 KG/M2 | HEART RATE: 80 BPM | HEIGHT: 66 IN | DIASTOLIC BLOOD PRESSURE: 80 MMHG | SYSTOLIC BLOOD PRESSURE: 140 MMHG | RESPIRATION RATE: 18 BRPM | WEIGHT: 245 LBS | OXYGEN SATURATION: 98 % | TEMPERATURE: 99.1 F

## 2019-12-09 DIAGNOSIS — D64.9 CHRONIC ANEMIA: ICD-10-CM

## 2019-12-09 DIAGNOSIS — D50.8 IRON DEFICIENCY ANEMIA SECONDARY TO INADEQUATE DIETARY IRON INTAKE: ICD-10-CM

## 2019-12-09 DIAGNOSIS — I82.532 CHRONIC DEEP VEIN THROMBOSIS (DVT) OF POPLITEAL VEIN OF LEFT LOWER EXTREMITY (HCC): Primary | ICD-10-CM

## 2019-12-09 DIAGNOSIS — I82.532 CHRONIC DEEP VEIN THROMBOSIS (DVT) OF POPLITEAL VEIN OF LEFT LOWER EXTREMITY (HCC): ICD-10-CM

## 2019-12-09 LAB
ALBUMIN SERPL-MCNC: 3.5 G/DL (ref 3.4–5)
ALBUMIN/GLOB SERPL: 0.7 {RATIO} (ref 0.8–1.7)
ALP SERPL-CCNC: 105 U/L (ref 45–117)
ALT SERPL-CCNC: 18 U/L (ref 13–56)
ANION GAP SERPL CALC-SCNC: 6 MMOL/L (ref 3–18)
AST SERPL-CCNC: 13 U/L (ref 10–38)
BASO+EOS+MONOS # BLD AUTO: 0.4 K/UL (ref 0–2.3)
BASO+EOS+MONOS NFR BLD AUTO: 8 % (ref 0.1–17)
BILIRUB SERPL-MCNC: 0.6 MG/DL (ref 0.2–1)
BUN SERPL-MCNC: 10 MG/DL (ref 7–18)
BUN/CREAT SERPL: 14 (ref 12–20)
CALCIUM SERPL-MCNC: 10.1 MG/DL (ref 8.5–10.1)
CHLORIDE SERPL-SCNC: 107 MMOL/L (ref 100–111)
CO2 SERPL-SCNC: 29 MMOL/L (ref 21–32)
CREAT SERPL-MCNC: 0.73 MG/DL (ref 0.6–1.3)
DIFFERENTIAL METHOD BLD: ABNORMAL
ERYTHROCYTE [DISTWIDTH] IN BLOOD BY AUTOMATED COUNT: 12.1 % (ref 11.5–14.5)
GLOBULIN SER CALC-MCNC: 4.9 G/DL (ref 2–4)
GLUCOSE SERPL-MCNC: 114 MG/DL (ref 74–99)
HCT VFR BLD AUTO: 37.8 % (ref 36–48)
HGB BLD-MCNC: 11.9 G/DL (ref 12–16)
LYMPHOCYTES # BLD: 2.1 K/UL (ref 1.1–5.9)
LYMPHOCYTES NFR BLD: 40 % (ref 14–44)
MCH RBC QN AUTO: 31.6 PG (ref 25–35)
MCHC RBC AUTO-ENTMCNC: 31.5 G/DL (ref 31–37)
MCV RBC AUTO: 100.3 FL (ref 78–102)
NEUTS SEG # BLD: 2.7 K/UL (ref 1.8–9.5)
NEUTS SEG NFR BLD: 52 % (ref 40–70)
PLATELET # BLD AUTO: 221 K/UL (ref 140–440)
POTASSIUM SERPL-SCNC: 3.5 MMOL/L (ref 3.5–5.5)
PROT SERPL-MCNC: 8.4 G/DL (ref 6.4–8.2)
RBC # BLD AUTO: 3.77 M/UL (ref 4.1–5.1)
SODIUM SERPL-SCNC: 142 MMOL/L (ref 136–145)
WBC # BLD AUTO: 5.2 K/UL (ref 4.5–13)

## 2019-12-09 PROCEDURE — 36415 COLL VENOUS BLD VENIPUNCTURE: CPT

## 2019-12-09 PROCEDURE — 83540 ASSAY OF IRON: CPT

## 2019-12-09 PROCEDURE — 80053 COMPREHEN METABOLIC PANEL: CPT

## 2019-12-09 PROCEDURE — 82728 ASSAY OF FERRITIN: CPT
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
## 2019-12-10 ENCOUNTER — PATIENT OUTREACH (OUTPATIENT)
Dept: FAMILY MEDICINE CLINIC | Age: 84
End: 2019-12-10

## 2019-12-10 LAB
FERRITIN SERPL-MCNC: 62 NG/ML (ref 8–388)
IRON SATN MFR SERPL: 32 %
IRON SERPL-MCNC: 100 UG/DL (ref 50–175)
TIBC SERPL-MCNC: 313 UG/DL (ref 250–450)

## 2019-12-10 NOTE — PROGRESS NOTES
Patient attended hematology/oncology appt w/Dr. Cindy Brumfield yesterday. EMR reviewed. Will continue to monitor.

## 2019-12-17 ENCOUNTER — PATIENT OUTREACH (OUTPATIENT)
Dept: FAMILY MEDICINE CLINIC | Age: 84
End: 2019-12-17

## 2019-12-17 NOTE — PROGRESS NOTES
Complex Case Management  Follow-Up    Date/Time:  12/17/2019 2:19 PM     Ambulatory Care Manager contacted patient for Complex Case Management  follow up. Spoke to patient. Introduced self/role and reason for call. Patient reported:   Since her visit with NP Javi Greenberg on 12/4/19, the \"buzzing\" noise she hears and dizziness is unchanged. She was referred to neurology but has not be contacted to schedule an appointment yet. Writer contacted [facility] to follow up. Appointment scheduled with Dr. Mariajose Henry on January 2, 2020 at 1:00 pm. Address is [address] [place] [address] . Patient was agreeable to this date and time. Patient was very pleasant as always and discussed current events, her dogs and spoke about her neighbor who assists her with transportation to appointments. Patient reports taking medications as prescribed and denies needing any refills at this time. Upcoming appointments were reviewed with patient. Patient verbalized acknowledgement. Pertinent negatives:  Patient denies cp, sob, n/v, fever, diarrhea, constipation, urinary frequency or retention. Specialist appointments since last outreach? no  If so, specialist and date: n/a    Medications:   New medications since last outreach: no  Does patient need refills on any medications: no  Medication changes since last outreach (dose adjustments or discontinued meds): no    Home Health company: n/a  Date of discharge: n/a    Barriers to care? None at this time. Patient reminded that there are physicians on call 24 hours a day / 7 days a week (M-F 5pm to 8am and from Friday 5pm until Monday 8a for the weekend) should the patient have questions or concerns.      Future Appointments   Date Time Provider Chelle Reeves   1/13/2020 10:30 AM [facility]   1/27/2020 10:00 AM Donavon Guzman MD [address]   3/23/2020 10:15 AM [place] [id number] [facility]        Other upcoming appointments:  n/a    Goals      Pt will take all medications prescribed to be evaluated on each outreach      10/21/19 Med rec done with patient. She reports taking all medications as prescribed. 10/28/19 Patient reports she is taking all medications as prescribed. She denies needing any refills at this time. 11/12/19 Patient reports taking medications as prescribed and denies needing any refills at this time. 12/3/19 Patient reports taking medications as prescribed and denies needing any refills at this time. 12/17/19 Patient reports taking medications as prescribed and denies needing any refills at this time.  Understands and adheres to diet. 10/21/19 Patient states she drinks fruit juices and eats candy on occasion. Discussed with importance of reducing carb/sweet intake  12/17/19 Patient states she is trying to \"watch what she eats\".

## 2019-12-31 ENCOUNTER — PATIENT OUTREACH (OUTPATIENT)
Dept: FAMILY MEDICINE CLINIC | Age: 84
End: 2019-12-31

## 2019-12-31 NOTE — PROGRESS NOTES
Complex Case Management  Follow-Up    Date/Time:  12/31/2019 9:29 AM     Ambulatory Care Manager contacted patient for Complex Case Management  follow up. Spoke to patient. Introduced self/role and reason for call. Patient reported:   She is still hearing the buzzing noise in her ears and some dizziness. She has an appointment with neurology on 1/2/20. Patient has arranged transportation. She denies any other needs or complaints. Patient reports taking medications as prescribed and denies needing any refills at this time. Upcoming appointments were reviewed with patient. Patient verbalized acknowledgement. Pertinent negatives:  Patient denies cp, sob, dizziness, n/v, fever, diarrhea, constipation, urinary frequency or retention. Specialist appointments since last outreach? no  If so, specialist and date: n/a    Medications:   New medications since last outreach: no  Does patient need refills on any medications: no  Medication changes since last outreach (dose adjustments or discontinued meds): no    Home Health company: n/a  Date of discharge: n/a    Barriers to care? None at this time. Patient reminded that there are physicians on call 24 hours a day / 7 days a week (M-F 5pm to 8am and from Friday 5pm until Monday 8a for the weekend) should the patient have questions or concerns. Future Appointments   Date Time Provider Chelle Quani   1/2/2020  1:00 PM Albino Claudio  [address]   1/13/2020 10:30 AM Seema Flores MD [facility] None   1/27/2020 10:00 AM Mario Rutherford MD [facility]   3/23/2020 10:15 AM Arron Neff MD [id number] [facility]        Other upcoming appointments:  n/a    Goals      Pt will take all medications prescribed to be evaluated on each outreach      10/21/19 Med rec done with patient. She reports taking all medications as prescribed. 10/28/19 Patient reports she is taking all medications as prescribed.  She denies needing any refills at this time.  11/12/19 Patient reports taking medications as prescribed and denies needing any refills at this time. 12/3/19 Patient reports taking medications as prescribed and denies needing any refills at this time. 12/17/19 Patient reports taking medications as prescribed and denies needing any refills at this time. 12/31/19 Patient reports taking medications as prescribed and denies needing any refills at this time.  Understands and adheres to diet. 10/21/19 Patient states she drinks fruit juices and eats candy on occasion. Discussed with importance of reducing carb/sweet intake  12/17/19 Patient states she is trying to \"watch what she eats\".

## 2020-01-02 ENCOUNTER — OFFICE VISIT (OUTPATIENT)
Dept: NEUROLOGY | Age: 85
End: 2020-01-02

## 2020-01-02 VITALS
HEIGHT: 66 IN | BODY MASS INDEX: 39.37 KG/M2 | OXYGEN SATURATION: 97 % | WEIGHT: 245 LBS | HEART RATE: 86 BPM | SYSTOLIC BLOOD PRESSURE: 140 MMHG | DIASTOLIC BLOOD PRESSURE: 90 MMHG | TEMPERATURE: 98.6 F

## 2020-01-02 DIAGNOSIS — G45.0 VERTEBRO BASILAR INSUFFICIENCY: Primary | ICD-10-CM

## 2020-01-02 NOTE — PROGRESS NOTES
Ms. Yaw Ford has a reminder for a \"due or due soon\" health maintenance. I have asked that she contact her primary care provider for follow-up on this health maintenance.

## 2020-01-02 NOTE — LETTER
1/3/20 Patient: Jigna Jimenez YOB: 1935 Date of Visit: 1/2/2020 Wendie West MD 
[facility] [address] [id number] [address] [address] VIA In Basket Dear Wendie West MD, Thank you for referring Ms. Virginia Franklin to [facility] for evaluation. My notes for this consultation are attached. If you have questions, please do not hesitate to call me. I look forward to following your patient along with you. Sincerely, Wilfred Parikh MD

## 2020-01-02 NOTE — PATIENT INSTRUCTIONS
Tinnitus: Care Instructions  Your Care Instructions    Many people have some ringing sounds in their ears once in a while. You may hear a roar, a hiss, a tinkle, or a buzz. The sound usually lasts only a few minutes. If it goes on all the time, you may have tinnitus. Tinnitus is usually caused by long-term exposure to loud noise. This damages the nerves in the inner ear. It can occur with all types of hearing loss. It may be a symptom of almost any ear problem. Tinnitus may be caused by a buildup of earwax. Or it may be caused by ear infections or certain medicines (especially antibiotics or large amounts of aspirin). You can also hear noises in your ears because of an injury to the ears, drinking too much alcohol or caffeine, or a medical condition. You may need tests to evaluate your hearing and to find causes of long-lasting tinnitus. Your doctor may suggest one or more treatments to help you cope with it. You can also do things at home to help reduce symptoms. Follow-up care is a key part of your treatment and safety. Be sure to make and go to all appointments, and call your doctor if you are having problems. It's also a good idea to know your test results and keep a list of the medicines you take. How can you care for yourself at home? · Limit or cut out alcohol and caffeine. They can make your symptoms worse. · Do not smoke or use other tobacco products. Nicotine reduces blood flow to the ear and makes tinnitus worse. If you need help quitting, talk to your doctor about stop-smoking programs and medicines. These can increase your chances of quitting for good. · Talk to your doctor about whether to stop taking aspirin and similar products such as ibuprofen or naproxen. · Get exercise often. It can improve blood flow to the ear. Ways to cope with noise  Some tinnitus may last a long time. To cope with noise, try to:  · Avoid noises that you think caused your tinnitus.  If you can't avoid loud noises, wear earplugs or earmuffs. · Ignore the sound by paying attention to other things. · Relax using biofeedback, meditation, or yoga. Feeling stressed and being tired can make tinnitus worse. · Play music or white noise to help you sleep. Background noise may cover up the noise that you hear in your ears. You can buy a machine that makes soothing sounds, such as ocean waves. When should you call for help? Call 911 anytime you think you may need emergency care. For example, call if:    · You have symptoms of a stroke. These may include:  ? Sudden numbness, tingling, weakness, or loss of movement in your face, arm, or leg, especially on only one side of your body. ? Sudden vision changes. ? Sudden trouble speaking. ? Sudden confusion or trouble understanding simple statements. ? Sudden problems with walking or balance. ? A sudden, severe headache that is different from past headaches.    Call your doctor now or seek immediate medical care if:    · You develop other symptoms. These may include hearing loss (or worse hearing loss), balance problems, dizziness, nausea, or vomiting.    Watch closely for changes in your health, and be sure to contact your doctor if:    · Your tinnitus moves from both ears to one ear.     · Your hearing loss gets worse within 1 day after an ear injury.     · Your tinnitus or hearing loss does not get better within 1 week after an ear injury.     · Your tinnitus bothers you enough that you want to take medicines to help you cope with it. Where can you learn more? Go to http://cristina-mignon.info/. Enter S165 in the search box to learn more about \"Tinnitus: Care Instructions. \"  Current as of: October 21, 2018  Content Version: 12.2  © 4663-9644 [employer]. Care instructions adapted under license by [employer] (which disclaims liability or warranty for this information).  If you have questions about a medical condition or this instruction, always ask your healthcare professional. [address] any warranty or liability for your use of this information. Dizziness: Care Instructions  Your Care Instructions  Dizziness is the feeling of unsteadiness or fuzziness in your head. It is different than having vertigo, which is a feeling that the room is spinning or that you are moving or falling. It is also different from lightheadedness, which is the feeling that you are about to faint. It can be hard to know what causes dizziness. Some people feel dizzy when they have migraine headaches. Sometimes bouts of flu can make you feel dizzy. Some medical conditions, such as heart problems or high blood pressure, can make you feel dizzy. Many medicines can cause dizziness, including medicines for high blood pressure, pain, or anxiety. If a medicine causes your symptoms, your doctor may recommend that you stop or change the medicine. If it is a problem with your heart, you may need medicine to help your heart work better. If there is no clear reason for your symptoms, your doctor may suggest watching and waiting for a while to see if the dizziness goes away on its own. Follow-up care is a key part of your treatment and safety. Be sure to make and go to all appointments, and call your doctor if you are having problems. It's also a good idea to know your test results and keep a list of the medicines you take. How can you care for yourself at home? · If your doctor recommends or prescribes medicine, take it exactly as directed. Call your doctor if you think you are having a problem with your medicine. · Do not drive while you feel dizzy. · Try to prevent falls. Steps you can take include:  ? Using nonskid mats, adding grab bars near the tub, and using night-lights. ? Clearing your home so that walkways are free of anything you might trip on.  ? Letting family and friends know that you have been feeling dizzy.  This will help them know how to help you. When should you call for help? Call 911 anytime you think you may need emergency care. For example, call if:    · You passed out (lost consciousness).     · You have dizziness along with symptoms of a heart attack. These may include:  ? Chest pain or pressure, or a strange feeling in the chest.  ? Sweating. ? Shortness of breath. ? Nausea or vomiting. ? Pain, pressure, or a strange feeling in the back, neck, jaw, or upper belly or in one or both shoulders or arms. ? Lightheadedness or sudden weakness. ? A fast or irregular heartbeat.     · You have symptoms of a stroke. These may include:  ? Sudden numbness, tingling, weakness, or loss of movement in your face, arm, or leg, especially on only one side of your body. ? Sudden vision changes. ? Sudden trouble speaking. ? Sudden confusion or trouble understanding simple statements. ? Sudden problems with walking or balance. ? A sudden, severe headache that is different from past headaches.    Call your doctor now or seek immediate medical care if:    · You feel dizzy and have a fever, headache, or ringing in your ears.     · You have new or increased nausea and vomiting.     · Your dizziness does not go away or comes back.    Watch closely for changes in your health, and be sure to contact your doctor if:    · You do not get better as expected. Where can you learn more? Go to http://cristina-mignon.info/. Enter M443 in the search box to learn more about \"Dizziness: Care Instructions. \"  Current as of: June 26, 2019  Content Version: 12.2  © 8024-0930 [employer]. Care instructions adapted under license by [employer] (which disclaims liability or warranty for this information). If you have questions about a medical condition or this instruction, always ask your healthcare professional. [address] any warranty or liability for your use of this information.
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
## 2020-01-06 ENCOUNTER — HOSPITAL ENCOUNTER (OUTPATIENT)
Age: 85
Discharge: HOME | End: 2020-01-06
Attending: STUDENT IN AN ORGANIZED HEALTH CARE EDUCATION/TRAINING PROGRAM
Payer: MEDICARE

## 2020-01-06 DIAGNOSIS — G45.0 VERTEBRO BASILAR INSUFFICIENCY: ICD-10-CM

## 2020-01-06 PROCEDURE — 70551 MRI BRAIN STEM W/O DYE: CPT

## 2020-01-13 ENCOUNTER — OFFICE VISIT (OUTPATIENT)
Dept: FAMILY MEDICINE CLINIC | Age: 85
End: 2020-01-13

## 2020-01-13 VITALS
HEIGHT: 66 IN | SYSTOLIC BLOOD PRESSURE: 130 MMHG | RESPIRATION RATE: 18 BRPM | BODY MASS INDEX: 39.54 KG/M2 | HEART RATE: 103 BPM | TEMPERATURE: 98.7 F | DIASTOLIC BLOOD PRESSURE: 80 MMHG

## 2020-01-13 DIAGNOSIS — E11.21 TYPE 2 DIABETES WITH NEPHROPATHY (HCC): ICD-10-CM

## 2020-01-13 DIAGNOSIS — E78.2 MIXED HYPERLIPIDEMIA: ICD-10-CM

## 2020-01-13 DIAGNOSIS — H93.13 TINNITUS OF BOTH EARS: ICD-10-CM

## 2020-01-13 DIAGNOSIS — E03.9 ACQUIRED HYPOTHYROIDISM: ICD-10-CM

## 2020-01-13 DIAGNOSIS — I10 ESSENTIAL HYPERTENSION: Primary | ICD-10-CM

## 2020-01-13 NOTE — PROGRESS NOTES
Veto Garcia presents today for   Chief Complaint   Patient presents with    Diabetes     follow up    Hypertension    Cholesterol Problem    Thyroid Problem       Is someone accompanying this pt? no    Is the patient using any DME equipment during OV? Yes wheelchair    Depression Screening:  3 most recent PHQ Screens 1/13/2020   PHQ Not Done -   Little interest or pleasure in doing things Not at all   Feeling down, depressed, irritable, or hopeless Not at all   Total Score PHQ 2 0       Learning Assessment:  Learning Assessment 1/13/2020   PRIMARY LEARNER Patient   HIGHEST LEVEL OF EDUCATION - PRIMARY LEARNER  SOME COLLEGE   BARRIERS PRIMARY LEARNER NONE   CO-LEARNER CAREGIVER No   CO-LEARNER NAME -   PRIMARY LANGUAGE ENGLISH    NEED -   LEARNER PREFERENCE PRIMARY LISTENING     -     -     -     -   ANSWERED BY self   RELATIONSHIP SELF       Abuse Screening:  Abuse Screening Questionnaire 1/13/2020   Do you ever feel afraid of your partner? N   Are you in a relationship with someone who physically or mentally threatens you? N   Is it safe for you to go home? Y       Fall Screening  Fall Risk Assessment, last 12 mths 1/13/2020   Able to walk? No   Fall in past 12 months? -   Fall with injury? -   Number of falls in past 12 months -   Fall Risk Score -       Generalized Anxiety  No flowsheet data found. Health Maintenance Due   Topic Date Due    Shingrix Vaccine Age 49> (1 of 2) 01/30/1987    FOOT EXAM Q1  10/24/2018    LIPID PANEL Q1  01/17/2020   . Health Maintenance reviewed and discussed and ordered per Provider. Veto Garcia is updated on all     Coordination of Care  1. Have you been to the ER, urgent care clinic since your last visit? Hospitalized since your last visit? no    2. Have you seen or consulted any other health care providers outside of the [address] [place] [address] [place] since your last visit? Include any pap smears or colon screening.  no      Advance Directive:  1. Do you have an advance directive in place? Patient Reply:no    2. If not, would you like material regarding how to put one in place?  Patient Reply: no

## 2020-01-13 NOTE — PROGRESS NOTES
Chief Complaint   Patient presents with    Diabetes     follow up    Hypertension    Cholesterol Problem    Thyroid Problem         HPI    Mian Miguel is a 82 y.o. female presenting today for 4 months  follow up of dm, htn, hld, thyroid dz. Pt has been seen by ent as well as neuro for eval of tinnitus. She had a recent mri. Results discussed. Last A1c with endo was 7.5 in Oct 2019. Patient does not need medication refills today. Review of Systems   Constitutional: Negative. HENT: Positive for tinnitus. Respiratory: Negative. Cardiovascular: Negative. All other systems reviewed and are negative. Physical Exam  Physical Exam   Nursing note and vitals reviewed. Constitutional: She is oriented to person, place, and time. She appears well-developed and well-nourished. HENT:   Head: Normocephalic and atraumatic. Right Ear: External ear normal.   Left Ear: External ear normal.   Nose: Nose normal.   Eyes: Conjunctivae and EOM are normal.   Neck: Normal range of motion. Neck supple. No JVD present. Carotid bruit is not present. No thyromegaly present. Cardiovascular: Normal rate, regular rhythm, normal heart sounds and intact distal pulses. Exam reveals no gallop and no friction rub. No murmur heard. Pulmonary/Chest: Effort normal and breath sounds normal. She has no wheezes. She has no rhonchi. She has no rales. Abdominal: Soft. Bowel sounds are normal.   Musculoskeletal: Normal range of motion. Neurological: She is alert and oriented to person, place, and time. Coordination normal.   Skin: Skin is warm and dry. Psychiatric: She has a normal mood and affect. Her behavior is normal. Judgment and thought content normal.     Diagnoses and all orders for this visit:    1. Essential hypertension  Stable, cont pres tx plan. 2. Type 2 diabetes with nephropathy (HCC)  Stable, cont pres tx plan. 3. Mixed hyperlipidemia  Stable, cont pres tx plan.      4. Acquired hypothyroidism  Stable, cont pres tx plan. 5. Tinnitus of both ears  Care as per specialists. Follow-up and Dispositions    · Return in about 3 months (around 4/13/2020) for diabetes, high blood pressure, high cholesterol, thyroid disease.

## 2020-01-14 ENCOUNTER — PATIENT OUTREACH (OUTPATIENT)
Dept: FAMILY MEDICINE CLINIC | Age: 85
End: 2020-01-14

## 2020-01-14 NOTE — PROGRESS NOTES
Patient attended appt with PCP, Dr. Veronica Pickering yesterday. EMR reviewed. Will continue to follow.

## 2020-01-27 ENCOUNTER — OFFICE VISIT (OUTPATIENT)
Dept: CARDIOLOGY CLINIC | Age: 85
End: 2020-01-27

## 2020-01-27 ENCOUNTER — TELEPHONE (OUTPATIENT)
Dept: CARDIOLOGY CLINIC | Age: 85
End: 2020-01-27

## 2020-01-27 VITALS
WEIGHT: 241 LBS | SYSTOLIC BLOOD PRESSURE: 145 MMHG | HEIGHT: 66 IN | HEART RATE: 89 BPM | BODY MASS INDEX: 38.73 KG/M2 | DIASTOLIC BLOOD PRESSURE: 80 MMHG

## 2020-01-27 DIAGNOSIS — Z95.5 S/P CORONARY ARTERY STENT PLACEMENT: ICD-10-CM

## 2020-01-27 DIAGNOSIS — M25.562 CHRONIC PAIN OF LEFT KNEE: ICD-10-CM

## 2020-01-27 DIAGNOSIS — E78.2 MIXED HYPERLIPIDEMIA: ICD-10-CM

## 2020-01-27 DIAGNOSIS — I25.118 ATHEROSCLEROSIS OF NATIVE CORONARY ARTERY OF NATIVE HEART WITH STABLE ANGINA PECTORIS (HCC): Primary | ICD-10-CM

## 2020-01-27 DIAGNOSIS — I50.32 CHRONIC DIASTOLIC CONGESTIVE HEART FAILURE (HCC): ICD-10-CM

## 2020-01-27 DIAGNOSIS — G89.29 CHRONIC PAIN OF LEFT KNEE: ICD-10-CM

## 2020-01-27 DIAGNOSIS — I10 ESSENTIAL HYPERTENSION: ICD-10-CM
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
## 2020-01-27 NOTE — TELEPHONE ENCOUNTER
Pt called and stated that she would like a small dose of medication for anxiety ordered. Pt also stated that she would like refill on pain patches. Please advise. Requested Prescriptions     Pending Prescriptions Disp Refills    lidocaine (ASPERCREME, LIDOCAINE,) 4 % patch 1 Package 3     Si Patch by TransDERmal route every eight (8) hours.

## 2020-01-27 NOTE — TELEPHONE ENCOUNTER
PCP: Nadia Geronimo MD    Last appt: 2020  Future Appointments   Date Time Provider Chelle Reeves   3/23/2020 10:15 AM Cady Obrien MD [id number] [facility]   2020 10:45 AM Nadia Geronimo MD [facility] None   2020 10:00 AM Marilou Cortes MD [id number] [place] [address] Rd       Requested Prescriptions     Pending Prescriptions Disp Refills    lidocaine (ASPERCREME, LIDOCAINE,) 4 % patch 1 Package 3     Si Patch by TransDERmal route every eight (8) hours.

## 2020-01-27 NOTE — TELEPHONE ENCOUNTER
Patient was seen in office today to see you, and she wants to know if you could give her a low dose anxiety medication to help her with the head issue you and her discuss in office today.  Please advise

## 2020-01-27 NOTE — PROGRESS NOTES
1. Have you been to the ER, urgent care clinic since your last visit? Hospitalized since your last visit? No    2. Have you seen or consulted any other health care providers outside of the [address] [place] [address] since your last visit? Include any pap smears or colon screening.  Yes Where: ENT

## 2020-01-27 NOTE — TELEPHONE ENCOUNTER
Called and spoke to patient per Dr. Festus Bowie to speak with PCP for medication for anxiety. She voices understanding and acceptance of this advice and will call back if any further questions or concerns.

## 2020-01-28 ENCOUNTER — PATIENT OUTREACH (OUTPATIENT)
Dept: FAMILY MEDICINE CLINIC | Age: 85
End: 2020-01-28

## 2020-01-28 NOTE — PROGRESS NOTES
Patient attended appointment on 1/27/20 with Dr. Oralia Connor (cardiology). EMR reviewed. Will continue to monitor and will contact patient at time of next scheduled outreach.

## 2020-01-29 RX ORDER — LIDOCAINE 4 G/100G
1 PATCH TOPICAL EVERY 8 HOURS
Qty: 1 PACKAGE | Refills: 3 | Status: SHIPPED | OUTPATIENT
Start: 2020-01-29

## 2020-02-16 RX ORDER — ALBUTEROL SULFATE 90 UG/1
AEROSOL, METERED RESPIRATORY (INHALATION)
Qty: 18 G | Refills: 5 | Status: SHIPPED | OUTPATIENT
Start: 2020-02-16 | End: 2020-09-13

## 2020-02-24 ENCOUNTER — HOSPITAL ENCOUNTER (INPATIENT)
Age: 85
LOS: 3 days | Discharge: HOME HEALTH | DRG: 308 | End: 2020-02-27
Attending: EMERGENCY MEDICINE | Admitting: HOSPITALIST
Payer: MEDICARE

## 2020-02-24 ENCOUNTER — APPOINTMENT (OUTPATIENT)
Dept: GENERAL RADIOLOGY | Age: 85
DRG: 308 | End: 2020-02-24
Attending: PHYSICIAN ASSISTANT
Payer: MEDICARE

## 2020-02-24 DIAGNOSIS — I20.9 ANGINA PECTORIS (HCC): ICD-10-CM

## 2020-02-24 DIAGNOSIS — R00.0 TACHYCARDIA: Primary | ICD-10-CM

## 2020-02-24 LAB
ALBUMIN SERPL-MCNC: 3.1 G/DL (ref 3.4–5)
ALBUMIN/GLOB SERPL: 0.6 {RATIO} (ref 0.8–1.7)
ALP SERPL-CCNC: 98 U/L (ref 45–117)
ALT SERPL-CCNC: 14 U/L (ref 13–56)
ANION GAP SERPL CALC-SCNC: 8 MMOL/L (ref 3–18)
AST SERPL-CCNC: 8 U/L (ref 10–38)
BASOPHILS # BLD: 0 K/UL (ref 0–0.1)
BASOPHILS NFR BLD: 1 % (ref 0–2)
BILIRUB SERPL-MCNC: 0.4 MG/DL (ref 0.2–1)
BNP SERPL-MCNC: 42 PG/ML (ref 0–1800)
BUN SERPL-MCNC: 9 MG/DL (ref 7–18)
BUN/CREAT SERPL: 10 (ref 12–20)
CALCIUM SERPL-MCNC: 9.2 MG/DL (ref 8.5–10.1)
CHLORIDE SERPL-SCNC: 105 MMOL/L (ref 100–111)
CO2 SERPL-SCNC: 26 MMOL/L (ref 21–32)
CREAT SERPL-MCNC: 0.93 MG/DL (ref 0.6–1.3)
D DIMER PPP FEU-MCNC: 1.14 UG/ML(FEU)
DIFFERENTIAL METHOD BLD: ABNORMAL
EOSINOPHIL # BLD: 0.2 K/UL (ref 0–0.4)
EOSINOPHIL NFR BLD: 3 % (ref 0–5)
ERYTHROCYTE [DISTWIDTH] IN BLOOD BY AUTOMATED COUNT: 12.1 % (ref 11.6–14.5)
GLOBULIN SER CALC-MCNC: 5.3 G/DL (ref 2–4)
GLUCOSE BLD STRIP.AUTO-MCNC: 251 MG/DL (ref 70–110)
GLUCOSE BLD STRIP.AUTO-MCNC: 329 MG/DL (ref 70–110)
GLUCOSE SERPL-MCNC: 346 MG/DL (ref 74–99)
HCT VFR BLD AUTO: 38.6 % (ref 35–45)
HGB BLD-MCNC: 12.1 G/DL (ref 12–16)
LACTATE SERPL-SCNC: 1.8 MMOL/L (ref 0.4–2)
LYMPHOCYTES # BLD: 2.4 K/UL (ref 0.9–3.6)
LYMPHOCYTES NFR BLD: 44 % (ref 21–52)
MAGNESIUM SERPL-MCNC: 2.1 MG/DL (ref 1.6–2.6)
MCH RBC QN AUTO: 31.8 PG (ref 24–34)
MCHC RBC AUTO-ENTMCNC: 31.3 G/DL (ref 31–37)
MCV RBC AUTO: 101.3 FL (ref 74–97)
MONOCYTES # BLD: 0.3 K/UL (ref 0.05–1.2)
MONOCYTES NFR BLD: 5 % (ref 3–10)
NEUTS SEG # BLD: 2.5 K/UL (ref 1.8–8)
NEUTS SEG NFR BLD: 47 % (ref 40–73)
PLATELET # BLD AUTO: 218 K/UL (ref 135–420)
PMV BLD AUTO: 10.5 FL (ref 9.2–11.8)
POTASSIUM SERPL-SCNC: 3.5 MMOL/L (ref 3.5–5.5)
PROT SERPL-MCNC: 8.4 G/DL (ref 6.4–8.2)
RBC # BLD AUTO: 3.81 M/UL (ref 4.2–5.3)
SODIUM SERPL-SCNC: 139 MMOL/L (ref 136–145)
TROPONIN I SERPL-MCNC: <0.02 NG/ML (ref 0–0.04)
WBC # BLD AUTO: 5.3 K/UL (ref 4.6–13.2)

## 2020-02-24 PROCEDURE — 83605 ASSAY OF LACTIC ACID: CPT

## 2020-02-24 PROCEDURE — 85379 FIBRIN DEGRADATION QUANT: CPT

## 2020-02-24 PROCEDURE — 85025 COMPLETE CBC W/AUTO DIFF WBC: CPT

## 2020-02-24 PROCEDURE — 82962 GLUCOSE BLOOD TEST: CPT

## 2020-02-24 PROCEDURE — 65660000000 HC RM CCU STEPDOWN

## 2020-02-24 PROCEDURE — 93005 ELECTROCARDIOGRAM TRACING: CPT

## 2020-02-24 PROCEDURE — 71045 X-RAY EXAM CHEST 1 VIEW: CPT

## 2020-02-24 PROCEDURE — 74011250636 HC RX REV CODE- 250/636: Performed by: PHYSICIAN ASSISTANT

## 2020-02-24 PROCEDURE — 74011636637 HC RX REV CODE- 636/637: Performed by: PHYSICIAN ASSISTANT

## 2020-02-24 PROCEDURE — 99285 EMERGENCY DEPT VISIT HI MDM: CPT

## 2020-02-24 PROCEDURE — 83735 ASSAY OF MAGNESIUM: CPT

## 2020-02-24 PROCEDURE — 83880 ASSAY OF NATRIURETIC PEPTIDE: CPT

## 2020-02-24 PROCEDURE — 80053 COMPREHEN METABOLIC PANEL: CPT

## 2020-02-24 PROCEDURE — 74011250637 HC RX REV CODE- 250/637: Performed by: PHYSICIAN ASSISTANT

## 2020-02-24 PROCEDURE — 84484 ASSAY OF TROPONIN QUANT: CPT

## 2020-02-24 PROCEDURE — 87040 BLOOD CULTURE FOR BACTERIA: CPT

## 2020-02-24 RX ORDER — GUAIFENESIN 100 MG/5ML
162 LIQUID (ML) ORAL
Status: COMPLETED | OUTPATIENT
Start: 2020-02-24 | End: 2020-02-24

## 2020-02-24 RX ORDER — NITROGLYCERIN 0.4 MG/1
0.4 TABLET SUBLINGUAL
Status: DISCONTINUED | OUTPATIENT
Start: 2020-02-24 | End: 2020-02-27 | Stop reason: HOSPADM

## 2020-02-24 RX ORDER — DILTIAZEM HYDROCHLORIDE 30 MG/1
60 TABLET, FILM COATED ORAL ONCE
Status: COMPLETED | OUTPATIENT
Start: 2020-02-24 | End: 2020-02-24

## 2020-02-24 RX ADMIN — DILTIAZEM HYDROCHLORIDE 60 MG: 30 TABLET, FILM COATED ORAL at 18:44

## 2020-02-24 RX ADMIN — SODIUM CHLORIDE 500 ML: 900 INJECTION, SOLUTION INTRAVENOUS at 19:50

## 2020-02-24 RX ADMIN — NITROGLYCERIN 0.4 MG: 0.4 TABLET SUBLINGUAL at 18:57

## 2020-02-24 RX ADMIN — NITROGLYCERIN 0.4 MG: 0.4 TABLET SUBLINGUAL at 18:43

## 2020-02-24 RX ADMIN — INSULIN HUMAN 5 UNITS: 100 INJECTION, SOLUTION PARENTERAL at 19:50

## 2020-02-24 RX ADMIN — NITROGLYCERIN 0.4 MG: 0.4 TABLET SUBLINGUAL at 18:52

## 2020-02-24 RX ADMIN — ASPIRIN 81 MG 162 MG: 81 TABLET ORAL at 18:40
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
## 2020-02-25 ENCOUNTER — APPOINTMENT (OUTPATIENT)
Dept: GENERAL RADIOLOGY | Age: 85
DRG: 308 | End: 2020-02-25
Attending: HOSPITALIST
Payer: MEDICARE

## 2020-02-25 ENCOUNTER — APPOINTMENT (OUTPATIENT)
Dept: NUCLEAR MEDICINE | Age: 85
DRG: 308 | End: 2020-02-25
Attending: PHYSICIAN ASSISTANT
Payer: MEDICARE

## 2020-02-25 ENCOUNTER — HOME HEALTH ADMISSION (OUTPATIENT)
Dept: HOME HEALTH SERVICES | Facility: HOME HEALTH | Age: 85
End: 2020-02-25
Payer: MEDICARE

## 2020-02-25 LAB
ALBUMIN SERPL-MCNC: 2.9 G/DL (ref 3.4–5)
ALBUMIN/GLOB SERPL: 0.7 {RATIO} (ref 0.8–1.7)
ALP SERPL-CCNC: 87 U/L (ref 45–117)
ALT SERPL-CCNC: 13 U/L (ref 13–56)
ANION GAP SERPL CALC-SCNC: 7 MMOL/L (ref 3–18)
AST SERPL-CCNC: 11 U/L (ref 10–38)
ATRIAL RATE: 113 BPM
ATRIAL RATE: 75 BPM
BASOPHILS # BLD: 0 K/UL (ref 0–0.1)
BASOPHILS NFR BLD: 0 % (ref 0–2)
BILIRUB SERPL-MCNC: 0.7 MG/DL (ref 0.2–1)
BUN SERPL-MCNC: 11 MG/DL (ref 7–18)
BUN/CREAT SERPL: 15 (ref 12–20)
CALCIUM SERPL-MCNC: 8.7 MG/DL (ref 8.5–10.1)
CALCULATED P AXIS, ECG09: 49 DEGREES
CALCULATED P AXIS, ECG09: 54 DEGREES
CALCULATED R AXIS, ECG10: -11 DEGREES
CALCULATED R AXIS, ECG10: -14 DEGREES
CALCULATED T AXIS, ECG11: -33 DEGREES
CALCULATED T AXIS, ECG11: 60 DEGREES
CHLORIDE SERPL-SCNC: 110 MMOL/L (ref 100–111)
CK MB CFR SERPL CALC: NORMAL % (ref 0–4)
CK MB SERPL-MCNC: <1 NG/ML (ref 5–25)
CK SERPL-CCNC: 54 U/L (ref 26–192)
CO2 SERPL-SCNC: 26 MMOL/L (ref 21–32)
CREAT SERPL-MCNC: 0.74 MG/DL (ref 0.6–1.3)
DIAGNOSIS, 93000: NORMAL
DIAGNOSIS, 93000: NORMAL
DIFFERENTIAL METHOD BLD: ABNORMAL
EOSINOPHIL # BLD: 0.3 K/UL (ref 0–0.4)
EOSINOPHIL NFR BLD: 5 % (ref 0–5)
ERYTHROCYTE [DISTWIDTH] IN BLOOD BY AUTOMATED COUNT: 12.6 % (ref 11.6–14.5)
EST. AVERAGE GLUCOSE BLD GHB EST-MCNC: 200 MG/DL
GLOBULIN SER CALC-MCNC: 4.1 G/DL (ref 2–4)
GLUCOSE BLD STRIP.AUTO-MCNC: 134 MG/DL (ref 70–110)
GLUCOSE BLD STRIP.AUTO-MCNC: 173 MG/DL (ref 70–110)
GLUCOSE BLD STRIP.AUTO-MCNC: 176 MG/DL (ref 70–110)
GLUCOSE BLD STRIP.AUTO-MCNC: 207 MG/DL (ref 70–110)
GLUCOSE SERPL-MCNC: 177 MG/DL (ref 74–99)
HBA1C MFR BLD: 8.6 % (ref 4.2–5.6)
HCT VFR BLD AUTO: 33.9 % (ref 35–45)
HGB BLD-MCNC: 10.8 G/DL (ref 12–16)
LYMPHOCYTES # BLD: 2.3 K/UL (ref 0.9–3.6)
LYMPHOCYTES NFR BLD: 45 % (ref 21–52)
MAGNESIUM SERPL-MCNC: 2 MG/DL (ref 1.6–2.6)
MCH RBC QN AUTO: 31.4 PG (ref 24–34)
MCHC RBC AUTO-ENTMCNC: 31.9 G/DL (ref 31–37)
MCV RBC AUTO: 98.5 FL (ref 74–97)
MONOCYTES # BLD: 0.5 K/UL (ref 0.05–1.2)
MONOCYTES NFR BLD: 9 % (ref 3–10)
NEUTS SEG # BLD: 2.2 K/UL (ref 1.8–8)
NEUTS SEG NFR BLD: 41 % (ref 40–73)
P-R INTERVAL, ECG05: 138 MS
P-R INTERVAL, ECG05: 144 MS
PHOSPHATE SERPL-MCNC: 3.3 MG/DL (ref 2.5–4.9)
PLATELET # BLD AUTO: 221 K/UL (ref 135–420)
PMV BLD AUTO: 10.9 FL (ref 9.2–11.8)
POTASSIUM SERPL-SCNC: 3.6 MMOL/L (ref 3.5–5.5)
PROT SERPL-MCNC: 7 G/DL (ref 6.4–8.2)
Q-T INTERVAL, ECG07: 348 MS
Q-T INTERVAL, ECG07: 408 MS
QRS DURATION, ECG06: 84 MS
QRS DURATION, ECG06: 84 MS
QTC CALCULATION (BEZET), ECG08: 455 MS
QTC CALCULATION (BEZET), ECG08: 477 MS
RBC # BLD AUTO: 3.44 M/UL (ref 4.2–5.3)
SODIUM SERPL-SCNC: 143 MMOL/L (ref 136–145)
TROPONIN I SERPL-MCNC: <0.02 NG/ML (ref 0–0.04)
VENTRICULAR RATE, ECG03: 113 BPM
VENTRICULAR RATE, ECG03: 75 BPM
WBC # BLD AUTO: 5.2 K/UL (ref 4.6–13.2)

## 2020-02-25 PROCEDURE — 97116 GAIT TRAINING THERAPY: CPT

## 2020-02-25 PROCEDURE — 84100 ASSAY OF PHOSPHORUS: CPT

## 2020-02-25 PROCEDURE — 85025 COMPLETE CBC W/AUTO DIFF WBC: CPT

## 2020-02-25 PROCEDURE — 74011636637 HC RX REV CODE- 636/637: Performed by: EMERGENCY MEDICINE

## 2020-02-25 PROCEDURE — 74011250637 HC RX REV CODE- 250/637: Performed by: HOSPITALIST

## 2020-02-25 PROCEDURE — 77030012890

## 2020-02-25 PROCEDURE — 93005 ELECTROCARDIOGRAM TRACING: CPT

## 2020-02-25 PROCEDURE — 82962 GLUCOSE BLOOD TEST: CPT

## 2020-02-25 PROCEDURE — 80053 COMPREHEN METABOLIC PANEL: CPT

## 2020-02-25 PROCEDURE — 97530 THERAPEUTIC ACTIVITIES: CPT

## 2020-02-25 PROCEDURE — 36415 COLL VENOUS BLD VENIPUNCTURE: CPT

## 2020-02-25 PROCEDURE — 74011250636 HC RX REV CODE- 250/636: Performed by: HOSPITALIST

## 2020-02-25 PROCEDURE — 83036 HEMOGLOBIN GLYCOSYLATED A1C: CPT

## 2020-02-25 PROCEDURE — 65660000000 HC RM CCU STEPDOWN

## 2020-02-25 PROCEDURE — 82550 ASSAY OF CK (CPK): CPT

## 2020-02-25 PROCEDURE — 71045 X-RAY EXAM CHEST 1 VIEW: CPT

## 2020-02-25 PROCEDURE — 97165 OT EVAL LOW COMPLEX 30 MIN: CPT

## 2020-02-25 PROCEDURE — 83735 ASSAY OF MAGNESIUM: CPT

## 2020-02-25 PROCEDURE — 74011250637 HC RX REV CODE- 250/637: Performed by: NURSE PRACTITIONER

## 2020-02-25 PROCEDURE — 74011636637 HC RX REV CODE- 636/637: Performed by: HOSPITALIST

## 2020-02-25 PROCEDURE — 97161 PT EVAL LOW COMPLEX 20 MIN: CPT

## 2020-02-25 RX ORDER — ALBUTEROL SULFATE 0.83 MG/ML
2.5 SOLUTION RESPIRATORY (INHALATION)
Status: DISCONTINUED | OUTPATIENT
Start: 2020-02-25 | End: 2020-02-27 | Stop reason: HOSPADM

## 2020-02-25 RX ORDER — INSULIN GLARGINE 100 [IU]/ML
36 INJECTION, SOLUTION SUBCUTANEOUS
Status: DISCONTINUED | OUTPATIENT
Start: 2020-02-25 | End: 2020-02-27 | Stop reason: HOSPADM

## 2020-02-25 RX ORDER — MAGNESIUM SULFATE 100 %
4 CRYSTALS MISCELLANEOUS AS NEEDED
Status: DISCONTINUED | OUTPATIENT
Start: 2020-02-25 | End: 2020-02-27 | Stop reason: HOSPADM

## 2020-02-25 RX ORDER — METOPROLOL TARTRATE 25 MG/1
25 TABLET, FILM COATED ORAL EVERY 12 HOURS
Status: DISCONTINUED | OUTPATIENT
Start: 2020-02-25 | End: 2020-02-27 | Stop reason: HOSPADM

## 2020-02-25 RX ORDER — ISOSORBIDE MONONITRATE 30 MG/1
30 TABLET, EXTENDED RELEASE ORAL DAILY
Status: DISCONTINUED | OUTPATIENT
Start: 2020-02-25 | End: 2020-02-26

## 2020-02-25 RX ORDER — INSULIN GLARGINE 100 [IU]/ML
36 INJECTION, SOLUTION SUBCUTANEOUS
Status: DISCONTINUED | OUTPATIENT
Start: 2020-02-25 | End: 2020-02-25

## 2020-02-25 RX ORDER — INSULIN GLARGINE 100 [IU]/ML
32 INJECTION, SOLUTION SUBCUTANEOUS DAILY
Status: DISCONTINUED | OUTPATIENT
Start: 2020-02-25 | End: 2020-02-27 | Stop reason: HOSPADM

## 2020-02-25 RX ORDER — HEPARIN SODIUM 5000 [USP'U]/ML
5000 INJECTION, SOLUTION INTRAVENOUS; SUBCUTANEOUS EVERY 8 HOURS
Status: DISCONTINUED | OUTPATIENT
Start: 2020-02-25 | End: 2020-02-27 | Stop reason: HOSPADM

## 2020-02-25 RX ORDER — FLUTICASONE PROPIONATE 50 MCG
2 SPRAY, SUSPENSION (ML) NASAL DAILY
Status: DISCONTINUED | OUTPATIENT
Start: 2020-02-26 | End: 2020-02-27 | Stop reason: HOSPADM

## 2020-02-25 RX ORDER — BISACODYL 5 MG
10 TABLET, DELAYED RELEASE (ENTERIC COATED) ORAL
Status: DISCONTINUED | OUTPATIENT
Start: 2020-02-25 | End: 2020-02-27 | Stop reason: HOSPADM

## 2020-02-25 RX ORDER — DEXTROSE 50 % IN WATER (D50W) INTRAVENOUS SYRINGE
25-50 AS NEEDED
Status: DISCONTINUED | OUTPATIENT
Start: 2020-02-25 | End: 2020-02-27

## 2020-02-25 RX ORDER — MONTELUKAST SODIUM 10 MG/1
10 TABLET ORAL DAILY
Status: DISCONTINUED | OUTPATIENT
Start: 2020-02-25 | End: 2020-02-27 | Stop reason: HOSPADM

## 2020-02-25 RX ORDER — LEVOTHYROXINE SODIUM 112 UG/1
125 TABLET ORAL
COMMUNITY
Start: 2019-12-11 | End: 2020-12-26

## 2020-02-25 RX ORDER — FUROSEMIDE 20 MG/1
20 TABLET ORAL DAILY
Status: DISCONTINUED | OUTPATIENT
Start: 2020-02-25 | End: 2020-02-25

## 2020-02-25 RX ORDER — AMLODIPINE BESYLATE 10 MG/1
10 TABLET ORAL DAILY
Status: DISCONTINUED | OUTPATIENT
Start: 2020-02-25 | End: 2020-02-26

## 2020-02-25 RX ORDER — SODIUM CHLORIDE 0.9 % (FLUSH) 0.9 %
5-40 SYRINGE (ML) INJECTION EVERY 8 HOURS
Status: DISCONTINUED | OUTPATIENT
Start: 2020-02-25 | End: 2020-02-27 | Stop reason: HOSPADM

## 2020-02-25 RX ORDER — ACETAMINOPHEN 500 MG
1000 TABLET ORAL
Status: DISCONTINUED | OUTPATIENT
Start: 2020-02-25 | End: 2020-02-27 | Stop reason: HOSPADM

## 2020-02-25 RX ORDER — INSULIN LISPRO 100 [IU]/ML
INJECTION, SOLUTION INTRAVENOUS; SUBCUTANEOUS
Status: DISCONTINUED | OUTPATIENT
Start: 2020-02-25 | End: 2020-02-27 | Stop reason: HOSPADM

## 2020-02-25 RX ORDER — FUROSEMIDE 40 MG/1
40 TABLET ORAL 2 TIMES DAILY
Status: DISCONTINUED | OUTPATIENT
Start: 2020-02-25 | End: 2020-02-26

## 2020-02-25 RX ORDER — SODIUM CHLORIDE 0.9 % (FLUSH) 0.9 %
5-40 SYRINGE (ML) INJECTION AS NEEDED
Status: DISCONTINUED | OUTPATIENT
Start: 2020-02-25 | End: 2020-02-27 | Stop reason: HOSPADM

## 2020-02-25 RX ORDER — CLOPIDOGREL BISULFATE 75 MG/1
75 TABLET ORAL DAILY
Status: DISCONTINUED | OUTPATIENT
Start: 2020-02-25 | End: 2020-02-27 | Stop reason: HOSPADM

## 2020-02-25 RX ORDER — LEVOTHYROXINE SODIUM 112 UG/1
112 TABLET ORAL
Status: DISCONTINUED | OUTPATIENT
Start: 2020-02-25 | End: 2020-02-27 | Stop reason: HOSPADM

## 2020-02-25 RX ADMIN — HEPARIN SODIUM 5000 UNITS: 5000 INJECTION INTRAVENOUS; SUBCUTANEOUS at 04:31

## 2020-02-25 RX ADMIN — CLOPIDOGREL BISULFATE 75 MG: 75 TABLET ORAL at 08:40

## 2020-02-25 RX ADMIN — MONTELUKAST 10 MG: 10 TABLET, FILM COATED ORAL at 08:40

## 2020-02-25 RX ADMIN — ISOSORBIDE MONONITRATE 30 MG: 30 TABLET, EXTENDED RELEASE ORAL at 08:40

## 2020-02-25 RX ADMIN — Medication 10 ML: at 21:51

## 2020-02-25 RX ADMIN — INSULIN LISPRO 4 UNITS: 100 INJECTION, SOLUTION INTRAVENOUS; SUBCUTANEOUS at 11:45

## 2020-02-25 RX ADMIN — AMLODIPINE BESYLATE 10 MG: 10 TABLET ORAL at 08:40

## 2020-02-25 RX ADMIN — HEPARIN SODIUM 5000 UNITS: 5000 INJECTION INTRAVENOUS; SUBCUTANEOUS at 21:41

## 2020-02-25 RX ADMIN — LEVOTHYROXINE SODIUM 112 MCG: 112 TABLET ORAL at 06:20

## 2020-02-25 RX ADMIN — INSULIN LISPRO 3 UNITS: 100 INJECTION, SOLUTION INTRAVENOUS; SUBCUTANEOUS at 18:15

## 2020-02-25 RX ADMIN — FUROSEMIDE 40 MG: 40 TABLET ORAL at 18:18

## 2020-02-25 RX ADMIN — INSULIN GLARGINE 32 UNITS: 100 INJECTION, SOLUTION SUBCUTANEOUS at 08:38

## 2020-02-25 RX ADMIN — INSULIN LISPRO 2 UNITS: 100 INJECTION, SOLUTION INTRAVENOUS; SUBCUTANEOUS at 08:26

## 2020-02-25 RX ADMIN — ACETAMINOPHEN 1000 MG: 500 TABLET ORAL at 08:40

## 2020-02-25 RX ADMIN — HEPARIN SODIUM 5000 UNITS: 5000 INJECTION INTRAVENOUS; SUBCUTANEOUS at 12:00

## 2020-02-25 RX ADMIN — Medication 10 ML: at 06:32

## 2020-02-25 RX ADMIN — INSULIN GLARGINE 36 UNITS: 100 INJECTION, SOLUTION SUBCUTANEOUS at 21:39

## 2020-02-25 RX ADMIN — METOPROLOL TARTRATE 25 MG: 25 TABLET, FILM COATED ORAL at 11:56

## 2020-02-25 RX ADMIN — METOPROLOL TARTRATE 25 MG: 25 TABLET, FILM COATED ORAL at 21:43

## 2020-02-25 RX ADMIN — ACETAMINOPHEN 1000 MG: 500 TABLET ORAL at 21:43

## 2020-02-25 RX ADMIN — Medication 10 ML: at 18:16

## 2020-02-25 RX ADMIN — FUROSEMIDE 20 MG: 20 TABLET ORAL at 08:39

## 2020-02-25 NOTE — PROGRESS NOTES
Visit Vitals  /64 (BP 1 Location: Left arm, BP Patient Position: At rest)   Pulse 81   Temp 97.4 °F (36.3 °C)   Resp 16   Ht 5' 7\" (1.702 m)   Wt 109.3 kg (241 lb)   SpO2 97%   Breastfeeding No   BMI 37.75 kg/m²     General:  Awake, alert  Cardiovascular:  S1S2+, RRR  Pulmonary:  Decreased BS b/l bases  GI:  Soft, BS+, NT, ND, obese  Extremities:  trace edema      I saw patient in f/u. Denies CP. Cardio input noted. Acute on chronic systolic chf exacerbation. Lasix. On plavix, statin. PT, OT. D/w patient.  Home soon

## 2020-02-25 NOTE — ED NOTES
Pt now has a room assignment. [facility] Means # [id number]. .. dr Zully Cedillo is placing an order for no telemetry while en route to v-q scan . .. per aliza, er charge rn: call report to the tele assigned rn. .   Nuclear med will be called now for the study. Azul Means

## 2020-02-25 NOTE — PROGRESS NOTES
attempted to visit patient and was not able to do a spiritual assessment. Patient was not available. Will follow up with patient as needed.   [id number] [facility] Department  [phone]
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
## 2020-02-25 NOTE — DIABETES MGMT
Diabetes Patient/Family Education Record  Factors That  May Influence Patients Ability  to Learn or  Comply with Recommendations   []   Language barrier    []   Cultural needs   []   Motivation    []   Cognitive limitation    []   Physical   []   Education    []   Physiological factors   []   Hearing/vision/speaking impairment   []   Hindu beliefs    []   Financial factors   []  Other:   [x]  No factors identified at this time. Person Instructed:   [x]   Patient   []   Family   []  Other     Preference for Learning:   [x]   Verbal   []   Written   []  Demonstration     Level of Comprehension & Competence:    []  Good                                      [x] Fair                                     []  Poor                             []  Needs Reinforcement   [x]  Teachback completed    Education Component:    [x]  Medication management, including how to administer insulin (if appropriate) and potential medication interactions Reports taking Lantus insulin 32 units in the morning and 36 units in the evening without difficulty   [x]  Nutritional management -obtain usual meal pattern reports not having a big appetite typically   []  Exercise   [x]  Signs, symptoms, and treatment of hyperglycemia and hypoglycemia Reports rarely having hypoglycemia unless she is not eating well, states she starts feeling symptoms at 100 mg/dL and below.     [x] Prevention, recognition and treatment of hyperglycemia and hypoglycemia   [x]  Importance of blood glucose monitoring and how to obtain a blood glucose meter    []  Instruction on use of the blood glucose meter   [x]  Discuss the importance of HbA1C monitoring 8.6% (up from previous A1c), states she has noticed her numbers are higher when she is having pain   []  Sick day guidelines   []  Proper use and disposal of lancets, needles, syringes or insulin pens (if appropriate)   [x]  Potential long-term complications (retinopathy, kidney disease, neuropathy, foot care)   [] Information about whom to contact in case of emergency or for more information    [x]  Goal:  Patient/family will demonstrate understanding of Diabetes Self Management Skills by:3/03/20  Plan for post-discharge education or self-management support:    [x] Outpatient class schedule provided            [] Patient Declined    [] Scheduled for outpatient classes (date) _______  Verify:  Does patient understand how diabetes medications work? Does patient know what their most recent A1c is? Does patient monitor glucose at home? yes  Does patient have difficulty obtaining diabetes medications or testing supplies?  None reported     Alma Haynes, ZULAY, CDE

## 2020-02-25 NOTE — PROGRESS NOTES
[facility] Hospitalist Group  Progress Note    Patient: Janneth Morton Age: 82 y.o. : 1935 MR#: [id number] SSN: [id number]  Date: 2020     Subjective:   Minimal SOB at rest. Increased SOB with exertion. Assessment/Plan:   1. Chest pain, atypical possibly d/t frequent PVC/PAC  2. Sinus tachycardia, resolved with BB  3. Mild LV dysfunction, mildly decompensated. Acute on chronic systolic and diastolic HF. 4. CAD s/p PCI in   5. Uncontrolled HTN, improved  6. HLD, intolerance to statins  7. DMT2. a1c 8,6  8. Thyroid disease  9. Asthma      Plan  1. Cardiology consult and recommendations lasix PO BID,  low dose BB, continue norvasc, ranexa and imdur. Continue Plavix  2. Monitor metabolic and renal function while diuresis  3. POC glucose, SSI, lantus. Diabetes management.    4. PT/OT eval and treat    Additional Notes:      Case discussed with:  [x]Patient  []Family  [x]Nursing  []Case Management  DVT Prophylaxis:  []Lovenox  [x]Hep SQ  []SCDs  []Coumadin   []On Heparin gtt    Objective:   VS:   Visit Vitals  /64 (BP 1 Location: Left arm, BP Patient Position: At rest)   Pulse 81   Temp 97.4 °F (36.3 °C)   Resp 16   Ht 5' 7\" (1.702 m)   Wt 109.3 kg (241 lb)   SpO2 97%   Breastfeeding No   BMI 37.75 kg/m²      Tmax/24hrs: Temp (24hrs), Av.9 °F (36.6 °C), Min:97.3 °F (36.3 °C), Max:98.8 °F (37.1 °C)      Intake/Output Summary (Last 24 hours) at 2020 1238  Last data filed at 2020 0955  Gross per 24 hour   Intake 850 ml   Output 300 ml   Net 550 ml       General:  Alert, NAD  Cardiovascular:  RRR  Pulmonary:  BLL diminished bases  GI:  +BS in all four quadrants, soft, non-tender  Extremities:  BLE trace edema  Neuro: alert and oriented      Labs:    Recent Results (from the past 24 hour(s))   EKG, 12 LEAD, INITIAL    Collection Time: 20  6:22 PM   Result Value Ref Range    Ventricular Rate 113 BPM    Atrial Rate 113 BPM    P-R Interval 144 ms QRS Duration 84 ms    Q-T Interval 348 ms    QTC Calculation (Bezet) 477 ms    Calculated P Axis 54 degrees    Calculated R Axis -11 degrees    Calculated T Axis 60 degrees    Diagnosis       Sinus tachycardia with premature supraventricular complexes with occasional   premature ventricular complexes  Nonspecific ST and T wave abnormality  Abnormal ECG  When compared with ECG of 29-OCT-2019 17:24,  Sinus rhythm has replaced Atrial fibrillation     METABOLIC PANEL, COMPREHENSIVE    Collection Time: 02/24/20  6:30 PM   Result Value Ref Range    Sodium 139 136 - 145 mmol/L    Potassium 3.5 3.5 - 5.5 mmol/L    Chloride 105 100 - 111 mmol/L    CO2 26 21 - 32 mmol/L    Anion gap 8 3.0 - 18 mmol/L    Glucose 346 (H) 74 - 99 mg/dL    BUN 9 7.0 - 18 MG/DL    Creatinine 0.93 0.6 - 1.3 MG/DL    BUN/Creatinine ratio 10 (L) 12 - 20      GFR est AA >60 >60 ml/min/1.73m2    GFR est non-AA 58 (L) >60 ml/min/1.73m2    Calcium 9.2 8.5 - 10.1 MG/DL    Bilirubin, total 0.4 0.2 - 1.0 MG/DL    ALT (SGPT) 14 13 - 56 U/L    AST (SGOT) 8 (L) 10 - 38 U/L    Alk. phosphatase 98 45 - 117 U/L    Protein, total 8.4 (H) 6.4 - 8.2 g/dL    Albumin 3.1 (L) 3.4 - 5.0 g/dL    Globulin 5.3 (H) 2.0 - 4.0 g/dL    A-G Ratio 0.6 (L) 0.8 - 1.7     CBC WITH AUTOMATED DIFF    Collection Time: 02/24/20  6:30 PM   Result Value Ref Range    WBC 5.3 4.6 - 13.2 K/uL    RBC 3.81 (L) 4.20 - 5.30 M/uL    HGB 12.1 12.0 - 16.0 g/dL    HCT 38.6 35.0 - 45.0 %    .3 (H) 74.0 - 97.0 FL    MCH 31.8 24.0 - 34.0 PG    MCHC 31.3 31.0 - 37.0 g/dL    RDW 12.1 11.6 - 14.5 %    PLATELET 262 180 - 505 K/uL    MPV 10.5 9.2 - 11.8 FL    NEUTROPHILS 47 40 - 73 %    LYMPHOCYTES 44 21 - 52 %    MONOCYTES 5 3 - 10 %    EOSINOPHILS 3 0 - 5 %    BASOPHILS 1 0 - 2 %    ABS. NEUTROPHILS 2.5 1.8 - 8.0 K/UL    ABS. LYMPHOCYTES 2.4 0.9 - 3.6 K/UL    ABS. MONOCYTES 0.3 0.05 - 1.2 K/UL    ABS. EOSINOPHILS 0.2 0.0 - 0.4 K/UL    ABS.  BASOPHILS 0.0 0.0 - 0.1 K/UL    DF AUTOMATED     TROPONIN I    Collection Time: 02/24/20  6:30 PM   Result Value Ref Range    Troponin-I, QT <0.02 0.0 - 0.045 NG/ML   MAGNESIUM    Collection Time: 02/24/20  6:30 PM   Result Value Ref Range    Magnesium 2.1 1.6 - 2.6 mg/dL   D DIMER    Collection Time: 02/24/20  6:30 PM   Result Value Ref Range    D DIMER 1.14 (H) <0.46 ug/ml(FEU)   NT-PRO BNP    Collection Time: 02/24/20  6:30 PM   Result Value Ref Range    NT pro-BNP 42 0 - 1,800 PG/ML   LACTIC ACID    Collection Time: 02/24/20  7:32 PM   Result Value Ref Range    Lactic acid 1.8 0.4 - 2.0 MMOL/L   CULTURE, BLOOD    Collection Time: 02/24/20  7:32 PM   Result Value Ref Range    Special Requests: NO SPECIAL REQUESTS  LEFT  Antecubital        Culture result: NO GROWTH AFTER 8 HOURS     GLUCOSE, POC    Collection Time: 02/24/20  7:46 PM   Result Value Ref Range    Glucose (POC) 329 (H) 70 - 110 mg/dL   GLUCOSE, POC    Collection Time: 02/24/20  8:32 PM   Result Value Ref Range    Glucose (POC) 251 (H) 70 - 110 mg/dL   EKG, 12 LEAD, SUBSEQUENT    Collection Time: 02/25/20  7:11 AM   Result Value Ref Range    Ventricular Rate 75 BPM    Atrial Rate 75 BPM    P-R Interval 138 ms    QRS Duration 84 ms    Q-T Interval 408 ms    QTC Calculation (Bezet) 455 ms    Calculated P Axis 49 degrees    Calculated R Axis -14 degrees    Calculated T Axis -33 degrees    Diagnosis       Normal sinus rhythm  Nonspecific T wave abnormality  Abnormal ECG  When compared with ECG of 24-FEB-2020 18:22,  premature ventricular complexes are no longer present  premature supraventricular complexes are no longer present  Vent.  rate has decreased BY  38 BPM  Nonspecific T wave abnormality now evident in Inferior leads     GLUCOSE, POC    Collection Time: 02/25/20  7:40 AM   Result Value Ref Range    Glucose (POC) 173 (H) 70 - 110 mg/dL   HEMOGLOBIN A1C WITH EAG    Collection Time: 02/25/20  7:42 AM   Result Value Ref Range    Hemoglobin A1c 8.6 (H) 4.2 - 5.6 %    Est. average glucose 458 mg/dL   METABOLIC PANEL, COMPREHENSIVE    Collection Time: 02/25/20  7:42 AM   Result Value Ref Range    Sodium 143 136 - 145 mmol/L    Potassium 3.6 3.5 - 5.5 mmol/L    Chloride 110 100 - 111 mmol/L    CO2 26 21 - 32 mmol/L    Anion gap 7 3.0 - 18 mmol/L    Glucose 177 (H) 74 - 99 mg/dL    BUN 11 7.0 - 18 MG/DL    Creatinine 0.74 0.6 - 1.3 MG/DL    BUN/Creatinine ratio 15 12 - 20      GFR est AA >60 >60 ml/min/1.73m2    GFR est non-AA >60 >60 ml/min/1.73m2    Calcium 8.7 8.5 - 10.1 MG/DL    Bilirubin, total 0.7 0.2 - 1.0 MG/DL    ALT (SGPT) 13 13 - 56 U/L    AST (SGOT) 11 10 - 38 U/L    Alk. phosphatase 87 45 - 117 U/L    Protein, total 7.0 6.4 - 8.2 g/dL    Albumin 2.9 (L) 3.4 - 5.0 g/dL    Globulin 4.1 (H) 2.0 - 4.0 g/dL    A-G Ratio 0.7 (L) 0.8 - 1.7     MAGNESIUM    Collection Time: 02/25/20  7:42 AM   Result Value Ref Range    Magnesium 2.0 1.6 - 2.6 mg/dL   PHOSPHORUS    Collection Time: 02/25/20  7:42 AM   Result Value Ref Range    Phosphorus 3.3 2.5 - 4.9 MG/DL   CBC WITH AUTOMATED DIFF    Collection Time: 02/25/20  7:42 AM   Result Value Ref Range    WBC 5.2 4.6 - 13.2 K/uL    RBC 3.44 (L) 4.20 - 5.30 M/uL    HGB 10.8 (L) 12.0 - 16.0 g/dL    HCT 33.9 (L) 35.0 - 45.0 %    MCV 98.5 (H) 74.0 - 97.0 FL    MCH 31.4 24.0 - 34.0 PG    MCHC 31.9 31.0 - 37.0 g/dL    RDW 12.6 11.6 - 14.5 %    PLATELET 885 726 - 083 K/uL    MPV 10.9 9.2 - 11.8 FL    NEUTROPHILS 41 40 - 73 %    LYMPHOCYTES 45 21 - 52 %    MONOCYTES 9 3 - 10 %    EOSINOPHILS 5 0 - 5 %    BASOPHILS 0 0 - 2 %    ABS. NEUTROPHILS 2.2 1.8 - 8.0 K/UL    ABS. LYMPHOCYTES 2.3 0.9 - 3.6 K/UL    ABS. MONOCYTES 0.5 0.05 - 1.2 K/UL    ABS. EOSINOPHILS 0.3 0.0 - 0.4 K/UL    ABS.  BASOPHILS 0.0 0.0 - 0.1 K/UL    DF AUTOMATED     CARDIAC PANEL,(CK, CKMB & TROPONIN)    Collection Time: 02/25/20  7:42 AM   Result Value Ref Range    CK 54 26 - 192 U/L    CK - MB <1.0 <3.6 ng/ml    CK-MB Index  0.0 - 4.0 %     CALCULATION NOT PERFORMED WHEN RESULT IS BELOW LINEAR LIMIT    Troponin-I, QT <0.02 0.0 - 0.045 NG/ML   GLUCOSE, POC    Collection Time: 02/25/20 11:25 AM   Result Value Ref Range    Glucose (POC) 207 (H) 70 - 110 mg/dL       Signed By: Delphine Graham NP     February 25, 2020

## 2020-02-25 NOTE — PROGRESS NOTES
Dr. Duyen Mac is informed of Study Result     VQ SCAN      CPT CODE: 85006     INDICATION: Tachycardia and elevated d-dimer.     COMPARISON: June 24, 2019.     DESCRIPTION: After aerosolization of 30 mCi 99mTc-DTPA, which delivers  approximately 1-2 mCi of radiopharmaceutical to the lungs, ventilatory images of  the lungs were obtained in multiple projections. After intravenous  administration of 5.5 mCi 99mTc-MAA, perfusion images of the lungs were obtained  in multiple projections. Images are correlated with chest radiographic study  which demonstrate a small left pleural effusion and bibasilar atelectasis. .       The distribution of radiopharmaceutical is within normal limits on ventilatory  and perfusion images. There is no evidence of moderate or large mismatched  subsegmental perfusion defect to indicate the presence of pulmonary embolism.       IMPRESSION  IMPRESSION:     Low probability for pulmonary embolus. .       Patient is alert and oriented x 4, denies chest pain, no SOB. Dr. Duyen Mac said she will come to see the patient.
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
## 2020-02-25 NOTE — DIABETES MGMT
NUTRITIONAL ASSESSMENT GLYCEMIC CONTROL/ PLAN OF CARE     Veto Rising           85 y.o.           2/24/2020                 1. Tachycardia    2. Angina pectoris (HCC)       INTERVENTIONS/PLAN:   Continue basal and correctional insulin coverage   Diabetes education   ASSESSMENT:   Pt is an 82year old female with a past medical history significant for CAD, diabetes, CHF, and hypertension who presented with chest pain. Blood glucose initially elevated, now improving with insulin coverage. Pt reports normally doing pretty well with her diabetes management, stating she's had it for many years. Pt reports a normal appetite, states she never has a large appetite. Pt has no difficulty with her injections and states she rarely has episodes of hypoglycemia.      Diabetes Management:   Recent blood glucose:     2/24/2020 19:46 2/24/2020 20:32 2/25/2020 07:40   329 (H) 251 (H) 173 (H)   Within target range (non-ICU: <140; ICU<180): [] Yes   [x]  No    Current Insulin regimen:   Lantus insulin 32 units daily and 36 units every bedtime  Correctional Lispro insulin 4 times daily ACHS  Home medication/insulin regimen:   Lantus insulin 32 units in the morning and 36 units at night  HbA1c: 8.6% (estimated average glucose of 200 mg/dL)  Adequate glycemic control PTA:  [] Yes  [x] No     SUBJECTIVE/OBJECTIVE:   Information obtained from: patient, chart review    Diet: diabetic consistent carbohydrate     Patient Vitals for the past 100 hrs:   % Diet Eaten   02/25/20 0854 25 %     Medications: [x]  Reviewed     Most Recent POC Glucose:   Recent Labs     02/25/20  0742 02/24/20  1830   * 346*      Labs:   Lab Results   Component Value Date/Time    Hemoglobin A1c 8.6 (H) 02/25/2020 07:42 AM    Hemoglobin A1c, External 7.5 10/31/2019     Lab Results   Component Value Date/Time    Sodium 143 02/25/2020 07:42 AM    Potassium 3.6 02/25/2020 07:42 AM    Chloride 110 02/25/2020 07:42 AM    CO2 26 02/25/2020 07:42 AM    Anion gap 7 02/25/2020 07:42 AM    Glucose 177 (H) 02/25/2020 07:42 AM    BUN 11 02/25/2020 07:42 AM    Creatinine 0.74 02/25/2020 07:42 AM    Calcium 8.7 02/25/2020 07:42 AM    Magnesium 2.0 02/25/2020 07:42 AM    Phosphorus 3.3 02/25/2020 07:42 AM    Albumin 2.9 (L) 02/25/2020 07:42 AM     Anthropometrics:   BMI (calculated): 37.7  Wt Readings from Last 1 Encounters:   02/24/20 109.3 kg (241 lb)      Ht Readings from Last 1 Encounters:   02/24/20 5' 7\" (1.702 m)     Estimated Nutrition Needs:  0653-4242 Kcal/day,   grams protein/day   Based on:   [x] Actual BW    []   IBW   []Adjusted BW      Nutrition Diagnoses:    Altered nutrition related lab value related to diabetes as evidenced by hemoglobin A1c of 8.6%  Nutrition Interventions: diabetes education, coordination of care   Goal: Blood glucose will be within target range of  mg/dL by 2/28/20     Nutrition Monitoring and Evaluation    []     Monitor po intake on meal rounds  [x]     Continue inpatient monitoring and intervention  []     Other:     Radha Alatorre, [facility]  Ext [phone]

## 2020-02-25 NOTE — CONSULTS
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   Anthony Eason MD

## 2020-02-25 NOTE — ED NOTES
Per Dr Javi Georges (hospitalist) pt needs cardiac monitoring while in VQ; ANATOLY Montes (primary ED RN) notified/instructed to notify staff of hospitalist order.
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
## 2020-02-25 NOTE — ED NOTES
I rec'd a call from Georgina Goodman tech: it will take her 1 hr to arrive from home and j20 min to set-up. .. pt will go straight to her room. . then, to [employer] med. ..

## 2020-02-25 NOTE — PROGRESS NOTES
Home health orders have been sent to [address] was notified.       CHASTITY Orellana RN  Care Management  Pager: [phone]

## 2020-02-25 NOTE — ED NOTES
Event summary: attempted to call pt report x 2 to er charge rn since 2057. Per h. Super: the er is extremely busy now and to call back In a few minutes. .  (h. Super: Tessy Hawley)

## 2020-02-25 NOTE — HOME CARE
Rounded on this \"[facility]\" patient ,explained to patient about [facility] services offered and left patient a brochure on [facility]. MARCELA MAHARAJ.

## 2020-02-25 NOTE — HOME CARE
Received home health referral for [facility] for SN, PT, and OT. Met with patient, explained services and answered all questions. Demographics verified. Order processed and emailed to central office. Patient has the following DME: bedside commode, manual wheelchair, rolling walker, and shower chair at home.  Mariah Dumont, [facility] Liaison

## 2020-02-25 NOTE — ROUTINE PROCESS
TRANSFER - OUT REPORT:    Verbal report given to Lena Ledesma RN(name) on Bre Carballo  being transferred to [facility] er(unit) for ordered procedure       Report consisted of patients Situation, Background, Assessment and   Recommendations(SBAR). Information from the following report(s) SBAR, ED Summary, Intake/Output, MAR, Recent Results, Med Rec Status, Cardiac Rhythm st and Alarm Parameters  was reviewed with the receiving nurse. Lines:   Peripheral IV 02/24/20 Left Antecubital (Active)   Site Assessment Clean, dry, & intact 2/24/2020  6:36 PM   Dressing Status Clean, dry, & intact 2/24/2020  6:36 PM   Dressing Type Transparent 2/24/2020  6:36 PM   Hub Color/Line Status Flushed 2/24/2020  6:36 PM        Opportunity for questions and clarification was provided.       Patient transported with:   Monitor

## 2020-02-25 NOTE — ROUTINE PROCESS
Bedside and Verbal shift change report given to Quique Levi RN   (oncoming nurse) by Amos Santillan RN (offgoing nurse). Report included the following information SBAR and Kardex.

## 2020-02-25 NOTE — ROUTINE PROCESS
TRANSFER - OUT REPORT:    Verbal report given to Kelly Smith RN(name) on Cassidy Rubin  being transferred to [facility](unit) for routine progression of care       Report consisted of patients Situation, Background, Assessment and   Recommendations(SBAR). Information from the following report(s) SBAR, ED Summary, Intake/Output, MAR, Recent Results, Med Rec Status, Cardiac Rhythm ST and Alarm Parameters  was reviewed with the receiving nurse. Lines:   Peripheral IV 02/24/20 Left Antecubital (Active)   Site Assessment Clean, dry, & intact 2/24/2020  6:36 PM   Dressing Status Clean, dry, & intact 2/24/2020  6:36 PM   Dressing Type Transparent 2/24/2020  6:36 PM   Hub Color/Line Status Flushed 2/24/2020  6:36 PM        Opportunity for questions and clarification was provided.       Patient transported with:   Monitor

## 2020-02-25 NOTE — PROGRESS NOTES
Reason for Admission:  Chest pain [R07.9]                 RRAT Score:    19           Plan for utilizing home health:    yes                      Likelihood of Readmission:   Moderate                         Do you (patient/family) have any concerns for transition/discharge?  no    Transition of Care Plan:       Initial assessment completed with patient. Cognitive status of patient: oriented to time, place, person and situation. Face sheet information confirmed:  yes. The patient designates her brother Luis Worthy and her grand daughter Calvin Fallon  to participate in her discharge plan and to receive any needed information. This patient lives in a home and her brother Urvashi Musa lives across the street from her. Patient is not able to navigate steps as needed. Prior to hospitalization, patient was considered to be independent with ADLs/IADLS : no . If not independent,  patient needs assist with : bathing, food preparation and cooking    Patient has a current ACP document on file: no  The friend and neighbor will be available to transport patient home upon discharge. The patient already has Walker, Rollator, W/C, BSC, and CPAP medical equipment available in the home. Patient is not currently active with home health. Patient has stayed in a skilled nursing facility or rehab. Was  stay within last 60 days : no. This patient is on dialysis :no    List of available Home Health agencies were provided and reviewed with the patient prior to discharge. Freedom of choice signed: yes, for Delma N Kaleb Jacobo. Currently, the discharge plan is Home with [address] [place] [address] [place]. The patient states that she can obtain her medications from the pharmacy, and take her medications as directed. Patient's current insurance is [place] Medicare, [facility] Medicaid      Care Management Interventions  PCP Verified by CM: Yes(per pt, she saw her pcp last month)  Mode of Transport at Discharge:  Other (see comment)(pt's neighbor will transport her home)  Transition of Care Consult (CM Consult): Discharge Planning, [address]: Yes  Physical Therapy Consult: Yes  Occupational Therapy Consult: Yes  Speech Therapy Consult: No  Current Support Network: Lives Alone, Has Personal Caregivers  Confirm Follow Up Transport: Friends  The Patient and/or Patient Representative was Provided with a Choice of Provider and Agrees with the Discharge Plan?: Yes  Freedom of Choice List was Provided with Basic Dialogue that Supports the Patient's Individualized Plan of Care/Goals, Treatment Preferences and Shares the Quality Data Associated with the Providers?: Yes  Discharge Location  Discharge Placement: Home with home health        CHASTITY Garcia RN  Care Management  Pager: [phone]

## 2020-02-26 LAB
ANION GAP SERPL CALC-SCNC: 7 MMOL/L (ref 3–18)
BUN SERPL-MCNC: 13 MG/DL (ref 7–18)
BUN/CREAT SERPL: 15 (ref 12–20)
CALCIUM SERPL-MCNC: 9 MG/DL (ref 8.5–10.1)
CHLORIDE SERPL-SCNC: 107 MMOL/L (ref 100–111)
CO2 SERPL-SCNC: 27 MMOL/L (ref 21–32)
CREAT SERPL-MCNC: 0.86 MG/DL (ref 0.6–1.3)
GLUCOSE BLD STRIP.AUTO-MCNC: 113 MG/DL (ref 70–110)
GLUCOSE BLD STRIP.AUTO-MCNC: 120 MG/DL (ref 70–110)
GLUCOSE BLD STRIP.AUTO-MCNC: 141 MG/DL (ref 70–110)
GLUCOSE BLD STRIP.AUTO-MCNC: 150 MG/DL (ref 70–110)
GLUCOSE BLD STRIP.AUTO-MCNC: 163 MG/DL (ref 70–110)
GLUCOSE BLD STRIP.AUTO-MCNC: 77 MG/DL (ref 70–110)
GLUCOSE SERPL-MCNC: 62 MG/DL (ref 74–99)
MAGNESIUM SERPL-MCNC: 2 MG/DL (ref 1.6–2.6)
MAGNESIUM SERPL-MCNC: 2 MG/DL (ref 1.6–2.6)
POTASSIUM SERPL-SCNC: 3 MMOL/L (ref 3.5–5.5)
SODIUM SERPL-SCNC: 141 MMOL/L (ref 136–145)

## 2020-02-26 PROCEDURE — 77030027138 HC INCENT SPIROMETER -A

## 2020-02-26 PROCEDURE — 65660000000 HC RM CCU STEPDOWN

## 2020-02-26 PROCEDURE — 83735 ASSAY OF MAGNESIUM: CPT

## 2020-02-26 PROCEDURE — 74011636637 HC RX REV CODE- 636/637: Performed by: EMERGENCY MEDICINE

## 2020-02-26 PROCEDURE — 36415 COLL VENOUS BLD VENIPUNCTURE: CPT

## 2020-02-26 PROCEDURE — 74011250637 HC RX REV CODE- 250/637: Performed by: HOSPITALIST

## 2020-02-26 PROCEDURE — 80048 BASIC METABOLIC PNL TOTAL CA: CPT

## 2020-02-26 PROCEDURE — 82962 GLUCOSE BLOOD TEST: CPT

## 2020-02-26 PROCEDURE — 74011250637 HC RX REV CODE- 250/637: Performed by: NURSE PRACTITIONER

## 2020-02-26 PROCEDURE — 74011250637 HC RX REV CODE- 250/637: Performed by: EMERGENCY MEDICINE

## 2020-02-26 PROCEDURE — 74011250636 HC RX REV CODE- 250/636: Performed by: HOSPITALIST

## 2020-02-26 PROCEDURE — 74011636637 HC RX REV CODE- 636/637: Performed by: HOSPITALIST

## 2020-02-26 RX ORDER — AMLODIPINE BESYLATE 5 MG/1
5 TABLET ORAL DAILY
Status: DISCONTINUED | OUTPATIENT
Start: 2020-02-27 | End: 2020-02-27 | Stop reason: HOSPADM

## 2020-02-26 RX ORDER — NITROGLYCERIN 0.4 MG/1
0.4 TABLET SUBLINGUAL
Qty: 20 TAB | Refills: 0 | Status: SHIPPED | OUTPATIENT
Start: 2020-02-26 | End: 2021-11-09 | Stop reason: SDUPTHER

## 2020-02-26 RX ORDER — POTASSIUM CHLORIDE 20 MEQ/1
40 TABLET, EXTENDED RELEASE ORAL
Status: COMPLETED | OUTPATIENT
Start: 2020-02-26 | End: 2020-02-26

## 2020-02-26 RX ORDER — FUROSEMIDE 40 MG/1
40 TABLET ORAL DAILY
Qty: 30 TAB | Refills: 0 | Status: SHIPPED | OUTPATIENT
Start: 2020-02-26 | End: 2020-02-27

## 2020-02-26 RX ORDER — METOPROLOL TARTRATE 25 MG/1
25 TABLET, FILM COATED ORAL EVERY 12 HOURS
Qty: 60 TAB | Refills: 0 | Status: SHIPPED | OUTPATIENT
Start: 2020-02-26 | End: 2020-05-01 | Stop reason: SDUPTHER

## 2020-02-26 RX ORDER — FUROSEMIDE 40 MG/1
40 TABLET ORAL DAILY
Status: DISCONTINUED | OUTPATIENT
Start: 2020-02-27 | End: 2020-02-26

## 2020-02-26 RX ADMIN — FUROSEMIDE 40 MG: 40 TABLET ORAL at 08:19

## 2020-02-26 RX ADMIN — INSULIN GLARGINE 32 UNITS: 100 INJECTION, SOLUTION SUBCUTANEOUS at 09:00

## 2020-02-26 RX ADMIN — LEVOTHYROXINE SODIUM 112 MCG: 112 TABLET ORAL at 08:19

## 2020-02-26 RX ADMIN — METOPROLOL TARTRATE 25 MG: 25 TABLET, FILM COATED ORAL at 21:47

## 2020-02-26 RX ADMIN — INSULIN LISPRO 3 UNITS: 100 INJECTION, SOLUTION INTRAVENOUS; SUBCUTANEOUS at 17:29

## 2020-02-26 RX ADMIN — CLOPIDOGREL BISULFATE 75 MG: 75 TABLET ORAL at 08:19

## 2020-02-26 RX ADMIN — ISOSORBIDE MONONITRATE 30 MG: 30 TABLET, EXTENDED RELEASE ORAL at 08:19

## 2020-02-26 RX ADMIN — ACETAMINOPHEN 1000 MG: 500 TABLET ORAL at 21:47

## 2020-02-26 RX ADMIN — METOPROLOL TARTRATE 25 MG: 25 TABLET, FILM COATED ORAL at 08:19

## 2020-02-26 RX ADMIN — Medication 10 ML: at 21:47

## 2020-02-26 RX ADMIN — MONTELUKAST 10 MG: 10 TABLET, FILM COATED ORAL at 08:19

## 2020-02-26 RX ADMIN — FLUTICASONE PROPIONATE 2 SPRAY: 50 SPRAY, METERED NASAL at 08:18

## 2020-02-26 RX ADMIN — INSULIN GLARGINE 36 UNITS: 100 INJECTION, SOLUTION SUBCUTANEOUS at 21:46

## 2020-02-26 RX ADMIN — HEPARIN SODIUM 5000 UNITS: 5000 INJECTION INTRAVENOUS; SUBCUTANEOUS at 05:14

## 2020-02-26 RX ADMIN — POTASSIUM CHLORIDE 40 MEQ: 1500 TABLET, EXTENDED RELEASE ORAL at 12:51

## 2020-02-26 RX ADMIN — Medication 10 ML: at 15:56

## 2020-02-26 RX ADMIN — AMLODIPINE BESYLATE 10 MG: 10 TABLET ORAL at 08:19

## 2020-02-26 RX ADMIN — INSULIN LISPRO 3 UNITS: 100 INJECTION, SOLUTION INTRAVENOUS; SUBCUTANEOUS at 12:16

## 2020-02-26 RX ADMIN — HEPARIN SODIUM 5000 UNITS: 5000 INJECTION INTRAVENOUS; SUBCUTANEOUS at 21:46

## 2020-02-26 RX ADMIN — Medication 10 ML: at 08:31

## 2020-02-26 NOTE — PROGRESS NOTES
[facility] Hospitalist Group  Progress Note    Patient: Kate Hayes Age: 82 y.o. : 1935 MR#: [id number] SSN: [id number]  Date: 2020     Subjective:     Patient sitting in a chair in NAD, awake, alert, c/o some dizziness    Assessment/Plan:   1. Chest pain, atypical possibly d/t frequent PVC/PAC  2. Sinus tachycardia, resolved with BB  3. Acute on chronic systolic and diastolic HF. 4. CAD s/p PCI in   5. Uncontrolled HTN, improved  6. HLD, intolerance to statins  7. DMT2. a1c 8,6  8. Thyroid disease  9.  Asthma  10 ?orthostasis      Plan    Stop lasix, monitor orthostatics  Graded compression stockings  Low dose BB  On ranexa, norvasc, imdur  SSI, lantus, monitor sugars  PT, OT  Replete K  D/w patient  Full code  D/w Dr Awan Seal home tomorrow    Additional Notes:      Case discussed with:  [x]Patient  []Family  [x]Nursing  []Case Management  DVT Prophylaxis:  []Lovenox  [x]Hep SQ  []SCDs  []Coumadin   []On Heparin gtt    Objective:   VS:   Visit Vitals  /90 (BP 1 Location: Right arm)   Pulse 80   Temp 97.9 °F (36.6 °C)   Resp 15   Ht 5' 7\" (1.702 m)   Wt 109.3 kg (241 lb)   SpO2 96%   Breastfeeding No   BMI 37.75 kg/m²      Tmax/24hrs: Temp (24hrs), Av.9 °F (36.6 °C), Min:97.4 °F (36.3 °C), Max:98.6 °F (37 °C)      Intake/Output Summary (Last 24 hours) at 2020 1635  Last data filed at 2020 7739  Gross per 24 hour   Intake 240 ml   Output 1700 ml   Net -1460 ml       General:  Awake, alert  Cardiovascular:  S1S2+, RRR  Pulmonary:  Coarse bs b/l  GI:  Soft, BS+, NT, ND  Extremities:  Trace edema        Labs:    Recent Results (from the past 24 hour(s))   GLUCOSE, POC    Collection Time: 20  9:35 PM   Result Value Ref Range    Glucose (POC) 134 (H) 70 - 379 mg/dL   METABOLIC PANEL, BASIC    Collection Time: 20  3:53 AM   Result Value Ref Range    Sodium 141 136 - 145 mmol/L    Potassium 3.0 (L) 3.5 - 5.5 mmol/L    Chloride 107 100 - 111 mmol/L    CO2 27 21 - 32 mmol/L    Anion gap 7 3.0 - 18 mmol/L    Glucose 62 (L) 74 - 99 mg/dL    BUN 13 7.0 - 18 MG/DL    Creatinine 0.86 0.6 - 1.3 MG/DL    BUN/Creatinine ratio 15 12 - 20      GFR est AA >60 >60 ml/min/1.73m2    GFR est non-AA >60 >60 ml/min/1.73m2    Calcium 9.0 8.5 - 10.1 MG/DL   MAGNESIUM    Collection Time: 02/26/20  3:53 AM   Result Value Ref Range    Magnesium 2.0 1.6 - 2.6 mg/dL   GLUCOSE, POC    Collection Time: 02/26/20  4:16 AM   Result Value Ref Range    Glucose (POC) 77 70 - 110 mg/dL   GLUCOSE, POC    Collection Time: 02/26/20  5:10 AM   Result Value Ref Range    Glucose (POC) 113 (H) 70 - 110 mg/dL   GLUCOSE, POC    Collection Time: 02/26/20  7:42 AM   Result Value Ref Range    Glucose (POC) 120 (H) 70 - 110 mg/dL   GLUCOSE, POC    Collection Time: 02/26/20 11:18 AM   Result Value Ref Range    Glucose (POC) 163 (H) 70 - 110 mg/dL   MAGNESIUM    Collection Time: 02/26/20 12:54 PM   Result Value Ref Range    Magnesium 2.0 1.6 - 2.6 mg/dL   GLUCOSE, POC    Collection Time: 02/26/20  3:20 PM   Result Value Ref Range    Glucose (POC) 150 (H) 70 - 110 mg/dL       Signed By: Stacie Lyon MD     February 26, 2020

## 2020-02-26 NOTE — ROUTINE PROCESS
Bedside shift change report given to   [address] (oncoming nurse) by  Fan Benitez (offgoing nurse). Report included the following information SBAR, Kardex, ED Summary, Intake/Output and Med Rec Status.

## 2020-02-26 NOTE — PROGRESS NOTES
Problem: Falls - Risk of  Goal: *Absence of Falls  Description  Document Mirlande Jay Fall Risk and appropriate interventions in the flowsheet.   Outcome: Progressing Towards Goal  Note: Fall Risk Interventions:  Mobility Interventions: Utilize walker, cane, or other assistive device, Utilize gait belt for transfers/ambulation         Medication Interventions: Patient to call before getting OOB, Evaluate medications/consider consulting pharmacy, Bed/chair exit alarm    Elimination Interventions: Call light in reach

## 2020-02-26 NOTE — PROGRESS NOTES
conducted an initial consultation and Spiritual Assessment for Bre Carballo, who is a 82 y.o.,female. Patients Primary Language is: [place].    According to the patients EMR Pent[employer]ism Affiliation is: [facility].     The reason the Patient came to the hospital is:   Patient Active Problem List    Diagnosis Date Noted    Type 2 diabetes with nephropathy ([facility] [address] 01/13/2020    Tinnitus of both ears 12/04/2019    Hypertensive urgency 06/24/2019    Tachycardia 06/24/2019    Tachypnea 06/24/2019    Right-sided chest pain 05/16/2019    Urinary tract infection with hematuria 05/07/2019    Frequent urination 05/07/2019    Bad odor of urine 05/07/2019    Lower abdominal pain 04/23/2019    Intermittent lightheadedness 04/23/2019    Deep vein thrombosis (DVT) of popliteal vein of left lower extremity ([address] 09/08/2018    Preoperative cardiovascular examination 06/11/2018    Periprosthetic left distal femur fracture 06/10/2018    Callie-prosthetic femur fracture at tip of prosthesis 06/10/2018    Right groin pain 06/16/2017    BMI 38.0-38.9,adult 06/07/2017    Bilateral lower extremity edema 04/25/2017    Chest pain 02/06/2017    Advanced care planning/counseling discussion 08/17/2016    S/P drug eluting coronary stent placement 06/10/2016    NSTEMI (non-ST elevated myocardial infarction) ([address] 05/28/2016    Moderate persistent asthma with status asthmaticus 76/36/4223    Uncomplicated asthma 87/75/4596    Tear of left rotator cuff 03/08/2016    Medication monitoring encounter 01/12/2015    Seasonal and perennial allergic rhinitis 07/30/2013    Morbid obesity ([address]     Unspecified transient cerebral ischemia     Congestive heart failure (HCC)     Mixed hyperlipidemia     Coronary atherosclerosis of native coronary artery     Chronic diastolic heart failure (HCC)     Benign hypertensive heart disease without heart failure     Type 2 diabetes mellitus with hyperglycemia, with long-term current use of insulin ([facility] [address] 12/04/2012    Hypothyroidism     Leg pain, right 09/05/2012    Diabetic neuropathy ([facility] [address] 04/20/2012    Dizziness 04/20/2012    Chronic neck pain 04/20/2012    Chronic back pain 04/20/2012    DJD (degenerative joint disease)     S/P joint replacement     Noncompliance with medication regimen 02/08/2011    Arm pain 02/08/2011    Neck pain 02/08/2011    Anxiety 02/08/2011    HTN (hypertension) 05/20/2010    Unspecified hypothyroidism 05/20/2010    Hypovitaminosis D 05/20/2010    Asthma 05/20/2010    Esophageal reflux 05/20/2010        The  provided the following Interventions:  Initiated a relationship of care and support. Explored issues of vinod, spirituality and/or Restorationist needs while hospitalized. Listened empathically. Provided information about Spiritual Care Services. Offered prayer and assurance of continued prayers on patient's behalf. Chart reviewed. The following outcomes were achieved:  Patient shared some information about their medical narrative and spiritual journey/beliefs. Patient processed feeling about current hospitalization. Patient expressed gratitude for the 's visit. Assessment:  Patient did not indicate any spiritual or Restorationist issues which require Spiritual Care Services interventions at this time. Patient does not have any Restorationist/cultural needs that will affect patients preferences in health care. Plan:  Chaplains will continue to follow and will provide pastoral care on an as needed or requested basis.  recommends bedside caregivers page  on duty if patient shows signs of acute spiritual or emotional distress.     [address] [facility]  [phone]

## 2020-02-26 NOTE — PROGRESS NOTES
Problem: Falls - Risk of  Goal: *Absence of Falls  Description  Document Bryanros Zhou Fall Risk and appropriate interventions in the flowsheet.   Outcome: Progressing Towards Goal  Note: Fall Risk Interventions:  Mobility Interventions: Utilize walker, cane, or other assistive device         Medication Interventions: Teach patient to arise slowly, Patient to call before getting OOB    Elimination Interventions: Call light in reach

## 2020-02-26 NOTE — DIABETES MGMT
GLYCEMIC CONTROL AND NUTRITION    Assessment/Recommendations:  Fasting lab glucose this am 62 mg/dl  Recommend decreasing bedtime lantus to 32 units every night. Continue am lantus and corrective insulin coverage as ordered  Will continue inpatient monitoring. Most recent blood glucose values:  Results for Quincy Sanchez (MRN [id number]) as of 2/26/2020 13:18   Ref. Range 2/25/2020 21:35 2/26/2020 04:16 2/26/2020 05:10 2/26/2020 07:42 2/26/2020 11:18   GLUCOSE,FAST - POC Latest Ref Range: 70 - 110 mg/dL 134 (H) 77 113 (H) 120 (H) 163 (H)     Current A1C of 8.6 % is equivalent to average blood glucose of 200 mg/dl over the past 2-3 months.     Current hospital diabetes medications:   lantus 32 units every morning  lantus 36 units every bedtime  Lispro corrective insulin AC&HS  Previous day's insulin requirements:   lantus 68 units  Lispro 9 units corrective insulin coverage  Home diabetes medications:  lantus 32 units every morning  lantus 36 units every night  Diet:    Diabetic consistent carb  Education:  _x__Refer to Diabetes Education Record (2/25/2020)            ____Education not indicated at this time      Terri RooneyHoly [facility] CDE  Ext [phone]

## 2020-02-26 NOTE — MED STUDENT NOTES
*ATTENTION:  This note has been created by a medical student for educational purposes only. Please do not refer to the content of this note for clinical decision-making, billing, or other purposes. Please see attending physicians note to obtain clinical information on this patient. * Subjective:  
 cc: orthostatic hypotension HPI: Ms. Maddison Floyd is a 82year old female with a history of CAD, diabetes type 2, CHF, LVH, asthma, GERD, and hypothyroidism who presented to the ED with chest pain. Pt is now hospital day 2. Pt states she is feeling better in terms of the chest pain but is complaining of dizziness. She states she is trying to eat in order to not become hypoglycemic but she feels a little nauseous. Pt denies shortness of breath and chest pain. ROS: 
Constitutional: positive for dizziness. CV/Pulm: see HPI 
GI: positive for slight nausea. Objective: 
 
Visit Vitals /76 (BP 1 Location: Right arm, BP Patient Position: Standing) Pulse 92 Temp 97.9 °F (36.6 °C) Resp 18 Ht 5' 7\" (1.702 m) Wt 109.3 kg (241 lb) SpO2 96% Breastfeeding No  
BMI 37.75 kg/m² General: well appearing female in no acute distress sitting calmly in the chair. CV: RRR, no murmurs appreciated. +1 peripheral edema with compression socks. Pulm: lungs CTA bilaterally, mild clubbing both hands bilaterally. GI: abdomen soft, non distended and non tender. No masses noted. Recent Results (from the past 12 hour(s)) METABOLIC PANEL, BASIC Collection Time: 02/26/20  3:53 AM  
Result Value Ref Range Sodium 141 136 - 145 mmol/L Potassium 3.0 (L) 3.5 - 5.5 mmol/L Chloride 107 100 - 111 mmol/L  
 CO2 27 21 - 32 mmol/L Anion gap 7 3.0 - 18 mmol/L Glucose 62 (L) 74 - 99 mg/dL BUN 13 7.0 - 18 MG/DL Creatinine 0.86 0.6 - 1.3 MG/DL  
 BUN/Creatinine ratio 15 12 - 20 GFR est AA >60 >60 ml/min/1.73m2 GFR est non-AA >60 >60 ml/min/1.73m2 Calcium 9.0 8.5 - 10.1 MG/DL MAGNESIUM  
 Collection Time: 02/26/20  3:53 AM  
Result Value Ref Range Magnesium 2.0 1.6 - 2.6 mg/dL GLUCOSE, POC Collection Time: 02/26/20  4:16 AM  
Result Value Ref Range Glucose (POC) 77 70 - 110 mg/dL GLUCOSE, POC Collection Time: 02/26/20  5:10 AM  
Result Value Ref Range Glucose (POC) 113 (H) 70 - 110 mg/dL GLUCOSE, POC Collection Time: 02/26/20  7:42 AM  
Result Value Ref Range Glucose (POC) 120 (H) 70 - 110 mg/dL GLUCOSE, POC Collection Time: 02/26/20 11:18 AM  
Result Value Ref Range Glucose (POC) 163 (H) 70 - 110 mg/dL Assessment: 1. Acute on chronic systolic heart failure 2. Chest pain, possibly due to frequent PVC/PAC 3. Orthostatic hypotension 4. CAD s/p PCI in 2016 
5. HTN 
6. Diabetes mellitus type 2 
7. Hypothyroidism 8. Asthma Plan:  
Cardiology consult and to continue Ranexa, and Plavix. Discontinue lasix and Imdur. Reduce Norvasc. Continue with compression stockings. Continue current medications Home health received for pt Discharge tomorrow 2/27 if orthostatic hypotension improves.

## 2020-02-26 NOTE — HOME CARE
Discharge order noted for today, [facility] will follow for SN,PT,OT,Telehealth and labs;  [facility] referral processed to [facility] central intake. MARCELA MAHARAJ. 3:15pm- Noted that d/c order was cancelled due to orthostatic hypotension ,pt will be pending d/c tomorrow. MARCELA MAHARAJ.

## 2020-02-26 NOTE — PROGRESS NOTES
Discharge order noted for today. Pt has been accepted to [facility] agency. Met with patient and are agreeable to the transition plan today. Transport has been arranged through pt's neighbor. Patient's discharge summary and home health  orders have been forwarded to [facility] home health  agency. Updated bedside RN, Catracho Luis,  to the transition plan.   Discharge information has been documented on the AVS.       CHASTITY Iniguez RN  Care Management  Pager: [phone]

## 2020-02-26 NOTE — PROGRESS NOTES
[facility]      CARDIOLOGY PROGRESS NOTE  RECS:    1. Chest pain, atypical: resolved - Cardiac cath 6/19 showed  No critical disease in epicardial coronary arteries other than 50% mid LAD stenosis and widely patent previous proximal right coronary stent. No further cardiac w/u needed at this time. Continue medical management. Continue Ranexa and Imdur, Plavix. 2. Sinus tachycardia- resolved. continue  low dose bb.   3. Orthostatic hypotension- c/o  c/o dizziness- discontinue lasix. Discontinue Imdur. Reduced Norvasc. continue with compression stockings   4. Mild LV dysfunction- on echo 9/18 with EF 45%- 50% better good diuresis will changed to 40 mg po daily   5. CAD- s/p PCI  to mid RCA with RENEE in 2016. Continue with Plavix. 6. Uncontrolled Hypertension- on admission- BP stable continue Norvasc, Imdur and bb.   7. Diabetes- medications and w/u per medical team  8. Hyperlipidemia- unable to tolerated statins- discussed with patient about pcsk9 starting-medications was approved. But she refused. 9. Morbid obesity- weight loss advised. 10. Hypokaliemia replaced with 40 meq now follow mag level       Tolerating low-dose beta-blocker well. Diuretics discontinued due to orthostasis. Again encourage patient to walk regularly and lose weight if possible. Hopefully home tomorrow.     ASSESSMENT:  Hospital Problems  Date Reviewed: 2/25/2020          Codes Class Noted POA    * (Principal) Chest pain ICD-10-CM: R07.9  ICD-9-CM: 786.50  2/6/2017 Yes        Hypothyroidism (Chronic) ICD-10-CM: E03.9  ICD-9-CM: 244.9  Unknown Yes        Type 2 diabetes mellitus with hyperglycemia, with long-term current use of insulin (HCC) (Chronic) ICD-10-CM: E11.65, Z79.4  ICD-9-CM: 250.00, 790.29, V58.67  12/4/2012 Yes        HTN (hypertension) (Chronic) ICD-10-CM: I10  ICD-9-CM: 401.9  5/20/2010 Yes    Overview Signed 7/28/2016 10:34 AM by Mirlande Cook MD     controlled             Asthma (Chronic) ICD-10-CM: J45.909  ICD-9-CM: 493.90  5/20/2010 Yes        Esophageal reflux (Chronic) ICD-10-CM: K21.9  ICD-9-CM: 530.81  5/20/2010 Yes                SUBJECTIVE:  No CP  Continues to have SOB on mild exertion  C/o dizziness and not feeling well    OBJECTIVE:    VS:   Visit Vitals  /74 (BP 1 Location: Left arm, BP Patient Position: At rest)   Pulse 73   Temp 97.9 °F (36.6 °C)   Resp 18   Ht 5' 7\" (1.702 m)   Wt 109.3 kg (241 lb)   SpO2 96%   Breastfeeding No   BMI 37.75 kg/m²         Intake/Output Summary (Last 24 hours) at 2/26/2020 1216  Last data filed at 2/26/2020 5017  Gross per 24 hour   Intake 680 ml   Output 2250 ml   Net -1570 ml     TELE: normal sinus rhythm    General: alert, well developed, pleasant and in no apparent distress  HENT: Normocephalic, atraumatic. Normal external eye. Neck :  JVD difficult to assess due to obesity  Cardiac:  regular rate and rhythm, S1, S2 normal, no click, no rub  Lungs: rales bilaterally to auscultation   Abdomen: Soft, nontender, no masses  Extremities:  No c/c/e, peripheral pulses present      Labs: Results:       Chemistry Recent Labs     02/26/20  0353 02/25/20  0742 02/24/20  1830   GLU 62* 177* 346*    143 139   K 3.0* 3.6 3.5    110 105   CO2 27 26 26   BUN 13 11 9   CREA 0.86 0.74 0.93   CA 9.0 8.7 9.2   AGAP 7 7 8   BUCR 15 15 10*   AP  --  87 98   TP  --  7.0 8.4*   ALB  --  2.9* 3.1*   GLOB  --  4.1* 5.3*   AGRAT  --  0.7* 0.6*      CBC w/Diff Recent Labs     02/25/20  0742 02/24/20  1830   WBC 5.2 5.3   RBC 3.44* 3.81*   HGB 10.8* 12.1   HCT 33.9* 38.6    218   GRANS 41 47   LYMPH 45 44   EOS 5 3      Cardiac Enzymes Recent Labs     02/25/20  0742   CPK 54   CKND1 CALCULATION NOT PERFORMED WHEN RESULT IS BELOW LINEAR LIMIT      Coagulation No results for input(s): PTP, INR, APTT, INREXT in the last 72 hours.     Lipid Panel Lab Results   Component Value Date/Time    Cholesterol, total 239 (H) 01/17/2019 11:48 AM    HDL Cholesterol 46 01/17/2019 11:48 AM    LDL, calculated 172.8 (H) 01/17/2019 11:48 AM    VLDL, calculated 20.2 01/17/2019 11:48 AM    Triglyceride 101 01/17/2019 11:48 AM    CHOL/HDL Ratio 5.2 (H) 01/17/2019 11:48 AM      BNP No results for input(s): BNPP in the last 72 hours. Liver Enzymes Recent Labs     02/25/20  0742   TP 7.0   ALB 2.9*   AP 87   SGOT 11      Thyroid Studies Lab Results   Component Value Date/Time    TSH 1.30 01/17/2019 11:48 AM              Ling ROBERSON Mireille:[phone]  I have independently evaluated and examined the patient. All relevant labs and testing data are reviewed. Care plan discussed and updated after review.   Jayson Gracia MD

## 2020-02-26 NOTE — ROUTINE PROCESS
Bedside shift change report given to Kita RN (oncoming nurse) by Ramos Wick RN (offgoing nurse). Report included the following information SBAR, Kardex, Intake/Output, MAR and Recent Results.

## 2020-02-27 VITALS
WEIGHT: 242.51 LBS | SYSTOLIC BLOOD PRESSURE: 165 MMHG | HEIGHT: 67 IN | HEART RATE: 94 BPM | DIASTOLIC BLOOD PRESSURE: 68 MMHG | TEMPERATURE: 98.2 F | BODY MASS INDEX: 38.06 KG/M2 | OXYGEN SATURATION: 98 % | RESPIRATION RATE: 17 BRPM

## 2020-02-27 LAB
ANION GAP SERPL CALC-SCNC: 7 MMOL/L (ref 3–18)
BUN SERPL-MCNC: 14 MG/DL (ref 7–18)
BUN/CREAT SERPL: 16 (ref 12–20)
CALCIUM SERPL-MCNC: 8.8 MG/DL (ref 8.5–10.1)
CHLORIDE SERPL-SCNC: 108 MMOL/L (ref 100–111)
CO2 SERPL-SCNC: 26 MMOL/L (ref 21–32)
CREAT SERPL-MCNC: 0.88 MG/DL (ref 0.6–1.3)
GLUCOSE BLD STRIP.AUTO-MCNC: 117 MG/DL (ref 70–110)
GLUCOSE SERPL-MCNC: 174 MG/DL (ref 74–99)
POTASSIUM SERPL-SCNC: 3.5 MMOL/L (ref 3.5–5.5)
SODIUM SERPL-SCNC: 141 MMOL/L (ref 136–145)

## 2020-02-27 PROCEDURE — 74011250637 HC RX REV CODE- 250/637: Performed by: NURSE PRACTITIONER

## 2020-02-27 PROCEDURE — 80048 BASIC METABOLIC PNL TOTAL CA: CPT

## 2020-02-27 PROCEDURE — 82962 GLUCOSE BLOOD TEST: CPT

## 2020-02-27 PROCEDURE — 36415 COLL VENOUS BLD VENIPUNCTURE: CPT

## 2020-02-27 PROCEDURE — 74011636637 HC RX REV CODE- 636/637: Performed by: HOSPITALIST

## 2020-02-27 PROCEDURE — 74011250637 HC RX REV CODE- 250/637: Performed by: HOSPITALIST

## 2020-02-27 PROCEDURE — 74011250636 HC RX REV CODE- 250/636: Performed by: HOSPITALIST

## 2020-02-27 RX ORDER — DEXTROSE MONOHYDRATE 100 MG/ML
125-250 INJECTION, SOLUTION INTRAVENOUS AS NEEDED
Status: DISCONTINUED | OUTPATIENT
Start: 2020-02-27 | End: 2020-02-27 | Stop reason: HOSPADM

## 2020-02-27 RX ORDER — AMLODIPINE BESYLATE 5 MG/1
5 TABLET ORAL DAILY
Qty: 30 TAB | Refills: 0 | Status: SHIPPED | OUTPATIENT
Start: 2020-02-27 | End: 2020-04-06 | Stop reason: SDUPTHER

## 2020-02-27 RX ADMIN — INSULIN GLARGINE 32 UNITS: 100 INJECTION, SOLUTION SUBCUTANEOUS at 08:28

## 2020-02-27 RX ADMIN — HEPARIN SODIUM 5000 UNITS: 5000 INJECTION INTRAVENOUS; SUBCUTANEOUS at 06:04

## 2020-02-27 RX ADMIN — LEVOTHYROXINE SODIUM 112 MCG: 112 TABLET ORAL at 08:27

## 2020-02-27 RX ADMIN — METOPROLOL TARTRATE 25 MG: 25 TABLET, FILM COATED ORAL at 08:27

## 2020-02-27 RX ADMIN — FLUTICASONE FUROATE 2 PUFF: 100 POWDER RESPIRATORY (INHALATION) at 09:00

## 2020-02-27 RX ADMIN — AMLODIPINE BESYLATE 5 MG: 5 TABLET ORAL at 08:27

## 2020-02-27 RX ADMIN — Medication 10 ML: at 06:04

## 2020-02-27 RX ADMIN — CLOPIDOGREL BISULFATE 75 MG: 75 TABLET ORAL at 08:27

## 2020-02-27 RX ADMIN — FLUTICASONE PROPIONATE 2 SPRAY: 50 SPRAY, METERED NASAL at 08:29

## 2020-02-27 RX ADMIN — MONTELUKAST 10 MG: 10 TABLET, FILM COATED ORAL at 08:27

## 2020-02-27 NOTE — ROUTINE PROCESS
Bedside shift change report given to [address] (oncoming nurse) by  Cait Brower (offgoing nurse). Report included the following information SBAR, Kardex, ED Summary and Recent Results.

## 2020-02-27 NOTE — PROGRESS NOTES
Discharge order noted for today. Pt has been accepted to [facility] agency. Met with patient she is agreeable to the transition plan today. Transport has been arranged through the patient's family. CM will update bedside RN, Matt Argueta  to the transition plan.   Discharge information has been documented on the AVS.       Supriya Jerry RN  Case Management [phone]

## 2020-02-27 NOTE — PROGRESS NOTES
[facility]      CARDIOLOGY PROGRESS NOTE  RECS:        1. Chest pain, atypical: resolved - Cardiac cath 6/19 showed  No critical disease in epicardial coronary arteries other than 50% mid LAD stenosis and widely patent previous proximal right coronary stent. No further cardiac w/u needed at this time. Continue medical management for CAD   2. Sinus tachycardia- resolved. continue  low dose bb.   3. Orthostatic hypotension- c/o dizziness had  significant orthostatic changes 2/26/20. Follow orthtostatic BP today. Lasix. D/c she uses prn at home for leg swelling and SOB. Continue  Norvasc. continue with compression stockings   4. Mild LV dysfunction- on echo 9/18 with EF 45%- 50%. Continue Lasix as needed  and low dose bb    5. CAD- s/p PCI  to mid RCA with RENEE in 2016. Continue with Plavix. 6. Uncontrolled Hypertension- on admission- BP stable continue Norvasc, Imdur and bb.   7. Diabetes- medications and w/u per medical team  8. Hyperlipidemia- unable to tolerated statins- discussed with patient about pcsk9 starting-medications was approved. But she refused. 9. Morbid obesity- weight loss advised. 10. Hypokaliemia resolved      Diuretics discontinued due to orthostasis.    ASSESSMENT:  Hospital Problems  Date Reviewed: 2/25/2020          Codes Class Noted POA    * (Principal) Chest pain ICD-10-CM: R07.9  ICD-9-CM: 786.50  2/6/2017 Yes        Hypothyroidism (Chronic) ICD-10-CM: E03.9  ICD-9-CM: 244.9  Unknown Yes        Type 2 diabetes mellitus with hyperglycemia, with long-term current use of insulin (HCC) (Chronic) ICD-10-CM: E11.65, Z79.4  ICD-9-CM: 250.00, 790.29, V58.67  12/4/2012 Yes        HTN (hypertension) (Chronic) ICD-10-CM: I10  ICD-9-CM: 401.9  5/20/2010 Yes    Overview Signed 7/28/2016 10:34 AM by Nam Zaragoza MD     controlled             Asthma (Chronic) ICD-10-CM: J45.909  ICD-9-CM: 493.90  5/20/2010 Yes        Esophageal reflux (Chronic) ICD-10-CM: K21.9  ICD-9-CM: 530.81  5/20/2010 Yes                SUBJECTIVE:  No CP  Continues to have SOB on mild exertion  C/o dizziness and not feeling well    OBJECTIVE:    VS:   Visit Vitals  /68 (BP 1 Location: Right arm, BP Patient Position: At rest)   Pulse 94   Temp 98.2 °F (36.8 °C)   Resp 17   Ht 5' 7\" (1.702 m)   Wt 110 kg (242 lb 8.1 oz)   SpO2 98%   Breastfeeding No   BMI 37.98 kg/m²         Intake/Output Summary (Last 24 hours) at 2/27/2020 0950  Last data filed at 2/27/2020 8428  Gross per 24 hour   Intake 610 ml   Output 950 ml   Net -340 ml     TELE: normal sinus rhythm    General: alert, well developed, pleasant and in no apparent distress  HENT: Normocephalic, atraumatic. Normal external eye. Neck :  JVD difficult to assess due to obesity  Cardiac:  regular rate and rhythm, S1, S2 normal, no click, no rub  Lungs: rales bilaterally to auscultation   Abdomen: Soft, nontender, no masses  Extremities:  No c/c/e, peripheral pulses present      Labs: Results:       Chemistry Recent Labs     02/27/20  0345 02/26/20  0353 02/25/20  0742 02/24/20  1830   * 62* 177* 346*    141 143 139   K 3.5 3.0* 3.6 3.5    107 110 105   CO2 26 27 26 26   BUN 14 13 11 9   CREA 0.88 0.86 0.74 0.93   CA 8.8 9.0 8.7 9.2   AGAP 7 7 7 8   BUCR 16 15 15 10*   AP  --   --  87 98   TP  --   --  7.0 8.4*   ALB  --   --  2.9* 3.1*   GLOB  --   --  4.1* 5.3*   AGRAT  --   --  0.7* 0.6*      CBC w/Diff Recent Labs     02/25/20  0742 02/24/20  1830   WBC 5.2 5.3   RBC 3.44* 3.81*   HGB 10.8* 12.1   HCT 33.9* 38.6    218   GRANS 41 47   LYMPH 45 44   EOS 5 3      Cardiac Enzymes Recent Labs     02/25/20  0742   CPK 54   CKND1 CALCULATION NOT PERFORMED WHEN RESULT IS BELOW LINEAR LIMIT      Coagulation No results for input(s): PTP, INR, APTT, INREXT, INREXT in the last 72 hours.     Lipid Panel Lab Results   Component Value Date/Time    Cholesterol, total 239 (H) 01/17/2019 11:48 AM    HDL Cholesterol 46 01/17/2019 11:48 AM LDL, calculated 172.8 (H) 01/17/2019 11:48 AM    VLDL, calculated 20.2 01/17/2019 11:48 AM    Triglyceride 101 01/17/2019 11:48 AM    CHOL/HDL Ratio 5.2 (H) 01/17/2019 11:48 AM      BNP No results for input(s): BNPP in the last 72 hours.    Liver Enzymes Recent Labs     02/25/20  0742   TP 7.0   ALB 2.9*   AP 87   SGOT 11      Thyroid Studies Lab Results   Component Value Date/Time    TSH 1.30 01/17/2019 11:48 AM              Sharan ROBERSON Mireille:[phone]

## 2020-02-27 NOTE — PROGRESS NOTES
Problem: Falls - Risk of  Goal: *Absence of Falls  Description  Document Tia Manus Fall Risk and appropriate interventions in the flowsheet.   Outcome: Progressing Towards Goal  Note: Fall Risk Interventions:  Mobility Interventions: Utilize walker, cane, or other assistive device         Medication Interventions: Patient to call before getting OOB, Teach patient to arise slowly    Elimination Interventions: Call light in reach

## 2020-02-27 NOTE — HOME CARE
Noted that pt did not go home yesterday as planned, but it going home today [facility] will follow for SN/PT/OT/telehealth - D Gayla DENNIS

## 2020-02-28 ENCOUNTER — HOME CARE VISIT (OUTPATIENT)
Dept: SCHEDULING | Facility: HOME HEALTH | Age: 85
End: 2020-02-28

## 2020-02-28 ENCOUNTER — HOSPITAL ENCOUNTER (OUTPATIENT)
Dept: LAB | Age: 85
Discharge: HOME | End: 2020-02-28
Payer: MEDICARE

## 2020-02-28 ENCOUNTER — PATIENT OUTREACH (OUTPATIENT)
Dept: CASE MANAGEMENT | Age: 85
End: 2020-02-28

## 2020-02-28 LAB
ALBUMIN SERPL-MCNC: 3.4 G/DL (ref 3.4–5)
ALBUMIN/GLOB SERPL: 0.7 {RATIO} (ref 0.8–1.7)
ALP SERPL-CCNC: 97 U/L (ref 45–117)
ALT SERPL-CCNC: 16 U/L (ref 13–56)
ANION GAP SERPL CALC-SCNC: 4 MMOL/L (ref 3–18)
AST SERPL-CCNC: 14 U/L (ref 10–38)
BILIRUB SERPL-MCNC: 0.5 MG/DL (ref 0.2–1)
BUN SERPL-MCNC: 9 MG/DL (ref 7–18)
BUN/CREAT SERPL: 13 (ref 12–20)
CALCIUM SERPL-MCNC: 8.9 MG/DL (ref 8.5–10.1)
CHLORIDE SERPL-SCNC: 107 MMOL/L (ref 100–111)
CO2 SERPL-SCNC: 30 MMOL/L (ref 21–32)
CREAT SERPL-MCNC: 0.68 MG/DL (ref 0.6–1.3)
ERYTHROCYTE [DISTWIDTH] IN BLOOD BY AUTOMATED COUNT: 12.5 % (ref 11.6–14.5)
GLOBULIN SER CALC-MCNC: 5 G/DL (ref 2–4)
GLUCOSE SERPL-MCNC: 86 MG/DL (ref 74–99)
HCT VFR BLD AUTO: 39 % (ref 35–45)
HGB BLD-MCNC: 12.2 G/DL (ref 12–16)
MAGNESIUM SERPL-MCNC: 2 MG/DL (ref 1.6–2.6)
MCH RBC QN AUTO: 31.2 PG (ref 24–34)
MCHC RBC AUTO-ENTMCNC: 31.3 G/DL (ref 31–37)
MCV RBC AUTO: 99.7 FL (ref 74–97)
PLATELET # BLD AUTO: 263 K/UL (ref 135–420)
PMV BLD AUTO: 11.4 FL (ref 9.2–11.8)
POTASSIUM SERPL-SCNC: 3.5 MMOL/L (ref 3.5–5.5)
PROT SERPL-MCNC: 8.4 G/DL (ref 6.4–8.2)
RBC # BLD AUTO: 3.91 M/UL (ref 4.2–5.3)
SODIUM SERPL-SCNC: 141 MMOL/L (ref 136–145)
WBC # BLD AUTO: 4.8 K/UL (ref 4.6–13.2)

## 2020-02-28 PROCEDURE — 83735 ASSAY OF MAGNESIUM: CPT

## 2020-02-28 PROCEDURE — 85027 COMPLETE CBC AUTOMATED: CPT

## 2020-02-28 PROCEDURE — 36415 COLL VENOUS BLD VENIPUNCTURE: CPT

## 2020-02-28 PROCEDURE — 80053 COMPREHEN METABOLIC PANEL: CPT

## 2020-02-28 NOTE — PROGRESS NOTES
Hospital Discharge Follow-Up      Date/Time:  2020 10:33 AM    [facility] and discharged on 20 for chest pain. The physician discharge summary was not available at the time of outreach. Patient was contacted within 1 business days of discharge. Top Challenges reviewed with the provider   Advance Care Planning:   Does patient have an Advance Directive:  not on file          Was this a readmission? no   Patient stated reason for the readmission: N/A    Care Transition Nurse (CTN) contacted the patient by telephone to perform post hospital discharge assessment. Patient verbalized she was on her way pout the door to get some lab work. patient agreed to call at a later time. Verified name and  with patient as identifiers. Provided introduction to self, and explanation of the CTN role. Patient received hospital discharge instructions. CTN reviewed discharge instructions and red flags with patient who verbalized understanding. Patient given an opportunity to ask questions and does not have any further questions or concerns at this time. The patient agrees to contact the PCP office for questions related to their healthcare. CTN provided contact information for future reference. Patients top risk factors for readmission:  medication management    Home Health orders at discharge: PT, OT, SN, Personal Care Aide, tele monitoring  [address]: ALIZE MARTINEZ [facility] (personal care aide with [address])  Date of initial visit: TBD    Durable Medical Equipment ordered at discharge: none  [address]: None  Durable Medical Equipment received: None    Medication(s):   New Medications at Discharge: Lopressor, Nitrostat  Changed Medications at Discharge: Norvasc  Discontinued Medications at Discharge: None    Medication reconciliation was performed with patient, who verbalizes understanding of administration of home medications.   There were no barriers to obtaining medications identified at this time. Referral to Pharm D needed: no     Current Outpatient Medications   Medication Sig    amLODIPine (NORVASC) 5 mg tablet Take 1 Tab by mouth daily.  metoprolol tartrate (LOPRESSOR) 25 mg tablet Take 1 Tab by mouth every twelve (12) hours.  nitroglycerin (NITROSTAT) 0.4 mg SL tablet 1 Tab by SubLINGual route every five (5) minutes as needed for Chest Pain. Up to 3 doses.  OTHER Check CBC, CMP, Mg in 3 days, results to PCP immediately, Diagnosis- CHF    SYNTHROID 112 mcg tablet 1 Tab.  albuterol (PROVENTIL HFA, VENTOLIN HFA, PROAIR HFA) 90 mcg/actuation inhaler INHALE 1 PUFF BY MOUTH EVERY 4 HOURS AS NEEDED FOR WHEEZING OR SHORTNESS OF BREATH    lidocaine (ASPERCREME, LIDOCAINE,) 4 % patch 1 Patch by TransDERmal route every eight (8) hours.  loratadine (CLARITIN) 10 mg tablet Take 1 Tab by mouth daily.  montelukast (SINGULAIR) 10 mg tablet Take 1 Tab by mouth daily. Indications: inflammation of the nose due to an allergy    clopidogrel (PLAVIX) 75 mg tab TAKE 1 TABLET BY MOUTH DAILY    fluticasone propionate (FLOVENT DISKUS) 100 mcg/actuation dsdv Take 1 Inhalation by inhalation two (2) times a day.  isosorbide mononitrate ER (IMDUR) 30 mg tablet TAKE 1 TABLET BY MOUTH EVERY MORNING    RONNELL PEN NEEDLE 32 gauge x 5/32\" ndle     bisacodyl (DULCOLAX, BISACODYL,) 5 mg EC tablet Take 2 Tabs by mouth daily as needed for Constipation.  Insulin Syringe-Needle U-100 (BD INSULIN SYRINGE ULTRA-FINE) 0.5 mL 31 gauge x 5/16\" syrg BD Insulin Syringe Ultra-Fine 0.5 mL 31 gauge x 5/16\"    ranolazine ER (RANEXA) 500 mg SR tablet Take 1 Tab by mouth two (2) times a day.  insulin glargine (LANTUS U-100 INSULIN) 100 unit/mL injection Take 32 units every morning and 36 units qhs    ascorbic acid, vitamin C, (VITAMIN C) 250 mg tablet Take 250 mg by mouth daily.     acetaminophen (TYLENOL ARTHRITIS PAIN) 650 mg TbER Take 650 mg by mouth every eight (8) hours. Indications: Fever, Pain    furosemide (LASIX) 20 mg tablet Use daily as needed for leg swelling (Patient taking differently: Take 20 mg by mouth as needed. Use daily as needed for leg swelling)    cyanocobalamin ER 1,000 mcg tablet Take 1 Tab by mouth daily.  capsaicin 0.075 % topical cream Apply  to affected area three (3) times daily. (Patient taking differently: Apply 1 Each to affected area three (3) times daily. apply thin layer to area)    DOCOSAHEXANOIC ACID/EPA (FISH OIL PO) Take 1,000 mg by mouth two (2) times a day.  magnesium oxide (MAG-OX) 400 mg tablet Take 1 Tab by mouth two (2) times a day.  ferrous sulfate 325 mg (65 mg iron) tablet Take 325 mg by mouth Daily (before breakfast).  cholecalciferol, vitamin d3, (VITAMIN D) 1,000 unit tablet Take 5,000 Units by mouth two (2) times a day. No current facility-administered medications for this visit. There are no discontinued medications. [facility] follow up appointment(s):   Future Appointments   Date Time Provider Chelle Reeves   2/29/2020 To Be Determined Mary Kate Adamson, RN [address] [facility]   3/5/2020 11:30 AM MD [place] NE[facility] Steven None   3/10/2020 11:00 AM Karla Perkins MD [facility]   3/23/2020 10:15 AM Nallely Naidu MD [facility] [address]   4/6/2020 10:45 AM MD [place] NE[facility] Steven None   7/13/2020 10:00 AM Karla Perkins MD [facility]      Non-[facility] follow up appointment(s): None    Goals Addressed                 This Visit's Progress     Attends follow-up appointments as directed. Ensure provider appt is scheduled within 7 days post-discharge; 2/28: JJ appt scheduled. Patient aware. Confirm patient attended post-discharge provider appt   Complete post-visit call to confirm attendance and update care needs             Prevent complications post hospitalization.         Plan of Care:   CTN will monitor X 4 weeks   Instruct on adherence to medications as ordered and assess for therapeutic response and side-effects       Review the Home Visit or Home Health documentation for coordinating care and goals    Discuss and provide resources for ACP         Understands red flags post discharge.         Review/educate common or potential \"red flags\" of condition worsening; 2/28: Educated/reviewed s/s of heart attack with patient to monitor and report to doctor:  Joyce Peacock, pressure, or pain in your chest  Discomfort or pain in your back, neck, jaw, stomach, or arm  Shortness of breath  Nausea or vomiting  Lightheadedness or a sudden cold sweat

## 2020-02-29 ENCOUNTER — HOME CARE VISIT (OUTPATIENT)
Dept: SCHEDULING | Facility: HOME HEALTH | Age: 85
End: 2020-02-29
Payer: MEDICARE

## 2020-02-29 PROCEDURE — 3331090001 HH PPS REVENUE CREDIT

## 2020-02-29 PROCEDURE — 3331090002 HH PPS REVENUE DEBIT

## 2020-02-29 PROCEDURE — G0299 HHS/HOSPICE OF RN EA 15 MIN: HCPCS

## 2020-02-29 PROCEDURE — 400013 HH SOC

## 2020-03-01 LAB
BACTERIA SPEC CULT: NORMAL
SERVICE CMNT-IMP: NORMAL

## 2020-03-01 PROCEDURE — 3331090001 HH PPS REVENUE CREDIT

## 2020-03-01 PROCEDURE — 3331090002 HH PPS REVENUE DEBIT

## 2020-03-02 ENCOUNTER — HOME CARE VISIT (OUTPATIENT)
Dept: SCHEDULING | Facility: HOME HEALTH | Age: 85
End: 2020-03-02
Payer: MEDICARE

## 2020-03-02 ENCOUNTER — HOME CARE VISIT (OUTPATIENT)
Dept: HOME HEALTH SERVICES | Facility: HOME HEALTH | Age: 85
End: 2020-03-02
Payer: MEDICARE

## 2020-03-02 PROCEDURE — 3331090001 HH PPS REVENUE CREDIT

## 2020-03-02 PROCEDURE — G0151 HHCP-SERV OF PT,EA 15 MIN: HCPCS

## 2020-03-02 PROCEDURE — 3331090002 HH PPS REVENUE DEBIT

## 2020-03-03 ENCOUNTER — HOME CARE VISIT (OUTPATIENT)
Dept: SCHEDULING | Facility: HOME HEALTH | Age: 85
End: 2020-03-03
Payer: MEDICARE

## 2020-03-03 VITALS
RESPIRATION RATE: 20 BRPM | WEIGHT: 241 LBS | HEART RATE: 88 BPM | BODY MASS INDEX: 37.83 KG/M2 | DIASTOLIC BLOOD PRESSURE: 82 MMHG | SYSTOLIC BLOOD PRESSURE: 144 MMHG | TEMPERATURE: 98.6 F | HEIGHT: 67 IN | OXYGEN SATURATION: 96 %

## 2020-03-03 VITALS
SYSTOLIC BLOOD PRESSURE: 169 MMHG | TEMPERATURE: 98.9 F | HEART RATE: 85 BPM | DIASTOLIC BLOOD PRESSURE: 75 MMHG | OXYGEN SATURATION: 98 %

## 2020-03-03 PROCEDURE — G0300 HHS/HOSPICE OF LPN EA 15 MIN: HCPCS

## 2020-03-03 PROCEDURE — 3331090002 HH PPS REVENUE DEBIT

## 2020-03-03 PROCEDURE — 3331090001 HH PPS REVENUE CREDIT

## 2020-03-04 ENCOUNTER — HOME CARE VISIT (OUTPATIENT)
Dept: SCHEDULING | Facility: HOME HEALTH | Age: 85
End: 2020-03-04
Payer: MEDICARE

## 2020-03-04 PROCEDURE — 3331090002 HH PPS REVENUE DEBIT

## 2020-03-04 PROCEDURE — G0157 HHC PT ASSISTANT EA 15: HCPCS

## 2020-03-04 PROCEDURE — 3331090001 HH PPS REVENUE CREDIT

## 2020-03-05 ENCOUNTER — HOME CARE VISIT (OUTPATIENT)
Dept: SCHEDULING | Facility: HOME HEALTH | Age: 85
End: 2020-03-05
Payer: MEDICARE

## 2020-03-05 ENCOUNTER — OFFICE VISIT (OUTPATIENT)
Dept: FAMILY MEDICINE CLINIC | Age: 85
End: 2020-03-05

## 2020-03-05 VITALS
TEMPERATURE: 98.3 F | SYSTOLIC BLOOD PRESSURE: 118 MMHG | DIASTOLIC BLOOD PRESSURE: 65 MMHG | HEART RATE: 96 BPM | RESPIRATION RATE: 18 BRPM | BODY MASS INDEX: 37.75 KG/M2 | HEIGHT: 67 IN

## 2020-03-05 VITALS
DIASTOLIC BLOOD PRESSURE: 90 MMHG | TEMPERATURE: 98.4 F | HEART RATE: 94 BPM | SYSTOLIC BLOOD PRESSURE: 158 MMHG | OXYGEN SATURATION: 96 %

## 2020-03-05 DIAGNOSIS — I49.3 PVC (PREMATURE VENTRICULAR CONTRACTION): ICD-10-CM

## 2020-03-05 DIAGNOSIS — I10 ESSENTIAL HYPERTENSION: ICD-10-CM

## 2020-03-05 DIAGNOSIS — I51.9 LEFT VENTRICULAR DYSFUNCTION: ICD-10-CM

## 2020-03-05 DIAGNOSIS — R07.89 ATYPICAL CHEST PAIN: Primary | ICD-10-CM

## 2020-03-05 PROCEDURE — 3331090001 HH PPS REVENUE CREDIT

## 2020-03-05 PROCEDURE — 3331090002 HH PPS REVENUE DEBIT

## 2020-03-05 NOTE — PROGRESS NOTES
Mirlande Martin presents today for   Chief Complaint   Patient presents with   [facility] [address]     2/24/20 for chest pain       Is someone accompanying this pt? no    Is the patient using any DME equipment during OV? Yes wheelchair    Depression Screening:  3 most recent PHQ Screens 3/5/2020   PHQ Not Done -   Little interest or pleasure in doing things Not at all   Feeling down, depressed, irritable, or hopeless Not at all   Total Score PHQ 2 0       Learning Assessment:  Learning Assessment 1/13/2020   PRIMARY LEARNER Patient   HIGHEST LEVEL OF EDUCATION - PRIMARY LEARNER  SOME COLLEGE   BARRIERS PRIMARY LEARNER NONE   CO-LEARNER CAREGIVER No   CO-LEARNER NAME -   PRIMARY LANGUAGE ENGLISH    NEED -   LEARNER PREFERENCE PRIMARY LISTENING     -     -     -     -   ANSWERED BY self   RELATIONSHIP SELF       Abuse Screening:  Abuse Screening Questionnaire 3/5/2020   Do you ever feel afraid of your partner? N   Are you in a relationship with someone who physically or mentally threatens you? N   Is it safe for you to go home? Y       Fall Screening  Fall Risk Assessment, last 12 mths 3/5/2020   Able to walk? No   Fall in past 12 months? No   Fall with injury? -   Number of falls in past 12 months -   Fall Risk Score -       Generalized Anxiety  No flowsheet data found. Health Maintenance Due   Topic Date Due    Shingrix Vaccine Age 49> (1 of 2) 01/30/1987    Foot Exam Q1  10/24/2018    Lipid Screen  01/17/2020    Pneumococcal 65+ years (2 of 2 - PPSV23) 02/19/2020    MICROALBUMIN Q1  02/25/2020    Medicare Yearly Exam  02/20/2020   . Health Maintenance reviewed and discussed and ordered per Provider. Mirlande Martin is updated on all     Coordination of Care  1. Have you been to the ER, urgent care clinic since your last visit? Hospitalized since your last visit? Yes 2/24/20 for chest pain    2.  Have you seen or consulted any other health care providers outside of the [facility] [address] since your last visit? Include any pap smears or colon screening. no      Advance Directive:  1. Do you have an advance directive in place? Patient Reply:no    2. If not, would you like material regarding how to put one in place?  Patient Reply: no

## 2020-03-05 NOTE — PROGRESS NOTES
Chief Complaint   Patient presents with   [facility] 232     2/24/20 for chest pain     HISTORY OF PRESENT ILLNESS  Donna Lane is a 82 y.o. female. HPI  Pt here for transition of care visit. Pt was [facility] 24, 2020 and discharged on February 27, 2020 for chest pain. She was contacted within 1 business day of discharge by the nurse navigator to perform a hospital discharge follow-up. Patient was seen that the ER for eval of ant chest pain. Initial concern was for PE due to elevated d-dimer. Pt was admitted for r/o mi. She had a consult by cards. Plan:         1. Chest pain, atypical: atypical chest pain likely due to frequent PVC's and palpitations-Myocardial infarction is ruled out-Cardiac cath 6/19 showed  No critical disease in epicardial coronary arteries other than 50% mid LAD stenosis and widely patent previous proximal right coronary stent. No further cardiac w/u needed at this time . Continue medical management. Continue Ranexa and Imdur, Plavix. Will add low dose metoprolol bid. 2. Sinus tachycardia- resolved. Added low dose bb.   3. Mild LV dysfunction- on echo 9/18 with EF 45%- 50% mildly decompensated. Added lasix 40 mg bid. 4. CAD- s/p PCI  to mid RCA with RENEE in 2016. Continue with Plavix. 5. Uncontrolled Hypertension- on admission- continue Norvasc, Imdur and bb.   6. Diabetes- medications and w/u per medical team  7. Hyperlipidemia- unable to tolerated statins- discussed with patient about pcsk9 starting-medications was approved. But she refused. 8. Morbid obesity- weight loss advised.      Patient has atypical chest pain and dyspnea on exertion which she thinks is asthma and uses Proventil inhaler. Physical exam reveals harsh breath sounds, severe obesity and absence of any fluid overload. Previous EKGs at different times have demonstrated frequent PACs and multifocal atrial rhythm but others are in sinus rhythm.   Blood pressure was elevated on admission and is now under control. Recent cardiac catheterization in June 2019 did not reveal any critical CAD and previous stent was patent. Due to mild LV dysfunction and crackles in the lungs along with dyspnea on exertion, would like to use diuretics as she likely has chronic congestive heart failure due to systolic and diastolic dysfunction. Would also like to use low-dose beta-blockers and watch for any increased wheezing. This will help tachycardia as well as hopefully PACs. Discussed the plan with patient and she is in agreement. Pt has been taking all meds as rx'ed since d/c to home. She is not having any chest pain. She does not have any new issues other than feeling a little dizzy at times. She admits she may not be eating much. ROS  Review of Systems   Constitutional: Negative. HENT: Negative. Respiratory: Negative. Cardiovascular: Negative. All other systems reviewed and are negative. Physical Exam  Physical Exam   Nursing note and vitals reviewed. Constitutional: She is oriented to person, place, and time. She appears well-developed and well-nourished. HENT:   Head: Normocephalic and atraumatic. Right Ear: External ear normal.   Left Ear: External ear normal.   Nose: Nose normal.   Eyes: Conjunctivae and EOM are normal.   Neck: Normal range of motion. Neck supple. No JVD present. Carotid bruit is not present. No thyromegaly present. Cardiovascular: Normal rate, regular rhythm, normal heart sounds and intact distal pulses. Exam reveals no gallop and no friction rub. No murmur heard. Pulmonary/Chest: Effort normal and breath sounds normal. She has no wheezes. She has no rhonchi. She has no rales. Abdominal: Soft. Bowel sounds are normal.   Musculoskeletal: Normal range of motion. Neurological: She is alert and oriented to person, place, and time. Coordination normal.   Skin: Skin is warm and dry.    Psychiatric: She has a normal mood and affect. Her behavior is normal. Judgment and thought content normal.     ASSESSMENT and PLAN  Diagnoses and all orders for this visit:    1. Atypical chest pain  Resolved    2. PVC (premature ventricular contraction)  Stable  3. Left ventricular dysfunction  Stable  4. Essential hypertension  Stable    Cardiology visit pending. Patient has no new concerns at this point in time. Patient understands that she should return to the emergency room if she has chest pain that is worrisome or concerning. Follow-up and Dispositions    · Return if symptoms worsen or fail to improve.

## 2020-03-06 ENCOUNTER — HOME CARE VISIT (OUTPATIENT)
Dept: SCHEDULING | Facility: HOME HEALTH | Age: 85
End: 2020-03-06
Payer: MEDICARE

## 2020-03-06 PROCEDURE — 3331090001 HH PPS REVENUE CREDIT

## 2020-03-06 PROCEDURE — G0157 HHC PT ASSISTANT EA 15: HCPCS

## 2020-03-06 PROCEDURE — 3331090002 HH PPS REVENUE DEBIT

## 2020-03-07 PROCEDURE — 3331090002 HH PPS REVENUE DEBIT

## 2020-03-07 PROCEDURE — 3331090001 HH PPS REVENUE CREDIT

## 2020-03-08 VITALS
TEMPERATURE: 98.1 F | HEART RATE: 100 BPM | OXYGEN SATURATION: 98 % | RESPIRATION RATE: 18 BRPM | DIASTOLIC BLOOD PRESSURE: 68 MMHG | SYSTOLIC BLOOD PRESSURE: 145 MMHG

## 2020-03-08 VITALS
SYSTOLIC BLOOD PRESSURE: 162 MMHG | HEART RATE: 85 BPM | RESPIRATION RATE: 17 BRPM | OXYGEN SATURATION: 98 % | TEMPERATURE: 97.4 F | DIASTOLIC BLOOD PRESSURE: 88 MMHG

## 2020-03-08 PROCEDURE — 3331090002 HH PPS REVENUE DEBIT

## 2020-03-08 PROCEDURE — 3331090001 HH PPS REVENUE CREDIT

## 2020-03-09 ENCOUNTER — HOME CARE VISIT (OUTPATIENT)
Dept: SCHEDULING | Facility: HOME HEALTH | Age: 85
End: 2020-03-09
Payer: MEDICARE

## 2020-03-09 PROCEDURE — 3331090002 HH PPS REVENUE DEBIT

## 2020-03-09 PROCEDURE — 3331090001 HH PPS REVENUE CREDIT

## 2020-03-10 ENCOUNTER — OFFICE VISIT (OUTPATIENT)
Dept: CARDIOLOGY CLINIC | Age: 85
End: 2020-03-10

## 2020-03-10 ENCOUNTER — HOME CARE VISIT (OUTPATIENT)
Dept: HOME HEALTH SERVICES | Facility: HOME HEALTH | Age: 85
End: 2020-03-10
Payer: MEDICARE

## 2020-03-10 VITALS
DIASTOLIC BLOOD PRESSURE: 74 MMHG | HEIGHT: 67 IN | WEIGHT: 242 LBS | SYSTOLIC BLOOD PRESSURE: 138 MMHG | HEART RATE: 91 BPM | BODY MASS INDEX: 37.98 KG/M2

## 2020-03-10 DIAGNOSIS — I10 ESSENTIAL HYPERTENSION: ICD-10-CM

## 2020-03-10 DIAGNOSIS — R42 DIZZINESS: ICD-10-CM

## 2020-03-10 DIAGNOSIS — I50.32 CHRONIC DIASTOLIC CONGESTIVE HEART FAILURE (HCC): ICD-10-CM

## 2020-03-10 DIAGNOSIS — Z95.5 S/P CORONARY ARTERY STENT PLACEMENT: ICD-10-CM

## 2020-03-10 DIAGNOSIS — I25.118 ATHEROSCLEROSIS OF NATIVE CORONARY ARTERY OF NATIVE HEART WITH STABLE ANGINA PECTORIS (HCC): Primary | ICD-10-CM

## 2020-03-10 DIAGNOSIS — E78.2 MIXED HYPERLIPIDEMIA: ICD-10-CM

## 2020-03-10 PROCEDURE — 3331090002 HH PPS REVENUE DEBIT

## 2020-03-10 PROCEDURE — 3331090001 HH PPS REVENUE CREDIT
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
## 2020-03-10 NOTE — PROGRESS NOTES
1. Have you been to the ER, urgent care clinic since your last visit? Hospitalized since your last visit? Yes Where: [facility] Reason for visit: Chest Pain    2. Have you seen or consulted any other health care providers outside of the [address] [place] [address] since your last visit? Include any pap smears or colon screening.  Yes Where: PCP

## 2020-03-11 ENCOUNTER — HOME CARE VISIT (OUTPATIENT)
Dept: SCHEDULING | Facility: HOME HEALTH | Age: 85
End: 2020-03-11
Payer: MEDICARE

## 2020-03-11 PROCEDURE — 3331090001 HH PPS REVENUE CREDIT

## 2020-03-11 PROCEDURE — 3331090002 HH PPS REVENUE DEBIT

## 2020-03-11 PROCEDURE — G0300 HHS/HOSPICE OF LPN EA 15 MIN: HCPCS

## 2020-03-12 ENCOUNTER — HOME CARE VISIT (OUTPATIENT)
Dept: SCHEDULING | Facility: HOME HEALTH | Age: 85
End: 2020-03-12
Payer: MEDICARE

## 2020-03-12 PROCEDURE — G0157 HHC PT ASSISTANT EA 15: HCPCS

## 2020-03-12 PROCEDURE — 3331090001 HH PPS REVENUE CREDIT

## 2020-03-12 PROCEDURE — 3331090002 HH PPS REVENUE DEBIT

## 2020-03-13 ENCOUNTER — HOME CARE VISIT (OUTPATIENT)
Dept: SCHEDULING | Facility: HOME HEALTH | Age: 85
End: 2020-03-13
Payer: MEDICARE

## 2020-03-13 PROCEDURE — 3331090002 HH PPS REVENUE DEBIT

## 2020-03-13 PROCEDURE — 3331090001 HH PPS REVENUE CREDIT

## 2020-03-13 PROCEDURE — G0157 HHC PT ASSISTANT EA 15: HCPCS

## 2020-03-14 PROCEDURE — 3331090002 HH PPS REVENUE DEBIT

## 2020-03-14 PROCEDURE — 3331090001 HH PPS REVENUE CREDIT

## 2020-03-15 PROCEDURE — 3331090001 HH PPS REVENUE CREDIT

## 2020-03-15 PROCEDURE — 3331090002 HH PPS REVENUE DEBIT

## 2020-03-15 NOTE — DISCHARGE SUMMARY
[address], [address]     DISCHARGE SUMMARY    Name: Jv Tellez MRN: [id number]   Age / Sex: 82 y.o. / female CSN: [id number]   YOB: 1935 Length of Stay: 3 days   Admit Date: 2/24/2020 Discharge Date:        PRIMARY CARE PHYSICIAN: Luigi Dunham MD      DISCHARGE DIAGNOSES:    1. Chest pain, atypical possibly d/t frequent PVC/PAC  2. Sinus tachycardia, resolved with BB  3. Acute on chronic systolic and diastolic HF. 4. CAD s/p PCI in 2016  5. Uncontrolled HTN, improved  6. HLD, intolerance to statins  7. DMT2. a1c 8,6  8. Thyroid disease  9. Asthma  10 ?orthostasis    CONSULTS CALLED: cardiology      PROCEDURES DONE: none      COURSE IN THE HOSPITAL: This is a 82-year-old female with known past medical history of coronary artery disease and congestive heart failure who presented to the ED with complaints of some chest pain. Patient was evaluated and was admitted. Cardiac biomarkers were trended. It was felt that patient had congestive heart failure exacerbation. Patient was started on diuretics. Cardiology saw the patient and I's and O's were monitored. Other preadmission medications were continued. PT OT were consulted. Renal function started to worsen and patient was noted to be orthostatic. Diuretics were held. Blood pressure medications were adjusted. PT OT continue to work with the patient. Patient felt better and was cleared for discharge by cardiology. Discharge plans were discussed with the patient. Home health care was arranged. MEDICATIONS ON DISCHARGE:    Discharge Medication List as of 2/27/2020 10:40 AM      START taking these medications    Details   metoprolol tartrate (LOPRESSOR) 25 mg tablet Take 1 Tab by mouth every twelve (12) hours. , Print, Disp-60 Tab, R-0      nitroglycerin (NITROSTAT) 0.4 mg SL tablet 1 Tab by SubLINGual route every five (5) minutes as needed for Chest Pain. Up to 3 doses. , Print, Disp-20 Tab, R-0      OTHER Check CBC, CMP, Mg in 3 days, results to PCP immediately, Diagnosis- CHF, Print, Disp-1 Each, R-0         CONTINUE these medications which have CHANGED    Details   amLODIPine (NORVASC) 5 mg tablet Take 1 Tab by mouth daily. , Print, Disp-30 Tab, R-0         CONTINUE these medications which have NOT CHANGED    Details   fluticasone propionate (FLOVENT DISKUS) 100 mcg/actuation dsdv Take 1 Inhalation by inhalation two (2) times a day., Normal, Disp-1 Inhaler, R-5      magnesium oxide (MAG-OX) 400 mg tablet Take 1 Tab by mouth two (2) times a day., Normal, Disp-180 Tab, R-3      ferrous sulfate 325 mg (65 mg iron) tablet Take 325 mg by mouth Daily (before breakfast). , Historical Med      furosemide (LASIX) 20 mg tablet Use daily as needed for leg swelling, Normal, Disp-30 Tab, R-2      SYNTHROID 112 mcg tablet 1 Tab., Historical Med, AUREA      albuterol (PROVENTIL HFA, VENTOLIN HFA, PROAIR HFA) 90 mcg/actuation inhaler INHALE 1 PUFF BY MOUTH EVERY 4 HOURS AS NEEDED FOR WHEEZING OR SHORTNESS OF BREATH, Normal, Disp-18 g, R-5      lidocaine (ASPERCREME, LIDOCAINE,) 4 % patch 1 Patch by TransDERmal route every eight (8) hours. , Normal, Disp-1 Package, R-3      loratadine (CLARITIN) 10 mg tablet Take 1 Tab by mouth daily. , Normal, Disp-90 Tab, R-1      montelukast (SINGULAIR) 10 mg tablet Take 1 Tab by mouth daily. Indications: inflammation of the nose due to an allergy, Normal, Disp-90 Tab, R-0      clopidogrel (PLAVIX) 75 mg tab TAKE 1 TABLET BY MOUTH DAILY, Normal, Disp-30 Tab, R-2      isosorbide mononitrate ER (IMDUR) 30 mg tablet TAKE 1 TABLET BY MOUTH EVERY MORNING, Normal**Patient requests 90 days supply**Disp-90 Tab, R-2      RONNELL PEN NEEDLE 32 gauge x 5/32\" ndle Historical Med, R-4, AUREA      bisacodyl (DULCOLAX, BISACODYL,) 5 mg EC tablet Take 2 Tabs by mouth daily as needed for Constipation. , Normal, Disp-30 Tab, R-0      Insulin Syringe-Needle U-100 (BD INSULIN SYRINGE ULTRA-FINE) 0.5 mL 31 gauge x 5/16\" syrg BD Insulin Syringe Ultra-Fine 0.5 mL 31 gauge x 5/16\", Historical Med      ranolazine ER (RANEXA) 500 mg SR tablet Take 1 Tab by mouth two (2) times a day., Normal, Disp-180 Tab, R-3      insulin glargine (LANTUS U-100 INSULIN) 100 unit/mL injection Take 32 units every morning and 36 units qhs, No Print, Disp-1 Vial, R-0      ascorbic acid, vitamin C, (VITAMIN C) 250 mg tablet Take 250 mg by mouth daily. , Historical Med      acetaminophen (TYLENOL ARTHRITIS PAIN) 650 mg TbER Take 650 mg by mouth every eight (8) hours. Indications: Fever, Pain, Historical Med      cyanocobalamin ER 1,000 mcg tablet Take 1 Tab by mouth daily. , Normal, Disp-30 Tab, R-3      capsaicin 0.075 % topical cream Apply  to affected area three (3) times daily. , Normal, Disp-60 g, R-0      DOCOSAHEXANOIC ACID/EPA (FISH OIL PO) Take 1,000 mg by mouth two (2) times a day., Historical Med      cholecalciferol, vitamin d3, (VITAMIN D) 1,000 unit tablet Take 5,000 Units by mouth two (2) times a day., Historical Med               DISCHARGE VITAL SIGNS:  Visit Vitals  /68 (BP 1 Location: Right arm, BP Patient Position: At rest)   Pulse 94   Temp 98.2 °F (36.8 °C)   Resp 17   Ht 5' 7\" (1.702 m)   Wt 110 kg (242 lb 8.1 oz)   SpO2 98%   Breastfeeding No   BMI 37.98 kg/m²           CONDITION ON DISCHARGE: Stable.       DISPOSITION: home with [facility]      FOLLOW-UP RECOMMENDATIONS:   Follow-up Information     Follow up With Specialties Details [facility]  [address]to[address]    Nora Villeda MD Family Practice On 3/5/2020 Appointment time is at 11:30am [address]      Ryan Olson MD Cardiology, Internal Medicine On 3/10/2020 Appointment time is at 11:00AM [address] [place] [address], [facility] ([facility])   Michael Person [id number]          OTHER INSTRUCTIONS:        TIME SPENT ON DISCHARGE ACTIVITIES: More than 35 minutes. Dragon medical dictation software was used for portions of this report. Unintended errors may occur.       Signed:  Willa Oakley MD      3/15/2020

## 2020-03-16 ENCOUNTER — HOME CARE VISIT (OUTPATIENT)
Dept: SCHEDULING | Facility: HOME HEALTH | Age: 85
End: 2020-03-16
Payer: MEDICARE

## 2020-03-16 VITALS
DIASTOLIC BLOOD PRESSURE: 80 MMHG | TEMPERATURE: 98.6 F | HEART RATE: 94 BPM | OXYGEN SATURATION: 97 % | SYSTOLIC BLOOD PRESSURE: 144 MMHG

## 2020-03-16 PROCEDURE — G0152 HHCP-SERV OF OT,EA 15 MIN: HCPCS

## 2020-03-16 PROCEDURE — 3331090001 HH PPS REVENUE CREDIT

## 2020-03-16 PROCEDURE — 3331090002 HH PPS REVENUE DEBIT

## 2020-03-17 ENCOUNTER — HOME CARE VISIT (OUTPATIENT)
Dept: SCHEDULING | Facility: HOME HEALTH | Age: 85
End: 2020-03-17
Payer: MEDICARE

## 2020-03-17 VITALS
HEART RATE: 100 BPM | RESPIRATION RATE: 17 BRPM | SYSTOLIC BLOOD PRESSURE: 160 MMHG | RESPIRATION RATE: 17 BRPM | OXYGEN SATURATION: 99 % | SYSTOLIC BLOOD PRESSURE: 190 MMHG | TEMPERATURE: 97.6 F | TEMPERATURE: 97.8 F | DIASTOLIC BLOOD PRESSURE: 88 MMHG | OXYGEN SATURATION: 98 % | DIASTOLIC BLOOD PRESSURE: 100 MMHG | HEART RATE: 80 BPM

## 2020-03-17 PROCEDURE — 3331090002 HH PPS REVENUE DEBIT

## 2020-03-17 PROCEDURE — G0151 HHCP-SERV OF PT,EA 15 MIN: HCPCS

## 2020-03-17 PROCEDURE — 3331090001 HH PPS REVENUE CREDIT

## 2020-03-18 ENCOUNTER — HOME CARE VISIT (OUTPATIENT)
Dept: SCHEDULING | Facility: HOME HEALTH | Age: 85
End: 2020-03-18
Payer: MEDICARE

## 2020-03-18 VITALS
DIASTOLIC BLOOD PRESSURE: 90 MMHG | HEART RATE: 88 BPM | RESPIRATION RATE: 18 BRPM | SYSTOLIC BLOOD PRESSURE: 195 MMHG | TEMPERATURE: 97.8 F | OXYGEN SATURATION: 94 %

## 2020-03-18 PROCEDURE — G0300 HHS/HOSPICE OF LPN EA 15 MIN: HCPCS

## 2020-03-18 PROCEDURE — 3331090002 HH PPS REVENUE DEBIT

## 2020-03-18 PROCEDURE — G0158 HHC OT ASSISTANT EA 15: HCPCS

## 2020-03-18 PROCEDURE — 3331090001 HH PPS REVENUE CREDIT

## 2020-03-19 ENCOUNTER — HOME CARE VISIT (OUTPATIENT)
Dept: HOME HEALTH SERVICES | Facility: HOME HEALTH | Age: 85
End: 2020-03-19
Payer: MEDICARE

## 2020-03-19 VITALS
SYSTOLIC BLOOD PRESSURE: 140 MMHG | TEMPERATURE: 97.4 F | OXYGEN SATURATION: 98 % | DIASTOLIC BLOOD PRESSURE: 76 MMHG | RESPIRATION RATE: 17 BRPM | HEART RATE: 90 BPM

## 2020-03-19 VITALS
HEART RATE: 99 BPM | OXYGEN SATURATION: 97 % | TEMPERATURE: 97.3 F | SYSTOLIC BLOOD PRESSURE: 161 MMHG | RESPIRATION RATE: 6 BRPM | DIASTOLIC BLOOD PRESSURE: 99 MMHG

## 2020-03-19 PROCEDURE — 3331090001 HH PPS REVENUE CREDIT

## 2020-03-19 PROCEDURE — 3331090002 HH PPS REVENUE DEBIT

## 2020-03-20 PROCEDURE — 3331090002 HH PPS REVENUE DEBIT

## 2020-03-20 PROCEDURE — 3331090001 HH PPS REVENUE CREDIT

## 2020-03-21 PROCEDURE — 3331090001 HH PPS REVENUE CREDIT

## 2020-03-21 PROCEDURE — 3331090002 HH PPS REVENUE DEBIT

## 2020-03-22 PROCEDURE — 3331090001 HH PPS REVENUE CREDIT

## 2020-03-22 PROCEDURE — 3331090002 HH PPS REVENUE DEBIT

## 2020-03-23 PROCEDURE — 3331090001 HH PPS REVENUE CREDIT

## 2020-03-23 PROCEDURE — 3331090002 HH PPS REVENUE DEBIT

## 2020-03-24 ENCOUNTER — HOME CARE VISIT (OUTPATIENT)
Dept: SCHEDULING | Facility: HOME HEALTH | Age: 85
End: 2020-03-24
Payer: MEDICARE

## 2020-03-24 PROCEDURE — G0300 HHS/HOSPICE OF LPN EA 15 MIN: HCPCS

## 2020-03-24 PROCEDURE — 3331090002 HH PPS REVENUE DEBIT

## 2020-03-24 PROCEDURE — 3331090001 HH PPS REVENUE CREDIT

## 2020-03-24 PROCEDURE — G0157 HHC PT ASSISTANT EA 15: HCPCS

## 2020-03-24 PROCEDURE — G0158 HHC OT ASSISTANT EA 15: HCPCS

## 2020-03-25 ENCOUNTER — HOME CARE VISIT (OUTPATIENT)
Dept: SCHEDULING | Facility: HOME HEALTH | Age: 85
End: 2020-03-25
Payer: MEDICARE

## 2020-03-25 ENCOUNTER — TELEPHONE (OUTPATIENT)
Dept: FAMILY MEDICINE CLINIC | Age: 85
End: 2020-03-25

## 2020-03-25 VITALS
DIASTOLIC BLOOD PRESSURE: 103 MMHG | OXYGEN SATURATION: 97 % | HEART RATE: 90 BPM | SYSTOLIC BLOOD PRESSURE: 164 MMHG | TEMPERATURE: 97.7 F

## 2020-03-25 VITALS
OXYGEN SATURATION: 96 % | SYSTOLIC BLOOD PRESSURE: 156 MMHG | TEMPERATURE: 97.6 F | RESPIRATION RATE: 24 BRPM | DIASTOLIC BLOOD PRESSURE: 82 MMHG | HEART RATE: 75 BPM

## 2020-03-25 VITALS
SYSTOLIC BLOOD PRESSURE: 138 MMHG | HEART RATE: 88 BPM | OXYGEN SATURATION: 97 % | DIASTOLIC BLOOD PRESSURE: 80 MMHG | TEMPERATURE: 98 F

## 2020-03-25 PROCEDURE — 3331090002 HH PPS REVENUE DEBIT

## 2020-03-25 PROCEDURE — 3331090001 HH PPS REVENUE CREDIT

## 2020-03-25 PROCEDURE — G0158 HHC OT ASSISTANT EA 15: HCPCS

## 2020-03-25 PROCEDURE — G0157 HHC PT ASSISTANT EA 15: HCPCS

## 2020-03-25 NOTE — TELEPHONE ENCOUNTER
Called patient and chart review was done. Patient was advised per- Dr. Naima Kunz to go to the emergency room to receive treatment. Patient stated that she will go to the emergency room when she get a chance. Patient stated that the Home health nurse's was getting on her nerve and that's why her blood pressure was up but she will go to the er.

## 2020-03-25 NOTE — TELEPHONE ENCOUNTER
[id number] [facility] Nurse called and stated that pt has had elevatedg BP today of 164/103, patient is dizzy and nauseated and has not taken Lopressor as of yet due to nausea. Last night patient has reported taking Nitroglycerin. Please advise.

## 2020-03-26 PROCEDURE — 3331090001 HH PPS REVENUE CREDIT

## 2020-03-26 PROCEDURE — 3331090002 HH PPS REVENUE DEBIT

## 2020-03-26 NOTE — TELEPHONE ENCOUNTER
Call connected by answering service. Patient verified her name, , and her address. She states she was instructed to go to ER today by Dr. Rachelle Perry but she did not go. She states she took her medications and her blood pressure went down to 160/86  after taking her medication. She states she knows when she needs to go to the ER and she will recheck her blood pressure later and go if it is up.  [facility]

## 2020-03-27 PROCEDURE — 3331090002 HH PPS REVENUE DEBIT

## 2020-03-27 PROCEDURE — 3331090001 HH PPS REVENUE CREDIT

## 2020-03-28 PROCEDURE — 3331090001 HH PPS REVENUE CREDIT

## 2020-03-28 PROCEDURE — 3331090002 HH PPS REVENUE DEBIT

## 2020-03-29 PROCEDURE — 3331090001 HH PPS REVENUE CREDIT

## 2020-03-29 PROCEDURE — 3331090002 HH PPS REVENUE DEBIT

## 2020-03-30 VITALS
TEMPERATURE: 98.2 F | HEART RATE: 77 BPM | RESPIRATION RATE: 18 BRPM | OXYGEN SATURATION: 99 % | RESPIRATION RATE: 18 BRPM | OXYGEN SATURATION: 98 % | SYSTOLIC BLOOD PRESSURE: 162 MMHG | TEMPERATURE: 97.8 F | DIASTOLIC BLOOD PRESSURE: 80 MMHG | DIASTOLIC BLOOD PRESSURE: 98 MMHG | SYSTOLIC BLOOD PRESSURE: 158 MMHG | HEART RATE: 70 BPM

## 2020-03-30 PROCEDURE — 3331090002 HH PPS REVENUE DEBIT

## 2020-03-30 PROCEDURE — 3331090001 HH PPS REVENUE CREDIT

## 2020-03-31 ENCOUNTER — HOME CARE VISIT (OUTPATIENT)
Dept: SCHEDULING | Facility: HOME HEALTH | Age: 85
End: 2020-03-31
Payer: MEDICARE

## 2020-03-31 ENCOUNTER — TELEPHONE (OUTPATIENT)
Dept: FAMILY MEDICINE CLINIC | Age: 85
End: 2020-03-31

## 2020-03-31 VITALS
RESPIRATION RATE: 18 BRPM | SYSTOLIC BLOOD PRESSURE: 162 MMHG | TEMPERATURE: 97.8 F | OXYGEN SATURATION: 98 % | DIASTOLIC BLOOD PRESSURE: 85 MMHG | HEART RATE: 105 BPM

## 2020-03-31 PROCEDURE — G0151 HHCP-SERV OF PT,EA 15 MIN: HCPCS

## 2020-03-31 PROCEDURE — 3331090002 HH PPS REVENUE DEBIT

## 2020-03-31 PROCEDURE — 400013 HH SOC

## 2020-03-31 PROCEDURE — 3331090001 HH PPS REVENUE CREDIT

## 2020-04-01 ENCOUNTER — HOME CARE VISIT (OUTPATIENT)
Dept: HOME HEALTH SERVICES | Facility: HOME HEALTH | Age: 85
End: 2020-04-01
Payer: MEDICARE

## 2020-04-01 ENCOUNTER — HOME CARE VISIT (OUTPATIENT)
Dept: SCHEDULING | Facility: HOME HEALTH | Age: 85
End: 2020-04-01
Payer: MEDICARE

## 2020-04-01 VITALS
OXYGEN SATURATION: 97 % | RESPIRATION RATE: 20 BRPM | SYSTOLIC BLOOD PRESSURE: 159 MMHG | TEMPERATURE: 97.6 F | HEART RATE: 103 BPM | DIASTOLIC BLOOD PRESSURE: 92 MMHG

## 2020-04-01 PROCEDURE — G0152 HHCP-SERV OF OT,EA 15 MIN: HCPCS

## 2020-04-01 PROCEDURE — 3331090001 HH PPS REVENUE CREDIT

## 2020-04-01 PROCEDURE — 3331090002 HH PPS REVENUE DEBIT

## 2020-04-01 NOTE — PROGRESS NOTES
Dr. Courtney Donaldson,  Ms. Aleisha Mike is progressing well w/ PT but would benefit from further visits and we are requestings conted orders for gait, transfers, and balance skills at 9 more visits:  2 wk 4, 1 wk 1 starting this week.   Thanks for your referral.

## 2020-04-02 ENCOUNTER — TELEPHONE (OUTPATIENT)
Dept: FAMILY MEDICINE CLINIC | Age: 85
End: 2020-04-02

## 2020-04-02 DIAGNOSIS — R26.89 BALANCE DISORDER: Primary | ICD-10-CM

## 2020-04-02 DIAGNOSIS — R26.81 GAIT INSTABILITY: ICD-10-CM

## 2020-04-02 PROCEDURE — 3331090002 HH PPS REVENUE DEBIT

## 2020-04-02 PROCEDURE — 3331090001 HH PPS REVENUE CREDIT

## 2020-04-02 NOTE — TELEPHONE ENCOUNTER
Spoke with Olimpia Ge with [address] and she will go ahead and cancel patient's home health services at this time. We will have to put in a new order when the [address] pandemic is over with for patient if she is still willing to have it.

## 2020-04-03 ENCOUNTER — HOME CARE VISIT (OUTPATIENT)
Dept: SCHEDULING | Facility: HOME HEALTH | Age: 85
End: 2020-04-03
Payer: MEDICARE

## 2020-04-03 PROCEDURE — 3331090001 HH PPS REVENUE CREDIT

## 2020-04-03 PROCEDURE — 3331090002 HH PPS REVENUE DEBIT

## 2020-04-03 PROCEDURE — G0158 HHC OT ASSISTANT EA 15: HCPCS

## 2020-04-04 ENCOUNTER — PATIENT OUTREACH (OUTPATIENT)
Dept: CASE MANAGEMENT | Age: 85
End: 2020-04-04

## 2020-04-04 PROCEDURE — 3331090001 HH PPS REVENUE CREDIT

## 2020-04-04 PROCEDURE — 3331090002 HH PPS REVENUE DEBIT

## 2020-04-05 PROCEDURE — 3331090002 HH PPS REVENUE DEBIT

## 2020-04-05 PROCEDURE — 3331090001 HH PPS REVENUE CREDIT

## 2020-04-06 ENCOUNTER — TELEPHONE (OUTPATIENT)
Dept: FAMILY MEDICINE CLINIC | Age: 85
End: 2020-04-06

## 2020-04-06 ENCOUNTER — HOME CARE VISIT (OUTPATIENT)
Dept: SCHEDULING | Facility: HOME HEALTH | Age: 85
End: 2020-04-06
Payer: MEDICARE

## 2020-04-06 ENCOUNTER — VIRTUAL VISIT (OUTPATIENT)
Dept: FAMILY MEDICINE CLINIC | Age: 85
End: 2020-04-06

## 2020-04-06 VITALS
TEMPERATURE: 97.6 F | SYSTOLIC BLOOD PRESSURE: 155 MMHG | DIASTOLIC BLOOD PRESSURE: 86 MMHG | HEART RATE: 95 BPM | OXYGEN SATURATION: 97 %

## 2020-04-06 VITALS
HEART RATE: 97 BPM | OXYGEN SATURATION: 97 % | DIASTOLIC BLOOD PRESSURE: 90 MMHG | SYSTOLIC BLOOD PRESSURE: 160 MMHG | TEMPERATURE: 96.9 F

## 2020-04-06 DIAGNOSIS — E78.2 MIXED HYPERLIPIDEMIA: ICD-10-CM

## 2020-04-06 DIAGNOSIS — I25.118 ATHEROSCLEROSIS OF NATIVE CORONARY ARTERY OF NATIVE HEART WITH STABLE ANGINA PECTORIS (HCC): ICD-10-CM

## 2020-04-06 DIAGNOSIS — I10 ESSENTIAL HYPERTENSION: Primary | ICD-10-CM

## 2020-04-06 PROCEDURE — G0158 HHC OT ASSISTANT EA 15: HCPCS

## 2020-04-06 PROCEDURE — 3331090001 HH PPS REVENUE CREDIT

## 2020-04-06 PROCEDURE — 3331090002 HH PPS REVENUE DEBIT

## 2020-04-06 RX ORDER — AMLODIPINE BESYLATE 5 MG/1
5 TABLET ORAL DAILY
Qty: 90 TAB | Refills: 4 | Status: SHIPPED | OUTPATIENT
Start: 2020-04-06 | End: 2020-11-18
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
## 2020-04-07 PROCEDURE — 3331090002 HH PPS REVENUE DEBIT

## 2020-04-07 PROCEDURE — 3331090001 HH PPS REVENUE CREDIT

## 2020-04-08 ENCOUNTER — HOME CARE VISIT (OUTPATIENT)
Dept: SCHEDULING | Facility: HOME HEALTH | Age: 85
End: 2020-04-08
Payer: MEDICARE

## 2020-04-08 VITALS
DIASTOLIC BLOOD PRESSURE: 83 MMHG | TEMPERATURE: 98 F | OXYGEN SATURATION: 98 % | SYSTOLIC BLOOD PRESSURE: 173 MMHG | HEART RATE: 97 BPM

## 2020-04-08 DIAGNOSIS — R07.9 CHEST PAIN, UNSPECIFIED TYPE: ICD-10-CM

## 2020-04-08 PROCEDURE — G0158 HHC OT ASSISTANT EA 15: HCPCS

## 2020-04-08 PROCEDURE — 3331090001 HH PPS REVENUE CREDIT

## 2020-04-08 PROCEDURE — 3331090002 HH PPS REVENUE DEBIT

## 2020-04-08 RX ORDER — RANOLAZINE 500 MG/1
500 TABLET, EXTENDED RELEASE ORAL 2 TIMES DAILY
Qty: 180 TAB | Refills: 6 | Status: SHIPPED | OUTPATIENT
Start: 2020-04-08 | End: 2021-09-20 | Stop reason: SDUPTHER

## 2020-04-09 PROCEDURE — 3331090002 HH PPS REVENUE DEBIT

## 2020-04-09 PROCEDURE — 3331090001 HH PPS REVENUE CREDIT

## 2020-04-10 ENCOUNTER — TELEPHONE (OUTPATIENT)
Dept: FAMILY MEDICINE CLINIC | Age: 85
End: 2020-04-10

## 2020-04-10 PROCEDURE — 3331090002 HH PPS REVENUE DEBIT

## 2020-04-10 PROCEDURE — 3331090001 HH PPS REVENUE CREDIT

## 2020-04-11 PROCEDURE — 3331090002 HH PPS REVENUE DEBIT

## 2020-04-11 PROCEDURE — 3331090001 HH PPS REVENUE CREDIT

## 2020-04-12 PROCEDURE — 3331090001 HH PPS REVENUE CREDIT

## 2020-04-12 PROCEDURE — 3331090002 HH PPS REVENUE DEBIT

## 2020-04-13 PROCEDURE — 3331090002 HH PPS REVENUE DEBIT

## 2020-04-13 PROCEDURE — 3331090001 HH PPS REVENUE CREDIT

## 2020-04-14 ENCOUNTER — HOME CARE VISIT (OUTPATIENT)
Dept: SCHEDULING | Facility: HOME HEALTH | Age: 85
End: 2020-04-14
Payer: MEDICARE

## 2020-04-14 VITALS
DIASTOLIC BLOOD PRESSURE: 81 MMHG | SYSTOLIC BLOOD PRESSURE: 182 MMHG | HEART RATE: 84 BPM | TEMPERATURE: 97.2 F | RESPIRATION RATE: 24 BRPM | OXYGEN SATURATION: 97 %

## 2020-04-14 PROCEDURE — G0152 HHCP-SERV OF OT,EA 15 MIN: HCPCS

## 2020-04-14 PROCEDURE — 3331090001 HH PPS REVENUE CREDIT

## 2020-04-14 PROCEDURE — 3331090002 HH PPS REVENUE DEBIT

## 2020-04-15 ENCOUNTER — HOME CARE VISIT (OUTPATIENT)
Dept: SCHEDULING | Facility: HOME HEALTH | Age: 85
End: 2020-04-15
Payer: MEDICARE

## 2020-04-15 PROCEDURE — 3331090001 HH PPS REVENUE CREDIT

## 2020-04-15 PROCEDURE — 3331090002 HH PPS REVENUE DEBIT

## 2020-04-15 PROCEDURE — G0157 HHC PT ASSISTANT EA 15: HCPCS

## 2020-04-16 PROCEDURE — 3331090001 HH PPS REVENUE CREDIT

## 2020-04-16 PROCEDURE — 3331090002 HH PPS REVENUE DEBIT

## 2020-04-17 ENCOUNTER — HOME CARE VISIT (OUTPATIENT)
Dept: SCHEDULING | Facility: HOME HEALTH | Age: 85
End: 2020-04-17
Payer: MEDICARE

## 2020-04-17 PROCEDURE — G0157 HHC PT ASSISTANT EA 15: HCPCS

## 2020-04-17 PROCEDURE — 3331090002 HH PPS REVENUE DEBIT

## 2020-04-17 PROCEDURE — 3331090001 HH PPS REVENUE CREDIT

## 2020-04-18 PROCEDURE — 3331090001 HH PPS REVENUE CREDIT

## 2020-04-18 PROCEDURE — 3331090002 HH PPS REVENUE DEBIT

## 2020-04-19 PROCEDURE — 3331090002 HH PPS REVENUE DEBIT

## 2020-04-19 PROCEDURE — 3331090001 HH PPS REVENUE CREDIT

## 2020-04-20 PROCEDURE — 3331090001 HH PPS REVENUE CREDIT

## 2020-04-20 PROCEDURE — 3331090002 HH PPS REVENUE DEBIT

## 2020-04-21 VITALS
OXYGEN SATURATION: 98 % | HEART RATE: 80 BPM | DIASTOLIC BLOOD PRESSURE: 88 MMHG | DIASTOLIC BLOOD PRESSURE: 90 MMHG | OXYGEN SATURATION: 98 % | SYSTOLIC BLOOD PRESSURE: 155 MMHG | TEMPERATURE: 98.6 F | RESPIRATION RATE: 18 BRPM | RESPIRATION RATE: 17 BRPM | HEART RATE: 91 BPM | TEMPERATURE: 97.3 F | SYSTOLIC BLOOD PRESSURE: 165 MMHG

## 2020-04-21 PROCEDURE — 3331090001 HH PPS REVENUE CREDIT

## 2020-04-21 PROCEDURE — 3331090002 HH PPS REVENUE DEBIT

## 2020-04-22 ENCOUNTER — HOME CARE VISIT (OUTPATIENT)
Dept: SCHEDULING | Facility: HOME HEALTH | Age: 85
End: 2020-04-22
Payer: MEDICARE

## 2020-04-22 PROCEDURE — 3331090002 HH PPS REVENUE DEBIT

## 2020-04-22 PROCEDURE — 3331090001 HH PPS REVENUE CREDIT

## 2020-04-22 PROCEDURE — G0157 HHC PT ASSISTANT EA 15: HCPCS

## 2020-04-23 PROCEDURE — 3331090001 HH PPS REVENUE CREDIT

## 2020-04-23 PROCEDURE — 3331090002 HH PPS REVENUE DEBIT

## 2020-04-24 ENCOUNTER — HOME CARE VISIT (OUTPATIENT)
Dept: HOME HEALTH SERVICES | Facility: HOME HEALTH | Age: 85
End: 2020-04-24
Payer: MEDICARE

## 2020-04-24 VITALS
HEART RATE: 95 BPM | TEMPERATURE: 96.5 F | RESPIRATION RATE: 17 BRPM | DIASTOLIC BLOOD PRESSURE: 95 MMHG | OXYGEN SATURATION: 98 % | SYSTOLIC BLOOD PRESSURE: 165 MMHG

## 2020-04-24 PROCEDURE — 3331090001 HH PPS REVENUE CREDIT

## 2020-04-24 PROCEDURE — 3331090002 HH PPS REVENUE DEBIT

## 2020-04-25 PROCEDURE — 3331090002 HH PPS REVENUE DEBIT

## 2020-04-25 PROCEDURE — 3331090001 HH PPS REVENUE CREDIT

## 2020-04-26 PROCEDURE — 3331090002 HH PPS REVENUE DEBIT

## 2020-04-26 PROCEDURE — 3331090001 HH PPS REVENUE CREDIT

## 2020-04-27 ENCOUNTER — HOME CARE VISIT (OUTPATIENT)
Dept: SCHEDULING | Facility: HOME HEALTH | Age: 85
End: 2020-04-27
Payer: MEDICARE

## 2020-04-27 PROCEDURE — 3331090002 HH PPS REVENUE DEBIT

## 2020-04-27 PROCEDURE — G0157 HHC PT ASSISTANT EA 15: HCPCS

## 2020-04-27 PROCEDURE — 3331090001 HH PPS REVENUE CREDIT

## 2020-04-28 ENCOUNTER — HOME CARE VISIT (OUTPATIENT)
Dept: SCHEDULING | Facility: HOME HEALTH | Age: 85
End: 2020-04-28
Payer: MEDICARE

## 2020-04-28 VITALS
OXYGEN SATURATION: 98 % | RESPIRATION RATE: 18 BRPM | TEMPERATURE: 97.8 F | DIASTOLIC BLOOD PRESSURE: 90 MMHG | HEART RATE: 85 BPM | SYSTOLIC BLOOD PRESSURE: 188 MMHG

## 2020-04-28 VITALS
OXYGEN SATURATION: 97 % | TEMPERATURE: 97.9 F | SYSTOLIC BLOOD PRESSURE: 160 MMHG | HEART RATE: 82 BPM | DIASTOLIC BLOOD PRESSURE: 90 MMHG

## 2020-04-28 PROCEDURE — 3331090002 HH PPS REVENUE DEBIT

## 2020-04-28 PROCEDURE — G0151 HHCP-SERV OF PT,EA 15 MIN: HCPCS

## 2020-04-28 PROCEDURE — 3331090001 HH PPS REVENUE CREDIT

## 2020-04-29 NOTE — PROGRESS NOTES
Patient has been discharged from [facility] [address] with HEP, max benefit achieved, goals have been met. A discharge summary is available for your review.   Thank you for your referral.

## 2020-05-01 RX ORDER — METOPROLOL TARTRATE 25 MG/1
25 TABLET, FILM COATED ORAL EVERY 12 HOURS
Qty: 60 TAB | Refills: 5 | Status: SHIPPED | OUTPATIENT
Start: 2020-05-01 | End: 2020-10-26

## 2020-05-05 NOTE — TELEPHONE ENCOUNTER
Follow up is scheduled for June 30, 2020             Requested Prescriptions     Pending Prescriptions Disp Refills    ranolazine ER (RANEXA) 500 mg SR tablet 180 Tab 6     Sig: Take 1 Tab by mouth two (2) times a day. Please see Dr. Hui message to his staff

## 2020-05-19 RX ORDER — FLUTICASONE PROPIONATE 100 UG/1
POWDER, METERED RESPIRATORY (INHALATION)
Qty: 1 INHALER | Refills: 5 | Status: SHIPPED | OUTPATIENT
Start: 2020-05-19 | End: 2021-05-17 | Stop reason: ALTCHOICE

## 2020-06-25 NOTE — Clinical Note
Contrast Dose Calculator:  
Patient's age: 82.  
Patient's sex: Female. Patient weight (kg) = 109.3. Creatinine level (mg/dL) = 0.8. Creatinine clearance (mL/min): 94.  
Contrast concentration (mg/mL) = 300. MACD = 300 mL. Max Contrast dose per Creatinine Cl calculator = 211.5 mL. Pt to clinic via wlc for one unit of PRBC's by self. C/O feeling weak, tired and SOB. Assisted to lounge chair easily. Port accessed to R chest wall. Good blood return. Blood infused without difficulty. Pt denies any complaints. Port deaccessed after flushing with NS and Heparin. Munoz intact. Pt brought downstairs in wlc to family waiting out front. In NAD.

## 2020-07-06 ENCOUNTER — VIRTUAL VISIT (OUTPATIENT)
Dept: FAMILY MEDICINE CLINIC | Age: 85
End: 2020-07-06

## 2020-07-06 DIAGNOSIS — E11.21 TYPE 2 DIABETES WITH NEPHROPATHY (HCC): ICD-10-CM

## 2020-07-06 DIAGNOSIS — E78.2 MIXED HYPERLIPIDEMIA: ICD-10-CM

## 2020-07-06 DIAGNOSIS — I10 ESSENTIAL HYPERTENSION: Primary | ICD-10-CM
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
## 2020-07-06 NOTE — PROGRESS NOTES
Saloni Albert presents today for   Chief Complaint   Patient presents with    Diabetes     Follow up     Cholesterol Problem     Follow up     Hypertension     Follow up        Saloni Albert preferred language for health care discussion is english/other. Is someone accompanying this pt? No    Is the patient using any DME equipment during OV? No     Depression Screening:  3 most recent PHQ Screens 3/5/2020   PHQ Not Done -   Little interest or pleasure in doing things Not at all   Feeling down, depressed, irritable, or hopeless Not at all   Total Score PHQ 2 0       Learning Assessment:  Learning Assessment 1/13/2020   PRIMARY LEARNER Patient   HIGHEST LEVEL OF EDUCATION - PRIMARY LEARNER  SOME COLLEGE   BARRIERS PRIMARY LEARNER NONE   CO-LEARNER CAREGIVER No   CO-LEARNER NAME -   PRIMARY LANGUAGE ENGLISH    NEED -   LEARNER PREFERENCE PRIMARY LISTENING     -     -     -     -   ANSWERED BY self   RELATIONSHIP SELF       Abuse Screening:  Abuse Screening Questionnaire 3/5/2020   Do you ever feel afraid of your partner? N   Are you in a relationship with someone who physically or mentally threatens you? N   Is it safe for you to go home? Y       Fall Risk  Fall Risk Assessment, last 12 mths 4/6/2020   Able to walk? Yes   Fall in past 12 months? No   Fall with injury? -   Number of falls in past 12 months -   Fall Risk Score -         Coordination of Care:  1. Have you been to the ER, urgent care clinic since your last visit? Hospitalized since your last visit? No    2. Have you seen or consulted any other health care providers outside of the [address] [place] [address] since your last visit? Include any pap smears or colon screening. No      Advance Directive:  1. Do you have an advance directive in place? Patient Reply:No    2. If not, would you like material regarding how to put one in place?  Patient Reply: No

## 2020-07-07 ENCOUNTER — HOSPITAL ENCOUNTER (OUTPATIENT)
Dept: LAB | Age: 85
Discharge: HOME | End: 2020-07-07
Payer: MEDICARE

## 2020-07-07 DIAGNOSIS — I10 ESSENTIAL HYPERTENSION: ICD-10-CM

## 2020-07-07 DIAGNOSIS — I25.118 ATHEROSCLEROSIS OF NATIVE CORONARY ARTERY OF NATIVE HEART WITH STABLE ANGINA PECTORIS (HCC): ICD-10-CM

## 2020-07-07 DIAGNOSIS — E78.2 MIXED HYPERLIPIDEMIA: ICD-10-CM

## 2020-07-07 LAB
ALBUMIN SERPL-MCNC: 3.3 G/DL (ref 3.4–5)
ALBUMIN/GLOB SERPL: 0.7 {RATIO} (ref 0.8–1.7)
ALP SERPL-CCNC: 85 U/L (ref 45–117)
ALT SERPL-CCNC: 14 U/L (ref 13–56)
ANION GAP SERPL CALC-SCNC: 5 MMOL/L (ref 3–18)
AST SERPL-CCNC: 7 U/L (ref 10–38)
BILIRUB SERPL-MCNC: 0.6 MG/DL (ref 0.2–1)
BUN SERPL-MCNC: 11 MG/DL (ref 7–18)
BUN/CREAT SERPL: 14 (ref 12–20)
CALCIUM SERPL-MCNC: 9.1 MG/DL (ref 8.5–10.1)
CHLORIDE SERPL-SCNC: 111 MMOL/L (ref 100–111)
CHOLEST SERPL-MCNC: 231 MG/DL
CO2 SERPL-SCNC: 28 MMOL/L (ref 21–32)
CREAT SERPL-MCNC: 0.77 MG/DL (ref 0.6–1.3)
GLOBULIN SER CALC-MCNC: 4.7 G/DL (ref 2–4)
GLUCOSE SERPL-MCNC: 143 MG/DL (ref 74–99)
HDLC SERPL-MCNC: 42 MG/DL (ref 40–60)
HDLC SERPL: 5.5 {RATIO} (ref 0–5)
LDLC SERPL CALC-MCNC: 165.8 MG/DL (ref 0–100)
LIPID PROFILE,FLP: ABNORMAL
POTASSIUM SERPL-SCNC: 3.8 MMOL/L (ref 3.5–5.5)
PROT SERPL-MCNC: 8 G/DL (ref 6.4–8.2)
SODIUM SERPL-SCNC: 144 MMOL/L (ref 136–145)
TRIGL SERPL-MCNC: 116 MG/DL (ref ?–150)
VLDLC SERPL CALC-MCNC: 23.2 MG/DL

## 2020-07-07 PROCEDURE — 36415 COLL VENOUS BLD VENIPUNCTURE: CPT

## 2020-07-07 PROCEDURE — 80053 COMPREHEN METABOLIC PANEL: CPT

## 2020-07-07 PROCEDURE — 80061 LIPID PANEL: CPT

## 2020-07-15 ENCOUNTER — TELEPHONE (OUTPATIENT)
Dept: FAMILY MEDICINE CLINIC | Age: 85
End: 2020-07-15

## 2020-07-15 NOTE — TELEPHONE ENCOUNTER
Pt called stating that she may have a UTI, discoloration of urine, bladder concerns. Lower back and kidney pain along with pain with urination. Pt stated that she would like medication called into the pharmacy. Please advise.

## 2020-07-16 NOTE — TELEPHONE ENCOUNTER
Pt was instructed to seek medical attention from Urgent Care to rule out symptoms for UTI.  Pt expressed understanding

## 2020-07-18 ENCOUNTER — HOSPITAL ENCOUNTER (EMERGENCY)
Age: 85
Discharge: HOME | End: 2020-07-18
Attending: EMERGENCY MEDICINE
Payer: MEDICARE

## 2020-07-18 VITALS
RESPIRATION RATE: 20 BRPM | HEIGHT: 67 IN | BODY MASS INDEX: 37.67 KG/M2 | SYSTOLIC BLOOD PRESSURE: 163 MMHG | OXYGEN SATURATION: 97 % | DIASTOLIC BLOOD PRESSURE: 90 MMHG | HEART RATE: 100 BPM | WEIGHT: 240 LBS | TEMPERATURE: 98.4 F

## 2020-07-18 DIAGNOSIS — N39.0 URINARY TRACT INFECTION WITHOUT HEMATURIA, SITE UNSPECIFIED: Primary | ICD-10-CM

## 2020-07-18 DIAGNOSIS — R03.0 ELEVATED BLOOD PRESSURE READING: ICD-10-CM

## 2020-07-18 LAB
APPEARANCE UR: ABNORMAL
BACTERIA URNS QL MICRO: ABNORMAL /HPF
BILIRUB UR QL: NEGATIVE
COLOR UR: YELLOW
EPITH CASTS URNS QL MICRO: ABNORMAL /LPF (ref 0–5)
GLUCOSE UR STRIP.AUTO-MCNC: NEGATIVE MG/DL
HGB UR QL STRIP: ABNORMAL
KETONES UR QL STRIP.AUTO: NEGATIVE MG/DL
LEUKOCYTE ESTERASE UR QL STRIP.AUTO: ABNORMAL
MUCOUS THREADS URNS QL MICRO: ABNORMAL /LPF
NITRITE UR QL STRIP.AUTO: POSITIVE
PH UR STRIP: 6.5 [PH] (ref 5–8)
PROT UR STRIP-MCNC: 100 MG/DL
RBC #/AREA URNS HPF: ABNORMAL /HPF (ref 0–5)
SP GR UR REFRACTOMETRY: 1.01 (ref 1–1.03)
UROBILINOGEN UR QL STRIP.AUTO: 1 EU/DL (ref 0.2–1)
WBC URNS QL MICRO: ABNORMAL /HPF (ref 0–4)

## 2020-07-18 PROCEDURE — 81001 URINALYSIS AUTO W/SCOPE: CPT

## 2020-07-18 PROCEDURE — 99283 EMERGENCY DEPT VISIT LOW MDM: CPT

## 2020-07-18 RX ORDER — SULFAMETHOXAZOLE AND TRIMETHOPRIM 800; 160 MG/1; MG/1
1 TABLET ORAL 2 TIMES DAILY
Qty: 14 TAB | Refills: 0 | Status: SHIPPED | OUTPATIENT
Start: 2020-07-18 | End: 2020-07-25

## 2020-07-18 RX ORDER — PHENAZOPYRIDINE HYDROCHLORIDE 200 MG/1
200 TABLET, FILM COATED ORAL 3 TIMES DAILY
Qty: 6 TAB | Refills: 0 | Status: SHIPPED | OUTPATIENT
Start: 2020-07-18 | End: 2020-07-20

## 2020-07-18 NOTE — DISCHARGE INSTRUCTIONS
1. Urinalysis positive for infection. 2.  Flank pain likely indicates early kidney infection. 3.  Start Bactrim DS twice daily for 7 days. 4.  Pyridium as directed for bladder spasm. 5.  See your primary care physician in 3 to 5 days if not improved. 6.  Return to the emergency department for severe pain, high fever, repetitive vomiting, or other acutely worsening symptoms. 7.  Blood pressure elevated to 163/90 (normal less than 130/80). Follow-up with your primary care physician for recheck in 1 to 2 weeks. Patient Education        Urinary Tract Infection in Women: Care Instructions  Your Care Instructions     A urinary tract infection, or UTI, is a general term for an infection anywhere between the kidneys and the urethra (where urine comes out). Most UTIs are bladder infections. They often cause pain or burning when you urinate. UTIs are caused by bacteria and can be cured with antibiotics. Be sure to complete your treatment so that the infection goes away. Follow-up care is a key part of your treatment and safety. Be sure to make and go to all appointments, and call your doctor if you are having problems. It's also a good idea to know your test results and keep a list of the medicines you take. How can you care for yourself at home? · Take your antibiotics as directed. Do not stop taking them just because you feel better. You need to take the full course of antibiotics. · Drink extra water and other fluids for the next day or two. This may help wash out the bacteria that are causing the infection. (If you have kidney, heart, or liver disease and have to limit fluids, talk with your doctor before you increase your fluid intake.)  · Avoid drinks that are carbonated or have caffeine. They can irritate the bladder. · Urinate often. Try to empty your bladder each time. · To relieve pain, take a hot bath or lay a heating pad set on low over your lower belly or genital area.  Never go to sleep with a heating pad in place. To prevent UTIs  · Drink plenty of water each day. This helps you urinate often, which clears bacteria from your system. (If you have kidney, heart, or liver disease and have to limit fluids, talk with your doctor before you increase your fluid intake.)  · Urinate when you need to. · Urinate right after you have sex. · Change sanitary pads often. · Avoid douches, bubble baths, feminine hygiene sprays, and other feminine hygiene products that have deodorants. · After going to the bathroom, wipe from front to back. When should you call for help? Call your doctor now or seek immediate medical care if:  · Symptoms such as fever, chills, nausea, or vomiting get worse or appear for the first time. · You have new pain in your back just below your rib cage. This is called flank pain. · There is new blood or pus in your urine. · You have any problems with your antibiotic medicine. Watch closely for changes in your health, and be sure to contact your doctor if:  · You are not getting better after taking an antibiotic for 2 days. · Your symptoms go away but then come back. Where can you learn more? Go to http://cristina-mignon.info/  Enter N994 in the search box to learn more about \"Urinary Tract Infection in Women: Care Instructions. \"  Current as of: August 22, 2019               Content Version: 12.5  © 7381-1607 [employer]. Care instructions adapted under license by [employer] (which disclaims liability or warranty for this information). If you have questions about a medical condition or this instruction, always ask your healthcare professional. [address] any warranty or liability for your use of this information.

## 2020-07-18 NOTE — ED TRIAGE NOTES
Pt c/o left flank pain off and on for one month.  States she called her doctor the other day and was told to come to the ED to be checked for a possible UTI
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
## 2020-07-20 ENCOUNTER — PATIENT OUTREACH (OUTPATIENT)
Dept: CASE MANAGEMENT | Age: 85
End: 2020-07-20

## 2020-07-23 ENCOUNTER — OFFICE VISIT (OUTPATIENT)
Dept: ONCOLOGY | Age: 85
End: 2020-07-23

## 2020-07-23 ENCOUNTER — HOSPITAL ENCOUNTER (OUTPATIENT)
Dept: ONCOLOGY | Age: 85
Discharge: HOME | End: 2020-07-23

## 2020-07-23 ENCOUNTER — HOSPITAL ENCOUNTER (OUTPATIENT)
Dept: LAB | Age: 85
Discharge: HOME | End: 2020-07-23
Payer: MEDICARE

## 2020-07-23 VITALS
DIASTOLIC BLOOD PRESSURE: 80 MMHG | BODY MASS INDEX: 38.3 KG/M2 | OXYGEN SATURATION: 96 % | HEART RATE: 93 BPM | WEIGHT: 244 LBS | SYSTOLIC BLOOD PRESSURE: 139 MMHG | HEIGHT: 67 IN

## 2020-07-23 DIAGNOSIS — I82.532 CHRONIC DEEP VEIN THROMBOSIS (DVT) OF POPLITEAL VEIN OF LEFT LOWER EXTREMITY (HCC): Primary | ICD-10-CM

## 2020-07-23 DIAGNOSIS — D50.8 IRON DEFICIENCY ANEMIA SECONDARY TO INADEQUATE DIETARY IRON INTAKE: ICD-10-CM

## 2020-07-23 DIAGNOSIS — D64.9 CHRONIC ANEMIA: ICD-10-CM

## 2020-07-23 DIAGNOSIS — I82.532 CHRONIC DEEP VEIN THROMBOSIS (DVT) OF POPLITEAL VEIN OF LEFT LOWER EXTREMITY (HCC): ICD-10-CM

## 2020-07-23 LAB
ALBUMIN SERPL-MCNC: 3.6 G/DL (ref 3.4–5)
ALBUMIN/GLOB SERPL: 0.8 {RATIO} (ref 0.8–1.7)
ALP SERPL-CCNC: 92 U/L (ref 45–117)
ALT SERPL-CCNC: 17 U/L (ref 13–56)
ANION GAP SERPL CALC-SCNC: 8 MMOL/L (ref 3–18)
AST SERPL-CCNC: 11 U/L (ref 10–38)
BASO+EOS+MONOS # BLD AUTO: 0.3 K/UL (ref 0–2.3)
BASO+EOS+MONOS NFR BLD AUTO: 6 % (ref 0.1–17)
BILIRUB SERPL-MCNC: 0.6 MG/DL (ref 0.2–1)
BUN SERPL-MCNC: 10 MG/DL (ref 7–18)
BUN/CREAT SERPL: 11 (ref 12–20)
CALCIUM SERPL-MCNC: 9.1 MG/DL (ref 8.5–10.1)
CHLORIDE SERPL-SCNC: 105 MMOL/L (ref 100–111)
CO2 SERPL-SCNC: 27 MMOL/L (ref 21–32)
CREAT SERPL-MCNC: 0.88 MG/DL (ref 0.6–1.3)
DIFFERENTIAL METHOD BLD: ABNORMAL
ERYTHROCYTE [DISTWIDTH] IN BLOOD BY AUTOMATED COUNT: 12.3 % (ref 11.5–14.5)
FERRITIN SERPL-MCNC: 88 NG/ML (ref 8–388)
GLOBULIN SER CALC-MCNC: 4.6 G/DL (ref 2–4)
GLUCOSE SERPL-MCNC: 172 MG/DL (ref 74–99)
HCT VFR BLD AUTO: 37.4 % (ref 36–48)
HGB BLD-MCNC: 11.9 G/DL (ref 12–16)
IRON SATN MFR SERPL: 44 % (ref 20–50)
IRON SERPL-MCNC: 126 UG/DL (ref 50–175)
LYMPHOCYTES # BLD: 2 K/UL (ref 1.1–5.9)
LYMPHOCYTES NFR BLD: 39 % (ref 14–44)
MCH RBC QN AUTO: 31.7 PG (ref 25–35)
MCHC RBC AUTO-ENTMCNC: 31.8 G/DL (ref 31–37)
MCV RBC AUTO: 99.7 FL (ref 78–102)
NEUTS SEG # BLD: 2.8 K/UL (ref 1.8–9.5)
NEUTS SEG NFR BLD: 55 % (ref 40–70)
PLATELET # BLD AUTO: 211 K/UL (ref 140–440)
POTASSIUM SERPL-SCNC: 4 MMOL/L (ref 3.5–5.5)
PROT SERPL-MCNC: 8.2 G/DL (ref 6.4–8.2)
RBC # BLD AUTO: 3.75 M/UL (ref 4.1–5.1)
SODIUM SERPL-SCNC: 140 MMOL/L (ref 136–145)
TIBC SERPL-MCNC: 289 UG/DL (ref 250–450)
WBC # BLD AUTO: 5.1 K/UL (ref 4.5–13)

## 2020-07-23 PROCEDURE — 83540 ASSAY OF IRON: CPT

## 2020-07-23 PROCEDURE — 36415 COLL VENOUS BLD VENIPUNCTURE: CPT

## 2020-07-23 PROCEDURE — 82728 ASSAY OF FERRITIN: CPT

## 2020-07-23 PROCEDURE — 80053 COMPREHEN METABOLIC PANEL: CPT
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
## 2020-07-23 NOTE — PATIENT INSTRUCTIONS
Anemia: Care Instructions  Your Care Instructions     Anemia is a low level of red blood cells, which carry oxygen throughout your body. Many things can cause anemia. Lack of iron is one of the most common causes. Your body needs iron to make hemoglobin, a substance in red blood cells that carries oxygen from the lungs to your body's cells. Without enough iron, the body produces fewer and smaller red blood cells. As a result, your body's cells do not get enough oxygen, and you feel tired and weak. And you may have trouble concentrating. Bleeding is the most common cause of a lack of iron. You may have heavy menstrual bleeding or bleeding caused by conditions such as ulcers, hemorrhoids, or cancer. Regular use of aspirin or other anti-inflammatory medicines (such as ibuprofen) also can cause bleeding in some people. A lack of iron in your diet also can cause anemia, especially at times when the body needs more iron, such as during pregnancy, infancy, and the teen years. Your doctor may have prescribed iron pills. It may take several months of treatment for your iron levels to return to normal. Your doctor also may suggest that you eat foods that are rich in iron, such as meat and beans. There are many other causes of anemia. It is not always due to a lack of iron. Finding the specific cause of your anemia will help your doctor find the right treatment for you. Follow-up care is a key part of your treatment and safety. Be sure to make and go to all appointments, and call your doctor if you are having problems. It's also a good idea to know your test results and keep a list of the medicines you take. How can you care for yourself at home? · Take your medicines exactly as prescribed. Call your doctor if you think you are having a problem with your medicine. · If your doctor recommends iron pills, take them as directed:  ? Try to take the pills on an empty stomach about 1 hour before or 2 hours after meals. But you may need to take iron with food to avoid an upset stomach. ? Do not take antacids or drink milk or caffeine drinks (such as coffee, tea, or cola) at the same time or within 2 hours of the time that you take your iron. They can make it hard for your body to absorb the iron. ? Vitamin C (from food or supplements) helps your body absorb iron. Try taking iron pills with a glass of orange juice or some other food that is high in vitamin C, such as citrus fruits. ? Iron pills may cause stomach problems, such as heartburn, nausea, diarrhea, constipation, and cramps. Be sure to drink plenty of fluids, and include fruits, vegetables, and fiber in your diet each day. Iron pills often make your bowel movements dark or green. ? If you forget to take an iron pill, do not take a double dose of iron the next time you take a pill. ? Keep iron pills out of the reach of small children. An overdose of iron can be very dangerous. · Follow your doctor's advice about eating iron-rich foods. These include red meat, shellfish, poultry, eggs, beans, raisins, whole-grain bread, and leafy green vegetables. · Steam vegetables to help them keep their iron content. When should you call for help? [id number] anytime you think you may need emergency care. For example, call if:  · You have symptoms of a heart attack. These may include:  ? Chest pain or pressure, or a strange feeling in the chest.  ? Sweating. ? Shortness of breath. ? Nausea or vomiting. ? Pain, pressure, or a strange feeling in the back, neck, jaw, or upper belly or in one or both shoulders or arms. ? Lightheadedness or sudden weakness. ? A fast or irregular heartbeat. After you call 911, the  may tell you to chew 1 adult-strength or 2 to 4 low-dose aspirin. Wait for an ambulance. Do not try to drive yourself. · You passed out (lost consciousness).   Call your doctor now or seek immediate medical care if:  · You have new or increased shortness of breath. · You are dizzy or lightheaded, or you feel like you may faint. · Your fatigue and weakness continue or get worse. · You have any abnormal bleeding, such as:  ? Nosebleeds. ? Vaginal bleeding that is different (heavier, more frequent, at a different time of the month) than what you are used to.  ? Bloody or black stools, or rectal bleeding. ? Bloody or pink urine. Watch closely for changes in your health, and be sure to contact your doctor if:  · You do not get better as expected. Where can you learn more? Go to http://www.vo.com/  Enter R301 in the search box to learn more about \"Anemia: Care Instructions. \"  Current as of: November 8, 2019               Content Version: 12.5  © 7970-2689 [employer]. Care instructions adapted under license by [employer] (which disclaims liability or warranty for this information). If you have questions about a medical condition or this instruction, always ask your healthcare professional. [place] [address] any warranty or liability for your use of this information. Iron-Rich Diet: Care Instructions  Your Care Instructions     Your body needs iron to make hemoglobin. Hemoglobin is a substance in red blood cells that carries oxygen from the lungs to cells all through your body. If you do not get enough iron, your body makes fewer and smaller red blood cells. As a result, your body's cells may not get enough oxygen. Adult men need 8 milligrams of iron a day; adult women need 18 milligrams of iron a day. After menopause, women need 8 milligrams of iron a day. A pregnant woman needs 27 milligrams of iron a day. Infants and young children have higher iron needs relative to their size than other age groups. People who have lost blood because of ulcers or heavy menstrual periods may become very low in iron and may develop anemia.   Most people can get the iron their bodies need by eating enough of certain iron-rich foods. Your doctor may recommend that you take an iron supplement along with eating an iron-rich diet. Follow-up care is a key part of your treatment and safety. Be sure to make and go to all appointments, and call your doctor if you are having problems. It's also a good idea to know your test results and keep a list of the medicines you take. How can you care for yourself at home? · Make iron-rich foods a part of your daily diet. Iron-rich foods include:  ? All meats, such as chicken, beef, lamb, pork, fish, and shellfish. Liver is especially high in iron. ? Leafy green vegetables. ? Raisins, peas, beans, lentils, barley, and eggs. ? Iron-fortified breakfast cereals. · Eat foods with vitamin C along with iron-rich foods. Vitamin C helps you absorb more iron from food. Drink a glass of orange juice or another citrus juice with your food. · Eat meat and vegetables or grains together. The iron in meat helps your body absorb the iron in other foods. Where can you learn more? Go to http://cristinaVenatoRx Pharmaceuticalsmignon.info/  Enter Z290 in the search box to learn more about \"Iron-Rich Diet: Care Instructions. \"  Current as of: August 22, 2019               Content Version: 12.5  © 1363-4922 [employer]. Care instructions adapted under license by [employer] (which disclaims liability or warranty for this information). If you have questions about a medical condition or this instruction, always ask your healthcare professional. [place] [address] any warranty or liability for your use of this information. Learning About Using Compression Stockings  What are compression stockings? Compression stockings may be used for problems like varicose veins, skin ulcers, and deep vein thrombosis. But there are different types of stockings, and they need to fit right. So your doctor will recommend what you need.   These stockings are the most common treatment for varicose veins. They help the blood circulate in your legs. This can prevent skin ulcers and keep blood from building up in the legs. Prescription stockings are tightest at the foot. They get less and less tight farther up on your legs. The kind you buy without a prescription have lighter elastic. So the pressure is even all the way up the leg. These don't cost as much. But they don't provide the compression you need to treat serious symptoms or prevent skin ulcers. How do you use compression stockings? · If your stockings are new, you may want to wash them before you put them on. This can make them more flexible and easier to put on. It's a good idea to wash them by hand. · Most of the time it's best to put on your stockings early in the morning. This is when you have the least swelling in your legs. · Put silicone lotion (such as ALPS) or talcum powder on your legs to help the stockings slide on. If your stockings contain latex, or you aren't sure if they contain latex, do not use other types of lotions or creams on your legs when you wear the stockings. You may use other lotions or creams when you are not wearing the stockings. · Sit in a chair with a back while you put on the stockings. This gives you something to lean against as you pull them up. · How to put on stockings:  ? Turn your stocking inside out. Then put your toe in as far as it will go.  ? Readjust the stocking by folding it back onto itself at the ankle. Then hold both sides of the folded stocking. ? Pull toward your body as far as you can.  ? Fold back the stocking again farther up on your leg. Then pull the stocking up to that point. ? Repeat folding back and pulling until the stocking is in the right place. · You may want to wear rubber gloves. They can help you hold the stockings better. · If your stockings don't have toes, use a silk \"slip sock. \" (You can get one from your medical supplier.) This will help the stocking slide over your foot better. When you're done, pull off the sock through the open toe. · If you still can't get your stockings on, you may want to use a \"stocking sharma. \" This is a metal device that holds the stocking open while you step into it. It is often recommended for people who have problems grasping, leaning, or pulling. Before you buy one, try it first. Some people find them hard to use. What else should you know about compression stockings? · You will probably need to buy a new pair every 4 to 6 months. · They can be uncomfortable if you wear them all day. They are hot and may be hard to put on. · It is important to think about the pros and cons of stockings. You may not like them. But they may help relieve symptoms and prevent future problems. Where can you learn more? Go to http://cristina-mignon.info/  Enter J166 in the search box to learn more about \"Learning About Using Compression Stockings. \"  Current as of: March 4, 2020               Content Version: 12.5  © 6764-8815 [employer]. Care instructions adapted under license by [employer] (which disclaims liability or warranty for this information). If you have questions about a medical condition or this instruction, always ask your healthcare professional. [address] any warranty or liability for your use of this information. Learning About Compression Devices to Prevent Blood Clots  What is a compression device? A compression device has sleeves that fit around your legs, with tubes that connect the sleeves to a pump. The device is sometimes called an intermittent or sequential compression device. The pump inflates different sections of the sleeves, squeezing from bottom to top in a massaging motion to help move the blood in your legs. The compression and massaging help prevent blood clots.  Clots can form in the veins when you are not active for a long period of time. This can happen if you need to stay in bed after surgery or if you have a health condition or illness that makes your blood more likely to clot. It's important to prevent blood clots because they can be painful and dangerous. A deep vein thrombosis (DVT) is a blood clot in certain veins of the legs, pelvis, or arms. The clot is most often in the legs. The clots can get bigger, break loose, and travel through the bloodstream to the lungs. A blood clot in a lung can cause death. A compression device keeps the blood moving in your legs until you are able to move around on your own. What can you expect? Your nurse will put the sleeves on your legs, check the fit, and turn on the device. The sleeves will squeeze and release your legs. The pressure will make the sleeves feel snug when they inflate, but it should not feel painful. Sometimes a sleeve is used on one leg instead of both legs. A few times a day, your nurse will check to make sure that the sleeves fit properly. The nurse will also check your skin for irritation or any other problems. What else should you know about this device? It's important to wear the sleeves as much as you can, even while you sleep. It may feel a bit uncomfortable at times. But the device helps protect you from life-threatening blood clots. If you feel pain, your skin gets irritated, or you feel too hot, tell a member of your care team. If you need to use the bathroom or bathe, ask your nurse to take off the sleeves so you can get up. Don't turn off the device or try to take off the sleeves by yourself. Keep the sleeves on when you're in bed. And keep using them until your doctor says you don't need them anymore. Where can you learn more? Go to http://cristina-mignon.info/  Enter P991 in the search box to learn more about \"Learning About Compression Devices to Prevent Blood Clots. \"  Current as of: March 4, 2020               Content Version: 12.5  © 5766-1754 [employer]. Care instructions adapted under license by [employer] (which disclaims liability or warranty for this information). If you have questions about a medical condition or this instruction, always ask your healthcare professional. [address] any warranty or liability for your use of this information. Clopidogrel (By mouth)   Clopidogrel ([id number])  Helps prevent stroke, heart attack, and other heart problems. This medicine is a platelet inhibitor. Brand Name(s): Plavix   There may be other brand names for this medicine. When This Medicine Should Not Be Used: This medicine is not right for everyone. Do not use it if you had an allergic reaction to clopidogrel. How to Use This Medicine:   Tablet  · Your doctor will tell you how much medicine to use. Do not use more than directed. · This medicine should come with a Medication Guide. Ask your pharmacist for a copy if you do not have one. · Missed dose: Take a dose as soon as you remember. If it is almost time for your next dose, wait until then and take a regular dose. Do not take extra medicine to make up for a missed dose. · Store the medicine in a closed container at room temperature, away from heat, moisture, and direct light. Drugs and Foods to Avoid:   Ask your doctor or pharmacist before using any other medicine, including over-the-counter medicines, vitamins, and herbal products. · Some medicines can affect how clopidogrel works.  Tell your doctor if you are using any of the following:   ¨ Esomeprazole, omeprazole, repaglinide, or warfarin  ¨ Medicine to treat depression  ¨ NSAID pain or arthritis medicine (including celecoxib, diclofenac, ibuprofen, or naproxen)  Warnings While Using This Medicine:   · Tell your doctor if you are pregnant or breastfeeding, or if you have bleeding problems, stomach ulcer or bleeding, a recent stroke, or have a history of bleeding problems. · This medicine can cause you to bleed and bruise more easily. Take precautions to avoid injury. Brush and floss your teeth gently, do not play rough sports, and be careful with sharp objects. Severe bleeding can be life-threatening. · This medicine may cause a rare but serious blood clotting condition called thrombotic thrombocytopenic purpura. · Make sure any doctor or dentist who treats you knows that you are using this medicine. Tell your doctor if you plan to have surgery or a dental procedure. · Do not stop using this medicine suddenly. Your doctor will need to slowly decrease your dose before you stop it completely. · Your doctor will check your progress and the effects of this medicine at regular visits. Keep all appointments. · Keep all medicine out of the reach of children. Never share your medicine with anyone. Possible Side Effects While Using This Medicine:   Call your doctor right away if you notice any of these side effects:  · Allergic reaction: Itching or hives, swelling in your face or hands, swelling or tingling in your mouth or throat, chest tightness, trouble breathing  · Bloody or black, tarry stools  · Nosebleeds  · Pinpoint red or purple spots on your skin or in your mouth  · Problems with vision, speech, or walking  · Red or dark brown urine  · Seizures  · Severe stomach pain  · Trouble breathing, tiredness, fast heartbeat, yellow skin or eyes  · Unusual bleeding, bruising, or weakness  · Vomiting of blood or vomit that looks like coffee grounds  If you notice other side effects that you think are caused by this medicine, tell your doctor. Call your doctor for medical advice about side effects. You may report side effects to FDA at 2-928-FDA-1059  © 2017 [facility] Information is for End User's use only and may not be sold, redistributed or otherwise used for commercial purposes. The above information is an  only.  It is not intended as medical advice for individual conditions or treatments. Talk to your doctor, nurse or pharmacist before following any medical regimen to see if it is safe and effective for you. Clopidogrel (Plavix) - (By mouth)   Why this medicine is used:   Helps prevent stroke, heart attack, and other heart problems. Contact a nurse or doctor right away if you have:  · Sudden or severe headache  · Bloody vomit or vomit that looks like coffee grounds; bloody or black, tarry stools  · Bleeding that does not stop or bruises that do not heal  · Dark urine or pale stools, nausea, loss of appetite, stomach pain,  · Yellow skin or eyes     Common side effects:  · Minor bleeding or bruising  © 2017 [facility] Information is for End User's use only and may not be sold, redistributed or otherwise used for commercial purposes.

## 2020-07-30 DIAGNOSIS — Z95.5 S/P CORONARY ARTERY STENT PLACEMENT: ICD-10-CM

## 2020-07-30 RX ORDER — CLOPIDOGREL BISULFATE 75 MG/1
TABLET ORAL
Qty: 30 TAB | Refills: 2 | Status: SHIPPED | OUTPATIENT
Start: 2020-07-30 | End: 2020-11-02

## 2020-08-03 ENCOUNTER — TELEPHONE (OUTPATIENT)
Dept: FAMILY MEDICINE CLINIC | Age: 85
End: 2020-08-03

## 2020-08-03 ENCOUNTER — TELEPHONE (OUTPATIENT)
Dept: ONCOLOGY | Age: 85
End: 2020-08-03

## 2020-08-03 NOTE — TELEPHONE ENCOUNTER
Pt kiersten hasn't received call concerning lab results from 07/23/2020. Please contact pt [phone].

## 2020-08-10 ENCOUNTER — TELEPHONE (OUTPATIENT)
Dept: FAMILY MEDICINE CLINIC | Age: 85
End: 2020-08-10

## 2020-08-10 ENCOUNTER — OFFICE VISIT (OUTPATIENT)
Dept: CARDIOLOGY CLINIC | Age: 85
End: 2020-08-10

## 2020-08-10 VITALS
HEART RATE: 95 BPM | DIASTOLIC BLOOD PRESSURE: 82 MMHG | HEIGHT: 66 IN | TEMPERATURE: 98.6 F | BODY MASS INDEX: 39.38 KG/M2 | SYSTOLIC BLOOD PRESSURE: 159 MMHG

## 2020-08-10 DIAGNOSIS — E78.2 MIXED HYPERLIPIDEMIA: ICD-10-CM

## 2020-08-10 DIAGNOSIS — I10 ESSENTIAL HYPERTENSION: ICD-10-CM

## 2020-08-10 DIAGNOSIS — I50.32 CHRONIC DIASTOLIC CONGESTIVE HEART FAILURE (HCC): ICD-10-CM

## 2020-08-10 DIAGNOSIS — I25.118 ATHEROSCLEROSIS OF NATIVE CORONARY ARTERY OF NATIVE HEART WITH STABLE ANGINA PECTORIS (HCC): Primary | ICD-10-CM

## 2020-08-10 DIAGNOSIS — Z95.5 S/P CORONARY ARTERY STENT PLACEMENT: ICD-10-CM

## 2020-08-10 NOTE — PROGRESS NOTES
1. Have you been to the ER, urgent care clinic since your last visit? Hospitalized since your last visit? Yes [facility]  2. Have you seen or consulted any other health care providers outside of the [address] [place] [address] since your last visit? Include any pap smears or colon screening. No     3. Since your last visit, have you had any of the following symptoms?       Shortness of breath/swelling
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
## 2020-08-11 NOTE — TELEPHONE ENCOUNTER
Returned call to patient to information from patient. Patient reports left flank and back pain ( left side) for about a week. Patient states that she feels as if she has kidney stones and that she feels that  It is the same pain as she experienced when she has had them in the past. Sent to provider for review. Per Lakshmi Connor if patient feels that she is having the same pain as when she passed kidney stones, then patient needs to be evaluated at ED.

## 2020-08-12 NOTE — TELEPHONE ENCOUNTER
Patient states that she still feels tired and about the same as the other day. She states that she is afebrile. Pt was again encouraged to visit the ED for an evaluation. Pt states that she didn't really want to go to the ED. It was explained to patient that if she goes for evaluation now it may prevent complications requiring a hospital stay. Provider informed.

## 2020-08-19 ENCOUNTER — HOSPITAL ENCOUNTER (EMERGENCY)
Age: 85
Discharge: HOME | End: 2020-08-19
Attending: EMERGENCY MEDICINE | Admitting: EMERGENCY MEDICINE
Payer: MEDICARE

## 2020-08-19 ENCOUNTER — APPOINTMENT (OUTPATIENT)
Dept: CT IMAGING | Age: 85
End: 2020-08-19
Attending: NURSE PRACTITIONER
Payer: MEDICARE

## 2020-08-19 VITALS
OXYGEN SATURATION: 98 % | RESPIRATION RATE: 18 BRPM | SYSTOLIC BLOOD PRESSURE: 178 MMHG | HEIGHT: 67 IN | HEART RATE: 98 BPM | BODY MASS INDEX: 37.67 KG/M2 | WEIGHT: 240 LBS | DIASTOLIC BLOOD PRESSURE: 90 MMHG | TEMPERATURE: 98.6 F

## 2020-08-19 DIAGNOSIS — K57.92 DIVERTICULITIS: Primary | ICD-10-CM

## 2020-08-19 DIAGNOSIS — E87.6 HYPOKALEMIA: ICD-10-CM

## 2020-08-19 LAB
ALBUMIN SERPL-MCNC: 3 G/DL (ref 3.4–5)
ALBUMIN/GLOB SERPL: 0.6 {RATIO} (ref 0.8–1.7)
ALP SERPL-CCNC: 97 U/L (ref 45–117)
ALT SERPL-CCNC: 15 U/L (ref 13–56)
ANION GAP SERPL CALC-SCNC: 4 MMOL/L (ref 3–18)
APPEARANCE UR: CLEAR
AST SERPL-CCNC: 10 U/L (ref 10–38)
BASOPHILS # BLD: 0 K/UL (ref 0–0.1)
BASOPHILS NFR BLD: 1 % (ref 0–2)
BILIRUB SERPL-MCNC: 0.4 MG/DL (ref 0.2–1)
BILIRUB UR QL: NEGATIVE
BUN SERPL-MCNC: 9 MG/DL (ref 7–18)
BUN/CREAT SERPL: 12 (ref 12–20)
CALCIUM SERPL-MCNC: 9.1 MG/DL (ref 8.5–10.1)
CHLORIDE SERPL-SCNC: 104 MMOL/L (ref 100–111)
CO2 SERPL-SCNC: 30 MMOL/L (ref 21–32)
COLOR UR: YELLOW
CREAT SERPL-MCNC: 0.78 MG/DL (ref 0.6–1.3)
DIFFERENTIAL METHOD BLD: ABNORMAL
EOSINOPHIL # BLD: 0.2 K/UL (ref 0–0.4)
EOSINOPHIL NFR BLD: 3 % (ref 0–5)
EPITH CASTS URNS QL MICRO: NORMAL /LPF (ref 0–5)
ERYTHROCYTE [DISTWIDTH] IN BLOOD BY AUTOMATED COUNT: 12.1 % (ref 11.6–14.5)
GLOBULIN SER CALC-MCNC: 5.1 G/DL (ref 2–4)
GLUCOSE SERPL-MCNC: 208 MG/DL (ref 74–99)
GLUCOSE UR STRIP.AUTO-MCNC: 500 MG/DL
HCT VFR BLD AUTO: 37.3 % (ref 35–45)
HGB BLD-MCNC: 11.7 G/DL (ref 12–16)
HGB UR QL STRIP: NEGATIVE
KETONES UR QL STRIP.AUTO: NEGATIVE MG/DL
LEUKOCYTE ESTERASE UR QL STRIP.AUTO: ABNORMAL
LIPASE SERPL-CCNC: 59 U/L (ref 73–393)
LYMPHOCYTES # BLD: 1.9 K/UL (ref 0.9–3.6)
LYMPHOCYTES NFR BLD: 31 % (ref 21–52)
MAGNESIUM SERPL-MCNC: 2 MG/DL (ref 1.6–2.6)
MCH RBC QN AUTO: 31.7 PG (ref 24–34)
MCHC RBC AUTO-ENTMCNC: 31.4 G/DL (ref 31–37)
MCV RBC AUTO: 101.1 FL (ref 74–97)
MONOCYTES # BLD: 0.4 K/UL (ref 0.05–1.2)
MONOCYTES NFR BLD: 6 % (ref 3–10)
NEUTS SEG # BLD: 3.7 K/UL (ref 1.8–8)
NEUTS SEG NFR BLD: 59 % (ref 40–73)
NITRITE UR QL STRIP.AUTO: NEGATIVE
PH UR STRIP: 6 [PH] (ref 5–8)
PLATELET # BLD AUTO: 225 K/UL (ref 135–420)
PMV BLD AUTO: 10.6 FL (ref 9.2–11.8)
POTASSIUM SERPL-SCNC: 3.3 MMOL/L (ref 3.5–5.5)
PROT SERPL-MCNC: 8.1 G/DL (ref 6.4–8.2)
PROT UR STRIP-MCNC: 30 MG/DL
RBC # BLD AUTO: 3.69 M/UL (ref 4.2–5.3)
SODIUM SERPL-SCNC: 138 MMOL/L (ref 136–145)
SP GR UR REFRACTOMETRY: 1.02 (ref 1–1.03)
UROBILINOGEN UR QL STRIP.AUTO: 1 EU/DL (ref 0.2–1)
WBC # BLD AUTO: 6.1 K/UL (ref 4.6–13.2)
WBC URNS QL MICRO: NORMAL /HPF (ref 0–4)

## 2020-08-19 PROCEDURE — 85025 COMPLETE CBC W/AUTO DIFF WBC: CPT

## 2020-08-19 PROCEDURE — 74011250637 HC RX REV CODE- 250/637: Performed by: NURSE PRACTITIONER

## 2020-08-19 PROCEDURE — 99283 EMERGENCY DEPT VISIT LOW MDM: CPT

## 2020-08-19 PROCEDURE — 83735 ASSAY OF MAGNESIUM: CPT

## 2020-08-19 PROCEDURE — 74176 CT ABD & PELVIS W/O CONTRAST: CPT

## 2020-08-19 PROCEDURE — 81001 URINALYSIS AUTO W/SCOPE: CPT

## 2020-08-19 PROCEDURE — 83690 ASSAY OF LIPASE: CPT

## 2020-08-19 PROCEDURE — 80053 COMPREHEN METABOLIC PANEL: CPT

## 2020-08-19 RX ORDER — AMOXICILLIN AND CLAVULANATE POTASSIUM 875; 125 MG/1; MG/1
1 TABLET, FILM COATED ORAL 2 TIMES DAILY
Qty: 10 TAB | Refills: 0 | Status: SHIPPED | OUTPATIENT
Start: 2020-08-19 | End: 2020-09-02 | Stop reason: ALTCHOICE

## 2020-08-19 RX ORDER — POTASSIUM CHLORIDE 20 MEQ/1
40 TABLET, EXTENDED RELEASE ORAL
Status: COMPLETED | OUTPATIENT
Start: 2020-08-19 | End: 2020-08-19

## 2020-08-19 RX ADMIN — POTASSIUM CHLORIDE 40 MEQ: 1500 TABLET, EXTENDED RELEASE ORAL at 20:27

## 2020-08-19 NOTE — DISCHARGE INSTRUCTIONS
Patient Education        Diverticulitis: Care Instructions  Overview     Diverticulitis occurs when pouches form in the wall of the colon and become inflamed or infected. It can be very painful. Doctors aren't sure what causes diverticulitis. There is no proof that foods such as nuts, seeds, or berries cause it or make it worse. A low-fiber diet may cause the colon to work harder to push stool forward. Pouches may form because of this extra work. It may be hard to think about healthy eating while you're in pain. But as you recover, you might think about how you can use healthy eating for overall better health. Healthy eating may help you avoid future attacks. Follow-up care is a key part of your treatment and safety. Be sure to make and go to all appointments, and call your doctor if you are having problems. It's also a good idea to know your test results and keep a list of the medicines you take. How can you care for yourself at home? · Drink plenty of fluids, enough so that your urine is light yellow or clear like water. If you have kidney, heart, or liver disease and have to limit fluids, talk with your doctor before you increase the amount of fluids you drink. · Stay with liquids or a bland diet (plain rice, bananas, dry toast or crackers, applesauce) until you are feeling better. Then you can return to regular foods and slowly increase the amount of fiber in your diet. · Use a heating pad set on low on your belly to relieve mild cramps and pain. · Get extra rest until you are feeling better. · Be safe with medicines. Read and follow all instructions on the label. ? If the doctor gave you a prescription medicine for pain, take it as prescribed. ? If you are not taking a prescription pain medicine, ask your doctor if you can take an over-the-counter medicine. · If your doctor prescribed antibiotics, take them as directed. Do not stop taking them just because you feel better.  You need to take the full course of antibiotics. · Do not use laxatives or enemas unless your doctor tells you to use them. When should you call for help? Call your doctor now or seek immediate medical care if:  · You have a fever. · You are vomiting. · You have new or worse belly pain. · You cannot pass stools or gas. Watch closely for changes in your health, and be sure to contact your doctor if you have any problems. Where can you learn more? Go to http://cristina-mignon.info/  Enter H901 in the search box to learn more about \"Diverticulitis: Care Instructions. \"  Current as of: August 12, 2019               Content Version: 12.5  © 5109-5512 [employer]. Care instructions adapted under license by [employer] (which disclaims liability or warranty for this information). If you have questions about a medical condition or this instruction, always ask your healthcare professional. [place] [address] any warranty or liability for your use of this information. Patient Education        Learning About Diverticulosis and Diverticulitis  What are diverticulosis and diverticulitis? In diverticulosis and diverticulitis, pouches called diverticula form in the wall of the large intestine, or colon. · In diverticulosis, the pouches do not cause any pain or other symptoms. · In diverticulitis, the pouches get inflamed or infected and cause symptoms. Doctors aren't sure what causes these pouches in the colon. But they think that a low-fiber diet may play a role. Without fiber to add bulk to the stool, the colon has to work harder than normal to push the stool forward. The pressure from this may cause pouches to form in weak spots along the colon. Some people with diverticulosis get diverticulitis. But experts don't know why this happens. What are the symptoms? · In diverticulosis, most people don't have symptoms. But pouches sometimes bleed.   · In diverticulitis, symptoms may last from a few hours to a week or more. They include:  ? Belly pain. This is usually in the lower left side. It is sometimes worse when you move. This is the most common symptom. ? Fever and chills. ? Bloating and gas. ? Diarrhea or constipation. ? Nausea and sometimes vomiting.  ? Not feeling like eating. How can you prevent diverticulitis? You may be able to lower your chance of getting diverticulitis. You can do this by taking steps to prevent constipation. · Eat fruits, vegetables, beans, and whole grains every day. These foods are high in fiber. · Drink plenty of fluids. (Drink enough so that your urine is light yellow or clear like water.) If you have kidney, heart, or liver disease and have to limit fluids, talk with your doctor before you increase the amount of fluids you drink. · Get at least 30 minutes of exercise on most days of the week. Walking is a good choice. You also may want to do other activities, such as running, swimming, cycling, or playing tennis or team sports. · Take a fiber supplement, such as Citrucel or Metamucil, every day if needed. Read and follow all instructions on the label. · Schedule time each day for a bowel movement. Having a daily routine may help. Take your time and do not strain when having a bowel movement. Some people avoid nuts, seeds, berries, and popcorn. They believe that these foods might get trapped in the diverticula and cause pain. But there is no proof that these foods cause diverticulitis or make it worse. How are these problems treated? · The best way to treat diverticulosis is to avoid constipation. · Treatment for diverticulitis includes antibiotics. It often includes a change in your diet. You may need only liquids at first. Your doctor may suggest pain medicines for pain or belly cramps. In some cases, surgery may be needed. Follow-up care is a key part of your treatment and safety.  Be sure to make and go to all appointments, and call your doctor if you are having problems. It's also a good idea to know your test results and keep a list of the medicines you take. Where can you learn more? Go to http://www.gray.com/  Enter R462 in the search box to learn more about \"Learning About Diverticulosis and Diverticulitis. \"  Current as of: August 12, 2019               Content Version: 12.5  © 4615-1815 [employer]. Care instructions adapted under license by [employer] (which disclaims liability or warranty for this information). If you have questions about a medical condition or this instruction, always ask your healthcare professional. [address] any warranty or liability for your use of this information.

## 2020-08-19 NOTE — ED TRIAGE NOTES
Pt here for reevaluation of left flank & left lower abdominal pain. States at last visit she was diagnosed with UTI and was prescribed Bactrim. States pain has continued. Denies any urinary sx's. C/o feeling fatigued this week; states h/o anemia and voices concern regarding same.

## 2020-08-20 ENCOUNTER — PATIENT OUTREACH (OUTPATIENT)
Dept: CASE MANAGEMENT | Age: 85
End: 2020-08-20

## 2020-08-20 NOTE — PROGRESS NOTES
Date/Time:  8/20/2020 12:16 PM  Attempted to reach patient by telephone. Left HIPPA compliant message requesting a return call. Will attempt to reach patient again.

## 2020-08-20 NOTE — ROUTINE PROCESS
Jannie Cormier is a 82 y.o. female that was discharged in stable. Pt was accompanied by friend. Pt is not driving. The patients diagnosis, condition and treatment were explained to  patient and aftercare instructions were given. The patient verbalized understanding. Patient armband removed and shredded.

## 2020-08-21 ENCOUNTER — PATIENT OUTREACH (OUTPATIENT)
Dept: CASE MANAGEMENT | Age: 85
End: 2020-08-21

## 2020-08-21 NOTE — PROGRESS NOTES
Date/Time:  8/21/2020 1:36 PM  Attempted to reach patient by telephone. Left HIPPA compliant message requesting a return call. Second attempt to contact patient. Episode resolved.

## 2020-09-02 ENCOUNTER — VIRTUAL VISIT (OUTPATIENT)
Dept: FAMILY MEDICINE CLINIC | Age: 85
End: 2020-09-02

## 2020-09-02 DIAGNOSIS — K57.32 DIVERTICULITIS OF LARGE INTESTINE WITHOUT PERFORATION OR ABSCESS WITHOUT BLEEDING: Primary | ICD-10-CM
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
## 2020-09-02 NOTE — PROGRESS NOTES
Yonny Ospina presents today for   Chief Complaint   Patient presents with   [facility] Follow Up     seen at [facility] on 8/19/20 for abdominal pain and fatigue       Is someone accompanying this pt? no    Is the patient using any DME equipment during [address]? no    Depression Screening:  3 most recent PHQ Screens 3/5/2020   PHQ Not Done -   Little interest or pleasure in doing things Not at all   Feeling down, depressed, irritable, or hopeless Not at all   Total Score PHQ 2 0       Learning Assessment:  Learning Assessment 1/13/2020   PRIMARY LEARNER Patient   HIGHEST LEVEL OF EDUCATION - PRIMARY LEARNER  SOME COLLEGE   BARRIERS PRIMARY LEARNER [address] CAREGIVER No   CO-LEARNER NAME -   PRIMARY LANGUAGE ENGLISH    NEED -   LEARNER PREFERENCE PRIMARY LISTENING     -     -     -     -   ANSWERED BY self   RELATIONSHIP SELF       Abuse Screening:  Abuse Screening Questionnaire 7/6/2020   Do you ever feel afraid of your partner? N   Are you in a relationship with someone who physically or mentally threatens you? N   Is it safe for you to go home? Y       Fall Screening  Fall Risk Assessment, last 12 mths 7/23/2020   Able to walk? Yes   Fall in past 12 months? No   Fall with injury? -   Number of falls in past 12 months -   Fall Risk Score -       Generalized Anxiety  No flowsheet data found. Health Maintenance Due   Topic Date Due    Shingrix Vaccine Age 49> (1 of 2) 01/30/1987    Foot Exam Q1  10/24/2018    Medicare Yearly Exam  02/20/2020    MICROALBUMIN Q1  02/25/2020    A1C test (Diabetic or Prediabetic)  08/25/2020    Flu Vaccine (1) 09/01/2020   . Health Maintenance reviewed and discussed and ordered per Provider. Coordination of Care  1. Have you been to the ER, urgent care clinic since your last visit? Hospitalized since your last visit? Yes, [facility]    2.  Have you seen or consulted any other health care providers outside of the [address] [place] [address] since your last visit? Include any pap smears or colon screening.  no      Advance Directive:  Discussed 1/13/20

## 2020-09-03 ENCOUNTER — TELEPHONE (OUTPATIENT)
Dept: FAMILY MEDICINE CLINIC | Age: 85
End: 2020-09-03

## 2020-09-03 NOTE — TELEPHONE ENCOUNTER
Pt called and stated that she doesn't have and appointment with gastro until next month and would like to know what can she do for the discomfort that she is still having in her stomach. Please advise.

## 2020-09-11 ENCOUNTER — VIRTUAL VISIT (OUTPATIENT)
Dept: FAMILY MEDICINE CLINIC | Age: 85
End: 2020-09-11

## 2020-09-11 DIAGNOSIS — Z71.89 ACP (ADVANCE CARE PLANNING): ICD-10-CM

## 2020-09-11 DIAGNOSIS — Z00.00 MEDICARE ANNUAL WELLNESS VISIT, SUBSEQUENT: Primary | ICD-10-CM

## 2020-09-11 RX ORDER — INSULIN GLARGINE 100 [IU]/ML
INJECTION, SOLUTION SUBCUTANEOUS
Status: ON HOLD | COMMUNITY
Start: 2020-07-27 | End: 2020-11-18 | Stop reason: SDUPTHER

## 2020-09-11 NOTE — PATIENT INSTRUCTIONS
Medicare Wellness Visit, Female The best way to live healthy is to have a lifestyle where you eat a well-balanced diet, exercise regularly, limit alcohol use, and quit all forms of tobacco/nicotine, if applicable. Regular preventive services are another way to keep healthy. Preventive services (vaccines, screening tests, monitoring & exams) can help personalize your care plan, which helps you manage your own care. Screening tests can find health problems at the earliest stages, when they are easiest to treat. [facility] follows the current, evidence-based guidelines published by the [facility] E[facility] Goldman ([facility]) when recommending preventive services for our patients. Because we follow these guidelines, sometimes recommendations change over time as research supports it. (For example, mammograms used to be recommended annually. Even though Medicare will still pay for an annual mammogram, the newer guidelines recommend a mammogram every two years for women of average risk). Of course, you and your doctor may decide to screen more often for some diseases, based on your risk and your co-morbidities (chronic disease you are already diagnosed with). Preventive services for you include: - Medicare offers their members a free annual wellness visit, which is time for you and your primary care provider to discuss and plan for your preventive service needs. Take advantage of this benefit every year! 
-All adults over the age of 72 should receive the recommended pneumonia vaccines. Current USPSTF guidelines recommend a series of two vaccines for the best pneumonia protection.  
-All adults should have a flu vaccine yearly and a tetanus vaccine every 10 years.  
-All adults age 48 and older should receive the shingles vaccines (series of two vaccines). -All adults age 38-68 who are overweight should have a diabetes screening test once every three years. -All adults born between [birth year] and 1965 should be screened once for Hepatitis C. 
-Other screening tests and preventive services for persons with diabetes include: an eye exam to screen for diabetic retinopathy, a kidney function test, a foot exam, and stricter control over your cholesterol.  
-Cardiovascular screening for adults with routine risk involves an electrocardiogram (ECG) at intervals determined by your doctor.  
-Colorectal cancer screenings should be done for adults age 54-65 with no increased risk factors for colorectal cancer. There are a number of acceptable methods of screening for this type of cancer. Each test has its own benefits and drawbacks. Discuss with your doctor what is most appropriate for you during your annual wellness visit. The different tests include: colonoscopy (considered the best screening method), a fecal occult blood test, a fecal DNA test, and sigmoidoscopy. 
 
-A bone mass density test is recommended when a woman turns 65 to screen for osteoporosis. This test is only recommended one time, as a screening. Some providers will use this same test as a disease monitoring tool if you already have osteoporosis. -Breast cancer screenings are recommended every other year for women of normal risk, age 54-69. 
-Cervical cancer screenings for women over age 72 are only recommended with certain risk factors. Here is a list of your current Health Maintenance items (your personalized list of preventive services) with a due date: 
Health Maintenance Due Topic Date Due  Shingles Vaccine (1 of 2) 01/30/1987 [facility] Diabetic Foot Care  10/24/2018 [facility] Annual Well Visit  02/20/2020  Albumin Urine Test  02/25/2020  Hemoglobin A1C    08/25/2020  Yearly Flu Vaccine (1) 09/01/2020

## 2020-09-11 NOTE — PROGRESS NOTES
Gayle Carias presents today for   Chief Complaint   Patient presents with   [facility] Annual Wellness Visit     Medicare wellness visit. Gayle Carias preferred language for health care discussion is english/other. Is someone accompanying this pt? No    Is the patient using any DME equipment during OV? Wheelchair. Depression Screening:  3 most recent PHQ Screens 3/5/2020   PHQ Not Done -   Little interest or pleasure in doing things Not at all   Feeling down, depressed, irritable, or hopeless Not at all   Total Score PHQ 2 0       Learning Assessment:  Learning Assessment 1/13/2020   PRIMARY LEARNER Patient   HIGHEST LEVEL OF EDUCATION - PRIMARY LEARNER  SOME COLLEGE   BARRIERS PRIMARY LEARNER NONE   CO-LEARNER CAREGIVER No   CO-LEARNER NAME -   PRIMARY LANGUAGE ENGLISH    NEED -   LEARNER PREFERENCE PRIMARY LISTENING     -     -     -     -   ANSWERED BY self   RELATIONSHIP SELF       Abuse Screening:  Abuse Screening Questionnaire 7/6/2020   Do you ever feel afraid of your partner? N   Are you in a relationship with someone who physically or mentally threatens you? N   Is it safe for you to go home? Y       Fall Risk  Fall Risk Assessment, last 12 mths 7/23/2020   Able to walk? Yes   Fall in past 12 months? No   Fall with injury? -   Number of falls in past 12 months -   Fall Risk Score -         Coordination of Care:  1. Have you been to the ER, urgent care clinic since your last visit? Hospitalized since your last visit? No    2. Have you seen or consulted any other health care providers outside of the [address] [place] [address] [place] since your last visit? Include any pap smears or colon screening. No      Advance Directive:  1. Do you have an advance directive in place? Patient Reply: No    2. If not, would you like material regarding how to put one in place?  Patient Reply: No
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
## 2020-09-11 NOTE — PROGRESS NOTES
Advance Care Planning       Advance Care Planning (ACP) Physician/NP/PA (Provider) Conversation        Date of ACP Conversation: 9/11/2020    Conversation Conducted with:   Patient with Decision Making [address] Maker:    Current Designated Health Care Decision Maker:   (If there is a [address] named in the [address]" box in the ACP activity, but it is not visible above, be sure to open that field and then select the health care decision maker relationship (ie \"primary\") in the blank space to the right of the name.)    Note: Assess and validate information in current ACP documents, as indicated. If no Authorized Decision Maker has previously been identified, then patient chooses [place]:  \"Who would you like to name as your primary health care decision-maker? \"    Name: Jonathan Hong   Relationship: kalyani Phone number: [phone]   \"Can this person be reached easily? \" YES  \"Who would you like to name as your back-up decision maker? \"   Name: Luci Mosley  Relationship: cousin Phone number: [phone], [phone] дмитрий  Dejan Groves this person be reached easily? \" YES    Note: If the relationship of these Decision-Makers to the patient does NOT follow your state's Next of Kin hierarchy, recommend that patient complete ACP document that meets state-specific requirements to allow them to act on the patient's behalf when appropriate. Care Preferences:    Hospitalization: \"If your health worsens and it becomes clear that your chance of recovery is unlikely, what would your preference be regarding hospitalization? \"  If the patient would want hospitalization, answer \"yes\". If the patient would prefer comfort-focused treatment without hospitalization, answer \"no\". no      Ventilation:   \"If you were in your present state of health and suddenly became very ill and were unable to breathe on your own, what would your preference be about the use of a ventilator (breathing machine) if it was available to you? \"    If patient would desire the use of a ventilator (breathing machine), answer \"yes\", if not answer \"no\":yes    \"If your health worsens and it becomes clear that your chance of recovery is unlikely, what would your preference be about the use of a ventilator (breathing machine) if it was available to you? \"   undecided      Resuscitation:  \"CPR works best to restart the heart when there is a sudden event, like a heart attack, in someone who is otherwise healthy. Unfortunately, CPR does not typically restart the heart for people who have serious health conditions or who are very sick. \"    \"In the event your heart stopped as a result of an underlying serious health condition, would you want attempts to be made to restart your heart (answer \"yes\" for attempt to resuscitate) or would you prefer a natural death (answer \"no\" for do not attempt to resuscitate)? \"   yes    NOTE: If the patient has a valid advance directive AND provides care preference(s) that are inconsistent with that prior directive, advise the patient to consider either: creating a new advance directive that complies with state-specific requirements; or, if that is not possible, orally revoking that prior directive in accordance with state-specific requirements, which must be documented in the EHR.     Conversation Outcomes / Follow-Up Plan:   Recommended completion of Advance Directive      Length of Voluntary ACP Conversation in minutes:  16 minutes      Estefany Terry MD

## 2020-09-19 ENCOUNTER — APPOINTMENT (OUTPATIENT)
Dept: CT IMAGING | Age: 85
End: 2020-09-19
Attending: EMERGENCY MEDICINE
Payer: MEDICARE

## 2020-09-19 ENCOUNTER — HOSPITAL ENCOUNTER (EMERGENCY)
Age: 85
Discharge: HOME | End: 2020-09-19
Attending: EMERGENCY MEDICINE
Payer: MEDICARE

## 2020-09-19 VITALS
WEIGHT: 240 LBS | DIASTOLIC BLOOD PRESSURE: 86 MMHG | TEMPERATURE: 98 F | OXYGEN SATURATION: 98 % | HEIGHT: 66 IN | SYSTOLIC BLOOD PRESSURE: 150 MMHG | RESPIRATION RATE: 17 BRPM | HEART RATE: 87 BPM | BODY MASS INDEX: 38.57 KG/M2
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
## 2020-09-19 DIAGNOSIS — R42 LIGHTHEADED: Primary | ICD-10-CM

## 2020-09-19 DIAGNOSIS — S00.06XA INSECT BITE OF SCALP, INITIAL ENCOUNTER: ICD-10-CM

## 2020-09-19 DIAGNOSIS — R51.9 NONINTRACTABLE EPISODIC HEADACHE, UNSPECIFIED HEADACHE TYPE: ICD-10-CM

## 2020-09-19 DIAGNOSIS — I10 ELEVATED BLOOD PRESSURE READING WITH DIAGNOSIS OF HYPERTENSION: ICD-10-CM

## 2020-09-19 LAB
ANION GAP SERPL CALC-SCNC: 4 MMOL/L (ref 3–18)
ATRIAL RATE: 99 BPM
BASOPHILS # BLD: 0 K/UL (ref 0–0.1)
BASOPHILS NFR BLD: 0 % (ref 0–2)
BUN SERPL-MCNC: 7 MG/DL (ref 7–18)
BUN/CREAT SERPL: 11 (ref 12–20)
CALCIUM SERPL-MCNC: 9.1 MG/DL (ref 8.5–10.1)
CALCULATED R AXIS, ECG10: -7 DEGREES
CALCULATED T AXIS, ECG11: -2 DEGREES
CHLORIDE SERPL-SCNC: 104 MMOL/L (ref 100–111)
CK MB CFR SERPL CALC: NORMAL % (ref 0–4)
CK MB SERPL-MCNC: <1 NG/ML (ref 5–25)
CK SERPL-CCNC: 51 U/L (ref 26–192)
CO2 SERPL-SCNC: 31 MMOL/L (ref 21–32)
CREAT SERPL-MCNC: 0.66 MG/DL (ref 0.6–1.3)
DIAGNOSIS, 93000: NORMAL
DIFFERENTIAL METHOD BLD: ABNORMAL
EOSINOPHIL # BLD: 0.2 K/UL (ref 0–0.4)
EOSINOPHIL NFR BLD: 3 % (ref 0–5)
ERYTHROCYTE [DISTWIDTH] IN BLOOD BY AUTOMATED COUNT: 11.8 % (ref 11.6–14.5)
GLUCOSE SERPL-MCNC: 213 MG/DL (ref 74–99)
HCT VFR BLD AUTO: 38.2 % (ref 35–45)
HGB BLD-MCNC: 11.9 G/DL (ref 12–16)
LYMPHOCYTES # BLD: 1.8 K/UL (ref 0.9–3.6)
LYMPHOCYTES NFR BLD: 34 % (ref 21–52)
MCH RBC QN AUTO: 31.8 PG (ref 24–34)
MCHC RBC AUTO-ENTMCNC: 31.2 G/DL (ref 31–37)
MCV RBC AUTO: 102.1 FL (ref 74–97)
MONOCYTES # BLD: 0.4 K/UL (ref 0.05–1.2)
MONOCYTES NFR BLD: 7 % (ref 3–10)
NEUTS SEG # BLD: 2.9 K/UL (ref 1.8–8)
NEUTS SEG NFR BLD: 56 % (ref 40–73)
P-R INTERVAL, ECG05: 128 MS
PLATELET # BLD AUTO: 195 K/UL (ref 135–420)
PMV BLD AUTO: 10.8 FL (ref 9.2–11.8)
POTASSIUM SERPL-SCNC: 3.4 MMOL/L (ref 3.5–5.5)
Q-T INTERVAL, ECG07: 386 MS
QRS DURATION, ECG06: 86 MS
QTC CALCULATION (BEZET), ECG08: 495 MS
RBC # BLD AUTO: 3.74 M/UL (ref 4.2–5.3)
SODIUM SERPL-SCNC: 139 MMOL/L (ref 136–145)
TROPONIN I SERPL-MCNC: <0.02 NG/ML (ref 0–0.04)
VENTRICULAR RATE, ECG03: 99 BPM
WBC # BLD AUTO: 5.2 K/UL (ref 4.6–13.2)

## 2020-09-19 PROCEDURE — 85025 COMPLETE CBC W/AUTO DIFF WBC: CPT

## 2020-09-19 PROCEDURE — 80048 BASIC METABOLIC PNL TOTAL CA: CPT

## 2020-09-19 PROCEDURE — 99285 EMERGENCY DEPT VISIT HI MDM: CPT

## 2020-09-19 PROCEDURE — 82550 ASSAY OF CK (CPK): CPT

## 2020-09-19 PROCEDURE — 93005 ELECTROCARDIOGRAM TRACING: CPT

## 2020-09-19 PROCEDURE — 70450 CT HEAD/BRAIN W/O DYE: CPT

## 2020-09-19 RX ORDER — DOXYCYCLINE HYCLATE 100 MG
100 TABLET ORAL 2 TIMES DAILY
Qty: 10 TAB | Refills: 0 | Status: SHIPPED | OUTPATIENT
Start: 2020-09-19 | End: 2020-09-24

## 2020-09-19 RX ORDER — CEPHALEXIN 250 MG/1
250 CAPSULE ORAL 3 TIMES DAILY
Qty: 15 CAP | Refills: 0 | OUTPATIENT
Start: 2020-09-19 | End: 2020-09-19

## 2020-09-19 NOTE — ED TRIAGE NOTES
Pt c/o dizziness since Thursday. Patient states \" I am always dizzy\"    Pt is really concerned with the area of induration on the posterior portion of her head. States \" I think I was bitten by something\"     Neuro intact.
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
## 2020-09-19 NOTE — ED NOTES
Discharge instructions reviewed with patient. Patient voices understanding. Patient advised to follow up as directed on discharge instructions. Patient denies questions, needs or concerns at discharge regarding discharge instructions. Patient voiced understanding. No s/sx of distress noted.

## 2020-09-19 NOTE — DISCHARGE INSTRUCTIONS
High Blood Pressure: Care Instructions  Overview     It's normal for blood pressure to go up and down throughout the day. But if it stays up, you have high blood pressure. Another name for high blood pressure is hypertension. Despite what a lot of people think, high blood pressure usually doesn't cause headaches or make you feel dizzy or lightheaded. It usually has no symptoms. But it does increase your risk of stroke, heart attack, and other problems. You and your doctor will talk about your risks of these problems based on your blood pressure. Your doctor will give you a goal for your blood pressure. Your goal will be based on your health and your age. Lifestyle changes, such as eating healthy and being active, are always important to help lower blood pressure. You might also take medicine to reach your blood pressure goal.  Follow-up care is a key part of your treatment and safety. Be sure to make and go to all appointments, and call your doctor if you are having problems. It's also a good idea to know your test results and keep a list of the medicines you take. How can you care for yourself at home? Medical treatment  · If you stop taking your medicine, your blood pressure will go back up. You may take one or more types of medicine to lower your blood pressure. Be safe with medicines. Take your medicine exactly as prescribed. Call your doctor if you think you are having a problem with your medicine. · Talk to your doctor before you start taking aspirin every day. Aspirin can help certain people lower their risk of a heart attack or stroke. But taking aspirin isn't right for everyone, because it can cause serious bleeding. · See your doctor regularly. You may need to see the doctor more often at first or until your blood pressure comes down. · If you are taking blood pressure medicine, talk to your doctor before you take decongestants or anti-inflammatory medicine, such as ibuprofen.  Some of these medicines can raise blood pressure. · Learn how to check your blood pressure at home. Lifestyle changes  · Stay at a healthy weight. This is especially important if you put on weight around the waist. Losing even 10 pounds can help you lower your blood pressure. · If your doctor recommends it, get more exercise. Walking is a good choice. Bit by bit, increase the amount you walk every day. Try for at least 30 minutes on most days of the week. You also may want to swim, bike, or do other activities. · Avoid or limit alcohol. Talk to your doctor about whether you can drink any alcohol. · Try to limit how much sodium you eat to less than 2,300 milligrams (mg) a day. Your doctor may ask you to try to eat less than 1,500 mg a day. · Eat plenty of fruits (such as bananas and oranges), vegetables, legumes, whole grains, and low-fat dairy products. · Lower the amount of saturated fat in your diet. Saturated fat is found in animal products such as milk, cheese, and meat. Limiting these foods may help you lose weight and also lower your risk for heart disease. · Do not smoke. Smoking increases your risk for heart attack and stroke. If you need help quitting, talk to your doctor about stop-smoking programs and medicines. These can increase your chances of quitting for good. When should you call for help? Call  911 anytime you think you may need emergency care. This may mean having symptoms that suggest that your blood pressure is causing a serious heart or blood vessel problem. Your blood pressure may be over 180/120. For example, call 911 if:    · You have symptoms of a heart attack. These may include:  ? Chest pain or pressure, or a strange feeling in the chest.  ? Sweating. ? Shortness of breath. ? Nausea or vomiting. ? Pain, pressure, or a strange feeling in the back, neck, jaw, or upper belly or in one or both shoulders or arms. ? Lightheadedness or sudden weakness.   ? A fast or irregular heartbeat.     · You have symptoms of a stroke. These may include:  ? Sudden numbness, tingling, weakness, or loss of movement in your face, arm, or leg, especially on only one side of your body. ? Sudden vision changes. ? Sudden trouble speaking. ? Sudden confusion or trouble understanding simple statements. ? Sudden problems with walking or balance. ? A sudden, severe headache that is different from past headaches.     · You have severe back or belly pain. Do not wait until your blood pressure comes down on its own. Get help right away. Call your doctor now or seek immediate care if:    · Your blood pressure is much higher than normal (such as 180/120 or higher), but you don't have symptoms.     · You think high blood pressure is causing symptoms, such as:  ? Severe headache.  ? Blurry vision. Watch closely for changes in your health, and be sure to contact your doctor if:    · Your blood pressure measures higher than your doctor recommends at least 2 times. That means the top number is higher or the bottom number is higher, or both.     · You think you may be having side effects from your blood pressure medicine. Where can you learn more? Go to http://cristinaYorumla.commignon.info/  Enter [id number] in the search box to learn more about \"High Blood Pressure: Care Instructions. \"  Current as of: December 16, 2019               Content Version: 12.6  © 2928-3017 [employer]. Care instructions adapted under license by [employer] (which disclaims liability or warranty for this information). If you have questions about a medical condition or this instruction, always ask your healthcare professional. [place] [address] any warranty or liability for your use of this information. Patient Education        Headache: Care Instructions  Your Care Instructions     Headaches have many possible causes.  Most headaches aren't a sign of a more serious problem, and they will get better on their own. Home treatment may help you feel better faster. The doctor has checked you carefully, but problems can develop later. If you notice any problems or new symptoms, get medical treatment right away. Follow-up care is a key part of your treatment and safety. Be sure to make and go to all appointments, and call your doctor if you are having problems. It's also a good idea to know your test results and keep a list of the medicines you take. How can you care for yourself at home? · Do not drive if you have taken a prescription pain medicine. · Rest in a quiet, dark room until your headache is gone. Close your eyes and try to relax or go to sleep. Don't watch TV or read. · Put a cold, moist cloth or cold pack on the painful area for 10 to 20 minutes at a time. Put a thin cloth between the cold pack and your skin. · Use a warm, moist towel or a heating pad set on low to relax tight shoulder and neck muscles. · Have someone gently massage your neck and shoulders. · Take pain medicines exactly as directed. ? If the doctor gave you a prescription medicine for pain, take it as prescribed. ? If you are not taking a prescription pain medicine, ask your doctor if you can take an over-the-counter medicine. · Be careful not to take pain medicine more often than the instructions allow, because you may get worse or more frequent headaches when the medicine wears off. · Do not ignore new symptoms that occur with a headache, such as a fever, weakness or numbness, vision changes, or confusion. These may be signs of a more serious problem. To prevent headaches  · Keep a headache diary so you can figure out what triggers your headaches. Avoiding triggers may help you prevent headaches. Record when each headache began, how long it lasted, and what the pain was like (throbbing, aching, stabbing, or dull).  Write down any other symptoms you had with the headache, such as nausea, flashing lights or dark spots, or sensitivity to bright light or loud noise. Note if the headache occurred near your period. List anything that might have triggered the headache, such as certain foods (chocolate, cheese, wine) or odors, smoke, bright light, stress, or lack of sleep. · Find healthy ways to deal with stress. Headaches are most common during or right after stressful times. Take time to relax before and after you do something that has caused a headache in the past.  · Try to keep your muscles relaxed by keeping good posture. Check your jaw, face, neck, and shoulder muscles for tension, and try relaxing them. When sitting at a desk, change positions often, and stretch for 30 seconds each hour. · Get plenty of sleep and exercise. · Eat regularly and well. Long periods without food can trigger a headache. · Treat yourself to a massage. Some people find that regular massages are very helpful in relieving tension. · Limit caffeine by not drinking too much coffee, tea, or soda. But don't quit caffeine suddenly, because that can also give you headaches. · Reduce eyestrain from computers by blinking frequently and looking away from the computer screen every so often. Make sure you have proper eyewear and that your monitor is set up properly, about an arm's length away. · Seek help if you have depression or anxiety. Your headaches may be linked to these conditions. Treatment can both prevent headaches and help with symptoms of anxiety or depression. When should you call for help? Call 911 anytime you think you may need emergency care. For example, call if:    · You have signs of a stroke. These may include:  ? Sudden numbness, paralysis, or weakness in your face, arm, or leg, especially on only one side of your body. ? Sudden vision changes. ? Sudden trouble speaking. ? Sudden confusion or trouble understanding simple statements. ? Sudden problems with walking or balance.   ? A sudden, severe headache that is different from past headaches. Call your doctor now or seek immediate medical care if:    · You have a new or worse headache.     · Your headache gets much worse. Where can you learn more? Go to http://cristina-mignon.info/  Enter M271 in the search box to learn more about \"Headache: Care Instructions. \"  Current as of: November 20, 2019               Content Version: 12.6  © 6457-2274 [facility]. Care instructions adapted under license by [employer] (which disclaims liability or warranty for this information). If you have questions about a medical condition or this instruction, always ask your healthcare professional. [place] [address] any warranty or liability for your use of this information. Patient Education        Insect Stings and Bites: Care Instructions  Your Care Instructions  Stings and bites from bees, wasps, ants, and other insects often cause pain, swelling, redness, and itching. In some people, especially children, the redness and swelling may be worse. It may extend several inches beyond the affected area. But in most cases, stings and bites don't cause reactions all over the body. If you have had a reaction to an insect sting or bite, you are at risk for a reaction if you get stung or bitten again. Follow-up care is a key part of your treatment and safety. Be sure to make and go to all appointments, and call your doctor if you are having problems. It's also a good idea to know your test results and keep a list of the medicines you take. How can you care for yourself at home? · Do not scratch or rub the skin where the sting or bite occurred. · Put a cold pack or ice cube on the area. Put a thin cloth between the ice and your skin. For some people, a paste of baking soda mixed with a little water helps relieve pain and decrease the reaction.   · Take an over-the-counter antihistamine, such as diphenhydramine (Benadryl) or loratadine (Claritin), to relieve swelling, redness, and itching. Calamine lotion or hydrocortisone cream may also help. Do not give antihistamines to your child unless you have checked with the doctor first.  · Be safe with medicines. If your doctor prescribed medicine for your allergy, take it exactly as prescribed. Call your doctor if you think you are having a problem with your medicine. You will get more details on the specific medicines your doctor prescribes. · Your doctor may prescribe a shot of epinephrine to carry with you in case you have a severe reaction. Learn how and when to give yourself the shot, and keep it with you at all times. Make sure it has not . · Go to the emergency room anytime you have a severe reaction. Go even if you have given yourself epinephrine and are feeling better. Symptoms can come back. When should you call for help? Call 911 anytime you think you may need emergency care. For example, call if:    · You have symptoms of a severe allergic reaction. These may include:  ? Sudden raised, red areas (hives) all over your body. ? Swelling of the throat, mouth, lips, or tongue. ? Trouble breathing. ? Passing out (losing consciousness). Or you may feel very lightheaded or suddenly feel weak, confused, or restless. Call your doctor now or seek immediate medical care if:    · You have symptoms of an allergic reaction not right at the sting or bite, such as:  ? A rash or small area of hives (raised, red areas on the skin). ? Itching. ? Swelling. ? Belly pain, nausea, or vomiting.     · You have a lot of swelling around the site (such as your entire arm or leg is swollen).     · You have signs of infection, such as:  ? Increased pain, swelling, redness, or warmth around the sting. ? Red streaks leading from the area. ? Pus draining from the sting. ? A fever. Watch closely for changes in your health, and be sure to contact your doctor if:    · You do not get better as expected.    Where can you learn more? Go to http://cristina-mignon.info/  Enter P390 in the search box to learn more about \"Insect Stings and Bites: Care Instructions. \"  Current as of: June 26, 2019               Content Version: 12.6  © 5654-4692 [employer]. Care instructions adapted under license by [employer] (which disclaims liability or warranty for this information). If you have questions about a medical condition or this instruction, always ask your healthcare professional. [place] [address] any warranty or liability for your use of this information. Patient Education        Lightheadedness or Faintness: Care Instructions  Your Care Instructions  Lightheadedness is a feeling that you are about to faint or \"pass out. \" You do not feel as if you or your surroundings are moving. It is different from vertigo, which is the feeling that you or things around you are spinning or tilting. Lightheadedness usually goes away or gets better when you lie down. If lightheadedness gets worse, it can lead to a fainting spell. It is common to feel lightheaded from time to time. Lightheadedness usually is not caused by a serious problem. It often is caused by a short-lasting drop in blood pressure and blood flow to your head that occurs when you get up too quickly from a seated or lying position. Follow-up care is a key part of your treatment and safety. Be sure to make and go to all appointments, and call your doctor if you are having problems. It's also a good idea to know your test results and keep a list of the medicines you take. How can you care for yourself at home? · Lie down for 1 or 2 minutes when you feel lightheaded. After lying down, sit up slowly and remain sitting for 1 to 2 minutes before slowly standing up.   · Avoid movements, positions, or activities that have made you lightheaded in the past.  · Get plenty of rest, especially if you have a cold or flu, which can cause lightheadedness. · Make sure you drink plenty of fluids, especially if you have a fever or have been sweating. · Do not drive or put yourself and others in danger while you feel lightheaded. When should you call for help? Call 911 anytime you think you may need emergency care. For example, call if:    · You have symptoms of a stroke. These may include:  ? Sudden numbness, tingling, weakness, or loss of movement in your face, arm, or leg, especially on only one side of your body. ? Sudden vision changes. ? Sudden trouble speaking. ? Sudden confusion or trouble understanding simple statements. ? Sudden problems with walking or balance. ? A sudden, severe headache that is different from past headaches.     · You have symptoms of a heart attack. These may include:  ? Chest pain or pressure, or a strange feeling in the chest.  ? Sweating. ? Shortness of breath. ? Nausea or vomiting. ? Pain, pressure, or a strange feeling in the back, neck, jaw, or upper belly or in one or both shoulders or arms. ? Lightheadedness or sudden weakness. ? A fast or irregular heartbeat. After you call 911, the  may tell you to chew 1 adult-strength or 2 to 4 low-dose aspirin. Wait for an ambulance. Do not try to drive yourself. Watch closely for changes in your health, and be sure to contact your doctor if:    · Your lightheadedness gets worse or does not get better with home care. Where can you learn more? Go to http://www.gray.com/  Enter [id number] in the search box to learn more about \"Lightheadedness or Faintness: Care Instructions. \"  Current as of: June 26, 2019               Content Version: 12.6  © 2769-5727 [employer]. Care instructions adapted under license by [employer] (which disclaims liability or warranty for this information).  If you have questions about a medical condition or this instruction, always ask your healthcare professional. [employer] disclaims any warranty or liability for your use of this information.

## 2020-09-21 ENCOUNTER — PATIENT OUTREACH (OUTPATIENT)
Dept: CASE MANAGEMENT | Age: 85
End: 2020-09-21

## 2020-09-21 NOTE — PROGRESS NOTES
Complex Case Management      Date/Time:  2020 11:23 AM    Method of communication with patient:phone    [facility] ([facility]) contacted the patient by telephone to perform Ambulatory Care Coordination. Verified name and  (PHI) with patient as identifiers. Provided introduction to self, and explanation of the Ambulatory Care Manager's role. Reviewed most ED visit w/ patient who verbalized understanding. Patient given an opportunity to ask questions. Top Challenges reviewed with the patient   1. Recent ED visit. Pt states will call to sched f/u w/ her PCP today. The patient agrees to contact the PCP office or the [facility] for questions related to their healthcare. Provided contact information for future reference. Disease Specific:   N/A    Home Health Active: No    DME Active: No    Barriers to care? lack of knowledge about disease    Advance Care Planning:   Does patient have an Advance Directive:  not on file; education provided     Medication(s):   Medication reconciliation was not performed with patient who declined. Pt verbalizes understanding of administration of home medications. There were no barriers to obtaining medications identified at this time. Will attempt med rec on next call    Referral to Pharm D needed: no     Current Outpatient Medications   Medication Sig    doxycycline (VIBRA-TABS) 100 mg tablet Take 1 Tab by mouth two (2) times a day for 5 days.     albuterol (PROVENTIL HFA, VENTOLIN HFA, PROAIR HFA) 90 mcg/actuation inhaler INHALE 1 PUFF BY MOUTH EVERY 4 HOURS AS NEEDED FOR WHEEZING OR SHORTNESS OF BREATH    Lantus Solostar U-100 Insulin 100 unit/mL (3 mL) inpn INJECT 30 UNITS SUBCUTANESOULY QAM AND 36 UNITS A BEDTIME    clopidogreL (PLAVIX) 75 mg tab TAKE 1 TABLET BY MOUTH DAILY    Flovent Diskus 100 mcg/actuation dsdv INHALE 1 PUFF BY MOUTH TWICE DAILY    metoprolol tartrate (LOPRESSOR) 25 mg tablet Take 1 Tab by mouth every twelve (12) hours.    multivitamin with minerals (MULTIVITAMIN & MINERAL FORMULA PO) Take 1 Tab by mouth daily.  ranolazine ER (RANEXA) 500 mg SR tablet Take 1 Tab by mouth two (2) times a day.  amLODIPine (NORVASC) 5 mg tablet Take 1 Tab by mouth daily.  montelukast (SINGULAIR) 10 mg tablet Take 1 Tab by mouth daily. Indications: inflammation of the nose due to an allergy    nitroglycerin (NITROSTAT) 0.4 mg SL tablet 1 Tab by SubLINGual route every five (5) minutes as needed for Chest Pain. Up to 3 doses.  OTHER Check CBC, CMP, Mg in 3 days, results to PCP immediately, Diagnosis- CHF    SYNTHROID 112 mcg tablet Take 1 Tab by mouth Daily (before breakfast). 125 mcg daily    lidocaine (ASPERCREME, LIDOCAINE,) 4 % patch 1 Patch by TransDERmal route every eight (8) hours.  loratadine (CLARITIN) 10 mg tablet Take 1 Tab by mouth daily.  isosorbide mononitrate ER (IMDUR) 30 mg tablet TAKE 1 TABLET BY MOUTH EVERY MORNING    RONNELL PEN NEEDLE 32 gauge x 5/32\" ndle     bisacodyl (DULCOLAX, BISACODYL,) 5 mg EC tablet Take 2 Tabs by mouth daily as needed for Constipation.  Insulin Syringe-Needle U-100 (BD INSULIN SYRINGE ULTRA-FINE) 0.5 mL 31 gauge x 5/16\" syrg BD Insulin Syringe Ultra-Fine 0.5 mL 31 gauge x 5/16\"    magnesium oxide (MAG-OX) 400 mg tablet Take 1 Tab by mouth two (2) times a day.  ascorbic acid, vitamin C, (VITAMIN C) 250 mg tablet Take 250 mg by mouth daily. 1 pill po daily  Indications: inadequate vitamin C    acetaminophen (TYLENOL ARTHRITIS PAIN) 650 mg TbER Take 650 mg by mouth every eight (8) hours. 1 pill po q 8 hours prn pain, fever  Indications: fever, pain    furosemide (LASIX) 20 mg tablet Use daily as needed for leg swelling (Patient taking differently: Take 20 mg by mouth as needed. Use daily as needed for leg swelling)    cyanocobalamin ER 1,000 mcg tablet Take 1 Tab by mouth daily.  capsaicin 0.075 % topical cream Apply  to affected area three (3) times daily.  (Patient taking differently: Apply 1 Each to affected area three (3) times daily. apply thin layer to area)    DOCOSAHEXANOIC ACID/EPA (FISH OIL PO) Take 1,000 mg by mouth two (2) times a day. 1 pill po twice daily     cholecalciferol, vitamin d3, (VITAMIN D) 1,000 unit tablet Take 5,000 Units by mouth two (2) times a day. No current facility-administered medications for this visit. [facility] follow up appointment(s):   Future Appointments   Date Time Provider Chelle Lala   10/27/2020 12:00 PM Roxy Dixon MD [facility] BS AMB   11/17/2020 11:15 AM Ignacio Vargas MD [facility] BS AMB   11/24/2020 11:00 AM Ignacio Vargas MD [facility] BS AMB   2/16/2021  9:45 AM Peter Hull MD [facility] BS AMB        Non-[facility] follow up appointment(s): n/a    Goals Addressed                 This Visit's Progress     Attends follow-up appointments as directed.         9/21/20 Patient will attend all scheduled appointments through 11/21/20         Knowledge and adherence of prescribed medication (ie. action, side effects, missed dose, etc.).        9/21/20 Pt will take all medications prescribed to be evaluated on each outreach through 11/21/20

## 2020-09-28 ENCOUNTER — PATIENT OUTREACH (OUTPATIENT)
Dept: CASE MANAGEMENT | Age: 85
End: 2020-09-28

## 2020-10-05 ENCOUNTER — PATIENT OUTREACH (OUTPATIENT)
Dept: CASE MANAGEMENT | Age: 85
End: 2020-10-05

## 2020-10-13 ENCOUNTER — PATIENT OUTREACH (OUTPATIENT)
Dept: CASE MANAGEMENT | Age: 85
End: 2020-10-13

## 2020-10-13 DIAGNOSIS — I20.8 OTHER FORMS OF ANGINA PECTORIS (HCC): ICD-10-CM

## 2020-10-13 RX ORDER — ISOSORBIDE MONONITRATE 30 MG/1
TABLET, EXTENDED RELEASE ORAL
Qty: 90 TAB | Refills: 3 | Status: SHIPPED | OUTPATIENT
Start: 2020-10-13 | End: 2021-11-03

## 2020-10-13 NOTE — TELEPHONE ENCOUNTER
Requested Prescriptions     Pending Prescriptions Disp Refills    isosorbide mononitrate ER (IMDUR) 30 mg tablet 90 Tab 3     Sig: TAKE 1 TABLET BY MOUTH EVERY MORNING

## 2020-10-13 NOTE — PROGRESS NOTES
Complex Case Management      Date/Time:  10/13/2020 11:23 AM    Method of communication with patient:phone    [facility] ([facility]) contacted the patient by telephone to perform Ambulatory Care Coordination. Verified name and  (PHI) with patient as identifiers. Provided introduction to self, and explanation of the Ambulatory Care Manager's role. Reviewed most ED visit w/ patient who verbalized understanding. Patient given an opportunity to ask questions. Top Challenges reviewed with the patient   1. States no issues or questions at this time. 2. Hx of CHF, DM2, CAD, TKR     The patient agrees to contact the PCP office or the [facility] for questions related to their healthcare. Provided contact information for future reference. Disease Specific:   N/A    Home Health Active: No    DME Active: No    Barriers to care? lack of knowledge about disease    Advance Care Planning:   Does patient have an Advance Directive:  not on file; education provided     Medication(s):   Medication reconciliation was not performed with patient who declined. Pt verbalizes understanding of administration of home medications. There were no barriers to obtaining medications identified at this time. Will attempt med rec on next call    Referral to Pharm D needed: no     Current Outpatient Medications   Medication Sig    albuterol (PROVENTIL HFA, VENTOLIN HFA, PROAIR HFA) 90 mcg/actuation inhaler INHALE 1 PUFF BY MOUTH EVERY 4 HOURS AS NEEDED FOR WHEEZING OR SHORTNESS OF BREATH    Lantus Solostar U-100 Insulin 100 unit/mL (3 mL) inpn INJECT 30 UNITS SUBCUTANESOULY QAM AND 36 UNITS A BEDTIME    clopidogreL (PLAVIX) 75 mg tab TAKE 1 TABLET BY MOUTH DAILY    Flovent Diskus 100 mcg/actuation dsdv INHALE 1 PUFF BY MOUTH TWICE DAILY    metoprolol tartrate (LOPRESSOR) 25 mg tablet Take 1 Tab by mouth every twelve (12) hours.     multivitamin with minerals (MULTIVITAMIN & MINERAL FORMULA PO) Take 1 Tab by mouth daily.    ranolazine ER (RANEXA) 500 mg SR tablet Take 1 Tab by mouth two (2) times a day.  amLODIPine (NORVASC) 5 mg tablet Take 1 Tab by mouth daily.  montelukast (SINGULAIR) 10 mg tablet Take 1 Tab by mouth daily. Indications: inflammation of the nose due to an allergy    nitroglycerin (NITROSTAT) 0.4 mg SL tablet 1 Tab by SubLINGual route every five (5) minutes as needed for Chest Pain. Up to 3 doses.  OTHER Check CBC, CMP, Mg in 3 days, results to PCP immediately, Diagnosis- CHF    SYNTHROID 112 mcg tablet Take 1 Tab by mouth Daily (before breakfast). 125 mcg daily    lidocaine (ASPERCREME, LIDOCAINE,) 4 % patch 1 Patch by TransDERmal route every eight (8) hours.  loratadine (CLARITIN) 10 mg tablet Take 1 Tab by mouth daily.  isosorbide mononitrate ER (IMDUR) 30 mg tablet TAKE 1 TABLET BY MOUTH EVERY MORNING    RONNELL PEN NEEDLE 32 gauge x 5/32\" ndle     bisacodyl (DULCOLAX, BISACODYL,) 5 mg EC tablet Take 2 Tabs by mouth daily as needed for Constipation.  Insulin Syringe-Needle U-100 (BD INSULIN SYRINGE ULTRA-FINE) 0.5 mL 31 gauge x 5/16\" syrg BD Insulin Syringe Ultra-Fine 0.5 mL 31 gauge x 5/16\"    magnesium oxide (MAG-OX) 400 mg tablet Take 1 Tab by mouth two (2) times a day.  ascorbic acid, vitamin C, (VITAMIN C) 250 mg tablet Take 250 mg by mouth daily. 1 pill po daily  Indications: inadequate vitamin C    acetaminophen (TYLENOL ARTHRITIS PAIN) 650 mg TbER Take 650 mg by mouth every eight (8) hours. 1 pill po q 8 hours prn pain, fever  Indications: fever, pain    furosemide (LASIX) 20 mg tablet Use daily as needed for leg swelling (Patient taking differently: Take 20 mg by mouth as needed. Use daily as needed for leg swelling)    cyanocobalamin ER 1,000 mcg tablet Take 1 Tab by mouth daily.  capsaicin 0.075 % topical cream Apply  to affected area three (3) times daily. (Patient taking differently: Apply 1 Each to affected area three (3) times daily.  apply thin layer to area)  DOCOSAHEXANOIC ACID/EPA (FISH OIL PO) Take 1,000 mg by mouth two (2) times a day. 1 pill po twice daily     cholecalciferol, vitamin d3, (VITAMIN D) 1,000 unit tablet Take 5,000 Units by mouth two (2) times a day. No current facility-administered medications for this visit.         [facility] follow up appointment(s):   Future Appointments   Date Time Provider Chelle Reeves   10/27/2020 12:00 PM Grecia Watson MD [facility] BS AMB   11/17/2020 11:15 AM Supriya Vargas MD [facility] BS AMB   11/24/2020 11:00 AM Supriya Vargas MD [facility] BS AMB   2/16/2021  9:45 AM Maicol Seth MD [facility] AMB        Non-[facility] follow up appointment(s): n/a    Goals Addressed                 This Visit's Progress     Attends follow up appointments on schedule        9/21/20 Patient will attend all scheduled appointments through 12/21/20       Knowledge and adherence of prescribed medication (ie. action, side effects, missed dose, etc.).        9/21/20 Pt will take all medications prescribed to be evaluated on each outreach through  12/21/20       Prepare patients and caregivers for end of life decisions (ie. need for hospice, pain management, symptom relief, advance directives etc.)        9/21/20 Pt will complete an ACP and have it scanned into their EMR by 12/21/20

## 2020-10-14 ENCOUNTER — TRANSCRIBE ORDER (OUTPATIENT)
Dept: SCHEDULING | Age: 85
End: 2020-10-14

## 2020-10-14 DIAGNOSIS — Z86.010 PERSONAL HISTORY OF COLONIC POLYPS: ICD-10-CM

## 2020-10-14 DIAGNOSIS — K57.92 DIVERTICULITIS: ICD-10-CM

## 2020-10-14 DIAGNOSIS — R03.0 ELEVATED BLOOD PRESSURE READING WITHOUT DIAGNOSIS OF HYPERTENSION: ICD-10-CM

## 2020-10-14 DIAGNOSIS — R10.9 ABDOMINAL PAIN: Primary | ICD-10-CM

## 2020-10-23 ENCOUNTER — HOSPITAL ENCOUNTER (OUTPATIENT)
Dept: CT IMAGING | Age: 85
Discharge: HOME | End: 2020-10-23
Attending: NURSE PRACTITIONER
Payer: MEDICARE

## 2020-10-23 DIAGNOSIS — K57.92 DIVERTICULITIS: ICD-10-CM

## 2020-10-23 DIAGNOSIS — Z86.010 PERSONAL HISTORY OF COLONIC POLYPS: ICD-10-CM

## 2020-10-23 DIAGNOSIS — R03.0 ELEVATED BLOOD PRESSURE READING WITHOUT DIAGNOSIS OF HYPERTENSION: ICD-10-CM

## 2020-10-23 DIAGNOSIS — R10.9 ABDOMINAL PAIN: ICD-10-CM

## 2020-10-23 PROCEDURE — 74176 CT ABD & PELVIS W/O CONTRAST: CPT

## 2020-10-26 RX ORDER — METOPROLOL TARTRATE 25 MG/1
TABLET, FILM COATED ORAL
Qty: 60 TAB | Refills: 5 | Status: SHIPPED | OUTPATIENT
Start: 2020-10-26 | End: 2022-09-10

## 2020-10-29 ENCOUNTER — PATIENT OUTREACH (OUTPATIENT)
Dept: CASE MANAGEMENT | Age: 85
End: 2020-10-29

## 2020-10-29 NOTE — PROGRESS NOTES
Patient attended appointments with her PCP, Dr Rickey Rodriguez. on 10/27/20, Nurse Navigator reviewed EMR and will follow up on next scheduled outreach.

## 2020-10-31 DIAGNOSIS — Z95.5 S/P CORONARY ARTERY STENT PLACEMENT: ICD-10-CM

## 2020-11-02 ENCOUNTER — CLINICAL SUPPORT (OUTPATIENT)
Dept: FAMILY MEDICINE CLINIC | Age: 85
End: 2020-11-02
Payer: MEDICARE

## 2020-11-02 VITALS — TEMPERATURE: 98.7 F

## 2020-11-02 DIAGNOSIS — Z23 NEEDS FLU SHOT: Primary | ICD-10-CM

## 2020-11-02 PROCEDURE — 90694 VACC AIIV4 NO PRSRV 0.5ML IM: CPT | Performed by: NURSE PRACTITIONER

## 2020-11-02 RX ORDER — CLOPIDOGREL BISULFATE 75 MG/1
TABLET ORAL
Qty: 30 TAB | Refills: 2 | Status: SHIPPED | OUTPATIENT
Start: 2020-11-02 | End: 2021-04-26

## 2020-11-02 NOTE — PROGRESS NOTES
Eduardo Frame 1935 female who presents for routine immunizations. Patient denies any symptoms , reactions or allergies that would exclude them from being immunized today. Risks and adverse reactions were discussed and the VIS was given to them. All questions were addressed. Patient was observed for 10 min post injection. There were no reactions observed.     LEXIE Martinez, FNP-C

## 2020-11-15 ENCOUNTER — HOSPITAL ENCOUNTER (INPATIENT)
Age: 85
LOS: 2 days | Discharge: HOME HEALTH | DRG: 313 | End: 2020-11-18
Attending: EMERGENCY MEDICINE | Admitting: INTERNAL MEDICINE
Payer: MEDICARE

## 2020-11-15 DIAGNOSIS — I50.32 CHRONIC DIASTOLIC CONGESTIVE HEART FAILURE (HCC): ICD-10-CM

## 2020-11-15 DIAGNOSIS — J44.9 CHRONIC OBSTRUCTIVE PULMONARY DISEASE, UNSPECIFIED COPD TYPE (HCC): ICD-10-CM

## 2020-11-15 DIAGNOSIS — R07.9 CHEST PAIN, UNSPECIFIED TYPE: Primary | ICD-10-CM

## 2020-11-15 DIAGNOSIS — I25.119 CORONARY ARTERY DISEASE INVOLVING NATIVE HEART WITH ANGINA PECTORIS, UNSPECIFIED VESSEL OR LESION TYPE (HCC): ICD-10-CM

## 2020-11-15 DIAGNOSIS — R79.89 ELEVATED D-DIMER: ICD-10-CM

## 2020-11-15 DIAGNOSIS — I50.33 ACUTE ON CHRONIC DIASTOLIC CONGESTIVE HEART FAILURE (HCC): ICD-10-CM

## 2020-11-15 DIAGNOSIS — F41.9 ANXIETY: ICD-10-CM

## 2020-11-15 PROCEDURE — 99284 EMERGENCY DEPT VISIT MOD MDM: CPT

## 2020-11-16 ENCOUNTER — APPOINTMENT (OUTPATIENT)
Dept: GENERAL RADIOLOGY | Age: 85
DRG: 313 | End: 2020-11-16
Attending: EMERGENCY MEDICINE
Payer: MEDICARE

## 2020-11-16 ENCOUNTER — APPOINTMENT (OUTPATIENT)
Dept: VASCULAR SURGERY | Age: 85
DRG: 313 | End: 2020-11-16
Attending: EMERGENCY MEDICINE
Payer: MEDICARE

## 2020-11-16 ENCOUNTER — APPOINTMENT (OUTPATIENT)
Dept: NUCLEAR MEDICINE | Age: 85
DRG: 313 | End: 2020-11-16
Attending: EMERGENCY MEDICINE
Payer: MEDICARE

## 2020-11-16 LAB
ANION GAP SERPL CALC-SCNC: 5 MMOL/L (ref 3–18)
ATRIAL RATE: 116 BPM
BASOPHILS # BLD: 0 K/UL (ref 0–0.1)
BASOPHILS NFR BLD: 1 % (ref 0–2)
BNP SERPL-MCNC: 37 PG/ML (ref 0–1800)
BUN SERPL-MCNC: 6 MG/DL (ref 7–18)
BUN/CREAT SERPL: 8 (ref 12–20)
CALCIUM SERPL-MCNC: 8.6 MG/DL (ref 8.5–10.1)
CALCULATED P AXIS, ECG09: 63 DEGREES
CALCULATED R AXIS, ECG10: -19 DEGREES
CALCULATED T AXIS, ECG11: 69 DEGREES
CHLORIDE SERPL-SCNC: 104 MMOL/L (ref 100–111)
CK MB CFR SERPL CALC: NORMAL % (ref 0–4)
CK MB SERPL-MCNC: <1 NG/ML (ref 5–25)
CK SERPL-CCNC: 59 U/L (ref 26–192)
CO2 SERPL-SCNC: 28 MMOL/L (ref 21–32)
CREAT SERPL-MCNC: 0.79 MG/DL (ref 0.6–1.3)
D DIMER PPP FEU-MCNC: 1.39 UG/ML(FEU)
DIAGNOSIS, 93000: NORMAL
DIFFERENTIAL METHOD BLD: ABNORMAL
EOSINOPHIL # BLD: 0.2 K/UL (ref 0–0.4)
EOSINOPHIL NFR BLD: 4 % (ref 0–5)
ERYTHROCYTE [DISTWIDTH] IN BLOOD BY AUTOMATED COUNT: 11.7 % (ref 11.6–14.5)
EST. AVERAGE GLUCOSE BLD GHB EST-MCNC: 189 MG/DL
GLUCOSE BLD STRIP.AUTO-MCNC: 133 MG/DL (ref 70–110)
GLUCOSE BLD STRIP.AUTO-MCNC: 176 MG/DL (ref 70–110)
GLUCOSE BLD STRIP.AUTO-MCNC: 183 MG/DL (ref 70–110)
GLUCOSE SERPL-MCNC: 287 MG/DL (ref 74–99)
HBA1C MFR BLD: 8.2 % (ref 4.2–5.6)
HCT VFR BLD AUTO: 36.2 % (ref 35–45)
HGB BLD-MCNC: 11.5 G/DL (ref 12–16)
LYMPHOCYTES # BLD: 2 K/UL (ref 0.9–3.6)
LYMPHOCYTES NFR BLD: 37 % (ref 21–52)
MCH RBC QN AUTO: 32.3 PG (ref 24–34)
MCHC RBC AUTO-ENTMCNC: 31.8 G/DL (ref 31–37)
MCV RBC AUTO: 101.7 FL (ref 74–97)
MONOCYTES # BLD: 0.4 K/UL (ref 0.05–1.2)
MONOCYTES NFR BLD: 8 % (ref 3–10)
NEUTS SEG # BLD: 2.8 K/UL (ref 1.8–8)
NEUTS SEG NFR BLD: 50 % (ref 40–73)
P-R INTERVAL, ECG05: 146 MS
PLATELET # BLD AUTO: 206 K/UL (ref 135–420)
PMV BLD AUTO: 10.5 FL (ref 9.2–11.8)
POTASSIUM SERPL-SCNC: 3.7 MMOL/L (ref 3.5–5.5)
Q-T INTERVAL, ECG07: 346 MS
QRS DURATION, ECG06: 80 MS
QTC CALCULATION (BEZET), ECG08: 480 MS
RBC # BLD AUTO: 3.56 M/UL (ref 4.2–5.3)
SODIUM SERPL-SCNC: 137 MMOL/L (ref 136–145)
TROPONIN I SERPL-MCNC: 0.02 NG/ML (ref 0–0.04)
TROPONIN I SERPL-MCNC: <0.02 NG/ML (ref 0–0.04)
TROPONIN I SERPL-MCNC: <0.02 NG/ML (ref 0–0.04)
VENTRICULAR RATE, ECG03: 116 BPM
WBC # BLD AUTO: 5.5 K/UL (ref 4.6–13.2)

## 2020-11-16 PROCEDURE — 83880 ASSAY OF NATRIURETIC PEPTIDE: CPT

## 2020-11-16 PROCEDURE — 93005 ELECTROCARDIOGRAM TRACING: CPT

## 2020-11-16 PROCEDURE — 36415 COLL VENOUS BLD VENIPUNCTURE: CPT

## 2020-11-16 PROCEDURE — 80048 BASIC METABOLIC PNL TOTAL CA: CPT

## 2020-11-16 PROCEDURE — 74011250637 HC RX REV CODE- 250/637: Performed by: INTERNAL MEDICINE

## 2020-11-16 PROCEDURE — 96372 THER/PROPH/DIAG INJ SC/IM: CPT

## 2020-11-16 PROCEDURE — 71045 X-RAY EXAM CHEST 1 VIEW: CPT

## 2020-11-16 PROCEDURE — 65660000000 HC RM CCU STEPDOWN

## 2020-11-16 PROCEDURE — 74011000250 HC RX REV CODE- 250: Performed by: INTERNAL MEDICINE

## 2020-11-16 PROCEDURE — 74011250637 HC RX REV CODE- 250/637: Performed by: EMERGENCY MEDICINE

## 2020-11-16 PROCEDURE — 2709999900 HC NON-CHARGEABLE SUPPLY

## 2020-11-16 PROCEDURE — A9540 TC99M MAA: HCPCS

## 2020-11-16 PROCEDURE — 84484 ASSAY OF TROPONIN QUANT: CPT

## 2020-11-16 PROCEDURE — 94761 N-INVAS EAR/PLS OXIMETRY MLT: CPT

## 2020-11-16 PROCEDURE — 99222 1ST HOSP IP/OBS MODERATE 55: CPT | Performed by: INTERNAL MEDICINE

## 2020-11-16 PROCEDURE — 94640 AIRWAY INHALATION TREATMENT: CPT

## 2020-11-16 PROCEDURE — 93970 EXTREMITY STUDY: CPT

## 2020-11-16 PROCEDURE — 74011250636 HC RX REV CODE- 250/636: Performed by: NURSE PRACTITIONER

## 2020-11-16 PROCEDURE — 74011250636 HC RX REV CODE- 250/636: Performed by: EMERGENCY MEDICINE

## 2020-11-16 PROCEDURE — 82550 ASSAY OF CK (CPK): CPT

## 2020-11-16 PROCEDURE — 74011250637 HC RX REV CODE- 250/637: Performed by: NURSE PRACTITIONER

## 2020-11-16 PROCEDURE — 85025 COMPLETE CBC W/AUTO DIFF WBC: CPT

## 2020-11-16 PROCEDURE — 85379 FIBRIN DEGRADATION QUANT: CPT

## 2020-11-16 PROCEDURE — 74011636637 HC RX REV CODE- 636/637: Performed by: NURSE PRACTITIONER

## 2020-11-16 PROCEDURE — 74011000250 HC RX REV CODE- 250: Performed by: NURSE PRACTITIONER

## 2020-11-16 PROCEDURE — 83036 HEMOGLOBIN GLYCOSYLATED A1C: CPT

## 2020-11-16 PROCEDURE — 82962 GLUCOSE BLOOD TEST: CPT

## 2020-11-16 RX ORDER — ISOSORBIDE MONONITRATE 30 MG/1
30 TABLET, EXTENDED RELEASE ORAL DAILY
Status: DISCONTINUED | OUTPATIENT
Start: 2020-11-17 | End: 2020-11-18 | Stop reason: HOSPADM

## 2020-11-16 RX ORDER — BUDESONIDE 0.5 MG/2ML
2 INHALANT ORAL
Status: DISCONTINUED | OUTPATIENT
Start: 2020-11-16 | End: 2020-11-18 | Stop reason: HOSPADM

## 2020-11-16 RX ORDER — ACETAMINOPHEN 650 MG/1
650 SUPPOSITORY RECTAL
Status: DISCONTINUED | OUTPATIENT
Start: 2020-11-16 | End: 2020-11-18 | Stop reason: HOSPADM

## 2020-11-16 RX ORDER — ACETAMINOPHEN 325 MG/1
650 TABLET ORAL
Status: COMPLETED | OUTPATIENT
Start: 2020-11-16 | End: 2020-11-16

## 2020-11-16 RX ORDER — ENOXAPARIN SODIUM 100 MG/ML
110 INJECTION SUBCUTANEOUS
Status: COMPLETED | OUTPATIENT
Start: 2020-11-16 | End: 2020-11-16

## 2020-11-16 RX ORDER — AMLODIPINE BESYLATE 5 MG/1
5 TABLET ORAL DAILY
Status: DISCONTINUED | OUTPATIENT
Start: 2020-11-17 | End: 2020-11-17

## 2020-11-16 RX ORDER — FAMOTIDINE 20 MG/1
20 TABLET, FILM COATED ORAL 2 TIMES DAILY
Status: DISCONTINUED | OUTPATIENT
Start: 2020-11-16 | End: 2020-11-18 | Stop reason: HOSPADM

## 2020-11-16 RX ORDER — MONTELUKAST SODIUM 10 MG/1
10 TABLET ORAL DAILY
Status: DISCONTINUED | OUTPATIENT
Start: 2020-11-17 | End: 2020-11-18 | Stop reason: HOSPADM

## 2020-11-16 RX ORDER — LIDOCAINE HYDROCHLORIDE 20 MG/ML
15 SOLUTION OROPHARYNGEAL
Status: DISPENSED | OUTPATIENT
Start: 2020-11-16 | End: 2020-11-18

## 2020-11-16 RX ORDER — SODIUM CHLORIDE 0.9 % (FLUSH) 0.9 %
5-40 SYRINGE (ML) INJECTION AS NEEDED
Status: DISCONTINUED | OUTPATIENT
Start: 2020-11-16 | End: 2020-11-18 | Stop reason: HOSPADM

## 2020-11-16 RX ORDER — LEVOTHYROXINE SODIUM 125 UG/1
125 TABLET ORAL
Status: DISCONTINUED | OUTPATIENT
Start: 2020-11-17 | End: 2020-11-18 | Stop reason: HOSPADM

## 2020-11-16 RX ORDER — ENOXAPARIN SODIUM 150 MG/ML
110 INJECTION SUBCUTANEOUS EVERY 12 HOURS
Status: COMPLETED | OUTPATIENT
Start: 2020-11-16 | End: 2020-11-17

## 2020-11-16 RX ORDER — CLOPIDOGREL BISULFATE 75 MG/1
75 TABLET ORAL DAILY
Status: DISCONTINUED | OUTPATIENT
Start: 2020-11-17 | End: 2020-11-18 | Stop reason: HOSPADM

## 2020-11-16 RX ORDER — ACETAMINOPHEN 325 MG/1
650 TABLET ORAL
Status: DISPENSED | OUTPATIENT
Start: 2020-11-16 | End: 2020-11-16

## 2020-11-16 RX ORDER — OXYMETAZOLINE HCL 0.05 %
2 SPRAY, NON-AEROSOL (ML) NASAL
Status: DISCONTINUED | OUTPATIENT
Start: 2020-11-16 | End: 2020-11-18 | Stop reason: HOSPADM

## 2020-11-16 RX ORDER — MAGNESIUM SULFATE 100 %
4 CRYSTALS MISCELLANEOUS AS NEEDED
Status: DISCONTINUED | OUTPATIENT
Start: 2020-11-16 | End: 2020-11-18 | Stop reason: HOSPADM

## 2020-11-16 RX ORDER — LANOLIN ALCOHOL/MO/W.PET/CERES
1000 CREAM (GRAM) TOPICAL DAILY
Status: DISCONTINUED | OUTPATIENT
Start: 2020-11-17 | End: 2020-11-18 | Stop reason: HOSPADM

## 2020-11-16 RX ORDER — SODIUM CHLORIDE 0.9 % (FLUSH) 0.9 %
5-40 SYRINGE (ML) INJECTION EVERY 8 HOURS
Status: DISCONTINUED | OUTPATIENT
Start: 2020-11-16 | End: 2020-11-18 | Stop reason: HOSPADM

## 2020-11-16 RX ORDER — ACETAMINOPHEN 325 MG/1
650 TABLET ORAL
Status: DISCONTINUED | OUTPATIENT
Start: 2020-11-16 | End: 2020-11-18 | Stop reason: HOSPADM

## 2020-11-16 RX ORDER — DEXTROSE 50 % IN WATER (D50W) INTRAVENOUS SYRINGE
25-50 AS NEEDED
Status: DISCONTINUED | OUTPATIENT
Start: 2020-11-16 | End: 2020-11-18 | Stop reason: HOSPADM

## 2020-11-16 RX ORDER — LORATADINE 10 MG/1
10 TABLET ORAL DAILY
Status: DISCONTINUED | OUTPATIENT
Start: 2020-11-17 | End: 2020-11-18 | Stop reason: HOSPADM

## 2020-11-16 RX ORDER — POLYETHYLENE GLYCOL 3350 17 G/17G
17 POWDER, FOR SOLUTION ORAL DAILY PRN
Status: DISCONTINUED | OUTPATIENT
Start: 2020-11-16 | End: 2020-11-18 | Stop reason: HOSPADM

## 2020-11-16 RX ORDER — ASCORBIC ACID 250 MG
250 TABLET ORAL DAILY
Status: DISCONTINUED | OUTPATIENT
Start: 2020-11-17 | End: 2020-11-18 | Stop reason: HOSPADM

## 2020-11-16 RX ORDER — THERA TABS 400 MCG
1 TAB ORAL DAILY
Status: DISCONTINUED | OUTPATIENT
Start: 2020-11-17 | End: 2020-11-18 | Stop reason: HOSPADM

## 2020-11-16 RX ORDER — MAG HYDROX/ALUMINUM HYD/SIMETH 200-200-20
30 SUSPENSION, ORAL (FINAL DOSE FORM) ORAL
Status: DISPENSED | OUTPATIENT
Start: 2020-11-16 | End: 2020-11-18

## 2020-11-16 RX ORDER — SODIUM CHLORIDE 9 MG/ML
75 INJECTION, SOLUTION INTRAVENOUS ONCE
Status: COMPLETED | OUTPATIENT
Start: 2020-11-16 | End: 2020-11-16

## 2020-11-16 RX ORDER — IPRATROPIUM BROMIDE AND ALBUTEROL SULFATE 2.5; .5 MG/3ML; MG/3ML
3 SOLUTION RESPIRATORY (INHALATION)
Status: DISCONTINUED | OUTPATIENT
Start: 2020-11-16 | End: 2020-11-18 | Stop reason: HOSPADM

## 2020-11-16 RX ORDER — ONDANSETRON 4 MG/1
4 TABLET, ORALLY DISINTEGRATING ORAL
Status: DISPENSED | OUTPATIENT
Start: 2020-11-16 | End: 2020-11-16

## 2020-11-16 RX ORDER — METOPROLOL TARTRATE 25 MG/1
25 TABLET, FILM COATED ORAL EVERY 12 HOURS
Status: DISCONTINUED | OUTPATIENT
Start: 2020-11-16 | End: 2020-11-18 | Stop reason: HOSPADM

## 2020-11-16 RX ORDER — RANOLAZINE 500 MG/1
500 TABLET, EXTENDED RELEASE ORAL 2 TIMES DAILY
Status: DISCONTINUED | OUTPATIENT
Start: 2020-11-16 | End: 2020-11-18 | Stop reason: HOSPADM

## 2020-11-16 RX ORDER — INSULIN LISPRO 100 [IU]/ML
INJECTION, SOLUTION INTRAVENOUS; SUBCUTANEOUS
Status: DISCONTINUED | OUTPATIENT
Start: 2020-11-16 | End: 2020-11-18 | Stop reason: HOSPADM

## 2020-11-16 RX ORDER — ENOXAPARIN SODIUM 100 MG/ML
40 INJECTION SUBCUTANEOUS DAILY
Status: DISCONTINUED | OUTPATIENT
Start: 2020-11-17 | End: 2020-11-16

## 2020-11-16 RX ORDER — ONDANSETRON 2 MG/ML
4 INJECTION INTRAMUSCULAR; INTRAVENOUS
Status: DISCONTINUED | OUTPATIENT
Start: 2020-11-16 | End: 2020-11-18 | Stop reason: HOSPADM

## 2020-11-16 RX ORDER — GUAIFENESIN 100 MG/5ML
81 LIQUID (ML) ORAL
Status: COMPLETED | OUTPATIENT
Start: 2020-11-16 | End: 2020-11-16

## 2020-11-16 RX ORDER — HYDROCODONE BITARTRATE AND ACETAMINOPHEN 5; 325 MG/1; MG/1
1 TABLET ORAL
Status: DISCONTINUED | OUTPATIENT
Start: 2020-11-16 | End: 2020-11-16

## 2020-11-16 RX ORDER — PROMETHAZINE HYDROCHLORIDE 25 MG/1
12.5 TABLET ORAL
Status: DISCONTINUED | OUTPATIENT
Start: 2020-11-16 | End: 2020-11-18 | Stop reason: HOSPADM

## 2020-11-16 RX ORDER — NITROGLYCERIN 0.4 MG/1
0.4 TABLET SUBLINGUAL
Status: DISCONTINUED | OUTPATIENT
Start: 2020-11-16 | End: 2020-11-18 | Stop reason: HOSPADM

## 2020-11-16 RX ADMIN — Medication 10 ML: at 21:01

## 2020-11-16 RX ADMIN — METOPROLOL TARTRATE 25 MG: 25 TABLET, FILM COATED ORAL at 20:59

## 2020-11-16 RX ADMIN — RANOLAZINE 500 MG: 500 TABLET, FILM COATED, EXTENDED RELEASE ORAL at 18:17

## 2020-11-16 RX ADMIN — ENOXAPARIN SODIUM 110 MG: 60 INJECTION SUBCUTANEOUS at 01:51

## 2020-11-16 RX ADMIN — NITROGLYCERIN 1 INCH: 20 OINTMENT TOPICAL at 00:20

## 2020-11-16 RX ADMIN — ACETAMINOPHEN 650 MG: 325 TABLET ORAL at 01:51

## 2020-11-16 RX ADMIN — BUDESONIDE 500 MCG: 0.5 SUSPENSION RESPIRATORY (INHALATION) at 20:45

## 2020-11-16 RX ADMIN — SODIUM CHLORIDE 75 ML/HR: 900 INJECTION, SOLUTION INTRAVENOUS at 00:20

## 2020-11-16 RX ADMIN — INSULIN LISPRO 2 UNITS: 100 INJECTION, SOLUTION INTRAVENOUS; SUBCUTANEOUS at 21:09

## 2020-11-16 RX ADMIN — ENOXAPARIN SODIUM 110 MG: 120 INJECTION SUBCUTANEOUS at 21:00

## 2020-11-16 RX ADMIN — ASPIRIN 81 MG CHEWABLE TABLET 81 MG: 81 TABLET CHEWABLE at 00:19

## 2020-11-16 RX ADMIN — ALUMINUM HYDROXIDE, MAGNESIUM HYDROXIDE, AND SIMETHICONE 30 ML: 200; 200; 20 SUSPENSION ORAL at 20:59

## 2020-11-16 RX ADMIN — LIDOCAINE HYDROCHLORIDE 15 ML: 20 SOLUTION ORAL; TOPICAL at 21:00

## 2020-11-16 RX ADMIN — FAMOTIDINE 20 MG: 20 TABLET, FILM COATED ORAL at 19:34

## 2020-11-16 RX ADMIN — OXYMETAZOLINE HCL 2 SPRAY: 0.05 SPRAY NASAL at 11:33

## 2020-11-16 RX ADMIN — Medication 10 ML: at 18:05

## 2020-11-16 NOTE — ED NOTES
Patient transitioned to hospital bed and placed in position of comfort. Vitals remain unchanged. Patient updated on plan of care. Will Continue to monitor.

## 2020-11-16 NOTE — ED NOTES
Received report and assumed care of patient. Patient reports no pain at t his time. Greeted patient and introduced myself as their primary nurse. Encouraged Patient to voice any concerns. Call bell with reach. Awaiting in patient bed assignment. Offered breakfast to patient and she states she is not hungry at this time.

## 2020-11-16 NOTE — ED TRIAGE NOTES
Patient reports to ED with complaints of midsternal chest pain x 2 hours. Patient reports taking nitroglycerin x 3 with no relief.
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
## 2020-11-16 NOTE — PROGRESS NOTES
Pulmicort Respules® (budesonide) 0.5mg/2ml BID was therapeutically interchanged for Arnuity Ellipta®/Flovent Diskus®   per the P&T Committee approved Therapeutic Interchanges Policy.

## 2020-11-16 NOTE — ED NOTES
Rounding completed. Patient continues to rest quietly on stretcher. No apparent distress noted. Vital remain stable on monitor.

## 2020-11-16 NOTE — ROUTINE PROCESS
Pt arrived to floor from [facility] in no signs of distress. Pt alert and oriented and being seen by NP Ramírez at bedside. Vital signs and blood sugar checked by cna.  Primary nurse to assess once NP complete with her assessment

## 2020-11-16 NOTE — ROUTINE PROCESS
TRANSFER - OUT REPORT:    Verbal report given to [facility] (name) on Jami Rater  being transferred to [address] (unit) for routine progression of care       Report consisted of patients Situation, Background, Assessment and   Recommendations(SBAR). Information from the following report(s) ED Summary was reviewed with the receiving nurse. Lines:   Peripheral IV 11/16/20 Left Antecubital (Active)   Site Assessment Clean, dry, & intact 11/16/20 0015   Phlebitis Assessment 0 11/16/20 0015   Infiltration Assessment 0 11/16/20 0015   Dressing Status Clean, dry, & intact 11/16/20 0015   Dressing Type Transparent 11/16/20 0015   Hub Color/Line Status Pink 11/16/20 0015        Opportunity for questions and clarification was provided.       Patient transported with:   Monitor

## 2020-11-16 NOTE — ED NOTES
Patient requesting nasal spray. She states her headache is sinus pain and doesn't want tylenol. She states tylenol makes her sleepy.
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
## 2020-11-16 NOTE — ED NOTES
Bedside and Verbal shift change report given to [address] [facility] [address] (oncoming nurse) by Sophie Wyatt RN (offgoing nurse). Report included the following information SBAR.

## 2020-11-16 NOTE — ED NOTES
Patient resting comfortably, side rails up, call bell within reach. No complaints of pain at this time. Still awaiting in patient bed assignment.

## 2020-11-16 NOTE — ROUTINE PROCESS
Primary Nurse Jonathan Webster and JEANNIE Ortiz performed a dual skin assessment on this patient No impairment noted. Nilton score is 18.

## 2020-11-17 ENCOUNTER — APPOINTMENT (OUTPATIENT)
Dept: NON INVASIVE DIAGNOSTICS | Age: 85
DRG: 313 | End: 2020-11-17
Attending: NURSE PRACTITIONER
Payer: MEDICARE

## 2020-11-17 LAB
ANION GAP SERPL CALC-SCNC: 5 MMOL/L (ref 3–18)
BASOPHILS # BLD: 0 K/UL (ref 0–0.1)
BASOPHILS NFR BLD: 1 % (ref 0–2)
BUN SERPL-MCNC: 4 MG/DL (ref 7–18)
BUN/CREAT SERPL: 6 (ref 12–20)
CALCIUM SERPL-MCNC: 9 MG/DL (ref 8.5–10.1)
CHLORIDE SERPL-SCNC: 106 MMOL/L (ref 100–111)
CO2 SERPL-SCNC: 27 MMOL/L (ref 21–32)
CREAT SERPL-MCNC: 0.63 MG/DL (ref 0.6–1.3)
DIFFERENTIAL METHOD BLD: ABNORMAL
EOSINOPHIL # BLD: 0.3 K/UL (ref 0–0.4)
EOSINOPHIL NFR BLD: 6 % (ref 0–5)
ERYTHROCYTE [DISTWIDTH] IN BLOOD BY AUTOMATED COUNT: 11.7 % (ref 11.6–14.5)
GLUCOSE BLD STRIP.AUTO-MCNC: 142 MG/DL (ref 70–110)
GLUCOSE BLD STRIP.AUTO-MCNC: 201 MG/DL (ref 70–110)
GLUCOSE BLD STRIP.AUTO-MCNC: 202 MG/DL (ref 70–110)
GLUCOSE BLD STRIP.AUTO-MCNC: 230 MG/DL (ref 70–110)
GLUCOSE SERPL-MCNC: 168 MG/DL (ref 74–99)
HCT VFR BLD AUTO: 33.8 % (ref 35–45)
HGB BLD-MCNC: 11.1 G/DL (ref 12–16)
LYMPHOCYTES # BLD: 2 K/UL (ref 0.9–3.6)
LYMPHOCYTES NFR BLD: 42 % (ref 21–52)
MAGNESIUM SERPL-MCNC: 1.9 MG/DL (ref 1.6–2.6)
MCH RBC QN AUTO: 31.7 PG (ref 24–34)
MCHC RBC AUTO-ENTMCNC: 32.8 G/DL (ref 31–37)
MCV RBC AUTO: 96.6 FL (ref 74–97)
MONOCYTES # BLD: 0.4 K/UL (ref 0.05–1.2)
MONOCYTES NFR BLD: 8 % (ref 3–10)
NEUTS SEG # BLD: 2.1 K/UL (ref 1.8–8)
NEUTS SEG NFR BLD: 43 % (ref 40–73)
PLATELET # BLD AUTO: 215 K/UL (ref 135–420)
PMV BLD AUTO: 10.6 FL (ref 9.2–11.8)
POTASSIUM SERPL-SCNC: 3.3 MMOL/L (ref 3.5–5.5)
RBC # BLD AUTO: 3.5 M/UL (ref 4.2–5.3)
SODIUM SERPL-SCNC: 138 MMOL/L (ref 136–145)
TSH SERPL DL<=0.05 MIU/L-ACNC: 1.55 UIU/ML (ref 0.36–3.74)
WBC # BLD AUTO: 4.7 K/UL (ref 4.6–13.2)

## 2020-11-17 PROCEDURE — 99232 SBSQ HOSP IP/OBS MODERATE 35: CPT | Performed by: HOSPITALIST

## 2020-11-17 PROCEDURE — 74011250636 HC RX REV CODE- 250/636: Performed by: HOSPITALIST

## 2020-11-17 PROCEDURE — 74011250636 HC RX REV CODE- 250/636: Performed by: INTERNAL MEDICINE

## 2020-11-17 PROCEDURE — 99223 1ST HOSP IP/OBS HIGH 75: CPT | Performed by: INTERNAL MEDICINE

## 2020-11-17 PROCEDURE — 2709999900 HC NON-CHARGEABLE SUPPLY

## 2020-11-17 PROCEDURE — 74011000250 HC RX REV CODE- 250: Performed by: NURSE PRACTITIONER

## 2020-11-17 PROCEDURE — 97161 PT EVAL LOW COMPLEX 20 MIN: CPT

## 2020-11-17 PROCEDURE — 74011250637 HC RX REV CODE- 250/637: Performed by: NURSE PRACTITIONER

## 2020-11-17 PROCEDURE — 80048 BASIC METABOLIC PNL TOTAL CA: CPT

## 2020-11-17 PROCEDURE — 84443 ASSAY THYROID STIM HORMONE: CPT

## 2020-11-17 PROCEDURE — 83735 ASSAY OF MAGNESIUM: CPT

## 2020-11-17 PROCEDURE — 74011250637 HC RX REV CODE- 250/637: Performed by: INTERNAL MEDICINE

## 2020-11-17 PROCEDURE — 94640 AIRWAY INHALATION TREATMENT: CPT

## 2020-11-17 PROCEDURE — 97116 GAIT TRAINING THERAPY: CPT

## 2020-11-17 PROCEDURE — 65660000000 HC RM CCU STEPDOWN

## 2020-11-17 PROCEDURE — 85025 COMPLETE CBC W/AUTO DIFF WBC: CPT

## 2020-11-17 PROCEDURE — C8929 TTE W OR WO FOL WCON,DOPPLER: HCPCS

## 2020-11-17 PROCEDURE — 74011250636 HC RX REV CODE- 250/636: Performed by: NURSE PRACTITIONER

## 2020-11-17 PROCEDURE — 74011636637 HC RX REV CODE- 636/637: Performed by: HOSPITALIST

## 2020-11-17 PROCEDURE — 97530 THERAPEUTIC ACTIVITIES: CPT

## 2020-11-17 PROCEDURE — 74011636637 HC RX REV CODE- 636/637: Performed by: NURSE PRACTITIONER

## 2020-11-17 PROCEDURE — 97165 OT EVAL LOW COMPLEX 30 MIN: CPT

## 2020-11-17 PROCEDURE — 94761 N-INVAS EAR/PLS OXIMETRY MLT: CPT

## 2020-11-17 PROCEDURE — 82962 GLUCOSE BLOOD TEST: CPT

## 2020-11-17 PROCEDURE — 36415 COLL VENOUS BLD VENIPUNCTURE: CPT

## 2020-11-17 RX ORDER — SPIRONOLACTONE 25 MG/1
25 TABLET ORAL DAILY
Status: DISCONTINUED | OUTPATIENT
Start: 2020-11-17 | End: 2020-11-18 | Stop reason: HOSPADM

## 2020-11-17 RX ORDER — FUROSEMIDE 10 MG/ML
20 INJECTION INTRAMUSCULAR; INTRAVENOUS ONCE
Status: ACTIVE | OUTPATIENT
Start: 2020-11-17 | End: 2020-11-18

## 2020-11-17 RX ORDER — HYDROCHLOROTHIAZIDE 25 MG/1
12.5 TABLET ORAL DAILY
Status: DISCONTINUED | OUTPATIENT
Start: 2020-11-17 | End: 2020-11-18 | Stop reason: HOSPADM

## 2020-11-17 RX ORDER — POTASSIUM CHLORIDE 20 MEQ/1
40 TABLET, EXTENDED RELEASE ORAL
Status: COMPLETED | OUTPATIENT
Start: 2020-11-17 | End: 2020-11-17

## 2020-11-17 RX ORDER — AMLODIPINE BESYLATE 5 MG/1
5 TABLET ORAL DAILY
Status: COMPLETED | OUTPATIENT
Start: 2020-11-17 | End: 2020-11-17

## 2020-11-17 RX ORDER — ENOXAPARIN SODIUM 100 MG/ML
40 INJECTION SUBCUTANEOUS DAILY
Status: DISCONTINUED | OUTPATIENT
Start: 2020-11-18 | End: 2020-11-18 | Stop reason: HOSPADM

## 2020-11-17 RX ORDER — AMLODIPINE BESYLATE 10 MG/1
10 TABLET ORAL DAILY
Status: DISCONTINUED | OUTPATIENT
Start: 2020-11-18 | End: 2020-11-18 | Stop reason: HOSPADM

## 2020-11-17 RX ADMIN — Medication 10 ML: at 06:17

## 2020-11-17 RX ADMIN — Medication 10 ML: at 13:32

## 2020-11-17 RX ADMIN — SPIRONOLACTONE 25 MG: 25 TABLET, FILM COATED ORAL at 16:56

## 2020-11-17 RX ADMIN — INSULIN LISPRO 6 UNITS: 100 INJECTION, SOLUTION INTRAVENOUS; SUBCUTANEOUS at 21:33

## 2020-11-17 RX ADMIN — AMLODIPINE BESYLATE 5 MG: 5 TABLET ORAL at 08:38

## 2020-11-17 RX ADMIN — INSULIN LISPRO 4 UNITS: 100 INJECTION, SOLUTION INTRAVENOUS; SUBCUTANEOUS at 13:09

## 2020-11-17 RX ADMIN — MONTELUKAST SODIUM 10 MG: 10 TABLET, COATED ORAL at 08:38

## 2020-11-17 RX ADMIN — AMLODIPINE BESYLATE 5 MG: 5 TABLET ORAL at 11:44

## 2020-11-17 RX ADMIN — POTASSIUM CHLORIDE 40 MEQ: 1500 TABLET, EXTENDED RELEASE ORAL at 11:43

## 2020-11-17 RX ADMIN — CLOPIDOGREL BISULFATE 75 MG: 75 TABLET, FILM COATED ORAL at 08:39

## 2020-11-17 RX ADMIN — INSULIN LISPRO 6 UNITS: 100 INJECTION, SOLUTION INTRAVENOUS; SUBCUTANEOUS at 16:55

## 2020-11-17 RX ADMIN — RANOLAZINE 500 MG: 500 TABLET, FILM COATED, EXTENDED RELEASE ORAL at 08:39

## 2020-11-17 RX ADMIN — ISOSORBIDE MONONITRATE 30 MG: 30 TABLET, EXTENDED RELEASE ORAL at 08:39

## 2020-11-17 RX ADMIN — ENOXAPARIN SODIUM 110 MG: 120 INJECTION SUBCUTANEOUS at 06:16

## 2020-11-17 RX ADMIN — METOPROLOL TARTRATE 25 MG: 25 TABLET, FILM COATED ORAL at 08:37

## 2020-11-17 RX ADMIN — BUDESONIDE 500 MCG: 0.5 SUSPENSION RESPIRATORY (INHALATION) at 20:21

## 2020-11-17 RX ADMIN — METOPROLOL TARTRATE 25 MG: 25 TABLET, FILM COATED ORAL at 21:33

## 2020-11-17 RX ADMIN — Medication 10 ML: at 21:34

## 2020-11-17 RX ADMIN — PERFLUTREN 2 ML: 6.52 INJECTION, SUSPENSION INTRAVENOUS at 15:18

## 2020-11-17 NOTE — DIABETES MGMT
Diabetes Patient/Family Education Record  Factors That  May Influence Patients Ability  to Learn or  Comply with Recommendations   []   Language barrier    []   Cultural needs   []   Motivation    []   Cognitive limitation    []   Physical   []   Education    []   Physiological factors   []   Hearing/vision/speaking impairment   []   Restoration beliefs    []   Financial factors   []  Other:   [x]  No factors identified at this time. Person Instructed:   [x]   Patient   []   Family   []  Other     Preference for Learning:   [x]   Verbal   []   Written   []  Demonstration     Level of Comprehension & Competence:    [x]  Good                                      [] Fair                                     []  Poor                             []  Needs Reinforcement   [x]  Teachback completed    Education Component:         See DM note    [x]  Medication management -        compliant with insulin administration bid   [x]  Nutritional management            3 meals/day    [x]  Exercise   [x]  Signs, symptoms, and treatment of hyperglycemia and hypoglycemia    occas. lows - treats with juice    [x] Prevention, recognition and treatment of hyperglycemia and hypoglycemia   []  Importance of blood glucose monitoring      monitors BG 1-2 x day     []  Instruction on use of the blood glucose meter   [x]  Discuss the importance of HbA1C monitoring           8.2%     []  Sick day guidelines   []  Proper use and disposal of lancets, needles, syringes or insulin pens (if appropriate)   []  Potential long-term complications (retinopathy, kidney disease, neuropathy, foot care)   [x] Information about whom to contact in case of emergency or for more information   To f/u with endocrinology   []  Goal:  Patient/family will demonstrate understanding of Diabetes Self Management Skills by: (date) _______  Plan for post-discharge education or self-management support:    [] Outpatient class schedule provided [] Patient Declined    [] Scheduled for outpatient classes (date) _______  Verify:  Does patient understand how diabetes medications work? ______yes______________________  Does patient know what their most recent A1c is? ____yes 8.2%_____________________________  Does patient monitor glucose at home? _____yes______________________________________  Does patient have difficulty obtaining diabetes medications or testing supplies? ______no_________         Markos Winston MPH, RN CDE  Pager [phone]
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
## 2020-11-17 NOTE — DIABETES MGMT
Glycemic Control Plan of Care    Recommend low dose of lantus - 10 units daily    Assessment:  Admitted overnight 11/16 for r/o CP. Consult received. Sitting up in recliner - c/o some nausea   States her last does of lantus was dinner time 11/15. She is followed by endocrinology, Dr. Beck Greenfield, and last saw him three months ago. States compliance with her insulin and monitors BG 1-2 times per day - reports occasional hypoglycemia - symptomatic in the low 80's and treats with juice.   DM cons.carb. 1600 kcal. diet  Will continue to monitor       Most recent blood glucose values:        Current A1C:  Lab Results   Component Value Date/Time    Hemoglobin A1c 8.2 (H) 11/16/2020 06:15 PM    Hemoglobin A1c (POC) 8.5 01/17/2019 12:19 PM    Hemoglobin A1c, External 7.5 10/31/2019     Current hospital diabetes medications:  Corrective lispro, advanced to very insulin resistant, 4 times daily    Home diabetes medications:  Lantus 32 units AM  Lantus 36 units PM    Goals:  Blood glucose will be within target range of  mg/dL by 11/20/2020    Education:  ___  Refer to Diabetes Education Record             _X_  Education not indicated at this time - is followed by endocrinology, Dr. Fatoumata Cabrales MPH RN CDE  Pager [phone]

## 2020-11-17 NOTE — PROGRESS NOTES
Patient was offered to put valuables with security in safe or have family  valuables. Patient wanted to keep her phone, wallet (multiple credit cards), and four rings with her. She was informed that refusing to have valuable with security or having family pick them up would make her responsible for belongings. Patient stated, \"I think it will be alright, if anybody wants it that bad I'll call you (Cassandra DENNIS)! \"

## 2020-11-17 NOTE — ROUTINE PROCESS
Bedside and Verbal shift change report given to Ronal Bocanegra, [facility] (oncoming nurse) by Jeff Tinajero RN (offgoing nurse). Report included the following information SBAR, Intake/Output, MAR and Recent Results.

## 2020-11-17 NOTE — PROGRESS NOTES
Reason for Admission:  Chest pain [R07.9]                 RUR Score:    20            Plan for utilizing home health:    yes                      Likelihood of Readmission:   Moderate                         Do you (patient/family) have any concerns for transition/discharge?  no    Transition of Care Plan:       Initial assessment completed with patient. Cognitive status of patient: oriented to time, place, person and situation. Face sheet information confirmed:  yes. The patient designates Anthony Onofre  to participate in her discharge plan and to receive any needed information. This patient lives in a single family home with patient. Brother lives across the street. Patient is not able to navigate steps as needed. Prior to hospitalization, patient was considered to be independent with ADLs/IADLS : no. [facility] hours[facility]. Patient has a current ACP document on file: no  The patient and daughter will be available to transport patient home upon discharge. The patient already has URIEL Higgins, [facility],  medical equipment available in the home. Patient is not currently active with home health. Patient has stayed in a skilled nursing facility or rehab. Was  stay within last 60 days : no. This patient is on dialysis :no    List of available Home Health agencies were provided and reviewed with the patient prior to discharge. Freedom of choice signed: yes, for [employer]. Currently, the discharge plan is Home with [address] [place] [address] [facility] [place]. The patient states that she can obtain her medications from the pharmacy, and take her medications as directed. Patient's current insurance is Medicare and Medicaid      Care Management Interventions  PCP Verified by CM:  Yes  Mode of Transport at Discharge: Self  Current Support Network: Lives Alone  Confirm Follow Up Transport: Family  The Plan for Transition of Care is Related to the Following Treatment Goals : Home health  The Patient and/or Patient Representative was Provided with a Choice of Provider and Agrees with the Discharge Plan?: Yes  Freedom of Choice List was Provided with Basic Dialogue that Supports the Patient's Individualized Plan of Care/Goals, Treatment Preferences and Shares the Quality Data Associated with the Providers?: Yes  Discharge Location  Discharge Placement: Home with home health        Bandar Borrero RN BSN  Care Manager  [phone]
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
## 2020-11-17 NOTE — PROGRESS NOTES
[facility]ist [facility]  Progress Note    Patient: Sera Abebe Age: 82 y.o. : 1935 MR#: [id number] SSN: [id number]  Date/Time: 2020     Subjective:     Patient seen and evaluated, sitting up in chair, no acute distress. She is currently chest pain-free. Assessment/Plan:     1. Atypical chest pain-3 sets of cardiac enzymes negative, cardiology consulted, echocardiogram ordered. 2. History of coronary artery disease-continue aspirin, Plavix, Ranexa, Imdur  3. History of hypertension-resume home medications, monitor blood pressure  4. H/o hypothyroidism-continue Synthroid  5. History of type 2 diabetes-insulin sliding scale, monitor blood sugars  6. Morbid obesity  DVT prophylaxis-Lovenox  Full code          Case discussed with:  [x]Patient  []Family  []Nursing  []Case Management  DVT Prophylaxis:  [x]Lovenox  []Hep SQ  []SCDs  []Coumadin   []On Heparin gtt    Objective:   VS:   Visit Vitals  BP (!) 186/75 (BP 1 Location: Right arm, BP Patient Position: Supine)   Pulse 77   Temp 98.1 °F (36.7 °C)   Resp 18   Ht 5' 6\" (1.676 m)   Wt 112.8 kg (248 lb 11.2 oz)   SpO2 96%   BMI 40.14 kg/m²      Tmax/24hrs: Temp (24hrs), Av.2 °F (36.8 °C), Min:97.8 °F (36.6 °C), Max:98.7 °F (37.1 °C)  IOBRIEF    Intake/Output Summary (Last 24 hours) at 2020 1133  Last data filed at 2020 0816  Gross per 24 hour   Intake 220 ml   Output 800 ml   Net -580 ml       General:  Alert, cooperative, no acute distress    HEENT: PERRLA, anicteric sclerae. Pulmonary:  CTA Bilaterally. No Wheezing/Rhonchi/Rales. Cardiovascular: Regular rate and Rhythm. GI:  Soft, Non distended, Non tender. + Bowel sounds. Extremities:  No edema, cyanosis, clubbing. No calf tenderness. Neurologic: Alert and oriented X 3. No acute neuro deficits.   Additional:    Medications:   Current Facility-Administered Medications   Medication Dose Route Frequency    potassium chloride (K-DUR, KLOR-CON) SR tablet 40 mEq  40 mEq Oral NOW    oxymetazoline (AFRIN) 0.05 % nasal spray 2 Spray  2 Spray Both Nostrils BID PRN    sodium chloride (NS) flush 5-40 mL  5-40 mL IntraVENous Q8H    sodium chloride (NS) flush 5-40 mL  5-40 mL IntraVENous PRN    acetaminophen (TYLENOL) tablet 650 mg  650 mg Oral Q6H PRN    Or    acetaminophen (TYLENOL) suppository 650 mg  650 mg Rectal Q6H PRN    polyethylene glycol (MIRALAX) packet 17 g  17 g Oral DAILY PRN    promethazine (PHENERGAN) tablet 12.5 mg  12.5 mg Oral Q6H PRN    Or    ondansetron (ZOFRAN) injection 4 mg  4 mg IntraVENous Q6H PRN    famotidine (PEPCID) tablet 20 mg  20 mg Oral BID    insulin lispro (HUMALOG) injection   SubCUTAneous AC&HS    glucose chewable tablet 16 g  4 Tab Oral PRN    glucagon (GLUCAGEN) injection 1 mg  1 mg IntraMUSCular PRN    dextrose (D50W) injection syrg 12.5-25 g  25-50 mL IntraVENous PRN    albuterol-ipratropium (DUO-NEB) 2.5 MG-0.5 MG/3 ML  3 mL Nebulization Q4H PRN    amLODIPine (NORVASC) tablet 5 mg  5 mg Oral DAILY    ascorbic acid (vitamin C) (VITAMIN C) tablet 250 mg  250 mg Oral DAILY    clopidogreL (PLAVIX) tablet 75 mg  75 mg Oral DAILY    cyanocobalamin tablet 1,000 mcg  1,000 mcg Oral DAILY    budesonide (PULMICORT) 500 mcg/2 ml nebulizer suspension  2 mL Nebulization BID RT    isosorbide mononitrate ER (IMDUR) tablet 30 mg  30 mg Oral DAILY    loratadine (CLARITIN) tablet 10 mg  10 mg Oral DAILY    metoprolol tartrate (LOPRESSOR) tablet 25 mg  25 mg Oral Q12H    montelukast (SINGULAIR) tablet 10 mg  10 mg Oral DAILY    therapeutic multivitamin (THERAGRAN) tablet 1 Tab  1 Tab Oral DAILY    nitroglycerin (NITROSTAT) tablet 0.4 mg  0.4 mg SubLINGual Q5MIN PRN    ranolazine ER (RANEXA) tablet 500 mg  500 mg Oral BID    levothyroxine (SYNTHROID) tablet 125 mcg  125 mcg Oral ACB    alum-mag hydroxide-simeth (MYLANTA) oral suspension 30 mL  30 mL Oral TID WITH MEALS    lidocaine (XYLOCAINE) 2 % viscous solution 15 mL  15 mL Mouth/Throat TIDAC       Imaging:   XR Results (most recent):  Results from Hospital Encounter encounter on 11/15/20   XR CHEST PORT    Narrative Portable Chest    CPT CODE: 46705    HISTORY: 2 hours of midsternal chest pain. FINDINGS:     Compared with 2/25/2020. Multiple wires overlie the patient. Low lung volumes. Heart size and mediastinal contours within normal limits. Patchy basilar densities. No pulmonary edema or pneumothorax. No acute osseous  abnormality. Arthritic change of the shoulders. Impression IMPRESSION:    Hypoinflation of the lungs with patchy basilar airspace disease favorable for  atelectasis        CT Results (most recent):  Results from [place] encounter on 10/23/20   CT ABD PELV WO CONT    Narrative EXAM: CT ABD PELV WO CONT    CLINICAL INDICATION/HISTORY: Abdomen pain diverticulitis    TECHNIQUE: Contiguous axial images were obtained through the abdomen and pelvis. From these, sagittal and coronal reconstructions were generated. Contrast Used: None    CT scans at this facility are performed using dose optimization technique as  appropriate with performed exam, to include automated exposure control,  adjustment of mA and/or kV according to patient's size (including appropriate  matching for site-specific examinations), or use of iterative reconstruction  technique. COMPARISON: August 2020    FINDINGS:        Lower chest: There are some focal calcifications the pleural surface in the left  lung base question prior asbestos exposure. There is no evidence of any pleural  effusion or nodules cardiac silhouette is enlarged but stable. Liver: Unremarkable. Gallbladder/biliary: Surgically removed Biliary tree unremarkable. Spleen: Unremarkable    Pancreas: Unremarkable. Left Kidney: Unremarkable.     Right Kidney: There is a mild amount of stranding around the right kidney  however this is similar in appearance to the August exam. No stone mass or  hydronephrotic changes are identified. Adrenals: Unremarkable. Bowels/mesentery: Diverticulosis identified in the descending colon sigmoid  region. There is Small amount peritoneal thickening is identified in the colonic  gutter on the left side at the site of concern for possible diverticulitis in  August. The lack of any change here suggests this is more likely chronic. There  is nothing to suggest a developing inflammatory focus by CT. Findings. Would  defer to white blood cell elevation as a guide for additional follow-up need for  additional imaging. Appendix: Surgically removed    Retroperitoneal Spaces: No intraperitoneal free air or free fluid. No fluid  collection. Pelvis: Urinary bladder unremarkable. Vascular: Aorta unremarkable for age. IVC unremarkable. Lymph Nodes: No adenopathy. Bones: Hip prosthesis on the left causes artifact through the pelvic region SI  joints sacroiliitis type changes with degenerative changes identified  bilaterally    Facet arthritis at L4-5-5 S1 with hypertrophic changes      Impression IMPRESSION[de-identified]    There is some thickening of the peritoneal lining posterior to the ascending  colon which is very similar to the August study. The lack of any changes against  this as a developing focus of infection. Extensive diverticular population in  the descending colon sigmoid region. Prior hysterectomy, prior appendectomy, prior left hip replacement and prior  cholecystectomy. Cardiomegaly  Calcifications in the pleural surface left lung base question asbestos exposure. 06/23/19   ECHO ADULT COMPLETE 06/24/2019 6/24/2019    Narrative · Definity contrast was given to enhance imaging. · Left Ventricle: Mild concentric hypertrophy. Low normal systolic   dysfunction. Estimated left ventricular ejection fraction is 51 - 55%. Visually measured ejection fraction. No regional wall motion abnormality   noted.  Inconclusive left ventricular diastolic function. · Tricuspid regurgitation is inadequate for estimation of right   ventricular systolic pressure. Signed by: Rayne Sifuentes MD        Labs:    Recent Results (from the past 48 hour(s))   EKG, 12 LEAD, INITIAL    Collection Time: 11/16/20 12:03 AM   Result Value Ref Range    Ventricular Rate 116 BPM    Atrial Rate 116 BPM    P-R Interval 146 ms    QRS Duration 80 ms    Q-T Interval 346 ms    QTC Calculation (Bezet) 480 ms    Calculated P Axis 63 degrees    Calculated R Axis -19 degrees    Calculated T Axis 69 degrees    Diagnosis       Sinus tachycardia  Nonspecific ST and T wave abnormality  Abnormal ECG  When compared with ECG of 19-SEP-2020 13:49,  Nonspecific T wave abnormality no longer evident in Inferior leads  Confirmed by Jeniffer Shaver MD, [place] ([id number]) on 11/16/2020 5:50:31 AM     METABOLIC PANEL, BASIC    Collection Time: 11/16/20 12:14 AM   Result Value Ref Range    Sodium 137 136 - 145 mmol/L    Potassium 3.7 3.5 - 5.5 mmol/L    Chloride 104 100 - 111 mmol/L    CO2 28 21 - 32 mmol/L    Anion gap 5 3.0 - 18 mmol/L    Glucose 287 (H) 74 - 99 mg/dL    BUN 6 (L) 7.0 - 18 MG/DL    Creatinine 0.79 0.6 - 1.3 MG/DL    BUN/Creatinine ratio 8 (L) 12 - 20      GFR est AA >60 >60 ml/min/1.73m2    GFR est non-AA >60 >60 ml/min/1.73m2    Calcium 8.6 8.5 - 10.1 MG/DL   CBC WITH AUTOMATED DIFF    Collection Time: 11/16/20 12:14 AM   Result Value Ref Range    WBC 5.5 4.6 - 13.2 K/uL    RBC 3.56 (L) 4.20 - 5.30 M/uL    HGB 11.5 (L) 12.0 - 16.0 g/dL    HCT 36.2 35.0 - 45.0 %    .7 (H) 74.0 - 97.0 FL    MCH 32.3 24.0 - 34.0 PG    MCHC 31.8 31.0 - 37.0 g/dL    RDW 11.7 11.6 - 14.5 %    PLATELET 832 734 - 091 K/uL    MPV 10.5 9.2 - 11.8 FL    NEUTROPHILS 50 40 - 73 %    LYMPHOCYTES 37 21 - 52 %    MONOCYTES 8 3 - 10 %    EOSINOPHILS 4 0 - 5 %    BASOPHILS 1 0 - 2 %    ABS. NEUTROPHILS 2.8 1.8 - 8.0 K/UL    ABS. LYMPHOCYTES 2.0 0.9 - 3.6 K/UL    ABS.  MONOCYTES 0.4 0.05 - 1.2 K/UL ABS. EOSINOPHILS 0.2 0.0 - 0.4 K/UL    ABS.  BASOPHILS 0.0 0.0 - 0.1 K/UL    DF AUTOMATED     TROPONIN I    Collection Time: 11/16/20 12:14 AM   Result Value Ref Range    Troponin-I, QT <0.02 0.0 - 0.045 NG/ML   D DIMER    Collection Time: 11/16/20 12:14 AM   Result Value Ref Range    D DIMER 1.39 (H) <0.46 ug/ml(FEU)   NT-PRO BNP    Collection Time: 11/16/20 12:14 AM   Result Value Ref Range    NT pro-BNP 37 0 - 1,800 PG/ML   TROPONIN I    Collection Time: 11/16/20  5:49 AM   Result Value Ref Range    Troponin-I, QT <0.02 0.0 - 0.045 NG/ML   GLUCOSE, POC    Collection Time: 11/16/20  8:22 AM   Result Value Ref Range    Glucose (POC) 133 (H) 70 - 110 mg/dL   GLUCOSE, POC    Collection Time: 11/16/20  5:03 PM   Result Value Ref Range    Glucose (POC) 176 (H) 70 - 110 mg/dL   HEMOGLOBIN A1C WITH EAG    Collection Time: 11/16/20  6:15 PM   Result Value Ref Range    Hemoglobin A1c 8.2 (H) 4.2 - 5.6 %    Est. average glucose 189 mg/dL   CARDIAC PANEL,(CK, CKMB & TROPONIN)    Collection Time: 11/16/20  6:15 PM   Result Value Ref Range    CK - MB <1.0 <3.6 ng/ml    CK-MB Index  0.0 - 4.0 %     CALCULATION NOT PERFORMED WHEN RESULT IS BELOW LINEAR LIMIT    CK 59 26 - 192 U/L    Troponin-I, QT 0.02 0.0 - 0.045 NG/ML   GLUCOSE, POC    Collection Time: 11/16/20  9:09 PM   Result Value Ref Range    Glucose (POC) 183 (H) 70 - 739 mg/dL   METABOLIC PANEL, BASIC    Collection Time: 11/17/20  5:10 AM   Result Value Ref Range    Sodium 138 136 - 145 mmol/L    Potassium 3.3 (L) 3.5 - 5.5 mmol/L    Chloride 106 100 - 111 mmol/L    CO2 27 21 - 32 mmol/L    Anion gap 5 3.0 - 18 mmol/L    Glucose 168 (H) 74 - 99 mg/dL    BUN 4 (L) 7.0 - 18 MG/DL    Creatinine 0.63 0.6 - 1.3 MG/DL    BUN/Creatinine ratio 6 (L) 12 - 20      GFR est AA >60 >60 ml/min/1.73m2    GFR est non-AA >60 >60 ml/min/1.73m2    Calcium 9.0 8.5 - 10.1 MG/DL   CBC WITH AUTOMATED DIFF    Collection Time: 11/17/20  5:10 AM   Result Value Ref Range    WBC 4.7 4.6 - 13.2 K/uL RBC 3.50 (L) 4.20 - 5.30 M/uL    HGB 11.1 (L) 12.0 - 16.0 g/dL    HCT 33.8 (L) 35.0 - 45.0 %    MCV 96.6 74.0 - 97.0 FL    MCH 31.7 24.0 - 34.0 PG    MCHC 32.8 31.0 - 37.0 g/dL    RDW 11.7 11.6 - 14.5 %    PLATELET 535 551 - 619 K/uL    MPV 10.6 9.2 - 11.8 FL    NEUTROPHILS 43 40 - 73 %    LYMPHOCYTES 42 21 - 52 %    MONOCYTES 8 3 - 10 %    EOSINOPHILS 6 (H) 0 - 5 %    BASOPHILS 1 0 - 2 %    ABS. NEUTROPHILS 2.1 1.8 - 8.0 K/UL    ABS. LYMPHOCYTES 2.0 0.9 - 3.6 K/UL    ABS. MONOCYTES 0.4 0.05 - 1.2 K/UL    ABS. EOSINOPHILS 0.3 0.0 - 0.4 K/UL    ABS. BASOPHILS 0.0 0.0 - 0.1 K/UL    DF AUTOMATED     TSH 3RD GENERATION    Collection Time: 11/17/20  5:10 AM   Result Value Ref Range    TSH 1.55 0.36 - 3.74 uIU/mL   GLUCOSE, POC    Collection Time: 11/17/20  6:16 AM   Result Value Ref Range    Glucose (POC) 142 (H) 70 - 110 mg/dL       Signed By: Chemo Willis MD     November 17, 2020      I spent 35 minutes with the patient in face-to-face consultation, of which greater than 50% was spent in counseling and coordination of care as described above    Disclaimer: Sections of this note are dictated using utilizing voice recognition software. Minor typographical errors may be present. If questions arise, please do not hesitate to contact me or call our department.
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
## 2020-11-18 ENCOUNTER — HOME HEALTH ADMISSION (OUTPATIENT)
Dept: HOME HEALTH SERVICES | Facility: HOME HEALTH | Age: 85
End: 2020-11-18
Payer: MEDICARE

## 2020-11-18 VITALS
SYSTOLIC BLOOD PRESSURE: 117 MMHG | OXYGEN SATURATION: 95 % | HEART RATE: 114 BPM | WEIGHT: 248 LBS | BODY MASS INDEX: 39.86 KG/M2 | TEMPERATURE: 97.9 F | DIASTOLIC BLOOD PRESSURE: 67 MMHG | RESPIRATION RATE: 20 BRPM | HEIGHT: 66 IN

## 2020-11-18 LAB
ANION GAP SERPL CALC-SCNC: 3 MMOL/L (ref 3–18)
BASOPHILS # BLD: 0 K/UL (ref 0–0.1)
BASOPHILS NFR BLD: 0 % (ref 0–2)
BUN SERPL-MCNC: 9 MG/DL (ref 7–18)
BUN/CREAT SERPL: 12 (ref 12–20)
CALCIUM SERPL-MCNC: 8.9 MG/DL (ref 8.5–10.1)
CHLORIDE SERPL-SCNC: 108 MMOL/L (ref 100–111)
CO2 SERPL-SCNC: 27 MMOL/L (ref 21–32)
CREAT SERPL-MCNC: 0.75 MG/DL (ref 0.6–1.3)
DIFFERENTIAL METHOD BLD: ABNORMAL
ECHO AO ROOT DIAM: 3.38 CM
ECHO LA AREA 4C: 21.88 CM2
ECHO LA VOL 4C: 60.86 ML (ref 22–52)
ECHO LA VOLUME INDEX A4C: 27.77 ML/M2 (ref 16–28)
ECHO LV INTERNAL DIMENSION DIASTOLIC: 4.29 CM (ref 3.9–5.3)
ECHO LV INTERNAL DIMENSION SYSTOLIC: 3.34 CM
ECHO LV IVSD: 1.43 CM (ref 0.6–0.9)
ECHO LV MASS 2D: 242.9 G (ref 67–162)
ECHO LV MASS INDEX 2D: 110.8 G/M2 (ref 43–95)
ECHO LV POSTERIOR WALL DIASTOLIC: 1.46 CM (ref 0.6–0.9)
ECHO LVOT DIAM: 2.29 CM
ECHO MV A VELOCITY: 48.36 CM/S
ECHO MV E DECELERATION TIME (DT): 146.21 MS
ECHO MV E VELOCITY: 42.53 CM/S
ECHO MV E/A RATIO: 0.88
EOSINOPHIL # BLD: 0.2 K/UL (ref 0–0.4)
EOSINOPHIL NFR BLD: 5 % (ref 0–5)
ERYTHROCYTE [DISTWIDTH] IN BLOOD BY AUTOMATED COUNT: 11.9 % (ref 11.6–14.5)
GLUCOSE BLD STRIP.AUTO-MCNC: 170 MG/DL (ref 70–110)
GLUCOSE BLD STRIP.AUTO-MCNC: 181 MG/DL (ref 70–110)
GLUCOSE BLD STRIP.AUTO-MCNC: 246 MG/DL (ref 70–110)
GLUCOSE SERPL-MCNC: 178 MG/DL (ref 74–99)
HCT VFR BLD AUTO: 34.5 % (ref 35–45)
HGB BLD-MCNC: 11.1 G/DL (ref 12–16)
LYMPHOCYTES # BLD: 1.7 K/UL (ref 0.9–3.6)
LYMPHOCYTES NFR BLD: 36 % (ref 21–52)
MCH RBC QN AUTO: 31.4 PG (ref 24–34)
MCHC RBC AUTO-ENTMCNC: 32.2 G/DL (ref 31–37)
MCV RBC AUTO: 97.7 FL (ref 74–97)
MONOCYTES # BLD: 0.6 K/UL (ref 0.05–1.2)
MONOCYTES NFR BLD: 11 % (ref 3–10)
NEUTS SEG # BLD: 2.3 K/UL (ref 1.8–8)
NEUTS SEG NFR BLD: 48 % (ref 40–73)
PLATELET # BLD AUTO: 211 K/UL (ref 135–420)
PMV BLD AUTO: 10.9 FL (ref 9.2–11.8)
POTASSIUM SERPL-SCNC: 3.8 MMOL/L (ref 3.5–5.5)
RBC # BLD AUTO: 3.53 M/UL (ref 4.2–5.3)
SODIUM SERPL-SCNC: 138 MMOL/L (ref 136–145)
WBC # BLD AUTO: 4.8 K/UL (ref 4.6–13.2)

## 2020-11-18 PROCEDURE — 85025 COMPLETE CBC W/AUTO DIFF WBC: CPT

## 2020-11-18 PROCEDURE — 74011250637 HC RX REV CODE- 250/637: Performed by: NURSE PRACTITIONER

## 2020-11-18 PROCEDURE — 74011000250 HC RX REV CODE- 250: Performed by: NURSE PRACTITIONER

## 2020-11-18 PROCEDURE — 99232 SBSQ HOSP IP/OBS MODERATE 35: CPT | Performed by: INTERNAL MEDICINE

## 2020-11-18 PROCEDURE — 74011250636 HC RX REV CODE- 250/636: Performed by: HOSPITALIST

## 2020-11-18 PROCEDURE — 74011250637 HC RX REV CODE- 250/637: Performed by: INTERNAL MEDICINE

## 2020-11-18 PROCEDURE — 97116 GAIT TRAINING THERAPY: CPT

## 2020-11-18 PROCEDURE — 93306 TTE W/DOPPLER COMPLETE: CPT | Performed by: INTERNAL MEDICINE

## 2020-11-18 PROCEDURE — 82962 GLUCOSE BLOOD TEST: CPT

## 2020-11-18 PROCEDURE — 94640 AIRWAY INHALATION TREATMENT: CPT

## 2020-11-18 PROCEDURE — 36415 COLL VENOUS BLD VENIPUNCTURE: CPT

## 2020-11-18 PROCEDURE — 97530 THERAPEUTIC ACTIVITIES: CPT

## 2020-11-18 PROCEDURE — 80048 BASIC METABOLIC PNL TOTAL CA: CPT

## 2020-11-18 PROCEDURE — 99239 HOSP IP/OBS DSCHRG MGMT >30: CPT | Performed by: EMERGENCY MEDICINE

## 2020-11-18 PROCEDURE — 74011636637 HC RX REV CODE- 636/637: Performed by: HOSPITALIST

## 2020-11-18 RX ORDER — INSULIN GLARGINE 100 [IU]/ML
15 INJECTION, SOLUTION SUBCUTANEOUS 2 TIMES DAILY
Qty: 1 PEN | Refills: 0 | Status: ON HOLD
Start: 2020-11-18 | End: 2021-01-22 | Stop reason: SDUPTHER

## 2020-11-18 RX ORDER — AMLODIPINE BESYLATE 10 MG/1
10 TABLET ORAL DAILY
Qty: 30 TAB | Refills: 0 | Status: SHIPPED | OUTPATIENT
Start: 2020-11-19 | End: 2021-01-25

## 2020-11-18 RX ORDER — MELATONIN
1000 2 TIMES DAILY
Qty: 60 TAB | Refills: 0 | Status: SHIPPED
Start: 2020-11-18

## 2020-11-18 RX ORDER — SPIRONOLACTONE 25 MG/1
25 TABLET ORAL DAILY
Qty: 30 TAB | Refills: 0 | Status: SHIPPED | OUTPATIENT
Start: 2020-11-19 | End: 2021-06-14 | Stop reason: DRUGHIGH

## 2020-11-18 RX ORDER — FAMOTIDINE 20 MG/1
20 TABLET, FILM COATED ORAL DAILY
Qty: 14 TAB | Refills: 0 | Status: SHIPPED | OUTPATIENT
Start: 2020-11-18 | End: 2020-12-02

## 2020-11-18 RX ORDER — HYDROCHLOROTHIAZIDE 12.5 MG/1
12.5 TABLET ORAL DAILY
Qty: 30 TAB | Refills: 0 | Status: SHIPPED | OUTPATIENT
Start: 2020-11-19 | End: 2020-11-30

## 2020-11-18 RX ADMIN — HYDROCHLOROTHIAZIDE 12.5 MG: 25 TABLET ORAL at 09:00

## 2020-11-18 RX ADMIN — INSULIN LISPRO 3 UNITS: 100 INJECTION, SOLUTION INTRAVENOUS; SUBCUTANEOUS at 09:29

## 2020-11-18 RX ADMIN — BUDESONIDE 500 MCG: 0.5 SUSPENSION RESPIRATORY (INHALATION) at 07:39

## 2020-11-18 RX ADMIN — Medication 10 ML: at 05:55

## 2020-11-18 RX ADMIN — CLOPIDOGREL BISULFATE 75 MG: 75 TABLET, FILM COATED ORAL at 09:21

## 2020-11-18 RX ADMIN — INSULIN LISPRO 6 UNITS: 100 INJECTION, SOLUTION INTRAVENOUS; SUBCUTANEOUS at 12:30

## 2020-11-18 RX ADMIN — FAMOTIDINE 20 MG: 20 TABLET, FILM COATED ORAL at 09:21

## 2020-11-18 RX ADMIN — AMLODIPINE BESYLATE 10 MG: 10 TABLET ORAL at 09:21

## 2020-11-18 RX ADMIN — ISOSORBIDE MONONITRATE 30 MG: 30 TABLET, EXTENDED RELEASE ORAL at 09:21

## 2020-11-18 RX ADMIN — RANOLAZINE 500 MG: 500 TABLET, FILM COATED, EXTENDED RELEASE ORAL at 09:24

## 2020-11-18 RX ADMIN — SPIRONOLACTONE 25 MG: 25 TABLET, FILM COATED ORAL at 09:22

## 2020-11-18 RX ADMIN — ENOXAPARIN SODIUM 40 MG: 100 INJECTION SUBCUTANEOUS at 09:26

## 2020-11-18 NOTE — PROGRESS NOTES
Physician Progress Note      PATIENT:               Eveline Durham  CSN #:                  [id number]  :                       1935  ADMIT DATE:       11/15/2020 11:59 PM  [address] [place] DATE:  RESPONDING  PROVIDER #:        Liyah Baig MD          QUERY TEXT:    Pt admitted with atypical chest pain. Pt noted to have CHF per cardiology, pt with edema and IV Lasix given. If possible, please document in progress notes and discharge summary if you are evaluating and/or treating any of the following: The medical record reflects the following:  Risk Factors: hx of chronic diastolic CHF, CAD, HTN  Clinical Indicators: Echo 2020 EF 50-55%, no WMA - unchanged from prior   Card: Chronic diastolic CHF NYHA class III mild edema and lung rales. Lasix 1 dose IV now.  Card: Acute on chronic diastolic CHF NYHA class III  Treatment: IV Lasix, Echo, VQ scan    Thank you,  Sandor Yu RN, [facility]  [phone]  Options provided:  -- Acute on Chronic Diastolic CHF/HFpEF  -- Chronic Diastolic CHF/HFpEF  -- Other - I will add my own diagnosis  -- Disagree - Not applicable / Not valid  -- Disagree - Clinically unable to determine / Unknown  -- Refer to Clinical Documentation Reviewer    PROVIDER RESPONSE TEXT:    This patient is in diastolic CHF/HFpEF exacerbation.     Query created by: Wale Massey on 2020 1:26 PM      Electronically signed by:  Liyah Baig MD 2020 1:48 PM

## 2020-11-18 NOTE — DIABETES MGMT
Glycemic Control Plan of Care    She plans to discharge home later today and will resume her home doses of insulin  She is followed by endocrinology and will make appointment with Dr. Justin Perry - she was to see him yesterday and had to cancel. TDD previous day = 16 units.    mg/dl      Your A1C  was   Lab Results   Component Value Date/Time    Hemoglobin A1c 8.2 (H) 11/16/2020 06:15 PM    Hemoglobin A1c (POC) 8.5 01/17/2019 12:19 PM    Hemoglobin A1c, External 7.5 10/31/2019       Home diabetes medications:  Lantus 32 units AM  Lantus 36 units PM      Richard Sutton MPH RN CDE  Pager [phone]

## 2020-11-18 NOTE — HOME CARE
Discharge noted for today. Received home health referral for [facility] PT OT Telehealth. Spoke with patient via phone, Explained services and answered all questions. Demographic verified, confirmed address: [address] [place] [address]. Patient lives alone, granddaughter Giovanni Lowery checked on her regularly. Brianna Webster (brother) lives nearby.  Patient has the following DME: RW. Orders noted and arranged.     Juve Edwards, [address] Intake/Liaison

## 2020-11-18 NOTE — DISCHARGE INSTRUCTIONS
Patient Education        Angina: Care Instructions  Your Care Instructions     You have a problem called angina. Angina happens when there is not enough blood flow to your heart muscle. Angina is a sign of coronary artery disease (CAD). CAD occurs when blood vessels that supply the heart become narrowed. Having CAD increases your risk of a heart attack. Chest pain or pressure is the most common symptom of angina. But some people have other symptoms, like:  · Pain, pressure, or a strange feeling in the back, neck, jaw, or upper belly, or in one or both shoulders or arms. · Shortness of breath. · Nausea or vomiting. · Lightheadedness or sudden weakness. · Fast or irregular heartbeat. Women are somewhat more likely than men to have angina symptoms like shortness of breath, nausea, and back or jaw pain. Angina can be dangerous. That's why it is important to pay attention to your symptoms. Know what is typical for you, learn how to control your symptoms, and understand when you need to get treatment. A change in your usual pattern of symptoms is an emergency. It may mean that you are having a heart attack. The doctor has checked you carefully, but problems can develop later. If you notice any problems or new symptoms, get medical treatment right away. Follow-up care is a key part of your treatment and safety. Be sure to make and go to all appointments, and call your doctor if you are having problems. It's also a good idea to know your test results and keep a list of the medicines you take. How can you care for yourself at home? Medicines    · If your doctor has given you nitroglycerin for angina symptoms, keep it with you at all times. If you have symptoms, sit down and rest, and take the first dose of nitroglycerin as directed. If your symptoms get worse or are not getting better within 5 minutes, call 911 right away. Stay on the phone.  The emergency  will give you further instructions.     · If your doctor advises it, take 1 low-dose aspirin a day to prevent heart attack.     · Be safe with medicines. Take your medicines exactly as prescribed. Call your doctor if you think you are having a problem with your medicine. You will get more details on the specific medicines your doctor prescribes. Lifestyle changes    · Do not smoke. If you need help quitting, talk to your doctor about stop-smoking programs and medicines. These can increase your chances of quitting for good.     · Eat a heart-healthy diet that is low in saturated fat and salt, and is high in fiber. Talk to your doctor or a dietitian about healthy eating.     · Stay at a healthy weight. Or lose weight if you need to. Activity    · Talk to your doctor about a level of activity that is safe for you.     · If an activity causes angina symptoms, stop and rest.   When should you call for help? Call 911 anytime you think you may need emergency care. For example, call if:    · You passed out (lost consciousness).     · You have symptoms of a heart attack. These may include:  ? Chest pain or pressure, or a strange feeling in the chest.  ? Sweating. ? Shortness of breath. ? Nausea or vomiting. ? Pain, pressure, or a strange feeling in the back, neck, jaw, or upper belly or in one or both shoulders or arms. ? Lightheadedness or sudden weakness. ? A fast or irregular heartbeat. After you call 911, the  may tell you to chew 1 adult-strength or 2 to 4 low-dose aspirin. Wait for an ambulance. Do not try to drive yourself.     · You have angina symptoms that do not go away with rest or are not getting better within 5 minutes after you take a dose of nitroglycerin. Call your doctor now if:    · Your angina symptoms seem worse but still follow your typical pattern. You can predict when symptoms will happen, but they may come on sooner, feel worse, or last longer.     · You feel dizzy or lightheaded, or you feel like you may faint.    Watch closely for changes in your health, and be sure to contact your doctor if you have any problems. Where can you learn more? Go to http://www.gray.com/  Enter H129 in the search box to learn more about \"Angina: Care Instructions. \"  Current as of: December 16, 2019               Content Version: 12.6  © 3330-0009 [employer]. Care instructions adapted under license by [employer] (which disclaims liability or warranty for this information). If you have questions about a medical condition or this instruction, always ask your healthcare professional. [address] any warranty or liability for your use of this information.

## 2020-11-18 NOTE — PROGRESS NOTES
Cardiopulmonary Navigator Recommendations    Discussed with patient benefits of Cardiac/Pulmonary Rehabilitation after discharge. Educational Material provided. Reinforced importance of good nutrition, low sodium diet and monitoring fluid intake, strict medication compliance, daily weights and blood pressure check. Pt is a good candidate for outpatient evaluation. Please consider referring patient for evaluation at OP Cardio Pulmonary Rehabilitation. Rehabilitation:  Cardiac Rehabilitation      Criteria:  Cardiac:   Atypical chest pain  History of coronary artery disease-  Morbid obesity  Acute on chronic diastolic congestive heart failure-now compensated        CPAP/BiPAP:  Criteria:   In the setting of morbid obesity combined with heart disease, recommend Sleep Study after discahrge  Available Devices:  [facility]  Respro    C

## 2020-11-18 NOTE — ROUTINE PROCESS
Bedside and Verbal shift change report given to [facility] (oncoming nurse) by Bladimir Tate (offgoing nurse). Report included the following information SBAR, Kardex, Intake/Output and MAR.

## 2020-11-18 NOTE — PROGRESS NOTES
Problem: Mobility Impaired (Adult and Pediatric)  Goal: *Acute Goals and Plan of Care (Insert Text)  Description: Physical Therapy Goals  Initiated 11/17/2020 and to be accomplished within 7 day(s)  1. Patient will move from supine to sit and sit to supine , scoot up and down, and roll side to side in bed with modified independence. 2.  Patient will transfer from bed to chair and chair to bed with modified independence using the least restrictive device. 3.  Patient will perform sit to stand with modified independence. 4.  Patient will ambulate with modified independence for 150 feet with the least restrictive device. PLOF: Pt reporting she lives in a 1 story house with ramp to enter. She lives alone and uses RW and wheelchair for mobility. Her brother lives across the street. Pt is [place] in all mobility. Outcome: Progressing Towards Goal     PHYSICAL THERAPY TREATMENT    Patient: Verna Arredondo (57 y.o. female)  Date: 11/18/2020  Diagnosis: Chest pain [R07.9]   <principal problem not specified>       Precautions: Fall    ASSESSMENT:  RN cleared for PT to work with pt. Pt found supine in bed, c/o nausea from her potassium pill, but willing to work with PT. She is very talkative and states the toilet in the bathroom is too low for her so she has been using the [facility]. She also states she lives alone and does not call for help to her family often. Pt performed supine-sit transfer SBA and required ~5 minute rest break to \"get her bearings\" prior to standing. RN in room requesting for PT to get walking O2 test. Spo2 sitting EOB 97%. Pt stood with CGA and RW, Spo2 95% and pt amb 20 feet around room with RW. Once preparing to get back to bed, SPO2 dropped to 80%. Pt sitting and O2 quickly back up to 95% within 20 seconds. She reports slight SOB. Pt returned back supine with SBA. Pt elft with HOB elevated, call bell nearby, and all needs met.  Recommend d/c home with [place] and increased support from brother who lives across the street if possible. Progression toward goals:   []      Improving appropriately and progressing toward goals  [x]      Improving slowly and progressing toward goals  []      Not making progress toward goals and plan of care will be adjusted     PLAN:  Patient continues to benefit from skilled intervention to address the above impairments. Continue treatment per established plan of care. Discharge Recommendations:  Home Health with increased support  Further Equipment Recommendations for Discharge:  N/A     SUBJECTIVE:   Patient stated I have family nearby but I wouldn't call them to tell them i'm here.     OBJECTIVE DATA SUMMARY:   Critical Behavior:  Neurologic State: Alert  Orientation Level: Oriented X4  Cognition: Follows commands  Safety/Judgement: Fall prevention  Functional Mobility Training:  Bed Mobility:     Supine to Sit: Stand-by assistance     Scooting: Stand-by assistance         Transfers:  Sit to Stand: Contact guard assistance  Stand to Sit: Contact guard assistance    Balance:  Sitting: Intact  Standing: Impaired; With support  Standing - Static: Good  Standing - Dynamic : Fair    Ambulation/Gait Training:  Distance (ft): 20 Feet (ft)  Assistive Device: Walker, rolling  Ambulation - Level of Assistance: Contact guard assistance        Gait Abnormalities: Decreased step clearance        Base of Support: Widened     Speed/Nano: Shuffled; Slow  Step Length: Right shortened;Left shortened        Pain:  Pain level pre-treatment: 0/10  Pain level post-treatment: 0/10   Pain Intervention(s): Medication (see MAR); Rest, Repositioning   Response to intervention: Nurse notified, See doc flow    Activity Tolerance:   Pt tolerated mobility fair, SPO2 dropped to 80% after 2 minutes of amb. Please refer to the flowsheet for vital signs taken during this treatment.   After treatment:   [] Patient left in no apparent distress sitting up in chair  [x] Patient left in no apparent distress in bed  [x] Call bell left within reach  [x] Nursing notified  [] Caregiver present  [] Bed alarm activated  [] SCDs applied      COMMUNICATION/EDUCATION:   [x]         Role of Physical Therapy in the acute care setting. [x]         Fall prevention education was provided and the patient/caregiver indicated understanding. [x]         Patient/family have participated as able in working toward goals and plan of care. []         Patient/family agree to work toward stated goals and plan of care. []         Patient understands intent and goals of therapy, but is neutral about his/her participation.   []         Patient is unable to participate in stated goals/plan of care: ongoing with therapy staff.  []         Other:        Jimmy Mccain   Time Calculation: 31 mins

## 2020-11-18 NOTE — PROGRESS NOTES
[facility]      CARDIOLOGY PROGRESS NOTE  RECS:  1. Chest pain - Resolved, Trop Neg x 3, EKG ST with non-specific ST abnormality. Elevated D-Dimer, VQ scan neg for PE. 2. CAD h/o PCI to RCA 2016 - Cardiac cath 6/2019 - 50% mid LAD stenosis and widely patent previous proximal right coronary stent. Continue plavix, aspirin, Imdur, Ranexa, norvasc and metoprolol. 3. Hypertension - improved, Continue Norvasc, metoprolol, HCTZ and Aldactone. Monitor b/p.    4. Acute on chronic diastolic CHF NYHA class III - Echo 11/2020 EF 50-55%, no WMA - unchanged from prior  5. Hyperlipidemia -  - she is intolerant to statins, and has declined Repatha in the past  6. DM II - uncontrolled A1C 8.2 - management per primary team.  7. Hypokalemia - Improved. She is now taking aldactone - will not need  Potassium supplements. Recommend BMP in 3 days. 8. Hypothyroidism - continue synthroid  9. Nausea - Treatment per primary team - discussed    Acute CHF seems to be improving as edema has resolved. Rales still persist but they could also be due to COPD. Continue present medications. Discussed with patient and explained that she may not need p.o. potassium pill as she is will be on Aldactone now. Recommend to ambulate in room. If stable, may d/c to home and follow up with Dr. Ever Freeman in 2-3 days. -  Echo 11/2020  · Technically difficult study with poor endocardial visualization and technically difficult study due to patient's body habitus. Definity contrast was given to enhance imaging. · LV: Estimated LVEF is 50 - 55%. Visually measured ejection fraction. Normal cavity size. Mildly to moderately increased wall thickness. Low normal systolic function. Wall motion: normal. Inconclusive left ventricular diastolic function. Comparison Study Information   Prior Study   There is a prior study available for comparison. Prior study date: 6/24/2019.  As compared to the previous study, there are no significant changes. ASSESSMENT:  Hospital Problems  Date Reviewed: 10/27/2020          Codes Class Noted POA    Chest pain ICD-10-CM: R07.9  ICD-9-CM: 786.50  2/6/2017 Unknown                SUBJECTIVE:  No CP  Improving SOB    OBJECTIVE:    VS:   Visit Vitals  BP (!) 149/70 (BP 1 Location: Right arm, BP Patient Position: Sitting)   Pulse 83   Temp 98 °F (36.7 °C)   Resp 19   Ht 5' 6\" (1.676 m)   Wt 112.5 kg (248 lb)   SpO2 95%   BMI 40.03 kg/m²         Intake/Output Summary (Last 24 hours) at 11/18/2020 1111  Last data filed at 11/18/2020 0820  Gross per 24 hour   Intake 1140 ml   Output 400 ml   Net 740 ml     TELE: normal sinus rhythm    General: alert, well developed and in no apparent distress  HENT: Normocephalic, atraumatic. Normal external eye. Neck :  no JVD  Cardiac:  regular rate and rhythm, S1, S2 normal, systolic murmur: early systolic 2/6, blowing BASILIA border, no click, no rub or gallop  Lungs: Few bibasilar rales  Abdomen: Soft, nontender, no masses  Extremities:  No c/c/e, peripheral pulses present      Labs: Results:       Chemistry Recent Labs     11/18/20 0226 11/17/20  0510 11/16/20  0014   * 168* 287*    138 137   K 3.8 3.3* 3.7    106 104   CO2 27 27 28   BUN 9 4* 6*   CREA 0.75 0.63 0.79   CA 8.9 9.0 8.6   AGAP 3 5 5   BUCR 12 6* 8*      CBC w/Diff Recent Labs     11/18/20 0226 11/17/20  0510 11/16/20  0014   WBC 4.8 4.7 5.5   RBC 3.53* 3.50* 3.56*   HGB 11.1* 11.1* 11.5*   HCT 34.5* 33.8* 36.2    215 206   GRANS 48 43 50   LYMPH 36 42 37   EOS 5 6* 4      Cardiac Enzymes Recent Labs     11/16/20  1815   CPK 59   CKND1 CALCULATION NOT PERFORMED WHEN RESULT IS BELOW LINEAR LIMIT      Coagulation No results for input(s): PTP, INR, APTT, INREXT in the last 72 hours.     Lipid Panel Lab Results   Component Value Date/Time    Cholesterol, total 231 (H) 07/07/2020 11:36 AM    HDL Cholesterol 42 07/07/2020 11:36 AM    LDL, calculated 165.8 (H) 07/07/2020 11:36 AM    VLDL, calculated 23.2 07/07/2020 11:36 AM    Triglyceride 116 07/07/2020 11:36 AM    CHOL/HDL Ratio 5.5 (H) 07/07/2020 11:36 AM      BNP No results for input(s): BNPP in the last 72 hours. Liver Enzymes No results for input(s): TP, ALB, TBIL, AP in the last 72 hours. No lab exists for component: SGOT, GPT, DBIL   Thyroid Studies Lab Results   Component Value Date/Time    TSH 1.55 11/17/2020 05:10 AM              Last Dunn NP     I have independently evaluated and examined the patient. All relevant labs and testing data are reviewed. Care plan discussed and updated after review.   Ria Paul MD

## 2020-11-18 NOTE — DISCHARGE SUMMARY
[address], [address]     DISCHARGE SUMMARY    Name: Ziggy Orourke MRN: [id number]   Age / Sex: 82 y.o. / female CSN: [id number]   YOB: 1935 Length of Stay: 2 days   Admit Date: 11/15/2020 Discharge Date:        PRIMARY CARE PHYSICIAN: Kaleb Bazzi MD      DISCHARGE DIAGNOSES:    1. Atypical chest pain  2. History of coronary artery disease-  3. Hypertension  4. H/o hypothyroidism-  5. type 2 diabetes-  6. Morbid obesity  7. Acute on chronic diastolic congestive heart failure-now compensated    CONSULTS CALLED: Cardiology      PROCEDURES DONE: None      COURSE IN THE HOSPITAL: This is a 51-year-old female with a past medical history of coronary artery disease and congestive heart failure and hypertension who presented to the ED with some chest pain. Patient was admitted. Cardiac biomarkers were trended. Patient was monitored on telemetry. Cardiology was consulted. Echocardiogram was done which did not show any significant changes compared to prior. PT OT were consulted. Cardiology has cleared the patient for discharge. Discharge plans were discussed with the patient and she verbalized understanding and agreed. Patient declines transition to care facility and wishes to go home with home health care. I have discussed with the . I have discussed with the nurse taking care of the patient. MEDICATIONS ON DISCHARGE:    Current Discharge Medication List      START taking these medications    Details   glucagon (GLUCAGEN) 1 mg injection 1 mL by IntraMUSCular route as needed for Hypoglycemia. Qty: 1 Vial, Refills: 0      famotidine (PEPCID) 20 mg tablet Take 1 Tab by mouth daily for 14 days. Qty: 14 Tab, Refills: 0      hydroCHLOROthiazide (HYDRODIURIL) 12.5 mg tablet Take 1 Tab by mouth daily. Qty: 30 Tab, Refills: 0      spironolactone (ALDACTONE) 25 mg tablet Take 1 Tab by mouth daily.   Qty: 30 Tab, Refills: 0         CONTINUE these medications which have CHANGED    Details   cholecalciferol (Vitamin D3) (1000 Units /25 mcg) tablet Take 1 Tab by mouth two (2) times a day. Qty: 60 Tab, Refills: 0      amLODIPine (NORVASC) 10 mg tablet Take 1 Tab by mouth daily. Qty: 30 Tab, Refills: 0      Lantus Solostar U-100 Insulin 100 unit/mL (3 mL) inpn 15 Units by SubCUTAneous route two (2) times a day. Qty: 1 Pen, Refills: 0         CONTINUE these medications which have NOT CHANGED    Details   clopidogreL (PLAVIX) 75 mg tab TAKE 1 TABLET BY MOUTH DAILY  Qty: 30 Tab, Refills: 2    Associated Diagnoses: S/P coronary artery stent placement      metoprolol tartrate (LOPRESSOR) 25 mg tablet TAKE 1 TABLET BY MOUTH EVERY 12 HOURS  Qty: 60 Tab, Refills: 5      isosorbide mononitrate ER (IMDUR) 30 mg tablet TAKE 1 TABLET BY MOUTH EVERY MORNING  Qty: 90 Tab, Refills: 3    Comments: **Patient requests 90 days supply**  Associated Diagnoses: Other forms of angina pectoris (HCC)      albuterol (PROVENTIL HFA, VENTOLIN HFA, PROAIR HFA) 90 mcg/actuation inhaler INHALE 1 PUFF BY MOUTH EVERY 4 HOURS AS NEEDED FOR WHEEZING OR SHORTNESS OF BREATH  Qty: 18 g, Refills: 12    Associated Diagnoses: Moderate intermittent asthma, with acute exacerbation      Flovent Diskus 100 mcg/actuation dsdv INHALE 1 PUFF BY MOUTH TWICE DAILY  Qty: 1 Inhaler, Refills: 5      multivitamin with minerals (MULTIVITAMIN & MINERAL FORMULA PO) Take 1 Tab by mouth daily. ranolazine ER (RANEXA) 500 mg SR tablet Take 1 Tab by mouth two (2) times a day. Qty: 180 Tab, Refills: 6    Associated Diagnoses: Chest pain, unspecified type      montelukast (SINGULAIR) 10 mg tablet Take 1 Tab by mouth daily. Indications: inflammation of the nose due to an allergy  Qty: 90 Tab, Refills: 4    Associated Diagnoses: Mild intermittent asthma without complication;  Allergic rhinitis, unspecified seasonality, unspecified trigger      lidocaine (ASPERCREME, LIDOCAINE,) 4 % patch 1 Patch by TransDERmal route every eight (8) hours. Qty: 1 Package, Refills: 3    Associated Diagnoses: Chronic pain of left knee      loratadine (CLARITIN) 10 mg tablet Take 1 Tab by mouth daily. Qty: 90 Tab, Refills: 1    Associated Diagnoses: Allergic rhinitis, unspecified seasonality, unspecified trigger      Insulin Syringe-Needle U-100 (BD INSULIN SYRINGE ULTRA-FINE) 0.5 mL 31 gauge x 5/16\" syrg BD Insulin Syringe Ultra-Fine 0.5 mL 31 gauge x 5/16\"      ascorbic acid, vitamin C, (VITAMIN C) 250 mg tablet Take 250 mg by mouth daily. 1 pill po daily  Indications: inadequate vitamin C      acetaminophen (TYLENOL ARTHRITIS PAIN) 650 mg TbER Take 650 mg by mouth every eight (8) hours. 1 pill po q 8 hours prn pain, fever  Indications: fever, pain      cyanocobalamin ER 1,000 mcg tablet Take 1 Tab by mouth daily. Qty: 30 Tab, Refills: 3    Associated Diagnoses: Weakness; Macrocytosis      DOCOSAHEXANOIC ACID/EPA (FISH OIL PO) Take 1,000 mg by mouth two (2) times a day. 1 pill po twice daily       nitroglycerin (NITROSTAT) 0.4 mg SL tablet 1 Tab by SubLINGual route every five (5) minutes as needed for Chest Pain. Up to 3 doses. Qty: 20 Tab, Refills: 0      SYNTHROID 112 mcg tablet Take 1 Tab by mouth Daily (before breakfast). 125 mcg daily      RONNELL PEN NEEDLE 32 gauge x 5/32\" ndle Refills: 4      bisacodyl (DULCOLAX, BISACODYL,) 5 mg EC tablet Take 2 Tabs by mouth daily as needed for Constipation. Qty: 30 Tab, Refills: 0      magnesium oxide (MAG-OX) 400 mg tablet Take 1 Tab by mouth two (2) times a day. Qty: 180 Tab, Refills: 3    Associated Diagnoses: Low magnesium level      capsaicin 0.075 % topical cream Apply  to affected area three (3) times daily.   Qty: 60 g, Refills: 0         STOP taking these medications       furosemide (LASIX) 20 mg tablet Comments:   Reason for Stopping:         OTHER Comments:   Reason for Stopping:                 DISCHARGE VITAL SIGNS:  Visit Vitals  BP (!) 149/70 (BP 1 Location: Right arm, BP Patient Position: Sitting)   Pulse 83   Temp 98 °F (36.7 °C)   Resp 19   Ht 5' 6\" (1.676 m)   Wt 112.5 kg (248 lb)   SpO2 95%   BMI 40.03 kg/m²       DISCHARGE PHYSICAL EXAMINATION:  General:  Awake, alert  Cardiovascular:  S1S2+, RRR  Pulmonary: Decreased breath sounds bilateral bases  GI:  Soft, BS+, NT, ND, obese  Extremities:  + edema      CONDITION ON DISCHARGE: Stable. DISPOSITION: Home with home health care      FOLLOW-UP RECOMMENDATIONS:   Follow-up Information     Follow up With Specialties Details Why Contact Info    Terrance Marshall MD Family Medicine   [address] [place] [id number] [phone]            OTHER INSTRUCTIONS:        TIME SPENT ON DISCHARGE ACTIVITIES: More than 35 minutes. Dragon medical dictation software was used for portions of this report. Unintended errors may occur.       Signed:  Emmanuel Valencia MD      11/18/2020

## 2020-11-18 NOTE — PROGRESS NOTES
Discharge order noted for today. Pt has been accepted to [facility] agency. Met with patient  and are agreeable to the transition plan today. Transport has been arranged through daughter. Patient's discharge summary and home health  orders have been forwarded to [facility] home health  agency via [facility] Updated bedside RN, Chanel Hawley,  to the transition plan.   Discharge information has been documented on the AVS.       Lian Ahuja RN BSN  Care Manager  [phone]

## 2020-11-18 NOTE — ROUTINE PROCESS
`Patient refused some of her morning medications. Education was given about benefits and risks of not taking medication. She reported that Mylanta and Xylocaine hurt her stomach with a burning sensation. At 1700, she refused to take Lasix and Hydrodiuril. Patient stated, \"I refuse to take more than one blood pressure medication at a time. Lasix is a morning medication and it is the afternoon. \"
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
## 2020-11-18 NOTE — PROGRESS NOTES
conducted an initial consultation and Spiritual Assessment for Jv Tellez, who is a 82 y.o.,female. Patient's Primary Language is: [place].    According to the patient's EMR Catholic Affiliation is: [employer].     The reason the Patient came to the hospital is:   Patient Active Problem List    Diagnosis Date Noted    Type 2 diabetes with nephropathy ([facility] [address] 01/13/2020    Tinnitus of both ears 12/04/2019    Hypertensive urgency 06/24/2019    Tachycardia 06/24/2019    Tachypnea 06/24/2019    Right-sided chest pain 05/16/2019    Urinary tract infection with hematuria 05/07/2019    Frequent urination 05/07/2019    Bad odor of urine 05/07/2019    Lower abdominal pain 04/23/2019    Intermittent lightheadedness 04/23/2019    Deep vein thrombosis (DVT) of popliteal vein of left lower extremity ([facility] [address] 09/08/2018    Preoperative cardiovascular examination 06/11/2018    Periprosthetic left distal femur fracture 06/10/2018    Callie-prosthetic femur fracture at tip of prosthesis 06/10/2018    Right groin pain 06/16/2017    BMI 38.0-38.9,adult 06/07/2017    Bilateral lower extremity edema 04/25/2017    Chest pain 02/06/2017    Advanced care planning/counseling discussion 08/17/2016    S/P drug eluting coronary stent placement 06/10/2016    NSTEMI (non-ST elevated myocardial infarction) ([facility] [address] 05/28/2016    Moderate persistent asthma with status asthmaticus 69/30/4396    Uncomplicated asthma 99/85/4516    Tear of left rotator cuff 03/08/2016    Medication monitoring encounter 01/12/2015    Seasonal and perennial allergic rhinitis 07/30/2013    Morbid obesity ([address]     Unspecified transient cerebral ischemia     Congestive heart failure (HCC)     Mixed hyperlipidemia     Coronary atherosclerosis of native coronary artery     Chronic diastolic heart failure (HCC)     Benign hypertensive heart disease without heart failure     Type 2 diabetes mellitus with hyperglycemia, with long-term current use of insulin ([facility] [address] 12/04/2012    Hypothyroidism     Leg pain, right 09/05/2012    Diabetic neuropathy ([facility] [address] [address] 04/20/2012    Dizziness 04/20/2012    Chronic neck pain 04/20/2012    Chronic back pain 04/20/2012    DJD (degenerative joint disease)     S/P joint replacement     Noncompliance with medication regimen 02/08/2011    Arm pain 02/08/2011    Neck pain 02/08/2011    Anxiety 02/08/2011    HTN (hypertension) 05/20/2010    Unspecified hypothyroidism 05/20/2010    Hypovitaminosis D 05/20/2010    Asthma 05/20/2010    Esophageal reflux 05/20/2010        The  provided the following Interventions:  Initiated a relationship of care and support. Explored issues of vinod, belief. Listened empathetically. Provided chaplaincy education. Offered assurance of continued prayers on patient's behalf. Chart reviewed. The following outcomes where achieved:  Patient shared limited information about both their medical narrative and spiritual beliefs. Patient processed feeling about current hospitalization. Patient expressed gratitude for 's visit. Assessment:  Patient does not have any known Hinduism/cultural needs that will affect patient's preferences in health care. There are no known spiritual or Hinduism issues which require intervention at this time. Plan:  Chaplains will continue to follow and will provide pastoral care as needed and as requested.  recommends bedside caregivers page the  on duty if patient shows signs of spiritual or emotional distress. [address], [address]  [address]   [phone]

## 2020-11-19 ENCOUNTER — PATIENT OUTREACH (OUTPATIENT)
Dept: CASE MANAGEMENT | Age: 85
End: 2020-11-19

## 2020-11-19 NOTE — PROGRESS NOTES
Transitions of Care     Patient was admitted to [facility] on  11/15/2020 and discharged on 2020 for chest pain. Outreach made within 2 business days of discharge: Yes    Top Discharge Challenges to be reviewed by the provider   Additional needs identified to be addressed with provider no    Discussed COVID-19 related testing which was not done at this time. Medication review unable to be completed with patient at this time. Method of communication with provider :  None        Advance Care Planning:   Does patient have an Advance Directive:  no    Inpatient Readmission Risk score: 93%  Was this a readmission? no   Patient stated reason for the admission: n/a    Patients top risk factors for readmission: medical condition  Interventions to address risk factors:   See goals please     Care Transition Nurse (CTN) contacted the patient by telephone to perform post hospital discharge assessment. Verified name and  with patient as identifiers. Provided introduction to self, and explanation of the CTN role. CTN reviewed discharge instructions, medical action plan and red flags with patient who verbalized understanding. Patient given an opportunity to ask questions and does not have any further questions or concerns at this time. The patient agrees to contact the PCP office for questions related to their healthcare. Medication reconciliation was attempted  with patient, who declined review and  verbalizes understanding of administration of home medications. There is one question that patient has for Dr. Justin Perry and she states that she has been with him for a long time and trusts him. She will call Dr. Callum Pineda for follow up. Patient states that her pharmacy will call her when prescriptions are filled. Patient states that she does not have any questions for CTN regarding her medications at this time.       Referral to Pharm D needed: no      Home Health/Outpatient orders at discharge: PT, OT and [address]: Brandon Pruett3  Date of initial visit:   To Be scheduled    Durable Medical Equipment ordered at discharge: None noted at this time   [address] Diamond received: n/a    Covid Risk Education    Patient has following risk factors of: diabetes. Education provided regarding when to seek medical attention with patient who verbalized understanding. Patient states:   - She wears a mask and has been doing this for a while due to allergies. - She takes her vital signs  - patient has  and uses wipes   - Thinks she is up to date on this information. Patient  given an opportunity for questions and concerns. The patient agrees to contact the COVID-19 hotline [phone] or PCP office for questions related to COVID-19. For more information on steps you can take to protect yourself, see CDC's How to Protect Yourself     Patient/family/caregiver given information for [employer] and agrees to enroll yes  Patient's preferred e-mail: declines  Patient's preferred phone number: [phone]    Discussed follow-up appointments. If no appointment was previously scheduled, appointment scheduling offered: n/a as patient has PCP follow up appointment scheduled for 11/23/2020. [facility] follow up appointment(s):   Future Appointments   Date Time Provider Chelle Reeves   11[facility]23[facility]2020  1:00 PM [facility]   11/25/2020 11:30 AM Dima Vargas MD [facility] BS AMB   11/25/2020 11:45 AM Arlette Obrien MD [facility] BS AMB   1/27/2021 10:30 AM MD CÉSAR FonsecaP BS AMB   2/16/2021  9:45 AM Arlette Obrien MD [facility] BS [facility]     [facility] follow up appointment(s):   None noted at this time. Plan for follow-up call in 10-14 days based on severity of symptoms and risk factors. CTN provided contact information for future needs.     Goals Addressed                 This Visit's Progress     Attends follow-up appointments as directed. 9/21/20 Patient will attend all scheduled appointments through 11/21/20 11/19/2020   Patient will attend all scheduled appointments [facility] 12-.   - Patient has PCP follow up appointment scheduled for 11/23/2020         Prevent complications post hospitalization. Target Date: 12- 11/19/2020   Patient and/or family members were alerted to the availability of physician or other advanced practioners after hours, weekends, and holidays. Please call the PCP office phone number and speak with the answering service for assistance. Patient asked to follow with emergency services as needed. Care Transitions Nurse ( CTN)   contact information provided for follow up as needed.  Supportive resources in place to maintain patient in the community (ie. Home Health, DME equipment, refer to, medication assistant plan, etc.)        Target Date:  12- 11/19/2020   [facility] to provide skilled home care visits        Understands red flags post discharge.         Target Date: 12-    Care Transitions Nurse (CTN) reviewed red flags with patient as follows:   Please call 911 for immediate assistance for symptoms such as:   - Chest Pain  - Severe shortness of breath   - Change in mental status   - Blue tint to face or lips   - New or worsening symptoms

## 2020-11-20 ENCOUNTER — TELEPHONE (OUTPATIENT)
Dept: CARDIAC REHAB | Age: 85
End: 2020-11-20

## 2020-11-20 NOTE — TELEPHONE ENCOUNTER
Cardiac Rehab screening complete. According to Medicare, patients diagnosed with CHF must have an EF of 35% or less to qualify for the program. Due to patient's recent ECHO showing an ejection fraction of 50-55%, she is not a candidate at this time.     Thank you,  Patricia Riley

## 2020-11-21 ENCOUNTER — HOME CARE VISIT (OUTPATIENT)
Dept: SCHEDULING | Facility: HOME HEALTH | Age: 85
End: 2020-11-21
Payer: MEDICARE

## 2020-11-21 VITALS
DIASTOLIC BLOOD PRESSURE: 80 MMHG | HEART RATE: 92 BPM | SYSTOLIC BLOOD PRESSURE: 148 MMHG | RESPIRATION RATE: 18 BRPM | TEMPERATURE: 98.8 F | OXYGEN SATURATION: 95 %

## 2020-11-21 PROCEDURE — 3331090001 HH PPS REVENUE CREDIT

## 2020-11-21 PROCEDURE — 3331090002 HH PPS REVENUE DEBIT

## 2020-11-21 PROCEDURE — 400013 HH SOC

## 2020-11-21 PROCEDURE — G0299 HHS/HOSPICE OF RN EA 15 MIN: HCPCS

## 2020-11-22 ENCOUNTER — HOSPITAL ENCOUNTER (OUTPATIENT)
Dept: LAB | Age: 85
Discharge: HOME | End: 2020-11-22
Payer: MEDICARE

## 2020-11-22 ENCOUNTER — HOME CARE VISIT (OUTPATIENT)
Dept: SCHEDULING | Facility: HOME HEALTH | Age: 85
End: 2020-11-22
Payer: MEDICARE

## 2020-11-22 LAB
ALBUMIN SERPL-MCNC: 3.5 G/DL (ref 3.4–5)
ALBUMIN/GLOB SERPL: 0.7 {RATIO} (ref 0.8–1.7)
ALP SERPL-CCNC: 91 U/L (ref 45–117)
ALT SERPL-CCNC: 17 U/L (ref 13–56)
ANION GAP SERPL CALC-SCNC: 6 MMOL/L (ref 3–18)
AST SERPL-CCNC: 16 U/L (ref 10–38)
BASOPHILS # BLD: 0.1 K/UL (ref 0–0.06)
BASOPHILS NFR BLD: 1 % (ref 0–3)
BILIRUB SERPL-MCNC: 0.7 MG/DL (ref 0.2–1)
BUN SERPL-MCNC: 8 MG/DL (ref 7–18)
BUN/CREAT SERPL: 11 (ref 12–20)
CALCIUM SERPL-MCNC: 9.5 MG/DL (ref 8.5–10.1)
CHLORIDE SERPL-SCNC: 105 MMOL/L (ref 100–111)
CO2 SERPL-SCNC: 30 MMOL/L (ref 21–32)
CREAT SERPL-MCNC: 0.74 MG/DL (ref 0.6–1.3)
DIFFERENTIAL METHOD BLD: ABNORMAL
EOSINOPHIL # BLD: 0.3 K/UL (ref 0–0.4)
EOSINOPHIL NFR BLD: 6 % (ref 0–5)
ERYTHROCYTE [DISTWIDTH] IN BLOOD BY AUTOMATED COUNT: 12.1 % (ref 11.6–14.5)
GLOBULIN SER CALC-MCNC: 4.9 G/DL (ref 2–4)
GLUCOSE SERPL-MCNC: 98 MG/DL (ref 74–99)
HCT VFR BLD AUTO: 36.7 % (ref 35–45)
HGB BLD-MCNC: 12.1 G/DL (ref 12–16)
LYMPHOCYTES # BLD: 2.1 K/UL (ref 0.8–3.5)
LYMPHOCYTES NFR BLD: 42 % (ref 20–51)
MAGNESIUM SERPL-MCNC: 2 MG/DL (ref 1.6–2.6)
MCH RBC QN AUTO: 32.9 PG (ref 24–34)
MCHC RBC AUTO-ENTMCNC: 33 G/DL (ref 31–37)
MCV RBC AUTO: 99.7 FL (ref 74–97)
MONOCYTES # BLD: 0.3 K/UL (ref 0–1)
MONOCYTES NFR BLD: 6 % (ref 2–9)
NEUTS SEG # BLD: 2.3 K/UL (ref 1.8–8)
NEUTS SEG NFR BLD: 45 % (ref 42–75)
PLATELET # BLD AUTO: 185 K/UL (ref 135–420)
PLATELET COMMENTS,PCOM: ABNORMAL
PMV BLD AUTO: 11.3 FL (ref 9.2–11.8)
POTASSIUM SERPL-SCNC: 3.9 MMOL/L (ref 3.5–5.5)
PROT SERPL-MCNC: 8.4 G/DL (ref 6.4–8.2)
RBC # BLD AUTO: 3.68 M/UL (ref 4.2–5.3)
RBC MORPH BLD: ABNORMAL
SODIUM SERPL-SCNC: 141 MMOL/L (ref 136–145)
WBC # BLD AUTO: 5.1 K/UL (ref 4.6–13.2)

## 2020-11-22 PROCEDURE — G0299 HHS/HOSPICE OF RN EA 15 MIN: HCPCS

## 2020-11-22 PROCEDURE — 3331090001 HH PPS REVENUE CREDIT

## 2020-11-22 PROCEDURE — 36415 COLL VENOUS BLD VENIPUNCTURE: CPT

## 2020-11-22 PROCEDURE — G0151 HHCP-SERV OF PT,EA 15 MIN: HCPCS

## 2020-11-22 PROCEDURE — 85025 COMPLETE CBC W/AUTO DIFF WBC: CPT

## 2020-11-22 PROCEDURE — 3331090002 HH PPS REVENUE DEBIT

## 2020-11-22 PROCEDURE — 80053 COMPREHEN METABOLIC PANEL: CPT

## 2020-11-22 PROCEDURE — 83735 ASSAY OF MAGNESIUM: CPT

## 2020-11-23 ENCOUNTER — VIRTUAL VISIT (OUTPATIENT)
Dept: FAMILY MEDICINE CLINIC | Age: 85
End: 2020-11-23
Payer: MEDICARE

## 2020-11-23 ENCOUNTER — HOME CARE VISIT (OUTPATIENT)
Dept: HOME HEALTH SERVICES | Facility: HOME HEALTH | Age: 85
End: 2020-11-23
Payer: MEDICARE

## 2020-11-23 VITALS
SYSTOLIC BLOOD PRESSURE: 140 MMHG | TEMPERATURE: 97.5 F | RESPIRATION RATE: 16 BRPM | DIASTOLIC BLOOD PRESSURE: 70 MMHG | HEART RATE: 76 BPM | OXYGEN SATURATION: 97 %

## 2020-11-23 DIAGNOSIS — Z09 HOSPITAL DISCHARGE FOLLOW-UP: Primary | ICD-10-CM

## 2020-11-23 DIAGNOSIS — I11.0 HYPERTENSIVE HEART DISEASE WITH HEART FAILURE (HCC): ICD-10-CM

## 2020-11-23 DIAGNOSIS — I50.32 CHRONIC DIASTOLIC HEART FAILURE (HCC): ICD-10-CM

## 2020-11-23 DIAGNOSIS — I25.118 ATHEROSCLEROSIS OF NATIVE CORONARY ARTERY OF NATIVE HEART WITH STABLE ANGINA PECTORIS (HCC): ICD-10-CM

## 2020-11-23 DIAGNOSIS — E66.01 MORBID OBESITY WITH BMI OF 40.0-44.9, ADULT (HCC): ICD-10-CM

## 2020-11-23 DIAGNOSIS — R07.89 ATYPICAL CHEST PAIN: ICD-10-CM

## 2020-11-23 DIAGNOSIS — E87.6 HYPOKALEMIA: ICD-10-CM

## 2020-11-23 PROCEDURE — 99496 TRANSJ CARE MGMT HIGH F2F 7D: CPT | Performed by: NURSE PRACTITIONER

## 2020-11-23 PROCEDURE — 3331090001 HH PPS REVENUE CREDIT

## 2020-11-23 PROCEDURE — 3331090002 HH PPS REVENUE DEBIT

## 2020-11-23 PROCEDURE — 1111F DSCHRG MED/CURRENT MED MERGE: CPT | Performed by: NURSE PRACTITIONER

## 2020-11-23 NOTE — PROGRESS NOTES
Adele Guzman presents today for   Chief Complaint   Patient presents with   [facility] Follow Up     admitted to [facility] on 11/15/20 for chest pain and discharged on 11/18/20       Is someone accompanying this pt? no    Is the patient using any DME equipment during [address]? no    Depression Screening:  3 most recent PHQ Screens 3/5/2020   PHQ Not Done -   Little interest or pleasure in doing things Not at all   Feeling down, depressed, irritable, or hopeless Not at all   Total Score PHQ 2 0       Learning Assessment:  Learning Assessment 1/13/2020   PRIMARY LEARNER Patient   HIGHEST LEVEL OF EDUCATION - PRIMARY LEARNER  SOME COLLEGE   BARRIERS PRIMARY LEARNER [address] CAREGIVER No   CO-LEARNER NAME -   PRIMARY LANGUAGE ENGLISH    NEED -   LEARNER PREFERENCE PRIMARY LISTENING     -     -     -     -   ANSWERED BY self   RELATIONSHIP SELF       Abuse Screening:  Abuse Screening Questionnaire 10/27/2020   Do you ever feel afraid of your partner? N   Are you in a relationship with someone who physically or mentally threatens you? N   Is it safe for you to go home? Y       Fall Screening  Fall Risk Assessment, last 12 mths 7/23/2020   Able to walk? Yes   Fall in past 12 months? No   Fall with injury? -   Number of falls in past 12 months -   Fall Risk Score -       Generalized Anxiety  No flowsheet data found. Health Maintenance Due   Topic Date Due    Shingrix Vaccine Age 49> (1 of 2) 01/30/1987    Foot Exam Q1  10/24/2018    MICROALBUMIN Q1  02/25/2020   . Health Maintenance reviewed and discussed and ordered per Provider. Coordination of Care  1. Have you been to the ER, urgent care clinic since your last visit? Hospitalized since your last visit? Yes, [facility]    2. Have you seen or consulted any other health care providers outside of the [address] [place] [address] since your last visit? Include any pap smears or colon screening.  no      Advance Directive:  Discussed 1/13/20

## 2020-11-23 NOTE — PROGRESS NOTES
Patient:   SAPNA DEWEY            MRN: [facility]-[id number]            FIN: [id number]              Age:   85 years     Sex:  MALE     :  37   Associated Diagnoses:   None   Author:   MAME EUGENE     History of Present Illness             The patient presents with This is an 85-year-old male with past medical history for glioblastoma () status post total resection and chemo.  Patient follows Dr. Sauceda outpatient.  Prostate CA status post brachii therapy, HTN, TIA-, macular degeneration, right cataract repair, perirectal abscess, gout who presented to [facility] from [facility] with poss fall 2/2 right-sided numbness, gait abnormality and  abnormal MRI brain on 10/7 showed glioblastomawith new meningeal enhancement.  CT head, CTA head and neck unremarkable.  Neurology consulted and recommended aspirin 81 and statin.  MRI brain 10/14/2019 done showing concern for ischemia to posterior limb of the left internal capsule.  The patient was then transferred to [facility].  Oncology continue to follow the patient and recommends possible LP, new MRI.  Neurology consulted and recommends  continuing atorvastatin, dual antiplatelet therapy with Plavix and aspirin x3 weeks and then monotherapy with Plavix.  He is on Keppra 500 mg every 12 hours.  Patient admitted to acute inpatient rehabilitation on 2019.  10/21 Patient seen and examined earlier today in his room . He is alert and cooperative.  No headaches no chest pain no shortness of breath.  Patient concerned that he was not having therapies today.  Reminded patient that he already had 2 hours of therapy this morning and he will have another hour of physical therapy this afternoon.  Reviewed patient's speech evaluation for cognition he is requiring min assist with memory and moderate assist  with problem solving.  10/22 Patient seen and examined today sitting at beside. He reports no acute problems overnight. He remains on contact  Vitals not taken due to this is a telephone encounter precautions for history of ESBL. He states his strength is improving on his right side and is 4+, but he does complain of numbness on the right side of his face, entire arm, and entire leg. He also complains of not having the urge to urniate, but states he is able to go when he tries. He was able to have a bowel movement today  and denied diarrhea or dark stool. He denies any headaches, dizziness, shortness of breath, or chest pain. His vital signs are stable with  today, pulse 82.  He is participating in therapy and planned goals include: balance, bed mobility, gait training, caregiver training, and neuromuscular reeducation. Continue to monitor.  10/23: Patient seen and examined sitting up in bed this morning. He rerports good appetite and says he slept well. He does comlain of right leg, arm, and face numbness. He states he can feel when I touch his arm or leg, but it feels \"like a log\". He also complains of not moving enough in his therapies. We discussed core strengthening prior to ambulating with the patient. PVRs were obtained with insignificant volumes, the order was  discontinued. He denies any issue with bowel or bladder today. He also denies any dizziness, chest pain, or shortness of breath. His vitals are stable with SBP ranging 138-136. He continues to have poor insight to his deficits. A conference was done on  the patient today, see notes  10/24: Patient seen and examined resting in bed this morning. He is able to complete most ADLs independently, but he requires cuing and is 1:1 for meals due to his poor vision. He denies any headaches, chest pain, shortness of breath, or abdominal pain today. He remains on contact precaution for ESBL positive urine. He his participating in therapy and demonstrates good strength in his upper and lower extremities bilaterally, but requires  frequent cuing. He remains moderate to max assist on his transfers. He was independent at home prior to his most recent  set back. His wife will need to be trained to assist with his care. New today, he has palpable petichiae on b/l shins. He denies pruritits or pain. His LEANDRO hose are in place. We will continue to monitor. check cbc in AM  10/25: Patient seen and examined at therapy this morning. He was encouraged to drink more fluids as his labs were significant for elevated BUN 23 and Creatinine 1.31. We will monitor. Vital signs are stable with /79 and pulse 77.  He complains of not having a bowel movement for 3 days, his senna dose will be increased and we will monitor. He denies any abdominal discomfort or bloating. He continues to work with therapy. He does not  complain of any pruritis or pain in his legs. He denies any shortness of breath, headaches, chest pain, or dizziness.  10/26/19-Patient seen and examined earlier today, late entry.  Vitals reviewed, stable.  Patient denies pain.  He is with urinary incontinence.  He is wearing a [place] catheter and complains that it is leaking.  Encourage patient to continue to push p.o. fluids.  Had bowel movement yesterday.  10/27-patient seen.  Discussed with nurse on the unit.  Sleeping comfortably.  Blood pressure stable.  No new labs.  No new complaints.  10/28 Patient seen and examined at therapy. He was encouraged to continue eating and drinking. He is participating in therapies and will progress to parallel bars.  He denies any abdominal discomfort. His vital signs are stable with -125. CPM and monitor.  10/29 Patient seen and examined at bedside this morning. He reports no acute events over night. He ate most of his breakfast. Vital signs are stable, [id number], pulse 69. He continues to express concern and depressed affect regarding the loss of sensation in his right arm and leg. He is reassured that he is doing the right things and shuold continue participating in therapies and he is agreeable. Neuropsychology was called and will see the  patient tomorrow. CPM and  monitor.   10/30 Patient seen and examined sitting by the nursing station this morning awaiting his therapy. He reports no issues over night. He remains in a depressed mood regarding the numbness in his right arm and leg. He is encouraged to continue participating in therapies and doing what is in his control, and he will be seen by the neuropsychologist today for evaluation. His vital signs VS are stable, -132, pulse 80. He denies headaches,  dizziness, shortness of breath, or chest pain.  Patient conferenced on today, see notes below.  10/31 Patient seen and examined in bed this morning. He has no complaints and reports no issues over night. He denies any shortness of breath, chest pain, constipation, or leg pain. He says his right arm and leg feel like \"logs\". He is encouraged to continue working with therapies and reassured that he is doing the right things to improve his condition following his stroke. He is not opposed to starting antidepressant medications  following his conversation with the neuropsychologist, and this will be reviewed with his other medications. Per his wife, she would like him home and monitor his mood before consideration of starting any anti-depressant medication patient was upset this morning because he perceived that he was not getting sufficient help with his feeding nursing technician  11/1 Patient seen and examined at bedside today. He complains of stomch discomfort and asks for 7-up. He says he is cold at night and is told he can ask for a heated blanket. He has no further concerns at this time.  He is participating in therapies.  11/2: Patient seen and examined with PT.  He feels that his vision has gotten worse, more on the right than the left.  He expresses his frustration that \"the doctors ruined my eyes\".  Both pupils are nonreactive to light, left is pinpoint, right is a little larger.  Patient does admit to blurry vision bilaterally.  Patient required assistance with  feeding today.  Patient has intermittent pain in the right upper extremity, he states  that it is from an RN moving it incorrectly.  He denies any shortness of breath, chest pain, headache, or dizziness.  His BP is stable.  He has antigravity strength in the right upper and right lower extremity.  He is voiding without difficulty.  He had a recent BM  I saw the patient with MARKOS Carranza.  I agree with the assessment, physical exam findings and a plan of rehabilitation.  I conducted a face to face meeting with the patient.  I personally performed a substantive portion of today's visit with key components as follows:  The patient denies shortness of breath or chest pain.  He has difficulty seeing.  Chest was clear to auscultation posteriorly.  Good vascular quotation revealed S1, S2, no murmurs.  Abdomen was soft and nontender with positive bowel sounds.  Extremities revealed no peripheral edema.  Strength was normal in upper lower extremities.  We will continue with rehabilitation program.    11/03/19 -the patient denies shortness of breath or chest pain.  He is on contact isolation for ESBL in the urine.  The patient complained of nausea and vomiting this morning.  11/4/19 - The patient is persevering over not being able to taste his food and states he threw up yesterday. He denies nausea/abd pain, and he admits to recent bowel movements. He is participating in therapies and denies and headache, SOB, or CP. His vital signs are stable, -126. Patient was conferenced on today, see note below.  Therapy team recommending patient be discharged for subacute rehab to continue therapies.  Patient continues  to require significant assist with ADLs and mobility  11/5 Patient seen and examined at bedside today. He complains about not being able to taste his food and says when he eats it it sits in his stomach and he feels like throwing it up. He admits to zofran helping a little.  He vomited x2 since this weekend.   GI service on consult.  Plan for patient to have an endoscopy tomorrow.  Patient's discharge on hold.  s. He is requiring significant asssistance to ambulate. He will not be discharging  today, he will be evaluated with an EGD tomorrow by GI..       Review of Systems   Constitutional:  Weakness, Decreased activity, No fever, No fatigue.   Eye:  Visual disturbances, Macular degeneration history.    Ear/Nose/Mouth/Throat:  Decreased hearing.    Cardiovascular:  No chest pain, No bradycardia, No tachycardia, No peripheral edema.   Respiratory:  No shortness of breath, No cough.    Gastrointestinal:  Vomiting, No nausea, No constipation.         Abdominal pain: Epigastric area, Characterized as ( Intermittent ).   Genitourinary:  No dysuria.    Musculoskeletal:  Negative except as documented in history of present illness.   Integumentary:  No rash, No petechiae.    Hematology/Lymphatics   Neurologic:  Abnormal balance, Numbness, Right sided numbness on his face, entire arm, and entire leg., No headache.   Allergy/Immunologic:     Allergic Reactions (All)  NKA.    Psychiatric:  Anxiety, depressed mood regarding paresthesia of right arm and leg and inability to taste his food.   All other systems The rest of the 10 point system reviewed and negative.    Histories   Past Med History: Past Medical History   Blind right eye  Blindness - both eyes  CA - Cancer of prostate  Cataract  Chronic pain  Glioblastoma  Gout  Hypertension  Macular degeneration    Family History:    No family history items have been selected or recorded.   Procedure History:    Cataract surgery ([id number]).   Social History       Alcohol  Details: Use: Current.  Frequency: 3-5 times per week.  Home/Environment  Details: Alcohol Abuse in Household: No.  Substance Abuse in Household: No.  Smoker in Household: No.  Substance Abuse  Details: Use: None.  Tobacco  Details: Smoked/Smokeless Tobacco Last 30 Days: No.  Use: Never smoker.  11/5/2019  Physical  Therapy     90   Bed Mobility Supine to Sit Min A   Bed Mobility Sit to Supine Min A   Transfer Chair to Bed Min A, Mod A    * 20   PT Gait Assistive Devices * Rolling Walker   PT Gait Level Of Assistance * Maximum Assist    * 0   PT Mobility Assistive Devices Wheelchair - Standard, Wheelchair - Cushion  PT Discharge Recommendations Home With 24 Hour Assist, Physical Therapy  Occupational Therapy      90  AIDL Toilet Transfer  Mod A  AIDL Tub Transfer  Does Not Occur  AIDL Shower Transfer  Mod A  AIDL Eating  Min A  AIDL Grooming  Min A  AIDL Bathing  Max A  AIDL UE Dressing  Mod A  AIDL LE Dressing  Max A  Recommended Equip for Discharge-OT  Hand Held Shower, Hospital Bed, Raised Toilet Seat, Tub Bench  .       Health Status   Allergies:    Allergic Reactions (All)  NKA, Allergies (ST)   Allergies (1) Active Reaction  NKA None Documented    Current medications:    Medications reviewed.,    Medications (16) Active  Scheduled: (13)  Allopurinol 100 mg tab  100 mg 1 tab, Oral, Daily [after breakfast]  AmLODIPine 5 mg tab  5 mg 1 tab, Oral, Daily  Aspirin 81 mg DR tab  81 mg 1 tab, Oral, Daily  Atorvastatin 80 mg tab  80 mg 1 tab, Oral, Daily  Clopidogrel 75 mg tab  75 mg 1 tab, Oral, Daily  Heparin 5,000 unit/1 mL inj  5,000 unit 1 mL, Subcutaneous, Q8H  Influenza virus vaccine, inactivated PF (latex free) quadrivalent 0.5 mL IM inj SDV  0.5 mL, IM, On Call  LevETIRAcetam 500 mg tab  500 mg 1 tab, Oral, Q12H  Pantoprazole 40 mg DR tab  40 mg 1 tab, Oral, Daily  Pneumococcal adult vaccine 23-polyvalent 0.5 mL IM inj SDV  0.5 mL, IM, On Call  Polyethylene glycol 3350 oral recon powder 17 gm packet UD  17 gm 1 packet, Oral, Daily  Senna 8.6 mg tab  17.2 mg 2 tab, Oral, BID  Tamsulosin 0.4 mg cap  0.4 mg 1 cap, Oral, Daily [after dinner]  Continuous: (0)  PRN: (3)  Aluminum-magnesium hydrox-simethicone SS 30 mL oral susp UD  30 mL, Oral, BID  Bisacodyl 10 mg suppos  10 mg 1 suppository, Rectal, Once (scheduled)   Ondansetron 4 mg disintegrating tab  4 mg 1 tab, Oral, Q6H  ,   Home Medications (13) Active  allopurinol oral 100 mg tablet 100 mg = 1 tab, Oral, Daily  atorvastatin oral 80 mg tablet 80 mg = 1 tab, Oral, Daily  bisacodyl rectal 10 mg suppository 10 mg = 1 suppository, PRN, Rectal, Once (scheduled)  Centrum Silver oral tablet 1 tab, Oral, Daily  famotidine oral 20 mg tablet 20 mg = 1 tab, Oral, Daily  heparin 5000 units/mL injectable solution 5,000 unit, Subcutaneous, Q8H  Keppra oral 500 mg tablet 500 mg = 1 tab, Oral, Q12H  MiraLax oral powder for solution 17 gm = 17 gm, PRN, Oral, Daily  Norvasc oral 5 mg tablet 5 mg = 1 tab, Oral, Daily  Plavix oral 75 mg tablet 75 mg = 1 tab, Oral, Daily  PreserVision oral tablet 1 tab, Oral, Daily  senna oral 8.6 mg tablet 17.2 mg = 2 tab, Oral, BID  tamsulosin oral 0.4 mg capsule 0.4 mg = 1 cap, Oral, Daily        Physical Examination   VS/Measurements     Vitals between:   04-NOV-2019 10:55:40   TO   05-NOV-2019 10:55:40                   LAST RESULT MINIMUM MAXIMUM  Temperature 37 36.8 37  Heart Rate 81 67 81  Respiratory Rate 16 16 16  NISBP           133 107 142  NIDBP           62 62 85  NIMBP           86 82 104  SpO2                    97 94 97  , Measurements from flowsheet : Height and Weight   11/04/19 12:00 CST       CLINICALWEIGHT            68.7 kg     ,    Dosing Weight:   70.2 kg    11/02/2019 14:46  Most Recent Clinical Weight:   68.7  kg    11/04/2019 12:00    Previous Clinical Weight:   70.2  kg 11/02/2019 13:52  Changed:   -   %2.14      General:  Alert and oriented, No acute distress.    Eye:  Normal conjunctiva.    HENT:  Normocephalic.    Neck:  Supple, Non-tender, No jugular venous distention.    Respiratory:  Lungs are clear to auscultation, Respirations are non-labored, Breath sounds are equal.   Cardiovascular:  Normal rate, No murmur, Good pulses equal in all extremities, No edema, Irregular heart rate.   Gastrointestinal:  Soft, Non-tender, Normal  bowel sounds.    Musculoskeletal:  Decreased sensation right side including face right upper extremity and lower extremity  Right bicep 4/5, equal  strength  Right decreased proprioception  Right dorsiflexors 4/5, right hip flexors 4/5  Left upper and lower extremity 5/5  Right dysmetria, dysdiadochokinesia.    Integumentary:  Warm, Dry.    Neurologic:  Alert, Oriented.    Cognition and Speech:  Oriented, Speech clear and coherent.    Psychiatric:  Cooperative, Dysphoric affect.      Review / Management   Laboratory results:     Labs between:  04-NOV-2019 10:55 to 05-NOV-2019 10:55  BMP:                 Na  Cl  BUN  Glu   05-NOV-2019 138  106  (H) 27  89                              K  CO2  Cr  Ca                              4.3  26  (H) 1.34  9.2                  ,    .    Lines and Tubes:    NO DATA QUALIFIED FOR THIS PATIENT: LINES, TUBES AND DRAINS.   .      Impression and Plan   Dx and Plan:     Diagnosis     Assessment/Plan:   CVA-left posterior limb of internal capsule  10/14 MRI significant for restricted diffusion posterior limb of the left internal capsule consistent with recent ischemia  Atorvastatin, dual antiplatelet therapy with Plavix and aspirin x3 weeks and then monotherapy with Plavix  STOP ASA 11/4  Glioblastoma status post gross total resection (7/13) and chemo radiation history. Keppra 500 mg twice daily . Will place  patient on seizure precaution  10/7 MRI consistent with new meningeal enhancement  Neuro consulted 11/01 oliver with Dr. Og no further work-up needed  possible fall 2/2 CVA  Right sean-  10/20 /2019 no change with hemiparesis.  He does have active movement of his right upper and lower extremities at least 4/5  10/22 4+ strength on Right upper and lower extremities, complains of numbness on the right side.  10/23 Participating in therapy, continues to complain of numbness and not \"moving the blood\" on his weak side. Counseled and reassured.  10/24 Good strength on Right  side, 4+ throughout. Requires cuing.  10/28 Strength improving, but apraxia preventing safe standing and transfers, continue to participate with therapies, complains of R sided numbness.  10/29 Complains of right sided paresthesia, progressing with standing  11/01 Patient now ambulating with a rolling walker and frequent cuing.  Gait abnormality and debility  HTN  10/19/2019 blood pressure and pulse rates doing well  10/20/2019 blood pressure pulse rates doing well  10/22 , rate 82. Monitor  10/23 -138, Monitor  10/24 -132, Monitor 10/25 patient's blood pressure stable with occasional mild elevation patient is on amlodipine 5 mg daily CPM and monitor  10/26: Systolic blood pressure this morning of 123, 122/81  10/28 -125, CPM and Monitor.  10/29 -132, CPM and monitor.  10/30 132/88, CPM  10/31 BP Reviewed, 138/84 today, CPM and monitor.  11/1 -138, CPM and monitor  11/4 -126, CPM and monitor  11/5 -133, CPM and monitor  TIA-2016  Gout  Allopurinol  ESBL hx  Contact isolation 10/22 asked infectious disease service if able to discontinue contact isolation.  Patient's urine culture was positive in 2013 10/23 per epidemiology nurse patient will always require contact isolation when admitted to the hospital.  No protocol to clear patient from contact isolation.  Patient with urine culture that was positive for ESBL in 2013  Prostate CA status post brachytherapy  Macular degeneration  10/20/2019 patient will follow-up with ophthalmology services after discharge  Right cataract repair  Petichiae  10/24 Raised, red petichiae on b/l shins. Assymptomatic currently, will monitor with CBC.  10/25 CBC significant for Hgb 12.8. , platelets 152 stable . No skin worsening, pruritis, or pain. Monitor.  10/28 Resolved  Perirectal abscess history  10/25 mildly elevated BUN and creatinine: BUN 23 creatinine 1.31 p.o. fluids  11/01: continue to push PO fluids   11/04: Recheck Labs  to assess hydration status  11/5 GI Consult - recommend PPI, EGD tomorrow, potential gastric emptying study.    DVT prophylaxis  Heparin  Nausea and Vomiting   11/03/19 -  Zofran prn, continue Pepcid.   10/29 patient discouraged about his slow progress in therapies.  Concerned the patient may be depressed.  Discussed with Dr. Daisy Dennison neuropsychologist who will evaluate him tomorrow  10/30 Patient talked to neuropsychologist who states he may benefit from anti-depressant therapy 2/2 his dysphoric affect. He feels hopeless and helpless. He has never taken anti depressant medications before. The patient is agreeable. It will be discussed further with his wife.  Discussed with patient's wife she would like him to wait until he goes home before any decision about need to start patient on antidepressant medication  11/05 earlier this morning discussed with patient's son.  Family concerned with patient decreased appetite with food becoming stuck in his throat.  Patient's complaints vary according to the patient he does not eat because he does not have any taste for the food.  However later he states that he does not eat because he feels full very easily and the food gets stuck in his throat.  Discussed with GI service plan for endoscopy tomorrow.  Patient  is medically stabilized to proceed with with the endoscopy.  Discussed with speech therapy they are planning on completing a bedside swallow study after the endoscopy to decide if need to proceed with video swallow study.  Discussed with Dr. Sauceda patient's oncologist plan for endoscopy and possible video swallow study.  Patient's son is appealing patient's discharge from acute inpatient rehabilitation.  Greater than 35 minutes spent on  patient's care with greater than 50% of the time spent in coordination of care  Continue therapies with OT PT and speech therapy.    11/4  RN: Complaining of upset stomach and vomiting since friday, improved with zofran and  ginger ale. Eating poorly. Skin in tact, mostly continent but needs help with urinal.  Speech: Signed off last thurs. Mod assist problem solving and memory, mod i verbal expresion. Back to baseline. General thin diet with 1:1 2/2 visual impairment  OT: Eating min, grooming min, bathing mod, UED min, LED mod, toileting mod, toilet trans min, shower trans min assist.  PT: Mod assist bed mobility, min/mod transfers. Min to strong side, mod to weak side. Max assist with walker for 20 feet. Poor sense of midline.  CM: Certified to stay until 11/5, undecided for ECF - [facility] or [facility].   .     .         Course: Progressing as expected.    Orders     Orders   Pharmacy:  Pepcid oral 20 mg tablet (Order Processing): 20 mg, Oral, Daily, Routine, Order Start: 11/03/19 12:38 CST, Tab  Zofran ODT oral 4 mg disintegrating tablet (Order Processing): 4 mg, Oral, Q6H, PRN nausea or vomiting, Routine, Order Start: 11/03/19 12:38 CST, Tab Disintegrating.    .

## 2020-11-24 ENCOUNTER — HOME CARE VISIT (OUTPATIENT)
Dept: SCHEDULING | Facility: HOME HEALTH | Age: 85
End: 2020-11-24
Payer: MEDICARE

## 2020-11-24 VITALS
OXYGEN SATURATION: 96 % | HEART RATE: 78 BPM | RESPIRATION RATE: 18 BRPM | TEMPERATURE: 98.8 F | DIASTOLIC BLOOD PRESSURE: 80 MMHG | SYSTOLIC BLOOD PRESSURE: 152 MMHG

## 2020-11-24 VITALS
DIASTOLIC BLOOD PRESSURE: 83 MMHG | RESPIRATION RATE: 22 BRPM | SYSTOLIC BLOOD PRESSURE: 149 MMHG | OXYGEN SATURATION: 96 % | HEART RATE: 94 BPM | TEMPERATURE: 96.7 F

## 2020-11-24 PROCEDURE — 3331090001 HH PPS REVENUE CREDIT

## 2020-11-24 PROCEDURE — 3331090002 HH PPS REVENUE DEBIT

## 2020-11-24 PROCEDURE — G0152 HHCP-SERV OF OT,EA 15 MIN: HCPCS

## 2020-11-25 PROCEDURE — 3331090002 HH PPS REVENUE DEBIT

## 2020-11-25 PROCEDURE — 3331090001 HH PPS REVENUE CREDIT

## 2020-11-26 PROCEDURE — 3331090002 HH PPS REVENUE DEBIT

## 2020-11-26 PROCEDURE — 3331090001 HH PPS REVENUE CREDIT

## 2020-11-27 PROCEDURE — 3331090002 HH PPS REVENUE DEBIT

## 2020-11-27 PROCEDURE — 3331090001 HH PPS REVENUE CREDIT

## 2020-11-28 ENCOUNTER — HOME CARE VISIT (OUTPATIENT)
Dept: SCHEDULING | Facility: HOME HEALTH | Age: 85
End: 2020-11-28
Payer: MEDICARE

## 2020-11-28 PROCEDURE — 3331090001 HH PPS REVENUE CREDIT

## 2020-11-28 PROCEDURE — G0299 HHS/HOSPICE OF RN EA 15 MIN: HCPCS

## 2020-11-28 PROCEDURE — 3331090002 HH PPS REVENUE DEBIT

## 2020-11-29 VITALS
SYSTOLIC BLOOD PRESSURE: 142 MMHG | TEMPERATURE: 97.4 F | HEART RATE: 68 BPM | RESPIRATION RATE: 20 BRPM | OXYGEN SATURATION: 97 % | DIASTOLIC BLOOD PRESSURE: 86 MMHG

## 2020-11-29 PROCEDURE — 3331090002 HH PPS REVENUE DEBIT

## 2020-11-29 PROCEDURE — 3331090001 HH PPS REVENUE CREDIT

## 2020-11-30 ENCOUNTER — OFFICE VISIT (OUTPATIENT)
Dept: CARDIOLOGY CLINIC | Age: 85
End: 2020-11-30
Payer: MEDICARE

## 2020-11-30 VITALS
TEMPERATURE: 98.2 F | BODY MASS INDEX: 40.02 KG/M2 | HEIGHT: 66 IN | DIASTOLIC BLOOD PRESSURE: 80 MMHG | WEIGHT: 249 LBS | SYSTOLIC BLOOD PRESSURE: 164 MMHG | HEART RATE: 86 BPM

## 2020-11-30 DIAGNOSIS — I25.118 ATHEROSCLEROSIS OF NATIVE CORONARY ARTERY OF NATIVE HEART WITH STABLE ANGINA PECTORIS (HCC): Primary | ICD-10-CM

## 2020-11-30 DIAGNOSIS — E78.2 MIXED HYPERLIPIDEMIA: ICD-10-CM

## 2020-11-30 DIAGNOSIS — Z95.5 S/P CORONARY ARTERY STENT PLACEMENT: ICD-10-CM

## 2020-11-30 DIAGNOSIS — I10 ESSENTIAL HYPERTENSION: ICD-10-CM

## 2020-11-30 DIAGNOSIS — I50.32 CHRONIC DIASTOLIC CONGESTIVE HEART FAILURE (HCC): ICD-10-CM

## 2020-11-30 PROCEDURE — 99214 OFFICE O/P EST MOD 30 MIN: CPT | Performed by: INTERNAL MEDICINE

## 2020-11-30 PROCEDURE — G8432 DEP SCR NOT DOC, RNG: HCPCS | Performed by: INTERNAL MEDICINE

## 2020-11-30 PROCEDURE — 1090F PRES/ABSN URINE INCON ASSESS: CPT | Performed by: INTERNAL MEDICINE

## 2020-11-30 PROCEDURE — G8753 SYS BP > OR = 140: HCPCS | Performed by: INTERNAL MEDICINE

## 2020-11-30 PROCEDURE — 1111F DSCHRG MED/CURRENT MED MERGE: CPT | Performed by: INTERNAL MEDICINE

## 2020-11-30 PROCEDURE — 3331090002 HH PPS REVENUE DEBIT

## 2020-11-30 PROCEDURE — G8754 DIAS BP LESS 90: HCPCS | Performed by: INTERNAL MEDICINE

## 2020-11-30 PROCEDURE — 3331090001 HH PPS REVENUE CREDIT

## 2020-11-30 PROCEDURE — G8536 NO DOC ELDER MAL SCRN: HCPCS | Performed by: INTERNAL MEDICINE

## 2020-11-30 PROCEDURE — 1101F PT FALLS ASSESS-DOCD LE1/YR: CPT | Performed by: INTERNAL MEDICINE

## 2020-11-30 PROCEDURE — G8399 PT W/DXA RESULTS DOCUMENT: HCPCS | Performed by: INTERNAL MEDICINE

## 2020-11-30 PROCEDURE — G8417 CALC BMI ABV UP PARAM F/U: HCPCS | Performed by: INTERNAL MEDICINE

## 2020-11-30 PROCEDURE — G8427 DOCREV CUR MEDS BY ELIG CLIN: HCPCS | Performed by: INTERNAL MEDICINE
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
## 2020-11-30 NOTE — PROGRESS NOTES
1. Have you been to the ER, urgent care clinic since your last visit? Hospitalized since your last visit? Yes [facility]  2. Have you seen or consulted any other health care providers outside of the [address] [place] [address] since your last visit? Include any pap smears or colon screening. No     3. Since your last visit, have you had any of the following symptoms? chest pains.

## 2020-12-01 PROCEDURE — 3331090001 HH PPS REVENUE CREDIT

## 2020-12-01 PROCEDURE — 3331090002 HH PPS REVENUE DEBIT

## 2020-12-02 PROCEDURE — 3331090002 HH PPS REVENUE DEBIT

## 2020-12-02 PROCEDURE — 3331090001 HH PPS REVENUE CREDIT

## 2020-12-03 ENCOUNTER — HOME CARE VISIT (OUTPATIENT)
Dept: SCHEDULING | Facility: HOME HEALTH | Age: 85
End: 2020-12-03
Payer: MEDICARE

## 2020-12-03 PROCEDURE — 3331090001 HH PPS REVENUE CREDIT

## 2020-12-03 PROCEDURE — 3331090002 HH PPS REVENUE DEBIT

## 2020-12-03 PROCEDURE — G0300 HHS/HOSPICE OF LPN EA 15 MIN: HCPCS

## 2020-12-04 PROCEDURE — 3331090001 HH PPS REVENUE CREDIT

## 2020-12-04 PROCEDURE — 3331090002 HH PPS REVENUE DEBIT

## 2020-12-05 PROCEDURE — 3331090001 HH PPS REVENUE CREDIT

## 2020-12-05 PROCEDURE — 3331090002 HH PPS REVENUE DEBIT

## 2020-12-06 PROCEDURE — 3331090002 HH PPS REVENUE DEBIT

## 2020-12-06 PROCEDURE — 3331090001 HH PPS REVENUE CREDIT

## 2020-12-07 PROCEDURE — 3331090001 HH PPS REVENUE CREDIT

## 2020-12-07 PROCEDURE — 3331090002 HH PPS REVENUE DEBIT

## 2020-12-08 PROCEDURE — 3331090002 HH PPS REVENUE DEBIT

## 2020-12-08 PROCEDURE — 3331090001 HH PPS REVENUE CREDIT

## 2020-12-09 PROCEDURE — 3331090001 HH PPS REVENUE CREDIT

## 2020-12-09 PROCEDURE — 3331090002 HH PPS REVENUE DEBIT

## 2020-12-10 ENCOUNTER — HOME CARE VISIT (OUTPATIENT)
Dept: SCHEDULING | Facility: HOME HEALTH | Age: 85
End: 2020-12-10
Payer: MEDICARE

## 2020-12-10 PROCEDURE — 3331090002 HH PPS REVENUE DEBIT

## 2020-12-10 PROCEDURE — 3331090001 HH PPS REVENUE CREDIT

## 2020-12-10 PROCEDURE — G0300 HHS/HOSPICE OF LPN EA 15 MIN: HCPCS

## 2020-12-11 VITALS — RESPIRATION RATE: 18 BRPM

## 2020-12-11 PROCEDURE — 3331090002 HH PPS REVENUE DEBIT

## 2020-12-11 PROCEDURE — 3331090001 HH PPS REVENUE CREDIT

## 2020-12-12 PROCEDURE — 3331090001 HH PPS REVENUE CREDIT

## 2020-12-12 PROCEDURE — 3331090002 HH PPS REVENUE DEBIT

## 2020-12-13 PROCEDURE — 3331090001 HH PPS REVENUE CREDIT

## 2020-12-13 PROCEDURE — 3331090002 HH PPS REVENUE DEBIT

## 2020-12-14 ENCOUNTER — PATIENT OUTREACH (OUTPATIENT)
Dept: CASE MANAGEMENT | Age: 85
End: 2020-12-14

## 2020-12-14 VITALS
SYSTOLIC BLOOD PRESSURE: 138 MMHG | RESPIRATION RATE: 18 BRPM | TEMPERATURE: 98.1 F | HEART RATE: 77 BPM | OXYGEN SATURATION: 97 % | DIASTOLIC BLOOD PRESSURE: 79 MMHG

## 2020-12-14 PROCEDURE — 3331090001 HH PPS REVENUE CREDIT

## 2020-12-14 PROCEDURE — 3331090002 HH PPS REVENUE DEBIT

## 2020-12-14 NOTE — PROGRESS NOTES
Transitions of Care Follow Up     Care Transitions Nurse ( CTN) spoke with patient via telephone call for follow up. Patient related the following:   - She is feeling kind of tired /listless  - She thinks it may be related to her medications because when you take medications they have side effects ( per patient). - Patient stated that she will be allright. She will get something to eat. Patient encouraged to follow up with home health nurses, physicians, urgent care or ED as needed. Patient reports that she has all necessary medications. Chart routed to PCP for review.

## 2020-12-15 PROCEDURE — 3331090001 HH PPS REVENUE CREDIT

## 2020-12-15 PROCEDURE — 3331090002 HH PPS REVENUE DEBIT

## 2020-12-16 PROCEDURE — 3331090001 HH PPS REVENUE CREDIT

## 2020-12-16 PROCEDURE — 3331090002 HH PPS REVENUE DEBIT

## 2020-12-17 ENCOUNTER — HOME CARE VISIT (OUTPATIENT)
Dept: SCHEDULING | Facility: HOME HEALTH | Age: 85
End: 2020-12-17
Payer: MEDICARE

## 2020-12-17 PROCEDURE — G0496 LPN CARE TRAIN/EDU IN HH: HCPCS

## 2020-12-17 PROCEDURE — 3331090001 HH PPS REVENUE CREDIT

## 2020-12-17 PROCEDURE — 3331090002 HH PPS REVENUE DEBIT

## 2020-12-18 ENCOUNTER — PATIENT OUTREACH (OUTPATIENT)
Dept: CASE MANAGEMENT | Age: 85
End: 2020-12-18

## 2020-12-18 PROCEDURE — 3331090002 HH PPS REVENUE DEBIT

## 2020-12-18 PROCEDURE — 3331090001 HH PPS REVENUE CREDIT

## 2020-12-18 NOTE — PROGRESS NOTES
Transitions of care    Patient advises:   - She is feeling blah, and it may be medication related. 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 CTN suggested patient calling PCP for a follow up appointment. Patient states that she has an appointment scheduled with her PCP or NP at the office in January ( 2021). Patient encouraged to follow up with PCP, Urgent Care, ED as needed. CTN discussed that prior ACM would possibly be calling her for further follow up and she was agreeable with this. Staff message sent to ZANA Smith for referral to case management services. [facility]  via confidential e-mail. ACM = Ambulatory Care Manager. Patient has graduated from the Transitions of Care Coordination  program on 12-. Care management goals have been completed at this time. No further care transitions nurse follow up scheduled. Goals Addressed                 This Visit's Progress     COMPLETED: Prevent complications post hospitalization. Target Date: 12- 11/19/2020   Patient and/or family members were alerted to the availability of physician or other advanced practioners after hours, weekends, and holidays. Please call the PCP office phone number and speak with the answering service for assistance. Patient asked to follow with emergency services as needed. Care Transitions Nurse ( CTN)   contact information provided for follow up as needed.  COMPLETED: Supportive resources in place to maintain patient in the community (ie.  Home Health, DME equipment, refer to, medication assistant plan, etc.)        Target Date:  12- 11/19/2020   [address] [facility] [address] to provide skilled [facility] care visits     12-  Skilled nursing visits initiated. PT and OT evals completed.  COMPLETED: Understands red flags post discharge. Target Date: 12-    Care Transitions Nurse (CTN) reviewed red flags with patient as follows:   Please call 911 for immediate assistance for symptoms such as:   - Chest Pain  - Severe shortness of breath   - Change in mental status   - Blue tint to face or lips   - New or worsening symptoms              Patient has care transitions nurse contact information for any further questions, concerns, or needs.   Patient's upcoming visits:    Future Appointments   Date Time Provider Department Center   12/24/2020 To Be Determined Ko Nichols [facility]   12/31/2020 To Be Determined Ko Nichols [facility]   1/7/2021 [facility]   1/14/2021 To Be Determined Ko Nichols [facility]   1/18/2021  9:00 AM Th[facility] Sharp [facility]   1/19/2021 To Be Determined Rosy Joyner, RN [facility]   1/19/2021 10:00 AM Jesusita Bunn NP [facility] BS AMB   1/27/2021 10:30 AM Thalia Sharp MD [facility] BS AMB   2/23/2021 10:15 AM Chris Mijares MD [facility] BS AMB

## 2020-12-19 PROCEDURE — 3331090002 HH PPS REVENUE DEBIT

## 2020-12-19 PROCEDURE — 3331090001 HH PPS REVENUE CREDIT

## 2020-12-20 PROCEDURE — 3331090001 HH PPS REVENUE CREDIT

## 2020-12-20 PROCEDURE — 3331090002 HH PPS REVENUE DEBIT

## 2020-12-21 ENCOUNTER — PATIENT OUTREACH (OUTPATIENT)
Dept: CASE MANAGEMENT | Age: 85
End: 2020-12-21

## 2020-12-21 ENCOUNTER — TELEPHONE (OUTPATIENT)
Dept: FAMILY MEDICINE CLINIC | Age: 85
End: 2020-12-21

## 2020-12-21 DIAGNOSIS — R53.83 FATIGUE, UNSPECIFIED TYPE: ICD-10-CM

## 2020-12-21 DIAGNOSIS — E55.9 VITAMIN D DEFICIENCY: Primary | ICD-10-CM

## 2020-12-21 PROCEDURE — 3331090002 HH PPS REVENUE DEBIT

## 2020-12-21 PROCEDURE — 3331090001 HH PPS REVENUE CREDIT

## 2020-12-21 NOTE — TELEPHONE ENCOUNTER
Returned call to pt and pt is alert and oriented times 3,  this morning and reports feeling increased  fatigued over the last couple of months and wanted to know if there were any labs she could have completed since she has an appointment 12/22/20 and will be in the [facility] area. Provider informed and labs ordered per Verbal order with read back. Sent to provider for review. Recent Travel Screening and Travel History documentation     Travel Screening     Question   Response    In the last month, have you been in contact with someone who was confirmed or suspected to have Coronavirus / COVID-19? No / Unsure    Have you had a COVID-19 viral test in the last 14 days? No    Do you have any of the following new or worsening symptoms? Fatigue    Have you traveled internationally in the last month?   No      Travel History   Travel since 11/21/20     No documented travel since 11/21/20

## 2020-12-21 NOTE — TELEPHONE ENCOUNTER
Patient called and said that she is feeling very weak and lethargic. She said that she can hardly get up to use the restroom.   Please advise

## 2020-12-21 NOTE — PROGRESS NOTES
Complex Case Management      Date/Time:  2020 11:23 AM    Method of communication with patient:phone    [address] [facility] ([facility]) contacted the patient by telephone to perform Ambulatory Care Coordination. Verified name and  (PHI) with patient as identifiers. Provided introduction to self, and explanation of the Ambulatory Care Manager's role. Reviewed most ED visit w/ patient who verbalized understanding. Patient given an opportunity to ask questions. Top Challenges reviewed with the patient   1. Resuming AC care after recent admission  2. Hx of CHF, DM2, CAD, TKR  3. Pt states some increased fatigue. States her plan is to call the PCP office. Recommended a sooner cardiology appt as well. Pt agreeable. Assisted w/ resched of card appt. 4. No other issues at this time. The patient agrees to contact the PCP office or the [facility] for questions related to their healthcare. Provided contact information for future reference. Disease Specific:   N/A    Home Health Active: No    DME Active: No    Barriers to care? lack of knowledge about disease    Advance Care Planning:   Does patient have an Advance Directive:  not on file; education provided     Medication(s):   Medication reconciliation was not performed with patient who declined. Pt verbalizes understanding of administration of home medications. There were no barriers to obtaining medications identified at this time. Will attempt med rec on next call    Referral to Pharm D needed: no     Current Outpatient Medications   Medication Sig    cholecalciferol (Vitamin D3) (1000 Units /25 mcg) tablet Take 1 Tab by mouth two (2) times a day.  amLODIPine (NORVASC) 10 mg tablet Take 1 Tab by mouth daily. (Patient taking differently: Take 5 mg by mouth daily. 5 mg daily)    spironolactone (ALDACTONE) 25 mg tablet Take 1 Tab by mouth daily.     Lantus Solostar U-100 Insulin 100 unit/mL (3 mL) inpn 15 Units by SubCUTAneous route two (2) times a day. (Patient taking differently: 32 Units by SubCUTAneous route daily. 32 units in the morning and 36 units at bedtime.)    clopidogreL (PLAVIX) 75 mg tab TAKE 1 TABLET BY MOUTH DAILY    metoprolol tartrate (LOPRESSOR) 25 mg tablet TAKE 1 TABLET BY MOUTH EVERY 12 HOURS    isosorbide mononitrate ER (IMDUR) 30 mg tablet TAKE 1 TABLET BY MOUTH EVERY MORNING    albuterol (PROVENTIL HFA, VENTOLIN HFA, PROAIR HFA) 90 mcg/actuation inhaler INHALE 1 PUFF BY MOUTH EVERY 4 HOURS AS NEEDED FOR WHEEZING OR SHORTNESS OF BREATH    Flovent Diskus 100 mcg/actuation dsdv INHALE 1 PUFF BY MOUTH TWICE DAILY    multivitamin with minerals (MULTIVITAMIN & MINERAL FORMULA PO) Take 1 Tab by mouth daily.  ranolazine ER (RANEXA) 500 mg SR tablet Take 1 Tab by mouth two (2) times a day.  montelukast (SINGULAIR) 10 mg tablet Take 1 Tab by mouth daily. Indications: inflammation of the nose due to an allergy    nitroglycerin (NITROSTAT) 0.4 mg SL tablet 1 Tab by SubLINGual route every five (5) minutes as needed for Chest Pain. Up to 3 doses.  SYNTHROID 112 mcg tablet Take 125 mcg by mouth Daily (before breakfast). 125 mcg daily    lidocaine (ASPERCREME, LIDOCAINE,) 4 % patch 1 Patch by TransDERmal route every eight (8) hours.  RONNELL PEN NEEDLE 32 gauge x 5/32\" ndle     magnesium oxide (MAG-OX) 400 mg tablet Take 1 Tab by mouth two (2) times a day.  ascorbic acid, vitamin C, (VITAMIN C) 250 mg tablet Take 250 mg by mouth daily. 1 pill po daily  Indications: inadequate vitamin C    acetaminophen (TYLENOL ARTHRITIS PAIN) 650 mg TbER Take 650 mg by mouth every eight (8) hours. 1 pill po q 8 hours prn pain, fever  Indications: fever, pain    cyanocobalamin ER 1,000 mcg tablet Take 1 Tab by mouth daily.  capsaicin 0.075 % topical cream Apply  to affected area three (3) times daily. (Patient taking differently: Apply 1 Each to affected area three (3) times daily.  apply thin layer to area)    DOCOSAHEXANOIC ACID/EPA (FISH OIL PO) Take 1,000 mg by mouth two (2) times a day. 1 pill po twice daily      No current facility-administered medications for this visit. [facility] follow up appointment(s):   Future Appointments   Date Time Provider Chelle Reeves   12/24/2020 To Be Determined Clare Almonte LPN [address]   12/31/2020 To Be Determined Clare Almonte LPN [facility]   1/7/2021 To Be Determined Clare Almonte LPN [facility]   1/11/2021  8:00 AM Pinky Angela MD [facility] BS AMB   1/14/2021 To Be Determined Clare Almonte MICHELLE [facility]   1/18/2021  9:00 AM Kala Haynes MD [facility] BS AMB   1/19/2021 To Be Determined Delano Leal RN [facility] [facility]   1/19/2021 10:00 AM Lesly Cruz NP [facility] BS AMB   1/27/2021 10:30 AM Kala Haynes MD [facility] BS AMB        Non-[facility] follow up appointment(s): n/a    Goals Addressed                 This Visit's Progress     Attend follow up appointments on schedule        12/21/20 Patient will attend all scheduled appointments through 3/21/20       Knowledge and adherence of prescribed medication (ie. action, side effects, missed dose, etc.).        12/21/20 Pt will take all medications prescribed to be evaluated on each outreach through  3/21/20       Prepare patients and caregivers for end of life decisions (ie. need for hospice, pain management, symptom relief, advance directives etc.)        12/21/20 Pt will complete an ACP and have it scanned into their EMR by 3/21/20       COMPLETED: Pt will take all medications prescribed to be evaluated on each outreach        10/21/19 Med rec done with patient. She reports taking all medications as prescribed. 10/28/19 Patient reports she is taking all medications as prescribed. She denies needing any refills at this time. 11/12/19 Patient reports taking medications as prescribed and denies needing any refills at this time.   12/3/19 Patient reports taking medications as prescribed and denies needing any refills at this time. 12/17/19 Patient reports taking medications as prescribed and denies needing any refills at this time. 12/31/19 Patient reports taking medications as prescribed and denies needing any refills at this time.  COMPLETED: Understands and adheres to diet. 10/21/19 Patient states she drinks fruit juices and eats candy on occasion. Discussed with importance of reducing carb/sweet intake  12/17/19 Patient states she is trying to \"watch what she eats\".

## 2020-12-22 ENCOUNTER — HOSPITAL ENCOUNTER (OUTPATIENT)
Dept: LAB | Age: 85
Discharge: HOME | End: 2020-12-22
Payer: MEDICARE

## 2020-12-22 DIAGNOSIS — E55.9 VITAMIN D DEFICIENCY: ICD-10-CM

## 2020-12-22 DIAGNOSIS — R53.83 FATIGUE, UNSPECIFIED TYPE: ICD-10-CM

## 2020-12-22 LAB
25(OH)D3 SERPL-MCNC: 28.3 NG/ML (ref 30–100)
ALBUMIN SERPL-MCNC: 3.4 G/DL (ref 3.4–5)
ALBUMIN/GLOB SERPL: 0.7 {RATIO} (ref 0.8–1.7)
ALP SERPL-CCNC: 94 U/L (ref 45–117)
ALT SERPL-CCNC: 17 U/L (ref 13–56)
ANION GAP SERPL CALC-SCNC: 5 MMOL/L (ref 3–18)
AST SERPL-CCNC: 11 U/L (ref 10–38)
BASOPHILS # BLD: 0 K/UL (ref 0–0.1)
BASOPHILS NFR BLD: 1 % (ref 0–2)
BILIRUB SERPL-MCNC: 0.6 MG/DL (ref 0.2–1)
BUN SERPL-MCNC: 10 MG/DL (ref 7–18)
BUN/CREAT SERPL: 14 (ref 12–20)
CALCIUM SERPL-MCNC: 9.6 MG/DL (ref 8.5–10.1)
CHLORIDE SERPL-SCNC: 106 MMOL/L (ref 100–111)
CO2 SERPL-SCNC: 29 MMOL/L (ref 21–32)
CREAT SERPL-MCNC: 0.73 MG/DL (ref 0.6–1.3)
DIFFERENTIAL METHOD BLD: ABNORMAL
EOSINOPHIL # BLD: 0.4 K/UL (ref 0–0.4)
EOSINOPHIL NFR BLD: 7 % (ref 0–5)
ERYTHROCYTE [DISTWIDTH] IN BLOOD BY AUTOMATED COUNT: 12.1 % (ref 11.6–14.5)
GLOBULIN SER CALC-MCNC: 4.9 G/DL (ref 2–4)
GLUCOSE SERPL-MCNC: 153 MG/DL (ref 74–99)
HCT VFR BLD AUTO: 39.4 % (ref 35–45)
HGB BLD-MCNC: 12.4 G/DL (ref 12–16)
LYMPHOCYTES # BLD: 2.1 K/UL (ref 0.9–3.6)
LYMPHOCYTES NFR BLD: 39 % (ref 21–52)
MCH RBC QN AUTO: 31.3 PG (ref 24–34)
MCHC RBC AUTO-ENTMCNC: 31.5 G/DL (ref 31–37)
MCV RBC AUTO: 99.5 FL (ref 74–97)
MONOCYTES # BLD: 0.3 K/UL (ref 0.05–1.2)
MONOCYTES NFR BLD: 6 % (ref 3–10)
NEUTS SEG # BLD: 2.6 K/UL (ref 1.8–8)
NEUTS SEG NFR BLD: 47 % (ref 40–73)
PLATELET # BLD AUTO: 259 K/UL (ref 135–420)
PMV BLD AUTO: 11.4 FL (ref 9.2–11.8)
POTASSIUM SERPL-SCNC: 3.5 MMOL/L (ref 3.5–5.5)
PROT SERPL-MCNC: 8.3 G/DL (ref 6.4–8.2)
RBC # BLD AUTO: 3.96 M/UL (ref 4.2–5.3)
SODIUM SERPL-SCNC: 140 MMOL/L (ref 136–145)
TSH SERPL DL<=0.05 MIU/L-ACNC: 7.79 UIU/ML (ref 0.36–3.74)
WBC # BLD AUTO: 5.5 K/UL (ref 4.6–13.2)

## 2020-12-22 PROCEDURE — 80053 COMPREHEN METABOLIC PANEL: CPT

## 2020-12-22 PROCEDURE — 3331090001 HH PPS REVENUE CREDIT

## 2020-12-22 PROCEDURE — 85025 COMPLETE CBC W/AUTO DIFF WBC: CPT

## 2020-12-22 PROCEDURE — 36415 COLL VENOUS BLD VENIPUNCTURE: CPT

## 2020-12-22 PROCEDURE — 82306 VITAMIN D 25 HYDROXY: CPT

## 2020-12-22 PROCEDURE — 3331090002 HH PPS REVENUE DEBIT

## 2020-12-22 PROCEDURE — 84443 ASSAY THYROID STIM HORMONE: CPT

## 2020-12-23 PROCEDURE — 3331090001 HH PPS REVENUE CREDIT

## 2020-12-23 PROCEDURE — 3331090002 HH PPS REVENUE DEBIT

## 2020-12-24 PROCEDURE — 3331090001 HH PPS REVENUE CREDIT

## 2020-12-24 PROCEDURE — 3331090002 HH PPS REVENUE DEBIT

## 2020-12-25 PROCEDURE — 3331090002 HH PPS REVENUE DEBIT

## 2020-12-25 PROCEDURE — 3331090001 HH PPS REVENUE CREDIT

## 2020-12-26 ENCOUNTER — HOME CARE VISIT (OUTPATIENT)
Dept: SCHEDULING | Facility: HOME HEALTH | Age: 85
End: 2020-12-26
Payer: MEDICARE

## 2020-12-26 ENCOUNTER — APPOINTMENT (OUTPATIENT)
Dept: CT IMAGING | Age: 85
End: 2020-12-26
Attending: PHYSICIAN ASSISTANT
Payer: MEDICARE

## 2020-12-26 ENCOUNTER — HOSPITAL ENCOUNTER (EMERGENCY)
Age: 85
Discharge: HOME | End: 2020-12-26
Attending: EMERGENCY MEDICINE
Payer: MEDICARE

## 2020-12-26 ENCOUNTER — APPOINTMENT (OUTPATIENT)
Dept: GENERAL RADIOLOGY | Age: 85
End: 2020-12-26
Attending: PHYSICIAN ASSISTANT
Payer: MEDICARE

## 2020-12-26 VITALS
HEART RATE: 107 BPM | TEMPERATURE: 98.3 F | BODY MASS INDEX: 38.45 KG/M2 | OXYGEN SATURATION: 99 % | WEIGHT: 245 LBS | DIASTOLIC BLOOD PRESSURE: 93 MMHG | HEIGHT: 67 IN | SYSTOLIC BLOOD PRESSURE: 160 MMHG | RESPIRATION RATE: 22 BRPM

## 2020-12-26 DIAGNOSIS — R79.89 ELEVATED TSH: Primary | ICD-10-CM

## 2020-12-26 DIAGNOSIS — R53.83 LETHARGY: ICD-10-CM

## 2020-12-26 LAB
ALBUMIN SERPL-MCNC: 3.2 G/DL (ref 3.4–5)
ALBUMIN/GLOB SERPL: 0.6 {RATIO} (ref 0.8–1.7)
ALP SERPL-CCNC: 91 U/L (ref 45–117)
ALT SERPL-CCNC: 20 U/L (ref 13–56)
ANION GAP SERPL CALC-SCNC: 6 MMOL/L (ref 3–18)
APPEARANCE UR: CLEAR
AST SERPL-CCNC: 15 U/L (ref 10–38)
BASOPHILS # BLD: 0 K/UL (ref 0–0.1)
BASOPHILS NFR BLD: 1 % (ref 0–2)
BILIRUB SERPL-MCNC: 0.5 MG/DL (ref 0.2–1)
BILIRUB UR QL: NEGATIVE
BUN SERPL-MCNC: 9 MG/DL (ref 7–18)
BUN/CREAT SERPL: 10 (ref 12–20)
CALCIUM SERPL-MCNC: 9.7 MG/DL (ref 8.5–10.1)
CHLORIDE SERPL-SCNC: 101 MMOL/L (ref 100–111)
CO2 SERPL-SCNC: 29 MMOL/L (ref 21–32)
COLOR UR: YELLOW
CREAT SERPL-MCNC: 0.86 MG/DL (ref 0.6–1.3)
DIFFERENTIAL METHOD BLD: ABNORMAL
EOSINOPHIL # BLD: 0.3 K/UL (ref 0–0.4)
EOSINOPHIL NFR BLD: 7 % (ref 0–5)
EPITH CASTS URNS QL MICRO: NORMAL /LPF (ref 0–5)
ERYTHROCYTE [DISTWIDTH] IN BLOOD BY AUTOMATED COUNT: 11.7 % (ref 11.6–14.5)
GLOBULIN SER CALC-MCNC: 5.2 G/DL (ref 2–4)
GLUCOSE SERPL-MCNC: 294 MG/DL (ref 74–99)
GLUCOSE UR STRIP.AUTO-MCNC: >1000 MG/DL
HCT VFR BLD AUTO: 38.9 % (ref 35–45)
HGB BLD-MCNC: 12.4 G/DL (ref 12–16)
HGB UR QL STRIP: NEGATIVE
KETONES UR QL STRIP.AUTO: ABNORMAL MG/DL
LEUKOCYTE ESTERASE UR QL STRIP.AUTO: ABNORMAL
LYMPHOCYTES # BLD: 1.7 K/UL (ref 0.9–3.6)
LYMPHOCYTES NFR BLD: 35 % (ref 21–52)
MCH RBC QN AUTO: 32.1 PG (ref 24–34)
MCHC RBC AUTO-ENTMCNC: 31.9 G/DL (ref 31–37)
MCV RBC AUTO: 100.8 FL (ref 74–97)
MONOCYTES # BLD: 0.4 K/UL (ref 0.05–1.2)
MONOCYTES NFR BLD: 8 % (ref 3–10)
NEUTS SEG # BLD: 2.4 K/UL (ref 1.8–8)
NEUTS SEG NFR BLD: 49 % (ref 40–73)
NITRITE UR QL STRIP.AUTO: NEGATIVE
PH UR STRIP: 5.5 [PH] (ref 5–8)
PLATELET # BLD AUTO: 215 K/UL (ref 135–420)
PMV BLD AUTO: 10.6 FL (ref 9.2–11.8)
POTASSIUM SERPL-SCNC: 3.7 MMOL/L (ref 3.5–5.5)
PROT SERPL-MCNC: 8.4 G/DL (ref 6.4–8.2)
PROT UR STRIP-MCNC: 30 MG/DL
RBC # BLD AUTO: 3.86 M/UL (ref 4.2–5.3)
SODIUM SERPL-SCNC: 136 MMOL/L (ref 136–145)
SP GR UR REFRACTOMETRY: 1.03 (ref 1–1.03)
T4 FREE SERPL-MCNC: 0.8 NG/DL (ref 0.7–1.5)
TSH SERPL DL<=0.05 MIU/L-ACNC: 8.37 UIU/ML (ref 0.36–3.74)
UROBILINOGEN UR QL STRIP.AUTO: 1 EU/DL (ref 0.2–1)
WBC # BLD AUTO: 4.9 K/UL (ref 4.6–13.2)
WBC URNS QL MICRO: NORMAL /HPF (ref 0–4)

## 2020-12-26 PROCEDURE — 81001 URINALYSIS AUTO W/SCOPE: CPT

## 2020-12-26 PROCEDURE — 84439 ASSAY OF FREE THYROXINE: CPT

## 2020-12-26 PROCEDURE — 84443 ASSAY THYROID STIM HORMONE: CPT

## 2020-12-26 PROCEDURE — 74011250636 HC RX REV CODE- 250/636: Performed by: PHYSICIAN ASSISTANT

## 2020-12-26 PROCEDURE — 85025 COMPLETE CBC W/AUTO DIFF WBC: CPT

## 2020-12-26 PROCEDURE — 84480 ASSAY TRIIODOTHYRONINE (T3): CPT

## 2020-12-26 PROCEDURE — 3331090001 HH PPS REVENUE CREDIT

## 2020-12-26 PROCEDURE — 71045 X-RAY EXAM CHEST 1 VIEW: CPT

## 2020-12-26 PROCEDURE — 93005 ELECTROCARDIOGRAM TRACING: CPT

## 2020-12-26 PROCEDURE — 80053 COMPREHEN METABOLIC PANEL: CPT

## 2020-12-26 PROCEDURE — 99284 EMERGENCY DEPT VISIT MOD MDM: CPT

## 2020-12-26 PROCEDURE — 87086 URINE CULTURE/COLONY COUNT: CPT

## 2020-12-26 PROCEDURE — 74176 CT ABD & PELVIS W/O CONTRAST: CPT

## 2020-12-26 PROCEDURE — G0299 HHS/HOSPICE OF RN EA 15 MIN: HCPCS

## 2020-12-26 PROCEDURE — 3331090002 HH PPS REVENUE DEBIT

## 2020-12-26 PROCEDURE — 400013 HH SOC

## 2020-12-26 RX ORDER — LEVOTHYROXINE SODIUM 137 UG/1
137 TABLET ORAL
Qty: 30 TAB | Refills: 5 | Status: SHIPPED | OUTPATIENT
Start: 2020-12-26

## 2020-12-26 RX ADMIN — SODIUM CHLORIDE 1000 ML: 900 INJECTION, SOLUTION INTRAVENOUS at 19:36
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
## 2020-12-27 VITALS
RESPIRATION RATE: 16 BRPM | SYSTOLIC BLOOD PRESSURE: 146 MMHG | OXYGEN SATURATION: 97 % | HEART RATE: 96 BPM | DIASTOLIC BLOOD PRESSURE: 78 MMHG | TEMPERATURE: 97 F

## 2020-12-27 LAB
ATRIAL RATE: 102 BPM
CALCULATED P AXIS, ECG09: 59 DEGREES
CALCULATED R AXIS, ECG10: -21 DEGREES
CALCULATED T AXIS, ECG11: 54 DEGREES
DIAGNOSIS, 93000: NORMAL
P-R INTERVAL, ECG05: 124 MS
Q-T INTERVAL, ECG07: 366 MS
QRS DURATION, ECG06: 86 MS
QTC CALCULATION (BEZET), ECG08: 477 MS
VENTRICULAR RATE, ECG03: 102 BPM

## 2020-12-27 PROCEDURE — 3331090001 HH PPS REVENUE CREDIT

## 2020-12-27 PROCEDURE — 3331090002 HH PPS REVENUE DEBIT

## 2020-12-27 NOTE — ED NOTES
11:11 PM  12/26/20     Discharge instructions given to Rory Gaytan (name) with verbalization of understanding. Patient accompanied by aide. Patient discharged with the following prescriptions none. Patient discharged to home (destination).       Bruce Maloney

## 2020-12-28 VITALS
OXYGEN SATURATION: 97 % | HEART RATE: 65 BPM | RESPIRATION RATE: 18 BRPM | DIASTOLIC BLOOD PRESSURE: 78 MMHG | TEMPERATURE: 97.7 F | SYSTOLIC BLOOD PRESSURE: 135 MMHG

## 2020-12-28 LAB
BACTERIA SPEC CULT: NORMAL
SERVICE CMNT-IMP: NORMAL

## 2020-12-28 PROCEDURE — 3331090001 HH PPS REVENUE CREDIT

## 2020-12-28 PROCEDURE — 3331090002 HH PPS REVENUE DEBIT

## 2020-12-29 LAB — T3 SERPL-MCNC: 104 NG/DL (ref 71–180)

## 2020-12-29 PROCEDURE — 3331090001 HH PPS REVENUE CREDIT

## 2020-12-29 PROCEDURE — 3331090002 HH PPS REVENUE DEBIT

## 2020-12-30 PROCEDURE — 3331090002 HH PPS REVENUE DEBIT

## 2020-12-30 PROCEDURE — 3331090001 HH PPS REVENUE CREDIT

## 2020-12-31 ENCOUNTER — HOME CARE VISIT (OUTPATIENT)
Dept: HOME HEALTH SERVICES | Facility: HOME HEALTH | Age: 85
End: 2020-12-31
Payer: MEDICARE

## 2020-12-31 PROCEDURE — 3331090002 HH PPS REVENUE DEBIT

## 2020-12-31 PROCEDURE — 3331090001 HH PPS REVENUE CREDIT

## 2021-01-01 PROCEDURE — 3331090002 HH PPS REVENUE DEBIT

## 2021-01-01 PROCEDURE — 3331090001 HH PPS REVENUE CREDIT

## 2021-01-02 PROCEDURE — 3331090001 HH PPS REVENUE CREDIT

## 2021-01-02 PROCEDURE — 3331090002 HH PPS REVENUE DEBIT

## 2021-01-03 PROCEDURE — 3331090001 HH PPS REVENUE CREDIT

## 2021-01-03 PROCEDURE — 3331090002 HH PPS REVENUE DEBIT

## 2021-01-04 PROCEDURE — 3331090001 HH PPS REVENUE CREDIT

## 2021-01-04 PROCEDURE — 3331090002 HH PPS REVENUE DEBIT

## 2021-01-05 PROCEDURE — 3331090001 HH PPS REVENUE CREDIT

## 2021-01-05 PROCEDURE — 3331090002 HH PPS REVENUE DEBIT

## 2021-01-06 PROCEDURE — 3331090001 HH PPS REVENUE CREDIT

## 2021-01-06 PROCEDURE — 3331090002 HH PPS REVENUE DEBIT

## 2021-01-07 ENCOUNTER — HOSPITAL ENCOUNTER (INPATIENT)
Age: 86
LOS: 5 days | Discharge: HOME | DRG: 313 | End: 2021-01-12
Attending: EMERGENCY MEDICINE | Admitting: FAMILY MEDICINE
Payer: MEDICARE

## 2021-01-07 ENCOUNTER — APPOINTMENT (OUTPATIENT)
Dept: GENERAL RADIOLOGY | Age: 86
DRG: 313 | End: 2021-01-07
Attending: EMERGENCY MEDICINE
Payer: MEDICARE

## 2021-01-07 ENCOUNTER — HOME CARE VISIT (OUTPATIENT)
Dept: SCHEDULING | Facility: HOME HEALTH | Age: 86
End: 2021-01-07
Payer: MEDICARE

## 2021-01-07 DIAGNOSIS — R07.9 CHEST PAIN, UNSPECIFIED TYPE: Primary | ICD-10-CM

## 2021-01-07 LAB
ANION GAP SERPL CALC-SCNC: 7 MMOL/L (ref 3–18)
BASOPHILS # BLD: 0 K/UL (ref 0–0.1)
BASOPHILS NFR BLD: 1 % (ref 0–2)
BNP SERPL-MCNC: 45 PG/ML (ref 0–1800)
BUN SERPL-MCNC: 13 MG/DL (ref 7–18)
BUN/CREAT SERPL: 11 (ref 12–20)
CALCIUM SERPL-MCNC: 9.5 MG/DL (ref 8.5–10.1)
CHLORIDE SERPL-SCNC: 102 MMOL/L (ref 100–111)
CK MB CFR SERPL CALC: NORMAL % (ref 0–4)
CK MB SERPL-MCNC: <1 NG/ML (ref 5–25)
CK SERPL-CCNC: 68 U/L (ref 26–192)
CO2 SERPL-SCNC: 29 MMOL/L (ref 21–32)
CREAT SERPL-MCNC: 1.15 MG/DL (ref 0.6–1.3)
DIFFERENTIAL METHOD BLD: ABNORMAL
EOSINOPHIL # BLD: 0.3 K/UL (ref 0–0.4)
EOSINOPHIL NFR BLD: 5 % (ref 0–5)
ERYTHROCYTE [DISTWIDTH] IN BLOOD BY AUTOMATED COUNT: 11.9 % (ref 11.6–14.5)
GLUCOSE SERPL-MCNC: 360 MG/DL (ref 74–99)
HCT VFR BLD AUTO: 38 % (ref 35–45)
HGB BLD-MCNC: 11.8 G/DL (ref 12–16)
LYMPHOCYTES # BLD: 1.9 K/UL (ref 0.9–3.6)
LYMPHOCYTES NFR BLD: 31 % (ref 21–52)
MCH RBC QN AUTO: 31.6 PG (ref 24–34)
MCHC RBC AUTO-ENTMCNC: 31.1 G/DL (ref 31–37)
MCV RBC AUTO: 101.9 FL (ref 74–97)
MONOCYTES # BLD: 0.4 K/UL (ref 0.05–1.2)
MONOCYTES NFR BLD: 7 % (ref 3–10)
NEUTS SEG # BLD: 3.5 K/UL (ref 1.8–8)
NEUTS SEG NFR BLD: 56 % (ref 40–73)
PLATELET # BLD AUTO: 223 K/UL (ref 135–420)
PMV BLD AUTO: 10.9 FL (ref 9.2–11.8)
POTASSIUM SERPL-SCNC: 3.8 MMOL/L (ref 3.5–5.5)
RBC # BLD AUTO: 3.73 M/UL (ref 4.2–5.3)
SODIUM SERPL-SCNC: 138 MMOL/L (ref 136–145)
TROPONIN I SERPL-MCNC: <0.02 NG/ML (ref 0–0.04)
WBC # BLD AUTO: 6.2 K/UL (ref 4.6–13.2)

## 2021-01-07 PROCEDURE — 85025 COMPLETE CBC W/AUTO DIFF WBC: CPT

## 2021-01-07 PROCEDURE — 99285 EMERGENCY DEPT VISIT HI MDM: CPT

## 2021-01-07 PROCEDURE — 65660000000 HC RM CCU STEPDOWN

## 2021-01-07 PROCEDURE — 93005 ELECTROCARDIOGRAM TRACING: CPT

## 2021-01-07 PROCEDURE — 3331090002 HH PPS REVENUE DEBIT

## 2021-01-07 PROCEDURE — 80048 BASIC METABOLIC PNL TOTAL CA: CPT

## 2021-01-07 PROCEDURE — 83880 ASSAY OF NATRIURETIC PEPTIDE: CPT

## 2021-01-07 PROCEDURE — 65270000029 HC RM PRIVATE

## 2021-01-07 PROCEDURE — 82553 CREATINE MB FRACTION: CPT

## 2021-01-07 PROCEDURE — 71045 X-RAY EXAM CHEST 1 VIEW: CPT

## 2021-01-07 PROCEDURE — G0300 HHS/HOSPICE OF LPN EA 15 MIN: HCPCS

## 2021-01-07 PROCEDURE — 74011250637 HC RX REV CODE- 250/637: Performed by: EMERGENCY MEDICINE

## 2021-01-07 PROCEDURE — 3331090001 HH PPS REVENUE CREDIT

## 2021-01-07 RX ADMIN — NITROGLYCERIN 1 INCH: 20 OINTMENT TOPICAL at 22:43

## 2021-01-08 ENCOUNTER — HOME CARE VISIT (OUTPATIENT)
Dept: HOME HEALTH SERVICES | Facility: HOME HEALTH | Age: 86
End: 2021-01-08
Payer: MEDICARE

## 2021-01-08 LAB
APPEARANCE UR: CLEAR
ATRIAL RATE: 117 BPM
BILIRUB UR QL: NEGATIVE
CALCULATED P AXIS, ECG09: 94 DEGREES
CALCULATED R AXIS, ECG10: -19 DEGREES
CALCULATED T AXIS, ECG11: 84 DEGREES
COLOR UR: YELLOW
DIAGNOSIS, 93000: NORMAL
EPITH CASTS URNS QL MICRO: NORMAL /LPF (ref 0–5)
GLUCOSE BLD STRIP.AUTO-MCNC: 274 MG/DL (ref 70–110)
GLUCOSE UR STRIP.AUTO-MCNC: 250 MG/DL
HGB UR QL STRIP: NEGATIVE
KETONES UR QL STRIP.AUTO: NEGATIVE MG/DL
LEUKOCYTE ESTERASE UR QL STRIP.AUTO: ABNORMAL
NITRITE UR QL STRIP.AUTO: NEGATIVE
P-R INTERVAL, ECG05: 140 MS
PH UR STRIP: 5.5 [PH] (ref 5–8)
PROT UR STRIP-MCNC: 30 MG/DL
Q-T INTERVAL, ECG07: 314 MS
QRS DURATION, ECG06: 80 MS
QTC CALCULATION (BEZET), ECG08: 438 MS
RBC #/AREA URNS HPF: NORMAL /HPF (ref 0–5)
SP GR UR REFRACTOMETRY: 1.02 (ref 1–1.03)
TROPONIN I SERPL-MCNC: <0.02 NG/ML (ref 0–0.04)
UROBILINOGEN UR QL STRIP.AUTO: 0.2 EU/DL (ref 0.2–1)
VENTRICULAR RATE, ECG03: 117 BPM
WBC URNS QL MICRO: NORMAL /HPF (ref 0–4)

## 2021-01-08 PROCEDURE — 74011636637 HC RX REV CODE- 636/637: Performed by: FAMILY MEDICINE

## 2021-01-08 PROCEDURE — 82962 GLUCOSE BLOOD TEST: CPT

## 2021-01-08 PROCEDURE — 65660000000 HC RM CCU STEPDOWN

## 2021-01-08 PROCEDURE — 84484 ASSAY OF TROPONIN QUANT: CPT

## 2021-01-08 PROCEDURE — 3331090001 HH PPS REVENUE CREDIT

## 2021-01-08 PROCEDURE — 3331090002 HH PPS REVENUE DEBIT

## 2021-01-08 PROCEDURE — 81001 URINALYSIS AUTO W/SCOPE: CPT

## 2021-01-08 PROCEDURE — 99222 1ST HOSP IP/OBS MODERATE 55: CPT | Performed by: FAMILY MEDICINE

## 2021-01-08 PROCEDURE — 74011250637 HC RX REV CODE- 250/637: Performed by: EMERGENCY MEDICINE

## 2021-01-08 RX ORDER — METOPROLOL TARTRATE 25 MG/1
25 TABLET, FILM COATED ORAL 2 TIMES DAILY
Status: DISCONTINUED | OUTPATIENT
Start: 2021-01-08 | End: 2021-01-12 | Stop reason: HOSPADM

## 2021-01-08 RX ORDER — ENOXAPARIN SODIUM 100 MG/ML
40 INJECTION SUBCUTANEOUS DAILY
Status: DISCONTINUED | OUTPATIENT
Start: 2021-01-09 | End: 2021-01-12 | Stop reason: HOSPADM

## 2021-01-08 RX ORDER — GUAIFENESIN 100 MG/5ML
324 LIQUID (ML) ORAL
Status: COMPLETED | OUTPATIENT
Start: 2021-01-08 | End: 2021-01-08

## 2021-01-08 RX ORDER — SPIRONOLACTONE 25 MG/1
25 TABLET ORAL DAILY
Status: DISCONTINUED | OUTPATIENT
Start: 2021-01-08 | End: 2021-01-12 | Stop reason: HOSPADM

## 2021-01-08 RX ORDER — ACETAMINOPHEN 650 MG/1
650 SUPPOSITORY RECTAL
Status: DISCONTINUED | OUTPATIENT
Start: 2021-01-08 | End: 2021-01-12 | Stop reason: HOSPADM

## 2021-01-08 RX ORDER — CLOPIDOGREL BISULFATE 75 MG/1
75 TABLET ORAL DAILY
Status: DISCONTINUED | OUTPATIENT
Start: 2021-01-08 | End: 2021-01-12 | Stop reason: HOSPADM

## 2021-01-08 RX ORDER — INSULIN GLARGINE 100 [IU]/ML
36 INJECTION, SOLUTION SUBCUTANEOUS
Status: DISCONTINUED | OUTPATIENT
Start: 2021-01-09 | End: 2021-01-12 | Stop reason: HOSPADM

## 2021-01-08 RX ORDER — ONDANSETRON 2 MG/ML
4 INJECTION INTRAMUSCULAR; INTRAVENOUS
Status: DISCONTINUED | OUTPATIENT
Start: 2021-01-08 | End: 2021-01-12 | Stop reason: HOSPADM

## 2021-01-08 RX ORDER — POLYETHYLENE GLYCOL 3350 17 G/17G
17 POWDER, FOR SOLUTION ORAL DAILY PRN
Status: DISCONTINUED | OUTPATIENT
Start: 2021-01-08 | End: 2021-01-12 | Stop reason: HOSPADM

## 2021-01-08 RX ORDER — PROMETHAZINE HYDROCHLORIDE 25 MG/1
12.5 TABLET ORAL
Status: DISCONTINUED | OUTPATIENT
Start: 2021-01-08 | End: 2021-01-12 | Stop reason: HOSPADM

## 2021-01-08 RX ORDER — INSULIN GLARGINE 100 [IU]/ML
32 INJECTION, SOLUTION SUBCUTANEOUS DAILY
Status: DISCONTINUED | OUTPATIENT
Start: 2021-01-09 | End: 2021-01-12 | Stop reason: HOSPADM

## 2021-01-08 RX ORDER — AMLODIPINE BESYLATE 5 MG/1
5 TABLET ORAL DAILY
Status: DISCONTINUED | OUTPATIENT
Start: 2021-01-08 | End: 2021-01-12 | Stop reason: HOSPADM

## 2021-01-08 RX ORDER — ACETAMINOPHEN 325 MG/1
650 TABLET ORAL
Status: DISCONTINUED | OUTPATIENT
Start: 2021-01-08 | End: 2021-01-12 | Stop reason: HOSPADM

## 2021-01-08 RX ORDER — SODIUM CHLORIDE 0.9 % (FLUSH) 0.9 %
5-40 SYRINGE (ML) INJECTION EVERY 8 HOURS
Status: DISCONTINUED | OUTPATIENT
Start: 2021-01-08 | End: 2021-01-12 | Stop reason: HOSPADM

## 2021-01-08 RX ORDER — SODIUM CHLORIDE 0.9 % (FLUSH) 0.9 %
5-40 SYRINGE (ML) INJECTION AS NEEDED
Status: DISCONTINUED | OUTPATIENT
Start: 2021-01-08 | End: 2021-01-12 | Stop reason: HOSPADM

## 2021-01-08 RX ADMIN — INSULIN GLARGINE 36 UNITS: 100 INJECTION, SOLUTION SUBCUTANEOUS at 23:46

## 2021-01-08 RX ADMIN — ASPIRIN 81 MG CHEWABLE TABLET 324 MG: 81 TABLET CHEWABLE at 08:41

## 2021-01-08 RX ADMIN — METOPROLOL TARTRATE 25 MG: 25 TABLET, FILM COATED ORAL at 21:59

## 2021-01-08 RX ADMIN — CLOPIDOGREL BISULFATE 75 MG: 75 TABLET ORAL at 08:10

## 2021-01-08 RX ADMIN — Medication 10 ML: at 21:59

## 2021-01-08 RX ADMIN — METOPROLOL TARTRATE 25 MG: 25 TABLET, FILM COATED ORAL at 08:10

## 2021-01-08 RX ADMIN — AMLODIPINE BESYLATE 5 MG: 5 TABLET ORAL at 08:10

## 2021-01-08 NOTE — ROUTINE PROCESS
TRANSFER - OUT REPORT:    Verbal report given to Irene Hodgson (name) on Jemal Pritchett  being transferred to [address] room 464 (unit) for routine progression of care       Report consisted of patients Situation, Background, Assessment and   Recommendations(SBAR). Information from the following report(s) ED Summary was reviewed with the receiving nurse. Lines:   Peripheral IV 01/07/21 Left Antecubital (Active)   Site Assessment Clean, dry, & intact 01/07/21 2153   Phlebitis Assessment 0 01/07/21 2153   Infiltration Assessment 0 01/07/21 2153        Opportunity for questions and clarification was provided.       Patient transported with:   Monitor

## 2021-01-08 NOTE — ED TRIAGE NOTES
Pt c/o intermittent chest pain and SOB.   Took one nitro tablet at home and pain was relieved  Denies chest pain at this time

## 2021-01-08 NOTE — ED NOTES
Patient greeted / introduced myself as their primary nurse. Encouraged to voice any concerns, and all questions/concerns addressed. Assessment in progress. Explanation and teaching of all care given,  including any pending orders or procedures. Patient given estimated time frame for test results. Patient informed of  NPO status until ordered otherwise. Patient verbalized understanding of 'Plan of Care' . Vital signs, history, and home medications reviewed. Call bell within reach. Placed on monitor for further vital sign monitoring while here. Attempts made to make patient as comfortable as possible, such as assisting with reposition for comfort, lights dimmed, warm blanket provided. Patient instructed to call assist with toileting or OOB. Awaiting orders from Provider.

## 2021-01-08 NOTE — ED NOTES
Patient resting comfortably, watching tv. Has not urinated yet to give specimen. Call bell within reach and VS stable. Patient has no other needs at this time.
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
## 2021-01-08 NOTE — ED NOTES
Patient was sleeping and easily woke. Still has not provided urine specimen. She states she hasn't needed to go yet. More water given. Patient has no other needs at this time.

## 2021-01-08 NOTE — ED NOTES
Patient resting comfortably. She states she has some mild nausea but no complaints of pain at this time. Scheduled medication given. VS stable. Patient updated on plan, awaiting hospital bed assignment.

## 2021-01-08 NOTE — ED NOTES
Bedside Hand-off-Report given to oncoming shift RN Angel Mtz), including  all care previously given. Addressed all questions asked by oncoming RN.

## 2021-01-09 LAB
ANION GAP SERPL CALC-SCNC: 7 MMOL/L (ref 3–18)
BUN SERPL-MCNC: 11 MG/DL (ref 7–18)
BUN/CREAT SERPL: 14 (ref 12–20)
CALCIUM SERPL-MCNC: 9 MG/DL (ref 8.5–10.1)
CHLORIDE SERPL-SCNC: 106 MMOL/L (ref 100–111)
CK MB CFR SERPL CALC: NORMAL % (ref 0–4)
CK MB SERPL-MCNC: <1 NG/ML (ref 5–25)
CK SERPL-CCNC: 73 U/L (ref 26–192)
CO2 SERPL-SCNC: 26 MMOL/L (ref 21–32)
CREAT SERPL-MCNC: 0.77 MG/DL (ref 0.6–1.3)
D DIMER PPP FEU-MCNC: 1.23 UG/ML(FEU)
ERYTHROCYTE [DISTWIDTH] IN BLOOD BY AUTOMATED COUNT: 12 % (ref 11.6–14.5)
GLUCOSE BLD STRIP.AUTO-MCNC: 153 MG/DL (ref 70–110)
GLUCOSE BLD STRIP.AUTO-MCNC: 155 MG/DL (ref 70–110)
GLUCOSE BLD STRIP.AUTO-MCNC: 178 MG/DL (ref 70–110)
GLUCOSE BLD STRIP.AUTO-MCNC: 218 MG/DL (ref 70–110)
GLUCOSE SERPL-MCNC: 197 MG/DL (ref 74–99)
HCT VFR BLD AUTO: 35.8 % (ref 35–45)
HGB BLD-MCNC: 11.5 G/DL (ref 12–16)
MAGNESIUM SERPL-MCNC: 1.8 MG/DL (ref 1.6–2.6)
MCH RBC QN AUTO: 31.9 PG (ref 24–34)
MCHC RBC AUTO-ENTMCNC: 32.1 G/DL (ref 31–37)
MCV RBC AUTO: 99.2 FL (ref 74–97)
PLATELET # BLD AUTO: 234 K/UL (ref 135–420)
PMV BLD AUTO: 11.1 FL (ref 9.2–11.8)
POTASSIUM SERPL-SCNC: 3.6 MMOL/L (ref 3.5–5.5)
RBC # BLD AUTO: 3.61 M/UL (ref 4.2–5.3)
SODIUM SERPL-SCNC: 139 MMOL/L (ref 136–145)
TROPONIN I SERPL-MCNC: <0.02 NG/ML (ref 0–0.04)
WBC # BLD AUTO: 5 K/UL (ref 4.6–13.2)

## 2021-01-09 PROCEDURE — 74011250637 HC RX REV CODE- 250/637: Performed by: EMERGENCY MEDICINE

## 2021-01-09 PROCEDURE — 74011250636 HC RX REV CODE- 250/636: Performed by: FAMILY MEDICINE

## 2021-01-09 PROCEDURE — 65660000000 HC RM CCU STEPDOWN

## 2021-01-09 PROCEDURE — 83735 ASSAY OF MAGNESIUM: CPT

## 2021-01-09 PROCEDURE — 97162 PT EVAL MOD COMPLEX 30 MIN: CPT

## 2021-01-09 PROCEDURE — 74011636637 HC RX REV CODE- 636/637: Performed by: FAMILY MEDICINE

## 2021-01-09 PROCEDURE — 82553 CREATINE MB FRACTION: CPT

## 2021-01-09 PROCEDURE — 36415 COLL VENOUS BLD VENIPUNCTURE: CPT

## 2021-01-09 PROCEDURE — 97165 OT EVAL LOW COMPLEX 30 MIN: CPT

## 2021-01-09 PROCEDURE — 99232 SBSQ HOSP IP/OBS MODERATE 35: CPT | Performed by: INTERNAL MEDICINE

## 2021-01-09 PROCEDURE — 74011250637 HC RX REV CODE- 250/637: Performed by: FAMILY MEDICINE

## 2021-01-09 PROCEDURE — 97116 GAIT TRAINING THERAPY: CPT

## 2021-01-09 PROCEDURE — 77030027138 HC INCENT SPIROMETER -A

## 2021-01-09 PROCEDURE — 3331090002 HH PPS REVENUE DEBIT

## 2021-01-09 PROCEDURE — 80048 BASIC METABOLIC PNL TOTAL CA: CPT

## 2021-01-09 PROCEDURE — 85379 FIBRIN DEGRADATION QUANT: CPT

## 2021-01-09 PROCEDURE — 3331090001 HH PPS REVENUE CREDIT

## 2021-01-09 PROCEDURE — 85027 COMPLETE CBC AUTOMATED: CPT

## 2021-01-09 PROCEDURE — 82962 GLUCOSE BLOOD TEST: CPT

## 2021-01-09 PROCEDURE — 74011250637 HC RX REV CODE- 250/637: Performed by: INTERNAL MEDICINE

## 2021-01-09 PROCEDURE — 2709999900 HC NON-CHARGEABLE SUPPLY

## 2021-01-09 RX ORDER — LEVOFLOXACIN 750 MG/1
750 TABLET ORAL
Status: DISCONTINUED | OUTPATIENT
Start: 2021-01-09 | End: 2021-01-10

## 2021-01-09 RX ORDER — LEVOFLOXACIN 500 MG/1
500 TABLET, FILM COATED ORAL
Status: DISCONTINUED | OUTPATIENT
Start: 2021-01-10 | End: 2021-01-09

## 2021-01-09 RX ORDER — GUAIFENESIN 100 MG/5ML
81 LIQUID (ML) ORAL DAILY
Status: DISCONTINUED | OUTPATIENT
Start: 2021-01-09 | End: 2021-01-12 | Stop reason: HOSPADM

## 2021-01-09 RX ADMIN — ENOXAPARIN SODIUM 40 MG: 40 INJECTION SUBCUTANEOUS at 08:53

## 2021-01-09 RX ADMIN — SPIRONOLACTONE 25 MG: 25 TABLET, FILM COATED ORAL at 08:53

## 2021-01-09 RX ADMIN — Medication 10 ML: at 22:29

## 2021-01-09 RX ADMIN — AMLODIPINE BESYLATE 5 MG: 5 TABLET ORAL at 08:53

## 2021-01-09 RX ADMIN — METOPROLOL TARTRATE 25 MG: 25 TABLET, FILM COATED ORAL at 08:54

## 2021-01-09 RX ADMIN — Medication 10 ML: at 15:09

## 2021-01-09 RX ADMIN — METOPROLOL TARTRATE 25 MG: 25 TABLET, FILM COATED ORAL at 18:13

## 2021-01-09 RX ADMIN — Medication 10 ML: at 06:06

## 2021-01-09 RX ADMIN — INSULIN GLARGINE 32 UNITS: 100 INJECTION, SOLUTION SUBCUTANEOUS at 09:10

## 2021-01-09 RX ADMIN — LEVOTHYROXINE SODIUM 137 MCG: 25 TABLET ORAL at 06:03

## 2021-01-09 RX ADMIN — ASPIRIN 81 MG: 81 TABLET, CHEWABLE ORAL at 18:12

## 2021-01-09 RX ADMIN — CLOPIDOGREL BISULFATE 75 MG: 75 TABLET ORAL at 08:53

## 2021-01-09 RX ADMIN — ACETAMINOPHEN 650 MG: 325 TABLET ORAL at 12:41

## 2021-01-09 RX ADMIN — ONDANSETRON 4 MG: 2 INJECTION INTRAMUSCULAR; INTRAVENOUS at 12:41

## 2021-01-09 RX ADMIN — INSULIN GLARGINE 36 UNITS: 100 INJECTION, SOLUTION SUBCUTANEOUS at 22:29

## 2021-01-09 RX ADMIN — LEVOFLOXACIN 750 MG: 750 TABLET, FILM COATED ORAL at 18:12

## 2021-01-09 NOTE — PROGRESS NOTES
Pt voice concern about how weak she feels. Says she cant quite \"put my finger on it\" voices vague symptoms of tiredness, generalized feeling uncomfortable but no specific pain, no nausea. Mentions her Synthroid was increased recently and wonders if her feeling \"this way\" is related to her hypothyroidism. Also mentions a cough with phlegm produced \"from my right lung\". She does have decreased breath sound posterior on RT lung, some crackles (? Atelectasis) on Lt post lung. Can only get to 200 on Incentive Spirometry. 1410 Pt c/o LF flank pain radiating around to abdomen. Has h/o UTIs. Her urine is very concentrated. Will get Urine Cx. Was given tylenol for pain. No fever.
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
## 2021-01-09 NOTE — PROGRESS NOTES
[facility] Hospitalist Group  Progress Note    Patient: Nohemy Cam Age: 82 y.o. : 1935 MR#: [id number] SSN: [id number]  Date/Time: 2021    Subjective:     She is a very poor historian. Overall she states she feels unwell and tired. She does complain per nursing of left flank pain. She complains of some \"tightness\" of the right leg. Assessment/Plan:   Patient is an 82-year-old female with history of coronary artery disease status post stents in the past coming in with complaints of chest pain and concern for unstable angina per initially evaluating the ED physician.    -Atypical chest pain in patient with history of coronary artery disease and risk factors. Initial evaluation including EKG and cardiac enzymes are reassuring. Case discussed with cardiologist on-call. .  On Plavix and beta-blocker here. Has multiple allergies including to several statins. Not on aspirin.   Currently stable and not complaining of active chest pain.    -Complaints of left flank pain, abnormal urinalysis: Concern for urinary tract infection    HISTORY OF:  -Dyslipidemia  -Type II DM  -GERD  -TAL  -Anxiety d/o  -Chronic pain  -Morbid obesity  -DJD  -Renal stone    PLAN:  -Cont bb  -Serial enzymes  - Add ASA  -Cont plavix  -Urine cx  -Statin intolerance  -PVL lower ext due to c/o tightness, +ve homans sign  -Start rocephin    Additional Notes:      Case discussed with:  [x]Patient  []Family  [x]Nursing  []Case Management  DVT Prophylaxis:  [x]Lovenox  []Hep SQ  []SCDs  []Coumadin   []On Heparin gtt    Objective:   VS:   Visit Vitals  BP (!) 157/82 (BP Patient Position: Sitting)   Pulse 73   Temp 97.9 °F (36.6 °C)   Resp 18   Ht 5' 7\" (1.702 m)   Wt 109.9 kg (242 lb 4.8 oz)   SpO2 96%   BMI 37.95 kg/m²      Tmax/24hrs: Temp (24hrs), Av.8 °F (36.6 °C), Min:97 °F (36.1 °C), Max:98.2 °F (36.8 °C)    Input/Output:     Intake/Output Summary (Last 24 hours) at 2021 1554  Last data filed at 1/9/2021 1242  Gross per 24 hour   Intake 360 ml   Output 750 ml   Net -390 ml       General: Alert, awake, in NAD   Cardiovascular:  RRR  Pulmonary:  CTAB  GI: Soft, nt, nd   Extremities:  No Edema  Additional:      Labs:    Recent Results (from the past 24 hour(s))   GLUCOSE, POC    Collection Time: 01/08/21  9:58 PM   Result Value Ref Range    Glucose (POC) 274 (H) 70 - 902 mg/dL   METABOLIC PANEL, BASIC    Collection Time: 01/09/21  3:42 AM   Result Value Ref Range    Sodium 139 136 - 145 mmol/L    Potassium 3.6 3.5 - 5.5 mmol/L    Chloride 106 100 - 111 mmol/L    CO2 26 21 - 32 mmol/L    Anion gap 7 3.0 - 18 mmol/L    Glucose 197 (H) 74 - 99 mg/dL    BUN 11 7.0 - 18 MG/DL    Creatinine 0.77 0.6 - 1.3 MG/DL    BUN/Creatinine ratio 14 12 - 20      GFR est AA >60 >60 ml/min/1.73m2    GFR est non-AA >60 >60 ml/min/1.73m2    Calcium 9.0 8.5 - 10.1 MG/DL   MAGNESIUM    Collection Time: 01/09/21  3:42 AM   Result Value Ref Range    Magnesium 1.8 1.6 - 2.6 mg/dL   CBC W/O DIFF    Collection Time: 01/09/21  3:42 AM   Result Value Ref Range    WBC 5.0 4.6 - 13.2 K/uL    RBC 3.61 (L) 4.20 - 5.30 M/uL    HGB 11.5 (L) 12.0 - 16.0 g/dL    HCT 35.8 35.0 - 45.0 %    MCV 99.2 (H) 74.0 - 97.0 FL    MCH 31.9 24.0 - 34.0 PG    MCHC 32.1 31.0 - 37.0 g/dL    RDW 12.0 11.6 - 14.5 %    PLATELET 986 517 - 432 K/uL    MPV 11.1 9.2 - 11.8 FL   GLUCOSE, POC    Collection Time: 01/09/21  8:05 AM   Result Value Ref Range    Glucose (POC) 178 (H) 70 - 110 mg/dL   GLUCOSE, POC    Collection Time: 01/09/21 11:58 AM   Result Value Ref Range    Glucose (POC) 218 (H) 70 - 110 mg/dL     Additional Data Reviewed:      Signed By: Yahaira Mcgovern MD     January 9, 2021 3:54 PM
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
## 2021-01-10 LAB
CK MB CFR SERPL CALC: NORMAL % (ref 0–4)
CK MB CFR SERPL CALC: NORMAL % (ref 0–4)
CK MB SERPL-MCNC: <1 NG/ML (ref 5–25)
CK MB SERPL-MCNC: <1 NG/ML (ref 5–25)
CK SERPL-CCNC: 63 U/L (ref 26–192)
CK SERPL-CCNC: 72 U/L (ref 26–192)
GLUCOSE BLD STRIP.AUTO-MCNC: 106 MG/DL (ref 70–110)
GLUCOSE BLD STRIP.AUTO-MCNC: 109 MG/DL (ref 70–110)
GLUCOSE BLD STRIP.AUTO-MCNC: 113 MG/DL (ref 70–110)
GLUCOSE BLD STRIP.AUTO-MCNC: 193 MG/DL (ref 70–110)
TROPONIN I SERPL-MCNC: <0.02 NG/ML (ref 0–0.04)
TROPONIN I SERPL-MCNC: <0.02 NG/ML (ref 0–0.04)

## 2021-01-10 PROCEDURE — 74011250636 HC RX REV CODE- 250/636: Performed by: INTERNAL MEDICINE

## 2021-01-10 PROCEDURE — 87086 URINE CULTURE/COLONY COUNT: CPT

## 2021-01-10 PROCEDURE — 3331090001 HH PPS REVENUE CREDIT

## 2021-01-10 PROCEDURE — 74011250636 HC RX REV CODE- 250/636: Performed by: FAMILY MEDICINE

## 2021-01-10 PROCEDURE — 74011250637 HC RX REV CODE- 250/637: Performed by: FAMILY MEDICINE

## 2021-01-10 PROCEDURE — 74011250637 HC RX REV CODE- 250/637: Performed by: INTERNAL MEDICINE

## 2021-01-10 PROCEDURE — 74011636637 HC RX REV CODE- 636/637: Performed by: FAMILY MEDICINE

## 2021-01-10 PROCEDURE — 65660000000 HC RM CCU STEPDOWN

## 2021-01-10 PROCEDURE — 74011000250 HC RX REV CODE- 250: Performed by: INTERNAL MEDICINE

## 2021-01-10 PROCEDURE — 82962 GLUCOSE BLOOD TEST: CPT

## 2021-01-10 PROCEDURE — 99232 SBSQ HOSP IP/OBS MODERATE 35: CPT | Performed by: INTERNAL MEDICINE

## 2021-01-10 PROCEDURE — 74011250637 HC RX REV CODE- 250/637: Performed by: EMERGENCY MEDICINE

## 2021-01-10 PROCEDURE — 36415 COLL VENOUS BLD VENIPUNCTURE: CPT

## 2021-01-10 PROCEDURE — 82550 ASSAY OF CK (CPK): CPT

## 2021-01-10 PROCEDURE — 3331090002 HH PPS REVENUE DEBIT

## 2021-01-10 RX ORDER — TRAMADOL HYDROCHLORIDE 50 MG/1
50 TABLET ORAL
Status: DISCONTINUED | OUTPATIENT
Start: 2021-01-10 | End: 2021-01-12 | Stop reason: HOSPADM

## 2021-01-10 RX ADMIN — Medication 10 ML: at 06:18

## 2021-01-10 RX ADMIN — LEVOFLOXACIN 750 MG: 750 TABLET, FILM COATED ORAL at 10:15

## 2021-01-10 RX ADMIN — TRAMADOL HYDROCHLORIDE 50 MG: 50 TABLET, FILM COATED ORAL at 19:28

## 2021-01-10 RX ADMIN — LEVOTHYROXINE SODIUM 137 MCG: 25 TABLET ORAL at 06:21

## 2021-01-10 RX ADMIN — SPIRONOLACTONE 25 MG: 25 TABLET, FILM COATED ORAL at 10:14

## 2021-01-10 RX ADMIN — CLOPIDOGREL BISULFATE 75 MG: 75 TABLET ORAL at 10:14

## 2021-01-10 RX ADMIN — WATER 1 G: 1 INJECTION INTRAMUSCULAR; INTRAVENOUS; SUBCUTANEOUS at 19:29

## 2021-01-10 RX ADMIN — METOPROLOL TARTRATE 25 MG: 25 TABLET, FILM COATED ORAL at 10:14

## 2021-01-10 RX ADMIN — INSULIN GLARGINE 36 UNITS: 100 INJECTION, SOLUTION SUBCUTANEOUS at 22:16

## 2021-01-10 RX ADMIN — ASPIRIN 81 MG: 81 TABLET, CHEWABLE ORAL at 10:14

## 2021-01-10 RX ADMIN — METOPROLOL TARTRATE 25 MG: 25 TABLET, FILM COATED ORAL at 19:28

## 2021-01-10 RX ADMIN — ENOXAPARIN SODIUM 40 MG: 40 INJECTION SUBCUTANEOUS at 10:13

## 2021-01-10 RX ADMIN — INSULIN GLARGINE 32 UNITS: 100 INJECTION, SOLUTION SUBCUTANEOUS at 10:12

## 2021-01-10 RX ADMIN — ONDANSETRON 4 MG: 2 INJECTION INTRAMUSCULAR; INTRAVENOUS at 10:19

## 2021-01-10 RX ADMIN — ONDANSETRON 4 MG: 2 INJECTION INTRAMUSCULAR; INTRAVENOUS at 04:54

## 2021-01-10 RX ADMIN — ACETAMINOPHEN 650 MG: 325 TABLET ORAL at 02:32

## 2021-01-10 RX ADMIN — AMLODIPINE BESYLATE 5 MG: 5 TABLET ORAL at 10:14

## 2021-01-10 RX ADMIN — Medication 10 ML: at 15:06

## 2021-01-10 RX ADMIN — Medication 10 ML: at 23:32

## 2021-01-10 NOTE — PROGRESS NOTES
[facility], [address] [place] [address] [facility]  Fax: [phone]      CARDIOLOGY PROGRESS NOTE    Impressions:   1.         Atypical Angina - Trop Neg x 3, EKG ST with non-specific ST abnormality. 2.         CAD h/o PCI to RCA 2016 - Cardiac cath 6/2019 - 50% mid LAD stenosis and widely patent previous proximal right coronary stent. Continue plavix, aspirin, Imdur, Ranexa, norvasc and metoprolol. 3.         Hypertension - improved, Continue Norvasc, metoprolol, HCTZ and Aldactone. Monitor b/p.    4.         Acute on chronic diastolic CHF NYHA class III - Echo 11/2020 EF 50-55%, no WMA - unchanged from prior  5.         Hyperlipidemia -  - she is intolerant to statins, and has declined Repatha in the past  6.         DM II - uncontrolled A1C 8.2 - management per primary team.  7.         Hypokalemia - Improved.  She is now taking aldactone - will not need  Potassium supplements.  Recommend BMP in 3 days. 8.         Hypothyroidism - continue synthroid  9.         Nausea - Treatment per primary team - discussed     Plan:   Telemetry  Clopidogrel / Enoxaparin / Metoprolol / Amlodipine / Spironolactone / Enoxaparin  FLP, pro BNP  NST   NPO past MN    11/17/20 Echocardiogram:  · Technically difficult study with poor endocardial visualization and technically difficult study due to patient's body habitus. Definity contrast was given to enhance imaging. · LV: Estimated LVEF is 50 - 55%. Visually measured ejection fraction. Normal cavity size. Mildly to moderately increased wall thickness. Low normal systolic function. Moderate (grade 2) left ventricular diastolic dysfunction.   · TV: Mild tricuspid valve regurgitation is present.         ASSESSMENT:  Hospital Problems  Date Reviewed: 11/23/2020          Codes Class Noted POA    CHF (congestive heart failure) ([facility] [address] ICD-10-CM: I50.9  ICD-9-CM: 428.0  1/8/2021 Unknown Chest pain ICD-10-CM: R07.9  ICD-9-CM: 786.50  2/6/2017 Unknown              OBJECTIVE:    VS:   Visit Vitals  /76 (BP 1 Location: Left arm, BP Patient Position: At rest)   Pulse 98   Temp 98.2 °F (36.8 °C)   Resp 18   Ht 5' 7\" (1.702 m)   Wt 111.1 kg (244 lb 14.4 oz)   SpO2 96%   BMI 38.36 kg/m²         Intake/Output Summary (Last 24 hours) at 1/10/2021 1504  Last data filed at 1/10/2021 8223  Gross per 24 hour   Intake --   Output 500 ml   Net -500 ml       Labs: Results:       Chemistry Recent Labs     01/09/21 0342 01/07/21 2149   * 360*    138   K 3.6 3.8    102   CO2 26 29   BUN 11 13   CREA 0.77 1.15   CA 9.0 9.5   MG 1.8  --    AGAP 7 7   BUCR 14 11*      CBC w/Diff Recent Labs     01/09/21 0342 01/07/21 2149   WBC 5.0 6.2   RBC 3.61* 3.73*   HGB 11.5* 11.8*   HCT 35.8 38.0    223   GRANS  --  56   LYMPH  --  31   EOS  --  5      Cardiac Enzymes Recent Labs     01/10/21  1136 01/10/21  0043   CPK 63 72   CKND1 CALCULATION NOT PERFORMED WHEN RESULT IS BELOW LINEAR LIMIT CALCULATION NOT PERFORMED WHEN RESULT IS BELOW LINEAR LIMIT      Coagulation No results for input(s): PTP, INR, APTT, INREXT in the last 72 hours. Lipid Panel Lab Results   Component Value Date/Time    Cholesterol, total 231 (H) 07/07/2020 11:36 AM    HDL Cholesterol 42 07/07/2020 11:36 AM    LDL, calculated 165.8 (H) 07/07/2020 11:36 AM    VLDL, calculated 23.2 07/07/2020 11:36 AM    Triglyceride 116 07/07/2020 11:36 AM    CHOL/HDL Ratio 5.5 (H) 07/07/2020 11:36 AM      BNP No results for input(s): BNPP in the last 72 hours. Liver Enzymes No results for input(s): TP, ALB, TBIL, AP in the last 72 hours.     No lab exists for component: SGOT, GPT, DBIL   Digoxin    Thyroid Studies Lab Results   Component Value Date/Time    TSH 8.37 (H) 12/26/2020 07:08 PM              Frannie Hansen MD   Pager # [phone]

## 2021-01-10 NOTE — PROGRESS NOTES
Called lab to inquire where urine specimen from yesterday disappeared to and was told that it was never recvd in lab department but the specimen was picked up from the unit by transport. Collected another urine specimen although she had already recvd a dose of antibiotic.

## 2021-01-10 NOTE — ROUTINE PROCESS
Bedside and Verbal shift change report given to JEANNIE Morris (oncoming nurse) by Queen Kentrell RN (offgoing nurse). Report included the following information SBAR, Kardex, MAR and Recent Results.

## 2021-01-10 NOTE — PROGRESS NOTES
[facility]  [facility]    Patient: Iza Pratt Age: 82 y.o. : 1935 MR#: [id number] SSN: [id number]  Date/Time: 1/10/2021    Subjective:     Pt continues to c/o Left back pain. Assessment/Plan:   Patient is an 72-year-old female with history of coronary artery disease status post stents in the past coming in with complaints of chest pain and concern for unstable angina per initially evaluating the ED physician.    -Atypical chest pain in patient with history of coronary artery disease and risk factors. Initial evaluation including EKG and cardiac enzymes are reassuring. Cardiology input noted, to have stress test.  On Plavix and beta-blocker here. Has multiple allergies including to several statins. Not on aspirin. Currently stable and not complaining of active chest pain.    -Complaints of left flank pain, abnormal urinalysis: Concern for urinary tract infection, started on levofloxacin. History of nephrolithiasis, however she has had a CT of the abdomen and pelvis on  that did not show evidence of obstructive stone. Doubt that she would form obstructing stone within the timeframe of 14 days. Plan to treat as pyelonephritis for now. If no improvement in her flank pain or if she has worsening signs and symptoms would image the abdomen and pelvis looking for evidence of complicated UTI due to stones. Urine culture from previous testings has grown E. coli that is resistant to fluoroquinolones. Per pharmacy review patient has had Ancef in 2018 without reported reaction. Urine culture done this admission pending.       HISTORY OF:  -Dyslipidemia  -Type II DM  -GERD  -TAL  -Anxiety d/o  -Chronic pain  -Morbid obesity  -DJD  -Renal stone    PLAN:  -Cont bb  -Serial enzymes  - Add ASA  -Cont plavix  -Urine cx  -Statin intolerance  -PVL lower ext due to c/o tightness, +ve homans sign, result pending.  -Stop levofloxacin, start Rocephin with close monitoring    Additional Notes:      Case discussed with:  [x]Patient  []Family  [x]Nursing  []Case Management  DVT Prophylaxis:  [x]Lovenox  []Hep SQ  []SCDs  []Coumadin   []On Heparin gtt    Objective:   VS:   Visit Vitals  /76 (BP 1 Location: Left arm, BP Patient Position: At rest)   Pulse 98   Temp 98.2 °F (36.8 °C)   Resp 18   Ht 5' 7\" (1.702 m)   Wt 111.1 kg (244 lb 14.4 oz)   SpO2 96%   BMI 38.36 kg/m²      Tmax/24hrs: Temp (24hrs), Av.8 °F (36.6 °C), Min:97.5 °F (36.4 °C), Max:98.2 °F (36.8 °C)    Input/Output:     Intake/Output Summary (Last 24 hours) at 1/10/2021 1706  Last data filed at 1/10/2021 4318  Gross per 24 hour   Intake --   Output 200 ml   Net -200 ml       General: Alert, awake, in NAD   Cardiovascular:  RRR  Pulmonary:  CTAB  GI: Soft, nt, nd   Extremities:  No Edema  Additional: Left CVA tenderness    Labs:    Recent Results (from the past 24 hour(s))   D DIMER    Collection Time: 21  6:17 PM   Result Value Ref Range    D DIMER 1.23 (H) <0.46 ug/ml(FEU)   CARDIAC PANEL,(CK, CKMB & TROPONIN)    Collection Time: 21  6:17 PM   Result Value Ref Range    CK - MB <1.0 <3.6 ng/ml    CK-MB Index  0.0 - 4.0 %     CALCULATION NOT PERFORMED WHEN RESULT IS BELOW LINEAR LIMIT    CK 73 26 - 192 U/L    Troponin-I, QT <0.02 0.0 - 0.045 NG/ML   GLUCOSE, POC    Collection Time: 21 10:28 PM   Result Value Ref Range    Glucose (POC) 155 (H) 70 - 110 mg/dL   CARDIAC PANEL,(CK, CKMB & TROPONIN)    Collection Time: 01/10/21 12:43 AM   Result Value Ref Range    CK - MB <1.0 <3.6 ng/ml    CK-MB Index  0.0 - 4.0 %     CALCULATION NOT PERFORMED WHEN RESULT IS BELOW LINEAR LIMIT    CK 72 26 - 192 U/L    Troponin-I, QT <0.02 0.0 - 0.045 NG/ML   GLUCOSE, POC    Collection Time: 01/10/21  8:05 AM   Result Value Ref Range    Glucose (POC) 113 (H) 70 - 110 mg/dL   CARDIAC PANEL,(CK, CKMB & TROPONIN)    Collection Time: 01/10/21 11:36 AM   Result Value Ref Range    CK - MB <1.0 <3.6 ng/ml CK-MB Index  0.0 - 4.0 %     CALCULATION NOT PERFORMED WHEN RESULT IS BELOW LINEAR LIMIT    CK 63 26 - 192 U/L    Troponin-I, QT <0.02 0.0 - 0.045 NG/ML   GLUCOSE, POC    Collection Time: 01/10/21 12:23 PM   Result Value Ref Range    Glucose (POC) 193 (H) 70 - 110 mg/dL     Additional Data Reviewed:      Signed By: Octavia Siegel MD     January 10, 2021 3:54 PM

## 2021-01-10 NOTE — PROGRESS NOTES
Problem: Falls - Risk of  Goal: *Absence of Falls  Description: Document Leafy Rodas Fall Risk and appropriate interventions in the flowsheet. Outcome: Progressing Towards Goal  Note: Fall Risk Interventions:  Mobility Interventions: Patient to call before getting OOB    Mentation Interventions: Adequate sleep, hydration, pain control    Medication Interventions: Patient to call before getting OOB    Elimination Interventions: Bed/chair exit alarm, Call light in reach, Patient to call for help with toileting needs              Problem: Unstable angina/NSTEMI: Day of Admission/Day 1  Goal: Off Pathway (Use only if patient is Off Pathway)  Outcome: Progressing Towards Goal  Goal: Activity/Safety  Outcome: Progressing Towards Goal  Goal: Consults, if ordered  Outcome: Progressing Towards Goal  Goal: Diagnostic Test/Procedures  Outcome: Progressing Towards Goal  Goal: Nutrition/Diet  Outcome: Progressing Towards Goal  Goal: Discharge Planning  Outcome: Progressing Towards Goal  Goal: Medications  Outcome: Progressing Towards Goal  Goal: Respiratory  Outcome: Progressing Towards Goal  Goal: Treatments/Interventions/Procedures  Outcome: Progressing Towards Goal  Goal: Psychosocial  Outcome: Progressing Towards Goal  Goal: *Hemodynamically stable  Outcome: Progressing Towards Goal  Goal: *Optimal pain control at patient's stated goal  Outcome: Progressing Towards Goal  Goal: *Lungs clear or at baseline  Outcome: Progressing Towards Goal     Problem: Diabetes Self-Management  Goal: *Disease process and treatment process  Description: Define diabetes and identify own type of diabetes; list 3 options for treating diabetes. Outcome: Progressing Towards Goal  Goal: *Incorporating nutritional management into lifestyle  Description: Describe effect of type, amount and timing of food on blood glucose; list 3 methods for planning meals.   Outcome: Progressing Towards Goal  Goal: *Incorporating physical activity into lifestyle  Description: State effect of exercise on blood glucose levels. Outcome: Progressing Towards Goal  Goal: *Developing strategies to promote health/change behavior  Description: Define the ABC's of diabetes; identify appropriate screenings, schedule and personal plan for screenings. Outcome: Progressing Towards Goal  Goal: *Using medications safely  Description: State effect of diabetes medications on diabetes; name diabetes medication taking, action and side effects. Outcome: Progressing Towards Goal  Goal: *Monitoring blood glucose, interpreting and using results  Description: Identify recommended blood glucose targets  and personal targets. Outcome: Progressing Towards Goal  Goal: *Prevention, detection, treatment of acute complications  Description: List symptoms of hyper- and hypoglycemia; describe how to treat low blood sugar and actions for lowering  high blood glucose level. Outcome: Progressing Towards Goal  Goal: *Prevention, detection and treatment of chronic complications  Description: Define the natural course of diabetes and describe the relationship of blood glucose levels to long term complications of diabetes.   Outcome: Progressing Towards Goal  Goal: *Developing strategies to address psychosocial issues  Description: Describe feelings about living with diabetes; identify support needed and support network  Outcome: Progressing Towards Goal  Goal: *Sick day guidelines  Outcome: Progressing Towards Goal  Goal: *Patient Specific Goal (EDIT GOAL, INSERT TEXT)  Outcome: Progressing Towards Goal

## 2021-01-11 ENCOUNTER — APPOINTMENT (OUTPATIENT)
Dept: NON INVASIVE DIAGNOSTICS | Age: 86
DRG: 313 | End: 2021-01-11
Attending: INTERNAL MEDICINE
Payer: MEDICARE

## 2021-01-11 ENCOUNTER — APPOINTMENT (OUTPATIENT)
Dept: CT IMAGING | Age: 86
DRG: 313 | End: 2021-01-11
Attending: INTERNAL MEDICINE
Payer: MEDICARE

## 2021-01-11 ENCOUNTER — APPOINTMENT (OUTPATIENT)
Dept: NON INVASIVE DIAGNOSTICS | Age: 86
DRG: 313 | End: 2021-01-11
Attending: NURSE PRACTITIONER
Payer: MEDICARE

## 2021-01-11 ENCOUNTER — APPOINTMENT (OUTPATIENT)
Dept: VASCULAR SURGERY | Age: 86
DRG: 313 | End: 2021-01-11
Attending: INTERNAL MEDICINE
Payer: MEDICARE

## 2021-01-11 LAB
ANION GAP SERPL CALC-SCNC: 6 MMOL/L (ref 3–18)
ATRIAL RATE: 68 BPM
BACTERIA SPEC CULT: ABNORMAL
BASOPHILS # BLD: 0 K/UL (ref 0–0.1)
BASOPHILS NFR BLD: 0 % (ref 0–2)
BNP SERPL-MCNC: 63 PG/ML (ref 0–1800)
BUN SERPL-MCNC: 14 MG/DL (ref 7–18)
BUN/CREAT SERPL: 18 (ref 12–20)
CALCIUM SERPL-MCNC: 9.2 MG/DL (ref 8.5–10.1)
CALCULATED P AXIS, ECG09: 45 DEGREES
CALCULATED R AXIS, ECG10: -16 DEGREES
CALCULATED T AXIS, ECG11: -6 DEGREES
CC UR VC: ABNORMAL
CHLORIDE SERPL-SCNC: 107 MMOL/L (ref 100–111)
CO2 SERPL-SCNC: 28 MMOL/L (ref 21–32)
CREAT SERPL-MCNC: 0.79 MG/DL (ref 0.6–1.3)
DIAGNOSIS, 93000: NORMAL
DIFFERENTIAL METHOD BLD: ABNORMAL
EOSINOPHIL # BLD: 0.3 K/UL (ref 0–0.4)
EOSINOPHIL NFR BLD: 5 % (ref 0–5)
ERYTHROCYTE [DISTWIDTH] IN BLOOD BY AUTOMATED COUNT: 12.2 % (ref 11.6–14.5)
GLUCOSE BLD STRIP.AUTO-MCNC: 100 MG/DL (ref 70–110)
GLUCOSE BLD STRIP.AUTO-MCNC: 112 MG/DL (ref 70–110)
GLUCOSE BLD STRIP.AUTO-MCNC: 141 MG/DL (ref 70–110)
GLUCOSE BLD STRIP.AUTO-MCNC: 141 MG/DL (ref 70–110)
GLUCOSE BLD STRIP.AUTO-MCNC: 78 MG/DL (ref 70–110)
GLUCOSE BLD STRIP.AUTO-MCNC: 78 MG/DL (ref 70–110)
GLUCOSE BLD STRIP.AUTO-MCNC: 82 MG/DL (ref 70–110)
GLUCOSE SERPL-MCNC: 117 MG/DL (ref 74–99)
HCT VFR BLD AUTO: 35.3 % (ref 35–45)
HGB BLD-MCNC: 11 G/DL (ref 12–16)
LYMPHOCYTES # BLD: 1.6 K/UL (ref 0.9–3.6)
LYMPHOCYTES NFR BLD: 29 % (ref 21–52)
MCH RBC QN AUTO: 31.3 PG (ref 24–34)
MCHC RBC AUTO-ENTMCNC: 31.2 G/DL (ref 31–37)
MCV RBC AUTO: 100.6 FL (ref 74–97)
MONOCYTES # BLD: 0.7 K/UL (ref 0.05–1.2)
MONOCYTES NFR BLD: 13 % (ref 3–10)
NEUTS SEG # BLD: 2.9 K/UL (ref 1.8–8)
NEUTS SEG NFR BLD: 53 % (ref 40–73)
P-R INTERVAL, ECG05: 122 MS
PLATELET # BLD AUTO: 219 K/UL (ref 135–420)
PMV BLD AUTO: 11 FL (ref 9.2–11.8)
POTASSIUM SERPL-SCNC: 3.6 MMOL/L (ref 3.5–5.5)
Q-T INTERVAL, ECG07: 386 MS
QRS DURATION, ECG06: 84 MS
QTC CALCULATION (BEZET), ECG08: 410 MS
RBC # BLD AUTO: 3.51 M/UL (ref 4.2–5.3)
SERVICE CMNT-IMP: ABNORMAL
SODIUM SERPL-SCNC: 141 MMOL/L (ref 136–145)
STRESS BASELINE DIAS BP: 78 MMHG
STRESS BASELINE HR: 88 BPM
STRESS BASELINE SYS BP: 141 MMHG
STRESS ESTIMATED WORKLOAD: 1 METS
STRESS EXERCISE DUR MIN: NORMAL
STRESS PEAK DIAS BP: 71 MMHG
STRESS PEAK SYS BP: 166 MMHG
STRESS PERCENT HR ACHIEVED: 82 %
STRESS POST PEAK HR: 113 BPM
STRESS RATE PRESSURE PRODUCT: NORMAL BPM*MMHG
STRESS ST DEPRESSION: 0 MM
STRESS ST ELEVATION: 0 MM
STRESS TARGET HR: 137 BPM
VENTRICULAR RATE, ECG03: 68 BPM
WBC # BLD AUTO: 5.6 K/UL (ref 4.6–13.2)

## 2021-01-11 PROCEDURE — 74176 CT ABD & PELVIS W/O CONTRAST: CPT

## 2021-01-11 PROCEDURE — 74011250636 HC RX REV CODE- 250/636: Performed by: INTERNAL MEDICINE

## 2021-01-11 PROCEDURE — 74011250636 HC RX REV CODE- 250/636: Performed by: FAMILY MEDICINE

## 2021-01-11 PROCEDURE — 93306 TTE W/DOPPLER COMPLETE: CPT | Performed by: INTERNAL MEDICINE

## 2021-01-11 PROCEDURE — 85025 COMPLETE CBC W/AUTO DIFF WBC: CPT

## 2021-01-11 PROCEDURE — 78452 HT MUSCLE IMAGE SPECT MULT: CPT | Performed by: INTERNAL MEDICINE

## 2021-01-11 PROCEDURE — 36415 COLL VENOUS BLD VENIPUNCTURE: CPT

## 2021-01-11 PROCEDURE — 82962 GLUCOSE BLOOD TEST: CPT

## 2021-01-11 PROCEDURE — C8929 TTE W OR WO FOL WCON,DOPPLER: HCPCS

## 2021-01-11 PROCEDURE — 65660000000 HC RM CCU STEPDOWN

## 2021-01-11 PROCEDURE — 93005 ELECTROCARDIOGRAM TRACING: CPT

## 2021-01-11 PROCEDURE — 74011250636 HC RX REV CODE- 250/636

## 2021-01-11 PROCEDURE — 3331090002 HH PPS REVENUE DEBIT

## 2021-01-11 PROCEDURE — 74011250637 HC RX REV CODE- 250/637: Performed by: FAMILY MEDICINE

## 2021-01-11 PROCEDURE — 99232 SBSQ HOSP IP/OBS MODERATE 35: CPT | Performed by: INTERNAL MEDICINE

## 2021-01-11 PROCEDURE — 80048 BASIC METABOLIC PNL TOTAL CA: CPT

## 2021-01-11 PROCEDURE — 93970 EXTREMITY STUDY: CPT

## 2021-01-11 PROCEDURE — 83880 ASSAY OF NATRIURETIC PEPTIDE: CPT

## 2021-01-11 PROCEDURE — 74011000250 HC RX REV CODE- 250: Performed by: INTERNAL MEDICINE

## 2021-01-11 PROCEDURE — 74011250637 HC RX REV CODE- 250/637: Performed by: EMERGENCY MEDICINE

## 2021-01-11 PROCEDURE — 3331090001 HH PPS REVENUE CREDIT

## 2021-01-11 PROCEDURE — 93018 CV STRESS TEST I&R ONLY: CPT | Performed by: INTERNAL MEDICINE

## 2021-01-11 PROCEDURE — 93016 CV STRESS TEST SUPVJ ONLY: CPT | Performed by: INTERNAL MEDICINE

## 2021-01-11 PROCEDURE — 74011250637 HC RX REV CODE- 250/637: Performed by: HOSPITALIST

## 2021-01-11 RX ORDER — CALCIUM CARBONATE 200(500)MG
200 TABLET,CHEWABLE ORAL
Status: DISCONTINUED | OUTPATIENT
Start: 2021-01-11 | End: 2021-01-12 | Stop reason: HOSPADM

## 2021-01-11 RX ORDER — MAGNESIUM SULFATE 100 %
4 CRYSTALS MISCELLANEOUS AS NEEDED
Status: DISCONTINUED | OUTPATIENT
Start: 2021-01-11 | End: 2021-01-12 | Stop reason: HOSPADM

## 2021-01-11 RX ORDER — FUROSEMIDE 20 MG/1
20 TABLET ORAL DAILY
Status: DISCONTINUED | OUTPATIENT
Start: 2021-01-11 | End: 2021-01-12 | Stop reason: HOSPADM

## 2021-01-11 RX ORDER — DEXTROSE 50 % IN WATER (D50W) INTRAVENOUS SYRINGE
25-50 AS NEEDED
Status: DISCONTINUED | OUTPATIENT
Start: 2021-01-11 | End: 2021-01-12 | Stop reason: HOSPADM

## 2021-01-11 RX ORDER — ISOSORBIDE MONONITRATE 30 MG/1
30 TABLET, EXTENDED RELEASE ORAL DAILY
Status: DISCONTINUED | OUTPATIENT
Start: 2021-01-11 | End: 2021-01-12 | Stop reason: HOSPADM

## 2021-01-11 RX ORDER — SODIUM CHLORIDE 9 MG/ML
250 INJECTION, SOLUTION INTRAVENOUS ONCE
Status: COMPLETED | OUTPATIENT
Start: 2021-01-11 | End: 2021-01-11

## 2021-01-11 RX ORDER — AMINOPHYLLINE 25 MG/ML
INJECTION, SOLUTION INTRAVENOUS
Status: COMPLETED
Start: 2021-01-11 | End: 2021-01-11

## 2021-01-11 RX ADMIN — REGADENOSON 0.4 MG: 0.08 INJECTION, SOLUTION INTRAVENOUS at 09:40

## 2021-01-11 RX ADMIN — SODIUM CHLORIDE 250 ML: 900 INJECTION, SOLUTION INTRAVENOUS at 09:40

## 2021-01-11 RX ADMIN — DEXTROSE MONOHYDRATE 12.5 G: 25 INJECTION, SOLUTION INTRAVENOUS at 03:19

## 2021-01-11 RX ADMIN — ONDANSETRON 4 MG: 2 INJECTION INTRAMUSCULAR; INTRAVENOUS at 08:28

## 2021-01-11 RX ADMIN — PERFLUTREN 2 ML: 6.52 INJECTION, SUSPENSION INTRAVENOUS at 14:52

## 2021-01-11 RX ADMIN — CALCIUM CARBONATE (ANTACID) CHEW TAB 500 MG 200 MG: 500 CHEW TAB at 22:06

## 2021-01-11 RX ADMIN — AMINOPHYLLINE 100 MG: 25 INJECTION, SOLUTION INTRAVENOUS at 09:45

## 2021-01-11 RX ADMIN — WATER 1 G: 1 INJECTION INTRAMUSCULAR; INTRAVENOUS; SUBCUTANEOUS at 18:39

## 2021-01-11 RX ADMIN — LEVOTHYROXINE SODIUM 137 MCG: 25 TABLET ORAL at 06:54

## 2021-01-11 RX ADMIN — Medication 10 ML: at 18:42

## 2021-01-11 RX ADMIN — ACETAMINOPHEN 650 MG: 325 TABLET ORAL at 01:03

## 2021-01-11 RX ADMIN — METOPROLOL TARTRATE 25 MG: 25 TABLET, FILM COATED ORAL at 18:39

## 2021-01-11 RX ADMIN — Medication 10 ML: at 22:06

## 2021-01-11 NOTE — PROGRESS NOTES
Physician Progress Note      PATIENT:               You Mcdonald  CSN #:                  [id number]  :                       1935  ADMIT DATE:       2021 9:23 PM  [address] [place] DATE:  RESPONDING  PROVIDER #:        Tessy Toussaint MD          QUERY TEXT:    Pt admitted with  atypical chest pain   Noted documentation of Acute on chronic diastolic CHF NYHA class III   in cardiology consult . If possible, please document in progress notes and discharge summary:    The medical record reflects the following:    Risk Factors: h/o   CAD ;   echo in  -  EF   50-55%    Clinical Indicators: BNP  on admit  65  per cardiology consult- Acute on chronic diastolic CHF NYHA class III    Treatment: -  pt continues on  Lopressor  25mg po BID    Thank you,   Gemma Boxer RN   CCDS  x [phone]  Options provided:  -- Acute on chronic diastolic CHF NYHA class III  confirmed present on admission  -- Acute on chronic diastolic CHF NYHA class III  confirmed not present on admission  -- Acute on chronic diastolic CHF NYHA class III  ruled out  -- Defer to cardiology  consultant documentation regarding Acute on chronic diastolic CHF NYHA class III  -- Other - I will add my own diagnosis  -- Disagree - Not applicable / Not valid  -- Disagree - Clinically unable to determine / Unknown  -- Refer to Clinical Documentation Reviewer    PROVIDER RESPONSE TEXT:    The diagnosis of Acute on chronic diastolic CHF NYHA class III was confirmed as present on admission.     Query created by: Alan López on 2021 7:58 AM      Electronically signed by:  Tessy Toussaint MD 2021 8:38 AM

## 2021-01-11 NOTE — PROGRESS NOTES
Problem: Falls - Risk of  Goal: *Absence of Falls  Description: Document Eldon Nash Fall Risk and appropriate interventions in the flowsheet. Outcome: Progressing Towards Goal  Note: Fall Risk Interventions:  Mobility Interventions: Patient to call before getting OOB    Mentation Interventions: Adequate sleep, hydration, pain control, Bed/chair exit alarm    Medication Interventions: Patient to call before getting OOB    Elimination Interventions: Bed/chair exit alarm, Call light in reach              Problem: Diabetes Self-Management  Goal: *Disease process and treatment process  Description: Define diabetes and identify own type of diabetes; list 3 options for treating diabetes. Outcome: Progressing Towards Goal  Goal: *Incorporating nutritional management into lifestyle  Description: Describe effect of type, amount and timing of food on blood glucose; list 3 methods for planning meals. Outcome: Progressing Towards Goal  Goal: *Incorporating physical activity into lifestyle  Description: State effect of exercise on blood glucose levels. Outcome: Progressing Towards Goal  Goal: *Developing strategies to promote health/change behavior  Description: Define the ABC's of diabetes; identify appropriate screenings, schedule and personal plan for screenings. Outcome: Progressing Towards Goal  Goal: *Using medications safely  Description: State effect of diabetes medications on diabetes; name diabetes medication taking, action and side effects. Outcome: Progressing Towards Goal  Goal: *Monitoring blood glucose, interpreting and using results  Description: Identify recommended blood glucose targets  and personal targets. Outcome: Progressing Towards Goal  Goal: *Prevention, detection, treatment of acute complications  Description: List symptoms of hyper- and hypoglycemia; describe how to treat low blood sugar and actions for lowering  high blood glucose level.   Outcome: Progressing Towards Goal  Goal: *Prevention, detection and treatment of chronic complications  Description: Define the natural course of diabetes and describe the relationship of blood glucose levels to long term complications of diabetes.   Outcome: Progressing Towards Goal  Goal: *Developing strategies to address psychosocial issues  Description: Describe feelings about living with diabetes; identify support needed and support network  Outcome: Progressing Towards Goal  Goal: *Sick day guidelines  Outcome: Progressing Towards Goal  Goal: *Patient Specific Goal (EDIT GOAL, INSERT TEXT)  Outcome: Progressing Towards Goal     Problem: Hypertension  Goal: *Blood pressure within specified parameters  Outcome: Progressing Towards Goal

## 2021-01-11 NOTE — ROUTINE PROCESS
Bedside and Verbal shift change report given to Ira Gayle RN (oncoming nurse) by Marco A Norman RN (offgoing nurse). Report included the following information SBAR, Kardex, MAR and Recent Results.

## 2021-01-11 NOTE — HOME CARE
Patient is Active to [facility] for SN /Telehealth for  Medication and Disease Management, patient's cert ends 3/32/79, notified  Dalila Alejandre) that patient is Active to [facility] and will need resumption of care orders prior to discharge ; [facility] will follow. MARCELA MAHARAJ.

## 2021-01-11 NOTE — ROUTINE PROCESS
46 - Patient's BS =78. Patient is currently NPO for procedure. 0319 - 12.5 g D50 given, IV. Will re-check sugar in 15 minutes. 0335-BS = 141. Will continue to monitor patient.

## 2021-01-11 NOTE — PROGRESS NOTES
Patient is Aox4 with complaints of nausea. PRN Zofran administered for comfort. AM assessment completed. Bed in lowest locked position, call light within reach, side rails x3. Problem: Falls - Risk of  Goal: *Absence of Falls  Description: Document Blaze Ahuja Fall Risk and appropriate interventions in the flowsheet.   Outcome: Progressing Towards Goal  Note: Fall Risk Interventions:  Mobility Interventions: Patient to call before getting OOB, Bed/chair exit alarm    Mentation Interventions: Adequate sleep, hydration, pain control, Door open when patient unattended    Medication Interventions: Teach patient to arise slowly, Patient to call before getting OOB    Elimination Interventions: Call light in reach, Patient to call for help with toileting needs              Problem: Patient Education: Go to Patient Education Activity  Goal: Patient/Family Education  Outcome: Progressing Towards Goal     Problem: Heart Failure: Day 1  Goal: Off Pathway (Use only if patient is Off Pathway)  Outcome: Progressing Towards Goal  Goal: Activity/Safety  Outcome: Progressing Towards Goal  Goal: Consults, if ordered  Outcome: Progressing Towards Goal  Goal: Diagnostic Test/Procedures  Outcome: Progressing Towards Goal  Goal: Nutrition/Diet  Outcome: Progressing Towards Goal  Goal: Discharge Planning  Outcome: Progressing Towards Goal  Goal: Medications  Outcome: Progressing Towards Goal  Goal: Respiratory  Outcome: Progressing Towards Goal  Goal: Treatments/Interventions/Procedures  Outcome: Progressing Towards Goal  Goal: Psychosocial  Outcome: Progressing Towards Goal  Goal: *Oxygen saturation within defined limits  Outcome: Progressing Towards Goal  Goal: *Hemodynamically stable  Outcome: Progressing Towards Goal  Goal: *Optimal pain control at patient's stated goal  Outcome: Progressing Towards Goal  Goal: *Anxiety reduced or absent  Outcome: Progressing Towards Goal     Problem: Unstable angina/NSTEMI: Day of Admission/Day 1  Goal: Off Pathway (Use only if patient is Off Pathway)  Outcome: Progressing Towards Goal  Goal: Activity/Safety  Outcome: Progressing Towards Goal  Goal: Consults, if ordered  Outcome: Progressing Towards Goal  Goal: Diagnostic Test/Procedures  Outcome: Progressing Towards Goal  Goal: Nutrition/Diet  Outcome: Progressing Towards Goal  Goal: Discharge Planning  Outcome: Progressing Towards Goal  Goal: Medications  Outcome: Progressing Towards Goal  Goal: Respiratory  Outcome: Progressing Towards Goal  Goal: Treatments/Interventions/Procedures  Outcome: Progressing Towards Goal  Goal: Psychosocial  Outcome: Progressing Towards Goal  Goal: *Hemodynamically stable  Outcome: Progressing Towards Goal  Goal: *Optimal pain control at patient's stated goal  Outcome: Progressing Towards Goal  Goal: *Lungs clear or at baseline  Outcome: Progressing Towards Goal     Problem: Diabetes Self-Management  Goal: *Disease process and treatment process  Description: Define diabetes and identify own type of diabetes; list 3 options for treating diabetes. Outcome: Progressing Towards Goal  Goal: *Incorporating nutritional management into lifestyle  Description: Describe effect of type, amount and timing of food on blood glucose; list 3 methods for planning meals. Outcome: Progressing Towards Goal  Goal: *Incorporating physical activity into lifestyle  Description: State effect of exercise on blood glucose levels. Outcome: Progressing Towards Goal  Goal: *Developing strategies to promote health/change behavior  Description: Define the ABC's of diabetes; identify appropriate screenings, schedule and personal plan for screenings. Outcome: Progressing Towards Goal  Goal: *Using medications safely  Description: State effect of diabetes medications on diabetes; name diabetes medication taking, action and side effects.   Outcome: Progressing Towards Goal  Goal: *Monitoring blood glucose, interpreting and using results  Description: Identify recommended blood glucose targets  and personal targets. Outcome: Progressing Towards Goal  Goal: *Prevention, detection, treatment of acute complications  Description: List symptoms of hyper- and hypoglycemia; describe how to treat low blood sugar and actions for lowering  high blood glucose level. Outcome: Progressing Towards Goal  Goal: *Prevention, detection and treatment of chronic complications  Description: Define the natural course of diabetes and describe the relationship of blood glucose levels to long term complications of diabetes.   Outcome: Progressing Towards Goal  Goal: *Developing strategies to address psychosocial issues  Description: Describe feelings about living with diabetes; identify support needed and support network  Outcome: Progressing Towards Goal  Goal: *Sick day guidelines  Outcome: Progressing Towards Goal  Goal: *Patient Specific Goal (EDIT GOAL, INSERT TEXT)  Outcome: Progressing Towards Goal     Problem: Patient Education: Go to Patient Education Activity  Goal: Patient/Family Education  Outcome: Progressing Towards Goal     Problem: Hypertension  Goal: *Blood pressure within specified parameters  Outcome: Progressing Towards Goal  Goal: *Fluid volume balance  Outcome: Progressing Towards Goal  Goal: *Labs within defined limits  Outcome: Progressing Towards Goal     Problem: Patient Education: Go to Patient Education Activity  Goal: Patient/Family Education  Outcome: Progressing Towards Goal     Problem: Patient Education: Go to Patient Education Activity  Goal: Patient/Family Education  Outcome: Progressing Towards Goal     Problem: Patient Education: Go to Patient Education Activity  Goal: Patient/Family Education  Outcome: Progressing Towards Goal

## 2021-01-11 NOTE — PROGRESS NOTES
[facility]      CARDIOLOGY PROGRESS NOTE  RECS:      1. Atypical Angina - Trop Neg x 3, EKG ST with non-specific ST abnormality. Follow NST today to assess CAD . Further recommendation per Dr. David Hurtado   2. CAD h/o PCI to RCA 2016 - Cardiac cath 6/2019 - 50% mid LAD stenosis and widely patent previous proximal right coronary stent. Continue medical management for CA  3. Hypertension - stable continue present treatment. resume Imdur   4. Acute on chronic diastolic CHF NYHA class III - Echo 11/2020 EF 50-55%, no WMA-mildly decompensated. C/o INIGUEZ no significant peripheral edema. Added lasix po.   5. Hyperlipidemia -  - she is intolerant to statins, and has declined Repatha in the past  6. DM II - uncontrolled A1C 8.2 - management per primary team  7. Morbid obesity- weight loss advised   8. Hypothyroidism - continue synthroid  9. Nausea - Treatment per primary team - discussed    Patient with atypical angina- negative stress test.  Continue current meds  Can be discharged home from cardiac standpoint. ASSESSMENT:  Hospital Problems  Date Reviewed: 11/23/2020          Codes Class Noted POA    CHF (congestive heart failure) ([facility] [address] ICD-10-CM: I50.9  ICD-9-CM: 428.0  1/8/2021 Unknown        Chest pain ICD-10-CM: R07.9  ICD-9-CM: 786.50  2/6/2017 Unknown                SUBJECTIVE:  No CP  C/o INIGUEZ and epigastric discomfort with nausea     OBJECTIVE:    VS:   Visit Vitals  BP (!) 141/78   Pulse 78   Temp 97.8 °F (36.6 °C)   Resp 18   Ht 5' 7\" (1.702 m)   Wt 110.7 kg (244 lb)   SpO2 96%   BMI 38.22 kg/m²         Intake/Output Summary (Last 24 hours) at 1/11/2021 1150  Last data filed at 1/11/2021 0934  Gross per 24 hour   Intake 730 ml   Output 1050 ml   Net -320 ml     TELE: normal sinus rhythm    General: alert, well developed, dyspneic and in no apparent distress  HENT: Normocephalic, atraumatic. Normal external eye.   Neck :  JVD difficult to assess due to obesity  Cardiac:  regular rate and rhythm, S1, S2 normal, no murmur, click, rub or gallop  Lungs: clear to auscultation bilaterally  Abdomen: Soft, nontender, no masses  Extremities:  No c/c/e, peripheral pulses present      Labs: Results:       Chemistry Recent Labs     01/11/21 0444 01/09/21  0342   * 197*    139   K 3.6 3.6    106   CO2 28 26   BUN 14 11   CREA 0.79 0.77   CA 9.2 9.0   AGAP 6 7   BUCR 18 14      CBC w/Diff Recent Labs     01/11/21 0444 01/09/21  0342   WBC 5.6 5.0   RBC 3.51* 3.61*   HGB 11.0* 11.5*   HCT 35.3 35.8    234   GRANS 53  --    LYMPH 29  --    EOS 5  --       Cardiac Enzymes Recent Labs     01/10/21  1136 01/10/21  0043   CPK 63 72   CKND1 CALCULATION NOT PERFORMED WHEN RESULT IS BELOW LINEAR LIMIT CALCULATION NOT PERFORMED WHEN RESULT IS BELOW LINEAR LIMIT      Coagulation No results for input(s): PTP, INR, APTT, INREXT in the last 72 hours. Lipid Panel Lab Results   Component Value Date/Time    Cholesterol, total 231 (H) 07/07/2020 11:36 AM    HDL Cholesterol 42 07/07/2020 11:36 AM    LDL, calculated 165.8 (H) 07/07/2020 11:36 AM    VLDL, calculated 23.2 07/07/2020 11:36 AM    Triglyceride 116 07/07/2020 11:36 AM    CHOL/HDL Ratio 5.5 (H) 07/07/2020 11:36 AM      BNP No results for input(s): BNPP in the last 72 hours. Liver Enzymes No results for input(s): TP, ALB, TBIL, AP in the last 72 hours. No lab exists for component: SGOT, GPT, DBIL   Thyroid Studies Lab Results   Component Value Date/Time    TSH 8.37 (H) 12/26/2020 07:08 PM              Ling ROBERSON Mireille:[phone] supervised    I have independently evaluated and examined the patient. All relevant labs and testing data's are reviewed. Care plan discussed and updated after review.     Jerry Sommers MD

## 2021-01-11 NOTE — PROGRESS NOTES
[facility] Hospitalist Group  Progress Note    Patient: Iftikhar Ann Age: 82 y.o. : 1935 MR#: [id number] SSN: [id number]  Date/Time: 2021    Subjective:     Pt continues to c/o Left back pain, although to a lesser extent. Seen in echo lab. Appears comfortable. Assessment/Plan:   Patient is an 28-year-old female with history of coronary artery disease status post stents in the past coming in with complaints of chest pain and concern for unstable angina per initially evaluating the ED physician.    -Atypical chest pain in patient with history of coronary artery disease and risk factors. Initial evaluation including EKG and cardiac enzymes are reassuring. Cardiology input noted, to have stress test today, results pending. On Plavix and beta-blocker here. Has allergies  to several statins. Started on aspirin. Currently stable and not complaining of active chest pain. Further work up per Cards.    -Complaints of left flank pain, abnormal urinalysis: Concern for urinary tract infection, Urine cx this admission-->Lactobacillus spp. Urine culture from previous evals prior to admission has grown E. coli that is resistant to fluoroquinolones. Keflex listed as allergy. Tolerating Rocephin. Per pharmacy review patient has had Ancef in 2018 without reported reaction. Discussed w/ ID specialist-->recommends CT abd pelvis, ordered. HISTORY OF:  -Dyslipidemia  -Type II DM  -GERD  -TAL  -Anxiety d/o  -Chronic pain  -Morbid obesity  -DJD  -Renal stone    PLAN:  -Cont rocephin for now  -CT abd pelvis to eval for obstructive uropathy  -Cont bb  -cont ASA  -Cont plavix  -Statin intolerance    Dispo: will dc when cleared by cardiology, after CT abd pelvis, and after ID eval for abx recommendations.     Additional Notes:      Case discussed with:  [x]Patient  []Family  [x]Nursing  []Case Management  DVT Prophylaxis:  [x]Lovenox  []Hep SQ  []SCDs  []Coumadin   []On Heparin gtt    Objective:   VS:   Visit Vitals  BP (!) 141/78   Pulse 78   Temp 97.8 °F (36.6 °C)   Resp 18   Ht 5' 7\" (1.702 m)   Wt 110.7 kg (244 lb)   SpO2 96%   BMI 38.22 kg/m²      Tmax/24hrs: Temp (24hrs), Av.8 °F (36.6 °C), Min:97.2 °F (36.2 °C), Max:98.2 °F (36.8 °C)    Input/Output:     Intake/Output Summary (Last 24 hours) at 2021 1501  Last data filed at 2021 1356  Gross per 24 hour   Intake 610 ml   Output 900 ml   Net -290 ml       General: Alert, awake, in NAD   Cardiovascular:  RRR  Pulmonary:  CTAB  GI: Soft, nt, nd   Extremities:  No Edema  Additional: Left CVA tenderness    Labs:    Recent Results (from the past 24 hour(s))   GLUCOSE, POC    Collection Time: 01/10/21  4:57 PM   Result Value Ref Range    Glucose (POC) 109 70 - 110 mg/dL   GLUCOSE, POC    Collection Time: 01/10/21 10:12 PM   Result Value Ref Range    Glucose (POC) 106 70 - 110 mg/dL   GLUCOSE, POC    Collection Time: 21  2:57 AM   Result Value Ref Range    Glucose (POC) 78 70 - 110 mg/dL   GLUCOSE, POC    Collection Time: 21  3:35 AM   Result Value Ref Range    Glucose (POC) 141 (H) 70 - 110 mg/dL   NT-PRO BNP    Collection Time: 21  4:44 AM   Result Value Ref Range    NT pro-BNP 63 0 - 1,800 PG/ML   CBC WITH AUTOMATED DIFF    Collection Time: 21  4:44 AM   Result Value Ref Range    WBC 5.6 4.6 - 13.2 K/uL    RBC 3.51 (L) 4.20 - 5.30 M/uL    HGB 11.0 (L) 12.0 - 16.0 g/dL    HCT 35.3 35.0 - 45.0 %    .6 (H) 74.0 - 97.0 FL    MCH 31.3 24.0 - 34.0 PG    MCHC 31.2 31.0 - 37.0 g/dL    RDW 12.2 11.6 - 14.5 %    PLATELET 093 847 - 133 K/uL    MPV 11.0 9.2 - 11.8 FL    NEUTROPHILS 53 40 - 73 %    LYMPHOCYTES 29 21 - 52 %    MONOCYTES 13 (H) 3 - 10 %    EOSINOPHILS 5 0 - 5 %    BASOPHILS 0 0 - 2 %    ABS. NEUTROPHILS 2.9 1.8 - 8.0 K/UL    ABS. LYMPHOCYTES 1.6 0.9 - 3.6 K/UL    ABS. MONOCYTES 0.7 0.05 - 1.2 K/UL    ABS. EOSINOPHILS 0.3 0.0 - 0.4 K/UL    ABS.  BASOPHILS 0.0 0.0 - 0.1 K/UL    DF AUTOMATED METABOLIC PANEL, BASIC    Collection Time: 01/11/21  4:44 AM   Result Value Ref Range    Sodium 141 136 - 145 mmol/L    Potassium 3.6 3.5 - 5.5 mmol/L    Chloride 107 100 - 111 mmol/L    CO2 28 21 - 32 mmol/L    Anion gap 6 3.0 - 18 mmol/L    Glucose 117 (H) 74 - 99 mg/dL    BUN 14 7.0 - 18 MG/DL    Creatinine 0.79 0.6 - 1.3 MG/DL    BUN/Creatinine ratio 18 12 - 20      GFR est AA >60 >60 ml/min/1.73m2    GFR est non-AA >60 >60 ml/min/1.73m2    Calcium 9.2 8.5 - 10.1 MG/DL   GLUCOSE, POC    Collection Time: 01/11/21  8:29 AM   Result Value Ref Range    Glucose (POC) 78 70 - 110 mg/dL   NUCLEAR CARDIAC STRESS TEST    Collection Time: 01/11/21 11:25 AM   Result Value Ref Range    Target  bpm    Exercise duration time 00:04:00     Stress Base Systolic  mmHg    Stress Base Diastolic BP 71 mmHg    Post peak  BPM    Baseline HR 88 BPM    Estimated workload 1.0 METS    Baseline  mmHg    Percent HR 82 %    Stress Base Diastolic BP 78 mmHg    Stress Rate Pressure Product 18,758 BPM*mmHg   GLUCOSE, POC    Collection Time: 01/11/21 11:43 AM   Result Value Ref Range    Glucose (POC) 100 70 - 110 mg/dL   EKG, 12 LEAD, SUBSEQUENT    Collection Time: 01/11/21  1:40 PM   Result Value Ref Range    Ventricular Rate 68 BPM    Atrial Rate 68 BPM    P-R Interval 122 ms    QRS Duration 84 ms    Q-T Interval 386 ms    QTC Calculation (Bezet) 410 ms    Calculated P Axis 45 degrees    Calculated R Axis -16 degrees    Calculated T Axis -6 degrees    Diagnosis       Sinus rhythm with premature supraventricular complexes  Nonspecific ST and T wave abnormality  Abnormal ECG  When compared with ECG of 11-JAN-2021 09:29,  Sinus rhythm has replaced Atrial fibrillation  Nonspecific T wave abnormality now evident in Inferior leads  QT has shortened  Confirmed by Ghazal Greenberg MD, ----- ([id number]) on 1/11/2021 1:57:51 PM     ECHO ADULT COMPLETE    Collection Time: 01/11/21  2:52 PM   Result Value Ref Range    Triscuspid Valve Regurgitation Peak Gradient 9.36 mmHg    TR Max Velocity 152.96 cm/s     Additional Data Reviewed:      Signed By: Octavia Siegel MD     January 11, 2021 3:54 PM

## 2021-01-11 NOTE — DIABETES MGMT
Glycemic Control Plan of Care    Assessment: known to our service from from previous admissions  Admitted for r/o CP  Attempted to see her today but off the floor - will follow up tomorrow for any DM needs     Most recent blood glucose values:       Current A1C:  Lab Results   Component Value Date/Time    Hemoglobin A1c 8.2 (H) 11/16/2020 06:15 PM    Hemoglobin A1c (POC) 8.5 01/17/2019 12:19 PM    Hemoglobin A1c, External 7.5 10/31/2019     Current hospital diabetes medications:  lantus 32 units am  lantus 36 units pm    TTD previous day = 68 units lantus    Home diabetes medications - confirmed during admission in Nov. 2020  Lantus 32 units AM  Lantus 36 units PM      Goals:  Blood glucose will be within target range of  mg/dL by 1/12/2021    Education:  ___  Refer to Diabetes Education Record             ___  Education not indicated at this time - will follow up tomorrow     Bebeto Chopra MPH RN CDE  Pager [phone]

## 2021-01-11 NOTE — PROGRESS NOTES
Patient was given 11.0 milliCuries of 99mTc-Sestamibi for the resting images. Patient was also given 33.0 milliCuries of 99mTc-Sestamibi for the stress images. Injected with 0.4mg Lexiscan. Due epigastric discomfort after lexiscan, 100mg of aminophylline given. Patient's armband was left on and sent back to room.

## 2021-01-12 VITALS
RESPIRATION RATE: 18 BRPM | HEART RATE: 103 BPM | TEMPERATURE: 97.6 F | OXYGEN SATURATION: 96 % | HEIGHT: 67 IN | SYSTOLIC BLOOD PRESSURE: 108 MMHG | WEIGHT: 244 LBS | BODY MASS INDEX: 38.3 KG/M2 | DIASTOLIC BLOOD PRESSURE: 69 MMHG

## 2021-01-12 LAB
GLUCOSE BLD STRIP.AUTO-MCNC: 107 MG/DL (ref 70–110)
GLUCOSE BLD STRIP.AUTO-MCNC: 140 MG/DL (ref 70–110)
GLUCOSE BLD STRIP.AUTO-MCNC: 210 MG/DL (ref 70–110)

## 2021-01-12 PROCEDURE — 74011250637 HC RX REV CODE- 250/637: Performed by: INTERNAL MEDICINE

## 2021-01-12 PROCEDURE — 97530 THERAPEUTIC ACTIVITIES: CPT

## 2021-01-12 PROCEDURE — 3331090001 HH PPS REVENUE CREDIT

## 2021-01-12 PROCEDURE — 82962 GLUCOSE BLOOD TEST: CPT

## 2021-01-12 PROCEDURE — 97535 SELF CARE MNGMENT TRAINING: CPT

## 2021-01-12 PROCEDURE — 2709999900 HC NON-CHARGEABLE SUPPLY

## 2021-01-12 PROCEDURE — 74011250637 HC RX REV CODE- 250/637: Performed by: FAMILY MEDICINE

## 2021-01-12 PROCEDURE — 97116 GAIT TRAINING THERAPY: CPT

## 2021-01-12 PROCEDURE — 74011636637 HC RX REV CODE- 636/637: Performed by: FAMILY MEDICINE

## 2021-01-12 PROCEDURE — 74011250637 HC RX REV CODE- 250/637: Performed by: EMERGENCY MEDICINE

## 2021-01-12 PROCEDURE — 74011250636 HC RX REV CODE- 250/636: Performed by: FAMILY MEDICINE

## 2021-01-12 PROCEDURE — 3331090002 HH PPS REVENUE DEBIT

## 2021-01-12 PROCEDURE — 74011250637 HC RX REV CODE- 250/637: Performed by: NURSE PRACTITIONER

## 2021-01-12 RX ORDER — GUAIFENESIN 100 MG/5ML
81 LIQUID (ML) ORAL DAILY
Qty: 30 TAB | Refills: 0 | Status: SHIPPED | OUTPATIENT
Start: 2021-01-13 | End: 2021-02-12

## 2021-01-12 RX ORDER — SAME BUTANEDISULFONATE/BETAINE 400-600 MG
250 POWDER IN PACKET (EA) ORAL 2 TIMES DAILY
Qty: 14 CAP | Refills: 0 | Status: SHIPPED | OUTPATIENT
Start: 2021-01-12 | End: 2021-01-25

## 2021-01-12 RX ORDER — AMOXICILLIN AND CLAVULANATE POTASSIUM 875; 125 MG/1; MG/1
1 TABLET, FILM COATED ORAL 3 TIMES DAILY
Qty: 18 TAB | Refills: 0 | Status: SHIPPED | OUTPATIENT
Start: 2021-01-12 | End: 2021-01-25

## 2021-01-12 RX ORDER — CIPROFLOXACIN 500 MG/1
500 TABLET ORAL 2 TIMES DAILY
Qty: 12 TAB | Refills: 0 | Status: SHIPPED | OUTPATIENT
Start: 2021-01-12 | End: 2021-01-25

## 2021-01-12 RX ORDER — FUROSEMIDE 20 MG/1
20 TABLET ORAL DAILY
Qty: 30 TAB | Refills: 0 | Status: SHIPPED | OUTPATIENT
Start: 2021-01-12 | End: 2021-02-11

## 2021-01-12 RX ADMIN — Medication 10 ML: at 14:36

## 2021-01-12 RX ADMIN — TRAMADOL HYDROCHLORIDE 50 MG: 50 TABLET, FILM COATED ORAL at 02:20

## 2021-01-12 RX ADMIN — FUROSEMIDE 20 MG: 20 TABLET ORAL at 09:08

## 2021-01-12 RX ADMIN — Medication 10 ML: at 07:08

## 2021-01-12 RX ADMIN — PROMETHAZINE HYDROCHLORIDE 12.5 MG: 25 TABLET ORAL at 09:23

## 2021-01-12 RX ADMIN — LEVOTHYROXINE SODIUM 137 MCG: 25 TABLET ORAL at 07:08

## 2021-01-12 RX ADMIN — INSULIN GLARGINE 32 UNITS: 100 INJECTION, SOLUTION SUBCUTANEOUS at 09:09

## 2021-01-12 RX ADMIN — SPIRONOLACTONE 25 MG: 25 TABLET, FILM COATED ORAL at 09:08

## 2021-01-12 RX ADMIN — ENOXAPARIN SODIUM 40 MG: 40 INJECTION SUBCUTANEOUS at 09:09

## 2021-01-12 RX ADMIN — METOPROLOL TARTRATE 25 MG: 25 TABLET, FILM COATED ORAL at 09:08

## 2021-01-12 RX ADMIN — ASPIRIN 81 MG: 81 TABLET, CHEWABLE ORAL at 09:08

## 2021-01-12 RX ADMIN — CLOPIDOGREL BISULFATE 75 MG: 75 TABLET ORAL at 09:08

## 2021-01-12 RX ADMIN — TRAMADOL HYDROCHLORIDE 50 MG: 50 TABLET, FILM COATED ORAL at 09:23

## 2021-01-12 RX ADMIN — ISOSORBIDE MONONITRATE 30 MG: 30 TABLET, EXTENDED RELEASE ORAL at 09:08

## 2021-01-12 RX ADMIN — AMLODIPINE BESYLATE 5 MG: 5 TABLET ORAL at 09:08

## 2021-01-12 NOTE — PROGRESS NOTES
Discharge:    Patient will discharge home today (1/12/2021) with family assistance and restart her home health services [facility]). This writer informed Dr. Ivett Talley to put the home health orders in the chart. Patient's family will transport her home, upon discharge. There are no other care management concerns regarding this discharge. This writer will continue to monitor for discharge planning to ensure a safe discharge home from [facility]. Clive MATIAS  Care Manager  Pager#: [phone]

## 2021-01-12 NOTE — PROGRESS NOTES
Problem: Mobility Impaired (Adult and Pediatric)  Goal: *Acute Goals and Plan of Care (Insert Text)  Description: Physical Therapy Goals  Initiated 1/9/2021 and to be accomplished within 7 day(s)  1. Patient will move from supine to sit and sit to supine , scoot up and down, and roll side to side in bed with modified independence. 2.  Patient will transfer from bed to chair and chair to bed with modified independence using the least restrictive device. 3.  Patient will perform sit to stand with modified independence. 4.  Patient will ambulate with modified independence for 30-50 feet with the least restrictive device. Prior Level of Function:   Patient was modified independence for all mobility including gait using rolling walker for short distances and WC for longer distances. Patient lives in a single story home with a ramp. She has a raised toilet seat and SC. Outcome: Progressing Towards Goal    PHYSICAL THERAPY TREATMENT    Patient: Saroj Mcintosh (56 y.o. female)  Date: 1/12/2021  Diagnosis: Chest pain [R07.9]  CHF (congestive heart failure) ([facility] [address] [I50.9] <principal problem not specified>       Precautions: Fall  PLOF: see above     ASSESSMENT:  Pt cleared to participate and agreeable to session with encouragement. Pt reporting she has been toileting to [facility] independently in the room, but will call for supervision to ambulate to bedside chair. Pt reporting she feels she is at her baseline, but requiring max encouragement to get up out of bed for mobility. Pt reporting she only walks limited distance in the house as she has a wheelchair for further mobility. She completes bed mobility with [place] and sitting on EOB with independence. Pt then standing to RW with [place], ambulating x60 feet with decreased speed. Pt returning to bed due to increased SOB and fatigue. Pt then returning to supine in bed with [place].  Pt would benefit from continued PT while admitted, recommending home health with increased support from family as needed. Progression toward goals:   []      Improving appropriately and progressing toward goals  [x]      Improving slowly and progressing toward goals  []      Not making progress toward goals and plan of care will be adjusted     PLAN:  Patient continues to benefit from skilled intervention to address the above impairments. Continue treatment per established plan of care. Discharge Recommendations:  Home Health  Further Equipment Recommendations for Discharge:  reporting she has RW and wheelchair at home     SUBJECTIVE:   Patient stated I am supposed to have aides 8 hours per day but they only stay on their phone.     OBJECTIVE DATA SUMMARY:   Critical Behavior:  Neurologic State: Alert, Eyes open spontaneously  Orientation Level: Oriented X4  Cognition: Appropriate decision making  Safety/Judgement: Fall prevention  Functional Mobility Training:  Bed Mobility:  Rolling: Modified independent  Supine to Sit: Modified independent  Sit to Supine: Modified independent  Scooting: Modified independent    Transfers:  Sit to Stand: Modified independent  Stand to Sit: Modified independent    Balance:  Sitting: Intact  Standing: Intact; With support  Standing - Static: Good  Standing - Dynamic : Good      Posture:   Posture (WDL): Within defined limits    Ambulation/Gait Training:  Distance (ft): 60 Feet (ft)  Assistive Device: Walker  Ambulation - Level of Assistance: Stand-by assistance    Gait Abnormalities: Decreased step clearance    Speed/Nano: Slow  Step Length: Right shortened;Left shortened    Pain:  Pain level pre-treatment: 0/10  Pain level post-treatment: 0/10     Activity Tolerance:   Decreased activity tolerance, SOB  Please refer to the flowsheet for vital signs taken during this treatment.   After treatment:   [] Patient left in no apparent distress sitting up in chair  [x] Patient left in no apparent distress in bed  [x] Call bell left within reach  [] Nursing notified  [] Caregiver present  [] Bed alarm activated  [] SCDs applied      COMMUNICATION/EDUCATION:   [x]         Role of Physical Therapy in the acute care setting. [x]         Fall prevention education was provided and the patient/caregiver indicated understanding. [x]         Patient/family have participated as able in working toward goals and plan of care. [x]         Patient/family agree to work toward stated goals and plan of care. []         Patient understands intent and goals of therapy, but is neutral about his/her participation.   []         Patient is unable to participate in stated goals/plan of care: ongoing with therapy staff.  []         Other:        Kat Felipe, PT   Time Calculation: 30 mins

## 2021-01-12 NOTE — PROGRESS NOTES
Reason for Admission:  Chest pain [R07.9]  CHF (congestive heart failure) ([facility] [address] [I50.9]                 RUR Score:    26%            Plan for utilizing home health:    Patient is active with [facility] home health. She will need home health orders, to resume services. Likelihood of Readmission:   Moderate                         Do you (patient/family) have any concerns for transition/discharge?  no    Transition of Care Plan:   Patient plans to return home with help from her family and re-starting her home health services. Initial assessment completed with patient. Cognitive status of patient: Alert and oriented. Face sheet information confirmed:  yes. The patient designates her granddaughter Lithuanian Elana) to participate in her discharge plan and to receive any needed information. This patient lives in a house, alone; however, her family reside close by. Patient is not able to navigate steps as needed. Prior to hospitalization, patient was considered to be independent with ADLs/IADLS : yes . Patient has a current ACP document on file: no. This writer completed an ACP, with patient. Patient's family will be available to transport patient home upon discharge. The patient already has a walker, a wheelchair, a bedside commode, a shower chair and a hospital bed, available in the home. Patient is currently active with home health. If active, agency name is [facility]. Patient has stayed in a skilled nursing facility or rehab. Was  stay within last 60 days : no. This patient is on dialysis :no    Freedom of choice signed: yes, for [facility]. Currently, the discharge plan is for patient to return home with help from her family and resume her home health services. Patient's family will transport her home, upon discharge. Patient's granddaughter/primary decision maker is ([address] [facility] [address], EY#[phone] and JT#[phone]).  Patient's brother is (Raul Roger, AW#[phone] and DT#[phone]). Patient's PCP is Dr. Kavya Carcamo. Patient is insured through Medicare A&B and she also has [employer] - /l?k â€¢ splik/. The patient states that she can obtain her medications from the [facility] pharmacy [address] [place] [address]), and take her medications as directed. This writer will continue to monitor for discharge planning to ensure a safe discharge home from [place]. Care Management Interventions  PCP Verified by CM: Yes(Dr. Kavya Carcamo)  Palliative Care Criteria Met (RRAT>21 & CHF Dx)?: No  Mode of Transport at Discharge: Other (see comment)(Family will transport her home.)  Transition of Care Consult (CM Consult): Discharge Planning  Current Support Network: Lives Alone, Family Lives Nearby  Confirm Follow Up Transport: Family  Discharge Location  Discharge Placement: Home with family assistance      Clive Brown MSW  Care Manager  Pager#: [phone]

## 2021-01-12 NOTE — HOME CARE
Patient is Active with [facility], Discharge order noted for today; received ANA orders via telephone from Dr. Hailee Sotelo for SN,Telehealth,PT,OT ;spoke to patient,verified demographics and insurance, patient states she has PCA for 56/hrs week, 8hrs/day,but she can't remember the name of company; patient states she has a hosp bed,W/C,walker and glucometer; [facility] referral updated and processed to [facility] central intake for ANA . MARCELA MAHARAJ.

## 2021-01-12 NOTE — ACP (ADVANCE CARE PLANNING)
Advance Care Planning     Advance Care Planning Clinical Specialist  Conversation Note      Date of ACP Conversation: 01/12/21    Conversation Conducted with:  Patient with Decision Making Capacity    ACP Clinical Specialist: Pato Sierra Decision Maker: NO    Current Designated Health Care Decision Maker: N/A    If no Decision Maker listed above or available through scanned documents, then:    If no Authorized Decision Maker has previously been identified, then patient chooses Health Care Decision Maker:  \"Who would you like to name as your primary health care decision-maker? \"    Name: Jerrod Bojorquez      Relationship: Granddaughter Phone number: [phone] and [phone]  Aneita Severin this person be reached easily? \" YES  \"Who would you like to name as your back-up decision maker? \" No one. Care Preferences    Hospitalization: \"If your health worsens and it becomes clear that your chance of recovery is unlikely, what would your preference be regarding hospitalization? \"    Choice:  [x]  The patient wants hospitalization  []  The patient prefers comfort-focused treatment without hospitalization. [] Yes  [x] No   Educated Patient / Kat Stark regarding differences between Advance Directives and portable DNR orders.     Length of ACP Conversation in minutes:      Conversation Outcomes:  [x] ACP discussion completed  [] Existing advance directive reviewed with patient; no changes to patient's previously recorded wishes   [x] New Advance Directive completed   [] Portable Do Not Resuscitate prepared for Provider review and signature  [] POLST/POST/MOLST/MOST prepared for Provider review and signature      Follow-up plan:    [] Schedule follow-up conversation to continue planning  [] Referred individual to Provider for additional questions/concerns   [] Advised patient/agent/surrogate to review completed ACP document and update if needed with changes in condition, patient preferences or care setting     [x] This note routed to one or more involved healthcare providers      Clive Schultz MS  Care Manager  Pager#: [phone]
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
## 2021-01-12 NOTE — DISCHARGE SUMMARY
[facility]  [facility]       Patient: True Parker Age: 82 y.o. : 1935 MR#: [id number]: [id number]  PCP on record: Yenny Leon MD  Admit date: 2021  Discharge date: 2021    Consults:    -   Procedures:  -     Significant Diagnostic Studies:   -    Discharge Diagnoses:                                           Patient Active Problem List   Diagnosis Code    Hypertensive heart disease with heart failure ([facility] I11.0    Unspecified hypothyroidism E03.9    Hypovitaminosis D E55.9    Asthma J45.909    Esophageal reflux K21.9    Noncompliance with medication regimen Z91.14    Arm pain M79.603    Neck pain M54.2    Anxiety F41.9    S/P joint replacement Z96.60    DJD (degenerative joint disease) M19.90    Diabetic neuropathy ([facility] E11.40    Dizziness R42    Chronic neck pain M54.2, G89.29    Chronic back pain M54.9, G89.29    Leg pain, right M79.604    Hypothyroidism E03.9    Type 2 diabetes mellitus with hyperglycemia, with long-term current use of insulin (HCC) E11.65, Z79.4    Morbid obesity ([facility]) E66.01    Unspecified transient cerebral ischemia G45.9    Congestive heart failure (HCC) I50.9    Mixed hyperlipidemia E78.2    Coronary atherosclerosis of native coronary artery I25.10    Chronic diastolic heart failure (HCC) I50.32    Seasonal and perennial allergic rhinitis J30.89, J30.2    Medication monitoring encounter Z51.81    Tear of left rotator cuff V83.019    Uncomplicated asthma A01.001    Moderate persistent asthma with status asthmaticus J45.42    NSTEMI (non-ST elevated myocardial infarction) ([facility] I21.4    S/P drug eluting coronary stent placement Z95.5    Advanced care planning/counseling discussion Z71.89    Chest pain R07.9    Bilateral lower extremity edema R60.0    BMI 38.0-38.9,adult Z68.38    Right groin pain R10.31    Periprosthetic left distal femur fracture M97. [place]avis, [address]-prosthetic femur fracture at tip of prosthesis M97. 8XXA, [id number]    Preoperative cardiovascular examination Z01.810    Deep vein thrombosis (DVT) of popliteal vein of left lower extremity (HCC) I82.432    Lower abdominal pain R10.30    Intermittent lightheadedness R42    Urinary tract infection with hematuria N39.0, R31.9    Frequent urination R35.0    Bad odor of urine R82.90    Right-sided chest pain R07.9    Hypertensive urgency I16.0    Tachycardia R00.0    Tachypnea R06.82    Tinnitus of both ears H93.13    Type 2 diabetes with nephropathy (HCC) E11.21    CHF (congestive heart failure) ([facility] I50.9       Hospital Course by Problem   1. Today's examination of the patient revealed:     Subjective:     Objective:   VS:   Visit Vitals  /71 (BP 1 Location: Left arm, BP Patient Position: Sitting)   Pulse 88   Temp 97.7 °F (36.5 °C)   Resp 20   Ht 5' 7\" (1.702 m)   Wt 110.7 kg (244 lb)   SpO2 95%   BMI 38.22 kg/m²      Tmax/24hrs: Temp (24hrs), Av.6 °F (37 °C), Min:97.7 °F (36.5 °C), Max:100.6 °F (38.1 °C)     Input/Output:     Intake/Output Summary (Last 24 hours) at 2021 1452  Last data filed at 2021 1415  Gross per 24 hour   Intake 820 ml   Output 700 ml   Net 120 ml       General:    Cardiovascular:    Pulmonary:    GI:    Extremities:     Additional:      Labs:    Recent Results (from the past 24 hour(s))   ECHO ADULT COMPLETE    Collection Time: 21  2:52 PM   Result Value Ref Range    Triscuspid Valve Regurgitation Peak Gradient 9.36 mmHg    TR Max Velocity 152.96 cm/s   GLUCOSE, POC    Collection Time: 21  4:31 PM   Result Value Ref Range    Glucose (POC) 82 70 - 110 mg/dL   GLUCOSE, POC    Collection Time: 21  7:04 PM   Result Value Ref Range    Glucose (POC) 112 (H) 70 - 110 mg/dL   GLUCOSE, POC    Collection Time: 21  8:20 PM   Result Value Ref Range    Glucose (POC) 141 (H) 70 - 110 mg/dL   GLUCOSE, POC    Collection Time: 21 7:47 AM   Result Value Ref Range    Glucose (POC) 107 70 - 110 mg/dL   GLUCOSE, POC    Collection Time: 01/12/21 11:14 AM   Result Value Ref Range    Glucose (POC) 140 (H) 70 - 110 mg/dL     Additional Data Reviewed:     Condition:   Disposition:    []Home   []Home with Home Health   []SNF/NH   []Rehab   []Home with family   []Alternate Facility:____________________      Discharge Medications:     Current Discharge Medication List      START taking these medications    Details   aspirin 81 mg chewable tablet Take 1 Tab by mouth daily for 30 days. Qty: 30 Tab, Refills: 0      furosemide (LASIX) 20 mg tablet Take 1 Tab by mouth daily for 30 days. Qty: 30 Tab, Refills: 0      ciprofloxacin HCl (CIPRO) 500 mg tablet Take 1 Tab by mouth two (2) times a day for 6 days. Qty: 12 Tab, Refills: 0      amoxicillin-clavulanate (Augmentin) 875-125 mg per tablet Take 1 Tab by mouth three (3) times daily for 6 days. Qty: 18 Tab, Refills: 0      Saccharomyces boulardii (Florastor) 250 mg capsule Take 1 Cap by mouth two (2) times a day for 7 days. Qty: 14 Cap, Refills: 0         CONTINUE these medications which have NOT CHANGED    Details   Flovent Diskus 100 mcg/actuation dsdv INHALE 1 PUFF BY MOUTH TWICE DAILY  Qty: 1 Inhaler, Refills: 5      levothyroxine (Synthroid) 137 mcg tablet Take 137 mcg by mouth Daily (before breakfast). Indications: a condition with low thyroid hormone levels  Qty: 30 Tab, Refills: 5      cholecalciferol (Vitamin D3) (1000 Units /25 mcg) tablet Take 1 Tab by mouth two (2) times a day. Qty: 60 Tab, Refills: 0      amLODIPine (NORVASC) 10 mg tablet Take 1 Tab by mouth daily. Qty: 30 Tab, Refills: 0      spironolactone (ALDACTONE) 25 mg tablet Take 1 Tab by mouth daily. Qty: 30 Tab, Refills: 0      Lantus Solostar U-100 Insulin 100 unit/mL (3 mL) inpn 15 Units by SubCUTAneous route two (2) times a day.   Qty: 1 Pen, Refills: 0      clopidogreL (PLAVIX) 75 mg tab TAKE 1 TABLET BY MOUTH DAILY  Qty: 30 Tab, Refills: 2    Associated Diagnoses: S/P coronary artery stent placement      metoprolol tartrate (LOPRESSOR) 25 mg tablet TAKE 1 TABLET BY MOUTH EVERY 12 HOURS  Qty: 60 Tab, Refills: 5      isosorbide mononitrate ER (IMDUR) 30 mg tablet TAKE 1 TABLET BY MOUTH EVERY MORNING  Qty: 90 Tab, Refills: 3    Comments: **Patient requests 90 days supply**  Associated Diagnoses: Other forms of angina pectoris (HCC)      albuterol (PROVENTIL HFA, VENTOLIN HFA, PROAIR HFA) 90 mcg/actuation inhaler INHALE 1 PUFF BY MOUTH EVERY 4 HOURS AS NEEDED FOR WHEEZING OR SHORTNESS OF BREATH  Qty: 18 g, Refills: 12    Associated Diagnoses: Moderate intermittent asthma, with acute exacerbation      multivitamin with minerals (MULTIVITAMIN & MINERAL FORMULA PO) Take 1 Tab by mouth daily. ranolazine ER (RANEXA) 500 mg SR tablet Take 1 Tab by mouth two (2) times a day. Qty: 180 Tab, Refills: 6    Associated Diagnoses: Chest pain, unspecified type      montelukast (SINGULAIR) 10 mg tablet Take 1 Tab by mouth daily. Indications: inflammation of the nose due to an allergy  Qty: 90 Tab, Refills: 4    Associated Diagnoses: Mild intermittent asthma without complication; Allergic rhinitis, unspecified seasonality, unspecified trigger      nitroglycerin (NITROSTAT) 0.4 mg SL tablet 1 Tab by SubLINGual route every five (5) minutes as needed for Chest Pain. Up to 3 doses. Qty: 20 Tab, Refills: 0      lidocaine (ASPERCREME, LIDOCAINE,) 4 % patch 1 Patch by TransDERmal route every eight (8) hours. Qty: 1 Package, Refills: 3    Associated Diagnoses: Chronic pain of left knee      RONNELL PEN NEEDLE 32 gauge x 5/32\" ndle Refills: 4      ascorbic acid, vitamin C, (VITAMIN C) 250 mg tablet Take 250 mg by mouth daily. 1 pill po daily  Indications: inadequate vitamin C      acetaminophen (TYLENOL ARTHRITIS PAIN) 650 mg TbER Take 650 mg by mouth every eight (8) hours.  1 pill po q 8 hours prn pain, fever  Indications: fever, pain      cyanocobalamin ER 1,000 mcg tablet Take 1 Tab by mouth daily. Qty: 30 Tab, Refills: 3    Associated Diagnoses: Weakness; Macrocytosis      capsaicin 0.075 % topical cream Apply  to affected area three (3) times daily. Qty: 60 g, Refills: 0      DOCOSAHEXANOIC ACID/EPA (FISH OIL PO) Take 1,000 mg by mouth two (2) times a day. 1 pill po twice daily          STOP taking these medications       magnesium oxide (MAG-OX) 400 mg tablet Comments:   Reason for Stopping: Follow-up Appointments:   1. Your PCP: Neeraj Pereira MD, within 7-10days  2.          Please follow-up on tests/labs that are still pendin.     >30 minutes spent coordinating this discharge (review instructions/follow-up, prescriptions, preparing report for sign off)    Signed:  José Manuel Lomax MD  2021  2:52 PM

## 2021-01-12 NOTE — DISCHARGE INSTRUCTIONS
To the emergency room or call your primary care physician for continued and worsening abdominal pain, chest pain, shortness of breath, fevers and chills, nausea and vomiting. Hold your magnesium supplementation while you are taking your antibiotic medication (Ciprofloxacin), resume taking your magnesium once you finish your Ciprofloxacin course. DISCHARGE SUMMARY from Nurse    PATIENT INSTRUCTIONS:    After general anesthesia or intravenous sedation, for 24 hours or while taking prescription Narcotics:  · Limit your activities  · Do not drive and operate hazardous machinery  · Do not make important personal or business decisions  · Do  not drink alcoholic beverages  · If you have not urinated within 8 hours after discharge, please contact your surgeon on call. Report the following to your surgeon:  · Excessive pain, swelling, redness or odor of or around the surgical area  · Temperature over 100.5  · Nausea and vomiting lasting longer than 4 hours or if unable to take medications  · Any signs of decreased circulation or nerve impairment to extremity: change in color, persistent  numbness, tingling, coldness or increase pain  · Any questions    What to do at Home:  Recommended activity: Activity as tolerated    If you experience any of the following symptoms chest pain, fever, shortness of breath, or nausea/vomiting, please follow up with Dr. Roni Da Silva. *  Please give a list of your current medications to your Primary Care Provider. *  Please update this list whenever your medications are discontinued, doses are      changed, or new medications (including over-the-counter products) are added. *  Please carry medication information at all times in case of emergency situations.     These are general instructions for a healthy lifestyle:    No smoking/ No tobacco products/ Avoid exposure to second hand smoke  Surgeon General's Warning:  Quitting smoking now greatly reduces serious risk to your health. Obesity, smoking, and sedentary lifestyle greatly increases your risk for illness    A healthy diet, regular physical exercise & weight monitoring are important for maintaining a healthy lifestyle    You may be retaining fluid if you have a history of heart failure or if you experience any of the following symptoms:  Weight gain of 3 pounds or more overnight or 5 pounds in a week, increased swelling in our hands or feet or shortness of breath while lying flat in bed. Please call your doctor as soon as you notice any of these symptoms; do not wait until your next office visit. Patient armband removed and shredded      The discharge information has been reviewed with the patient. The patient verbalized understanding. Discharge medications reviewed with the patient and appropriate educational materials and side effects teaching were provided.   ___________________________________________________________________________________________________________________________________

## 2021-01-12 NOTE — PROGRESS NOTES
Bedside shift change report given to Dee Marquez RN (oncoming nurse) by Ita Monroy RN (offgoing nurse). Report included the following information SBAR, Kardex, MAR and Recent Results.

## 2021-01-13 ENCOUNTER — HOME CARE VISIT (OUTPATIENT)
Dept: SCHEDULING | Facility: HOME HEALTH | Age: 86
End: 2021-01-13
Payer: MEDICARE

## 2021-01-13 ENCOUNTER — PATIENT OUTREACH (OUTPATIENT)
Dept: CASE MANAGEMENT | Age: 86
End: 2021-01-13

## 2021-01-13 VITALS
TEMPERATURE: 97.7 F | OXYGEN SATURATION: 98 % | SYSTOLIC BLOOD PRESSURE: 160 MMHG | HEART RATE: 108 BPM | DIASTOLIC BLOOD PRESSURE: 82 MMHG | RESPIRATION RATE: 17 BRPM

## 2021-01-13 LAB
ECHO TV REGURGITANT MAX VELOCITY: 152.96 CM/S
ECHO TV REGURGITANT PEAK GRADIENT: 9.36 MMHG

## 2021-01-13 PROCEDURE — G0151 HHCP-SERV OF PT,EA 15 MIN: HCPCS

## 2021-01-13 PROCEDURE — 3331090001 HH PPS REVENUE CREDIT

## 2021-01-13 PROCEDURE — 3331090002 HH PPS REVENUE DEBIT

## 2021-01-13 NOTE — PROGRESS NOTES
Problem: Self Care Deficits Care Plan (Adult)  Goal: *Acute Goals and Plan of Care (Insert Text)  Description: Occupational Therapy Goals  Initiated 1/9/2021 within 7 day(s). 1.  Patient will perform LB dressing with modified independence  2. Patient will perform LB clothes management with modified independence at walker level  3. Patient will maneuver on and off commode with modified independence  Outcome: Progressing Towards Goal   OCCUPATIONAL THERAPY TREATMENT    Patient: Musa Wolf (84 y.o. female)  Date: 1/12/2021  Diagnosis: Chest pain [R07.9]  CH[facility]congestive [facility] failure) [facility] [I50.9] <principal problem not specified>       Precautions: Fall  PLOF: she reports she is able to complete her self care routine with difficulty and that she has an aide 8 hours/day although she doesn't feel they help her much as they don't clean her house or make her bed well. Chart, occupational therapy assessment, plan of care, and goals were reviewed. ASSESSMENT:  Patient seated on EOB upon entry into room and was motivated to participate in therapy session. She was able to perform LB dressing with mod I after demonstration/practice with AE (reacher, sock aid) while seated and in standing. Dizziness noted in standing and nursing notified. No LOB noted. Patient also given a long handled sponge for bathing and lotion application after demonstration. She stood at EOB using a RW and was able to perform functional tasks for ~5 minutes. Again no LOB noted. Patient stated she was nauseated earlier and received medication. She transferred to the chair and was left with all needs met at end of session.   Progression toward goals:  [x]          Improving appropriately and progressing toward goals  []          Improving slowly and progressing toward goals  []          Not making progress toward goals and plan of care will be adjusted     PLAN:  Patient continues to benefit from skilled intervention to address the above impairments. Continue treatment per established plan of care. Discharge Recommendations:  Home Health safety eval  Further Equipment Recommendations for Discharge:  N/A     SUBJECTIVE:   Patient stated I love my reacher.     OBJECTIVE DATA SUMMARY:   Cognitive/Behavioral Status:  Neurologic State: Alert  Orientation Level: Oriented X4  Cognition: Appropriate decision making, Follows commands  Safety/Judgement: Awareness of environment, Fall prevention    Functional Mobility and Transfers for ADLs:   Bed Mobility:  Rolling: Modified independent  Supine to Sit: Modified independent  Sit to Supine: Modified independent  Scooting: Modified independent   Transfers:  Sit to Stand: Modified independent  Stand to Sit: Modified independent   Toilet Transfer : Modified independent    Balance:  Sitting: Intact  Standing: Intact; With support  Standing - Static: Good  Standing - Dynamic : Good    ADL Intervention:  Lower Body Dressing Assistance  Dressing Assistance: Modified independent  Underpants: Modified independent  Socks: Modified independent  Leg Crossed Method Used: No  Position Performed: Seated edge of bed;Standing  Adaptive Equipment Used: Reacher;Sock aid    Cognitive Retraining  Safety/Judgement: Awareness of environment; Fall prevention    Pain:  Pain level pre-treatment: 0/10   Pain level post-treatment: 0/10  Pain Intervention(s): NA  Response to intervention: NA    Activity Tolerance:    Good  Please refer to the flowsheet for vital signs taken during this treatment. After treatment:   [x]  Patient left in no apparent distress sitting up in chair  []  Patient left in no apparent distress in bed  [x]  Call bell left within reach  [x]  Nursing notified  []  Caregiver present  []  Bed alarm activated    COMMUNICATION/EDUCATION:   [x] Role of Occupational Therapy in the acute care setting  [x] Home safety education was provided and the patient/caregiver indicated understanding.   [x] Patient/family have participated as able in working towards goals and plan of care. [x] Patient/family agree to work toward stated goals and plan of care. [] Patient understands intent and goals of therapy, but is neutral about his/her participation. [] Patient is unable to participate in goal setting and plan of care.       Thank you for this referral.  Melo Byrd MS OTR/L   Time Calculation: 26 mins

## 2021-01-14 ENCOUNTER — HOME CARE VISIT (OUTPATIENT)
Dept: SCHEDULING | Facility: HOME HEALTH | Age: 86
End: 2021-01-14
Payer: MEDICARE

## 2021-01-14 VITALS
OXYGEN SATURATION: 98 % | SYSTOLIC BLOOD PRESSURE: 135 MMHG | DIASTOLIC BLOOD PRESSURE: 78 MMHG | HEART RATE: 65 BPM | RESPIRATION RATE: 18 BRPM | TEMPERATURE: 97.7 F

## 2021-01-14 PROCEDURE — 3331090001 HH PPS REVENUE CREDIT

## 2021-01-14 PROCEDURE — 3331090002 HH PPS REVENUE DEBIT

## 2021-01-14 PROCEDURE — G0299 HHS/HOSPICE OF RN EA 15 MIN: HCPCS

## 2021-01-15 ENCOUNTER — HOME CARE VISIT (OUTPATIENT)
Dept: HOME HEALTH SERVICES | Facility: HOME HEALTH | Age: 86
End: 2021-01-15
Payer: MEDICARE

## 2021-01-15 ENCOUNTER — HOME CARE VISIT (OUTPATIENT)
Dept: SCHEDULING | Facility: HOME HEALTH | Age: 86
End: 2021-01-15
Payer: MEDICARE

## 2021-01-15 VITALS
DIASTOLIC BLOOD PRESSURE: 95 MMHG | TEMPERATURE: 97 F | HEART RATE: 109 BPM | RESPIRATION RATE: 22 BRPM | SYSTOLIC BLOOD PRESSURE: 124 MMHG | OXYGEN SATURATION: 98 %

## 2021-01-15 PROCEDURE — 3331090001 HH PPS REVENUE CREDIT

## 2021-01-15 PROCEDURE — 3331090002 HH PPS REVENUE DEBIT

## 2021-01-15 PROCEDURE — G0152 HHCP-SERV OF OT,EA 15 MIN: HCPCS

## 2021-01-16 ENCOUNTER — HOME CARE VISIT (OUTPATIENT)
Dept: SCHEDULING | Facility: HOME HEALTH | Age: 86
End: 2021-01-16
Payer: MEDICARE

## 2021-01-16 VITALS
TEMPERATURE: 99.6 F | DIASTOLIC BLOOD PRESSURE: 68 MMHG | OXYGEN SATURATION: 94 % | SYSTOLIC BLOOD PRESSURE: 132 MMHG | HEART RATE: 111 BPM

## 2021-01-16 PROCEDURE — 3331090002 HH PPS REVENUE DEBIT

## 2021-01-16 PROCEDURE — 3331090001 HH PPS REVENUE CREDIT

## 2021-01-16 PROCEDURE — G0157 HHC PT ASSISTANT EA 15: HCPCS

## 2021-01-17 ENCOUNTER — HOSPITAL ENCOUNTER (INPATIENT)
Age: 86
LOS: 7 days | Discharge: NURSING, REHAB OR LONG-TERM CARE | DRG: 177 | End: 2021-01-25
Attending: EMERGENCY MEDICINE | Admitting: INTERNAL MEDICINE
Payer: MEDICARE

## 2021-01-17 ENCOUNTER — APPOINTMENT (OUTPATIENT)
Dept: GENERAL RADIOLOGY | Age: 86
DRG: 177 | End: 2021-01-17
Attending: PHYSICIAN ASSISTANT
Payer: MEDICARE

## 2021-01-17 VITALS
TEMPERATURE: 98.2 F | DIASTOLIC BLOOD PRESSURE: 80 MMHG | HEART RATE: 80 BPM | RESPIRATION RATE: 18 BRPM | SYSTOLIC BLOOD PRESSURE: 122 MMHG | OXYGEN SATURATION: 98 %

## 2021-01-17 DIAGNOSIS — R60.0 BILATERAL LOWER EXTREMITY EDEMA: ICD-10-CM

## 2021-01-17 DIAGNOSIS — F41.9 ANXIETY: ICD-10-CM

## 2021-01-17 DIAGNOSIS — R53.1 GENERALIZED WEAKNESS: ICD-10-CM

## 2021-01-17 DIAGNOSIS — R06.09 DYSPNEA ON EXERTION: ICD-10-CM

## 2021-01-17 DIAGNOSIS — Z71.89 ADVANCED CARE PLANNING/COUNSELING DISCUSSION: ICD-10-CM

## 2021-01-17 DIAGNOSIS — J20.9 ACUTE BRONCHITIS, UNSPECIFIED ORGANISM: Primary | ICD-10-CM

## 2021-01-17 DIAGNOSIS — I47.9 TACHYCARDIA, PAROXYSMAL (HCC): ICD-10-CM

## 2021-01-17 LAB
ALBUMIN SERPL-MCNC: 3 G/DL (ref 3.4–5)
ALBUMIN/GLOB SERPL: 0.5 {RATIO} (ref 0.8–1.7)
ALP SERPL-CCNC: 82 U/L (ref 45–117)
ALT SERPL-CCNC: 26 U/L (ref 13–56)
ANION GAP SERPL CALC-SCNC: 6 MMOL/L (ref 3–18)
AST SERPL-CCNC: 38 U/L (ref 10–38)
ATRIAL RATE: 109 BPM
BASOPHILS # BLD: 0 K/UL (ref 0–0.1)
BASOPHILS NFR BLD: 0 % (ref 0–2)
BILIRUB SERPL-MCNC: 0.8 MG/DL (ref 0.2–1)
BUN SERPL-MCNC: 18 MG/DL (ref 7–18)
BUN/CREAT SERPL: 17 (ref 12–20)
CALCIUM SERPL-MCNC: 8.9 MG/DL (ref 8.5–10.1)
CALCULATED P AXIS, ECG09: 72 DEGREES
CALCULATED R AXIS, ECG10: 5 DEGREES
CALCULATED T AXIS, ECG11: 27 DEGREES
CHLORIDE SERPL-SCNC: 100 MMOL/L (ref 100–111)
CO2 SERPL-SCNC: 28 MMOL/L (ref 21–32)
CREAT SERPL-MCNC: 1.05 MG/DL (ref 0.6–1.3)
DEPRECATED S PYO AG THROAT QL EIA: NEGATIVE
DIAGNOSIS, 93000: NORMAL
DIFFERENTIAL METHOD BLD: ABNORMAL
EOSINOPHIL # BLD: 0 K/UL (ref 0–0.4)
EOSINOPHIL NFR BLD: 0 % (ref 0–5)
ERYTHROCYTE [DISTWIDTH] IN BLOOD BY AUTOMATED COUNT: 11.7 % (ref 11.6–14.5)
FLUAV AG NPH QL IA: NEGATIVE
FLUBV AG NOSE QL IA: NEGATIVE
GLOBULIN SER CALC-MCNC: 5.6 G/DL (ref 2–4)
GLUCOSE SERPL-MCNC: 264 MG/DL (ref 74–99)
HCT VFR BLD AUTO: 36.9 % (ref 35–45)
HGB BLD-MCNC: 11.7 G/DL (ref 12–16)
LACTATE BLD-SCNC: 1.08 MMOL/L (ref 0.4–2)
LIPASE SERPL-CCNC: 84 U/L (ref 73–393)
LYMPHOCYTES # BLD: 1 K/UL (ref 0.9–3.6)
LYMPHOCYTES NFR BLD: 19 % (ref 21–52)
MCH RBC QN AUTO: 31.8 PG (ref 24–34)
MCHC RBC AUTO-ENTMCNC: 31.7 G/DL (ref 31–37)
MCV RBC AUTO: 100.3 FL (ref 74–97)
MONOCYTES # BLD: 0.5 K/UL (ref 0.05–1.2)
MONOCYTES NFR BLD: 9 % (ref 3–10)
NEUTS SEG # BLD: 4 K/UL (ref 1.8–8)
NEUTS SEG NFR BLD: 72 % (ref 40–73)
P-R INTERVAL, ECG05: 136 MS
PLATELET # BLD AUTO: 171 K/UL (ref 135–420)
PMV BLD AUTO: 10.9 FL (ref 9.2–11.8)
POTASSIUM SERPL-SCNC: 3.9 MMOL/L (ref 3.5–5.5)
PROT SERPL-MCNC: 8.6 G/DL (ref 6.4–8.2)
Q-T INTERVAL, ECG07: 320 MS
QRS DURATION, ECG06: 78 MS
QTC CALCULATION (BEZET), ECG08: 430 MS
RBC # BLD AUTO: 3.68 M/UL (ref 4.2–5.3)
SODIUM SERPL-SCNC: 134 MMOL/L (ref 136–145)
VENTRICULAR RATE, ECG03: 109 BPM
WBC # BLD AUTO: 5.5 K/UL (ref 4.6–13.2)

## 2021-01-17 PROCEDURE — 99285 EMERGENCY DEPT VISIT HI MDM: CPT

## 2021-01-17 PROCEDURE — 74011250637 HC RX REV CODE- 250/637: Performed by: EMERGENCY MEDICINE

## 2021-01-17 PROCEDURE — 74011250637 HC RX REV CODE- 250/637: Performed by: PHYSICIAN ASSISTANT

## 2021-01-17 PROCEDURE — 87880 STREP A ASSAY W/OPTIC: CPT

## 2021-01-17 PROCEDURE — 87070 CULTURE OTHR SPECIMN AEROBIC: CPT

## 2021-01-17 PROCEDURE — 71045 X-RAY EXAM CHEST 1 VIEW: CPT

## 2021-01-17 PROCEDURE — 3331090001 HH PPS REVENUE CREDIT

## 2021-01-17 PROCEDURE — 87804 INFLUENZA ASSAY W/OPTIC: CPT

## 2021-01-17 PROCEDURE — 93005 ELECTROCARDIOGRAM TRACING: CPT

## 2021-01-17 PROCEDURE — 83690 ASSAY OF LIPASE: CPT

## 2021-01-17 PROCEDURE — 85025 COMPLETE CBC W/AUTO DIFF WBC: CPT

## 2021-01-17 PROCEDURE — 87040 BLOOD CULTURE FOR BACTERIA: CPT

## 2021-01-17 PROCEDURE — 3331090002 HH PPS REVENUE DEBIT

## 2021-01-17 PROCEDURE — 80053 COMPREHEN METABOLIC PANEL: CPT

## 2021-01-17 PROCEDURE — 83605 ASSAY OF LACTIC ACID: CPT

## 2021-01-17 PROCEDURE — U0003 INFECTIOUS AGENT DETECTION BY NUCLEIC ACID (DNA OR RNA); SEVERE ACUTE RESPIRATORY SYNDROME CORONAVIRUS 2 (SARS-COV-2) (CORONAVIRUS DISEASE [COVID-19]), AMPLIFIED PROBE TECHNIQUE, MAKING USE OF HIGH THROUGHPUT TECHNOLOGIES AS DESCRIBED BY CMS-2020-01-R: HCPCS

## 2021-01-17 RX ORDER — DOXYCYCLINE 100 MG/1
100 CAPSULE ORAL
Status: DISCONTINUED | OUTPATIENT
Start: 2021-01-17 | End: 2021-01-17

## 2021-01-17 RX ORDER — ACETAMINOPHEN 325 MG/1
650 TABLET ORAL
Status: COMPLETED | OUTPATIENT
Start: 2021-01-17 | End: 2021-01-17

## 2021-01-17 RX ORDER — LEVOFLOXACIN 500 MG/1
500 TABLET, FILM COATED ORAL
Status: COMPLETED | OUTPATIENT
Start: 2021-01-17 | End: 2021-01-17

## 2021-01-17 RX ORDER — LEVOFLOXACIN 500 MG/1
500 TABLET, FILM COATED ORAL DAILY
Qty: 7 TAB | Refills: 0 | Status: SHIPPED | OUTPATIENT
Start: 2021-01-17 | End: 2021-01-22

## 2021-01-17 RX ORDER — DOXYCYCLINE 100 MG/1
100 CAPSULE ORAL 2 TIMES DAILY
Qty: 14 CAP | Refills: 0 | Status: SHIPPED | OUTPATIENT
Start: 2021-01-17 | End: 2021-01-22

## 2021-01-17 RX ADMIN — ACETAMINOPHEN 650 MG: 325 TABLET ORAL at 18:31

## 2021-01-17 RX ADMIN — LEVOFLOXACIN 500 MG: 500 TABLET, FILM COATED ORAL at 20:18
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
## 2021-01-17 NOTE — ED TRIAGE NOTES
Pt here for evaluation of cough, fever, fatigue, shortness of breath that pt states began sometime after she was discharged home from inpatient hospitalization @ [facility] on Thursday.

## 2021-01-18 ENCOUNTER — HOME CARE VISIT (OUTPATIENT)
Dept: HOME HEALTH SERVICES | Facility: HOME HEALTH | Age: 86
End: 2021-01-18
Payer: MEDICARE

## 2021-01-18 PROBLEM — R53.1 GENERALIZED WEAKNESS: Status: ACTIVE | Noted: 2021-01-18

## 2021-01-18 LAB
ANION GAP SERPL CALC-SCNC: 7 MMOL/L (ref 3–18)
APPEARANCE UR: ABNORMAL
BACTERIA URNS QL MICRO: NEGATIVE /HPF
BASOPHILS # BLD: 0 K/UL (ref 0–0.1)
BASOPHILS NFR BLD: 1 % (ref 0–2)
BILIRUB UR QL: ABNORMAL
BUN SERPL-MCNC: 18 MG/DL (ref 7–18)
BUN/CREAT SERPL: 24 (ref 12–20)
CALCIUM SERPL-MCNC: 8.3 MG/DL (ref 8.5–10.1)
CHLORIDE SERPL-SCNC: 104 MMOL/L (ref 100–111)
CK MB CFR SERPL CALC: NORMAL % (ref 0–4)
CK MB SERPL-MCNC: <1 NG/ML (ref 5–25)
CK SERPL-CCNC: 122 U/L (ref 26–192)
CO2 SERPL-SCNC: 27 MMOL/L (ref 21–32)
COLOR UR: ABNORMAL
COVID-19 RAPID TEST, COVR: DETECTED
CREAT SERPL-MCNC: 0.76 MG/DL (ref 0.6–1.3)
CRP SERPL-MCNC: 6 MG/DL (ref 0–0.3)
D DIMER PPP FEU-MCNC: 1.48 UG/ML(FEU)
DIFFERENTIAL METHOD BLD: ABNORMAL
EOSINOPHIL # BLD: 0 K/UL (ref 0–0.4)
EOSINOPHIL NFR BLD: 0 % (ref 0–5)
EPITH CASTS URNS QL MICRO: ABNORMAL /LPF (ref 0–5)
ERYTHROCYTE [DISTWIDTH] IN BLOOD BY AUTOMATED COUNT: 11.6 % (ref 11.6–14.5)
EST. AVERAGE GLUCOSE BLD GHB EST-MCNC: 186 MG/DL
GLUCOSE BLD STRIP.AUTO-MCNC: 282 MG/DL (ref 70–110)
GLUCOSE SERPL-MCNC: 212 MG/DL (ref 74–99)
GLUCOSE UR STRIP.AUTO-MCNC: NEGATIVE MG/DL
HBA1C MFR BLD: 8.1 % (ref 4.2–5.6)
HCT VFR BLD AUTO: 33 % (ref 35–45)
HGB BLD-MCNC: 10.6 G/DL (ref 12–16)
HGB UR QL STRIP: NEGATIVE
KETONES UR QL STRIP.AUTO: ABNORMAL MG/DL
LDH SERPL L TO P-CCNC: 246 U/L (ref 81–234)
LEUKOCYTE ESTERASE UR QL STRIP.AUTO: ABNORMAL
LYMPHOCYTES # BLD: 1.2 K/UL (ref 0.9–3.6)
LYMPHOCYTES NFR BLD: 27 % (ref 21–52)
MCH RBC QN AUTO: 32.3 PG (ref 24–34)
MCHC RBC AUTO-ENTMCNC: 32.1 G/DL (ref 31–37)
MCV RBC AUTO: 100.6 FL (ref 74–97)
MONOCYTES # BLD: 0.4 K/UL (ref 0.05–1.2)
MONOCYTES NFR BLD: 10 % (ref 3–10)
NEUTS SEG # BLD: 2.7 K/UL (ref 1.8–8)
NEUTS SEG NFR BLD: 62 % (ref 40–73)
NITRITE UR QL STRIP.AUTO: NEGATIVE
PH UR STRIP: 5 [PH] (ref 5–8)
PLATELET # BLD AUTO: 153 K/UL (ref 135–420)
PMV BLD AUTO: 10.8 FL (ref 9.2–11.8)
POTASSIUM SERPL-SCNC: 4.1 MMOL/L (ref 3.5–5.5)
PROCALCITONIN SERPL-MCNC: <0.05 NG/ML
PROT UR STRIP-MCNC: 300 MG/DL
RBC # BLD AUTO: 3.28 M/UL (ref 4.2–5.3)
RBC #/AREA URNS HPF: NEGATIVE /HPF (ref 0–5)
SODIUM SERPL-SCNC: 138 MMOL/L (ref 136–145)
SOURCE, COVRS: ABNORMAL
SP GR UR REFRACTOMETRY: 1.02 (ref 1–1.03)
SPECIMEN TYPE, XMCV1T: ABNORMAL
TROPONIN I SERPL-MCNC: 0.02 NG/ML (ref 0–0.04)
UROBILINOGEN UR QL STRIP.AUTO: 1 EU/DL (ref 0.2–1)
WBC # BLD AUTO: 4.4 K/UL (ref 4.6–13.2)
WBC URNS QL MICRO: ABNORMAL /HPF (ref 0–4)
YEAST URNS QL MICRO: ABNORMAL

## 2021-01-18 PROCEDURE — 82962 GLUCOSE BLOOD TEST: CPT

## 2021-01-18 PROCEDURE — 85379 FIBRIN DEGRADATION QUANT: CPT

## 2021-01-18 PROCEDURE — 87635 SARS-COV-2 COVID-19 AMP PRB: CPT

## 2021-01-18 PROCEDURE — 82550 ASSAY OF CK (CPK): CPT

## 2021-01-18 PROCEDURE — 74011250636 HC RX REV CODE- 250/636: Performed by: EMERGENCY MEDICINE

## 2021-01-18 PROCEDURE — 74011250637 HC RX REV CODE- 250/637: Performed by: NURSE PRACTITIONER

## 2021-01-18 PROCEDURE — 36415 COLL VENOUS BLD VENIPUNCTURE: CPT

## 2021-01-18 PROCEDURE — 74011250636 HC RX REV CODE- 250/636: Performed by: NURSE PRACTITIONER

## 2021-01-18 PROCEDURE — 2709999900 HC NON-CHARGEABLE SUPPLY

## 2021-01-18 PROCEDURE — 83615 LACTATE (LD) (LDH) ENZYME: CPT

## 2021-01-18 PROCEDURE — 74011250637 HC RX REV CODE- 250/637: Performed by: EMERGENCY MEDICINE

## 2021-01-18 PROCEDURE — 74011000250 HC RX REV CODE- 250: Performed by: NURSE PRACTITIONER

## 2021-01-18 PROCEDURE — 74011636637 HC RX REV CODE- 636/637: Performed by: NURSE PRACTITIONER

## 2021-01-18 PROCEDURE — 3331090001 HH PPS REVENUE CREDIT

## 2021-01-18 PROCEDURE — 3331090003 HH PPS REVENUE ADJ

## 2021-01-18 PROCEDURE — 83036 HEMOGLOBIN GLYCOSYLATED A1C: CPT

## 2021-01-18 PROCEDURE — 65660000000 HC RM CCU STEPDOWN

## 2021-01-18 PROCEDURE — 84145 PROCALCITONIN (PCT): CPT

## 2021-01-18 PROCEDURE — 99222 1ST HOSP IP/OBS MODERATE 55: CPT | Performed by: INTERNAL MEDICINE

## 2021-01-18 PROCEDURE — 81001 URINALYSIS AUTO W/SCOPE: CPT

## 2021-01-18 PROCEDURE — 3331090002 HH PPS REVENUE DEBIT

## 2021-01-18 PROCEDURE — 80048 BASIC METABOLIC PNL TOTAL CA: CPT

## 2021-01-18 PROCEDURE — 86140 C-REACTIVE PROTEIN: CPT

## 2021-01-18 PROCEDURE — 85025 COMPLETE CBC W/AUTO DIFF WBC: CPT

## 2021-01-18 RX ORDER — LANOLIN ALCOHOL/MO/W.PET/CERES
1000 CREAM (GRAM) TOPICAL DAILY
Status: DISCONTINUED | OUTPATIENT
Start: 2021-01-19 | End: 2021-01-26 | Stop reason: HOSPADM

## 2021-01-18 RX ORDER — DEXAMETHASONE SODIUM PHOSPHATE 4 MG/ML
6 INJECTION, SOLUTION INTRA-ARTICULAR; INTRALESIONAL; INTRAMUSCULAR; INTRAVENOUS; SOFT TISSUE EVERY 24 HOURS
Status: DISCONTINUED | OUTPATIENT
Start: 2021-01-19 | End: 2021-01-18

## 2021-01-18 RX ORDER — ENOXAPARIN SODIUM 100 MG/ML
40 INJECTION SUBCUTANEOUS EVERY 24 HOURS
Status: DISCONTINUED | OUTPATIENT
Start: 2021-01-18 | End: 2021-01-26 | Stop reason: HOSPADM

## 2021-01-18 RX ORDER — ONDANSETRON 2 MG/ML
4 INJECTION INTRAMUSCULAR; INTRAVENOUS
Status: COMPLETED | OUTPATIENT
Start: 2021-01-18 | End: 2021-01-18

## 2021-01-18 RX ORDER — SODIUM CHLORIDE 9 MG/ML
125 INJECTION, SOLUTION INTRAVENOUS CONTINUOUS
Status: DISCONTINUED | OUTPATIENT
Start: 2021-01-18 | End: 2021-01-18

## 2021-01-18 RX ORDER — POLYETHYLENE GLYCOL 3350 17 G/17G
17 POWDER, FOR SOLUTION ORAL DAILY PRN
Status: DISCONTINUED | OUTPATIENT
Start: 2021-01-18 | End: 2021-01-26 | Stop reason: HOSPADM

## 2021-01-18 RX ORDER — SPIRONOLACTONE 25 MG/1
25 TABLET ORAL DAILY
Status: DISCONTINUED | OUTPATIENT
Start: 2021-01-19 | End: 2021-01-26 | Stop reason: HOSPADM

## 2021-01-18 RX ORDER — ASCORBIC ACID 250 MG
250 TABLET ORAL DAILY
Status: DISCONTINUED | OUTPATIENT
Start: 2021-01-19 | End: 2021-01-20

## 2021-01-18 RX ORDER — SODIUM CHLORIDE 0.9 % (FLUSH) 0.9 %
5-40 SYRINGE (ML) INJECTION AS NEEDED
Status: DISCONTINUED | OUTPATIENT
Start: 2021-01-18 | End: 2021-01-26 | Stop reason: HOSPADM

## 2021-01-18 RX ORDER — CHOLECALCIFEROL (VITAMIN D3) 125 MCG
5 CAPSULE ORAL
Status: DISCONTINUED | OUTPATIENT
Start: 2021-01-18 | End: 2021-01-26 | Stop reason: HOSPADM

## 2021-01-18 RX ORDER — METOPROLOL TARTRATE 25 MG/1
25 TABLET, FILM COATED ORAL 2 TIMES DAILY
Status: DISCONTINUED | OUTPATIENT
Start: 2021-01-18 | End: 2021-01-26 | Stop reason: HOSPADM

## 2021-01-18 RX ORDER — AMLODIPINE BESYLATE 5 MG/1
5 TABLET ORAL DAILY
Status: DISCONTINUED | OUTPATIENT
Start: 2021-01-19 | End: 2021-01-26 | Stop reason: HOSPADM

## 2021-01-18 RX ORDER — ONDANSETRON 2 MG/ML
4 INJECTION INTRAMUSCULAR; INTRAVENOUS
Status: DISCONTINUED | OUTPATIENT
Start: 2021-01-18 | End: 2021-01-26 | Stop reason: HOSPADM

## 2021-01-18 RX ORDER — ISOSORBIDE MONONITRATE 30 MG/1
30 TABLET, EXTENDED RELEASE ORAL DAILY
Status: DISCONTINUED | OUTPATIENT
Start: 2021-01-19 | End: 2021-01-26 | Stop reason: HOSPADM

## 2021-01-18 RX ORDER — FLUTICASONE PROPIONATE 110 UG/1
1 AEROSOL, METERED RESPIRATORY (INHALATION)
Status: DISCONTINUED | OUTPATIENT
Start: 2021-01-18 | End: 2021-01-26 | Stop reason: HOSPADM

## 2021-01-18 RX ORDER — SODIUM CHLORIDE 0.9 % (FLUSH) 0.9 %
5-40 SYRINGE (ML) INJECTION EVERY 8 HOURS
Status: DISCONTINUED | OUTPATIENT
Start: 2021-01-18 | End: 2021-01-26 | Stop reason: HOSPADM

## 2021-01-18 RX ORDER — ACETAMINOPHEN 650 MG/1
650 SUPPOSITORY RECTAL
Status: DISCONTINUED | OUTPATIENT
Start: 2021-01-18 | End: 2021-01-20

## 2021-01-18 RX ORDER — ALBUTEROL SULFATE 90 UG/1
2 AEROSOL, METERED RESPIRATORY (INHALATION)
Status: DISCONTINUED | OUTPATIENT
Start: 2021-01-18 | End: 2021-01-18 | Stop reason: SDUPTHER

## 2021-01-18 RX ORDER — MAGNESIUM SULFATE 100 %
4 CRYSTALS MISCELLANEOUS AS NEEDED
Status: DISCONTINUED | OUTPATIENT
Start: 2021-01-18 | End: 2021-01-26 | Stop reason: HOSPADM

## 2021-01-18 RX ORDER — MONTELUKAST SODIUM 10 MG/1
10 TABLET ORAL DAILY
Status: DISCONTINUED | OUTPATIENT
Start: 2021-01-19 | End: 2021-01-26 | Stop reason: HOSPADM

## 2021-01-18 RX ORDER — DEXTROSE 50 % IN WATER (D50W) INTRAVENOUS SYRINGE
25-50 AS NEEDED
Status: DISCONTINUED | OUTPATIENT
Start: 2021-01-18 | End: 2021-01-26 | Stop reason: HOSPADM

## 2021-01-18 RX ORDER — PROMETHAZINE HYDROCHLORIDE 25 MG/1
12.5 TABLET ORAL
Status: DISCONTINUED | OUTPATIENT
Start: 2021-01-18 | End: 2021-01-26 | Stop reason: HOSPADM

## 2021-01-18 RX ORDER — GUAIFENESIN 100 MG/5ML
81 LIQUID (ML) ORAL DAILY
Status: DISCONTINUED | OUTPATIENT
Start: 2021-01-19 | End: 2021-01-26 | Stop reason: HOSPADM

## 2021-01-18 RX ORDER — RANOLAZINE 500 MG/1
500 TABLET, EXTENDED RELEASE ORAL 2 TIMES DAILY
Status: DISCONTINUED | OUTPATIENT
Start: 2021-01-18 | End: 2021-01-26 | Stop reason: HOSPADM

## 2021-01-18 RX ORDER — ACETAMINOPHEN 325 MG/1
650 TABLET ORAL
Status: COMPLETED | OUTPATIENT
Start: 2021-01-18 | End: 2021-01-18

## 2021-01-18 RX ORDER — FUROSEMIDE 20 MG/1
20 TABLET ORAL DAILY
Status: DISCONTINUED | OUTPATIENT
Start: 2021-01-19 | End: 2021-01-26 | Stop reason: HOSPADM

## 2021-01-18 RX ORDER — ALBUTEROL SULFATE 90 UG/1
2 AEROSOL, METERED RESPIRATORY (INHALATION)
Status: DISCONTINUED | OUTPATIENT
Start: 2021-01-18 | End: 2021-01-18

## 2021-01-18 RX ORDER — MELATONIN
1000 DAILY
Status: DISCONTINUED | OUTPATIENT
Start: 2021-01-19 | End: 2021-01-20

## 2021-01-18 RX ORDER — ALBUTEROL SULFATE 90 UG/1
2 AEROSOL, METERED RESPIRATORY (INHALATION)
Status: DISCONTINUED | OUTPATIENT
Start: 2021-01-18 | End: 2021-01-26 | Stop reason: HOSPADM

## 2021-01-18 RX ORDER — INSULIN GLARGINE 100 [IU]/ML
15 INJECTION, SOLUTION SUBCUTANEOUS
Status: DISCONTINUED | OUTPATIENT
Start: 2021-01-18 | End: 2021-01-22

## 2021-01-18 RX ORDER — DEXAMETHASONE SODIUM PHOSPHATE 4 MG/ML
6 INJECTION, SOLUTION INTRA-ARTICULAR; INTRALESIONAL; INTRAMUSCULAR; INTRAVENOUS; SOFT TISSUE EVERY 12 HOURS
Status: DISCONTINUED | OUTPATIENT
Start: 2021-01-18 | End: 2021-01-18

## 2021-01-18 RX ORDER — INSULIN LISPRO 100 [IU]/ML
INJECTION, SOLUTION INTRAVENOUS; SUBCUTANEOUS
Status: DISCONTINUED | OUTPATIENT
Start: 2021-01-18 | End: 2021-01-26 | Stop reason: HOSPADM

## 2021-01-18 RX ORDER — ACETAMINOPHEN 325 MG/1
650 TABLET ORAL
Status: DISCONTINUED | OUTPATIENT
Start: 2021-01-18 | End: 2021-01-20

## 2021-01-18 RX ORDER — CLOPIDOGREL BISULFATE 75 MG/1
75 TABLET ORAL DAILY
Status: DISCONTINUED | OUTPATIENT
Start: 2021-01-19 | End: 2021-01-26 | Stop reason: HOSPADM

## 2021-01-18 RX ADMIN — INSULIN GLARGINE 15 UNITS: 100 INJECTION, SOLUTION SUBCUTANEOUS at 21:37

## 2021-01-18 RX ADMIN — AZITHROMYCIN DIHYDRATE 500 MG: 500 INJECTION, POWDER, LYOPHILIZED, FOR SOLUTION INTRAVENOUS at 20:31

## 2021-01-18 RX ADMIN — METOPROLOL TARTRATE 25 MG: 25 TABLET, FILM COATED ORAL at 20:32

## 2021-01-18 RX ADMIN — SODIUM CHLORIDE 125 ML/HR: 900 INJECTION, SOLUTION INTRAVENOUS at 04:11

## 2021-01-18 RX ADMIN — ACETAMINOPHEN 650 MG: 325 TABLET ORAL at 09:34

## 2021-01-18 RX ADMIN — DEXAMETHASONE SODIUM PHOSPHATE 6 MG: 4 INJECTION, SOLUTION INTRAMUSCULAR; INTRAVENOUS at 11:53

## 2021-01-18 RX ADMIN — INSULIN LISPRO 6 UNITS: 100 INJECTION, SOLUTION INTRAVENOUS; SUBCUTANEOUS at 21:37

## 2021-01-18 RX ADMIN — ENOXAPARIN SODIUM 40 MG: 100 INJECTION SUBCUTANEOUS at 14:44

## 2021-01-18 RX ADMIN — ALBUTEROL SULFATE 2 PUFF: 108 INHALANT RESPIRATORY (INHALATION) at 11:53

## 2021-01-18 RX ADMIN — Medication 10 ML: at 21:38

## 2021-01-18 RX ADMIN — ONDANSETRON 4 MG: 2 INJECTION INTRAMUSCULAR; INTRAVENOUS at 06:59

## 2021-01-18 RX ADMIN — Medication 5 MG: at 21:37

## 2021-01-18 RX ADMIN — RANOLAZINE 500 MG: 500 TABLET, FILM COATED, EXTENDED RELEASE ORAL at 21:37

## 2021-01-18 RX ADMIN — WATER 1 G: 1 INJECTION INTRAMUSCULAR; INTRAVENOUS; SUBCUTANEOUS at 20:31

## 2021-01-18 RX ADMIN — SODIUM CHLORIDE 125 ML/HR: 900 INJECTION, SOLUTION INTRAVENOUS at 11:54

## 2021-01-18 NOTE — ED NOTES
Pt awake/alert/calm/conversant; respirations regular/non labored/skin warm and dry. Reports mild nausea; order for Zofran received. Denies pain except upper shoulder pain. No chest pain noted; no dizziness/shortness of breath denied at this time.

## 2021-01-18 NOTE — ED NOTES
Pt asleep/ rouses easily to voice. Affect calm/conversant, respirations regular/non labored. Pt has intermittent upper shoulder pain bilaterally; taking sips of fluid but scant amount with no need to urinate at this time.   Per Dr Lucrecia Browning VO/RBV 0.9 NS at 125 ml/hr and 650 mg PO Tylenol

## 2021-01-18 NOTE — ED NOTES
Received report, Sarah Lilly RN. Assumed care, initial assesment completed. Pending inpatient admission [facility].

## 2021-01-18 NOTE — ED NOTES
TRANSFER - OUT REPORT:    Verbal report given to Erika Ballesteros(name) on Kal Duffy  being transferred to [facility] room 415(unit) for routine progression of care       Report consisted of patients Situation, Background, Assessment and   Recommendations(SBAR). Information from the following report(s) SBAR, Kardex, ED Summary, Intake/Output, MAR, Recent Results, Med Rec Status, Cardiac Rhythm NSR, Quality Measures and Dual Neuro Assessment was reviewed with the receiving nurse. Lines:   Peripheral IV 01/18/21 Left Hand (Active)        Opportunity for questions and clarification was provided.       Patient transported with:   Monitor  Tech
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
## 2021-01-18 NOTE — DISCHARGE INSTRUCTIONS
Take antibiotics as directed. Finish an entire course! Increase intake of over-the-counter probiotics (probiotic supplements, yogurt) when taking antibiotics to prevent antibiotic associated diarrhea and yeast infection. Take Tylenol/Acetaminophen (every 4-6 hours)  for fever or pain as needed. Follow up with your primary care provider or the provided referral for further evaluation and management  Return to emergency room at once for worsening or new symptoms.

## 2021-01-18 NOTE — ED NOTES
Pt unable to void or have BM; assisted pt back to bed. Dyspnea with exertion/increased 's to 130's; rate subsides after a short period of rest.  Pt refused offer of food; reports mild dyspnea at this time but without noticeable distress; rate/effort improving after resting in bed. Continuing to monitor at this time.

## 2021-01-18 NOTE — ED NOTES
Pt reports feeling improved at this time; no distress noted. Pt asleep with hands folded; rouses easily to voice.

## 2021-01-18 NOTE — ED NOTES
Pt able to swallow antibiotic pill without difficulty. Awake/alert/conversant without distress; remains intermittently tachypneic at rest but without significant dyspnea. Able to swallow fluids without difficulty. Pt watching TV with call light in reach.

## 2021-01-18 NOTE — ED NOTES
Repeat iv established; pt denies shortness of breath. Refusing Tylenol at this time. No distress noted. Conversant without difficulty, affect remains calm/conversant, respirations regular/non labored/skin warm and dry. Informed of current plan for admission with explanation of wait time provided.

## 2021-01-18 NOTE — ED NOTES
Pt moved to room 6 and placed on hospital bed. Pt is awake/alert/oriented; answers all orientation questions appropriately (current month/year). Pt denies shortness of breath; respirations regular/non labored/intermittently tachypneic without distress. Some dyspnea with exertion. 100% on RA. Dr Juan Miguel De La Fuente notified of findings.

## 2021-01-18 NOTE — ED NOTES
Received the patient in signout the patient is awaiting admission and is now rapid Covid positive. The patient was recently discharged from the hospital and was having some cough and weakness so is being readmitted. The PCR test is pending however the patient's rapid is positive and she is not hypoxic. The patient however is too weak to go home and discussed case with the hospitalist and like the patient be started on Lovenox, vitamin D, vitamin C, albuterol, and decrease the Decadron to q. 24 as she is not hypoxic. I made the changes after reviewing the case with Dr. Pitts Check we will continue to follow the patient awaiting a bed. Patient does not need remdesivir at this time or convalescent plasma as she is not hypoxic. Bethany Fraga, DO 2:25 PM

## 2021-01-18 NOTE — ED NOTES
Pt resting awake/alert/in bed without distress. Performing ROM leg exercises in bed. No distress noted at this time.

## 2021-01-18 NOTE — ED NOTES
Pt asleep without visible/noticeable distress. Remains on cardiac/RR/o2/continuous pulse oximetry monitor.

## 2021-01-18 NOTE — ED NOTES
Pt asleep; respirations regular/non labored with no further coughing noted at this time. Pyxis system still in reboot.

## 2021-01-18 NOTE — ED NOTES
Report given to Dr Chris Epstein regarding pt complaint, recent admission, treatments initiated, issues with urine output, lab work, and plan of care. Continuing to monitor.

## 2021-01-18 NOTE — ED NOTES
Rapid covid test has been sent. Pt is awake/alert/conversant without difficulty. Intermittent non productive cough which pt reports is not unusual for her; Dr Cassandra Wylie notified and order for albuterol HFA ordered. Pt again refused Tylenol at this time. Report with MAR review given to Catia Nelson RN; pt given update on plan of care and current wait.

## 2021-01-18 NOTE — ED NOTES
Telephone call to receiving RN, pending return hedy for SBAR and progression of care, for inpatient needs.

## 2021-01-18 NOTE — ED NOTES
Report received/care assumed. Pt is awake/alert/calm/conversant; denies shortness of breath a this time. Pt introduced to oncoming shift; continuing to monitor.

## 2021-01-19 ENCOUNTER — HOME CARE VISIT (OUTPATIENT)
Dept: HOME HEALTH SERVICES | Facility: HOME HEALTH | Age: 86
End: 2021-01-19
Payer: MEDICARE

## 2021-01-19 LAB
ANION GAP SERPL CALC-SCNC: 7 MMOL/L (ref 3–18)
BASOPHILS # BLD: 0 K/UL (ref 0–0.1)
BASOPHILS NFR BLD: 0 % (ref 0–2)
BUN SERPL-MCNC: 19 MG/DL (ref 7–18)
BUN/CREAT SERPL: 22 (ref 12–20)
CALCIUM SERPL-MCNC: 8.1 MG/DL (ref 8.5–10.1)
CHLORIDE SERPL-SCNC: 105 MMOL/L (ref 100–111)
CO2 SERPL-SCNC: 25 MMOL/L (ref 21–32)
CREAT SERPL-MCNC: 0.86 MG/DL (ref 0.6–1.3)
DIFFERENTIAL METHOD BLD: ABNORMAL
EOSINOPHIL # BLD: 0 K/UL (ref 0–0.4)
EOSINOPHIL NFR BLD: 0 % (ref 0–5)
ERYTHROCYTE [DISTWIDTH] IN BLOOD BY AUTOMATED COUNT: 12 % (ref 11.6–14.5)
GLUCOSE BLD STRIP.AUTO-MCNC: 255 MG/DL (ref 70–110)
GLUCOSE BLD STRIP.AUTO-MCNC: 282 MG/DL (ref 70–110)
GLUCOSE BLD STRIP.AUTO-MCNC: 288 MG/DL (ref 70–110)
GLUCOSE BLD STRIP.AUTO-MCNC: 322 MG/DL (ref 70–110)
GLUCOSE SERPL-MCNC: 342 MG/DL (ref 74–99)
HCT VFR BLD AUTO: 34.2 % (ref 35–45)
HGB BLD-MCNC: 10.9 G/DL (ref 12–16)
LYMPHOCYTES # BLD: 0.6 K/UL (ref 0.9–3.6)
LYMPHOCYTES NFR BLD: 26 % (ref 21–52)
MCH RBC QN AUTO: 31.1 PG (ref 24–34)
MCHC RBC AUTO-ENTMCNC: 31.9 G/DL (ref 31–37)
MCV RBC AUTO: 97.7 FL (ref 74–97)
MONOCYTES # BLD: 0.1 K/UL (ref 0.05–1.2)
MONOCYTES NFR BLD: 6 % (ref 3–10)
NEUTS SEG # BLD: 1.7 K/UL (ref 1.8–8)
NEUTS SEG NFR BLD: 68 % (ref 40–73)
PLATELET # BLD AUTO: 181 K/UL (ref 135–420)
PMV BLD AUTO: 11.6 FL (ref 9.2–11.8)
POTASSIUM SERPL-SCNC: 4.2 MMOL/L (ref 3.5–5.5)
RBC # BLD AUTO: 3.5 M/UL (ref 4.2–5.3)
SARS-COV-2, COV2NT: DETECTED
SODIUM SERPL-SCNC: 137 MMOL/L (ref 136–145)
WBC # BLD AUTO: 2.4 K/UL (ref 4.6–13.2)

## 2021-01-19 PROCEDURE — 65660000000 HC RM CCU STEPDOWN

## 2021-01-19 PROCEDURE — 94640 AIRWAY INHALATION TREATMENT: CPT

## 2021-01-19 PROCEDURE — 3331090001 HH PPS REVENUE CREDIT

## 2021-01-19 PROCEDURE — 74011250637 HC RX REV CODE- 250/637: Performed by: NURSE PRACTITIONER

## 2021-01-19 PROCEDURE — 97530 THERAPEUTIC ACTIVITIES: CPT

## 2021-01-19 PROCEDURE — 3331090003 HH PPS REVENUE ADJ

## 2021-01-19 PROCEDURE — 74011250636 HC RX REV CODE- 250/636: Performed by: EMERGENCY MEDICINE

## 2021-01-19 PROCEDURE — 99232 SBSQ HOSP IP/OBS MODERATE 35: CPT | Performed by: EMERGENCY MEDICINE

## 2021-01-19 PROCEDURE — 74011250636 HC RX REV CODE- 250/636: Performed by: NURSE PRACTITIONER

## 2021-01-19 PROCEDURE — 97162 PT EVAL MOD COMPLEX 30 MIN: CPT

## 2021-01-19 PROCEDURE — 97110 THERAPEUTIC EXERCISES: CPT

## 2021-01-19 PROCEDURE — 82962 GLUCOSE BLOOD TEST: CPT

## 2021-01-19 PROCEDURE — 80048 BASIC METABOLIC PNL TOTAL CA: CPT

## 2021-01-19 PROCEDURE — 74011636637 HC RX REV CODE- 636/637: Performed by: EMERGENCY MEDICINE

## 2021-01-19 PROCEDURE — 3331090002 HH PPS REVENUE DEBIT

## 2021-01-19 PROCEDURE — 74011000250 HC RX REV CODE- 250: Performed by: NURSE PRACTITIONER

## 2021-01-19 PROCEDURE — 36415 COLL VENOUS BLD VENIPUNCTURE: CPT

## 2021-01-19 PROCEDURE — 74011250637 HC RX REV CODE- 250/637: Performed by: EMERGENCY MEDICINE

## 2021-01-19 PROCEDURE — 74011636637 HC RX REV CODE- 636/637: Performed by: NURSE PRACTITIONER

## 2021-01-19 PROCEDURE — 97166 OT EVAL MOD COMPLEX 45 MIN: CPT

## 2021-01-19 PROCEDURE — 97535 SELF CARE MNGMENT TRAINING: CPT

## 2021-01-19 PROCEDURE — 85025 COMPLETE CBC W/AUTO DIFF WBC: CPT

## 2021-01-19 RX ORDER — INSULIN GLARGINE 100 [IU]/ML
15 INJECTION, SOLUTION SUBCUTANEOUS DAILY
Status: DISCONTINUED | OUTPATIENT
Start: 2021-01-19 | End: 2021-01-22

## 2021-01-19 RX ADMIN — METOPROLOL TARTRATE 25 MG: 25 TABLET, FILM COATED ORAL at 17:06

## 2021-01-19 RX ADMIN — SPIRONOLACTONE 25 MG: 25 TABLET ORAL at 08:24

## 2021-01-19 RX ADMIN — RANOLAZINE 500 MG: 500 TABLET, FILM COATED, EXTENDED RELEASE ORAL at 08:24

## 2021-01-19 RX ADMIN — AZITHROMYCIN DIHYDRATE 500 MG: 500 INJECTION, POWDER, LYOPHILIZED, FOR SOLUTION INTRAVENOUS at 21:39

## 2021-01-19 RX ADMIN — ACETAMINOPHEN 650 MG: 325 TABLET ORAL at 18:51

## 2021-01-19 RX ADMIN — WATER 1 G: 1 INJECTION INTRAMUSCULAR; INTRAVENOUS; SUBCUTANEOUS at 21:39

## 2021-01-19 RX ADMIN — INSULIN LISPRO 8 UNITS: 100 INJECTION, SOLUTION INTRAVENOUS; SUBCUTANEOUS at 08:23

## 2021-01-19 RX ADMIN — LEVOTHYROXINE SODIUM 137 MCG: 25 TABLET ORAL at 05:25

## 2021-01-19 RX ADMIN — Medication 10 ML: at 05:25

## 2021-01-19 RX ADMIN — INSULIN GLARGINE 15 UNITS: 100 INJECTION, SOLUTION SUBCUTANEOUS at 21:40

## 2021-01-19 RX ADMIN — CHOLECALCIFEROL TAB 25 MCG (1000 UNIT) 1 TABLET: 25 TAB at 08:24

## 2021-01-19 RX ADMIN — ENOXAPARIN SODIUM 40 MG: 100 INJECTION SUBCUTANEOUS at 17:11

## 2021-01-19 RX ADMIN — INSULIN GLARGINE 15 UNITS: 100 INJECTION, SOLUTION SUBCUTANEOUS at 10:42

## 2021-01-19 RX ADMIN — INSULIN LISPRO 9 UNITS: 100 INJECTION, SOLUTION INTRAVENOUS; SUBCUTANEOUS at 13:47

## 2021-01-19 RX ADMIN — METOPROLOL TARTRATE 25 MG: 25 TABLET, FILM COATED ORAL at 08:24

## 2021-01-19 RX ADMIN — FLUTICASONE PROPIONATE 1 PUFF: 110 AEROSOL, METERED RESPIRATORY (INHALATION) at 19:38

## 2021-01-19 RX ADMIN — ISOSORBIDE MONONITRATE 30 MG: 30 TABLET, EXTENDED RELEASE ORAL at 08:24

## 2021-01-19 RX ADMIN — Medication 10 ML: at 18:39

## 2021-01-19 RX ADMIN — Medication 10 ML: at 21:40

## 2021-01-19 RX ADMIN — INSULIN LISPRO 9 UNITS: 100 INJECTION, SOLUTION INTRAVENOUS; SUBCUTANEOUS at 17:35

## 2021-01-19 RX ADMIN — RANOLAZINE 500 MG: 500 TABLET, FILM COATED, EXTENDED RELEASE ORAL at 17:06

## 2021-01-19 RX ADMIN — CLOPIDOGREL BISULFATE 75 MG: 75 TABLET ORAL at 08:24

## 2021-01-19 RX ADMIN — INSULIN LISPRO 9 UNITS: 100 INJECTION, SOLUTION INTRAVENOUS; SUBCUTANEOUS at 21:40

## 2021-01-19 NOTE — DIABETES MGMT
Diabetes Patient/Family Education Record  Factors That  May Influence Patients Ability  to Learn or  Comply with Recommendations   []   Language barrier    []   Cultural needs   []   Motivation    []   Cognitive limitation    []   Physical   []   Education    []   Physiological factors   []   Hearing/vision/speaking impairment   []   Muslim beliefs    []   Financial factors   []  Other:   [x]  No factors identified at this time.      Person Instructed:   [x]   Patient   []   Family   []  Other     Preference for Learning:   [x]   Verbal   []   Written   []  Demonstration     Level of Comprehension & Competence:    [x]  Good                                      [] Fair                                     []  Poor                             []  Needs Reinforcement   [x]  Teachback completed    Education Component:           See DM note - re-admitted with Covid-19 infection    [x]  Medication management   She is compliant with Lantus bid    [x]  Nutritional management -obtain usual meal pattern              typically consumes 3 meals/daily    [x]  Exercise   [x]  Signs, symptoms, and treatment of hyperglycemia and hypoglycemia   [x] Prevention, recognition and treatment of hyperglycemia and hypoglycemia   [x]  Importance of blood glucose monitoring   She monitors BG 1-2 times daily   []  Instruction on use of the blood glucose meter   [x]  Discuss the importance of HbA1C monitoring            8.1%   []  Sick day guidelines   []  Proper use and disposal of lancets, needles, syringes or insulin pens (if appropriate)   []  Potential long-term complications (retinopathy, kidney disease, neuropathy, foot care)   [x] Information about whom to contact in case of emergency or for more information  Is followed by Dr. Gerson Garcia    []  Goal:  Patient/family will demonstrate understanding of Diabetes Self Management Skills by: (date) _______  Plan for post-discharge education or self-management support:    [] Outpatient class schedule provided            [] Patient Declined    [] Scheduled for outpatient classes (date) _______  Verify:  Does patient understand how diabetes medications work? _______yes_____________________  Does patient know what their most recent A1c is? _________yes 8.1%__________________________  Does patient monitor glucose at home? _____________yes______________________________  Does patient have difficulty obtaining diabetes medications or testing supplies? _______no__________       Kenyatta Rod MPH RN CDE  Pager [phone]

## 2021-01-19 NOTE — PROGRESS NOTES
Verbal shift change report given to Kaylene Reveles RN (oncoming nurse) by Michelle Metzger RN (offgoing nurse). Report included the following information SBAR, Intake/Output, MAR and Recent Results.
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
## 2021-01-19 NOTE — DIABETES MGMT
Glycemic Control Plan of Care    Recommend AM dose of Lantus 15 units - order obtained     Assessment: consult received -  known to our service - now admitted with Covid infection  Received one dose of decadron - now discontinued. She is followed by endocrinology, Dr. Tracee Mcdowell. Spoke with her via phone - confirmed her home doses of Lantus   Lantus bid at home - recommend adding an additional dose lantus in the AM  Will continue to monitor     Most recent blood glucose values:    Current A1C:  Lab Results   Component Value Date/Time    Hemoglobin A1c 8.1 (H) 01/18/2021 09:12 PM    Hemoglobin A1c (POC) 8.5 01/17/2019 12:19 PM    Hemoglobin A1c, External 7.5 10/31/2019     Current hospital diabetes medications:  Lantus 15 units nightly  Corrective lispro, very insulin resistant, 4 times daily    TTD previous day = 21 units  15 units lantus  6 units lispro    Home diabetes medications:  Lantus 32 units AM  Lantus 36 units PM     Diet - DM Cons. Carb.1600 kcal. 2 GM NA    Yessy Parada MPH RN CDE  Pager [phone]

## 2021-01-19 NOTE — PROGRESS NOTES
Reason for Admission:   Dyspnea [R06.00]  Generalized weakness [R53.1]  Tachycardia, paroxysmal (HCC) [I47.9]               RUR Score:     36             Resources/supports as identified by patient/family:       Top Challenges facing patient (as identified by patient/family and CM): Finances/Medication cost?     No needs identified  Transportation      Medicaid  Support system or lack thereof?   family  Living arrangements? Lives alone   Self-care/ADLs/Cognition? Needs assistance        Current Advanced Directive/Advance Care Plan:   no                          Plan for utilizing home health:    no                      Likelihood of readmission:   HIGH    Transition of Care Plan:                    Initial assessment completed with patient. Cognitive status of patient: oriented to time, place, person and situation. Face sheet information confirmed:  yes. The patient designates grandchild to participate in her discharge plan and to receive any needed information. This patient lives in a single family home with patient. Patient is not able to navigate steps as needed. Prior to hospitalization, patient was considered to be independent with ADLs/IADLS : no . Patient has personal care for 56 hours per week with a [facility]. Patient has a current ACP document on file: no  The patient and other:  BLS will be available to transport patient home upon discharge. The patient already has IVETT Pollack/ARETHA, Shower chair, [address] [place] bed,  medical equipment available in the home. Patient is currently active with home health. If active, agency name is [address] [place] [address]. Patient has not stayed in a skilled nursing facility or rehab. This patient is on dialysis :no    List of available SNF agencies were provided and reviewed with the patient prior to discharge. Freedom of choice signed: yes, for [facility]. Currently, the discharge plan is SNF.     The patient states that she can obtain her medications from the pharmacy, and take her medications as directed. Patient's current insurance is Medicare and Medicaid. Care Management Interventions  PCP Verified by CM:  Yes  Mode of Transport at Discharge: Self  Physical Therapy Consult: Yes  Occupational Therapy Consult: Yes  Current Support Network: Lives Alone  Confirm Follow Up Transport: Family  The Plan for Transition of Care is Related to the Following Treatment Goals : SNF  The Patient and/or Patient Representative was Provided with a Choice of Provider and Agrees with the Discharge Plan?: Yes  Freedom of Choice List was Provided with Basic Dialogue that Supports the Patient's Individualized Plan of Care/Goals, Treatment Preferences and Shares the Quality Data Associated with the Providers?: Yes  Discharge Location  Discharge Placement: Skilled nursing facility        Jillian Engel, RN BSN  Care Manager  [phone]

## 2021-01-19 NOTE — PROGRESS NOTES
Physician Progress Note      PATIENT:               Chilango Granger  CSN #:                  [id number]  :                       1935  ADMIT DATE:       2021 9:23 PM  [address] [place] DATE:        2021 4:51 PM  RESPONDING  PROVIDER #:        Spencer Lee MD          QUERY TEXT:    Pt admitted with Chest Pain. Denied current Chest Pain per Triage RN and relieved at home. Pt noted to have ID consult with noted:   ID consult:  \"Clinical presentation seems most consistent with recurrent left abdominal pain secondary to recurrent descending colon/sigmoid diverticulitis. \"  If possible, please document in progress notes and discharge summary if you are evaluating and/or treating any of the following: The medical record reflects the following:  Risk Factors: CAD, Hx diverticulitis  Clinical Indicators:  ER Triage RN:  Pt c/o intermittent chest pain and SOB. Took one nitro tablet at home and pain was relieved. Denies chest pain at this time  ER:  Pt c/o left sided chest pain, x few minutes about 12 hours ago and again about 6 hours ago, took ntg tab each time and pain resolved. Card consult:  repeat catheterization in 2019 showed widely patent stent with insignificant coronary disease  She presents with complaint of chest pain under the left axilla radiating across left-sided mid sternum. Nausea - Treatment per primary team, Atypical angina   PN:  She does complain per nursing of left flank pain, Atypical chest pain   PN:  Pt continues to c/o Left back pain   cardiology PN:  Patient with atypical angina- negative stress test.   ID consult:   ID:  Clinical presentation seems most consistent with recurrent left abdominal pain secondary to recurrent descending colon/sigmoid diverticulitis. No definitive evidence of diverticulitis noted on CT scan 2021 likely due to lack of IV contrast.  Status post antibiotics on  with clinical improvement.   Lack of dysuria, only 40,000 colonies of lactobacillus in urine culture likely suggest skin jerome. Doubt cystitis  Discontinue ceftriaxone. Switch to oral ciprofloxacin, Augmentin for 6 more days  2. Recommend follow-up with GI for recurrent diverticulitis  Treatment: troponin x2, ECG, echo, stress test, cardiology consult, ID consult  Thank you. Paulino Archibald RN BSN [email or web address] [email or web address]  Options provided:  -- Chest pain due to CAD with angina, with admitting symptoms unrelated to descending colon/sigmoid diverticulitis ruled in with UTI and pyelonephritis ruled out  -- Chest pain due to CAD with unstable angina with admitting symptoms unrelated to descending colon/sigmoid diverticulitis with UTI and pyelonephritis ruled out  -- Chest pain with admitting symptoms due to descending colon/sigmoid diverticulitis with UTI and pyelonephritis ruled out  -- Atypical Chest Pain unknown source  -- Other - I will add my own diagnosis  -- Disagree - Not applicable / Not valid  -- Disagree - Clinically unable to determine / Unknown  -- Refer to Clinical Documentation Reviewer    PROVIDER RESPONSE TEXT:    This patient had atypical Chest Pain unknown source. Query created by:  Helena Guzman on 1/18/2021 12:07 PM      Electronically signed by:  Riana Thorne MD 1/18/2021 10:17 PM
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
## 2021-01-19 NOTE — ROUTINE PROCESS
Bedside and Verbal shift change report given to Lesli (oncoming nurse) by Pinky Tavera (offgoing nurse). Report included the following information SBAR and Kardex.

## 2021-01-20 ENCOUNTER — APPOINTMENT (OUTPATIENT)
Dept: GENERAL RADIOLOGY | Age: 86
DRG: 177 | End: 2021-01-20
Attending: EMERGENCY MEDICINE
Payer: MEDICARE

## 2021-01-20 LAB
ANION GAP SERPL CALC-SCNC: 7 MMOL/L (ref 3–18)
BACTERIA SPEC CULT: NORMAL
BASOPHILS # BLD: 0 K/UL (ref 0–0.1)
BASOPHILS NFR BLD: 0 % (ref 0–2)
BUN SERPL-MCNC: 19 MG/DL (ref 7–18)
BUN/CREAT SERPL: 23 (ref 12–20)
CALCIUM SERPL-MCNC: 8.3 MG/DL (ref 8.5–10.1)
CHLORIDE SERPL-SCNC: 109 MMOL/L (ref 100–111)
CO2 SERPL-SCNC: 26 MMOL/L (ref 21–32)
CREAT SERPL-MCNC: 0.83 MG/DL (ref 0.6–1.3)
CRP SERPL-MCNC: 8.7 MG/DL (ref 0–0.3)
D DIMER PPP FEU-MCNC: 0.9 UG/ML(FEU)
DIFFERENTIAL METHOD BLD: ABNORMAL
EOSINOPHIL # BLD: 0 K/UL (ref 0–0.4)
EOSINOPHIL NFR BLD: 0 % (ref 0–5)
ERYTHROCYTE [DISTWIDTH] IN BLOOD BY AUTOMATED COUNT: 12.1 % (ref 11.6–14.5)
GLUCOSE BLD STRIP.AUTO-MCNC: 111 MG/DL (ref 70–110)
GLUCOSE BLD STRIP.AUTO-MCNC: 111 MG/DL (ref 70–110)
GLUCOSE BLD STRIP.AUTO-MCNC: 116 MG/DL (ref 70–110)
GLUCOSE BLD STRIP.AUTO-MCNC: 98 MG/DL (ref 70–110)
GLUCOSE SERPL-MCNC: 86 MG/DL (ref 74–99)
HCT VFR BLD AUTO: 34.2 % (ref 35–45)
HGB BLD-MCNC: 11.1 G/DL (ref 12–16)
LYMPHOCYTES # BLD: 1.2 K/UL (ref 0.9–3.6)
LYMPHOCYTES NFR BLD: 16 % (ref 21–52)
MAGNESIUM SERPL-MCNC: 2.1 MG/DL (ref 1.6–2.6)
MCH RBC QN AUTO: 32.1 PG (ref 24–34)
MCHC RBC AUTO-ENTMCNC: 32.5 G/DL (ref 31–37)
MCV RBC AUTO: 98.8 FL (ref 74–97)
MONOCYTES # BLD: 0.5 K/UL (ref 0.05–1.2)
MONOCYTES NFR BLD: 7 % (ref 3–10)
NEUTS SEG # BLD: 5.5 K/UL (ref 1.8–8)
NEUTS SEG NFR BLD: 77 % (ref 40–73)
PLATELET # BLD AUTO: 170 K/UL (ref 135–420)
PMV BLD AUTO: 11 FL (ref 9.2–11.8)
POTASSIUM SERPL-SCNC: 3.9 MMOL/L (ref 3.5–5.5)
PROCALCITONIN SERPL-MCNC: <0.05 NG/ML
RBC # BLD AUTO: 3.46 M/UL (ref 4.2–5.3)
SERVICE CMNT-IMP: NORMAL
SODIUM SERPL-SCNC: 142 MMOL/L (ref 136–145)
WBC # BLD AUTO: 7.3 K/UL (ref 4.6–13.2)

## 2021-01-20 PROCEDURE — XW033E5 INTRODUCTION OF REMDESIVIR ANTI-INFECTIVE INTO PERIPHERAL VEIN, PERCUTANEOUS APPROACH, NEW TECHNOLOGY GROUP 5: ICD-10-PCS | Performed by: INTERNAL MEDICINE

## 2021-01-20 PROCEDURE — 74011250636 HC RX REV CODE- 250/636: Performed by: NURSE PRACTITIONER

## 2021-01-20 PROCEDURE — 2709999900 HC NON-CHARGEABLE SUPPLY

## 2021-01-20 PROCEDURE — 86140 C-REACTIVE PROTEIN: CPT

## 2021-01-20 PROCEDURE — 83735 ASSAY OF MAGNESIUM: CPT

## 2021-01-20 PROCEDURE — 74011250637 HC RX REV CODE- 250/637: Performed by: NURSE PRACTITIONER

## 2021-01-20 PROCEDURE — 71045 X-RAY EXAM CHEST 1 VIEW: CPT

## 2021-01-20 PROCEDURE — 85379 FIBRIN DEGRADATION QUANT: CPT

## 2021-01-20 PROCEDURE — 74011636637 HC RX REV CODE- 636/637: Performed by: EMERGENCY MEDICINE

## 2021-01-20 PROCEDURE — 99232 SBSQ HOSP IP/OBS MODERATE 35: CPT | Performed by: EMERGENCY MEDICINE

## 2021-01-20 PROCEDURE — 74011250636 HC RX REV CODE- 250/636: Performed by: EMERGENCY MEDICINE

## 2021-01-20 PROCEDURE — 99222 1ST HOSP IP/OBS MODERATE 55: CPT | Performed by: NURSE PRACTITIONER

## 2021-01-20 PROCEDURE — 76450000000

## 2021-01-20 PROCEDURE — 80048 BASIC METABOLIC PNL TOTAL CA: CPT

## 2021-01-20 PROCEDURE — 74011636637 HC RX REV CODE- 636/637: Performed by: NURSE PRACTITIONER

## 2021-01-20 PROCEDURE — 84145 PROCALCITONIN (PCT): CPT

## 2021-01-20 PROCEDURE — 74011250637 HC RX REV CODE- 250/637: Performed by: EMERGENCY MEDICINE

## 2021-01-20 PROCEDURE — 94640 AIRWAY INHALATION TREATMENT: CPT

## 2021-01-20 PROCEDURE — 74011000250 HC RX REV CODE- 250: Performed by: NURSE PRACTITIONER

## 2021-01-20 PROCEDURE — 65660000000 HC RM CCU STEPDOWN

## 2021-01-20 PROCEDURE — 85025 COMPLETE CBC W/AUTO DIFF WBC: CPT

## 2021-01-20 PROCEDURE — 74011250637 HC RX REV CODE- 250/637: Performed by: STUDENT IN AN ORGANIZED HEALTH CARE EDUCATION/TRAINING PROGRAM

## 2021-01-20 PROCEDURE — 36415 COLL VENOUS BLD VENIPUNCTURE: CPT

## 2021-01-20 PROCEDURE — 82962 GLUCOSE BLOOD TEST: CPT

## 2021-01-20 RX ORDER — FAMOTIDINE 20 MG/1
20 TABLET, FILM COATED ORAL 2 TIMES DAILY
Status: DISCONTINUED | OUTPATIENT
Start: 2021-01-20 | End: 2021-01-20

## 2021-01-20 RX ORDER — FAMOTIDINE 20 MG/1
20 TABLET, FILM COATED ORAL DAILY
Status: DISCONTINUED | OUTPATIENT
Start: 2021-01-20 | End: 2021-01-26 | Stop reason: HOSPADM

## 2021-01-20 RX ORDER — ZINC SULFATE 50(220)MG
1 CAPSULE ORAL DAILY
Status: DISCONTINUED | OUTPATIENT
Start: 2021-01-21 | End: 2021-01-26 | Stop reason: HOSPADM

## 2021-01-20 RX ORDER — ACETAMINOPHEN 650 MG/1
650 SUPPOSITORY RECTAL
Status: DISCONTINUED | OUTPATIENT
Start: 2021-01-20 | End: 2021-01-26 | Stop reason: HOSPADM

## 2021-01-20 RX ORDER — ACETAMINOPHEN 325 MG/1
325 TABLET ORAL ONCE
Status: COMPLETED | OUTPATIENT
Start: 2021-01-20 | End: 2021-01-20

## 2021-01-20 RX ORDER — ASCORBIC ACID 250 MG
250 TABLET ORAL 2 TIMES DAILY
Status: DISCONTINUED | OUTPATIENT
Start: 2021-01-20 | End: 2021-01-26 | Stop reason: HOSPADM

## 2021-01-20 RX ORDER — MELATONIN
2000 DAILY
Status: DISCONTINUED | OUTPATIENT
Start: 2021-01-21 | End: 2021-01-26 | Stop reason: HOSPADM

## 2021-01-20 RX ORDER — ACETAMINOPHEN 500 MG
1000 TABLET ORAL
Status: DISCONTINUED | OUTPATIENT
Start: 2021-01-20 | End: 2021-01-26 | Stop reason: HOSPADM

## 2021-01-20 RX ORDER — DEXAMETHASONE SODIUM PHOSPHATE 4 MG/ML
6 INJECTION, SOLUTION INTRA-ARTICULAR; INTRALESIONAL; INTRAMUSCULAR; INTRAVENOUS; SOFT TISSUE EVERY 24 HOURS
Status: DISCONTINUED | OUTPATIENT
Start: 2021-01-20 | End: 2021-01-22

## 2021-01-20 RX ADMIN — Medication 250 MG: at 09:19

## 2021-01-20 RX ADMIN — RANOLAZINE 500 MG: 500 TABLET, FILM COATED, EXTENDED RELEASE ORAL at 09:19

## 2021-01-20 RX ADMIN — CHOLECALCIFEROL TAB 25 MCG (1000 UNIT) 1 TABLET: 25 TAB at 09:19

## 2021-01-20 RX ADMIN — FAMOTIDINE 20 MG: 20 TABLET, FILM COATED ORAL at 14:55

## 2021-01-20 RX ADMIN — Medication 250 MG: at 18:58

## 2021-01-20 RX ADMIN — MONTELUKAST 10 MG: 10 TABLET, FILM COATED ORAL at 09:19

## 2021-01-20 RX ADMIN — Medication 5 MG: at 21:31

## 2021-01-20 RX ADMIN — CYANOCOBALAMIN TAB 1000 MCG 1000 MCG: 1000 TAB at 09:00

## 2021-01-20 RX ADMIN — FLUTICASONE PROPIONATE 1 PUFF: 110 AEROSOL, METERED RESPIRATORY (INHALATION) at 20:20

## 2021-01-20 RX ADMIN — WATER 1 G: 1 INJECTION INTRAMUSCULAR; INTRAVENOUS; SUBCUTANEOUS at 21:31

## 2021-01-20 RX ADMIN — ASPIRIN 81 MG: 81 TABLET, CHEWABLE ORAL at 09:19

## 2021-01-20 RX ADMIN — ISOSORBIDE MONONITRATE 30 MG: 30 TABLET, EXTENDED RELEASE ORAL at 09:19

## 2021-01-20 RX ADMIN — INSULIN GLARGINE 15 UNITS: 100 INJECTION, SOLUTION SUBCUTANEOUS at 21:31

## 2021-01-20 RX ADMIN — CLOPIDOGREL BISULFATE 75 MG: 75 TABLET ORAL at 09:19

## 2021-01-20 RX ADMIN — DEXAMETHASONE SODIUM PHOSPHATE 6 MG: 4 INJECTION, SOLUTION INTRAMUSCULAR; INTRAVENOUS at 18:58

## 2021-01-20 RX ADMIN — FLUTICASONE PROPIONATE 1 PUFF: 110 AEROSOL, METERED RESPIRATORY (INHALATION) at 07:51

## 2021-01-20 RX ADMIN — AZITHROMYCIN DIHYDRATE 500 MG: 500 INJECTION, POWDER, LYOPHILIZED, FOR SOLUTION INTRAVENOUS at 21:31

## 2021-01-20 RX ADMIN — Medication 10 ML: at 21:32

## 2021-01-20 RX ADMIN — LEVOTHYROXINE SODIUM 137 MCG: 25 TABLET ORAL at 06:27

## 2021-01-20 RX ADMIN — ACETAMINOPHEN 325 MG: 325 TABLET ORAL at 21:31

## 2021-01-20 RX ADMIN — RANOLAZINE 500 MG: 500 TABLET, FILM COATED, EXTENDED RELEASE ORAL at 17:16

## 2021-01-20 RX ADMIN — METOPROLOL TARTRATE 25 MG: 25 TABLET, FILM COATED ORAL at 17:17

## 2021-01-20 RX ADMIN — AMLODIPINE BESYLATE 5 MG: 5 TABLET ORAL at 09:19

## 2021-01-20 RX ADMIN — Medication 10 ML: at 14:56

## 2021-01-20 RX ADMIN — INSULIN GLARGINE 15 UNITS: 100 INJECTION, SOLUTION SUBCUTANEOUS at 09:19

## 2021-01-20 RX ADMIN — METOPROLOL TARTRATE 25 MG: 25 TABLET, FILM COATED ORAL at 09:19

## 2021-01-20 RX ADMIN — ACETAMINOPHEN 650 MG: 325 TABLET ORAL at 17:16

## 2021-01-20 RX ADMIN — Medication 10 ML: at 06:28

## 2021-01-20 RX ADMIN — FUROSEMIDE 20 MG: 20 TABLET ORAL at 09:19

## 2021-01-20 RX ADMIN — SPIRONOLACTONE 25 MG: 25 TABLET ORAL at 09:19

## 2021-01-20 RX ADMIN — ENOXAPARIN SODIUM 40 MG: 100 INJECTION SUBCUTANEOUS at 14:55

## 2021-01-20 NOTE — PROGRESS NOTES
[facility] Hospitalist Group  Progress Note    Patient: Rajiv Jones Age: 82 y.o. : 1935 MR#: [id number] [id number]: [id number]  Date/Time: 2021    Subjective:     Patient is sitting in bed in no apparent distress, complains of some cough. Denies any chest pain or shortness of breath. Patient complains of fatigue and tiredness. Assessment/Plan:     1. Covid-19  2. Generalized weakness  3. Chronic diastolic CHF-compensated  4. Hypertension  5. Hx CAD  6. Hyperlipidemia  7. T2DM  8. Hypothyroidism  9. Obesity     Plan  Oxygen saturations have been trending lower with now patient saturating at 90% on room air. I called and discussed with ID and she recommended to start remdesivir and dexamethasone and place the patient on 2 L of oxygen. Continue empiric antibiotics and vitamin supplements  Continue aspirin and Plavix  Continue Lantus and sliding scale insulin  Continue Lasix  Monitor labs and Covid related markers  PT OT   palliative care input noted. Patient is DNR  Discussed with patient, discussed with RN, discussed with   I called patient's granddaughter at phone #[phone] and left a message.   I called patient's granddaughter at the other #[phone] and updated her regarding patient's care    Case discussed with:  [x]Patient  []Family  [x]Nursing  [x]Case Management  DVT Prophylaxis:  [x]Lovenox  []Hep SQ  []SCDs  []Coumadin   []On Heparin gtt    Objective:   VS:   Visit Vitals  /66 (BP 1 Location: Left arm, BP Patient Position: Supine)   Pulse 79   Temp (!) 102.3 °F (39.1 °C) Comment: Notified Nurse rafy   Resp 19   Ht 5' 7\" (1.702 m)   Wt 112.5 kg (248 lb)   SpO2 90%   Breastfeeding No   BMI 38.84 kg/m²      Tmax/24hrs: Temp (24hrs), Av.7 °F (37.6 °C), Min:97.8 °F (36.6 °C), Max:102.3 °F (39.1 °C)    Input/Output: No intake or output data in the 24 hours ending 21 4429    General:  Awake, alert  Cardiovascular:  S1S2+, RRR  Pulmonary:  CTA b/l  GI:  Soft, BS+, NT, ND  Extremities:  trace edema      Labs:    Recent Results (from the past 24 hour(s))   GLUCOSE, POC    Collection Time: 01/19/21  8:53 PM   Result Value Ref Range    Glucose (POC) 288 (H) 70 - 110 mg/dL   CBC WITH AUTOMATED DIFF    Collection Time: 01/20/21  5:15 AM   Result Value Ref Range    WBC 7.3 4.6 - 13.2 K/uL    RBC 3.46 (L) 4.20 - 5.30 M/uL    HGB 11.1 (L) 12.0 - 16.0 g/dL    HCT 34.2 (L) 35.0 - 45.0 %    MCV 98.8 (H) 74.0 - 97.0 FL    MCH 32.1 24.0 - 34.0 PG    MCHC 32.5 31.0 - 37.0 g/dL    RDW 12.1 11.6 - 14.5 %    PLATELET 750 495 - 697 K/uL    MPV 11.0 9.2 - 11.8 FL    NEUTROPHILS 77 (H) 40 - 73 %    LYMPHOCYTES 16 (L) 21 - 52 %    MONOCYTES 7 3 - 10 %    EOSINOPHILS 0 0 - 5 %    BASOPHILS 0 0 - 2 %    ABS. NEUTROPHILS 5.5 1.8 - 8.0 K/UL    ABS. LYMPHOCYTES 1.2 0.9 - 3.6 K/UL    ABS. MONOCYTES 0.5 0.05 - 1.2 K/UL    ABS. EOSINOPHILS 0.0 0.0 - 0.4 K/UL    ABS.  BASOPHILS 0.0 0.0 - 0.1 K/UL    DF AUTOMATED     METABOLIC PANEL, BASIC    Collection Time: 01/20/21  5:15 AM   Result Value Ref Range    Sodium 142 136 - 145 mmol/L    Potassium 3.9 3.5 - 5.5 mmol/L    Chloride 109 100 - 111 mmol/L    CO2 26 21 - 32 mmol/L    Anion gap 7 3.0 - 18 mmol/L    Glucose 86 74 - 99 mg/dL    BUN 19 (H) 7.0 - 18 MG/DL    Creatinine 0.83 0.6 - 1.3 MG/DL    BUN/Creatinine ratio 23 (H) 12 - 20      GFR est AA >60 >60 ml/min/1.73m2    GFR est non-AA >60 >60 ml/min/1.73m2    Calcium 8.3 (L) 8.5 - 10.1 MG/DL   MAGNESIUM    Collection Time: 01/20/21  5:15 AM   Result Value Ref Range    Magnesium 2.1 1.6 - 2.6 mg/dL   GLUCOSE, POC    Collection Time: 01/20/21  5:39 AM   Result Value Ref Range    Glucose (POC) 98 70 - 110 mg/dL   GLUCOSE, POC    Collection Time: 01/20/21 12:26 PM   Result Value Ref Range    Glucose (POC) 111 (H) 70 - 110 mg/dL   GLUCOSE, POC    Collection Time: 01/20/21  4:47 PM   Result Value Ref Range    Glucose (POC) 111 (H) 70 - 110 mg/dL     Additional Data Reviewed:      Signed By: Michael Addison MD     January 20, 2021

## 2021-01-20 NOTE — ACP (ADVANCE CARE PLANNING)
[facility] Steps [facility] (Physician Orders for Scope of Treatment)       Date of conversation: 1/20/2021   [address] [place] [address]   Length (minutes): 50    Participants:   [x] Patient    [] Healthcare agent (already designated in existing ACP document)    Name: Roger Waldron     Relationship to Patient:granddaughter     Phone number: [phone] cell         [phone]  [] Other Surrogate Decision Maker / Next of Modesta Katz    Name: Noemi Thompson     Relationship to Patient: cousin     Phone Number:      [facility] Steps® ACP Facilitator: Lorena Medina, RN, Lawson Human, NP       Conversation Topics   Understanding of Medical Condition/s AND Potential Complications:    Patient response: \"starting having a cough\"     Lesson [place] from Experiences Related to Serious Illness: \" I have seen people on ventilators in my time and it is not what people think\"      Identifies the following as important for living well: Hopes: That she is able to go home and not need a SNF. Sources of support/comfort described as: her extended family live very close and are supportive. Has * hr GC services 7 days a week.      Needs to discuss with spiritual/Mandaeism advisor: [] Yes  [x] No    Needs more information about illness and complications:  [] Yes  [x] No      Cardiopulmonary Resuscitation      Order Elected for CPR:  []  Attempt Resuscitation [x]  Do Not Attempt Resuscitation      When NOT in Cardiopulmonary Arrest, Order Elected:    [] Comfort Measures  [x] Limited Additional Interventions  [] Full Interventions    Artificially Administered Nutrition, Order Elected:    [x] No Feeding Tube \" pt stated \"why would I want that\"   [] Feeding Tube for a defined trial period  [] Feeding Tube long-term if indicated    The following was provided (check all that apply):      Healthcare Decision Information:   [x] Help with Breathing Facts   [] Tube Feeding Facts   [x] CPR Facts [] Review of existing Advance Directive  [x] Assistance with Completion of New Advance Directive   [x] Review of [place] POST Form       Meeting Outcomes:   [x] ACP discussion completed   [x] Yogesh form completed  [x] Yogesh prepared for Provider review and signature   [x] Original placed on Chart, if in facility (form to be sent with patient at discharge)  [x] Copy given to healthcare agent    [x] Copy scanned to electronic medical record    If ACP discussion not completed, last interview topic discussed: Call placed to patient's granddaughter Richard Horne. Brief update provided. Informed her of pt's completion of AMD naming her Primary MPOA. Ms Maggi Quiroz is willing to accept this role. Also inform her our the Palliative teams conversation with Ms Tiffanie Andrews and her decision of NO CPR or Intubation. Ms Maggi Quiroz stated her grandmother and she had discussed this in the past and her wishes are consistent with that conversation. Emailed copies via [facility] to Ms. Desai at [email or web address]     Follow-up plan:     [] Schedule follow-up conversation to continue planning   [] Referred individual to Provider for additional questions/concerns   [x] Advised patient/agent/surrogate to review completed POST form and update if needed with changes in condition, patient preferences or care setting     [] This note routed to one or more involved healthcare providers    Basilio HAYWOOD, RN, [address] [place] [address] [place]: [phone])

## 2021-01-20 NOTE — ROUTINE PROCESS
Bedside and Verbal shift change report given to Nina (oncoming nurse) by Marvin Garrett (offgoing nurse). Report included the following information SBAR.

## 2021-01-20 NOTE — PROGRESS NOTES
Verbal shift change report given to Jerrod Rey RN (oncoming nurse) by Sukh Romero RN (offgoing nurse). Report included the following information SBAR, MAR and Recent Results.

## 2021-01-20 NOTE — PROGRESS NOTES
Problem: Falls - Risk of  Goal: *Absence of Falls  Description: Document [place] Flow Fall Risk and appropriate interventions in the flowsheet.   Outcome: Progressing Towards Goal  Note: Fall Risk Interventions:  Mobility Interventions: Patient to call before getting OOB         Medication Interventions: Assess postural VS orthostatic hypotension, Teach patient to arise slowly, Patient to call before getting OOB    Elimination Interventions: Bed/chair exit alarm              Problem: Patient Education: Go to Patient Education Activity  Goal: Patient/Family Education  Outcome: Progressing Towards Goal

## 2021-01-20 NOTE — PROGRESS NOTES
[facility] Hospitalist Group  Progress Note    Patient: Jarrell Castaneda Age: 82 y.o. : 1935 MR#: [id number] SSN: [id number]  Date/Time: 2021    Subjective:     Patient is sitting in bed in no apparent distress, awake, complains of cough. Plan    Assessment/Plan:     1. Covid-19  2. Generalized weakness  3. Chronic diastolic CHF-compensated  4. Hypertension  5. Hx CAD  6. Hyperlipidemia  7. T2DM  8. Hypothyroidism  9.  Obesity     Plan  Patient is not hypoxic  Continue empiric antibiotics and vitamin supplements  Continue aspirin, Plavix, Imdur  Lantus, sliding scale insulin, diabetic educator  Continue Lasix  Monitor labs   PT OT   palliative care consult  Discussed with patient, discussed with RN  I called patient's granddaughter at phone #[phone] and left a message    Case discussed with:  [x]Patient  []Family  [x]Nursing  [x]Case Management  DVT Prophylaxis:  [x]Lovenox  []Hep SQ  []SCDs  []Coumadin   []On Heparin gtt    Objective:   VS:   Visit Vitals  BP (!) 104/59 (BP 1 Location: Left arm, BP Patient Position: At rest)   Pulse 81   Temp 97.8 °F (36.6 °C)   Resp 19   Ht 5' 7\" (1.702 m)   Wt 112 kg (247 lb)   SpO2 93%   Breastfeeding No   BMI 38.69 kg/m²      Tmax/24hrs: Temp (24hrs), Av.4 °F (36.9 °C), Min:97.8 °F (36.6 °C), Max:98.8 °F (37.1 °C)    Input/Output: No intake or output data in the 24 hours ending 21    General:  Awake, alert  Cardiovascular:  S1S2+, RRR  Pulmonary:  CTA b/l  GI:  Soft, BS+, NT, ND  Extremities:  trace edema      Labs:    Recent Results (from the past 24 hour(s))   GLUCOSE, POC    Collection Time: 21  8:51 PM   Result Value Ref Range    Glucose (POC) 282 (H) 70 - 110 mg/dL   PROCALCITONIN    Collection Time: 21  9:12 PM   Result Value Ref Range    Procalcitonin <0.05 ng/mL   LD    Collection Time: 21  9:12 PM   Result Value Ref Range     (H) 81 - 234 U/L   C REACTIVE PROTEIN, QT    Collection Time: 01/18/21  9:12 PM   Result Value Ref Range    C-Reactive protein 6.0 (H) 0 - 0.3 mg/dL   D DIMER    Collection Time: 01/18/21  9:12 PM   Result Value Ref Range    D DIMER 1.48 (H) <0.46 ug/ml(FEU)   HEMOGLOBIN A1C WITH EAG    Collection Time: 01/18/21  9:12 PM   Result Value Ref Range    Hemoglobin A1c 8.1 (H) 4.2 - 5.6 %    Est. average glucose 186 mg/dL   CBC WITH AUTOMATED DIFF    Collection Time: 01/19/21  4:15 AM   Result Value Ref Range    WBC 2.4 (L) 4.6 - 13.2 K/uL    RBC 3.50 (L) 4.20 - 5.30 M/uL    HGB 10.9 (L) 12.0 - 16.0 g/dL    HCT 34.2 (L) 35.0 - 45.0 %    MCV 97.7 (H) 74.0 - 97.0 FL    MCH 31.1 24.0 - 34.0 PG    MCHC 31.9 31.0 - 37.0 g/dL    RDW 12.0 11.6 - 14.5 %    PLATELET 740 386 - 318 K/uL    MPV 11.6 9.2 - 11.8 FL    NEUTROPHILS 68 40 - 73 %    LYMPHOCYTES 26 21 - 52 %    MONOCYTES 6 3 - 10 %    EOSINOPHILS 0 0 - 5 %    BASOPHILS 0 0 - 2 %    ABS. NEUTROPHILS 1.7 (L) 1.8 - 8.0 K/UL    ABS. LYMPHOCYTES 0.6 (L) 0.9 - 3.6 K/UL    ABS. MONOCYTES 0.1 0.05 - 1.2 K/UL    ABS. EOSINOPHILS 0.0 0.0 - 0.4 K/UL    ABS.  BASOPHILS 0.0 0.0 - 0.1 K/UL    DF AUTOMATED     METABOLIC PANEL, BASIC    Collection Time: 01/19/21  4:15 AM   Result Value Ref Range    Sodium 137 136 - 145 mmol/L    Potassium 4.2 3.5 - 5.5 mmol/L    Chloride 105 100 - 111 mmol/L    CO2 25 21 - 32 mmol/L    Anion gap 7 3.0 - 18 mmol/L    Glucose 342 (H) 74 - 99 mg/dL    BUN 19 (H) 7.0 - 18 MG/DL    Creatinine 0.86 0.6 - 1.3 MG/DL    BUN/Creatinine ratio 22 (H) 12 - 20      GFR est AA >60 >60 ml/min/1.73m2    GFR est non-AA >60 >60 ml/min/1.73m2    Calcium 8.1 (L) 8.5 - 10.1 MG/DL   GLUCOSE, POC    Collection Time: 01/19/21  6:09 AM   Result Value Ref Range    Glucose (POC) 322 (H) 70 - 110 mg/dL   GLUCOSE, POC    Collection Time: 01/19/21  1:44 PM   Result Value Ref Range    Glucose (POC) 282 (H) 70 - 110 mg/dL   GLUCOSE, POC    Collection Time: 01/19/21  5:20 PM   Result Value Ref Range    Glucose (POC) 255 (H) 70 - 110 mg/dL Additional Data Reviewed:      Signed By: Ajith Sharma MD     January 19, 2021
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
## 2021-01-20 NOTE — DIABETES MGMT
Glycemic Control Plan of Care    BG controlled with lantus bid, corrective lispro   BG 98 mg/dl this morning  States she is feeling a little stronger today and may consider SNF at discharge prior to returning home. Will continue to monitor      Your A1C  was   Lab Results   Component Value Date/Time    Hemoglobin A1c 8.1 (H) 01/18/2021 09:12 PM    Hemoglobin A1c (POC) 8.5 01/17/2019 12:19 PM    Hemoglobin A1c, External 7.5 10/31/2019     Current hospital diabetes medications:  Lantus 15 units nightly  Corrective lispro, very insulin resistant, 4 times daily  TTD previous day = 65 units  30 units lantus  35 units lispro  Home diabetes medications:  Lantus 32 units AM  Lantus 36 units PM  Diet - DM Cons. Carb.1600 kcal. 2 GM     Zaid Beth MPH RN CDE  Pager [phone]

## 2021-01-20 NOTE — PROGRESS NOTES
UAI submitted for processing on this date with tracking #[id number]    Aki Poe, [address] [place] [address]  [phone]

## 2021-01-21 LAB
ALBUMIN SERPL-MCNC: 2.6 G/DL (ref 3.4–5)
ALBUMIN/GLOB SERPL: 0.5 {RATIO} (ref 0.8–1.7)
ALP SERPL-CCNC: 64 U/L (ref 45–117)
ALT SERPL-CCNC: 15 U/L (ref 13–56)
ANION GAP SERPL CALC-SCNC: 8 MMOL/L (ref 3–18)
AST SERPL-CCNC: 17 U/L (ref 10–38)
BASOPHILS # BLD: 0 K/UL (ref 0–0.1)
BASOPHILS NFR BLD: 0 % (ref 0–2)
BILIRUB SERPL-MCNC: 0.6 MG/DL (ref 0.2–1)
BUN SERPL-MCNC: 21 MG/DL (ref 7–18)
BUN/CREAT SERPL: 22 (ref 12–20)
CALCIUM SERPL-MCNC: 8.6 MG/DL (ref 8.5–10.1)
CHLORIDE SERPL-SCNC: 108 MMOL/L (ref 100–111)
CO2 SERPL-SCNC: 25 MMOL/L (ref 21–32)
CREAT SERPL-MCNC: 0.95 MG/DL (ref 0.6–1.3)
CRP SERPL-MCNC: 11.7 MG/DL (ref 0–0.3)
D DIMER PPP FEU-MCNC: 1.06 UG/ML(FEU)
DIFFERENTIAL METHOD BLD: ABNORMAL
EOSINOPHIL # BLD: 0 K/UL (ref 0–0.4)
EOSINOPHIL NFR BLD: 0 % (ref 0–5)
ERYTHROCYTE [DISTWIDTH] IN BLOOD BY AUTOMATED COUNT: 12.3 % (ref 11.6–14.5)
GLOBULIN SER CALC-MCNC: 5.3 G/DL (ref 2–4)
GLUCOSE BLD STRIP.AUTO-MCNC: 151 MG/DL (ref 70–110)
GLUCOSE BLD STRIP.AUTO-MCNC: 230 MG/DL (ref 70–110)
GLUCOSE BLD STRIP.AUTO-MCNC: 231 MG/DL (ref 70–110)
GLUCOSE BLD STRIP.AUTO-MCNC: 232 MG/DL (ref 70–110)
GLUCOSE SERPL-MCNC: 211 MG/DL (ref 74–99)
HCT VFR BLD AUTO: 32.7 % (ref 35–45)
HGB BLD-MCNC: 10.4 G/DL (ref 12–16)
LYMPHOCYTES # BLD: 0.8 K/UL (ref 0.9–3.6)
LYMPHOCYTES NFR BLD: 15 % (ref 21–52)
MAGNESIUM SERPL-MCNC: 2.2 MG/DL (ref 1.6–2.6)
MCH RBC QN AUTO: 31.1 PG (ref 24–34)
MCHC RBC AUTO-ENTMCNC: 31.8 G/DL (ref 31–37)
MCV RBC AUTO: 97.9 FL (ref 74–97)
MONOCYTES # BLD: 0.2 K/UL (ref 0.05–1.2)
MONOCYTES NFR BLD: 3 % (ref 3–10)
NEUTS SEG # BLD: 4.5 K/UL (ref 1.8–8)
NEUTS SEG NFR BLD: 82 % (ref 40–73)
PLATELET # BLD AUTO: 203 K/UL (ref 135–420)
PMV BLD AUTO: 11.7 FL (ref 9.2–11.8)
POTASSIUM SERPL-SCNC: 3.9 MMOL/L (ref 3.5–5.5)
PROCALCITONIN SERPL-MCNC: <0.05 NG/ML
PROT SERPL-MCNC: 7.9 G/DL (ref 6.4–8.2)
RBC # BLD AUTO: 3.34 M/UL (ref 4.2–5.3)
SODIUM SERPL-SCNC: 141 MMOL/L (ref 136–145)
WBC # BLD AUTO: 5.5 K/UL (ref 4.6–13.2)

## 2021-01-21 PROCEDURE — 74011250637 HC RX REV CODE- 250/637: Performed by: EMERGENCY MEDICINE

## 2021-01-21 PROCEDURE — 99232 SBSQ HOSP IP/OBS MODERATE 35: CPT | Performed by: EMERGENCY MEDICINE

## 2021-01-21 PROCEDURE — 74011000250 HC RX REV CODE- 250: Performed by: INTERNAL MEDICINE

## 2021-01-21 PROCEDURE — 94640 AIRWAY INHALATION TREATMENT: CPT

## 2021-01-21 PROCEDURE — 80053 COMPREHEN METABOLIC PANEL: CPT

## 2021-01-21 PROCEDURE — 2709999900 HC NON-CHARGEABLE SUPPLY

## 2021-01-21 PROCEDURE — 94761 N-INVAS EAR/PLS OXIMETRY MLT: CPT

## 2021-01-21 PROCEDURE — 97116 GAIT TRAINING THERAPY: CPT

## 2021-01-21 PROCEDURE — 36415 COLL VENOUS BLD VENIPUNCTURE: CPT

## 2021-01-21 PROCEDURE — 84145 PROCALCITONIN (PCT): CPT

## 2021-01-21 PROCEDURE — 86140 C-REACTIVE PROTEIN: CPT

## 2021-01-21 PROCEDURE — 97535 SELF CARE MNGMENT TRAINING: CPT

## 2021-01-21 PROCEDURE — 85379 FIBRIN DEGRADATION QUANT: CPT

## 2021-01-21 PROCEDURE — 74011250637 HC RX REV CODE- 250/637: Performed by: NURSE PRACTITIONER

## 2021-01-21 PROCEDURE — 65660000000 HC RM CCU STEPDOWN

## 2021-01-21 PROCEDURE — 74011636637 HC RX REV CODE- 636/637: Performed by: EMERGENCY MEDICINE

## 2021-01-21 PROCEDURE — 74011250637 HC RX REV CODE- 250/637: Performed by: STUDENT IN AN ORGANIZED HEALTH CARE EDUCATION/TRAINING PROGRAM

## 2021-01-21 PROCEDURE — 85025 COMPLETE CBC W/AUTO DIFF WBC: CPT

## 2021-01-21 PROCEDURE — 74011636637 HC RX REV CODE- 636/637: Performed by: NURSE PRACTITIONER

## 2021-01-21 PROCEDURE — 97530 THERAPEUTIC ACTIVITIES: CPT

## 2021-01-21 PROCEDURE — 74011250636 HC RX REV CODE- 250/636: Performed by: EMERGENCY MEDICINE

## 2021-01-21 PROCEDURE — 82962 GLUCOSE BLOOD TEST: CPT

## 2021-01-21 PROCEDURE — 74011000258 HC RX REV CODE- 258: Performed by: INTERNAL MEDICINE

## 2021-01-21 PROCEDURE — 83735 ASSAY OF MAGNESIUM: CPT

## 2021-01-21 RX ADMIN — Medication 10 ML: at 21:52

## 2021-01-21 RX ADMIN — Medication 10 ML: at 05:07

## 2021-01-21 RX ADMIN — ENOXAPARIN SODIUM 40 MG: 100 INJECTION SUBCUTANEOUS at 15:53

## 2021-01-21 RX ADMIN — METOPROLOL TARTRATE 25 MG: 25 TABLET, FILM COATED ORAL at 18:22

## 2021-01-21 RX ADMIN — LEVOTHYROXINE SODIUM 137 MCG: 25 TABLET ORAL at 05:07

## 2021-01-21 RX ADMIN — Medication 10 ML: at 15:58

## 2021-01-21 RX ADMIN — Medication 5 MG: at 21:52

## 2021-01-21 RX ADMIN — INSULIN LISPRO 3 UNITS: 100 INJECTION, SOLUTION INTRAVENOUS; SUBCUTANEOUS at 21:52

## 2021-01-21 RX ADMIN — INSULIN LISPRO 6 UNITS: 100 INJECTION, SOLUTION INTRAVENOUS; SUBCUTANEOUS at 10:02

## 2021-01-21 RX ADMIN — INSULIN LISPRO 6 UNITS: 100 INJECTION, SOLUTION INTRAVENOUS; SUBCUTANEOUS at 16:44

## 2021-01-21 RX ADMIN — ACETAMINOPHEN 1000 MG: 500 TABLET ORAL at 15:53

## 2021-01-21 RX ADMIN — DEXAMETHASONE SODIUM PHOSPHATE 6 MG: 4 INJECTION, SOLUTION INTRAMUSCULAR; INTRAVENOUS at 18:21

## 2021-01-21 RX ADMIN — INSULIN GLARGINE 15 UNITS: 100 INJECTION, SOLUTION SUBCUTANEOUS at 21:52

## 2021-01-21 RX ADMIN — ACETAMINOPHEN 1000 MG: 500 TABLET ORAL at 05:07

## 2021-01-21 RX ADMIN — REMDESIVIR 200 MG: 100 INJECTION, POWDER, LYOPHILIZED, FOR SOLUTION INTRAVENOUS at 00:30

## 2021-01-21 RX ADMIN — FAMOTIDINE 20 MG: 20 TABLET, FILM COATED ORAL at 10:08

## 2021-01-21 RX ADMIN — RANOLAZINE 500 MG: 500 TABLET, FILM COATED, EXTENDED RELEASE ORAL at 18:21

## 2021-01-21 RX ADMIN — FLUTICASONE PROPIONATE 1 PUFF: 110 AEROSOL, METERED RESPIRATORY (INHALATION) at 08:00

## 2021-01-21 RX ADMIN — ISOSORBIDE MONONITRATE 30 MG: 30 TABLET, EXTENDED RELEASE ORAL at 09:59

## 2021-01-21 RX ADMIN — AMLODIPINE BESYLATE 5 MG: 5 TABLET ORAL at 09:59

## 2021-01-21 RX ADMIN — FLUTICASONE PROPIONATE 1 PUFF: 110 AEROSOL, METERED RESPIRATORY (INHALATION) at 20:58

## 2021-01-21 RX ADMIN — CLOPIDOGREL BISULFATE 75 MG: 75 TABLET ORAL at 10:00

## 2021-01-21 RX ADMIN — INSULIN GLARGINE 15 UNITS: 100 INJECTION, SOLUTION SUBCUTANEOUS at 10:01

## 2021-01-21 RX ADMIN — METOPROLOL TARTRATE 25 MG: 25 TABLET, FILM COATED ORAL at 13:29

## 2021-01-21 RX ADMIN — INSULIN LISPRO 6 UNITS: 100 INJECTION, SOLUTION INTRAVENOUS; SUBCUTANEOUS at 12:10
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
## 2021-01-21 NOTE — DIABETES MGMT
GLYCEMIC CONTROL PLAN OF CARE     Assessment/Recommendations:  Lab glucose this am 211 mg/dl  Recommend increasing lantus dose to 18 units bid  Continue corrective insulin coverage as needed  Already receiving very insulin resistant coverage. Will continue inpatient monitoring. Most recent blood glucose values:    Results for Zeyad Thapa (MRN [id number]) as of 1/21/2021 15:58   Ref. Range 1/20/2021 12:26 1/20/2021 16:47 1/20/2021 20:49 1/21/2021 05:56 1/21/2021 11:38   GLUCOSE,FAST - POC Latest Ref Range: 70 - 110 mg/dL 111 (H) 111 (H) 116 (H) 231 (H) 230 (H)     Current A1C of 8.1 % is equivalent to average blood glucose of 186 mg/dl over the past 2-3 months.     Current hospital diabetes medications:   lantus 15 units bid  Lispro corrective insulin coverage AC&HS  Previous day's insulin requirements:   lantus 30 units  Lispro 0 units corrective insulin  Home diabetes medications:  Lantus 32 units AM  Lantus 36 units PM  Diet:    DIET DIABETIC WITH OPTIONS Consistent Carb 1500-1600kcal; Regular; 2 GM NA (House Low NA)  Education:  _x__Refer to Diabetes Education Record (1/19/2021)            ____Education not indicated at this time      Mary Cruz, [address] [facility] CDE  Ext [phone]

## 2021-01-21 NOTE — PROGRESS NOTES
conducted an initial consultation and Spiritual Assessment for Alessia Smoker, who is a 82 y.o.,female. Patient's Primary Language is: [place].    According to the patient's EMR Evangelical Affiliation is: [facility].     The reason the Patient came to the hospital is:   Patient Active Problem List    Diagnosis Date Noted    Goals of care, counseling/discussion     COVID-19     Debility     Tachycardia, paroxysmal ([address] 01/18/2021    Dyspnea 01/18/2021    Generalized weakness 01/18/2021    CHF (congestive heart failure) ([address] 01/08/2021    Type 2 diabetes with nephropathy ([address] 01/13/2020    Tinnitus of both ears 12/04/2019    Hypertensive urgency 06/24/2019    Tachycardia 06/24/2019    Tachypnea 06/24/2019    Right-sided chest pain 05/16/2019    Urinary tract infection with hematuria 05/07/2019    Frequent urination 05/07/2019    Bad odor of urine 05/07/2019    Lower abdominal pain 04/23/2019    Intermittent lightheadedness 04/23/2019    Deep vein thrombosis (DVT) of popliteal vein of left lower extremity ([address] 09/08/2018    Preoperative cardiovascular examination 06/11/2018    Periprosthetic left distal femur fracture 06/10/2018    Callie-prosthetic femur fracture at tip of prosthesis 06/10/2018    Right groin pain 06/16/2017    BMI 38.0-38.9,adult 06/07/2017    Bilateral lower extremity edema 04/25/2017    Chest pain 02/06/2017    Advanced care planning/counseling discussion 08/17/2016    S/P drug eluting coronary stent placement 06/10/2016    NSTEMI (non-ST elevated myocardial infarction) ([address] 05/28/2016    Moderate persistent asthma with status asthmaticus 22/61/4731    Uncomplicated asthma 64/90/4532    Tear of left rotator cuff 03/08/2016    Medication monitoring encounter 01/12/2015    Seasonal and perennial allergic rhinitis 07/30/2013    Morbid obesity ([address]     Unspecified transient cerebral ischemia     Congestive heart failure (HCC)     Mixed hyperlipidemia  Coronary atherosclerosis of native coronary artery     Chronic diastolic heart failure (HCC)     Type 2 diabetes mellitus with hyperglycemia, with long-term current use of insulin ([facility] 12/04/2012    Hypothyroidism     Leg pain, right 09/05/2012    Diabetic neuropathy ([facility] 04/20/2012    Dizziness 04/20/2012    Chronic neck pain 04/20/2012    Chronic back pain 04/20/2012    DJD (degenerative joint disease)     S/P joint replacement     Noncompliance with medication regimen 02/08/2011    Arm pain 02/08/2011    Neck pain 02/08/2011    Anxiety 02/08/2011    Hypertensive heart disease with heart failure ([facility] 05/20/2010    Unspecified hypothyroidism 05/20/2010    Hypovitaminosis D 05/20/2010    Asthma 05/20/2010    Esophageal reflux 05/20/2010        The  provided the following Interventions:  Initiated a relationship of care and support. Explored issues of vinod, belief. Listened empathetically. Provided chaplaincy education. Offered prayer and assurance of continued prayers on patient's behalf. Chart reviewed. The following outcomes where achieved:  Patient shared limited information about both their medical narrative and spiritual beliefs. Patient marianela through her vinod in [id number]. Patient processed feeling about current hospitalization. Patient expressed gratitude for 's visit. Assessment:  Patient does not have any known Orthodoxy/cultural needs that will affect patient's preferences in health care. There are no known spiritual or Orthodoxy issues which require intervention at this time. Plan:  Chaplains will continue to follow and will provide pastoral care as needed and as requested.  recommends bedside caregivers page the  on duty if patient shows signs of spiritual or emotional distress. Ronni. Bari, Jocy Gandhi.  [address]   [phone]

## 2021-01-21 NOTE — PROGRESS NOTES
Problem: Mobility Impaired (Adult and Pediatric)  Goal: *Acute Goals and Plan of Care (Insert Text)  Description: Physical Therapy Goals  Initiated 1/19/2021 and to be accomplished within 7 day(s)  1. Patient will move from supine to sit and sit to supine  in bed with modified independence. 2.  Patient will transfer from bed to chair and chair to bed with modified independence using the least restrictive device. 3.  Patient will perform sit to stand with modified independence. 4.  Patient will ambulate with modified independence for 100 feet with the least restrictive device. PLOF: ambulates household distances with RW, also has w/c; aides 8 hours/day     Outcome: Progressing Towards Goal    PHYSICAL THERAPY TREATMENT    Patient: Medina Tavarez (89 y.o. female)  Date: 1/21/2021  Diagnosis: Dyspnea [R06.00]  Generalized weakness [R53.1]  Tachycardia, paroxysmal (HCC) [I47.9] <principal problem not specified>       Precautions: Fall, Contact(droplet plus)  PLOF: see above     ASSESSMENT:  Pt cleared to participate in PT session, pt received semi-reclined in bed and agreeable to PT session. Completing with OT to maximize safety and mobility. Pt completing supine to sit with SBA. Pt sitting on EOB with good balance. Pt becoming upset with assist from therapists, reporting her mother never needed help and neither would she. Pt standing to RW with SBA, ambulating to bathroom with BSC placed over toilet to raise seat. Pt performing hygiene tasks with supervision. Pt reporting fatigue with increased work of breathing but SPO2 WNL throughout. Pt then ambulating with CGA to recliner x10 feet. Pt again reporting fatigue and SOB. Pt positioned for comfort in recliner with LEs elevated due to decreased L knee flexion. Pt left with all needs met and call bell in reach. RN notified of position and participation.       Progression toward goals:   []      Improving appropriately and progressing toward goals  [x] Improving slowly and progressing toward goals  []      Not making progress toward goals and plan of care will be adjusted     PLAN:  Patient continues to benefit from skilled intervention to address the above impairments. Continue treatment per established plan of care. Discharge Recommendations:  Home Health with family support  Further Equipment Recommendations for Discharge:  rolling walker     SUBJECTIVE:   Patient stated I don't need anyhelp.     OBJECTIVE DATA SUMMARY:   Critical Behavior:  Neurologic State: Alert  Orientation Level: Oriented X4  Cognition: Follows commands  Safety/Judgement: Fall prevention, Decreased awareness of need for safety, Decreased awareness of need for assistance  Functional Mobility Training:  Bed Mobility:     Supine to Sit: Stand-by assistance     Scooting: Stand-by assistance    Transfers:  Sit to Stand: Stand-by assistance  Stand to Sit: Stand-by assistance    Balance:  Sitting: Intact  Sitting - Static: Good (unsupported)  Sitting - Dynamic: Fair (occasional); Good (unsupported)  Standing: Impaired; With support  Standing - Static: Fair  Standing - Dynamic : Fair      Posture:   Posture (WDL): Within defined limits     Ambulation/Gait Training:  Distance (ft): 10 Feet (ft)(x2 reps )  Assistive Device: Walker, rolling  Ambulation - Level of Assistance: Contact guard assistance    Gait Abnormalities: Decreased step clearance    Base of Support: Center of gravity altered; Widened     Speed/Nano: Slow      Pain:  Pain level pre-treatment: 0/10  Pain level post-treatment: 0/10     Activity Tolerance:   Fair activity tolerance, maintaining SPO2 on room air but reporting fatigue with limited mobility this session. Please refer to the flowsheet for vital signs taken during this treatment.   After treatment:   [x] Patient left in no apparent distress sitting up in chair  [] Patient left in no apparent distress in bed  [x] Call bell left within reach  [x] Nursing notified  [] Caregiver present  [] Bed alarm activated  [] SCDs applied      COMMUNICATION/EDUCATION:   [x]         Role of Physical Therapy in the acute care setting. [x]         Fall prevention education was provided and the patient/caregiver indicated understanding. [x]         Patient/family have participated as able in working toward goals and plan of care. [x]         Patient/family agree to work toward stated goals and plan of care. []         Patient understands intent and goals of therapy, but is neutral about his/her participation.   []         Patient is unable to participate in stated goals/plan of care: ongoing with therapy staff.  []         Other:        Dana Ferro, PT   Time Calculation: 29 mins

## 2021-01-21 NOTE — PROGRESS NOTES
Problem: Self Care Deficits Care Plan (Adult)  Goal: *Acute Goals and Plan of Care (Insert Text)  Description: Occupational Therapy Goals  Initiated 1/19/2021 within 7 day(s). 1.  Patient will perform grooming task standing for 4-8 minutes with modified independence, f+ balance. 2.  Patient will perform upper body dressing with independence. 3.  Patient will perform lower body dressing with modified independence seated and standing. 4.  Patient will perform toilet transfers with supervision/set-up. 5.  Patient will perform all aspects of toileting with modified independence. 6.  Patient will participate in upper extremity therapeutic exercise/activities with independence for 8-10 minutes to increase BUE ROM/strength for self-care. 7.  Patient will utilize energy conservation techniques during functional activities with verbal cues. Prior Level of Function: Patient was modified independent with self-care and used a RW for functional mobility PTA. Patient reports having care aids 7 days a week for 8 hours and all adaptive bathing/dressing aids from previous visit. Outcome: Progressing Towards Goal   OCCUPATIONAL THERAPY TREATMENT    Patient: Paul Vitale (37 y.o. female)  Date: 1/21/2021  Diagnosis: Dyspnea [R06.00]  Generalized weakness [R53.1]  Tachycardia, paroxysmal (HCC) [I47.9] <principal problem not specified>       Precautions: Fall, Contact(droplet plus)  PLOF: Patient was modified independent with self-care and used a RW for functional mobility PTA. Patient reports having care aids 7 days a week for 8 hours and all adaptive bathing/dressing aids from previous visit. Chart, occupational therapy assessment, plan of care, and goals were reviewed. ASSESSMENT:  Pt cleared by RN for OT tx at this time. PT co tx to maximize pt safety and participation. Pt presented long sitting in bed upon therapist arrival and agreeable for participation.  Pt performed LB dressing task donning socks with leg cross technique while long sitting in bed. Pt maneuvered to EOB  with SBA requiring increased time utilizing SR. STS transfer CGA/SBA with RW and performed functional room and bathroom mobility ~ 15 ft for increased strength and endurance needed for self cares. Pt performed simple grooming task while sitting EOB requiring S/U. During grooming task pt with increased fatigued with notable SOB. Pt educated on importance of pursed lip breathing technique for optimal oxygenation. Pt performed STS transfer from toilet CGA with vc's for proper hand placement and returned self to reclining chair an additional 15 ft with RW. Vitals assessed and WNL on RA. Pt left sitting upright in reclining chair, RN present, B LE's elevated, and all needs left within reach. Progression toward goals:  []          Improving appropriately and progressing toward goals  [x]          Improving slowly and progressing toward goals  []          Not making progress toward goals and plan of care will be adjusted     PLAN:  Patient continues to benefit from skilled intervention to address the above impairments. Continue treatment per established plan of care. Discharge Recommendations:  SNF   Further Equipment Recommendations for Discharge:  N/A      SUBJECTIVE:   Patient stated  I am ready to go home. \"     OBJECTIVE DATA SUMMARY:   Cognitive/Behavioral Status:  Neurologic State: Alert  Orientation Level: Oriented X4  Cognition: Follows commands  Safety/Judgement: Fall prevention, Decreased awareness of need for safety, Decreased awareness of need for assistance    Functional Mobility and Transfers for ADLs:   Bed Mobility:     Supine to Sit: Stand-by assistance     Scooting: Stand-by assistance   Transfers:  Sit to Stand: Stand-by assistance;Contact guard assistance  Stand to Sit: Stand-by assistance         Toilet Transfer : Contact guard assistance           Bathroom Mobility: Contact guard assistance        Balance:  Sitting: Intact  Sitting - Static: Good (unsupported)  Sitting - Dynamic: Fair (occasional)(+)  Standing: With support  Standing - Static: Fair(+)  Standing - Dynamic : Fair    ADL Intervention:       Grooming  Grooming Assistance: Set-up  Position Performed: Other (comment)(seated on toilet)  Washing Face: Set-up  Washing Hands: Set-up    Lower Body Dressing Assistance  Socks: Supervision  Leg Crossed Method Used: Yes  Position Performed: Long sitting on bed  Cues: Verbal cues provided      Pain:  Pain level pre-treatment: 0/10   Pain level post-treatment: 0/10  Pain Intervention(s): Medication (see MAR); Rest,  Repositioning   Response to intervention: Nurse notified, See doc flow    Activity Tolerance:    Fair   Please refer to the flowsheet for vital signs taken during this treatment. After treatment:   [x]  Patient left in no apparent distress sitting up in chair  []  Patient left in no apparent distress in bed  [x]  Call bell left within reach  [x]  Nursing notified  [x]  RN present  []  Bed alarm activated    COMMUNICATION/EDUCATION:   [x] Role of Occupational Therapy in the acute care setting  [] Home safety education was provided and the patient/caregiver indicated understanding. [x] Patient/family have participated as able in working towards goals and plan of care. [x] Patient/family agree to work toward stated goals and plan of care. [] Patient understands intent and goals of therapy, but is neutral about his/her participation. [] Patient is unable to participate in goal setting and plan of care.       Thank you for this referral.  KEISHA Dinero  Time Calculation: 29 mins

## 2021-01-21 NOTE — PROGRESS NOTES
Palliative Medicine    Goals of care defined. POST form completed and denotes DDNR status, limited medical interventions (NO intubation under any circumstances, ok with CPAP/BiPAP, avoid ICU if possible) and NO feeding tubes. Will sign off. Please reconsult team as needed/if appropriate. Pt is DNR/DNI. Thank you for the Palliative Medicine consult and allowing us to participate in the care of Ms. Anna Bryan.       Amador De La Paz RN, BSN  Palliative Medicine Inpatient RN  DR. [facility]  Palliative [facility] Line: [phone] ([phone])

## 2021-01-21 NOTE — ROUTINE PROCESS
Bedside and Verbal shift change report given to Nina (oncoming nurse) by Candance Mathieu (offgoing nurse). Report included the following information SBAR.

## 2021-01-21 NOTE — PROGRESS NOTES
Belkys Lara [phone] admissions at [facility]. Facility will accept patient tomorrow 1/22/21.   Updated clinicals uploaded into [place], RN BSN  Care Manager  [phone]

## 2021-01-21 NOTE — PROGRESS NOTES
[facility] Hospitalist Group  Progress Note    Patient: Jarrell Castaneda Age: 82 y.o. : 1935 MR#: [id number] SSN: [id number]  Date/Time: 2021    Subjective:     Patient is sitting in bed in no apparent distress, feels a little better today. Has some dyspnea on exertion    Assessment/Plan:     1. Covid-19  2. Generalized weakness  3. Chronic diastolic CHF-compensated  4. Hypertension  5. Hx CAD  6. Hyperlipidemia  7. T2DM  8. Hypothyroidism  9. Obesity     Plan  Continue dexamethasone, monitor oxygen saturations  Discussed with ID  Continue empiric antibiotics and vitamin supplements  Continue aspirin and Plavix  Lantus, sliding scale insulin  Continue Lasix  Monitor labs and Covid related markers  PT OT   palliative care input noted.   Patient is DNR  Discussed with patient, discussed with , discussed with RN  Disposition-likely SNF soon    Case discussed with:  [x]Patient  []Family  [x]Nursing  [x]Case Management  DVT Prophylaxis:  [x]Lovenox  []Hep SQ  []SCDs  []Coumadin   []On Heparin gtt    Objective:   VS:   Visit Vitals  /72 (BP 1 Location: Left arm)   Pulse 92   Temp 98.4 °F (36.9 °C)   Resp 20   Ht 5' 7\" (1.702 m)   Wt 110.7 kg (244 lb)   SpO2 95%   Breastfeeding No   BMI 38.22 kg/m²      Tmax/24hrs: Temp (24hrs), Av.5 °F (36.9 °C), Min:97.7 °F (36.5 °C), Max:100.9 °F (38.3 °C)    Input/Output: No intake or output data in the 24 hours ending 21 1743    General:  Awake, alert  Cardiovascular:  S1S2+, RRR  Pulmonary: Coarse breath sounds bilaterally  GI:  Soft, BS+, NT, ND  Extremities: Trace edema        Labs:    Recent Results (from the past 24 hour(s))   C REACTIVE PROTEIN, QT    Collection Time: 21  7:55 PM   Result Value Ref Range    C-Reactive protein 8.7 (H) 0 - 0.3 mg/dL   D DIMER    Collection Time: 21  7:55 PM   Result Value Ref Range    D DIMER 0.90 (H) <0.46 ug/ml(FEU)   PROCALCITONIN    Collection Time: 21  7:55 PM   Result Value Ref Range    Procalcitonin <0.05 ng/mL   GLUCOSE, POC    Collection Time: 01/20/21  8:49 PM   Result Value Ref Range    Glucose (POC) 116 (H) 70 - 110 mg/dL   CBC WITH AUTOMATED DIFF    Collection Time: 01/21/21  4:30 AM   Result Value Ref Range    WBC 5.5 4.6 - 13.2 K/uL    RBC 3.34 (L) 4.20 - 5.30 M/uL    HGB 10.4 (L) 12.0 - 16.0 g/dL    HCT 32.7 (L) 35.0 - 45.0 %    MCV 97.9 (H) 74.0 - 97.0 FL    MCH 31.1 24.0 - 34.0 PG    MCHC 31.8 31.0 - 37.0 g/dL    RDW 12.3 11.6 - 14.5 %    PLATELET 407 486 - 597 K/uL    MPV 11.7 9.2 - 11.8 FL    NEUTROPHILS 82 (H) 40 - 73 %    LYMPHOCYTES 15 (L) 21 - 52 %    MONOCYTES 3 3 - 10 %    EOSINOPHILS 0 0 - 5 %    BASOPHILS 0 0 - 2 %    ABS. NEUTROPHILS 4.5 1.8 - 8.0 K/UL    ABS. LYMPHOCYTES 0.8 (L) 0.9 - 3.6 K/UL    ABS. MONOCYTES 0.2 0.05 - 1.2 K/UL    ABS. EOSINOPHILS 0.0 0.0 - 0.4 K/UL    ABS.  BASOPHILS 0.0 0.0 - 0.1 K/UL    DF AUTOMATED     MAGNESIUM    Collection Time: 01/21/21  4:30 AM   Result Value Ref Range    Magnesium 2.2 1.6 - 2.6 mg/dL   C REACTIVE PROTEIN, QT    Collection Time: 01/21/21  4:30 AM   Result Value Ref Range    C-Reactive protein 11.7 (H) 0 - 0.3 mg/dL   D DIMER    Collection Time: 01/21/21  4:30 AM   Result Value Ref Range    D DIMER 1.06 (H) <0.46 ug/ml(FEU)   PROCALCITONIN    Collection Time: 01/21/21  4:30 AM   Result Value Ref Range    Procalcitonin <3.12 ng/mL   METABOLIC PANEL, COMPREHENSIVE    Collection Time: 01/21/21  4:30 AM   Result Value Ref Range    Sodium 141 136 - 145 mmol/L    Potassium 3.9 3.5 - 5.5 mmol/L    Chloride 108 100 - 111 mmol/L    CO2 25 21 - 32 mmol/L    Anion gap 8 3.0 - 18 mmol/L    Glucose 211 (H) 74 - 99 mg/dL    BUN 21 (H) 7.0 - 18 MG/DL    Creatinine 0.95 0.6 - 1.3 MG/DL    BUN/Creatinine ratio 22 (H) 12 - 20      GFR est AA >60 >60 ml/min/1.73m2    GFR est non-AA 56 (L) >60 ml/min/1.73m2    Calcium 8.6 8.5 - 10.1 MG/DL    Bilirubin, total 0.6 0.2 - 1.0 MG/DL    ALT (SGPT) 15 13 - 56 U/L    AST (SGOT) 17 10 - 38 U/L    Alk.  phosphatase 64 45 - 117 U/L    Protein, total 7.9 6.4 - 8.2 g/dL    Albumin 2.6 (L) 3.4 - 5.0 g/dL    Globulin 5.3 (H) 2.0 - 4.0 g/dL    A-G Ratio 0.5 (L) 0.8 - 1.7     GLUCOSE, POC    Collection Time: 01/21/21  5:56 AM   Result Value Ref Range    Glucose (POC) 231 (H) 70 - 110 mg/dL   GLUCOSE, POC    Collection Time: 01/21/21 11:38 AM   Result Value Ref Range    Glucose (POC) 230 (H) 70 - 110 mg/dL   GLUCOSE, POC    Collection Time: 01/21/21  4:10 PM   Result Value Ref Range    Glucose (POC) 232 (H) 70 - 110 mg/dL     Additional Data Reviewed:      Signed By: Michael Addison MD     January 21, 2021

## 2021-01-21 NOTE — PROGRESS NOTES
Problem: Falls - Risk of  Goal: *Absence of Falls  Description: Document [place] Fall Risk and appropriate interventions in the flowsheet. Outcome: Progressing Towards Goal  Note: Fall Risk Interventions:  Mobility Interventions: Assess mobility with egress test, Patient to call before getting OOB         Medication Interventions: Assess postural VS orthostatic hypotension, Patient to call before getting OOB, Teach patient to arise slowly    Elimination Interventions: Call light in reach              Problem: Diabetes Self-Management  Goal: *Disease process and treatment process  Description: Define diabetes and identify own type of diabetes; list 3 options for treating diabetes. Outcome: Progressing Towards Goal  Goal: *Incorporating nutritional management into lifestyle  Description: Describe effect of type, amount and timing of food on blood glucose; list 3 methods for planning meals. Outcome: Progressing Towards Goal  Goal: *Incorporating physical activity into lifestyle  Description: State effect of exercise on blood glucose levels. Outcome: Progressing Towards Goal  Goal: *Developing strategies to promote health/change behavior  Description: Define the ABC's of diabetes; identify appropriate screenings, schedule and personal plan for screenings. Outcome: Progressing Towards Goal  Goal: *Using medications safely  Description: State effect of diabetes medications on diabetes; name diabetes medication taking, action and side effects.   Outcome: Progressing Towards Goal

## 2021-01-22 LAB
ALBUMIN SERPL-MCNC: 2.6 G/DL (ref 3.4–5)
ALBUMIN/GLOB SERPL: 0.5 {RATIO} (ref 0.8–1.7)
ALP SERPL-CCNC: 63 U/L (ref 45–117)
ALT SERPL-CCNC: 15 U/L (ref 13–56)
ANION GAP SERPL CALC-SCNC: 6 MMOL/L (ref 3–18)
AST SERPL-CCNC: 16 U/L (ref 10–38)
BASOPHILS # BLD: 0 K/UL (ref 0–0.1)
BASOPHILS NFR BLD: 0 % (ref 0–2)
BILIRUB SERPL-MCNC: 0.7 MG/DL (ref 0.2–1)
BUN SERPL-MCNC: 25 MG/DL (ref 7–18)
BUN/CREAT SERPL: 31 (ref 12–20)
CALCIUM SERPL-MCNC: 8.6 MG/DL (ref 8.5–10.1)
CHLORIDE SERPL-SCNC: 109 MMOL/L (ref 100–111)
CO2 SERPL-SCNC: 26 MMOL/L (ref 21–32)
CREAT SERPL-MCNC: 0.81 MG/DL (ref 0.6–1.3)
CRP SERPL-MCNC: 8.2 MG/DL (ref 0–0.3)
D DIMER PPP FEU-MCNC: 1.04 UG/ML(FEU)
DIFFERENTIAL METHOD BLD: ABNORMAL
EOSINOPHIL # BLD: 0 K/UL (ref 0–0.4)
EOSINOPHIL NFR BLD: 0 % (ref 0–5)
ERYTHROCYTE [DISTWIDTH] IN BLOOD BY AUTOMATED COUNT: 12.2 % (ref 11.6–14.5)
GLOBULIN SER CALC-MCNC: 5.2 G/DL (ref 2–4)
GLUCOSE BLD STRIP.AUTO-MCNC: 147 MG/DL (ref 70–110)
GLUCOSE BLD STRIP.AUTO-MCNC: 184 MG/DL (ref 70–110)
GLUCOSE BLD STRIP.AUTO-MCNC: 265 MG/DL (ref 70–110)
GLUCOSE BLD STRIP.AUTO-MCNC: 298 MG/DL (ref 70–110)
GLUCOSE SERPL-MCNC: 210 MG/DL (ref 74–99)
HCT VFR BLD AUTO: 33.1 % (ref 35–45)
HGB BLD-MCNC: 10.6 G/DL (ref 12–16)
LYMPHOCYTES # BLD: 0.8 K/UL (ref 0.9–3.6)
LYMPHOCYTES NFR BLD: 18 % (ref 21–52)
MAGNESIUM SERPL-MCNC: 2.3 MG/DL (ref 1.6–2.6)
MCH RBC QN AUTO: 30.9 PG (ref 24–34)
MCHC RBC AUTO-ENTMCNC: 32 G/DL (ref 31–37)
MCV RBC AUTO: 96.5 FL (ref 74–97)
MONOCYTES # BLD: 0.3 K/UL (ref 0.05–1.2)
MONOCYTES NFR BLD: 7 % (ref 3–10)
NEUTS SEG # BLD: 3.1 K/UL (ref 1.8–8)
NEUTS SEG NFR BLD: 75 % (ref 40–73)
PLATELET # BLD AUTO: 236 K/UL (ref 135–420)
PMV BLD AUTO: 11.6 FL (ref 9.2–11.8)
POTASSIUM SERPL-SCNC: 3.9 MMOL/L (ref 3.5–5.5)
PROCALCITONIN SERPL-MCNC: <0.05 NG/ML
PROT SERPL-MCNC: 7.8 G/DL (ref 6.4–8.2)
RBC # BLD AUTO: 3.43 M/UL (ref 4.2–5.3)
SODIUM SERPL-SCNC: 141 MMOL/L (ref 136–145)
WBC # BLD AUTO: 4.2 K/UL (ref 4.6–13.2)

## 2021-01-22 PROCEDURE — 74011250636 HC RX REV CODE- 250/636: Performed by: NURSE PRACTITIONER

## 2021-01-22 PROCEDURE — 74011250637 HC RX REV CODE- 250/637: Performed by: EMERGENCY MEDICINE

## 2021-01-22 PROCEDURE — 86140 C-REACTIVE PROTEIN: CPT

## 2021-01-22 PROCEDURE — 74011250637 HC RX REV CODE- 250/637: Performed by: STUDENT IN AN ORGANIZED HEALTH CARE EDUCATION/TRAINING PROGRAM

## 2021-01-22 PROCEDURE — 85379 FIBRIN DEGRADATION QUANT: CPT

## 2021-01-22 PROCEDURE — 36415 COLL VENOUS BLD VENIPUNCTURE: CPT

## 2021-01-22 PROCEDURE — 94640 AIRWAY INHALATION TREATMENT: CPT

## 2021-01-22 PROCEDURE — 74011636637 HC RX REV CODE- 636/637: Performed by: EMERGENCY MEDICINE

## 2021-01-22 PROCEDURE — 94761 N-INVAS EAR/PLS OXIMETRY MLT: CPT

## 2021-01-22 PROCEDURE — 83735 ASSAY OF MAGNESIUM: CPT

## 2021-01-22 PROCEDURE — 74011250637 HC RX REV CODE- 250/637: Performed by: NURSE PRACTITIONER

## 2021-01-22 PROCEDURE — 2709999900 HC NON-CHARGEABLE SUPPLY

## 2021-01-22 PROCEDURE — 65660000000 HC RM CCU STEPDOWN

## 2021-01-22 PROCEDURE — 84145 PROCALCITONIN (PCT): CPT

## 2021-01-22 PROCEDURE — 85025 COMPLETE CBC W/AUTO DIFF WBC: CPT

## 2021-01-22 PROCEDURE — 99239 HOSP IP/OBS DSCHRG MGMT >30: CPT | Performed by: EMERGENCY MEDICINE

## 2021-01-22 PROCEDURE — 82962 GLUCOSE BLOOD TEST: CPT

## 2021-01-22 PROCEDURE — 80053 COMPREHEN METABOLIC PANEL: CPT

## 2021-01-22 RX ORDER — INSULIN GLARGINE 100 [IU]/ML
18 INJECTION, SOLUTION SUBCUTANEOUS 2 TIMES DAILY
Qty: 1 PEN | Refills: 0 | Status: SHIPPED
Start: 2021-01-22 | End: 2022-09-10

## 2021-01-22 RX ORDER — ENOXAPARIN SODIUM 100 MG/ML
40 INJECTION SUBCUTANEOUS EVERY 24 HOURS
Qty: 10 SYRINGE | Refills: 0 | Status: SHIPPED
Start: 2021-01-22 | End: 2021-02-01

## 2021-01-22 RX ORDER — INSULIN GLARGINE 100 [IU]/ML
18 INJECTION, SOLUTION SUBCUTANEOUS DAILY
Status: DISCONTINUED | OUTPATIENT
Start: 2021-01-23 | End: 2021-01-26 | Stop reason: HOSPADM

## 2021-01-22 RX ORDER — CHOLECALCIFEROL (VITAMIN D3) 125 MCG
5 CAPSULE ORAL
Qty: 15 TAB | Refills: 0 | Status: SHIPPED
Start: 2021-01-22 | End: 2021-08-26

## 2021-01-22 RX ORDER — AMLODIPINE BESYLATE 5 MG/1
5 TABLET ORAL DAILY
Qty: 30 TAB | Refills: 0 | Status: SHIPPED
Start: 2021-01-23 | End: 2021-04-09

## 2021-01-22 RX ORDER — FAMOTIDINE 20 MG/1
20 TABLET, FILM COATED ORAL DAILY
Qty: 14 TAB | Refills: 0 | Status: SHIPPED
Start: 2021-01-23 | End: 2021-02-06

## 2021-01-22 RX ORDER — INSULIN LISPRO 100 [IU]/ML
INJECTION, SOLUTION INTRAVENOUS; SUBCUTANEOUS
Qty: 1 VIAL | Refills: 0 | Status: SHIPPED
Start: 2021-01-22 | End: 2021-08-26

## 2021-01-22 RX ORDER — ACETAMINOPHEN 325 MG/1
650 TABLET ORAL
Qty: 20 TAB | Refills: 0 | Status: SHIPPED
Start: 2021-01-22 | End: 2021-08-12 | Stop reason: ALTCHOICE

## 2021-01-22 RX ORDER — POLYETHYLENE GLYCOL 3350 17 G/17G
17 POWDER, FOR SOLUTION ORAL
Qty: 15 PACKET | Refills: 0 | Status: SHIPPED
Start: 2021-01-22

## 2021-01-22 RX ORDER — DEXAMETHASONE 6 MG/1
6 TABLET ORAL
Qty: 8 TAB | Refills: 0 | Status: SHIPPED
Start: 2021-01-22 | End: 2021-01-25 | Stop reason: SDUPTHER

## 2021-01-22 RX ORDER — ZINC SULFATE 50(220)MG
220 CAPSULE ORAL DAILY
Qty: 14 CAP | Refills: 0 | Status: SHIPPED
Start: 2021-01-23 | End: 2021-02-06

## 2021-01-22 RX ORDER — INSULIN GLARGINE 100 [IU]/ML
18 INJECTION, SOLUTION SUBCUTANEOUS
Status: DISCONTINUED | OUTPATIENT
Start: 2021-01-22 | End: 2021-01-26 | Stop reason: HOSPADM

## 2021-01-22 RX ADMIN — FLUTICASONE PROPIONATE 1 PUFF: 110 AEROSOL, METERED RESPIRATORY (INHALATION) at 08:00

## 2021-01-22 RX ADMIN — FAMOTIDINE 20 MG: 20 TABLET, FILM COATED ORAL at 10:12

## 2021-01-22 RX ADMIN — INSULIN LISPRO 9 UNITS: 100 INJECTION, SOLUTION INTRAVENOUS; SUBCUTANEOUS at 16:13

## 2021-01-22 RX ADMIN — INSULIN GLARGINE 15 UNITS: 100 INJECTION, SOLUTION SUBCUTANEOUS at 08:06

## 2021-01-22 RX ADMIN — MONTELUKAST 10 MG: 10 TABLET, FILM COATED ORAL at 10:12

## 2021-01-22 RX ADMIN — FUROSEMIDE 20 MG: 20 TABLET ORAL at 10:12

## 2021-01-22 RX ADMIN — INSULIN LISPRO 9 UNITS: 100 INJECTION, SOLUTION INTRAVENOUS; SUBCUTANEOUS at 12:02

## 2021-01-22 RX ADMIN — METOPROLOL TARTRATE 25 MG: 25 TABLET, FILM COATED ORAL at 18:28

## 2021-01-22 RX ADMIN — CLOPIDOGREL BISULFATE 75 MG: 75 TABLET ORAL at 10:12

## 2021-01-22 RX ADMIN — Medication 10 ML: at 22:32

## 2021-01-22 RX ADMIN — FLUTICASONE PROPIONATE 1 PUFF: 110 AEROSOL, METERED RESPIRATORY (INHALATION) at 20:00

## 2021-01-22 RX ADMIN — Medication 10 ML: at 06:35

## 2021-01-22 RX ADMIN — PROMETHAZINE HYDROCHLORIDE 12.5 MG: 25 TABLET ORAL at 22:30

## 2021-01-22 RX ADMIN — Medication 5 MG: at 22:30

## 2021-01-22 RX ADMIN — ACETAMINOPHEN 1000 MG: 500 TABLET ORAL at 08:07

## 2021-01-22 RX ADMIN — INSULIN LISPRO 3 UNITS: 100 INJECTION, SOLUTION INTRAVENOUS; SUBCUTANEOUS at 07:50

## 2021-01-22 RX ADMIN — LEVOTHYROXINE SODIUM 137 MCG: 25 TABLET ORAL at 06:35

## 2021-01-22 RX ADMIN — ONDANSETRON 4 MG: 2 INJECTION INTRAMUSCULAR; INTRAVENOUS at 12:06

## 2021-01-22 RX ADMIN — INSULIN GLARGINE 18 UNITS: 100 INJECTION, SOLUTION SUBCUTANEOUS at 22:31

## 2021-01-22 RX ADMIN — Medication 10 ML: at 15:54

## 2021-01-22 RX ADMIN — METOPROLOL TARTRATE 25 MG: 25 TABLET, FILM COATED ORAL at 10:12

## 2021-01-22 NOTE — PROGRESS NOTES
Physician Progress Note      PATIENT:               Dolores Campos  CSN #:                  [id number]  :                       1935  ADMIT DATE:       2021 6:08 PM  [address] [place] DATE:  RESPONDING  PROVIDER #:        Sarah Farley MD          QUERY TEXT:    Pt admitted with COVID and noted to have repeat chest xray  with bilat airspace opacities mildly progressed and decreasing O2 sats. Please document in progress notes and discharge summary if you are evaluating or treating any of the following: The medical record reflects the following:  Risk Factors: 82 y.o. COVID (+) female with recent hospitalization for angina d/c'd home on  with home health  Clinical Indicators: Per  PN: Oxygen saturations have been trending lower with now patient saturating at 90% on room air. Per  chest xray - Bilateral airspace opacities are noted these are mildly progressed. Lungs clear upon admit per H&P. Treatment: remdesivir and dexamethasone and place the patient on 2 L of oxygen. Thank you,  Mar Roach RN, [facility]  [phone]  Options provided:  -- Pneumonia due to COVID-19  -- Acute bronchitis due to COVID-19  -- Acute lower respiratory infection due to COVID-19  -- Other - I will add my own diagnosis  -- Disagree - Not applicable / Not valid  -- Disagree - Clinically unable to determine / Unknown  -- Refer to Clinical Documentation Reviewer    PROVIDER RESPONSE TEXT:    This patient has pneumonia due to COVID-19.     Query created by: Vicente Crisostomo on 2021 8:35 AM      Electronically signed by:  Sarah Farley MD 2021 7:51 AM

## 2021-01-22 NOTE — PROGRESS NOTES
Infectious Disease progress Note        Reason: confirmed covid-19 pneumonia    Current abx Prior abx   Ceftriaxone, azithromycin since 1/18-1/21 Levofloxacin  1/17     Lines:       Assessment :    68-year-old lady with history of diastolic heart failure, hypertension, asthma, uncontrolled type 2 diabetes-last hemoglobin A1c 8.1 on 1/18/2021 presented to [facility] ED on 1/17 with cough, fever, fatigue, SOB. Positive COVID-19 test 1/17/2021, 1/18/2021    Clinical presentation consistent with sepsis evolving since admission due to confirmed COVID-19 infection, pneumonia    High-grade fevers on 1/20/2021 likely secondary to COVID-19 infection. Procalcitonin less than 0.05 argues against bacterial infection. Worsening hypoxia on 1/20/2021-likely due to COVID-19 pneumonia. Status post remdesivir on 1/20/2021, status post Decadron with quick clinical improvement. Subjective sense of improvement. Appeared comfortable on room air when I examined her today. No documented hypoxia on ambulation or rest.  Decreasing CRP. Procalcitonin remains low. Recommendations:    1. Continue Decadron -switch to p.o.   2.  Discharge planning per primary team    Above plan was discussed in details with patient,and dr Rockney Meckel. Please call me if any further questions or concerns. Will continue to participate in the care of this patient. HPI:    When I saw the patient, she appeared comfortable on room air. Her oxygen saturation was around 96 to 98% on room air. She states that she feels ok. She still has some shortness of breath when she walks. She denies any chest pain, abdominal pain, diarrhea, nausea or vomiting.        home Medication List    Details   doxycycline (MONODOX) 100 mg capsule Take 1 Cap by mouth two (2) times a day for 7 days. Qty: 14 Cap, Refills: 0      levoFLOXacin (Levaquin) 500 mg tablet Take 1 Tab by mouth daily for 7 days.   Qty: 7 Tab, Refills: 0       Details   aspirin 81 mg chewable tablet Take 1 Tab by mouth daily for 30 days. Qty: 30 Tab, Refills: 0      furosemide (LASIX) 20 mg tablet Take 1 Tab by mouth daily for 30 days. Qty: 30 Tab, Refills: 0      levothyroxine (Synthroid) 137 mcg tablet Take 137 mcg by mouth Daily (before breakfast). Indications: a condition with low thyroid hormone levels  Qty: 30 Tab, Refills: 5      cholecalciferol (Vitamin D3) (1000 Units /25 mcg) tablet Take 1 Tab by mouth two (2) times a day. Qty: 60 Tab, Refills: 0      amLODIPine (NORVASC) 10 mg tablet Take 1 Tab by mouth daily. Qty: 30 Tab, Refills: 0      spironolactone (ALDACTONE) 25 mg tablet Take 1 Tab by mouth daily. Qty: 30 Tab, Refills: 0      Lantus Solostar U-100 Insulin 100 unit/mL (3 mL) inpn 15 Units by SubCUTAneous route two (2) times a day. Qty: 1 Pen, Refills: 0      clopidogreL (PLAVIX) 75 mg tab TAKE 1 TABLET BY MOUTH DAILY  Qty: 30 Tab, Refills: 2    Associated Diagnoses: S/P coronary artery stent placement      metoprolol tartrate (LOPRESSOR) 25 mg tablet TAKE 1 TABLET BY MOUTH EVERY 12 HOURS  Qty: 60 Tab, Refills: 5      isosorbide mononitrate ER (IMDUR) 30 mg tablet TAKE 1 TABLET BY MOUTH EVERY MORNING  Qty: 90 Tab, Refills: 3    Comments: **Patient requests 90 days supply**  Associated Diagnoses: Other forms of angina pectoris (HCC)      albuterol (PROVENTIL HFA, VENTOLIN HFA, PROAIR HFA) 90 mcg/actuation inhaler INHALE 1 PUFF BY MOUTH EVERY 4 HOURS AS NEEDED FOR WHEEZING OR SHORTNESS OF BREATH  Qty: 18 g, Refills: 12    Associated Diagnoses: Moderate intermittent asthma, with acute exacerbation      Flovent Diskus 100 mcg/actuation dsdv INHALE 1 PUFF BY MOUTH TWICE DAILY  Qty: 1 Inhaler, Refills: 5      multivitamin with minerals (MULTIVITAMIN & MINERAL FORMULA PO) Take 1 Tab by mouth daily. ranolazine ER (RANEXA) 500 mg SR tablet Take 1 Tab by mouth two (2) times a day.   Qty: 180 Tab, Refills: 6    Associated Diagnoses: Chest pain, unspecified type      montelukast (SINGULAIR) 10 mg tablet Take 1 Tab by mouth daily. Indications: inflammation of the nose due to an allergy  Qty: 90 Tab, Refills: 4    Associated Diagnoses: Mild intermittent asthma without complication; Allergic rhinitis, unspecified seasonality, unspecified trigger      lidocaine (ASPERCREME, LIDOCAINE,) 4 % patch 1 Patch by TransDERmal route every eight (8) hours. Qty: 1 Package, Refills: 3    Associated Diagnoses: Chronic pain of left knee      RONNELL PEN NEEDLE 32 gauge x 5/32\" ndle Refills: 4      ascorbic acid, vitamin C, (VITAMIN C) 250 mg tablet Take 250 mg by mouth daily. 1 pill po daily  Indications: inadequate vitamin C      acetaminophen (TYLENOL ARTHRITIS PAIN) 650 mg TbER Take 650 mg by mouth every eight (8) hours. 1 pill po q 8 hours prn pain, fever  Indications: fever, pain      cyanocobalamin ER 1,000 mcg tablet Take 1 Tab by mouth daily. Qty: 30 Tab, Refills: 3    Associated Diagnoses: Weakness; Macrocytosis      capsaicin 0.075 % topical cream Apply  to affected area three (3) times daily. Qty: 60 g, Refills: 0      DOCOSAHEXANOIC ACID/EPA (FISH OIL PO) Take 1,000 mg by mouth two (2) times a day. 1 pill po twice daily       nitroglycerin (NITROSTAT) 0.4 mg SL tablet 1 Tab by SubLINGual route every five (5) minutes as needed for Chest Pain. Up to 3 doses.   Qty: 20 Tab, Refills: 0          Current Facility-Administered Medications   Medication Dose Route Frequency    famotidine (PEPCID) tablet 20 mg  20 mg Oral DAILY    zinc sulfate (ZINCATE) 220 (50) mg capsule 1 Cap  1 Cap Oral DAILY    cholecalciferol (VITAMIN D3) (1000 Units /25 mcg) tablet 2 Tab  2,000 Units Oral DAILY    ascorbic acid (vitamin C) (VITAMIN C) tablet 250 mg  250 mg Oral BID    dexamethasone (DECADRON) 4 mg/mL injection 6 mg  6 mg IntraVENous Q24H    acetaminophen (TYLENOL) tablet 1,000 mg  1,000 mg Oral Q6H PRN    Or    acetaminophen (TYLENOL) suppository 650 mg  650 mg Rectal Q6H PRN    insulin glargine (LANTUS) injection 15 Units  15 Units SubCUTAneous DAILY    enoxaparin (LOVENOX) injection 40 mg  40 mg SubCUTAneous Q24H    melatonin tablet 5 mg  5 mg Oral QHS    sodium chloride (NS) flush 5-40 mL  5-40 mL IntraVENous Q8H    sodium chloride (NS) flush 5-40 mL  5-40 mL IntraVENous PRN    polyethylene glycol (MIRALAX) packet 17 g  17 g Oral DAILY PRN    promethazine (PHENERGAN) tablet 12.5 mg  12.5 mg Oral Q6H PRN    Or    ondansetron (ZOFRAN) injection 4 mg  4 mg IntraVENous Q6H PRN    insulin lispro (HUMALOG) injection   SubCUTAneous AC&HS    glucose chewable tablet 16 g  4 Tab Oral PRN    glucagon (GLUCAGEN) injection 1 mg  1 mg IntraMUSCular PRN    dextrose (D50W) injection syrg 12.5-25 g  25-50 mL IntraVENous PRN    amLODIPine (NORVASC) tablet 5 mg  5 mg Oral DAILY    aspirin chewable tablet 81 mg  81 mg Oral DAILY    clopidogreL (PLAVIX) tablet 75 mg  75 mg Oral DAILY    cyanocobalamin tablet 1,000 mcg  1,000 mcg Oral DAILY    fluticasone (FLOVENT HFA) 110 mcg inhaler  1 Puff Inhalation BID RT    furosemide (LASIX) tablet 20 mg  20 mg Oral DAILY    isosorbide mononitrate ER (IMDUR) tablet 30 mg  30 mg Oral DAILY    insulin glargine (LANTUS) injection 15 Units  15 Units SubCUTAneous QHS    levothyroxine (SYNTHROID) tablet 137 mcg  137 mcg Oral 6am    metoprolol tartrate (LOPRESSOR) tablet 25 mg  25 mg Oral BID    montelukast (SINGULAIR) tablet 10 mg  10 mg Oral DAILY    ranolazine ER (RANEXA) tablet 500 mg  500 mg Oral BID    spironolactone (ALDACTONE) tablet 25 mg  25 mg Oral DAILY    albuterol (PROVENTIL HFA, VENTOLIN HFA, PROAIR HFA) inhaler 2 Puff  2 Puff Inhalation Q4H PRN       Allergies: Niacin, Morphine, Ace inhibitors, Avapro [irbesartan], Bystolic [nebivolol], Catapres [clonidine], Codeine, Cozaar [losartan], Crestor [rosuvastatin], Darvocet a500 [propoxyphene n-acetaminophen], Diovan [valsartan], Flagyl [metronidazole], Gabapentin, Iodinated contrast media, Iodine, Keflex [cephalexin], Lescol [fluvastatin], Lipitor [atorvastatin], Lovastatin, Nexium [esomeprazole magnesium], Pravachol [pravastatin], Reglan [metoclopramide], Trazodone, Zetia [ezetimibe], and Zocor [simvastatin]    Temp (24hrs), Av.9 °F (36.6 °C), Min:97.4 °F (36.3 °C), Max:98.4 °F (36.9 °C)    Visit Vitals  BP (!) 145/85   Pulse 90   Temp 97.9 °F (36.6 °C)   Resp 22   Ht 5' 7\" (1.702 m)   Wt 110.7 kg (244 lb)   SpO2 94%   Breastfeeding No   BMI 38.22 kg/m²       ROS: 12 point ROS obtained in details. Pertinent positives as mentioned in HPI,   otherwise negative    Physical Exam:    General:  Alert, cooperative, no distress, appears stated age. Head: Normocephalic, without obvious abnormality, atraumatic. Eyes:  Conjunctivae/corneas clear. , EOMs intact. Nose: Nares normal. No drainage or sinus tenderness. Neck: Supple, symmetrical, trachea midline, no JVD. Lungs:    Bilateral chest movements equal, auscultation deferred due to Covid positive   Heart:  Regular rate and rhythm on monitor     Abdomen: Soft, non-tender. Bowel sounds normal.    Extremities: Extremities normal, atraumatic, no cyanosis or edema. Pulses: 2+ and symmetric all extremities. Skin:  No rashes or lesions   Neurologic: AAOx3, No focal motor or sensory deficit. Labs: Results:   Chemistry Recent Labs     21  0537 21  0430 21  0515   * 211* 86    141 142   K 3.9 3.9 3.9    108 109   CO2 26 25 26   BUN 25* 21* 19*   CREA 0.81 0.95 0.83   CA 8.6 8.6 8.3*   AGAP 6 8 7   BUCR 31* 22* 23*   AP 63 64  --    TP 7.8 7.9  --    ALB 2.6* 2.6*  --    GLOB 5.2* 5.3*  --    AGRAT 0.5* 0.5*  --       CBC w/Diff Recent Labs     21  0537 21  0430 21  0515   WBC 4.2* 5.5 7.3   RBC 3.43* 3.34* 3.46*   HGB 10.6* 10.4* 11.1*   HCT 33.1* 32.7* 34.2*    203 170   GRANS 75* 82* 77*   LYMPH 18* 15* 16*   EOS 0 0 0      Microbiology No results for input(s): CULT in the last 72 hours.        RADIOLOGY:    All available imaging studies/reports in [facility] for this admission were reviewed    Dr. Angie Watters, Infectious Disease Specialist  [phone]  January 22, 2021  9:56 AM

## 2021-01-22 NOTE — PROGRESS NOTES
Problem: Falls - Risk of  Goal: *Absence of Falls  Description: Document Brian Carmichael Fall Risk and appropriate interventions in the flowsheet. Outcome: Progressing Towards Goal  Note: Fall Risk Interventions:  Mobility Interventions: Assess mobility with egress test, Patient to call before getting OOB         Medication Interventions: Teach patient to arise slowly, Patient to call before getting OOB    Elimination Interventions: Bed/chair exit alarm, Call light in reach              Problem: Patient Education: Go to Patient Education Activity  Goal: Patient/Family Education  Outcome: Progressing Towards Goal     Problem: Diabetes Self-Management  Goal: *Disease process and treatment process  Description: Define diabetes and identify own type of diabetes; list 3 options for treating diabetes. Outcome: Progressing Towards Goal  Goal: *Incorporating nutritional management into lifestyle  Description: Describe effect of type, amount and timing of food on blood glucose; list 3 methods for planning meals. Outcome: Progressing Towards Goal  Goal: *Incorporating physical activity into lifestyle  Description: State effect of exercise on blood glucose levels. Outcome: Progressing Towards Goal  Goal: *Developing strategies to promote health/change behavior  Description: Define the ABC's of diabetes; identify appropriate screenings, schedule and personal plan for screenings. Outcome: Progressing Towards Goal  Goal: *Using medications safely  Description: State effect of diabetes medications on diabetes; name diabetes medication taking, action and side effects.   Outcome: Progressing Towards Goal

## 2021-01-22 NOTE — PROGRESS NOTES
Discussed with ID. ID recommends to ambulate patient and check oxygen saturations. If oxygen saturations remain greater than 90% with ambulation we will discharge the patient to skilled nursing facility on a course of dexamethasone. If oxygen saturations drop below 90 with ambulation ID recommends to continue patient in the hospital and then ID will restart remdesivir. I have discussed with the charge nurse to check oxygen saturations ASAP and let me know.

## 2021-01-22 NOTE — ROUTINE PROCESS
Walk test: Sitting in chair on room air: 93%  Ambulated from chair to room entrance door X 1 on room air: 91%, requested to sit before she ambulates again, breathing labored but in no immediate distress. Back to chair, restin%.  Room air

## 2021-01-22 NOTE — PROGRESS NOTES
Called Jessica at [facility] to inform of transport time of 4:30pm with [facility]. Courtney Randle stated that many patients are now positive and had to move patients to the covid unit. Unable to accomodate patient, no female beds. Called Roberta at [facility], no covid beds. Called Michel at [facility], no covid beds. Called Tressa at [facility], no covid beds. RN and Dr Brodie Carranza informed. Called [facility] to cancel transport.      Marylen Presume, RN BSN  Care Manager  [phone]

## 2021-01-22 NOTE — ROUTINE PROCESS
Bedside and Verbal shift change report given to Nina (oncoming nurse) by Armida Boeck (offgoing nurse). Report included the following information SBAR.

## 2021-01-22 NOTE — DIABETES MGMT
Glycemic Control Plan of Care    Recommend increasing Lantus to 18 units bid - order obtained     Covid infection   Receiving decadron 6 mg daily  Lab , 210 mg/dl previous 2 days  Currently receiving lantus 15 units bid with corrective lispro      Your A1C  was   Lab Results   Component Value Date/Time    Hemoglobin A1c 8.1 (H) 01/18/2021 09:12 PM    Hemoglobin A1c (POC) 8.5 01/17/2019 12:19 PM    Hemoglobin A1c, External 7.5 10/31/2019     Current hospital diabetes medications:   lantus 18 units bid   Corrective lispro, very insulin resistant, 4 times daily   TDD previous day = 51 units   lantus 30 units  Lispro 21 units   Home diabetes medications:  Lantus 32 units AM  Lantus 36 units PM  Diet:    DIET DIABETIC WITH OPTIONS Consistent Carb 1500-1600kcal; Regular; 2 GM NA (House Low NA)    Zaid Beth MPH RN CDE  Pager [phone]

## 2021-01-22 NOTE — DISCHARGE SUMMARY
[address] [place] [address] Dr Rigoberto Alfred [facility], [address]     DISCHARGE SUMMARY    Name: Hemant Rodriguez MRN: [id number]   Age / Sex: 82 y.o. / female CSN: [id number]   YOB: 1935 Length of Stay: 4 days   Admit Date: 1/17/2021 Discharge Date:        PRIMARY CARE PHYSICIAN: Monet Page MD      DISCHARGE DIAGNOSES:    1. Covid-19 infection and pneumonia  2. Generalized weakness  3. Chronic diastolic CHF-compensated  4. Hypertension  5. Hx CAD  6. Hyperlipidemia  7. T2DM  8. Hypothyroidism  9. Obesity     Patient is DNR and DNI. Patient has a post form      CONSULTS CALLED: ID, palliative care      PROCEDURES DONE: None      COURSE IN THE HOSPITAL: This is a 24-year-old female who presented to the ED with complaints of generalized weakness. Patient was evaluated and was noted to have fevers. Rapid Covid testing came positive. Patient was admitted. Initially patient was not hypoxic. Subsequently patient was noted to have downtrend in her oxygen saturations. Patient was started on remdesivir and dexamethasone. Initially patient was started on antibiotics but these were discontinued by ID. Oxygen saturations were monitored. Patient is afebrile today. Patient is not hypoxic and is comfortable on room air. PT OT has seen the patient and recommended SNF. Case management was involved and skilled nursing facility was arranged. I have discussed with ID and she has cleared the patient for discharge to complete a 10-day course of dexamethasone. Discharge plans have been discussed with the patient. I have also discussed with the patient's granddaughter over the phone at #[phone]. I have also discussed with the . I also discussed with the nurse . Please note that patient has had some labile blood pressures and her blood pressure regimen may need further titration. I will defer this to the physician taking care of the patient at the facility.       MEDICATIONS ON DISCHARGE:    Current Discharge Medication List      START taking these medications    Details   acetaminophen (TYLENOL) 325 mg tablet Take 2 Tabs by mouth every six (6) hours as needed for Pain or Fever. Qty: 20 Tab, Refills: 0      enoxaparin (LOVENOX) 40 mg/0.4 mL 0.4 mL by SubCUTAneous route every twenty-four (24) hours for 10 days. Indications: deep vein thrombosis prevention  Qty: 10 Syringe, Refills: 0      famotidine (PEPCID) 20 mg tablet Take 1 Tab by mouth daily for 14 days. Qty: 14 Tab, Refills: 0      insulin lispro (HUMALOG) 100 unit/mL injection Check FSBS AC*HS  For sugars between 160 and 200- Give 2 units SQ,  For sugars between 201 and 250, Give 3 units SQ,  For sugars Between 251 and 300, give 5 units SQ,  For Sugars between 301 and 350, give 7 units SQ  For sugars between 351 and 400, give 8 units SQ and contact PCP  Qty: 1 Vial, Refills: 0      melatonin 5 mg tablet Take 1 Tab by mouth nightly as needed for Other (As Needed for insomnia). Qty: 15 Tab, Refills: 0      polyethylene glycol (MIRALAX) 17 gram packet Take 1 Packet by mouth daily as needed for Constipation. Qty: 15 Packet, Refills: 0      zinc sulfate (ZINCATE) 220 (50) mg capsule Take 1 Cap by mouth daily for 14 days. Qty: 14 Cap, Refills: 0      dexAMETHasone (Decadron) 6 mg tablet Take 1 Tab by mouth daily (after dinner) for 8 days. Qty: 8 Tab, Refills: 0         CONTINUE these medications which have CHANGED    Details   amLODIPine (NORVASC) 5 mg tablet Take 1 Tab by mouth daily. Qty: 30 Tab, Refills: 0      Lantus Solostar U-100 Insulin 100 unit/mL (3 mL) inpn 18 Units by SubCUTAneous route two (2) times a day. Qty: 1 Pen, Refills: 0         CONTINUE these medications which have NOT CHANGED    Details   aspirin 81 mg chewable tablet Take 1 Tab by mouth daily for 30 days. Qty: 30 Tab, Refills: 0      furosemide (LASIX) 20 mg tablet Take 1 Tab by mouth daily for 30 days.   Qty: 30 Tab, Refills: 0      levothyroxine (Synthroid) 137 mcg tablet Take 137 mcg by mouth Daily (before breakfast). Indications: a condition with low thyroid hormone levels  Qty: 30 Tab, Refills: 5      cholecalciferol (Vitamin D3) (1000 Units /25 mcg) tablet Take 1 Tab by mouth two (2) times a day. Qty: 60 Tab, Refills: 0      spironolactone (ALDACTONE) 25 mg tablet Take 1 Tab by mouth daily. Qty: 30 Tab, Refills: 0      clopidogreL (PLAVIX) 75 mg tab TAKE 1 TABLET BY MOUTH DAILY  Qty: 30 Tab, Refills: 2    Associated Diagnoses: S/P coronary artery stent placement      metoprolol tartrate (LOPRESSOR) 25 mg tablet TAKE 1 TABLET BY MOUTH EVERY 12 HOURS  Qty: 60 Tab, Refills: 5      isosorbide mononitrate ER (IMDUR) 30 mg tablet TAKE 1 TABLET BY MOUTH EVERY MORNING  Qty: 90 Tab, Refills: 3    Comments: **Patient requests 90 days supply**  Associated Diagnoses: Other forms of angina pectoris (HCC)      albuterol (PROVENTIL HFA, VENTOLIN HFA, PROAIR HFA) 90 mcg/actuation inhaler INHALE 1 PUFF BY MOUTH EVERY 4 HOURS AS NEEDED FOR WHEEZING OR SHORTNESS OF BREATH  Qty: 18 g, Refills: 12    Associated Diagnoses: Moderate intermittent asthma, with acute exacerbation      Flovent Diskus 100 mcg/actuation dsdv INHALE 1 PUFF BY MOUTH TWICE DAILY  Qty: 1 Inhaler, Refills: 5      multivitamin with minerals (MULTIVITAMIN & MINERAL FORMULA PO) Take 1 Tab by mouth daily. ranolazine ER (RANEXA) 500 mg SR tablet Take 1 Tab by mouth two (2) times a day. Qty: 180 Tab, Refills: 6    Associated Diagnoses: Chest pain, unspecified type      montelukast (SINGULAIR) 10 mg tablet Take 1 Tab by mouth daily. Indications: inflammation of the nose due to an allergy  Qty: 90 Tab, Refills: 4    Associated Diagnoses: Mild intermittent asthma without complication; Allergic rhinitis, unspecified seasonality, unspecified trigger      lidocaine (ASPERCREME, LIDOCAINE,) 4 % patch 1 Patch by TransDERmal route every eight (8) hours.   Qty: 1 Package, Refills: 3    Associated Diagnoses: Chronic pain of left knee      RONNELL PEN NEEDLE 32 gauge x 5/32\" ndle Refills: 4      ascorbic acid, vitamin C, (VITAMIN C) 250 mg tablet Take 250 mg by mouth daily. 1 pill po daily  Indications: inadequate vitamin C      cyanocobalamin ER 1,000 mcg tablet Take 1 Tab by mouth daily. Qty: 30 Tab, Refills: 3    Associated Diagnoses: Weakness; Macrocytosis      DOCOSAHEXANOIC ACID/EPA (FISH OIL PO) Take 1,000 mg by mouth two (2) times a day. 1 pill po twice daily       nitroglycerin (NITROSTAT) 0.4 mg SL tablet 1 Tab by SubLINGual route every five (5) minutes as needed for Chest Pain. Up to 3 doses. Qty: 20 Tab, Refills: 0         STOP taking these medications       ciprofloxacin HCl (CIPRO) 500 mg tablet Comments:   Reason for Stopping:         amoxicillin-clavulanate (Augmentin) 875-125 mg per tablet Comments:   Reason for Stopping:         Saccharomyces boulardii (Florastor) 250 mg capsule Comments:   Reason for Stopping:         acetaminophen (TYLENOL ARTHRITIS PAIN) 650 mg TbER Comments:   Reason for Stopping:         capsaicin 0.075 % topical cream Comments:   Reason for Stopping:                   DISCHARGE PHYSICAL EXAMINATION:  General:  Awake, alert  Cardiovascular:  S1S2+, RRR  Pulmonary: Coarse breath sounds bilaterally  GI:  Soft, BS+, NT, ND  Extremities:  trace edema      CONDITION ON DISCHARGE: Stable.       DISPOSITION:       FOLLOW-UP RECOMMENDATIONS:   Follow-up Information     Follow up With Specialties Details Why Contact Info    David Carrillo MD Family Medicine Call in 1 day for recheck of current symptoms, for blood pressure recheck and control [address] [facility]  [phone] [address] [facility] EMERGENCY DEPT Emergency Medicine  As needed, If symptoms worsen [address] [facility]  [phone]          OTHER INSTRUCTIONS:  Diet- Cardiac, Diabetic  Glucerna , 1 can PO BID  Activity - as tolerated  FALL PRECAUTIONS  ASPIRATION PRECAUTIONS  DECUBITUS PRECAUTIONS  Droplet plus precautions  Incentive Spirometry Q30 minutes when awake  PT, OT Evaluate and Treat  Check CBC, CMP, Mg, CRP in 2 days, results to supervising physician at facility immediately  Notify supervising physician at facility immediately if patient has no bowel movement for 2 consecutive days  F/u with PCP in 1 week  Follow-up with cardiologist in 4 weeks        TIME SPENT ON DISCHARGE ACTIVITIES: More than 35 minutes. Dragon medical dictation software was used for portions of this report. Unintended errors may occur.       Signed:  José Miguel Lopez MD      1/22/2021

## 2021-01-23 LAB
ALBUMIN SERPL-MCNC: 2.5 G/DL (ref 3.4–5)
ALBUMIN/GLOB SERPL: 0.5 {RATIO} (ref 0.8–1.7)
ALP SERPL-CCNC: 61 U/L (ref 45–117)
ALT SERPL-CCNC: 16 U/L (ref 13–56)
ANION GAP SERPL CALC-SCNC: 7 MMOL/L (ref 3–18)
AST SERPL-CCNC: 17 U/L (ref 10–38)
BASOPHILS # BLD: 0 K/UL (ref 0–0.1)
BASOPHILS NFR BLD: 0 % (ref 0–2)
BILIRUB SERPL-MCNC: 0.6 MG/DL (ref 0.2–1)
BUN SERPL-MCNC: 21 MG/DL (ref 7–18)
BUN/CREAT SERPL: 27 (ref 12–20)
CALCIUM SERPL-MCNC: 9 MG/DL (ref 8.5–10.1)
CHLORIDE SERPL-SCNC: 108 MMOL/L (ref 100–111)
CO2 SERPL-SCNC: 27 MMOL/L (ref 21–32)
CREAT SERPL-MCNC: 0.77 MG/DL (ref 0.6–1.3)
CRP SERPL-MCNC: 5.6 MG/DL (ref 0–0.3)
D DIMER PPP FEU-MCNC: 1.28 UG/ML(FEU)
DIFFERENTIAL METHOD BLD: ABNORMAL
EOSINOPHIL # BLD: 0 K/UL (ref 0–0.4)
EOSINOPHIL NFR BLD: 0 % (ref 0–5)
ERYTHROCYTE [DISTWIDTH] IN BLOOD BY AUTOMATED COUNT: 12.2 % (ref 11.6–14.5)
GLOBULIN SER CALC-MCNC: 5.4 G/DL (ref 2–4)
GLUCOSE BLD STRIP.AUTO-MCNC: 118 MG/DL (ref 70–110)
GLUCOSE BLD STRIP.AUTO-MCNC: 124 MG/DL (ref 70–110)
GLUCOSE BLD STRIP.AUTO-MCNC: 147 MG/DL (ref 70–110)
GLUCOSE BLD STRIP.AUTO-MCNC: 98 MG/DL (ref 70–110)
GLUCOSE SERPL-MCNC: 134 MG/DL (ref 74–99)
HCT VFR BLD AUTO: 33.6 % (ref 35–45)
HGB BLD-MCNC: 10.6 G/DL (ref 12–16)
LYMPHOCYTES # BLD: 1 K/UL (ref 0.9–3.6)
LYMPHOCYTES NFR BLD: 21 % (ref 21–52)
MCH RBC QN AUTO: 30.3 PG (ref 24–34)
MCHC RBC AUTO-ENTMCNC: 31.5 G/DL (ref 31–37)
MCV RBC AUTO: 96 FL (ref 74–97)
MONOCYTES # BLD: 0.5 K/UL (ref 0.05–1.2)
MONOCYTES NFR BLD: 10 % (ref 3–10)
NEUTS SEG # BLD: 3.5 K/UL (ref 1.8–8)
NEUTS SEG NFR BLD: 69 % (ref 40–73)
PLATELET # BLD AUTO: 271 K/UL (ref 135–420)
PMV BLD AUTO: 11.4 FL (ref 9.2–11.8)
POTASSIUM SERPL-SCNC: 3.7 MMOL/L (ref 3.5–5.5)
PROCALCITONIN SERPL-MCNC: <0.05 NG/ML
PROT SERPL-MCNC: 7.9 G/DL (ref 6.4–8.2)
RBC # BLD AUTO: 3.5 M/UL (ref 4.2–5.3)
SODIUM SERPL-SCNC: 142 MMOL/L (ref 136–145)
WBC # BLD AUTO: 5 K/UL (ref 4.6–13.2)

## 2021-01-23 PROCEDURE — 85025 COMPLETE CBC W/AUTO DIFF WBC: CPT

## 2021-01-23 PROCEDURE — 74011636637 HC RX REV CODE- 636/637: Performed by: EMERGENCY MEDICINE

## 2021-01-23 PROCEDURE — 82962 GLUCOSE BLOOD TEST: CPT

## 2021-01-23 PROCEDURE — 80053 COMPREHEN METABOLIC PANEL: CPT

## 2021-01-23 PROCEDURE — 74011250636 HC RX REV CODE- 250/636: Performed by: INTERNAL MEDICINE

## 2021-01-23 PROCEDURE — 85379 FIBRIN DEGRADATION QUANT: CPT

## 2021-01-23 PROCEDURE — 74011250637 HC RX REV CODE- 250/637: Performed by: EMERGENCY MEDICINE

## 2021-01-23 PROCEDURE — 94640 AIRWAY INHALATION TREATMENT: CPT

## 2021-01-23 PROCEDURE — 99232 SBSQ HOSP IP/OBS MODERATE 35: CPT | Performed by: EMERGENCY MEDICINE

## 2021-01-23 PROCEDURE — 84145 PROCALCITONIN (PCT): CPT

## 2021-01-23 PROCEDURE — 74011250636 HC RX REV CODE- 250/636: Performed by: EMERGENCY MEDICINE

## 2021-01-23 PROCEDURE — 74011250637 HC RX REV CODE- 250/637: Performed by: NURSE PRACTITIONER

## 2021-01-23 PROCEDURE — 65660000000 HC RM CCU STEPDOWN

## 2021-01-23 PROCEDURE — 86140 C-REACTIVE PROTEIN: CPT

## 2021-01-23 PROCEDURE — 36415 COLL VENOUS BLD VENIPUNCTURE: CPT

## 2021-01-23 RX ADMIN — CLOPIDOGREL BISULFATE 75 MG: 75 TABLET ORAL at 09:39

## 2021-01-23 RX ADMIN — AMLODIPINE BESYLATE 5 MG: 5 TABLET ORAL at 09:39

## 2021-01-23 RX ADMIN — Medication 5 MG: at 22:51

## 2021-01-23 RX ADMIN — ASPIRIN 81 MG: 81 TABLET, CHEWABLE ORAL at 09:39

## 2021-01-23 RX ADMIN — LEVOTHYROXINE SODIUM 137 MCG: 25 TABLET ORAL at 05:12

## 2021-01-23 RX ADMIN — Medication 10 ML: at 05:12

## 2021-01-23 RX ADMIN — RANOLAZINE 500 MG: 500 TABLET, FILM COATED, EXTENDED RELEASE ORAL at 09:39

## 2021-01-23 RX ADMIN — SPIRONOLACTONE 25 MG: 25 TABLET ORAL at 09:39

## 2021-01-23 RX ADMIN — INSULIN GLARGINE 18 UNITS: 100 INJECTION, SOLUTION SUBCUTANEOUS at 22:51

## 2021-01-23 RX ADMIN — FLUTICASONE PROPIONATE 1 PUFF: 110 AEROSOL, METERED RESPIRATORY (INHALATION) at 20:00

## 2021-01-23 RX ADMIN — CYANOCOBALAMIN TAB 1000 MCG 1000 MCG: 1000 TAB at 09:39

## 2021-01-23 RX ADMIN — FUROSEMIDE 20 MG: 20 TABLET ORAL at 09:39

## 2021-01-23 RX ADMIN — RANOLAZINE 500 MG: 500 TABLET, FILM COATED, EXTENDED RELEASE ORAL at 18:02

## 2021-01-23 RX ADMIN — ISOSORBIDE MONONITRATE 30 MG: 30 TABLET, EXTENDED RELEASE ORAL at 09:39

## 2021-01-23 RX ADMIN — INSULIN GLARGINE 18 UNITS: 100 INJECTION, SOLUTION SUBCUTANEOUS at 09:39

## 2021-01-23 RX ADMIN — Medication 10 ML: at 22:52

## 2021-01-23 RX ADMIN — ENOXAPARIN SODIUM 40 MG: 100 INJECTION SUBCUTANEOUS at 15:49

## 2021-01-23 RX ADMIN — Medication 250 MG: at 09:38

## 2021-01-23 RX ADMIN — FAMOTIDINE 20 MG: 20 TABLET, FILM COATED ORAL at 09:38

## 2021-01-23 RX ADMIN — METOPROLOL TARTRATE 25 MG: 25 TABLET, FILM COATED ORAL at 09:38

## 2021-01-23 RX ADMIN — Medication 10 ML: at 14:00

## 2021-01-23 RX ADMIN — DEXAMETHASONE 6 MG: 4 TABLET ORAL at 18:02

## 2021-01-23 RX ADMIN — MONTELUKAST 10 MG: 10 TABLET, FILM COATED ORAL at 09:39

## 2021-01-23 RX ADMIN — Medication 250 MG: at 18:02

## 2021-01-23 RX ADMIN — CHOLECALCIFEROL TAB 25 MCG (1000 UNIT) 2 TABLET: 25 TAB at 09:39

## 2021-01-23 RX ADMIN — FLUTICASONE PROPIONATE 1 PUFF: 110 AEROSOL, METERED RESPIRATORY (INHALATION) at 08:00

## 2021-01-23 RX ADMIN — ZINC SULFATE 220 MG (50 MG) CAPSULE 1 CAPSULE: CAPSULE at 09:39

## 2021-01-23 NOTE — PROGRESS NOTES
DEUCE called [address] [facility] [phone] [address] [facility] to see if any beds were available this weekend. DEUCE received a voicemail, CM left a message with phone number for a return call. DEUCE called [address] [facility], DEUCE was told no one working in admissions this weekend, they were both off.         Yoni Zhou RN  Case Management [phone]

## 2021-01-23 NOTE — PROGRESS NOTES
Bedside shift change report given to [address] [facility] [address] (oncoming nurse) by Seema Robledo (offgoing nurse). Report included the following information SBAR.

## 2021-01-23 NOTE — PROGRESS NOTES
[facility] Hospitalist Group  Progress Note    Patient: Musa Wolf Age: 82 y.o. : 1935 MR#: [id number] SSN: [id number]  Date/Time: 2021    Subjective:     Patient is sitting in bed in no apparent distress, awake and alert. Denies shortness of breath    Assessment/Plan:     1. Covid-19  2. Generalized weakness  3. Chronic diastolic CHF-compensated  4. Hypertension  5. Hx CAD  6. Hyperlipidemia  7. T2DM  8. Hypothyroidism  9. Obesity     Plan  Continue dexamethasone  Continue  vitamin supplements  Continue aspirin and Plavix  Lantus, sliding scale insulin  Continue Lasix  Monitor labs  PT OT   palliative care input noted. Patient is DNR  Discussed with patient, discussed with RN  Disposition-await SNF. Plan was for the patient to discharge to SNF on  but at the last minute this was canceled by the SNF.     Case discussed with:  [x]Patient  []Family  [x]Nursing  [x]Case Management  DVT Prophylaxis:  [x]Lovenox  []Hep SQ  []SCDs  []Coumadin   []On Heparin gtt    Objective:   VS:   Visit Vitals  /69 (BP 1 Location: Right arm, BP Patient Position: Lying right side)   Pulse 74   Temp 98.1 °F (36.7 °C)   Resp 18   Ht 5' 7\" (1.702 m)   Wt 110 kg (242 lb 8.8 oz)   SpO2 91%   Breastfeeding No   BMI 37.99 kg/m²      Tmax/24hrs: Temp (24hrs), Av.8 °F (36.6 °C), Min:97.3 °F (36.3 °C), Max:98.4 °F (36.9 °C)    Input/Output:     Intake/Output Summary (Last 24 hours) at 2021 1853  Last data filed at 2021 1326  Gross per 24 hour   Intake 1100 ml   Output --   Net 1100 ml       General:  Awake, alert  Cardiovascular:  S1S2+, RRR  Pulmonary: Coarse breath sounds bilaterally  GI:  Soft, BS+, NT, ND  Extremities:  trace edema          Labs:    Recent Results (from the past 24 hour(s))   GLUCOSE, POC    Collection Time: 21 10:35 PM   Result Value Ref Range    Glucose (POC) 147 (H) 70 - 110 mg/dL   CBC WITH AUTOMATED DIFF    Collection Time: 21  2:34 AM   Result Value Ref Range    WBC 5.0 4.6 - 13.2 K/uL    RBC 3.50 (L) 4.20 - 5.30 M/uL    HGB 10.6 (L) 12.0 - 16.0 g/dL    HCT 33.6 (L) 35.0 - 45.0 %    MCV 96.0 74.0 - 97.0 FL    MCH 30.3 24.0 - 34.0 PG    MCHC 31.5 31.0 - 37.0 g/dL    RDW 12.2 11.6 - 14.5 %    PLATELET 447 349 - 186 K/uL    MPV 11.4 9.2 - 11.8 FL    NEUTROPHILS 69 40 - 73 %    LYMPHOCYTES 21 21 - 52 %    MONOCYTES 10 3 - 10 %    EOSINOPHILS 0 0 - 5 %    BASOPHILS 0 0 - 2 %    ABS. NEUTROPHILS 3.5 1.8 - 8.0 K/UL    ABS. LYMPHOCYTES 1.0 0.9 - 3.6 K/UL    ABS. MONOCYTES 0.5 0.05 - 1.2 K/UL    ABS. EOSINOPHILS 0.0 0.0 - 0.4 K/UL    ABS. BASOPHILS 0.0 0.0 - 0.1 K/UL    DF AUTOMATED     C REACTIVE PROTEIN, QT    Collection Time: 01/23/21  2:34 AM   Result Value Ref Range    C-Reactive protein 5.6 (H) 0 - 0.3 mg/dL   D DIMER    Collection Time: 01/23/21  2:34 AM   Result Value Ref Range    D DIMER 1.28 (H) <0.46 ug/ml(FEU)   PROCALCITONIN    Collection Time: 01/23/21  2:34 AM   Result Value Ref Range    Procalcitonin <1.05 ng/mL   METABOLIC PANEL, COMPREHENSIVE    Collection Time: 01/23/21  2:34 AM   Result Value Ref Range    Sodium 142 136 - 145 mmol/L    Potassium 3.7 3.5 - 5.5 mmol/L    Chloride 108 100 - 111 mmol/L    CO2 27 21 - 32 mmol/L    Anion gap 7 3.0 - 18 mmol/L    Glucose 134 (H) 74 - 99 mg/dL    BUN 21 (H) 7.0 - 18 MG/DL    Creatinine 0.77 0.6 - 1.3 MG/DL    BUN/Creatinine ratio 27 (H) 12 - 20      GFR est AA >60 >60 ml/min/1.73m2    GFR est non-AA >60 >60 ml/min/1.73m2    Calcium 9.0 8.5 - 10.1 MG/DL    Bilirubin, total 0.6 0.2 - 1.0 MG/DL    ALT (SGPT) 16 13 - 56 U/L    AST (SGOT) 17 10 - 38 U/L    Alk.  phosphatase 61 45 - 117 U/L    Protein, total 7.9 6.4 - 8.2 g/dL    Albumin 2.5 (L) 3.4 - 5.0 g/dL    Globulin 5.4 (H) 2.0 - 4.0 g/dL    A-G Ratio 0.5 (L) 0.8 - 1.7     GLUCOSE, POC    Collection Time: 01/23/21  6:21 AM   Result Value Ref Range    Glucose (POC) 124 (H) 70 - 110 mg/dL   GLUCOSE, POC    Collection Time: 01/23/21 11:27 AM Result Value Ref Range    Glucose (POC) 98 70 - 110 mg/dL   GLUCOSE, POC    Collection Time: 01/23/21  4:38 PM   Result Value Ref Range    Glucose (POC) 118 (H) 70 - 110 mg/dL     Additional Data Reviewed:      Signed By: Bhanu Javier MD     January 23, 2021

## 2021-01-24 LAB
ALBUMIN SERPL-MCNC: 2.4 G/DL (ref 3.4–5)
ALBUMIN/GLOB SERPL: 0.5 {RATIO} (ref 0.8–1.7)
ALP SERPL-CCNC: 66 U/L (ref 45–117)
ALT SERPL-CCNC: 15 U/L (ref 13–56)
ANION GAP SERPL CALC-SCNC: 6 MMOL/L (ref 3–18)
AST SERPL-CCNC: 15 U/L (ref 10–38)
BACTERIA SPEC CULT: NORMAL
BACTERIA SPEC CULT: NORMAL
BASOPHILS # BLD: 0 K/UL (ref 0–0.1)
BASOPHILS NFR BLD: 0 % (ref 0–2)
BILIRUB SERPL-MCNC: 0.7 MG/DL (ref 0.2–1)
BUN SERPL-MCNC: 23 MG/DL (ref 7–18)
BUN/CREAT SERPL: 28 (ref 12–20)
CALCIUM SERPL-MCNC: 9.2 MG/DL (ref 8.5–10.1)
CHLORIDE SERPL-SCNC: 109 MMOL/L (ref 100–111)
CO2 SERPL-SCNC: 25 MMOL/L (ref 21–32)
CREAT SERPL-MCNC: 0.81 MG/DL (ref 0.6–1.3)
CRP SERPL-MCNC: 4.9 MG/DL (ref 0–0.3)
D DIMER PPP FEU-MCNC: 0.98 UG/ML(FEU)
DIFFERENTIAL METHOD BLD: ABNORMAL
EOSINOPHIL # BLD: 0 K/UL (ref 0–0.4)
EOSINOPHIL NFR BLD: 0 % (ref 0–5)
ERYTHROCYTE [DISTWIDTH] IN BLOOD BY AUTOMATED COUNT: 12.3 % (ref 11.6–14.5)
GLOBULIN SER CALC-MCNC: 5.3 G/DL (ref 2–4)
GLUCOSE BLD STRIP.AUTO-MCNC: 167 MG/DL (ref 70–110)
GLUCOSE BLD STRIP.AUTO-MCNC: 200 MG/DL (ref 70–110)
GLUCOSE BLD STRIP.AUTO-MCNC: 216 MG/DL (ref 70–110)
GLUCOSE BLD STRIP.AUTO-MCNC: 245 MG/DL (ref 70–110)
GLUCOSE SERPL-MCNC: 189 MG/DL (ref 74–99)
HCT VFR BLD AUTO: 32.6 % (ref 35–45)
HGB BLD-MCNC: 10.5 G/DL (ref 12–16)
LYMPHOCYTES # BLD: 0.8 K/UL (ref 0.9–3.6)
LYMPHOCYTES NFR BLD: 18 % (ref 21–52)
MCH RBC QN AUTO: 31.2 PG (ref 24–34)
MCHC RBC AUTO-ENTMCNC: 32.2 G/DL (ref 31–37)
MCV RBC AUTO: 96.7 FL (ref 74–97)
MONOCYTES # BLD: 0.2 K/UL (ref 0.05–1.2)
MONOCYTES NFR BLD: 5 % (ref 3–10)
NEUTS SEG # BLD: 3.3 K/UL (ref 1.8–8)
NEUTS SEG NFR BLD: 77 % (ref 40–73)
PLATELET # BLD AUTO: 308 K/UL (ref 135–420)
PMV BLD AUTO: 10.9 FL (ref 9.2–11.8)
POTASSIUM SERPL-SCNC: 4.2 MMOL/L (ref 3.5–5.5)
PROCALCITONIN SERPL-MCNC: <0.05 NG/ML
PROT SERPL-MCNC: 7.7 G/DL (ref 6.4–8.2)
RBC # BLD AUTO: 3.37 M/UL (ref 4.2–5.3)
SERVICE CMNT-IMP: NORMAL
SERVICE CMNT-IMP: NORMAL
SODIUM SERPL-SCNC: 140 MMOL/L (ref 136–145)
WBC # BLD AUTO: 4.3 K/UL (ref 4.6–13.2)

## 2021-01-24 PROCEDURE — 74011250637 HC RX REV CODE- 250/637: Performed by: EMERGENCY MEDICINE

## 2021-01-24 PROCEDURE — 82962 GLUCOSE BLOOD TEST: CPT

## 2021-01-24 PROCEDURE — 65660000000 HC RM CCU STEPDOWN

## 2021-01-24 PROCEDURE — 74011250637 HC RX REV CODE- 250/637: Performed by: NURSE PRACTITIONER

## 2021-01-24 PROCEDURE — 74011250636 HC RX REV CODE- 250/636: Performed by: EMERGENCY MEDICINE

## 2021-01-24 PROCEDURE — 86140 C-REACTIVE PROTEIN: CPT

## 2021-01-24 PROCEDURE — 94761 N-INVAS EAR/PLS OXIMETRY MLT: CPT

## 2021-01-24 PROCEDURE — 80053 COMPREHEN METABOLIC PANEL: CPT

## 2021-01-24 PROCEDURE — 74011250636 HC RX REV CODE- 250/636: Performed by: INTERNAL MEDICINE

## 2021-01-24 PROCEDURE — 74011636637 HC RX REV CODE- 636/637: Performed by: EMERGENCY MEDICINE

## 2021-01-24 PROCEDURE — 84145 PROCALCITONIN (PCT): CPT

## 2021-01-24 PROCEDURE — 94640 AIRWAY INHALATION TREATMENT: CPT

## 2021-01-24 PROCEDURE — 36415 COLL VENOUS BLD VENIPUNCTURE: CPT

## 2021-01-24 PROCEDURE — 99232 SBSQ HOSP IP/OBS MODERATE 35: CPT | Performed by: EMERGENCY MEDICINE

## 2021-01-24 PROCEDURE — 85025 COMPLETE CBC W/AUTO DIFF WBC: CPT

## 2021-01-24 PROCEDURE — 85379 FIBRIN DEGRADATION QUANT: CPT

## 2021-01-24 PROCEDURE — 2709999900 HC NON-CHARGEABLE SUPPLY

## 2021-01-24 RX ADMIN — Medication 250 MG: at 18:54

## 2021-01-24 RX ADMIN — INSULIN LISPRO 6 UNITS: 100 INJECTION, SOLUTION INTRAVENOUS; SUBCUTANEOUS at 08:31

## 2021-01-24 RX ADMIN — ZINC SULFATE 220 MG (50 MG) CAPSULE 1 CAPSULE: CAPSULE at 08:33

## 2021-01-24 RX ADMIN — SPIRONOLACTONE 25 MG: 25 TABLET ORAL at 08:32

## 2021-01-24 RX ADMIN — INSULIN LISPRO 6 UNITS: 100 INJECTION, SOLUTION INTRAVENOUS; SUBCUTANEOUS at 18:53

## 2021-01-24 RX ADMIN — MONTELUKAST 10 MG: 10 TABLET, FILM COATED ORAL at 08:33

## 2021-01-24 RX ADMIN — Medication 10 ML: at 05:16

## 2021-01-24 RX ADMIN — METOPROLOL TARTRATE 25 MG: 25 TABLET, FILM COATED ORAL at 18:54

## 2021-01-24 RX ADMIN — FUROSEMIDE 20 MG: 20 TABLET ORAL at 08:33

## 2021-01-24 RX ADMIN — RANOLAZINE 500 MG: 500 TABLET, FILM COATED, EXTENDED RELEASE ORAL at 08:32

## 2021-01-24 RX ADMIN — RANOLAZINE 500 MG: 500 TABLET, FILM COATED, EXTENDED RELEASE ORAL at 18:53

## 2021-01-24 RX ADMIN — LEVOTHYROXINE SODIUM 137 MCG: 25 TABLET ORAL at 05:16

## 2021-01-24 RX ADMIN — METOPROLOL TARTRATE 25 MG: 25 TABLET, FILM COATED ORAL at 08:33

## 2021-01-24 RX ADMIN — ASPIRIN 81 MG: 81 TABLET, CHEWABLE ORAL at 08:32

## 2021-01-24 RX ADMIN — FLUTICASONE PROPIONATE 1 PUFF: 110 AEROSOL, METERED RESPIRATORY (INHALATION) at 07:40

## 2021-01-24 RX ADMIN — CLOPIDOGREL BISULFATE 75 MG: 75 TABLET ORAL at 08:33

## 2021-01-24 RX ADMIN — FLUTICASONE PROPIONATE 1 PUFF: 110 AEROSOL, METERED RESPIRATORY (INHALATION) at 21:43

## 2021-01-24 RX ADMIN — INSULIN GLARGINE 18 UNITS: 100 INJECTION, SOLUTION SUBCUTANEOUS at 22:00

## 2021-01-24 RX ADMIN — Medication 10 ML: at 23:19

## 2021-01-24 RX ADMIN — INSULIN LISPRO 3 UNITS: 100 INJECTION, SOLUTION INTRAVENOUS; SUBCUTANEOUS at 23:18

## 2021-01-24 RX ADMIN — Medication 10 ML: at 14:00

## 2021-01-24 RX ADMIN — INSULIN GLARGINE 18 UNITS: 100 INJECTION, SOLUTION SUBCUTANEOUS at 08:31

## 2021-01-24 RX ADMIN — CYANOCOBALAMIN TAB 1000 MCG 1000 MCG: 1000 TAB at 08:33

## 2021-01-24 RX ADMIN — CHOLECALCIFEROL TAB 25 MCG (1000 UNIT) 2 TABLET: 25 TAB at 08:32

## 2021-01-24 RX ADMIN — AMLODIPINE BESYLATE 5 MG: 5 TABLET ORAL at 08:32

## 2021-01-24 RX ADMIN — INSULIN LISPRO 6 UNITS: 100 INJECTION, SOLUTION INTRAVENOUS; SUBCUTANEOUS at 14:17

## 2021-01-24 RX ADMIN — ISOSORBIDE MONONITRATE 30 MG: 30 TABLET, EXTENDED RELEASE ORAL at 08:33

## 2021-01-24 RX ADMIN — Medication 250 MG: at 08:32

## 2021-01-24 RX ADMIN — FAMOTIDINE 20 MG: 20 TABLET, FILM COATED ORAL at 08:32

## 2021-01-24 RX ADMIN — ENOXAPARIN SODIUM 40 MG: 100 INJECTION SUBCUTANEOUS at 14:19

## 2021-01-24 RX ADMIN — DEXAMETHASONE 6 MG: 4 TABLET ORAL at 18:54

## 2021-01-24 NOTE — PROGRESS NOTES
[facility] Hospitalist Group  Progress Note    Patient: Mary Simon Age: 82 y.o. : 1935 MR#: [id number] SSN: [id number]  Date/Time: 2021    Subjective:     Patient is sitting in bed in no apparent distress, complains of fatigue and poor appetite    Assessment/Plan:     1. Covid-19  2. Generalized weakness  3. Chronic diastolic CHF-compensated  4. Hypertension  5. Hx CAD  6. Hyperlipidemia  7. T2DM  8. Hypothyroidism  9. Obesity     Plan  On dexamethasone  Continue vitamin supplements  On aspirin and Plavix  Lantus, sliding scale insulin  Continue Lasix  Monitor labs  PT OT   palliative care input noted.   Patient is DNR  Discussed with patient, discussed with RN  Disposition-await SNF  I called patient's granddaughter at phone #[phone] and updated her    Case discussed with:  [x]Patient  []Family  [x]Nursing  [x]Case Management  DVT Prophylaxis:  [x]Lovenox  []Hep SQ  []SCDs  []Coumadin   []On Heparin gtt    Objective:   VS:   Visit Vitals  /85 (BP 1 Location: Left arm, BP Patient Position: Sitting)   Pulse 89   Temp 98.2 °F (36.8 °C)   Resp 18   Ht 5' 7\" (1.702 m)   Wt 108.9 kg (240 lb)   SpO2 91%   Breastfeeding No   BMI 37.59 kg/m²      Tmax/24hrs: Temp (24hrs), Av.1 °F (36.7 °C), Min:97.9 °F (36.6 °C), Max:98.6 °F (37 °C)    Input/Output:     Intake/Output Summary (Last 24 hours) at 2021 1621  Last data filed at 2021 0015  Gross per 24 hour   Intake 360 ml   Output --   Net 360 ml       General:  Awake, alert, follows commands  Cardiovascular:  S1S2+, RRR  Pulmonary: Coarse breath sounds bilaterally  GI:  Soft, BS+, NT, ND  Extremities: Trace edema            Labs:    Recent Results (from the past 24 hour(s))   GLUCOSE, POC    Collection Time: 21  4:38 PM   Result Value Ref Range    Glucose (POC) 118 (H) 70 - 110 mg/dL   GLUCOSE, POC    Collection Time: 21  8:50 PM   Result Value Ref Range    Glucose (POC) 147 (H) 70 - 110 mg/dL GLUCOSE, POC    Collection Time: 01/24/21  5:57 AM   Result Value Ref Range    Glucose (POC) 200 (H) 70 - 110 mg/dL   CBC WITH AUTOMATED DIFF    Collection Time: 01/24/21  6:44 AM   Result Value Ref Range    WBC 4.3 (L) 4.6 - 13.2 K/uL    RBC 3.37 (L) 4.20 - 5.30 M/uL    HGB 10.5 (L) 12.0 - 16.0 g/dL    HCT 32.6 (L) 35.0 - 45.0 %    MCV 96.7 74.0 - 97.0 FL    MCH 31.2 24.0 - 34.0 PG    MCHC 32.2 31.0 - 37.0 g/dL    RDW 12.3 11.6 - 14.5 %    PLATELET 884 319 - 629 K/uL    MPV 10.9 9.2 - 11.8 FL    NEUTROPHILS 77 (H) 40 - 73 %    LYMPHOCYTES 18 (L) 21 - 52 %    MONOCYTES 5 3 - 10 %    EOSINOPHILS 0 0 - 5 %    BASOPHILS 0 0 - 2 %    ABS. NEUTROPHILS 3.3 1.8 - 8.0 K/UL    ABS. LYMPHOCYTES 0.8 (L) 0.9 - 3.6 K/UL    ABS. MONOCYTES 0.2 0.05 - 1.2 K/UL    ABS. EOSINOPHILS 0.0 0.0 - 0.4 K/UL    ABS. BASOPHILS 0.0 0.0 - 0.1 K/UL    DF AUTOMATED     C REACTIVE PROTEIN, QT    Collection Time: 01/24/21  6:44 AM   Result Value Ref Range    C-Reactive protein 4.9 (H) 0 - 0.3 mg/dL   D DIMER    Collection Time: 01/24/21  6:44 AM   Result Value Ref Range    D DIMER 0.98 (H) <0.46 ug/ml(FEU)   PROCALCITONIN    Collection Time: 01/24/21  6:44 AM   Result Value Ref Range    Procalcitonin <0.30 ng/mL   METABOLIC PANEL, COMPREHENSIVE    Collection Time: 01/24/21  6:44 AM   Result Value Ref Range    Sodium 140 136 - 145 mmol/L    Potassium 4.2 3.5 - 5.5 mmol/L    Chloride 109 100 - 111 mmol/L    CO2 25 21 - 32 mmol/L    Anion gap 6 3.0 - 18 mmol/L    Glucose 189 (H) 74 - 99 mg/dL    BUN 23 (H) 7.0 - 18 MG/DL    Creatinine 0.81 0.6 - 1.3 MG/DL    BUN/Creatinine ratio 28 (H) 12 - 20      GFR est AA >60 >60 ml/min/1.73m2    GFR est non-AA >60 >60 ml/min/1.73m2    Calcium 9.2 8.5 - 10.1 MG/DL    Bilirubin, total 0.7 0.2 - 1.0 MG/DL    ALT (SGPT) 15 13 - 56 U/L    AST (SGOT) 15 10 - 38 U/L    Alk.  phosphatase 66 45 - 117 U/L    Protein, total 7.7 6.4 - 8.2 g/dL    Albumin 2.4 (L) 3.4 - 5.0 g/dL    Globulin 5.3 (H) 2.0 - 4.0 g/dL    A-G Ratio 0.5 (L) 0.8 - 1.7     GLUCOSE, POC    Collection Time: 01/24/21 12:07 PM   Result Value Ref Range    Glucose (POC) 245 (H) 70 - 110 mg/dL     Additional Data Reviewed:      Signed By: Keith Gutierrez MD     January 24, 2021

## 2021-01-24 NOTE — PROGRESS NOTES
Verbal shift change report given to Buster James RN (oncoming nurse) by Danuta Ladd RN (offgoing nurse). Report included the following information SBAR, Intake/Output, MAR and Recent Results.

## 2021-01-24 NOTE — PROGRESS NOTES
DEUCE uploaded clinicals to [facility], and sent booking requests to SNF in [facility], [facility], [address] [place] [address], and [facility] in [facility]. DEUCE called Jessica with [phone], and received her voicemail, CM left message that patient is ready for discharge, and if bed available, phone number given for return call. DEUCE called [phone] and spoke with Ms Brianna Cisneros, and asked her if anyone with admissions was there today, we have this patient who was supposed to come this weekend. Ms Brianna Cisneros took CM phone number and patient information, and said someone should be calling DEUCE back.         Graciela Rosario RN  Case Management [phone]

## 2021-01-25 VITALS
HEART RATE: 104 BPM | DIASTOLIC BLOOD PRESSURE: 82 MMHG | WEIGHT: 240 LBS | SYSTOLIC BLOOD PRESSURE: 141 MMHG | OXYGEN SATURATION: 90 % | TEMPERATURE: 98.8 F | RESPIRATION RATE: 20 BRPM | HEIGHT: 67 IN | BODY MASS INDEX: 37.67 KG/M2

## 2021-01-25 LAB
ALBUMIN SERPL-MCNC: 2.4 G/DL (ref 3.4–5)
ALBUMIN/GLOB SERPL: 0.5 {RATIO} (ref 0.8–1.7)
ALP SERPL-CCNC: 60 U/L (ref 45–117)
ALT SERPL-CCNC: 15 U/L (ref 13–56)
ANION GAP SERPL CALC-SCNC: 8 MMOL/L (ref 3–18)
AST SERPL-CCNC: 13 U/L (ref 10–38)
BASOPHILS # BLD: 0 K/UL (ref 0–0.06)
BASOPHILS NFR BLD: 0 % (ref 0–3)
BILIRUB SERPL-MCNC: 0.8 MG/DL (ref 0.2–1)
BUN SERPL-MCNC: 23 MG/DL (ref 7–18)
BUN/CREAT SERPL: 30 (ref 12–20)
CALCIUM SERPL-MCNC: 8.7 MG/DL (ref 8.5–10.1)
CHLORIDE SERPL-SCNC: 107 MMOL/L (ref 100–111)
CO2 SERPL-SCNC: 25 MMOL/L (ref 21–32)
CREAT SERPL-MCNC: 0.77 MG/DL (ref 0.6–1.3)
CRP SERPL-MCNC: 3.4 MG/DL (ref 0–0.3)
D DIMER PPP FEU-MCNC: 1.13 UG/ML(FEU)
DIFFERENTIAL METHOD BLD: ABNORMAL
EOSINOPHIL # BLD: 0 K/UL (ref 0–0.4)
EOSINOPHIL NFR BLD: 0 % (ref 0–5)
ERYTHROCYTE [DISTWIDTH] IN BLOOD BY AUTOMATED COUNT: 12.5 % (ref 11.6–14.5)
GLOBULIN SER CALC-MCNC: 5.1 G/DL (ref 2–4)
GLUCOSE BLD STRIP.AUTO-MCNC: 149 MG/DL (ref 70–110)
GLUCOSE BLD STRIP.AUTO-MCNC: 156 MG/DL (ref 70–110)
GLUCOSE BLD STRIP.AUTO-MCNC: 173 MG/DL (ref 70–110)
GLUCOSE SERPL-MCNC: 176 MG/DL (ref 74–99)
HCT VFR BLD AUTO: 33.7 % (ref 35–45)
HGB BLD-MCNC: 11 G/DL (ref 12–16)
LYMPHOCYTES # BLD: 1.2 K/UL (ref 0.8–3.5)
LYMPHOCYTES NFR BLD: 25 % (ref 20–51)
MCH RBC QN AUTO: 31.8 PG (ref 24–34)
MCHC RBC AUTO-ENTMCNC: 32.6 G/DL (ref 31–37)
MCV RBC AUTO: 97.4 FL (ref 74–97)
MONOCYTES # BLD: 0 K/UL (ref 0–1)
MONOCYTES NFR BLD: 1 % (ref 2–9)
MYELOCYTES NFR BLD MANUAL: 1 %
NEUTS SEG # BLD: 3.5 K/UL (ref 1.8–8)
NEUTS SEG NFR BLD: 73 % (ref 42–75)
NRBC BLD-RTO: 2 PER 100 WBC
PLATELET # BLD AUTO: 277 K/UL (ref 135–420)
PLATELET COMMENTS,PCOM: ABNORMAL
POTASSIUM SERPL-SCNC: 3.9 MMOL/L (ref 3.5–5.5)
PROCALCITONIN SERPL-MCNC: <0.05 NG/ML
PROT SERPL-MCNC: 7.5 G/DL (ref 6.4–8.2)
RBC # BLD AUTO: 3.46 M/UL (ref 4.2–5.3)
RBC MORPH BLD: ABNORMAL
SODIUM SERPL-SCNC: 140 MMOL/L (ref 136–145)
WBC # BLD AUTO: 4.8 K/UL (ref 4.6–13.2)

## 2021-01-25 PROCEDURE — 85025 COMPLETE CBC W/AUTO DIFF WBC: CPT

## 2021-01-25 PROCEDURE — 85379 FIBRIN DEGRADATION QUANT: CPT

## 2021-01-25 PROCEDURE — 74011250637 HC RX REV CODE- 250/637: Performed by: EMERGENCY MEDICINE

## 2021-01-25 PROCEDURE — 94640 AIRWAY INHALATION TREATMENT: CPT

## 2021-01-25 PROCEDURE — 36415 COLL VENOUS BLD VENIPUNCTURE: CPT

## 2021-01-25 PROCEDURE — 80053 COMPREHEN METABOLIC PANEL: CPT

## 2021-01-25 PROCEDURE — 74011636637 HC RX REV CODE- 636/637: Performed by: EMERGENCY MEDICINE

## 2021-01-25 PROCEDURE — 84145 PROCALCITONIN (PCT): CPT

## 2021-01-25 PROCEDURE — 74011250636 HC RX REV CODE- 250/636: Performed by: EMERGENCY MEDICINE

## 2021-01-25 PROCEDURE — 74011250637 HC RX REV CODE- 250/637: Performed by: NURSE PRACTITIONER

## 2021-01-25 PROCEDURE — 86140 C-REACTIVE PROTEIN: CPT

## 2021-01-25 PROCEDURE — 82962 GLUCOSE BLOOD TEST: CPT

## 2021-01-25 PROCEDURE — 99238 HOSP IP/OBS DSCHRG MGMT 30/<: CPT | Performed by: EMERGENCY MEDICINE

## 2021-01-25 PROCEDURE — 74011250636 HC RX REV CODE- 250/636: Performed by: INTERNAL MEDICINE

## 2021-01-25 RX ORDER — DEXAMETHASONE 6 MG/1
6 TABLET ORAL
Qty: 6 TAB | Refills: 0 | Status: SHIPPED
Start: 2021-01-25 | End: 2021-01-31

## 2021-01-25 RX ADMIN — DEXAMETHASONE 6 MG: 4 TABLET ORAL at 17:51

## 2021-01-25 RX ADMIN — Medication 10 ML: at 05:08

## 2021-01-25 RX ADMIN — CHOLECALCIFEROL TAB 25 MCG (1000 UNIT) 2 TABLET: 25 TAB at 09:07

## 2021-01-25 RX ADMIN — Medication 250 MG: at 17:51

## 2021-01-25 RX ADMIN — INSULIN LISPRO 3 UNITS: 100 INJECTION, SOLUTION INTRAVENOUS; SUBCUTANEOUS at 17:52

## 2021-01-25 RX ADMIN — FLUTICASONE PROPIONATE 1 PUFF: 110 AEROSOL, METERED RESPIRATORY (INHALATION) at 08:00

## 2021-01-25 RX ADMIN — ENOXAPARIN SODIUM 40 MG: 100 INJECTION SUBCUTANEOUS at 14:51

## 2021-01-25 RX ADMIN — AMLODIPINE BESYLATE 5 MG: 5 TABLET ORAL at 09:07

## 2021-01-25 RX ADMIN — INSULIN GLARGINE 18 UNITS: 100 INJECTION, SOLUTION SUBCUTANEOUS at 09:05

## 2021-01-25 RX ADMIN — MONTELUKAST 10 MG: 10 TABLET, FILM COATED ORAL at 09:07

## 2021-01-25 RX ADMIN — RANOLAZINE 500 MG: 500 TABLET, FILM COATED, EXTENDED RELEASE ORAL at 17:51

## 2021-01-25 RX ADMIN — INSULIN LISPRO 3 UNITS: 100 INJECTION, SOLUTION INTRAVENOUS; SUBCUTANEOUS at 09:05

## 2021-01-25 RX ADMIN — METOPROLOL TARTRATE 25 MG: 25 TABLET, FILM COATED ORAL at 09:07

## 2021-01-25 RX ADMIN — FUROSEMIDE 20 MG: 20 TABLET ORAL at 09:08

## 2021-01-25 RX ADMIN — ASPIRIN 81 MG: 81 TABLET, CHEWABLE ORAL at 09:07

## 2021-01-25 RX ADMIN — FAMOTIDINE 20 MG: 20 TABLET, FILM COATED ORAL at 09:08

## 2021-01-25 RX ADMIN — METOPROLOL TARTRATE 25 MG: 25 TABLET, FILM COATED ORAL at 17:51

## 2021-01-25 RX ADMIN — ISOSORBIDE MONONITRATE 30 MG: 30 TABLET, EXTENDED RELEASE ORAL at 09:08

## 2021-01-25 RX ADMIN — SPIRONOLACTONE 25 MG: 25 TABLET ORAL at 09:08

## 2021-01-25 RX ADMIN — LEVOTHYROXINE SODIUM 137 MCG: 25 TABLET ORAL at 05:08

## 2021-01-25 RX ADMIN — CLOPIDOGREL BISULFATE 75 MG: 75 TABLET ORAL at 09:07

## 2021-01-25 RX ADMIN — Medication 250 MG: at 09:08

## 2021-01-25 RX ADMIN — CYANOCOBALAMIN TAB 1000 MCG 1000 MCG: 1000 TAB at 09:07

## 2021-01-25 RX ADMIN — ZINC SULFATE 220 MG (50 MG) CAPSULE 1 CAPSULE: CAPSULE at 09:08

## 2021-01-25 RX ADMIN — RANOLAZINE 500 MG: 500 TABLET, FILM COATED, EXTENDED RELEASE ORAL at 09:07

## 2021-01-25 NOTE — DIABETES MGMT
Glycemic Control Plan of Care    Awaiting SNF placement -   Continues with steroids - decadron 6 mg daily  Continues with basal/corrective insulins   mg/dl today  Will continue to monitor -       Your A1C  was   Lab Results   Component Value Date/Time    Hemoglobin A1c 8.1 (H) 01/18/2021 09:12 PM    Hemoglobin A1c (POC) 8.5 01/17/2019 12:19 PM    Hemoglobin A1c, External 7.5 10/31/2019     Current hospital diabetes medications:   lantus 18 units bid   Corrective lispro, very insulin resistant, 4 times daily   TDD previous day = 57 units   lantus 36 units  Lispro 21 units   Home diabetes medications:  Lantus 32 units AM  Lantus 36 units PM  Diet:    DIET DIABETIC WITH OPTIONS Consistent Carb 1500-1600kcal; Regular; 2 GM NA (House Low NA)     Zaid Beth MPH RN CDE  Pager [phone]

## 2021-01-25 NOTE — PROGRESS NOTES
0730- received report from off going RN. 1310- Report given to Daniel Lemus at [facility]. Patient for discharge at 2pm.  1425- IV discontinued for patient pending discharge. Reviewed discharge instructions with patient. 1630- Report given to oncoming RN Magdy Will.  New patient discharge time for 7pm.

## 2021-01-25 NOTE — PROGRESS NOTES
Transition of Care Plan to SNF/Rehab    SNF/Rehab Transition:  Patient has been accepted to [place] and meets criteria for admission. Patient will  be transported by [facility] and expected to leave at 2pm.    Communication to Patient/Family:  Met with patient and granddaughter (identified care giver) and they are agreeable to the transition plan. Communication to SNF/Rehab:  Bedside RN, has been notified of the transition plan to the facility and to call report (phone number [phone]). Discharge information has been updated on the AVS. And communicated to facility via [facility], or CC link. SNF/Rehab Transition:    PCP/Specialist:     Nursing Please include all hard scripts for controlled substances, med rec and dc summary, and AVS in packet. Reviewed and confirmed with facility representative, Wong Harper  at [facility] they can manage the patient care needs for the following:     Madelaine Ying with (X) only those applicable:    Medication:  [x]  Medications will be available at the facility  []  IV Antibiotics   []  Controlled Substance - hard copy to be sent with patient   []  Weekly Labs    Documents:  [] Hard RX  Number sent   [] MAR  [] Kardex  [] AVS  [] Wound care note  [x]Transfer Summary/Discharge Summary    Equipment:  []  CPAP/BiPAP  []  Wound Vacuum  []  Jauregui or Urinary Device  []  PICC/Central Line  []  Nebulizer  []  Ventilator    Treatment:  [x]Isolation (for MRSA, VRE, etc.)  []Surgical Drain Management  []Tracheostomy Care  []Dressing Changes  []Dialysis with transportation and chair time  Center Mode of tranpsort   []PEG Care  []Oxygen  []Daily Weights for Heart Failure Covid   Dietary:  []Any diet limitations  []Tube Feedings   []Total Parenteral Management (TPN)    Eligible for Medicaid Long Term Services and Supports  Yes:  [] Eligible for medical assistance or will become eligible within 180 days and LTSS completed.    [] Provider/Patient and/or support system has requested screening. [x] LTSS copy provided to patient or responsible party, .  [] LTSS unavailable at discharge will send once processed to SNF provider.  [] LTSS  unavailable at discharged mailed to patient  [] LTSS performed by outside agency  on  with tracking number   No:   [] Private pay and is not financially eligible for Medicaid within the next 180 days. [] Reside out-of-state.   [] A residents of a state owned/operated facility that is licensed  by [facility] and [facility] or [facility]  [] Enrollment in [facility] hospice services  [] [address] [place] [address]  [] Patient /Family declines to have screening completed or provide financial information for screening          Financial Resources:  Medicaid    [] Initiated and application pending   [x] Full coverage      Advanced Care Plan:  []Surrogate Decision Maker of Care  []POA  [x]Communicated Code Status/ sent (DDNR, POST, Advance Directive)       Davin Kurtz RN BSN  Care Manager  [phone]

## 2021-01-25 NOTE — ROUTINE PROCESS
Bedside and Verbal shift change report given to Amita Peraza RN (oncoming nurse) by TORIBIO Ibanez RN (offgoing nurse). Report included the following information SBAR, Kardex, MAR and Recent Results.

## 2021-01-25 NOTE — PROGRESS NOTES
Call made to [facility] transportation [phone], spoke with Skip Bahena, patient will be transported via stretcher to [address] [place]no, patient will be ready at 2:00 pm. Trip # [id number].

## 2021-01-25 NOTE — DISCHARGE SUMMARY
Addendum to previous discharge summary from January 22. Patient has remained hemodynamically stable. Patient is in no apparent distress, awake and alert. Patient continues to have fatigue. Visit Vitals  BP (!) 169/92 (BP 1 Location: Left arm, BP Patient Position: Supine)   Pulse 74   Temp 97.9 °F (36.6 °C)   Resp 20   Ht 5' 7\" (1.702 m)   Wt 108.9 kg (240 lb)   SpO2 94%   Breastfeeding No   BMI 37.59 kg/m²     I have discussed with  and a bed is available for the patient today at the skilled nursing facility. Discharge plans have been discussed with patient and family. Updated discharge medication list is as below. For other discharge instructions and details please refer to original discharge summary from January 22    Current Discharge Medication List      START taking these medications    Details   dexAMETHasone (Decadron) 6 mg tablet Take 1 Tab by mouth daily (after dinner) for 6 days. Qty: 6 Tab, Refills: 0      acetaminophen (TYLENOL) 325 mg tablet Take 2 Tabs by mouth every six (6) hours as needed for Pain or Fever. Qty: 20 Tab, Refills: 0      enoxaparin (LOVENOX) 40 mg/0.4 mL 0.4 mL by SubCUTAneous route every twenty-four (24) hours for 10 days. Indications: deep vein thrombosis prevention  Qty: 10 Syringe, Refills: 0      famotidine (PEPCID) 20 mg tablet Take 1 Tab by mouth daily for 14 days. Qty: 14 Tab, Refills: 0      insulin lispro (HUMALOG) 100 unit/mL injection Check FSBS AC*HS  For sugars between 160 and 200- Give 2 units SQ,  For sugars between 201 and 250, Give 3 units SQ,  For sugars Between 251 and 300, give 5 units SQ,  For Sugars between 301 and 350, give 7 units SQ  For sugars between 351 and 400, give 8 units SQ and contact PCP  Qty: 1 Vial, Refills: 0      melatonin 5 mg tablet Take 1 Tab by mouth nightly as needed for Other (As Needed for insomnia).   Qty: 15 Tab, Refills: 0      polyethylene glycol (MIRALAX) 17 gram packet Take 1 Packet by mouth daily as needed for Constipation. Qty: 15 Packet, Refills: 0      zinc sulfate (ZINCATE) 220 (50) mg capsule Take 1 Cap by mouth daily for 14 days. Qty: 14 Cap, Refills: 0         CONTINUE these medications which have CHANGED    Details   amLODIPine (NORVASC) 5 mg tablet Take 1 Tab by mouth daily. Qty: 30 Tab, Refills: 0      Lantus Solostar U-100 Insulin 100 unit/mL (3 mL) inpn 18 Units by SubCUTAneous route two (2) times a day. Qty: 1 Pen, Refills: 0         CONTINUE these medications which have NOT CHANGED    Details   aspirin 81 mg chewable tablet Take 1 Tab by mouth daily for 30 days. Qty: 30 Tab, Refills: 0      furosemide (LASIX) 20 mg tablet Take 1 Tab by mouth daily for 30 days. Qty: 30 Tab, Refills: 0      levothyroxine (Synthroid) 137 mcg tablet Take 137 mcg by mouth Daily (before breakfast). Indications: a condition with low thyroid hormone levels  Qty: 30 Tab, Refills: 5      cholecalciferol (Vitamin D3) (1000 Units /25 mcg) tablet Take 1 Tab by mouth two (2) times a day. Qty: 60 Tab, Refills: 0      spironolactone (ALDACTONE) 25 mg tablet Take 1 Tab by mouth daily. Qty: 30 Tab, Refills: 0      clopidogreL (PLAVIX) 75 mg tab TAKE 1 TABLET BY MOUTH DAILY  Qty: 30 Tab, Refills: 2    Associated Diagnoses: S/P coronary artery stent placement      metoprolol tartrate (LOPRESSOR) 25 mg tablet TAKE 1 TABLET BY MOUTH EVERY 12 HOURS  Qty: 60 Tab, Refills: 5      isosorbide mononitrate ER (IMDUR) 30 mg tablet TAKE 1 TABLET BY MOUTH EVERY MORNING  Qty: 90 Tab, Refills: 3    Comments: **Patient requests 90 days supply**  Associated Diagnoses: Other forms of angina pectoris (HCC)      albuterol (PROVENTIL HFA, VENTOLIN HFA, PROAIR HFA) 90 mcg/actuation inhaler INHALE 1 PUFF BY MOUTH EVERY 4 HOURS AS NEEDED FOR WHEEZING OR SHORTNESS OF BREATH  Qty: 18 g, Refills: 12    Associated Diagnoses:  Moderate intermittent asthma, with acute exacerbation      Flovent Diskus 100 mcg/actuation dsdv INHALE 1 PUFF BY MOUTH TWICE DAILY  Qty: 1 Inhaler, Refills: 5      multivitamin with minerals (MULTIVITAMIN & MINERAL FORMULA PO) Take 1 Tab by mouth daily. ranolazine ER (RANEXA) 500 mg SR tablet Take 1 Tab by mouth two (2) times a day. Qty: 180 Tab, Refills: 6    Associated Diagnoses: Chest pain, unspecified type      montelukast (SINGULAIR) 10 mg tablet Take 1 Tab by mouth daily. Indications: inflammation of the nose due to an allergy  Qty: 90 Tab, Refills: 4    Associated Diagnoses: Mild intermittent asthma without complication; Allergic rhinitis, unspecified seasonality, unspecified trigger      lidocaine (ASPERCREME, LIDOCAINE,) 4 % patch 1 Patch by TransDERmal route every eight (8) hours. Qty: 1 Package, Refills: 3    Associated Diagnoses: Chronic pain of left knee      RONNELL PEN NEEDLE 32 gauge x 5/32\" ndle Refills: 4      ascorbic acid, vitamin C, (VITAMIN C) 250 mg tablet Take 250 mg by mouth daily. 1 pill po daily  Indications: inadequate vitamin C      cyanocobalamin ER 1,000 mcg tablet Take 1 Tab by mouth daily. Qty: 30 Tab, Refills: 3    Associated Diagnoses: Weakness; Macrocytosis      DOCOSAHEXANOIC ACID/EPA (FISH OIL PO) Take 1,000 mg by mouth two (2) times a day. 1 pill po twice daily       nitroglycerin (NITROSTAT) 0.4 mg SL tablet 1 Tab by SubLINGual route every five (5) minutes as needed for Chest Pain. Up to 3 doses.   Qty: 20 Tab, Refills: 0         STOP taking these medications       ciprofloxacin HCl (CIPRO) 500 mg tablet Comments:   Reason for Stopping:         amoxicillin-clavulanate (Augmentin) 875-125 mg per tablet Comments:   Reason for Stopping:         Saccharomyces boulardii (Florastor) 250 mg capsule Comments:   Reason for Stopping:         acetaminophen (TYLENOL ARTHRITIS PAIN) 650 mg TbER Comments:   Reason for Stopping:         capsaicin 0.075 % topical cream Comments:   Reason for Stopping:             Travis Marino MD

## 2021-01-25 NOTE — PROGRESS NOTES
Received a call from [address] [place] [address] transportation/[facility] that [facility] will be picking up the patient on or before 7pm.  No earlier providers.       Anmol Monte, RN BSN  Care Manager  [phone]

## 2021-01-25 NOTE — PROGRESS NOTES
Comprehensive Nutrition Assessment    Type and Reason for Visit: Initial, RD nutrition re-screen/LOS    Nutrition Recommendations/Plan:   - Update food preferences in diet order  - Modify supplement: add Magic Cup ELIZABETH, BID and decrease Glucerna Shake to BID  - Encourage/ monitor po intake of meals and supplements    Nutrition Assessment:  Pt reported poor/fair appetite since admission. Tolerating diet. Poor/fair intake of nutrition supplements; had 4 unopened bottles of glucerna shakes. Agreeable to decreased glucerna shakes to BID and trying magic cup. Food preferences discussed. Pt is covid 19 positive    Malnutrition Assessment:  Malnutrition Status: At risk for malnutrition (specify)(poor po intake)        Nutrition History and Allergies: Past medical hx: anemia, anxiety, CAD, cholelithiasis, heart failure/ CHF, diverticulosis, DM, GERD, HTN, HLD, hypothyroidism, kidney stone, HLD, PUD, UTI, appendectomy, hernia repair. Eats 3 small meals at home; fair intake recently. UBW is 242-246 lb; few lb wt loss PTA. No known food allergies     Estimated Daily Nutrient Needs:  Energy (kcal): 6023-4955; Weight Used for Energy Requirements: Current(108.9 kg)  Protein (g): ; Weight Used for Protein Requirements: Current(x0.8-1)  Fluid (ml/day): 7278-0684; Method Used for Fluid Requirements: 1 ml/kcal      Nutrition Related Findings:  BM 1/23. No edema.      Meds: vitamin C, vitamin D3, cyanocobalamin, steroid, SSI, lantus, zinc      Wounds:    None       Current Nutrition Therapies:  DIET NUTRITIONAL SUPPLEMENTS Breakfast, Dinner; Glucerna Shake  DIET NUTRITIONAL SUPPLEMENTS Lunch, Dinner; Magic Cups ELIZABETH  DIET DIABETIC WITH OPTIONS Consistent Carb 1500-1600kcal; Regular; 2 GM NA (House Low NA)    Anthropometric Measures:  · Height:  5' 7\" (170.2 cm)  · Current Body Wt:  108.9 kg (240 lb 1.3 oz)   · Admission Body Wt:  235 lb 0.2 oz(pt stated)    · Usual Body Wt:  (242-246 lb)     · Ideal Body Wt:  135 lbs: 177.8 %   · Adjusted Body Weight:   ; Weight Adjustment for: No adjustment     · BMI Category:  Obese class 2 (BMI 35.0-39. 9)       Nutrition Diagnosis:   · Inadequate oral intake related to (decreased appetite) as evidenced by intake 26-50%(of estimated nutrition needs)      Nutrition Interventions:   Food and/or Nutrient Delivery: Continue current diet, Vitamin supplement, Mineral supplement, Modify oral nutrition supplement  Nutrition Education and Counseling: Education not indicated  Coordination of Nutrition Care: Continue to monitor while inpatient    Goals:  PO nutrition intake will meet >75% of patient's estimated nutrition needs within the next 7 days       Nutrition Monitoring and Evaluation:   Behavioral-Environmental Outcomes: None identified  Food/Nutrient Intake Outcomes: Food and nutrient intake, Supplement intake, Vitamin/mineral intake  Physical Signs/Symptoms Outcomes: Biochemical data, Nutrition focused physical findings, Meal time behavior    Discharge Planning:    Continue oral nutrition supplement, Continue current diet     Electronically signed by Kim Rodas RD on 1/25/2021 at 4:01 PM    Contact: [phone]

## 2021-01-25 NOTE — PROGRESS NOTES
Called Jessica at [facility] to check on bed availability for today. No answer, left message to call this writer back.      Aki Poe RN BSN  Care Manager  [phone]

## 2021-01-29 ENCOUNTER — PATIENT OUTREACH (OUTPATIENT)
Dept: CASE MANAGEMENT | Age: 86
End: 2021-01-29

## 2021-01-29 NOTE — PROGRESS NOTES
Pt admitted to SNF after D/C from admission. Will close Complex Care Management episode. Ambulatory care will resume upon discharge from SNF.

## 2021-01-29 NOTE — PROGRESS NOTES
Transition of Care Coordination/Hospital to Post Acute Facility:     Date/Time:  1/29/2021 5:03 PM    Patient was admitted to [facility] [place] on 1/17/21  for treatment of Covid-19 infection and pneumonia. Patient was discharged on 1/25/21 to  [facility]  for continuation of care. Transition of care outreach postponed for 14 days due to patient's discharge to SNF.

## 2021-02-02 ENCOUNTER — HOSPITAL ENCOUNTER (OUTPATIENT)
Dept: LAB | Age: 86
Discharge: HOME | End: 2021-02-02

## 2021-02-02 LAB
ANION GAP SERPL CALC-SCNC: 4 MMOL/L (ref 3–18)
BASOPHILS # BLD: 0 K/UL (ref 0–0.1)
BASOPHILS NFR BLD: 0 % (ref 0–2)
BUN SERPL-MCNC: 26 MG/DL (ref 7–18)
BUN/CREAT SERPL: 25 (ref 12–20)
CALCIUM SERPL-MCNC: 9 MG/DL (ref 8.5–10.1)
CHLORIDE SERPL-SCNC: 109 MMOL/L (ref 100–111)
CO2 SERPL-SCNC: 30 MMOL/L (ref 21–32)
CREAT SERPL-MCNC: 1.02 MG/DL (ref 0.6–1.3)
DIFFERENTIAL METHOD BLD: ABNORMAL
EOSINOPHIL # BLD: 0.1 K/UL (ref 0–0.4)
EOSINOPHIL NFR BLD: 2 % (ref 0–5)
ERYTHROCYTE [DISTWIDTH] IN BLOOD BY AUTOMATED COUNT: 13.2 % (ref 11.6–14.5)
GLUCOSE SERPL-MCNC: 132 MG/DL (ref 74–99)
HCT VFR BLD AUTO: 36.8 % (ref 35–45)
HGB BLD-MCNC: 11.9 G/DL (ref 12–16)
LYMPHOCYTES # BLD: 1 K/UL (ref 0.9–3.6)
LYMPHOCYTES NFR BLD: 13 % (ref 21–52)
MCH RBC QN AUTO: 32.2 PG (ref 24–34)
MCHC RBC AUTO-ENTMCNC: 32.3 G/DL (ref 31–37)
MCV RBC AUTO: 99.5 FL (ref 74–97)
MONOCYTES # BLD: 0.8 K/UL (ref 0.05–1.2)
MONOCYTES NFR BLD: 10 % (ref 3–10)
NEUTS SEG # BLD: 5.9 K/UL (ref 1.8–8)
NEUTS SEG NFR BLD: 75 % (ref 40–73)
PLATELET # BLD AUTO: 378 K/UL (ref 135–420)
PMV BLD AUTO: 10.6 FL (ref 9.2–11.8)
POTASSIUM SERPL-SCNC: 3.8 MMOL/L (ref 3.5–5.5)
RBC # BLD AUTO: 3.7 M/UL (ref 4.2–5.3)
SODIUM SERPL-SCNC: 143 MMOL/L (ref 136–145)
WBC # BLD AUTO: 7.8 K/UL (ref 4.6–13.2)

## 2021-02-02 PROCEDURE — 80048 BASIC METABOLIC PNL TOTAL CA: CPT

## 2021-02-02 PROCEDURE — 85025 COMPLETE CBC W/AUTO DIFF WBC: CPT

## 2021-02-08 ENCOUNTER — PATIENT OUTREACH (OUTPATIENT)
Dept: CASE MANAGEMENT | Age: 86
End: 2021-02-08

## 2021-02-08 NOTE — PROGRESS NOTES
CTN Attempt to contact patient via telephone on 2/8/21 for post hospital follow up. Unable to reach. Unable to leave a voice message , Pt. vm is full.

## 2021-02-16 ENCOUNTER — OFFICE VISIT (OUTPATIENT)
Dept: CARDIOLOGY CLINIC | Age: 86
End: 2021-02-16
Payer: MEDICARE

## 2021-02-16 VITALS
TEMPERATURE: 97 F | DIASTOLIC BLOOD PRESSURE: 71 MMHG | WEIGHT: 221 LBS | SYSTOLIC BLOOD PRESSURE: 123 MMHG | BODY MASS INDEX: 34.69 KG/M2 | HEIGHT: 67 IN | HEART RATE: 104 BPM

## 2021-02-16 DIAGNOSIS — I10 ESSENTIAL HYPERTENSION: ICD-10-CM

## 2021-02-16 DIAGNOSIS — E78.2 MIXED HYPERLIPIDEMIA: ICD-10-CM

## 2021-02-16 DIAGNOSIS — I25.118 ATHEROSCLEROSIS OF NATIVE CORONARY ARTERY OF NATIVE HEART WITH STABLE ANGINA PECTORIS (HCC): Primary | ICD-10-CM

## 2021-02-16 DIAGNOSIS — I50.32 CHRONIC DIASTOLIC CONGESTIVE HEART FAILURE (HCC): ICD-10-CM

## 2021-02-16 DIAGNOSIS — Z95.5 S/P CORONARY ARTERY STENT PLACEMENT: ICD-10-CM

## 2021-02-16 PROCEDURE — 99214 OFFICE O/P EST MOD 30 MIN: CPT | Performed by: NURSE PRACTITIONER

## 2021-02-16 RX ORDER — DOCUSATE SODIUM 100 MG/1
100 CAPSULE, LIQUID FILLED ORAL AS NEEDED
COMMUNITY
End: 2021-11-09
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
## 2021-02-16 NOTE — PATIENT INSTRUCTIONS
Heart-Healthy Diet: Care Instructions  Your Care Instructions     A heart-healthy diet has lots of vegetables, fruits, nuts, beans, and whole grains, and is low in salt. It limits foods that are high in saturated fat, such as meats, cheeses, and fried foods. It may be hard to change your diet, but even small changes can lower your risk of heart attack and heart disease. Follow-up care is a key part of your treatment and safety. Be sure to make and go to all appointments, and call your doctor if you are having problems. It's also a good idea to know your test results and keep a list of the medicines you take. How can you care for yourself at home? Watch your portions  · Learn what a serving is. A \"serving\" and a \"portion\" are not always the same thing. Make sure that you are not eating larger portions than are recommended. For example, a serving of pasta is ½ cup. A serving size of meat is 2 to 3 ounces. A 3-ounce serving is about the size of a deck of cards. Measure serving sizes until you are good at [place]" them. Keep in mind that restaurants often serve portions that are 2 or 3 times the size of one serving. · To keep your energy level up and keep you from feeling hungry, eat often but in smaller portions. · Eat only the number of calories you need to stay at a healthy weight. If you need to lose weight, eat fewer calories than your body burns (through exercise and other physical activity). Eat more fruits and vegetables  · Eat a variety of fruit and vegetables every day. Dark green, deep orange, red, or yellow fruits and vegetables are especially good for you. Examples include spinach, carrots, peaches, and berries. · Keep carrots, celery, and other veggies handy for snacks. Buy fruit that is in season and store it where you can see it so that you will be tempted to eat it. · Cook dishes that have a lot of veggies in them, such as stir-fries and soups.   Limit saturated and trans fat  · Read food labels, and try to avoid saturated and trans fats. They increase your risk of heart disease. · Use olive or canola oil when you cook. · Bake, broil, grill, or steam foods instead of frying them. · Choose lean meats instead of high-fat meats such as hot dogs and sausages. Cut off all visible fat when you prepare meat. · Eat fish, skinless poultry, and meat alternatives such as soy products instead of high-fat meats. Soy products, such as tofu, may be especially good for your heart. · Choose low-fat or fat-free milk and dairy products. Eat foods high in fiber  · Eat a variety of grain products every day. Include whole-grain foods that have lots of fiber and nutrients. Examples of whole-grain foods include oats, whole wheat bread, and brown rice. · Buy whole-grain breads and cereals, instead of white bread or pastries. Limit salt and sodium  · Limit how much salt and sodium you eat to help lower your blood pressure. · Taste food before you salt it. Add only a little salt when you think you need it. With time, your taste buds will adjust to less salt. · Eat fewer snack items, fast foods, and other high-salt, processed foods. Check food labels for the amount of sodium in packaged foods. · Choose low-sodium versions of canned goods (such as soups, vegetables, and beans). Limit sugar  · Limit drinks and foods with added sugar. These include candy, desserts, and soda pop. Limit alcohol  · Limit alcohol to no more than 2 drinks a day for men and 1 drink a day for women. Too much alcohol can cause health problems. When should you call for help? Watch closely for changes in your health, and be sure to contact your doctor if:    · You would like help planning heart-healthy meals. Where can you learn more? Go to http://www.[employer]com/  Enter V137 in the search box to learn more about \"Heart-Healthy Diet: Care Instructions. \"  Current as of: August 22, 2019               Content Version: 12.6  © 6268-2470 [employer]. Care instructions adapted under license by [employer] (which disclaims liability or warranty for this information). If you have questions about a medical condition or this instruction, always ask your healthcare professional. [address] any warranty or liability for your use of this information.

## 2021-02-16 NOTE — PROGRESS NOTES
1. Have you been to the ER, urgent care clinic since your last visit? Hospitalized since your last visit? Yes Mitzi for generalized weakness    2. Have you seen or consulted any other health care providers outside of the [address] [place] [address] since your last visit? Include any pap smears or colon screening.  Yes  [facility]
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
## 2021-03-01 ENCOUNTER — VIRTUAL VISIT (OUTPATIENT)
Dept: FAMILY MEDICINE CLINIC | Age: 86
End: 2021-03-01
Payer: MEDICARE

## 2021-03-01 DIAGNOSIS — E11.69 HYPERLIPIDEMIA ASSOCIATED WITH TYPE 2 DIABETES MELLITUS (HCC): ICD-10-CM

## 2021-03-01 DIAGNOSIS — E03.9 ACQUIRED HYPOTHYROIDISM: Chronic | ICD-10-CM

## 2021-03-01 DIAGNOSIS — M35.3 POLYMYALGIA RHEUMATICA (HCC): ICD-10-CM

## 2021-03-01 DIAGNOSIS — U07.1 PNEUMONIA DUE TO COVID-19 VIRUS: Primary | ICD-10-CM

## 2021-03-01 DIAGNOSIS — Z78.9 STATIN INTOLERANCE: ICD-10-CM

## 2021-03-01 DIAGNOSIS — E78.5 HYPERLIPIDEMIA ASSOCIATED WITH TYPE 2 DIABETES MELLITUS (HCC): ICD-10-CM

## 2021-03-01 DIAGNOSIS — I50.32 CHRONIC DIASTOLIC HEART FAILURE (HCC): ICD-10-CM

## 2021-03-01 DIAGNOSIS — I25.118 ATHEROSCLEROSIS OF NATIVE CORONARY ARTERY OF NATIVE HEART WITH STABLE ANGINA PECTORIS (HCC): ICD-10-CM

## 2021-03-01 DIAGNOSIS — E11.65 TYPE 2 DIABETES MELLITUS WITH HYPERGLYCEMIA, WITH LONG-TERM CURRENT USE OF INSULIN (HCC): Chronic | ICD-10-CM

## 2021-03-01 DIAGNOSIS — J12.82 PNEUMONIA DUE TO COVID-19 VIRUS: Primary | ICD-10-CM

## 2021-03-01 DIAGNOSIS — I82.532 CHRONIC DEEP VEIN THROMBOSIS (DVT) OF POPLITEAL VEIN OF LEFT LOWER EXTREMITY (HCC): ICD-10-CM

## 2021-03-01 DIAGNOSIS — Z79.4 TYPE 2 DIABETES MELLITUS WITH HYPERGLYCEMIA, WITH LONG-TERM CURRENT USE OF INSULIN (HCC): Chronic | ICD-10-CM

## 2021-03-01 PROBLEM — N39.0 URINARY TRACT INFECTION WITH HEMATURIA: Status: RESOLVED | Noted: 2019-05-07 | Resolved: 2021-03-01

## 2021-03-01 PROBLEM — R10.31 RIGHT GROIN PAIN: Status: RESOLVED | Noted: 2017-06-16 | Resolved: 2021-03-01

## 2021-03-01 PROCEDURE — 99215 OFFICE O/P EST HI 40 MIN: CPT | Performed by: NURSE PRACTITIONER

## 2021-03-01 PROCEDURE — G0463 HOSPITAL OUTPT CLINIC VISIT: HCPCS | Performed by: NURSE PRACTITIONER

## 2021-03-01 PROCEDURE — 3052F HG A1C>EQUAL 8.0%<EQUAL 9.0%: CPT | Performed by: NURSE PRACTITIONER

## 2021-03-01 NOTE — PROGRESS NOTES
Flores Christiansen presents today for   Chief Complaint   Patient presents with    CHF    Other     body mass index     Hypothyroidism       Virtual/telephone visit    Depression Screening:  3 most recent PHQ Screens 3/1/2021   PHQ Not Done -   Little interest or pleasure in doing things Not at all   Feeling down, depressed, irritable, or hopeless Not at all   Total Score PHQ 2 0       Learning Assessment:  Learning Assessment 3/1/2021   PRIMARY LEARNER Patient   HIGHEST LEVEL OF EDUCATION - PRIMARY LEARNER  [facility] [address] PRIMARY LEARNER NONE   CO-LEARNER CAREGIVER No   CO-LEARNER NAME -   PRIMARY LANGUAGE ENGLISH    NEED -   LEARNER PREFERENCE PRIMARY READING     -     -     -     -   ANSWERED BY patient    RELATIONSHIP SELF       Fall Risk  Fall Risk Assessment, last 12 mths 3/1/2021   Able to walk? Yes   Fall in past 12 months? 0   Do you feel unsteady? 1   Are you worried about falling 1   Is TUG test greater than 12 seconds? 0   Is the gait abnormal? 1   Number of falls in past 12 months 0   Fall with injury? -       ADL  No flowsheet data found. Health Maintenance reviewed and discussed and ordered per Provider. Health Maintenance Due   Topic Date Due    COVID-19 Vaccine (1 of 2) 01/30/1951    Shingrix Vaccine Age 50> (1 of 2) 01/30/1987    Foot Exam Q1  10/24/2018    MICROALBUMIN Q1  02/25/2020   . Coordination of Care:  1. Have you been to the ER, urgent care clinic since your last visit? Hospitalized since your last visit? HBV 1/7/21, SOB    2. Have you seen or consulted any other health care providers outside of the [address] [place] [address] since your last visit? Include any pap smears or colon screening.  No

## 2021-03-03 ENCOUNTER — PATIENT OUTREACH (OUTPATIENT)
Dept: CASE MANAGEMENT | Age: 86
End: 2021-03-03

## 2021-03-03 NOTE — PROGRESS NOTES
Patient has graduated from the Transitions of Care Coordination  program on 3/3/2021. Patient reported that she is doing well. No New or worsening of symptoms verbalized by Patient at this time. Patient states that she has a neighbor that can help her. Patient reported that her brother and her sister in law live nearby. Patient states that she has a granddaughter that check on her at times. Patient states that she needs a list of personal care aide agencies. Patient agreed for  to follow. Sent message to  regarding Pt. Needs. No other needs, questions or assistance verbalized by Patient at this time. Noted Patient attended appt with PCP on 3/1/21 and cardiology on  2/16/21 . Patient's symptoms are stable at this time. Care management goals have been completed at this time. No further care transitions nurse follow up scheduled. Reminded Pt. to go to the nearest emergency room for any emergency  S/s such as chest pain, shortness of breath, returning of symptoms that brought her to the emergency room and/or worsening of symptoms. Pt. Verbalized and repeated back understanding. Goals Addressed                 This Visit's Progress     COMPLETED: Attends follow-up appointments as directed. Patient will attend ff appts; Future Appointments   Date Time Provider Chelle Reeves   1/19/2021 10:00 AM Jenniffer Rubinstein, NP [facility] BS AMB   1/19/2021 11:30 AM ANAND MahanP BS AMB   1/27/2021 10:30 AM [facility]   3/2/2021 12:45 PM Boris Bill MD [facility] BS AMB               Patient has care transitions nurse contact information for any further questions, concerns, or needs.     Patient's upcoming visits:    Future Appointments   Date Time Provider Chelle Reeves   5/17/2021 11:45 AM Caryle Lint, MD [facility] BS AMB   6/1/2021 10:00 AM [facility]

## 2021-03-05 ENCOUNTER — PATIENT OUTREACH (OUTPATIENT)
Dept: CASE MANAGEMENT | Age: 86
End: 2021-03-05

## 2021-03-05 NOTE — PROGRESS NOTES
Social Work Note  3/5/2021      Date of referral: 3/4/2021  Referral received from: JENNIFER - Paula Womack RN  Reason for referral: Assist the patient with community resource for a reliable personal care agency. Previous SW Referral:  no   If yes, brief summary and outcome:  n/a    Support System: PCP, granddaughter Jensen Jauregui, neighbor [address] [employer] Providers: PCP    Transportation: Prabhakar Crow (neighbor)    Medication:unknown    Financial Concern: none    Advance Care Plan:  reviewed and current     Other: n/a    Identified Needs:      Assist the patient with community resource for a reliable personal care agency    Social Work Plan:     Provide the patient with list of community resources for personal care agency    MSW received referral from JENNIFER to assist the patient with community resources for a reliable personal care agency. The patient is a 82year old woman who resides alone in the community. She is alert and oriented X4 and an active participant in the conversation. The patient verified her name and  as identifiers. MSW introduced self, role and reason for call. The patient reported that she was recently hospitalized then sent to a SNF for short term rehab. She mentioned that prior to her hospitalization, she was receiving personal care from [address] [place] [address]. The patient shared that the aides were not very reliable and she made the agency aware at the time. The patient reported that since she's been discharged home, the agency has not been able to assign an aide to her due to their lack of staff. She informed MSW that she would like another agency to assist her. The patient made MSW aware that she is aware of the process and have already terminated services with her prior agency. MSW and patient had discussion about UAI and patient is aware that the new agency may need to obtain access to that document. The patient verbalized understanding and stated that she knows.    MSW provided the patient with contact information to local personal care agencies to call. The patient shared that she will try calling today. She asked that MSW contact her next week. MSW will follow to assist the patient as needed.

## 2021-03-08 ENCOUNTER — TELEPHONE (OUTPATIENT)
Dept: FAMILY MEDICINE CLINIC | Age: 86
End: 2021-03-08

## 2021-03-08 DIAGNOSIS — R05.9 COUGH: Primary | ICD-10-CM

## 2021-03-08 RX ORDER — BENZONATATE 200 MG/1
200 CAPSULE ORAL
Qty: 30 CAP | Refills: 0 | Status: SHIPPED | OUTPATIENT
Start: 2021-03-08 | End: 2021-08-12 | Stop reason: ALTCHOICE

## 2021-03-08 NOTE — TELEPHONE ENCOUNTER
Pt called and stated she needs something for cough. She have been taking Tussin for diabetic and vicks but nothing is helping it feels like its tightening up.  Please advise

## 2021-03-12 ENCOUNTER — PATIENT OUTREACH (OUTPATIENT)
Dept: CASE MANAGEMENT | Age: 86
End: 2021-03-12

## 2021-03-12 NOTE — PROGRESS NOTES
Social Work Note  3/12/2021    Date of referral: 3/4/2021  Referral received from: CTMICHELLE - Tereso Hanna RN  Reason for referral: Assist the patient with community resource for a reliable personal care agency.      Previous SW Referral:  no   If yes, brief summary and outcome:  n/a     Support System: PCP, granddaughter Ambar Schaefer, neighbor Ke Julio César     Community Providers: PCP     Transportation: Mallory Ferreira (neighbor)     Medication:unknown     Financial Concern: none     Advance Care Plan:  reviewed and current      Other: n/a     Identified Needs:      · Assist the patient with community resource for a reliable personal care agency     Social Work Plan:     · Provide the patient with list of community resources for personal care agency     MSW received referral from CTMICHELLE to assist the patient with community resources for a reliable personal care agency. The patient is a 82year old woman who resides alone in the community. She is alert and oriented X4 and an active participant in the conversation. The patient verified her name and  as identifiers. MSW introduced self, role and reason for call. The patient reported that she have contacted the agencies provided to her during last conversation. She is awaiting to hear back from a couple of them. A few agencies shared that they're not accepting any new patients due to [employer]. MSW informed the patient that she will contact some agencies for assistance. MSW is awaiting return calls from some agencies. Other agencies informed that they do not accept state insurances and some others does not service [facility]. The patient has been updated and verbalized understanding. MSW will continue to follow to assist the patient.

## 2021-03-22 ENCOUNTER — PATIENT OUTREACH (OUTPATIENT)
Dept: CASE MANAGEMENT | Age: 86
End: 2021-03-22

## 2021-03-22 NOTE — PROGRESS NOTES
.  Social Work Note  3/22/2021    Date of referral: 3/4/2021  Referral received from: JENNIFER - eJremy Nelson RN  Reason for referral: Assist the patient with community resource for a reliable personal care agency.      Previous  Referral:  no   If yes, brief summary and outcome:  n/a     Support System: PCP, granddaughter Tirso Fink, neighbor [address] [facility] Providers: PCP     Waqar Sigala (neighbor)     Medication:unknown     Financial Concern: none     Advance Care Plan:  reviewed and current      Other: n/a     Identified Needs:      · Assist the patient with community resource for a reliable personal care agency     Social Work Plan:     · Provide the patient with list of community resources for personal care agency     MSW received referral from JENNIFER to assist the patient with community resources for a reliable personal care agency. The patient is a 82year old woman who resides alone in the community. She is alert and oriented X4 and an active participant in the conversation. The patient verified her name and  as identifiers. MSW introduced self, role and reason for call. The patient reported that she has not received call after leaving message for the agency ([facility]). MSW informed the patient that no return call had been received. The patient indicated that she is managing the best way she can but needs the assistance. MSW left another message for [facility] with request to return call. MSW also contacted [facility] who informed that they are not accepting any new patients due to limited caregivers available to accommodate the patient at this time. Other agencies does not service the area in which the patient lives. MSW will continue to assist the patient in finding personal care services. The patient has been updated and verbalized understanding.

## 2021-03-24 ENCOUNTER — PATIENT OUTREACH (OUTPATIENT)
Dept: CASE MANAGEMENT | Age: 86
End: 2021-03-24

## 2021-03-24 NOTE — PROGRESS NOTES
Social Work Note  3/24/2021    Date of referral: 3/4/2021  Referral received from: JENNIFER - Tereso Hanna RN  Reason for referral: Assist the patient with community resource for a reliable personal care agency.      Previous SW Referral:  no   If yes, brief summary and outcome:  n/a     Support System: PCP, granddaughter Ambar Schaefer, neighbor [address] [facility] Providers: PCP     Ebony Moss (neighbor)     Medication:unknown     Financial Concern: none     Advance Care Plan:  reviewed and current      Other: n/a     Identified Needs:      · Assist the patient with community resource for a reliable personal care agency     Social Work Plan:     · Provide the patient with list of community resources for personal care agency     MSW received referral from JENNIFER to assist the patient with community resources for a reliable personal care agency. The patient is a 82year old woman who resides alone in the community. She is alert and oriented X4 and an active participant in the conversation. The patient verified her name and  as identifiers. MSW introduced self, role and reason for call. MSW informed the patient of agencies contacted and updated her that some agencies don't have the staffing to extend; some don't service the area of her residence; some don't accept Medicaid. The patient verbalized understanding. [facility] and spoke with the Elizabeth Katz who reported that he will need to communicate with his aides to find out if any will be able to assist the patient. Return call received from Ras Green who indicated that one of his aides is willing to provide service to the patient. MSW updated the patient with the information and provided her with the contact information to reach the agency. The patient stated that she will reach out to the agency and will update MSW.     MSW will continue to follow to assist the patient as needed.

## 2021-03-29 ENCOUNTER — PATIENT OUTREACH (OUTPATIENT)
Dept: CASE MANAGEMENT | Age: 86
End: 2021-03-29

## 2021-03-29 NOTE — PROGRESS NOTES
Social Work Note  3/29/2021    Date of referral: 3/4/2021  Referral received from: JENNIFER - Lamine Epstein RN  Reason for referral: Assist the patient with community resource for a reliable personal care agency.      Previous SW Referral:  no   If yes, brief summary and outcome:  n/a     Support System: PCP, granddaughter jonathan Pham [address] [facility] Providers: PCP     Paula Schulz (neighbor)     Medication:unknown     Financial Concern: none     Advance Care Plan:  reviewed and current      Other: n/a     Identified Needs:      · Assist the patient with community resource for a reliable personal care agency     Social Work Plan:     · Provide the patient with list of community resources for personal care agency. MSW received referral from JENNIFER to assist the patient with community resources for a reliable personal care agency. The patient is an 82year old woman who resides alone in the community. She is alert and oriented X4 and has the ability to verbalized her needs. MSW have been assisting the patient with finding home health agencies for personal care in the community. MSW continues to make calls to agencies listed online. MSW was able to speak with co-owner of [facility] who shared that the agency accepts Medicaid and they service the [address]. The co-owner asked if the patient's UAI can be faxed to him so that they can review and will send a nurse to her home for an assessment. MSW contacted the patient to inform. She verbalized understanding and shared that she will contact her former agency ([facility]) to request that they fax UAI to [employer]. MSW received call back from the patient who reported that the agency informed her that they forwarded a copy of her UAI to another agency. The patient shared that she provided information to her past agency regarding another agency she was interested in before she was hospitalized.      MSW informed the patient that she can contact her  at the [address] [place] [address] to request for a copy of UAI to be faxed to [employer]. Patient was insisting that [facility] should fax copy to agency and she informed MSW that she will contact agency again. MSW received call back from [address]. MSW was informed that based on assessment and level of care they will determine whether they will be able to extend care to the patient. Agency will also need UAI. MSW updated the patient and she verbalized understanding but was concern about [facility] sending UAI out and unable to give her specifics on where it was sent. MSW will follow to assist the patient as needed.

## 2021-04-02 ENCOUNTER — PATIENT OUTREACH (OUTPATIENT)
Dept: CASE MANAGEMENT | Age: 86
End: 2021-04-02

## 2021-04-02 NOTE — PROGRESS NOTES
Social Work Note  4/2/2021    Date of referral: 3/4/2021  Referral received from: JENNIFER - Fabio Lackey RN  Reason for referral: Assist the patient with community resource for a reliable personal care agency.      Previous SW Referral:  no   If yes, brief summary and outcome:  n/a     Support System: PCP, granddaughter Tatum Solis, jonathan [address] [facility] Providers: PCP     Whitney Juan (neighbor)     Medication:unknown     Financial Concern: none     Advance Care Plan:  reviewed and current      Other: n/a     Identified Needs:      · Assist the patient with community resource for a reliable personal care agency     Social Work Plan:     · Provide the patient with list of community resources for personal care agency.     MSW received referral from JENNIFER to assist the patient with community resources for a reliable personal care agency. The patient is an 82year old woman who resides alone in the community. She is alert and oriented X4 and has the ability to verbalize her needs. MSW have been assisting the patient with finding home health agencies for personal care in the community. The patient reported that she was able to get [facility] to fax UAI to [facility]. She stated that she received a call from the manager of [employer] personal care agency and he informed her that a nurse is scheduled to visit with her to complete assessment on 4/5/2021. MSW will follow up with the patient for updates.

## 2021-04-09 RX ORDER — AMLODIPINE BESYLATE 5 MG/1
TABLET ORAL
Qty: 90 TAB | Refills: 0 | Status: SHIPPED | OUTPATIENT
Start: 2021-04-09 | End: 2021-07-07

## 2021-04-12 ENCOUNTER — VIRTUAL VISIT (OUTPATIENT)
Dept: FAMILY MEDICINE CLINIC | Age: 86
End: 2021-04-12
Payer: MEDICARE

## 2021-04-12 DIAGNOSIS — Z79.899 ENCOUNTER FOR MEDICATION REVIEW: ICD-10-CM

## 2021-04-12 DIAGNOSIS — R60.0 BILATERAL LEG EDEMA: Primary | ICD-10-CM

## 2021-04-12 DIAGNOSIS — R06.02 SHORTNESS OF BREATH: ICD-10-CM

## 2021-04-12 PROCEDURE — 99442 PR PHYS/QHP TELEPHONE EVALUATION 11-20 MIN: CPT | Performed by: NURSE PRACTITIONER

## 2021-04-12 NOTE — PROGRESS NOTES
Chirag Hooper is a 82 y.o. female who was evaluated via audio-only technology on 4/12/2021 for Follow-up (Lump in right thigh. Tender to touch)    Assessment & Plan:     1. Bilateral leg edema  Comments:  Chronic, continue Spironolactone for now  Unclear whether or not she should be on Lasix, will come in for medication review with LPN  Orders:  -     XR CHEST PA LAT; Future  2. Shortness of breath  Comments:  Mild, check CXR to r/o vascular congestion, given CHF hx  Orders:  -     XR CHEST PA LAT; Future  3. Encounter for medication review  Comments:  Patient very confused about her medications  Will schedule with LPN next week for review, patient to bring ALL medication bottles, including insulin     Follow-up and Dispositions    · Return in about 2 months (around 6/12/2021) for diabetes, cholesterol, hypothyroidism, cad, lab results. SUBJECTIVE:     HPI    BLE Swelling -  Onset:  Chronic  Associated symptoms: None  She states she used to be on 40 mg of furosemide   Found some 20 mg tablets and took one  Right more swollen than left  She is worried about a blood clot  Reports mild SOB (chronic)  Denies any redness or pain of her legs, no chest pain    Current Outpatient Medications   Medication Sig Dispense Refill    amLODIPine (NORVASC) 5 mg tablet TAKE 1 TABLET BY MOUTH DAILY 90 Tab 0    benzonatate (TESSALON) 200 mg capsule Take 1 Cap by mouth three (3) times daily as needed for Cough. 30 Cap 0    docusate sodium (Colace) 100 mg capsule Take 100 mg by mouth as needed.  Lantus Solostar U-100 Insulin 100 unit/mL (3 mL) inpn 18 Units by SubCUTAneous route two (2) times a day. 1 Pen 0    acetaminophen (TYLENOL) 325 mg tablet Take 2 Tabs by mouth every six (6) hours as needed for Pain or Fever. 20 Tab 0    polyethylene glycol (MIRALAX) 17 gram packet Take 1 Packet by mouth daily as needed for Constipation.  15 Packet 0    levothyroxine (Synthroid) 137 mcg tablet Take 137 mcg by mouth Daily (before breakfast). Indications: a condition with low thyroid hormone levels 30 Tab 5    cholecalciferol (Vitamin D3) (1000 Units /25 mcg) tablet Take 1 Tab by mouth two (2) times a day. 60 Tab 0    spironolactone (ALDACTONE) 25 mg tablet Take 1 Tab by mouth daily. 30 Tab 0    clopidogreL (PLAVIX) 75 mg tab TAKE 1 TABLET BY MOUTH DAILY 30 Tab 2    metoprolol tartrate (LOPRESSOR) 25 mg tablet TAKE 1 TABLET BY MOUTH EVERY 12 HOURS 60 Tab 5    isosorbide mononitrate ER (IMDUR) 30 mg tablet TAKE 1 TABLET BY MOUTH EVERY MORNING 90 Tab 3    albuterol (PROVENTIL HFA, VENTOLIN HFA, PROAIR HFA) 90 mcg/actuation inhaler INHALE 1 PUFF BY MOUTH EVERY 4 HOURS AS NEEDED FOR WHEEZING OR SHORTNESS OF BREATH 18 g 12    Flovent Diskus 100 mcg/actuation dsdv INHALE 1 PUFF BY MOUTH TWICE DAILY 1 Inhaler 5    multivitamin with minerals (MULTIVITAMIN & MINERAL FORMULA PO) Take 1 Tab by mouth daily.  ranolazine ER (RANEXA) 500 mg SR tablet Take 1 Tab by mouth two (2) times a day. 180 Tab 6    montelukast (SINGULAIR) 10 mg tablet Take 1 Tab by mouth daily. Indications: inflammation of the nose due to an allergy 90 Tab 4    nitroglycerin (NITROSTAT) 0.4 mg SL tablet 1 Tab by SubLINGual route every five (5) minutes as needed for Chest Pain. Up to 3 doses. 20 Tab 0    lidocaine (ASPERCREME, LIDOCAINE,) 4 % patch 1 Patch by TransDERmal route every eight (8) hours. 1 Package 3    RONNELL PEN NEEDLE 32 gauge x 5/32\" ndle   4    ascorbic acid, vitamin C, (VITAMIN C) 250 mg tablet Take 250 mg by mouth daily. 1 pill po daily  Indications: inadequate vitamin C      cyanocobalamin ER 1,000 mcg tablet Take 1 Tab by mouth daily. 30 Tab 3    DOCOSAHEXANOIC ACID/EPA (FISH OIL PO) Take 1,000 mg by mouth two (2) times a day.  1 pill po twice daily       insulin lispro (HUMALOG) 100 unit/mL injection Check FSBS AC*HS  For sugars between 160 and 200- Give 2 units SQ,  For sugars between 201 and 250, Give 3 units SQ,  For sugars Between 251 and 300, give 5 units SQ,  For Sugars between 301 and 350, give 7 units SQ  For sugars between 351 and 400, give 8 units SQ and contact PCP 1 Vial 0    melatonin 5 mg tablet Take 1 Tab by mouth nightly as needed for Other (As Needed for insomnia). 15 Tab 0       Review of Systems   Constitutional: Negative for chills, fever and malaise/fatigue. Respiratory: Positive for cough and shortness of breath. Cardiovascular: Positive for leg swelling. Negative for chest pain. Gastrointestinal: Negative for nausea and vomiting. Neurological: Negative for dizziness, tingling and headaches. OBJECTIVE:     Physical Exam   Constitutional: Alert and oriented, in no distress  HENT:   Ears:  Hearing grossly intact. Pulmonary/Chest: Does not sound dyspneic, no audible wheezes   Neurological:  Intact recent memory, answering questions appropriately. Psychiatric: Judgment and insight good, normal mood. We discussed the expected course, resolution and complications of the diagnosis(es) in detail. Medication risks, benefits, costs, interactions, and alternatives were discussed as indicated. I advised her to contact the office if her condition worsens, changes or fails to improve as anticipated. She expressed understanding with the diagnosis(es) and plan. Daved Bence, who was evaluated through a synchronous (real-time) audio only encounter, and/or her healthcare decision maker, is aware that it is a billable service, with coverage as determined by her insurance carrier. provided verbal consent to proceed: Yes, and patient identification was verified. It was conducted pursuant to the emergency declaration under the [facility], [address] [place] [address] and the [employer] and [employer] General Act. A caregiver was present when appropriate. Ability to conduct physical exam was limited. I was in the office. The patient was at home.     Total time spent:  11-20 minutes  ANNIKA Bueno-C

## 2021-04-12 NOTE — PROGRESS NOTES
Vianey Gotti presents today for   Chief Complaint   Patient presents with    Follow-up     Lump in right thigh. Tender to touch       Virtual/telephone visit    Depression Screening:  3 most recent PHQ Screens 3/1/2021   PHQ Not Done -   Little interest or pleasure in doing things Not at all   Feeling down, depressed, irritable, or hopeless Not at all   Total Score PHQ 2 0       Learning Assessment:  Learning Assessment 3/1/2021   PRIMARY LEARNER Patient   HIGHEST LEVEL OF EDUCATION - PRIMARY LEARNER  S[facility] [address] PRIMARY LEARNER NONE   CO-LEARNER CAREGIVER No   CO-LEARNER NAME -   PRIMARY LANGUAGE ENGLISH    NEED -   LEARNER PREFERENCE PRIMARY READING     -     -     -     -   ANSWERED BY patient    RELATIONSHIP SELF       Fall Risk  Fall Risk Assessment, last 12 mths 3/1/2021   Able to walk? Yes   Fall in past 12 months? 0   Do you feel unsteady? 1   Are you worried about falling 1   Is TUG test greater than 12 seconds? 0   Is the gait abnormal? 1   Number of falls in past 12 months 0   Fall with injury? -       ADL  No flowsheet data found. Travel Screening:    Travel Screening     Question   Response    In the last month, have you been in contact with someone who was confirmed or suspected to have Coronavirus / COVID-19? No / Unsure    Have you had a COVID-19 viral test in the last 14 days? No    Do you have any of the following new or worsening symptoms? Have you traveled internationally or domestically in the last month? No      Travel History   Travel since 03/12/21     No documented travel since 03/12/21          Health Maintenance reviewed and discussed and ordered per Provider. Health Maintenance Due   Topic Date Due    COVID-19 Vaccine (1) Never done    Shingrix Vaccine Age 50> (1 of 2) Never done    Foot Exam Q1  10/24/2018    Pneumococcal 65+ years (2 of 2 - PPSV23) 02/19/2020    MICROALBUMIN Q1  02/25/2020   . Coordination of Care:    1.  Have you been to the ER, urgent care clinic since your last visit? Hospitalized since your last visit? Yes, for chest pain. 2. Have you seen or consulted any other health care providers outside of the [address] [place] [address] since your last visit? Include any pap smears or colon screening.  No

## 2021-04-15 ENCOUNTER — PATIENT OUTREACH (OUTPATIENT)
Dept: CASE MANAGEMENT | Age: 86
End: 2021-04-15

## 2021-04-15 NOTE — PROGRESS NOTES
Social Work Note  4/15/2021    Date of referral: 3/4/2021  Referral received from: CTMICHELLE - [place] RN  Reason for referral: Assist the patient with community resource for a reliable personal care agency.      Previous SW Referral:  no   If yes, brief summary and outcome:  n/a     Support System: PCP, granddaughter jonathan Inman [address] [facility] Providers: PCP     Dorene Shipley (neighbor)     Medication:unknown     Financial Concern: none     Advance Care Plan:  reviewed and current      Other: n/a     Identified Needs:      · Assist the patient with community resource for a reliable personal care agency     Social Work Plan:     · Provide the patient with list of community resources for personal care agency.     MSW received referral from JENNIFER to assist the patient with community resources for a reliable personal care agency. The patient is an 82year old woman who resides alone in the community. She is alert and oriented X4 and has the ability to verbalize her needs. The patient verified her name and  as identifiers. MSW introduced self, role and reason for call. MSW inquired on whether the Home health agency ([facility]) followed up with the patient as planned for assessment and assignment. The patient reported that the agency sent out an aide however, the aide only showed up two days and left without giving her notice. She informed MSW that she contacted the agency to inform and they have assigned another aide to her. The new aide is scheduled to come out on 2021. MSW had a lengthy conversation with the patient. MSW will follow to assist the patient as needed.

## 2021-04-20 ENCOUNTER — PATIENT OUTREACH (OUTPATIENT)
Dept: CASE MANAGEMENT | Age: 86
End: 2021-04-20

## 2021-04-20 NOTE — PROGRESS NOTES
Social Work Note  4/20/2021    Date of referral: 3/4/2021  Referral received from: JENNIFER - Nino Henderson RN  Reason for referral: Assist the patient with community resource for a reliable personal care agency.      Previous SW Referral:  no   If yes, brief summary and outcome:  n/a     Support System: PCP, granddaughter Charles Gardiner, jonathan [address] [facility] Providers: PCP     Estelle Patel (neighbor)     Medication:unknown     Financial Concern: none     Advance Care Plan:  reviewed and current      Other: n/a     Identified Needs:      · Assist the patient with community resource for a reliable personal care agency     Social Work Plan:     · Provide the patient with list of community resources for personal care agency.     MSW received referral from JENNIFER to assist the patient with community resources for a reliable personal care agency. The patient is an 82year old woman who resides alone in the community. She is alert and oriented X4 and has the ability to verbalize her needs. MSW have been assisting the patient with finding home health agencies for personal care in the community. [facility]W [facility] with [facility] to inquire if they provide services in the area the patient resides. MSW provided the patient with information to contact the agency. She have decided to use this agency for personal care aides. The agency have initiated services and have assigned an aide to assist her in the home. The patient reported that the aide will be assisting her 5 days weekly and expressed that she likes the aide. MSW will follow to assist the patient as needed.

## 2021-04-25 DIAGNOSIS — Z95.5 S/P CORONARY ARTERY STENT PLACEMENT: ICD-10-CM

## 2021-04-26 RX ORDER — CLOPIDOGREL BISULFATE 75 MG/1
TABLET ORAL
Qty: 30 TAB | Refills: 2 | Status: SHIPPED | OUTPATIENT
Start: 2021-04-26 | End: 2021-07-28

## 2021-04-27 ENCOUNTER — PATIENT OUTREACH (OUTPATIENT)
Dept: CASE MANAGEMENT | Age: 86
End: 2021-04-27

## 2021-04-27 NOTE — PROGRESS NOTES
Date/Time:  2021 3:51 PM    Method of communication with patient:phone    [address] [place] [address] ([facility]) contacted the patient by telephone to perform Ambulatory Care Coordination. Verified name and  (PHI) with patient as identifiers. Provided introduction to self, and explanation of the Ambulatory Care Manager's role. Reviewed most recent clinic visit w/ patient who verbalized understanding. Patient given an opportunity to ask questions. Top Challenges reviewed with the patient   1. Spoke with patient - Patient states doing well at present  2. Medication reconciliation done at this encounter with patient /family. Shows understanding of medication therapy  3. [facility] to call [facility] to determine CXR appointment time. - will call tomorrow AM with info. 4. Further appointments listed below  5. Patient has [facility] contact information and will be followed per [facility] protocol. The patient agrees to contact the PCP office or the [address] [place] [address] for questions related to their healthcare. Provided contact information for future reference. Disease Specific:   CHF    Home Health Active: No    DME Active: Yes    Barriers to care? lack of knowledge about disease, medication management    Advance Care Planning:   Does patient have an Advance Directive:  reviewed and current     Medication(s):   Medication reconciliation was performed with patient, who verbalizes understanding of administration of home medications. There were no barriers to obtaining medications identified at this time. Current Outpatient Medications   Medication Sig    clopidogreL (PLAVIX) 75 mg tab TAKE 1 TABLET BY MOUTH DAILY    amLODIPine (NORVASC) 5 mg tablet TAKE 1 TABLET BY MOUTH DAILY    benzonatate (TESSALON) 200 mg capsule Take 1 Cap by mouth three (3) times daily as needed for Cough.  docusate sodium (Colace) 100 mg capsule Take 100 mg by mouth as needed.     Lanmarcela Pabonar U-100 Insulin 100 unit/mL (3 mL) inpn 18 Units by SubCUTAneous route two (2) times a day.  acetaminophen (TYLENOL) 325 mg tablet Take 2 Tabs by mouth every six (6) hours as needed for Pain or Fever.  insulin lispro (HUMALOG) 100 unit/mL injection Check FSBS AC*HS  For sugars between 160 and 200- Give 2 units SQ,  For sugars between 201 and 250, Give 3 units SQ,  For sugars Between 251 and 300, give 5 units SQ,  For Sugars between 301 and 350, give 7 units SQ  For sugars between 351 and 400, give 8 units SQ and contact PCP    melatonin 5 mg tablet Take 1 Tab by mouth nightly as needed for Other (As Needed for insomnia).  polyethylene glycol (MIRALAX) 17 gram packet Take 1 Packet by mouth daily as needed for Constipation.  levothyroxine (Synthroid) 137 mcg tablet Take 137 mcg by mouth Daily (before breakfast). Indications: a condition with low thyroid hormone levels    cholecalciferol (Vitamin D3) (1000 Units /25 mcg) tablet Take 1 Tab by mouth two (2) times a day.  spironolactone (ALDACTONE) 25 mg tablet Take 1 Tab by mouth daily.  metoprolol tartrate (LOPRESSOR) 25 mg tablet TAKE 1 TABLET BY MOUTH EVERY 12 HOURS    isosorbide mononitrate ER (IMDUR) 30 mg tablet TAKE 1 TABLET BY MOUTH EVERY MORNING    albuterol (PROVENTIL HFA, VENTOLIN HFA, PROAIR HFA) 90 mcg/actuation inhaler INHALE 1 PUFF BY MOUTH EVERY 4 HOURS AS NEEDED FOR WHEEZING OR SHORTNESS OF BREATH    Flovent Diskus 100 mcg/actuation dsdv INHALE 1 PUFF BY MOUTH TWICE DAILY    multivitamin with minerals (MULTIVITAMIN & MINERAL FORMULA PO) Take 1 Tab by mouth daily.  ranolazine ER (RANEXA) 500 mg SR tablet Take 1 Tab by mouth two (2) times a day.  montelukast (SINGULAIR) 10 mg tablet Take 1 Tab by mouth daily. Indications: inflammation of the nose due to an allergy    nitroglycerin (NITROSTAT) 0.4 mg SL tablet 1 Tab by SubLINGual route every five (5) minutes as needed for Chest Pain. Up to 3 doses.     lidocaine (ASPERCREME, LIDOCAINE,) 4 % patch 1 Patch by TransDERmal route every eight (8) hours.  RONNELL PEN NEEDLE 32 gauge x 5/32\" ndle     ascorbic acid, vitamin C, (VITAMIN C) 250 mg tablet Take 250 mg by mouth daily. 1 pill po daily  Indications: inadequate vitamin C    cyanocobalamin ER 1,000 mcg tablet Take 1 Tab by mouth daily.  DOCOSAHEXANOIC ACID/EPA (FISH OIL PO) Take 1,000 mg by mouth two (2) times a day. 1 pill po twice daily      No current facility-administered medications for this visit. [facility] follow up appointment(s):   Future Appointments   Date Time Provider Chelle Reeves   5/17/2021 11:45 AM Kaleb Flores MD [facility] AM[facility]   6/1/2021 10:00 AM Leyla Etienne MD [facility]          Goals Addressed                 This Visit's Progress     Attend follow up appointments on schedule   On track     12/21/20 Patient will attend all scheduled appointments through 3/21/20       Knowledge and adherence of prescribed medication (ie. action, side effects, missed dose, etc.).    On track     12/21/20 Pt will take all medications prescribed to be evaluated on each outreach through  3/21/20       Prepare patients and caregivers for end of life decisions (ie. need for hospice, pain management, symptom relief, advance directives etc.)   On track     12/21/20 Pt will complete an ACP and have it scanned into their EMR by 3/21/20       Prepare patients and caregivers for end of life decisions (ie. need for hospice, pain management, symptom relief, advance directives etc.)   On track

## 2021-04-28 ENCOUNTER — PATIENT OUTREACH (OUTPATIENT)
Dept: CASE MANAGEMENT | Age: 86
End: 2021-04-28

## 2021-04-28 NOTE — PROGRESS NOTES
Returned call as promised yesterday to tell patient when she could get her CXR and lab work at [id number] [facility]. Unable to leave message on home or mobile phone due to mail box full - will return call at a later time.

## 2021-04-30 ENCOUNTER — PATIENT OUTREACH (OUTPATIENT)
Dept: CASE MANAGEMENT | Age: 86
End: 2021-04-30

## 2021-04-30 NOTE — PROGRESS NOTES
Social Work Note  2021    Date of referral: 3/4/2021  Referral received from: JENNIFER - Mirela Ramirez RN  Reason for referral: Assist the patient with community resource for a reliable personal care agency.      Previous  Referral:  no   If yes, brief summary and outcome:  n/a     Support System: PCP, granddaughter Mauri Freeman, neighbor [address] [facility] Providers: PCP     Osito Davenport (neighbor)     Medication:unknown     Financial Concern: none     Advance Care Plan:  reviewed and current      Other: n/a     Identified Needs:      · Assist the patient with community resource for a reliable personal care agency     Social Work Plan:     · Provide the patient with list of community resources for personal care agency.     MSW received referral from JENNIFER to assist the patient with community resources for a reliable personal care agency. The patient is an 82year old woman who resides alone in the community. She is alert and oriented X4 and has the ability to verbalize her needs. MSW have been assisting the patient with finding home health agencies for personal care in the community. [facility] with [facility] to inquire if they provide services in the area the patient resides. MSW provided the patient with information to contact the agency. She have decided to use this agency for personal care aides. MSW contacted the patient who verified her name and  as identifiers. MSW introduced self, role and reason for call. The patient reported that she is doing okay. She shared that the aide has been out to assist her this week and mentioned that she was at the home at the time of call. The patient informed MSW that she has been having some pain in her leg and felt that she needs to have blood work completed. MSW encouraged the patient to communicate with her PCP to order labs. The patient shared that she will call the office right now.    The patient had no other issue or concern to report at this time. MSW will continue to follow to assist the patient as needed.

## 2021-05-03 DIAGNOSIS — J45.20 MILD INTERMITTENT ASTHMA WITHOUT COMPLICATION: ICD-10-CM

## 2021-05-03 DIAGNOSIS — J30.9 ALLERGIC RHINITIS, UNSPECIFIED SEASONALITY, UNSPECIFIED TRIGGER: ICD-10-CM

## 2021-05-03 RX ORDER — MONTELUKAST SODIUM 10 MG/1
10 TABLET ORAL DAILY
Qty: 90 TAB | Refills: 4 | Status: SHIPPED | OUTPATIENT
Start: 2021-05-03 | End: 2022-08-01

## 2021-05-03 NOTE — TELEPHONE ENCOUNTER
Pt needs rx refill. Requested Prescriptions     Pending Prescriptions Disp Refills    montelukast (Singulair) 10 mg tablet 90 Tab 4     Sig: Take 1 Tab by mouth daily.  Indications: inflammation of the nose due to an allergy

## 2021-05-05 ENCOUNTER — APPOINTMENT (OUTPATIENT)
Dept: GENERAL RADIOLOGY | Age: 86
End: 2021-05-05
Attending: EMERGENCY MEDICINE
Payer: MEDICARE

## 2021-05-05 ENCOUNTER — PATIENT OUTREACH (OUTPATIENT)
Dept: CASE MANAGEMENT | Age: 86
End: 2021-05-05

## 2021-05-05 ENCOUNTER — HOSPITAL ENCOUNTER (EMERGENCY)
Age: 86
Discharge: HOME | End: 2021-05-05
Attending: EMERGENCY MEDICINE
Payer: MEDICARE

## 2021-05-05 VITALS
RESPIRATION RATE: 20 BRPM | SYSTOLIC BLOOD PRESSURE: 152 MMHG | BODY MASS INDEX: 36.96 KG/M2 | OXYGEN SATURATION: 95 % | HEIGHT: 66 IN | DIASTOLIC BLOOD PRESSURE: 74 MMHG | HEART RATE: 98 BPM | WEIGHT: 230 LBS | TEMPERATURE: 98.1 F

## 2021-05-05 DIAGNOSIS — J20.9 ACUTE BRONCHITIS, UNSPECIFIED ORGANISM: Primary | ICD-10-CM

## 2021-05-05 DIAGNOSIS — I48.20 CHRONIC ATRIAL FIBRILLATION (HCC): ICD-10-CM

## 2021-05-05 LAB
ALBUMIN SERPL-MCNC: 3.1 G/DL (ref 3.4–5)
ALBUMIN/GLOB SERPL: 0.7 {RATIO} (ref 0.8–1.7)
ALP SERPL-CCNC: 80 U/L (ref 45–117)
ALT SERPL-CCNC: 18 U/L (ref 13–56)
ANION GAP SERPL CALC-SCNC: 7 MMOL/L (ref 3–18)
AST SERPL-CCNC: 22 U/L (ref 10–38)
BASOPHILS # BLD: 0 K/UL (ref 0–0.1)
BASOPHILS NFR BLD: 0 % (ref 0–2)
BILIRUB SERPL-MCNC: 0.7 MG/DL (ref 0.2–1)
BNP SERPL-MCNC: 191 PG/ML (ref 0–1800)
BUN SERPL-MCNC: 7 MG/DL (ref 7–18)
BUN/CREAT SERPL: 10 (ref 12–20)
CALCIUM SERPL-MCNC: 9.4 MG/DL (ref 8.5–10.1)
CHLORIDE SERPL-SCNC: 104 MMOL/L (ref 100–111)
CO2 SERPL-SCNC: 29 MMOL/L (ref 21–32)
CREAT SERPL-MCNC: 0.67 MG/DL (ref 0.6–1.3)
DIFFERENTIAL METHOD BLD: ABNORMAL
EOSINOPHIL # BLD: 0.2 K/UL (ref 0–0.4)
EOSINOPHIL NFR BLD: 3 % (ref 0–5)
ERYTHROCYTE [DISTWIDTH] IN BLOOD BY AUTOMATED COUNT: 12.7 % (ref 11.6–14.5)
GLOBULIN SER CALC-MCNC: 4.5 G/DL (ref 2–4)
GLUCOSE SERPL-MCNC: 226 MG/DL (ref 74–99)
HCT VFR BLD AUTO: 35.7 % (ref 35–45)
HGB BLD-MCNC: 11.4 G/DL (ref 12–16)
LYMPHOCYTES # BLD: 1.7 K/UL (ref 0.9–3.6)
LYMPHOCYTES NFR BLD: 29 % (ref 21–52)
MAGNESIUM SERPL-MCNC: 2 MG/DL (ref 1.6–2.6)
MCH RBC QN AUTO: 30.7 PG (ref 24–34)
MCHC RBC AUTO-ENTMCNC: 31.9 G/DL (ref 31–37)
MCV RBC AUTO: 96.2 FL (ref 74–97)
MONOCYTES # BLD: 0.7 K/UL (ref 0.05–1.2)
MONOCYTES NFR BLD: 11 % (ref 3–10)
NEUTS SEG # BLD: 3.4 K/UL (ref 1.8–8)
NEUTS SEG NFR BLD: 57 % (ref 40–73)
PHOSPHATE SERPL-MCNC: 2.7 MG/DL (ref 2.5–4.9)
PLATELET # BLD AUTO: 220 K/UL (ref 135–420)
PLATELET COMMENTS,PCOM: ABNORMAL
PMV BLD AUTO: 11.7 FL (ref 9.2–11.8)
POTASSIUM SERPL-SCNC: 3.4 MMOL/L (ref 3.5–5.5)
PROT SERPL-MCNC: 7.6 G/DL (ref 6.4–8.2)
RBC # BLD AUTO: 3.71 M/UL (ref 4.2–5.3)
RBC MORPH BLD: ABNORMAL
SODIUM SERPL-SCNC: 140 MMOL/L (ref 136–145)
TROPONIN I SERPL-MCNC: 0.04 NG/ML (ref 0–0.04)
TSH SERPL DL<=0.05 MIU/L-ACNC: 0.08 UIU/ML (ref 0.36–3.74)
WBC # BLD AUTO: 6 K/UL (ref 4.6–13.2)

## 2021-05-05 PROCEDURE — 85025 COMPLETE CBC W/AUTO DIFF WBC: CPT

## 2021-05-05 PROCEDURE — 74011250636 HC RX REV CODE- 250/636: Performed by: EMERGENCY MEDICINE

## 2021-05-05 PROCEDURE — 80053 COMPREHEN METABOLIC PANEL: CPT

## 2021-05-05 PROCEDURE — 84443 ASSAY THYROID STIM HORMONE: CPT

## 2021-05-05 PROCEDURE — 83735 ASSAY OF MAGNESIUM: CPT

## 2021-05-05 PROCEDURE — 99284 EMERGENCY DEPT VISIT MOD MDM: CPT

## 2021-05-05 PROCEDURE — 96372 THER/PROPH/DIAG INJ SC/IM: CPT

## 2021-05-05 PROCEDURE — 83880 ASSAY OF NATRIURETIC PEPTIDE: CPT

## 2021-05-05 PROCEDURE — 71045 X-RAY EXAM CHEST 1 VIEW: CPT

## 2021-05-05 PROCEDURE — 84100 ASSAY OF PHOSPHORUS: CPT

## 2021-05-05 PROCEDURE — 84484 ASSAY OF TROPONIN QUANT: CPT

## 2021-05-05 PROCEDURE — 93005 ELECTROCARDIOGRAM TRACING: CPT

## 2021-05-05 RX ORDER — DEXAMETHASONE SODIUM PHOSPHATE 4 MG/ML
10 INJECTION, SOLUTION INTRA-ARTICULAR; INTRALESIONAL; INTRAMUSCULAR; INTRAVENOUS; SOFT TISSUE ONCE
Status: DISCONTINUED | OUTPATIENT
Start: 2021-05-05 | End: 2021-05-05

## 2021-05-05 RX ORDER — BENZONATATE 200 MG/1
200 CAPSULE ORAL
Qty: 30 CAP | Refills: 0 | Status: SHIPPED | OUTPATIENT
Start: 2021-05-05 | End: 2021-05-15

## 2021-05-05 RX ORDER — DEXAMETHASONE SODIUM PHOSPHATE 4 MG/ML
10 INJECTION, SOLUTION INTRA-ARTICULAR; INTRALESIONAL; INTRAMUSCULAR; INTRAVENOUS; SOFT TISSUE
Status: COMPLETED | OUTPATIENT
Start: 2021-05-05 | End: 2021-05-05

## 2021-05-05 RX ORDER — AZITHROMYCIN 250 MG/1
TABLET, FILM COATED ORAL
Qty: 6 TAB | Refills: 0 | Status: SHIPPED | OUTPATIENT
Start: 2021-05-05 | End: 2021-05-06 | Stop reason: SDUPTHER

## 2021-05-05 RX ADMIN — DEXAMETHASONE SODIUM PHOSPHATE 10 MG: 4 INJECTION, SOLUTION INTRAMUSCULAR; INTRAVENOUS at 17:53

## 2021-05-05 NOTE — PROGRESS NOTES
Date/Time:  2021 3:51 PM    Method of communication with patient:phone    [facility] ([facility]) contacted the patient by telephone to perform Ambulatory Care Coordination. Verified name and  (PHI) with patient as identifiers. Provided introduction to self, and explanation of the Ambulatory Care Manager's role. Reviewed most recent clinic visit w/ patient who verbalized understanding. Patient given an opportunity to ask questions. Top Challenges reviewed with the patient   1. Spoke with patient - States she is \"short of breath\" - took a NTG earlier today. No chest pain at present. 2. Encouraged patient to seek medical help as soon as possible. Patient started she was calling her neighbor to take her to [facility] ER. I encouraged patient to call 911 instead but she refused. Said her brother lived across the street - 80years old - and she did not want to upset him. Explained that he would be more upset of she got worse. 3. Respirations became less labored as phone conversation progressed. Patient stated her granddaughter was coming over at 1430 (20 minutes from this note) and she would have her take her to the ER if necessary. 4. Will return call tomorrow to assess patient condition. 5. Patient has [facility] contact information and will be followed per [facility] protocol. The patient agrees to contact the PCP office or the [facility] for questions related to their healthcare. Provided contact information for future reference. Disease Specific:   CHF    Home Health Active: No    DME Active: Yes    Barriers to care? lack of knowledge about disease, medication management    Advance Care Planning:   Does patient have an Advance Directive:  reviewed and current     Medication(s):   Medication reconciliation was performed with patient, who verbalizes understanding of administration of home medications. There were no barriers to obtaining medications identified at this time.          Current Outpatient Medications   Medication Sig    montelukast (Singulair) 10 mg tablet Take 1 Tab by mouth daily. Indications: inflammation of the nose due to an allergy    clopidogreL (PLAVIX) 75 mg tab TAKE 1 TABLET BY MOUTH DAILY    amLODIPine (NORVASC) 5 mg tablet TAKE 1 TABLET BY MOUTH DAILY    benzonatate (TESSALON) 200 mg capsule Take 1 Cap by mouth three (3) times daily as needed for Cough.  docusate sodium (Colace) 100 mg capsule Take 100 mg by mouth as needed.  Lantus Solostar U-100 Insulin 100 unit/mL (3 mL) inpn 18 Units by SubCUTAneous route two (2) times a day.  acetaminophen (TYLENOL) 325 mg tablet Take 2 Tabs by mouth every six (6) hours as needed for Pain or Fever.  insulin lispro (HUMALOG) 100 unit/mL injection Check FSBS AC*HS  For sugars between 160 and 200- Give 2 units SQ,  For sugars between 201 and 250, Give 3 units SQ,  For sugars Between 251 and 300, give 5 units SQ,  For Sugars between 301 and 350, give 7 units SQ  For sugars between 351 and 400, give 8 units SQ and contact PCP    melatonin 5 mg tablet Take 1 Tab by mouth nightly as needed for Other (As Needed for insomnia).  polyethylene glycol (MIRALAX) 17 gram packet Take 1 Packet by mouth daily as needed for Constipation.  levothyroxine (Synthroid) 137 mcg tablet Take 137 mcg by mouth Daily (before breakfast). Indications: a condition with low thyroid hormone levels    cholecalciferol (Vitamin D3) (1000 Units /25 mcg) tablet Take 1 Tab by mouth two (2) times a day.  spironolactone (ALDACTONE) 25 mg tablet Take 1 Tab by mouth daily.     metoprolol tartrate (LOPRESSOR) 25 mg tablet TAKE 1 TABLET BY MOUTH EVERY 12 HOURS    isosorbide mononitrate ER (IMDUR) 30 mg tablet TAKE 1 TABLET BY MOUTH EVERY MORNING    albuterol (PROVENTIL HFA, VENTOLIN HFA, PROAIR HFA) 90 mcg/actuation inhaler INHALE 1 PUFF BY MOUTH EVERY 4 HOURS AS NEEDED FOR WHEEZING OR SHORTNESS OF BREATH    Flovent Diskus 100 mcg/actuation dsdv INHALE 1 PUFF BY MOUTH TWICE DAILY    multivitamin with minerals (MULTIVITAMIN & MINERAL FORMULA PO) Take 1 Tab by mouth daily.  ranolazine ER (RANEXA) 500 mg SR tablet Take 1 Tab by mouth two (2) times a day.  nitroglycerin (NITROSTAT) 0.4 mg SL tablet 1 Tab by SubLINGual route every five (5) minutes as needed for Chest Pain. Up to 3 doses.  lidocaine (ASPERCREME, LIDOCAINE,) 4 % patch 1 Patch by TransDERmal route every eight (8) hours.  RONENLL PEN NEEDLE 32 gauge x 5/32\" ndle     ascorbic acid, vitamin C, (VITAMIN C) 250 mg tablet Take 250 mg by mouth daily. 1 pill po daily  Indications: inadequate vitamin C    cyanocobalamin ER 1,000 mcg tablet Take 1 Tab by mouth daily.  DOCOSAHEXANOIC ACID/EPA (FISH OIL PO) Take 1,000 mg by mouth two (2) times a day. 1 pill po twice daily      No current facility-administered medications for this visit. [facility] follow up appointment(s):   Future Appointments   Date Time Provider Chelle Reeves   5/17/2021 11:45 AM Amadeo Quiñonez MD [facility] AMB   6/1/2021 10:00 AM Geeta Carlos MD [facility] AM[facility]          Goals Addressed                 This Visit's Progress     Attend follow up appointments on schedule   On track     12/21/20 Patient will attend all scheduled appointments through 3/21/20       Knowledge and adherence of prescribed medication (ie. action, side effects, missed dose, etc.). On track     12/21/20 Pt will take all medications prescribed to be evaluated on each outreach through  3/21/20       Prepare patients and caregivers for end of life decisions (ie. need for hospice, pain management, symptom relief, advance directives etc.)   On track     12/21/20 Pt will complete an ACP and have it scanned into their EMR by 3/21/20       Prevent complications post hospitalization. On track     Patient will closely follow up with PCP. Pt. Will continue to take all medications as prescribed.    Pt. Will continue to check BP and BG and will call PCP for any questions and/or concerns.
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
## 2021-05-05 NOTE — ED TRIAGE NOTES
Pt reports SOB x4 days with cough. States hx asthma, taking duo-neb without relief. Pt states sometimes coughing up mucous. Pt also states pain in right leg.

## 2021-05-05 NOTE — ED NOTES
Patient resting comfortably- talking without difficulty. Labs drawn - unable to establish PIV- patient tolerated well.  Dr. Josh Wing made aware

## 2021-05-06 ENCOUNTER — PATIENT OUTREACH (OUTPATIENT)
Dept: CASE MANAGEMENT | Age: 86
End: 2021-05-06

## 2021-05-06 LAB
ATRIAL RATE: 106 BPM
CALCULATED R AXIS, ECG10: -23 DEGREES
CALCULATED T AXIS, ECG11: 14 DEGREES
DIAGNOSIS, 93000: NORMAL
Q-T INTERVAL, ECG07: 364 MS
QRS DURATION, ECG06: 86 MS
QTC CALCULATION (BEZET), ECG08: 483 MS
VENTRICULAR RATE, ECG03: 106 BPM

## 2021-05-06 RX ORDER — AZITHROMYCIN 250 MG/1
TABLET, FILM COATED ORAL
Qty: 6 TAB | Refills: 0 | Status: SHIPPED | OUTPATIENT
Start: 2021-05-06 | End: 2021-08-12 | Stop reason: ALTCHOICE

## 2021-05-06 NOTE — PROGRESS NOTES
Date/Time:  2021 3:51 PM    Method of communication with patient:phone    [facility] ([facility]) contacted the patient by telephone to perform Ambulatory Care Coordination. Verified name and  (PHI) with patient as identifiers. Provided introduction to self, and explanation of the Ambulatory Care Manager's role. Reviewed most recent clinic visit w/ patient who verbalized understanding. Patient given an opportunity to ask questions. Top Challenges reviewed with the patient   1. Spoke with patient - Patient states doing well at present . 2. Has improved since tri- to ER yesterday   3. Blood sugar at this point is uncontrolled, reported at 200-250 - patient following original Insuline prescriptions from Dr. Mi Pritchett. 4. patient has placed call to Dr. Mi Pritchett to tell about Blood Sugar and verify insuline therapy. patient very knowledgeable regarding meds and understands her med therapy. 5. Patient has [facility] contact information and will be followed per [facility] protocol. The patient agrees to contact the PCP office or the [address] [place] [address] for questions related to their healthcare. Provided contact information for future reference. Disease Specific:   CHF    Home Health Active: No    DME Active: Yes    Barriers to care? lack of knowledge about disease, medication management    Advance Care Planning:   Does patient have an Advance Directive:  reviewed and current     Medication(s):   Medication reconciliation was performed with patient, who verbalizes understanding of administration of home medications. There were no barriers to obtaining medications identified at this time. Current Outpatient Medications   Medication Sig    azithromycin (Zithromax Z-Jhonny) 250 mg tablet Take two tablets today then one tablet daily    benzonatate (TESSALON) 200 mg capsule Take 1 Cap by mouth three (3) times daily as needed for Cough for up to 10 days.     montelukast (Singulair) 10 mg tablet Take 1 Tab by mouth daily. Indications: inflammation of the nose due to an allergy    clopidogreL (PLAVIX) 75 mg tab TAKE 1 TABLET BY MOUTH DAILY    amLODIPine (NORVASC) 5 mg tablet TAKE 1 TABLET BY MOUTH DAILY    benzonatate (TESSALON) 200 mg capsule Take 1 Cap by mouth three (3) times daily as needed for Cough.  docusate sodium (Colace) 100 mg capsule Take 100 mg by mouth as needed.  Lantus Solostar U-100 Insulin 100 unit/mL (3 mL) inpn 18 Units by SubCUTAneous route two (2) times a day.  acetaminophen (TYLENOL) 325 mg tablet Take 2 Tabs by mouth every six (6) hours as needed for Pain or Fever.  insulin lispro (HUMALOG) 100 unit/mL injection Check FSBS AC*HS  For sugars between 160 and 200- Give 2 units SQ,  For sugars between 201 and 250, Give 3 units SQ,  For sugars Between 251 and 300, give 5 units SQ,  For Sugars between 301 and 350, give 7 units SQ  For sugars between 351 and 400, give 8 units SQ and contact PCP    melatonin 5 mg tablet Take 1 Tab by mouth nightly as needed for Other (As Needed for insomnia).  polyethylene glycol (MIRALAX) 17 gram packet Take 1 Packet by mouth daily as needed for Constipation.  levothyroxine (Synthroid) 137 mcg tablet Take 137 mcg by mouth Daily (before breakfast). Indications: a condition with low thyroid hormone levels    cholecalciferol (Vitamin D3) (1000 Units /25 mcg) tablet Take 1 Tab by mouth two (2) times a day.  spironolactone (ALDACTONE) 25 mg tablet Take 1 Tab by mouth daily.     metoprolol tartrate (LOPRESSOR) 25 mg tablet TAKE 1 TABLET BY MOUTH EVERY 12 HOURS    isosorbide mononitrate ER (IMDUR) 30 mg tablet TAKE 1 TABLET BY MOUTH EVERY MORNING    albuterol (PROVENTIL HFA, VENTOLIN HFA, PROAIR HFA) 90 mcg/actuation inhaler INHALE 1 PUFF BY MOUTH EVERY 4 HOURS AS NEEDED FOR WHEEZING OR SHORTNESS OF BREATH    Flovent Diskus 100 mcg/actuation dsdv INHALE 1 PUFF BY MOUTH TWICE DAILY    multivitamin with minerals (MULTIVITAMIN & MINERAL FORMULA PO) Take 1 Tab by mouth daily.  ranolazine ER (RANEXA) 500 mg SR tablet Take 1 Tab by mouth two (2) times a day.  nitroglycerin (NITROSTAT) 0.4 mg SL tablet 1 Tab by SubLINGual route every five (5) minutes as needed for Chest Pain. Up to 3 doses.  lidocaine (ASPERCREME, LIDOCAINE,) 4 % patch 1 Patch by TransDERmal route every eight (8) hours.  RONNELL PEN NEEDLE 32 gauge x 5/32\" ndle     ascorbic acid, vitamin C, (VITAMIN C) 250 mg tablet Take 250 mg by mouth daily. 1 pill po daily  Indications: inadequate vitamin C    cyanocobalamin ER 1,000 mcg tablet Take 1 Tab by mouth daily.  DOCOSAHEXANOIC ACID/EPA (FISH OIL PO) Take 1,000 mg by mouth two (2) times a day. 1 pill po twice daily      No current facility-administered medications for this visit. [facility] follow up appointment(s):   Future Appointments   Date Time Provider Chelle Reeves   5/17/2021 11:45 AM Candi Lu MD [facility] AMB   6/1/2021 10:00 AM Mason Andujar MD [facility] AM[facility]          Goals Addressed                 This Visit's Progress     Attend follow up appointments on schedule   On track     12/21/20 Patient will attend all scheduled appointments through 3/21/20       Knowledge and adherence of prescribed medication (ie. action, side effects, missed dose, etc.). On track     12/21/20 Pt will take all medications prescribed to be evaluated on each outreach through  3/21/20       Prepare patients and caregivers for end of life decisions (ie. need for hospice, pain management, symptom relief, advance directives etc.)   On track     12/21/20 Pt will complete an ACP and have it scanned into their EMR by 3/21/20       Prevent complications post hospitalization. On track     Patient will closely follow up with PCP. Pt. Will continue to take all medications as prescribed. Pt. Will continue to check BP and BG and will call PCP for any questions and/or concerns.

## 2021-05-06 NOTE — TELEPHONE ENCOUNTER
Patient has lost her medication that she received from the ER yesterday she said that she think her neighbor through it out in the trash that was picked up this morning. She would like a medication refill.  Please advise     Requested Prescriptions     Pending Prescriptions Disp Refills    azithromycin (Zithromax Z-Jhonny) 250 mg tablet 6 Tab 0     Sig: Take two tablets today then one tablet daily

## 2021-05-11 ENCOUNTER — PATIENT OUTREACH (OUTPATIENT)
Dept: CASE MANAGEMENT | Age: 86
End: 2021-05-11

## 2021-05-11 NOTE — PROGRESS NOTES
Date/Time:  2021 3:51 PM    Method of communication with patient:phone    [address] [place] [address] ([facility]) contacted the patient by telephone to perform Ambulatory Care Coordination. Verified name and  (PHI) with patient as identifiers. Provided introduction to self, and explanation of the Ambulatory Care Manager's role. Reviewed most recent clinic visit w/ patient who verbalized understanding. Patient given an opportunity to ask questions. Top Challenges reviewed with the patient   1. Spoke with patient - Patient states doing well at present . 2. Using walker - no report of falls at present  3. Blood sugar today is 124 -Resp less labored - States her stress level is much less - patient believes some of her symptoms may have been a result of her second COVID vaccine. 4. Patient has [facility] contact information and will be followed per [facility] protocol. The patient agrees to contact the PCP office or the [address] [place] [address] for questions related to their healthcare. Provided contact information for future reference. Disease Specific:   CHF    Home Health Active: No    DME Active: Yes    Barriers to care? lack of knowledge about disease, medication management    Advance Care Planning:   Does patient have an Advance Directive:  reviewed and current     Medication(s):   Medication reconciliation was performed with patient, who verbalizes understanding of administration of home medications. There were no barriers to obtaining medications identified at this time. Current Outpatient Medications   Medication Sig    azithromycin (Zithromax Z-Jhonny) 250 mg tablet Take two tablets today then one tablet daily    benzonatate (TESSALON) 200 mg capsule Take 1 Cap by mouth three (3) times daily as needed for Cough for up to 10 days.  montelukast (Singulair) 10 mg tablet Take 1 Tab by mouth daily.  Indications: inflammation of the nose due to an allergy    clopidogreL (PLAVIX) 75 mg tab TAKE 1 TABLET BY MOUTH DAILY    amLODIPine (NORVASC) 5 mg tablet TAKE 1 TABLET BY MOUTH DAILY    benzonatate (TESSALON) 200 mg capsule Take 1 Cap by mouth three (3) times daily as needed for Cough.  docusate sodium (Colace) 100 mg capsule Take 100 mg by mouth as needed.  Lantus Solostar U-100 Insulin 100 unit/mL (3 mL) inpn 18 Units by SubCUTAneous route two (2) times a day.  acetaminophen (TYLENOL) 325 mg tablet Take 2 Tabs by mouth every six (6) hours as needed for Pain or Fever.  insulin lispro (HUMALOG) 100 unit/mL injection Check FSBS AC*HS  For sugars between 160 and 200- Give 2 units SQ,  For sugars between 201 and 250, Give 3 units SQ,  For sugars Between 251 and 300, give 5 units SQ,  For Sugars between 301 and 350, give 7 units SQ  For sugars between 351 and 400, give 8 units SQ and contact PCP    melatonin 5 mg tablet Take 1 Tab by mouth nightly as needed for Other (As Needed for insomnia).  polyethylene glycol (MIRALAX) 17 gram packet Take 1 Packet by mouth daily as needed for Constipation.  levothyroxine (Synthroid) 137 mcg tablet Take 137 mcg by mouth Daily (before breakfast). Indications: a condition with low thyroid hormone levels    cholecalciferol (Vitamin D3) (1000 Units /25 mcg) tablet Take 1 Tab by mouth two (2) times a day.  spironolactone (ALDACTONE) 25 mg tablet Take 1 Tab by mouth daily.  metoprolol tartrate (LOPRESSOR) 25 mg tablet TAKE 1 TABLET BY MOUTH EVERY 12 HOURS    isosorbide mononitrate ER (IMDUR) 30 mg tablet TAKE 1 TABLET BY MOUTH EVERY MORNING    albuterol (PROVENTIL HFA, VENTOLIN HFA, PROAIR HFA) 90 mcg/actuation inhaler INHALE 1 PUFF BY MOUTH EVERY 4 HOURS AS NEEDED FOR WHEEZING OR SHORTNESS OF BREATH    Flovent Diskus 100 mcg/actuation dsdv INHALE 1 PUFF BY MOUTH TWICE DAILY    multivitamin with minerals (MULTIVITAMIN & MINERAL FORMULA PO) Take 1 Tab by mouth daily.  ranolazine ER (RANEXA) 500 mg SR tablet Take 1 Tab by mouth two (2) times a day.     nitroglycerin (NITROSTAT) 0.4 mg SL tablet 1 Tab by SubLINGual route every five (5) minutes as needed for Chest Pain. Up to 3 doses.  lidocaine (ASPERCREME, LIDOCAINE,) 4 % patch 1 Patch by TransDERmal route every eight (8) hours.  RONNELL PEN NEEDLE 32 gauge x 5/32\" ndle     ascorbic acid, vitamin C, (VITAMIN C) 250 mg tablet Take 250 mg by mouth daily. 1 pill po daily  Indications: inadequate vitamin C    cyanocobalamin ER 1,000 mcg tablet Take 1 Tab by mouth daily.  DOCOSAHEXANOIC ACID/EPA (FISH OIL PO) Take 1,000 mg by mouth two (2) times a day. 1 pill po twice daily      No current facility-administered medications for this visit. [facility] follow up appointment(s):   Future Appointments   Date Time Provider Chelle Reeves   5/17/2021 11:45 AM Debora Weems MD [facility] AMB   6/1/2021 10:00 AM Yolie Todd MD [facility] AMB          Goals Addressed                 This Visit's Progress     Attend follow up appointments on schedule   On track     12/21/20 Patient will attend all scheduled appointments through 3/21/20       Knowledge and adherence of prescribed medication (ie. action, side effects, missed dose, etc.). On track     12/21/20 Pt will take all medications prescribed to be evaluated on each outreach through  3/21/20       Prepare patients and caregivers for end of life decisions (ie. need for hospice, pain management, symptom relief, advance directives etc.)   On track     Prevent complications post hospitalization. On track     Patient will closely follow up with PCP. Pt. Will continue to take all medications as prescribed. Pt. Will continue to check BP and BG and will call PCP for any questions and/or concerns.

## 2021-05-12 ENCOUNTER — PATIENT OUTREACH (OUTPATIENT)
Dept: CASE MANAGEMENT | Age: 86
End: 2021-05-12

## 2021-05-12 NOTE — PROGRESS NOTES
Initial Contact Social Work Note - Ambulatory  2021      Date of referral: 3/4/2021  Referral received from: DAMARIS Gordillo RN  Reason for referral: Assist the patient with community resource for a reliable personal care agency. Previous SW Referral: No  If yes, brief summary and outcome:  n/a    Two Identifiers Verified: Yes    Insurance Provider: Isrrael Cates    Support System: Neighbors, granddaughter Jeffry Gan,      [employer] Status: N/A    Community Providers: [employer] and Medicaid Long-Term Care, PCP     ADL Assistance: N/A    Transportation Concern: None    Medication Cost Concern: None    Medication Education or Adherence Concern: None    Financial Concern(s): none    Ability to Read/Write: Yes    Advance Care Plan:  Reviewed and confirmed accuracy    Other: none    Identified Needs:      Assist the patient with community resources for a reliable personal care agency. Social Work Plan:     Provide the patient with list of community resources for personal care agencies. MSW received referral from JENNIFER to assist the patient with community resources for a reliable personal care agency. The patient is an 82year old woman who resides alone in the community. She is alert and oriented X4 and has the ability to verbalize her needs. MSW have been assisting the patient with finding home health agencies for personal care in the community. [facility] to inquire if they provide services in the area the patient resides. MSW provided the patient with information to contact the agency. She have decided to use this agency for personal care aides. MSW contacted the patient who verified her name and  as identifiers. MSW introduced self, role and reason for call. The patient reported that t her personal aide had to leave early yesterday because she was not feeling well.  She received a phone call from her later stating that she was tested positive for COVID. The patient shared that she have reached out to the agency, \"[facility]\" for plans on receiving another aide. The patient shared that the person she was able to reach, told her that he will call back. The patient is currently awaiting phone call. MSW asked the patient to provide with updates. The patient informed MSW that she is doing okay and verbalized understanding that most agencies are not properly staffed due to [place] and stimulus payments. MSW will continue to follow the patient for support and assistance as needed.

## 2021-05-17 ENCOUNTER — OFFICE VISIT (OUTPATIENT)
Dept: CARDIOLOGY CLINIC | Age: 86
End: 2021-05-17
Payer: MEDICARE

## 2021-05-17 VITALS
DIASTOLIC BLOOD PRESSURE: 84 MMHG | BODY MASS INDEX: 37.28 KG/M2 | HEIGHT: 66 IN | SYSTOLIC BLOOD PRESSURE: 182 MMHG | HEART RATE: 110 BPM | WEIGHT: 232 LBS

## 2021-05-17 DIAGNOSIS — I50.32 CHRONIC DIASTOLIC CONGESTIVE HEART FAILURE (HCC): ICD-10-CM

## 2021-05-17 DIAGNOSIS — E78.2 MIXED HYPERLIPIDEMIA: ICD-10-CM

## 2021-05-17 DIAGNOSIS — I10 ESSENTIAL HYPERTENSION: ICD-10-CM

## 2021-05-17 DIAGNOSIS — Z95.5 S/P CORONARY ARTERY STENT PLACEMENT: ICD-10-CM

## 2021-05-17 DIAGNOSIS — I25.118 ATHEROSCLEROSIS OF NATIVE CORONARY ARTERY OF NATIVE HEART WITH STABLE ANGINA PECTORIS (HCC): Primary | ICD-10-CM

## 2021-05-17 PROCEDURE — G8753 SYS BP > OR = 140: HCPCS | Performed by: INTERNAL MEDICINE

## 2021-05-17 PROCEDURE — 1101F PT FALLS ASSESS-DOCD LE1/YR: CPT | Performed by: INTERNAL MEDICINE

## 2021-05-17 PROCEDURE — G8399 PT W/DXA RESULTS DOCUMENT: HCPCS | Performed by: INTERNAL MEDICINE

## 2021-05-17 PROCEDURE — G8754 DIAS BP LESS 90: HCPCS | Performed by: INTERNAL MEDICINE

## 2021-05-17 PROCEDURE — 99214 OFFICE O/P EST MOD 30 MIN: CPT | Performed by: INTERNAL MEDICINE

## 2021-05-17 PROCEDURE — G8427 DOCREV CUR MEDS BY ELIG CLIN: HCPCS | Performed by: INTERNAL MEDICINE

## 2021-05-17 PROCEDURE — 1090F PRES/ABSN URINE INCON ASSESS: CPT | Performed by: INTERNAL MEDICINE

## 2021-05-17 PROCEDURE — G8510 SCR DEP NEG, NO PLAN REQD: HCPCS | Performed by: INTERNAL MEDICINE

## 2021-05-17 PROCEDURE — G8536 NO DOC ELDER MAL SCRN: HCPCS | Performed by: INTERNAL MEDICINE

## 2021-05-17 PROCEDURE — G8417 CALC BMI ABV UP PARAM F/U: HCPCS | Performed by: INTERNAL MEDICINE

## 2021-05-17 RX ORDER — FUROSEMIDE 20 MG/1
20 TABLET ORAL AS NEEDED
COMMUNITY
End: 2021-07-01 | Stop reason: SDUPTHER

## 2021-05-17 NOTE — PROGRESS NOTES
1. Have you been to the ER, urgent care clinic since your last visit? Hospitalized since your last visit? Yes [facility] for chest pain    2. Have you seen or consulted any other health care providers outside of the [address] [place] [address] since your last visit? Include any pap smears or colon screening.  no
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
## 2021-05-18 ENCOUNTER — HOSPITAL ENCOUNTER (EMERGENCY)
Age: 86
Discharge: HOME | End: 2021-05-18
Attending: EMERGENCY MEDICINE
Payer: MEDICARE

## 2021-05-18 ENCOUNTER — APPOINTMENT (OUTPATIENT)
Dept: CT IMAGING | Age: 86
End: 2021-05-18
Attending: EMERGENCY MEDICINE
Payer: MEDICARE

## 2021-05-18 VITALS
DIASTOLIC BLOOD PRESSURE: 76 MMHG | HEART RATE: 91 BPM | BODY MASS INDEX: 36.41 KG/M2 | OXYGEN SATURATION: 97 % | HEIGHT: 67 IN | TEMPERATURE: 98.5 F | SYSTOLIC BLOOD PRESSURE: 148 MMHG | WEIGHT: 232 LBS | RESPIRATION RATE: 16 BRPM

## 2021-05-18 DIAGNOSIS — M54.2 NECK PAIN: ICD-10-CM

## 2021-05-18 DIAGNOSIS — I10 ESSENTIAL HYPERTENSION: ICD-10-CM

## 2021-05-18 DIAGNOSIS — R51.9 ACUTE NONINTRACTABLE HEADACHE, UNSPECIFIED HEADACHE TYPE: Primary | ICD-10-CM

## 2021-05-18 PROCEDURE — 70450 CT HEAD/BRAIN W/O DYE: CPT

## 2021-05-18 PROCEDURE — 99283 EMERGENCY DEPT VISIT LOW MDM: CPT

## 2021-05-18 PROCEDURE — 74011250637 HC RX REV CODE- 250/637: Performed by: EMERGENCY MEDICINE

## 2021-05-18 RX ORDER — CYCLOBENZAPRINE HCL 10 MG
5 TABLET ORAL
Status: DISCONTINUED | OUTPATIENT
Start: 2021-05-18 | End: 2021-05-18 | Stop reason: HOSPADM

## 2021-05-18 RX ORDER — CYCLOBENZAPRINE HCL 5 MG
5 TABLET ORAL
Qty: 12 TAB | Refills: 0 | Status: SHIPPED | OUTPATIENT
Start: 2021-05-18 | End: 2021-11-09

## 2021-05-18 RX ORDER — IBUPROFEN 600 MG/1
600 TABLET ORAL
Qty: 20 TAB | Refills: 0 | Status: SHIPPED | OUTPATIENT
Start: 2021-05-18 | End: 2021-11-09

## 2021-05-18 RX ORDER — IBUPROFEN 400 MG/1
800 TABLET ORAL
Status: COMPLETED | OUTPATIENT
Start: 2021-05-18 | End: 2021-05-18

## 2021-05-18 RX ADMIN — IBUPROFEN 400 MG: 400 TABLET, FILM COATED ORAL at 14:46

## 2021-05-18 NOTE — ED NOTES
I have reviewed discharge instructions with the patient. The patient verbalized understanding. Patient armband removed and given to patient to take home. Patient was informed of the privacy risks if armband lost or stolen. Current Discharge Medication List      START taking these medications    Details   ibuprofen (MOTRIN) 600 mg tablet Take 1 Tab by mouth every six (6) hours as needed for Pain. Qty: 20 Tab, Refills: 0  Start date: 5/18/2021      cyclobenzaprine (FLEXERIL) 5 mg tablet Take 1 Tab by mouth three (3) times daily as needed for Muscle Spasm(s).   Qty: 12 Tab, Refills: 0  Start date: 5/18/2021
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
## 2021-05-19 ENCOUNTER — PATIENT OUTREACH (OUTPATIENT)
Dept: CASE MANAGEMENT | Age: 86
End: 2021-05-19

## 2021-05-19 NOTE — PROGRESS NOTES
Date/Time:  2021 3:51 PM    Method of communication with patient:phone    [facility] ([facility]) contacted the patient by telephone to perform Ambulatory Care Coordination. Verified name and  (PHI) with patient as identifiers. Provided introduction to self, and explanation of the Ambulatory Care Manager's role. Reviewed most recent clinic visit w/ patient who verbalized understanding. Patient given an opportunity to ask questions. Top Challenges reviewed with the patient   1. Spoke with patient - Patient states doing well at present without headache from yesterday. 2. Using walker - no report of falls at present  3. Blood sugar reported by patient today is 112    4. No further issues reported - no transportation issues related to attending [facility] appointment  5. Medication reconciliation review done at this encounter with patient /family. Shows understanding of medication therapy  6. Further / follow up appointments listed below   7. Recommending patient seek medical attention at a [facility] instead of an ER for non-emergent issues. 8. Patient has [facility] contact information and will be followed per [facility] protocol. The patient agrees to contact the PCP office or the [address] [place] [address] for questions related to their healthcare. Provided contact information for future reference. Disease Specific:   CHF    Home Health Active: No    DME Active: Yes    Barriers to care? lack of knowledge about disease, medication management    Advance Care Planning:   Does patient have an Advance Directive:  reviewed and current     Medication(s):   Medication reconciliation was performed with patient, who verbalizes understanding of administration of home medications. There were no barriers to obtaining medications identified at this time. Current Outpatient Medications   Medication Sig    ibuprofen (MOTRIN) 600 mg tablet Take 1 Tab by mouth every six (6) hours as needed for Pain.     cyclobenzaprine (FLEXERIL) 5 mg tablet Take 1 Tab by mouth three (3) times daily as needed for Muscle Spasm(s).  furosemide (Lasix) 20 mg tablet Take 20 mg by mouth as needed.  azithromycin (Zithromax Z-Jhonny) 250 mg tablet Take two tablets today then one tablet daily    montelukast (Singulair) 10 mg tablet Take 1 Tab by mouth daily. Indications: inflammation of the nose due to an allergy    clopidogreL (PLAVIX) 75 mg tab TAKE 1 TABLET BY MOUTH DAILY    amLODIPine (NORVASC) 5 mg tablet TAKE 1 TABLET BY MOUTH DAILY    benzonatate (TESSALON) 200 mg capsule Take 1 Cap by mouth three (3) times daily as needed for Cough.  docusate sodium (Colace) 100 mg capsule Take 100 mg by mouth as needed.  Lantus Solostar U-100 Insulin 100 unit/mL (3 mL) inpn 18 Units by SubCUTAneous route two (2) times a day.  acetaminophen (TYLENOL) 325 mg tablet Take 2 Tabs by mouth every six (6) hours as needed for Pain or Fever.  insulin lispro (HUMALOG) 100 unit/mL injection Check FSBS AC*HS  For sugars between 160 and 200- Give 2 units SQ,  For sugars between 201 and 250, Give 3 units SQ,  For sugars Between 251 and 300, give 5 units SQ,  For Sugars between 301 and 350, give 7 units SQ  For sugars between 351 and 400, give 8 units SQ and contact PCP    melatonin 5 mg tablet Take 1 Tab by mouth nightly as needed for Other (As Needed for insomnia).  polyethylene glycol (MIRALAX) 17 gram packet Take 1 Packet by mouth daily as needed for Constipation.  levothyroxine (Synthroid) 137 mcg tablet Take 137 mcg by mouth Daily (before breakfast). Indications: a condition with low thyroid hormone levels    cholecalciferol (Vitamin D3) (1000 Units /25 mcg) tablet Take 1 Tab by mouth two (2) times a day.  spironolactone (ALDACTONE) 25 mg tablet Take 1 Tab by mouth daily.     metoprolol tartrate (LOPRESSOR) 25 mg tablet TAKE 1 TABLET BY MOUTH EVERY 12 HOURS    isosorbide mononitrate ER (IMDUR) 30 mg tablet TAKE 1 TABLET BY MOUTH EVERY MORNING    albuterol (PROVENTIL HFA, VENTOLIN HFA, PROAIR HFA) 90 mcg/actuation inhaler INHALE 1 PUFF BY MOUTH EVERY 4 HOURS AS NEEDED FOR WHEEZING OR SHORTNESS OF BREATH    multivitamin with minerals (MULTIVITAMIN & MINERAL FORMULA PO) Take 1 Tab by mouth daily.  ranolazine ER (RANEXA) 500 mg SR tablet Take 1 Tab by mouth two (2) times a day.  nitroglycerin (NITROSTAT) 0.4 mg SL tablet 1 Tab by SubLINGual route every five (5) minutes as needed for Chest Pain. Up to 3 doses.  lidocaine (ASPERCREME, LIDOCAINE,) 4 % patch 1 Patch by TransDERmal route every eight (8) hours.  RONNELL PEN NEEDLE 32 gauge x 5/32\" ndle     ascorbic acid, vitamin C, (VITAMIN C) 250 mg tablet Take 250 mg by mouth daily. 1 pill po daily  Indications: inadequate vitamin C    cyanocobalamin ER 1,000 mcg tablet Take 1 Tab by mouth daily.  DOCOSAHEXANOIC ACID/EPA (FISH OIL PO) Take 1,000 mg by mouth two (2) times a day. 1 pill po twice daily      No current facility-administered medications for this visit. [facility] follow up appointment(s):   Future Appointments   Date Time Provider Chelle Reeves   6/1/2021 10:00 AM Eddie Vincent MD [facility] AMB   7/20/2021 10:45 AM Michael Parikh MD [facility] AMB          Goals Addressed                 This Visit's Progress     Attend follow up appointments on schedule   On track     12/21/20 Patient will attend all scheduled appointments through 3/21/20       Knowledge and adherence of prescribed medication (ie. action, side effects, missed dose, etc.). On track     12/21/20 Pt will take all medications prescribed to be evaluated on each outreach through  3/21/20       Prepare patients and caregivers for end of life decisions (ie. need for hospice, pain management, symptom relief, advance directives etc.)   On track     12/21/20 Pt will complete an ACP and have it scanned into their EMR by 3/21/20       Prevent complications post hospitalization.    On track     Patient will closely follow up with PCP. Pt. Will continue to take all medications as prescribed. Pt. Will continue to check BP and BG and will call PCP for any questions and/or concerns.

## 2021-05-20 ENCOUNTER — PATIENT OUTREACH (OUTPATIENT)
Dept: CASE MANAGEMENT | Age: 86
End: 2021-05-20

## 2021-05-20 NOTE — PROGRESS NOTES
Social Work Note  5/20/2021      Date of referral: 3/4/2021  Referral received from: EJNNIFER- Sathish Slater RN  Reason for referral: Assist the patient with community resource for a reliable personal care agency.     Previous SW Referral: No  If yes, brief summary and outcome:  n/a     Two Identifiers Verified: Yes     Insurance Provider: [employer]     Support System: Neighbors, granddaughter Subha,       [place] Status: N/A     Community Providers: SNAP/[employer] [place] and Medicaid Long-Term Care, PCP      ADL Assistance: N/A     Transportation Concern: None     Medication Cost Concern: None     Medication Education or Adherence Concern: None     Financial Concern(s): none     Ability to Read/Write: Yes     Advance Care Plan:  Reviewed and confirmed accuracy     Other: none     Identified Needs:      · Assist the patient with community resources for a reliable personal care agency.     Social Work Plan:     · Provide the patient with list of community resources for personal care agencies.      MSW received referral from JENNIFER to assist the patient with community resources for a reliable personal care agency. The patient is an 82year old woman who resides alone in the community. She is alert and oriented X4 and has the ability to verbalize her needs. MSW have been assisting the patient with finding home health agencies for personal care in the community. [facility] owner to inquire if they provide services in the area the patient resides. MSW provided the patient with information to contact the agency. She have decided to use this agency for personal care aides. MSW received phone call from the patient who informed that the aide is still out due to testing positive for COVID. She mentioned that she has been awaiting a phone call from the agency on whether they will be able to staff her with another aide.  MSW informed the patient that she will attempt to contact the agency on her behalf. MSW contacted [facility] and spoke with Enmanuel Cassidy (owner) and he informed that the patient has been in constant communication with the [facility] and she has been informed that she will be staffed. Enmanuel Cassidy assured MSW that he will communicate with Jose Resendez on ensuring that the patient receives a call with updates. MSW attempted to contact the patient. Left message with information to return call. MSW will follow to assist the patient as needed.

## 2021-05-28 ENCOUNTER — PATIENT OUTREACH (OUTPATIENT)
Dept: CASE MANAGEMENT | Age: 86
End: 2021-05-28

## 2021-05-28 NOTE — PROGRESS NOTES
Social Work Note  5/28/2021    Date of referral: 3/4/2021  Referral received from: JENNIFER- Lionel Lemus RN  Reason for referral: Assist the patient with community resource for a reliable personal care agency.     Previous SW Referral: No  If yes, brief summary and outcome:  n/a     Two Identifiers Verified: Yes     Insurance Provider: [employer]     Support System: Neighbors, granddaughter Subha,       [place] Status: N/A     Community Providers: SNAP/[employer] [place] and Medicaid Long-Term Care, PCP      ADL Assistance: N/A     Transportation Concern: None     Medication Cost Concern: None     Medication Education or Adherence Concern: None     Financial Concern(s): none     Ability to Read/Write: Yes     Advance Care Plan:  Reviewed and confirmed accuracy     Other: none     Identified Needs:      · Assist the patient with community resources for a reliable personal care agency.     Social Work Plan:     · Provide the patient with list of community resources for personal care agencies.      MSW received referral from JENNIFER to assist the patient with community resources for a reliable personal care agency. The patient is an 82year old woman who resides alone in the community. She is alert and oriented X4 and has the ability to verbalize her needs. MSW contacted the patient and introduced self, role and reason for call. The patient reported that she have been staffed with an aide all week. The patient shared that she is reaching out to Katie on the female pca who was tested with COVID. The patient is interested in having her as her permanent pca once she is available to return to work. Patient shared that she was currently going through her mail and paying bills. MSW will continue to follow to assist the patient as needed.

## 2021-05-31 ENCOUNTER — APPOINTMENT (OUTPATIENT)
Dept: VASCULAR SURGERY | Age: 86
End: 2021-05-31
Attending: EMERGENCY MEDICINE
Payer: MEDICARE

## 2021-05-31 ENCOUNTER — HOSPITAL ENCOUNTER (EMERGENCY)
Age: 86
Discharge: HOME | End: 2021-05-31
Attending: EMERGENCY MEDICINE
Payer: MEDICARE

## 2021-05-31 VITALS
HEART RATE: 111 BPM | SYSTOLIC BLOOD PRESSURE: 154 MMHG | TEMPERATURE: 98.7 F | OXYGEN SATURATION: 93 % | RESPIRATION RATE: 20 BRPM | DIASTOLIC BLOOD PRESSURE: 90 MMHG

## 2021-05-31 DIAGNOSIS — G56.02 CARPAL TUNNEL SYNDROME OF LEFT WRIST: ICD-10-CM

## 2021-05-31 DIAGNOSIS — R60.0 UNILATERAL EDEMA OF LOWER EXTREMITY: Primary | ICD-10-CM

## 2021-05-31 DIAGNOSIS — I50.32 CHRONIC DIASTOLIC CONGESTIVE HEART FAILURE (HCC): ICD-10-CM

## 2021-05-31 LAB
ANION GAP SERPL CALC-SCNC: 6 MMOL/L (ref 3–18)
APTT PPP: 31.8 SEC (ref 23–36.4)
BASOPHILS # BLD: 0 K/UL (ref 0–0.1)
BASOPHILS NFR BLD: 1 % (ref 0–2)
BUN SERPL-MCNC: 8 MG/DL (ref 7–18)
BUN/CREAT SERPL: 11 (ref 12–20)
CALCIUM SERPL-MCNC: 8.9 MG/DL (ref 8.5–10.1)
CHLORIDE SERPL-SCNC: 106 MMOL/L (ref 100–111)
CO2 SERPL-SCNC: 28 MMOL/L (ref 21–32)
CREAT SERPL-MCNC: 0.7 MG/DL (ref 0.6–1.3)
DIFFERENTIAL METHOD BLD: ABNORMAL
EOSINOPHIL # BLD: 0.3 K/UL (ref 0–0.4)
EOSINOPHIL NFR BLD: 5 % (ref 0–5)
ERYTHROCYTE [DISTWIDTH] IN BLOOD BY AUTOMATED COUNT: 12.9 % (ref 11.6–14.5)
GLUCOSE SERPL-MCNC: 173 MG/DL (ref 74–99)
HCT VFR BLD AUTO: 34.5 % (ref 35–45)
HGB BLD-MCNC: 11 G/DL (ref 12–16)
INR PPP: 1 (ref 0.8–1.2)
LYMPHOCYTES # BLD: 1.7 K/UL (ref 0.9–3.6)
LYMPHOCYTES NFR BLD: 29 % (ref 21–52)
MCH RBC QN AUTO: 31.3 PG (ref 24–34)
MCHC RBC AUTO-ENTMCNC: 31.9 G/DL (ref 31–37)
MCV RBC AUTO: 98 FL (ref 74–97)
MONOCYTES # BLD: 0.4 K/UL (ref 0.05–1.2)
MONOCYTES NFR BLD: 7 % (ref 3–10)
NEUTS SEG # BLD: 3.4 K/UL (ref 1.8–8)
NEUTS SEG NFR BLD: 59 % (ref 40–73)
PLATELET # BLD AUTO: 216 K/UL (ref 135–420)
PMV BLD AUTO: 11 FL (ref 9.2–11.8)
POTASSIUM SERPL-SCNC: 3.3 MMOL/L (ref 3.5–5.5)
PROTHROMBIN TIME: 13.3 SEC (ref 11.5–15.2)
RBC # BLD AUTO: 3.52 M/UL (ref 4.2–5.3)
SODIUM SERPL-SCNC: 140 MMOL/L (ref 136–145)
WBC # BLD AUTO: 5.8 K/UL (ref 4.6–13.2)

## 2021-05-31 PROCEDURE — 93971 EXTREMITY STUDY: CPT

## 2021-05-31 PROCEDURE — 85730 THROMBOPLASTIN TIME PARTIAL: CPT

## 2021-05-31 PROCEDURE — 99284 EMERGENCY DEPT VISIT MOD MDM: CPT

## 2021-05-31 PROCEDURE — 85025 COMPLETE CBC W/AUTO DIFF WBC: CPT

## 2021-05-31 PROCEDURE — 85610 PROTHROMBIN TIME: CPT

## 2021-05-31 PROCEDURE — 80048 BASIC METABOLIC PNL TOTAL CA: CPT

## 2021-05-31 RX ORDER — COMPR.STOCKING,THIGH,REG,LARGE
2 EACH MISCELLANEOUS DAILY
Qty: 4 EACH | Refills: 0 | Status: SHIPPED | OUTPATIENT
Start: 2021-05-31

## 2021-05-31 NOTE — ED NOTES
Patient resting on stretcher in low/locked position. Patients daughter at bedside. Patient laughing and talking. No distress reported or noted. Patient update given.  Vascular tech in with patient

## 2021-05-31 NOTE — ED TRIAGE NOTES
Patient states she has been having swelling and pain in right leg. She states she has had the swelling for some time and it improves with taking a fluid pill. She also c/o pain in right thigh and states concern for blood clot.
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
## 2021-06-02 ENCOUNTER — PATIENT OUTREACH (OUTPATIENT)
Dept: CASE MANAGEMENT | Age: 86
End: 2021-06-02

## 2021-06-02 NOTE — PROGRESS NOTES
Date/Time:  2021 3:51 PM    Method of communication with patient:phone    [address] [place] [address] ([facility]) contacted the patient by telephone to perform Ambulatory Care Coordination. Verified name and  (PHI) with patient as identifiers. Provided introduction to self, and explanation of the Ambulatory Care Manager's role. Reviewed most recent clinic visit w/ patient who verbalized understanding. Patient given an opportunity to ask questions. Top Challenges reviewed with the patient   1. Spoke with patient - Patient states her urine is cloudy and there a trace of blood seen in the B[facility].ps she was calling PCP regarding urine isues  2. Using walker - no report of falls at present  3. Blood sugar reported by patient today is 128   4. Medication reconciliation review done. 5. Further / follow up appointments listed below   6. Recommending patient seek medical [facility] at a [facility] instead of an [facility] for non-emergent issues. 7. Patient states her legs remain swollen to a point, applying compression stockings and taking her Lasix as prescribed. 8. Will follow up with urine issues and lower leg edema. 9. Patient has [facility] contact information and will be followed per [facility] protocol. The patient agrees to contact the PCP office or the [address] [place] [address] for questions related to their healthcare. Provided contact information for future reference. Disease Specific:   CHF    Home Health Active: No    DME Active: Yes    Barriers to care? lack of knowledge about disease, medication management    Advance Care Planning:   Does patient have an Advance Directive:  reviewed and current     Medication(s):   Medication reconciliation was performed with patient, who verbalizes understanding of administration of home medications. There were no barriers to obtaining medications identified at this time.          Current Outpatient Medications   Medication Sig    compr.stocking,thigh,reg,large (Comp.Stocking,Thigh,Reg,Large) misc 2 Each by Does Not Apply route daily.  ibuprofen (MOTRIN) 600 mg tablet Take 1 Tab by mouth every six (6) hours as needed for Pain.  cyclobenzaprine (FLEXERIL) 5 mg tablet Take 1 Tab by mouth three (3) times daily as needed for Muscle Spasm(s).  furosemide (Lasix) 20 mg tablet Take 20 mg by mouth as needed.  azithromycin (Zithromax Z-Jhonny) 250 mg tablet Take two tablets today then one tablet daily (Patient not taking: Reported on 6/1/2021)    montelukast (Singulair) 10 mg tablet Take 1 Tab by mouth daily. Indications: inflammation of the nose due to an allergy    clopidogreL (PLAVIX) 75 mg tab TAKE 1 TABLET BY MOUTH DAILY    amLODIPine (NORVASC) 5 mg tablet TAKE 1 TABLET BY MOUTH DAILY    benzonatate (TESSALON) 200 mg capsule Take 1 Cap by mouth three (3) times daily as needed for Cough. (Patient not taking: Reported on 6/1/2021)    docusate sodium (Colace) 100 mg capsule Take 100 mg by mouth as needed.  Lantus Solostar U-100 Insulin 100 unit/mL (3 mL) inpn 18 Units by SubCUTAneous route two (2) times a day.  acetaminophen (TYLENOL) 325 mg tablet Take 2 Tabs by mouth every six (6) hours as needed for Pain or Fever.  insulin lispro (HUMALOG) 100 unit/mL injection Check FSBS AC*HS  For sugars between 160 and 200- Give 2 units SQ,  For sugars between 201 and 250, Give 3 units SQ,  For sugars Between 251 and 300, give 5 units SQ,  For Sugars between 301 and 350, give 7 units SQ  For sugars between 351 and 400, give 8 units SQ and contact PCP    melatonin 5 mg tablet Take 1 Tab by mouth nightly as needed for Other (As Needed for insomnia). (Patient not taking: Reported on 6/1/2021)    polyethylene glycol (MIRALAX) 17 gram packet Take 1 Packet by mouth daily as needed for Constipation. (Patient not taking: Reported on 6/1/2021)    levothyroxine (Synthroid) 137 mcg tablet Take 137 mcg by mouth Daily (before breakfast).  Indications: a condition with low thyroid hormone levels    cholecalciferol (Vitamin D3) (1000 Units /25 mcg) tablet Take 1 Tab by mouth two (2) times a day.  spironolactone (ALDACTONE) 25 mg tablet Take 1 Tab by mouth daily.  metoprolol tartrate (LOPRESSOR) 25 mg tablet TAKE 1 TABLET BY MOUTH EVERY 12 HOURS    isosorbide mononitrate ER (IMDUR) 30 mg tablet TAKE 1 TABLET BY MOUTH EVERY MORNING    albuterol (PROVENTIL HFA, VENTOLIN HFA, PROAIR HFA) 90 mcg/actuation inhaler INHALE 1 PUFF BY MOUTH EVERY 4 HOURS AS NEEDED FOR WHEEZING OR SHORTNESS OF BREATH    multivitamin with minerals (MULTIVITAMIN & MINERAL FORMULA PO) Take 1 Tab by mouth daily.  ranolazine ER (RANEXA) 500 mg SR tablet Take 1 Tab by mouth two (2) times a day.  nitroglycerin (NITROSTAT) 0.4 mg SL tablet 1 Tab by SubLINGual route every five (5) minutes as needed for Chest Pain. Up to 3 doses.  lidocaine (ASPERCREME, LIDOCAINE,) 4 % patch 1 Patch by TransDERmal route every eight (8) hours.  RONNELL PEN NEEDLE 32 gauge x 5/32\" ndle     ascorbic acid, vitamin C, (VITAMIN C) 250 mg tablet Take 250 mg by mouth daily. 1 pill po daily  Indications: inadequate vitamin C    cyanocobalamin ER 1,000 mcg tablet Take 1 Tab by mouth daily.  DOCOSAHEXANOIC ACID/EPA (FISH OIL PO) Take 1,000 mg by mouth two (2) times a day. 1 pill po twice daily      No current facility-administered medications for this visit. BS follow up appointment(s):   Future Appointments   Date Time Provider Chelle Reeves   6/9/2021  9:20 AM Sole Belcher MD [facility] BS AMB   7/6/2021 10:45 AM Augie Hooper MD [facility] BS AMB   7/20/2021 10:45 AM Juan Stafford MD [facility] BS AMB          Goals Addressed                 This Visit's Progress     Attend follow up appointments on schedule   On track     Knowledge and adherence of prescribed medication (ie. action, side effects, missed dose, etc.).    On track     Prepare patients and caregivers for end of life decisions (ie. need for hospice, pain management, symptom relief, advance directives etc.)   On track     Prevent complications post hospitalization. On track     Patient will closely follow up with PCP. Pt. Will continue to take all medications as prescribed. Pt. Will continue to check BP and BG and will call PCP or ACM for any questions and/or concerns.

## 2021-06-03 DIAGNOSIS — R31.9 HEMATURIA, UNSPECIFIED TYPE: Primary | ICD-10-CM

## 2021-06-03 LAB
BILIRUB UR QL STRIP: NEGATIVE
GLUCOSE UR-MCNC: NEGATIVE MG/DL
KETONES P FAST UR STRIP-MCNC: NEGATIVE MG/DL
PH UR STRIP: 5.5 [PH] (ref 4.6–8)
PROT UR QL STRIP: NORMAL
SP GR UR STRIP: 1.03 (ref 1–1.03)
UA UROBILINOGEN AMB POC: NORMAL (ref 0.2–1)
URINALYSIS CLARITY POC: NORMAL
URINALYSIS COLOR POC: YELLOW
URINE BLOOD POC: NEGATIVE
URINE LEUKOCYTES POC: NORMAL
URINE NITRITES POC: NEGATIVE

## 2021-06-03 PROCEDURE — 81003 URINALYSIS AUTO W/O SCOPE: CPT | Performed by: FAMILY MEDICINE

## 2021-06-04 ENCOUNTER — PATIENT OUTREACH (OUTPATIENT)
Dept: CASE MANAGEMENT | Age: 86
End: 2021-06-04

## 2021-06-04 NOTE — PROGRESS NOTES
Social Work Note  6/4/2021    Date of referral: 3/4/2021  Referral received from: DAMARIS Henderson RN  Reason for referral: Assist the patient with community resource for a reliable personal care agency.     Previous SW Referral: No  If yes, brief summary and outcome:  n/a     Two Identifiers Verified: Yes     Insurance Provider: [facility]     Support System: Neighbors, granddaughter Subha,        Status: N/A     Community Providers: SNAP/Food [place] and Medicaid Long-Term Care, PCP      ADL Assistance: N/A     Transportation Concern: None     Medication Cost Concern: None     Medication Education or Adherence Concern: None     Financial Concern(s): none     Ability to Read/Write: Yes     Advance Care Plan:  Reviewed and confirmed accuracy     Other: none     Identified Needs:      · Assist the patient with community resources for a reliable personal care agency.     Social Work Plan:     · Provide the patient with list of community resources for personal care agencies.      MSW received referral from JENNIFER to assist the patient with community resources for a reliable personal care agency. The patient is an 82year old woman who resides alone in the community. She is alert and oriented X4 and has the ability to verbalize her needs. MSW attempted to reach the patient by telephone. Phone rings then hangs up. Unable to leave a message. MSW will reattempt to reach the patient at a later time.

## 2021-06-05 LAB — BACTERIA UR CULT: NORMAL

## 2021-06-10 ENCOUNTER — PATIENT OUTREACH (OUTPATIENT)
Dept: CASE MANAGEMENT | Age: 86
End: 2021-06-10

## 2021-06-10 NOTE — PROGRESS NOTES
Social Work Note  6/10/2021    Date of referral: 3/4/2021  Referral received from: JENNIFER- Rima Mosley RN  Reason for referral: Assist the patient with community resource for a reliable personal care agency.     Previous SW Referral: No  If yes, brief summary and outcome:  n/a     Two Identifiers Verified: Yes     Insurance Provider: [facility] [employer]     Support System: Neighbors, granddaughter Subha,        Status: N/A     Community Providers: SNAP/Food [place] and Medicaid Long-Term Care, PCP      ADL Assistance: N/A     Transportation Concern: None     Medication Cost Concern: None     Medication Education or Adherence Concern: None     Financial Concern(s): none     Ability to Read/Write: Yes     Advance Care Plan:  Reviewed and confirmed accuracy     Other: none     Identified Needs:      · Assist the patient with community resources for a reliable personal care agency.     Social Work Plan:     · Provide the patient with list of community resources for personal care agencies.      MSW received referral from JENNIFER to assist the patient with community resources for a reliable personal care agency. The patient is an 82year old woman who resides alone in the community. She is alert and oriented X4 and has the ability to verbalize her needs. MSW contacted the patient and introduced self, role and reason for call. The patient reported that she is doing well. She mentioned having to go to the ED recently for leg pain. The patient shared that she has an upcoming appointment on June 19th with Orthopedic and will make certain that they take a look at her leg. The patient informed MSW that she's had consistent service from the home health agency for  Personal care. The patient shared that she have spoken to the manager of the agency and requested for respite (overnight assist).  The agency accommodated her for two overnights with an aide to assist. The patient asked that MSW continue to follow her a little longer. MSW will follow to assist the patient as needed.

## 2021-06-12 ENCOUNTER — HOSPITAL ENCOUNTER (OUTPATIENT)
Dept: LAB | Age: 86
Discharge: HOME | End: 2021-06-12

## 2021-06-12 LAB — XX-LABCORP SPECIMEN COL,LCBCF: NORMAL

## 2021-06-12 PROCEDURE — 99001 SPECIMEN HANDLING PT-LAB: CPT

## 2021-06-13 LAB
BUN SERPL-MCNC: 9 MG/DL (ref 8–27)
BUN/CREAT SERPL: 13 (ref 12–28)
CALCIUM SERPL-MCNC: 9.4 MG/DL (ref 8.7–10.3)
CHLORIDE SERPL-SCNC: 103 MMOL/L (ref 96–106)
CO2 SERPL-SCNC: 23 MMOL/L (ref 20–29)
CREAT SERPL-MCNC: 0.71 MG/DL (ref 0.57–1)
GLUCOSE SERPL-MCNC: 156 MG/DL (ref 65–99)
POTASSIUM SERPL-SCNC: 3.4 MMOL/L (ref 3.5–5.2)
SODIUM SERPL-SCNC: 141 MMOL/L (ref 134–144)

## 2021-06-14 ENCOUNTER — TELEPHONE (OUTPATIENT)
Dept: CARDIOLOGY CLINIC | Age: 86
End: 2021-06-14

## 2021-06-14 DIAGNOSIS — I50.32 CHRONIC DIASTOLIC CONGESTIVE HEART FAILURE (HCC): Primary | ICD-10-CM

## 2021-06-14 RX ORDER — SPIRONOLACTONE 50 MG/1
50 TABLET, FILM COATED ORAL DAILY
Qty: 90 TABLET | Refills: 1 | Status: SHIPPED | OUTPATIENT
Start: 2021-06-14 | End: 2021-12-03

## 2021-06-14 NOTE — TELEPHONE ENCOUNTER
Called and spoke to patient per Dr. True Good regarding lab/test, lab reviewed Mildly reduced potassium. Continue with Aldactone. If she is on 25 mg a day increase it to 50 mg a day. BMP in 2 weeks. She voices understanding and acceptance of this advice and will call back if any further questions or concerns.

## 2021-06-14 NOTE — PROGRESS NOTES
Mildly reduced potassium. Continue with Aldactone. If she is on 25 mg a day increase it to 50 mg a day.   BMP in 2 weeks

## 2021-06-14 NOTE — TELEPHONE ENCOUNTER
----- Message from Yoel Gillespie MD sent at 6/14/2021 11:21 AM EDT -----  Mildly reduced potassium. Continue with Aldactone. If she is on 25 mg a day increase it to 50 mg a day.   BMP in 2 weeks

## 2021-06-14 NOTE — TELEPHONE ENCOUNTER
Requested Prescriptions     Pending Prescriptions Disp Refills    spironolactone (ALDACTONE) 25 mg tablet 60 Tablet 3     Sig: Take 2 tabs by mouth daily.

## 2021-06-16 ENCOUNTER — PATIENT OUTREACH (OUTPATIENT)
Dept: CASE MANAGEMENT | Age: 86
End: 2021-06-16

## 2021-06-16 NOTE — PROGRESS NOTES
Date/Time:  2021 3:51 PM    Method of communication with patient:phone    [facility] ([facility]) contacted the patient by telephone to perform Ambulatory Care Coordination. Verified name and  (PHI) with patient as identifiers. Provided introduction to self, and explanation of the Ambulatory Care Manager's role. Reviewed most recent clinic visit w/ patient who verbalized understanding. Patient given an opportunity to ask questions. Top Challenges reviewed with the patient   1. Spoke with patient - Patient states her urine is clear with no blood seen   2. Using walker - no report of falls at present  3. Blood sugar reported by patient today is 91 - 110 this AM   4. Medication reconciliation review done. 5. Further / follow up appointmentts listed below   6. Encouraged patient to utilize [facility] facility, but states she likes the ER because \"They know me there\"  7. Patient states her legs remain swollen to a point, applying compression stockings and taking her Lasix as prescribed. 8. .Patient has [facility] contact information and will be followed per [facility] protocol. The patient agrees to contact the PCP office or the [address] [place] [address] for questions related to their healthcare. Provided contact information for future reference. Disease Specific:   CHF    Home Health Active: No    DME Active: Yes    Barriers to care? lack of knowledge about disease, medication management    Advance Care Planning:   Does patient have an Advance Directive:  reviewed and current     Medication(s):   Medication reconciliation was performed with patient, who verbalizes understanding of administration of home medications. There were no barriers to obtaining medications identified at this time. Current Outpatient Medications   Medication Sig    spironolactone (ALDACTONE) 50 mg tablet Take 1 Tablet by mouth daily. Take 2 tabs by mouth daily.     compr.stocking,thigh,reg,large (Comp.Stocking,Thigh,Reg,Large) misc 2 Each by Does Not Apply route daily.  ibuprofen (MOTRIN) 600 mg tablet Take 1 Tab by mouth every six (6) hours as needed for Pain.  cyclobenzaprine (FLEXERIL) 5 mg tablet Take 1 Tab by mouth three (3) times daily as needed for Muscle Spasm(s).  furosemide (Lasix) 20 mg tablet Take 20 mg by mouth as needed.  azithromycin (Zithromax Z-Jhonny) 250 mg tablet Take two tablets today then one tablet daily (Patient not taking: Reported on 6/1/2021)    montelukast (Singulair) 10 mg tablet Take 1 Tab by mouth daily. Indications: inflammation of the nose due to an allergy    clopidogreL (PLAVIX) 75 mg tab TAKE 1 TABLET BY MOUTH DAILY    amLODIPine (NORVASC) 5 mg tablet TAKE 1 TABLET BY MOUTH DAILY    benzonatate (TESSALON) 200 mg capsule Take 1 Cap by mouth three (3) times daily as needed for Cough. (Patient not taking: Reported on 6/1/2021)    docusate sodium (Colace) 100 mg capsule Take 100 mg by mouth as needed.  Lantus Solostar U-100 Insulin 100 unit/mL (3 mL) inpn 18 Units by SubCUTAneous route two (2) times a day.  acetaminophen (TYLENOL) 325 mg tablet Take 2 Tabs by mouth every six (6) hours as needed for Pain or Fever.  insulin lispro (HUMALOG) 100 unit/mL injection Check FSBS AC*HS  For sugars between 160 and 200- Give 2 units SQ,  For sugars between 201 and 250, Give 3 units SQ,  For sugars Between 251 and 300, give 5 units SQ,  For Sugars between 301 and 350, give 7 units SQ  For sugars between 351 and 400, give 8 units SQ and contact PCP    melatonin 5 mg tablet Take 1 Tab by mouth nightly as needed for Other (As Needed for insomnia). (Patient not taking: Reported on 6/1/2021)    polyethylene glycol (MIRALAX) 17 gram packet Take 1 Packet by mouth daily as needed for Constipation. (Patient not taking: Reported on 6/1/2021)    levothyroxine (Synthroid) 137 mcg tablet Take 137 mcg by mouth Daily (before breakfast).  Indications: a condition with low thyroid hormone levels    cholecalciferol (Vitamin D3) (1000 Units /25 mcg) tablet Take 1 Tab by mouth two (2) times a day.  metoprolol tartrate (LOPRESSOR) 25 mg tablet TAKE 1 TABLET BY MOUTH EVERY 12 HOURS    isosorbide mononitrate ER (IMDUR) 30 mg tablet TAKE 1 TABLET BY MOUTH EVERY MORNING    albuterol (PROVENTIL HFA, VENTOLIN HFA, PROAIR HFA) 90 mcg/actuation inhaler INHALE 1 PUFF BY MOUTH EVERY 4 HOURS AS NEEDED FOR WHEEZING OR SHORTNESS OF BREATH    multivitamin with minerals (MULTIVITAMIN & MINERAL FORMULA PO) Take 1 Tab by mouth daily.  ranolazine ER (RANEXA) 500 mg SR tablet Take 1 Tab by mouth two (2) times a day.  nitroglycerin (NITROSTAT) 0.4 mg SL tablet 1 Tab by SubLINGual route every five (5) minutes as needed for Chest Pain. Up to 3 doses.  lidocaine (ASPERCREME, LIDOCAINE,) 4 % patch 1 Patch by TransDERmal route every eight (8) hours.  RONNELL PEN NEEDLE 32 gauge x 5/32\" ndle     ascorbic acid, vitamin C, (VITAMIN C) 250 mg tablet Take 250 mg by mouth daily. 1 pill po daily  Indications: inadequate vitamin C    cyanocobalamin ER 1,000 mcg tablet Take 1 Tab by mouth daily.  DOCOSAHEXANOIC ACID/EPA (FISH OIL PO) Take 1,000 mg by mouth two (2) times a day. 1 pill po twice daily      No current facility-administered medications for this visit. [facility] follow up appointment(s):   Future Appointments   Date Time Provider Chelle Reeves   7/6/2021 10:45 AM Franklin Bazzi MD [facility] BS AMB   7/20/2021 10:45 AM Santa Ford MD [facility] BS AMB          Goals Addressed                 This Visit's Progress     Attend follow up appointments on schedule   On track     Knowledge and adherence of prescribed medication (ie. action, side effects, missed dose, etc.).    On track     COMPLETED: Prepare patients and caregivers for end of life decisions (ie. need for hospice, pain management, symptom relief, advance directives etc.)        Prepare patients and caregivers for end of life decisions (ie. need for hospice, pain management, symptom relief, advance directives etc.)   On track

## 2021-06-17 LAB
HBA1C MFR BLD HPLC: 7.7 %
LDL-C, EXTERNAL: 146
TOTAL CHOLESTEROL, NCHOLT: 215

## 2021-06-23 ENCOUNTER — PATIENT OUTREACH (OUTPATIENT)
Dept: CASE MANAGEMENT | Age: 86
End: 2021-06-23

## 2021-06-23 NOTE — PROGRESS NOTES
Attempted to contact patient for CCM encounter / follow up. No answer. Message left with ACM contact information provided. Will attempt to contact at a later time.

## 2021-06-26 ENCOUNTER — HOSPITAL ENCOUNTER (OUTPATIENT)
Dept: LAB | Age: 86
Discharge: HOME | End: 2021-06-26

## 2021-06-26 LAB — XX-LABCORP SPECIMEN COL,LCBCF: NORMAL

## 2021-06-26 PROCEDURE — 99001 SPECIMEN HANDLING PT-LAB: CPT

## 2021-06-27 LAB
BUN SERPL-MCNC: 8 MG/DL (ref 8–27)
BUN/CREAT SERPL: 11 (ref 12–28)
CALCIUM SERPL-MCNC: 9.6 MG/DL (ref 8.7–10.3)
CHLORIDE SERPL-SCNC: 101 MMOL/L (ref 96–106)
CO2 SERPL-SCNC: 23 MMOL/L (ref 20–29)
CREAT SERPL-MCNC: 0.72 MG/DL (ref 0.57–1)
GLUCOSE SERPL-MCNC: 134 MG/DL (ref 65–99)
POTASSIUM SERPL-SCNC: 3.7 MMOL/L (ref 3.5–5.2)
SODIUM SERPL-SCNC: 140 MMOL/L (ref 134–144)

## 2021-06-28 DIAGNOSIS — I50.32 CHRONIC DIASTOLIC CONGESTIVE HEART FAILURE (HCC): ICD-10-CM

## 2021-06-29 ENCOUNTER — TELEPHONE (OUTPATIENT)
Dept: CARDIOLOGY CLINIC | Age: 86
End: 2021-06-29

## 2021-06-29 NOTE — TELEPHONE ENCOUNTER
Let patient know labs reviewed no significant abnormalities with exception of elevated glucose and advised to follow-up with PCP thank you

## 2021-06-29 NOTE — TELEPHONE ENCOUNTER
Paper message given spoke with patient with instructions. She voices understanding and acceptance of this advice and will call back if any further questions or concerns.

## 2021-06-29 NOTE — TELEPHONE ENCOUNTER
----- Message from Tushar Fernandez NP sent at 6/29/2021  3:17 PM EDT -----  Let patient know labs reviewed no significant abnormalities with exception of elevated glucose and advised to follow-up with PCP thank you

## 2021-07-01 ENCOUNTER — PATIENT OUTREACH (OUTPATIENT)
Dept: CASE MANAGEMENT | Age: 86
End: 2021-07-01

## 2021-07-01 RX ORDER — FUROSEMIDE 20 MG/1
20 TABLET ORAL AS NEEDED
Qty: 30 TABLET | Refills: 5 | Status: SHIPPED | OUTPATIENT
Start: 2021-07-01 | End: 2022-02-01

## 2021-07-08 ENCOUNTER — APPOINTMENT (OUTPATIENT)
Dept: CT IMAGING | Age: 86
End: 2021-07-08
Attending: EMERGENCY MEDICINE
Payer: MEDICARE

## 2021-07-08 ENCOUNTER — HOSPITAL ENCOUNTER (EMERGENCY)
Age: 86
Discharge: HOME | End: 2021-07-08
Attending: EMERGENCY MEDICINE
Payer: MEDICARE

## 2021-07-08 ENCOUNTER — APPOINTMENT (OUTPATIENT)
Dept: GENERAL RADIOLOGY | Age: 86
End: 2021-07-08
Attending: EMERGENCY MEDICINE
Payer: MEDICARE

## 2021-07-08 VITALS
HEIGHT: 66 IN | WEIGHT: 229 LBS | OXYGEN SATURATION: 94 % | RESPIRATION RATE: 24 BRPM | HEART RATE: 104 BPM | SYSTOLIC BLOOD PRESSURE: 181 MMHG | TEMPERATURE: 98.3 F | DIASTOLIC BLOOD PRESSURE: 99 MMHG | BODY MASS INDEX: 36.8 KG/M2

## 2021-07-08 DIAGNOSIS — R53.83 MALAISE AND FATIGUE: Primary | ICD-10-CM

## 2021-07-08 DIAGNOSIS — R53.81 MALAISE AND FATIGUE: Primary | ICD-10-CM

## 2021-07-08 DIAGNOSIS — R06.89 DYSPNEA AND RESPIRATORY ABNORMALITIES: ICD-10-CM

## 2021-07-08 DIAGNOSIS — R06.00 DYSPNEA AND RESPIRATORY ABNORMALITIES: ICD-10-CM

## 2021-07-08 DIAGNOSIS — R61 CHRONIC NIGHT SWEATS: ICD-10-CM

## 2021-07-08 LAB
ALBUMIN SERPL-MCNC: 3.2 G/DL (ref 3.4–5)
ALBUMIN/GLOB SERPL: 0.6 {RATIO} (ref 0.8–1.7)
ALP SERPL-CCNC: 99 U/L (ref 45–117)
ALT SERPL-CCNC: 16 U/L (ref 13–56)
ANION GAP SERPL CALC-SCNC: 10 MMOL/L (ref 3–18)
APPEARANCE UR: CLEAR
AST SERPL-CCNC: 17 U/L (ref 10–38)
BASOPHILS # BLD: 0.1 K/UL (ref 0–0.1)
BASOPHILS NFR BLD: 1 % (ref 0–2)
BILIRUB SERPL-MCNC: 0.7 MG/DL (ref 0.2–1)
BILIRUB UR QL: NEGATIVE
BNP SERPL-MCNC: 36 PG/ML (ref 0–1800)
BUN SERPL-MCNC: 13 MG/DL (ref 7–18)
BUN/CREAT SERPL: 15 (ref 12–20)
CALCIUM SERPL-MCNC: 9.7 MG/DL (ref 8.5–10.1)
CHLORIDE SERPL-SCNC: 107 MMOL/L (ref 100–111)
CO2 SERPL-SCNC: 23 MMOL/L (ref 21–32)
COLOR UR: YELLOW
CREAT SERPL-MCNC: 0.84 MG/DL (ref 0.6–1.3)
DIFFERENTIAL METHOD BLD: ABNORMAL
EOSINOPHIL # BLD: 0.2 K/UL (ref 0–0.4)
EOSINOPHIL NFR BLD: 3 % (ref 0–5)
ERYTHROCYTE [DISTWIDTH] IN BLOOD BY AUTOMATED COUNT: 12.2 % (ref 11.6–14.5)
GLOBULIN SER CALC-MCNC: 5.2 G/DL (ref 2–4)
GLUCOSE SERPL-MCNC: 204 MG/DL (ref 74–99)
GLUCOSE UR STRIP.AUTO-MCNC: NEGATIVE MG/DL
HCT VFR BLD AUTO: 39.5 % (ref 35–45)
HGB BLD-MCNC: 12.4 G/DL (ref 12–16)
HGB UR QL STRIP: NEGATIVE
KETONES UR QL STRIP.AUTO: NEGATIVE MG/DL
LACTATE BLD-SCNC: 1.06 MMOL/L (ref 0.4–2)
LEUKOCYTE ESTERASE UR QL STRIP.AUTO: NEGATIVE
LIPASE SERPL-CCNC: 46 U/L (ref 73–393)
LYMPHOCYTES # BLD: 2.1 K/UL (ref 0.9–3.6)
LYMPHOCYTES NFR BLD: 35 % (ref 21–52)
MCH RBC QN AUTO: 30.8 PG (ref 24–34)
MCHC RBC AUTO-ENTMCNC: 31.4 G/DL (ref 31–37)
MCV RBC AUTO: 98.3 FL (ref 74–97)
MONOCYTES # BLD: 0.5 K/UL (ref 0.05–1.2)
MONOCYTES NFR BLD: 8 % (ref 3–10)
NEUTS SEG # BLD: 3.2 K/UL (ref 1.8–8)
NEUTS SEG NFR BLD: 53 % (ref 40–73)
NITRITE UR QL STRIP.AUTO: NEGATIVE
PH UR STRIP: 6.5 [PH] (ref 5–8)
PLATELET # BLD AUTO: 245 K/UL (ref 135–420)
PMV BLD AUTO: 11.2 FL (ref 9.2–11.8)
POTASSIUM SERPL-SCNC: 4 MMOL/L (ref 3.5–5.5)
PROT SERPL-MCNC: 8.4 G/DL (ref 6.4–8.2)
PROT UR STRIP-MCNC: NEGATIVE MG/DL
RBC # BLD AUTO: 4.02 M/UL (ref 4.2–5.3)
RBC MORPH BLD: ABNORMAL
SARS-COV-2, COV2: NORMAL
SODIUM SERPL-SCNC: 140 MMOL/L (ref 136–145)
SP GR UR REFRACTOMETRY: 1.01 (ref 1–1.03)
TROPONIN I SERPL-MCNC: <0.02 NG/ML (ref 0–0.04)
UROBILINOGEN UR QL STRIP.AUTO: 0.2 EU/DL (ref 0.2–1)
WBC # BLD AUTO: 6.1 K/UL (ref 4.6–13.2)

## 2021-07-08 PROCEDURE — 87077 CULTURE AEROBIC IDENTIFY: CPT

## 2021-07-08 PROCEDURE — 83605 ASSAY OF LACTIC ACID: CPT

## 2021-07-08 PROCEDURE — 83690 ASSAY OF LIPASE: CPT

## 2021-07-08 PROCEDURE — 99284 EMERGENCY DEPT VISIT MOD MDM: CPT

## 2021-07-08 PROCEDURE — 87040 BLOOD CULTURE FOR BACTERIA: CPT

## 2021-07-08 PROCEDURE — 93005 ELECTROCARDIOGRAM TRACING: CPT

## 2021-07-08 PROCEDURE — 83880 ASSAY OF NATRIURETIC PEPTIDE: CPT

## 2021-07-08 PROCEDURE — 96374 THER/PROPH/DIAG INJ IV PUSH: CPT

## 2021-07-08 PROCEDURE — U0005 INFEC AGEN DETEC AMPLI PROBE: HCPCS

## 2021-07-08 PROCEDURE — 80053 COMPREHEN METABOLIC PANEL: CPT

## 2021-07-08 PROCEDURE — 85025 COMPLETE CBC W/AUTO DIFF WBC: CPT

## 2021-07-08 PROCEDURE — 74176 CT ABD & PELVIS W/O CONTRAST: CPT

## 2021-07-08 PROCEDURE — 81003 URINALYSIS AUTO W/O SCOPE: CPT

## 2021-07-08 PROCEDURE — 87086 URINE CULTURE/COLONY COUNT: CPT

## 2021-07-08 PROCEDURE — 84484 ASSAY OF TROPONIN QUANT: CPT

## 2021-07-08 PROCEDURE — 71045 X-RAY EXAM CHEST 1 VIEW: CPT

## 2021-07-08 PROCEDURE — 74011250636 HC RX REV CODE- 250/636: Performed by: EMERGENCY MEDICINE

## 2021-07-08 RX ORDER — FUROSEMIDE 10 MG/ML
40 INJECTION INTRAMUSCULAR; INTRAVENOUS
Status: COMPLETED | OUTPATIENT
Start: 2021-07-08 | End: 2021-07-08

## 2021-07-08 RX ADMIN — FUROSEMIDE 40 MG: 10 INJECTION, SOLUTION INTRAMUSCULAR; INTRAVENOUS at 15:13
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
## 2021-07-08 NOTE — ED TRIAGE NOTES
Pt reports feeling fatigued, excessive sweating, and nausea the last x4 days. Pt states she has felt weak and \"off\". Pt FSBS in \"200s\" this morning. Pt reports pain in right calf that they cleared for DVT a couple weeks ago. Pt diaphoretic in triage.

## 2021-07-09 ENCOUNTER — PATIENT OUTREACH (OUTPATIENT)
Dept: CASE MANAGEMENT | Age: 86
End: 2021-07-09

## 2021-07-09 LAB
ATRIAL RATE: 100 BPM
CALCULATED P AXIS, ECG09: 88 DEGREES
CALCULATED T AXIS, ECG11: 32 DEGREES
DIAGNOSIS, 93000: NORMAL
P-R INTERVAL, ECG05: 124 MS
Q-T INTERVAL, ECG07: 368 MS
QRS DURATION, ECG06: 84 MS
QTC CALCULATION (BEZET), ECG08: 474 MS
VENTRICULAR RATE, ECG03: 100 BPM

## 2021-07-09 NOTE — PROGRESS NOTES
Social Work Note  7/9/2021  Date of referral: 3/4/2021  Referral received from: JENNIFER- Maria Del Rosario Brown RN  Reason for referral: Assist the patient with community resource for a reliable personal care agency.     Previous SW Referral: No  If yes, brief summary and outcome:  n/a     Two Identifiers Verified: Yes     Insurance Provider: [facility] [employer]     Support System: Neighbors, granddaughter Subha,       [place] Status: N/A     Community Providers: SNAP/Food [place] and Medicaid Long-Term Care, PCP      ADL Assistance: N/A     Transportation Concern: None     Medication Cost Concern: None     Medication Education or Adherence Concern: None     Financial Concern(s): none     Ability to Read/Write: Yes     Advance Care Plan:  Reviewed and confirmed accuracy     Other: none     Identified Needs:      · Assist the patient with community resources for a reliable personal care agency.     Social Work Plan:     · Provide the patient with list of community resources for personal care agencies.      MSW received referral from JENNIFER to assist the patient with community resources for a reliable personal care agency. The patient is an 82year old woman who resides alone in the community. She is alert and oriented X4 and has the ability to verbalize her needs. She is always very pleasant and was in good spirits during phone call. MSW introduced self, role and reason for call. The patient reported that she had been to the ED yesterday due to not feeling well and foot being swollen. The patient shared that she was tested for Covid during visit. Ms. Laurita Prater informed MSW that she has an aide to assist her in the home provided by [facility]. The patient had no other issues or concerns to report at this time. MSW will continue to follow the patient as needed.

## 2021-07-09 NOTE — ED NOTES
Notified by RN that the patient has a positive blood culture on labs drawn yesterday. Chart review reveals Gram-positive rods in the anaerobic bottle of cultures drawn yesterday. Case discussed with the on-call Infectious Disease physician, Dr. Kal Whelan. He said this is likely a skin contaminant and in the absence of sepsis this is unlikely to be of significance. This was discussed with the patient's daughter [facility] at [phone]. She was instructed to bring her grandmother back to the ED if she spikes a fever or exhibits any deterioration in her condition. She demonstrated understanding and all questions were answered.     Payton Jimenez, DO

## 2021-07-09 NOTE — PROGRESS NOTES
Patient is feeling better today. Swelling in legs is improved. Patient drinking water regularly. Educated patient about risk for severe COVID-19 due to risk factors according to CDC guidelines. CTN reviewed discharge instructions, medical action plan and red flag symptoms with the patient who verbalized understanding. Discussed COVID vaccination status: yes. Patient states that she received her last COVID vaccine in May patient will take card to office  Education provided on COVID-19 vaccination as appropriate. Discussed exposure protocols and quarantine with CDC Guidelines. Patient was given an opportunity to verbalize any questions and concerns and agrees to contact CTN or health care provider for questions related to their healthcare. CTN provided contact information. No further follow-up call identified based on severity of symptoms and risk factors.

## 2021-07-10 LAB
BACTERIA SPEC CULT: ABNORMAL
BACTERIA SPEC CULT: ABNORMAL
CC UR VC: ABNORMAL
GRAM STN SPEC: ABNORMAL
GRAM STN SPEC: ABNORMAL
SARS-COV-2, COV2NT: NOT DETECTED
SERVICE CMNT-IMP: ABNORMAL
SERVICE CMNT-IMP: ABNORMAL

## 2021-07-14 LAB
BACTERIA SPEC CULT: NORMAL
SERVICE CMNT-IMP: NORMAL

## 2021-07-16 ENCOUNTER — PATIENT OUTREACH (OUTPATIENT)
Dept: CASE MANAGEMENT | Age: 86
End: 2021-07-16

## 2021-07-16 NOTE — PROGRESS NOTES
Social Work Note  7/16/2021    Date of referral: 3/4/2021  Referral received from: DAMARIS Vance RN  Reason for referral: Assist the patient with community resource for a reliable personal care agency.     Previous SW Referral: No  If yes, brief summary and outcome:  n/a     Two Identifiers Verified: Yes     Insurance Provider: [facility] [email or web address]     Support System: Neighbors, granddaughter Subha,        Status: N/A     Community Providers: SNAP/Food [place] and Medicaid Long-Term Care, PCP      ADL Assistance: N/A     Transportation Concern: None     Medication Cost Concern: None     Medication Education or Adherence Concern: None     Financial Concern(s): none     Ability to Read/Write: Yes     Advance Care Plan:  Reviewed and confirmed accuracy     Other: none     Identified Needs:      · Assist the patient with community resources for a reliable personal care agency.     Social Work Plan:     · Provide the patient with list of community resources for personal care agencies.   ·   MSW received referral from JENNIFER to assist the patient with community resources for a reliable personal care agency.   The patient is an 82year old woman who resides alone in the community. She is alert and oriented X4 and has the ability to verbalize her needs. MSW attempted to reach the patient by telephone for follow up. Phone message indicated that the mailbox is full and cannot receive any messages at this time. MSW unable to leave message. Will attempt to reach the patient at a later time.

## 2021-07-19 ENCOUNTER — PATIENT OUTREACH (OUTPATIENT)
Dept: CASE MANAGEMENT | Age: 86
End: 2021-07-19

## 2021-07-19 NOTE — PROGRESS NOTES
Social Work Note  7/19/2021    Date of referral: 3/4/2021  Referral received from: DAMARIS Ambriz RN  Reason for referral: Assist the patient with community resource for a reliable personal care agency.     Previous SW Referral: No  If yes, brief summary and outcome:  n/a     Two Identifiers Verified: Yes     Insurance Provider: [facility] [employer]     Support System: Neighbors, granddaughter Subha,       [place] Status: N/A     Community Providers: SNAP/Food [place] and Medicaid Long-Term Care, PCP      ADL Assistance: N/A     Transportation Concern: None     Medication Cost Concern: None     Medication Education or Adherence Concern: None     Financial Concern(s): none     Ability to Read/Write: Yes     Advance Care Plan:  Reviewed and confirmed accuracy     Other: none     Identified Needs:      · Assist the patient with community resources for a reliable personal care agency.     Social Work Plan:     · Provide the patient with list of community resources for personal care agencies.      MSW received referral from JENNIFER to assist the patient with community resources for a reliable personal care agency.   The patient is an 82year old woman who resides alone in the community. She is alert and oriented X4 and has the ability to verbalize her needs. MSW contacted the patient and introduced self, role and reason for call. The patient reported that she is doing alright. She mentioned that she does not have an aid to assist her today, because the female assigned to her could not make it to work. The patient stated that she contacted the  and left a message requesting a call back. The patient informed MSW that the 's had a death in his family over the weekend and was busy with assisting his family.    The patient shared that she will try to call co manager of [facility] to find out what their plan is for her in regards to an aid and to follow up on having someone available for respite (overnight stay). The patient informed MSW that recent Covid test result was negative. She shared that she continues to have some lower extremity pain on and off. MSW encouraged the patient to contact her physician to inform if it worsens. The patient verbalized understanding. MSW will continue to assist the patient as needed.

## 2021-07-20 ENCOUNTER — PATIENT OUTREACH (OUTPATIENT)
Dept: CASE MANAGEMENT | Age: 86
End: 2021-07-20

## 2021-07-20 NOTE — PROGRESS NOTES
Social Work Note  7/20/2021      Date of referral: 3/4/2021  Referral received from: JENNIFER- Jl Soni RN  Reason for referral: Assist the patient with community resource for a reliable personal care agency.     Previous SW Referral: No  If yes, brief summary and outcome:  n/a     Two Identifiers Verified: Yes     Insurance Provider: [facility] [employer] [employer]     Support System: Neighbors, granddaughter Subha,       [place] Status: N/A     Community Providers: SNAP/Food [place] and Medicaid Long-Term Care, PCP      ADL Assistance: N/A     Transportation Concern: None     Medication Cost Concern: None     Medication Education or Adherence Concern: None     Financial Concern(s): none     Ability to Read/Write: Yes     Advance Care Plan:  Reviewed and confirmed accuracy     Other: none     Identified Needs:      · Assist the patient with community resources for a reliable personal care agency.     Social Work Plan:     · Provide the patient with list of community resources for personal care agencies.      MSW received referral from JENNIFER to assist the patient with community resources for a reliable personal care agency.   The patient is an 82year old woman who resides alone in the community. She is alert and oriented X4 and has the ability to verbalize her needs. MSW contacted the patient and introduced self, role and reason for call. The patient stated that she contacted the manager of [employer] and he told her that he will give her a call back once he speaks with Anastasia Gustafson. MSW informed the patient that if she has not received a response by tomorrow, MSW will contact the manager for follow up. The patient shared that she has not heard from the aid assigned to her since Sunday and was not sure what was going on. The patient mentioned that she was doing well and able to do some things for herself. MSW will follow to assist the patient as needed.

## 2021-07-27 ENCOUNTER — PATIENT OUTREACH (OUTPATIENT)
Dept: CASE MANAGEMENT | Age: 86
End: 2021-07-27

## 2021-07-27 NOTE — PROGRESS NOTES
Social Work Note  7/27/2021    Date of referral: 3/4/2021  Referral received from: CTN- Rincon Level, RN  Reason for referral: Assist the patient with community resource for a reliable personal care agency.     Previous SW Referral: No  If yes, brief summary and outcome:  n/a     Two Identifiers Verified: Yes     Insurance Provider: [employer] [place] [employer]     Support System: Neighbors, granddaughter Subha,       [place] Status: N/A     Community Providers: SNAP/Food [place] and Medicaid Long-Term Care, PCP      ADL Assistance: N/A     Transportation Concern: None     Medication Cost Concern: None     Medication Education or Adherence Concern: None     Financial Concern(s): none     Ability to Read/Write: Yes     Advance Care Plan:  Reviewed and confirmed accuracy     Other: none     Identified Needs:      · Assist the patient with community resources for a reliable personal care agency.     Social Work Plan:     · Provide the patient with list of community resources for personal care agencies.      MSW received referral from JENNIFER to assist the patient with community resources for a reliable personal care agency.   The patient is an 82year old woman who resides alone in the community. She is alert and oriented X4 and has the ability to verbalize her needs. MSW contacted the patient and introduced self, role and reason for call. The patient reported that she has not heard back from the director of \"[employer]. \" She mentioned not having an aide to assist her and is not sure what is going on. MSW attempted to reach the director of [facility] and left message with information to return call. MSW will continue to follow patient to assist with any needs.

## 2021-07-28 DIAGNOSIS — Z95.5 S/P CORONARY ARTERY STENT PLACEMENT: ICD-10-CM

## 2021-07-28 RX ORDER — CLOPIDOGREL BISULFATE 75 MG/1
TABLET ORAL
Qty: 30 TABLET | Refills: 2 | Status: SHIPPED | OUTPATIENT
Start: 2021-07-28 | End: 2021-11-22

## 2021-08-02 ENCOUNTER — PATIENT OUTREACH (OUTPATIENT)
Dept: CASE MANAGEMENT | Age: 86
End: 2021-08-02
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
## 2021-08-12 ENCOUNTER — OFFICE VISIT (OUTPATIENT)
Dept: FAMILY MEDICINE CLINIC | Age: 86
End: 2021-08-12
Payer: MEDICARE

## 2021-08-12 VITALS
BODY MASS INDEX: 36.8 KG/M2 | HEIGHT: 66 IN | HEART RATE: 114 BPM | RESPIRATION RATE: 18 BRPM | DIASTOLIC BLOOD PRESSURE: 77 MMHG | OXYGEN SATURATION: 97 % | WEIGHT: 229 LBS | SYSTOLIC BLOOD PRESSURE: 124 MMHG | TEMPERATURE: 98.2 F

## 2021-08-12 DIAGNOSIS — R53.83 FATIGUE, UNSPECIFIED TYPE: ICD-10-CM

## 2021-08-12 DIAGNOSIS — R10.32 LLQ PAIN: Primary | ICD-10-CM

## 2021-08-12 DIAGNOSIS — R00.0 TACHYCARDIA: ICD-10-CM

## 2021-08-12 PROCEDURE — 99213 OFFICE O/P EST LOW 20 MIN: CPT | Performed by: NURSE PRACTITIONER

## 2021-08-12 NOTE — PROGRESS NOTES
Chief Complaint   Patient presents with    UTI     unable to leave sample      Assessment & Plan:     1. LLQ pain  Comments:  Unremarkable abdominal exam  2. Fatigue, unspecified type  Comments:  Labs from most recent ER visit reviewed, unremarkable  3. Tachycardia  Comments:  Feels fatigued, otherwise asymptomatic  Advised to call cardiology to schedule follow-up    Follow-up and Dispositions    · Return in about 4 weeks (around 9/9/2021) for chronic conditions with PCP       Subjective:     HPI    Abdominal Pain -  Was seen and evaluated in the ER on 07/08/2021 and was found to have UTI based on culture and sensitivity. CT scan of abd/pelvis showed diverticulosis, otherwise unremarkable. Had some pain on LLQ 2 days ago  Drank some cranberry juice and her symptoms went away  Denies any fever/chills, nausea or vomiting  No changes in stool, eating drinking well  She does admit some fatigue      Review of Systems   Constitutional: Positive for malaise/fatigue. Negative for chills and fever. Respiratory: Negative for shortness of breath. Cardiovascular: Positive for leg swelling. Negative for chest pain and palpitations. Gastrointestinal: Positive for abdominal pain. Negative for blood in stool, constipation, diarrhea, nausea and vomiting. Neurological: Negative for dizziness and headaches. Objective:   /77 (BP 1 Location: Left upper arm, BP Patient Position: Sitting, BP Cuff Size: Adult)   Pulse (!) 114   Temp 98.2 °F (36.8 °C) (Oral)   Resp 18   Ht 5' 6\" (1.676 m)   Wt 229 lb (103.9 kg)   SpO2 97%   BMI 36.96 kg/m²      Physical Exam  Vitals and nursing note reviewed. Constitutional:       General: She is not in acute distress. Appearance: She is not ill-appearing. HENT:      Head: Normocephalic and atraumatic. Cardiovascular:      Rate and Rhythm: Regular rhythm. Tachycardia present. Heart sounds: No murmur heard. No friction rub. No gallop.     Pulmonary:      Effort: Pulmonary effort is normal. No respiratory distress. Breath sounds: No wheezing, rhonchi or rales. Abdominal:      General: Bowel sounds are normal. There is no distension. Palpations: Abdomen is soft. There is no mass. Tenderness: There is no abdominal tenderness. There is no right CVA tenderness, left CVA tenderness, guarding or rebound. Skin:     General: Skin is warm and dry. Neurological:      General: No focal deficit present. Mental Status: She is alert and oriented to person, place, and time. Psychiatric:         Mood and Affect: Mood normal.         Thought Content:  Thought content normal.         Judgment: Judgment normal.        VASYL Newby

## 2021-08-12 NOTE — PROGRESS NOTES
Remberto Hwang presents today for   Chief Complaint   Patient presents with    UTI     unable to leave sample        Is someone accompanying this pt? no    Is the patient using any DME equipment during OV? no    Depression Screening:  3 most recent PHQ Screens 8/12/2021   PHQ Not Done -   Little interest or pleasure in doing things Not at all   Feeling down, depressed, irritable, or hopeless Not at all   Total Score PHQ 2 0       Learning Assessment:  Learning Assessment 3/1/2021   PRIMARY LEARNER Patient   HIGHEST LEVEL OF EDUCATION - PRIMARY LEARNER  [facility] PRIMARY LEARNER NONE   CO-LEARNER CAREGIVER No   CO-LEARNER NAME -   PRIMARY LANGUAGE ENGLISH    NEED -   LEARNER PREFERENCE PRIMARY READING     -     -     -     -   ANSWERED BY patient    RELATIONSHIP SELF       Fall Risk  Fall Risk Assessment, last 12 mths 8/12/2021   Able to walk? Yes   Fall in past 12 months? 0   Do you feel unsteady? 1   Are you worried about falling 1   Is TUG test greater than 12 seconds? 0   Is the gait abnormal? 1   Number of falls in past 12 months 0   Fall with injury? -       ADL  No flowsheet data found. Travel Screening:    Travel Screening     Question   Response    In the last month, have you been in contact with someone who was confirmed or suspected to have Coronavirus / COVID-19? No / Unsure    Have you had a COVID-19 viral test in the last 14 days? No    Do you have any of the following new or worsening symptoms? Have you traveled internationally or domestically in the last month? No      Travel History   Travel since 07/12/21     No documented travel since 07/12/21          Health Maintenance reviewed and discussed and ordered per Provider.     Health Maintenance Due   Topic Date Due    COVID-19 Vaccine (1) Never done    Shingrix Vaccine Age 50> (1 of 2) Never done    Foot Exam Q1  10/24/2018    Pneumococcal 65+ years (2 of 2 - PPSV23) 02/19/2020    MICROALBUMIN Q1 02/25/2020    Lipid Screen  07/07/2021    A1C test (Diabetic or Prediabetic)  07/18/2021    Medicare Yearly Exam  09/12/2021   . Coordination of Care:  1. Have you been to the ER, urgent care clinic since your last visit? Hospitalized since your last visit? no    2. Have you seen or consulted any other health care providers outside of the [address] [place] [address] since your last visit? Include any pap smears or colon screening.  No

## 2021-08-20 ENCOUNTER — PATIENT OUTREACH (OUTPATIENT)
Dept: CASE MANAGEMENT | Age: 86
End: 2021-08-20

## 2021-08-20 NOTE — PROGRESS NOTES
Social Work Note  8/20/2021    Date of referral: 3/4/2021  Referral received from: JENNIFER- Prince Jeremie RN  Reason for referral: Assist the patient with community resource for a reliable personal care agency.     Previous SW Referral: No  If yes, brief summary and outcome:  n/a     Two Identifiers Verified: Yes     Insurance Provider: [employer] [place] [employer]     Support System: Neighbors, granddaughter Subha,       [place] Status: N/A     Community Providers: SNAP/Food [place] and Medicaid Long-Term Care, PCP      ADL Assistance: N/A     Transportation Concern: None     Medication Cost Concern: None     Medication Education or Adherence Concern: None     Financial Concern(s): none     Ability to Read/Write: Yes     Advance Care Plan:  Reviewed and confirmed accuracy     Other: none     Identified Needs:      · Assist the patient with community resources for a reliable personal care agency.     Social Work Plan:     · Provide the patient with list of community resources for personal care agencies.      MSW received referral from JENNIFER to assist the patient with community resources for a reliable personal care agency.   The patient is an 82year old woman who resides alone in the community. She is alert and oriented X4 and has the ability to verbalize her needs. MSW contacted the patient and introduced self, role and reason for call.   The patient reported that she is doing alright. MSW asked the patient if she heard from the agency, [facility] and she shared that she spoke with Danielle Graves who told her that they are working on finding an aid to assign to her but have not received anyone as yet. The patient then shared that she met someone (Ms. Ana Gilbert) from a home health agency she used in the past ([facility]) and shared that they've changed their name and after extensive talk with the person, she have decided to transfer her case to them.  The patient informed MSW that the agency requested her UAI from [facility] and it has been received. The new agency is [facility]. She shared that she has been in communication with the agency and Ms. Trang Chin is scheduled to meet with her for initial assessment. The patient was in good spirits and had no concerns or issues to report at this time. MSW will continue to follow to assist as needed.

## 2021-08-24 ENCOUNTER — OFFICE VISIT (OUTPATIENT)
Dept: CARDIOLOGY CLINIC | Age: 86
End: 2021-08-24
Payer: MEDICARE

## 2021-08-24 VITALS
SYSTOLIC BLOOD PRESSURE: 136 MMHG | HEIGHT: 66 IN | BODY MASS INDEX: 36.96 KG/M2 | DIASTOLIC BLOOD PRESSURE: 74 MMHG | HEART RATE: 116 BPM

## 2021-08-24 DIAGNOSIS — I50.32 CHRONIC DIASTOLIC CONGESTIVE HEART FAILURE (HCC): ICD-10-CM

## 2021-08-24 DIAGNOSIS — I25.118 ATHEROSCLEROSIS OF NATIVE CORONARY ARTERY OF NATIVE HEART WITH STABLE ANGINA PECTORIS (HCC): Primary | ICD-10-CM

## 2021-08-24 DIAGNOSIS — I10 ESSENTIAL HYPERTENSION: ICD-10-CM

## 2021-08-24 DIAGNOSIS — Z95.5 S/P CORONARY ARTERY STENT PLACEMENT: ICD-10-CM

## 2021-08-24 PROCEDURE — 1101F PT FALLS ASSESS-DOCD LE1/YR: CPT | Performed by: INTERNAL MEDICINE

## 2021-08-24 PROCEDURE — G8427 DOCREV CUR MEDS BY ELIG CLIN: HCPCS | Performed by: INTERNAL MEDICINE

## 2021-08-24 PROCEDURE — G8399 PT W/DXA RESULTS DOCUMENT: HCPCS | Performed by: INTERNAL MEDICINE

## 2021-08-24 PROCEDURE — G8417 CALC BMI ABV UP PARAM F/U: HCPCS | Performed by: INTERNAL MEDICINE

## 2021-08-24 PROCEDURE — G8752 SYS BP LESS 140: HCPCS | Performed by: INTERNAL MEDICINE

## 2021-08-24 PROCEDURE — G8432 DEP SCR NOT DOC, RNG: HCPCS | Performed by: INTERNAL MEDICINE

## 2021-08-24 PROCEDURE — G8536 NO DOC ELDER MAL SCRN: HCPCS | Performed by: INTERNAL MEDICINE

## 2021-08-24 PROCEDURE — G8754 DIAS BP LESS 90: HCPCS | Performed by: INTERNAL MEDICINE

## 2021-08-24 PROCEDURE — 1090F PRES/ABSN URINE INCON ASSESS: CPT | Performed by: INTERNAL MEDICINE

## 2021-08-24 PROCEDURE — 99214 OFFICE O/P EST MOD 30 MIN: CPT | Performed by: INTERNAL MEDICINE

## 2021-08-24 NOTE — PROGRESS NOTES
1. Have you been to the ER, urgent care clinic since your last visit? Hospitalized since your last visit?     no  2. Have you seen or consulted any other health care providers outside of the [address] [place] [address] since your last visit? Include any pap smears or colon screening. No     3. Since your last visit, have you had any of the following symptoms?      swelling in legs/arms.

## 2021-08-25 ENCOUNTER — PATIENT OUTREACH (OUTPATIENT)
Dept: CASE MANAGEMENT | Age: 86
End: 2021-08-25

## 2021-08-25 NOTE — PROGRESS NOTES
Social Work Note  8/25/2021    Date of referral: 3/4/2021  Referral received from: JENNIFER- Doc Naidu RN  Reason for referral: Assist the patient with community resource for a reliable personal care agency.     Previous SW Referral: No  If yes, brief summary and outcome:  n/a     Two Identifiers Verified: Yes     Insurance Provider: [employer] [place] [employer]     Support System: Neighbors, granddaughter Subha,       [place] Status: N/A     Community Providers: SNAP/Food [place] and Medicaid Long-Term Care, PCP      ADL Assistance: N/A     Transportation Concern: None     Medication Cost Concern: None     Medication Education or Adherence Concern: None     Financial Concern(s): none     Ability to Read/Write: Yes     Advance Care Plan:  Reviewed and confirmed accuracy     Other: none     Identified Needs:      · Assist the patient with community resources for a reliable personal care agency.     Social Work Plan:     · Provide the patient with list of community resources for personal care agencies.      MSW received referral from JENNIFER to assist the patient with community resources for a reliable personal care agency.   The patient is an 82year old woman who resides alone in the community. She is alert and oriented X4 and has the ability to verbalize her needs    Ms. Grafton Carrel contacted MSW. She reported that her granddaughter located another personal care agency ([facility]) owned by someone she knows. The patient shared that the owner and an aid stopped by to perform their assessment. The patient felt overwhelmed in that she had contacted [facility] and was awaiting for initial assessment to be completed. MSW asked the patient which agency she wishes to utilize and initally she could not decide. She then informed MSW that she will use [facility] as her granddaughter contacted them. The patient had no other issues or concerns to report. The patient asked that MSW continue to follow her.    MSW will follow to assist as needed.

## 2021-08-26 ENCOUNTER — APPOINTMENT (OUTPATIENT)
Dept: CT IMAGING | Age: 86
End: 2021-08-26
Attending: EMERGENCY MEDICINE
Payer: MEDICARE

## 2021-08-26 ENCOUNTER — HOSPITAL ENCOUNTER (EMERGENCY)
Age: 86
Discharge: HOME | End: 2021-08-27
Attending: EMERGENCY MEDICINE
Payer: MEDICARE

## 2021-08-26 ENCOUNTER — APPOINTMENT (OUTPATIENT)
Dept: GENERAL RADIOLOGY | Age: 86
End: 2021-08-26
Attending: EMERGENCY MEDICINE
Payer: MEDICARE

## 2021-08-26 DIAGNOSIS — R42 DIZZINESS: Primary | ICD-10-CM

## 2021-08-26 LAB
ALBUMIN SERPL-MCNC: 3.3 G/DL (ref 3.4–5)
ALBUMIN/GLOB SERPL: 0.6 {RATIO} (ref 0.8–1.7)
ALP SERPL-CCNC: 95 U/L (ref 45–117)
ALT SERPL-CCNC: 15 U/L (ref 13–56)
ANION GAP SERPL CALC-SCNC: 5 MMOL/L (ref 3–18)
AST SERPL-CCNC: 10 U/L (ref 10–38)
BASOPHILS # BLD: 0 K/UL (ref 0–0.1)
BASOPHILS NFR BLD: 1 % (ref 0–2)
BILIRUB SERPL-MCNC: 0.6 MG/DL (ref 0.2–1)
BUN SERPL-MCNC: 12 MG/DL (ref 7–18)
BUN/CREAT SERPL: 16 (ref 12–20)
CALCIUM SERPL-MCNC: 9.1 MG/DL (ref 8.5–10.1)
CHLORIDE SERPL-SCNC: 107 MMOL/L (ref 100–111)
CK MB CFR SERPL CALC: NORMAL % (ref 0–4)
CK MB SERPL-MCNC: <1 NG/ML (ref 5–25)
CK SERPL-CCNC: 59 U/L (ref 26–192)
CO2 SERPL-SCNC: 27 MMOL/L (ref 21–32)
CREAT SERPL-MCNC: 0.76 MG/DL (ref 0.6–1.3)
DIFFERENTIAL METHOD BLD: ABNORMAL
EOSINOPHIL # BLD: 0.2 K/UL (ref 0–0.4)
EOSINOPHIL NFR BLD: 3 % (ref 0–5)
ERYTHROCYTE [DISTWIDTH] IN BLOOD BY AUTOMATED COUNT: 12.1 % (ref 11.6–14.5)
GLOBULIN SER CALC-MCNC: 5.1 G/DL (ref 2–4)
GLUCOSE SERPL-MCNC: 138 MG/DL (ref 74–99)
HCT VFR BLD AUTO: 37 % (ref 35–45)
HGB BLD-MCNC: 12 G/DL (ref 12–16)
LYMPHOCYTES # BLD: 2.5 K/UL (ref 0.9–3.6)
LYMPHOCYTES NFR BLD: 38 % (ref 21–52)
MCH RBC QN AUTO: 31.3 PG (ref 24–34)
MCHC RBC AUTO-ENTMCNC: 32.4 G/DL (ref 31–37)
MCV RBC AUTO: 96.4 FL (ref 78–100)
MONOCYTES # BLD: 0.4 K/UL (ref 0.05–1.2)
MONOCYTES NFR BLD: 6 % (ref 3–10)
NEUTS SEG # BLD: 3.5 K/UL (ref 1.8–8)
NEUTS SEG NFR BLD: 52 % (ref 40–73)
PLATELET # BLD AUTO: 242 K/UL (ref 135–420)
PMV BLD AUTO: 10.2 FL (ref 9.2–11.8)
POTASSIUM SERPL-SCNC: 3.5 MMOL/L (ref 3.5–5.5)
PROT SERPL-MCNC: 8.4 G/DL (ref 6.4–8.2)
RBC # BLD AUTO: 3.84 M/UL (ref 4.2–5.3)
SODIUM SERPL-SCNC: 139 MMOL/L (ref 136–145)
TROPONIN I SERPL-MCNC: <0.02 NG/ML (ref 0–0.04)
TROPONIN I SERPL-MCNC: <0.02 NG/ML (ref 0–0.04)
WBC # BLD AUTO: 6.6 K/UL (ref 4.6–13.2)

## 2021-08-26 PROCEDURE — 85025 COMPLETE CBC W/AUTO DIFF WBC: CPT

## 2021-08-26 PROCEDURE — 80053 COMPREHEN METABOLIC PANEL: CPT

## 2021-08-26 PROCEDURE — 82553 CREATINE MB FRACTION: CPT

## 2021-08-26 PROCEDURE — 93005 ELECTROCARDIOGRAM TRACING: CPT

## 2021-08-26 PROCEDURE — 70450 CT HEAD/BRAIN W/O DYE: CPT

## 2021-08-26 PROCEDURE — 99285 EMERGENCY DEPT VISIT HI MDM: CPT

## 2021-08-26 PROCEDURE — 71045 X-RAY EXAM CHEST 1 VIEW: CPT

## 2021-08-26 NOTE — ED TRIAGE NOTES
Pt c/o feeling weak for several days. Also c/o chest tightness, feels nervous. Pt is talking non-stop. States her head feels dizzy.  Pt went to see her GI doctor and say she told them she wasn't feeling good
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
## 2021-08-27 VITALS
SYSTOLIC BLOOD PRESSURE: 139 MMHG | DIASTOLIC BLOOD PRESSURE: 75 MMHG | RESPIRATION RATE: 18 BRPM | TEMPERATURE: 98.6 F | HEIGHT: 66 IN | HEART RATE: 89 BPM | BODY MASS INDEX: 35.2 KG/M2 | WEIGHT: 219 LBS | OXYGEN SATURATION: 98 %

## 2021-08-27 LAB
ATRIAL RATE: 115 BPM
CALCULATED P AXIS, ECG09: 95 DEGREES
CALCULATED R AXIS, ECG10: -5 DEGREES
CALCULATED T AXIS, ECG11: -2 DEGREES
DIAGNOSIS, 93000: NORMAL
P-R INTERVAL, ECG05: 148 MS
Q-T INTERVAL, ECG07: 338 MS
QRS DURATION, ECG06: 80 MS
QTC CALCULATION (BEZET), ECG08: 467 MS
VENTRICULAR RATE, ECG03: 115 BPM

## 2021-08-27 RX ORDER — MECLIZINE HYDROCHLORIDE 25 MG/1
TABLET ORAL
Qty: 20 TABLET | Refills: 0 | Status: SHIPPED | OUTPATIENT
Start: 2021-08-27 | End: 2021-11-09

## 2021-08-27 RX ORDER — MECLIZINE HCL 12.5 MG 12.5 MG/1
50 TABLET ORAL DAILY
Status: DISCONTINUED | OUTPATIENT
Start: 2021-08-27 | End: 2021-08-27

## 2021-08-27 NOTE — DISCHARGE INSTRUCTIONS

## 2021-09-13 ENCOUNTER — OFFICE VISIT (OUTPATIENT)
Dept: FAMILY MEDICINE CLINIC | Age: 86
End: 2021-09-13
Payer: MEDICARE

## 2021-09-13 VITALS
HEIGHT: 66 IN | BODY MASS INDEX: 35.35 KG/M2 | OXYGEN SATURATION: 99 % | SYSTOLIC BLOOD PRESSURE: 124 MMHG | DIASTOLIC BLOOD PRESSURE: 78 MMHG | TEMPERATURE: 97.3 F | HEART RATE: 81 BPM | RESPIRATION RATE: 20 BRPM

## 2021-09-13 DIAGNOSIS — E11.69 HYPERLIPIDEMIA ASSOCIATED WITH TYPE 2 DIABETES MELLITUS (HCC): ICD-10-CM

## 2021-09-13 DIAGNOSIS — Z79.4 TYPE 2 DIABETES MELLITUS WITH HYPERGLYCEMIA, WITH LONG-TERM CURRENT USE OF INSULIN (HCC): Primary | ICD-10-CM

## 2021-09-13 DIAGNOSIS — E78.5 HYPERLIPIDEMIA ASSOCIATED WITH TYPE 2 DIABETES MELLITUS (HCC): ICD-10-CM

## 2021-09-13 DIAGNOSIS — I10 ESSENTIAL HYPERTENSION: ICD-10-CM

## 2021-09-13 DIAGNOSIS — E11.65 TYPE 2 DIABETES MELLITUS WITH HYPERGLYCEMIA, WITH LONG-TERM CURRENT USE OF INSULIN (HCC): Primary | ICD-10-CM

## 2021-09-13 DIAGNOSIS — E66.01 SEVERE OBESITY (BMI 35.0-35.9 WITH COMORBIDITY) (HCC): ICD-10-CM

## 2021-09-13 PROCEDURE — G8417 CALC BMI ABV UP PARAM F/U: HCPCS | Performed by: FAMILY MEDICINE

## 2021-09-13 PROCEDURE — G8536 NO DOC ELDER MAL SCRN: HCPCS | Performed by: FAMILY MEDICINE

## 2021-09-13 PROCEDURE — G8432 DEP SCR NOT DOC, RNG: HCPCS | Performed by: FAMILY MEDICINE

## 2021-09-13 PROCEDURE — 99214 OFFICE O/P EST MOD 30 MIN: CPT | Performed by: FAMILY MEDICINE

## 2021-09-13 PROCEDURE — 3052F HG A1C>EQUAL 8.0%<EQUAL 9.0%: CPT | Performed by: FAMILY MEDICINE

## 2021-09-13 PROCEDURE — 1090F PRES/ABSN URINE INCON ASSESS: CPT | Performed by: FAMILY MEDICINE

## 2021-09-13 PROCEDURE — G8399 PT W/DXA RESULTS DOCUMENT: HCPCS | Performed by: FAMILY MEDICINE

## 2021-09-13 PROCEDURE — G8427 DOCREV CUR MEDS BY ELIG CLIN: HCPCS | Performed by: FAMILY MEDICINE

## 2021-09-13 PROCEDURE — 1101F PT FALLS ASSESS-DOCD LE1/YR: CPT | Performed by: FAMILY MEDICINE

## 2021-09-13 PROCEDURE — G8752 SYS BP LESS 140: HCPCS | Performed by: FAMILY MEDICINE

## 2021-09-13 PROCEDURE — G8754 DIAS BP LESS 90: HCPCS | Performed by: FAMILY MEDICINE

## 2021-09-13 NOTE — PROGRESS NOTES
Chief Complaint   Patient presents with    Follow-up     4 week follow up         HPI    Holger Beth is a 82 y.o. female presenting today for  3 months  follow up of dm, htn, hld, leg edema. Pt is still seeing endo for f/u of her dm. She thinks she had recent labs there. She had labs with cards in June and has been in the ER for assorted concerns since then. Pt will see ortho soon for b knee pain. Patient does not need medication refills today. New concerns today: none      Review of Systems   Constitutional: Negative. HENT: Negative. Respiratory: Negative. Cardiovascular: Negative. Musculoskeletal: Positive for joint pain. All other systems reviewed and are negative. Physical Exam  Vitals and nursing note reviewed. Constitutional:       Appearance: Normal appearance. She is not ill-appearing. HENT:      Head: Normocephalic and atraumatic. Right Ear: External ear normal.      Left Ear: External ear normal.      Nose: Nose normal.      Mouth/Throat:      Mouth: Mucous membranes are moist.   Eyes:      Extraocular Movements: Extraocular movements intact. Conjunctiva/sclera: Conjunctivae normal.   Cardiovascular:      Rate and Rhythm: Normal rate and regular rhythm. Heart sounds: No murmur heard. No friction rub. No gallop. Pulmonary:      Effort: Pulmonary effort is normal.      Breath sounds: Normal breath sounds. No wheezing, rhonchi or rales. Musculoskeletal:         General: Normal range of motion. Cervical back: Normal range of motion. Skin:     General: Skin is warm and dry. Neurological:      Mental Status: She is alert and oriented to person, place, and time. Coordination: Coordination normal.      Comments: In wheelchair   Psychiatric:         Mood and Affect: Mood normal.         Behavior: Behavior normal.         Thought Content:  Thought content normal.         Judgment: Judgment normal.         Diagnoses and all orders for this visit:    1. Type 2 diabetes mellitus with hyperglycemia, with long-term current use of insulin (HCC)  Stable, cont pres tx plan. 2. Severe obesity (BMI 35.0-35.9 with comorbidity) [facility]  Work on careful diet    3. Hyperlipidemia associated with type 2 diabetes mellitus (HCC)  Stable, cont pres tx plan. 4. Essential hypertension  Stable, cont pres tx plan. Follow-up and Dispositions    · Return in about 3 months (around 12/13/2021) for diabetes, high blood pressure, high cholesterol.

## 2021-09-20 DIAGNOSIS — R07.9 CHEST PAIN, UNSPECIFIED TYPE: ICD-10-CM

## 2021-09-20 RX ORDER — RANOLAZINE 500 MG/1
500 TABLET, EXTENDED RELEASE ORAL 2 TIMES DAILY
Qty: 180 TABLET | Refills: 6 | Status: SHIPPED | OUTPATIENT
Start: 2021-09-20

## 2021-09-20 NOTE — TELEPHONE ENCOUNTER
Requested Prescriptions     Pending Prescriptions Disp Refills    ranolazine ER (RANEXA) 500 mg SR tablet 180 Tablet 6     Sig: Take 1 Tablet by mouth two (2) times a day.

## 2021-09-22 ENCOUNTER — OFFICE VISIT (OUTPATIENT)
Dept: ORTHOPEDIC SURGERY | Age: 86
End: 2021-09-22
Payer: MEDICARE

## 2021-09-22 VITALS — OXYGEN SATURATION: 97 % | TEMPERATURE: 96.9 F | RESPIRATION RATE: 16 BRPM | HEART RATE: 97 BPM

## 2021-09-22 DIAGNOSIS — M17.11 PRIMARY OSTEOARTHRITIS OF RIGHT KNEE: ICD-10-CM

## 2021-09-22 DIAGNOSIS — M19.011 PRIMARY OSTEOARTHRITIS, RIGHT SHOULDER: ICD-10-CM

## 2021-09-22 DIAGNOSIS — M25.511 CHRONIC RIGHT SHOULDER PAIN: ICD-10-CM

## 2021-09-22 DIAGNOSIS — G89.29 CHRONIC LEFT SHOULDER PAIN: ICD-10-CM

## 2021-09-22 DIAGNOSIS — G89.29 CHRONIC RIGHT SHOULDER PAIN: ICD-10-CM

## 2021-09-22 DIAGNOSIS — M25.512 CHRONIC LEFT SHOULDER PAIN: ICD-10-CM

## 2021-09-22 DIAGNOSIS — M19.012 PRIMARY OSTEOARTHRITIS, LEFT SHOULDER: Primary | ICD-10-CM

## 2021-09-22 PROCEDURE — 1101F PT FALLS ASSESS-DOCD LE1/YR: CPT | Performed by: SPECIALIST

## 2021-09-22 PROCEDURE — 1090F PRES/ABSN URINE INCON ASSESS: CPT | Performed by: SPECIALIST

## 2021-09-22 PROCEDURE — G8432 DEP SCR NOT DOC, RNG: HCPCS | Performed by: SPECIALIST

## 2021-09-22 PROCEDURE — G8756 NO BP MEASURE DOC: HCPCS | Performed by: SPECIALIST

## 2021-09-22 PROCEDURE — G8427 DOCREV CUR MEDS BY ELIG CLIN: HCPCS | Performed by: SPECIALIST

## 2021-09-22 PROCEDURE — 20610 DRAIN/INJ JOINT/BURSA W/O US: CPT | Performed by: SPECIALIST

## 2021-09-22 PROCEDURE — G8399 PT W/DXA RESULTS DOCUMENT: HCPCS | Performed by: SPECIALIST

## 2021-09-22 PROCEDURE — G8536 NO DOC ELDER MAL SCRN: HCPCS | Performed by: SPECIALIST

## 2021-09-22 PROCEDURE — 99213 OFFICE O/P EST LOW 20 MIN: CPT | Performed by: SPECIALIST

## 2021-09-22 PROCEDURE — G8417 CALC BMI ABV UP PARAM F/U: HCPCS | Performed by: SPECIALIST

## 2021-09-22 RX ORDER — BETAMETHASONE SODIUM PHOSPHATE AND BETAMETHASONE ACETATE 3; 3 MG/ML; MG/ML
6 INJECTION, SUSPENSION INTRA-ARTICULAR; INTRALESIONAL; INTRAMUSCULAR; SOFT TISSUE ONCE
Status: COMPLETED | OUTPATIENT
Start: 2021-09-22 | End: 2021-09-22

## 2021-09-22 RX ADMIN — BETAMETHASONE SODIUM PHOSPHATE AND BETAMETHASONE ACETATE 6 MG: 3; 3 INJECTION, SUSPENSION INTRA-ARTICULAR; INTRALESIONAL; INTRAMUSCULAR; SOFT TISSUE at 10:37
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
## 2021-10-04 ENCOUNTER — TELEPHONE (OUTPATIENT)
Dept: FAMILY MEDICINE CLINIC | Age: 86
End: 2021-10-04

## 2021-10-04 NOTE — TELEPHONE ENCOUNTER
Malvina Gosselin, a nurse with [employer] called regarding Ms. Allen Fallon having 11 ER visits. He is having difficulty reaching Ms. Allen Fallon. Their goal is to help her avoid so many ER visits. The number he has is listed as her cellphone. I gave him her home number and also her granddaughter's name/#.

## 2021-10-08 ENCOUNTER — PATIENT OUTREACH (OUTPATIENT)
Dept: CASE MANAGEMENT | Age: 86
End: 2021-10-08

## 2021-10-08 NOTE — PROGRESS NOTES
Social Work Note  10/8/2021    Date of referral: 3/4/2021  Referral received from: JENNIFER- Naima Leong RN  Reason for referral: Assist the patient with community resource for a reliable personal care agency.     Previous SW Referral: No  If yes, brief summary and outcome:  n/a     Two Identifiers Verified: Yes     Insurance Provider: [facility] [employer]     Support System: Neighbors, granddaughter Subha, brother and son      [place] Status: N/A     Community Providers: SNAP/[employer] [place] and Medicaid Long-Term Care, PCP      ADL Assistance: N/A     Transportation Concern: None     Medication Cost Concern: None     Medication Education or Adherence Concern: None     Financial Concern(s): none     Ability to Read/Write: Yes     Advance Care Plan:  Reviewed and confirmed accuracy     Other: none     Identified Needs:      · Assist the patient with community resources for a reliable personal care agency.     Social Work Plan:     · Provide the patient with list of community resources for personal care agencies.      MSW received referral from JENNIFER to assist the patient with community resources for a reliable personal care agency.   The patient is an 82year old woman who resides in the community. Her son is currently staying with her. She is alert and oriented X4 and has the ability to verbalize her needs. Ms. Yamileth Leonard reported that [facility] is providing her with personal care services. She mentioned that things are coming together and is working out. MSW encouraged the patient to continue verbalizing her needs to the agency as she has been. The patient had no other issues or concerns to report. There are no other needs identified at this time. Episode will be resolved. No further follow up will be required.      MSW will be available to assist with any future needs.

## 2021-10-24 ENCOUNTER — APPOINTMENT (OUTPATIENT)
Dept: CT IMAGING | Age: 86
End: 2021-10-24
Attending: STUDENT IN AN ORGANIZED HEALTH CARE EDUCATION/TRAINING PROGRAM
Payer: MEDICARE

## 2021-10-24 ENCOUNTER — HOSPITAL ENCOUNTER (EMERGENCY)
Age: 86
Discharge: HOME | End: 2021-10-24
Attending: STUDENT IN AN ORGANIZED HEALTH CARE EDUCATION/TRAINING PROGRAM
Payer: MEDICARE

## 2021-10-24 VITALS
WEIGHT: 219 LBS | DIASTOLIC BLOOD PRESSURE: 85 MMHG | HEART RATE: 84 BPM | BODY MASS INDEX: 33.19 KG/M2 | TEMPERATURE: 98.6 F | RESPIRATION RATE: 20 BRPM | HEIGHT: 68 IN | OXYGEN SATURATION: 100 % | SYSTOLIC BLOOD PRESSURE: 155 MMHG

## 2021-10-24 DIAGNOSIS — K57.92 DIVERTICULITIS: Primary | ICD-10-CM

## 2021-10-24 LAB
ALBUMIN SERPL-MCNC: 3.2 G/DL (ref 3.4–5)
ALBUMIN/GLOB SERPL: 0.6 {RATIO} (ref 0.8–1.7)
ALP SERPL-CCNC: 105 U/L (ref 45–117)
ALT SERPL-CCNC: 17 U/L (ref 13–56)
ANION GAP SERPL CALC-SCNC: 7 MMOL/L (ref 3–18)
AST SERPL-CCNC: 18 U/L (ref 10–38)
ATRIAL RATE: 750 BPM
BASOPHILS # BLD: 0 K/UL (ref 0–0.1)
BASOPHILS NFR BLD: 0 % (ref 0–2)
BILIRUB SERPL-MCNC: 0.5 MG/DL (ref 0.2–1)
BUN SERPL-MCNC: 10 MG/DL (ref 7–18)
BUN/CREAT SERPL: 13 (ref 12–20)
CALCIUM SERPL-MCNC: 9.1 MG/DL (ref 8.5–10.1)
CALCULATED R AXIS, ECG10: 23 DEGREES
CALCULATED T AXIS, ECG11: 23 DEGREES
CHLORIDE SERPL-SCNC: 104 MMOL/L (ref 100–111)
CO2 SERPL-SCNC: 27 MMOL/L (ref 21–32)
CREAT SERPL-MCNC: 0.8 MG/DL (ref 0.6–1.3)
DIAGNOSIS, 93000: NORMAL
DIFFERENTIAL METHOD BLD: ABNORMAL
EOSINOPHIL # BLD: 0.2 K/UL (ref 0–0.4)
EOSINOPHIL NFR BLD: 3 % (ref 0–5)
ERYTHROCYTE [DISTWIDTH] IN BLOOD BY AUTOMATED COUNT: 11.8 % (ref 11.6–14.5)
GLOBULIN SER CALC-MCNC: 5.3 G/DL (ref 2–4)
GLUCOSE SERPL-MCNC: 215 MG/DL (ref 74–99)
HCT VFR BLD AUTO: 39.3 % (ref 35–45)
HGB BLD-MCNC: 12.7 G/DL (ref 12–16)
LIPASE SERPL-CCNC: 55 U/L (ref 73–393)
LYMPHOCYTES # BLD: 2.1 K/UL (ref 0.9–3.6)
LYMPHOCYTES NFR BLD: 30 % (ref 21–52)
MCH RBC QN AUTO: 31.8 PG (ref 24–34)
MCHC RBC AUTO-ENTMCNC: 32.3 G/DL (ref 31–37)
MCV RBC AUTO: 98.5 FL (ref 78–100)
MONOCYTES # BLD: 0.4 K/UL (ref 0.05–1.2)
MONOCYTES NFR BLD: 6 % (ref 3–10)
NEUTS SEG # BLD: 4.1 K/UL (ref 1.8–8)
NEUTS SEG NFR BLD: 61 % (ref 40–73)
PLATELET # BLD AUTO: 265 K/UL (ref 135–420)
PMV BLD AUTO: 10.4 FL (ref 9.2–11.8)
POTASSIUM SERPL-SCNC: 3.5 MMOL/L (ref 3.5–5.5)
PROT SERPL-MCNC: 8.5 G/DL (ref 6.4–8.2)
Q-T INTERVAL, ECG07: 408 MS
QRS DURATION, ECG06: 88 MS
QTC CALCULATION (BEZET), ECG08: 473 MS
RBC # BLD AUTO: 3.99 M/UL (ref 4.2–5.3)
SODIUM SERPL-SCNC: 138 MMOL/L (ref 136–145)
VENTRICULAR RATE, ECG03: 81 BPM
WBC # BLD AUTO: 6.8 K/UL (ref 4.6–13.2)

## 2021-10-24 PROCEDURE — 74011250637 HC RX REV CODE- 250/637: Performed by: STUDENT IN AN ORGANIZED HEALTH CARE EDUCATION/TRAINING PROGRAM

## 2021-10-24 PROCEDURE — 93005 ELECTROCARDIOGRAM TRACING: CPT

## 2021-10-24 PROCEDURE — 85025 COMPLETE CBC W/AUTO DIFF WBC: CPT

## 2021-10-24 PROCEDURE — 83690 ASSAY OF LIPASE: CPT

## 2021-10-24 PROCEDURE — 80053 COMPREHEN METABOLIC PANEL: CPT

## 2021-10-24 PROCEDURE — 74176 CT ABD & PELVIS W/O CONTRAST: CPT

## 2021-10-24 PROCEDURE — 99283 EMERGENCY DEPT VISIT LOW MDM: CPT

## 2021-10-24 RX ORDER — AMOXICILLIN AND CLAVULANATE POTASSIUM 875; 125 MG/1; MG/1
1 TABLET, FILM COATED ORAL 2 TIMES DAILY
Qty: 14 TABLET | Refills: 0 | Status: SHIPPED | OUTPATIENT
Start: 2021-10-24 | End: 2021-10-31

## 2021-10-24 RX ORDER — AMOXICILLIN AND CLAVULANATE POTASSIUM 875; 125 MG/1; MG/1
1 TABLET, FILM COATED ORAL
Status: COMPLETED | OUTPATIENT
Start: 2021-10-24 | End: 2021-10-24

## 2021-10-24 RX ADMIN — AMOXICILLIN AND CLAVULANATE POTASSIUM 1 TABLET: 875; 125 TABLET, FILM COATED ORAL at 18:53
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
## 2021-10-24 NOTE — ED NOTES
I have reviewed discharge instructions with the patient and caregiver. The patient and caregiver verbalized understanding. Pt taken out in a wheelchair upon discharge. Pt in stable condition. Pt armband shredded.

## 2021-10-24 NOTE — ED TRIAGE NOTES
Pt c/o left sided flank and abdominal pain x 3 days    Pt denies any other symptoms, did vomit once yesterday. Decreased  Appetitive.

## 2021-10-31 ENCOUNTER — APPOINTMENT (OUTPATIENT)
Dept: CT IMAGING | Age: 86
End: 2021-10-31
Attending: EMERGENCY MEDICINE
Payer: MEDICAID

## 2021-10-31 ENCOUNTER — HOSPITAL ENCOUNTER (EMERGENCY)
Age: 86
Discharge: HOME | End: 2021-10-31
Attending: EMERGENCY MEDICINE
Payer: MEDICAID

## 2021-10-31 VITALS
HEIGHT: 67 IN | OXYGEN SATURATION: 97 % | HEART RATE: 76 BPM | TEMPERATURE: 98.5 F | SYSTOLIC BLOOD PRESSURE: 188 MMHG | DIASTOLIC BLOOD PRESSURE: 89 MMHG | RESPIRATION RATE: 20 BRPM | WEIGHT: 215 LBS | BODY MASS INDEX: 33.74 KG/M2

## 2021-10-31 DIAGNOSIS — R53.1 WEAKNESS: ICD-10-CM

## 2021-10-31 DIAGNOSIS — R73.9 HYPERGLYCEMIA: ICD-10-CM

## 2021-10-31 DIAGNOSIS — R53.83 FATIGUE, UNSPECIFIED TYPE: Primary | ICD-10-CM

## 2021-10-31 LAB
ALBUMIN SERPL-MCNC: 2.9 G/DL (ref 3.4–5)
ALBUMIN/GLOB SERPL: 0.6 {RATIO} (ref 0.8–1.7)
ALP SERPL-CCNC: 96 U/L (ref 45–117)
ALT SERPL-CCNC: 14 U/L (ref 13–56)
ANION GAP SERPL CALC-SCNC: 7 MMOL/L (ref 3–18)
APPEARANCE UR: CLEAR
AST SERPL-CCNC: 13 U/L (ref 10–38)
BASOPHILS # BLD: 0 K/UL (ref 0–0.1)
BASOPHILS NFR BLD: 0 % (ref 0–2)
BILIRUB SERPL-MCNC: 0.4 MG/DL (ref 0.2–1)
BILIRUB UR QL: NEGATIVE
BUN SERPL-MCNC: 8 MG/DL (ref 7–18)
BUN/CREAT SERPL: 10 (ref 12–20)
CALCIUM SERPL-MCNC: 8.7 MG/DL (ref 8.5–10.1)
CHLORIDE SERPL-SCNC: 102 MMOL/L (ref 100–111)
CK MB CFR SERPL CALC: NORMAL % (ref 0–4)
CK MB SERPL-MCNC: <1 NG/ML (ref 5–25)
CK SERPL-CCNC: 76 U/L (ref 26–192)
CO2 SERPL-SCNC: 29 MMOL/L (ref 21–32)
COLOR UR: YELLOW
CREAT SERPL-MCNC: 0.84 MG/DL (ref 0.6–1.3)
DIFFERENTIAL METHOD BLD: ABNORMAL
EOSINOPHIL # BLD: 0.2 K/UL (ref 0–0.4)
EOSINOPHIL NFR BLD: 3 % (ref 0–5)
EPITH CASTS URNS QL MICRO: NORMAL /LPF (ref 0–5)
ERYTHROCYTE [DISTWIDTH] IN BLOOD BY AUTOMATED COUNT: 11.8 % (ref 11.6–14.5)
GLOBULIN SER CALC-MCNC: 4.9 G/DL (ref 2–4)
GLUCOSE SERPL-MCNC: 278 MG/DL (ref 74–99)
GLUCOSE UR STRIP.AUTO-MCNC: 500 MG/DL
HCT VFR BLD AUTO: 37.2 % (ref 35–45)
HGB BLD-MCNC: 12 G/DL (ref 12–16)
HGB UR QL STRIP: NEGATIVE
KETONES UR QL STRIP.AUTO: ABNORMAL MG/DL
LEUKOCYTE ESTERASE UR QL STRIP.AUTO: ABNORMAL
LYMPHOCYTES # BLD: 2.4 K/UL (ref 0.9–3.6)
LYMPHOCYTES NFR BLD: 35 % (ref 21–52)
MCH RBC QN AUTO: 31.9 PG (ref 24–34)
MCHC RBC AUTO-ENTMCNC: 32.3 G/DL (ref 31–37)
MCV RBC AUTO: 98.9 FL (ref 78–100)
MONOCYTES # BLD: 0.5 K/UL (ref 0.05–1.2)
MONOCYTES NFR BLD: 8 % (ref 3–10)
NEUTS SEG # BLD: 3.7 K/UL (ref 1.8–8)
NEUTS SEG NFR BLD: 54 % (ref 40–73)
NITRITE UR QL STRIP.AUTO: NEGATIVE
PH UR STRIP: 6 [PH] (ref 5–8)
PLATELET # BLD AUTO: 226 K/UL (ref 135–420)
PMV BLD AUTO: 11.5 FL (ref 9.2–11.8)
POTASSIUM SERPL-SCNC: 3.5 MMOL/L (ref 3.5–5.5)
PROT SERPL-MCNC: 7.8 G/DL (ref 6.4–8.2)
PROT UR STRIP-MCNC: 100 MG/DL
RBC # BLD AUTO: 3.76 M/UL (ref 4.2–5.3)
RBC #/AREA URNS HPF: NORMAL /HPF (ref 0–5)
SODIUM SERPL-SCNC: 138 MMOL/L (ref 136–145)
SP GR UR REFRACTOMETRY: 1.03 (ref 1–1.03)
TROPONIN I SERPL-MCNC: <0.02 NG/ML (ref 0–0.04)
UROBILINOGEN UR QL STRIP.AUTO: 1 EU/DL (ref 0.2–1)
WBC # BLD AUTO: 6.9 K/UL (ref 4.6–13.2)
WBC URNS QL MICRO: NORMAL /HPF (ref 0–4)

## 2021-10-31 PROCEDURE — 82553 CREATINE MB FRACTION: CPT

## 2021-10-31 PROCEDURE — 80053 COMPREHEN METABOLIC PANEL: CPT

## 2021-10-31 PROCEDURE — 99285 EMERGENCY DEPT VISIT HI MDM: CPT

## 2021-10-31 PROCEDURE — 85025 COMPLETE CBC W/AUTO DIFF WBC: CPT

## 2021-10-31 PROCEDURE — 93005 ELECTROCARDIOGRAM TRACING: CPT

## 2021-10-31 PROCEDURE — 81001 URINALYSIS AUTO W/SCOPE: CPT

## 2021-10-31 PROCEDURE — 74176 CT ABD & PELVIS W/O CONTRAST: CPT
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
## 2021-10-31 NOTE — ED TRIAGE NOTES
Pt c/o feeling SOB, weak, dizzy that started when she was here on 10/24 for diverticulitis. Has hx of vertigo and has antivert at home but didn't take it because she lives alone.

## 2021-11-01 LAB
ATRIAL RATE: 113 BPM
CALCULATED P AXIS, ECG09: 68 DEGREES
CALCULATED R AXIS, ECG10: -15 DEGREES
CALCULATED T AXIS, ECG11: -23 DEGREES
DIAGNOSIS, 93000: NORMAL
P-R INTERVAL, ECG05: 150 MS
Q-T INTERVAL, ECG07: 380 MS
QRS DURATION, ECG06: 84 MS
QTC CALCULATION (BEZET), ECG08: 521 MS
VENTRICULAR RATE, ECG03: 113 BPM

## 2021-11-01 NOTE — ED NOTES
Patient called in follow-up. Still just not feeling well no worse though. Advised patient if she feels worse in any way to immediately come back to the ER if she is feeling better okay to try to follow-up with the PCP if she just fails to improve also return to the ER.   Do this in the next 1 to 2 days

## 2021-11-01 NOTE — ED NOTES
Written and verbal discharge instructions given. Patient verbalizes understanding of same. Patient denies  further questions about treatment and discharge instructions. Left ED with patent airway and steady gait. Arm band removed shredded.  Patient left ED with number and time to call Dr Afshan Marcos tomorrow at [address] for F/U per MD request

## 2021-11-03 DIAGNOSIS — I20.8 OTHER FORMS OF ANGINA PECTORIS (HCC): ICD-10-CM

## 2021-11-03 RX ORDER — ISOSORBIDE MONONITRATE 30 MG/1
TABLET, EXTENDED RELEASE ORAL
Qty: 90 TABLET | Refills: 3 | Status: SHIPPED | OUTPATIENT
Start: 2021-11-03

## 2021-11-05 ENCOUNTER — OFFICE VISIT (OUTPATIENT)
Dept: FAMILY MEDICINE CLINIC | Age: 86
End: 2021-11-05
Payer: MEDICARE

## 2021-11-05 VITALS
BODY MASS INDEX: 34.18 KG/M2 | TEMPERATURE: 98.6 F | HEIGHT: 67 IN | RESPIRATION RATE: 16 BRPM | SYSTOLIC BLOOD PRESSURE: 137 MMHG | OXYGEN SATURATION: 96 % | DIASTOLIC BLOOD PRESSURE: 67 MMHG | HEART RATE: 70 BPM

## 2021-11-05 DIAGNOSIS — R53.83 FATIGUE, UNSPECIFIED TYPE: Primary | ICD-10-CM

## 2021-11-05 DIAGNOSIS — K57.92 DIVERTICULITIS: ICD-10-CM

## 2021-11-05 DIAGNOSIS — Z79.899 POLYPHARMACY: ICD-10-CM

## 2021-11-05 PROCEDURE — 99214 OFFICE O/P EST MOD 30 MIN: CPT | Performed by: FAMILY MEDICINE

## 2021-11-05 NOTE — PROGRESS NOTES
Chief Complaint   Patient presents with   [facility] Follow Up     seen in ER on 10/31/21 for left flank pain and fatigue         HPI    Kal Duffy is a 82 y.o. female presenting today for follow up of ER visit for l flank pain and fatigue. She had also been there one week prior for diverticulitis that was tx'ed as outpt. Pt cont to c/o feeling fatigued. She is eating well. She is taking her vitamins and medications. She has not had any fevers. She monitors her bp throughout the day. It is normotensive. Her blood sugars are good also. It was 136 this morning. Pt has had the covid shots but is not sure if she will take the booster. Patient is concerned about all of the medications that she takes. She wonders if they may contribute to her feelings of fatigue. Review of Systems   Constitutional: Positive for malaise/fatigue. HENT: Negative. Respiratory: Negative. Cardiovascular: Negative. All other systems reviewed and are negative. Physical Exam  Vitals and nursing note reviewed. Constitutional:       Appearance: Normal appearance. She is not ill-appearing. HENT:      Head: Normocephalic and atraumatic. Right Ear: External ear normal.      Left Ear: External ear normal.      Nose: Nose normal.      Mouth/Throat:      Mouth: Mucous membranes are moist.   Eyes:      Extraocular Movements: Extraocular movements intact. Conjunctiva/sclera: Conjunctivae normal.   Cardiovascular:      Rate and Rhythm: Normal rate and regular rhythm. Heart sounds: No murmur heard. No friction rub. No gallop. Pulmonary:      Effort: Pulmonary effort is normal.      Breath sounds: Normal breath sounds. No wheezing, rhonchi or rales. Musculoskeletal:         General: Normal range of motion. Cervical back: Normal range of motion. Skin:     General: Skin is warm and dry. Neurological:      Mental Status: She is alert and oriented to person, place, and time. Coordination: Coordination normal.   Psychiatric:         Mood and Affect: Mood normal.         Behavior: Behavior normal.         Thought Content: Thought content normal.         Judgment: Judgment normal.         Diagnoses and all orders for this visit:    1. Fatigue, unspecified type  Patient reassured. Her frequent monitoring of vitals at home is helpful. This, in concert with her good appetite as evaluated by the 24-hour recall she provides today, is very reassuring. Patient advised that she did have the recent diverticulitis and may not have completely gotten back to her baseline level of function yet just because of her age and comorbidities. Patient advised to continue to monitor closely. 2. Diverticulitis  Resolved. 3. Polypharmacy  -     REFERRAL TO PHARMACIST      Follow-up and Dispositions    · Return for diabetes, high cholesterol, thyroid disease, high blood pressure. Keep previously scheduled routine follow-up appointment.

## 2021-11-05 NOTE — PROGRESS NOTES
Manual Sharper presents today for   Chief Complaint   Patient presents with   [facility] Follow Up     seen in ER on 10/31/21 for left flank pain and fatigue       Is someone accompanying this pt? no    Is the patient using any DME equipment during [address]? Yes, wheelchair    Depression Screening:  3 most recent PHQ Screens 11/5/2021   PHQ Not Done -   Little interest or pleasure in doing things Not at all   Feeling down, depressed, irritable, or hopeless Not at all   Total Score PHQ 2 0       Learning Assessment:  Learning Assessment 3/1/2021   PRIMARY LEARNER Patient   HIGHEST LEVEL OF EDUCATION - PRIMARY LEARNER  [facility] [address] [facility] PRIMARY LEARNER [address] CAREGIVER No   CO-LEARNER NAME -   PRIMARY LANGUAGE ENGLISH    NEED -   LEARNER PREFERENCE PRIMARY READING     -     -     -     -   ANSWERED BY patient    RELATIONSHIP SELF       Abuse Screening:  Abuse Screening Questionnaire 6/1/2021   Do you ever feel afraid of your partner? N   Are you in a relationship with someone who physically or mentally threatens you? N   Is it safe for you to go home? Y       Fall Screening  Fall Risk Assessment, last 12 mths 9/22/2021   Able to walk? Yes   Fall in past 12 months? 1   Do you feel unsteady? 1   Are you worried about falling 1   Is TUG test greater than 12 seconds? -   Is the gait abnormal? -   Number of falls in past 12 months 1   Fall with injury? 1       Generalized Anxiety  No flowsheet data found. Health Maintenance Due   Topic Date Due    Shingrix Vaccine Age 49> (1 of 2) Never done    Foot Exam Q1  10/24/2018    Pneumococcal 65+ years (2 of 2 - PPSV23) 02/19/2020    MICROALBUMIN Q1  02/25/2020    Flu Vaccine (1) 09/01/2021    Medicare Yearly Exam  09/12/2021    COVID-19 Vaccine (3 - Pfizer booster) 11/03/2021    Eye Exam Retinal or Dilated  11/12/2021   . Health Maintenance reviewed and discussed and ordered per Provider. Coordination of Care  1.  Have you been to the ER, urgent care clinic since your last visit? Hospitalized since your last visit? Yes, ER    2. Have you seen or consulted any other health care providers outside of the [address] [place] [address] since your last visit? Include any pap smears or colon screening.  no

## 2021-11-09 ENCOUNTER — OFFICE VISIT (OUTPATIENT)
Dept: CARDIOLOGY CLINIC | Age: 86
End: 2021-11-09
Payer: MEDICARE

## 2021-11-09 VITALS
DIASTOLIC BLOOD PRESSURE: 86 MMHG | SYSTOLIC BLOOD PRESSURE: 155 MMHG | HEIGHT: 67 IN | WEIGHT: 236 LBS | BODY MASS INDEX: 37.04 KG/M2 | HEART RATE: 88 BPM

## 2021-11-09 DIAGNOSIS — I50.32 CHRONIC DIASTOLIC CONGESTIVE HEART FAILURE (HCC): ICD-10-CM

## 2021-11-09 DIAGNOSIS — I10 ESSENTIAL HYPERTENSION: ICD-10-CM

## 2021-11-09 DIAGNOSIS — I25.118 ATHEROSCLEROSIS OF NATIVE CORONARY ARTERY OF NATIVE HEART WITH STABLE ANGINA PECTORIS (HCC): Primary | ICD-10-CM

## 2021-11-09 DIAGNOSIS — E78.2 MIXED HYPERLIPIDEMIA: ICD-10-CM

## 2021-11-09 DIAGNOSIS — Z95.5 S/P CORONARY ARTERY STENT PLACEMENT: ICD-10-CM

## 2021-11-09 PROCEDURE — 99214 OFFICE O/P EST MOD 30 MIN: CPT | Performed by: INTERNAL MEDICINE

## 2021-11-09 RX ORDER — NITROGLYCERIN 0.4 MG/1
0.4 TABLET SUBLINGUAL
Qty: 20 TABLET | Refills: 0 | Status: SHIPPED | OUTPATIENT
Start: 2021-11-09 | End: 2022-02-18 | Stop reason: SDUPTHER
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
## 2021-11-09 NOTE — PROGRESS NOTES
1. Have you been to the ER, urgent care clinic since your last visit? Hospitalized since your last visit? Yes Where: [facility]    2. Have you seen or consulted any other health care providers outside of the [address] [place] [address] since your last visit? Include any pap smears or colon screening.       No

## 2021-11-12 ENCOUNTER — TELEPHONE (OUTPATIENT)
Dept: CARDIOLOGY CLINIC | Age: 86
End: 2021-11-12

## 2021-11-14 ENCOUNTER — APPOINTMENT (OUTPATIENT)
Dept: GENERAL RADIOLOGY | Age: 86
End: 2021-11-14
Attending: STUDENT IN AN ORGANIZED HEALTH CARE EDUCATION/TRAINING PROGRAM
Payer: MEDICARE

## 2021-11-14 ENCOUNTER — HOSPITAL ENCOUNTER (EMERGENCY)
Age: 86
Discharge: HOME | End: 2021-11-14
Attending: STUDENT IN AN ORGANIZED HEALTH CARE EDUCATION/TRAINING PROGRAM
Payer: MEDICARE

## 2021-11-14 VITALS
RESPIRATION RATE: 16 BRPM | SYSTOLIC BLOOD PRESSURE: 161 MMHG | DIASTOLIC BLOOD PRESSURE: 101 MMHG | TEMPERATURE: 99 F | WEIGHT: 223 LBS | HEART RATE: 98 BPM | HEIGHT: 67 IN | BODY MASS INDEX: 35 KG/M2 | OXYGEN SATURATION: 97 %

## 2021-11-14 DIAGNOSIS — R07.9 CHEST PAIN, UNSPECIFIED TYPE: Primary | ICD-10-CM

## 2021-11-14 LAB
ANION GAP SERPL CALC-SCNC: 6 MMOL/L (ref 3–18)
APPEARANCE UR: CLEAR
ATRIAL RATE: 101 BPM
BASOPHILS # BLD: 0 K/UL (ref 0–0.1)
BASOPHILS NFR BLD: 1 % (ref 0–2)
BILIRUB UR QL: NEGATIVE
BNP SERPL-MCNC: 100 PG/ML (ref 0–1800)
BUN SERPL-MCNC: 5 MG/DL (ref 7–18)
BUN/CREAT SERPL: 7 (ref 12–20)
CALCIUM SERPL-MCNC: 9.2 MG/DL (ref 8.5–10.1)
CALCULATED P AXIS, ECG09: 39 DEGREES
CALCULATED R AXIS, ECG10: -8 DEGREES
CALCULATED T AXIS, ECG11: 61 DEGREES
CHLORIDE SERPL-SCNC: 104 MMOL/L (ref 100–111)
CK MB CFR SERPL CALC: NORMAL % (ref 0–4)
CK MB SERPL-MCNC: <1 NG/ML (ref 5–25)
CK SERPL-CCNC: 69 U/L (ref 26–192)
CO2 SERPL-SCNC: 27 MMOL/L (ref 21–32)
COLOR UR: YELLOW
CREAT SERPL-MCNC: 0.72 MG/DL (ref 0.6–1.3)
DIAGNOSIS, 93000: NORMAL
DIFFERENTIAL METHOD BLD: ABNORMAL
EOSINOPHIL # BLD: 0.2 K/UL (ref 0–0.4)
EOSINOPHIL NFR BLD: 3 % (ref 0–5)
EPITH CASTS URNS QL MICRO: NORMAL /LPF (ref 0–5)
ERYTHROCYTE [DISTWIDTH] IN BLOOD BY AUTOMATED COUNT: 11.7 % (ref 11.6–14.5)
GLUCOSE SERPL-MCNC: 234 MG/DL (ref 74–99)
GLUCOSE UR STRIP.AUTO-MCNC: 500 MG/DL
HCT VFR BLD AUTO: 38.3 % (ref 35–45)
HGB BLD-MCNC: 12.5 G/DL (ref 12–16)
HGB UR QL STRIP: NEGATIVE
IMM GRANULOCYTES # BLD AUTO: 0 K/UL (ref 0–0.04)
IMM GRANULOCYTES NFR BLD AUTO: 0 % (ref 0–0.5)
KETONES UR QL STRIP.AUTO: NEGATIVE MG/DL
LEUKOCYTE ESTERASE UR QL STRIP.AUTO: NEGATIVE
LYMPHOCYTES # BLD: 2 K/UL (ref 0.9–3.6)
LYMPHOCYTES NFR BLD: 31 % (ref 21–52)
MAGNESIUM SERPL-MCNC: 1.9 MG/DL (ref 1.6–2.6)
MCH RBC QN AUTO: 32.1 PG (ref 24–34)
MCHC RBC AUTO-ENTMCNC: 32.6 G/DL (ref 31–37)
MCV RBC AUTO: 98.5 FL (ref 78–100)
MONOCYTES # BLD: 0.4 K/UL (ref 0.05–1.2)
MONOCYTES NFR BLD: 7 % (ref 3–10)
NEUTS SEG # BLD: 3.7 K/UL (ref 1.8–8)
NEUTS SEG NFR BLD: 58 % (ref 40–73)
NITRITE UR QL STRIP.AUTO: NEGATIVE
NRBC # BLD: 0 K/UL (ref 0–0.01)
NRBC BLD-RTO: 0 PER 100 WBC
P-R INTERVAL, ECG05: 118 MS
PH UR STRIP: 6.5 [PH] (ref 5–8)
PLATELET # BLD AUTO: 225 K/UL (ref 135–420)
PMV BLD AUTO: 11.2 FL (ref 9.2–11.8)
POTASSIUM SERPL-SCNC: 3.3 MMOL/L (ref 3.5–5.5)
PROT UR STRIP-MCNC: ABNORMAL MG/DL
Q-T INTERVAL, ECG07: 346 MS
QRS DURATION, ECG06: 86 MS
QTC CALCULATION (BEZET), ECG08: 448 MS
RBC # BLD AUTO: 3.89 M/UL (ref 4.2–5.3)
SODIUM SERPL-SCNC: 137 MMOL/L (ref 136–145)
SP GR UR REFRACTOMETRY: 1.01 (ref 1–1.03)
TROPONIN I SERPL-MCNC: <0.02 NG/ML (ref 0–0.04)
UROBILINOGEN UR QL STRIP.AUTO: 1 EU/DL (ref 0.2–1)
VENTRICULAR RATE, ECG03: 101 BPM
WBC # BLD AUTO: 6.3 K/UL (ref 4.6–13.2)
WBC URNS QL MICRO: NORMAL /HPF (ref 0–4)

## 2021-11-14 PROCEDURE — 82553 CREATINE MB FRACTION: CPT

## 2021-11-14 PROCEDURE — 81001 URINALYSIS AUTO W/SCOPE: CPT

## 2021-11-14 PROCEDURE — 93005 ELECTROCARDIOGRAM TRACING: CPT

## 2021-11-14 PROCEDURE — 80048 BASIC METABOLIC PNL TOTAL CA: CPT

## 2021-11-14 PROCEDURE — 85025 COMPLETE CBC W/AUTO DIFF WBC: CPT

## 2021-11-14 PROCEDURE — 83735 ASSAY OF MAGNESIUM: CPT

## 2021-11-14 PROCEDURE — 99283 EMERGENCY DEPT VISIT LOW MDM: CPT

## 2021-11-14 PROCEDURE — 71045 X-RAY EXAM CHEST 1 VIEW: CPT

## 2021-11-14 PROCEDURE — 83880 ASSAY OF NATRIURETIC PEPTIDE: CPT

## 2021-11-14 RX ORDER — POTASSIUM CHLORIDE 20 MEQ/1
40 TABLET, EXTENDED RELEASE ORAL
Status: DISCONTINUED | OUTPATIENT
Start: 2021-11-14 | End: 2021-11-14 | Stop reason: HOSPADM
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
## 2021-11-14 NOTE — ED TRIAGE NOTES
Patient states ongoing dizziness since last ER visit. C/o left side chest pain that began this morning at 0200. She states that she had been sitting up all night watching game shows. She states taking Pepto Bismol with mild relief of chest pain. States taking nitro for chest pain without relief. She states that she cannot take antivert for dizziness because it makes her drowsy.

## 2021-11-18 ENCOUNTER — OFFICE VISIT (OUTPATIENT)
Dept: FAMILY MEDICINE CLINIC | Age: 86
End: 2021-11-18
Payer: MEDICARE

## 2021-11-18 VITALS
HEART RATE: 103 BPM | SYSTOLIC BLOOD PRESSURE: 149 MMHG | RESPIRATION RATE: 20 BRPM | OXYGEN SATURATION: 96 % | DIASTOLIC BLOOD PRESSURE: 81 MMHG | TEMPERATURE: 99.2 F

## 2021-11-18 DIAGNOSIS — R42 DIZZINESS: ICD-10-CM

## 2021-11-18 DIAGNOSIS — R07.9 CHEST PAIN, UNSPECIFIED TYPE: ICD-10-CM

## 2021-11-18 DIAGNOSIS — R30.0 DYSURIA: Primary | ICD-10-CM

## 2021-11-18 PROCEDURE — G8753 SYS BP > OR = 140: HCPCS | Performed by: FAMILY MEDICINE

## 2021-11-18 PROCEDURE — 99214 OFFICE O/P EST MOD 30 MIN: CPT | Performed by: FAMILY MEDICINE

## 2021-11-18 PROCEDURE — 1101F PT FALLS ASSESS-DOCD LE1/YR: CPT | Performed by: FAMILY MEDICINE

## 2021-11-18 PROCEDURE — 1090F PRES/ABSN URINE INCON ASSESS: CPT | Performed by: FAMILY MEDICINE

## 2021-11-18 PROCEDURE — G8399 PT W/DXA RESULTS DOCUMENT: HCPCS | Performed by: FAMILY MEDICINE

## 2021-11-18 PROCEDURE — G8536 NO DOC ELDER MAL SCRN: HCPCS | Performed by: FAMILY MEDICINE

## 2021-11-18 PROCEDURE — G8427 DOCREV CUR MEDS BY ELIG CLIN: HCPCS | Performed by: FAMILY MEDICINE

## 2021-11-18 PROCEDURE — G8510 SCR DEP NEG, NO PLAN REQD: HCPCS | Performed by: FAMILY MEDICINE

## 2021-11-18 PROCEDURE — G8417 CALC BMI ABV UP PARAM F/U: HCPCS | Performed by: FAMILY MEDICINE

## 2021-11-18 PROCEDURE — G8754 DIAS BP LESS 90: HCPCS | Performed by: FAMILY MEDICINE

## 2021-11-18 NOTE — PROGRESS NOTES
[employer] presents today for   Chief Complaint   Patient presents with   [facility] Follow Up     seen at [facility] on 11/14/21 for chest pain and dizziness       Is someone accompanying this pt? no    Is the patient using any DME equipment during [address]? Yes, wheelchair    Depression Screening:  3 most recent PHQ Screens 11/18/2021   PHQ Not Done -   Little interest or pleasure in doing things Not at all   Feeling down, depressed, irritable, or hopeless Not at all   Total Score PHQ 2 0       Learning Assessment:  Learning Assessment 3/1/2021   PRIMARY LEARNER Patient   HIGHEST LEVEL OF EDUCATION - PRIMARY LEARNER  [facility] PRIMARY LEARNER [address] CAREGIVER No   CO-LEARNER NAME -   PRIMARY LANGUAGE ENGLISH    NEED -   LEARNER PREFERENCE PRIMARY READING     -     -     -     -   ANSWERED BY patient    RELATIONSHIP SELF       Abuse Screening:  Abuse Screening Questionnaire 6/1/2021   Do you ever feel afraid of your partner? N   Are you in a relationship with someone who physically or mentally threatens you? N   Is it safe for you to go home? Y       Fall Screening  Fall Risk Assessment, last 12 mths 11/9/2021   Able to walk? Yes   Fall in past 12 months? 0   Do you feel unsteady? 0   Are you worried about falling 0   Is TUG test greater than 12 seconds? -   Is the gait abnormal? -   Number of falls in past 12 months -   Fall with injury? -       Generalized Anxiety  No flowsheet data found. Health Maintenance Due   Topic Date Due    Shingrix Vaccine Age 49> (1 of 2) Never done    Foot Exam Q1  10/24/2018    Pneumococcal 65+ years (2 of 2 - PPSV23) 02/19/2020    MICROALBUMIN Q1  02/25/2020    Flu Vaccine (1) 09/01/2021    Medicare Yearly Exam  09/12/2021    COVID-19 Vaccine (3 - Booster for Pfizer series) 11/03/2021    Eye Exam Retinal or Dilated  11/12/2021   . Health Maintenance reviewed and discussed and ordered per Provider. Coordination of Care  1. Have you been to the ER, urgent care clinic since your last visit? Hospitalized since your last visit? Yes. [facility]    2. Have you seen or consulted any other health care providers outside of the [address] [place] [address] since your last visit? Include any pap smears or colon screening.  no

## 2021-11-18 NOTE — PROGRESS NOTES
Chief Complaint   Patient presents with   [facility] Follow Up     seen at [facility] on 11/14/21 for chest pain and dizziness     HPI    Lisseth Acevedo is a 82 y.o. female presenting today for ER  follow up. Pt was seen for chest pain on 11/14/21. She has not had any further episodes of chest pain. She still has dizziness but does not want to take antivert due to drowsiness. New concerns today: pt reports that she had bladder spasms yesterday. Pt would like to have a ua today in the ofc. Review of Systems   Constitutional: Negative. Negative for chills and fever. HENT: Negative. Respiratory: Negative. Cardiovascular: Negative. Genitourinary: Positive for dysuria. Negative for frequency and urgency. Bladder spasm   All other systems reviewed and are negative. Physical Exam  Vitals and nursing note reviewed. Constitutional:       Appearance: Normal appearance. She is not ill-appearing. HENT:      Head: Normocephalic and atraumatic. Right Ear: External ear normal.      Left Ear: External ear normal.      Nose: Nose normal.      Mouth/Throat:      Mouth: Mucous membranes are moist.   Eyes:      Extraocular Movements: Extraocular movements intact. Conjunctiva/sclera: Conjunctivae normal.   Cardiovascular:      Rate and Rhythm: Normal rate and regular rhythm. Heart sounds: No murmur heard. No friction rub. No gallop. Pulmonary:      Effort: Pulmonary effort is normal.      Breath sounds: Normal breath sounds. No wheezing, rhonchi or rales. Musculoskeletal:         General: Normal range of motion. Cervical back: Normal range of motion. Skin:     General: Skin is warm and dry. Neurological:      Mental Status: She is alert and oriented to person, place, and time. Coordination: Coordination normal.   Psychiatric:         Mood and Affect: Mood normal.         Behavior: Behavior normal.         Thought Content:  Thought content normal. Judgment: Judgment normal.         Diagnoses and all orders for this visit:    1. Dysuria  Pt unable to urinate. No specimen obtained. 2. Chest pain, unspecified type  Resolved. 3. Dizziness  Pt declines meclizine. Pt made aware that drowsiness is a common (and anticipated) side effect of this medication. It is not a sign of intolerance. However, pt is worried about being somnolent when she is alone at her home. Follow-up and Dispositions    · Return if symptoms worsen or fail to improve.

## 2021-11-21 ENCOUNTER — APPOINTMENT (OUTPATIENT)
Dept: CT IMAGING | Age: 86
End: 2021-11-21
Attending: EMERGENCY MEDICINE
Payer: MEDICARE

## 2021-11-21 ENCOUNTER — HOSPITAL ENCOUNTER (EMERGENCY)
Age: 86
Discharge: HOME | End: 2021-11-21
Attending: EMERGENCY MEDICINE
Payer: MEDICARE

## 2021-11-21 VITALS
WEIGHT: 223 LBS | RESPIRATION RATE: 20 BRPM | HEIGHT: 66 IN | BODY MASS INDEX: 35.84 KG/M2 | SYSTOLIC BLOOD PRESSURE: 138 MMHG | HEART RATE: 106 BPM | DIASTOLIC BLOOD PRESSURE: 102 MMHG | OXYGEN SATURATION: 96 % | TEMPERATURE: 99.5 F

## 2021-11-21 VITALS
WEIGHT: 223 LBS | HEART RATE: 105 BPM | HEIGHT: 67 IN | DIASTOLIC BLOOD PRESSURE: 82 MMHG | OXYGEN SATURATION: 97 % | TEMPERATURE: 99.9 F | SYSTOLIC BLOOD PRESSURE: 133 MMHG | RESPIRATION RATE: 18 BRPM | BODY MASS INDEX: 35 KG/M2

## 2021-11-21 DIAGNOSIS — N12 PYELONEPHRITIS: Primary | ICD-10-CM

## 2021-11-21 DIAGNOSIS — Z95.5 S/P CORONARY ARTERY STENT PLACEMENT: ICD-10-CM

## 2021-11-21 DIAGNOSIS — N39.0 URINARY TRACT INFECTION WITHOUT HEMATURIA, SITE UNSPECIFIED: Primary | ICD-10-CM

## 2021-11-21 LAB
ALBUMIN SERPL-MCNC: 3 G/DL (ref 3.4–5)
ALBUMIN SERPL-MCNC: 3.2 G/DL (ref 3.4–5)
ALBUMIN/GLOB SERPL: 0.6 {RATIO} (ref 0.8–1.7)
ALBUMIN/GLOB SERPL: 0.6 {RATIO} (ref 0.8–1.7)
ALP SERPL-CCNC: 106 U/L (ref 45–117)
ALP SERPL-CCNC: 111 U/L (ref 45–117)
ALT SERPL-CCNC: 16 U/L (ref 13–56)
ALT SERPL-CCNC: 18 U/L (ref 13–56)
ANION GAP SERPL CALC-SCNC: 3 MMOL/L (ref 3–18)
ANION GAP SERPL CALC-SCNC: 4 MMOL/L (ref 3–18)
APPEARANCE UR: ABNORMAL
APTT PPP: 33 SEC (ref 23–36.4)
AST SERPL-CCNC: 16 U/L (ref 10–38)
AST SERPL-CCNC: 29 U/L (ref 10–38)
BACTERIA URNS QL MICRO: ABNORMAL /HPF
BASOPHILS # BLD: 0 K/UL (ref 0–0.1)
BASOPHILS # BLD: 0 K/UL (ref 0–0.1)
BASOPHILS NFR BLD: 0 % (ref 0–2)
BASOPHILS NFR BLD: 0 % (ref 0–2)
BILIRUB SERPL-MCNC: 1.2 MG/DL (ref 0.2–1)
BILIRUB SERPL-MCNC: 1.2 MG/DL (ref 0.2–1)
BILIRUB UR QL: NEGATIVE
BUN SERPL-MCNC: 10 MG/DL (ref 7–18)
BUN SERPL-MCNC: 11 MG/DL (ref 7–18)
BUN/CREAT SERPL: 10 (ref 12–20)
BUN/CREAT SERPL: 11 (ref 12–20)
CALCIUM SERPL-MCNC: 9.2 MG/DL (ref 8.5–10.1)
CALCIUM SERPL-MCNC: 9.3 MG/DL (ref 8.5–10.1)
CHLORIDE SERPL-SCNC: 105 MMOL/L (ref 100–111)
CHLORIDE SERPL-SCNC: 106 MMOL/L (ref 100–111)
CO2 SERPL-SCNC: 28 MMOL/L (ref 21–32)
CO2 SERPL-SCNC: 29 MMOL/L (ref 21–32)
COLOR UR: YELLOW
CREAT SERPL-MCNC: 1.03 MG/DL (ref 0.6–1.3)
CREAT SERPL-MCNC: 1.04 MG/DL (ref 0.6–1.3)
DIFFERENTIAL METHOD BLD: ABNORMAL
DIFFERENTIAL METHOD BLD: ABNORMAL
EOSINOPHIL # BLD: 0 K/UL (ref 0–0.4)
EOSINOPHIL # BLD: 0 K/UL (ref 0–0.4)
EOSINOPHIL NFR BLD: 0 % (ref 0–5)
EOSINOPHIL NFR BLD: 0 % (ref 0–5)
EPITH CASTS URNS QL MICRO: NEGATIVE /LPF (ref 0–5)
ERYTHROCYTE [DISTWIDTH] IN BLOOD BY AUTOMATED COUNT: 11.7 % (ref 11.6–14.5)
ERYTHROCYTE [DISTWIDTH] IN BLOOD BY AUTOMATED COUNT: 11.8 % (ref 11.6–14.5)
GLOBULIN SER CALC-MCNC: 5.2 G/DL (ref 2–4)
GLOBULIN SER CALC-MCNC: 5.7 G/DL (ref 2–4)
GLUCOSE SERPL-MCNC: 231 MG/DL (ref 74–99)
GLUCOSE SERPL-MCNC: 283 MG/DL (ref 74–99)
GLUCOSE UR STRIP.AUTO-MCNC: 500 MG/DL
HCT VFR BLD AUTO: 36.1 % (ref 35–45)
HCT VFR BLD AUTO: 39.5 % (ref 35–45)
HGB BLD-MCNC: 11.7 G/DL (ref 12–16)
HGB BLD-MCNC: 12.8 G/DL (ref 12–16)
HGB UR QL STRIP: ABNORMAL
IMM GRANULOCYTES # BLD AUTO: 0 K/UL (ref 0–0.04)
IMM GRANULOCYTES # BLD AUTO: 0.1 K/UL (ref 0–0.04)
IMM GRANULOCYTES NFR BLD AUTO: 0 % (ref 0–0.5)
IMM GRANULOCYTES NFR BLD AUTO: 0 % (ref 0–0.5)
INR PPP: 1.1 (ref 0.8–1.2)
KETONES UR QL STRIP.AUTO: NEGATIVE MG/DL
LACTATE BLD-SCNC: 1.12 MMOL/L (ref 0.4–2)
LEUKOCYTE ESTERASE UR QL STRIP.AUTO: ABNORMAL
LIPASE SERPL-CCNC: 24 U/L (ref 73–393)
LYMPHOCYTES # BLD: 0.8 K/UL (ref 0.9–3.6)
LYMPHOCYTES # BLD: 1.4 K/UL (ref 0.9–3.6)
LYMPHOCYTES NFR BLD: 13 % (ref 21–52)
LYMPHOCYTES NFR BLD: 6 % (ref 21–52)
MCH RBC QN AUTO: 31.7 PG (ref 24–34)
MCH RBC QN AUTO: 32.3 PG (ref 24–34)
MCHC RBC AUTO-ENTMCNC: 32.4 G/DL (ref 31–37)
MCHC RBC AUTO-ENTMCNC: 32.4 G/DL (ref 31–37)
MCV RBC AUTO: 97.8 FL (ref 78–100)
MCV RBC AUTO: 99.7 FL (ref 78–100)
MONOCYTES # BLD: 0.8 K/UL (ref 0.05–1.2)
MONOCYTES # BLD: 1.3 K/UL (ref 0.05–1.2)
MONOCYTES NFR BLD: 7 % (ref 3–10)
MONOCYTES NFR BLD: 9 % (ref 3–10)
NEUTS SEG # BLD: 11.3 K/UL (ref 1.8–8)
NEUTS SEG # BLD: 8.9 K/UL (ref 1.8–8)
NEUTS SEG NFR BLD: 80 % (ref 40–73)
NEUTS SEG NFR BLD: 84 % (ref 40–73)
NITRITE UR QL STRIP.AUTO: POSITIVE
NRBC # BLD: 0 K/UL (ref 0–0.01)
NRBC # BLD: 0 K/UL (ref 0–0.01)
NRBC BLD-RTO: 0 PER 100 WBC
NRBC BLD-RTO: 0 PER 100 WBC
PH UR STRIP: 6 [PH] (ref 5–8)
PLATELET # BLD AUTO: 221 K/UL (ref 135–420)
PLATELET # BLD AUTO: 237 K/UL (ref 135–420)
PMV BLD AUTO: 10.6 FL (ref 9.2–11.8)
PMV BLD AUTO: 11.1 FL (ref 9.2–11.8)
POTASSIUM SERPL-SCNC: 4.1 MMOL/L (ref 3.5–5.5)
POTASSIUM SERPL-SCNC: 4.9 MMOL/L (ref 3.5–5.5)
PROT SERPL-MCNC: 8.2 G/DL (ref 6.4–8.2)
PROT SERPL-MCNC: 8.9 G/DL (ref 6.4–8.2)
PROT UR STRIP-MCNC: 100 MG/DL
PROTHROMBIN TIME: 14.2 SEC (ref 11.5–15.2)
RBC # BLD AUTO: 3.69 M/UL (ref 4.2–5.3)
RBC # BLD AUTO: 3.96 M/UL (ref 4.2–5.3)
RBC #/AREA URNS HPF: ABNORMAL /HPF (ref 0–5)
SODIUM SERPL-SCNC: 136 MMOL/L (ref 136–145)
SODIUM SERPL-SCNC: 139 MMOL/L (ref 136–145)
SP GR UR REFRACTOMETRY: 1.01 (ref 1–1.03)
UROBILINOGEN UR QL STRIP.AUTO: 1 EU/DL (ref 0.2–1)
WBC # BLD AUTO: 11.2 K/UL (ref 4.6–13.2)
WBC # BLD AUTO: 13.4 K/UL (ref 4.6–13.2)
WBC URNS QL MICRO: ABNORMAL /HPF (ref 0–4)

## 2021-11-21 PROCEDURE — 81001 URINALYSIS AUTO W/SCOPE: CPT

## 2021-11-21 PROCEDURE — 85610 PROTHROMBIN TIME: CPT

## 2021-11-21 PROCEDURE — 99284 EMERGENCY DEPT VISIT MOD MDM: CPT

## 2021-11-21 PROCEDURE — 83605 ASSAY OF LACTIC ACID: CPT

## 2021-11-21 PROCEDURE — 87077 CULTURE AEROBIC IDENTIFY: CPT

## 2021-11-21 PROCEDURE — 80053 COMPREHEN METABOLIC PANEL: CPT

## 2021-11-21 PROCEDURE — 83690 ASSAY OF LIPASE: CPT

## 2021-11-21 PROCEDURE — 74011250636 HC RX REV CODE- 250/636: Performed by: EMERGENCY MEDICINE

## 2021-11-21 PROCEDURE — 85730 THROMBOPLASTIN TIME PARTIAL: CPT

## 2021-11-21 PROCEDURE — 74176 CT ABD & PELVIS W/O CONTRAST: CPT

## 2021-11-21 PROCEDURE — 96365 THER/PROPH/DIAG IV INF INIT: CPT

## 2021-11-21 PROCEDURE — 85025 COMPLETE CBC W/AUTO DIFF WBC: CPT

## 2021-11-21 PROCEDURE — 87186 SC STD MICRODIL/AGAR DIL: CPT

## 2021-11-21 PROCEDURE — 87086 URINE CULTURE/COLONY COUNT: CPT

## 2021-11-21 RX ORDER — LEVOFLOXACIN 500 MG/1
500 TABLET, FILM COATED ORAL DAILY
Qty: 9 TABLET | Refills: 0 | Status: SHIPPED | OUTPATIENT
Start: 2021-11-21 | End: 2021-11-21 | Stop reason: SDUPTHER

## 2021-11-21 RX ORDER — ONDANSETRON 4 MG/1
4 TABLET, FILM COATED ORAL
Qty: 12 TABLET | Refills: 0 | Status: SHIPPED | OUTPATIENT
Start: 2021-11-21

## 2021-11-21 RX ORDER — LEVOFLOXACIN 500 MG/1
750 TABLET, FILM COATED ORAL DAILY
Qty: 11 TABLET | Refills: 0 | Status: SHIPPED | OUTPATIENT
Start: 2021-11-21 | End: 2021-11-28

## 2021-11-21 RX ORDER — DICYCLOMINE HYDROCHLORIDE 20 MG/1
20 TABLET ORAL EVERY 6 HOURS
Qty: 10 TABLET | Refills: 0 | Status: SHIPPED | OUTPATIENT
Start: 2021-11-21 | End: 2022-08-23

## 2021-11-21 RX ORDER — LEVOFLOXACIN 5 MG/ML
500 INJECTION, SOLUTION INTRAVENOUS
Status: COMPLETED | OUTPATIENT
Start: 2021-11-21 | End: 2021-11-21

## 2021-11-21 RX ORDER — ONDANSETRON 2 MG/ML
4 INJECTION INTRAMUSCULAR; INTRAVENOUS
Status: COMPLETED | OUTPATIENT
Start: 2021-11-21 | End: 2021-11-21

## 2021-11-21 RX ADMIN — LEVOFLOXACIN 500 MG: 500 INJECTION, SOLUTION INTRAVENOUS at 07:16

## 2021-11-21 RX ADMIN — ONDANSETRON 4 MG: 2 INJECTION INTRAMUSCULAR; INTRAVENOUS at 14:32

## 2021-11-21 NOTE — REMOTE MONITORING
Spoke with  JEANNIE GEORGES regarding 3 and 6 hour Sepsis bundle.     Edgard Pereira RN BSN- orienting with [address]  Callback # [phone]

## 2021-11-21 NOTE — ED TRIAGE NOTES
Alert and oriented female with c/o lower abdominal pain, blood in urine and bladder spasms x 1 week.
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
## 2021-11-21 NOTE — ED TRIAGE NOTES
Pt.AOx4, wheel chair use, NAD, arriving this time for nausea after medications prescribed by ER provider for bladder infection, pt.  States she came last night for issue blood in urine
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
## 2021-11-22 RX ORDER — CLOPIDOGREL BISULFATE 75 MG/1
TABLET ORAL
Qty: 30 TABLET | Refills: 2 | Status: SHIPPED | OUTPATIENT
Start: 2021-11-22 | End: 2022-05-31

## 2021-11-24 LAB
BACTERIA SPEC CULT: ABNORMAL
CC UR VC: ABNORMAL
SERVICE CMNT-IMP: ABNORMAL

## 2021-11-29 ENCOUNTER — TELEPHONE (OUTPATIENT)
Dept: FAMILY MEDICINE CLINIC | Age: 86
End: 2021-11-29

## 2021-11-29 NOTE — TELEPHONE ENCOUNTER
Patient called [facility] stating her bed is not working properly. [facility] told her she needed her doctor to fax a request to them for DME repairment or supply pt with new bed.  [facility] Fax # [phone]

## 2021-12-03 DIAGNOSIS — I50.32 CHRONIC DIASTOLIC CONGESTIVE HEART FAILURE (HCC): ICD-10-CM

## 2021-12-03 RX ORDER — SPIRONOLACTONE 50 MG/1
TABLET, FILM COATED ORAL
Qty: 180 TABLET | Refills: 3 | Status: SHIPPED | OUTPATIENT
Start: 2021-12-03 | End: 2022-09-10

## 2022-01-02 RX ORDER — AMLODIPINE BESYLATE 5 MG/1
TABLET ORAL
Qty: 90 TABLET | Refills: 1 | Status: SHIPPED | OUTPATIENT
Start: 2022-01-02 | End: 2022-07-01

## 2022-01-10 RX ORDER — ALBUTEROL SULFATE 90 UG/1
AEROSOL, METERED RESPIRATORY (INHALATION)
Qty: 18 G | Refills: 12 | Status: SHIPPED | OUTPATIENT
Start: 2022-01-10

## 2022-01-17 ENCOUNTER — HOSPITAL ENCOUNTER (EMERGENCY)
Age: 87
Discharge: HOME | End: 2022-01-17
Attending: EMERGENCY MEDICINE
Payer: MEDICARE

## 2022-01-17 ENCOUNTER — TELEPHONE (OUTPATIENT)
Dept: FAMILY MEDICINE CLINIC | Age: 87
End: 2022-01-17

## 2022-01-17 VITALS
TEMPERATURE: 98.7 F | BODY MASS INDEX: 35.79 KG/M2 | SYSTOLIC BLOOD PRESSURE: 155 MMHG | HEART RATE: 85 BPM | WEIGHT: 228 LBS | OXYGEN SATURATION: 97 % | RESPIRATION RATE: 18 BRPM | DIASTOLIC BLOOD PRESSURE: 81 MMHG | HEIGHT: 67 IN

## 2022-01-17 DIAGNOSIS — M79.10 MYALGIA: Primary | ICD-10-CM

## 2022-01-17 DIAGNOSIS — R68.83 CHILLS: ICD-10-CM

## 2022-01-17 LAB
APPEARANCE UR: CLEAR
ATRIAL RATE: 95 BPM
BACTERIA URNS QL MICRO: NEGATIVE /HPF
BASOPHILS # BLD: 0 K/UL (ref 0–0.1)
BASOPHILS NFR BLD: 1 % (ref 0–2)
BILIRUB UR QL: NEGATIVE
CALCULATED P AXIS, ECG09: 74 DEGREES
CALCULATED R AXIS, ECG10: -2 DEGREES
CALCULATED T AXIS, ECG11: 55 DEGREES
COLOR UR: YELLOW
DIAGNOSIS, 93000: NORMAL
DIFFERENTIAL METHOD BLD: ABNORMAL
EOSINOPHIL # BLD: 0.2 K/UL (ref 0–0.4)
EOSINOPHIL NFR BLD: 3 % (ref 0–5)
EPITH CASTS URNS QL MICRO: NORMAL /LPF (ref 0–5)
ERYTHROCYTE [DISTWIDTH] IN BLOOD BY AUTOMATED COUNT: 11.9 % (ref 11.6–14.5)
GLUCOSE UR STRIP.AUTO-MCNC: 500 MG/DL
HCT VFR BLD AUTO: 40.4 % (ref 35–45)
HGB BLD-MCNC: 12.9 G/DL (ref 12–16)
HGB UR QL STRIP: ABNORMAL
IMM GRANULOCYTES # BLD AUTO: 0 K/UL (ref 0–0.04)
IMM GRANULOCYTES NFR BLD AUTO: 0 % (ref 0–0.5)
KETONES UR QL STRIP.AUTO: NEGATIVE MG/DL
LEUKOCYTE ESTERASE UR QL STRIP.AUTO: NEGATIVE
LYMPHOCYTES # BLD: 2 K/UL (ref 0.9–3.6)
LYMPHOCYTES NFR BLD: 35 % (ref 21–52)
MCH RBC QN AUTO: 31.5 PG (ref 24–34)
MCHC RBC AUTO-ENTMCNC: 31.9 G/DL (ref 31–37)
MCV RBC AUTO: 98.8 FL (ref 78–100)
MONOCYTES # BLD: 0.3 K/UL (ref 0.05–1.2)
MONOCYTES NFR BLD: 6 % (ref 3–10)
NEUTS SEG # BLD: 3.2 K/UL (ref 1.8–8)
NEUTS SEG NFR BLD: 55 % (ref 40–73)
NITRITE UR QL STRIP.AUTO: NEGATIVE
NRBC # BLD: 0 K/UL (ref 0–0.01)
NRBC BLD-RTO: 0 PER 100 WBC
P-R INTERVAL, ECG05: 128 MS
PH UR STRIP: 6 [PH] (ref 5–8)
PLATELET # BLD AUTO: 203 K/UL (ref 135–420)
PMV BLD AUTO: 10.6 FL (ref 9.2–11.8)
PROT UR STRIP-MCNC: 100 MG/DL
Q-T INTERVAL, ECG07: 376 MS
QRS DURATION, ECG06: 88 MS
QTC CALCULATION (BEZET), ECG08: 472 MS
RBC # BLD AUTO: 4.09 M/UL (ref 4.2–5.3)
RBC #/AREA URNS HPF: 0 /HPF (ref 0–5)
SARS-COV-2, COV2: NORMAL
SP GR UR REFRACTOMETRY: 1.02 (ref 1–1.03)
TROPONIN-HIGH SENSITIVITY: 37 NG/L (ref 0–54)
UROBILINOGEN UR QL STRIP.AUTO: 0.2 EU/DL (ref 0.2–1)
VENTRICULAR RATE, ECG03: 95 BPM
WBC # BLD AUTO: 5.8 K/UL (ref 4.6–13.2)
WBC URNS QL MICRO: NORMAL /HPF (ref 0–4)

## 2022-01-17 PROCEDURE — 93005 ELECTROCARDIOGRAM TRACING: CPT

## 2022-01-17 PROCEDURE — 84484 ASSAY OF TROPONIN QUANT: CPT

## 2022-01-17 PROCEDURE — 85025 COMPLETE CBC W/AUTO DIFF WBC: CPT

## 2022-01-17 PROCEDURE — U0003 INFECTIOUS AGENT DETECTION BY NUCLEIC ACID (DNA OR RNA); SEVERE ACUTE RESPIRATORY SYNDROME CORONAVIRUS 2 (SARS-COV-2) (CORONAVIRUS DISEASE [COVID-19]), AMPLIFIED PROBE TECHNIQUE, MAKING USE OF HIGH THROUGHPUT TECHNOLOGIES AS DESCRIBED BY CMS-2020-01-R: HCPCS

## 2022-01-17 PROCEDURE — 99283 EMERGENCY DEPT VISIT LOW MDM: CPT

## 2022-01-17 PROCEDURE — 81001 URINALYSIS AUTO W/SCOPE: CPT

## 2022-01-17 RX ORDER — ACETAMINOPHEN 325 MG/1
650 TABLET ORAL
Qty: 20 TABLET | Refills: 0 | Status: SHIPPED | OUTPATIENT
Start: 2022-01-17

## 2022-01-17 NOTE — ED NOTES
Patient resting comfortably on the stretcher with call bell within reach. Patient has no signs or symptoms of acute distress at this time. Patient has no concerns or questions about her treatment plan.

## 2022-01-17 NOTE — ED TRIAGE NOTES
Patient c/o experiencing a chills across her shoulders during the night. C/o elevated blood pressure, dizziness, headache, and left lower back pain.    States taking nitro sl at 0600 for sharp pain to left chest.  She characterizes chest pain as \"spasmodic\"

## 2022-01-17 NOTE — TELEPHONE ENCOUNTER
Patient called states that she feels a chill in her shoulders  and that she isnt sure what is going on. Patient denies fever  SOB  Chest pain . I let patient know Dr Agata Cummins is out of the office and that I will notify her of symptoms however if she gets worse to go to Urgent care or er to be evaluated .
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
## 2022-01-17 NOTE — ED NOTES
I performed a brief history of the patient here in triage and I have determined that pt will need further treatment and evaluation from the main side ER physician. I have placed initial orders based on the history to help in expediting patients care.        Visit Vitals  BP (!) 161/92 (BP 1 Location: Left upper arm, BP Patient Position: At rest)   Pulse 98   Temp 98.7 °F (37.1 °C)   Resp 18   Ht 5' 6.5\" (1.689 m)   Wt 103.4 kg (228 lb)   SpO2 97%   BMI 36.25 kg/m²      SOLITARIO Poon 2:30 PM

## 2022-01-18 ENCOUNTER — PATIENT OUTREACH (OUTPATIENT)
Dept: CASE MANAGEMENT | Age: 87
End: 2022-01-18

## 2022-01-18 LAB — SARS-COV-2, NAA: NOT DETECTED

## 2022-01-18 NOTE — PROGRESS NOTES
Ambulatory Care Coordination ED COVID Follow up Call    Challenges to be reviewed by the provider   Additional needs identified to be addressed with provider no  none           Encounter was not routed to provider for escalation. Method of communication with provider : none    Discussed COVID-19 related testing which was pending at this time. Test results were pending. Patient informed of results, if available? n/a. Current Symptoms: fatigue, pain or aching joints and chills or shaking    Reviewed New or Changed Meds: yes    Do you have what you need at home?  Durable Medical Equipment ordered at discharge: None   Home Health/Outpatient orders at discharge: none    Pulse oximeter? no Discussed and confirmed pulse oximeter discharge instructions and when to notify provider or seek emergency care. Patient education provided: Reviewed appropriate site of care based on symptoms and resources available to patient including: PCP and When to call 911. Follow up appointment recommended: yes. If no appointment scheduled, scheduling offered: Patient states she will call PCP today to schedule follow up . Future Appointments   Date Time Provider Chelle Reeves   2/10/2022 11:15 AM Karoline Reid MD [facility] AMB       Interventions: Obtained and reviewed discharge summary and/or continuity of care documents and Assessment and support for treatment adherence and medication management-. Reviewed discharge instructions, medical action plan and red flags with patient who verbalized understanding. Provided contact information for future needs. Plan for follow-up call in 3-5 days based on severity of symptoms and risk factors.   Plan for next call: self management-.     Quincy Dyer

## 2022-01-18 NOTE — ED NOTES
Patient resting comfortably on the stretcher with family at the bedside. Patient has no signs or symptoms of acute distress at this time. Patient has no concerns or questions about her treatment plan.

## 2022-01-19 ENCOUNTER — PATIENT OUTREACH (OUTPATIENT)
Dept: CASE MANAGEMENT | Age: 87
End: 2022-01-19

## 2022-01-31 ENCOUNTER — PATIENT OUTREACH (OUTPATIENT)
Dept: CASE MANAGEMENT | Age: 87
End: 2022-01-31

## 2022-01-31 NOTE — PROGRESS NOTES
Contacted patient for Transitions of Care Coordination  follow up. No answer. Left message introducing self, role and reason for call. Requested return call.

## 2022-02-01 RX ORDER — FUROSEMIDE 20 MG/1
TABLET ORAL
Qty: 30 TABLET | Refills: 5 | Status: SHIPPED | OUTPATIENT
Start: 2022-02-01 | End: 2022-08-01

## 2022-02-07 ENCOUNTER — OFFICE VISIT (OUTPATIENT)
Dept: FAMILY MEDICINE CLINIC | Age: 87
End: 2022-02-07
Payer: MEDICARE

## 2022-02-07 VITALS
TEMPERATURE: 98.4 F | RESPIRATION RATE: 16 BRPM | HEART RATE: 113 BPM | SYSTOLIC BLOOD PRESSURE: 118 MMHG | DIASTOLIC BLOOD PRESSURE: 76 MMHG | OXYGEN SATURATION: 97 %

## 2022-02-07 DIAGNOSIS — Z71.89 ACP (ADVANCE CARE PLANNING): ICD-10-CM

## 2022-02-07 DIAGNOSIS — E66.01 SEVERE OBESITY (BMI 35.0-39.9) WITH COMORBIDITY (HCC): ICD-10-CM

## 2022-02-07 DIAGNOSIS — Z00.00 MEDICARE ANNUAL WELLNESS VISIT, SUBSEQUENT: Primary | ICD-10-CM

## 2022-02-07 PROCEDURE — G0439 PPPS, SUBSEQ VISIT: HCPCS | Performed by: FAMILY MEDICINE

## 2022-02-07 PROCEDURE — G8536 NO DOC ELDER MAL SCRN: HCPCS | Performed by: FAMILY MEDICINE

## 2022-02-07 PROCEDURE — 99497 ADVNCD CARE PLAN 30 MIN: CPT | Performed by: FAMILY MEDICINE

## 2022-02-07 PROCEDURE — 1101F PT FALLS ASSESS-DOCD LE1/YR: CPT | Performed by: FAMILY MEDICINE

## 2022-02-07 PROCEDURE — G8427 DOCREV CUR MEDS BY ELIG CLIN: HCPCS | Performed by: FAMILY MEDICINE

## 2022-02-07 PROCEDURE — G8417 CALC BMI ABV UP PARAM F/U: HCPCS | Performed by: FAMILY MEDICINE

## 2022-02-07 PROCEDURE — G8399 PT W/DXA RESULTS DOCUMENT: HCPCS | Performed by: FAMILY MEDICINE

## 2022-02-07 PROCEDURE — G8510 SCR DEP NEG, NO PLAN REQD: HCPCS | Performed by: FAMILY MEDICINE
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
## 2022-02-07 NOTE — PATIENT INSTRUCTIONS
Medicare Wellness Visit, Female     The best way to live healthy is to have a lifestyle where you eat a well-balanced diet, exercise regularly, limit alcohol use, and quit all forms of tobacco/nicotine, if applicable. Regular preventive services are another way to keep healthy. Preventive services (vaccines, screening tests, monitoring & exams) can help personalize your care plan, which helps you manage your own care. Screening tests can find health problems at the earliest stages, when they are easiest to treat. [facility] follows the current, evidence-based guidelines published by the [facility] Goldman ([facility]) when recommending preventive services for our patients. Because we follow these guidelines, sometimes recommendations change over time as research supports it. (For example, mammograms used to be recommended annually. Even though Medicare will still pay for an annual mammogram, the newer guidelines recommend a mammogram every two years for women of average risk). Of course, you and your doctor may decide to screen more often for some diseases, based on your risk and your co-morbidities (chronic disease you are already diagnosed with). Preventive services for you include:  - Medicare offers their members a free annual wellness visit, which is time for you and your primary care provider to discuss and plan for your preventive service needs. Take advantage of this benefit every year!  -All adults over the age of 72 should receive the recommended pneumonia vaccines. Current USPSTF guidelines recommend a series of two vaccines for the best pneumonia protection.   -All adults should have a flu vaccine yearly and a tetanus vaccine every 10 years.   -All adults age 48 and older should receive the shingles vaccines (series of two vaccines).       -All adults age 38-68 who are overweight should have a diabetes screening test once every three years.   -All adults born between [birth year] and 1963 should be screened once for Hepatitis C.  -Other screening tests and preventive services for persons with diabetes include: an eye exam to screen for diabetic retinopathy, a kidney function test, a foot exam, and stricter control over your cholesterol.   -Cardiovascular screening for adults with routine risk involves an electrocardiogram (ECG) at intervals determined by your doctor.   -Colorectal cancer screenings should be done for adults age 54-65 with no increased risk factors for colorectal cancer. There are a number of acceptable methods of screening for this type of cancer. Each test has its own benefits and drawbacks. Discuss with your doctor what is most appropriate for you during your annual wellness visit. The different tests include: colonoscopy (considered the best screening method), a fecal occult blood test, a fecal DNA test, and sigmoidoscopy.    -A bone mass density test is recommended when a woman turns 65 to screen for osteoporosis. This test is only recommended one time, as a screening. Some providers will use this same test as a disease monitoring tool if you already have osteoporosis. -Breast cancer screenings are recommended every other year for women of normal risk, age 54-69.  -Cervical cancer screenings for women over age 72 are only recommended with certain risk factors.      Here is a list of your current Health Maintenance items (your personalized list of preventive services) with a due date:  Health Maintenance Due   Topic Date Due    Shingles Vaccine (1 of 2) Never done    Diabetic Foot Care  10/24/2018    Pneumococcal Vaccine (2 of 2 - PPSV23) 02/19/2020    Albumin Urine Test  02/25/2020    Yearly Flu Vaccine (1) 09/01/2021    Annual Well Visit  09/12/2021    COVID-19 Vaccine (3 - Booster for Pfizer series) 10/03/2021    Eye Exam  11/12/2021    Hemoglobin A1C    12/17/2021

## 2022-02-13 ENCOUNTER — HOSPITAL ENCOUNTER (EMERGENCY)
Age: 87
Discharge: HOME | End: 2022-02-13
Attending: EMERGENCY MEDICINE
Payer: MEDICARE

## 2022-02-13 ENCOUNTER — APPOINTMENT (OUTPATIENT)
Dept: CT IMAGING | Age: 87
End: 2022-02-13
Attending: EMERGENCY MEDICINE
Payer: MEDICARE

## 2022-02-13 VITALS
RESPIRATION RATE: 17 BRPM | OXYGEN SATURATION: 100 % | DIASTOLIC BLOOD PRESSURE: 93 MMHG | BODY MASS INDEX: 34.72 KG/M2 | SYSTOLIC BLOOD PRESSURE: 157 MMHG | TEMPERATURE: 98.4 F | HEIGHT: 66 IN | HEART RATE: 82 BPM | WEIGHT: 216 LBS

## 2022-02-13 DIAGNOSIS — N30.00 ACUTE CYSTITIS WITHOUT HEMATURIA: ICD-10-CM

## 2022-02-13 DIAGNOSIS — G44.209 TENSION HEADACHE: Primary | ICD-10-CM

## 2022-02-13 LAB
ALBUMIN SERPL-MCNC: 3.1 G/DL (ref 3.4–5)
ALBUMIN/GLOB SERPL: 0.7 {RATIO} (ref 0.8–1.7)
ALP SERPL-CCNC: 90 U/L (ref 45–117)
ALT SERPL-CCNC: 15 U/L (ref 13–56)
ANION GAP SERPL CALC-SCNC: 5 MMOL/L (ref 3–18)
APPEARANCE UR: CLEAR
AST SERPL-CCNC: 17 U/L (ref 10–38)
ATRIAL RATE: 92 BPM
BACTERIA URNS QL MICRO: NEGATIVE /HPF
BASOPHILS # BLD: 0 K/UL (ref 0–0.1)
BASOPHILS NFR BLD: 1 % (ref 0–2)
BILIRUB SERPL-MCNC: 0.5 MG/DL (ref 0.2–1)
BILIRUB UR QL: NEGATIVE
BUN SERPL-MCNC: 9 MG/DL (ref 7–18)
BUN/CREAT SERPL: 13 (ref 12–20)
CALCIUM SERPL-MCNC: 9.2 MG/DL (ref 8.5–10.1)
CALCULATED P AXIS, ECG09: 91 DEGREES
CALCULATED R AXIS, ECG10: -13 DEGREES
CALCULATED T AXIS, ECG11: 44 DEGREES
CHLORIDE SERPL-SCNC: 106 MMOL/L (ref 100–111)
CO2 SERPL-SCNC: 27 MMOL/L (ref 21–32)
COLOR UR: YELLOW
CREAT SERPL-MCNC: 0.71 MG/DL (ref 0.6–1.3)
DIAGNOSIS, 93000: NORMAL
DIFFERENTIAL METHOD BLD: ABNORMAL
EOSINOPHIL # BLD: 0.2 K/UL (ref 0–0.4)
EOSINOPHIL NFR BLD: 4 % (ref 0–5)
EPITH CASTS URNS QL MICRO: ABNORMAL /LPF (ref 0–5)
ERYTHROCYTE [DISTWIDTH] IN BLOOD BY AUTOMATED COUNT: 11.9 % (ref 11.6–14.5)
GLOBULIN SER CALC-MCNC: 4.5 G/DL (ref 2–4)
GLUCOSE SERPL-MCNC: 215 MG/DL (ref 74–99)
GLUCOSE UR STRIP.AUTO-MCNC: 250 MG/DL
HCT VFR BLD AUTO: 36.1 % (ref 35–45)
HGB BLD-MCNC: 11.7 G/DL (ref 12–16)
HGB UR QL STRIP: NEGATIVE
IMM GRANULOCYTES # BLD AUTO: 0 K/UL (ref 0–0.04)
IMM GRANULOCYTES NFR BLD AUTO: 0 % (ref 0–0.5)
KETONES UR QL STRIP.AUTO: NEGATIVE MG/DL
LEUKOCYTE ESTERASE UR QL STRIP.AUTO: ABNORMAL
LYMPHOCYTES # BLD: 1.8 K/UL (ref 0.9–3.6)
LYMPHOCYTES NFR BLD: 35 % (ref 21–52)
MAGNESIUM SERPL-MCNC: 2 MG/DL (ref 1.6–2.6)
MCH RBC QN AUTO: 31.6 PG (ref 24–34)
MCHC RBC AUTO-ENTMCNC: 32.4 G/DL (ref 31–37)
MCV RBC AUTO: 97.6 FL (ref 78–100)
MONOCYTES # BLD: 0.4 K/UL (ref 0.05–1.2)
MONOCYTES NFR BLD: 8 % (ref 3–10)
MUCOUS THREADS URNS QL MICRO: ABNORMAL /LPF
NEUTS SEG # BLD: 2.7 K/UL (ref 1.8–8)
NEUTS SEG NFR BLD: 52 % (ref 40–73)
NITRITE UR QL STRIP.AUTO: NEGATIVE
NRBC # BLD: 0 K/UL (ref 0–0.01)
NRBC BLD-RTO: 0 PER 100 WBC
P-R INTERVAL, ECG05: 124 MS
PH UR STRIP: 6.5 [PH] (ref 5–8)
PLATELET # BLD AUTO: 208 K/UL (ref 135–420)
PMV BLD AUTO: 10.2 FL (ref 9.2–11.8)
POTASSIUM SERPL-SCNC: 3.7 MMOL/L (ref 3.5–5.5)
PROT SERPL-MCNC: 7.6 G/DL (ref 6.4–8.2)
PROT UR STRIP-MCNC: 30 MG/DL
Q-T INTERVAL, ECG07: 366 MS
QRS DURATION, ECG06: 86 MS
QTC CALCULATION (BEZET), ECG08: 452 MS
RBC # BLD AUTO: 3.7 M/UL (ref 4.2–5.3)
RBC #/AREA URNS HPF: NEGATIVE /HPF (ref 0–5)
SODIUM SERPL-SCNC: 138 MMOL/L (ref 136–145)
SP GR UR REFRACTOMETRY: 1.02 (ref 1–1.03)
UROBILINOGEN UR QL STRIP.AUTO: 1 EU/DL (ref 0.2–1)
VENTRICULAR RATE, ECG03: 92 BPM
WBC # BLD AUTO: 5.2 K/UL (ref 4.6–13.2)
WBC URNS QL MICRO: ABNORMAL /HPF (ref 0–4)

## 2022-02-13 PROCEDURE — 99284 EMERGENCY DEPT VISIT MOD MDM: CPT

## 2022-02-13 PROCEDURE — 93005 ELECTROCARDIOGRAM TRACING: CPT

## 2022-02-13 PROCEDURE — 83735 ASSAY OF MAGNESIUM: CPT

## 2022-02-13 PROCEDURE — 85025 COMPLETE CBC W/AUTO DIFF WBC: CPT

## 2022-02-13 PROCEDURE — 80053 COMPREHEN METABOLIC PANEL: CPT

## 2022-02-13 PROCEDURE — 70450 CT HEAD/BRAIN W/O DYE: CPT

## 2022-02-13 PROCEDURE — 74011250637 HC RX REV CODE- 250/637: Performed by: EMERGENCY MEDICINE

## 2022-02-13 PROCEDURE — 81001 URINALYSIS AUTO W/SCOPE: CPT

## 2022-02-13 RX ORDER — NITROFURANTOIN 25; 75 MG/1; MG/1
100 CAPSULE ORAL 2 TIMES DAILY
Qty: 6 CAPSULE | Refills: 0 | Status: SHIPPED | OUTPATIENT
Start: 2022-02-13 | End: 2022-02-16

## 2022-02-13 RX ORDER — METHOCARBAMOL 750 MG/1
750 TABLET, FILM COATED ORAL ONCE
Status: COMPLETED | OUTPATIENT
Start: 2022-02-13 | End: 2022-02-13

## 2022-02-13 RX ORDER — NITROFURANTOIN 25; 75 MG/1; MG/1
100 CAPSULE ORAL ONCE
Status: COMPLETED | OUTPATIENT
Start: 2022-02-13 | End: 2022-02-13

## 2022-02-13 RX ORDER — METHOCARBAMOL 500 MG/1
500 TABLET, FILM COATED ORAL
Qty: 21 TABLET | Refills: 0 | Status: SHIPPED | OUTPATIENT
Start: 2022-02-13 | End: 2022-02-20

## 2022-02-13 RX ADMIN — METHOCARBAMOL 750 MG: 750 TABLET ORAL at 04:48

## 2022-02-13 RX ADMIN — NITROFURANTOIN (MONOHYDRATE/MACROCRYSTALS) 100 MG: 75; 25 CAPSULE ORAL at 06:12
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
## 2022-02-13 NOTE — ED TRIAGE NOTES
Pt states that she started getting a headache at about 8pm on 2-. Pt states that she took a Tylenol arthritis at 9pm and then again at 3 this morning. Pt states that she put the lidocaine rub on her neck and the pain decreased some after the second dose of tylenol.

## 2022-02-13 NOTE — PROGRESS NOTES
Advance Care Planning     Advance Care Planning (ACP) Physician/NP/PA Conversation      Date of Conversation: 2/7/2022  Conducted with: Patient with Decision Making Capacity    Healthcare Decision Maker:     Primary Decision Maker: Kathy Baird - [phone]  Click here to complete [employer] including selection of the Healthcare Decision Maker Relationship (ie \"Primary\")      Today we documented Decision Maker(s) consistent with Legal Next of Kin hierarchy. Care Preferences:    Hospitalization: \"If your health worsens and it becomes clear that your chance of recovery is unlikely, what would be your preference regarding hospitalization? \"  The patient would prefer comfort-focused treatment without hospitalization. Ventilation: \"If you were unable to breathe on your own and your chance of recovery was unlikely, what would be your preference about the use of a ventilator (breathing machine) if it was available to you? \"   The patient would desire the use of a ventilator. Resuscitation: \"In the event your heart stopped as a result of an underlying serious health condition, would you want attempts to be made to restart your heart, or would you prefer a natural death? \"   The patient is unsure.       Conversation Outcomes / Follow-Up Plan:   ACP in process - information provided, considering goals and options      Length of Voluntary ACP Conversation in minutes:  16 minutes    Howard Serrano MD

## 2022-02-18 RX ORDER — NITROGLYCERIN 0.4 MG/1
0.4 TABLET SUBLINGUAL
Qty: 30 TABLET | Refills: 0 | Status: SHIPPED | OUTPATIENT
Start: 2022-02-18

## 2022-03-02 ENCOUNTER — NURSE TRIAGE (OUTPATIENT)
Dept: OTHER | Facility: CLINIC | Age: 87
End: 2022-03-02

## 2022-03-02 NOTE — TELEPHONE ENCOUNTER
Received call from jason at [facility], caller not on line. Complaint: dizziness    Practice Name: [facility]     Caller's telephone number verified as [phone]    Connected with caller via phone, please see below triage     Subjective: Caller states Jae Krause been having problems with my kidneys. Had been in ER and they said call primary. I have been having problems with my kidneys. I have been hurting all night. drank cranberry juice lot of fluids. Sometimes urine clear sometimes brianna. im dizzy too. \"     Pt seen in ER 2-13-22    Current Symptoms: pt reports kidney bothering her. Every time urinates has hat to check if stones. Been doing this for years. Pt states \"feel like chilled I don't have a tempeture\" pt drinking a lot of fluids. Pt reports the flank left side. Pt id report drinking boost yesterday feeling weak. Patient denies urinary pain. Pt reports going to the bathroom more frequently drinking more fluids. Pt uses walker. Pt reports the pain in left flank is also in stomach left side. Onset: off and on months , waxing and waning     Associated Symptoms: NA    Pain Severity: 8/10; stabbing and dull; intermittent lasting a hour or so    Temperature: pt denies fever     What has been tried: APAP for arthritis     LMP: NA Pregnant: NA    Recommended disposition: Go to ED Now    Care advice provided, patient verbalizes understanding; denies any other questions or concerns; instructed to call back for any new or worsening symptoms. Patient/caller agrees to proceed to nearest Emergency Department    Attention Provider: Thank you for allowing me to participate in the care of your patient. The patient was connected to triage in response to information provided to the [facility]. Please do not respond through this encounter as the response is not directed to a shared pool.     Reason for Disposition   Abdominal pain and age > 61 years     Patient reports left sided abdominal pain    Protocols used:  FLANK PAIN-ADULT-[place]

## 2022-03-05 ENCOUNTER — HOSPITAL ENCOUNTER (EMERGENCY)
Age: 87
Discharge: HOME | End: 2022-03-05
Attending: EMERGENCY MEDICINE
Payer: MEDICARE

## 2022-03-05 VITALS
BODY MASS INDEX: 33.9 KG/M2 | DIASTOLIC BLOOD PRESSURE: 93 MMHG | HEART RATE: 115 BPM | HEIGHT: 67 IN | RESPIRATION RATE: 20 BRPM | WEIGHT: 216 LBS | TEMPERATURE: 98.6 F | OXYGEN SATURATION: 100 % | SYSTOLIC BLOOD PRESSURE: 140 MMHG

## 2022-03-05 DIAGNOSIS — R07.9 CHEST PAIN, UNSPECIFIED TYPE: Primary | ICD-10-CM

## 2022-03-05 DIAGNOSIS — E87.6 HYPOKALEMIA: ICD-10-CM

## 2022-03-05 LAB
ANION GAP SERPL CALC-SCNC: 5 MMOL/L (ref 3–18)
ATRIAL RATE: 113 BPM
BASOPHILS # BLD: 0 K/UL (ref 0–0.1)
BASOPHILS NFR BLD: 1 % (ref 0–2)
BUN SERPL-MCNC: 9 MG/DL (ref 7–18)
BUN/CREAT SERPL: 12 (ref 12–20)
CALCIUM SERPL-MCNC: 8.7 MG/DL (ref 8.5–10.1)
CALCULATED P AXIS, ECG09: 77 DEGREES
CALCULATED R AXIS, ECG10: -22 DEGREES
CALCULATED T AXIS, ECG11: 69 DEGREES
CHLORIDE SERPL-SCNC: 105 MMOL/L (ref 100–111)
CO2 SERPL-SCNC: 28 MMOL/L (ref 21–32)
CREAT SERPL-MCNC: 0.76 MG/DL (ref 0.6–1.3)
DIAGNOSIS, 93000: NORMAL
DIFFERENTIAL METHOD BLD: ABNORMAL
EOSINOPHIL # BLD: 0.1 K/UL (ref 0–0.4)
EOSINOPHIL NFR BLD: 3 % (ref 0–5)
ERYTHROCYTE [DISTWIDTH] IN BLOOD BY AUTOMATED COUNT: 12.8 % (ref 11.6–14.5)
GLUCOSE SERPL-MCNC: 193 MG/DL (ref 74–99)
HCT VFR BLD AUTO: 37.1 % (ref 35–45)
HGB BLD-MCNC: 12.4 G/DL (ref 12–16)
IMM GRANULOCYTES # BLD AUTO: 0 K/UL (ref 0–0.04)
IMM GRANULOCYTES NFR BLD AUTO: 0 % (ref 0–0.5)
LYMPHOCYTES # BLD: 2.1 K/UL (ref 0.9–3.6)
LYMPHOCYTES NFR BLD: 37 % (ref 21–52)
MCH RBC QN AUTO: 31.9 PG (ref 24–34)
MCHC RBC AUTO-ENTMCNC: 33.4 G/DL (ref 31–37)
MCV RBC AUTO: 95.4 FL (ref 78–100)
MONOCYTES # BLD: 0.4 K/UL (ref 0.05–1.2)
MONOCYTES NFR BLD: 7 % (ref 3–10)
NEUTS SEG # BLD: 3 K/UL (ref 1.8–8)
NEUTS SEG NFR BLD: 53 % (ref 40–73)
NRBC # BLD: 0 K/UL (ref 0–0.01)
NRBC BLD-RTO: 0 PER 100 WBC
P-R INTERVAL, ECG05: 132 MS
PLATELET # BLD AUTO: 249 K/UL (ref 135–420)
PMV BLD AUTO: 12 FL (ref 9.2–11.8)
POTASSIUM SERPL-SCNC: 3 MMOL/L (ref 3.5–5.5)
Q-T INTERVAL, ECG07: 358 MS
QRS DURATION, ECG06: 84 MS
QTC CALCULATION (BEZET), ECG08: 491 MS
RBC # BLD AUTO: 3.89 M/UL (ref 4.2–5.3)
SODIUM SERPL-SCNC: 138 MMOL/L (ref 136–145)
TROPONIN-HIGH SENSITIVITY: 45 NG/L (ref 0–54)
TROPONIN-HIGH SENSITIVITY: 46 NG/L (ref 0–54)
VENTRICULAR RATE, ECG03: 113 BPM
WBC # BLD AUTO: 5.6 K/UL (ref 4.6–13.2)

## 2022-03-05 PROCEDURE — 80048 BASIC METABOLIC PNL TOTAL CA: CPT

## 2022-03-05 PROCEDURE — 84484 ASSAY OF TROPONIN QUANT: CPT

## 2022-03-05 PROCEDURE — 74011250637 HC RX REV CODE- 250/637: Performed by: EMERGENCY MEDICINE

## 2022-03-05 PROCEDURE — 93005 ELECTROCARDIOGRAM TRACING: CPT

## 2022-03-05 PROCEDURE — 99284 EMERGENCY DEPT VISIT MOD MDM: CPT

## 2022-03-05 PROCEDURE — 85025 COMPLETE CBC W/AUTO DIFF WBC: CPT

## 2022-03-05 RX ORDER — CYCLOBENZAPRINE HCL 5 MG
10 TABLET ORAL 3 TIMES DAILY
Qty: 9 TABLET | Refills: 0 | Status: SHIPPED | OUTPATIENT
Start: 2022-03-05 | End: 2022-09-08

## 2022-03-05 RX ORDER — METHOCARBAMOL 500 MG/1
TABLET, FILM COATED ORAL 4 TIMES DAILY
COMMUNITY
End: 2022-08-23

## 2022-03-05 RX ADMIN — POTASSIUM BICARBONATE 40 MEQ: 782 TABLET, EFFERVESCENT ORAL at 13:35

## 2022-03-05 NOTE — ED TRIAGE NOTES
C/o left side chest pain that began this morning. C/o spasm type pain to chest.  She states taking nitro sl for pain without relief. Denies taking prescribed robaxin for spasm like pain. Patient noted to c/o spasms when changing positions and moving from chair to bed.

## 2022-03-05 NOTE — ED NOTES
Assisted pt to bedside toilet and given call bell to call for assistance when finished, verbalized understanding.
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
## 2022-03-05 NOTE — ED NOTES
Discharge instructions reviewed with patient. Patient verbalized understanding. Patient advised to follow up as directed on discharge instructions. Patient denies questions, needs or concerns at this time. No s/sx of distress noted.  Pt taken via WC to lobby to wait for ride

## 2022-03-09 ENCOUNTER — OFFICE VISIT (OUTPATIENT)
Dept: FAMILY MEDICINE CLINIC | Age: 87
End: 2022-03-09
Payer: MEDICARE

## 2022-03-09 VITALS
SYSTOLIC BLOOD PRESSURE: 152 MMHG | RESPIRATION RATE: 16 BRPM | OXYGEN SATURATION: 95 % | HEART RATE: 95 BPM | DIASTOLIC BLOOD PRESSURE: 89 MMHG | TEMPERATURE: 97.8 F

## 2022-03-09 DIAGNOSIS — E87.6 HYPOKALEMIA: Primary | ICD-10-CM

## 2022-03-09 DIAGNOSIS — M62.838 MUSCLE SPASM: ICD-10-CM

## 2022-03-09 DIAGNOSIS — R30.0 DYSURIA: ICD-10-CM

## 2022-03-09 PROCEDURE — G8432 DEP SCR NOT DOC, RNG: HCPCS | Performed by: FAMILY MEDICINE

## 2022-03-09 PROCEDURE — 1090F PRES/ABSN URINE INCON ASSESS: CPT | Performed by: FAMILY MEDICINE

## 2022-03-09 PROCEDURE — G8427 DOCREV CUR MEDS BY ELIG CLIN: HCPCS | Performed by: FAMILY MEDICINE

## 2022-03-09 PROCEDURE — G8417 CALC BMI ABV UP PARAM F/U: HCPCS | Performed by: FAMILY MEDICINE

## 2022-03-09 PROCEDURE — G8399 PT W/DXA RESULTS DOCUMENT: HCPCS | Performed by: FAMILY MEDICINE

## 2022-03-09 PROCEDURE — G8536 NO DOC ELDER MAL SCRN: HCPCS | Performed by: FAMILY MEDICINE

## 2022-03-09 PROCEDURE — 99214 OFFICE O/P EST MOD 30 MIN: CPT | Performed by: FAMILY MEDICINE

## 2022-03-09 PROCEDURE — 1101F PT FALLS ASSESS-DOCD LE1/YR: CPT | Performed by: FAMILY MEDICINE

## 2022-03-09 NOTE — PROGRESS NOTES
Alycia Lencho presents today for   Chief Complaint   Patient presents with   [facility] ED Follow-up      patient went to ED for chest pain not resolved with nitro        Is someone accompanying this pt? No     Is the patient using any DME equipment during OV? No     Depression Screening:  3 most recent PHQ Screens 2/7/2022   PHQ Not Done -   Little interest or pleasure in doing things Not at all   Feeling down, depressed, irritable, or hopeless Not at all   Total Score PHQ 2 0       Learning Assessment:  Learning Assessment 3/1/2021   PRIMARY LEARNER Patient   HIGHEST LEVEL OF EDUCATION - PRIMARY LEARNER  SOME COLLEGE   BARRIERS PRIMARY LEARNER NONE   CO-LEARNER CAREGIVER No   CO-LEARNER NAME -   PRIMARY LANGUAGE ENGLISH    NEED -   LEARNER PREFERENCE PRIMARY READING     -     -     -     -   ANSWERED BY patient    RELATIONSHIP SELF       Abuse Screening:  Abuse Screening Questionnaire 2/7/2022   Do you ever feel afraid of your partner? N   Are you in a relationship with someone who physically or mentally threatens you? N   Is it safe for you to go home? Y       Fall Screening  Fall Risk Assessment, last 12 mths 2/7/2022   Able to walk? No   Fall in past 12 months? -   Do you feel unsteady? -   Are you worried about falling -   Is TUG test greater than 12 seconds? -   Is the gait abnormal? -   Number of falls in past 12 months -   Fall with injury? -       Generalized Anxiety  No flowsheet data found. Health Maintenance Due   Topic Date Due    Shingrix Vaccine Age 49> (1 of 2) Never done    Foot Exam Q1  10/24/2018    Pneumococcal 65+ years (2 of 2 - PPSV23) 02/19/2020    MICROALBUMIN Q1  02/25/2020    COVID-19 Vaccine (3 - Booster for Pfizer series) 10/03/2021    Eye Exam Retinal or Dilated  11/12/2021    A1C test (Diabetic or Prediabetic)  12/17/2021   . Health Maintenance reviewed and discussed and ordered per Provider.   Vaccines Due   Screenings Due       Alycia Musa is updated on all     1. \"Have you been to the ER, urgent care clinic since your last visit? Hospitalized since your last visit? \" Yes When: 3/5  Where: [facility]  Reason for visit: chest pain     2. \"Have you seen or consulted any other health care providers outside of the [address] [place] [address] since your last visit? \" No     3. For patients aged 39-70: Has the patient had a colonoscopy / FIT/ Cologuard? NA - based on age     If the patient is female:    4. For patients aged 41-77: Has the patient had a mammogram within the past 2 years? NA - based on age    11. For patients aged 21-65: Has the patient had a pap smear?  NA - based on age

## 2022-03-09 NOTE — PROGRESS NOTES
Chief Complaint   Patient presents with   [facility] Follow-up      patient went to ED for chest pain not resolved with nitro      Subjective  Van Rodgers is a 82 y.o. female. HPI   Pt here for f/u of 3/5/22 ER visit. She had chest pain that did not resolve with ntg. Cardiac eval was neg. Pt had spasm-like pains while there. Her K was noted to be low. KCl 40mEq was given in the ER. Review of Systems   Constitutional: Negative. HENT: Negative. Respiratory: Negative. Cardiovascular: Positive for chest pain. Muscle spasms   All other systems reviewed and are negative. Objective  Physical Exam  Vitals and nursing note reviewed. Constitutional:       Appearance: Normal appearance. She is not ill-appearing. HENT:      Head: Normocephalic and atraumatic. Right Ear: External ear normal.      Left Ear: External ear normal.      Nose: Nose normal.      Mouth/Throat:      Mouth: Mucous membranes are moist.   Eyes:      Extraocular Movements: Extraocular movements intact. Conjunctiva/sclera: Conjunctivae normal.   Cardiovascular:      Rate and Rhythm: Normal rate and regular rhythm. Heart sounds: No murmur heard. No friction rub. No gallop. Pulmonary:      Effort: Pulmonary effort is normal.      Breath sounds: Normal breath sounds. No wheezing, rhonchi or rales. Musculoskeletal:         General: Normal range of motion. Cervical back: Normal range of motion. Skin:     General: Skin is warm and dry. Neurological:      Mental Status: She is alert and oriented to person, place, and time. Coordination: Coordination normal.   Psychiatric:         Mood and Affect: Mood normal.         Behavior: Behavior normal.         Thought Content: Thought content normal.         Judgment: Judgment normal.          Assessment & Plan  Diagnoses and all orders for this visit:    1. Hypokalemia  -     METABOLIC PANEL, BASIC; Future    2. Muscle spasm  Stable, cont pres tx plan. 3. Dysuria  -     URINALYSIS W/ RFLX MICROSCOPIC; Future      Follow-up and Dispositions    · Return in about 29 days (around 4/7/2022) for routine care.        Camryn Serrano MD

## 2022-03-19 PROBLEM — I82.432 DEEP VEIN THROMBOSIS (DVT) OF POPLITEAL VEIN OF LEFT LOWER EXTREMITY (HCC): Status: ACTIVE | Noted: 2018-09-08

## 2022-03-19 PROBLEM — I50.9 CHF (CONGESTIVE HEART FAILURE) (HCC): Status: ACTIVE | Noted: 2021-01-08

## 2022-03-19 PROBLEM — E11.21 TYPE 2 DIABETES WITH NEPHROPATHY (HCC): Status: ACTIVE | Noted: 2020-01-13

## 2022-03-19 PROBLEM — R53.1 GENERALIZED WEAKNESS: Status: ACTIVE | Noted: 2021-01-18

## 2022-03-23 ENCOUNTER — HOSPITAL ENCOUNTER (OUTPATIENT)
Dept: LAB | Age: 87
Discharge: HOME | End: 2022-03-23

## 2022-03-23 LAB — XX-LABCORP SPECIMEN COL,LCBCF: NORMAL

## 2022-03-23 PROCEDURE — 99001 SPECIMEN HANDLING PT-LAB: CPT

## 2022-03-24 LAB
BUN SERPL-MCNC: 7 MG/DL (ref 8–27)
BUN/CREAT SERPL: 11 (ref 12–28)
CALCIUM SERPL-MCNC: 9.4 MG/DL (ref 8.7–10.3)
CHLORIDE SERPL-SCNC: 101 MMOL/L (ref 96–106)
CO2 SERPL-SCNC: 22 MMOL/L (ref 20–29)
CREAT SERPL-MCNC: 0.65 MG/DL (ref 0.57–1)
EGFR: 86 ML/MIN/1.73
GLUCOSE SERPL-MCNC: 243 MG/DL (ref 65–99)
POTASSIUM SERPL-SCNC: 3.4 MMOL/L (ref 3.5–5.2)
SODIUM SERPL-SCNC: 141 MMOL/L (ref 134–144)

## 2022-04-06 ENCOUNTER — APPOINTMENT (OUTPATIENT)
Dept: GENERAL RADIOLOGY | Age: 87
End: 2022-04-06
Attending: STUDENT IN AN ORGANIZED HEALTH CARE EDUCATION/TRAINING PROGRAM
Payer: MEDICARE

## 2022-04-06 ENCOUNTER — HOSPITAL ENCOUNTER (EMERGENCY)
Age: 87
Discharge: HOME | End: 2022-04-07
Attending: EMERGENCY MEDICINE
Payer: MEDICARE

## 2022-04-06 ENCOUNTER — APPOINTMENT (OUTPATIENT)
Dept: CT IMAGING | Age: 87
End: 2022-04-06
Attending: EMERGENCY MEDICINE
Payer: MEDICARE

## 2022-04-06 DIAGNOSIS — R51.9 ACUTE NONINTRACTABLE HEADACHE, UNSPECIFIED HEADACHE TYPE: ICD-10-CM

## 2022-04-06 DIAGNOSIS — N30.00 ACUTE CYSTITIS WITHOUT HEMATURIA: Primary | ICD-10-CM

## 2022-04-06 LAB
ALBUMIN SERPL-MCNC: 3.1 G/DL (ref 3.4–5)
ALBUMIN/GLOB SERPL: 0.6 {RATIO} (ref 0.8–1.7)
ALP SERPL-CCNC: 90 U/L (ref 45–117)
ALT SERPL-CCNC: 15 U/L (ref 13–56)
ANION GAP SERPL CALC-SCNC: 4 MMOL/L (ref 3–18)
AST SERPL-CCNC: 12 U/L (ref 10–38)
BASOPHILS # BLD: 0 K/UL (ref 0–0.1)
BASOPHILS NFR BLD: 1 % (ref 0–2)
BILIRUB SERPL-MCNC: 0.5 MG/DL (ref 0.2–1)
BUN SERPL-MCNC: 7 MG/DL (ref 7–18)
BUN/CREAT SERPL: 10 (ref 12–20)
CALCIUM SERPL-MCNC: 9.2 MG/DL (ref 8.5–10.1)
CHLORIDE SERPL-SCNC: 104 MMOL/L (ref 100–111)
CO2 SERPL-SCNC: 31 MMOL/L (ref 21–32)
CREAT SERPL-MCNC: 0.73 MG/DL (ref 0.6–1.3)
D DIMER PPP FEU-MCNC: 1.47 UG/ML(FEU)
DIFFERENTIAL METHOD BLD: ABNORMAL
EOSINOPHIL # BLD: 0.2 K/UL (ref 0–0.4)
EOSINOPHIL NFR BLD: 4 % (ref 0–5)
ERYTHROCYTE [DISTWIDTH] IN BLOOD BY AUTOMATED COUNT: 11.9 % (ref 11.6–14.5)
GLOBULIN SER CALC-MCNC: 4.9 G/DL (ref 2–4)
GLUCOSE SERPL-MCNC: 161 MG/DL (ref 74–99)
HCT VFR BLD AUTO: 37.6 % (ref 35–45)
HGB BLD-MCNC: 11.9 G/DL (ref 12–16)
IMM GRANULOCYTES # BLD AUTO: 0 K/UL (ref 0–0.04)
IMM GRANULOCYTES NFR BLD AUTO: 0 % (ref 0–0.5)
INR PPP: 1 (ref 0.8–1.2)
LIPASE SERPL-CCNC: 48 U/L (ref 73–393)
LYMPHOCYTES # BLD: 1.8 K/UL (ref 0.9–3.6)
LYMPHOCYTES NFR BLD: 32 % (ref 21–52)
MAGNESIUM SERPL-MCNC: 1.9 MG/DL (ref 1.6–2.6)
MCH RBC QN AUTO: 31.1 PG (ref 24–34)
MCHC RBC AUTO-ENTMCNC: 31.6 G/DL (ref 31–37)
MCV RBC AUTO: 98.2 FL (ref 78–100)
MONOCYTES # BLD: 0.5 K/UL (ref 0.05–1.2)
MONOCYTES NFR BLD: 8 % (ref 3–10)
NEUTS SEG # BLD: 3.1 K/UL (ref 1.8–8)
NEUTS SEG NFR BLD: 55 % (ref 40–73)
NRBC # BLD: 0 K/UL (ref 0–0.01)
NRBC BLD-RTO: 0 PER 100 WBC
PLATELET # BLD AUTO: 221 K/UL (ref 135–420)
PMV BLD AUTO: 10.1 FL (ref 9.2–11.8)
POTASSIUM SERPL-SCNC: 3.2 MMOL/L (ref 3.5–5.5)
PROT SERPL-MCNC: 8 G/DL (ref 6.4–8.2)
PROTHROMBIN TIME: 12.7 SEC (ref 11.5–15.2)
RBC # BLD AUTO: 3.83 M/UL (ref 4.2–5.3)
SODIUM SERPL-SCNC: 139 MMOL/L (ref 136–145)
TROPONIN-HIGH SENSITIVITY: 51 NG/L (ref 0–54)
WBC # BLD AUTO: 5.7 K/UL (ref 4.6–13.2)

## 2022-04-06 PROCEDURE — 85379 FIBRIN DEGRADATION QUANT: CPT

## 2022-04-06 PROCEDURE — 85025 COMPLETE CBC W/AUTO DIFF WBC: CPT

## 2022-04-06 PROCEDURE — 74011250636 HC RX REV CODE- 250/636: Performed by: STUDENT IN AN ORGANIZED HEALTH CARE EDUCATION/TRAINING PROGRAM

## 2022-04-06 PROCEDURE — 71046 X-RAY EXAM CHEST 2 VIEWS: CPT

## 2022-04-06 PROCEDURE — 85610 PROTHROMBIN TIME: CPT

## 2022-04-06 PROCEDURE — 80053 COMPREHEN METABOLIC PANEL: CPT

## 2022-04-06 PROCEDURE — 84484 ASSAY OF TROPONIN QUANT: CPT

## 2022-04-06 PROCEDURE — 99285 EMERGENCY DEPT VISIT HI MDM: CPT

## 2022-04-06 PROCEDURE — 83735 ASSAY OF MAGNESIUM: CPT

## 2022-04-06 PROCEDURE — 96374 THER/PROPH/DIAG INJ IV PUSH: CPT

## 2022-04-06 PROCEDURE — 74011250637 HC RX REV CODE- 250/637: Performed by: STUDENT IN AN ORGANIZED HEALTH CARE EDUCATION/TRAINING PROGRAM

## 2022-04-06 PROCEDURE — 83690 ASSAY OF LIPASE: CPT

## 2022-04-06 PROCEDURE — 70450 CT HEAD/BRAIN W/O DYE: CPT

## 2022-04-06 PROCEDURE — 93005 ELECTROCARDIOGRAM TRACING: CPT

## 2022-04-06 RX ORDER — KETOROLAC TROMETHAMINE 15 MG/ML
15 INJECTION, SOLUTION INTRAMUSCULAR; INTRAVENOUS ONCE
Status: COMPLETED | OUTPATIENT
Start: 2022-04-06 | End: 2022-04-06

## 2022-04-06 RX ORDER — POTASSIUM CHLORIDE 20 MEQ/1
40 TABLET, EXTENDED RELEASE ORAL
Status: COMPLETED | OUTPATIENT
Start: 2022-04-06 | End: 2022-04-06

## 2022-04-06 RX ADMIN — KETOROLAC TROMETHAMINE 15 MG: 15 INJECTION, SOLUTION INTRAMUSCULAR; INTRAVENOUS at 22:31

## 2022-04-06 RX ADMIN — POTASSIUM CHLORIDE 40 MEQ: 20 TABLET, EXTENDED RELEASE ORAL at 22:31

## 2022-04-06 RX ADMIN — SODIUM CHLORIDE 1000 ML: 9 INJECTION, SOLUTION INTRAVENOUS at 22:31

## 2022-04-07 ENCOUNTER — APPOINTMENT (OUTPATIENT)
Dept: CT IMAGING | Age: 87
End: 2022-04-07
Attending: STUDENT IN AN ORGANIZED HEALTH CARE EDUCATION/TRAINING PROGRAM
Payer: MEDICARE

## 2022-04-07 VITALS
SYSTOLIC BLOOD PRESSURE: 147 MMHG | BODY MASS INDEX: 35.63 KG/M2 | HEIGHT: 67 IN | RESPIRATION RATE: 23 BRPM | HEART RATE: 105 BPM | DIASTOLIC BLOOD PRESSURE: 79 MMHG | TEMPERATURE: 98.9 F | WEIGHT: 227 LBS | OXYGEN SATURATION: 92 %

## 2022-04-07 LAB
APPEARANCE UR: ABNORMAL
ATRIAL RATE: 103 BPM
BACTERIA URNS QL MICRO: ABNORMAL /HPF
BILIRUB UR QL: NEGATIVE
CALCULATED P AXIS, ECG09: 82 DEGREES
CALCULATED R AXIS, ECG10: -14 DEGREES
CALCULATED T AXIS, ECG11: 20 DEGREES
COLOR UR: YELLOW
DIAGNOSIS, 93000: NORMAL
EPITH CASTS URNS QL MICRO: ABNORMAL /LPF (ref 0–5)
GLUCOSE UR STRIP.AUTO-MCNC: 100 MG/DL
HGB UR QL STRIP: ABNORMAL
KETONES UR QL STRIP.AUTO: NEGATIVE MG/DL
LEUKOCYTE ESTERASE UR QL STRIP.AUTO: ABNORMAL
NITRITE UR QL STRIP.AUTO: NEGATIVE
P-R INTERVAL, ECG05: 176 MS
PH UR STRIP: 7 [PH] (ref 5–8)
PROT UR STRIP-MCNC: 30 MG/DL
Q-T INTERVAL, ECG07: 374 MS
QRS DURATION, ECG06: 82 MS
QTC CALCULATION (BEZET), ECG08: 489 MS
RBC #/AREA URNS HPF: ABNORMAL /HPF (ref 0–5)
SP GR UR REFRACTOMETRY: 1.02 (ref 1–1.03)
UROBILINOGEN UR QL STRIP.AUTO: 1 EU/DL (ref 0.2–1)
VENTRICULAR RATE, ECG03: 103 BPM
WBC URNS QL MICRO: ABNORMAL /HPF (ref 0–4)

## 2022-04-07 PROCEDURE — 74011000636 HC RX REV CODE- 636: Performed by: STUDENT IN AN ORGANIZED HEALTH CARE EDUCATION/TRAINING PROGRAM

## 2022-04-07 PROCEDURE — 81001 URINALYSIS AUTO W/SCOPE: CPT

## 2022-04-07 PROCEDURE — 96376 TX/PRO/DX INJ SAME DRUG ADON: CPT

## 2022-04-07 PROCEDURE — 71275 CT ANGIOGRAPHY CHEST: CPT

## 2022-04-07 PROCEDURE — 74011000250 HC RX REV CODE- 250: Performed by: STUDENT IN AN ORGANIZED HEALTH CARE EDUCATION/TRAINING PROGRAM

## 2022-04-07 PROCEDURE — 87077 CULTURE AEROBIC IDENTIFY: CPT

## 2022-04-07 PROCEDURE — 96375 TX/PRO/DX INJ NEW DRUG ADDON: CPT

## 2022-04-07 PROCEDURE — 74011250636 HC RX REV CODE- 250/636: Performed by: STUDENT IN AN ORGANIZED HEALTH CARE EDUCATION/TRAINING PROGRAM

## 2022-04-07 PROCEDURE — 87086 URINE CULTURE/COLONY COUNT: CPT

## 2022-04-07 RX ORDER — SULFAMETHOXAZOLE AND TRIMETHOPRIM 800; 160 MG/1; MG/1
1 TABLET ORAL 2 TIMES DAILY
Qty: 10 TABLET | Refills: 0 | Status: SHIPPED | OUTPATIENT
Start: 2022-04-07 | End: 2022-04-12

## 2022-04-07 RX ORDER — DIPHENHYDRAMINE HYDROCHLORIDE 50 MG/ML
50 INJECTION, SOLUTION INTRAMUSCULAR; INTRAVENOUS ONCE
Status: COMPLETED | OUTPATIENT
Start: 2022-04-07 | End: 2022-04-07

## 2022-04-07 RX ADMIN — CEFTRIAXONE 1 G: 1 INJECTION, POWDER, FOR SOLUTION INTRAMUSCULAR; INTRAVENOUS at 01:07

## 2022-04-07 RX ADMIN — DIPHENHYDRAMINE HYDROCHLORIDE 50 MG: 50 INJECTION INTRAMUSCULAR; INTRAVENOUS at 04:53

## 2022-04-07 RX ADMIN — METHYLPREDNISOLONE SODIUM SUCCINATE 40 MG: 40 INJECTION, POWDER, FOR SOLUTION INTRAMUSCULAR; INTRAVENOUS at 04:53

## 2022-04-07 RX ADMIN — METHYLPREDNISOLONE SODIUM SUCCINATE 40 MG: 40 INJECTION, POWDER, FOR SOLUTION INTRAMUSCULAR; INTRAVENOUS at 01:04

## 2022-04-07 RX ADMIN — IOPAMIDOL 80 ML: 755 INJECTION, SOLUTION INTRAVENOUS at 04:59
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
## 2022-04-07 NOTE — ED TRIAGE NOTES
A&O female with c/o headache that began this AM. Minor relief from tylenol this AM but HA returned. Elevated BP since this afternoon.

## 2022-04-07 NOTE — ED NOTES
06:00  Assumed care by Dr. Keegan Vela pending CTA results. Patient appears comfortable on reassessment with no current complaints    CTA chest   No filling defects are appreciated within the main, left, right, lobar or   visualized segmental pulmonary arteries to suggest embolism. The thoracic aorta   is not aneurysmal.  No evidence for dissection. Arterial wall calcifications   noted     No pericardial effusion.  No pleural effusion. No enlarged axillary, hilar, or   mediastinal lymphadenopathy. No acute bone finding. Expiration phase hypoinflated lung imaging with minimal atelectasis. Partially included upper abdomen is unremarkable. Patient discharged with prescription for antibiotics for UTI.   Return precautions given if abdominal pain fever or any other concerns

## 2022-04-09 LAB
BACTERIA SPEC CULT: ABNORMAL
BACTERIA SPEC CULT: ABNORMAL
CC UR VC: ABNORMAL
SERVICE CMNT-IMP: ABNORMAL

## 2022-04-11 RX ORDER — AMPICILLIN 500 MG/1
500 CAPSULE ORAL
Qty: 28 CAPSULE | Refills: 0 | Status: SHIPPED | OUTPATIENT
Start: 2022-04-11 | End: 2022-04-18

## 2022-04-25 ENCOUNTER — TELEPHONE (OUTPATIENT)
Dept: FAMILY MEDICINE CLINIC | Age: 87
End: 2022-04-25

## 2022-04-25 NOTE — TELEPHONE ENCOUNTER
----- Message from Franklin Pulido sent at 4/14/2022 11:22 AM EDT -----  Subject: Message to Provider    QUESTIONS  Information for Provider? Patient is calling to see if there is something   that the PCP can give her for her nervousness. she said she talked about   that this with her PCP about this before. She said she is on some   medication from being in the Hospital.   ---------------------------------------------------------------------------  --------------  [address]  What is the best way for the office to contact you? OK to leave message on   voicemail  Preferred Call Back Phone Number? [phone]  ---------------------------------------------------------------------------  --------------  SCRIPT ANSWERS  Relationship to Patient?  Self

## 2022-04-27 ENCOUNTER — TELEPHONE (OUTPATIENT)
Dept: FAMILY MEDICINE CLINIC | Age: 87
End: 2022-04-27

## 2022-04-29 ENCOUNTER — TELEPHONE (OUTPATIENT)
Dept: FAMILY MEDICINE CLINIC | Age: 87
End: 2022-04-29

## 2022-05-19 ENCOUNTER — TELEPHONE (OUTPATIENT)
Dept: FAMILY MEDICINE CLINIC | Age: 87
End: 2022-05-19

## 2022-05-19 DIAGNOSIS — N39.0 URINARY TRACT INFECTION WITHOUT HEMATURIA, SITE UNSPECIFIED: Primary | ICD-10-CM

## 2022-05-19 NOTE — TELEPHONE ENCOUNTER
Patient called advising she was told to call when she finished the antibiotics she was prescribed. Advising urine is still going back and forth between clear and cloudy. Having bladder spasms, leg, and back pain. Requesting a call back to see what she should do next.

## 2022-05-23 NOTE — TELEPHONE ENCOUNTER
Spoke with patient advised her of referral and repeat labs patient states she will go to [facility] to get them done .

## 2022-05-24 ENCOUNTER — APPOINTMENT (OUTPATIENT)
Dept: GENERAL RADIOLOGY | Age: 87
End: 2022-05-24
Attending: EMERGENCY MEDICINE
Payer: MEDICARE

## 2022-05-24 ENCOUNTER — HOSPITAL ENCOUNTER (EMERGENCY)
Age: 87
Discharge: HOME | End: 2022-05-24
Attending: EMERGENCY MEDICINE
Payer: MEDICARE

## 2022-05-24 VITALS
DIASTOLIC BLOOD PRESSURE: 76 MMHG | HEART RATE: 88 BPM | TEMPERATURE: 98.3 F | RESPIRATION RATE: 16 BRPM | SYSTOLIC BLOOD PRESSURE: 145 MMHG | BODY MASS INDEX: 35.2 KG/M2 | HEIGHT: 66 IN | OXYGEN SATURATION: 94 % | WEIGHT: 219 LBS

## 2022-05-24 DIAGNOSIS — N39.0 ACUTE UTI: Primary | ICD-10-CM

## 2022-05-24 DIAGNOSIS — R11.0 NAUSEA: ICD-10-CM

## 2022-05-24 DIAGNOSIS — R03.0 ELEVATED BLOOD PRESSURE READING: ICD-10-CM

## 2022-05-24 DIAGNOSIS — I48.20 CHRONIC ATRIAL FIBRILLATION (HCC): ICD-10-CM

## 2022-05-24 LAB
ALBUMIN SERPL-MCNC: 2.9 G/DL (ref 3.4–5)
ALBUMIN/GLOB SERPL: 0.6 {RATIO} (ref 0.8–1.7)
ALP SERPL-CCNC: 94 U/L (ref 45–117)
ALT SERPL-CCNC: 15 U/L (ref 13–56)
ANION GAP SERPL CALC-SCNC: 7 MMOL/L (ref 3–18)
APPEARANCE UR: ABNORMAL
AST SERPL-CCNC: 39 U/L (ref 10–38)
BACTERIA URNS QL MICRO: ABNORMAL /HPF
BASOPHILS # BLD: 0 K/UL (ref 0–0.1)
BASOPHILS NFR BLD: 1 % (ref 0–2)
BILIRUB SERPL-MCNC: 0.9 MG/DL (ref 0.2–1)
BILIRUB UR QL: NEGATIVE
BNP SERPL-MCNC: 86 PG/ML (ref 0–1800)
BUN SERPL-MCNC: 9 MG/DL (ref 7–18)
BUN/CREAT SERPL: 12 (ref 12–20)
CALCIUM SERPL-MCNC: 9.1 MG/DL (ref 8.5–10.1)
CHLORIDE SERPL-SCNC: 107 MMOL/L (ref 100–111)
CO2 SERPL-SCNC: 26 MMOL/L (ref 21–32)
COLOR UR: YELLOW
CREAT SERPL-MCNC: 0.78 MG/DL (ref 0.6–1.3)
DIFFERENTIAL METHOD BLD: ABNORMAL
EOSINOPHIL # BLD: 0.2 K/UL (ref 0–0.4)
EOSINOPHIL NFR BLD: 3 % (ref 0–5)
EPITH CASTS URNS QL MICRO: ABNORMAL /LPF (ref 0–5)
ERYTHROCYTE [DISTWIDTH] IN BLOOD BY AUTOMATED COUNT: 11.9 % (ref 11.6–14.5)
GLOBULIN SER CALC-MCNC: 4.7 G/DL (ref 2–4)
GLUCOSE BLD STRIP.AUTO-MCNC: 191 MG/DL (ref 70–110)
GLUCOSE SERPL-MCNC: 200 MG/DL (ref 74–99)
GLUCOSE UR STRIP.AUTO-MCNC: NEGATIVE MG/DL
HCT VFR BLD AUTO: 37.4 % (ref 35–45)
HGB BLD-MCNC: 12.1 G/DL (ref 12–16)
HGB UR QL STRIP: ABNORMAL
IMM GRANULOCYTES # BLD AUTO: 0 K/UL (ref 0–0.04)
IMM GRANULOCYTES NFR BLD AUTO: 0 % (ref 0–0.5)
KETONES UR QL STRIP.AUTO: NEGATIVE MG/DL
LEUKOCYTE ESTERASE UR QL STRIP.AUTO: ABNORMAL
LYMPHOCYTES # BLD: 1.7 K/UL (ref 0.9–3.6)
LYMPHOCYTES NFR BLD: 31 % (ref 21–52)
MAGNESIUM SERPL-MCNC: 1.9 MG/DL (ref 1.6–2.6)
MCH RBC QN AUTO: 31.5 PG (ref 24–34)
MCHC RBC AUTO-ENTMCNC: 32.4 G/DL (ref 31–37)
MCV RBC AUTO: 97.4 FL (ref 78–100)
MONOCYTES # BLD: 0.5 K/UL (ref 0.05–1.2)
MONOCYTES NFR BLD: 8 % (ref 3–10)
NEUTS SEG # BLD: 3.2 K/UL (ref 1.8–8)
NEUTS SEG NFR BLD: 57 % (ref 40–73)
NITRITE UR QL STRIP.AUTO: NEGATIVE
NRBC # BLD: 0 K/UL (ref 0–0.01)
NRBC BLD-RTO: 0 PER 100 WBC
PH UR STRIP: 5.5 [PH] (ref 5–8)
PLATELET # BLD AUTO: 205 K/UL (ref 135–420)
PMV BLD AUTO: 10.9 FL (ref 9.2–11.8)
POTASSIUM SERPL-SCNC: 4.7 MMOL/L (ref 3.5–5.5)
PROT SERPL-MCNC: 7.6 G/DL (ref 6.4–8.2)
PROT UR STRIP-MCNC: 100 MG/DL
RBC # BLD AUTO: 3.84 M/UL (ref 4.2–5.3)
RBC #/AREA URNS HPF: ABNORMAL /HPF (ref 0–5)
SODIUM SERPL-SCNC: 140 MMOL/L (ref 136–145)
SP GR UR REFRACTOMETRY: 1.02 (ref 1–1.03)
TROPONIN-HIGH SENSITIVITY: 42 NG/L (ref 0–54)
TROPONIN-HIGH SENSITIVITY: 45 NG/L (ref 0–54)
TSH SERPL DL<=0.05 MIU/L-ACNC: 1.83 UIU/ML (ref 0.36–3.74)
UROBILINOGEN UR QL STRIP.AUTO: 1 EU/DL (ref 0.2–1)
WBC # BLD AUTO: 5.6 K/UL (ref 4.6–13.2)
WBC URNS QL MICRO: ABNORMAL /HPF (ref 0–4)

## 2022-05-24 PROCEDURE — 83735 ASSAY OF MAGNESIUM: CPT

## 2022-05-24 PROCEDURE — 84484 ASSAY OF TROPONIN QUANT: CPT

## 2022-05-24 PROCEDURE — 83880 ASSAY OF NATRIURETIC PEPTIDE: CPT

## 2022-05-24 PROCEDURE — 74011250636 HC RX REV CODE- 250/636: Performed by: EMERGENCY MEDICINE

## 2022-05-24 PROCEDURE — 87086 URINE CULTURE/COLONY COUNT: CPT

## 2022-05-24 PROCEDURE — 99285 EMERGENCY DEPT VISIT HI MDM: CPT

## 2022-05-24 PROCEDURE — 81001 URINALYSIS AUTO W/SCOPE: CPT

## 2022-05-24 PROCEDURE — 82962 GLUCOSE BLOOD TEST: CPT

## 2022-05-24 PROCEDURE — 93005 ELECTROCARDIOGRAM TRACING: CPT

## 2022-05-24 PROCEDURE — 71045 X-RAY EXAM CHEST 1 VIEW: CPT

## 2022-05-24 PROCEDURE — 84443 ASSAY THYROID STIM HORMONE: CPT

## 2022-05-24 PROCEDURE — 74011000250 HC RX REV CODE- 250: Performed by: EMERGENCY MEDICINE

## 2022-05-24 PROCEDURE — 80053 COMPREHEN METABOLIC PANEL: CPT

## 2022-05-24 PROCEDURE — 87077 CULTURE AEROBIC IDENTIFY: CPT

## 2022-05-24 PROCEDURE — 74011250637 HC RX REV CODE- 250/637: Performed by: EMERGENCY MEDICINE

## 2022-05-24 PROCEDURE — 85025 COMPLETE CBC W/AUTO DIFF WBC: CPT

## 2022-05-24 PROCEDURE — 96375 TX/PRO/DX INJ NEW DRUG ADDON: CPT

## 2022-05-24 PROCEDURE — 87186 SC STD MICRODIL/AGAR DIL: CPT

## 2022-05-24 PROCEDURE — 96374 THER/PROPH/DIAG INJ IV PUSH: CPT

## 2022-05-24 RX ORDER — METOPROLOL TARTRATE 25 MG/1
25 TABLET, FILM COATED ORAL ONCE
Status: COMPLETED | OUTPATIENT
Start: 2022-05-24 | End: 2022-05-24

## 2022-05-24 RX ORDER — AMLODIPINE BESYLATE 5 MG/1
5 TABLET ORAL
Status: COMPLETED | OUTPATIENT
Start: 2022-05-24 | End: 2022-05-24

## 2022-05-24 RX ORDER — ONDANSETRON 8 MG/1
8 TABLET, ORALLY DISINTEGRATING ORAL
Qty: 12 TABLET | Refills: 0 | Status: SHIPPED | OUTPATIENT
Start: 2022-05-24 | End: 2022-08-11 | Stop reason: SDUPTHER

## 2022-05-24 RX ORDER — CEFDINIR 300 MG/1
300 CAPSULE ORAL 2 TIMES DAILY
Qty: 14 CAPSULE | Refills: 0 | OUTPATIENT
Start: 2022-05-24 | End: 2022-06-07

## 2022-05-24 RX ORDER — ONDANSETRON 2 MG/ML
4 INJECTION INTRAMUSCULAR; INTRAVENOUS
Status: COMPLETED | OUTPATIENT
Start: 2022-05-24 | End: 2022-05-24

## 2022-05-24 RX ORDER — AMLODIPINE BESYLATE 5 MG/1
5 TABLET ORAL DAILY
Status: DISCONTINUED | OUTPATIENT
Start: 2022-05-25 | End: 2022-05-24

## 2022-05-24 RX ADMIN — WATER 1 G: 1 INJECTION INTRAMUSCULAR; INTRAVENOUS; SUBCUTANEOUS at 16:44

## 2022-05-24 RX ADMIN — ONDANSETRON 4 MG: 2 INJECTION INTRAMUSCULAR; INTRAVENOUS at 16:03

## 2022-05-24 RX ADMIN — METOPROLOL TARTRATE 25 MG: 25 TABLET, FILM COATED ORAL at 16:03

## 2022-05-24 RX ADMIN — AMLODIPINE BESYLATE 5 MG: 5 TABLET ORAL at 16:23

## 2022-05-24 NOTE — ED NOTES
Pain assessment on discharge was 0/10. Condition stable. Patient education was completed. Patient education was taught to patient using discussion and handout. Patient verbalized understanding. Patient was discharged to home by self with neighbor driving. Patient was discharged via wheelchair and staff assist to car, able to transfer without assistance.
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
## 2022-05-24 NOTE — DISCHARGE INSTRUCTIONS
Make sure to take your antibiotics as well as nausea medicine if needed. You have fevers or you are any worse and you cannot take your medications please return in the emergency department. Make sure you take all of your medications your blood pressure medications help control your heart rate may need to be taken as directed. Please return if you are at all worsened or concerned.

## 2022-05-24 NOTE — ED NOTES
Patient up to [facility], able to transfer without difficulty. Urine specimen obtained and sent, patient back to stretcher in position of comfort with side rail up x2 and call bell in hand.

## 2022-05-24 NOTE — ED TRIAGE NOTES
Pt reports shortness of breath and fatigue today. Pt speaking in full sentences without any difficulty or delay.   Pt presents in NAD

## 2022-05-25 LAB
ATRIAL RATE: 101 BPM
CALCULATED P AXIS, ECG09: 92 DEGREES
CALCULATED R AXIS, ECG10: 22 DEGREES
CALCULATED T AXIS, ECG11: 71 DEGREES
DIAGNOSIS, 93000: NORMAL
P-R INTERVAL, ECG05: 146 MS
Q-T INTERVAL, ECG07: 362 MS
QRS DURATION, ECG06: 86 MS
QTC CALCULATION (BEZET), ECG08: 469 MS
VENTRICULAR RATE, ECG03: 101 BPM

## 2022-05-26 ENCOUNTER — TELEPHONE (OUTPATIENT)
Dept: FAMILY MEDICINE CLINIC | Age: 87
End: 2022-05-26

## 2022-05-26 NOTE — TELEPHONE ENCOUNTER
Patient was seen in the ER this morning and would like to be called by a nurse. I made her an appointment for Charly Blair next available.  Please advise

## 2022-05-27 LAB
BACTERIA SPEC CULT: ABNORMAL
CC UR VC: ABNORMAL
SERVICE CMNT-IMP: ABNORMAL

## 2022-05-28 DIAGNOSIS — Z95.5 S/P CORONARY ARTERY STENT PLACEMENT: ICD-10-CM

## 2022-05-31 ENCOUNTER — APPOINTMENT (OUTPATIENT)
Dept: GENERAL RADIOLOGY | Age: 87
End: 2022-05-31
Attending: STUDENT IN AN ORGANIZED HEALTH CARE EDUCATION/TRAINING PROGRAM
Payer: MEDICARE

## 2022-05-31 ENCOUNTER — HOSPITAL ENCOUNTER (EMERGENCY)
Age: 87
Discharge: HOME | End: 2022-05-31
Attending: STUDENT IN AN ORGANIZED HEALTH CARE EDUCATION/TRAINING PROGRAM
Payer: MEDICARE

## 2022-05-31 VITALS
RESPIRATION RATE: 19 BRPM | TEMPERATURE: 98.9 F | BODY MASS INDEX: 35.79 KG/M2 | DIASTOLIC BLOOD PRESSURE: 88 MMHG | OXYGEN SATURATION: 97 % | SYSTOLIC BLOOD PRESSURE: 168 MMHG | HEART RATE: 96 BPM | HEIGHT: 67 IN | WEIGHT: 228 LBS

## 2022-05-31 DIAGNOSIS — R42 LIGHTHEADED: Primary | ICD-10-CM

## 2022-05-31 DIAGNOSIS — R07.9 CHEST PAIN, UNSPECIFIED TYPE: ICD-10-CM

## 2022-05-31 LAB
ALBUMIN SERPL-MCNC: 2.9 G/DL (ref 3.4–5)
ALBUMIN/GLOB SERPL: 0.6 {RATIO} (ref 0.8–1.7)
ALP SERPL-CCNC: 87 U/L (ref 45–117)
ALT SERPL-CCNC: 13 U/L (ref 13–56)
ANION GAP SERPL CALC-SCNC: 6 MMOL/L (ref 3–18)
APPEARANCE UR: CLEAR
AST SERPL-CCNC: 12 U/L (ref 10–38)
BASOPHILS # BLD: 0 K/UL (ref 0–0.1)
BASOPHILS NFR BLD: 1 % (ref 0–2)
BILIRUB SERPL-MCNC: 0.6 MG/DL (ref 0.2–1)
BILIRUB UR QL: NEGATIVE
BUN SERPL-MCNC: 9 MG/DL (ref 7–18)
BUN/CREAT SERPL: 12 (ref 12–20)
CALCIUM SERPL-MCNC: 8.9 MG/DL (ref 8.5–10.1)
CHLORIDE SERPL-SCNC: 106 MMOL/L (ref 100–111)
CO2 SERPL-SCNC: 29 MMOL/L (ref 21–32)
COLOR UR: YELLOW
CREAT SERPL-MCNC: 0.73 MG/DL (ref 0.6–1.3)
DIFFERENTIAL METHOD BLD: ABNORMAL
EOSINOPHIL # BLD: 0.2 K/UL (ref 0–0.4)
EOSINOPHIL NFR BLD: 4 % (ref 0–5)
EPITH CASTS URNS QL MICRO: NORMAL /LPF (ref 0–5)
ERYTHROCYTE [DISTWIDTH] IN BLOOD BY AUTOMATED COUNT: 11.9 % (ref 11.6–14.5)
GLOBULIN SER CALC-MCNC: 4.7 G/DL (ref 2–4)
GLUCOSE SERPL-MCNC: 194 MG/DL (ref 74–99)
GLUCOSE UR STRIP.AUTO-MCNC: NEGATIVE MG/DL
HCT VFR BLD AUTO: 35.6 % (ref 35–45)
HGB BLD-MCNC: 11.6 G/DL (ref 12–16)
HGB UR QL STRIP: NEGATIVE
IMM GRANULOCYTES # BLD AUTO: 0 K/UL (ref 0–0.04)
IMM GRANULOCYTES NFR BLD AUTO: 0 % (ref 0–0.5)
KETONES UR QL STRIP.AUTO: NEGATIVE MG/DL
LEUKOCYTE ESTERASE UR QL STRIP.AUTO: ABNORMAL
LYMPHOCYTES # BLD: 2 K/UL (ref 0.9–3.6)
LYMPHOCYTES NFR BLD: 38 % (ref 21–52)
MCH RBC QN AUTO: 31.5 PG (ref 24–34)
MCHC RBC AUTO-ENTMCNC: 32.6 G/DL (ref 31–37)
MCV RBC AUTO: 96.7 FL (ref 78–100)
MONOCYTES # BLD: 0.5 K/UL (ref 0.05–1.2)
MONOCYTES NFR BLD: 9 % (ref 3–10)
NEUTS SEG # BLD: 2.4 K/UL (ref 1.8–8)
NEUTS SEG NFR BLD: 48 % (ref 40–73)
NITRITE UR QL STRIP.AUTO: NEGATIVE
NRBC # BLD: 0 K/UL (ref 0–0.01)
NRBC BLD-RTO: 0 PER 100 WBC
PH UR STRIP: 7 [PH] (ref 5–8)
PLATELET # BLD AUTO: 216 K/UL (ref 135–420)
PMV BLD AUTO: 10.7 FL (ref 9.2–11.8)
POTASSIUM SERPL-SCNC: 3.6 MMOL/L (ref 3.5–5.5)
PROT SERPL-MCNC: 7.6 G/DL (ref 6.4–8.2)
PROT UR STRIP-MCNC: 30 MG/DL
RBC # BLD AUTO: 3.68 M/UL (ref 4.2–5.3)
RBC #/AREA URNS HPF: NORMAL /HPF (ref 0–5)
SODIUM SERPL-SCNC: 141 MMOL/L (ref 136–145)
SP GR UR REFRACTOMETRY: 1.02 (ref 1–1.03)
TROPONIN-HIGH SENSITIVITY: 51 NG/L (ref 0–54)
UROBILINOGEN UR QL STRIP.AUTO: 1 EU/DL (ref 0.2–1)
WBC # BLD AUTO: 5.1 K/UL (ref 4.6–13.2)
WBC URNS QL MICRO: NORMAL /HPF (ref 0–4)

## 2022-05-31 PROCEDURE — 99285 EMERGENCY DEPT VISIT HI MDM: CPT

## 2022-05-31 PROCEDURE — 93005 ELECTROCARDIOGRAM TRACING: CPT

## 2022-05-31 PROCEDURE — 85025 COMPLETE CBC W/AUTO DIFF WBC: CPT

## 2022-05-31 PROCEDURE — 71045 X-RAY EXAM CHEST 1 VIEW: CPT

## 2022-05-31 PROCEDURE — 84484 ASSAY OF TROPONIN QUANT: CPT

## 2022-05-31 PROCEDURE — 80053 COMPREHEN METABOLIC PANEL: CPT

## 2022-05-31 PROCEDURE — 81001 URINALYSIS AUTO W/SCOPE: CPT

## 2022-05-31 RX ORDER — CLOPIDOGREL BISULFATE 75 MG/1
TABLET ORAL
Qty: 30 TABLET | Refills: 2 | Status: SHIPPED | OUTPATIENT
Start: 2022-05-31 | End: 2022-09-07

## 2022-05-31 NOTE — ED TRIAGE NOTES
Patient c/o generalized weakness and dizziness that started last night. She states she also had an episode of chest pain this morning. She is currently on abx for urinary infection after being seen here recently. She has an appointment with a urologist tomorrow.

## 2022-05-31 NOTE — ED NOTES
Reviewed d/c instructions with patient, verbalized understanding. Pt taken via WC to lobby and care transferred to family.

## 2022-05-31 NOTE — DISCHARGE INSTRUCTIONS
You presented emergency department due to lightheadedness, weakness and chest pain. Your EKG and troponin suggest that you are low risk at this time for acute coronary event/heart attack but you should follow-up with your primary care doctor. You are feeling of lightheadedness and weakness could be due to your urinary tract infection so please continue with your antibiotics and if you are having symptoms after antibiotics are done you should have repeat urinalysis done. Please continue with your neurology appointment tomorrow. If you have any worsening of your symptoms please return to the emergency department.
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
## 2022-05-31 NOTE — ED NOTES
Assisted pt with getting dressed and up to [facility].  IV removed pt tolerated well

## 2022-06-01 LAB
ATRIAL RATE: 92 BPM
CALCULATED R AXIS, ECG10: -5 DEGREES
CALCULATED T AXIS, ECG11: 7 DEGREES
DIAGNOSIS, 93000: NORMAL
P-R INTERVAL, ECG05: 114 MS
Q-T INTERVAL, ECG07: 348 MS
QRS DURATION, ECG06: 82 MS
QTC CALCULATION (BEZET), ECG08: 430 MS
VENTRICULAR RATE, ECG03: 92 BPM

## 2022-06-07 ENCOUNTER — HOSPITAL ENCOUNTER (EMERGENCY)
Age: 87
Discharge: HOME | End: 2022-06-07
Attending: EMERGENCY MEDICINE
Payer: MEDICARE

## 2022-06-07 VITALS
BODY MASS INDEX: 36.25 KG/M2 | WEIGHT: 228 LBS | HEART RATE: 76 BPM | RESPIRATION RATE: 20 BRPM | DIASTOLIC BLOOD PRESSURE: 77 MMHG | OXYGEN SATURATION: 97 % | TEMPERATURE: 98.7 F | SYSTOLIC BLOOD PRESSURE: 166 MMHG

## 2022-06-07 DIAGNOSIS — J34.89 SINUS PRESSURE: ICD-10-CM

## 2022-06-07 DIAGNOSIS — R10.9 SIDE PAIN: Primary | ICD-10-CM

## 2022-06-07 LAB
ALBUMIN SERPL-MCNC: 3.1 G/DL (ref 3.4–5)
ALBUMIN/GLOB SERPL: 0.7 {RATIO} (ref 0.8–1.7)
ALP SERPL-CCNC: 89 U/L (ref 45–117)
ALT SERPL-CCNC: 14 U/L (ref 13–56)
ANION GAP SERPL CALC-SCNC: 4 MMOL/L (ref 3–18)
APPEARANCE UR: CLEAR
AST SERPL-CCNC: 12 U/L (ref 10–38)
ATRIAL RATE: 82 BPM
BACTERIA URNS QL MICRO: NEGATIVE /HPF
BASOPHILS # BLD: 0 K/UL (ref 0–0.1)
BASOPHILS NFR BLD: 1 % (ref 0–2)
BILIRUB SERPL-MCNC: 0.6 MG/DL (ref 0.2–1)
BILIRUB UR QL: NEGATIVE
BUN SERPL-MCNC: 5 MG/DL (ref 7–18)
BUN/CREAT SERPL: 7 (ref 12–20)
CALCIUM SERPL-MCNC: 8.6 MG/DL (ref 8.5–10.1)
CALCULATED R AXIS, ECG10: -15 DEGREES
CALCULATED T AXIS, ECG11: 15 DEGREES
CHLORIDE SERPL-SCNC: 106 MMOL/L (ref 100–111)
CO2 SERPL-SCNC: 30 MMOL/L (ref 21–32)
COLOR UR: YELLOW
CREAT SERPL-MCNC: 0.68 MG/DL (ref 0.6–1.3)
DIAGNOSIS, 93000: NORMAL
DIFFERENTIAL METHOD BLD: ABNORMAL
EOSINOPHIL # BLD: 0.1 K/UL (ref 0–0.4)
EOSINOPHIL NFR BLD: 3 % (ref 0–5)
EPITH CASTS URNS QL MICRO: NORMAL /LPF (ref 0–5)
ERYTHROCYTE [DISTWIDTH] IN BLOOD BY AUTOMATED COUNT: 12 % (ref 11.6–14.5)
GLOBULIN SER CALC-MCNC: 4.4 G/DL (ref 2–4)
GLUCOSE SERPL-MCNC: 217 MG/DL (ref 74–99)
GLUCOSE UR STRIP.AUTO-MCNC: 250 MG/DL
HCT VFR BLD AUTO: 36.9 % (ref 35–45)
HGB BLD-MCNC: 11.9 G/DL (ref 12–16)
HGB UR QL STRIP: NEGATIVE
IMM GRANULOCYTES # BLD AUTO: 0 K/UL (ref 0–0.04)
IMM GRANULOCYTES NFR BLD AUTO: 0 % (ref 0–0.5)
KETONES UR QL STRIP.AUTO: NEGATIVE MG/DL
LEUKOCYTE ESTERASE UR QL STRIP.AUTO: ABNORMAL
LYMPHOCYTES # BLD: 1.6 K/UL (ref 0.9–3.6)
LYMPHOCYTES NFR BLD: 36 % (ref 21–52)
MAGNESIUM SERPL-MCNC: 1.8 MG/DL (ref 1.6–2.6)
MCH RBC QN AUTO: 31.9 PG (ref 24–34)
MCHC RBC AUTO-ENTMCNC: 32.2 G/DL (ref 31–37)
MCV RBC AUTO: 98.9 FL (ref 78–100)
MONOCYTES # BLD: 0.4 K/UL (ref 0.05–1.2)
MONOCYTES NFR BLD: 8 % (ref 3–10)
NEUTS SEG # BLD: 2.3 K/UL (ref 1.8–8)
NEUTS SEG NFR BLD: 52 % (ref 40–73)
NITRITE UR QL STRIP.AUTO: NEGATIVE
NRBC # BLD: 0 K/UL (ref 0–0.01)
NRBC BLD-RTO: 0 PER 100 WBC
P-R INTERVAL, ECG05: 122 MS
PH UR STRIP: 7 [PH] (ref 5–8)
PLATELET # BLD AUTO: 207 K/UL (ref 135–420)
PMV BLD AUTO: 10.8 FL (ref 9.2–11.8)
POTASSIUM SERPL-SCNC: 3.3 MMOL/L (ref 3.5–5.5)
PROT SERPL-MCNC: 7.5 G/DL (ref 6.4–8.2)
PROT UR STRIP-MCNC: ABNORMAL MG/DL
Q-T INTERVAL, ECG07: 374 MS
QRS DURATION, ECG06: 90 MS
QTC CALCULATION (BEZET), ECG08: 436 MS
RBC # BLD AUTO: 3.73 M/UL (ref 4.2–5.3)
RBC #/AREA URNS HPF: NORMAL /HPF (ref 0–5)
SODIUM SERPL-SCNC: 140 MMOL/L (ref 136–145)
SP GR UR REFRACTOMETRY: 1.01 (ref 1–1.03)
TROPONIN-HIGH SENSITIVITY: 49 NG/L (ref 0–54)
UROBILINOGEN UR QL STRIP.AUTO: 1 EU/DL (ref 0.2–1)
VENTRICULAR RATE, ECG03: 82 BPM
WBC # BLD AUTO: 4.5 K/UL (ref 4.6–13.2)
WBC URNS QL MICRO: NORMAL /HPF (ref 0–4)

## 2022-06-07 PROCEDURE — 83735 ASSAY OF MAGNESIUM: CPT

## 2022-06-07 PROCEDURE — 84484 ASSAY OF TROPONIN QUANT: CPT

## 2022-06-07 PROCEDURE — 80053 COMPREHEN METABOLIC PANEL: CPT

## 2022-06-07 PROCEDURE — 74011250637 HC RX REV CODE- 250/637: Performed by: EMERGENCY MEDICINE

## 2022-06-07 PROCEDURE — 93005 ELECTROCARDIOGRAM TRACING: CPT

## 2022-06-07 PROCEDURE — 85025 COMPLETE CBC W/AUTO DIFF WBC: CPT

## 2022-06-07 PROCEDURE — 99284 EMERGENCY DEPT VISIT MOD MDM: CPT

## 2022-06-07 PROCEDURE — 81001 URINALYSIS AUTO W/SCOPE: CPT

## 2022-06-07 RX ORDER — METOPROLOL TARTRATE 25 MG/1
25 TABLET, FILM COATED ORAL
Status: COMPLETED | OUTPATIENT
Start: 2022-06-07 | End: 2022-06-07

## 2022-06-07 RX ADMIN — METOPROLOL TARTRATE 25 MG: 25 TABLET, FILM COATED ORAL at 14:10

## 2022-06-07 NOTE — ED TRIAGE NOTES
Pt states she is here for her dizziness, states this is a chronic condition and is not new. States recent UTI and completed her antibiotics. Pt is not giving a clear complaint as to what brought her here to ED today. Pt in NAD.
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
## 2022-06-29 ENCOUNTER — OFFICE VISIT (OUTPATIENT)
Dept: FAMILY MEDICINE CLINIC | Age: 87
End: 2022-06-29
Payer: MEDICARE

## 2022-06-29 VITALS
DIASTOLIC BLOOD PRESSURE: 79 MMHG | RESPIRATION RATE: 20 BRPM | OXYGEN SATURATION: 97 % | HEART RATE: 88 BPM | SYSTOLIC BLOOD PRESSURE: 131 MMHG | BODY MASS INDEX: 36.25 KG/M2 | TEMPERATURE: 98.5 F | HEIGHT: 67 IN

## 2022-06-29 DIAGNOSIS — I25.118 ATHEROSCLEROSIS OF NATIVE CORONARY ARTERY OF NATIVE HEART WITH STABLE ANGINA PECTORIS (HCC): ICD-10-CM

## 2022-06-29 DIAGNOSIS — E11.69 HYPERLIPIDEMIA ASSOCIATED WITH TYPE 2 DIABETES MELLITUS (HCC): ICD-10-CM

## 2022-06-29 DIAGNOSIS — N39.0 RECURRENT UTI: Primary | ICD-10-CM

## 2022-06-29 DIAGNOSIS — I82.532 CHRONIC DEEP VEIN THROMBOSIS (DVT) OF POPLITEAL VEIN OF LEFT LOWER EXTREMITY (HCC): ICD-10-CM

## 2022-06-29 DIAGNOSIS — I50.32 CHRONIC DIASTOLIC CONGESTIVE HEART FAILURE (HCC): ICD-10-CM

## 2022-06-29 DIAGNOSIS — E78.5 HYPERLIPIDEMIA ASSOCIATED WITH TYPE 2 DIABETES MELLITUS (HCC): ICD-10-CM

## 2022-06-29 DIAGNOSIS — M35.3 POLYMYALGIA RHEUMATICA (HCC): ICD-10-CM

## 2022-06-29 PROCEDURE — 99214 OFFICE O/P EST MOD 30 MIN: CPT | Performed by: FAMILY MEDICINE

## 2022-06-29 PROCEDURE — G8399 PT W/DXA RESULTS DOCUMENT: HCPCS | Performed by: FAMILY MEDICINE

## 2022-06-29 PROCEDURE — 1090F PRES/ABSN URINE INCON ASSESS: CPT | Performed by: FAMILY MEDICINE

## 2022-06-29 PROCEDURE — G8510 SCR DEP NEG, NO PLAN REQD: HCPCS | Performed by: FAMILY MEDICINE

## 2022-06-29 PROCEDURE — 1101F PT FALLS ASSESS-DOCD LE1/YR: CPT | Performed by: FAMILY MEDICINE

## 2022-06-29 PROCEDURE — G8536 NO DOC ELDER MAL SCRN: HCPCS | Performed by: FAMILY MEDICINE

## 2022-06-29 PROCEDURE — G8417 CALC BMI ABV UP PARAM F/U: HCPCS | Performed by: FAMILY MEDICINE

## 2022-06-29 PROCEDURE — G8427 DOCREV CUR MEDS BY ELIG CLIN: HCPCS | Performed by: FAMILY MEDICINE

## 2022-06-29 PROCEDURE — 1123F ACP DISCUSS/DSCN MKR DOCD: CPT | Performed by: FAMILY MEDICINE

## 2022-06-29 NOTE — ASSESSMENT & PLAN NOTE
monitored by specialist. No acute findings meriting change in the plan. Pt reminded to sched f/up with cards as above.

## 2022-06-29 NOTE — PROGRESS NOTES
Roseanna Villa presents today for   Chief Complaint   Patient presents with   [facility] Follow Up     seen at [facility] on 6/7/22 for dizzines    Hypothyroidism    Diabetes    Hypertension    CHF       Is someone accompanying this pt? no    Is the patient using any DME equipment during OV? Yes, wheelchair    Depression Screening:  3 most recent PHQ Screens 6/29/2022   PHQ Not Done -   Little interest or pleasure in doing things Not at all   Feeling down, depressed, irritable, or hopeless Not at all   Total Score PHQ 2 0       Learning Assessment:  Learning Assessment 3/1/2021   PRIMARY LEARNER Patient   HIGHEST LEVEL OF EDUCATION - PRIMARY LEARNER  [address] [facility] PRIMARY LEARNER [address] CAREGIVER No   CO-LEARNER NAME -   PRIMARY LANGUAGE ENGLISH    NEED -   LEARNER PREFERENCE PRIMARY READING     -     -     -     -   ANSWERED BY patient    RELATIONSHIP SELF       Abuse Screening:  Abuse Screening Questionnaire 2/7/2022   Do you ever feel afraid of your partner? N   Are you in a relationship with someone who physically or mentally threatens you? N   Is it safe for you to go home? Y       Fall Screening  Fall Risk Assessment, last 12 mths 2/7/2022   Able to walk? No   Fall in past 12 months? -   Do you feel unsteady? -   Are you worried about falling -   Is TUG test greater than 12 seconds? -   Is the gait abnormal? -   Number of falls in past 12 months -   Fall with injury? -       Generalized Anxiety  No flowsheet data found. Health Maintenance Due   Topic Date Due    Shingrix Vaccine Age 49> (1 of 2) Never done    Foot Exam Q1  10/24/2018    Pneumococcal 65+ years (2 - PPSV23 or PCV20) 02/19/2020    MICROALBUMIN Q1  02/25/2020    COVID-19 Vaccine (3 - Booster for Pfizer series) 10/03/2021    Eye Exam Retinal or Dilated  11/12/2021    A1C test (Diabetic or Prediabetic)  12/17/2021    Lipid Screen  06/17/2022   .       Health Maintenance reviewed and discussed and ordered per Provider. Coordination of Care  1. Have you been to the ER, urgent care clinic since your last visit? Hospitalized since your last visit? Yes, [facility]    2. Have you seen or consulted any other health care providers outside of the [address] [place] [address] since your last visit? Include any pap smears or colon screening.  no

## 2022-06-29 NOTE — PROGRESS NOTES
Chief Complaint   Patient presents with   [facility] Follow Up     seen at [facility] on 6/7/22 for dizzines    Hypothyroidism    Diabetes    Hypertension    CHF         HPI    Desire Hurley is a 82 y.o. female presenting today for follow up of ER visit. Pt cont to c/o assorted intermittent ailments. She has been at the ER twice in the last 30 days. Pt reports her urine is sometimes clear and sometimes brianna. Pt thinks she will see endo in July. Pt has not seen rheum, pulm lately. Pt missed her Feb 2022 appt with cards. She has not yet rescheduled that appt although she thinks she has had an appt there. Patient does not need medication refills today. New concerns today: none      Review of Systems   Constitutional: Negative. HENT: Negative. Respiratory: Negative. Cardiovascular: Negative. Genitourinary:        Intermittently dark urine  \"side\" pain   All other systems reviewed and are negative. Physical Exam  Vitals and nursing note reviewed. Constitutional:       Appearance: Normal appearance. She is not ill-appearing. HENT:      Head: Normocephalic and atraumatic. Right Ear: External ear normal.      Left Ear: External ear normal.      Nose: Nose normal.      Mouth/Throat:      Mouth: Mucous membranes are moist.   Eyes:      Extraocular Movements: Extraocular movements intact. Conjunctiva/sclera: Conjunctivae normal.   Cardiovascular:      Rate and Rhythm: Normal rate and regular rhythm. Heart sounds: No murmur heard. No friction rub. No gallop. Pulmonary:      Effort: Pulmonary effort is normal.      Breath sounds: Normal breath sounds. No wheezing, rhonchi or rales. Musculoskeletal:         General: Normal range of motion. Cervical back: Normal range of motion. Skin:     General: Skin is warm and dry. Neurological:      Mental Status: She is alert and oriented to person, place, and time.       Coordination: Coordination normal. Comments: In wheelchair   Psychiatric:         Mood and Affect: Mood normal.         Behavior: Behavior normal.         Thought Content: Thought content normal.         Judgment: Judgment normal.         Diagnoses and all orders for this visit:    1. Recurrent UTI  No current sign of uti. Pt reminded to keep appt with uro    2. Polymyalgia rheumatica ([facility] [address]  Assessment & Plan:   monitored by specialist. No acute findings meriting change in the plan. patient reminded to schedule follow-up appointment with rheumatology. 3. Chronic diastolic congestive heart failure ([facility] [address]  Assessment & Plan:   monitored by specialist. No acute findings meriting change in the plan. Patient advised that she missed her last appointment with cardiology and still needs to reschedule. Notice of no-show printed and given to patient as she does not think that she has missed any appointments. 4. Hyperlipidemia associated with type 2 diabetes mellitus ([facility]  Assessment & Plan:   no recent lipid panel. Will plan for lab draw. 5. Chronic deep vein thrombosis (DVT) of popliteal vein of left lower extremity ([facility]  Assessment & Plan:   monitored by specialist. No acute findings meriting change in the plan      6. Atherosclerosis of native coronary artery of native heart with stable angina pectoris [facility])  Assessment & Plan:   monitored by specialist. No acute findings meriting change in the plan. Pt reminded to sched f/up with cards as above.        Follow-up and Dispositions    · Return in about 4 months (around 10/29/2022) for high cholesterol, specialist juana.

## 2022-06-29 NOTE — ASSESSMENT & PLAN NOTE
monitored by specialist. No acute findings meriting change in the plan. patient reminded to schedule follow-up appointment with rheumatology.

## 2022-06-29 NOTE — ASSESSMENT & PLAN NOTE
monitored by specialist. No acute findings meriting change in the plan. Patient advised that she missed her last appointment with cardiology and still needs to reschedule. Notice of no-show printed and given to patient as she does not think that she has missed any appointments.

## 2022-07-13 ENCOUNTER — HOSPITAL ENCOUNTER (EMERGENCY)
Age: 87
Discharge: HOME | End: 2022-07-13
Attending: STUDENT IN AN ORGANIZED HEALTH CARE EDUCATION/TRAINING PROGRAM
Payer: MEDICARE

## 2022-07-13 ENCOUNTER — APPOINTMENT (OUTPATIENT)
Dept: GENERAL RADIOLOGY | Age: 87
End: 2022-07-13
Attending: STUDENT IN AN ORGANIZED HEALTH CARE EDUCATION/TRAINING PROGRAM
Payer: MEDICARE

## 2022-07-13 VITALS
HEART RATE: 85 BPM | WEIGHT: 221 LBS | TEMPERATURE: 100.3 F | OXYGEN SATURATION: 92 % | HEIGHT: 67 IN | BODY MASS INDEX: 34.69 KG/M2 | DIASTOLIC BLOOD PRESSURE: 67 MMHG | RESPIRATION RATE: 18 BRPM | SYSTOLIC BLOOD PRESSURE: 118 MMHG

## 2022-07-13 DIAGNOSIS — N39.0 URINARY TRACT INFECTION WITHOUT HEMATURIA, SITE UNSPECIFIED: ICD-10-CM

## 2022-07-13 DIAGNOSIS — U07.1 COVID: Primary | ICD-10-CM

## 2022-07-13 LAB
ANION GAP SERPL CALC-SCNC: 5 MMOL/L (ref 3–18)
APPEARANCE UR: ABNORMAL
ATRIAL RATE: 102 BPM
BACTERIA URNS QL MICRO: NEGATIVE /HPF
BASOPHILS # BLD: 0 K/UL (ref 0–0.1)
BASOPHILS NFR BLD: 0 % (ref 0–2)
BILIRUB UR QL: ABNORMAL
BUN SERPL-MCNC: 11 MG/DL (ref 7–18)
BUN/CREAT SERPL: 12 (ref 12–20)
CALCIUM SERPL-MCNC: 9.4 MG/DL (ref 8.5–10.1)
CALCULATED P AXIS, ECG09: 77 DEGREES
CALCULATED R AXIS, ECG10: -13 DEGREES
CALCULATED T AXIS, ECG11: -33 DEGREES
CHLORIDE SERPL-SCNC: 107 MMOL/L (ref 100–111)
CO2 SERPL-SCNC: 25 MMOL/L (ref 21–32)
COLOR UR: ABNORMAL
CREAT SERPL-MCNC: 0.92 MG/DL (ref 0.6–1.3)
DIAGNOSIS, 93000: NORMAL
DIFFERENTIAL METHOD BLD: ABNORMAL
EOSINOPHIL # BLD: 0.2 K/UL (ref 0–0.4)
EOSINOPHIL NFR BLD: 2 % (ref 0–5)
EPITH CASTS URNS QL MICRO: NORMAL /LPF (ref 0–5)
ERYTHROCYTE [DISTWIDTH] IN BLOOD BY AUTOMATED COUNT: 12 % (ref 11.6–14.5)
FLUAV RNA SPEC QL NAA+PROBE: NOT DETECTED
FLUBV RNA SPEC QL NAA+PROBE: NOT DETECTED
GLUCOSE SERPL-MCNC: 126 MG/DL (ref 74–99)
GLUCOSE UR STRIP.AUTO-MCNC: NEGATIVE MG/DL
HCT VFR BLD AUTO: 37.6 % (ref 35–45)
HGB BLD-MCNC: 12.1 G/DL (ref 12–16)
HGB UR QL STRIP: NEGATIVE
IMM GRANULOCYTES # BLD AUTO: 0 K/UL (ref 0–0.04)
IMM GRANULOCYTES NFR BLD AUTO: 0 % (ref 0–0.5)
KETONES UR QL STRIP.AUTO: ABNORMAL MG/DL
LEUKOCYTE ESTERASE UR QL STRIP.AUTO: ABNORMAL
LYMPHOCYTES # BLD: 1.4 K/UL (ref 0.9–3.6)
LYMPHOCYTES NFR BLD: 17 % (ref 21–52)
MCH RBC QN AUTO: 31.8 PG (ref 24–34)
MCHC RBC AUTO-ENTMCNC: 32.2 G/DL (ref 31–37)
MCV RBC AUTO: 98.9 FL (ref 78–100)
MONOCYTES # BLD: 0.8 K/UL (ref 0.05–1.2)
MONOCYTES NFR BLD: 10 % (ref 3–10)
NEUTS SEG # BLD: 5.7 K/UL (ref 1.8–8)
NEUTS SEG NFR BLD: 70 % (ref 40–73)
NITRITE UR QL STRIP.AUTO: NEGATIVE
NRBC # BLD: 0 K/UL (ref 0–0.01)
NRBC BLD-RTO: 0 PER 100 WBC
P-R INTERVAL, ECG05: 116 MS
PH UR STRIP: 5.5 [PH] (ref 5–8)
PLATELET # BLD AUTO: 185 K/UL (ref 135–420)
PMV BLD AUTO: 11.2 FL (ref 9.2–11.8)
POTASSIUM SERPL-SCNC: 4.2 MMOL/L (ref 3.5–5.5)
PROT UR STRIP-MCNC: 100 MG/DL
Q-T INTERVAL, ECG07: 420 MS
QRS DURATION, ECG06: 80 MS
QTC CALCULATION (BEZET), ECG08: 547 MS
RBC # BLD AUTO: 3.8 M/UL (ref 4.2–5.3)
RBC #/AREA URNS HPF: NEGATIVE /HPF (ref 0–5)
SARS-COV-2, COV2: DETECTED
SODIUM SERPL-SCNC: 137 MMOL/L (ref 136–145)
SP GR UR REFRACTOMETRY: 1.02 (ref 1–1.03)
TROPONIN-HIGH SENSITIVITY: 37 NG/L (ref 0–54)
UROBILINOGEN UR QL STRIP.AUTO: 1 EU/DL (ref 0.2–1)
VENTRICULAR RATE, ECG03: 102 BPM
WBC # BLD AUTO: 8.2 K/UL (ref 4.6–13.2)
WBC URNS QL MICRO: NORMAL /HPF (ref 0–4)

## 2022-07-13 PROCEDURE — 84484 ASSAY OF TROPONIN QUANT: CPT

## 2022-07-13 PROCEDURE — 71045 X-RAY EXAM CHEST 1 VIEW: CPT

## 2022-07-13 PROCEDURE — 74011250636 HC RX REV CODE- 250/636: Performed by: STUDENT IN AN ORGANIZED HEALTH CARE EDUCATION/TRAINING PROGRAM

## 2022-07-13 PROCEDURE — 93005 ELECTROCARDIOGRAM TRACING: CPT

## 2022-07-13 PROCEDURE — 99285 EMERGENCY DEPT VISIT HI MDM: CPT

## 2022-07-13 PROCEDURE — 74011000250 HC RX REV CODE- 250: Performed by: STUDENT IN AN ORGANIZED HEALTH CARE EDUCATION/TRAINING PROGRAM

## 2022-07-13 PROCEDURE — 96374 THER/PROPH/DIAG INJ IV PUSH: CPT

## 2022-07-13 PROCEDURE — 74011250637 HC RX REV CODE- 250/637: Performed by: STUDENT IN AN ORGANIZED HEALTH CARE EDUCATION/TRAINING PROGRAM

## 2022-07-13 PROCEDURE — 80048 BASIC METABOLIC PNL TOTAL CA: CPT

## 2022-07-13 PROCEDURE — 87636 SARSCOV2 & INF A&B AMP PRB: CPT

## 2022-07-13 PROCEDURE — 81001 URINALYSIS AUTO W/SCOPE: CPT

## 2022-07-13 PROCEDURE — 85025 COMPLETE CBC W/AUTO DIFF WBC: CPT

## 2022-07-13 RX ORDER — SULFAMETHOXAZOLE AND TRIMETHOPRIM 800; 160 MG/1; MG/1
1 TABLET ORAL 2 TIMES DAILY
Qty: 14 TABLET | Refills: 0 | Status: SHIPPED | OUTPATIENT
Start: 2022-07-13 | End: 2022-07-20

## 2022-07-13 RX ORDER — CEPHALEXIN 500 MG/1
500 CAPSULE ORAL 4 TIMES DAILY
Qty: 28 CAPSULE | Refills: 0 | Status: SHIPPED | OUTPATIENT
Start: 2022-07-13 | End: 2022-07-13 | Stop reason: SINTOL

## 2022-07-13 RX ORDER — ACETAMINOPHEN 325 MG/1
975 TABLET ORAL
Status: COMPLETED | OUTPATIENT
Start: 2022-07-13 | End: 2022-07-13

## 2022-07-13 RX ADMIN — WATER 1 G: 1 INJECTION INTRAMUSCULAR; INTRAVENOUS; SUBCUTANEOUS at 15:05

## 2022-07-13 RX ADMIN — ACETAMINOPHEN 975 MG: 325 TABLET ORAL at 10:54
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
## 2022-07-13 NOTE — ED TRIAGE NOTES
Patient c/o nasal congestion, cough, fever and fatigue since yesterday. She states continuing left flank pain since last UTI dx. She states taking Tylenol Arthritis at 0400 today.

## 2022-07-14 ENCOUNTER — PATIENT OUTREACH (OUTPATIENT)
Dept: CASE MANAGEMENT | Age: 87
End: 2022-07-14

## 2022-07-14 ENCOUNTER — TELEPHONE (OUTPATIENT)
Dept: FAMILY MEDICINE CLINIC | Age: 87
End: 2022-07-14

## 2022-07-14 DIAGNOSIS — U07.1 COVID-19: Primary | ICD-10-CM

## 2022-07-14 NOTE — TELEPHONE ENCOUNTER
Patient was seen in the ed 7/13 dx with UTI and covid . Patient  Is requesting medication for covid . She is not a Candidate  For paxlovid due to  Being on amlodipine and ranolazine  . She is on day 2 of symptoms , would she be a candidate for Molnupiravir ?

## 2022-07-14 NOTE — TELEPHONE ENCOUNTER
Molnupiravir sent to [facility] on [address]. Take 4 capsules every 12 hours for 5 days. Please notify patient. Thank you!

## 2022-07-14 NOTE — PROGRESS NOTES
Patient contacted regarding COVID-19 diagnosis. Discussed COVID-19 related testing which was available at this time. Test results were positive. Patient informed of results, if available? yes. Ambulatory Care Manager contacted the patient by telephone to perform post discharge assessment. Call within 2 business days of discharge: Yes Verified name and  with patient as identifiers. Provided introduction to self, and explanation of the CTN/ACM role, and reason for call due to risk factors for infection and/or exposure to COVID-19. Patient has aides that come in to assist her daily and she feels this is where she was exposed. Symptoms reviewed with patient who verbalized the following symptoms: cough, shortness of breath and chills or shaking      Due to no new or worsening symptoms encounter was not routed to provider for escalation. Discussed follow-up appointments. If no appointment was previously scheduled, appointment scheduling offered:  yes. [facility] follow up appointment(s):   Future Appointments   Date Time Provider [facility]   8/15/2022 10:50 AM Cole Gay NP [address] [facility]   2022 10:30 AM Natty Christine MD [facility] BS AMB   10/26/2022 10:15 AM Jennifer Abebe MD [facility] BS AMB     Interventions to address risk factors: Scheduled appointment with PCP-Dr. Cassandra Howell     Advance Care Planning:   Does patient have an Advance Directive: yes, reviewed and current. Educated patient about risk for severe COVID-19 due to risk factors according to CDC guidelines. ACM reviewed discharge instructions, medical action plan and red flag symptoms with the patient who verbalized understanding. Discussed COVID vaccination status: yes. Patient is fully vaccinated. Discussed exposure protocols and quarantine with CDC Guidelines.  Patient was given an opportunity to verbalize any questions and concerns and agrees to contact ACM or health care provider for questions related to their healthcare. Reviewed and educated patient on any new and changed medications related to discharge diagnosis     Was patient discharged with a pulse oximeter? no    ACM provided contact information. Plan for follow-up call in 5-7 days based on severity of symptoms and risk factors.

## 2022-07-14 NOTE — TELEPHONE ENCOUNTER
Patient has tested positive for COVID-19 & UTI she went to the [facility] Emergency Department. She wanted to get a prescription for Covid-19. The hospital prescribed Bactrim for her UTI.

## 2022-07-20 ENCOUNTER — PATIENT OUTREACH (OUTPATIENT)
Dept: CASE MANAGEMENT | Age: 87
End: 2022-07-20

## 2022-07-20 NOTE — PROGRESS NOTES
Patient resolved from [address] Transitions episode on 7/20/22. Discussed COVID-19 related testing which was available at this time. Test results were positive. Patient informed of results, if available? yes     Patient/family has been provided the following resources and education related to COVID-19:                         Signs, symptoms and red flags related to COVID-19            [facility] exposure and quarantine guidelines            [facility] exposure contact - [phone]            Contact for their local Department of Health                 Patient currently reports that the following symptoms have improved:  cough, chills or shaking, no new symptoms, and no worsening symptoms. No further outreach scheduled with this CTN/ACM/LPN/HC/ MA. Episode of Care resolved. Patient has this CTN/ACM/LPN/HC/MA contact information if future needs arise.

## 2022-07-29 ENCOUNTER — HOSPITAL ENCOUNTER (EMERGENCY)
Age: 87
Discharge: HOME | End: 2022-07-29
Attending: EMERGENCY MEDICINE
Payer: MEDICARE

## 2022-07-29 ENCOUNTER — APPOINTMENT (OUTPATIENT)
Dept: CT IMAGING | Age: 87
End: 2022-07-29
Attending: EMERGENCY MEDICINE
Payer: MEDICARE

## 2022-07-29 VITALS
SYSTOLIC BLOOD PRESSURE: 150 MMHG | TEMPERATURE: 98.2 F | DIASTOLIC BLOOD PRESSURE: 76 MMHG | BODY MASS INDEX: 34.53 KG/M2 | RESPIRATION RATE: 29 BRPM | HEART RATE: 93 BPM | HEIGHT: 67 IN | WEIGHT: 220 LBS | OXYGEN SATURATION: 98 %

## 2022-07-29 DIAGNOSIS — K57.92 DIVERTICULITIS: Primary | ICD-10-CM

## 2022-07-29 LAB
ALBUMIN SERPL-MCNC: 3.4 G/DL (ref 3.4–5)
ALBUMIN/GLOB SERPL: 0.8 {RATIO} (ref 0.8–1.7)
ALP SERPL-CCNC: 68 U/L (ref 45–117)
ALT SERPL-CCNC: 13 U/L (ref 13–56)
ANION GAP SERPL CALC-SCNC: 5 MMOL/L (ref 3–18)
APPEARANCE UR: CLEAR
AST SERPL-CCNC: 12 U/L (ref 10–38)
ATRIAL RATE: 103 BPM
BACTERIA URNS QL MICRO: NEGATIVE /HPF
BASOPHILS # BLD: 0 K/UL (ref 0–0.1)
BASOPHILS NFR BLD: 1 % (ref 0–2)
BILIRUB SERPL-MCNC: 0.6 MG/DL (ref 0.2–1)
BILIRUB UR QL: NEGATIVE
BUN SERPL-MCNC: 14 MG/DL (ref 7–18)
BUN/CREAT SERPL: 17 (ref 12–20)
CALCIUM SERPL-MCNC: 9.8 MG/DL (ref 8.5–10.1)
CALCULATED P AXIS, ECG09: 67 DEGREES
CALCULATED R AXIS, ECG10: 0 DEGREES
CALCULATED T AXIS, ECG11: 13 DEGREES
CHLORIDE SERPL-SCNC: 105 MMOL/L (ref 100–111)
CO2 SERPL-SCNC: 28 MMOL/L (ref 21–32)
COLOR UR: YELLOW
CREAT SERPL-MCNC: 0.83 MG/DL (ref 0.6–1.3)
DIAGNOSIS, 93000: NORMAL
DIFFERENTIAL METHOD BLD: ABNORMAL
EOSINOPHIL # BLD: 0.2 K/UL (ref 0–0.4)
EOSINOPHIL NFR BLD: 4 % (ref 0–5)
EPITH CASTS URNS QL MICRO: NORMAL /LPF (ref 0–5)
ERYTHROCYTE [DISTWIDTH] IN BLOOD BY AUTOMATED COUNT: 11.9 % (ref 11.6–14.5)
GLOBULIN SER CALC-MCNC: 4.4 G/DL (ref 2–4)
GLUCOSE SERPL-MCNC: 179 MG/DL (ref 74–99)
GLUCOSE UR STRIP.AUTO-MCNC: 250 MG/DL
HCT VFR BLD AUTO: 35.6 % (ref 35–45)
HGB BLD-MCNC: 11.8 G/DL (ref 12–16)
HGB UR QL STRIP: NEGATIVE
IMM GRANULOCYTES # BLD AUTO: 0 K/UL (ref 0–0.04)
IMM GRANULOCYTES NFR BLD AUTO: 0 % (ref 0–0.5)
KETONES UR QL STRIP.AUTO: ABNORMAL MG/DL
LEUKOCYTE ESTERASE UR QL STRIP.AUTO: ABNORMAL
LIPASE SERPL-CCNC: 102 U/L (ref 73–393)
LYMPHOCYTES # BLD: 1.9 K/UL (ref 0.9–3.6)
LYMPHOCYTES NFR BLD: 31 % (ref 21–52)
MCH RBC QN AUTO: 32.3 PG (ref 24–34)
MCHC RBC AUTO-ENTMCNC: 33.1 G/DL (ref 31–37)
MCV RBC AUTO: 97.5 FL (ref 78–100)
MONOCYTES # BLD: 0.6 K/UL (ref 0.05–1.2)
MONOCYTES NFR BLD: 9 % (ref 3–10)
NEUTS SEG # BLD: 3.5 K/UL (ref 1.8–8)
NEUTS SEG NFR BLD: 56 % (ref 40–73)
NITRITE UR QL STRIP.AUTO: NEGATIVE
NRBC # BLD: 0 K/UL (ref 0–0.01)
NRBC BLD-RTO: 0 PER 100 WBC
P-R INTERVAL, ECG05: 136 MS
PH UR STRIP: 5.5 [PH] (ref 5–8)
PLATELET # BLD AUTO: 226 K/UL (ref 135–420)
PMV BLD AUTO: 11 FL (ref 9.2–11.8)
POTASSIUM SERPL-SCNC: 4 MMOL/L (ref 3.5–5.5)
PROT SERPL-MCNC: 7.8 G/DL (ref 6.4–8.2)
PROT UR STRIP-MCNC: 30 MG/DL
Q-T INTERVAL, ECG07: 364 MS
QRS DURATION, ECG06: 80 MS
QTC CALCULATION (BEZET), ECG08: 476 MS
RBC # BLD AUTO: 3.65 M/UL (ref 4.2–5.3)
RBC #/AREA URNS HPF: NEGATIVE /HPF (ref 0–5)
SODIUM SERPL-SCNC: 138 MMOL/L (ref 136–145)
SP GR UR REFRACTOMETRY: 1.02 (ref 1–1.03)
UROBILINOGEN UR QL STRIP.AUTO: 0.2 EU/DL (ref 0.2–1)
VENTRICULAR RATE, ECG03: 103 BPM
WBC # BLD AUTO: 6.3 K/UL (ref 4.6–13.2)
WBC URNS QL MICRO: NORMAL /HPF (ref 0–4)

## 2022-07-29 PROCEDURE — 74176 CT ABD & PELVIS W/O CONTRAST: CPT

## 2022-07-29 PROCEDURE — 80053 COMPREHEN METABOLIC PANEL: CPT

## 2022-07-29 PROCEDURE — 83690 ASSAY OF LIPASE: CPT

## 2022-07-29 PROCEDURE — 93005 ELECTROCARDIOGRAM TRACING: CPT

## 2022-07-29 PROCEDURE — 87086 URINE CULTURE/COLONY COUNT: CPT

## 2022-07-29 PROCEDURE — 81001 URINALYSIS AUTO W/SCOPE: CPT

## 2022-07-29 PROCEDURE — 74011250637 HC RX REV CODE- 250/637: Performed by: EMERGENCY MEDICINE

## 2022-07-29 PROCEDURE — 85025 COMPLETE CBC W/AUTO DIFF WBC: CPT

## 2022-07-29 PROCEDURE — 99284 EMERGENCY DEPT VISIT MOD MDM: CPT

## 2022-07-29 RX ORDER — CIPROFLOXACIN 500 MG/1
500 TABLET ORAL 2 TIMES DAILY
Qty: 14 TABLET | Refills: 0 | Status: SHIPPED | OUTPATIENT
Start: 2022-07-29 | End: 2022-07-29

## 2022-07-29 RX ORDER — LEVOFLOXACIN 500 MG/1
500 TABLET, FILM COATED ORAL ONCE
Status: COMPLETED | OUTPATIENT
Start: 2022-07-29 | End: 2022-07-29

## 2022-07-29 RX ORDER — LEVOFLOXACIN 500 MG/1
500 TABLET, FILM COATED ORAL
Status: DISCONTINUED | OUTPATIENT
Start: 2022-07-29 | End: 2022-07-29

## 2022-07-29 RX ORDER — METRONIDAZOLE 500 MG/1
500 TABLET ORAL
Status: DISCONTINUED | OUTPATIENT
Start: 2022-07-29 | End: 2022-07-29

## 2022-07-29 RX ORDER — MOXIFLOXACIN HYDROCHLORIDE 400 MG/1
400 TABLET ORAL
Status: DISCONTINUED | OUTPATIENT
Start: 2022-07-29 | End: 2022-07-29 | Stop reason: CLARIF

## 2022-07-29 RX ORDER — METRONIDAZOLE 500 MG/1
500 TABLET ORAL 3 TIMES DAILY
Qty: 30 TABLET | Refills: 0 | Status: SHIPPED | OUTPATIENT
Start: 2022-07-29 | End: 2022-07-29

## 2022-07-29 RX ORDER — MOXIFLOXACIN HYDROCHLORIDE 400 MG/1
400 TABLET ORAL DAILY
Qty: 7 TABLET | Refills: 0 | Status: SHIPPED | OUTPATIENT
Start: 2022-07-29 | End: 2022-08-05

## 2022-07-29 RX ADMIN — LEVOFLOXACIN 500 MG: 500 TABLET, FILM COATED ORAL at 16:03

## 2022-07-29 NOTE — ED NOTES
Attempted to medicate and discharge patient. Pt refusing antibx stating she is allergic to flagyl reporting previous reaction to flagyl was mouth sores and tingling. This RN explained to pt MD advised to give medication and watch patient for reaction prior to discharge. Pt again refuses to take medication. MD made aware. Awaiting order.

## 2022-07-29 NOTE — PROGRESS NOTES
Levofloxacin 500 mg was therapeutically interchanged for Ciprofloxacin 500 mg per the P&T Committee approved Therapeutic Interchanges Policy.

## 2022-07-29 NOTE — PROGRESS NOTES
Levofloxacin (Levaquin) 500 mg tablet was therapeutically interchanged for Moxifloxacin (Avelox) 400 mg per the P&T Committee approved Therapeutic Interchanges Policy.

## 2022-07-29 NOTE — ED TRIAGE NOTES
Pt presents with c/o lower back pain & fatigue; back pain x 2 weeks & was diagnosed with Covid approx 2 weeks ago. Stated she was seen here & was given antibiotics for a UTI at that time & she is still having the same symptoms. Reports dizziness at times; no other complaints.

## 2022-07-29 NOTE — ED NOTES
Pt assisted to BR. Urine collected and sent for processing. EKG completed and given to MD.   PIV placed, labs collected and sent for processing. Pt placed on full cardiac monitor. Vitals updated and stable. Afib noted on monitor.
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
## 2022-07-30 LAB
BACTERIA SPEC CULT: NORMAL
SERVICE CMNT-IMP: NORMAL

## 2022-07-31 DIAGNOSIS — J45.20 MILD INTERMITTENT ASTHMA WITHOUT COMPLICATION: ICD-10-CM

## 2022-07-31 DIAGNOSIS — J30.9 ALLERGIC RHINITIS, UNSPECIFIED SEASONALITY, UNSPECIFIED TRIGGER: ICD-10-CM

## 2022-08-01 RX ORDER — MONTELUKAST SODIUM 10 MG/1
TABLET ORAL
Qty: 90 TABLET | Refills: 4 | Status: SHIPPED | OUTPATIENT
Start: 2022-08-01

## 2022-08-01 RX ORDER — FUROSEMIDE 20 MG/1
TABLET ORAL
Qty: 30 TABLET | Refills: 5 | Status: SHIPPED | OUTPATIENT
Start: 2022-08-01 | End: 2022-09-10

## 2022-08-11 ENCOUNTER — APPOINTMENT (OUTPATIENT)
Age: 87
End: 2022-08-11
Attending: EMERGENCY MEDICINE
Payer: MEDICARE

## 2022-08-11 ENCOUNTER — APPOINTMENT (OUTPATIENT)
Dept: CT IMAGING | Age: 87
End: 2022-08-11
Attending: EMERGENCY MEDICINE
Payer: MEDICARE

## 2022-08-11 ENCOUNTER — HOSPITAL ENCOUNTER (EMERGENCY)
Age: 87
Discharge: HOME | End: 2022-08-11
Attending: EMERGENCY MEDICINE
Payer: MEDICARE

## 2022-08-11 VITALS
DIASTOLIC BLOOD PRESSURE: 76 MMHG | HEIGHT: 66 IN | SYSTOLIC BLOOD PRESSURE: 165 MMHG | RESPIRATION RATE: 16 BRPM | OXYGEN SATURATION: 100 % | HEART RATE: 78 BPM | BODY MASS INDEX: 35.36 KG/M2 | WEIGHT: 220 LBS | TEMPERATURE: 98.3 F

## 2022-08-11 DIAGNOSIS — I10 ELEVATED BLOOD PRESSURE READING WITH DIAGNOSIS OF HYPERTENSION: Primary | ICD-10-CM

## 2022-08-11 DIAGNOSIS — R10.9 ABDOMINAL CRAMPS: ICD-10-CM

## 2022-08-11 DIAGNOSIS — R42 DIZZINESS: ICD-10-CM

## 2022-08-11 LAB
ALBUMIN SERPL-MCNC: 3.4 G/DL (ref 3.4–5)
ALBUMIN/GLOB SERPL: 0.8 {RATIO} (ref 0.8–1.7)
ALP SERPL-CCNC: 80 U/L (ref 45–117)
ALT SERPL-CCNC: 12 U/L (ref 13–56)
ANION GAP SERPL CALC-SCNC: 7 MMOL/L (ref 3–18)
APPEARANCE UR: CLEAR
AST SERPL-CCNC: 12 U/L (ref 10–38)
ATRIAL RATE: 87 BPM
BACTERIA URNS QL MICRO: NEGATIVE /HPF
BASOPHILS # BLD: 0 K/UL (ref 0–0.1)
BASOPHILS NFR BLD: 0 % (ref 0–2)
BILIRUB SERPL-MCNC: 0.7 MG/DL (ref 0.2–1)
BILIRUB UR QL: NEGATIVE
BUN SERPL-MCNC: 10 MG/DL (ref 7–18)
BUN/CREAT SERPL: 14 (ref 12–20)
CALCIUM SERPL-MCNC: 9.7 MG/DL (ref 8.5–10.1)
CALCULATED P AXIS, ECG09: 106 DEGREES
CALCULATED R AXIS, ECG10: -10 DEGREES
CALCULATED T AXIS, ECG11: 5 DEGREES
CHLORIDE SERPL-SCNC: 107 MMOL/L (ref 100–111)
CO2 SERPL-SCNC: 26 MMOL/L (ref 21–32)
COLOR UR: YELLOW
CREAT SERPL-MCNC: 0.72 MG/DL (ref 0.6–1.3)
DIAGNOSIS, 93000: NORMAL
DIFFERENTIAL METHOD BLD: ABNORMAL
EOSINOPHIL # BLD: 0.2 K/UL (ref 0–0.4)
EOSINOPHIL NFR BLD: 3 % (ref 0–5)
EPITH CASTS URNS QL MICRO: NORMAL /LPF (ref 0–5)
ERYTHROCYTE [DISTWIDTH] IN BLOOD BY AUTOMATED COUNT: 12.3 % (ref 11.6–14.5)
GLOBULIN SER CALC-MCNC: 4.3 G/DL (ref 2–4)
GLUCOSE SERPL-MCNC: 187 MG/DL (ref 74–99)
GLUCOSE UR STRIP.AUTO-MCNC: NEGATIVE MG/DL
HCT VFR BLD AUTO: 37 % (ref 35–45)
HGB BLD-MCNC: 11.7 G/DL (ref 12–16)
HGB UR QL STRIP: NEGATIVE
IMM GRANULOCYTES # BLD AUTO: 0 K/UL (ref 0–0.04)
IMM GRANULOCYTES NFR BLD AUTO: 0 % (ref 0–0.5)
KETONES UR QL STRIP.AUTO: NEGATIVE MG/DL
LEUKOCYTE ESTERASE UR QL STRIP.AUTO: ABNORMAL
LYMPHOCYTES # BLD: 2.1 K/UL (ref 0.9–3.6)
LYMPHOCYTES NFR BLD: 40 % (ref 21–52)
MAGNESIUM SERPL-MCNC: 2 MG/DL (ref 1.6–2.6)
MCH RBC QN AUTO: 31.3 PG (ref 24–34)
MCHC RBC AUTO-ENTMCNC: 31.6 G/DL (ref 31–37)
MCV RBC AUTO: 98.9 FL (ref 78–100)
MONOCYTES # BLD: 0.5 K/UL (ref 0.05–1.2)
MONOCYTES NFR BLD: 10 % (ref 3–10)
NEUTS SEG # BLD: 2.5 K/UL (ref 1.8–8)
NEUTS SEG NFR BLD: 47 % (ref 40–73)
NITRITE UR QL STRIP.AUTO: NEGATIVE
NRBC # BLD: 0 K/UL (ref 0–0.01)
NRBC BLD-RTO: 0 PER 100 WBC
P-R INTERVAL, ECG05: 138 MS
PH UR STRIP: 6 [PH] (ref 5–8)
PLATELET # BLD AUTO: 209 K/UL (ref 135–420)
PMV BLD AUTO: 10.8 FL (ref 9.2–11.8)
POTASSIUM SERPL-SCNC: 3.8 MMOL/L (ref 3.5–5.5)
PROT SERPL-MCNC: 7.7 G/DL (ref 6.4–8.2)
PROT UR STRIP-MCNC: ABNORMAL MG/DL
Q-T INTERVAL, ECG07: 348 MS
QRS DURATION, ECG06: 82 MS
QTC CALCULATION (BEZET), ECG08: 418 MS
RBC # BLD AUTO: 3.74 M/UL (ref 4.2–5.3)
RBC #/AREA URNS HPF: NEGATIVE /HPF (ref 0–5)
SODIUM SERPL-SCNC: 140 MMOL/L (ref 136–145)
SP GR UR REFRACTOMETRY: 1.02 (ref 1–1.03)
TROPONIN-HIGH SENSITIVITY: 44 NG/L (ref 0–54)
UROBILINOGEN UR QL STRIP.AUTO: 1 EU/DL (ref 0.2–1)
VENTRICULAR RATE, ECG03: 87 BPM
WBC # BLD AUTO: 5.2 K/UL (ref 4.6–13.2)
WBC URNS QL MICRO: NORMAL /HPF (ref 0–4)

## 2022-08-11 PROCEDURE — 80053 COMPREHEN METABOLIC PANEL: CPT

## 2022-08-11 PROCEDURE — 81001 URINALYSIS AUTO W/SCOPE: CPT

## 2022-08-11 PROCEDURE — 84484 ASSAY OF TROPONIN QUANT: CPT

## 2022-08-11 PROCEDURE — 85025 COMPLETE CBC W/AUTO DIFF WBC: CPT

## 2022-08-11 PROCEDURE — 74011250637 HC RX REV CODE- 250/637: Performed by: EMERGENCY MEDICINE

## 2022-08-11 PROCEDURE — 70547 MR ANGIOGRAPHY NECK W/O DYE: CPT

## 2022-08-11 PROCEDURE — 83735 ASSAY OF MAGNESIUM: CPT

## 2022-08-11 PROCEDURE — 70551 MRI BRAIN STEM W/O DYE: CPT

## 2022-08-11 PROCEDURE — 93005 ELECTROCARDIOGRAM TRACING: CPT

## 2022-08-11 PROCEDURE — 70544 MR ANGIOGRAPHY HEAD W/O DYE: CPT

## 2022-08-11 PROCEDURE — 99284 EMERGENCY DEPT VISIT MOD MDM: CPT

## 2022-08-11 PROCEDURE — 70450 CT HEAD/BRAIN W/O DYE: CPT

## 2022-08-11 RX ORDER — FAMOTIDINE 20 MG/1
20 TABLET, FILM COATED ORAL DAILY
Qty: 14 TABLET | Refills: 0 | Status: SHIPPED | OUTPATIENT
Start: 2022-08-11 | End: 2022-08-25

## 2022-08-11 RX ORDER — LORAZEPAM 1 MG/1
1 TABLET ORAL ONCE
Status: DISCONTINUED | OUTPATIENT
Start: 2022-08-11 | End: 2022-08-11 | Stop reason: HOSPADM

## 2022-08-11 RX ORDER — FAMOTIDINE 20 MG/1
20 TABLET, FILM COATED ORAL DAILY
Qty: 14 TABLET | Refills: 0 | Status: SHIPPED | OUTPATIENT
Start: 2022-08-11 | End: 2022-08-11

## 2022-08-11 RX ORDER — MECLIZINE HYDROCHLORIDE 25 MG/1
12.5 TABLET ORAL
Qty: 15 TABLET | Refills: 0 | Status: SHIPPED | OUTPATIENT
Start: 2022-08-11 | End: 2022-08-16

## 2022-08-11 RX ORDER — AMLODIPINE BESYLATE 10 MG/1
10 TABLET ORAL DAILY
Qty: 14 TABLET | Refills: 0 | Status: SHIPPED | OUTPATIENT
Start: 2022-08-11 | End: 2022-08-25

## 2022-08-11 RX ORDER — AMLODIPINE BESYLATE 10 MG/1
10 TABLET ORAL DAILY
Qty: 14 TABLET | Refills: 0 | Status: SHIPPED | OUTPATIENT
Start: 2022-08-11 | End: 2022-08-11

## 2022-08-11 RX ORDER — MECLIZINE HYDROCHLORIDE 25 MG/1
12.5 TABLET ORAL
Qty: 15 TABLET | Refills: 0 | Status: SHIPPED | OUTPATIENT
Start: 2022-08-11 | End: 2022-08-11

## 2022-08-11 RX ORDER — ONDANSETRON 4 MG/1
4 TABLET, ORALLY DISINTEGRATING ORAL
Status: COMPLETED | OUTPATIENT
Start: 2022-08-11 | End: 2022-08-11

## 2022-08-11 RX ADMIN — ONDANSETRON 4 MG: 4 TABLET, ORALLY DISINTEGRATING ORAL at 14:33

## 2022-08-11 NOTE — ED TRIAGE NOTES
Patient states Meli Sparks started feeling dizzy this morning and she took her blood pressure and patient states it was 280/186\"  Upon arrival to ED her blood pressure is 159/84.
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
## 2022-08-18 ENCOUNTER — TELEPHONE (OUTPATIENT)
Dept: FAMILY MEDICINE CLINIC | Age: 87
End: 2022-08-18

## 2022-08-23 ENCOUNTER — OFFICE VISIT (OUTPATIENT)
Dept: CARDIOLOGY CLINIC | Age: 87
End: 2022-08-23
Payer: MEDICARE

## 2022-08-23 VITALS
SYSTOLIC BLOOD PRESSURE: 130 MMHG | BODY MASS INDEX: 35.16 KG/M2 | WEIGHT: 224 LBS | OXYGEN SATURATION: 95 % | HEART RATE: 108 BPM | DIASTOLIC BLOOD PRESSURE: 76 MMHG | HEIGHT: 67 IN

## 2022-08-23 DIAGNOSIS — E78.2 MIXED HYPERLIPIDEMIA: ICD-10-CM

## 2022-08-23 DIAGNOSIS — I10 ESSENTIAL HYPERTENSION: ICD-10-CM

## 2022-08-23 DIAGNOSIS — I25.118 ATHEROSCLEROSIS OF NATIVE CORONARY ARTERY OF NATIVE HEART WITH STABLE ANGINA PECTORIS (HCC): Primary | ICD-10-CM

## 2022-08-23 DIAGNOSIS — Z95.5 S/P CORONARY ARTERY STENT PLACEMENT: ICD-10-CM

## 2022-08-23 DIAGNOSIS — I50.32 CHRONIC DIASTOLIC CONGESTIVE HEART FAILURE (HCC): ICD-10-CM

## 2022-08-23 PROCEDURE — 1123F ACP DISCUSS/DSCN MKR DOCD: CPT | Performed by: INTERNAL MEDICINE

## 2022-08-23 PROCEDURE — G8399 PT W/DXA RESULTS DOCUMENT: HCPCS | Performed by: INTERNAL MEDICINE

## 2022-08-23 PROCEDURE — G8536 NO DOC ELDER MAL SCRN: HCPCS | Performed by: INTERNAL MEDICINE

## 2022-08-23 PROCEDURE — G8427 DOCREV CUR MEDS BY ELIG CLIN: HCPCS | Performed by: INTERNAL MEDICINE

## 2022-08-23 PROCEDURE — 99214 OFFICE O/P EST MOD 30 MIN: CPT | Performed by: INTERNAL MEDICINE

## 2022-08-23 PROCEDURE — 1090F PRES/ABSN URINE INCON ASSESS: CPT | Performed by: INTERNAL MEDICINE

## 2022-08-23 PROCEDURE — G8432 DEP SCR NOT DOC, RNG: HCPCS | Performed by: INTERNAL MEDICINE

## 2022-08-23 PROCEDURE — G8417 CALC BMI ABV UP PARAM F/U: HCPCS | Performed by: INTERNAL MEDICINE

## 2022-08-23 PROCEDURE — 1101F PT FALLS ASSESS-DOCD LE1/YR: CPT | Performed by: INTERNAL MEDICINE

## 2022-08-23 NOTE — PROGRESS NOTES
1. Have you been to the ER, urgent care clinic since your last visit? Hospitalized since your last visit? Yes Where: [facility]    2. Have you seen or consulted any other health care providers outside of the [address] [place] [address] since your last visit? Include any pap smears or colon screening.  No
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
## 2022-09-03 ENCOUNTER — HOSPITAL ENCOUNTER (EMERGENCY)
Age: 87
Discharge: HOME | End: 2022-09-04
Attending: EMERGENCY MEDICINE
Payer: MEDICARE

## 2022-09-03 ENCOUNTER — APPOINTMENT (OUTPATIENT)
Dept: GENERAL RADIOLOGY | Age: 87
End: 2022-09-03
Attending: EMERGENCY MEDICINE
Payer: MEDICARE

## 2022-09-03 ENCOUNTER — HOSPITAL ENCOUNTER (EMERGENCY)
Age: 87
Discharge: HOME | End: 2022-09-03
Attending: EMERGENCY MEDICINE
Payer: MEDICARE

## 2022-09-03 ENCOUNTER — APPOINTMENT (OUTPATIENT)
Dept: MRI IMAGING | Age: 87
End: 2022-09-03
Attending: PHYSICIAN ASSISTANT
Payer: MEDICARE

## 2022-09-03 ENCOUNTER — APPOINTMENT (OUTPATIENT)
Dept: CT IMAGING | Age: 87
End: 2022-09-03
Attending: EMERGENCY MEDICINE
Payer: MEDICARE

## 2022-09-03 VITALS
HEART RATE: 100 BPM | OXYGEN SATURATION: 100 % | WEIGHT: 212 LBS | TEMPERATURE: 99 F | SYSTOLIC BLOOD PRESSURE: 147 MMHG | HEIGHT: 67 IN | DIASTOLIC BLOOD PRESSURE: 83 MMHG | BODY MASS INDEX: 33.27 KG/M2 | RESPIRATION RATE: 19 BRPM

## 2022-09-03 DIAGNOSIS — R51.9 NONINTRACTABLE EPISODIC HEADACHE, UNSPECIFIED HEADACHE TYPE: Primary | ICD-10-CM

## 2022-09-03 DIAGNOSIS — R42 DIZZINESS: Primary | ICD-10-CM

## 2022-09-03 DIAGNOSIS — E87.6 HYPOKALEMIA: ICD-10-CM

## 2022-09-03 LAB
ALBUMIN SERPL-MCNC: 3.4 G/DL (ref 3.4–5)
ALBUMIN SERPL-MCNC: 3.4 G/DL (ref 3.4–5)
ALBUMIN/GLOB SERPL: 0.7 {RATIO} (ref 0.8–1.7)
ALBUMIN/GLOB SERPL: 0.7 {RATIO} (ref 0.8–1.7)
ALP SERPL-CCNC: 91 U/L (ref 45–117)
ALP SERPL-CCNC: 94 U/L (ref 45–117)
ALT SERPL-CCNC: 12 U/L (ref 13–56)
ALT SERPL-CCNC: 14 U/L (ref 13–56)
ANION GAP SERPL CALC-SCNC: 5 MMOL/L (ref 3–18)
ANION GAP SERPL CALC-SCNC: 6 MMOL/L (ref 3–18)
APPEARANCE UR: CLEAR
AST SERPL-CCNC: 11 U/L (ref 10–38)
AST SERPL-CCNC: 27 U/L (ref 10–38)
ATRIAL RATE: 119 BPM
ATRIAL RATE: 92 BPM
BASOPHILS # BLD: 0 K/UL (ref 0–0.1)
BASOPHILS # BLD: 0 K/UL (ref 0–0.1)
BASOPHILS NFR BLD: 0 % (ref 0–2)
BASOPHILS NFR BLD: 1 % (ref 0–2)
BILIRUB SERPL-MCNC: 0.6 MG/DL (ref 0.2–1)
BILIRUB SERPL-MCNC: 0.7 MG/DL (ref 0.2–1)
BILIRUB UR QL: NEGATIVE
BUN SERPL-MCNC: 7 MG/DL (ref 7–18)
BUN SERPL-MCNC: 9 MG/DL (ref 7–18)
BUN/CREAT SERPL: 11 (ref 12–20)
BUN/CREAT SERPL: 13 (ref 12–20)
CALCIUM SERPL-MCNC: 9.3 MG/DL (ref 8.5–10.1)
CALCIUM SERPL-MCNC: 9.7 MG/DL (ref 8.5–10.1)
CALCULATED P AXIS, ECG09: 73 DEGREES
CALCULATED R AXIS, ECG10: -20 DEGREES
CALCULATED R AXIS, ECG10: -22 DEGREES
CALCULATED T AXIS, ECG11: -73 DEGREES
CALCULATED T AXIS, ECG11: 40 DEGREES
CHLORIDE SERPL-SCNC: 104 MMOL/L (ref 100–111)
CHLORIDE SERPL-SCNC: 105 MMOL/L (ref 100–111)
CO2 SERPL-SCNC: 26 MMOL/L (ref 21–32)
CO2 SERPL-SCNC: 29 MMOL/L (ref 21–32)
COLOR UR: YELLOW
CREAT SERPL-MCNC: 0.64 MG/DL (ref 0.6–1.3)
CREAT SERPL-MCNC: 0.68 MG/DL (ref 0.6–1.3)
DIAGNOSIS, 93000: NORMAL
DIAGNOSIS, 93000: NORMAL
DIFFERENTIAL METHOD BLD: ABNORMAL
DIFFERENTIAL METHOD BLD: ABNORMAL
EOSINOPHIL # BLD: 0.1 K/UL (ref 0–0.4)
EOSINOPHIL # BLD: 0.2 K/UL (ref 0–0.4)
EOSINOPHIL NFR BLD: 1 % (ref 0–5)
EOSINOPHIL NFR BLD: 4 % (ref 0–5)
EPITH CASTS URNS QL MICRO: ABNORMAL /LPF (ref 0–5)
ERYTHROCYTE [DISTWIDTH] IN BLOOD BY AUTOMATED COUNT: 12.4 % (ref 11.6–14.5)
ERYTHROCYTE [DISTWIDTH] IN BLOOD BY AUTOMATED COUNT: 12.6 % (ref 11.6–14.5)
FLUAV RNA SPEC QL NAA+PROBE: NOT DETECTED
FLUBV RNA SPEC QL NAA+PROBE: NOT DETECTED
GLOBULIN SER CALC-MCNC: 4.8 G/DL (ref 2–4)
GLOBULIN SER CALC-MCNC: 5.2 G/DL (ref 2–4)
GLUCOSE SERPL-MCNC: 165 MG/DL (ref 74–99)
GLUCOSE SERPL-MCNC: 233 MG/DL (ref 74–99)
GLUCOSE UR STRIP.AUTO-MCNC: NEGATIVE MG/DL
HCT VFR BLD AUTO: 36.5 % (ref 35–45)
HCT VFR BLD AUTO: 37 % (ref 35–45)
HGB BLD-MCNC: 11.8 G/DL (ref 12–16)
HGB BLD-MCNC: 11.8 G/DL (ref 12–16)
HGB UR QL STRIP: NEGATIVE
IMM GRANULOCYTES # BLD AUTO: 0 K/UL (ref 0–0.04)
IMM GRANULOCYTES # BLD AUTO: 0 K/UL (ref 0–0.04)
IMM GRANULOCYTES NFR BLD AUTO: 0 % (ref 0–0.5)
IMM GRANULOCYTES NFR BLD AUTO: 0 % (ref 0–0.5)
KETONES UR QL STRIP.AUTO: NEGATIVE MG/DL
LEUKOCYTE ESTERASE UR QL STRIP.AUTO: ABNORMAL
LIPASE SERPL-CCNC: 54 U/L (ref 73–393)
LYMPHOCYTES # BLD: 0.9 K/UL (ref 0.9–3.6)
LYMPHOCYTES # BLD: 2.5 K/UL (ref 0.9–3.6)
LYMPHOCYTES NFR BLD: 15 % (ref 21–52)
LYMPHOCYTES NFR BLD: 40 % (ref 21–52)
MAGNESIUM SERPL-MCNC: 1.9 MG/DL (ref 1.6–2.6)
MAGNESIUM SERPL-MCNC: 2.1 MG/DL (ref 1.6–2.6)
MCH RBC QN AUTO: 31.3 PG (ref 24–34)
MCH RBC QN AUTO: 32.2 PG (ref 24–34)
MCHC RBC AUTO-ENTMCNC: 31.9 G/DL (ref 31–37)
MCHC RBC AUTO-ENTMCNC: 32.3 G/DL (ref 31–37)
MCV RBC AUTO: 98.1 FL (ref 78–100)
MCV RBC AUTO: 99.5 FL (ref 78–100)
MONOCYTES # BLD: 0.4 K/UL (ref 0.05–1.2)
MONOCYTES # BLD: 0.5 K/UL (ref 0.05–1.2)
MONOCYTES NFR BLD: 6 % (ref 3–10)
MONOCYTES NFR BLD: 8 % (ref 3–10)
MUCOUS THREADS URNS QL MICRO: ABNORMAL /LPF
NEUTS SEG # BLD: 3 K/UL (ref 1.8–8)
NEUTS SEG # BLD: 4.6 K/UL (ref 1.8–8)
NEUTS SEG NFR BLD: 48 % (ref 40–73)
NEUTS SEG NFR BLD: 78 % (ref 40–73)
NITRITE UR QL STRIP.AUTO: NEGATIVE
NRBC # BLD: 0 K/UL (ref 0–0.01)
NRBC # BLD: 0 K/UL (ref 0–0.01)
NRBC BLD-RTO: 0 PER 100 WBC
NRBC BLD-RTO: 0 PER 100 WBC
P-R INTERVAL, ECG05: 132 MS
PH UR STRIP: 6.5 [PH] (ref 5–8)
PLATELET # BLD AUTO: 211 K/UL (ref 135–420)
PLATELET # BLD AUTO: 215 K/UL (ref 135–420)
PMV BLD AUTO: 10.3 FL (ref 9.2–11.8)
PMV BLD AUTO: 10.7 FL (ref 9.2–11.8)
POTASSIUM SERPL-SCNC: 3 MMOL/L (ref 3.5–5.5)
POTASSIUM SERPL-SCNC: 4.2 MMOL/L (ref 3.5–5.5)
PROT SERPL-MCNC: 8.2 G/DL (ref 6.4–8.2)
PROT SERPL-MCNC: 8.6 G/DL (ref 6.4–8.2)
PROT UR STRIP-MCNC: 30 MG/DL
Q-T INTERVAL, ECG07: 378 MS
Q-T INTERVAL, ECG07: 386 MS
QRS DURATION, ECG06: 82 MS
QRS DURATION, ECG06: 86 MS
QTC CALCULATION (BEZET), ECG08: 467 MS
QTC CALCULATION (BEZET), ECG08: 541 MS
RBC # BLD AUTO: 3.67 M/UL (ref 4.2–5.3)
RBC # BLD AUTO: 3.77 M/UL (ref 4.2–5.3)
RBC #/AREA URNS HPF: ABNORMAL /HPF (ref 0–5)
SARS-COV-2, COV2: NOT DETECTED
SODIUM SERPL-SCNC: 137 MMOL/L (ref 136–145)
SODIUM SERPL-SCNC: 138 MMOL/L (ref 136–145)
SP GR UR REFRACTOMETRY: 1.02 (ref 1–1.03)
TROPONIN-HIGH SENSITIVITY: 42 NG/L (ref 0–54)
TROPONIN-HIGH SENSITIVITY: 47 NG/L (ref 0–54)
UROBILINOGEN UR QL STRIP.AUTO: 1 EU/DL (ref 0.2–1)
VENTRICULAR RATE, ECG03: 118 BPM
VENTRICULAR RATE, ECG03: 92 BPM
WBC # BLD AUTO: 5.9 K/UL (ref 4.6–13.2)
WBC # BLD AUTO: 6.2 K/UL (ref 4.6–13.2)
WBC URNS QL MICRO: ABNORMAL /HPF (ref 0–4)

## 2022-09-03 PROCEDURE — 83735 ASSAY OF MAGNESIUM: CPT

## 2022-09-03 PROCEDURE — 70551 MRI BRAIN STEM W/O DYE: CPT

## 2022-09-03 PROCEDURE — 99285 EMERGENCY DEPT VISIT HI MDM: CPT

## 2022-09-03 PROCEDURE — 81001 URINALYSIS AUTO W/SCOPE: CPT

## 2022-09-03 PROCEDURE — 74011250637 HC RX REV CODE- 250/637: Performed by: EMERGENCY MEDICINE

## 2022-09-03 PROCEDURE — 96375 TX/PRO/DX INJ NEW DRUG ADDON: CPT

## 2022-09-03 PROCEDURE — 80053 COMPREHEN METABOLIC PANEL: CPT

## 2022-09-03 PROCEDURE — 87636 SARSCOV2 & INF A&B AMP PRB: CPT

## 2022-09-03 PROCEDURE — 85025 COMPLETE CBC W/AUTO DIFF WBC: CPT

## 2022-09-03 PROCEDURE — 96374 THER/PROPH/DIAG INJ IV PUSH: CPT

## 2022-09-03 PROCEDURE — 74011250636 HC RX REV CODE- 250/636: Performed by: PHYSICIAN ASSISTANT

## 2022-09-03 PROCEDURE — 70450 CT HEAD/BRAIN W/O DYE: CPT

## 2022-09-03 PROCEDURE — 74011250636 HC RX REV CODE- 250/636: Performed by: EMERGENCY MEDICINE

## 2022-09-03 PROCEDURE — 84484 ASSAY OF TROPONIN QUANT: CPT

## 2022-09-03 PROCEDURE — 93005 ELECTROCARDIOGRAM TRACING: CPT

## 2022-09-03 PROCEDURE — 83690 ASSAY OF LIPASE: CPT

## 2022-09-03 PROCEDURE — 74011000250 HC RX REV CODE- 250: Performed by: EMERGENCY MEDICINE

## 2022-09-03 PROCEDURE — 71045 X-RAY EXAM CHEST 1 VIEW: CPT

## 2022-09-03 RX ORDER — DIPHENHYDRAMINE HYDROCHLORIDE 50 MG/ML
12.5 INJECTION, SOLUTION INTRAMUSCULAR; INTRAVENOUS
Status: COMPLETED | OUTPATIENT
Start: 2022-09-03 | End: 2022-09-03

## 2022-09-03 RX ORDER — POTASSIUM CHLORIDE 20 MEQ/1
40 TABLET, EXTENDED RELEASE ORAL
Status: DISCONTINUED | OUTPATIENT
Start: 2022-09-03 | End: 2022-09-04 | Stop reason: HOSPADM

## 2022-09-03 RX ORDER — FAMOTIDINE 10 MG/ML
20 INJECTION INTRAVENOUS
Status: COMPLETED | OUTPATIENT
Start: 2022-09-03 | End: 2022-09-03

## 2022-09-03 RX ORDER — ONDANSETRON 2 MG/ML
4 INJECTION INTRAMUSCULAR; INTRAVENOUS
Status: COMPLETED | OUTPATIENT
Start: 2022-09-03 | End: 2022-09-03

## 2022-09-03 RX ORDER — PROCHLORPERAZINE EDISYLATE 5 MG/ML
5 INJECTION INTRAMUSCULAR; INTRAVENOUS
Status: COMPLETED | OUTPATIENT
Start: 2022-09-03 | End: 2022-09-03

## 2022-09-03 RX ADMIN — DIPHENHYDRAMINE HYDROCHLORIDE 12.5 MG: 50 INJECTION, SOLUTION INTRAMUSCULAR; INTRAVENOUS at 19:11

## 2022-09-03 RX ADMIN — SODIUM CHLORIDE 500 ML: 900 INJECTION, SOLUTION INTRAVENOUS at 11:20

## 2022-09-03 RX ADMIN — LIDOCAINE HYDROCHLORIDE 40 ML: 20 SOLUTION ORAL; TOPICAL at 14:05

## 2022-09-03 RX ADMIN — METHYLPREDNISOLONE SODIUM SUCCINATE 125 MG: 125 INJECTION, POWDER, FOR SOLUTION INTRAMUSCULAR; INTRAVENOUS at 19:11

## 2022-09-03 RX ADMIN — FAMOTIDINE 20 MG: 10 INJECTION INTRAVENOUS at 23:30

## 2022-09-03 RX ADMIN — PROCHLORPERAZINE EDISYLATE 5 MG: 5 INJECTION INTRAMUSCULAR; INTRAVENOUS at 19:11

## 2022-09-03 RX ADMIN — ONDANSETRON 4 MG: 2 INJECTION INTRAMUSCULAR; INTRAVENOUS at 19:11

## 2022-09-03 NOTE — ED TRIAGE NOTES
Pt arrives from home with c/o 9/10 head pressure.  Pt seen at other facility this am. Pt c/o nausea with head pressure

## 2022-09-03 NOTE — ED PROVIDER NOTES
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 computer voice recognition software. Quite often unanticipated grammatical, syntax, homophones, and other interpretive errors are inadvertently transcribed by the computer software. Please disregard these errors. Please excuse any errors that have escaped final proofreading. \"

## 2022-09-03 NOTE — ED NOTES
7:42 PM :Pt care assumed from Mati Alva PA-C, ED provider. Pt complaint(s), current treatment plan, progression and available diagnostic results have been discussed thoroughly. The patient was seen and evaluated on my shift. Rounding occurred: yes  Intended Disposition: d/c  Pending diagnostic reports and/or labs (please list): MRI    Recent Results (from the past 12 hour(s))   TROPONIN-HIGH SENSITIVITY    Collection Time: 09/03/22 12:20 PM   Result Value Ref Range    Troponin-High Sensitivity 47 0 - 54 ng/L   URINALYSIS W/ RFLX MICROSCOPIC    Collection Time: 09/03/22  1:32 PM   Result Value Ref Range    Color YELLOW      Appearance CLEAR      Specific gravity 1.019 1.005 - 1.030      pH (UA) 6.5 5.0 - 8.0      Protein 30 (A) NEG mg/dL    Glucose Negative NEG mg/dL    Ketone Negative NEG mg/dL    Bilirubin Negative NEG      Blood Negative NEG      Urobilinogen 1.0 0.2 - 1.0 EU/dL    Nitrites Negative NEG      Leukocyte Esterase TRACE (A) NEG     URINE MICROSCOPIC ONLY    Collection Time: 09/03/22  1:32 PM   Result Value Ref Range    WBC 0 to 3 0 - 4 /hpf    RBC NONE 0 - 5 /hpf    Epithelial cells 2+ 0 - 5 /lpf    Mucus 2+ (A) NEG /lpf   CBC WITH AUTOMATED DIFF    Collection Time: 09/03/22  6:30 PM   Result Value Ref Range    WBC 5.9 4.6 - 13.2 K/uL    RBC 3.77 (L) 4.20 - 5.30 M/uL    HGB 11.8 (L) 12.0 - 16.0 g/dL    HCT 37.0 35.0 - 45.0 %    MCV 98.1 78.0 - 100.0 FL    MCH 31.3 24.0 - 34.0 PG    MCHC 31.9 31.0 - 37.0 g/dL    RDW 12.6 11.6 - 14.5 %    PLATELET 434 696 - 526 K/uL    MPV 10.3 9.2 - 11.8 FL    NRBC 0.0 0  WBC    ABSOLUTE NRBC 0.00 0.00 - 0.01 K/uL    NEUTROPHILS 78 (H) 40 - 73 %    LYMPHOCYTES 15 (L) 21 - 52 %    MONOCYTES 6 3 - 10 %    EOSINOPHILS 1 0 - 5 %    BASOPHILS 0 0 - 2 %    IMMATURE GRANULOCYTES 0 0.0 - 0.5 %    ABS. NEUTROPHILS 4.6 1.8 - 8.0 K/UL    ABS. LYMPHOCYTES 0.9 0.9 - 3.6 K/UL    ABS. MONOCYTES 0.4 0.05 - 1.2 K/UL    ABS. EOSINOPHILS 0.1 0.0 - 0.4 K/UL    ABS. BASOPHILS 0.0 0.0 - 0.1 K/UL    ABS. IMM. GRANS. 0.0 0.00 - 0.04 K/UL    DF AUTOMATED     METABOLIC PANEL, COMPREHENSIVE    Collection Time: 09/03/22  6:30 PM   Result Value Ref Range    Sodium 138 136 - 145 mmol/L    Potassium 3.0 (L) 3.5 - 5.5 mmol/L    Chloride 104 100 - 111 mmol/L    CO2 29 21 - 32 mmol/L    Anion gap 5 3.0 - 18 mmol/L    Glucose 165 (H) 74 - 99 mg/dL    BUN 7 7.0 - 18 MG/DL    Creatinine 0.64 0.6 - 1.3 MG/DL    BUN/Creatinine ratio 11 (L) 12 - 20      GFR est AA >60 >60 ml/min/1.73m2    GFR est non-AA >60 >60 ml/min/1.73m2    Calcium 9.3 8.5 - 10.1 MG/DL    Bilirubin, total 0.6 0.2 - 1.0 MG/DL    ALT (SGPT) 12 (L) 13 - 56 U/L    AST (SGOT) 11 10 - 38 U/L    Alk. phosphatase 91 45 - 117 U/L    Protein, total 8.6 (H) 6.4 - 8.2 g/dL    Albumin 3.4 3.4 - 5.0 g/dL    Globulin 5.2 (H) 2.0 - 4.0 g/dL    A-G Ratio 0.7 (L) 0.8 - 1.7     MAGNESIUM    Collection Time: 09/03/22  6:30 PM   Result Value Ref Range    Magnesium 2.1 1.6 - 2.6 mg/dL   LIPASE    Collection Time: 09/03/22  6:30 PM   Result Value Ref Range    Lipase 54 (L) 73 - 393 U/L   COVID-19 WITH INFLUENZA A/B    Collection Time: 09/03/22  7:25 PM   Result Value Ref Range    SARS-CoV-2 by PCR Not detected NOTD      Influenza A by PCR Not detected NOTD      Influenza B by PCR Not detected NOTD       MRI BRAIN WO CONT   Final Result   1. No acute intracranial process. 2.  Moderate parenchymal volume loss and chronic small vessel ischemic changes. 12:00 AM: Pt sleeping soundly. Reviewed results and plan with patient. Notes pain has resolved, feeling better. Discussed need for close outpatient follow-up this week for reassessment. Discussed strict return precautions, including dizziness, changes in vision, or any other medical concerns. In agreement with plan, ready to go home.
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
## 2022-09-04 VITALS
TEMPERATURE: 97.3 F | HEART RATE: 85 BPM | BODY MASS INDEX: 36.07 KG/M2 | HEIGHT: 67 IN | DIASTOLIC BLOOD PRESSURE: 86 MMHG | SYSTOLIC BLOOD PRESSURE: 159 MMHG | WEIGHT: 229.8 LBS | RESPIRATION RATE: 16 BRPM | OXYGEN SATURATION: 99 %

## 2022-09-04 LAB
ATRIAL RATE: 104 BPM
CALCULATED P AXIS, ECG09: 77 DEGREES
CALCULATED R AXIS, ECG10: -36 DEGREES
CALCULATED T AXIS, ECG11: 84 DEGREES
DIAGNOSIS, 93000: NORMAL
P-R INTERVAL, ECG05: 172 MS
Q-T INTERVAL, ECG07: 368 MS
QRS DURATION, ECG06: 84 MS
QTC CALCULATION (BEZET), ECG08: 483 MS
VENTRICULAR RATE, ECG03: 104 BPM

## 2022-09-04 RX ORDER — POTASSIUM CHLORIDE 20 MEQ/1
20 TABLET, EXTENDED RELEASE ORAL 3 TIMES DAILY
Qty: 9 TABLET | Refills: 0 | Status: SHIPPED | OUTPATIENT
Start: 2022-09-04 | End: 2022-09-07

## 2022-09-04 NOTE — ED NOTES
Pt discharged to waiting room with all personal belongings and discharge papers. Pt axo x3. NAD observed.

## 2022-09-04 NOTE — DISCHARGE INSTRUCTIONS
Take medication as prescribed. Follow-up with your primary care physician within 2 days for reassessment. Bring the results from this visit with you for their review. Return to the ED immediately for any new, worsening, or persistent symptoms, including changes in vision, weakness, or any other medical concerns.

## 2022-09-04 NOTE — ED NOTES
Pt provided discharge instructions. Pt reports that she will not call anyone for a ride home until 0700.

## 2022-09-06 NOTE — PROGRESS NOTES
HISTORY:  She is essentially two months status post periprosthetic fracture dislocation of the left knee. She's at home currently. She is doing well. She has virtually no pain associated with her left leg. She has been intermittently wearing her straight leg immobilizer. She is nonweightbearing strict of the left lower extremity. EXAMINATION:  The surgical sites have healed nicely associated with the lateral left distal femur. She has active ROM of the left knee without pain nor instability at essentially 85 degrees - 0. There is no calf tenderness or evidence of DVT. She is modestly weak in the quad hamstrings of the left leg noted at barely 3+/5. RADIOGRAPHS:  Xrays today of the left knee reveal a lateral supracondylar plate noted with callus inlay associated in AP imaging with the medial distal portion of the fracture extending into the prosthesis. On lateral there is a displaced fragment which is posterior to the prosthesis. All screws are intact and plate is approximated accordingly. PLAN:  The patient at this point is going to discontinue use of the straight leg immobilizer. She is going to stay strict nonweightbearing. She is using a bone growth stimulator and will continue 20 minutes each day both on the inside and outside of the leg. As well, we will continue physical therapy for some gentle strengthening of the quad/hamstring complexes of the left leg in hopes of by next visit based on xrays to get her weightbearing partially to the left lower extremity. Today xrays reviewed, all of her questions answered to her satisfaction. Physical Therapy  Continue Current Plan of Care     Patient Name:  Chayito Chung   MRN:  [id number]  Admitting Diagnosis:  Chronic obstructive pulmonary disease with acute exacerbation   Recent Surgery: * No surgery found *    Admit Date: 8/22/2022  Length of Stay: 13 days    Patient not seen on this date, however per chart review pt continues to benefit from acute PT services. PT to follow up as POC allows. Please continue progressive mobility as appropriate.    Discharge Disposition Recommendation: ANTWAN Vegas PTA  9/6/2022  Pager: [phone]

## 2022-09-07 ENCOUNTER — OFFICE VISIT (OUTPATIENT)
Dept: FAMILY MEDICINE CLINIC | Age: 87
End: 2022-09-07
Payer: MEDICARE

## 2022-09-07 VITALS
TEMPERATURE: 98.1 F | WEIGHT: 229 LBS | HEART RATE: 112 BPM | SYSTOLIC BLOOD PRESSURE: 122 MMHG | BODY MASS INDEX: 35.94 KG/M2 | HEIGHT: 67 IN | OXYGEN SATURATION: 98 % | DIASTOLIC BLOOD PRESSURE: 68 MMHG | RESPIRATION RATE: 17 BRPM

## 2022-09-07 DIAGNOSIS — E87.6 HYPOKALEMIA: ICD-10-CM

## 2022-09-07 DIAGNOSIS — R51.9 NONINTRACTABLE EPISODIC HEADACHE, UNSPECIFIED HEADACHE TYPE: Primary | ICD-10-CM

## 2022-09-07 DIAGNOSIS — H91.92 DECREASED HEARING OF LEFT EAR: ICD-10-CM

## 2022-09-07 PROCEDURE — 99214 OFFICE O/P EST MOD 30 MIN: CPT | Performed by: NURSE PRACTITIONER

## 2022-09-07 PROCEDURE — 1101F PT FALLS ASSESS-DOCD LE1/YR: CPT | Performed by: NURSE PRACTITIONER

## 2022-09-07 PROCEDURE — 1090F PRES/ABSN URINE INCON ASSESS: CPT | Performed by: NURSE PRACTITIONER

## 2022-09-07 PROCEDURE — G8399 PT W/DXA RESULTS DOCUMENT: HCPCS | Performed by: NURSE PRACTITIONER

## 2022-09-07 PROCEDURE — G8536 NO DOC ELDER MAL SCRN: HCPCS | Performed by: NURSE PRACTITIONER

## 2022-09-07 PROCEDURE — G8432 DEP SCR NOT DOC, RNG: HCPCS | Performed by: NURSE PRACTITIONER

## 2022-09-07 PROCEDURE — 1123F ACP DISCUSS/DSCN MKR DOCD: CPT | Performed by: NURSE PRACTITIONER

## 2022-09-07 PROCEDURE — G8427 DOCREV CUR MEDS BY ELIG CLIN: HCPCS | Performed by: NURSE PRACTITIONER

## 2022-09-07 PROCEDURE — G8417 CALC BMI ABV UP PARAM F/U: HCPCS | Performed by: NURSE PRACTITIONER

## 2022-09-07 NOTE — PROGRESS NOTES
Chief Complaint   Patient presents with    Hospital Follow Up     Unable to hear out of left ear. Assessment & Plan:     1. Nonintractable episodic headache, unspecified headache type  Comments:  ER notes, diagnostic testing and labs reviewed  Continue supportive measures  2. Decreased hearing of left ear  Comments:  Declined referral to Dr. Tran Landis, patient to call her preferred ENT specialist  3. Hypokalemia  Comments:  Advised not to crush potassium pills but may dissolve in water  Check BMP in 1 week  Orders:  -     METABOLIC PANEL, BASIC; Future    Follow-up and Dispositions    Return in 7 weeks (on 10/26/2022) for blood pressure, cholesterol with Dr. Corina Stewart. Subjective:     HPI    Patient was seen in the ER on 09/03/2022 for headache, dizziness, nausea and vomiting. MRI was unremarkable. Labs showed hypokalemia but otherwise unremarkable. She was also tested for flu and COVID which were both negative. At the time of her headache at home, she took some Tylenol. She states her headache was interfering with her hearing and she had some dizziness. She states she was sent home on potassium chloride, which she has been crushing because they were too big to swallow. Patient also c/o reduced hearing of left ear. She reports hx of tinnitus which she saw ENT for in the past.  She states no one checked her ear in the ER. Review of Systems   Constitutional:  Negative for chills, fever and malaise/fatigue. HENT:  Positive for hearing loss (left ear). Respiratory:  Negative for shortness of breath. Cardiovascular:  Negative for chest pain. Neurological:  Positive for headaches. Objective:   /68 (BP 1 Location: Left upper arm, BP Patient Position: Sitting, BP Cuff Size: Adult)   Pulse (!) 112   Temp 98.1 °F (36.7 °C) (Oral)   Resp 17   Ht 5' 7\" (1.702 m)   Wt 229 lb (103.9 kg)   SpO2 98%   BMI 35.87 kg/m²      Physical Exam  Vitals and nursing note reviewed. Constitutional:       General: She is not in acute distress. Appearance: She is not ill-appearing. HENT:      Head: Normocephalic and atraumatic. Right Ear: Ear canal and external ear normal. There is impacted cerumen. Left Ear: Tympanic membrane, ear canal and external ear normal. There is no impacted cerumen. Cardiovascular:      Rate and Rhythm: Regular rhythm. Tachycardia present. Pulmonary:      Effort: Pulmonary effort is normal. No respiratory distress. Breath sounds: No wheezing, rhonchi or rales. Skin:     General: Skin is warm and dry. Neurological:      General: No focal deficit present. Mental Status: She is alert and oriented to person, place, and time. Psychiatric:         Mood and Affect: Mood normal.         Thought Content:  Thought content normal.         Judgment: Judgment normal.          MILY KnappC

## 2022-09-07 NOTE — PROGRESS NOTES
Bandar Crow presents today for   Chief Complaint   Patient presents with    Hospital Follow Up     Unable to hear out of left ear. Is someone accompanying this pt? no    Is the patient using any DME equipment during [address]? Patient in wheelchair    Depression Screening:  3 most recent PHQ Screens 9/7/2022   PHQ Not Done -   Little interest or pleasure in doing things Not at all   Feeling down, depressed, irritable, or hopeless Not at all   Total Score PHQ 2 0       Learning Assessment:  Learning Assessment 3/1/2021   PRIMARY LEARNER Patient   HIGHEST LEVEL OF EDUCATION - PRIMARY LEARNER  [facility] [address] PRIMARY LEARNER NONE   CO-LEARNER CAREGIVER No   CO-LEARNER NAME -   PRIMARY LANGUAGE ENGLISH    NEED -   LEARNER PREFERENCE PRIMARY READING     -     -     -     -   ANSWERED BY patient    RELATIONSHIP SELF       Fall Risk  Fall Risk Assessment, last 12 mths 2/7/2022   Able to walk? No   Fall in past 12 months? -   Do you feel unsteady? -   Are you worried about falling -   Is TUG test greater than 12 seconds?  -   Is the gait abnormal? -   Number of falls in past 12 months -   Fall with injury? -       ADL  ADL Assessment 2/7/2022   Feeding yourself No Help Needed   Getting from bed to chair Help Needed   Getting dressed Help Needed   Bathing or showering Help Needed   Walk across the room (includes cane/walker) Help Needed   Using the telphone No Help Needed   Taking your medications No Help Needed   Preparing meals Help Needed   Managing money (expenses/bills) No Help Needed   Moderately strenuous housework (laundry) Help Needed   Shopping for personal items (toiletries/medicines) Help Needed   Shopping for groceries Help Needed   Driving Help Needed   Climbing a flight of stairs Help Needed   Getting to places beyond walking distances Help Needed       Travel Screening:    Travel Screening       Question Response    In the last 10 days, have you been in contact with someone who was confirmed or suspected to have Coronavirus/COVID-19? No / Unsure    Have you had a COVID-19 viral test in the last 10 days? No    Do you have any of the following new or worsening symptoms? None of these    Have you traveled internationally or domestically in the last month? No          Travel History   Travel since 08/07/22    No documented travel since 08/07/22         Health Maintenance reviewed and discussed and ordered per Provider. Health Maintenance Due   Topic Date Due    Shingrix Vaccine Age 49> (1 of 2) Never done    Foot Exam Q1  10/24/2018    Pneumococcal 65+ years (2 - PPSV23 or PCV20) 02/19/2020    MICROALBUMIN Q1  02/25/2020    COVID-19 Vaccine (3 - Booster for Pfizer series) 10/03/2021    Eye Exam Retinal or Dilated  11/12/2021    A1C test (Diabetic or Prediabetic)  12/17/2021    Lipid Screen  06/17/2022    Flu Vaccine (1) 09/01/2022   . Coordination of Care:  1. Have you been to the ER, urgent care clinic since your last visit? Hospitalized since your last visit? Yes     2. Have you seen or consulted any other health care providers outside of the [address] [place] [address] since your last visit? Include any pap smears or colon screening.  no

## 2022-09-08 ENCOUNTER — HOSPITAL ENCOUNTER (OUTPATIENT)
Age: 87
Setting detail: OBSERVATION
Discharge: HOME | End: 2022-09-10
Attending: EMERGENCY MEDICINE | Admitting: INTERNAL MEDICINE
Payer: MEDICARE

## 2022-09-08 ENCOUNTER — APPOINTMENT (OUTPATIENT)
Dept: GENERAL RADIOLOGY | Age: 87
End: 2022-09-08
Attending: PHYSICIAN ASSISTANT
Payer: MEDICARE

## 2022-09-08 DIAGNOSIS — J30.9 ALLERGIC RHINITIS, UNSPECIFIED SEASONALITY, UNSPECIFIED TRIGGER: ICD-10-CM

## 2022-09-08 DIAGNOSIS — N39.0 URINARY TRACT INFECTION WITHOUT HEMATURIA, SITE UNSPECIFIED: ICD-10-CM

## 2022-09-08 DIAGNOSIS — R07.9 CHEST PAIN, UNSPECIFIED TYPE: Primary | ICD-10-CM

## 2022-09-08 DIAGNOSIS — R51.9 CHRONIC NONINTRACTABLE HEADACHE, UNSPECIFIED HEADACHE TYPE: ICD-10-CM

## 2022-09-08 DIAGNOSIS — J45.20 MILD INTERMITTENT ASTHMA WITHOUT COMPLICATION: ICD-10-CM

## 2022-09-08 DIAGNOSIS — G89.29 CHRONIC NONINTRACTABLE HEADACHE, UNSPECIFIED HEADACHE TYPE: ICD-10-CM

## 2022-09-08 LAB
ALBUMIN SERPL-MCNC: 3.2 G/DL (ref 3.4–5)
ALBUMIN/GLOB SERPL: 0.7 {RATIO} (ref 0.8–1.7)
ALP SERPL-CCNC: 86 U/L (ref 45–117)
ALT SERPL-CCNC: 13 U/L (ref 13–56)
ANION GAP SERPL CALC-SCNC: 5 MMOL/L (ref 3–18)
APPEARANCE UR: ABNORMAL
AST SERPL-CCNC: 15 U/L (ref 10–38)
BACTERIA URNS QL MICRO: NEGATIVE /HPF
BASOPHILS # BLD: 0 K/UL (ref 0–0.1)
BASOPHILS NFR BLD: 1 % (ref 0–2)
BILIRUB SERPL-MCNC: 0.6 MG/DL (ref 0.2–1)
BILIRUB UR QL: NEGATIVE
BNP SERPL-MCNC: 63 PG/ML (ref 0–1800)
BUN SERPL-MCNC: 9 MG/DL (ref 7–18)
BUN/CREAT SERPL: 12 (ref 12–20)
CALCIUM SERPL-MCNC: 9.4 MG/DL (ref 8.5–10.1)
CHLORIDE SERPL-SCNC: 104 MMOL/L (ref 100–111)
CO2 SERPL-SCNC: 30 MMOL/L (ref 21–32)
COLOR UR: YELLOW
CREAT SERPL-MCNC: 0.78 MG/DL (ref 0.6–1.3)
DIFFERENTIAL METHOD BLD: ABNORMAL
EOSINOPHIL # BLD: 0.2 K/UL (ref 0–0.4)
EOSINOPHIL NFR BLD: 3 % (ref 0–5)
EPITH CASTS URNS QL MICRO: ABNORMAL /LPF (ref 0–5)
ERYTHROCYTE [DISTWIDTH] IN BLOOD BY AUTOMATED COUNT: 12.5 % (ref 11.6–14.5)
GLOBULIN SER CALC-MCNC: 4.9 G/DL (ref 2–4)
GLUCOSE SERPL-MCNC: 169 MG/DL (ref 74–99)
GLUCOSE UR STRIP.AUTO-MCNC: NEGATIVE MG/DL
HCT VFR BLD AUTO: 36 % (ref 35–45)
HGB BLD-MCNC: 11.8 G/DL (ref 12–16)
HGB UR QL STRIP: ABNORMAL
IMM GRANULOCYTES # BLD AUTO: 0 K/UL (ref 0–0.04)
IMM GRANULOCYTES NFR BLD AUTO: 0 % (ref 0–0.5)
KETONES UR QL STRIP.AUTO: NEGATIVE MG/DL
LEUKOCYTE ESTERASE UR QL STRIP.AUTO: ABNORMAL
LYMPHOCYTES # BLD: 2.8 K/UL (ref 0.9–3.6)
LYMPHOCYTES NFR BLD: 38 % (ref 21–52)
MAGNESIUM SERPL-MCNC: 1.9 MG/DL (ref 1.6–2.6)
MCH RBC QN AUTO: 32.1 PG (ref 24–34)
MCHC RBC AUTO-ENTMCNC: 32.8 G/DL (ref 31–37)
MCV RBC AUTO: 97.8 FL (ref 78–100)
MONOCYTES # BLD: 0.6 K/UL (ref 0.05–1.2)
MONOCYTES NFR BLD: 8 % (ref 3–10)
NEUTS SEG # BLD: 3.7 K/UL (ref 1.8–8)
NEUTS SEG NFR BLD: 51 % (ref 40–73)
NITRITE UR QL STRIP.AUTO: POSITIVE
NRBC # BLD: 0 K/UL (ref 0–0.01)
NRBC BLD-RTO: 0 PER 100 WBC
PH UR STRIP: 8.5 [PH] (ref 5–8)
PLATELET # BLD AUTO: 246 K/UL (ref 135–420)
PMV BLD AUTO: 11.9 FL (ref 9.2–11.8)
POTASSIUM SERPL-SCNC: 3.6 MMOL/L (ref 3.5–5.5)
PROT SERPL-MCNC: 8.1 G/DL (ref 6.4–8.2)
PROT UR STRIP-MCNC: 100 MG/DL
RBC # BLD AUTO: 3.68 M/UL (ref 4.2–5.3)
RBC #/AREA URNS HPF: ABNORMAL /HPF (ref 0–5)
SODIUM SERPL-SCNC: 139 MMOL/L (ref 136–145)
SP GR UR REFRACTOMETRY: 1.01 (ref 1–1.03)
TRI-PHOS CRY URNS QL MICRO: ABNORMAL
TROPONIN-HIGH SENSITIVITY: 39 NG/L (ref 0–54)
TROPONIN-HIGH SENSITIVITY: 41 NG/L (ref 0–54)
UROBILINOGEN UR QL STRIP.AUTO: 1 EU/DL (ref 0.2–1)
WBC # BLD AUTO: 7.3 K/UL (ref 4.6–13.2)
WBC URNS QL MICRO: ABNORMAL /HPF (ref 0–4)

## 2022-09-08 PROCEDURE — 99285 EMERGENCY DEPT VISIT HI MDM: CPT

## 2022-09-08 PROCEDURE — 93005 ELECTROCARDIOGRAM TRACING: CPT

## 2022-09-08 PROCEDURE — 74011250637 HC RX REV CODE- 250/637: Performed by: PHYSICIAN ASSISTANT

## 2022-09-08 PROCEDURE — 83880 ASSAY OF NATRIURETIC PEPTIDE: CPT

## 2022-09-08 PROCEDURE — 80053 COMPREHEN METABOLIC PANEL: CPT

## 2022-09-08 PROCEDURE — 87086 URINE CULTURE/COLONY COUNT: CPT

## 2022-09-08 PROCEDURE — 87186 SC STD MICRODIL/AGAR DIL: CPT

## 2022-09-08 PROCEDURE — 96374 THER/PROPH/DIAG INJ IV PUSH: CPT

## 2022-09-08 PROCEDURE — 96375 TX/PRO/DX INJ NEW DRUG ADDON: CPT

## 2022-09-08 PROCEDURE — 84484 ASSAY OF TROPONIN QUANT: CPT

## 2022-09-08 PROCEDURE — 71046 X-RAY EXAM CHEST 2 VIEWS: CPT

## 2022-09-08 PROCEDURE — 87077 CULTURE AEROBIC IDENTIFY: CPT

## 2022-09-08 PROCEDURE — 83735 ASSAY OF MAGNESIUM: CPT

## 2022-09-08 PROCEDURE — 65270000029 HC RM PRIVATE

## 2022-09-08 PROCEDURE — 85025 COMPLETE CBC W/AUTO DIFF WBC: CPT

## 2022-09-08 PROCEDURE — G0378 HOSPITAL OBSERVATION PER HR: HCPCS

## 2022-09-08 PROCEDURE — 74011250636 HC RX REV CODE- 250/636: Performed by: PHYSICIAN ASSISTANT

## 2022-09-08 PROCEDURE — 81001 URINALYSIS AUTO W/SCOPE: CPT

## 2022-09-08 RX ORDER — NITROFURANTOIN 25; 75 MG/1; MG/1
100 CAPSULE ORAL
Status: COMPLETED | OUTPATIENT
Start: 2022-09-08 | End: 2022-09-08

## 2022-09-08 RX ORDER — NITROFURANTOIN 25; 75 MG/1; MG/1
100 CAPSULE ORAL 2 TIMES DAILY
Status: DISCONTINUED | OUTPATIENT
Start: 2022-09-08 | End: 2022-09-08

## 2022-09-08 RX ORDER — ACETAMINOPHEN 500 MG
1000 TABLET ORAL
Status: COMPLETED | OUTPATIENT
Start: 2022-09-08 | End: 2022-09-08

## 2022-09-08 RX ORDER — FUROSEMIDE 10 MG/ML
40 INJECTION INTRAMUSCULAR; INTRAVENOUS
Status: COMPLETED | OUTPATIENT
Start: 2022-09-08 | End: 2022-09-08

## 2022-09-08 RX ORDER — ONDANSETRON 2 MG/ML
4 INJECTION INTRAMUSCULAR; INTRAVENOUS
Status: COMPLETED | OUTPATIENT
Start: 2022-09-08 | End: 2022-09-08

## 2022-09-08 RX ORDER — GUAIFENESIN 100 MG/5ML
162 LIQUID (ML) ORAL
Status: DISCONTINUED | OUTPATIENT
Start: 2022-09-08 | End: 2022-09-08

## 2022-09-08 RX ADMIN — ACETAMINOPHEN 1000 MG: 500 TABLET ORAL at 17:47

## 2022-09-08 RX ADMIN — ONDANSETRON 4 MG: 2 INJECTION INTRAMUSCULAR; INTRAVENOUS at 17:47

## 2022-09-08 RX ADMIN — FUROSEMIDE 40 MG: 10 INJECTION, SOLUTION INTRAMUSCULAR; INTRAVENOUS at 16:54

## 2022-09-08 RX ADMIN — NITROFURANTOIN (MONOHYDRATE/MACROCRYSTALS) 100 MG: 75; 25 CAPSULE ORAL at 15:57
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
## 2022-09-08 NOTE — ED TRIAGE NOTES
CP last night and took a nitro last night and this am, cp got better, dizziness and headache since. Pt A/Ox 4. Nothing to eat today.

## 2022-09-08 NOTE — ED NOTES
Report received from [facility], [facility].

## 2022-09-09 ENCOUNTER — APPOINTMENT (OUTPATIENT)
Dept: NON INVASIVE DIAGNOSTICS | Age: 87
End: 2022-09-09
Attending: STUDENT IN AN ORGANIZED HEALTH CARE EDUCATION/TRAINING PROGRAM
Payer: MEDICARE

## 2022-09-09 ENCOUNTER — APPOINTMENT (OUTPATIENT)
Dept: NUCLEAR MEDICINE | Age: 87
End: 2022-09-09
Attending: PHYSICIAN ASSISTANT
Payer: MEDICARE

## 2022-09-09 ENCOUNTER — APPOINTMENT (OUTPATIENT)
Dept: NON INVASIVE DIAGNOSTICS | Age: 87
End: 2022-09-09
Attending: PHYSICIAN ASSISTANT
Payer: MEDICARE

## 2022-09-09 LAB
ALBUMIN SERPL-MCNC: 3.3 G/DL (ref 3.4–5)
ALBUMIN/GLOB SERPL: 0.7 {RATIO} (ref 0.8–1.7)
ALP SERPL-CCNC: 87 U/L (ref 45–117)
ALT SERPL-CCNC: 12 U/L (ref 13–56)
ANION GAP SERPL CALC-SCNC: 5 MMOL/L (ref 3–18)
AST SERPL-CCNC: 10 U/L (ref 10–38)
ATRIAL RATE: 105 BPM
BASOPHILS # BLD: 0 K/UL (ref 0–0.1)
BASOPHILS NFR BLD: 1 % (ref 0–2)
BILIRUB SERPL-MCNC: 1 MG/DL (ref 0.2–1)
BUN SERPL-MCNC: 12 MG/DL (ref 7–18)
BUN/CREAT SERPL: 15 (ref 12–20)
CALCIUM SERPL-MCNC: 9.4 MG/DL (ref 8.5–10.1)
CALCULATED P AXIS, ECG09: -133 DEGREES
CALCULATED R AXIS, ECG10: -19 DEGREES
CALCULATED T AXIS, ECG11: 99 DEGREES
CHLORIDE SERPL-SCNC: 102 MMOL/L (ref 100–111)
CO2 SERPL-SCNC: 29 MMOL/L (ref 21–32)
CREAT SERPL-MCNC: 0.79 MG/DL (ref 0.6–1.3)
DIAGNOSIS, 93000: NORMAL
DIFFERENTIAL METHOD BLD: ABNORMAL
ECHO AO ROOT DIAM: 3.3 CM
ECHO AO ROOT INDEX: 1.58 CM/M2
ECHO LA VOL 2C: 83 ML (ref 22–52)
ECHO LA VOL 4C: 59 ML (ref 22–52)
ECHO LA VOLUME AREA LENGTH: 77 ML
ECHO LA VOLUME INDEX A2C: 40 ML/M2 (ref 16–34)
ECHO LA VOLUME INDEX A4C: 28 ML/M2 (ref 16–34)
ECHO LA VOLUME INDEX AREA LENGTH: 37 ML/M2 (ref 16–34)
ECHO LV E' LATERAL VELOCITY: 6 CM/S
ECHO LV E' SEPTAL VELOCITY: 5 CM/S
ECHO LV FRACTIONAL SHORTENING: 24 % (ref 28–44)
ECHO LV INTERNAL DIMENSION DIASTOLE INDEX: 2.2 CM/M2
ECHO LV INTERNAL DIMENSION DIASTOLIC: 4.6 CM (ref 3.9–5.3)
ECHO LV INTERNAL DIMENSION SYSTOLIC INDEX: 1.67 CM/M2
ECHO LV INTERNAL DIMENSION SYSTOLIC: 3.5 CM
ECHO LV IVSD: 1.4 CM (ref 0.6–0.9)
ECHO LV MASS 2D: 256.8 G (ref 67–162)
ECHO LV MASS INDEX 2D: 122.9 G/M2 (ref 43–95)
ECHO LV POSTERIOR WALL DIASTOLIC: 1.4 CM (ref 0.6–0.9)
ECHO LV RELATIVE WALL THICKNESS RATIO: 0.61
ECHO LVOT AREA: 3.5 CM2
ECHO LVOT DIAM: 2.1 CM
ECHO LVOT MEAN GRADIENT: 1 MMHG
ECHO LVOT PEAK GRADIENT: 2 MMHG
ECHO LVOT PEAK VELOCITY: 0.8 M/S
ECHO LVOT STROKE VOLUME INDEX: 26.5 ML/M2
ECHO LVOT SV: 55.4 ML
ECHO LVOT VTI: 16 CM
ECHO MV A VELOCITY: 0.73 M/S
ECHO MV E DECELERATION TIME (DT): 270.7 MS
ECHO MV E VELOCITY: 0.48 M/S
ECHO MV E/A RATIO: 0.66
ECHO MV E/E' LATERAL: 8
ECHO MV E/E' RATIO (AVERAGED): 8.8
ECHO MV E/E' SEPTAL: 9.6
ECHO PULMONARY ARTERY SYSTOLIC PRESSURE (PASP): 31 MMHG
ECHO RV FREE WALL PEAK S': 11 CM/S
ECHO RV TAPSE: 2.1 CM (ref 1.7–?)
ECHO TV REGURGITANT MAX VELOCITY: 2.38 M/S
ECHO TV REGURGITANT PEAK GRADIENT: 23 MMHG
EOSINOPHIL # BLD: 0.2 K/UL (ref 0–0.4)
EOSINOPHIL NFR BLD: 4 % (ref 0–5)
ERYTHROCYTE [DISTWIDTH] IN BLOOD BY AUTOMATED COUNT: 12.5 % (ref 11.6–14.5)
EST. AVERAGE GLUCOSE BLD GHB EST-MCNC: 163 MG/DL
GLOBULIN SER CALC-MCNC: 5 G/DL (ref 2–4)
GLUCOSE BLD STRIP.AUTO-MCNC: 136 MG/DL (ref 70–110)
GLUCOSE BLD STRIP.AUTO-MCNC: 149 MG/DL (ref 70–110)
GLUCOSE BLD STRIP.AUTO-MCNC: 159 MG/DL (ref 70–110)
GLUCOSE SERPL-MCNC: 152 MG/DL (ref 74–99)
HBA1C MFR BLD: 7.3 % (ref 4.2–5.6)
HCT VFR BLD AUTO: 38.4 % (ref 35–45)
HGB BLD-MCNC: 12.1 G/DL (ref 12–16)
IMM GRANULOCYTES # BLD AUTO: 0 K/UL (ref 0–0.04)
IMM GRANULOCYTES NFR BLD AUTO: 0 % (ref 0–0.5)
INR PPP: 1 (ref 0.8–1.2)
LYMPHOCYTES # BLD: 1.7 K/UL (ref 0.9–3.6)
LYMPHOCYTES NFR BLD: 28 % (ref 21–52)
MAGNESIUM SERPL-MCNC: 2.1 MG/DL (ref 1.6–2.6)
MCH RBC QN AUTO: 31.5 PG (ref 24–34)
MCHC RBC AUTO-ENTMCNC: 31.5 G/DL (ref 31–37)
MCV RBC AUTO: 100 FL (ref 78–100)
MONOCYTES # BLD: 0.5 K/UL (ref 0.05–1.2)
MONOCYTES NFR BLD: 8 % (ref 3–10)
NEUTS SEG # BLD: 3.8 K/UL (ref 1.8–8)
NEUTS SEG NFR BLD: 61 % (ref 40–73)
NRBC # BLD: 0 K/UL (ref 0–0.01)
NRBC BLD-RTO: 0 PER 100 WBC
NUC STRESS EJECTION FRACTION: 68 %
P-R INTERVAL, ECG05: 104 MS
PLATELET # BLD AUTO: 258 K/UL (ref 135–420)
PMV BLD AUTO: 10.8 FL (ref 9.2–11.8)
POTASSIUM SERPL-SCNC: 3.6 MMOL/L (ref 3.5–5.5)
PROT SERPL-MCNC: 8.3 G/DL (ref 6.4–8.2)
PROTHROMBIN TIME: 13.2 SEC (ref 11.5–15.2)
Q-T INTERVAL, ECG07: 348 MS
QRS DURATION, ECG06: 86 MS
QTC CALCULATION (BEZET), ECG08: 459 MS
RBC # BLD AUTO: 3.84 M/UL (ref 4.2–5.3)
SODIUM SERPL-SCNC: 136 MMOL/L (ref 136–145)
STRESS BASELINE HR: 75 BPM
STRESS ESTIMATED WORKLOAD: 1 METS
STRESS EXERCISE DUR MIN: 4 MIN
STRESS EXERCISE DUR SEC: 0 SEC
STRESS PEAK DIAS BP: 80 MMHG
STRESS PEAK SYS BP: 135 MMHG
STRESS PERCENT HR ACHIEVED: 71 %
STRESS POST PEAK HR: 96 BPM
STRESS RATE PRESSURE PRODUCT: NORMAL BPM*MMHG
STRESS TARGET HR: 135 BPM
TID: 1.01
VENTRICULAR RATE, ECG03: 105 BPM
WBC # BLD AUTO: 6.2 K/UL (ref 4.6–13.2)

## 2022-09-09 PROCEDURE — G0378 HOSPITAL OBSERVATION PER HR: HCPCS

## 2022-09-09 PROCEDURE — 83036 HEMOGLOBIN GLYCOSYLATED A1C: CPT

## 2022-09-09 PROCEDURE — 74011250636 HC RX REV CODE- 250/636: Performed by: PHYSICIAN ASSISTANT

## 2022-09-09 PROCEDURE — 74011250637 HC RX REV CODE- 250/637: Performed by: STUDENT IN AN ORGANIZED HEALTH CARE EDUCATION/TRAINING PROGRAM

## 2022-09-09 PROCEDURE — 99223 1ST HOSP IP/OBS HIGH 75: CPT | Performed by: INTERNAL MEDICINE

## 2022-09-09 PROCEDURE — 36415 COLL VENOUS BLD VENIPUNCTURE: CPT

## 2022-09-09 PROCEDURE — 74011250636 HC RX REV CODE- 250/636: Performed by: STUDENT IN AN ORGANIZED HEALTH CARE EDUCATION/TRAINING PROGRAM

## 2022-09-09 PROCEDURE — 85025 COMPLETE CBC W/AUTO DIFF WBC: CPT

## 2022-09-09 PROCEDURE — 99223 1ST HOSP IP/OBS HIGH 75: CPT | Performed by: STUDENT IN AN ORGANIZED HEALTH CARE EDUCATION/TRAINING PROGRAM

## 2022-09-09 PROCEDURE — 85610 PROTHROMBIN TIME: CPT

## 2022-09-09 PROCEDURE — 74011636637 HC RX REV CODE- 636/637: Performed by: STUDENT IN AN ORGANIZED HEALTH CARE EDUCATION/TRAINING PROGRAM

## 2022-09-09 PROCEDURE — 82962 GLUCOSE BLOOD TEST: CPT

## 2022-09-09 PROCEDURE — 74011000250 HC RX REV CODE- 250: Performed by: STUDENT IN AN ORGANIZED HEALTH CARE EDUCATION/TRAINING PROGRAM

## 2022-09-09 PROCEDURE — 83735 ASSAY OF MAGNESIUM: CPT

## 2022-09-09 PROCEDURE — C8929 TTE W OR WO FOL WCON,DOPPLER: HCPCS

## 2022-09-09 PROCEDURE — 93017 CV STRESS TEST TRACING ONLY: CPT

## 2022-09-09 PROCEDURE — A9502 TC99M TETROFOSMIN: HCPCS

## 2022-09-09 PROCEDURE — 80053 COMPREHEN METABOLIC PANEL: CPT

## 2022-09-09 PROCEDURE — 65270000046 HC RM TELEMETRY

## 2022-09-09 PROCEDURE — 96372 THER/PROPH/DIAG INJ SC/IM: CPT

## 2022-09-09 RX ORDER — ACETAMINOPHEN 325 MG/1
650 TABLET ORAL
Status: DISCONTINUED | OUTPATIENT
Start: 2022-09-09 | End: 2022-09-10 | Stop reason: HOSPADM

## 2022-09-09 RX ORDER — MECLIZINE HCL 12.5 MG 12.5 MG/1
12.5 TABLET ORAL
Status: DISCONTINUED | OUTPATIENT
Start: 2022-09-09 | End: 2022-09-10 | Stop reason: HOSPADM

## 2022-09-09 RX ORDER — METOPROLOL TARTRATE 25 MG/1
25 TABLET, FILM COATED ORAL EVERY 12 HOURS
Status: DISCONTINUED | OUTPATIENT
Start: 2022-09-09 | End: 2022-09-10 | Stop reason: HOSPADM

## 2022-09-09 RX ORDER — ISOSORBIDE MONONITRATE 30 MG/1
30 TABLET, EXTENDED RELEASE ORAL DAILY
Status: DISCONTINUED | OUTPATIENT
Start: 2022-09-09 | End: 2022-09-10 | Stop reason: HOSPADM

## 2022-09-09 RX ORDER — HEPARIN SODIUM 5000 [USP'U]/ML
5000 INJECTION, SOLUTION INTRAVENOUS; SUBCUTANEOUS EVERY 8 HOURS
Status: DISCONTINUED | OUTPATIENT
Start: 2022-09-09 | End: 2022-09-10 | Stop reason: HOSPADM

## 2022-09-09 RX ORDER — INSULIN GLARGINE 100 [IU]/ML
10 INJECTION, SOLUTION SUBCUTANEOUS DAILY
Refills: 0 | Status: DISCONTINUED | OUTPATIENT
Start: 2022-09-09 | End: 2022-09-10 | Stop reason: HOSPADM

## 2022-09-09 RX ORDER — RANOLAZINE 500 MG/1
500 TABLET, EXTENDED RELEASE ORAL 2 TIMES DAILY
Status: DISCONTINUED | OUTPATIENT
Start: 2022-09-09 | End: 2022-09-10 | Stop reason: HOSPADM

## 2022-09-09 RX ORDER — ONDANSETRON 4 MG/1
4 TABLET, ORALLY DISINTEGRATING ORAL
Status: DISCONTINUED | OUTPATIENT
Start: 2022-09-09 | End: 2022-09-10 | Stop reason: HOSPADM

## 2022-09-09 RX ORDER — ONDANSETRON 2 MG/ML
4 INJECTION INTRAMUSCULAR; INTRAVENOUS
Status: DISCONTINUED | OUTPATIENT
Start: 2022-09-09 | End: 2022-09-10 | Stop reason: HOSPADM

## 2022-09-09 RX ORDER — MONTELUKAST SODIUM 10 MG/1
10 TABLET ORAL DAILY
Status: DISCONTINUED | OUTPATIENT
Start: 2022-09-09 | End: 2022-09-10 | Stop reason: HOSPADM

## 2022-09-09 RX ORDER — FUROSEMIDE 10 MG/ML
40 INJECTION INTRAMUSCULAR; INTRAVENOUS ONCE
Status: ACTIVE | OUTPATIENT
Start: 2022-09-09 | End: 2022-09-09

## 2022-09-09 RX ORDER — SODIUM CHLORIDE 0.9 % (FLUSH) 0.9 %
5-40 SYRINGE (ML) INJECTION EVERY 8 HOURS
Status: DISCONTINUED | OUTPATIENT
Start: 2022-09-09 | End: 2022-09-10 | Stop reason: HOSPADM

## 2022-09-09 RX ORDER — DEXTROSE MONOHYDRATE 100 MG/ML
0-250 INJECTION, SOLUTION INTRAVENOUS AS NEEDED
Status: DISCONTINUED | OUTPATIENT
Start: 2022-09-09 | End: 2022-09-10 | Stop reason: HOSPADM

## 2022-09-09 RX ORDER — NITROGLYCERIN 0.4 MG/1
0.4 TABLET SUBLINGUAL
Status: DISCONTINUED | OUTPATIENT
Start: 2022-09-09 | End: 2022-09-10 | Stop reason: HOSPADM

## 2022-09-09 RX ORDER — SODIUM CHLORIDE 0.9 % (FLUSH) 0.9 %
5-40 SYRINGE (ML) INJECTION AS NEEDED
Status: DISCONTINUED | OUTPATIENT
Start: 2022-09-09 | End: 2022-09-10 | Stop reason: HOSPADM

## 2022-09-09 RX ORDER — FAMOTIDINE 20 MG/1
20 TABLET, FILM COATED ORAL 2 TIMES DAILY
Status: DISCONTINUED | OUTPATIENT
Start: 2022-09-09 | End: 2022-09-10 | Stop reason: HOSPADM

## 2022-09-09 RX ORDER — CLOPIDOGREL BISULFATE 75 MG/1
75 TABLET ORAL DAILY
Status: DISCONTINUED | OUTPATIENT
Start: 2022-09-09 | End: 2022-09-10 | Stop reason: HOSPADM

## 2022-09-09 RX ORDER — INSULIN LISPRO 100 [IU]/ML
INJECTION, SOLUTION INTRAVENOUS; SUBCUTANEOUS
Status: DISCONTINUED | OUTPATIENT
Start: 2022-09-09 | End: 2022-09-10 | Stop reason: HOSPADM

## 2022-09-09 RX ORDER — MAGNESIUM SULFATE 100 %
16 CRYSTALS MISCELLANEOUS AS NEEDED
Status: DISCONTINUED | OUTPATIENT
Start: 2022-09-09 | End: 2022-09-10 | Stop reason: HOSPADM

## 2022-09-09 RX ORDER — POLYETHYLENE GLYCOL 3350 17 G/17G
17 POWDER, FOR SOLUTION ORAL DAILY PRN
Status: DISCONTINUED | OUTPATIENT
Start: 2022-09-09 | End: 2022-09-10 | Stop reason: HOSPADM

## 2022-09-09 RX ORDER — SODIUM CHLORIDE 9 MG/ML
250 INJECTION, SOLUTION INTRAVENOUS ONCE
Status: COMPLETED | OUTPATIENT
Start: 2022-09-09 | End: 2022-09-09

## 2022-09-09 RX ADMIN — ONDANSETRON 4 MG: 4 TABLET, ORALLY DISINTEGRATING ORAL at 08:27

## 2022-09-09 RX ADMIN — FAMOTIDINE 20 MG: 20 TABLET ORAL at 18:39

## 2022-09-09 RX ADMIN — REGADENOSON 0.4 MG: 0.08 INJECTION, SOLUTION INTRAVENOUS at 12:15

## 2022-09-09 RX ADMIN — LEVOTHYROXINE SODIUM 137 MCG: 25 TABLET ORAL at 06:30

## 2022-09-09 RX ADMIN — ISOSORBIDE MONONITRATE 30 MG: 30 TABLET, EXTENDED RELEASE ORAL at 08:36

## 2022-09-09 RX ADMIN — MONTELUKAST 10 MG: 10 TABLET, FILM COATED ORAL at 08:36

## 2022-09-09 RX ADMIN — Medication 2 UNITS: at 14:00

## 2022-09-09 RX ADMIN — METOPROLOL TARTRATE 25 MG: 25 TABLET, FILM COATED ORAL at 09:00

## 2022-09-09 RX ADMIN — HEPARIN SODIUM 5000 UNITS: 5000 INJECTION INTRAVENOUS; SUBCUTANEOUS at 04:31

## 2022-09-09 RX ADMIN — RANOLAZINE 500 MG: 500 TABLET, EXTENDED RELEASE ORAL at 08:36

## 2022-09-09 RX ADMIN — PERFLUTREN 2 ML: 6.52 INJECTION, SUSPENSION INTRAVENOUS at 10:29

## 2022-09-09 RX ADMIN — METOPROLOL TARTRATE 25 MG: 25 TABLET, FILM COATED ORAL at 21:55

## 2022-09-09 RX ADMIN — SODIUM CHLORIDE 250 ML: 900 INJECTION, SOLUTION INTRAVENOUS at 12:15

## 2022-09-09 RX ADMIN — RANOLAZINE 500 MG: 500 TABLET, EXTENDED RELEASE ORAL at 18:39

## 2022-09-09 RX ADMIN — INSULIN GLARGINE 10 UNITS: 100 INJECTION, SOLUTION SUBCUTANEOUS at 08:36

## 2022-09-09 RX ADMIN — MECLIZINE 12.5 MG: 12.5 TABLET ORAL at 04:31

## 2022-09-09 RX ADMIN — SODIUM CHLORIDE, PRESERVATIVE FREE 10 ML: 5 INJECTION INTRAVENOUS at 21:55

## 2022-09-09 RX ADMIN — SODIUM CHLORIDE, PRESERVATIVE FREE 10 ML: 5 INJECTION INTRAVENOUS at 06:30

## 2022-09-09 RX ADMIN — HEPARIN SODIUM 5000 UNITS: 5000 INJECTION INTRAVENOUS; SUBCUTANEOUS at 21:55

## 2022-09-09 RX ADMIN — FAMOTIDINE 20 MG: 20 TABLET ORAL at 08:36

## 2022-09-09 NOTE — DIABETES MGMT
Diabetes/ Glycemic Control Plan of Care    Recommendations:   1.) cont glycemic monitoring and consider increasing lantus insulin dose if BG is above target. Assessment: Patient with history of T2DM and takes lantus insulin 30 units twice daily at home: morning and bedtime and reported that she's under the care of Dr. Francine Mancilla. She was admitted on 9/08/2022 with report of dizziness, headache, and chest pain. POC BG 9/09/2022: 149, 159    Patient is waiting to be allowed to eat. DX:   1. Chest pain, unspecified type        2. Chronic nonintractable headache, unspecified headache type        3. Urinary tract infection without hematuria, site unspecified           Fasting/ Morning blood glucose:   Lab Results   Component Value Date/Time    Glucose 152 (H) 09/09/2022 03:16 PM    Glucose (POC) 159 (H) 09/09/2022 01:56 PM     IV Fluids containing dextrose: none    Steroids:  None    Blood glucose values: Within target range (70-180mg/dL):  YES    Current insulin orders:   Basal lantus insulin 10 units daily  Correctional lispro insulin. Normal sensitivity dose    Total Daily Dose previous 24 hours: None    Current A1c:   Lab Results   Component Value Date/Time    Hemoglobin A1c 8.1 (H) 01/18/2021 09:12 PM    Hemoglobin A1c (POC) 8.5 01/17/2019 12:19 PM    Hemoglobin A1c, External 7.7 06/17/2021 12:00 AM      equivalent  to ave Blood Glucose of (result dated 1/08/2021, pending A1c lab result at time of this review) mg/dl for 2-3 months prior to admission  Adequate glycemic control PTA: NO    Nutrition/Diet:   Active Orders   Diet    DIET NPO Sips of Water with Meds      Meal Intake:  No data found. Supplement Intake:  No data found. Home diabetes medications: Patient reported on 9/09/2022 that she takes lantus insulin 30 units twice daily: morning and bedtime. Key Antihyperglycemic Medications               Farxiga 10 mg tab tablet Take 10 mg by mouth daily.     Lantus Solostar U-100 Insulin 100 unit/mL (3 mL) inpn 18 Units by SubCUTAneous route two (2) times a day. Plan/Goals:   Blood glucose will be within target of 70 - 180 mg/dl within 72 hours  Reinforce dietary and medication compliance at home.           Education:  [x] Refer to Diabetes Education Record: 9/09/2022                       [] Education not indicated at this time     Nickolas Morejon RN CCM

## 2022-09-09 NOTE — PROGRESS NOTES
Problem: Pressure Injury - Risk of  Goal: *Prevention of pressure injury  Description: Document Nilton Scale and appropriate interventions in the flowsheet. Outcome: Progressing Towards Goal  Note: Pressure Injury Interventions:  Sensory Interventions: Assess need for specialty bed, Discuss PT/OT consult with provider, Keep linens dry and wrinkle-free, Maintain/enhance activity level, Minimize linen layers, Monitor skin under medical devices, Pressure redistribution bed/mattress (bed type)    Moisture Interventions: Absorbent underpads, Apply protective barrier, creams and emollients, Internal/External urinary devices, Maintain skin hydration (lotion/cream), Moisture barrier    Activity Interventions: Increase time out of bed, PT/OT evaluation, Pressure redistribution bed/mattress(bed type)    Mobility Interventions: HOB 30 degrees or less, Pressure redistribution bed/mattress (bed type), PT/OT evaluation, Turn and reposition approx. every two hours(pillow and wedges)    Nutrition Interventions: Document food/fluid/supplement intake, Offer support with meals,snacks and hydration    Friction and Shear Interventions: Foam dressings/transparent film/skin sealants, HOB 30 degrees or less                Problem: Falls - Risk of  Goal: *Absence of Falls  Description: Document Terence Fall Risk and appropriate interventions in the flowsheet.   Outcome: Progressing Towards Goal  Note: Fall Risk Interventions:  Mobility Interventions: Communicate number of staff needed for ambulation/transfer, Bed/chair exit alarm, Patient to call before getting OOB, Utilize walker, cane, or other assistive device, Strengthening exercises (ROM-active/passive), PT Consult for mobility concerns         Medication Interventions: Bed/chair exit alarm, Patient to call before getting OOB, Teach patient to arise slowly, Evaluate medications/consider consulting pharmacy    Elimination Interventions: Bed/chair exit alarm, Call light in reach, Patient to call for help with toileting needs, Toileting schedule/hourly rounds

## 2022-09-09 NOTE — PROGRESS NOTES
Called report to [facility] at [facility].
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
## 2022-09-09 NOTE — DIABETES MGMT
Diabetes Patient/Family Education Record    Factors That May Influence Patients Ability to Learn or Comply with Recommendations   []   Language barrier    []   Cultural needs   []   Motivation    []   Cognitive limitation    []   Physical   [x]   Education    []   Physiological factors   []   Hearing/vision/speaking impairment   []   Confucianism beliefs    []   Financial factors   []  Other:   []  No factors identified at this time. Person Instructed:   [x]   Patient   []   Family   []  Other     Preference for Learning:   [x]   Verbal   []   Written   []  Demonstration     Level of Comprehension & Competence:    [x]  Good                                      [] Fair                                     []  Poor                             []  Needs Reinforcement   [x]  Teach back completed    Education Component:   [x]  Medication management, including how to administer insulin (if appropriate) and potential medication interactions :  Patient reported prescribed lantus insulin 30 units twice daily: morning and bedtime. []  Nutritional management - [] Obtained usual meal pattern   []   Basic carbohydrate counting  []  Plate method  []  Limit concentrated sweets and avoid sweetened beverages  []  Portion control  []    Avoid skipping meals   []  Exercise   [x]  Signs, symptoms, and treatment of hyperglycemia and hypoglycemia: Discussed high blood sugar in detail with patient. [x] Prevention, recognition and treatment of hyperglycemia and hypoglycemia: Discussed low blood sugar in detail with patient. [x]  Importance of blood glucose monitoring  [x] Blood Glucose targets   []   Provided patient with blood glucose meter  [x]  Has glucometer and supplies at home     []  Instruction on use of the blood glucose meter and recommended monitoring schedule   []  Discuss the importance of HbA1C monitoring. Patients A1c is ___ %.  This is equivalent to average glucose of ___ mg/dl for the past 2-3 months. Patient cannot remember her last A1c and when it was done. []  Sick day guidelines   []  Proper use and disposal of lancets, needles, syringes or insulin pens (if appropriate)   []  Potential long-term complications (retinopathy, kidney disease, neuropathy, foot care)   [] Information about whom to contact in case of emergency or for more information    []  Goal:  Patient/family will demonstrate understanding of Diabetes Self- Management Skills by: (date) _______  Plan for post-discharge education or self-management support:    [] Outpatient class schedule provided            [] Patient Declined    [] Scheduled for outpatient classes (date) _______    [] Written information provided  Verify: [] Prior to admission Diabetes medications    Does patient understand how diabetes medications work? Yes. Does patient have difficulty obtaining diabetes medications or testing supplies?  No.

## 2022-09-09 NOTE — PROGRESS NOTES
conducted an initial consultation and Spiritual Assessment for Billie Galvez, who is a 82 y.o.,female. Patients Primary Language is: [place]. According to the patients EMR Yazidism Affiliation is: [facility].     The reason the Patient came to the hospital is:   Patient Active Problem List    Diagnosis Date Noted    Chest pain 09/08/2022    Headache 09/08/2022    Elevated blood pressure reading with diagnosis of hypertension 08/11/2022    Abdominal cramps 08/11/2022    Statin intolerance 03/01/2021    Polymyalgia rheumatica ([address] 03/01/2021    COVID-19     Debility     Tachycardia, paroxysmal ([address] 01/18/2021    Generalized weakness 01/18/2021    CHF (congestive heart failure) ([address] 01/08/2021    Type 2 diabetes with nephropathy ([address] 01/13/2020    Deep vein thrombosis (DVT) of popliteal vein of left lower extremity ([address] 09/08/2018    BMI 38.0-38.9,adult 06/07/2017    S/P drug eluting coronary stent placement 06/10/2016    History of non-ST elevation myocardial infarction (NSTEMI) 05/28/2016    Seasonal and perennial allergic rhinitis 07/30/2013    Unspecified transient cerebral ischemia     Hyperlipidemia associated with type 2 diabetes mellitus ([address]     Atherosclerosis of native coronary artery of native heart with stable angina pectoris (HCC)     Chronic diastolic heart failure ([address]     Type 2 diabetes mellitus with hyperglycemia, with long-term current use of insulin ([address] 12/04/2012    Hypothyroidism     Diabetic neuropathy ([address] 04/20/2012    Dizziness 04/20/2012    Chronic neck pain 04/20/2012    Chronic back pain 04/20/2012    DJD (degenerative joint disease)     Noncompliance with medication regimen 02/08/2011    Anxiety 02/08/2011    Hypertensive heart disease with heart failure ([address] 05/20/2010    Asthma 05/20/2010    Esophageal reflux 05/20/2010        The  provided the following Interventions:  Initiated a relationship of care and support.    Explored issues of vinod, belief, spirituality and Holiness/ritual needs while hospitalized. Listened empathically. Provided information about Spiritual Care Services. Offered prayer and assurance of continued prayers on patient's behalf. Chart reviewed. The following outcomes where achieved:  Patient shared limited information about both their medical narrative and spiritual journey/beliefs. Patient processed feeling about current hospitalization. Patient expressed gratitude for 's visit. Assessment:  Patient does not have any Holiness/cultural needs that will affect patients preferences in health care. There are no spiritual or Holiness issues which require intervention at this time. Plan:  Chaplains will continue to follow and will provide pastoral care on an as needed/requested basis.  recommends bedside caregivers page  on duty if patient shows signs of acute spiritual or emotional distress.       [address] [facility]   [phone]

## 2022-09-09 NOTE — PROGRESS NOTES
Reason for Admission:   Chest pain [R07.9]  Headache [R51.9]    PCP: Maya Francisco MD               RUR Score:     15             Resources/supports as identified by patient/family:   has personal care 8 hours a day , 7 days a week . Granddaughter support also     Top Challenges facing patient (as identified by patient/family and CM):     ear packed with wax and cant hear     Finances/Medication cost?     Did not voice concern   Transportation      did not voice concern   Support system or lack thereof? supportive  Living arrangements? Alone 1 story home    Self-care/ADLs/Cognition? Needs assist        Current Advanced Directive/Advance Care Plan:   yes    Healthcare Decision Maker:   Primary Decision Maker: Shayan Nickerson - [phone]    Click here to complete [address] including selection of the Healthcare Decision Maker Relationship (ie \"Primary\")                          Plan for utilizing home health:    yes                      Likelihood of readmission:   HIGH    Transition of Care Plan:                    Initial assessment completed with patient. Cognitive status of patient: oriented to time, place, person and situation. Face sheet information confirmed:  yes. The patient designates granddaughter  to participate in her discharge plan and to receive any needed information. This patient lives in a single family home with patient. Patient is not able to navigate steps as needed. Prior to hospitalization, patient was considered to be independent with ADLs/IADLS : no . If not independent,  patient needs assist with : dressing, bathing, food preparation, and cooking    Patient has a current ACP document on file: yes  The other:  tbd will be available to transport patient home upon discharge. The patient already has Paulette Noel W/ARETHA, [facility], hospital bed  , and  medical equipment available in the home. Patient is not currently active with home health. Jason Padron Patient has not stayed in a skilled nursing facility or rehab. Was  stay within last 60 days : no. This patient is on dialysis :no        List of available Home Health agencies were provided and reviewed with the patient prior to discharge. Freedom of choice signed: yes, for [facility]. Currently, the discharge plan is Home with [address] [place] [address] [facility] [place]. The patient states that she can obtain her medications from the pharmacy, and take her medications as directed. Patient's current insurance is Payor: [facility] MEDICAID / Plan: [facility] CCCP / Product Type: Managed Care Medicaid /       Care Management Interventions  PCP Verified by CM: Yes  Mode of Transport at Discharge:  Other (see comment) (tbd)  Transition of Care Consult (CM Consult): Discharge Planning, [address]: Other Family Member(s)  Confirm Follow Up Transport: Other (see comment)  The Plan for Transition of Care is Related to the Following Treatment Goals : home health  The Patient and/or Patient Representative was Provided with a Choice of Provider and Agrees with the Discharge Plan?: Yes  Freedom of Choice List was Provided with Basic Dialogue that Supports the Patient's Individualized Plan of Care/Goals, Treatment Preferences and Shares the Quality Data Associated with the Providers?: Yes  Discharge Location  Patient Expects to be Discharged to[de-identified] Home with home health
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
## 2022-09-10 VITALS
DIASTOLIC BLOOD PRESSURE: 64 MMHG | HEART RATE: 78 BPM | SYSTOLIC BLOOD PRESSURE: 122 MMHG | OXYGEN SATURATION: 97 % | BODY MASS INDEX: 33.9 KG/M2 | RESPIRATION RATE: 18 BRPM | WEIGHT: 216 LBS | HEIGHT: 67 IN | TEMPERATURE: 98.1 F

## 2022-09-10 LAB
GLUCOSE BLD STRIP.AUTO-MCNC: 141 MG/DL (ref 70–110)
GLUCOSE BLD STRIP.AUTO-MCNC: 147 MG/DL (ref 70–110)
GLUCOSE BLD STRIP.AUTO-MCNC: 178 MG/DL (ref 70–110)

## 2022-09-10 PROCEDURE — 74011250636 HC RX REV CODE- 250/636: Performed by: STUDENT IN AN ORGANIZED HEALTH CARE EDUCATION/TRAINING PROGRAM

## 2022-09-10 PROCEDURE — 74011000250 HC RX REV CODE- 250: Performed by: STUDENT IN AN ORGANIZED HEALTH CARE EDUCATION/TRAINING PROGRAM

## 2022-09-10 PROCEDURE — 99239 HOSP IP/OBS DSCHRG MGMT >30: CPT | Performed by: STUDENT IN AN ORGANIZED HEALTH CARE EDUCATION/TRAINING PROGRAM

## 2022-09-10 PROCEDURE — 74011250637 HC RX REV CODE- 250/637: Performed by: STUDENT IN AN ORGANIZED HEALTH CARE EDUCATION/TRAINING PROGRAM

## 2022-09-10 PROCEDURE — 82962 GLUCOSE BLOOD TEST: CPT

## 2022-09-10 PROCEDURE — 96372 THER/PROPH/DIAG INJ SC/IM: CPT

## 2022-09-10 PROCEDURE — 74011636637 HC RX REV CODE- 636/637: Performed by: STUDENT IN AN ORGANIZED HEALTH CARE EDUCATION/TRAINING PROGRAM

## 2022-09-10 PROCEDURE — G0378 HOSPITAL OBSERVATION PER HR: HCPCS

## 2022-09-10 RX ORDER — METOPROLOL TARTRATE 25 MG/1
25 TABLET, FILM COATED ORAL EVERY 12 HOURS
Qty: 60 TABLET | Refills: 0 | Status: SHIPPED | OUTPATIENT
Start: 2022-09-10

## 2022-09-10 RX ADMIN — SODIUM CHLORIDE, PRESERVATIVE FREE 10 ML: 5 INJECTION INTRAVENOUS at 05:23

## 2022-09-10 RX ADMIN — LEVOTHYROXINE SODIUM 137 MCG: 25 TABLET ORAL at 05:23

## 2022-09-10 RX ADMIN — RANOLAZINE 500 MG: 500 TABLET, EXTENDED RELEASE ORAL at 08:36

## 2022-09-10 RX ADMIN — FAMOTIDINE 20 MG: 20 TABLET ORAL at 08:36

## 2022-09-10 RX ADMIN — HEPARIN SODIUM 5000 UNITS: 5000 INJECTION INTRAVENOUS; SUBCUTANEOUS at 12:01

## 2022-09-10 RX ADMIN — Medication 2 UNITS: at 12:00

## 2022-09-10 RX ADMIN — ISOSORBIDE MONONITRATE 30 MG: 30 TABLET, EXTENDED RELEASE ORAL at 08:37

## 2022-09-10 RX ADMIN — METOPROLOL TARTRATE 25 MG: 25 TABLET, FILM COATED ORAL at 08:37

## 2022-09-10 RX ADMIN — HEPARIN SODIUM 5000 UNITS: 5000 INJECTION INTRAVENOUS; SUBCUTANEOUS at 05:22

## 2022-09-10 RX ADMIN — MONTELUKAST 10 MG: 10 TABLET, FILM COATED ORAL at 08:37

## 2022-09-10 RX ADMIN — INSULIN GLARGINE 10 UNITS: 100 INJECTION, SOLUTION SUBCUTANEOUS at 08:36

## 2022-09-10 NOTE — DISCHARGE SUMMARY
Discharge Summary    Patient: Derek Isaac MRN: [id number]  CSN: [id number]    YOB: 1935  Age: 82 y.o. Sex: female    DOA: 9/8/2022 LOS:  LOS: 2 days   Discharge Date: 9/10/2022     Admission Diagnosis: Chest pain [R07.9]  Headache [R51.9]    Discharge Diagnosis:    Hospital Problems  Date Reviewed: 9/7/2022            Codes Class Noted POA    Chest pain ICD-10-CM: R07.9  ICD-9-CM: 786.50  9/8/2022 Unknown        Headache ICD-10-CM: R51.9  ICD-9-CM: 784.0  9/8/2022 Unknown        Type 2 diabetes mellitus with hyperglycemia, with long-term current use of insulin (HCC) (Chronic) ICD-10-CM: E11.65, Z79.4  ICD-9-CM: 250.00, 790.29, V58.67  12/4/2012 Yes        Vertigo ICD-10-CM: R42  ICD-9-CM: 780.4  4/20/2012 Unknown           Discharge Condition: Stable  Discharge Disposition: Home     PHYSICAL EXAM  Visit Vitals  /64 (BP 1 Location: Left upper arm, BP Patient Position: At rest;Lying left side)   Pulse 78   Temp 98.1 °F (36.7 °C)   Resp 18   Ht 5' 7\" (1.702 m)   Wt 98 kg (216 lb)   SpO2 97%   Breastfeeding No   BMI 33.83 kg/m²       General: Alert, cooperative, no acute distress    HEENT: NC, Atraumatic. PERRLA, EOMI. Anicteric sclerae. Lungs:  CTA Bilaterally. No Wheezing/Rhonchi/Rales. Heart:  Regular  rhythm,  No murmur, No Rubs, No Gallops  Abdomen: Soft, Non distended, Non tender. +Bowel sounds, no HSM  Extremities: No c/c/e  Psych:   Good insight. Not anxious or agitated. Neurologic:  CN 2-12 grossly intact, oriented X 3. No acute neurological                                 Deficits,     Hospital Course By Problem:   Derek Isaac is a 82 y.o. female w/ PMH CAD status post PCI 2016, last cardiac cath on 2019 with patent RCA stent and noncritical 50% stenosis of LAD, type 2 diabetes, hypothyroidism. Patient reported with [facility] ED with chest pain that was resolved with nitro at home. Patient continued to have similar chest pain at [facility] ED.  There, there were no EKG changes and troponin was normal, however given the typical nature of her chest pain, cardiology was contacted. Patient underwent nuclear stress test with no reversible defect. On day of discharge, patient felt well, but still complaints of headache, dizziness, and nausea. The symptoms have been present for some time now along with her left ear hearing deficit. She is supposed to see ENT as an outpatient for this. Her chest pain has improved and she no longer has had this for several hours now. Patient needs audiology appointment and to follow with ENT. Patient has been to ED multiple times for this issue with extensive workup. Consults:   Cardiology - Dr. Daisha Proctor     Significant Diagnostic Studies:   Study Result    Narrative & Impression   EXAM: MRI of the Head without contrast     INDICATION: Head pressure, dizziness     TECHNIQUE: MRI of the head without IV contrast. Multiplanar multisequence MR  images of the brain without contrast.      IV contrast:  None     COMPARISON: Head CT dated 9/3/2022 and MRI of the brain dated 8/11/2022     FINDINGS: No focus of restricted diffusion appreciated. The ventricles and sulci  are symmetric but moderately prominent. Patient has the septum pellucidum. . No  midline shift, mass effect, or mass lesion appreciated. Multifocal white  matter hyperintensity on FLAIR weighted imaging in the periventricular  subcortical white matter. No evidence for an acute infarct identified. No focus  of abnormal susceptibility appreciated. The vascular flow voids are intact. The  cerebellopontine angles are unremarkable. The visualized internal auditory  canals and semicircular canals are unremarkable. The pituitary is normal. The  mastoid air cells are unremarkable. The visualized paranasal sinuses are  unremarkable. The orbits are unremarkable. The scalp and skull are unremarkable. IMPRESSION  1. No acute intracranial process.      2.  Moderate parenchymal volume loss and chronic small vessel ischemic changes. Discharge Medications:     Current Discharge Medication List        CONTINUE these medications which have CHANGED    Details   metoprolol tartrate (LOPRESSOR) 25 mg tablet Take 1 Tablet by mouth every twelve (12) hours. Qty: 60 Tablet, Refills: 0           CONTINUE these medications which have NOT CHANGED    Details   meclizine (ANTIVERT) 25 mg tablet Take 12.5 mg by mouth three (3) times daily as needed for Dizziness. famotidine (Pepcid) 20 mg tablet Take 20 mg by mouth two (2) times a day. clopidogreL (PLAVIX) 75 mg tab TAKE 1 TABLET BY MOUTH DAILY  Qty: 30 Tablet, Refills: 5    Associated Diagnoses: S/P coronary artery stent placement      montelukast (SINGULAIR) 10 mg tablet TAKE 1 TABLET BY MOUTH DAILY  Qty: 90 Tablet, Refills: 4    Associated Diagnoses: Mild intermittent asthma without complication; Allergic rhinitis, unspecified seasonality, unspecified trigger      nitroglycerin (NITROSTAT) 0.4 mg SL tablet 1 Tablet by SubLINGual route every five (5) minutes as needed for Chest Pain for up to 3 doses. Up to 3 doses. Qty: 30 Tablet, Refills: 0      acetaminophen (TYLENOL) 325 mg tablet Take 2 Tablets by mouth every four (4) hours as needed for Pain. Qty: 20 Tablet, Refills: 0      albuterol (PROVENTIL HFA, VENTOLIN HFA, PROAIR HFA) 90 mcg/actuation inhaler INHALE 1 PUFF BY MOUTH EVERY 4 HOURS AS NEEDED FOR WHEEZING OR SHORTNESS OF BREATH  Qty: 18 g, Refills: 12      ondansetron hcl (Zofran) 4 mg tablet Take 1 Tablet by mouth every eight (8) hours as needed for Nausea. Qty: 12 Tablet, Refills: 0      isosorbide mononitrate ER (IMDUR) 30 mg tablet TAKE 1 TABLET BY MOUTH EVERY MORNING  Qty: 90 Tablet, Refills: 3    Associated Diagnoses: Other forms of angina pectoris (HCC)      ranolazine ER (RANEXA) 500 mg SR tablet Take 1 Tablet by mouth two (2) times a day.   Qty: 180 Tablet, Refills: 6    Associated Diagnoses: Chest pain, unspecified type compr.stocking,thigh,reg,large (Comp. Stocking,Thigh,Reg,Large) misc 2 Each by Does Not Apply route daily. Qty: 4 Each, Refills: 0      polyethylene glycol (MIRALAX) 17 gram packet Take 1 Packet by mouth daily as needed for Constipation. Qty: 15 Packet, Refills: 0      levothyroxine (Synthroid) 137 mcg tablet Take 137 mcg by mouth Daily (before breakfast). Indications: a condition with low thyroid hormone levels  Qty: 30 Tab, Refills: 5      cholecalciferol (Vitamin D3) (1000 Units /25 mcg) tablet Take 1 Tab by mouth two (2) times a day. Qty: 60 Tab, Refills: 0      multivitamin with minerals (MULTIVITAMIN & MINERAL FORMULA PO) Take 1 Tab by mouth daily. lidocaine (ASPERCREME, LIDOCAINE,) 4 % patch 1 Patch by TransDERmal route every eight (8) hours. Qty: 1 Package, Refills: 3    Associated Diagnoses: Chronic pain of left knee      RONNELL PEN NEEDLE 32 gauge x 5/32\" ndle Refills: 4      ascorbic acid, vitamin C, (VITAMIN C) 250 mg tablet Take 250 mg by mouth daily. 1 pill po daily  Indications: inadequate vitamin C      cyanocobalamin ER 1,000 mcg tablet Take 1 Tab by mouth daily. Qty: 30 Tab, Refills: 3    Associated Diagnoses: Weakness; Macrocytosis      DOCOSAHEXANOIC ACID/EPA (FISH OIL PO) Take 1,000 mg by mouth two (2) times a day.  1 pill po twice daily            STOP taking these medications       potassium chloride (K-DUR, KLOR-CON M20) 20 mEq tablet Comments:   Reason for Stopping:         Farxiga 10 mg tab tablet Comments:   Reason for Stopping:         furosemide (LASIX) 20 mg tablet Comments:   Reason for Stopping:         spironolactone (ALDACTONE) 50 mg tablet Comments:   Reason for Stopping:         Lantus Solostar U-100 Insulin 100 unit/mL (3 mL) inpn Comments:   Reason for Stopping:               Activity: activity as tolerated    Diet: Regular Diet    Wound Care: None needed    Follow-up: with PCP, Yu Marx MD in 7-10days    Minutes spent on discharge: >30 minutes spent coordinating this discharge (review instructions/follow-up, prescriptions, preparing report for sign off)    Ethan Ritter DO

## 2022-09-10 NOTE — ROUTINE PROCESS
Patient discharged home. Discharge instructions given and she verbalized understanding. No distress noted.

## 2022-09-10 NOTE — DISCHARGE INSTRUCTIONS
DISCHARGE SUMMARY from Nurse    PATIENT INSTRUCTIONS:    After general anesthesia or intravenous sedation, for 24 hours or while taking prescription Narcotics:  Limit your activities  Do not drive and operate hazardous machinery  Do not make important personal or business decisions  Do  not drink alcoholic beverages  If you have not urinated within 8 hours after discharge, please contact your surgeon on call. Report the following to your surgeon:  Excessive pain, swelling, redness or odor of or around the surgical area  Temperature over 100.5  Nausea and vomiting lasting longer than 4 hours or if unable to take medications  Any signs of decreased circulation or nerve impairment to extremity: change in color, persistent  numbness, tingling, coldness or increase pain  Any questions    What to do at Home:  Recommended activity: Activity as tolerated. If you experience any of the following symptoms ,Chest pain,shortness of breath,change mental status, please follow up with your family doctor. *  Please give a list of your current medications to your Primary Care Provider. *  Please update this list whenever your medications are discontinued, doses are      changed, or new medications (including over-the-counter products) are added. *  Please carry medication information at all times in case of emergency situations. These are general instructions for a healthy lifestyle:    No smoking/ No tobacco products/ Avoid exposure to second hand smoke  Surgeon General's Warning:  Quitting smoking now greatly reduces serious risk to your health.     Obesity, smoking, and sedentary lifestyle greatly increases your risk for illness    A healthy diet, regular physical exercise & weight monitoring are important for maintaining a healthy lifestyle    You may be retaining fluid if you have a history of heart failure or if you experience any of the following symptoms:  Weight gain of 3 pounds or more overnight or 5 pounds in a week, increased swelling in our hands or feet or shortness of breath while lying flat in bed. Please call your doctor as soon as you notice any of these symptoms; do not wait until your next office visit. Patient armband removed and shredded. [employer] Activation    Thank you for requesting access to [employer]. Please follow the instructions below to securely access and download your online medical record. [employer] allows you to send messages to your doctor, view your test results, renew your prescriptions, schedule appointments, and more. How Do I Sign Up? In your internet browser, go to [email or web address]. Click on the First Time User? Click Here link in the Sign In box. You will see the New Member Sign Up page. Enter your [employer] Access Code exactly as it appears below. You will not need to use this code after youve completed the sign-up process. If you do not sign up before the expiration date, you must request a new code. [employer] Access Code: Activation code not generated  Current [employer] Status: Active (This is the date your [employer] access code will )    Enter the last four digits of your Social Security Number (xxxx) and Date of Birth (mm/dd/yyyy) as indicated and click Submit. You will be taken to the next sign-up page. Create a [employer] ID. This will be your [employer] login ID and cannot be changed, so think of one that is secure and easy to remember. Create a [employer] password. You can change your password at any time. Enter your Password Reset Question and Answer. This can be used at a later time if you forget your password. Enter your e-mail address. You will receive e-mail notification when new information is available in [address] Click Sign Up. You can now view and download portions of your medical record. Click the [place] link to download a portable copy of your medical information.     Additional Information    If you have questions, please visit the Frequently Asked Questions section of the [employer] website at [email or web address] "[email or web address][email or web address] Remember, [employer] is NOT to be used for urgent needs. For medical emergencies, dial 911. The discharge information has been reviewed with the patient. The patient verbalized understanding. Discharge medications reviewed with the patient and appropriate educational materials and side effects teaching were provided.   ___________________________________________________________________________________________________________________________________

## 2022-09-10 NOTE — PROGRESS NOTES
Discharge/Transition Planning       Asked Dr Encarnacion for home health orders.  After calling Roz with [facility] and going through chart with Roz we were unable to find active diagnosis that will qualify patient for home health under insurance       Spoke with patient and her aid will pick her up around Lelia DENNIS BSN  /Discharge Planner

## 2022-09-11 LAB
BACTERIA SPEC CULT: ABNORMAL
CC UR VC: ABNORMAL
SERVICE CMNT-IMP: ABNORMAL

## 2022-09-12 ENCOUNTER — TELEPHONE (OUTPATIENT)
Dept: FAMILY MEDICINE CLINIC | Age: 87
End: 2022-09-12

## 2022-09-12 DIAGNOSIS — R42 DIZZINESS: Primary | ICD-10-CM

## 2022-09-12 DIAGNOSIS — H91.92 HEARING LOSS OF LEFT EAR, UNSPECIFIED HEARING LOSS TYPE: ICD-10-CM

## 2022-09-12 NOTE — TELEPHONE ENCOUNTER
Patient called and would like to be referred to someone who can treat her for her dizziness and lost hearing in left ear. Please advise.

## 2022-09-13 DIAGNOSIS — Z95.5 S/P CORONARY ARTERY STENT PLACEMENT: ICD-10-CM

## 2022-09-13 RX ORDER — CLOPIDOGREL BISULFATE 75 MG/1
75 TABLET ORAL DAILY
Qty: 90 TABLET | Refills: 1 | Status: SHIPPED | OUTPATIENT
Start: 2022-09-13

## 2022-09-16 NOTE — TELEPHONE ENCOUNTER
New order generated patient no longer wants to see dr Agata Dhaliwal  sent to [facility] .  They will contact patient ,

## 2022-09-30 NOTE — ED PROVIDER NOTES
Critical Care  Performed by: Familia Berger MD  Authorized by: Familia Berger MD     Critical care provider statement:     Critical care time (minutes):  35    Critical care time was exclusive of:  Separately billable procedures and treating other patients    Critical care was necessary to treat or prevent imminent or life-threatening deterioration of the following conditions:  Cardiac failure    Critical care was time spent personally by me on the following activities:  Development of treatment plan with patient or surrogate, discussions with consultants, examination of patient, obtaining history from patient or surrogate, ordering and performing treatments and interventions, ordering and review of laboratory studies, ordering and review of radiographic studies, re-evaluation of patient's condition and review of old charts    I assumed direction of critical care for this patient from another provider in my specialty: yes

## 2022-11-10 NOTE — PROGRESS NOTES
Jalen Curry presents today for   Chief Complaint   Patient presents with   Yessenia Mcleod in Ear     Patient stated that she been having ringing in the ears and sinus infection. Is someone accompanying this pt? no    Is the patient using any DME equipment during [address]? Yes wheelchair    Depression Screening:  3 most recent PHQ Screens 10/9/2019   PHQ Not Done -   Little interest or pleasure in doing things Not at all   Feeling down, depressed, irritable, or hopeless Not at all   Total Score PHQ 2 0       Learning Assessment:  Learning Assessment 10/9/2019   PRIMARY LEARNER Patient   HIGHEST LEVEL OF EDUCATION - PRIMARY LEARNER  SOME COLLEGE   BARRIERS PRIMARY LEARNER NONE   CO-LEARNER CAREGIVER No   CO-LEARNER NAME -   PRIMARY LANGUAGE ENGLISH    NEED -   LEARNER PREFERENCE PRIMARY LISTENING     -     -     -     -   ANSWERED BY self   RELATIONSHIP SELF       Abuse Screening:  Abuse Screening Questionnaire 10/9/2019   Do you ever feel afraid of your partner? N   Are you in a relationship with someone who physically or mentally threatens you? N   Is it safe for you to go home? Y       Fall Screening  Fall Risk Assessment, last 12 mths 10/9/2019   Able to walk? Yes   Fall in past 12 months? No   Fall with injury? -   Number of falls in past 12 months -   Fall Risk Score -       Generalized Anxiety  No flowsheet data found. Health Maintenance Due   Topic Date Due    FOOT EXAM Q1  10/24/2018   . Health Maintenance reviewed and discussed and ordered per Provider. Jalen Curry is updated on all     Coordination of Care  1. Have you been to the ER, urgent care clinic since your last visit? Hospitalized since your last visit? no    2. Have you seen or consulted any other health care providers outside of the [address] [place] [address] [place] since your last visit? Include any pap smears or colon screening. no      Advance Directive:  1. Do you have an advance directive in place?  Patient Reply:no    2. If not, would you like material regarding how to put one in place?  Patient Reply: no Rinvoq Counseling: I discussed with the patient the risks of Rinvoq therapy including but not limited to upper respiratory tract infections, shingles, cold sores, bronchitis, nausea, cough, fever, acne, and headache. Live vaccines should be avoided.  This medication has been linked to serious infections; higher rate of mortality; malignancy and lymphoproliferative disorders; major adverse cardiovascular events; thrombosis; thrombocytopenia, anemia, and neutropenia; lipid elevations; liver enzyme elevations; and gastrointestinal perforations.

## 2023-02-21 PROBLEM — I49.9 CARDIAC ARRHYTHMIA: Status: ACTIVE | Noted: 2023-02-21

## 2023-02-21 PROBLEM — I10 ACCELERATED HYPERTENSION: Status: ACTIVE | Noted: 2023-02-21

## 2023-03-16 ENCOUNTER — TELEPHONE (OUTPATIENT)
Age: 88
End: 2023-03-16

## 2023-04-05 ENCOUNTER — HOSPITAL ENCOUNTER (OUTPATIENT)
Facility: HOSPITAL | Age: 88
Setting detail: RECURRING SERIES
End: 2023-04-05
Payer: MEDICARE

## 2023-04-10 ENCOUNTER — HOSPITAL ENCOUNTER (OUTPATIENT)
Facility: HOSPITAL | Age: 88
Setting detail: RECURRING SERIES
End: 2023-04-10
Payer: MEDICARE

## 2023-04-17 ENCOUNTER — HOSPITAL ENCOUNTER (OUTPATIENT)
Facility: HOSPITAL | Age: 88
Setting detail: RECURRING SERIES
End: 2023-04-17
Payer: MEDICARE

## 2023-04-19 ENCOUNTER — APPOINTMENT (OUTPATIENT)
Facility: HOSPITAL | Age: 88
End: 2023-04-19
Payer: MEDICARE

## 2023-04-24 ENCOUNTER — HOSPITAL ENCOUNTER (OUTPATIENT)
Facility: HOSPITAL | Age: 88
Setting detail: RECURRING SERIES
End: 2023-04-24
Payer: MEDICARE

## 2023-04-24 ENCOUNTER — APPOINTMENT (OUTPATIENT)
Facility: HOSPITAL | Age: 88
End: 2023-04-24
Payer: MEDICARE

## 2023-04-24 ENCOUNTER — HOSPITAL ENCOUNTER (EMERGENCY)
Facility: HOSPITAL | Age: 88
Discharge: HOME | End: 2023-04-24
Attending: EMERGENCY MEDICINE
Payer: MEDICARE

## 2023-04-24 VITALS
BODY MASS INDEX: 33.74 KG/M2 | SYSTOLIC BLOOD PRESSURE: 159 MMHG | RESPIRATION RATE: 16 BRPM | DIASTOLIC BLOOD PRESSURE: 88 MMHG | HEIGHT: 67 IN | OXYGEN SATURATION: 67 % | HEART RATE: 82 BPM | TEMPERATURE: 98.4 F | WEIGHT: 215 LBS

## 2023-04-24 DIAGNOSIS — R42 DIZZINESS: Primary | ICD-10-CM

## 2023-04-24 LAB
ALBUMIN SERPL-MCNC: 3.2 G/DL (ref 3.4–5)
ALBUMIN/GLOB SERPL: 0.7 (ref 0.8–1.7)
ALP SERPL-CCNC: 91 U/L (ref 45–117)
ALT SERPL-CCNC: 13 U/L (ref 13–56)
ANION GAP SERPL CALC-SCNC: 6 MMOL/L (ref 3–18)
APPEARANCE UR: CLEAR
AST SERPL-CCNC: 14 U/L (ref 10–38)
BACTERIA URNS QL MICRO: NEGATIVE /HPF
BASOPHILS # BLD: 0 K/UL (ref 0–0.1)
BASOPHILS NFR BLD: 1 % (ref 0–2)
BILIRUB SERPL-MCNC: 0.6 MG/DL (ref 0.2–1)
BILIRUB UR QL: NEGATIVE
BUN SERPL-MCNC: 10 MG/DL (ref 7–18)
BUN/CREAT SERPL: 14 (ref 12–20)
CALCIUM SERPL-MCNC: 9.2 MG/DL (ref 8.5–10.1)
CHLORIDE SERPL-SCNC: 107 MMOL/L (ref 100–111)
CO2 SERPL-SCNC: 26 MMOL/L (ref 21–32)
COLOR UR: YELLOW
CREAT SERPL-MCNC: 0.74 MG/DL (ref 0.6–1.3)
DIFFERENTIAL METHOD BLD: ABNORMAL
EKG ATRIAL RATE: 74 BPM
EKG DIAGNOSIS: NORMAL
EKG P AXIS: 20 DEGREES
EKG P-R INTERVAL: 122 MS
EKG Q-T INTERVAL: 398 MS
EKG QRS DURATION: 86 MS
EKG QTC CALCULATION (BAZETT): 441 MS
EKG R AXIS: -19 DEGREES
EKG T AXIS: 30 DEGREES
EKG VENTRICULAR RATE: 74 BPM
EOSINOPHIL # BLD: 0.3 K/UL (ref 0–0.4)
EOSINOPHIL NFR BLD: 5 % (ref 0–5)
EPITH CASTS URNS QL MICRO: ABNORMAL /LPF (ref 0–5)
ERYTHROCYTE [DISTWIDTH] IN BLOOD BY AUTOMATED COUNT: 11.7 % (ref 11.6–14.5)
GLOBULIN SER CALC-MCNC: 4.5 G/DL (ref 2–4)
GLUCOSE SERPL-MCNC: 176 MG/DL (ref 74–99)
GLUCOSE UR STRIP.AUTO-MCNC: NEGATIVE MG/DL
HCT VFR BLD AUTO: 37.9 % (ref 35–45)
HGB BLD-MCNC: 12.2 G/DL (ref 12–16)
HGB UR QL STRIP: NEGATIVE
HYALINE CASTS URNS QL MICRO: ABNORMAL /LPF (ref 0–2)
IMM GRANULOCYTES # BLD AUTO: 0 K/UL (ref 0–0.04)
IMM GRANULOCYTES NFR BLD AUTO: 0 % (ref 0–0.5)
KETONES UR QL STRIP.AUTO: ABNORMAL MG/DL
LEUKOCYTE ESTERASE UR QL STRIP.AUTO: ABNORMAL
LYMPHOCYTES # BLD: 2.4 K/UL (ref 0.9–3.6)
LYMPHOCYTES NFR BLD: 39 % (ref 21–52)
MAGNESIUM SERPL-MCNC: 1.9 MG/DL (ref 1.6–2.6)
MCH RBC QN AUTO: 31.6 PG (ref 24–34)
MCHC RBC AUTO-ENTMCNC: 32.2 G/DL (ref 31–37)
MCV RBC AUTO: 98.2 FL (ref 78–100)
MONOCYTES # BLD: 0.6 K/UL (ref 0.05–1.2)
MONOCYTES NFR BLD: 9 % (ref 3–10)
MUCOUS THREADS URNS QL MICRO: ABNORMAL /LPF
NEUTS SEG # BLD: 2.8 K/UL (ref 1.8–8)
NEUTS SEG NFR BLD: 46 % (ref 40–73)
NITRITE UR QL STRIP.AUTO: NEGATIVE
NRBC # BLD: 0 K/UL (ref 0–0.01)
NRBC BLD-RTO: 0 PER 100 WBC
PH UR STRIP: 5.5 (ref 5–8)
PLATELET # BLD AUTO: 226 K/UL (ref 135–420)
PMV BLD AUTO: 10.8 FL (ref 9.2–11.8)
POTASSIUM SERPL-SCNC: 3.8 MMOL/L (ref 3.5–5.5)
PROT SERPL-MCNC: 7.7 G/DL (ref 6.4–8.2)
PROT UR STRIP-MCNC: 100 MG/DL
RBC # BLD AUTO: 3.86 M/UL (ref 4.2–5.3)
RBC #/AREA URNS HPF: ABNORMAL /HPF (ref 0–5)
SODIUM SERPL-SCNC: 139 MMOL/L (ref 136–145)
SP GR UR REFRACTOMETRY: 1.02 (ref 1–1.03)
TROPONIN I SERPL HS-MCNC: 45 NG/L (ref 0–54)
UROBILINOGEN UR QL STRIP.AUTO: 1 EU/DL (ref 0.2–1)
WBC # BLD AUTO: 6.1 K/UL (ref 4.6–13.2)
WBC URNS QL MICRO: ABNORMAL /HPF (ref 0–4)

## 2023-04-24 PROCEDURE — 85025 COMPLETE CBC W/AUTO DIFF WBC: CPT

## 2023-04-24 PROCEDURE — 84484 ASSAY OF TROPONIN QUANT: CPT

## 2023-04-24 PROCEDURE — 93010 ELECTROCARDIOGRAM REPORT: CPT | Performed by: INTERNAL MEDICINE

## 2023-04-24 PROCEDURE — 81001 URINALYSIS AUTO W/SCOPE: CPT

## 2023-04-24 PROCEDURE — 80053 COMPREHEN METABOLIC PANEL: CPT

## 2023-04-24 PROCEDURE — 83735 ASSAY OF MAGNESIUM: CPT

## 2023-04-24 PROCEDURE — 6370000000 HC RX 637 (ALT 250 FOR IP): Performed by: EMERGENCY MEDICINE

## 2023-04-24 PROCEDURE — 71045 X-RAY EXAM CHEST 1 VIEW: CPT

## 2023-04-24 PROCEDURE — 99285 EMERGENCY DEPT VISIT HI MDM: CPT

## 2023-04-24 PROCEDURE — 93005 ELECTROCARDIOGRAM TRACING: CPT | Performed by: EMERGENCY MEDICINE

## 2023-04-24 RX ORDER — MECLIZINE HYDROCHLORIDE 25 MG/1
25 TABLET ORAL 3 TIMES DAILY PRN
Qty: 15 TABLET | Refills: 0 | Status: SHIPPED | OUTPATIENT
Start: 2023-04-24 | End: 2023-04-29

## 2023-04-24 RX ORDER — MECLIZINE HCL 12.5 MG/1
50 TABLET ORAL
Status: COMPLETED | OUTPATIENT
Start: 2023-04-24 | End: 2023-04-24

## 2023-04-24 RX ADMIN — MECLIZINE 50 MG: 12.5 TABLET ORAL at 11:52

## 2023-04-24 ASSESSMENT — PAIN - FUNCTIONAL ASSESSMENT: PAIN_FUNCTIONAL_ASSESSMENT: NONE - DENIES PAIN

## 2023-04-24 NOTE — ED NOTES
Pt given a bedside commode for urine  sample.  Pt insists on water to drink for urine sample and blood sample     Oscar Hudson RN  04/24/23 0930

## 2023-04-24 NOTE — ED NOTES
Assisted pt with getting dressed and helped place in wheelchair for caretaker to take her out to car.       Michelle Burrows RN  04/24/23 9582

## 2023-05-10 ENCOUNTER — HOSPITAL ENCOUNTER (EMERGENCY)
Facility: HOSPITAL | Age: 88
Discharge: HOME | End: 2023-05-10
Attending: EMERGENCY MEDICINE
Payer: MEDICARE

## 2023-05-10 ENCOUNTER — APPOINTMENT (OUTPATIENT)
Facility: HOSPITAL | Age: 88
End: 2023-05-10
Payer: MEDICARE

## 2023-05-10 VITALS
DIASTOLIC BLOOD PRESSURE: 86 MMHG | HEART RATE: 92 BPM | RESPIRATION RATE: 21 BRPM | SYSTOLIC BLOOD PRESSURE: 151 MMHG | HEIGHT: 67 IN | OXYGEN SATURATION: 96 % | BODY MASS INDEX: 33.74 KG/M2 | TEMPERATURE: 98.8 F | WEIGHT: 215 LBS

## 2023-05-10 DIAGNOSIS — R42 DIZZINESS: Primary | ICD-10-CM

## 2023-05-10 DIAGNOSIS — N39.0 ACUTE UTI: ICD-10-CM

## 2023-05-10 LAB
ALBUMIN SERPL-MCNC: 3.2 G/DL (ref 3.4–5)
ALBUMIN/GLOB SERPL: 0.7 (ref 0.8–1.7)
ALP SERPL-CCNC: 85 U/L (ref 45–117)
ALT SERPL-CCNC: 14 U/L (ref 13–56)
ANION GAP SERPL CALC-SCNC: 4 MMOL/L (ref 3–18)
APPEARANCE UR: ABNORMAL
AST SERPL-CCNC: 14 U/L (ref 10–38)
BACTERIA URNS QL MICRO: ABNORMAL /HPF
BASOPHILS # BLD: 0.1 K/UL (ref 0–0.1)
BASOPHILS NFR BLD: 1 % (ref 0–2)
BILIRUB SERPL-MCNC: 0.6 MG/DL (ref 0.2–1)
BILIRUB UR QL: NEGATIVE
BUN SERPL-MCNC: 14 MG/DL (ref 7–18)
BUN/CREAT SERPL: 18 (ref 12–20)
CALCIUM SERPL-MCNC: 9.1 MG/DL (ref 8.5–10.1)
CHLORIDE SERPL-SCNC: 110 MMOL/L (ref 100–111)
CO2 SERPL-SCNC: 26 MMOL/L (ref 21–32)
COLOR UR: YELLOW
CREAT SERPL-MCNC: 0.77 MG/DL (ref 0.6–1.3)
DIFFERENTIAL METHOD BLD: ABNORMAL
EKG ATRIAL RATE: 110 BPM
EKG DIAGNOSIS: NORMAL
EKG P AXIS: 89 DEGREES
EKG P-R INTERVAL: 140 MS
EKG Q-T INTERVAL: 324 MS
EKG QRS DURATION: 82 MS
EKG QTC CALCULATION (BAZETT): 438 MS
EKG R AXIS: -18 DEGREES
EKG T AXIS: -38 DEGREES
EKG VENTRICULAR RATE: 110 BPM
EOSINOPHIL # BLD: 0.3 K/UL (ref 0–0.4)
EOSINOPHIL NFR BLD: 5 % (ref 0–5)
EPITH CASTS URNS QL MICRO: ABNORMAL /LPF (ref 0–5)
ERYTHROCYTE [DISTWIDTH] IN BLOOD BY AUTOMATED COUNT: 11.8 % (ref 11.6–14.5)
GLOBULIN SER CALC-MCNC: 4.5 G/DL (ref 2–4)
GLUCOSE SERPL-MCNC: 198 MG/DL (ref 74–99)
GLUCOSE UR STRIP.AUTO-MCNC: NEGATIVE MG/DL
HCT VFR BLD AUTO: 37 % (ref 35–45)
HGB BLD-MCNC: 11.9 G/DL (ref 12–16)
HGB UR QL STRIP: ABNORMAL
IMM GRANULOCYTES # BLD AUTO: 0 K/UL (ref 0–0.04)
IMM GRANULOCYTES NFR BLD AUTO: 0 % (ref 0–0.5)
KETONES UR QL STRIP.AUTO: NEGATIVE MG/DL
LEUKOCYTE ESTERASE UR QL STRIP.AUTO: ABNORMAL
LYMPHOCYTES # BLD: 2.3 K/UL (ref 0.9–3.6)
LYMPHOCYTES NFR BLD: 38 % (ref 21–52)
MCH RBC QN AUTO: 31.8 PG (ref 24–34)
MCHC RBC AUTO-ENTMCNC: 32.2 G/DL (ref 31–37)
MCV RBC AUTO: 98.9 FL (ref 78–100)
MONOCYTES # BLD: 0.4 K/UL (ref 0.05–1.2)
MONOCYTES NFR BLD: 8 % (ref 3–10)
NEUTS SEG # BLD: 2.8 K/UL (ref 1.8–8)
NEUTS SEG NFR BLD: 48 % (ref 40–73)
NITRITE UR QL STRIP.AUTO: POSITIVE
NRBC # BLD: 0 K/UL (ref 0–0.01)
NRBC BLD-RTO: 0 PER 100 WBC
PH UR STRIP: 6 (ref 5–8)
PLATELET # BLD AUTO: 207 K/UL (ref 135–420)
PMV BLD AUTO: 10.9 FL (ref 9.2–11.8)
POTASSIUM SERPL-SCNC: 3.5 MMOL/L (ref 3.5–5.5)
PROT SERPL-MCNC: 7.7 G/DL (ref 6.4–8.2)
PROT UR STRIP-MCNC: 30 MG/DL
RBC # BLD AUTO: 3.74 M/UL (ref 4.2–5.3)
RBC #/AREA URNS HPF: ABNORMAL /HPF (ref 0–5)
SARS-COV-2 RDRP RESP QL NAA+PROBE: NOT DETECTED
SODIUM SERPL-SCNC: 140 MMOL/L (ref 136–145)
SOURCE: NORMAL
SP GR UR REFRACTOMETRY: 1.01 (ref 1–1.03)
TROPONIN I SERPL HS-MCNC: 48 NG/L (ref 0–54)
TSH SERPL DL<=0.05 MIU/L-ACNC: 0.76 UIU/ML (ref 0.36–3.74)
UROBILINOGEN UR QL STRIP.AUTO: 0.2 EU/DL (ref 0.2–1)
WBC # BLD AUTO: 5.9 K/UL (ref 4.6–13.2)
WBC URNS QL MICRO: ABNORMAL /HPF (ref 0–4)

## 2023-05-10 PROCEDURE — 80053 COMPREHEN METABOLIC PANEL: CPT

## 2023-05-10 PROCEDURE — 84439 ASSAY OF FREE THYROXINE: CPT

## 2023-05-10 PROCEDURE — 87086 URINE CULTURE/COLONY COUNT: CPT

## 2023-05-10 PROCEDURE — 2580000003 HC RX 258: Performed by: EMERGENCY MEDICINE

## 2023-05-10 PROCEDURE — 96374 THER/PROPH/DIAG INJ IV PUSH: CPT

## 2023-05-10 PROCEDURE — 93005 ELECTROCARDIOGRAM TRACING: CPT | Performed by: EMERGENCY MEDICINE

## 2023-05-10 PROCEDURE — 87635 SARS-COV-2 COVID-19 AMP PRB: CPT

## 2023-05-10 PROCEDURE — 6360000002 HC RX W HCPCS: Performed by: EMERGENCY MEDICINE

## 2023-05-10 PROCEDURE — 85025 COMPLETE CBC W/AUTO DIFF WBC: CPT

## 2023-05-10 PROCEDURE — 99285 EMERGENCY DEPT VISIT HI MDM: CPT

## 2023-05-10 PROCEDURE — 87077 CULTURE AEROBIC IDENTIFY: CPT

## 2023-05-10 PROCEDURE — 84484 ASSAY OF TROPONIN QUANT: CPT

## 2023-05-10 PROCEDURE — 93010 ELECTROCARDIOGRAM REPORT: CPT | Performed by: INTERNAL MEDICINE

## 2023-05-10 PROCEDURE — 70450 CT HEAD/BRAIN W/O DYE: CPT

## 2023-05-10 PROCEDURE — 84443 ASSAY THYROID STIM HORMONE: CPT

## 2023-05-10 PROCEDURE — 71045 X-RAY EXAM CHEST 1 VIEW: CPT

## 2023-05-10 PROCEDURE — 81001 URINALYSIS AUTO W/SCOPE: CPT

## 2023-05-10 PROCEDURE — 87186 SC STD MICRODIL/AGAR DIL: CPT

## 2023-05-10 RX ORDER — CEPHALEXIN 500 MG/1
500 CAPSULE ORAL 3 TIMES DAILY
Qty: 21 CAPSULE | Refills: 0 | Status: SHIPPED | OUTPATIENT
Start: 2023-05-10 | End: 2023-05-17

## 2023-05-10 RX ADMIN — WATER 1000 MG: 1 INJECTION INTRAMUSCULAR; INTRAVENOUS; SUBCUTANEOUS at 16:27

## 2023-05-10 ASSESSMENT — PAIN - FUNCTIONAL ASSESSMENT: PAIN_FUNCTIONAL_ASSESSMENT: 0-10

## 2023-05-10 ASSESSMENT — PAIN SCALES - GENERAL: PAINLEVEL_OUTOF10: 2

## 2023-05-10 NOTE — ED TRIAGE NOTES
Patient arrived to ER with continued dizziness. Patient was given Antivert and its not helping. Patient also has some left ear stuffiness for past 2-3 months.

## 2023-05-10 NOTE — ED PROVIDER NOTES
severe sepsis or septic shock? No Exclusion criteria - the patient is NOT to be included for SEP-1 Core Measure due to: 2+ SIRS criteria are not met    MEDICATIONS ADMINISTERED IN THE ED:  Medications   cefTRIAXone (ROCEPHIN) 1,000 mg in sterile water 10 mL IV syringe (has no administration in time range)            None    Critical Care: None  4:27 PM Upon re-evaluation the patient's symptoms have improved. Pt has non-toxic appearance and condition is stable for discharge. She was informed of her results, instructed to f/u with her PCP and return to the ED upon worsening of symptoms. All questions and concerns were addressed. Diagnosis and Disposition   Diagnosis:   1. Dizziness    2. Acute UTI          Disposition:    DISPOSITION Decision To Discharge 05/10/2023 04:23:50 PM    Home or [facility] [address]     Please note that this dictation was completed with [employer], the [employer] voice recognition software. Quite often unanticipated grammatical, syntax, homophones, and other interpretive errors are inadvertently transcribed by the computer software. Please disregard these errors. Please excuse any errors that have escaped final proofreading. Quin Castellanos DO am the primary clinician of record. Alina Castellanos DO  (Electronically signed)            Stefan Florez,   05/10/23 [address],   05/10/23 [address], DO  05/10/23 1932

## 2023-05-11 LAB — T4 FREE SERPL-MCNC: 1.3 NG/DL (ref 0.7–1.5)

## 2023-05-13 LAB
BACTERIA SPEC CULT: ABNORMAL
CC UR VC: ABNORMAL
SERVICE CMNT-IMP: ABNORMAL

## 2023-05-14 ASSESSMENT — ENCOUNTER SYMPTOMS: SHORTNESS OF BREATH: 0

## 2023-05-15 ENCOUNTER — OFFICE VISIT (OUTPATIENT)
Facility: CLINIC | Age: 88
End: 2023-05-15
Payer: MEDICARE

## 2023-05-15 VITALS
SYSTOLIC BLOOD PRESSURE: 137 MMHG | BODY MASS INDEX: 33.74 KG/M2 | OXYGEN SATURATION: 100 % | HEIGHT: 67 IN | DIASTOLIC BLOOD PRESSURE: 67 MMHG | HEART RATE: 100 BPM | RESPIRATION RATE: 16 BRPM | TEMPERATURE: 97.6 F | WEIGHT: 215 LBS

## 2023-05-15 DIAGNOSIS — R42 DIZZINESS: Primary | ICD-10-CM

## 2023-05-15 DIAGNOSIS — N30.01 ACUTE CYSTITIS WITH HEMATURIA: ICD-10-CM

## 2023-05-15 PROBLEM — R51.9 HEADACHE: Status: RESOLVED | Noted: 2022-09-08 | Resolved: 2023-05-15

## 2023-05-15 PROBLEM — I49.9 CARDIAC ARRHYTHMIA: Status: RESOLVED | Noted: 2023-02-21 | Resolved: 2023-05-15

## 2023-05-15 PROBLEM — R10.9 ABDOMINAL CRAMPS: Status: RESOLVED | Noted: 2022-08-11 | Resolved: 2023-05-15

## 2023-05-15 PROBLEM — R07.9 CHEST PAIN: Status: RESOLVED | Noted: 2022-09-08 | Resolved: 2023-05-15

## 2023-05-15 PROBLEM — I10 ELEVATED BLOOD PRESSURE READING WITH DIAGNOSIS OF HYPERTENSION: Status: RESOLVED | Noted: 2022-08-11 | Resolved: 2023-05-15

## 2023-05-15 PROCEDURE — 99214 OFFICE O/P EST MOD 30 MIN: CPT

## 2023-05-15 PROCEDURE — 1123F ACP DISCUSS/DSCN MKR DOCD: CPT

## 2023-05-15 RX ORDER — NITROFURANTOIN 25; 75 MG/1; MG/1
100 CAPSULE ORAL 2 TIMES DAILY
Qty: 10 CAPSULE | Refills: 0 | Status: SHIPPED | OUTPATIENT
Start: 2023-05-15 | End: 2023-05-20

## 2023-05-15 NOTE — PROGRESS NOTES
Arianna Howard presents today for   Chief Complaint   Patient presents with    Follow-Up from Hospital    Dizziness       Is someone accompanying this pt? Jennifer Armstrong    Is the patient using any DME equipment during [address]? wheelchair    Depression Screening:  PHQ-9 Questionaire 3/22/2023 2/24/2023 9/7/2022 6/29/2022 2/7/2022 11/9/2021 8/12/2021   Little interest or pleasure in doing things 0 0 0 0 0 0 0   Feeling down, depressed, or hopeless 0 0 0 0 0 0 0   PHQ-9 Total Score 0 0 0 0 0 0 0        RUTH 7-Anxiety   No flowsheet data found. Learning Assessment:  No question data found. Fall Risk  No flowsheet data found. Travel Screening:    Travel Screening     No screening recorded since 05/14/23 0000       Travel History   Travel since 04/15/23    No documented travel since 04/15/23          Health Maintenance reviewed and discussed and ordered per Provider. Social Determinants of Health     Tobacco Use: Medium Risk    Smoking Tobacco Use: Former    Smokeless Tobacco Use: Never    Passive Exposure: Not on file   Alcohol Use: Not At Risk    Frequency of Alcohol Consumption: Never    Average Number of Drinks: Patient does not drink    Frequency of Binge Drinking: Never   Financial Resource Strain: Low Risk     Difficulty of Paying Living Expenses: Not very hard   Food Insecurity: No Food Insecurity    Worried About Running Out of Food in the Last Year: Never true    Ran Out of Food in the Last Year: Never true   Transportation Needs: Unknown    Lack of Transportation (Medical): Not on file    Lack of Transportation (Non-Medical):  No   Physical Activity: Not on file   Stress: Not on file   Social Connections: Not on file   Intimate Partner Violence: Not on file   Depression: Not at risk    PHQ-2 Score: 0   Housing Stability: Unknown    Unable to Pay for Housing in the Last Year: Not on file    Number of Places Lived in the Last Year: Not on file    Unstable Housing in the Last Year: No        Health Maintenance

## 2023-05-22 ENCOUNTER — TELEPHONE (OUTPATIENT)
Facility: HOSPITAL | Age: 88
End: 2023-05-22

## 2023-05-22 NOTE — TELEPHONE ENCOUNTER
Prescription sent to pharmacy on file to treat for UTI. Patient called and left voicemail to  prescription at pharmacy.

## 2023-05-24 RX ORDER — MECLIZINE HYDROCHLORIDE 25 MG/1
25 TABLET ORAL 3 TIMES DAILY PRN
Qty: 15 TABLET | Refills: 0 | Status: SHIPPED | OUTPATIENT
Start: 2023-05-24 | End: 2023-06-03

## 2023-05-24 NOTE — TELEPHONE ENCOUNTER
Spoke w/ pt and she has been feeling dizzy intermittently for about one month and hasn't improved. Pt states she has taken Meclizine before which has helped and would like rx sent to pharmacy. Pt is stating she is also still having chest congestion with slight coughing and feels a lot of mucus is in her chest. Pt is requesting medicine to help break down the mucus. Pt denies fever, SOB, body aches.  Pt did not take a Covid test.

## 2023-05-26 ASSESSMENT — ENCOUNTER SYMPTOMS
RHINORRHEA: 0
GASTROINTESTINAL NEGATIVE: 1
RESPIRATORY NEGATIVE: 1
FACIAL SWELLING: 0

## 2023-05-26 NOTE — ED PROVIDER NOTES
Albumin/Globulin Ratio 0.7 (*)     All other components within normal limits   URINALYSIS - Abnormal; Notable for the following components:    Protein,  (*)     Ketones, Urine TRACE (*)     Leukocyte Esterase, Urine TRACE (*)     All other components within normal limits   URINALYSIS, MICRO - Abnormal; Notable for the following components:    Mucus, UA 1+ (*)     All other components within normal limits   TROPONIN   MAGNESIUM       All other labs were within normal range or not returned as of this dictation. EMERGENCY DEPARTMENT COURSE and DIFFERENTIAL DIAGNOSIS/MDM:   Vitals:    Vitals:    04/24/23 1219 04/24/23 1234 04/24/23 1249 04/24/23 1300   BP: 134/73 133/70 (!) 140/79 (!) 159/88   Pulse: 72 87 87 82   Resp: 12 23 17 16   Temp:       TempSrc:       SpO2: 97% 97% 97% (!) 67%   Weight:       Height:               Medical Decision Making   Patient is neurologically intact normal ear exam will discharge on Antivert. Urinalysis does not show UTI will have her follow up with PCP and/or ENT. Differential diagnosis stroke vertigo headache dehydration syncope    Amount and/or Complexity of Data Reviewed  Labs: ordered. Radiology: ordered. ECG/medicine tests: ordered. REASSESSMENT          CRITICAL CARE TIME   Total Critical Care time was  minutes, excluding separately reportable procedures. There was a high probability of clinically significant/life threatening deterioration in the patient's condition which required my urgent intervention. CONSULTS:  None    PROCEDURES:  Unless otherwise noted below, none     Procedures        FINAL IMPRESSION      1.  Dizziness          DISPOSITION/PLAN   DISPOSITION Decision To Discharge 04/24/2023 01:13:21 PM      PATIENT REFERRED TO:  Prakash Capellan MD  [address] [place] [id number] [phone]    Go today        DISCHARGE MEDICATIONS:  Discharge Medication List as of 4/24/2023  1:37 PM        START taking these medications    Details

## 2023-05-30 ENCOUNTER — HOSPITAL ENCOUNTER (OUTPATIENT)
Facility: HOSPITAL | Age: 88
Discharge: HOME | End: 2023-06-02
Payer: MEDICARE

## 2023-05-30 ENCOUNTER — TELEPHONE (OUTPATIENT)
Age: 88
End: 2023-05-30

## 2023-05-30 ENCOUNTER — OFFICE VISIT (OUTPATIENT)
Age: 88
End: 2023-05-30
Payer: MEDICARE

## 2023-05-30 VITALS
OXYGEN SATURATION: 98 % | HEART RATE: 95 BPM | HEIGHT: 67 IN | DIASTOLIC BLOOD PRESSURE: 62 MMHG | WEIGHT: 215 LBS | SYSTOLIC BLOOD PRESSURE: 137 MMHG | BODY MASS INDEX: 33.74 KG/M2

## 2023-05-30 DIAGNOSIS — I25.118 ATHEROSCLEROTIC HEART DISEASE OF NATIVE CORONARY ARTERY WITH OTHER FORMS OF ANGINA PECTORIS (HCC): Primary | ICD-10-CM

## 2023-05-30 DIAGNOSIS — R42 VERTIGO: ICD-10-CM

## 2023-05-30 DIAGNOSIS — I50.32 CHRONIC DIASTOLIC (CONGESTIVE) HEART FAILURE (HCC): ICD-10-CM

## 2023-05-30 DIAGNOSIS — E78.2 MIXED HYPERLIPIDEMIA: ICD-10-CM

## 2023-05-30 DIAGNOSIS — Z95.5 HISTORY OF CORONARY ARTERY STENT PLACEMENT: ICD-10-CM

## 2023-05-30 DIAGNOSIS — I48.0 PAROXYSMAL ATRIAL FIBRILLATION (HCC): ICD-10-CM

## 2023-05-30 DIAGNOSIS — I10 ESSENTIAL (PRIMARY) HYPERTENSION: ICD-10-CM

## 2023-05-30 LAB
ALBUMIN SERPL-MCNC: 3.2 G/DL (ref 3.4–5)
ALBUMIN/GLOB SERPL: 0.7 (ref 0.8–1.7)
ALP SERPL-CCNC: 78 U/L (ref 45–117)
ALT SERPL-CCNC: 13 U/L (ref 13–56)
ANION GAP SERPL CALC-SCNC: 6 MMOL/L (ref 3–18)
AST SERPL-CCNC: 13 U/L (ref 10–38)
BILIRUB SERPL-MCNC: 0.8 MG/DL (ref 0.2–1)
BUN SERPL-MCNC: 10 MG/DL (ref 7–18)
BUN/CREAT SERPL: 14 (ref 12–20)
CALCIUM SERPL-MCNC: 9.4 MG/DL (ref 8.5–10.1)
CHLORIDE SERPL-SCNC: 107 MMOL/L (ref 100–111)
CO2 SERPL-SCNC: 29 MMOL/L (ref 21–32)
CREAT SERPL-MCNC: 0.71 MG/DL (ref 0.6–1.3)
GLOBULIN SER CALC-MCNC: 4.3 G/DL (ref 2–4)
GLUCOSE SERPL-MCNC: 181 MG/DL (ref 74–99)
NT PRO BNP: 190 PG/ML (ref 0–1800)
POTASSIUM SERPL-SCNC: 3.7 MMOL/L (ref 3.5–5.5)
PROT SERPL-MCNC: 7.5 G/DL (ref 6.4–8.2)
SODIUM SERPL-SCNC: 142 MMOL/L (ref 136–145)

## 2023-05-30 PROCEDURE — 71046 X-RAY EXAM CHEST 2 VIEWS: CPT

## 2023-05-30 PROCEDURE — 83880 ASSAY OF NATRIURETIC PEPTIDE: CPT

## 2023-05-30 PROCEDURE — 99214 OFFICE O/P EST MOD 30 MIN: CPT | Performed by: NURSE PRACTITIONER

## 2023-05-30 PROCEDURE — 36415 COLL VENOUS BLD VENIPUNCTURE: CPT

## 2023-05-30 PROCEDURE — 80053 COMPREHEN METABOLIC PANEL: CPT

## 2023-05-30 PROCEDURE — 1123F ACP DISCUSS/DSCN MKR DOCD: CPT | Performed by: NURSE PRACTITIONER

## 2023-05-30 ASSESSMENT — PATIENT HEALTH QUESTIONNAIRE - PHQ9
1. LITTLE INTEREST OR PLEASURE IN DOING THINGS: 0
SUM OF ALL RESPONSES TO PHQ QUESTIONS 1-9: 0
DEPRESSION UNABLE TO ASSESS: FUNCTIONAL CAPACITY MOTIVATION LIMITS ACCURACY
SUM OF ALL RESPONSES TO PHQ QUESTIONS 1-9: 0
SUM OF ALL RESPONSES TO PHQ9 QUESTIONS 1 & 2: 0
SUM OF ALL RESPONSES TO PHQ QUESTIONS 1-9: 0
SUM OF ALL RESPONSES TO PHQ QUESTIONS 1-9: 0
2. FEELING DOWN, DEPRESSED OR HOPELESS: 0

## 2023-05-30 NOTE — PROGRESS NOTES
HISTORY OF PRESENT ILLNESS  Huey Roemro is a 82 y.o. female. Patient was admitted 6/2019 with  1. Chest pain, atypical: resolved. S/p cardiac cath that showed no critical CAD other than 50% mid LAD stenosis and widely patent previous proximal right coronary stent- continue medical management. Recent echo with  EF%  51%-55% and mild concentric hypertrophy. 3/2020  Patient seen today for hospital follow-up. She was admitted to 2020 with  1. Chest pain, atypical: resolved - Cardiac cath 6/19 showed  No critical disease in epicardial coronary arteries other than 50% mid LAD stenosis and widely patent previous proximal right coronary stent. No further cardiac w/u needed at this time. Continue medical management for CAD   2. Sinus tachycardia- resolved. continue  low dose bb.   3. Orthostatic hypotension- c/o dizziness had  significant orthostatic changes 2/26/20. Follow orthtostatic BP today. Lasix. D/c she uses prn at home for leg swelling and SOB. Continue  Norvasc. continue with compression stockings   4. Mild LV dysfunction- on echo 9/18 with EF 45%- 50%. Continue Lasix as needed  and low dose bb    11/2020  Recent admission with    1. Chest pain - Resolved, Trop Neg x 3, EKG ST with non-specific ST abnormality. Elevated D-Dimer, VQ scan neg for PE. 2. CAD h/o PCI to RCA 2016 - Cardiac cath 6/2019 - 50% mid LAD stenosis and widely patent previous proximal right coronary stent. Continue plavix, aspirin, Imdur, Ranexa, norvasc and metoprolol. 3. Hypertension - improved, Continue Norvasc, metoprolol, HCTZ and Aldactone. Monitor b/p.    4. Acute on chronic diastolic CHF NYHA class III - Echo 11/2020 EF 50-55%, no WMA - unchanged from prior  5. Hyperlipidemia -  - she is intolerant to statins, and has declined Repatha in the past  6. DM II - uncontrolled A1C 8.2 - management per primary team.  7. Hypokalemia - Improved. She is now taking aldactone - will not need  Potassium supplements.   Recommend

## 2023-05-30 NOTE — TELEPHONE ENCOUNTER
Spoke with patient per Ray Medellin NP regarding lab/test. Please call patient, advise labs are normal.   Chest x ray normal  No evidence of CHF   F/U with PCP for cough. Follow up as scheduled. She voices understanding and acceptance of this advice and will call back if any further questions or concerns.

## 2023-05-30 NOTE — TELEPHONE ENCOUNTER
----- Message from ISAIAH Blakely NP sent at 5/30/2023  3:22 PM EDT -----  Please call patient, advise labs are normal.   Chest x ray normal  No evidence of CHF   F/U with PCP for cough.   Follow up as scheduled

## 2023-05-30 NOTE — RESULT ENCOUNTER NOTE
Please call patient, advise labs are normal.   Chest x ray normal  No evidence of CHF   F/U with PCP for cough.   Follow up as scheduled

## 2023-06-22 ENCOUNTER — TELEPHONE (OUTPATIENT)
Facility: CLINIC | Age: 88
End: 2023-06-22

## 2023-06-22 ENCOUNTER — OFFICE VISIT (OUTPATIENT)
Facility: CLINIC | Age: 88
End: 2023-06-22
Payer: MEDICARE

## 2023-06-22 VITALS
OXYGEN SATURATION: 97 % | RESPIRATION RATE: 16 BRPM | HEART RATE: 106 BPM | SYSTOLIC BLOOD PRESSURE: 118 MMHG | DIASTOLIC BLOOD PRESSURE: 57 MMHG

## 2023-06-22 DIAGNOSIS — I10 ESSENTIAL (PRIMARY) HYPERTENSION: ICD-10-CM

## 2023-06-22 DIAGNOSIS — Z79.4 TYPE 2 DIABETES MELLITUS WITH OTHER SPECIFIED COMPLICATION, WITH LONG-TERM CURRENT USE OF INSULIN (HCC): ICD-10-CM

## 2023-06-22 DIAGNOSIS — E11.69 TYPE 2 DIABETES MELLITUS WITH OTHER SPECIFIED COMPLICATION, WITH LONG-TERM CURRENT USE OF INSULIN (HCC): ICD-10-CM

## 2023-06-22 DIAGNOSIS — R42 DIZZINESS: Primary | ICD-10-CM

## 2023-06-22 PROCEDURE — 99214 OFFICE O/P EST MOD 30 MIN: CPT | Performed by: FAMILY MEDICINE

## 2023-06-22 PROCEDURE — 3051F HG A1C>EQUAL 7.0%<8.0%: CPT | Performed by: FAMILY MEDICINE

## 2023-06-22 PROCEDURE — 1123F ACP DISCUSS/DSCN MKR DOCD: CPT | Performed by: FAMILY MEDICINE

## 2023-06-22 RX ORDER — MECLIZINE HYDROCHLORIDE 25 MG/1
25 TABLET ORAL 3 TIMES DAILY PRN
Qty: 30 TABLET | Refills: 0 | Status: SHIPPED | OUTPATIENT
Start: 2023-06-22

## 2023-06-22 NOTE — TELEPHONE ENCOUNTER
Patient called and states that she would like an order put in for another hospital bed. Patient states that the one she has is not working properly and she would like a new one. Her callback number is [phone]. Please advise.

## 2023-06-22 NOTE — PROGRESS NOTES
ASSESSMENT/PLAN:  1. Dizziness  -     meclizine (ANTIVERT) 25 MG tablet; Take 1 tablet by mouth 3 times daily as needed for Dizziness, Disp-30 tablet, R-0Normal  Pt to contact ENT of choice for further eval    2. Essential (primary) hypertension  Stable, cont pres tx plan. 3. Type 2 diabetes mellitus with other specified complication, with long-term current use of insulin ([address]  Care as per endo      Return in about 3 months (around 9/22/2023) for f/u specialist eval.      SUBJECTIVE/OBJECTIVE:    Chief Complaint   Patient presents with    Dizziness    Frequent/Recurrent UTI         HPI    Samantha Aguilar is a 82 y.o. female presenting today for    6 weeks  follow up of dizziness, uti. Pt has seen Dr Rubia Gee before and plans to see a different ent for continued eval of dizziness and L ear hearing loss. Pt cont to see Dr Nowak Star for dm. Pt is seeing cardiology regularly. Patient does not need medication refills today. New concerns today: none        Review of Systems   Constitutional: Negative. HENT:  Positive for hearing loss. Respiratory: Negative. Cardiovascular: Negative. Neurological:  Positive for dizziness. All other systems reviewed and are negative. Physical Exam  Vitals and nursing note reviewed. Constitutional:       General: She is not in acute distress. Appearance: Normal appearance. HENT:      Head: Normocephalic and atraumatic. Right Ear: External ear normal.      Left Ear: External ear normal.      Nose: Nose normal.      Mouth/Throat:      Mouth: Mucous membranes are moist.   Eyes:      Extraocular Movements: Extraocular movements intact. Conjunctiva/sclera: Conjunctivae normal.   Cardiovascular:      Rate and Rhythm: Normal rate and regular rhythm. Heart sounds: No murmur heard. No friction rub. No gallop. Pulmonary:      Effort: Pulmonary effort is normal.      Breath sounds: Normal breath sounds. No wheezing, rhonchi or rales.

## 2023-06-22 NOTE — PROGRESS NOTES
Chief Complaint   Patient presents with    Dizziness    Frequent/Recurrent UTI        There were no vitals filed for this visit. Depression: Not at risk    PHQ-2 Score: 0        No flowsheet data found. Catherin Mohs \"Have you been to the ER, urgent care clinic since your last visit? Hospitalized since your last visit? \"  No     2. \"Have you seen or consulted any other health care providers outside of the [address] [place] [address] since your last visit? \"  No      3. For patients aged 39-70: Has the patient had a colonoscopy / FIT/ Cologuard? N/A     If the patient is female:    4. For patients aged 41-77: Has the patient had a mammogram within the past 2 years? N/A    5. For patients aged 21-65: Has the patient had a pap smear?  N/A

## 2023-06-25 ASSESSMENT — ENCOUNTER SYMPTOMS: RESPIRATORY NEGATIVE: 1

## 2023-08-24 ENCOUNTER — OFFICE VISIT (OUTPATIENT)
Age: 88
End: 2023-08-24
Payer: MEDICARE

## 2023-08-24 VITALS
OXYGEN SATURATION: 98 % | HEIGHT: 67 IN | WEIGHT: 217 LBS | BODY MASS INDEX: 34.06 KG/M2 | DIASTOLIC BLOOD PRESSURE: 70 MMHG | SYSTOLIC BLOOD PRESSURE: 130 MMHG | HEART RATE: 103 BPM

## 2023-08-24 DIAGNOSIS — I10 ESSENTIAL (PRIMARY) HYPERTENSION: ICD-10-CM

## 2023-08-24 DIAGNOSIS — Z95.5 PRESENCE OF CORONARY ANGIOPLASTY IMPLANT AND GRAFT: ICD-10-CM

## 2023-08-24 DIAGNOSIS — E78.2 MIXED HYPERLIPIDEMIA: ICD-10-CM

## 2023-08-24 DIAGNOSIS — I25.118 ATHEROSCLEROTIC HEART DISEASE OF NATIVE CORONARY ARTERY WITH OTHER FORMS OF ANGINA PECTORIS (HCC): Primary | ICD-10-CM

## 2023-08-24 DIAGNOSIS — I50.32 CHRONIC DIASTOLIC (CONGESTIVE) HEART FAILURE (HCC): ICD-10-CM

## 2023-08-24 DIAGNOSIS — R42 DIZZINESS: ICD-10-CM

## 2023-08-24 PROCEDURE — 99214 OFFICE O/P EST MOD 30 MIN: CPT | Performed by: INTERNAL MEDICINE

## 2023-08-24 PROCEDURE — 1123F ACP DISCUSS/DSCN MKR DOCD: CPT | Performed by: INTERNAL MEDICINE

## 2023-08-24 RX ORDER — CLONIDINE HYDROCHLORIDE 0.1 MG/1
TABLET ORAL
COMMUNITY

## 2023-08-24 RX ORDER — MECLIZINE HYDROCHLORIDE 25 MG/1
25 TABLET ORAL 3 TIMES DAILY PRN
Qty: 30 TABLET | Refills: 1 | Status: SHIPPED | OUTPATIENT
Start: 2023-08-24

## 2023-08-24 ASSESSMENT — PATIENT HEALTH QUESTIONNAIRE - PHQ9
2. FEELING DOWN, DEPRESSED OR HOPELESS: 0
SUM OF ALL RESPONSES TO PHQ QUESTIONS 1-9: 0
1. LITTLE INTEREST OR PLEASURE IN DOING THINGS: 0
SUM OF ALL RESPONSES TO PHQ QUESTIONS 1-9: 0
DEPRESSION UNABLE TO ASSESS: FUNCTIONAL CAPACITY MOTIVATION LIMITS ACCURACY
SUM OF ALL RESPONSES TO PHQ QUESTIONS 1-9: 0
SUM OF ALL RESPONSES TO PHQ9 QUESTIONS 1 & 2: 0
SUM OF ALL RESPONSES TO PHQ QUESTIONS 1-9: 0

## 2023-08-24 NOTE — PROGRESS NOTES
1. Have you been to the ER, urgent care clinic since your last visit? Hospitalized since your last visit? No    2. Have you seen or consulted any other health care providers outside of the [address] [place] [address] since your last visit? Include any pap smears or colon screening.       No
H/O PCI in 2016 Recent ER visit due to chest pain. Stopped taking Ranexa due to GI upset, has now resumed. . Discussed resuming Repatha, she will consider. Will order stress test to r/o ischemia. And begin Imdur 30 mg/ day - this  Will also improve b/p. F/U post testing.  5/2019  Post ER visit. Atypical chest pain. Resolved no recurrence. We will continue to monitor clinically continue current treatment  7/2019  Cardiac status stable. Recent admission records reviewed. Noncritical CAD and patent stent on cath. Discontinue aspirin and continue Plavix  3/2020  Seen after recent admission. Atypical chest pain. Stable since discharge. Short of breath on exertion class III unchanged. Had dizziness with use of Lasix as needed now. Blood pressure control. Continue therapy  8/2020  Cardiac status stable. CHF class III stable. Continue treatment. Currently undergoing treatment for UTI  11/2020  Recent admission with atypical chest pain and CHF improving since discharge stable cardiac status. Continue current medical management. Hospital records reviewed  5/2021  Recent evaluation with increased shortness of breath and cough in ER. Was given antibiotic course. NT BNP was normal.  + Fatigue. Mild edema will use Lasix 20 mg daily for now. Continue using Aldactone which I have advised which she is not using right now blood pressure is elevated. Recent lab reviewed. Repeat lab in about 2 weeks  8/2021  Shortness of breath stable. Intermittent edema. Currently I do not see significant fluid overload. Hypokalemia is improved with taking Aldactone 50 mg a day blood pressure is controlled. Angina stable. Labs reviewed  11/2021  Cardiac status stable. Recent ER visit with abdominal complaint and headache. Work-up noted. .  Rales at bases of lung likely from bronchiectasis that is also seen on part of the CAT scan.   Blood pressure elevated here but home readings are normal  8/2022  Recent COVID
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
## 2023-09-18 RX ORDER — CLOPIDOGREL BISULFATE 75 MG/1
75 TABLET ORAL DAILY
Qty: 90 TABLET | Refills: 3 | Status: SHIPPED | OUTPATIENT
Start: 2023-09-18

## 2023-09-20 ENCOUNTER — TELEPHONE (OUTPATIENT)
Facility: CLINIC | Age: 88
End: 2023-09-20

## 2023-09-20 NOTE — TELEPHONE ENCOUNTER
Had a very lengthy conversation with this kind lady regarding her [address] Dual plan which we no longer participate with. I suggested she call Medicare and try to change to [facility] [place] who offer plans like she has. She is a retired nurse and I thoroughly enjoyed talking to her and I offered to help with her insurance situation as best as I can.

## 2024-01-22 RX ORDER — ISOSORBIDE MONONITRATE 30 MG/1
TABLET, EXTENDED RELEASE ORAL
Qty: 90 TABLET | Refills: 3 | Status: SHIPPED | OUTPATIENT
Start: 2024-01-22

## 2024-02-08 ENCOUNTER — OFFICE VISIT (OUTPATIENT)
Age: 89
End: 2024-02-08
Payer: MEDICAID

## 2024-02-08 VITALS
HEART RATE: 92 BPM | BODY MASS INDEX: 41.59 KG/M2 | SYSTOLIC BLOOD PRESSURE: 138 MMHG | HEIGHT: 67 IN | DIASTOLIC BLOOD PRESSURE: 79 MMHG | WEIGHT: 265 LBS

## 2024-02-08 DIAGNOSIS — E78.2 MIXED HYPERLIPIDEMIA: ICD-10-CM

## 2024-02-08 DIAGNOSIS — I10 ESSENTIAL (PRIMARY) HYPERTENSION: ICD-10-CM

## 2024-02-08 DIAGNOSIS — I50.32 CHRONIC DIASTOLIC (CONGESTIVE) HEART FAILURE (HCC): ICD-10-CM

## 2024-02-08 DIAGNOSIS — I25.118 ATHEROSCLEROTIC HEART DISEASE OF NATIVE CORONARY ARTERY WITH OTHER FORMS OF ANGINA PECTORIS (HCC): Primary | ICD-10-CM

## 2024-02-08 DIAGNOSIS — Z95.5 PRESENCE OF CORONARY ANGIOPLASTY IMPLANT AND GRAFT: ICD-10-CM

## 2024-02-08 PROCEDURE — 1123F ACP DISCUSS/DSCN MKR DOCD: CPT | Performed by: INTERNAL MEDICINE

## 2024-02-08 PROCEDURE — 99214 OFFICE O/P EST MOD 30 MIN: CPT | Performed by: INTERNAL MEDICINE

## 2024-02-08 ASSESSMENT — PATIENT HEALTH QUESTIONNAIRE - PHQ9
1. LITTLE INTEREST OR PLEASURE IN DOING THINGS: 0
SUM OF ALL RESPONSES TO PHQ QUESTIONS 1-9: 0
2. FEELING DOWN, DEPRESSED OR HOPELESS: 0
SUM OF ALL RESPONSES TO PHQ QUESTIONS 1-9: 0
DEPRESSION UNABLE TO ASSESS: FUNCTIONAL CAPACITY MOTIVATION LIMITS ACCURACY
SUM OF ALL RESPONSES TO PHQ9 QUESTIONS 1 & 2: 0

## 2024-02-08 NOTE — PROGRESS NOTES
HISTORY OF PRESENT ILLNESS  Grace Ayon is a 87 y.o. female.    Patient was admitted 6/2019 with  1. Chest pain, atypical: resolved. S/p cardiac cath that showed no critical CAD other than 50% mid LAD stenosis and widely patent previous proximal right coronary stent- continue medical management. Recent echo with  EF%  51%-55% and mild concentric hypertrophy.  3/2020  Patient seen today for hospital follow-up.  She was admitted to 2020 with  1. Chest pain, atypical: resolved - Cardiac cath 6/19 showed  No critical disease in epicardial coronary arteries other than 50% mid LAD stenosis and widely patent previous proximal right coronary stent. No further cardiac w/u needed at this time. Continue medical management for CAD   2. Sinus tachycardia- resolved. continue  low dose bb.   3. Orthostatic hypotension- c/o dizziness had  significant orthostatic changes 2/26/20. Follow orthtostatic BP today. Lasix. D/c she uses prn at home for leg swelling and SOB. Continue  Norvasc. continue with compression stockings   4. Mild LV dysfunction- on echo 9/18 with EF 45%- 50%. Continue Lasix as needed  and low dose bb    11/2020  Recent admission with    1. Chest pain - Resolved, Trop Neg x 3, EKG ST with non-specific ST abnormality.  Elevated D-Dimer, VQ scan neg for PE.    2. CAD h/o PCI to RCA 2016 - Cardiac cath 6/2019 - 50% mid LAD stenosis and widely patent previous proximal right coronary stent. Continue plavix, aspirin, Imdur, Ranexa, norvasc and metoprolol.   3. Hypertension - improved, Continue Norvasc, metoprolol, HCTZ and Aldactone. Monitor b/p.    4. Acute on chronic diastolic CHF NYHA class III - Echo 11/2020 EF 50-55%, no WMA - unchanged from prior  5. Hyperlipidemia -  - she is intolerant to statins, and has declined Repatha in the past  6. DM II - uncontrolled A1C 8.2 - management per primary team.  7. Hypokalemia - Improved.  She is now taking aldactone - will not need  Potassium supplements.  Recommend

## 2024-02-08 NOTE — PROGRESS NOTES
1. Have you been to the ER, urgent care clinic since your last visit?  Hospitalized since your last visit?     No    2. Have you seen or consulted any other health care providers outside of the [facility] since your last visit?  Include any pap smears or colon screening.      No

## 2024-02-21 RX ORDER — RANOLAZINE 500 MG/1
TABLET, EXTENDED RELEASE ORAL
Qty: 180 TABLET | Refills: 2 | Status: SHIPPED | OUTPATIENT
Start: 2024-02-21

## 2024-03-14 ENCOUNTER — HOSPITAL ENCOUNTER (EMERGENCY)
Facility: HOSPITAL | Age: 89
Discharge: HOME | End: 2024-03-15
Attending: STUDENT IN AN ORGANIZED HEALTH CARE EDUCATION/TRAINING PROGRAM
Payer: MEDICARE

## 2024-03-14 ENCOUNTER — APPOINTMENT (OUTPATIENT)
Facility: HOSPITAL | Age: 89
End: 2024-03-14
Payer: MEDICARE

## 2024-03-14 DIAGNOSIS — J32.0 CHRONIC MAXILLARY SINUSITIS: ICD-10-CM

## 2024-03-14 DIAGNOSIS — R42 DIZZINESS: Primary | ICD-10-CM

## 2024-03-14 LAB
ALBUMIN SERPL-MCNC: 3.2 G/DL (ref 3.4–5)
ALBUMIN/GLOB SERPL: 0.7 (ref 0.8–1.7)
ALP SERPL-CCNC: 83 U/L (ref 45–117)
ALT SERPL-CCNC: 14 U/L (ref 13–56)
ANION GAP SERPL CALC-SCNC: 6 MMOL/L (ref 3–18)
APPEARANCE UR: CLEAR
AST SERPL-CCNC: 18 U/L (ref 10–38)
BASOPHILS # BLD: 0 K/UL (ref 0–0.1)
BASOPHILS NFR BLD: 0 % (ref 0–2)
BILIRUB SERPL-MCNC: 0.4 MG/DL (ref 0.2–1)
BILIRUB UR QL: NEGATIVE
BUN SERPL-MCNC: 12 MG/DL (ref 7–18)
BUN/CREAT SERPL: 16 (ref 12–20)
CALCIUM SERPL-MCNC: 9.5 MG/DL (ref 8.5–10.1)
CHLORIDE SERPL-SCNC: 108 MMOL/L (ref 100–111)
CO2 SERPL-SCNC: 28 MMOL/L (ref 21–32)
COLOR UR: YELLOW
CREAT SERPL-MCNC: 0.77 MG/DL (ref 0.6–1.3)
DIFFERENTIAL METHOD BLD: ABNORMAL
EOSINOPHIL # BLD: 0.3 K/UL (ref 0–0.4)
EOSINOPHIL NFR BLD: 4 % (ref 0–5)
EPITH CASTS URNS QL MICRO: NORMAL /LPF (ref 0–5)
ERYTHROCYTE [DISTWIDTH] IN BLOOD BY AUTOMATED COUNT: 12.4 % (ref 11.6–14.5)
GLOBULIN SER CALC-MCNC: 4.9 G/DL (ref 2–4)
GLUCOSE BLD STRIP.AUTO-MCNC: 169 MG/DL (ref 70–110)
GLUCOSE SERPL-MCNC: 175 MG/DL (ref 74–99)
GLUCOSE UR STRIP.AUTO-MCNC: NEGATIVE MG/DL
HCT VFR BLD AUTO: 35.4 % (ref 35–45)
HGB BLD-MCNC: 11.2 G/DL (ref 12–16)
HGB UR QL STRIP: NEGATIVE
IMM GRANULOCYTES # BLD AUTO: 0 K/UL (ref 0–0.04)
IMM GRANULOCYTES NFR BLD AUTO: 0 % (ref 0–0.5)
KETONES UR QL STRIP.AUTO: NEGATIVE MG/DL
LEUKOCYTE ESTERASE UR QL STRIP.AUTO: ABNORMAL
LYMPHOCYTES # BLD: 2 K/UL (ref 0.9–3.6)
LYMPHOCYTES NFR BLD: 28 % (ref 21–52)
MAGNESIUM SERPL-MCNC: 2 MG/DL (ref 1.6–2.6)
MCH RBC QN AUTO: 32 PG (ref 24–34)
MCHC RBC AUTO-ENTMCNC: 31.6 G/DL (ref 31–37)
MCV RBC AUTO: 101.1 FL (ref 78–100)
MONOCYTES # BLD: 0.5 K/UL (ref 0.05–1.2)
MONOCYTES NFR BLD: 7 % (ref 3–10)
NEUTS SEG # BLD: 4.3 K/UL (ref 1.8–8)
NEUTS SEG NFR BLD: 61 % (ref 40–73)
NITRITE UR QL STRIP.AUTO: NEGATIVE
NRBC # BLD: 0 K/UL (ref 0–0.01)
NRBC BLD-RTO: 0 PER 100 WBC
NT PRO BNP: 100 PG/ML (ref 0–1800)
PH UR STRIP: 6.5 (ref 5–8)
PLATELET # BLD AUTO: 206 K/UL (ref 135–420)
PMV BLD AUTO: 11.3 FL (ref 9.2–11.8)
POTASSIUM SERPL-SCNC: 4 MMOL/L (ref 3.5–5.5)
PROT SERPL-MCNC: 8.1 G/DL (ref 6.4–8.2)
PROT UR STRIP-MCNC: 30 MG/DL
RBC # BLD AUTO: 3.5 M/UL (ref 4.2–5.3)
SODIUM SERPL-SCNC: 142 MMOL/L (ref 136–145)
SP GR UR REFRACTOMETRY: 1.02 (ref 1–1.03)
TROPONIN I SERPL HS-MCNC: 40 NG/L (ref 0–54)
TROPONIN I SERPL HS-MCNC: 41 NG/L (ref 0–54)
UROBILINOGEN UR QL STRIP.AUTO: 1 EU/DL (ref 0.2–1)
WBC # BLD AUTO: 7.1 K/UL (ref 4.6–13.2)
WBC URNS QL MICRO: NORMAL /HPF (ref 0–4)

## 2024-03-14 PROCEDURE — 2580000003 HC RX 258: Performed by: STUDENT IN AN ORGANIZED HEALTH CARE EDUCATION/TRAINING PROGRAM

## 2024-03-14 PROCEDURE — 82962 GLUCOSE BLOOD TEST: CPT

## 2024-03-14 PROCEDURE — 85025 COMPLETE CBC W/AUTO DIFF WBC: CPT

## 2024-03-14 PROCEDURE — 70450 CT HEAD/BRAIN W/O DYE: CPT

## 2024-03-14 PROCEDURE — 71045 X-RAY EXAM CHEST 1 VIEW: CPT

## 2024-03-14 PROCEDURE — 81001 URINALYSIS AUTO W/SCOPE: CPT

## 2024-03-14 PROCEDURE — 84484 ASSAY OF TROPONIN QUANT: CPT

## 2024-03-14 PROCEDURE — 93005 ELECTROCARDIOGRAM TRACING: CPT | Performed by: STUDENT IN AN ORGANIZED HEALTH CARE EDUCATION/TRAINING PROGRAM

## 2024-03-14 PROCEDURE — 80053 COMPREHEN METABOLIC PANEL: CPT

## 2024-03-14 PROCEDURE — 99285 EMERGENCY DEPT VISIT HI MDM: CPT

## 2024-03-14 PROCEDURE — 96361 HYDRATE IV INFUSION ADD-ON: CPT

## 2024-03-14 PROCEDURE — 96360 HYDRATION IV INFUSION INIT: CPT

## 2024-03-14 PROCEDURE — 83735 ASSAY OF MAGNESIUM: CPT

## 2024-03-14 PROCEDURE — 83880 ASSAY OF NATRIURETIC PEPTIDE: CPT

## 2024-03-14 RX ORDER — 0.9 % SODIUM CHLORIDE 0.9 %
1000 INTRAVENOUS SOLUTION INTRAVENOUS ONCE
Status: COMPLETED | OUTPATIENT
Start: 2024-03-14 | End: 2024-03-14

## 2024-03-14 RX ADMIN — SODIUM CHLORIDE 1000 ML: 9 INJECTION, SOLUTION INTRAVENOUS at 19:03

## 2024-03-14 ASSESSMENT — PAIN SCALES - GENERAL: PAINLEVEL_OUTOF10: 7

## 2024-03-14 ASSESSMENT — ENCOUNTER SYMPTOMS
RESPIRATORY NEGATIVE: 1
SORE THROAT: 0
NAUSEA: 1
DIARRHEA: 0
ABDOMINAL PAIN: 0
TROUBLE SWALLOWING: 0

## 2024-03-14 ASSESSMENT — LIFESTYLE VARIABLES
HOW MANY STANDARD DRINKS CONTAINING ALCOHOL DO YOU HAVE ON A TYPICAL DAY: PATIENT DOES NOT DRINK
HOW OFTEN DO YOU HAVE A DRINK CONTAINING ALCOHOL: NEVER

## 2024-03-14 ASSESSMENT — PAIN - FUNCTIONAL ASSESSMENT: PAIN_FUNCTIONAL_ASSESSMENT: 0-10

## 2024-03-14 NOTE — ED PROVIDER NOTES
`[facility] EMERGENCY DEPT  eMERGENCY dEPARTMENT eNCOUnter      Pt Name: Grace Lyons  MRN: [id number]  Birthdate 1935 of evaluation: 3/14/2024  Provider:Timothy Head MD    CHIEF COMPLAINT       HPI    Grace Lyons is a 89 y.o. female  with a long hx of vertigo and lightheadedness.  Patient states she was seen by various doctors who treated with meclizine and gave no relief.  States today symptoms were worse than normal.  She denies chest pain headache.    ROS  Review of Systems   HENT:  Negative for congestion, ear pain, sore throat, tinnitus and trouble swallowing.    Respiratory: Negative.     Cardiovascular: Negative.    Gastrointestinal:  Positive for nausea. Negative for abdominal pain and diarrhea.   Genitourinary:  Negative for dysuria.   Neurological:  Positive for dizziness and light-headedness. Negative for weakness.   Psychiatric/Behavioral:  Negative for confusion.    All other systems reviewed and are negative.      Except as noted above the remainder of the review of systems was reviewed and negative.       PAST MEDICAL HISTORY     Past Medical History:   Diagnosis Date    Acetabulum fracture (HCC) 1981    Anemia     Anxiety     Asthma     Benign hypertensive heart disease without heart failure     Elevated today, usually normal at home, currently significant joint pains    BMI 38.0-38.9,adult 06/07/2017    Bronchitis     Bursitis of left shoulder     CAD (coronary artery disease)     Cervical spinal stenosis     Cholelithiasis     Chronic diastolic heart failure (HCC)     Stable, edema better, uses PRN Lasix    Chronic pain     right leg    Congestive heart failure (HCC)     Coronary atherosclerosis of native coronary artery     9/10 Non critical LAD and RCA disease    Degenerative joint disease of left knee     Diverticulosis     Diverticulosis     DJD (degenerative joint disease)     Dyspepsia     Dysuria     HTN (hypertension)     Hypothyroidism     IC (interstitial cystitis)   were made to edit the dictations but occasionally words are mis-transcribed.)    Timothy Head MD(electronically signed)  Attending Emergency Physician

## 2024-03-14 NOTE — ED TRIAGE NOTES
Patient reports chest pain, weakness and dizziness for months. Patient reports taking meclizine and symptoms are not resolving.

## 2024-03-15 VITALS
WEIGHT: 265 LBS | RESPIRATION RATE: 19 BRPM | BODY MASS INDEX: 48.76 KG/M2 | OXYGEN SATURATION: 97 % | HEART RATE: 99 BPM | SYSTOLIC BLOOD PRESSURE: 142 MMHG | TEMPERATURE: 98.3 F | DIASTOLIC BLOOD PRESSURE: 79 MMHG | HEIGHT: 62 IN

## 2024-03-15 LAB
EKG ATRIAL RATE: 109 BPM
EKG DIAGNOSIS: NORMAL
EKG P AXIS: 71 DEGREES
EKG P-R INTERVAL: 152 MS
EKG Q-T INTERVAL: 340 MS
EKG QRS DURATION: 84 MS
EKG QTC CALCULATION (BAZETT): 457 MS
EKG R AXIS: -18 DEGREES
EKG T AXIS: 77 DEGREES
EKG VENTRICULAR RATE: 109 BPM

## 2024-03-15 PROCEDURE — 93010 ELECTROCARDIOGRAM REPORT: CPT | Performed by: INTERNAL MEDICINE

## 2024-03-15 PROCEDURE — 6370000000 HC RX 637 (ALT 250 FOR IP): Performed by: EMERGENCY MEDICINE

## 2024-03-15 RX ORDER — MECLIZINE HCL 12.5 MG/1
12.5 TABLET ORAL 3 TIMES DAILY PRN
Qty: 30 TABLET | Refills: 0 | Status: SHIPPED | OUTPATIENT
Start: 2024-03-15 | End: 2024-03-25

## 2024-03-15 RX ORDER — CLINDAMYCIN HYDROCHLORIDE 300 MG/1
300 CAPSULE ORAL 4 TIMES DAILY
Qty: 40 CAPSULE | Refills: 0 | Status: SHIPPED | OUTPATIENT
Start: 2024-03-15 | End: 2024-03-25

## 2024-03-15 RX ORDER — CLINDAMYCIN HYDROCHLORIDE 150 MG/1
300 CAPSULE ORAL
Status: COMPLETED | OUTPATIENT
Start: 2024-03-15 | End: 2024-03-15

## 2024-03-15 RX ADMIN — CLINDAMYCIN HYDROCHLORIDE 300 MG: 150 CAPSULE ORAL at 00:10

## 2024-03-15 NOTE — ED NOTES
Patient received discharge instructions, left ED in stable condition with family. No acute distress noted.

## 2024-04-26 ENCOUNTER — APPOINTMENT (OUTPATIENT)
Facility: HOSPITAL | Age: 89
End: 2024-04-26
Payer: MEDICARE

## 2024-04-26 ENCOUNTER — HOSPITAL ENCOUNTER (EMERGENCY)
Facility: HOSPITAL | Age: 89
Discharge: HOME | End: 2024-04-26
Attending: EMERGENCY MEDICINE
Payer: MEDICARE

## 2024-04-26 VITALS
DIASTOLIC BLOOD PRESSURE: 85 MMHG | HEART RATE: 93 BPM | SYSTOLIC BLOOD PRESSURE: 161 MMHG | RESPIRATION RATE: 22 BRPM | WEIGHT: 190 LBS | OXYGEN SATURATION: 98 % | BODY MASS INDEX: 34.75 KG/M2 | TEMPERATURE: 98 F

## 2024-04-26 DIAGNOSIS — H83.09 LABYRINTHITIS, UNSPECIFIED LATERALITY: Primary | ICD-10-CM

## 2024-04-26 DIAGNOSIS — N39.0 URINARY TRACT INFECTION WITHOUT HEMATURIA, SITE UNSPECIFIED: ICD-10-CM

## 2024-04-26 LAB
ALBUMIN SERPL-MCNC: 3.4 G/DL (ref 3.4–5)
ALBUMIN/GLOB SERPL: 0.8 (ref 0.8–1.7)
ALP SERPL-CCNC: 95 U/L (ref 45–117)
ALT SERPL-CCNC: 14 U/L (ref 13–56)
ANION GAP SERPL CALC-SCNC: 4 MMOL/L (ref 3–18)
APPEARANCE UR: ABNORMAL
AST SERPL-CCNC: 12 U/L (ref 10–38)
BACTERIA URNS QL MICRO: ABNORMAL /HPF
BASOPHILS # BLD: 0 K/UL (ref 0–0.1)
BASOPHILS NFR BLD: 1 % (ref 0–2)
BILIRUB SERPL-MCNC: 0.6 MG/DL (ref 0.2–1)
BILIRUB UR QL: NEGATIVE
BUN SERPL-MCNC: 12 MG/DL (ref 7–18)
BUN/CREAT SERPL: 16 (ref 12–20)
CALCIUM SERPL-MCNC: 9.3 MG/DL (ref 8.5–10.1)
CHLORIDE SERPL-SCNC: 105 MMOL/L (ref 100–111)
CO2 SERPL-SCNC: 33 MMOL/L (ref 21–32)
COLOR UR: YELLOW
CREAT SERPL-MCNC: 0.77 MG/DL (ref 0.6–1.3)
DIFFERENTIAL METHOD BLD: ABNORMAL
EKG ATRIAL RATE: 95 BPM
EKG DIAGNOSIS: NORMAL
EKG P AXIS: 104 DEGREES
EKG P-R INTERVAL: 142 MS
EKG Q-T INTERVAL: 388 MS
EKG QRS DURATION: 88 MS
EKG QTC CALCULATION (BAZETT): 487 MS
EKG R AXIS: -35 DEGREES
EKG T AXIS: 53 DEGREES
EKG VENTRICULAR RATE: 95 BPM
EOSINOPHIL # BLD: 0.3 K/UL (ref 0–0.4)
EOSINOPHIL NFR BLD: 4 % (ref 0–5)
EPITH CASTS URNS QL MICRO: ABNORMAL /LPF (ref 0–5)
ERYTHROCYTE [DISTWIDTH] IN BLOOD BY AUTOMATED COUNT: 12.2 % (ref 11.6–14.5)
GLOBULIN SER CALC-MCNC: 4.5 G/DL (ref 2–4)
GLUCOSE SERPL-MCNC: 152 MG/DL (ref 74–99)
GLUCOSE UR STRIP.AUTO-MCNC: NEGATIVE MG/DL
HCT VFR BLD AUTO: 36.7 % (ref 35–45)
HGB BLD-MCNC: 11.5 G/DL (ref 12–16)
HGB UR QL STRIP: NEGATIVE
IMM GRANULOCYTES # BLD AUTO: 0 K/UL (ref 0–0.04)
IMM GRANULOCYTES NFR BLD AUTO: 0 % (ref 0–0.5)
KETONES UR QL STRIP.AUTO: ABNORMAL MG/DL
LEUKOCYTE ESTERASE UR QL STRIP.AUTO: ABNORMAL
LYMPHOCYTES # BLD: 2.1 K/UL (ref 0.9–3.6)
LYMPHOCYTES NFR BLD: 31 % (ref 21–52)
MAGNESIUM SERPL-MCNC: 1.9 MG/DL (ref 1.6–2.6)
MCH RBC QN AUTO: 31.6 PG (ref 24–34)
MCHC RBC AUTO-ENTMCNC: 31.3 G/DL (ref 31–37)
MCV RBC AUTO: 100.8 FL (ref 78–100)
MONOCYTES # BLD: 0.5 K/UL (ref 0.05–1.2)
MONOCYTES NFR BLD: 7 % (ref 3–10)
NEUTS SEG # BLD: 3.9 K/UL (ref 1.8–8)
NEUTS SEG NFR BLD: 58 % (ref 40–73)
NITRITE UR QL STRIP.AUTO: POSITIVE
NRBC # BLD: 0 K/UL (ref 0–0.01)
NRBC BLD-RTO: 0 PER 100 WBC
PH UR STRIP: 6.5 (ref 5–8)
PLATELET # BLD AUTO: 202 K/UL (ref 135–420)
PMV BLD AUTO: 10.3 FL (ref 9.2–11.8)
POTASSIUM SERPL-SCNC: 3.4 MMOL/L (ref 3.5–5.5)
PROT SERPL-MCNC: 7.9 G/DL (ref 6.4–8.2)
PROT UR STRIP-MCNC: 30 MG/DL
RBC # BLD AUTO: 3.64 M/UL (ref 4.2–5.3)
RBC #/AREA URNS HPF: NEGATIVE /HPF (ref 0–5)
SODIUM SERPL-SCNC: 142 MMOL/L (ref 136–145)
SP GR UR REFRACTOMETRY: 1.02 (ref 1–1.03)
TROPONIN I SERPL HS-MCNC: 45 NG/L (ref 0–54)
TROPONIN I SERPL HS-MCNC: 45 NG/L (ref 0–54)
UROBILINOGEN UR QL STRIP.AUTO: 1 EU/DL (ref 0.2–1)
WBC # BLD AUTO: 6.8 K/UL (ref 4.6–13.2)
WBC URNS QL MICRO: ABNORMAL /HPF (ref 0–4)

## 2024-04-26 PROCEDURE — 93005 ELECTROCARDIOGRAM TRACING: CPT | Performed by: EMERGENCY MEDICINE

## 2024-04-26 PROCEDURE — 83735 ASSAY OF MAGNESIUM: CPT

## 2024-04-26 PROCEDURE — 85025 COMPLETE CBC W/AUTO DIFF WBC: CPT

## 2024-04-26 PROCEDURE — 81001 URINALYSIS AUTO W/SCOPE: CPT

## 2024-04-26 PROCEDURE — 80053 COMPREHEN METABOLIC PANEL: CPT

## 2024-04-26 PROCEDURE — 93010 ELECTROCARDIOGRAM REPORT: CPT | Performed by: INTERNAL MEDICINE

## 2024-04-26 PROCEDURE — 99284 EMERGENCY DEPT VISIT MOD MDM: CPT

## 2024-04-26 PROCEDURE — 70450 CT HEAD/BRAIN W/O DYE: CPT

## 2024-04-26 PROCEDURE — 84484 ASSAY OF TROPONIN QUANT: CPT

## 2024-04-26 PROCEDURE — 6370000000 HC RX 637 (ALT 250 FOR IP): Performed by: EMERGENCY MEDICINE

## 2024-04-26 RX ORDER — MECLIZINE HCL 12.5 MG/1
12.5 TABLET ORAL
Status: COMPLETED | OUTPATIENT
Start: 2024-04-26 | End: 2024-04-26

## 2024-04-26 RX ORDER — MECLIZINE HCL 12.5 MG/1
12.5 TABLET ORAL 3 TIMES DAILY PRN
Qty: 15 TABLET | Refills: 0 | Status: SHIPPED | OUTPATIENT
Start: 2024-04-26 | End: 2024-05-06

## 2024-04-26 RX ORDER — PROMETHAZINE HYDROCHLORIDE 25 MG/1
25 TABLET ORAL
Status: COMPLETED | OUTPATIENT
Start: 2024-04-26 | End: 2024-04-26

## 2024-04-26 RX ORDER — SULFAMETHOXAZOLE AND TRIMETHOPRIM 800; 160 MG/1; MG/1
1 TABLET ORAL 2 TIMES DAILY
Qty: 14 TABLET | Refills: 0 | Status: SHIPPED | OUTPATIENT
Start: 2024-04-26 | End: 2024-05-03

## 2024-04-26 RX ORDER — SULFAMETHOXAZOLE AND TRIMETHOPRIM 800; 160 MG/1; MG/1
1 TABLET ORAL ONCE
Status: COMPLETED | OUTPATIENT
Start: 2024-04-26 | End: 2024-04-26

## 2024-04-26 RX ADMIN — SULFAMETHOXAZOLE AND TRIMETHOPRIM 1 TABLET: 800; 160 TABLET ORAL at 05:35

## 2024-04-26 RX ADMIN — POTASSIUM BICARBONATE 20 MEQ: 782 TABLET, EFFERVESCENT ORAL at 02:01

## 2024-04-26 RX ADMIN — PROMETHAZINE HYDROCHLORIDE 25 MG: 25 TABLET ORAL at 01:59

## 2024-04-26 RX ADMIN — MECLIZINE 12.5 MG: 12.5 TABLET ORAL at 03:05

## 2024-04-26 ASSESSMENT — PAIN DESCRIPTION - DESCRIPTORS: DESCRIPTORS: ACHING

## 2024-04-26 ASSESSMENT — PAIN - FUNCTIONAL ASSESSMENT: PAIN_FUNCTIONAL_ASSESSMENT: 0-10

## 2024-04-26 ASSESSMENT — PAIN SCALES - GENERAL: PAINLEVEL_OUTOF10: 9

## 2024-04-26 ASSESSMENT — ENCOUNTER SYMPTOMS
RESPIRATORY NEGATIVE: 1
GASTROINTESTINAL NEGATIVE: 1

## 2024-04-26 ASSESSMENT — PAIN DESCRIPTION - LOCATION: LOCATION: HEAD

## 2024-04-26 NOTE — ED PROVIDER NOTES
Family History   Problem Relation Age of Onset    Stroke Other     Breast Cancer Maternal Aunt     Colon Cancer Other     Breast Cancer Other     Thyroid Disease Brother     Hypertension Mother     Heart Disease Mother         CHF     Diabetes Mother     Osteoarthritis Mother     Coronary Art Dis Father     Heart Disease Father         CHF age 84    Asthma Father     Osteoarthritis Father     Other Father         Stomach problems/Ulcers    Hypertension Brother     Diabetes Maternal Aunt     Hypertension Other           SOCIAL HISTORY       Social History     Socioeconomic History    Marital status:    Tobacco Use    Smoking status: Former     Current packs/day: 0.00     Types: Cigarettes     Quit date: 1980     Years since quittin.0    Smokeless tobacco: Never   Substance and Sexual Activity    Alcohol use: No    Drug use: Never     Social Determinants of Health     Financial Resource Strain: Low Risk  (3/22/2023)    Overall Financial Resource Strain (CARDIA)     Difficulty of Paying Living Expenses: Not very hard   Transportation Needs: Unknown (3/22/2023)    PRAPARE - Transportation     Lack of Transportation (Non-Medical): No   Social Connections: Feeling Socially Integrated (3/20/2023)    OASIS : Social Isolation     Frequency of experiencing loneliness or isolation: Never   Housing Stability: Unknown (3/22/2023)    Housing Stability Vital Sign     Unstable Housing in the Last Year: No         PHYSICAL EXAM       ED Triage Vitals [24 0047]   BP Temp Temp Source Pulse Respirations SpO2 Height Weight - Scale   (!) 175/82 98 °F (36.7 °C) Oral 94 18 96 % -- 86.2 kg (190 lb)       Physical Exam  Constitutional:       Appearance: Normal appearance.   HENT:      Right Ear: Tympanic membrane normal.      Left Ear: Tympanic membrane normal.      Mouth/Throat:      Mouth: Mucous membranes are dry.   Eyes:      Extraocular Movements: Extraocular movements intact.      Pupils: Pupils are

## 2024-04-26 NOTE — ED TRIAGE NOTES
Patient A/O x 4, presented to ED with complaint of headache, dizziness, NV x 1500, worsening x 1700 4/25/24. Patient states she's been seen for this in the past but feels worse.

## 2024-05-26 ENCOUNTER — APPOINTMENT (OUTPATIENT)
Facility: HOSPITAL | Age: 89
End: 2024-05-26
Payer: MEDICARE

## 2024-05-26 ENCOUNTER — HOSPITAL ENCOUNTER (EMERGENCY)
Facility: HOSPITAL | Age: 89
Discharge: HOME | End: 2024-05-27
Attending: EMERGENCY MEDICINE
Payer: MEDICARE

## 2024-05-26 DIAGNOSIS — G44.1 OTHER VASCULAR HEADACHE: Primary | ICD-10-CM

## 2024-05-26 LAB
ALBUMIN SERPL-MCNC: 3.1 G/DL (ref 3.4–5)
ALBUMIN/GLOB SERPL: 0.7 (ref 0.8–1.7)
ALP SERPL-CCNC: 71 U/L (ref 45–117)
ALT SERPL-CCNC: 13 U/L (ref 13–56)
ANION GAP SERPL CALC-SCNC: 4 MMOL/L (ref 3–18)
AST SERPL-CCNC: 10 U/L (ref 10–38)
BASOPHILS # BLD: 0 K/UL (ref 0–0.1)
BASOPHILS NFR BLD: 0 % (ref 0–2)
BILIRUB SERPL-MCNC: 0.3 MG/DL (ref 0.2–1)
BUN SERPL-MCNC: 9 MG/DL (ref 7–18)
BUN/CREAT SERPL: 11 (ref 12–20)
CALCIUM SERPL-MCNC: 9.4 MG/DL (ref 8.5–10.1)
CHLORIDE SERPL-SCNC: 109 MMOL/L (ref 100–111)
CO2 SERPL-SCNC: 30 MMOL/L (ref 21–32)
CREAT SERPL-MCNC: 0.83 MG/DL (ref 0.6–1.3)
DIFFERENTIAL METHOD BLD: ABNORMAL
EOSINOPHIL # BLD: 0.2 K/UL (ref 0–0.4)
EOSINOPHIL NFR BLD: 2 % (ref 0–5)
ERYTHROCYTE [DISTWIDTH] IN BLOOD BY AUTOMATED COUNT: 12.4 % (ref 11.6–14.5)
GLOBULIN SER CALC-MCNC: 4.7 G/DL (ref 2–4)
GLUCOSE SERPL-MCNC: 79 MG/DL (ref 74–99)
HCT VFR BLD AUTO: 35.6 % (ref 35–45)
HGB BLD-MCNC: 11.2 G/DL (ref 12–16)
IMM GRANULOCYTES # BLD AUTO: 0 K/UL (ref 0–0.04)
IMM GRANULOCYTES NFR BLD AUTO: 0 % (ref 0–0.5)
LYMPHOCYTES # BLD: 1.5 K/UL (ref 0.9–3.6)
LYMPHOCYTES NFR BLD: 21 % (ref 21–52)
MCH RBC QN AUTO: 32.4 PG (ref 24–34)
MCHC RBC AUTO-ENTMCNC: 31.5 G/DL (ref 31–37)
MCV RBC AUTO: 102.9 FL (ref 78–100)
MONOCYTES # BLD: 0.5 K/UL (ref 0.05–1.2)
MONOCYTES NFR BLD: 7 % (ref 3–10)
NEUTS SEG # BLD: 4.8 K/UL (ref 1.8–8)
NEUTS SEG NFR BLD: 69 % (ref 40–73)
NRBC # BLD: 0 K/UL (ref 0–0.01)
NRBC BLD-RTO: 0 PER 100 WBC
PLATELET # BLD AUTO: 184 K/UL (ref 135–420)
PMV BLD AUTO: 10.9 FL (ref 9.2–11.8)
POTASSIUM SERPL-SCNC: 3.5 MMOL/L (ref 3.5–5.5)
PROT SERPL-MCNC: 7.8 G/DL (ref 6.4–8.2)
RBC # BLD AUTO: 3.46 M/UL (ref 4.2–5.3)
SODIUM SERPL-SCNC: 143 MMOL/L (ref 136–145)
TROPONIN I SERPL HS-MCNC: 43 NG/L (ref 0–54)
TROPONIN I SERPL HS-MCNC: 43 NG/L (ref 0–54)
WBC # BLD AUTO: 7 K/UL (ref 4.6–13.2)

## 2024-05-26 PROCEDURE — 84484 ASSAY OF TROPONIN QUANT: CPT

## 2024-05-26 PROCEDURE — 85025 COMPLETE CBC W/AUTO DIFF WBC: CPT

## 2024-05-26 PROCEDURE — 99285 EMERGENCY DEPT VISIT HI MDM: CPT

## 2024-05-26 PROCEDURE — 93005 ELECTROCARDIOGRAM TRACING: CPT | Performed by: EMERGENCY MEDICINE

## 2024-05-26 PROCEDURE — 80053 COMPREHEN METABOLIC PANEL: CPT

## 2024-05-26 PROCEDURE — 71045 X-RAY EXAM CHEST 1 VIEW: CPT

## 2024-05-26 ASSESSMENT — PAIN - FUNCTIONAL ASSESSMENT: PAIN_FUNCTIONAL_ASSESSMENT: NONE - DENIES PAIN

## 2024-05-27 VITALS
OXYGEN SATURATION: 98 % | BODY MASS INDEX: 38.24 KG/M2 | TEMPERATURE: 98.8 F | WEIGHT: 243.6 LBS | DIASTOLIC BLOOD PRESSURE: 87 MMHG | RESPIRATION RATE: 19 BRPM | HEIGHT: 67 IN | SYSTOLIC BLOOD PRESSURE: 148 MMHG | HEART RATE: 87 BPM

## 2024-05-27 LAB
EKG ATRIAL RATE: 105 BPM
EKG ATRIAL RATE: 90 BPM
EKG DIAGNOSIS: NORMAL
EKG DIAGNOSIS: NORMAL
EKG P AXIS: 72 DEGREES
EKG P AXIS: 92 DEGREES
EKG P-R INTERVAL: 120 MS
EKG P-R INTERVAL: 136 MS
EKG Q-T INTERVAL: 392 MS
EKG Q-T INTERVAL: 400 MS
EKG QRS DURATION: 138 MS
EKG QRS DURATION: 142 MS
EKG QTC CALCULATION (BAZETT): 489 MS
EKG QTC CALCULATION (BAZETT): 518 MS
EKG R AXIS: -33 DEGREES
EKG R AXIS: -39 DEGREES
EKG T AXIS: -11 DEGREES
EKG T AXIS: -28 DEGREES
EKG VENTRICULAR RATE: 105 BPM
EKG VENTRICULAR RATE: 90 BPM

## 2024-05-27 PROCEDURE — 6370000000 HC RX 637 (ALT 250 FOR IP): Performed by: EMERGENCY MEDICINE

## 2024-05-27 RX ORDER — IBUPROFEN 600 MG/1
600 TABLET ORAL 3 TIMES DAILY PRN
Qty: 30 TABLET | Refills: 0 | Status: SHIPPED | OUTPATIENT
Start: 2024-05-27

## 2024-05-27 RX ORDER — BUTALBITAL, ACETAMINOPHEN AND CAFFEINE 50; 325; 40 MG/1; MG/1; MG/1
1 TABLET ORAL
Status: DISCONTINUED | OUTPATIENT
Start: 2024-05-27 | End: 2024-05-27

## 2024-05-27 RX ORDER — ACETAMINOPHEN 325 MG/1
650 TABLET ORAL
Status: COMPLETED | OUTPATIENT
Start: 2024-05-27 | End: 2024-05-27

## 2024-05-27 RX ORDER — BUTALBITAL, ASPIRIN, AND CAFFEINE 325; 50; 40 MG/1; MG/1; MG/1
1 CAPSULE ORAL EVERY 4 HOURS PRN
Status: DISCONTINUED | OUTPATIENT
Start: 2024-05-27 | End: 2024-05-27 | Stop reason: RX

## 2024-05-27 RX ORDER — TRAMADOL HYDROCHLORIDE 50 MG/1
50 TABLET ORAL ONCE
Status: DISCONTINUED | OUTPATIENT
Start: 2024-05-27 | End: 2024-05-27 | Stop reason: HOSPADM

## 2024-05-27 RX ADMIN — ACETAMINOPHEN 325 MG: 325 TABLET ORAL at 02:12

## 2024-05-27 NOTE — ED TRIAGE NOTES
PT to ED via wheelchair for headache, dizziness, high pulse rate and HTN. Pt states she was seen one week ago for vertigo and has been seen a neurologist for similar symptoms. Pt states she is feeling dizzy, headache, sweating, HTN and high pulse rate x 1 week. MD at bedside. Pt reports she has had these symptoms x 1 year. NIH of 0.

## 2024-05-27 NOTE — ED NOTES
Transport called for transport home. Pt required multiple person assist from private vehicle due to previous leg surgeries and current condition. Pt will need assist home.

## 2024-05-27 NOTE — ED NOTES
Pt states that she is still dizzy but will take her meclizine at home. RN offered to provide meclizine in ED but pt declined.

## 2024-05-27 NOTE — ED NOTES
Discussed Tylenol allergy with both patient and nursing assistant. Both patient and nursing assistant assured RN that patient is not allergic to Tylenol and that she takes it regularly at home without any reaction. Pt states she is \"positive that she is not allergic to Tylenol.\"MD notified.

## 2024-05-27 NOTE — ED NOTES
Pt refused Tramadol. Md notified. Pt requesting Tylenol. States her chart is wrong and she does not have an allergy to Tylenol. States she takes it regularly without problem. MD notified.

## 2024-05-27 NOTE — ED PROVIDER NOTES
Total CriticalCare time was 45 minutes, excluding separately reportable procedures.   There was a high probability of clinically significant/life threatening deterioration in the patient's condition which required my urgent intervention.      EMERGENCY DEPARTMENT COURSE and DIFFERENTIALDIAGNOSIS/ MDM:   Vitals:    Vitals:    05/26/24 2030 05/26/24 2045 05/26/24 2100 05/26/24 2115   BP: 124/74 138/69 136/72 (!) 150/68   Pulse: 92 90 93 90   Resp: 22 21 20 21   Temp:       TempSrc:       SpO2: 94% 95% 95% 93%   Weight:       Height:           CLINICAL MANAGEMENT TOOLS:  Not Applicable, Dizziness: MEDICAL DECISION MAKING:   I considered, but did not perform, additional testing such CT stroke and Brain MRI, as well as admission or transfer to a higher level of care.     I utilized an evidence-based risk rating tool (CMT) along with my training and experience to weigh the risk of discharge against the risks of further testing, imaging, or hospitalization. At this time, I estimate the risks of additional testing, imaging, or hospitalization (in the case of discharge home) to be equal to or greater than the risk of discharge.       I have performed and NIHSS exam, and it is 0 with no cerebellar findings.  The patient has had 2 CVA work up for a cod stroke in the past 2 weeks with identical symptoms.  Neurologist dx patient with labyrinthitis and placed her on meclizine . The chance of missing a stroke is less than 1%. MRI at this time would not benefit the patient. MRI is higher risk than performing a NIHSS, and MRI is not as sensitive for acute stroke. With an NIHSS of 0 and no cerebellar findings, acute interventions such as thrombolytics, angiogram, or new anticoagulation most likely have more risk than benefit. [id number]     SHARED DECISION MAKING:   I discussed my risk assessment with the patient. The patient understands and consents to the risk of disposition/plan, as well as the risk of uncertainty in

## 2024-05-27 NOTE — ED NOTES
Discharge teaching provided to pt regarding treatment received, medications prescribed, and follow-up care. Pt verbalized understanding directions and follow up care. Pt left via stretcher home with home health discharge assistant, NAD and with discharge paperwork in hand.

## 2024-06-26 ENCOUNTER — APPOINTMENT (OUTPATIENT)
Facility: HOSPITAL | Age: 89
DRG: 321 | End: 2024-06-26
Attending: EMERGENCY MEDICINE
Payer: MEDICARE

## 2024-06-26 ENCOUNTER — HOSPITAL ENCOUNTER (INPATIENT)
Facility: HOSPITAL | Age: 89
LOS: 12 days | Discharge: HOME HEALTH | DRG: 321 | End: 2024-07-09
Attending: EMERGENCY MEDICINE | Admitting: INTERNAL MEDICINE
Payer: MEDICARE

## 2024-06-26 DIAGNOSIS — I25.9 MYOCARDIAL ISCHEMIA: ICD-10-CM

## 2024-06-26 DIAGNOSIS — I10 HYPERTENSION, UNSPECIFIED TYPE: ICD-10-CM

## 2024-06-26 DIAGNOSIS — I21.4 NON-ST ELEVATION MI (NSTEMI) (HCC): ICD-10-CM

## 2024-06-26 DIAGNOSIS — I48.91 ATRIAL FIBRILLATION, UNSPECIFIED TYPE (HCC): Primary | ICD-10-CM

## 2024-06-26 DIAGNOSIS — I50.42 CHRONIC COMBINED SYSTOLIC AND DIASTOLIC CONGESTIVE HEART FAILURE (HCC): ICD-10-CM

## 2024-06-26 DIAGNOSIS — I21.4 NSTEMI (NON-ST ELEVATED MYOCARDIAL INFARCTION) (HCC): ICD-10-CM

## 2024-06-26 DIAGNOSIS — I48.91 ATRIAL FIBRILLATION, NEW ONSET (HCC): ICD-10-CM

## 2024-06-26 DIAGNOSIS — I48.91 NEW ONSET A-FIB (HCC): ICD-10-CM

## 2024-06-26 DIAGNOSIS — I50.32 CHRONIC HEART FAILURE WITH PRESERVED EJECTION FRACTION (HCC): ICD-10-CM

## 2024-06-26 LAB
ALBUMIN SERPL-MCNC: 3.5 G/DL (ref 3.4–5)
ALBUMIN/GLOB SERPL: 0.7 (ref 0.8–1.7)
ALP SERPL-CCNC: 76 U/L (ref 45–117)
ALT SERPL-CCNC: 13 U/L (ref 13–56)
ANION GAP SERPL CALC-SCNC: 10 MMOL/L (ref 3–18)
APTT PPP: 35.5 SEC (ref 23–36.4)
AST SERPL-CCNC: 15 U/L (ref 10–38)
BASOPHILS # BLD: 0 K/UL (ref 0–0.1)
BASOPHILS NFR BLD: 1 % (ref 0–2)
BILIRUB SERPL-MCNC: 0.7 MG/DL (ref 0.2–1)
BUN SERPL-MCNC: 13 MG/DL (ref 7–18)
BUN/CREAT SERPL: 12 (ref 12–20)
CALCIUM SERPL-MCNC: 9.5 MG/DL (ref 8.5–10.1)
CHLORIDE SERPL-SCNC: 109 MMOL/L (ref 100–111)
CO2 SERPL-SCNC: 25 MMOL/L (ref 21–32)
CREAT SERPL-MCNC: 1.07 MG/DL (ref 0.6–1.3)
DIFFERENTIAL METHOD BLD: ABNORMAL
EOSINOPHIL # BLD: 0.2 K/UL (ref 0–0.4)
EOSINOPHIL NFR BLD: 3 % (ref 0–5)
ERYTHROCYTE [DISTWIDTH] IN BLOOD BY AUTOMATED COUNT: 12.1 % (ref 11.6–14.5)
GLOBULIN SER CALC-MCNC: 5.3 G/DL (ref 2–4)
GLUCOSE BLD STRIP.AUTO-MCNC: 99 MG/DL (ref 70–110)
GLUCOSE SERPL-MCNC: 107 MG/DL (ref 74–99)
HCT VFR BLD AUTO: 41.3 % (ref 35–45)
HGB BLD-MCNC: 12.9 G/DL (ref 12–16)
IMM GRANULOCYTES # BLD AUTO: 0 K/UL (ref 0–0.04)
IMM GRANULOCYTES NFR BLD AUTO: 0 % (ref 0–0.5)
INR PPP: 1 (ref 0.9–1.1)
LYMPHOCYTES # BLD: 2.8 K/UL (ref 0.9–3.6)
LYMPHOCYTES NFR BLD: 43 % (ref 21–52)
MCH RBC QN AUTO: 32.2 PG (ref 24–34)
MCHC RBC AUTO-ENTMCNC: 31.2 G/DL (ref 31–37)
MCV RBC AUTO: 103 FL (ref 78–100)
MONOCYTES # BLD: 0.5 K/UL (ref 0.05–1.2)
MONOCYTES NFR BLD: 8 % (ref 3–10)
NEUTS SEG # BLD: 3.1 K/UL (ref 1.8–8)
NEUTS SEG NFR BLD: 46 % (ref 40–73)
NRBC # BLD: 0 K/UL (ref 0–0.01)
NRBC BLD-RTO: 0 PER 100 WBC
NT PRO BNP: 413 PG/ML (ref 0–1800)
PLATELET # BLD AUTO: 228 K/UL (ref 135–420)
PMV BLD AUTO: 10.7 FL (ref 9.2–11.8)
POTASSIUM SERPL-SCNC: 3.2 MMOL/L (ref 3.5–5.5)
PROT SERPL-MCNC: 8.8 G/DL (ref 6.4–8.2)
PROTHROMBIN TIME: 13.5 SEC (ref 11.9–14.9)
RBC # BLD AUTO: 4.01 M/UL (ref 4.2–5.3)
SODIUM SERPL-SCNC: 144 MMOL/L (ref 136–145)
TROPONIN I SERPL HS-MCNC: 125 NG/L (ref 0–54)
WBC # BLD AUTO: 6.7 K/UL (ref 4.6–13.2)

## 2024-06-26 PROCEDURE — 85610 PROTHROMBIN TIME: CPT

## 2024-06-26 PROCEDURE — 85730 THROMBOPLASTIN TIME PARTIAL: CPT

## 2024-06-26 PROCEDURE — 96374 THER/PROPH/DIAG INJ IV PUSH: CPT

## 2024-06-26 PROCEDURE — 84484 ASSAY OF TROPONIN QUANT: CPT

## 2024-06-26 PROCEDURE — 99285 EMERGENCY DEPT VISIT HI MDM: CPT

## 2024-06-26 PROCEDURE — 85025 COMPLETE CBC W/AUTO DIFF WBC: CPT

## 2024-06-26 PROCEDURE — 82962 GLUCOSE BLOOD TEST: CPT

## 2024-06-26 PROCEDURE — 71045 X-RAY EXAM CHEST 1 VIEW: CPT

## 2024-06-26 PROCEDURE — 93005 ELECTROCARDIOGRAM TRACING: CPT | Performed by: EMERGENCY MEDICINE

## 2024-06-26 PROCEDURE — 2500000003 HC RX 250 WO HCPCS: Performed by: EMERGENCY MEDICINE

## 2024-06-26 PROCEDURE — 6370000000 HC RX 637 (ALT 250 FOR IP): Performed by: EMERGENCY MEDICINE

## 2024-06-26 PROCEDURE — 83880 ASSAY OF NATRIURETIC PEPTIDE: CPT

## 2024-06-26 PROCEDURE — 80053 COMPREHEN METABOLIC PANEL: CPT

## 2024-06-26 RX ORDER — ACETAMINOPHEN 500 MG
1000 TABLET ORAL
Status: DISCONTINUED | OUTPATIENT
Start: 2024-06-26 | End: 2024-06-26

## 2024-06-26 RX ORDER — DILTIAZEM HYDROCHLORIDE 5 MG/ML
10 INJECTION INTRAVENOUS
Status: COMPLETED | OUTPATIENT
Start: 2024-06-26 | End: 2024-06-26

## 2024-06-26 RX ORDER — ACETAMINOPHEN 325 MG/1
650 TABLET ORAL
Status: COMPLETED | OUTPATIENT
Start: 2024-06-27 | End: 2024-06-27

## 2024-06-26 RX ADMIN — POTASSIUM BICARBONATE 40 MEQ: 782 TABLET, EFFERVESCENT ORAL at 21:35

## 2024-06-26 RX ADMIN — DILTIAZEM HYDROCHLORIDE 10 MG: 5 INJECTION INTRAVENOUS at 21:22

## 2024-06-26 ASSESSMENT — PAIN - FUNCTIONAL ASSESSMENT: PAIN_FUNCTIONAL_ASSESSMENT: NONE - DENIES PAIN

## 2024-06-27 PROBLEM — I48.91 ATRIAL FIBRILLATION (HCC): Status: ACTIVE | Noted: 2024-06-27

## 2024-06-27 PROBLEM — I48.91 NEW ONSET A-FIB (HCC): Status: ACTIVE | Noted: 2024-06-27

## 2024-06-27 LAB
APPEARANCE UR: ABNORMAL
APTT PPP: 126.9 SEC (ref 23–36.4)
APTT PPP: 135.2 SEC (ref 23–36.4)
APTT PPP: 179.5 SEC (ref 23–36.4)
BACTERIA URNS QL MICRO: ABNORMAL /HPF
BILIRUB UR QL: NEGATIVE
COLOR UR: YELLOW
EKG ATRIAL RATE: 57 BPM
EKG ATRIAL RATE: 91 BPM
EKG DIAGNOSIS: NORMAL
EKG DIAGNOSIS: NORMAL
EKG P AXIS: 84 DEGREES
EKG P-R INTERVAL: 146 MS
EKG P-R INTERVAL: 96 MS
EKG Q-T INTERVAL: 360 MS
EKG Q-T INTERVAL: 448 MS
EKG QRS DURATION: 130 MS
EKG QRS DURATION: 86 MS
EKG QTC CALCULATION (BAZETT): 436 MS
EKG QTC CALCULATION (BAZETT): 612 MS
EKG R AXIS: -31 DEGREES
EKG R AXIS: -54 DEGREES
EKG T AXIS: 196 DEGREES
EKG T AXIS: 47 DEGREES
EKG VENTRICULAR RATE: 174 BPM
EKG VENTRICULAR RATE: 57 BPM
EPITH CASTS URNS QL MICRO: ABNORMAL /LPF (ref 0–5)
GLUCOSE BLD STRIP.AUTO-MCNC: 85 MG/DL (ref 70–110)
GLUCOSE BLD STRIP.AUTO-MCNC: 88 MG/DL (ref 70–110)
GLUCOSE BLD STRIP.AUTO-MCNC: 97 MG/DL (ref 70–110)
GLUCOSE UR STRIP.AUTO-MCNC: NEGATIVE MG/DL
HGB UR QL STRIP: ABNORMAL
KETONES UR QL STRIP.AUTO: 15 MG/DL
LEUKOCYTE ESTERASE UR QL STRIP.AUTO: ABNORMAL
NITRITE UR QL STRIP.AUTO: POSITIVE
PH UR STRIP: 5.5 (ref 5–8)
PROT UR STRIP-MCNC: 100 MG/DL
RBC #/AREA URNS HPF: ABNORMAL /HPF (ref 0–5)
SP GR UR REFRACTOMETRY: 1.02 (ref 1–1.03)
TROPONIN I SERPL HS-MCNC: 224 NG/L (ref 0–54)
TROPONIN I SERPL HS-MCNC: 533 NG/L (ref 0–54)
TSH SERPL DL<=0.05 MIU/L-ACNC: 2.97 UIU/ML (ref 0.36–3.74)
UROBILINOGEN UR QL STRIP.AUTO: 1 EU/DL (ref 0.2–1)
WBC URNS QL MICRO: ABNORMAL /HPF (ref 0–4)

## 2024-06-27 PROCEDURE — 6370000000 HC RX 637 (ALT 250 FOR IP): Performed by: EMERGENCY MEDICINE

## 2024-06-27 PROCEDURE — 85730 THROMBOPLASTIN TIME PARTIAL: CPT

## 2024-06-27 PROCEDURE — 81001 URINALYSIS AUTO W/SCOPE: CPT

## 2024-06-27 PROCEDURE — 1100000000 HC RM PRIVATE

## 2024-06-27 PROCEDURE — 84484 ASSAY OF TROPONIN QUANT: CPT

## 2024-06-27 PROCEDURE — 97530 THERAPEUTIC ACTIVITIES: CPT

## 2024-06-27 PROCEDURE — 93005 ELECTROCARDIOGRAM TRACING: CPT | Performed by: INTERNAL MEDICINE

## 2024-06-27 PROCEDURE — 82962 GLUCOSE BLOOD TEST: CPT

## 2024-06-27 PROCEDURE — 6370000000 HC RX 637 (ALT 250 FOR IP): Performed by: INTERNAL MEDICINE

## 2024-06-27 PROCEDURE — 99223 1ST HOSP IP/OBS HIGH 75: CPT | Performed by: INTERNAL MEDICINE

## 2024-06-27 PROCEDURE — 6360000002 HC RX W HCPCS: Performed by: EMERGENCY MEDICINE

## 2024-06-27 PROCEDURE — 97162 PT EVAL MOD COMPLEX 30 MIN: CPT

## 2024-06-27 PROCEDURE — 2580000003 HC RX 258: Performed by: INTERNAL MEDICINE

## 2024-06-27 PROCEDURE — 84443 ASSAY THYROID STIM HORMONE: CPT

## 2024-06-27 PROCEDURE — 36415 COLL VENOUS BLD VENIPUNCTURE: CPT

## 2024-06-27 PROCEDURE — 93010 ELECTROCARDIOGRAM REPORT: CPT | Performed by: INTERNAL MEDICINE

## 2024-06-27 PROCEDURE — 94761 N-INVAS EAR/PLS OXIMETRY MLT: CPT

## 2024-06-27 RX ORDER — HEPARIN SODIUM 10000 [USP'U]/100ML
5-30 INJECTION, SOLUTION INTRAVENOUS CONTINUOUS
Status: DISCONTINUED | OUTPATIENT
Start: 2024-06-27 | End: 2024-07-04

## 2024-06-27 RX ORDER — FUROSEMIDE 20 MG/1
20 TABLET ORAL DAILY
Status: DISCONTINUED | OUTPATIENT
Start: 2024-06-28 | End: 2024-06-29

## 2024-06-27 RX ORDER — POTASSIUM CHLORIDE 7.45 MG/ML
10 INJECTION INTRAVENOUS PRN
Status: DISCONTINUED | OUTPATIENT
Start: 2024-06-27 | End: 2024-07-09 | Stop reason: HOSPADM

## 2024-06-27 RX ORDER — HYDRALAZINE HYDROCHLORIDE 20 MG/ML
10 INJECTION INTRAMUSCULAR; INTRAVENOUS EVERY 6 HOURS PRN
Status: DISCONTINUED | OUTPATIENT
Start: 2024-06-27 | End: 2024-07-09 | Stop reason: HOSPADM

## 2024-06-27 RX ORDER — HEPARIN SODIUM 1000 [USP'U]/ML
4000 INJECTION, SOLUTION INTRAVENOUS; SUBCUTANEOUS ONCE
Status: COMPLETED | OUTPATIENT
Start: 2024-06-27 | End: 2024-06-27

## 2024-06-27 RX ORDER — ONDANSETRON 2 MG/ML
4 INJECTION INTRAMUSCULAR; INTRAVENOUS EVERY 6 HOURS PRN
Status: DISCONTINUED | OUTPATIENT
Start: 2024-06-27 | End: 2024-07-09 | Stop reason: HOSPADM

## 2024-06-27 RX ORDER — HEPARIN SODIUM 1000 [USP'U]/ML
2000 INJECTION, SOLUTION INTRAVENOUS; SUBCUTANEOUS PRN
Status: DISCONTINUED | OUTPATIENT
Start: 2024-06-27 | End: 2024-07-04

## 2024-06-27 RX ORDER — SODIUM CHLORIDE 0.9 % (FLUSH) 0.9 %
5-40 SYRINGE (ML) INJECTION PRN
Status: DISCONTINUED | OUTPATIENT
Start: 2024-06-27 | End: 2024-07-09 | Stop reason: HOSPADM

## 2024-06-27 RX ORDER — POTASSIUM CHLORIDE 20 MEQ/1
40 TABLET, EXTENDED RELEASE ORAL PRN
Status: DISCONTINUED | OUTPATIENT
Start: 2024-06-27 | End: 2024-07-09 | Stop reason: HOSPADM

## 2024-06-27 RX ORDER — SODIUM CHLORIDE 9 MG/ML
INJECTION, SOLUTION INTRAVENOUS PRN
Status: DISCONTINUED | OUTPATIENT
Start: 2024-06-27 | End: 2024-07-09 | Stop reason: HOSPADM

## 2024-06-27 RX ORDER — AMLODIPINE BESYLATE 5 MG/1
5 TABLET ORAL NIGHTLY
Status: DISCONTINUED | OUTPATIENT
Start: 2024-06-27 | End: 2024-07-02

## 2024-06-27 RX ORDER — MAGNESIUM SULFATE IN WATER 40 MG/ML
2000 INJECTION, SOLUTION INTRAVENOUS PRN
Status: DISCONTINUED | OUTPATIENT
Start: 2024-06-27 | End: 2024-07-09 | Stop reason: HOSPADM

## 2024-06-27 RX ORDER — DEXTROSE MONOHYDRATE 100 MG/ML
INJECTION, SOLUTION INTRAVENOUS CONTINUOUS PRN
Status: DISCONTINUED | OUTPATIENT
Start: 2024-06-27 | End: 2024-07-09 | Stop reason: HOSPADM

## 2024-06-27 RX ORDER — ONDANSETRON 4 MG/1
4 TABLET, ORALLY DISINTEGRATING ORAL EVERY 8 HOURS PRN
Status: DISCONTINUED | OUTPATIENT
Start: 2024-06-27 | End: 2024-07-09 | Stop reason: HOSPADM

## 2024-06-27 RX ORDER — RANOLAZINE 500 MG/1
500 TABLET, EXTENDED RELEASE ORAL 2 TIMES DAILY
Status: DISCONTINUED | OUTPATIENT
Start: 2024-06-27 | End: 2024-07-09 | Stop reason: HOSPADM

## 2024-06-27 RX ORDER — INSULIN LISPRO 100 [IU]/ML
0-4 INJECTION, SOLUTION INTRAVENOUS; SUBCUTANEOUS
Status: DISCONTINUED | OUTPATIENT
Start: 2024-06-27 | End: 2024-07-09 | Stop reason: HOSPADM

## 2024-06-27 RX ORDER — ASPIRIN 325 MG
325 TABLET ORAL
Status: COMPLETED | OUTPATIENT
Start: 2024-06-27 | End: 2024-06-27

## 2024-06-27 RX ORDER — POLYETHYLENE GLYCOL 3350 17 G/17G
17 POWDER, FOR SOLUTION ORAL DAILY PRN
Status: DISCONTINUED | OUTPATIENT
Start: 2024-06-27 | End: 2024-07-04

## 2024-06-27 RX ORDER — SODIUM CHLORIDE 0.9 % (FLUSH) 0.9 %
5-40 SYRINGE (ML) INJECTION EVERY 12 HOURS SCHEDULED
Status: DISCONTINUED | OUTPATIENT
Start: 2024-06-27 | End: 2024-07-09 | Stop reason: HOSPADM

## 2024-06-27 RX ORDER — HEPARIN SODIUM 1000 [USP'U]/ML
4000 INJECTION, SOLUTION INTRAVENOUS; SUBCUTANEOUS PRN
Status: DISCONTINUED | OUTPATIENT
Start: 2024-06-27 | End: 2024-07-04

## 2024-06-27 RX ORDER — INSULIN LISPRO 100 [IU]/ML
0-4 INJECTION, SOLUTION INTRAVENOUS; SUBCUTANEOUS NIGHTLY
Status: DISCONTINUED | OUTPATIENT
Start: 2024-06-27 | End: 2024-07-09 | Stop reason: HOSPADM

## 2024-06-27 RX ADMIN — HEPARIN SODIUM 10 UNITS/KG/HR: 10000 INJECTION, SOLUTION INTRAVENOUS at 02:59

## 2024-06-27 RX ADMIN — SODIUM CHLORIDE, PRESERVATIVE FREE 10 ML: 5 INJECTION INTRAVENOUS at 10:36

## 2024-06-27 RX ADMIN — ACETAMINOPHEN 325MG 650 MG: 325 TABLET ORAL at 00:07

## 2024-06-27 RX ADMIN — RANOLAZINE 500 MG: 500 TABLET, EXTENDED RELEASE ORAL at 23:53

## 2024-06-27 RX ADMIN — POTASSIUM BICARBONATE 40 MEQ: 782 TABLET, EFFERVESCENT ORAL at 10:34

## 2024-06-27 RX ADMIN — METOPROLOL TARTRATE 25 MG: 25 TABLET, FILM COATED ORAL at 10:35

## 2024-06-27 RX ADMIN — RANOLAZINE 500 MG: 500 TABLET, EXTENDED RELEASE ORAL at 10:35

## 2024-06-27 RX ADMIN — HEPARIN SODIUM 4000 UNITS: 1000 INJECTION INTRAVENOUS; SUBCUTANEOUS at 02:54

## 2024-06-27 RX ADMIN — LEVOTHYROXINE SODIUM 137 MCG: 25 TABLET ORAL at 10:35

## 2024-06-27 RX ADMIN — ASPIRIN 325 MG: 325 TABLET ORAL at 02:20

## 2024-06-27 RX ADMIN — METOPROLOL TARTRATE 25 MG: 25 TABLET, FILM COATED ORAL at 23:53

## 2024-06-27 ASSESSMENT — PAIN SCALES - GENERAL
PAINLEVEL_OUTOF10: 0
PAINLEVEL_OUTOF10: 0

## 2024-06-27 NOTE — CARE COORDINATION
06/27/24 0944   Service Assessment   Patient Orientation Alert and Oriented   Cognition Alert   History Provided By Patient   Primary Caregiver Private caregiver   Accompanied By/Relationship none   Support Systems Children;Family Members;Friends/Neighbors;Home Care Staff   Patient's Healthcare Decision Maker is: Named in Scanned ACP Document   PCP Verified by CM Yes   Last Visit to PCP Within last 3 months   Prior Functional Level Assistance with the following:;Housework;Bathing;Dressing;Toileting;Cooking;Shopping;Mobility   Current Functional Level Assistance with the following:;Bathing;Dressing;Toileting;Cooking;Housework;Shopping;Mobility   Can patient return to prior living arrangement Unknown at present   Ability to make needs known: Good   Family able to assist with home care needs: Other (comment)  (Has care aides that come 56 hours weekly)   Would you like for me to discuss the discharge plan with any other family members/significant others, and if so, who? Yes  (granddaughter)   Financial Resources Medicare;Medicaid   Community Resources ECF/Home Care   Social/Functional History   Lives With Alone;Home care staff   Type of Home House   Home Layout One level   Home Access Ramped entrance   Bathroom Shower/Tub Tub/Shower unit   Bathroom Toilet Bedside commode   Bathroom Equipment Shower chair   Bathroom Accessibility Accessible   Home Equipment Wheelchair - Manual;Walker - Rolling;Hospital bed   Receives Help From Personal care attendant   ADL Assistance Needs assistance   Toileting Needs assistance   Homemaking Assistance Needs assistance   Ambulation Assistance Needs assistance   Transfer Assistance Independent   Active  No   Patient's  Info pt states either her goddaughter, family, neighbor or aides transport her   Occupation Retired   Type of Occupation Nurse at [employer]   Discharge Planning   Type of Residence House   Living Arrangements Alone   Current Services Prior To Admission Home Care

## 2024-06-27 NOTE — CARE COORDINATION
SW sent several referrals for  services via [facility], if recommended.     BRIAN Jung  Case Management Department

## 2024-06-27 NOTE — ED PROVIDER NOTES
`[facility] EMERGENCY DEPT  eMERGENCY dEPARTMENT eNCOUnter      Pt Name: Grace Lyons  MRN: [id number]  Birthdate 1935 of evaluation: 6/26/2024  Provider:Timothy Head MD    CHIEF COMPLAINT         HPI  Grace Lyons is a 89 y.o. female  presents to the ED for evaluation of  increased sweating x 4-5 days and  SOB.  Patient denies having chest pain, fever or chills.  Patient have a history of  cardiac ischemia.     ROS  Review of Systems   HENT: Negative.     Respiratory: Negative.     Cardiovascular:  Positive for palpitations. Negative for chest pain.   Gastrointestinal: Negative.    Musculoskeletal: Negative.    Neurological: Negative.    All other systems reviewed and are negative.      Except as noted above the remainder of the review of systems was reviewed and negative.       PAST MEDICAL HISTORY     Past Medical History:   Diagnosis Date    Acetabulum fracture (HCC) 1981    Anemia     Anxiety     Asthma     Benign hypertensive heart disease without heart failure     Elevated today, usually normal at home, currently significant joint pains    BMI 38.0-38.9,adult 06/07/2017    Bronchitis     Bursitis of left shoulder     CAD (coronary artery disease)     Cervical spinal stenosis     Cholelithiasis     Chronic diastolic heart failure (HCC)     Stable, edema better, uses PRN Lasix    Chronic pain     right leg    Congestive heart failure (HCC)     Coronary atherosclerosis of native coronary artery     9/10 Non critical LAD and RCA disease    Degenerative joint disease of left knee     Diverticulosis     Diverticulosis     DJD (degenerative joint disease)     Dyspepsia     Dysuria     HTN (hypertension)     Hypothyroidism     IC (interstitial cystitis)     Kidney stone     Kidney stones     Left shoulder pain     Low back pain     LVH (left ventricular hypertrophy)     Morbid obesity (HCC)     Weight loss has been strongly encouraged by following dietary restrictions and an exercise routine.

## 2024-06-27 NOTE — ED NOTES
Pt was given diltiazem push. Pt stated she felt like her head was groggy. BP came back sbp at 88 HR went below 100. Informed MD. Held diltiazem drip.

## 2024-06-27 NOTE — H&P
Comments)     Abdominal pain and burning     Iodinated Contrast Media Other (See Comments)     Throat swelling, felt like she couldn't breath but no further interventions required. Not anaphylaxis. Tolerated contrast with pre treatment.      Irbesartan Myalgia    Keflex [Cephalexin]      Pt reports severe yeast infection causing one week of hospital admittance     Losartan Itching    Lovastatin Other (See Comments)     Leg cramps    Metoclopramide Nausea Only    Metronidazole Other (See Comments)     Mouth and throat irritation      Nebivolol Other (See Comments)     Felt like throat closing; can take metoprolol    Pravastatin Other (See Comments)     Leg cramps    Rosuvastatin Other (See Comments)     Cramps, aches    Simvastatin Other (See Comments)     Cramps, aches    Trazodone Other (See Comments)     Patient states she feels drugged    Valsartan Cough    Acetaminophen-Codeine Rash    Codeine Nausea And Vomiting    Iodine Rash       Review of Systems:  Patient is alert awake oriented  No history of weight loss weight gain fever chills rigors but frequent diaphoresis  Denies any visual disturbances no throat pain  Chest-some shortness of breath with ambulation  CVS-denies any chest pain  Abdomen-no nausea vomiting diarrhea constipation  Musculoskeletal-chronic arthritis related pain on the major joints  Neurology-no focal weakness  Objective:   Body mass index is 33.67 kg/m².  Vitals:    06/27/24 0230 06/27/24 0245 06/27/24 0300 06/27/24 0315   BP: 121/78 (!) 125/92 (!) 111/38 133/80   Pulse: 76 75 85 76   Resp: 22 22 25 21   Temp:       TempSrc:       SpO2: 94% 98% 96% 96%   Weight:       Height:            Physical Exam:  General: Stable  Cardiovascular: Irregular S1S2+, RRR  Pulmonary:  CTA b/l  GI:  Soft, BS+, NT, ND  Extremities:  No edema           CBC:  Lab Results   Component Value Date/Time    WBC 6.7 06/26/2024 08:50 PM    HGB 12.9 06/26/2024 08:50 PM    HCT 41.3 06/26/2024 08:50 PM

## 2024-06-27 NOTE — ED TRIAGE NOTES
Pt to ED for eval of increased sweating x 4-5 days and increased SOB. Per pt caregiver, pt requested to come to the ED when she got there because she had been sweating a lot. Pt endorsed SOB and headache at registration.

## 2024-06-27 NOTE — ED NOTES
TRANSFER - OUT REPORT:    Verbal report given to Jazz Bragg on Grace Lyons  being transferred to [facility] for routine progression of patient care       Report consisted of patient's Situation, Background, Assessment and   Recommendations(SBAR).     Information from the following report(s) Nurse Handoff Report was reviewed with the receiving nurse.    Kinder Fall Assessment:                           Lines:   Peripheral IV 06/26/24 Left;Proximal Forearm (Active)   Site Assessment Clean, dry & intact 06/26/24 2058   Line Status Blood return noted 06/26/24 2058   Phlebitis Assessment No symptoms 06/26/24 2058   Infiltration Assessment 0 06/26/24 2058       Peripheral IV 06/26/24 Posterior;Right Hand (Active)   Site Assessment Clean, dry & intact 06/26/24 2059   Line Status Blood return noted 06/26/24 2059   Phlebitis Assessment No symptoms 06/26/24 2059   Infiltration Assessment 0 06/26/24 2059        Opportunity for questions and clarification was provided.      Patient transported with:  Monitor

## 2024-06-28 ENCOUNTER — APPOINTMENT (OUTPATIENT)
Facility: HOSPITAL | Age: 89
DRG: 321 | End: 2024-06-28
Attending: INTERNAL MEDICINE
Payer: MEDICARE

## 2024-06-28 LAB
ANION GAP SERPL CALC-SCNC: 5 MMOL/L (ref 3–18)
APTT PPP: 46.8 SEC (ref 23–36.4)
APTT PPP: 61.1 SEC (ref 23–36.4)
APTT PPP: 84.7 SEC (ref 23–36.4)
BASOPHILS # BLD: 0 K/UL (ref 0–0.1)
BASOPHILS NFR BLD: 0 % (ref 0–2)
BUN SERPL-MCNC: 10 MG/DL (ref 7–18)
BUN/CREAT SERPL: 14 (ref 12–20)
CALCIUM SERPL-MCNC: 9 MG/DL (ref 8.5–10.1)
CHLORIDE SERPL-SCNC: 110 MMOL/L (ref 100–111)
CO2 SERPL-SCNC: 27 MMOL/L (ref 21–32)
CREAT SERPL-MCNC: 0.7 MG/DL (ref 0.6–1.3)
DIFFERENTIAL METHOD BLD: ABNORMAL
ECHO AO ASC DIAM: 3.1 CM
ECHO AO ASCENDING AORTA INDEX: 1.49 CM/M2
ECHO AO ROOT DIAM: 3.1 CM
ECHO AO ROOT INDEX: 1.49 CM/M2
ECHO AV AREA PEAK VELOCITY: 2.8 CM2
ECHO AV AREA/BSA PEAK VELOCITY: 1.3 CM2/M2
ECHO AV PEAK GRADIENT: 6 MMHG
ECHO AV PEAK VELOCITY: 1.2 M/S
ECHO AV VELOCITY RATIO: 0.75
ECHO BSA: 2.14 M2
ECHO LA VOL A-L A2C: 39 ML (ref 22–52)
ECHO LA VOL A-L A4C: 44 ML (ref 22–52)
ECHO LA VOL MOD A2C: 39 ML (ref 22–52)
ECHO LA VOL MOD A4C: 41 ML (ref 22–52)
ECHO LA VOLUME AREA LENGTH: 42 ML
ECHO LA VOLUME INDEX A-L A2C: 19 ML/M2 (ref 16–34)
ECHO LA VOLUME INDEX A-L A4C: 21 ML/M2 (ref 16–34)
ECHO LA VOLUME INDEX AREA LENGTH: 20 ML/M2 (ref 16–34)
ECHO LA VOLUME INDEX MOD A2C: 19 ML/M2 (ref 16–34)
ECHO LA VOLUME INDEX MOD A4C: 20 ML/M2 (ref 16–34)
ECHO LV E' LATERAL VELOCITY: 5 CM/S
ECHO LV E' SEPTAL VELOCITY: 4 CM/S
ECHO LV FRACTIONAL SHORTENING: 12 % (ref 28–44)
ECHO LV INTERNAL DIMENSION DIASTOLE INDEX: 2.36 CM/M2
ECHO LV INTERNAL DIMENSION DIASTOLIC: 4.9 CM (ref 3.9–5.3)
ECHO LV INTERNAL DIMENSION SYSTOLIC INDEX: 2.07 CM/M2
ECHO LV INTERNAL DIMENSION SYSTOLIC: 4.3 CM
ECHO LV IVSD: 1.1 CM (ref 0.6–0.9)
ECHO LV MASS 2D: 200.5 G (ref 67–162)
ECHO LV MASS INDEX 2D: 96.4 G/M2 (ref 43–95)
ECHO LV POSTERIOR WALL DIASTOLIC: 1.1 CM (ref 0.6–0.9)
ECHO LV RELATIVE WALL THICKNESS RATIO: 0.45
ECHO LVOT AREA: 3.8 CM2
ECHO LVOT DIAM: 2.2 CM
ECHO LVOT MEAN GRADIENT: 2 MMHG
ECHO LVOT PEAK GRADIENT: 3 MMHG
ECHO LVOT PEAK VELOCITY: 0.9 M/S
ECHO LVOT STROKE VOLUME INDEX: 30.5 ML/M2
ECHO LVOT SV: 63.4 ML
ECHO LVOT VTI: 16.7 CM
ECHO MV A VELOCITY: 0.66 M/S
ECHO MV E DECELERATION TIME (DT): 180.3 MS
ECHO MV E VELOCITY: 0.44 M/S
ECHO MV E/A RATIO: 0.67
ECHO MV E/E' LATERAL: 8.8
ECHO MV E/E' RATIO (AVERAGED): 9.9
ECHO MV E/E' SEPTAL: 11
EOSINOPHIL # BLD: 0.2 K/UL (ref 0–0.4)
EOSINOPHIL NFR BLD: 5 % (ref 0–5)
ERYTHROCYTE [DISTWIDTH] IN BLOOD BY AUTOMATED COUNT: 12 % (ref 11.6–14.5)
GLUCOSE BLD STRIP.AUTO-MCNC: 110 MG/DL (ref 70–110)
GLUCOSE BLD STRIP.AUTO-MCNC: 122 MG/DL (ref 70–110)
GLUCOSE BLD STRIP.AUTO-MCNC: 86 MG/DL (ref 70–110)
GLUCOSE BLD STRIP.AUTO-MCNC: 99 MG/DL (ref 70–110)
GLUCOSE SERPL-MCNC: 78 MG/DL (ref 74–99)
HCT VFR BLD AUTO: 37.4 % (ref 35–45)
HGB BLD-MCNC: 11.8 G/DL (ref 12–16)
IMM GRANULOCYTES # BLD AUTO: 0 K/UL (ref 0–0.04)
IMM GRANULOCYTES NFR BLD AUTO: 0 % (ref 0–0.5)
LYMPHOCYTES # BLD: 1.9 K/UL (ref 0.9–3.6)
LYMPHOCYTES NFR BLD: 42 % (ref 21–52)
MCH RBC QN AUTO: 32.4 PG (ref 24–34)
MCHC RBC AUTO-ENTMCNC: 31.6 G/DL (ref 31–37)
MCV RBC AUTO: 102.7 FL (ref 78–100)
MONOCYTES # BLD: 0.4 K/UL (ref 0.05–1.2)
MONOCYTES NFR BLD: 8 % (ref 3–10)
NEUTS SEG # BLD: 2 K/UL (ref 1.8–8)
NEUTS SEG NFR BLD: 45 % (ref 40–73)
NRBC # BLD: 0 K/UL (ref 0–0.01)
NRBC BLD-RTO: 0 PER 100 WBC
PLATELET # BLD AUTO: 183 K/UL (ref 135–420)
PMV BLD AUTO: 11.5 FL (ref 9.2–11.8)
POTASSIUM SERPL-SCNC: 3.6 MMOL/L (ref 3.5–5.5)
RBC # BLD AUTO: 3.64 M/UL (ref 4.2–5.3)
SODIUM SERPL-SCNC: 142 MMOL/L (ref 136–145)
TROPONIN I SERPL HS-MCNC: 569 NG/L (ref 0–54)
TROPONIN I SERPL HS-MCNC: 612 NG/L (ref 0–54)
WBC # BLD AUTO: 4.5 K/UL (ref 4.6–13.2)

## 2024-06-28 PROCEDURE — 93306 TTE W/DOPPLER COMPLETE: CPT

## 2024-06-28 PROCEDURE — 6370000000 HC RX 637 (ALT 250 FOR IP): Performed by: FAMILY MEDICINE

## 2024-06-28 PROCEDURE — 1100000000 HC RM PRIVATE

## 2024-06-28 PROCEDURE — 94761 N-INVAS EAR/PLS OXIMETRY MLT: CPT

## 2024-06-28 PROCEDURE — 6360000002 HC RX W HCPCS: Performed by: INTERNAL MEDICINE

## 2024-06-28 PROCEDURE — 99232 SBSQ HOSP IP/OBS MODERATE 35: CPT | Performed by: INTERNAL MEDICINE

## 2024-06-28 PROCEDURE — 85025 COMPLETE CBC W/AUTO DIFF WBC: CPT

## 2024-06-28 PROCEDURE — 80048 BASIC METABOLIC PNL TOTAL CA: CPT

## 2024-06-28 PROCEDURE — 6370000000 HC RX 637 (ALT 250 FOR IP): Performed by: INTERNAL MEDICINE

## 2024-06-28 PROCEDURE — 82962 GLUCOSE BLOOD TEST: CPT

## 2024-06-28 PROCEDURE — 6360000002 HC RX W HCPCS: Performed by: FAMILY MEDICINE

## 2024-06-28 PROCEDURE — 99232 SBSQ HOSP IP/OBS MODERATE 35: CPT | Performed by: FAMILY MEDICINE

## 2024-06-28 PROCEDURE — 36415 COLL VENOUS BLD VENIPUNCTURE: CPT

## 2024-06-28 PROCEDURE — 97535 SELF CARE MNGMENT TRAINING: CPT

## 2024-06-28 PROCEDURE — 2580000003 HC RX 258: Performed by: INTERNAL MEDICINE

## 2024-06-28 PROCEDURE — 85730 THROMBOPLASTIN TIME PARTIAL: CPT

## 2024-06-28 PROCEDURE — 97110 THERAPEUTIC EXERCISES: CPT

## 2024-06-28 PROCEDURE — 97530 THERAPEUTIC ACTIVITIES: CPT

## 2024-06-28 PROCEDURE — 85027 COMPLETE CBC AUTOMATED: CPT

## 2024-06-28 PROCEDURE — 93306 TTE W/DOPPLER COMPLETE: CPT | Performed by: INTERNAL MEDICINE

## 2024-06-28 PROCEDURE — 84484 ASSAY OF TROPONIN QUANT: CPT

## 2024-06-28 PROCEDURE — 97166 OT EVAL MOD COMPLEX 45 MIN: CPT

## 2024-06-28 RX ORDER — GAUZE BANDAGE 2" X 2"
100 BANDAGE TOPICAL DAILY
Status: DISCONTINUED | OUTPATIENT
Start: 2024-06-28 | End: 2024-07-09 | Stop reason: HOSPADM

## 2024-06-28 RX ORDER — MECLIZINE HCL 12.5 MG/1
25 TABLET ORAL 3 TIMES DAILY PRN
Status: DISCONTINUED | OUTPATIENT
Start: 2024-06-28 | End: 2024-07-09 | Stop reason: HOSPADM

## 2024-06-28 RX ADMIN — ONDANSETRON 4 MG: 2 INJECTION INTRAMUSCULAR; INTRAVENOUS at 22:10

## 2024-06-28 RX ADMIN — RANOLAZINE 500 MG: 500 TABLET, EXTENDED RELEASE ORAL at 09:13

## 2024-06-28 RX ADMIN — METOPROLOL TARTRATE 25 MG: 25 TABLET, FILM COATED ORAL at 09:13

## 2024-06-28 RX ADMIN — HYDRALAZINE HYDROCHLORIDE 10 MG: 20 INJECTION INTRAMUSCULAR; INTRAVENOUS at 21:28

## 2024-06-28 RX ADMIN — LEVOTHYROXINE SODIUM 137 MCG: 25 TABLET ORAL at 09:12

## 2024-06-28 RX ADMIN — MECLIZINE 25 MG: 12.5 TABLET ORAL at 11:29

## 2024-06-28 RX ADMIN — Medication 100 MG: at 15:57

## 2024-06-28 RX ADMIN — FUROSEMIDE 20 MG: 20 TABLET ORAL at 09:13

## 2024-06-28 RX ADMIN — MECLIZINE 25 MG: 12.5 TABLET ORAL at 22:10

## 2024-06-28 RX ADMIN — HEPARIN SODIUM 6 UNITS/KG/HR: 10000 INJECTION, SOLUTION INTRAVENOUS at 07:45

## 2024-06-28 RX ADMIN — RANOLAZINE 500 MG: 500 TABLET, EXTENDED RELEASE ORAL at 21:28

## 2024-06-28 RX ADMIN — METOPROLOL TARTRATE 25 MG: 25 TABLET, FILM COATED ORAL at 21:28

## 2024-06-28 RX ADMIN — SODIUM CHLORIDE, PRESERVATIVE FREE 10 ML: 5 INJECTION INTRAVENOUS at 21:27

## 2024-06-28 RX ADMIN — SODIUM CHLORIDE, PRESERVATIVE FREE 10 ML: 5 INJECTION INTRAVENOUS at 09:13

## 2024-06-28 RX ADMIN — HEPARIN SODIUM 2000 UNITS: 1000 INJECTION INTRAVENOUS; SUBCUTANEOUS at 16:00

## 2024-06-28 ASSESSMENT — PAIN SCALES - GENERAL
PAINLEVEL_OUTOF10: 0

## 2024-06-29 ENCOUNTER — APPOINTMENT (OUTPATIENT)
Facility: HOSPITAL | Age: 89
DRG: 321 | End: 2024-06-29
Payer: MEDICARE

## 2024-06-29 LAB
ANION GAP SERPL CALC-SCNC: 9 MMOL/L (ref 3–18)
APTT PPP: 36.8 SEC (ref 23–36.4)
APTT PPP: >180 SEC (ref 23–36.4)
BUN SERPL-MCNC: 15 MG/DL (ref 7–18)
BUN/CREAT SERPL: 14 (ref 12–20)
CALCIUM SERPL-MCNC: 9.2 MG/DL (ref 8.5–10.1)
CHLORIDE SERPL-SCNC: 105 MMOL/L (ref 100–111)
CHOLEST SERPL-MCNC: 207 MG/DL
CO2 SERPL-SCNC: 24 MMOL/L (ref 21–32)
CREAT SERPL-MCNC: 1.11 MG/DL (ref 0.6–1.3)
ERYTHROCYTE [DISTWIDTH] IN BLOOD BY AUTOMATED COUNT: 12.1 % (ref 11.6–14.5)
GLUCOSE BLD STRIP.AUTO-MCNC: 130 MG/DL (ref 70–110)
GLUCOSE BLD STRIP.AUTO-MCNC: 135 MG/DL (ref 70–110)
GLUCOSE BLD STRIP.AUTO-MCNC: 136 MG/DL (ref 70–110)
GLUCOSE BLD STRIP.AUTO-MCNC: 156 MG/DL (ref 70–110)
GLUCOSE BLD STRIP.AUTO-MCNC: 159 MG/DL (ref 70–110)
GLUCOSE SERPL-MCNC: 151 MG/DL (ref 74–99)
HCT VFR BLD AUTO: 38.2 % (ref 35–45)
HDLC SERPL-MCNC: 36 MG/DL (ref 40–60)
HDLC SERPL: 5.8 (ref 0–5)
HGB BLD-MCNC: 12.1 G/DL (ref 12–16)
LDLC SERPL CALC-MCNC: 154.8 MG/DL (ref 0–100)
LIPID PANEL: ABNORMAL
MCH RBC QN AUTO: 33.1 PG (ref 24–34)
MCHC RBC AUTO-ENTMCNC: 31.7 G/DL (ref 31–37)
MCV RBC AUTO: 104.4 FL (ref 78–100)
NRBC # BLD: 0 K/UL (ref 0–0.01)
NRBC BLD-RTO: 0 PER 100 WBC
PLATELET # BLD AUTO: 176 K/UL (ref 135–420)
PMV BLD AUTO: 12.1 FL (ref 9.2–11.8)
POTASSIUM SERPL-SCNC: 3.5 MMOL/L (ref 3.5–5.5)
RBC # BLD AUTO: 3.66 M/UL (ref 4.2–5.3)
SODIUM SERPL-SCNC: 138 MMOL/L (ref 136–145)
TRIGL SERPL-MCNC: 81 MG/DL
VLDLC SERPL CALC-MCNC: 16.2 MG/DL
WBC # BLD AUTO: 5.7 K/UL (ref 4.6–13.2)

## 2024-06-29 PROCEDURE — 94761 N-INVAS EAR/PLS OXIMETRY MLT: CPT

## 2024-06-29 PROCEDURE — 99232 SBSQ HOSP IP/OBS MODERATE 35: CPT | Performed by: FAMILY MEDICINE

## 2024-06-29 PROCEDURE — 1100000000 HC RM PRIVATE

## 2024-06-29 PROCEDURE — 99232 SBSQ HOSP IP/OBS MODERATE 35: CPT | Performed by: INTERNAL MEDICINE

## 2024-06-29 PROCEDURE — 6370000000 HC RX 637 (ALT 250 FOR IP): Performed by: FAMILY MEDICINE

## 2024-06-29 PROCEDURE — 6370000000 HC RX 637 (ALT 250 FOR IP): Performed by: INTERNAL MEDICINE

## 2024-06-29 PROCEDURE — 80048 BASIC METABOLIC PNL TOTAL CA: CPT

## 2024-06-29 PROCEDURE — 6360000002 HC RX W HCPCS: Performed by: INTERNAL MEDICINE

## 2024-06-29 PROCEDURE — 2700000000 HC OXYGEN THERAPY PER DAY

## 2024-06-29 PROCEDURE — 85730 THROMBOPLASTIN TIME PARTIAL: CPT

## 2024-06-29 PROCEDURE — 74018 RADEX ABDOMEN 1 VIEW: CPT

## 2024-06-29 PROCEDURE — 82962 GLUCOSE BLOOD TEST: CPT

## 2024-06-29 PROCEDURE — 2580000003 HC RX 258: Performed by: INTERNAL MEDICINE

## 2024-06-29 PROCEDURE — 80061 LIPID PANEL: CPT

## 2024-06-29 RX ORDER — FUROSEMIDE 40 MG/1
40 TABLET ORAL DAILY
Status: DISCONTINUED | OUTPATIENT
Start: 2024-06-30 | End: 2024-07-09 | Stop reason: HOSPADM

## 2024-06-29 RX ORDER — LEVOFLOXACIN 500 MG/1
500 TABLET, FILM COATED ORAL ONCE
Status: COMPLETED | OUTPATIENT
Start: 2024-06-29 | End: 2024-06-29

## 2024-06-29 RX ORDER — FUROSEMIDE 10 MG/ML
40 INJECTION INTRAMUSCULAR; INTRAVENOUS ONCE
Status: COMPLETED | OUTPATIENT
Start: 2024-06-29 | End: 2024-06-29

## 2024-06-29 RX ORDER — LEVOFLOXACIN 250 MG/1
250 TABLET, FILM COATED ORAL DAILY
Status: DISCONTINUED | OUTPATIENT
Start: 2024-06-30 | End: 2024-07-01

## 2024-06-29 RX ADMIN — SODIUM CHLORIDE, PRESERVATIVE FREE 10 ML: 5 INJECTION INTRAVENOUS at 09:17

## 2024-06-29 RX ADMIN — MECLIZINE 25 MG: 12.5 TABLET ORAL at 20:16

## 2024-06-29 RX ADMIN — ONDANSETRON 4 MG: 2 INJECTION INTRAMUSCULAR; INTRAVENOUS at 09:13

## 2024-06-29 RX ADMIN — FUROSEMIDE 20 MG: 20 TABLET ORAL at 09:12

## 2024-06-29 RX ADMIN — HEPARIN SODIUM 4000 UNITS: 1000 INJECTION INTRAVENOUS; SUBCUTANEOUS at 18:29

## 2024-06-29 RX ADMIN — LEVOTHYROXINE SODIUM 137 MCG: 25 TABLET ORAL at 07:04

## 2024-06-29 RX ADMIN — ONDANSETRON 4 MG: 4 TABLET, ORALLY DISINTEGRATING ORAL at 20:13

## 2024-06-29 RX ADMIN — Medication 100 MG: at 09:13

## 2024-06-29 RX ADMIN — METOPROLOL TARTRATE 12.5 MG: 25 TABLET, FILM COATED ORAL at 21:34

## 2024-06-29 RX ADMIN — HEPARIN SODIUM 4000 UNITS: 1000 INJECTION INTRAVENOUS; SUBCUTANEOUS at 00:45

## 2024-06-29 RX ADMIN — RANOLAZINE 500 MG: 500 TABLET, EXTENDED RELEASE ORAL at 09:13

## 2024-06-29 RX ADMIN — SODIUM CHLORIDE, PRESERVATIVE FREE 10 ML: 5 INJECTION INTRAVENOUS at 21:13

## 2024-06-29 RX ADMIN — MECLIZINE 25 MG: 12.5 TABLET ORAL at 09:13

## 2024-06-29 RX ADMIN — RANOLAZINE 500 MG: 500 TABLET, EXTENDED RELEASE ORAL at 20:15

## 2024-06-29 RX ADMIN — METOPROLOL TARTRATE 25 MG: 25 TABLET, FILM COATED ORAL at 09:13

## 2024-06-29 RX ADMIN — LEVOFLOXACIN 500 MG: 500 TABLET, FILM COATED ORAL at 13:58

## 2024-06-29 RX ADMIN — FUROSEMIDE 40 MG: 10 INJECTION, SOLUTION INTRAMUSCULAR; INTRAVENOUS at 13:58

## 2024-06-29 ASSESSMENT — PAIN SCALES - GENERAL
PAINLEVEL_OUTOF10: 0

## 2024-06-29 NOTE — CARE COORDINATION
[facility] accepted patient in [facility], all other home health agencies declined patient.   Anticipated Start of Care for patient is 07/01/2024.               Phuong Rodriguez RN  Case Management [phone]

## 2024-06-30 LAB
ANION GAP SERPL CALC-SCNC: 6 MMOL/L (ref 3–18)
APTT PPP: 129.1 SEC (ref 23–36.4)
APTT PPP: 61.9 SEC (ref 23–36.4)
APTT PPP: 86.9 SEC (ref 23–36.4)
BUN SERPL-MCNC: 17 MG/DL (ref 7–18)
BUN/CREAT SERPL: 15 (ref 12–20)
CALCIUM SERPL-MCNC: 10.1 MG/DL (ref 8.5–10.1)
CHLORIDE SERPL-SCNC: 104 MMOL/L (ref 100–111)
CO2 SERPL-SCNC: 28 MMOL/L (ref 21–32)
CREAT SERPL-MCNC: 1.16 MG/DL (ref 0.6–1.3)
GLUCOSE BLD STRIP.AUTO-MCNC: 131 MG/DL (ref 70–110)
GLUCOSE BLD STRIP.AUTO-MCNC: 131 MG/DL (ref 70–110)
GLUCOSE BLD STRIP.AUTO-MCNC: 150 MG/DL (ref 70–110)
GLUCOSE BLD STRIP.AUTO-MCNC: 162 MG/DL (ref 70–110)
GLUCOSE SERPL-MCNC: 133 MG/DL (ref 74–99)
POTASSIUM SERPL-SCNC: 3.7 MMOL/L (ref 3.5–5.5)
SODIUM SERPL-SCNC: 138 MMOL/L (ref 136–145)

## 2024-06-30 PROCEDURE — 6370000000 HC RX 637 (ALT 250 FOR IP): Performed by: INTERNAL MEDICINE

## 2024-06-30 PROCEDURE — 6360000002 HC RX W HCPCS: Performed by: INTERNAL MEDICINE

## 2024-06-30 PROCEDURE — 36415 COLL VENOUS BLD VENIPUNCTURE: CPT

## 2024-06-30 PROCEDURE — 99232 SBSQ HOSP IP/OBS MODERATE 35: CPT | Performed by: FAMILY MEDICINE

## 2024-06-30 PROCEDURE — 85730 THROMBOPLASTIN TIME PARTIAL: CPT

## 2024-06-30 PROCEDURE — 80048 BASIC METABOLIC PNL TOTAL CA: CPT

## 2024-06-30 PROCEDURE — 2580000003 HC RX 258: Performed by: INTERNAL MEDICINE

## 2024-06-30 PROCEDURE — 6370000000 HC RX 637 (ALT 250 FOR IP): Performed by: FAMILY MEDICINE

## 2024-06-30 PROCEDURE — 94761 N-INVAS EAR/PLS OXIMETRY MLT: CPT

## 2024-06-30 PROCEDURE — 82962 GLUCOSE BLOOD TEST: CPT

## 2024-06-30 PROCEDURE — 1100000000 HC RM PRIVATE

## 2024-06-30 PROCEDURE — 2700000000 HC OXYGEN THERAPY PER DAY

## 2024-06-30 RX ORDER — LANOLIN ALCOHOL/MO/W.PET/CERES
3 CREAM (GRAM) TOPICAL NIGHTLY PRN
Status: DISCONTINUED | OUTPATIENT
Start: 2024-06-30 | End: 2024-07-09 | Stop reason: HOSPADM

## 2024-06-30 RX ORDER — FLUTICASONE PROPIONATE 50 MCG
1 SPRAY, SUSPENSION (ML) NASAL DAILY
Status: DISCONTINUED | OUTPATIENT
Start: 2024-06-30 | End: 2024-07-09 | Stop reason: HOSPADM

## 2024-06-30 RX ORDER — ACETAMINOPHEN 325 MG/1
650 TABLET ORAL EVERY 6 HOURS PRN
Status: DISCONTINUED | OUTPATIENT
Start: 2024-06-30 | End: 2024-07-09 | Stop reason: HOSPADM

## 2024-06-30 RX ADMIN — LEVOFLOXACIN 250 MG: 250 TABLET, FILM COATED ORAL at 14:12

## 2024-06-30 RX ADMIN — FUROSEMIDE 40 MG: 40 TABLET ORAL at 09:55

## 2024-06-30 RX ADMIN — Medication 100 MG: at 09:56

## 2024-06-30 RX ADMIN — HEPARIN SODIUM 4000 UNITS: 1000 INJECTION INTRAVENOUS; SUBCUTANEOUS at 03:00

## 2024-06-30 RX ADMIN — HEPARIN SODIUM 7 UNITS/KG/HR: 10000 INJECTION, SOLUTION INTRAVENOUS at 03:06

## 2024-06-30 RX ADMIN — SODIUM CHLORIDE, PRESERVATIVE FREE 10 ML: 5 INJECTION INTRAVENOUS at 20:00

## 2024-06-30 RX ADMIN — MECLIZINE 25 MG: 12.5 TABLET ORAL at 23:41

## 2024-06-30 RX ADMIN — Medication 3 MG: at 00:53

## 2024-06-30 RX ADMIN — ACETAMINOPHEN 325MG 650 MG: 325 TABLET ORAL at 23:39

## 2024-06-30 RX ADMIN — RANOLAZINE 500 MG: 500 TABLET, EXTENDED RELEASE ORAL at 20:02

## 2024-06-30 RX ADMIN — METOPROLOL TARTRATE 12.5 MG: 25 TABLET, FILM COATED ORAL at 09:56

## 2024-06-30 RX ADMIN — LEVOTHYROXINE SODIUM 137 MCG: 25 TABLET ORAL at 06:58

## 2024-06-30 RX ADMIN — RANOLAZINE 500 MG: 500 TABLET, EXTENDED RELEASE ORAL at 09:55

## 2024-06-30 RX ADMIN — SODIUM CHLORIDE, PRESERVATIVE FREE 10 ML: 5 INJECTION INTRAVENOUS at 09:56

## 2024-06-30 ASSESSMENT — PAIN SCALES - GENERAL
PAINLEVEL_OUTOF10: 0
PAINLEVEL_OUTOF10: 0
PAINLEVEL_OUTOF10: 8
PAINLEVEL_OUTOF10: 0

## 2024-06-30 ASSESSMENT — PAIN DESCRIPTION - DESCRIPTORS: DESCRIPTORS: ACHING

## 2024-06-30 ASSESSMENT — PAIN DESCRIPTION - LOCATION: LOCATION: HEAD

## 2024-07-01 LAB
ANION GAP SERPL CALC-SCNC: 7 MMOL/L (ref 3–18)
APPEARANCE UR: ABNORMAL
APTT PPP: 67.9 SEC (ref 23–36.4)
APTT PPP: 76.7 SEC (ref 23–36.4)
BILIRUB UR QL: ABNORMAL
BUN SERPL-MCNC: 19 MG/DL (ref 7–18)
BUN/CREAT SERPL: 21 (ref 12–20)
CALCIUM SERPL-MCNC: 9.8 MG/DL (ref 8.5–10.1)
CHLORIDE SERPL-SCNC: 105 MMOL/L (ref 100–111)
CO2 SERPL-SCNC: 26 MMOL/L (ref 21–32)
COLOR UR: ABNORMAL
CREAT SERPL-MCNC: 0.91 MG/DL (ref 0.6–1.3)
ERYTHROCYTE [DISTWIDTH] IN BLOOD BY AUTOMATED COUNT: 11.9 % (ref 11.6–14.5)
GLUCOSE BLD STRIP.AUTO-MCNC: 127 MG/DL (ref 70–110)
GLUCOSE BLD STRIP.AUTO-MCNC: 158 MG/DL (ref 70–110)
GLUCOSE BLD STRIP.AUTO-MCNC: 173 MG/DL (ref 70–110)
GLUCOSE BLD STRIP.AUTO-MCNC: 217 MG/DL (ref 70–110)
GLUCOSE SERPL-MCNC: 117 MG/DL (ref 74–99)
GLUCOSE UR STRIP.AUTO-MCNC: NEGATIVE MG/DL
HCT VFR BLD AUTO: 36.2 % (ref 35–45)
HGB BLD-MCNC: 11.5 G/DL (ref 12–16)
HGB UR QL STRIP: ABNORMAL
KETONES UR QL STRIP.AUTO: NEGATIVE MG/DL
LEUKOCYTE ESTERASE UR QL STRIP.AUTO: ABNORMAL
MCH RBC QN AUTO: 32.4 PG (ref 24–34)
MCHC RBC AUTO-ENTMCNC: 31.8 G/DL (ref 31–37)
MCV RBC AUTO: 102 FL (ref 78–100)
NITRITE UR QL STRIP.AUTO: NEGATIVE
NRBC # BLD: 0 K/UL (ref 0–0.01)
NRBC BLD-RTO: 0 PER 100 WBC
OTHER: NORMAL
PH UR STRIP: 6 (ref 5–8)
PLATELET # BLD AUTO: 170 K/UL (ref 135–420)
PMV BLD AUTO: 12 FL (ref 9.2–11.8)
POTASSIUM SERPL-SCNC: 3.5 MMOL/L (ref 3.5–5.5)
PROT UR STRIP-MCNC: >300 MG/DL
RBC # BLD AUTO: 3.55 M/UL (ref 4.2–5.3)
RBC #/AREA URNS HPF: NORMAL /HPF (ref 0–5)
SODIUM SERPL-SCNC: 138 MMOL/L (ref 136–145)
SP GR UR REFRACTOMETRY: 1.03 (ref 1–1.03)
UROBILINOGEN UR QL STRIP.AUTO: 0.2 EU/DL (ref 0.2–1)
WBC # BLD AUTO: 5 K/UL (ref 4.6–13.2)
WBC URNS QL MICRO: NORMAL /HPF (ref 0–4)

## 2024-07-01 PROCEDURE — 6360000002 HC RX W HCPCS: Performed by: INTERNAL MEDICINE

## 2024-07-01 PROCEDURE — 97530 THERAPEUTIC ACTIVITIES: CPT

## 2024-07-01 PROCEDURE — 36415 COLL VENOUS BLD VENIPUNCTURE: CPT

## 2024-07-01 PROCEDURE — 82962 GLUCOSE BLOOD TEST: CPT

## 2024-07-01 PROCEDURE — 99232 SBSQ HOSP IP/OBS MODERATE 35: CPT | Performed by: INTERNAL MEDICINE

## 2024-07-01 PROCEDURE — 99232 SBSQ HOSP IP/OBS MODERATE 35: CPT | Performed by: FAMILY MEDICINE

## 2024-07-01 PROCEDURE — 1100000000 HC RM PRIVATE

## 2024-07-01 PROCEDURE — 80048 BASIC METABOLIC PNL TOTAL CA: CPT

## 2024-07-01 PROCEDURE — 94761 N-INVAS EAR/PLS OXIMETRY MLT: CPT

## 2024-07-01 PROCEDURE — 6370000000 HC RX 637 (ALT 250 FOR IP): Performed by: INTERNAL MEDICINE

## 2024-07-01 PROCEDURE — 87086 URINE CULTURE/COLONY COUNT: CPT

## 2024-07-01 PROCEDURE — 85027 COMPLETE CBC AUTOMATED: CPT

## 2024-07-01 PROCEDURE — 2700000000 HC OXYGEN THERAPY PER DAY

## 2024-07-01 PROCEDURE — 6370000000 HC RX 637 (ALT 250 FOR IP): Performed by: FAMILY MEDICINE

## 2024-07-01 PROCEDURE — 97535 SELF CARE MNGMENT TRAINING: CPT

## 2024-07-01 PROCEDURE — 81001 URINALYSIS AUTO W/SCOPE: CPT

## 2024-07-01 PROCEDURE — 85730 THROMBOPLASTIN TIME PARTIAL: CPT

## 2024-07-01 PROCEDURE — 2580000003 HC RX 258: Performed by: INTERNAL MEDICINE

## 2024-07-01 RX ORDER — LEVOFLOXACIN 500 MG/1
500 TABLET, FILM COATED ORAL DAILY
Status: COMPLETED | OUTPATIENT
Start: 2024-07-02 | End: 2024-07-04

## 2024-07-01 RX ADMIN — FLUTICASONE PROPIONATE 1 SPRAY: 50 SPRAY, METERED NASAL at 13:37

## 2024-07-01 RX ADMIN — Medication 100 MG: at 09:47

## 2024-07-01 RX ADMIN — LEVOTHYROXINE SODIUM 137 MCG: 25 TABLET ORAL at 06:19

## 2024-07-01 RX ADMIN — RANOLAZINE 500 MG: 500 TABLET, EXTENDED RELEASE ORAL at 09:47

## 2024-07-01 RX ADMIN — MECLIZINE 25 MG: 12.5 TABLET ORAL at 09:57

## 2024-07-01 RX ADMIN — LEVOFLOXACIN 250 MG: 250 TABLET, FILM COATED ORAL at 09:46

## 2024-07-01 RX ADMIN — HEPARIN SODIUM 2000 UNITS: 1000 INJECTION INTRAVENOUS; SUBCUTANEOUS at 06:13

## 2024-07-01 RX ADMIN — RANOLAZINE 500 MG: 500 TABLET, EXTENDED RELEASE ORAL at 21:13

## 2024-07-01 RX ADMIN — SODIUM CHLORIDE, PRESERVATIVE FREE 10 ML: 5 INJECTION INTRAVENOUS at 09:51

## 2024-07-01 RX ADMIN — SODIUM CHLORIDE, PRESERVATIVE FREE 5 ML: 5 INJECTION INTRAVENOUS at 21:16

## 2024-07-01 RX ADMIN — HEPARIN SODIUM 7 UNITS/KG/HR: 10000 INJECTION, SOLUTION INTRAVENOUS at 19:29

## 2024-07-01 RX ADMIN — METOPROLOL TARTRATE 12.5 MG: 25 TABLET, FILM COATED ORAL at 09:47

## 2024-07-01 RX ADMIN — FUROSEMIDE 40 MG: 40 TABLET ORAL at 09:47

## 2024-07-01 RX ADMIN — METOPROLOL TARTRATE 12.5 MG: 25 TABLET, FILM COATED ORAL at 21:13

## 2024-07-01 ASSESSMENT — PAIN SCALES - GENERAL
PAINLEVEL_OUTOF10: 0
PAINLEVEL_OUTOF10: 0
PAINLEVEL_OUTOF10: 8
PAINLEVEL_OUTOF10: 0
PAINLEVEL_OUTOF10: 8
PAINLEVEL_OUTOF10: 0

## 2024-07-02 ENCOUNTER — APPOINTMENT (OUTPATIENT)
Facility: HOSPITAL | Age: 89
DRG: 321 | End: 2024-07-02
Payer: MEDICARE

## 2024-07-02 PROBLEM — I21.4 NSTEMI (NON-ST ELEVATED MYOCARDIAL INFARCTION) (HCC): Status: ACTIVE | Noted: 2024-07-02

## 2024-07-02 PROBLEM — R31.9 HEMATURIA: Status: ACTIVE | Noted: 2024-07-02

## 2024-07-02 LAB
ANION GAP SERPL CALC-SCNC: 5 MMOL/L (ref 3–18)
APTT PPP: 58.6 SEC (ref 23–36.4)
BUN SERPL-MCNC: 18 MG/DL (ref 7–18)
BUN/CREAT SERPL: 16 (ref 12–20)
CALCIUM SERPL-MCNC: 9.7 MG/DL (ref 8.5–10.1)
CHLORIDE SERPL-SCNC: 103 MMOL/L (ref 100–111)
CO2 SERPL-SCNC: 30 MMOL/L (ref 21–32)
CREAT SERPL-MCNC: 1.12 MG/DL (ref 0.6–1.3)
ECHO BSA: 2.14 M2
GLUCOSE BLD STRIP.AUTO-MCNC: 129 MG/DL (ref 70–110)
GLUCOSE BLD STRIP.AUTO-MCNC: 238 MG/DL (ref 70–110)
GLUCOSE BLD STRIP.AUTO-MCNC: 268 MG/DL (ref 70–110)
GLUCOSE SERPL-MCNC: 145 MG/DL (ref 74–99)
POTASSIUM SERPL-SCNC: 3.9 MMOL/L (ref 3.5–5.5)
SODIUM SERPL-SCNC: 138 MMOL/L (ref 136–145)

## 2024-07-02 PROCEDURE — 6360000002 HC RX W HCPCS: Performed by: INTERNAL MEDICINE

## 2024-07-02 PROCEDURE — 99152 MOD SED SAME PHYS/QHP 5/>YRS: CPT | Performed by: INTERNAL MEDICINE

## 2024-07-02 PROCEDURE — 80048 BASIC METABOLIC PNL TOTAL CA: CPT

## 2024-07-02 PROCEDURE — 74176 CT ABD & PELVIS W/O CONTRAST: CPT

## 2024-07-02 PROCEDURE — 93458 L HRT ARTERY/VENTRICLE ANGIO: CPT | Performed by: INTERNAL MEDICINE

## 2024-07-02 PROCEDURE — 1100000000 HC RM PRIVATE

## 2024-07-02 PROCEDURE — C1894 INTRO/SHEATH, NON-LASER: HCPCS | Performed by: INTERNAL MEDICINE

## 2024-07-02 PROCEDURE — 36415 COLL VENOUS BLD VENIPUNCTURE: CPT

## 2024-07-02 PROCEDURE — 4A023N7 MEASUREMENT OF CARDIAC SAMPLING AND PRESSURE, LEFT HEART, PERCUTANEOUS APPROACH: ICD-10-PCS | Performed by: INTERNAL MEDICINE

## 2024-07-02 PROCEDURE — B2111ZZ FLUOROSCOPY OF MULTIPLE CORONARY ARTERIES USING LOW OSMOLAR CONTRAST: ICD-10-PCS | Performed by: INTERNAL MEDICINE

## 2024-07-02 PROCEDURE — 6360000002 HC RX W HCPCS: Performed by: FAMILY MEDICINE

## 2024-07-02 PROCEDURE — 99153 MOD SED SAME PHYS/QHP EA: CPT | Performed by: INTERNAL MEDICINE

## 2024-07-02 PROCEDURE — 2500000003 HC RX 250 WO HCPCS: Performed by: INTERNAL MEDICINE

## 2024-07-02 PROCEDURE — 82962 GLUCOSE BLOOD TEST: CPT

## 2024-07-02 PROCEDURE — 6370000000 HC RX 637 (ALT 250 FOR IP): Performed by: INTERNAL MEDICINE

## 2024-07-02 PROCEDURE — 99232 SBSQ HOSP IP/OBS MODERATE 35: CPT | Performed by: INTERNAL MEDICINE

## 2024-07-02 PROCEDURE — C1713 ANCHOR/SCREW BN/BN,TIS/BN: HCPCS | Performed by: INTERNAL MEDICINE

## 2024-07-02 PROCEDURE — C1760 CLOSURE DEV, VASC: HCPCS | Performed by: INTERNAL MEDICINE

## 2024-07-02 PROCEDURE — 6370000000 HC RX 637 (ALT 250 FOR IP): Performed by: FAMILY MEDICINE

## 2024-07-02 PROCEDURE — 76000 FLUOROSCOPY <1 HR PHYS/QHP: CPT | Performed by: INTERNAL MEDICINE

## 2024-07-02 PROCEDURE — C1769 GUIDE WIRE: HCPCS | Performed by: INTERNAL MEDICINE

## 2024-07-02 PROCEDURE — 88112 CYTOPATH CELL ENHANCE TECH: CPT

## 2024-07-02 PROCEDURE — 2709999900 HC NON-CHARGEABLE SUPPLY: Performed by: INTERNAL MEDICINE

## 2024-07-02 PROCEDURE — 6360000004 HC RX CONTRAST MEDICATION: Performed by: INTERNAL MEDICINE

## 2024-07-02 PROCEDURE — 6370000000 HC RX 637 (ALT 250 FOR IP): Performed by: HOSPITALIST

## 2024-07-02 PROCEDURE — 99232 SBSQ HOSP IP/OBS MODERATE 35: CPT | Performed by: HOSPITALIST

## 2024-07-02 PROCEDURE — 76937 US GUIDE VASCULAR ACCESS: CPT | Performed by: INTERNAL MEDICINE

## 2024-07-02 PROCEDURE — 2580000003 HC RX 258: Performed by: INTERNAL MEDICINE

## 2024-07-02 PROCEDURE — 85730 THROMBOPLASTIN TIME PARTIAL: CPT

## 2024-07-02 RX ORDER — HEPARIN SODIUM 1000 [USP'U]/ML
INJECTION, SOLUTION INTRAVENOUS; SUBCUTANEOUS PRN
Status: DISCONTINUED | OUTPATIENT
Start: 2024-07-02 | End: 2024-07-02 | Stop reason: HOSPADM

## 2024-07-02 RX ORDER — DIPHENHYDRAMINE HYDROCHLORIDE 50 MG/ML
25 INJECTION INTRAMUSCULAR; INTRAVENOUS ONCE
Status: COMPLETED | OUTPATIENT
Start: 2024-07-02 | End: 2024-07-02

## 2024-07-02 RX ORDER — FENTANYL CITRATE 50 UG/ML
INJECTION, SOLUTION INTRAMUSCULAR; INTRAVENOUS PRN
Status: DISCONTINUED | OUTPATIENT
Start: 2024-07-02 | End: 2024-07-02 | Stop reason: HOSPADM

## 2024-07-02 RX ORDER — ASPIRIN 81 MG/1
81 TABLET, CHEWABLE ORAL ONCE
Status: COMPLETED | OUTPATIENT
Start: 2024-07-02 | End: 2024-07-02

## 2024-07-02 RX ORDER — MIDAZOLAM HYDROCHLORIDE 1 MG/ML
INJECTION INTRAMUSCULAR; INTRAVENOUS PRN
Status: DISCONTINUED | OUTPATIENT
Start: 2024-07-02 | End: 2024-07-02 | Stop reason: HOSPADM

## 2024-07-02 RX ORDER — AMLODIPINE BESYLATE 5 MG/1
5 TABLET ORAL NIGHTLY
Status: DISCONTINUED | OUTPATIENT
Start: 2024-07-02 | End: 2024-07-03

## 2024-07-02 RX ORDER — IODIXANOL 320 MG/ML
INJECTION, SOLUTION INTRAVASCULAR PRN
Status: DISCONTINUED | OUTPATIENT
Start: 2024-07-02 | End: 2024-07-02 | Stop reason: HOSPADM

## 2024-07-02 RX ORDER — ASPIRIN 81 MG/1
81 TABLET, CHEWABLE ORAL DAILY
Status: DISCONTINUED | OUTPATIENT
Start: 2024-07-03 | End: 2024-07-08 | Stop reason: HOSPADM

## 2024-07-02 RX ADMIN — METOPROLOL TARTRATE 12.5 MG: 25 TABLET, FILM COATED ORAL at 09:29

## 2024-07-02 RX ADMIN — AMLODIPINE BESYLATE 5 MG: 5 TABLET ORAL at 19:26

## 2024-07-02 RX ADMIN — RANOLAZINE 500 MG: 500 TABLET, EXTENDED RELEASE ORAL at 09:28

## 2024-07-02 RX ADMIN — LEVOTHYROXINE SODIUM 137 MCG: 25 TABLET ORAL at 07:05

## 2024-07-02 RX ADMIN — DIPHENHYDRAMINE HYDROCHLORIDE 25 MG: 50 INJECTION INTRAMUSCULAR; INTRAVENOUS at 10:11

## 2024-07-02 RX ADMIN — RANOLAZINE 500 MG: 500 TABLET, EXTENDED RELEASE ORAL at 21:33

## 2024-07-02 RX ADMIN — HYDRALAZINE HYDROCHLORIDE 10 MG: 20 INJECTION INTRAMUSCULAR; INTRAVENOUS at 15:16

## 2024-07-02 RX ADMIN — FUROSEMIDE 40 MG: 40 TABLET ORAL at 09:31

## 2024-07-02 RX ADMIN — HEPARIN SODIUM 4000 UNITS: 1000 INJECTION INTRAVENOUS; SUBCUTANEOUS at 07:10

## 2024-07-02 RX ADMIN — ASPIRIN 81 MG CHEWABLE TABLET 81 MG: 81 TABLET CHEWABLE at 10:11

## 2024-07-02 RX ADMIN — MECLIZINE 25 MG: 12.5 TABLET ORAL at 09:31

## 2024-07-02 RX ADMIN — LEVOFLOXACIN 500 MG: 500 TABLET, FILM COATED ORAL at 09:37

## 2024-07-02 RX ADMIN — SODIUM CHLORIDE, PRESERVATIVE FREE 10 ML: 5 INJECTION INTRAVENOUS at 09:39

## 2024-07-02 RX ADMIN — METOPROLOL TARTRATE 12.5 MG: 25 TABLET, FILM COATED ORAL at 21:33

## 2024-07-02 RX ADMIN — ONDANSETRON 4 MG: 2 INJECTION INTRAMUSCULAR; INTRAVENOUS at 21:32

## 2024-07-02 RX ADMIN — METHYLPREDNISOLONE SODIUM SUCCINATE 125 MG: 125 INJECTION, POWDER, FOR SOLUTION INTRAMUSCULAR; INTRAVENOUS at 10:13

## 2024-07-02 RX ADMIN — MECLIZINE 25 MG: 12.5 TABLET ORAL at 21:36

## 2024-07-02 RX ADMIN — Medication 100 MG: at 09:29

## 2024-07-02 RX ADMIN — FLUTICASONE PROPIONATE 1 SPRAY: 50 SPRAY, METERED NASAL at 15:22

## 2024-07-02 ASSESSMENT — PAIN SCALES - GENERAL
PAINLEVEL_OUTOF10: 0
PAINLEVEL_OUTOF10: 8
PAINLEVEL_OUTOF10: 6
PAINLEVEL_OUTOF10: 0
PAINLEVEL_OUTOF10: 2
PAINLEVEL_OUTOF10: 0
PAINLEVEL_OUTOF10: 0

## 2024-07-02 NOTE — PRE SEDATION
Sedation Plan  ASA: class 3 - patient with severe systemic disease     Mallampati class: II - soft palate, uvula, fauces visible.    Sedation plan: local anesthesia    Risks, benefits, and alternatives discussed with patient.  Use of blood products discussed with patient who consented to blood products.       Immediate reassessment prior to sedation:  Patient's status reviewed and vital signs assessed; acceptable to perform procedure and proceed to administer sedation as planned.

## 2024-07-03 LAB
ANION GAP SERPL CALC-SCNC: 5 MMOL/L (ref 3–18)
BUN SERPL-MCNC: 20 MG/DL (ref 7–18)
BUN/CREAT SERPL: 19 (ref 12–20)
CALCIUM SERPL-MCNC: 9.9 MG/DL (ref 8.5–10.1)
CHLORIDE SERPL-SCNC: 102 MMOL/L (ref 100–111)
CO2 SERPL-SCNC: 29 MMOL/L (ref 21–32)
CREAT SERPL-MCNC: 1.07 MG/DL (ref 0.6–1.3)
CYTOLOGY-NON GYN: NORMAL
ERYTHROCYTE [DISTWIDTH] IN BLOOD BY AUTOMATED COUNT: 11.9 % (ref 11.6–14.5)
GLUCOSE BLD STRIP.AUTO-MCNC: 158 MG/DL (ref 70–110)
GLUCOSE BLD STRIP.AUTO-MCNC: 193 MG/DL (ref 70–110)
GLUCOSE BLD STRIP.AUTO-MCNC: 201 MG/DL (ref 70–110)
GLUCOSE BLD STRIP.AUTO-MCNC: 238 MG/DL (ref 70–110)
GLUCOSE SERPL-MCNC: 237 MG/DL (ref 74–99)
HCT VFR BLD AUTO: 36.9 % (ref 35–45)
HGB BLD-MCNC: 11.8 G/DL (ref 12–16)
MCH RBC QN AUTO: 32.5 PG (ref 24–34)
MCHC RBC AUTO-ENTMCNC: 32 G/DL (ref 31–37)
MCV RBC AUTO: 101.7 FL (ref 78–100)
NRBC # BLD: 0 K/UL (ref 0–0.01)
NRBC BLD-RTO: 0 PER 100 WBC
PLATELET # BLD AUTO: 175 K/UL (ref 135–420)
PMV BLD AUTO: 11.8 FL (ref 9.2–11.8)
POTASSIUM SERPL-SCNC: 3.8 MMOL/L (ref 3.5–5.5)
RBC # BLD AUTO: 3.63 M/UL (ref 4.2–5.3)
SODIUM SERPL-SCNC: 136 MMOL/L (ref 136–145)
WBC # BLD AUTO: 4.3 K/UL (ref 4.6–13.2)

## 2024-07-03 PROCEDURE — 36415 COLL VENOUS BLD VENIPUNCTURE: CPT

## 2024-07-03 PROCEDURE — 6370000000 HC RX 637 (ALT 250 FOR IP): Performed by: INTERNAL MEDICINE

## 2024-07-03 PROCEDURE — 99232 SBSQ HOSP IP/OBS MODERATE 35: CPT | Performed by: INTERNAL MEDICINE

## 2024-07-03 PROCEDURE — 6370000000 HC RX 637 (ALT 250 FOR IP): Performed by: PHYSICIAN ASSISTANT

## 2024-07-03 PROCEDURE — 82962 GLUCOSE BLOOD TEST: CPT

## 2024-07-03 PROCEDURE — 97530 THERAPEUTIC ACTIVITIES: CPT

## 2024-07-03 PROCEDURE — 97535 SELF CARE MNGMENT TRAINING: CPT

## 2024-07-03 PROCEDURE — 80048 BASIC METABOLIC PNL TOTAL CA: CPT

## 2024-07-03 PROCEDURE — 1100000000 HC RM PRIVATE

## 2024-07-03 PROCEDURE — 6370000000 HC RX 637 (ALT 250 FOR IP): Performed by: HOSPITALIST

## 2024-07-03 PROCEDURE — 6370000000 HC RX 637 (ALT 250 FOR IP): Performed by: FAMILY MEDICINE

## 2024-07-03 PROCEDURE — 94761 N-INVAS EAR/PLS OXIMETRY MLT: CPT

## 2024-07-03 PROCEDURE — 99024 POSTOP FOLLOW-UP VISIT: CPT | Performed by: PHYSICIAN ASSISTANT

## 2024-07-03 PROCEDURE — 99232 SBSQ HOSP IP/OBS MODERATE 35: CPT | Performed by: HOSPITALIST

## 2024-07-03 PROCEDURE — 2580000003 HC RX 258: Performed by: INTERNAL MEDICINE

## 2024-07-03 PROCEDURE — 85027 COMPLETE CBC AUTOMATED: CPT

## 2024-07-03 RX ORDER — ISOSORBIDE MONONITRATE 30 MG/1
30 TABLET, EXTENDED RELEASE ORAL DAILY
Status: DISCONTINUED | OUTPATIENT
Start: 2024-07-03 | End: 2024-07-09 | Stop reason: HOSPADM

## 2024-07-03 RX ADMIN — Medication 100 MG: at 08:40

## 2024-07-03 RX ADMIN — ISOSORBIDE MONONITRATE 30 MG: 30 TABLET, EXTENDED RELEASE ORAL at 16:57

## 2024-07-03 RX ADMIN — METOPROLOL TARTRATE 12.5 MG: 25 TABLET, FILM COATED ORAL at 20:44

## 2024-07-03 RX ADMIN — LEVOTHYROXINE SODIUM 137 MCG: 25 TABLET ORAL at 06:58

## 2024-07-03 RX ADMIN — METOPROLOL TARTRATE 12.5 MG: 25 TABLET, FILM COATED ORAL at 08:40

## 2024-07-03 RX ADMIN — INSULIN LISPRO 1 UNITS: 100 INJECTION, SOLUTION INTRAVENOUS; SUBCUTANEOUS at 08:38

## 2024-07-03 RX ADMIN — SODIUM CHLORIDE, PRESERVATIVE FREE 10 ML: 5 INJECTION INTRAVENOUS at 08:41

## 2024-07-03 RX ADMIN — MECLIZINE 25 MG: 12.5 TABLET ORAL at 16:53

## 2024-07-03 RX ADMIN — ASPIRIN 81 MG CHEWABLE TABLET 81 MG: 81 TABLET CHEWABLE at 08:39

## 2024-07-03 RX ADMIN — RANOLAZINE 500 MG: 500 TABLET, EXTENDED RELEASE ORAL at 20:44

## 2024-07-03 RX ADMIN — ACETAMINOPHEN 325MG 650 MG: 325 TABLET ORAL at 16:50

## 2024-07-03 RX ADMIN — LEVOFLOXACIN 500 MG: 500 TABLET, FILM COATED ORAL at 08:39

## 2024-07-03 RX ADMIN — RANOLAZINE 500 MG: 500 TABLET, EXTENDED RELEASE ORAL at 08:39

## 2024-07-03 RX ADMIN — SODIUM CHLORIDE, PRESERVATIVE FREE 10 ML: 5 INJECTION INTRAVENOUS at 20:44

## 2024-07-03 RX ADMIN — FUROSEMIDE 40 MG: 40 TABLET ORAL at 08:40

## 2024-07-03 ASSESSMENT — PAIN SCALES - GENERAL
PAINLEVEL_OUTOF10: 0

## 2024-07-04 LAB
ANION GAP SERPL CALC-SCNC: 5 MMOL/L (ref 3–18)
BACTERIA SPEC CULT: NORMAL
BASOPHILS # BLD: 0 K/UL (ref 0–0.1)
BASOPHILS NFR BLD: 0 % (ref 0–2)
BUN SERPL-MCNC: 29 MG/DL (ref 7–18)
BUN/CREAT SERPL: 24 (ref 12–20)
CALCIUM SERPL-MCNC: 9.5 MG/DL (ref 8.5–10.1)
CHLORIDE SERPL-SCNC: 103 MMOL/L (ref 100–111)
CO2 SERPL-SCNC: 30 MMOL/L (ref 21–32)
CREAT SERPL-MCNC: 1.23 MG/DL (ref 0.6–1.3)
DIFFERENTIAL METHOD BLD: ABNORMAL
EOSINOPHIL # BLD: 0 K/UL (ref 0–0.4)
EOSINOPHIL NFR BLD: 1 % (ref 0–5)
ERYTHROCYTE [DISTWIDTH] IN BLOOD BY AUTOMATED COUNT: 11.9 % (ref 11.6–14.5)
GLUCOSE BLD STRIP.AUTO-MCNC: 150 MG/DL (ref 70–110)
GLUCOSE BLD STRIP.AUTO-MCNC: 163 MG/DL (ref 70–110)
GLUCOSE BLD STRIP.AUTO-MCNC: 170 MG/DL (ref 70–110)
GLUCOSE BLD STRIP.AUTO-MCNC: 176 MG/DL (ref 70–110)
GLUCOSE SERPL-MCNC: 199 MG/DL (ref 74–99)
HCT VFR BLD AUTO: 32.2 % (ref 35–45)
HGB BLD-MCNC: 10.3 G/DL (ref 12–16)
IMM GRANULOCYTES # BLD AUTO: 0 K/UL (ref 0–0.04)
IMM GRANULOCYTES NFR BLD AUTO: 0 % (ref 0–0.5)
LYMPHOCYTES # BLD: 2 K/UL (ref 0.9–3.6)
LYMPHOCYTES NFR BLD: 33 % (ref 21–52)
MAGNESIUM SERPL-MCNC: 2 MG/DL (ref 1.6–2.6)
MCH RBC QN AUTO: 32.5 PG (ref 24–34)
MCHC RBC AUTO-ENTMCNC: 32 G/DL (ref 31–37)
MCV RBC AUTO: 101.6 FL (ref 78–100)
MONOCYTES # BLD: 0.5 K/UL (ref 0.05–1.2)
MONOCYTES NFR BLD: 8 % (ref 3–10)
NEUTS SEG # BLD: 3.6 K/UL (ref 1.8–8)
NEUTS SEG NFR BLD: 58 % (ref 40–73)
NRBC # BLD: 0 K/UL (ref 0–0.01)
NRBC BLD-RTO: 0 PER 100 WBC
PLATELET # BLD AUTO: 168 K/UL (ref 135–420)
PMV BLD AUTO: 11.6 FL (ref 9.2–11.8)
POTASSIUM SERPL-SCNC: 3.6 MMOL/L (ref 3.5–5.5)
RBC # BLD AUTO: 3.17 M/UL (ref 4.2–5.3)
SERVICE CMNT-IMP: NORMAL
SODIUM SERPL-SCNC: 138 MMOL/L (ref 136–145)
WBC # BLD AUTO: 6.2 K/UL (ref 4.6–13.2)

## 2024-07-04 PROCEDURE — 6370000000 HC RX 637 (ALT 250 FOR IP): Performed by: HOSPITALIST

## 2024-07-04 PROCEDURE — 6370000000 HC RX 637 (ALT 250 FOR IP): Performed by: INTERNAL MEDICINE

## 2024-07-04 PROCEDURE — 99232 SBSQ HOSP IP/OBS MODERATE 35: CPT | Performed by: HOSPITALIST

## 2024-07-04 PROCEDURE — 36415 COLL VENOUS BLD VENIPUNCTURE: CPT

## 2024-07-04 PROCEDURE — 6370000000 HC RX 637 (ALT 250 FOR IP): Performed by: FAMILY MEDICINE

## 2024-07-04 PROCEDURE — 6370000000 HC RX 637 (ALT 250 FOR IP): Performed by: PHYSICIAN ASSISTANT

## 2024-07-04 PROCEDURE — 82962 GLUCOSE BLOOD TEST: CPT

## 2024-07-04 PROCEDURE — 1100000000 HC RM PRIVATE

## 2024-07-04 PROCEDURE — 80048 BASIC METABOLIC PNL TOTAL CA: CPT

## 2024-07-04 PROCEDURE — 99232 SBSQ HOSP IP/OBS MODERATE 35: CPT | Performed by: INTERNAL MEDICINE

## 2024-07-04 PROCEDURE — 2580000003 HC RX 258: Performed by: HOSPITALIST

## 2024-07-04 PROCEDURE — 2580000003 HC RX 258: Performed by: INTERNAL MEDICINE

## 2024-07-04 PROCEDURE — 94761 N-INVAS EAR/PLS OXIMETRY MLT: CPT

## 2024-07-04 PROCEDURE — 85025 COMPLETE CBC W/AUTO DIFF WBC: CPT

## 2024-07-04 PROCEDURE — 83735 ASSAY OF MAGNESIUM: CPT

## 2024-07-04 PROCEDURE — 6360000002 HC RX W HCPCS: Performed by: INTERNAL MEDICINE

## 2024-07-04 RX ORDER — BISACODYL 10 MG
10 SUPPOSITORY, RECTAL RECTAL DAILY PRN
Status: DISCONTINUED | OUTPATIENT
Start: 2024-07-04 | End: 2024-07-09 | Stop reason: HOSPADM

## 2024-07-04 RX ORDER — SODIUM CHLORIDE 9 MG/ML
INJECTION, SOLUTION INTRAVENOUS CONTINUOUS
Status: DISCONTINUED | OUTPATIENT
Start: 2024-07-04 | End: 2024-07-06

## 2024-07-04 RX ORDER — POLYETHYLENE GLYCOL 3350 17 G/17G
17 POWDER, FOR SOLUTION ORAL DAILY
Status: DISCONTINUED | OUTPATIENT
Start: 2024-07-04 | End: 2024-07-09 | Stop reason: HOSPADM

## 2024-07-04 RX ADMIN — SODIUM CHLORIDE, PRESERVATIVE FREE 10 ML: 5 INJECTION INTRAVENOUS at 21:01

## 2024-07-04 RX ADMIN — SODIUM CHLORIDE: 9 INJECTION, SOLUTION INTRAVENOUS at 12:44

## 2024-07-04 RX ADMIN — SODIUM CHLORIDE, PRESERVATIVE FREE 10 ML: 5 INJECTION INTRAVENOUS at 10:29

## 2024-07-04 RX ADMIN — ISOSORBIDE MONONITRATE 30 MG: 30 TABLET, EXTENDED RELEASE ORAL at 10:29

## 2024-07-04 RX ADMIN — MECLIZINE 25 MG: 12.5 TABLET ORAL at 22:42

## 2024-07-04 RX ADMIN — FLUTICASONE PROPIONATE 1 SPRAY: 50 SPRAY, METERED NASAL at 10:29

## 2024-07-04 RX ADMIN — ONDANSETRON 4 MG: 2 INJECTION INTRAMUSCULAR; INTRAVENOUS at 21:01

## 2024-07-04 RX ADMIN — LEVOFLOXACIN 500 MG: 500 TABLET, FILM COATED ORAL at 10:29

## 2024-07-04 RX ADMIN — METOPROLOL TARTRATE 12.5 MG: 25 TABLET, FILM COATED ORAL at 10:29

## 2024-07-04 RX ADMIN — RANOLAZINE 500 MG: 500 TABLET, EXTENDED RELEASE ORAL at 20:51

## 2024-07-04 RX ADMIN — Medication 100 MG: at 10:29

## 2024-07-04 RX ADMIN — METOPROLOL TARTRATE 12.5 MG: 25 TABLET, FILM COATED ORAL at 20:51

## 2024-07-04 RX ADMIN — MECLIZINE 25 MG: 12.5 TABLET ORAL at 12:40

## 2024-07-04 RX ADMIN — POLYETHYLENE GLYCOL 3350 17 G: 17 POWDER, FOR SOLUTION ORAL at 12:30

## 2024-07-04 ASSESSMENT — PAIN SCALES - GENERAL
PAINLEVEL_OUTOF10: 0

## 2024-07-05 PROBLEM — N39.0 URINARY TRACT INFECTION WITHOUT HEMATURIA: Status: ACTIVE | Noted: 2024-07-05

## 2024-07-05 LAB
APPEARANCE UR: ABNORMAL
BACTERIA URNS QL MICRO: ABNORMAL /HPF
BILIRUB UR QL: NEGATIVE
COLOR UR: ABNORMAL
EPITH CASTS URNS QL MICRO: ABNORMAL /LPF (ref 0–5)
GLUCOSE BLD STRIP.AUTO-MCNC: 136 MG/DL (ref 70–110)
GLUCOSE BLD STRIP.AUTO-MCNC: 166 MG/DL (ref 70–110)
GLUCOSE BLD STRIP.AUTO-MCNC: 174 MG/DL (ref 70–110)
GLUCOSE BLD STRIP.AUTO-MCNC: 254 MG/DL (ref 70–110)
GLUCOSE UR STRIP.AUTO-MCNC: NEGATIVE MG/DL
HGB UR QL STRIP: ABNORMAL
KETONES UR QL STRIP.AUTO: NEGATIVE MG/DL
LEUKOCYTE ESTERASE UR QL STRIP.AUTO: ABNORMAL
NITRITE UR QL STRIP.AUTO: NEGATIVE
PH UR STRIP: 5.5 (ref 5–8)
PROT UR STRIP-MCNC: 100 MG/DL
RBC #/AREA URNS HPF: ABNORMAL /HPF (ref 0–5)
SP GR UR REFRACTOMETRY: 1.02 (ref 1–1.03)
UROBILINOGEN UR QL STRIP.AUTO: 0.2 EU/DL (ref 0.2–1)
WBC URNS QL MICRO: ABNORMAL /HPF (ref 0–4)

## 2024-07-05 PROCEDURE — 87086 URINE CULTURE/COLONY COUNT: CPT

## 2024-07-05 PROCEDURE — 6370000000 HC RX 637 (ALT 250 FOR IP): Performed by: FAMILY MEDICINE

## 2024-07-05 PROCEDURE — 97530 THERAPEUTIC ACTIVITIES: CPT

## 2024-07-05 PROCEDURE — 6370000000 HC RX 637 (ALT 250 FOR IP): Performed by: PHYSICIAN ASSISTANT

## 2024-07-05 PROCEDURE — 6370000000 HC RX 637 (ALT 250 FOR IP): Performed by: INTERNAL MEDICINE

## 2024-07-05 PROCEDURE — 99232 SBSQ HOSP IP/OBS MODERATE 35: CPT | Performed by: INTERNAL MEDICINE

## 2024-07-05 PROCEDURE — 97168 OT RE-EVAL EST PLAN CARE: CPT

## 2024-07-05 PROCEDURE — 6370000000 HC RX 637 (ALT 250 FOR IP): Performed by: HOSPITALIST

## 2024-07-05 PROCEDURE — 2580000003 HC RX 258: Performed by: HOSPITALIST

## 2024-07-05 PROCEDURE — 81001 URINALYSIS AUTO W/SCOPE: CPT

## 2024-07-05 PROCEDURE — 2580000003 HC RX 258: Performed by: INTERNAL MEDICINE

## 2024-07-05 PROCEDURE — 6360000002 HC RX W HCPCS: Performed by: INTERNAL MEDICINE

## 2024-07-05 PROCEDURE — 82962 GLUCOSE BLOOD TEST: CPT

## 2024-07-05 PROCEDURE — 94761 N-INVAS EAR/PLS OXIMETRY MLT: CPT

## 2024-07-05 PROCEDURE — 99232 SBSQ HOSP IP/OBS MODERATE 35: CPT | Performed by: HOSPITALIST

## 2024-07-05 PROCEDURE — 1100000000 HC RM PRIVATE

## 2024-07-05 RX ORDER — SENNOSIDES A AND B 8.6 MG/1
1 TABLET, FILM COATED ORAL 2 TIMES DAILY
Status: DISCONTINUED | OUTPATIENT
Start: 2024-07-05 | End: 2024-07-09 | Stop reason: HOSPADM

## 2024-07-05 RX ADMIN — ONDANSETRON 4 MG: 2 INJECTION INTRAMUSCULAR; INTRAVENOUS at 09:01

## 2024-07-05 RX ADMIN — FLUTICASONE PROPIONATE 1 SPRAY: 50 SPRAY, METERED NASAL at 09:02

## 2024-07-05 RX ADMIN — METOPROLOL TARTRATE 12.5 MG: 25 TABLET, FILM COATED ORAL at 08:57

## 2024-07-05 RX ADMIN — SODIUM CHLORIDE: 9 INJECTION, SOLUTION INTRAVENOUS at 15:23

## 2024-07-05 RX ADMIN — METOPROLOL TARTRATE 12.5 MG: 25 TABLET, FILM COATED ORAL at 21:25

## 2024-07-05 RX ADMIN — ASPIRIN 81 MG CHEWABLE TABLET 81 MG: 81 TABLET CHEWABLE at 08:57

## 2024-07-05 RX ADMIN — SODIUM CHLORIDE: 9 INJECTION, SOLUTION INTRAVENOUS at 01:36

## 2024-07-05 RX ADMIN — SODIUM CHLORIDE, PRESERVATIVE FREE 5 ML: 5 INJECTION INTRAVENOUS at 21:29

## 2024-07-05 RX ADMIN — ISOSORBIDE MONONITRATE 30 MG: 30 TABLET, EXTENDED RELEASE ORAL at 08:57

## 2024-07-05 RX ADMIN — LEVOTHYROXINE SODIUM 137 MCG: 25 TABLET ORAL at 06:41

## 2024-07-05 RX ADMIN — RANOLAZINE 500 MG: 500 TABLET, EXTENDED RELEASE ORAL at 21:25

## 2024-07-05 RX ADMIN — SODIUM CHLORIDE, PRESERVATIVE FREE 10 ML: 5 INJECTION INTRAVENOUS at 09:01

## 2024-07-05 RX ADMIN — SENNOSIDES 8.6 MG: 8.6 TABLET, FILM COATED ORAL at 16:25

## 2024-07-05 RX ADMIN — POLYETHYLENE GLYCOL 3350 17 G: 17 POWDER, FOR SOLUTION ORAL at 08:57

## 2024-07-05 RX ADMIN — Medication 100 MG: at 08:57

## 2024-07-05 RX ADMIN — RANOLAZINE 500 MG: 500 TABLET, EXTENDED RELEASE ORAL at 08:57

## 2024-07-05 ASSESSMENT — PAIN SCALES - GENERAL
PAINLEVEL_OUTOF10: 0

## 2024-07-06 LAB
ANION GAP SERPL CALC-SCNC: 4 MMOL/L (ref 3–18)
BACTERIA SPEC CULT: NORMAL
BASOPHILS # BLD: 0 K/UL (ref 0–0.1)
BASOPHILS NFR BLD: 1 % (ref 0–2)
BUN SERPL-MCNC: 17 MG/DL (ref 7–18)
BUN/CREAT SERPL: 21 (ref 12–20)
CALCIUM SERPL-MCNC: 8.9 MG/DL (ref 8.5–10.1)
CHLORIDE SERPL-SCNC: 109 MMOL/L (ref 100–111)
CO2 SERPL-SCNC: 28 MMOL/L (ref 21–32)
CREAT SERPL-MCNC: 0.81 MG/DL (ref 0.6–1.3)
DIFFERENTIAL METHOD BLD: ABNORMAL
EOSINOPHIL # BLD: 0.2 K/UL (ref 0–0.4)
EOSINOPHIL NFR BLD: 4 % (ref 0–5)
ERYTHROCYTE [DISTWIDTH] IN BLOOD BY AUTOMATED COUNT: 11.9 % (ref 11.6–14.5)
GLUCOSE BLD STRIP.AUTO-MCNC: 126 MG/DL (ref 70–110)
GLUCOSE BLD STRIP.AUTO-MCNC: 155 MG/DL (ref 70–110)
GLUCOSE BLD STRIP.AUTO-MCNC: 158 MG/DL (ref 70–110)
GLUCOSE BLD STRIP.AUTO-MCNC: 166 MG/DL (ref 70–110)
GLUCOSE SERPL-MCNC: 129 MG/DL (ref 74–99)
HCT VFR BLD AUTO: 34.1 % (ref 35–45)
HGB BLD-MCNC: 10.5 G/DL (ref 12–16)
IMM GRANULOCYTES # BLD AUTO: 0 K/UL (ref 0–0.04)
IMM GRANULOCYTES NFR BLD AUTO: 0 % (ref 0–0.5)
LYMPHOCYTES # BLD: 2.5 K/UL (ref 0.9–3.6)
LYMPHOCYTES NFR BLD: 45 % (ref 21–52)
MAGNESIUM SERPL-MCNC: 1.9 MG/DL (ref 1.6–2.6)
MCH RBC QN AUTO: 32.4 PG (ref 24–34)
MCHC RBC AUTO-ENTMCNC: 30.8 G/DL (ref 31–37)
MCV RBC AUTO: 105.2 FL (ref 78–100)
MONOCYTES # BLD: 0.5 K/UL (ref 0.05–1.2)
MONOCYTES NFR BLD: 9 % (ref 3–10)
NEUTS SEG # BLD: 2.3 K/UL (ref 1.8–8)
NEUTS SEG NFR BLD: 41 % (ref 40–73)
NRBC # BLD: 0 K/UL (ref 0–0.01)
NRBC BLD-RTO: 0 PER 100 WBC
PLATELET # BLD AUTO: 161 K/UL (ref 135–420)
PMV BLD AUTO: 11.9 FL (ref 9.2–11.8)
POTASSIUM SERPL-SCNC: 3.7 MMOL/L (ref 3.5–5.5)
RBC # BLD AUTO: 3.24 M/UL (ref 4.2–5.3)
SERVICE CMNT-IMP: NORMAL
SODIUM SERPL-SCNC: 141 MMOL/L (ref 136–145)
WBC # BLD AUTO: 5.5 K/UL (ref 4.6–13.2)

## 2024-07-06 PROCEDURE — 94761 N-INVAS EAR/PLS OXIMETRY MLT: CPT

## 2024-07-06 PROCEDURE — 85025 COMPLETE CBC W/AUTO DIFF WBC: CPT

## 2024-07-06 PROCEDURE — 6370000000 HC RX 637 (ALT 250 FOR IP): Performed by: INTERNAL MEDICINE

## 2024-07-06 PROCEDURE — 82962 GLUCOSE BLOOD TEST: CPT

## 2024-07-06 PROCEDURE — 36415 COLL VENOUS BLD VENIPUNCTURE: CPT

## 2024-07-06 PROCEDURE — 99232 SBSQ HOSP IP/OBS MODERATE 35: CPT | Performed by: INTERNAL MEDICINE

## 2024-07-06 PROCEDURE — 6370000000 HC RX 637 (ALT 250 FOR IP): Performed by: HOSPITALIST

## 2024-07-06 PROCEDURE — 2580000003 HC RX 258: Performed by: INTERNAL MEDICINE

## 2024-07-06 PROCEDURE — 83735 ASSAY OF MAGNESIUM: CPT

## 2024-07-06 PROCEDURE — 99232 SBSQ HOSP IP/OBS MODERATE 35: CPT | Performed by: HOSPITALIST

## 2024-07-06 PROCEDURE — 6370000000 HC RX 637 (ALT 250 FOR IP): Performed by: FAMILY MEDICINE

## 2024-07-06 PROCEDURE — 80048 BASIC METABOLIC PNL TOTAL CA: CPT

## 2024-07-06 PROCEDURE — 1100000000 HC RM PRIVATE

## 2024-07-06 PROCEDURE — 6370000000 HC RX 637 (ALT 250 FOR IP): Performed by: PHYSICIAN ASSISTANT

## 2024-07-06 RX ORDER — CLOPIDOGREL BISULFATE 75 MG/1
75 TABLET ORAL DAILY
Status: DISCONTINUED | OUTPATIENT
Start: 2024-07-06 | End: 2024-07-09 | Stop reason: HOSPADM

## 2024-07-06 RX ADMIN — ASPIRIN 81 MG CHEWABLE TABLET 81 MG: 81 TABLET CHEWABLE at 08:52

## 2024-07-06 RX ADMIN — FLUTICASONE PROPIONATE 1 SPRAY: 50 SPRAY, METERED NASAL at 08:53

## 2024-07-06 RX ADMIN — METOPROLOL TARTRATE 12.5 MG: 25 TABLET, FILM COATED ORAL at 21:10

## 2024-07-06 RX ADMIN — Medication 100 MG: at 08:52

## 2024-07-06 RX ADMIN — SODIUM CHLORIDE, PRESERVATIVE FREE 10 ML: 5 INJECTION INTRAVENOUS at 08:53

## 2024-07-06 RX ADMIN — CLOPIDOGREL BISULFATE 75 MG: 75 TABLET ORAL at 09:11

## 2024-07-06 RX ADMIN — SODIUM CHLORIDE, PRESERVATIVE FREE 10 ML: 5 INJECTION INTRAVENOUS at 21:11

## 2024-07-06 RX ADMIN — ISOSORBIDE MONONITRATE 30 MG: 30 TABLET, EXTENDED RELEASE ORAL at 08:52

## 2024-07-06 RX ADMIN — LEVOTHYROXINE SODIUM 137 MCG: 25 TABLET ORAL at 06:53

## 2024-07-06 RX ADMIN — POLYETHYLENE GLYCOL 3350 17 G: 17 POWDER, FOR SOLUTION ORAL at 08:52

## 2024-07-06 RX ADMIN — METOPROLOL TARTRATE 12.5 MG: 25 TABLET, FILM COATED ORAL at 08:52

## 2024-07-06 RX ADMIN — RANOLAZINE 500 MG: 500 TABLET, EXTENDED RELEASE ORAL at 08:52

## 2024-07-06 RX ADMIN — RANOLAZINE 500 MG: 500 TABLET, EXTENDED RELEASE ORAL at 21:10

## 2024-07-06 RX ADMIN — SENNOSIDES 8.6 MG: 8.6 TABLET, FILM COATED ORAL at 21:10

## 2024-07-06 RX ADMIN — SENNOSIDES 8.6 MG: 8.6 TABLET, FILM COATED ORAL at 08:52

## 2024-07-06 ASSESSMENT — PAIN SCALES - GENERAL
PAINLEVEL_OUTOF10: 0

## 2024-07-07 LAB
ALBUMIN SERPL-MCNC: 2.4 G/DL (ref 3.4–5)
ALBUMIN/GLOB SERPL: 0.6 (ref 0.8–1.7)
ALP SERPL-CCNC: 58 U/L (ref 45–117)
ALT SERPL-CCNC: 15 U/L (ref 13–56)
ANION GAP SERPL CALC-SCNC: 4 MMOL/L (ref 3–18)
AST SERPL-CCNC: 21 U/L (ref 10–38)
BILIRUB DIRECT SERPL-MCNC: <0.1 MG/DL (ref 0–0.2)
BILIRUB SERPL-MCNC: 0.5 MG/DL (ref 0.2–1)
BUN SERPL-MCNC: 12 MG/DL (ref 7–18)
BUN/CREAT SERPL: 16 (ref 12–20)
CALCIUM SERPL-MCNC: 8.7 MG/DL (ref 8.5–10.1)
CHLORIDE SERPL-SCNC: 108 MMOL/L (ref 100–111)
CO2 SERPL-SCNC: 26 MMOL/L (ref 21–32)
CREAT SERPL-MCNC: 0.75 MG/DL (ref 0.6–1.3)
ERYTHROCYTE [DISTWIDTH] IN BLOOD BY AUTOMATED COUNT: 11.9 % (ref 11.6–14.5)
GLOBULIN SER CALC-MCNC: 4.2 G/DL (ref 2–4)
GLUCOSE BLD STRIP.AUTO-MCNC: 130 MG/DL (ref 70–110)
GLUCOSE BLD STRIP.AUTO-MCNC: 152 MG/DL (ref 70–110)
GLUCOSE BLD STRIP.AUTO-MCNC: 196 MG/DL (ref 70–110)
GLUCOSE BLD STRIP.AUTO-MCNC: 205 MG/DL (ref 70–110)
GLUCOSE SERPL-MCNC: 190 MG/DL (ref 74–99)
HCT VFR BLD AUTO: 31.3 % (ref 35–45)
HGB BLD-MCNC: 9.9 G/DL (ref 12–16)
MCH RBC QN AUTO: 32.9 PG (ref 24–34)
MCHC RBC AUTO-ENTMCNC: 31.6 G/DL (ref 31–37)
MCV RBC AUTO: 104 FL (ref 78–100)
NRBC # BLD: 0 K/UL (ref 0–0.01)
NRBC BLD-RTO: 0 PER 100 WBC
PLATELET # BLD AUTO: 139 K/UL (ref 135–420)
PMV BLD AUTO: 11.6 FL (ref 9.2–11.8)
POTASSIUM SERPL-SCNC: 3.7 MMOL/L (ref 3.5–5.5)
PROT SERPL-MCNC: 6.6 G/DL (ref 6.4–8.2)
RBC # BLD AUTO: 3.01 M/UL (ref 4.2–5.3)
SODIUM SERPL-SCNC: 138 MMOL/L (ref 136–145)
WBC # BLD AUTO: 5.2 K/UL (ref 4.6–13.2)

## 2024-07-07 PROCEDURE — 80048 BASIC METABOLIC PNL TOTAL CA: CPT

## 2024-07-07 PROCEDURE — 6370000000 HC RX 637 (ALT 250 FOR IP): Performed by: HOSPITALIST

## 2024-07-07 PROCEDURE — 6370000000 HC RX 637 (ALT 250 FOR IP): Performed by: PHYSICIAN ASSISTANT

## 2024-07-07 PROCEDURE — 6370000000 HC RX 637 (ALT 250 FOR IP): Performed by: FAMILY MEDICINE

## 2024-07-07 PROCEDURE — 6370000000 HC RX 637 (ALT 250 FOR IP): Performed by: INTERNAL MEDICINE

## 2024-07-07 PROCEDURE — 82962 GLUCOSE BLOOD TEST: CPT

## 2024-07-07 PROCEDURE — 80076 HEPATIC FUNCTION PANEL: CPT

## 2024-07-07 PROCEDURE — 36415 COLL VENOUS BLD VENIPUNCTURE: CPT

## 2024-07-07 PROCEDURE — 2580000003 HC RX 258: Performed by: INTERNAL MEDICINE

## 2024-07-07 PROCEDURE — 99232 SBSQ HOSP IP/OBS MODERATE 35: CPT | Performed by: HOSPITALIST

## 2024-07-07 PROCEDURE — 85027 COMPLETE CBC AUTOMATED: CPT

## 2024-07-07 PROCEDURE — 99232 SBSQ HOSP IP/OBS MODERATE 35: CPT | Performed by: INTERNAL MEDICINE

## 2024-07-07 PROCEDURE — 1100000000 HC RM PRIVATE

## 2024-07-07 PROCEDURE — APPNB45 APP NON BILLABLE 31-45 MINUTES: Performed by: NURSE PRACTITIONER

## 2024-07-07 PROCEDURE — 94761 N-INVAS EAR/PLS OXIMETRY MLT: CPT

## 2024-07-07 RX ADMIN — SODIUM CHLORIDE, PRESERVATIVE FREE 10 ML: 5 INJECTION INTRAVENOUS at 20:32

## 2024-07-07 RX ADMIN — INSULIN LISPRO 1 UNITS: 100 INJECTION, SOLUTION INTRAVENOUS; SUBCUTANEOUS at 16:45

## 2024-07-07 RX ADMIN — Medication 100 MG: at 08:46

## 2024-07-07 RX ADMIN — SENNOSIDES 8.6 MG: 8.6 TABLET, FILM COATED ORAL at 20:29

## 2024-07-07 RX ADMIN — METOPROLOL TARTRATE 25 MG: 25 TABLET, FILM COATED ORAL at 20:29

## 2024-07-07 RX ADMIN — METOPROLOL TARTRATE 12.5 MG: 25 TABLET, FILM COATED ORAL at 08:49

## 2024-07-07 RX ADMIN — RANOLAZINE 500 MG: 500 TABLET, EXTENDED RELEASE ORAL at 20:29

## 2024-07-07 RX ADMIN — RANOLAZINE 500 MG: 500 TABLET, EXTENDED RELEASE ORAL at 08:47

## 2024-07-07 RX ADMIN — ASPIRIN 81 MG CHEWABLE TABLET 81 MG: 81 TABLET CHEWABLE at 08:47

## 2024-07-07 RX ADMIN — CLOPIDOGREL BISULFATE 75 MG: 75 TABLET ORAL at 08:49

## 2024-07-07 RX ADMIN — LEVOTHYROXINE SODIUM 137 MCG: 25 TABLET ORAL at 06:37

## 2024-07-07 RX ADMIN — FLUTICASONE PROPIONATE 1 SPRAY: 50 SPRAY, METERED NASAL at 08:50

## 2024-07-07 RX ADMIN — SODIUM CHLORIDE, PRESERVATIVE FREE 10 ML: 5 INJECTION INTRAVENOUS at 08:49

## 2024-07-07 RX ADMIN — ISOSORBIDE MONONITRATE 30 MG: 30 TABLET, EXTENDED RELEASE ORAL at 08:46

## 2024-07-07 ASSESSMENT — PAIN SCALES - GENERAL
PAINLEVEL_OUTOF10: 0

## 2024-07-08 PROBLEM — Z16.12 ESBL (EXTENDED SPECTRUM BETA-LACTAMASE) PRODUCING BACTERIA INFECTION: Status: ACTIVE | Noted: 2024-07-08

## 2024-07-08 PROBLEM — A49.9 ESBL (EXTENDED SPECTRUM BETA-LACTAMASE) PRODUCING BACTERIA INFECTION: Status: ACTIVE | Noted: 2024-07-08

## 2024-07-08 PROBLEM — N30.90 CYSTITIS: Status: ACTIVE | Noted: 2024-07-08

## 2024-07-08 LAB
ACT BLD: 281 SECS (ref 79–138)
ANION GAP SERPL CALC-SCNC: 6 MMOL/L (ref 3–18)
BASOPHILS # BLD: 0 K/UL (ref 0–0.1)
BASOPHILS NFR BLD: 1 % (ref 0–2)
BUN SERPL-MCNC: 7 MG/DL (ref 7–18)
BUN/CREAT SERPL: 12 (ref 12–20)
CALCIUM SERPL-MCNC: 8.9 MG/DL (ref 8.5–10.1)
CHLORIDE SERPL-SCNC: 107 MMOL/L (ref 100–111)
CO2 SERPL-SCNC: 27 MMOL/L (ref 21–32)
CREAT SERPL-MCNC: 0.57 MG/DL (ref 0.6–1.3)
DIFFERENTIAL METHOD BLD: ABNORMAL
EOSINOPHIL # BLD: 0.2 K/UL (ref 0–0.4)
EOSINOPHIL NFR BLD: 4 % (ref 0–5)
ERYTHROCYTE [DISTWIDTH] IN BLOOD BY AUTOMATED COUNT: 11.9 % (ref 11.6–14.5)
GLUCOSE BLD STRIP.AUTO-MCNC: 117 MG/DL (ref 70–110)
GLUCOSE BLD STRIP.AUTO-MCNC: 138 MG/DL (ref 70–110)
GLUCOSE BLD STRIP.AUTO-MCNC: 140 MG/DL (ref 70–110)
GLUCOSE BLD STRIP.AUTO-MCNC: 295 MG/DL (ref 70–110)
GLUCOSE SERPL-MCNC: 138 MG/DL (ref 74–99)
HCT VFR BLD AUTO: 30.6 % (ref 35–45)
HGB BLD-MCNC: 9.9 G/DL (ref 12–16)
IMM GRANULOCYTES # BLD AUTO: 0 K/UL (ref 0–0.04)
IMM GRANULOCYTES NFR BLD AUTO: 0 % (ref 0–0.5)
LYMPHOCYTES # BLD: 2.1 K/UL (ref 0.9–3.6)
LYMPHOCYTES NFR BLD: 39 % (ref 21–52)
MAGNESIUM SERPL-MCNC: 1.8 MG/DL (ref 1.6–2.6)
MCH RBC QN AUTO: 33 PG (ref 24–34)
MCHC RBC AUTO-ENTMCNC: 32.4 G/DL (ref 31–37)
MCV RBC AUTO: 102 FL (ref 78–100)
MONOCYTES # BLD: 0.5 K/UL (ref 0.05–1.2)
MONOCYTES NFR BLD: 9 % (ref 3–10)
NEUTS SEG # BLD: 2.5 K/UL (ref 1.8–8)
NEUTS SEG NFR BLD: 47 % (ref 40–73)
NRBC # BLD: 0 K/UL (ref 0–0.01)
NRBC BLD-RTO: 0 PER 100 WBC
PLATELET # BLD AUTO: 168 K/UL (ref 135–420)
PMV BLD AUTO: 11.5 FL (ref 9.2–11.8)
POTASSIUM SERPL-SCNC: 3.5 MMOL/L (ref 3.5–5.5)
RBC # BLD AUTO: 3 M/UL (ref 4.2–5.3)
SODIUM SERPL-SCNC: 140 MMOL/L (ref 136–145)
WBC # BLD AUTO: 5.3 K/UL (ref 4.6–13.2)

## 2024-07-08 PROCEDURE — 36415 COLL VENOUS BLD VENIPUNCTURE: CPT

## 2024-07-08 PROCEDURE — 80048 BASIC METABOLIC PNL TOTAL CA: CPT

## 2024-07-08 PROCEDURE — B2111ZZ FLUOROSCOPY OF MULTIPLE CORONARY ARTERIES USING LOW OSMOLAR CONTRAST: ICD-10-PCS | Performed by: INTERNAL MEDICINE

## 2024-07-08 PROCEDURE — C1769 GUIDE WIRE: HCPCS | Performed by: INTERNAL MEDICINE

## 2024-07-08 PROCEDURE — 6360000002 HC RX W HCPCS: Performed by: INTERNAL MEDICINE

## 2024-07-08 PROCEDURE — 85347 COAGULATION TIME ACTIVATED: CPT | Performed by: INTERNAL MEDICINE

## 2024-07-08 PROCEDURE — 99153 MOD SED SAME PHYS/QHP EA: CPT | Performed by: INTERNAL MEDICINE

## 2024-07-08 PROCEDURE — 6370000000 HC RX 637 (ALT 250 FOR IP): Performed by: HOSPITALIST

## 2024-07-08 PROCEDURE — 2580000003 HC RX 258: Performed by: INTERNAL MEDICINE

## 2024-07-08 PROCEDURE — 2709999900 HC NON-CHARGEABLE SUPPLY: Performed by: INTERNAL MEDICINE

## 2024-07-08 PROCEDURE — 6370000000 HC RX 637 (ALT 250 FOR IP): Performed by: FAMILY MEDICINE

## 2024-07-08 PROCEDURE — 82962 GLUCOSE BLOOD TEST: CPT

## 2024-07-08 PROCEDURE — C1760 CLOSURE DEV, VASC: HCPCS | Performed by: INTERNAL MEDICINE

## 2024-07-08 PROCEDURE — 85025 COMPLETE CBC W/AUTO DIFF WBC: CPT

## 2024-07-08 PROCEDURE — C1887 CATHETER, GUIDING: HCPCS | Performed by: INTERNAL MEDICINE

## 2024-07-08 PROCEDURE — 99232 SBSQ HOSP IP/OBS MODERATE 35: CPT | Performed by: HOSPITALIST

## 2024-07-08 PROCEDURE — 83735 ASSAY OF MAGNESIUM: CPT

## 2024-07-08 PROCEDURE — 6370000000 HC RX 637 (ALT 250 FOR IP): Performed by: PHYSICIAN ASSISTANT

## 2024-07-08 PROCEDURE — 02703DZ DILATION OF CORONARY ARTERY, ONE ARTERY WITH INTRALUMINAL DEVICE, PERCUTANEOUS APPROACH: ICD-10-PCS | Performed by: INTERNAL MEDICINE

## 2024-07-08 PROCEDURE — 85347 COAGULATION TIME ACTIVATED: CPT

## 2024-07-08 PROCEDURE — C1894 INTRO/SHEATH, NON-LASER: HCPCS | Performed by: INTERNAL MEDICINE

## 2024-07-08 PROCEDURE — C1725 CATH, TRANSLUMIN NON-LASER: HCPCS | Performed by: INTERNAL MEDICINE

## 2024-07-08 PROCEDURE — 6360000004 HC RX CONTRAST MEDICATION: Performed by: INTERNAL MEDICINE

## 2024-07-08 PROCEDURE — 4A023N7 MEASUREMENT OF CARDIAC SAMPLING AND PRESSURE, LEFT HEART, PERCUTANEOUS APPROACH: ICD-10-PCS | Performed by: INTERNAL MEDICINE

## 2024-07-08 PROCEDURE — 1100000000 HC RM PRIVATE

## 2024-07-08 PROCEDURE — 99152 MOD SED SAME PHYS/QHP 5/>YRS: CPT | Performed by: INTERNAL MEDICINE

## 2024-07-08 PROCEDURE — 6370000000 HC RX 637 (ALT 250 FOR IP): Performed by: INTERNAL MEDICINE

## 2024-07-08 PROCEDURE — 92978 ENDOLUMINL IVUS OCT C 1ST: CPT | Performed by: INTERNAL MEDICINE

## 2024-07-08 PROCEDURE — B240ZZ3 ULTRASONOGRAPHY OF SINGLE CORONARY ARTERY, INTRAVASCULAR: ICD-10-PCS | Performed by: INTERNAL MEDICINE

## 2024-07-08 PROCEDURE — C1713 ANCHOR/SCREW BN/BN,TIS/BN: HCPCS | Performed by: INTERNAL MEDICINE

## 2024-07-08 PROCEDURE — 76937 US GUIDE VASCULAR ACCESS: CPT | Performed by: INTERNAL MEDICINE

## 2024-07-08 PROCEDURE — 92921 HC PRQ CARDIAC ANGIO ADDL ART: CPT | Performed by: INTERNAL MEDICINE

## 2024-07-08 PROCEDURE — 76000 FLUOROSCOPY <1 HR PHYS/QHP: CPT | Performed by: INTERNAL MEDICINE

## 2024-07-08 PROCEDURE — C1753 CATH, INTRAVAS ULTRASOUND: HCPCS | Performed by: INTERNAL MEDICINE

## 2024-07-08 PROCEDURE — 2500000003 HC RX 250 WO HCPCS: Performed by: INTERNAL MEDICINE

## 2024-07-08 RX ORDER — MIDAZOLAM HYDROCHLORIDE 1 MG/ML
INJECTION INTRAMUSCULAR; INTRAVENOUS PRN
Status: DISCONTINUED | OUTPATIENT
Start: 2024-07-08 | End: 2024-07-08 | Stop reason: HOSPADM

## 2024-07-08 RX ORDER — DIPHENHYDRAMINE HYDROCHLORIDE 50 MG/ML
INJECTION INTRAMUSCULAR; INTRAVENOUS PRN
Status: DISCONTINUED | OUTPATIENT
Start: 2024-07-08 | End: 2024-07-08 | Stop reason: HOSPADM

## 2024-07-08 RX ORDER — HEPARIN SODIUM 1000 [USP'U]/ML
INJECTION, SOLUTION INTRAVENOUS; SUBCUTANEOUS PRN
Status: DISCONTINUED | OUTPATIENT
Start: 2024-07-08 | End: 2024-07-08 | Stop reason: HOSPADM

## 2024-07-08 RX ORDER — SODIUM CHLORIDE 0.9 % (FLUSH) 0.9 %
5-40 SYRINGE (ML) INJECTION EVERY 12 HOURS SCHEDULED
OUTPATIENT
Start: 2024-07-08

## 2024-07-08 RX ORDER — SODIUM CHLORIDE 0.9 % (FLUSH) 0.9 %
5-40 SYRINGE (ML) INJECTION PRN
OUTPATIENT
Start: 2024-07-08

## 2024-07-08 RX ORDER — SODIUM CHLORIDE 9 MG/ML
INJECTION, SOLUTION INTRAVENOUS PRN
OUTPATIENT
Start: 2024-07-08

## 2024-07-08 RX ADMIN — SENNOSIDES 8.6 MG: 8.6 TABLET, FILM COATED ORAL at 21:47

## 2024-07-08 RX ADMIN — ISOSORBIDE MONONITRATE 30 MG: 30 TABLET, EXTENDED RELEASE ORAL at 08:56

## 2024-07-08 RX ADMIN — SODIUM CHLORIDE, PRESERVATIVE FREE 10 ML: 5 INJECTION INTRAVENOUS at 21:50

## 2024-07-08 RX ADMIN — METOPROLOL TARTRATE 25 MG: 25 TABLET, FILM COATED ORAL at 08:57

## 2024-07-08 RX ADMIN — METOPROLOL TARTRATE 25 MG: 25 TABLET, FILM COATED ORAL at 21:49

## 2024-07-08 RX ADMIN — ASPIRIN 81 MG CHEWABLE TABLET 81 MG: 81 TABLET CHEWABLE at 13:07

## 2024-07-08 RX ADMIN — Medication 100 MG: at 08:57

## 2024-07-08 RX ADMIN — RANOLAZINE 500 MG: 500 TABLET, EXTENDED RELEASE ORAL at 21:48

## 2024-07-08 RX ADMIN — FLUTICASONE PROPIONATE 1 SPRAY: 50 SPRAY, METERED NASAL at 08:59

## 2024-07-08 RX ADMIN — SENNOSIDES 8.6 MG: 8.6 TABLET, FILM COATED ORAL at 08:57

## 2024-07-08 RX ADMIN — SODIUM CHLORIDE, PRESERVATIVE FREE 10 ML: 5 INJECTION INTRAVENOUS at 08:57

## 2024-07-08 RX ADMIN — MECLIZINE 25 MG: 12.5 TABLET ORAL at 21:46

## 2024-07-08 RX ADMIN — RANOLAZINE 500 MG: 500 TABLET, EXTENDED RELEASE ORAL at 08:57

## 2024-07-08 ASSESSMENT — PAIN SCALES - GENERAL
PAINLEVEL_OUTOF10: 0

## 2024-07-08 NOTE — CONSULTS
[facility] - Consult Note    Date of  Admission: 6/26/2024  8:32 PM   Primary Care Physician:  Trent Talamantes MD       Assessment:     Patient Active Problem List    Diagnosis Date Noted    Paroxysmal atrial fibrillation ([facility]) 02/21/2023    Hypertension 02/21/2023    History of DVT (deep vein thrombosis) 02/21/2023    Cerebrovascular accident (CVA) ([facility]) 02/21/2023    Dizziness 02/20/2023    Atrial fibrillation ([facility]) 06/27/2024    New onset a-fib ([facility]) 06/27/2024    Atherosclerosis of native coronary artery of native heart with stable angina pectoris ([facility])     Polymyalgia rheumatica ([facility]) 03/01/2021    Statin intolerance 03/01/2021    Hyperlipidemia associated with type 2 diabetes mellitus ([facility])     Hypothyroidism     Chronic heart failure with preserved ejection fraction ([facility])     Generalized weakness 01/18/2021    Tachycardia, paroxysmal ([facility]) 01/18/2021    CHF (congestive heart failure) ([facility]) 01/08/2021    Type 2 diabetes with nephropathy ([facility]) 01/13/2020    Deep vein thrombosis (DVT) of popliteal vein of left lower extremity ([facility]) 09/08/2018    DJD (degenerative joint disease)     History of non-ST elevation myocardial infarction (NSTEMI) 05/28/2016    Seasonal and perennial allergic rhinitis 07/30/2013    Type 2 diabetes mellitus with hyperglycemia, with long-term current use of insulin ([facility]) 12/04/2012    Chronic back pain 04/20/2012    Diabetic neuropathy ([facility]) 04/20/2012    Chronic neck pain 04/20/2012    Vertigo 04/20/2012    Anxiety 02/08/2011    Noncompliance with medication regimen 02/08/2011    Hypertensive heart disease with heart failure ([facility]) 05/20/2010    Esophageal reflux 05/20/2010    Asthma 05/20/2010       IMPRESSION  Paroxysmal atrial fibrillation  NSTEMI likely type II in context afib RVR, chest x-ray 6/26/2024 no acute process  Hypertension - Normotensive to Stage II pressure  Hyperlipidemia  Chronic heart failure preserved ejection fraction  Coronary risk equivalent diabetes 
Food in the Last Year: Never true     Ran Out of Food in the Last Year: Never true   Transportation Needs: No Transportation Needs (7/3/2024)    PRAPARE - Transportation     Lack of Transportation (Medical): No     Lack of Transportation (Non-Medical): No   Physical Activity: Not on file   Stress: Not on file   Social Connections: Feeling Socially Integrated (3/20/2023)    OASIS : Social Isolation     Frequency of experiencing loneliness or isolation: Never   Intimate Partner Violence: Not on file   Housing Stability: Low Risk  (7/3/2024)    Housing Stability Vital Sign     Unable to Pay for Housing in the Last Year: No     Number of Places Lived in the Last Year: 1     Unstable Housing in the Last Year: No     Social History     Tobacco Use   Smoking Status Former    Current packs/day: 0.00    Types: Cigarettes    Quit date: 1980    Years since quittin.2   Smokeless Tobacco Never        Temp (24hrs), Av °F (36.7 °C), Min:97.4 °F (36.3 °C), Max:98.5 °F (36.9 °C)    BP (!) 167/84   Pulse 60   Temp 97.9 °F (36.6 °C) (Oral)   Resp 18   Ht 1.702 m (5' 7\")   Wt 97.1 kg (214 lb)   LMP  (LMP Unknown)   SpO2 97%   BMI 33.52 kg/m²     ROS: 12 point ROS obtained in details. Pertinent positives as mentioned in HPI,   otherwise negative    Physical Exam:    General: Well developed, well nourished female laying on the bed AAOx3 in no acute distress.    General:   awake alert and oriented   HEENT:  Normocephalic, atraumatic, EOMI, no scleral icterus or pallor; no conjunctival hemmohage;  nasal and oral mucous are moist and without evidence of lesions.    Lymph Nodes:   no cervical, axillary or inguinal adenopathy   Lungs:   non-labored, bilaterally clear to auscultation- no crackles wheezes rales or rhonchi   Heart:  RRR, s1 and s2; no  rubs or gallops, no edema, + pedal pulses   Abdomen:  soft, non-distended, active bowel sounds, no hepatomegaly, no splenomegaly. Non-tender   Genitourinary:  deferred 
        Cultures Invalid input(s): \"CULT\"  [unfilled]      Urinalysis Potassium   Date Value Ref Range Status   07/02/2024 3.9 3.5 - 5.5 mmol/L Final     Comment:     SLIGHTLY HEMOLYZED SPECIMEN     BUN   Date Value Ref Range Status   07/02/2024 18 7.0 - 18 MG/DL Final      PSA No results for input(s): \"PSA\" in the last 72 hours.   Coagulation Lab Results   Component Value Date/Time    INR 1.0 06/26/2024 08:50 PM    INR 1.0 09/09/2022 03:16 PM    APTT 58.6 07/02/2024 05:52 AM    APTT 76.7 07/01/2024 09:35 PM    APTT 33.0 11/21/2021 01:50 PM    APTT 31.8 05/31/2021 02:18 PM           US Results (most recent):  @Rapid VocabularyT(BNN5975:1)@       chest    CT Results (most recent):   @Rapid VocabularyT(NAX3714:1)@       ABD      MRI Results (most recent):  @Mobile AuthenticationIMGCAT(IMGxxxxx:1)@                                        1. Atrial fibrillation, unspecified type (HCC)    2. Atrial fibrillation, new onset (HCC)    3. Myocardial ischemia    4. Non-ST elevation MI (NSTEMI) ([facility])    5. Chronic heart failure with preserved ejection fraction (HCC)    6. Chronic combined systolic and diastolic congestive heart failure ([facility])    7. NSTEMI (non-ST elevated myocardial infarction) ([facility])           
throughout the colon. Air is seen in nondistended small  bowel loops. There are numerous calcified phleboliths in the pelvis. Status post  cholecystectomy. Left hip prosthesis present.    Impression  Nonobstructive bowel gas pattern.        Electronically signed by Cristino Campbell      PLEASE NOTE:  This document has been produced using voice recognition software. Unrecognized errors in transcription may be present.    NOTE TO PATIENT:  The purpose of this note is to communicate optimally with the other providers involved in your care.  It is written using standard medical terminology.  If you have questions regarding details of the note please call my office at [phone] and make an appointment to discuss your concerns.

## 2024-07-09 VITALS
OXYGEN SATURATION: 100 % | WEIGHT: 214 LBS | HEART RATE: 90 BPM | SYSTOLIC BLOOD PRESSURE: 155 MMHG | RESPIRATION RATE: 20 BRPM | TEMPERATURE: 98.1 F | BODY MASS INDEX: 33.59 KG/M2 | HEIGHT: 67 IN | DIASTOLIC BLOOD PRESSURE: 81 MMHG

## 2024-07-09 PROBLEM — N39.0 URINARY TRACT INFECTION WITHOUT HEMATURIA: Status: RESOLVED | Noted: 2024-07-05 | Resolved: 2024-07-09

## 2024-07-09 PROBLEM — N30.90 CYSTITIS: Status: RESOLVED | Noted: 2024-07-08 | Resolved: 2024-07-09

## 2024-07-09 PROBLEM — R31.9 HEMATURIA: Status: RESOLVED | Noted: 2024-07-02 | Resolved: 2024-07-09

## 2024-07-09 LAB
ECHO BSA: 2.14 M2
GLUCOSE BLD STRIP.AUTO-MCNC: 227 MG/DL (ref 70–110)
GLUCOSE BLD STRIP.AUTO-MCNC: 248 MG/DL (ref 70–110)
GLUCOSE BLD STRIP.AUTO-MCNC: 283 MG/DL (ref 70–110)
GLUCOSE BLD STRIP.AUTO-MCNC: 307 MG/DL (ref 70–110)

## 2024-07-09 PROCEDURE — 6370000000 HC RX 637 (ALT 250 FOR IP): Performed by: INTERNAL MEDICINE

## 2024-07-09 PROCEDURE — 99239 HOSP IP/OBS DSCHRG MGMT >30: CPT | Performed by: HOSPITALIST

## 2024-07-09 PROCEDURE — 82962 GLUCOSE BLOOD TEST: CPT

## 2024-07-09 PROCEDURE — 2580000003 HC RX 258: Performed by: INTERNAL MEDICINE

## 2024-07-09 PROCEDURE — 6360000002 HC RX W HCPCS: Performed by: INTERNAL MEDICINE

## 2024-07-09 PROCEDURE — 6370000000 HC RX 637 (ALT 250 FOR IP): Performed by: HOSPITALIST

## 2024-07-09 PROCEDURE — 94761 N-INVAS EAR/PLS OXIMETRY MLT: CPT

## 2024-07-09 RX ORDER — SENNOSIDES A AND B 8.6 MG/1
1 TABLET, FILM COATED ORAL 2 TIMES DAILY
Qty: 60 TABLET | Refills: 0 | Status: SHIPPED | OUTPATIENT
Start: 2024-07-09 | End: 2024-08-08

## 2024-07-09 RX ORDER — ASPIRIN 81 MG/1
81 TABLET, CHEWABLE ORAL DAILY
Qty: 30 TABLET | Refills: 3 | Status: SHIPPED | OUTPATIENT
Start: 2024-07-09

## 2024-07-09 RX ORDER — POLYETHYLENE GLYCOL 3350 17 G/17G
17 POWDER, FOR SOLUTION ORAL DAILY
Qty: 30 PACKET | Refills: 0 | Status: SHIPPED | OUTPATIENT
Start: 2024-07-10 | End: 2024-08-09

## 2024-07-09 RX ORDER — FUROSEMIDE 20 MG/1
20 TABLET ORAL DAILY
Qty: 60 TABLET | Refills: 0 | Status: SHIPPED | OUTPATIENT
Start: 2024-07-09

## 2024-07-09 RX ORDER — ASPIRIN 81 MG/1
81 TABLET, CHEWABLE ORAL DAILY
Status: DISCONTINUED | OUTPATIENT
Start: 2024-07-09 | End: 2024-07-09 | Stop reason: HOSPADM

## 2024-07-09 RX ORDER — INSULIN GLARGINE 100 [IU]/ML
15 INJECTION, SOLUTION SUBCUTANEOUS 2 TIMES DAILY
Qty: 10 ML | Refills: 0
Start: 2024-07-09

## 2024-07-09 RX ADMIN — INSULIN LISPRO 3 UNITS: 100 INJECTION, SOLUTION INTRAVENOUS; SUBCUTANEOUS at 09:05

## 2024-07-09 RX ADMIN — INSULIN LISPRO 1 UNITS: 100 INJECTION, SOLUTION INTRAVENOUS; SUBCUTANEOUS at 16:45

## 2024-07-09 RX ADMIN — POLYETHYLENE GLYCOL 3350 17 G: 17 POWDER, FOR SOLUTION ORAL at 09:05

## 2024-07-09 RX ADMIN — INSULIN LISPRO 1 UNITS: 100 INJECTION, SOLUTION INTRAVENOUS; SUBCUTANEOUS at 13:20

## 2024-07-09 RX ADMIN — SODIUM CHLORIDE, PRESERVATIVE FREE 10 ML: 5 INJECTION INTRAVENOUS at 09:07

## 2024-07-09 RX ADMIN — ONDANSETRON 4 MG: 2 INJECTION INTRAMUSCULAR; INTRAVENOUS at 09:08

## 2024-07-09 ASSESSMENT — PAIN SCALES - GENERAL
PAINLEVEL_OUTOF10: 0
PAINLEVEL_OUTOF10: 0
PAINLEVEL_OUTOF10: 4
PAINLEVEL_OUTOF10: 0
PAINLEVEL_OUTOF10: 0
PAINLEVEL_OUTOF10: 4

## 2024-07-09 ASSESSMENT — PAIN DESCRIPTION - DESCRIPTORS: DESCRIPTORS: ACHING;DISCOMFORT

## 2024-07-09 ASSESSMENT — PAIN DESCRIPTION - ORIENTATION: ORIENTATION: LEFT

## 2024-07-09 ASSESSMENT — PAIN DESCRIPTION - LOCATION: LOCATION: ABDOMEN

## 2024-07-09 NOTE — PLAN OF CARE
Problem: Chronic Conditions and Co-morbidities  Goal: Patient's chronic conditions and co-morbidity symptoms are monitored and maintained or improved  6/30/2024 1024 by Sriram Mason RN  Outcome: Progressing  6/29/2024 2217 by Timoteo Palacios RN  Outcome: Progressing  Flowsheets (Taken 6/29/2024 2215)  Care Plan - Patient's Chronic Conditions and Co-Morbidity Symptoms are Monitored and Maintained or Improved:   Collaborate with multidisciplinary team to address chronic and comorbid conditions and prevent exacerbation or deterioration   Monitor and assess patient's chronic conditions and comorbid symptoms for stability, deterioration, or improvement   Update acute care plan with appropriate goals if chronic or comorbid symptoms are exacerbated and prevent overall improvement and discharge     Problem: Safety - Adult  Goal: Free from fall injury  6/30/2024 1024 by Sriram Mason RN  Outcome: Progressing  Flowsheets (Taken 6/29/2024 2222 by Timoteo Palacios RN)  Free From Fall Injury:   Instruct family/caregiver on patient safety   Based on caregiver fall risk screen, instruct family/caregiver to ask for assistance with transferring infant if caregiver noted to have fall risk factors  6/29/2024 2217 by Timoteo Palacios RN  Outcome: Progressing     Problem: Pain  Goal: Verbalizes/displays adequate comfort level or baseline comfort level  6/30/2024 1024 by Sriram Mason RN  Outcome: Progressing  Flowsheets  Taken 6/30/2024 0300 by Timoteo Palacios RN  Verbalizes/displays adequate comfort level or baseline comfort level:   Encourage patient to monitor pain and request assistance   Assess pain using appropriate pain scale   Administer analgesics based on type and severity of pain and evaluate response   Implement non-pharmacological measures as appropriate and evaluate response  Taken 6/30/2024 0100 by Timoteo Palacios RN  Verbalizes/displays adequate comfort level or baseline 
  Problem: Chronic Conditions and Co-morbidities  Goal: Patient's chronic conditions and co-morbidity symptoms are monitored and maintained or improved  7/1/2024 1140 by Lenny Kinsey RN  Outcome: Progressing  7/1/2024 0029 by Zack Webb RN  Outcome: Progressing     Problem: Safety - Adult  Goal: Free from fall injury  7/1/2024 1140 by Lenny Kinsey RN  Outcome: Progressing  7/1/2024 0029 by Zack Webb RN  Outcome: Progressing     Problem: Pain  Goal: Verbalizes/displays adequate comfort level or baseline comfort level  7/1/2024 1140 by Lenny Kinsey RN  Outcome: Progressing  7/1/2024 0029 by Zack Webb RN  Outcome: Progressing     Problem: Nutrition Deficit:  Goal: Optimize nutritional status  7/1/2024 1140 by Lenny Kinsey RN  Outcome: Progressing  7/1/2024 0029 by Zack Webb RN  Outcome: Progressing     Problem: Skin/Tissue Integrity  Goal: Absence of new skin breakdown  Description: 1.  Monitor for areas of redness and/or skin breakdown  2.  Assess vascular access sites hourly  3.  Every 4-6 hours minimum:  Change oxygen saturation probe site  4.  Every 4-6 hours:  If on nasal continuous positive airway pressure, respiratory therapy assess nares and determine need for appliance change or resting period.  7/1/2024 1140 by Lenny Kinsey RN  Outcome: Progressing  7/1/2024 0029 by Zack Webb RN  Outcome: Progressing     Problem: ABCDS Injury Assessment  Goal: Absence of physical injury  7/1/2024 1140 by Lenny Kinsey RN  Outcome: Progressing  7/1/2024 0029 by Zack Webb RN  Outcome: Progressing     
  Problem: Chronic Conditions and Co-morbidities  Goal: Patient's chronic conditions and co-morbidity symptoms are monitored and maintained or improved  7/6/2024 1056 by Dayna Cuba RN  Outcome: Progressing  Flowsheets (Taken 7/6/2024 0705)  Care Plan - Patient's Chronic Conditions and Co-Morbidity Symptoms are Monitored and Maintained or Improved: Collaborate with multidisciplinary team to address chronic and comorbid conditions and prevent exacerbation or deterioration  7/6/2024 0023 by Beulah Okeefe, RN  Outcome: Progressing  Flowsheets (Taken 7/5/2024 2005)  Care Plan - Patient's Chronic Conditions and Co-Morbidity Symptoms are Monitored and Maintained or Improved:   Monitor and assess patient's chronic conditions and comorbid symptoms for stability, deterioration, or improvement   Collaborate with multidisciplinary team to address chronic and comorbid conditions and prevent exacerbation or deterioration     Problem: Pain  Goal: Verbalizes/displays adequate comfort level or baseline comfort level  7/6/2024 1056 by Dayna Cuba, RN  Outcome: Progressing  7/6/2024 0023 by Beulah Okeefe, RN  Outcome: Progressing     
  Problem: Chronic Conditions and Co-morbidities  Goal: Patient's chronic conditions and co-morbidity symptoms are monitored and maintained or improved  7/7/2024 0009 by Nanette Justice RN  Outcome: Progressing  Flowsheets (Taken 7/6/2024 2100)  Care Plan - Patient's Chronic Conditions and Co-Morbidity Symptoms are Monitored and Maintained or Improved: Monitor and assess patient's chronic conditions and comorbid symptoms for stability, deterioration, or improvement  7/6/2024 1056 by Dayna uCba RN  Outcome: Progressing  Flowsheets (Taken 7/6/2024 0705)  Care Plan - Patient's Chronic Conditions and Co-Morbidity Symptoms are Monitored and Maintained or Improved: Collaborate with multidisciplinary team to address chronic and comorbid conditions and prevent exacerbation or deterioration     Problem: Safety - Adult  Goal: Free from fall injury  7/7/2024 0009 by Nanette Justice RN  Outcome: Progressing  7/6/2024 1056 by Dayna Cuba RN  Outcome: Progressing     Problem: Pain  Goal: Verbalizes/displays adequate comfort level or baseline comfort level  7/7/2024 0009 by Nanette Justice RN  Outcome: Progressing  7/6/2024 1056 by Dayna Cuba RN  Outcome: Progressing     Problem: Nutrition Deficit:  Goal: Optimize nutritional status  Outcome: Progressing     Problem: Skin/Tissue Integrity  Goal: Absence of new skin breakdown  Description: 1.  Monitor for areas of redness and/or skin breakdown  2.  Assess vascular access sites hourly  3.  Every 4-6 hours minimum:  Change oxygen saturation probe site  4.  Every 4-6 hours:  If on nasal continuous positive airway pressure, respiratory therapy assess nares and determine need for appliance change or resting period.  Outcome: Progressing     Problem: ABCDS Injury Assessment  Goal: Absence of physical injury  Outcome: Progressing     
  Problem: Chronic Conditions and Co-morbidities  Goal: Patient's chronic conditions and co-morbidity symptoms are monitored and maintained or improved  7/7/2024 0937 by Dayna Cuba RN  Outcome: Progressing  Flowsheets (Taken 7/7/2024 0705)  Care Plan - Patient's Chronic Conditions and Co-Morbidity Symptoms are Monitored and Maintained or Improved: Collaborate with multidisciplinary team to address chronic and comorbid conditions and prevent exacerbation or deterioration  7/7/2024 0009 by Nanette Justice RN  Outcome: Progressing  Flowsheets (Taken 7/6/2024 2100)  Care Plan - Patient's Chronic Conditions and Co-Morbidity Symptoms are Monitored and Maintained or Improved: Monitor and assess patient's chronic conditions and comorbid symptoms for stability, deterioration, or improvement     Problem: Safety - Adult  Goal: Free from fall injury  7/7/2024 0937 by Dayna Cuba, RN  Outcome: Progressing  7/7/2024 0009 by Nanette Justice RN  Outcome: Progressing     Problem: Pain  Goal: Verbalizes/displays adequate comfort level or baseline comfort level  7/7/2024 0009 by Nanette Justice, RN  Outcome: Progressing     
  Problem: Chronic Conditions and Co-morbidities  Goal: Patient's chronic conditions and co-morbidity symptoms are monitored and maintained or improved  7/8/2024 0902 by Nalini Schwartz RN  Outcome: Progressing  7/8/2024 0118 by Nanette Justice RN  Outcome: Progressing     Problem: Safety - Adult  Goal: Free from fall injury  7/8/2024 0902 by Nalini Schwartz RN  Outcome: Progressing  7/8/2024 0118 by Nanette Justice RN  Outcome: Progressing     Problem: Pain  Goal: Verbalizes/displays adequate comfort level or baseline comfort level  7/8/2024 0902 by Nalini Schwartz RN  Outcome: Progressing  7/8/2024 0118 by Nanette Justice RN  Outcome: Progressing     Problem: Nutrition Deficit:  Goal: Optimize nutritional status  7/8/2024 0118 by Nanette Justice RN  Outcome: Progressing     Problem: Skin/Tissue Integrity  Goal: Absence of new skin breakdown  Description: 1.  Monitor for areas of redness and/or skin breakdown  2.  Assess vascular access sites hourly  3.  Every 4-6 hours minimum:  Change oxygen saturation probe site  4.  Every 4-6 hours:  If on nasal continuous positive airway pressure, respiratory therapy assess nares and determine need for appliance change or resting period.  7/8/2024 0118 by Nanette Justice RN  Outcome: Progressing     Problem: ABCDS Injury Assessment  Goal: Absence of physical injury  7/8/2024 0118 by Nanette Justice RN  Outcome: Progressing     
  Problem: Chronic Conditions and Co-morbidities  Goal: Patient's chronic conditions and co-morbidity symptoms are monitored and maintained or improved  7/8/2024 2245 by Yola Childs RN  Outcome: Progressing  Flowsheets (Taken 7/8/2024 2000)  Care Plan - Patient's Chronic Conditions and Co-Morbidity Symptoms are Monitored and Maintained or Improved: Collaborate with multidisciplinary team to address chronic and comorbid conditions and prevent exacerbation or deterioration  7/8/2024 0902 by Nalini Schwartz RN  Outcome: Progressing     Problem: Safety - Adult  Goal: Free from fall injury  7/8/2024 2245 by Yola Childs RN  Outcome: Progressing  7/8/2024 0902 by Nalini Schwartz RN  Outcome: Progressing     Problem: Pain  Goal: Verbalizes/displays adequate comfort level or baseline comfort level  7/8/2024 2245 by Yola Childs RN  Outcome: Progressing  7/8/2024 0902 by Nalini Schwartz RN  Outcome: Progressing     Problem: Nutrition Deficit:  Goal: Optimize nutritional status  Outcome: Progressing     
  Problem: Chronic Conditions and Co-morbidities  Goal: Patient's chronic conditions and co-morbidity symptoms are monitored and maintained or improved  7/9/2024 1736 by Luann Ayala RN  Outcome: Progressing  7/9/2024 1609 by Hilda Suh RN  Outcome: Progressing  Flowsheets (Taken 7/8/2024 2000 by Yola Childs, RN)  Care Plan - Patient's Chronic Conditions and Co-Morbidity Symptoms are Monitored and Maintained or Improved: Collaborate with multidisciplinary team to address chronic and comorbid conditions and prevent exacerbation or deterioration     Problem: Safety - Adult  Goal: Free from fall injury  7/9/2024 1736 by Luann Ayala RN  Outcome: Progressing  7/9/2024 1609 by Hilda Suh RN  Outcome: Progressing  Flowsheets (Taken 6/29/2024 2222 by Timoteo Palacios, RN)  Free From Fall Injury:   Instruct family/caregiver on patient safety   Based on caregiver fall risk screen, instruct family/caregiver to ask for assistance with transferring infant if caregiver noted to have fall risk factors     Problem: Pain  Goal: Verbalizes/displays adequate comfort level or baseline comfort level  7/9/2024 1609 by Hilda Suh RN  Outcome: Progressing  Flowsheets (Taken 7/3/2024 0500 by Timoteo Palacios, RN)  Verbalizes/displays adequate comfort level or baseline comfort level:   Encourage patient to monitor pain and request assistance   Implement non-pharmacological measures as appropriate and evaluate response     
  Problem: Chronic Conditions and Co-morbidities  Goal: Patient's chronic conditions and co-morbidity symptoms are monitored and maintained or improved  Outcome: Progressing     Problem: Safety - Adult  Goal: Free from fall injury  Outcome: Progressing     
  Problem: Chronic Conditions and Co-morbidities  Goal: Patient's chronic conditions and co-morbidity symptoms are monitored and maintained or improved  Outcome: Progressing     Problem: Safety - Adult  Goal: Free from fall injury  Outcome: Progressing     Problem: Pain  Goal: Verbalizes/displays adequate comfort level or baseline comfort level  Outcome: Progressing     Problem: Nutrition Deficit:  Goal: Optimize nutritional status  Outcome: Progressing     Problem: Skin/Tissue Integrity  Goal: Absence of new skin breakdown  Description: 1.  Monitor for areas of redness and/or skin breakdown  2.  Assess vascular access sites hourly  3.  Every 4-6 hours minimum:  Change oxygen saturation probe site  4.  Every 4-6 hours:  If on nasal continuous positive airway pressure, respiratory therapy assess nares and determine need for appliance change or resting period.  Outcome: Progressing     Problem: ABCDS Injury Assessment  Goal: Absence of physical injury  Outcome: Progressing     
  Problem: Chronic Conditions and Co-morbidities  Goal: Patient's chronic conditions and co-morbidity symptoms are monitored and maintained or improved  Outcome: Progressing  Flowsheets  Taken 6/29/2024 0705 by Dayna Cuba RN  Care Plan - Patient's Chronic Conditions and Co-Morbidity Symptoms are Monitored and Maintained or Improved: Collaborate with multidisciplinary team to address chronic and comorbid conditions and prevent exacerbation or deterioration  Taken 6/28/2024 2000 by Niki Arrieta RN  Care Plan - Patient's Chronic Conditions and Co-Morbidity Symptoms are Monitored and Maintained or Improved:   Monitor and assess patient's chronic conditions and comorbid symptoms for stability, deterioration, or improvement   Collaborate with multidisciplinary team to address chronic and comorbid conditions and prevent exacerbation or deterioration   Update acute care plan with appropriate goals if chronic or comorbid symptoms are exacerbated and prevent overall improvement and discharge     
  Problem: Chronic Conditions and Co-morbidities  Goal: Patient's chronic conditions and co-morbidity symptoms are monitored and maintained or improved  Outcome: Progressing  Flowsheets (Taken 6/27/2024 0800)  Care Plan - Patient's Chronic Conditions and Co-Morbidity Symptoms are Monitored and Maintained or Improved:   Monitor and assess patient's chronic conditions and comorbid symptoms for stability, deterioration, or improvement   Collaborate with multidisciplinary team to address chronic and comorbid conditions and prevent exacerbation or deterioration   Update acute care plan with appropriate goals if chronic or comorbid symptoms are exacerbated and prevent overall improvement and discharge     Problem: Safety - Adult  Goal: Free from fall injury  Outcome: Progressing     
  Problem: Chronic Conditions and Co-morbidities  Goal: Patient's chronic conditions and co-morbidity symptoms are monitored and maintained or improved  Outcome: Progressing  Flowsheets (Taken 7/2/2024 0943)  Care Plan - Patient's Chronic Conditions and Co-Morbidity Symptoms are Monitored and Maintained or Improved: Monitor and assess patient's chronic conditions and comorbid symptoms for stability, deterioration, or improvement     Problem: Safety - Adult  Goal: Free from fall injury  Outcome: Progressing     Problem: Pain  Goal: Verbalizes/displays adequate comfort level or baseline comfort level  Outcome: Progressing  Flowsheets (Taken 7/2/2024 1400)  Verbalizes/displays adequate comfort level or baseline comfort level: Encourage patient to monitor pain and request assistance     Problem: Nutrition Deficit:  Goal: Optimize nutritional status  Outcome: Progressing     Problem: Skin/Tissue Integrity  Goal: Absence of new skin breakdown  Description: 1.  Monitor for areas of redness and/or skin breakdown  2.  Assess vascular access sites hourly  3.  Every 4-6 hours minimum:  Change oxygen saturation probe site  4.  Every 4-6 hours:  If on nasal continuous positive airway pressure, respiratory therapy assess nares and determine need for appliance change or resting period.  Outcome: Progressing     Problem: ABCDS Injury Assessment  Goal: Absence of physical injury  Outcome: Progressing     
  Problem: Chronic Conditions and Co-morbidities  Goal: Patient's chronic conditions and co-morbidity symptoms are monitored and maintained or improved  Outcome: Progressing  Flowsheets (Taken 7/8/2024 2000 by Yola Childs RN)  Care Plan - Patient's Chronic Conditions and Co-Morbidity Symptoms are Monitored and Maintained or Improved: Collaborate with multidisciplinary team to address chronic and comorbid conditions and prevent exacerbation or deterioration     Problem: Safety - Adult  Goal: Free from fall injury  Outcome: Progressing  Flowsheets (Taken 6/29/2024 2222 by Timoteo Palacios, RN)  Free From Fall Injury:   Instruct family/caregiver on patient safety   Based on caregiver fall risk screen, instruct family/caregiver to ask for assistance with transferring infant if caregiver noted to have fall risk factors     Problem: Pain  Goal: Verbalizes/displays adequate comfort level or baseline comfort level  Outcome: Progressing  Flowsheets (Taken 7/3/2024 0500 by Timoteo Palacios, RN)  Verbalizes/displays adequate comfort level or baseline comfort level:   Encourage patient to monitor pain and request assistance   Implement non-pharmacological measures as appropriate and evaluate response     
  Problem: Occupational Therapy - Adult  Goal: By Discharge: Performs self-care activities at highest level of function for planned discharge setting.  See evaluation for individualized goals.  Description: Occupational Therapy Goals:  Initiated 7/1/2024 to be met within 7-10 days.    1.  Patient will perform upper body dressing with modified independence.   2.  Patient will perform lower body dressing with modified independence.  3.  Patient will perform toilet transfers with supervision/set-up.  4.  Patient will perform all aspects of toileting with modified independence.  5.  Patient will participate in upper extremity therapeutic exercise/activities with modified independence for 8-10 minutes to increase strength/endurance for ADLs.    6.  Patient will utilize energy conservation techniques during functional activities with verbal cues.      Outcome: Progressing   OCCUPATIONAL THERAPY TREATMENT    Patient: Grace Lyons (89 y.o. female)  Date: 7/3/2024  Diagnosis: Atrial fibrillation (HCC) [I48.91]  New onset a-fib (HCC) [I48.91] Atrial fibrillation (HCC)  Procedure(s) (LRB):  Left heart cath / coronary angiography (N/A) 1 Day Post-Op  Precautions: General Precautions, Fall Risk,  ,  ,  ,  ,  ,  ,      Chart, occupational therapy assessment, plan of care, and goals were reviewed.  ASSESSMENT:  Pt presented supine in bed upon entry and agreeable to work with OT. She transitioned to EOB SBA in prep for functional tasks. Once sitting, she was able to elijah her socks, engage in UB Bathing and change her hospital gown with SBA, no LOB noted. STS transfer SBA and performed SPT to BSC w/ CGA. Pt unable to void at this time however was able to perform alcon area hygiene Supervision while sitting on BSC. Pt stood and was agreeable to return to reclining chair. She maneuvered ~ 5 ft to reclining chair requiring MIN A with RW 2/2 slight unsteadiness. Pt positioned for comfort and left with BLE's elevated and all 
  Problem: Occupational Therapy - Adult  Goal: By Discharge: Performs self-care activities at highest level of function for planned discharge setting.  See evaluation for individualized goals.  Description: Occupational Therapy Goals:  Initiated 7/1/2024 to be met within 7-10 days.    1.  Patient will perform upper body dressing with modified independence.   2.  Patient will perform lower body dressing with modified independence.  3.  Patient will perform toilet transfers with supervision/set-up.  4.  Patient will perform all aspects of toileting with modified independence.  5.  Patient will participate in upper extremity therapeutic exercise/activities with modified independence for 8-10 minutes to increase strength/endurance for ADLs.    6.  Patient will utilize energy conservation techniques during functional activities with verbal cues.      Outcome: Progressing  OCCUPATIONAL THERAPY TREATMENT    Patient: Grace Lyons (89 y.o. female)  Date: 7/1/2024  Diagnosis: Atrial fibrillation (HCC) [I48.91]  New onset a-fib (HCC) [I48.91] Atrial fibrillation (HCC)  Procedure(s) (LRB):  Left heart cath / coronary angiography (N/A) * Surgery Date in Future *  Precautions: General Precautions, Fall Risk,  ,  ,  ,  ,  ,  ,      Chart, occupational therapy assessment, plan of care, and goals were reviewed.  ASSESSMENT:  Pt presents in bed agreeable to OT tx, however declines OOB / EOB activity. Pt demo least amount of task due to excessive talking during tx time. Pt demo supine to long sitting with SBA and [facility] gown with min A due to line mgmt. Pt is concerned about her purewick canister being red colored with blood. Notified nursing. Pt is left in bed comfortably with all needs met.    Progression toward goals:  []          Improving appropriately and progressing toward goals  [x]          Improving slowly and progressing toward goals  []          Not making progress toward goals and plan of care will be 
  Problem: Physical Therapy - Adult  Goal: By Discharge: Performs mobility at highest level of function for planned discharge setting.  See evaluation for individualized goals.  Description: Physical Therapy Goals:  Initiated 6/27/2024 to be met within 7-10 days.    1.  Patient will move from supine to sit and sit to supine , scoot up and down, and roll side to side in bed with modified independence in order to safely get into/out of bed.    2.  Patient will transfer from bed to chair and chair to bed with supervision/set-up in order to safely partake in OOB mobility.  3.  Patient will perform sit to stand with supervision/set-up in order to safely partake in OOB mobility.      PLOF: lives alone in 1 level home with ramped entrance, daily PCA v. Family assist, primarily uses w/c      Outcome: Progressing     PHYSICAL THERAPY TREATMENT    Patient: Grace Lyons (89 y.o. female)  Date: 6/28/2024  Diagnosis: Atrial fibrillation (HCC) [I48.91]  New onset a-fib (HCC) [I48.91] Atrial fibrillation (HCC)      Precautions: General Precautions, Fall Risk,  ,  ,  ,  ,  ,  ,      ASSESSMENT:  Patient seen for PT follow up session. Received supine; agreeable. Focus of session initially on progressing OOB mobility; however, patient declining any/all attempts at OOB tasks secondary to head pain. RN made aware. Extensive education provided on detriments of bedrest. HEP completed to promote strengthening outside of therapy sessions, with patient demonstrating good understanding. Continue PT POC.    Progression toward goals:   []      Improving appropriately and progressing toward goals  [x]      Improving slowly and progressing toward goals  []      Not making progress toward goals and plan of care will be adjusted     PLAN:  Patient continues to benefit from skilled intervention to address the above impairments.  Continue treatment per established plan of care.    Further Equipment Recommendations for Discharge:  none- owns 
  Problem: Physical Therapy - Adult  Goal: By Discharge: Performs mobility at highest level of function for planned discharge setting.  See evaluation for individualized goals.  Description: Physical Therapy Goals:  Initiated 6/27/2024 to be met within 7-10 days.    1.  Patient will move from supine to sit and sit to supine , scoot up and down, and roll side to side in bed with modified independence in order to safely get into/out of bed.    2.  Patient will transfer from bed to chair and chair to bed with supervision/set-up in order to safely partake in OOB mobility.  3.  Patient will perform sit to stand with supervision/set-up in order to safely partake in OOB mobility.      PLOF: lives alone in 1 level home with ramped entrance, daily PCA v. Family assist, primarily uses w/c      Outcome: Progressing     PHYSICAL THERAPY TREATMENT    Patient: Grace Lyons (89 y.o. female)  Date: 7/1/2024  Diagnosis: Atrial fibrillation (HCC) [I48.91]  New onset a-fib (HCC) [I48.91] Atrial fibrillation (HCC)  Procedure(s) (LRB):  Left heart cath / coronary angiography (N/A) * Surgery Date in Future *  Precautions: General Precautions, Fall Risk      ASSESSMENT:  Pt received in bed, towel on top of head, pt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

## 2024-07-09 NOTE — DISCHARGE SUMMARY
within the collecting systems and bladder from cardiac  catheterization earlier same day. Study should not have been ordered same day as  intravascular contrast administration.  -Could follow-up with ultrasound to assess for intrarenal stones or bladder  stones. Of note, there were no intrarenal calcified stones or bladder stones  present on the CT of December 2022.    Left kidney nonobstructed. No hydroureter. Could not exclude small  calcifications within several nondilated calyces due to presence of excreted  contrast.    Right kidney demonstrates mild diffuse hydronephrosis with dilated renal pelvis.  Gradual smooth transition to nondilated mid ureter. A discrete filling defect is  not appreciated, though there is transition of high density contrast to  low-attenuation ureter lumen near the expected level of the UPJ, see coronal  image 32. Cannot exclude some endoluminal debris or wall thickening at this  level.  -Mid to distal ureter is nondilated and not contrast opacified. No definite  calcifications along the path of the unopacified ureters. Minimal right-sided  perinephric stranding. Presumed small subcentimeter anterior and lower pole  cortical cyst.    PELVIS FINDINGS:  Urinary bladder contains high density contrast and is also partially obscured by  streak artifact from metallic left hip prosthesis and not well assessed. No  definite mass. Ultrasound could provide better assessment of bladder debris or  focal lesion.    No small bowel distention to suggest obstruction.  Colon nondilated. Diffuse diverticulosis.  No pelvic ascites.  Metallic left hip prosthesis.    Impression  1. Mild right-sided hydronephrosis and pelviectasis with transition at the level  of the UPJ of unclear etiology. High density residual excreted contrast could  obscure luminal stones or debris.    2. Bladder contains high density contrast and is partially obscured by streak  artifact from the hip prosthesis. No definite

## 2024-07-09 NOTE — CARE COORDINATION
07/05/24 1236   /Social Work Whiteboard Notes   /Social Work Whiteboard GREEN: 7/08/24  Pt to return home with Paul LOVELL. Aid to transport at 4:30p MP       Aid: Wanda Baldwin [phone]. Will be picking the Pt up at 4:30 to return home.     BRIAN Jung  Case Management Department

## 2024-07-09 NOTE — PROGRESS NOTES
Progress Note      Patient: Grace Lyons MRN: [id number]  SSN: [id number]    YOB: 1935  Age: 89 y.o.  Sex: female      Admit Date: 6/26/2024    LOS: 6 days     Assessment:     1. Atrial Fibrillation with RVR     2. Hypokalemia     3. Gross Hematuria               CT abd/pelvis wo 07/29/2022- No nephrolithiasis. Small right renal cyst               Cysto 10/18/22- negative              Cytology 8/15/22- negative   CT A/P w contr 7/3/24: mild right sided hydronephrosis and pelviectasis with transition at the level of the   UPJ of unclear etiology. Due to contrast unable to exclude stone or debris. Bladder partially obscured by   streak artifact from hip prosthesis. No definite abnormalities associated.      4.. Recurrent UTI, On cranberry, d-mannose, Estrogen cream TIW              Hiprex BID for ppx              Urine Cx 5/23/24->100K E.coli treated with Augmentin 500 mg BID x 7 days  2/1/24 - E.coli                          10/30/23- E. Coli   7/13/23- mixed  5/10/23- + E. Coli                           4/11/23- + E. Coli   9/16/22- Proteus  08/19/2022- E coli, staph epi                           05/24/2022- > 100 K Klebsiella pneumoniae                                   04/07/2022-> 100 K streptococcus agalactiae group B        5. Hx of kidney stones               CT 12/6/22- no stones     Plan:     Recommend   1. WBC: 4.3, Hgb: 11.8, Creat: 1.0  2. UA 7/1/24 large blood, small LE, neg nitrites.   3. Ucx pending, tailor antibiotics per sensitivity per primary  4. Urine cytology pending  5. CT A/P 7/3/24 reveals mild right sided hydronephrosis and pelviectasis with transition at the level of the UPJ of unclear etiology. Due to contrast unable to exclude stone or debris. Bladder partially obscured by streak artifact from hip prosthesis. No definite abnormalities associated.   6. Plan for cystoscopy outpatient to rule out lower tract pathology for gross hematuria  7. Urology signed off   
  Physician Progress Note      PATIENT:               RAKEL SILVERMAN  [facility] #:                  [id number]  :                       1935  ADMIT DATE:       2024 8:32 PM  DISCH DATE:  RESPONDING  PROVIDER #:        Danita Elizabeth MD          QUERY TEXT:    Patient admitted with UTI. Noted to have documentation of weight loss, and   dietician assessment with malnutrition diagnosis. If possible, please document   in progress notes and discharge summary if you are evaluating and /or   treating any of the following:    The medical record reflects the following:  Risk Factors: weight loss  Clinical Indicators:   nutritional consult  Malnutrition Assessment:  Malnutrition Status:  Severe malnutrition (24 1414)  Context:  Chronic Illness  Findings of the 6 clinical characteristics of malnutrition:  Energy Intake:  75% or less estimated energy requirements for 1 month or   longer  Weight Loss:  Greater than 5% over 1 month  Body Fat Loss:  Mild body fat loss Orbital, Buccal region  Muscle Mass Loss:   (Moderate loss) Thigh (quadriceps), Calf (gastrocnemius),   Temples (temporalis)  Fluid Accumulation:  No significant fluid accumulation   Strength:  Normal  strength  Nutrition Diagnosis:  ? Unintended weight loss related to inadequate protein-energy intake, other   (comment) (reduced appetite) as evidenced by weight loss greater than or equal   to 5% in 1 month, poor intake prior to admission        Treatment: Nutrition Interventions:  Food and/or Nutrient Delivery: Continue Current Diet, Vitamin Supplement,   Start Oral Nutrition Supplement  Nutrition Education/Counseling: No recommendation at this time  Coordination of Nutrition Care: Continue to monitor while inpatient    Thank you  Aviva Viveros RN, CDI, CCDS, CRCR  Certified  Clinical Documentation   O: [phone]  [email or web address]  I can also be reached by perfect 
0911 TRANSFER - IN REPORT:    Verbal report received from BRANDIE Pabon on Grace Lyons  being received from [facility] for ordered procedure      Report consisted of patient's Situation, Background, Assessment and   Recommendations(SBAR).     Information from the following report(s) Nurse Handoff Report, Intake/Output, MAR, Recent Results, Med Rec Status, and Neuro Assessment was reviewed with the receiving nurse.    Opportunity for questions and clarification was provided.      Assessment completed upon patient's arrival to unit and care assumed.    
1220: Pt assisted up to BSC x1.     1228: Urine sample obtained.    
3v CAD severe RCA, LCFX, LAD    CT surgical consultation, family discussion, heart team approach  
4 Eyes Skin Assessment     NAME:  Grace Lyons  YOB: 1935  MEDICAL RECORD NUMBER:  [id number]    The patient is being assessed for  Shift Handoff    I agree that at least one RN has performed a thorough Head to Toe Skin Assessment on the patient. ALL assessment sites listed below have been assessed.      Areas assessed by both nurses:    Head, Face, Ears, Shoulders, Back, Chest, Arms, Elbows, Hands, Sacrum. Buttock, Coccyx, Ischium, Legs. Feet and Heels, and Under Medical Devices         Does the Patient have a Wound? No noted wound(s)       Heladio Prevention initiated by RN: Yes  Wound Care Orders initiated by RN: No    Pressure Injury (Stage 3,4, Unstageable, DTI, NWPT, and Complex wounds) if present, place Wound referral order by RN under : No    New Ostomies, if present place, Ostomy referral order under : No     Nurse 1 eSignature: Electronically signed by Timoteo Palacios RN on 7/3/24 at 3:01 AM EDT    **SHARE this note so that the co-signing nurse can place an eSignature**    Nurse 2 eSignature: {Esignature:[id number]}    
4N nurse requested to have her blood glucose checked prior to transport.  POC.  
Advance Care Planning   Healthcare Decision Maker:    Primary Decision Maker: Rita Braswell - Grandchild - [phone]    Secondary Decision Maker: Fernando Martinez - Other Relative    Today we documented Decision Maker(s) consistent with ACP documents on file.        conducted an initial consultation and Spiritual Assessment for Grace Lyons, who is a 89 y.o.,female. Patient's Primary Language is: English.   According to the patient's EMR Scientology Affiliation is: Druze.     The reason the Patient came to the hospital is:   Patient Active Problem List    Diagnosis Date Noted    Paroxysmal atrial fibrillation ([facility]) 02/21/2023    Hypertension 02/21/2023    History of DVT (deep vein thrombosis) 02/21/2023    Cerebrovascular accident (CVA) ([facility]) 02/21/2023    Dizziness 02/20/2023    Atrial fibrillation ([facility]) 06/27/2024    New onset a-fib ([facility]) 06/27/2024    Atherosclerosis of native coronary artery of native heart with stable angina pectoris ([facility])     Polymyalgia rheumatica ([facility]) 03/01/2021    Statin intolerance 03/01/2021    Hyperlipidemia associated with type 2 diabetes mellitus ([facility])     Hypothyroidism     Chronic heart failure with preserved ejection fraction ([facility])     Generalized weakness 01/18/2021    Tachycardia, paroxysmal ([facility]) 01/18/2021    CHF (congestive heart failure) ([facility]) 01/08/2021    Type 2 diabetes with nephropathy ([facility]) 01/13/2020    Deep vein thrombosis (DVT) of popliteal vein of left lower extremity ([facility]) 09/08/2018    DJD (degenerative joint disease)     History of non-ST elevation myocardial infarction (NSTEMI) 05/28/2016    Seasonal and perennial allergic rhinitis 07/30/2013    Type 2 diabetes mellitus with hyperglycemia, with long-term current use of insulin ([facility]) 12/04/2012    Chronic back pain 04/20/2012    Diabetic neuropathy ([facility]) 04/20/2012    Chronic neck pain 04/20/2012    Vertigo 04/20/2012    Anxiety 02/08/2011    Noncompliance with medication regimen 02/08/2011 
[facility]  Progress Note    Patient: Grace Lyons Age: 89 y.o. : 1935 MR#: [id number] SSN: [id number]      Subjective/24-hour events:     Patient lying in the bed, feels better, complains of some abdominal discomfort, passing gas.  No nausea or vomiting.  Did not have any bowel movements for last 3 days.    Assessment:   E. Coli UTI  Hematuria, improved, urine more darker in color than hematuria  Mild right hydronephrosis?  Stone versus debris  NSTEMI post coronary angiogram today with three-vessel disease  Atrial fibrillation with RVR  Hypertension  DM2  Hypothyroidism  Advanced age  Constipation    Plan:   Cultures noted, negative, patient completed 7 days of levofloxacin  Urine remains dark brown in color, no gross hematuria.  Will repeat UA  UA noted persistent leukocyte esterase, WBC and RBC.  Will consult ID discussed with Dr. Moreno  Continue IV hydration, monitor urine output.  Suspect hematuria is improved but patient does have some dark urine.  Will hold Lasix  Urology input noted, recommend outpatient cystoscopy  Continue aspirin and statin  CT surgery note input noted, does not offer CABG  Discussed with cardiology, plan to do trial of aspirin Plavix if tolerates then cath on Monday.  BP better, continue amlodipine, metoprolol  PT/OT as tolerated.    Further plan based on hospital course    Disposition: Home with home health care once cleared by cardiology, anticipate discharge 2024.      Discussed with patient at the bedside explained in detail about my above plan care.  Answered all questions or concerns at the bedside appropriate.      Case discussed with:  [x]Patient  []Family  [x] Nursing  []Case Management  DVT Prophylaxis:  []Lovenox  [x]Hep SQ  []SCDs  []Coumadin   []On Heparin gtt []PO anticoagulant    Objective:   VS: BP (!) 148/62   Pulse 69   Temp 97.4 °F (36.3 °C) (Oral)   Resp 18   Ht 1.702 m (5' 7\")   Wt 97.1 kg (214 lb)  
[facility] Hospitalist Group  Progress Note    Patient: Grace Lyons Age: 89 y.o. : 1935 MR#: [id number] SSN: [id number]      Subjective/24-hour events:     Chart reviewed.  Patient admitted earlier this morning after presenting to the ED with rapid A-fib.  Rates improved with IV Cardizem and patient is not complaining complained of any SOB, chest pain or palpitations currently.    Assessment:   Atrial fibrillation with RVR  Hypokalemia   CAD  Hypertension  DM2  Hypothyroidism  Advanced age    Plan:   Cardiology evaluation pending.  Continue rate control therapy as ordered in interim.  Continue IV heparin, transition to oral anticoagulation therapy when appropriate.  PT/OT evaluations.  Disposition recommendations TBD.    Anticipated discharge: 1 to 2 days/TBD    Case discussed with:  [x]Patient  []Family  [x] Nursing  []Case Management  DVT Prophylaxis:  []Lovenox  []Hep SQ  []SCDs  []Coumadin   [x]On Heparin gtt []PO anticoagulant    Objective:   VS: /61   Pulse 59   Temp 97.8 °F (36.6 °C) (Oral)   Resp 20   Ht 1.702 m (5' 7\")   Wt 97.5 kg (214 lb 15.2 oz)   LMP  (LMP Unknown)   SpO2 97%   BMI 33.67 kg/m²      Tmax/24hrs: Temp (24hrs), Av.5 °F (36.9 °C), Min:97.8 °F (36.6 °C), Max:99.1 °F (37.3 °C)    Intake/Output Summary (Last 24 hours) at 2024 0923  Last data filed at 2024 0727  Gross per 24 hour   Intake 41.21 ml   Output --   Net 41.21 ml       Gen:  In NAD.  Lungs: Clear, no wheezes  Effort nonlabored.  CV: S1-S2, rate WNL.  Abdomen: Soft, NTTP.  Extremities: Warm, no pitting edema or ischemia.  Neuro:  Awake and alert, moves extremities spontaneously.    Current Facility-Administered Medications   Medication Dose Route Frequency    heparin (porcine) injection 4,000 Units  4,000 Units IntraVENous PRN    heparin (porcine) injection 2,000 Units  2,000 Units IntraVENous PRN    heparin 25,000 units in dextrose 5% 250 mL (premix) infusion  5-30 
[facility] Hospitalist Group  Progress Note    Patient: Grace Lyons Age: 89 y.o. : 1935 MR#: [id number] SSN: [id number]      Subjective/24-hour events:     Patient lying in the bed, feels okay, had bowel movement yesterday.  Urine more clear, no more hematuria.    Assessment:   NSTEMI post coronary angiogram today with three-vessel disease  E. Coli UTI  Hematuria, resolved  Mild right hydronephrosis?  Stone versus debris  Atrial fibrillation with RVR  Hypertension  DM2  Hypothyroidism  Advanced age  Constipation    Plan:   Repeat cultures noted, negative, patient completed 7 days of levofloxacin  Discussed with ID, recommend no need for antibiotics and okay for cath tomorrow  Will hold Lasix  Continue bowel regimen  Urology input noted, recommend outpatient cystoscopy  Continue aspirin and statin  CT surgery note input noted, not a CABG candidate  Cardiology planning for angiogram tomorrow  Continue trial of aspirin Plavix and monitor for hematuria  BP better, continue amlodipine, metoprolol  PT/OT as tolerated.    Further plan based on hospital course    Disposition: Home with home health care once cleared by cardiology, anticipate discharge 2024.      Discussed with patient at the bedside explained in detail about my above plan care.  Answered all questions or concerns at the bedside appropriate.  After obtaining permission from the patient called the patient's granddaughter Rita again today on her cell phone but no response, could not leave voicemail either      Case discussed with:  [x]Patient  []Family  [x] Nursing  []Case Management  DVT Prophylaxis:  []Lovenox  [x]Hep SQ  []SCDs  []Coumadin   []On Heparin gtt []PO anticoagulant    Objective:   VS: BP (!) 161/73   Pulse 83   Temp 97.4 °F (36.3 °C) (Oral)   Resp 18   Ht 1.702 m (5' 7\")   Wt 97.1 kg (214 lb)   LMP  (LMP Unknown)   SpO2 97%   BMI 33.52 kg/m²      Tmax/24hrs: Temp (24hrs), Av.1 °F (36.7 
[facility] Hospitalist Group  Progress Note    Patient: Grace Lyons Age: 89 y.o. : 1935 MR#: [id number] SSN: [id number]      Subjective/24-hour events:     Patient lying in the bed, feels better, denies any right flank pain or tenderness.  She does have mild pain in the left flank and groin area.    Assessment:   E. Coli UTI  Hematuria  Mild right hydronephrosis?  Stone versus debris  NSTEMI post coronary angiogram today with three-vessel disease  Atrial fibrillation with RVR  Hypertension  DM2  Hypothyroidism  Advanced age    Plan:   Continue antibiotics for treatment of UTI.  Culture pending  CT abdomen pelvis results noted, urology input pending  Will hold heparin drip and monitor urine  Continue aspirin  CT surgery input noted, recommending not a surgical candidate.  Will defer to cardiology for further intervention  Continue aspirin  BP better, continue amlodipine, metoprolol  PT/OT as tolerated.    Further plan based on hospital course    Disposition: Home with home health care once cleared by cardiology, anticipate discharge 2024.      Discussed with patient at the bedside explained in detail about my above plan care.  Discussed about CT surgery recommendations, CT results and further plan care.  Offered to talk to family, patient states she will update her family.    Case discussed with:  [x]Patient  []Family  [x] Nursing  []Case Management  DVT Prophylaxis:  []Lovenox  [x]Hep SQ  []SCDs  []Coumadin   []On Heparin gtt []PO anticoagulant    Objective:   VS: /60   Pulse 75   Temp 97.8 °F (36.6 °C) (Oral)   Resp 20   Ht 1.702 m (5' 7\")   Wt 97.1 kg (214 lb)   LMP  (LMP Unknown)   SpO2 95%   BMI 33.52 kg/m²      Tmax/24hrs: Temp (24hrs), Av.9 °F (36.6 °C), Min:97.5 °F (36.4 °C), Max:98.1 °F (36.7 °C)    Intake/Output Summary (Last 24 hours) at 7/3/2024 1605  Last data filed at 7/3/2024 0900  Gross per 24 hour   Intake 135 ml   Output --   Net 135 
[facility]  Progress Note    Patient: Grace Lyons Age: 89 y.o. : 1935 MR#: [id number] SSN: [id number]      Subjective/24-hour events:     No new complaints.  Afebrile, no N/V.    Assessment:   E. Coli UTI  Atrial fibrillation with RVR  Hypokalemia   CAD  Hypertension  DM2  Hypothyroidism  Advanced age    Plan:   Continue antibiotics for treatment of UTI.  Urine remains quite dark and has not cleared significantly.  Will send for UA today.   Cardiac catheterization today.  Timing of cardiac catheterization per cardiology.  Further plan pending results of study.  PT/OT as tolerated.  Current recommendation is for rolling walker at discharge.  Other care primarily supportive.  Follow.    Anticipated discharge: 1-2 days/[facility]    Case discussed with:  [x]Patient  []Family  [x] Nursing  []Case Management  DVT Prophylaxis:  []Lovenox  [x]Hep SQ  []SCDs  []Coumadin   []On Heparin gtt []PO anticoagulant    Objective:   VS: /63   Pulse 63   Temp 97.6 °F (36.4 °C) (Oral)   Resp 18   Ht 1.702 m (5' 7\")   Wt 97.1 kg (214 lb)   LMP  (LMP Unknown)   SpO2 95%   BMI 33.52 kg/m²      Tmax/24hrs: Temp (24hrs), Av °F (36.7 °C), Min:97.6 °F (36.4 °C), Max:98.6 °F (37 °C)    Intake/Output Summary (Last 24 hours) at 2024 1330  Last data filed at 2024  Gross per 24 hour   Intake 557.4 ml   Output 100 ml   Net 457.4 ml         Gen:  In NAD.  Lungs: Clear, no wheezes  Effort nonlabored.  CV: RRR.  Abdomen: Soft, NTTP.  Nondistended.  Extremities: Warm, no pitting edema or ischemia.  Neuro:  Awake and alert, moves extremities spontaneously.    Current Facility-Administered Medications   Medication Dose Route Frequency    melatonin tablet 3 mg  3 mg Oral Nightly PRN    acetaminophen (TYLENOL) tablet 650 mg  650 mg Oral Q6H PRN    fluticasone (FLONASE) 50 MCG/ACT nasal spray 1 spray  1 spray Each Nostril Daily    levoFLOXacin (LEVAQUIN) tablet 250 mg  250 
Attempted pt for OT tx at 1025. Pt declining participation at this time 2/2 having upset stomach. Will continue to follow up as schedule allows. Thank you  GILMER Matt/ALEXANDER  
[facility] - Progress Note    Admit Date: 6/26/2024  Attending Cardiologist: Dr. Boo    Assessment:      -pAFib with RVR, converted to SR s/p IV cardizem gtt.   Chadsvasc 8 (Age, F, CHF, DM, HTN, Stroke/DVT)  -UTI.   -NSTEMI type 2 in setting of RVR.   -CAD, PCI to RCA 2016; Cardiac cath 6/2019 50% mid LAD stenosis and widely patent previous prox RCA stent  Low risk nuclear stress test 04/2022.   -Hx Mild Cardiomyopathy with recovered EF.   Echo 11/2020 EF 50-55%.    09/2018 45-50%.  -HTN, on Clonidine, Imdur, Norvasc as outpatient.   -H/o orthostatic hypotension.   -HLD, not on statin d/t intolerance. Declined Repatha in the past.   -DM2  -H/o DVT.   -hypothyroid, on replacement.       Primary Cardiologist, Dr. Ansley Moreno     Plan:     -Currently in SR, Continue lopressor 25 mg BID.   -Currently on heparin gtt, transition to Eliquis prior to discharge.   -Echo pending.   -Repeat troponin.   -Continue ASA. Historically not on statin d/t intolerance.   -BP elevated this morning, outpatient cardiac meds held d/t labile pressures, currently only on lopressor. Will resume medications in a stepwise fashion as needed.   -Final recommendations pending Echo results.     Subjective:     Denies CP or SOB.     Objective:      Patient Vitals for the past 8 hrs:   Temp Pulse Resp BP SpO2   06/28/24 0730 97.8 °F (36.6 °C) 78 20 (!) 161/87 98 %   06/28/24 0500 -- 53 -- -- --   06/28/24 0300 -- 54 -- -- --   06/28/24 0100 -- 65 -- -- --         Patient Vitals for the past 96 hrs:   Weight   06/26/24 2041 97.5 kg (214 lb 15.2 oz)       TELE: SR               Current Facility-Administered Medications   Medication Dose Route Frequency    heparin (porcine) injection 4,000 Units  4,000 Units IntraVENous PRN    heparin (porcine) injection 2,000 Units  2,000 Units IntraVENous PRN    heparin 25,000 units in dextrose 5% 250 mL (premix) infusion  5-30 Units/kg/hr IntraVENous Continuous    [Held by provider] amLODIPine 
[facility] - Progress Note    Admit Date: 6/26/2024  Attending Cardiologist: Dr. Boo    Assessment:      -pAFib with RVR, converted to SR s/p IV cardizem gtt.   Chadsvasc 8 (Age, F, CHF, DM, HTN, Stroke/DVT)  -UTI.   -NSTEMI type 2 in setting of RVR.   -CAD, PCI to RCA 2016; Cardiac cath 6/2019 50% mid LAD stenosis and widely patent previous prox RCA stent  Low risk nuclear stress test 04/2022.   -Hx Mild Cardiomyopathy with recovered EF.   Echo 11/2020 EF 50-55%.    09/2018 45-50%.  -HTN, on Clonidine, Imdur, Norvasc as outpatient.   -H/o orthostatic hypotension.   -HLD, not on statin d/t intolerance. Declined Repatha in the past.   -DM2  -H/o DVT.   -hypothyroid, on replacement.       Primary Cardiologist, Dr. Ansley Moreno     Plan:     -Currently in SR, getting bradycardic intermittently.  Will reduce metoprolol to 12.5 mg twice daily.  -Currently on heparin gtt, transition to Eliquis prior to discharge.   -Echo normal ejection fraction without any significant wall motion abnormality.  No significant valve disease.  -CAD with old coronary stents.  Plan to get cardiac cath next week to evaluate CAD due to abnormal cardiac enzymes.  -CHF: Chronic diastolic with crackles in the bases today.  Increase Lasix.  -Continue ASA. Historically not on statin d/t intolerance.  Consider Nexletol or Repatha.  Not available in hospital pharmacies yet.  -BP controlled..  Watch closely.  Restart amlodipine if it increases.  -Not ambulating for almost a year or more.  Consider physical therapy as needed.    Subjective:     Denies CP or SOB.     Objective:      Patient Vitals for the past 8 hrs:   Temp Pulse Resp BP SpO2   06/29/24 1114 97 °F (36.1 °C) 66 18 120/69 100 %   06/29/24 0912 -- -- -- 123/78 --   06/29/24 0900 -- 70 -- -- --   06/29/24 0715 97.8 °F (36.6 °C) 59 18 123/72 94 %   06/29/24 0658 -- 53 -- -- --           Patient Vitals for the past 96 hrs:   Weight   06/28/24 1159 97.1 kg (214 lb)   06/26/24 
[facility] - Progress Note    Admit Date: 6/26/2024  Attending Cardiologist: Dr. Kermit Moreno     Assessment:      -pAFib with RVR, converted to SR s/p IV cardizem gtt.   -UTI, E. Coli; Gross Hematuria.   -NSTEMI type 2 vs type 1 in setting of RVR. Troponins peaked in the 600s.   -3V CAD, s/p [facility] (07/02/24, Dr. Nguyen) - CABG evaluation. PCI to RCA 2016.   -Hx Mild Cardiomyopathy with recovered EF.   Echo 11/2020 EF 50-55%.    09/2018 45-50%.  -HTN, on Clonidine, Imdur, Norvasc as outpatient. H/o orthostatic hypotension.   -HLD, not on statin d/t intolerance. Declined Repatha in the past.   -DM2  -H/o DVT.   -hypothyroid, on replacement.         Primary Cardiologist, Dr. Ansley Moreno     Plan:       I saw, evaluated, interviewed and examined the patient personally.  Patient without any chest pain or shortness of breath at this time  Hemodynamically stable  Pertinent labs reviewed    Recommendation  - CAD: Awaiting CT surgery input.  Continue medical management  - Will change amlodipine to Imdur with CAD  - Continue aspirin, Lopressor, Ranexa and Lasix  - Anticoagulation on hold because of gross hematuria.  Monitor H&H  - Intolerant to statin.      Significant time spent in reviewing the case, multiple EMR databases, physician notes, reviewing pertinent labs and imaging studies  I spent significant amount of time for medical decision making and updated history, and other providers assessments as well.  I personally agree with the findings as stated and the plan as documented.  I saw, examined, and evaluated this patient and performed the substantive portion of the encounter for > 50% of the time including extensive history, physical exam, assessment and plan    Kermit Moreno MD       -s/p [facility] yesterday with 3V CAD, pending CTS evaluation for consideration of CABG. Appreciate CTS involvement.   -Currently on amlodipine for BP control, would favor long acting nitrate over CCB with 3V CAD and Hx of [facility]. 
Cardiology Progress Note    Admit Date: 6/26/2024  Attending Cardiologist: Dr. Nguyen    IMPRESSION  Paroxysmal atrial fibrillation  NSTEMI likely type I v II in context afib RVR, chest x-ray 6/26/2024 no acute process, 2d echo 55-60 percent 6/28/2024  Hypertension - Normotensive to Stage II pressure  Hyperlipidemia  Chronic heart failure preserved ejection fraction  Coronary risk equivalent diabetes mellitus  Coronary artery disease history of PCI to RCA 2016, 50% mid LAD stenoses     PLAN  Changed to aspirin 81 mg p.o. daily  Heparin gtt. currently held due to hematuria  Monitor blood pressure, adjust antihypertensive medications as necessary.  Metoprolol now at 12.5mg po bid due to some bradycardia, Continue to hold home medications Norvasc 5 mg p.o. daily, clonidine 0.1 mg p.o. daily, Imdur, 30 mg p.o. daily due to lower blood pressure  Statin if can tolerate prior to discharge.  Antianginal medication Ranexa 500 mg p.o. twice daily home dose,  Monitor fluid status, adjust as necessary, fluid restriction, salt intake <2g per day.  On Lasix 40 mg p.o. daily which is home dose  Recommend Guideline Directed medical therapy as can tolerate.  May consider to trial patient on aspirin Plavix or Plavix Eliquis as hematuria resolves to see if can tolerate PCI.  If patient is unable to tolerate escalation of antiplatelet or anticoagulation and if persistence of hematuria may need to consider cystoscopy at high risk if willing to accept risk and benefits without further testing.     Dr. Nguyen Consult, Primary AJelly Moreno    Subjective:     Denies chest pain  Denies shortness of breath  Denies abdominal pain    Objective:      Patient Vitals for the past 8 hrs:   Temp Pulse Resp BP SpO2   07/04/24 0715 97.9 °F (36.6 °C) 65 17 (!) 150/70 94 %   07/04/24 0500 -- 80 -- -- --   07/04/24 0324 98.1 °F (36.7 °C) 66 18 127/75 99 %   07/04/24 0300 -- 67 -- -- --   07/04/24 0100 -- 84 -- -- --         No data 
Cardiology Progress Note    Admit Date: 6/26/2024  Attending Cardiologist: Dr. Nguyen    IMPRESSION  Paroxysmal atrial fibrillation  NSTEMI likely type I v II in context afib RVR, chest x-ray 6/26/2024 no acute process, 2d echo 55-60 percent 6/28/2024  Hypertension - Normotensive to Stage II pressure  Hyperlipidemia  Chronic heart failure preserved ejection fraction  Coronary risk equivalent diabetes mellitus  Coronary artery disease history of PCI to RCA 2016, 50% mid LAD stenoses, status post left heart catheterization on 7/2/2024 three-vessel disease had surgical consultation with recommendation PCI versus medical management.       PLAN  Changed to aspirin 81 mg p.o. daily, plavix 75mg po daily, having stable Hgb now  Monitor blood pressure, adjust antihypertensive medications as necessary.  Metoprolol now at 25mg po bid, Continue to hold home medications Norvasc 5 mg p.o. daily, clonidine 0.1 mg p.o. daily, Imdur, 30 mg p.o. daily  Statin if can tolerate prior to discharge.  Antianginal medication Ranexa 500 mg p.o. twice daily home dose,  Monitor fluid status, adjust as necessary, fluid restriction, salt intake <2g per day.  Home dose of lasix 40mg po daily currently held.    Recommend Guideline Directed medical therapy as can tolerate.  The benefits and risks of cardiac catheterization have been discussed in detail with the patient or healthcare power of . Patient understands  risk of potential cath complications including but not limited to bleeding, infection, dialysis or renal function decline, difficulty healing the arteriotomy access site which may require surgical repair, potential thromboembolic complications which could result in stroke, myocardial infarction, vascular injury, loss of limb or organ function and/or death and potential allergic reaction to contrast dye or other medication used during the procedure. Patient is also aware of the therapeutic implications for medical management vs 
Cardiology Progress Note    Admit Date: 6/26/2024  Attending Cardiologist: Dr. Nguyen    MPRESSION  Paroxysmal atrial fibrillation  NSTEMI likely type I v II in context afib RVR, chest x-ray 6/26/2024 no acute process, 2d echo 55-60 percent 6/28/2024  Hypertension - Normotensive to Stage II pressure  Hyperlipidemia  Chronic heart failure preserved ejection fraction  Coronary risk equivalent diabetes mellitus  Coronary artery disease history of PCI to RCA 2016, 50% mid LAD stenoses     PLAN  Changed to aspirin 81 mg p.o. daily  Heparin GTT for primary prevention of stroke context of atrial fibrillation with rapid ventricular response  Monitor blood pressure, adjust antihypertensive medications as necessary.  Metoprolol now at 12.5mg po bid due to some bradycardia, Continue to hold home medications Norvasc 5 mg p.o. daily, clonidine 0.1 mg p.o. daily, Imdur, 30 mg p.o. daily due to lower blood pressure  Statin if can tolerate prior to discharge.  Antianginal medication Ranexa 500 mg p.o. twice daily home dose,  Monitor fluid status, adjust as necessary, fluid restriction, salt intake <2g per day.  On Lasix 40 mg p.o. daily at home  Recommend Guideline Directed medical therapy as can tolerate.  The benefits and risks of cardiac catheterization have been discussed in detail with the patient or healthcare power of . Patient understands  risk of potential cath complications including but not limited to bleeding, infection, dialysis or renal function decline, difficulty healing the arteriotomy access site which may require surgical repair, potential thromboembolic complications which could result in stroke, myocardial infarction, vascular injury, loss of limb or organ function and/or death and potential allergic reaction to contrast dye or other medication used during the procedure. Patient is also aware of the therapeutic implications for medical management vs coronary artery bypass surgery vs percutaneous coronary 
Cardiology Progress Note    Admit Date: 6/26/2024  Attending Cardiologist: Dr. Nguyen    MPRESSION  Paroxysmal atrial fibrillation  NSTEMI likely type I v II in context afib RVR, chest x-ray 6/26/2024 no acute process, 2d echo 55-60 percent 6/28/2024  Hypertension - Normotensive to Stage II pressure  Hyperlipidemia  Chronic heart failure preserved ejection fraction  Coronary risk equivalent diabetes mellitus  Coronary artery disease history of PCI to RCA 2016, 50% mid LAD stenoses     PLAN  Changed to aspirin 81 mg p.o. daily  Heparin GTT for primary prevention of stroke context of atrial fibrillation with rapid ventricular response  Monitor blood pressure, adjust antihypertensive medications as necessary.  Metoprolol now at 12.5mg po bid due to some bradycardia, stop home medications Norvasc 5 mg p.o. daily, clonidine 0.1 mg p.o. daily, Imdur, 30 mg p.o. daily due to lower blood pressure  Statin if can tolerate prior to discharge.  Antianginal medication Ranexa 500 mg p.o. twice daily home dose,  Monitor fluid status, adjust as necessary, fluid restriction, salt intake <2g per day.  On Lasix 40 mg p.o. daily at home  Recommend Guideline Directed medical therapy as can tolerate.  The benefits and risks of cardiac catheterization have been discussed in detail with the patient or healthcare power of . Patient understands  risk of potential cath complications including but not limited to bleeding, infection, dialysis or renal function decline, difficulty healing the arteriotomy access site which may require surgical repair, potential thromboembolic complications which could result in stroke, myocardial infarction, vascular injury, loss of limb or organ function and/or death and potential allergic reaction to contrast dye or other medication used during the procedure. Patient is also aware of the therapeutic implications for medical management vs coronary artery bypass surgery vs percutaneous coronary 
Comprehensive Nutrition Assessment    Type and Reason for Visit:  Initial, Positive Nutrition Screen    Nutrition Recommendations/Plan:   Continue current diet as tolerated.  Order Ensure Plus High Protein (each provides 350 kcal, 20g protein) BID  Recommend/order Thiamin 100 mg x 7 days per malnutrition status   Continue to monitor tolerance of PO, compliance of oral supplements, weight, labs, and plan of care during admission.     Malnutrition Assessment:  Malnutrition Status:  Severe malnutrition (06/28/24 1414)    Context:  Chronic Illness     Findings of the 6 clinical characteristics of malnutrition:  Energy Intake:  75% or less estimated energy requirements for 1 month or longer  Weight Loss:  Greater than 5% over 1 month     Body Fat Loss:  Mild body fat loss Orbital, Buccal region   Muscle Mass Loss:   (Moderate loss) Thigh (quadriceps), Calf (gastrocnemius), Temples (temporalis)  Fluid Accumulation:  No significant fluid accumulation     Strength:  Normal  strength    Nutrition History and Allergies:     Past Medical History:   Diagnosis Date    Acetabulum fracture (HCC) 1981    Anemia     Anxiety     Asthma     Benign hypertensive heart disease without heart failure     Elevated today, usually normal at home, currently significant joint pains    BMI 38.0-38.9,adult 06/07/2017    Bronchitis     Bursitis of left shoulder     CAD (coronary artery disease)     Cervical spinal stenosis     Cholelithiasis     Chronic diastolic heart failure (HCC)     Stable, edema better, uses PRN Lasix    Chronic pain     right leg    Congestive heart failure (HCC)     Coronary atherosclerosis of native coronary artery     9/10 Non critical LAD and RCA disease    Degenerative joint disease of left knee     Diverticulosis     Diverticulosis     DJD (degenerative joint disease)     Dyspepsia     Dysuria     HTN (hypertension)     Hypothyroidism     IC (interstitial cystitis)     Kidney stone     Kidney stones     Left 
Dr Cortez made aware of admission.  
Heart cath completed. Pt access was the right femoral. No signs of bleeding.    Bedrest expires  0021   
[facility] Renal Dosing [facility]    Pharmacist Renal Dosing Note for Levofloxacin   Physician/Prescriber:  Jose Haynes MD    Previous Regimen Levofloxacin 500 mg daily   Serum Creatinine No results found for: \"ISAI\", \"CREAPOC\"   Creatinine Clearance Estimated Creatinine Clearance: 43 mL/min (based on SCr of 1.11 mg/dL).   BUN Lab Results   Component Value Date/Time    BUN 15 06/29/2024 11:28 AM           The following medication: Levofloxacin 500 mg daily was automatically dose-adjusted per [facility] P&T Committee Protocol, with respect to renal function.      Dosage changed to:  500 mg x 1, then 250 mg every 24 hours x 4     Additional notes:    Pharmacy to continue to monitor patient daily.   Will make dosage adjustments based upon changing renal function.  Signed LUISANA ROBIN RPH.     
PT and caregiver given discharge packet. Pt still refusing medications from pharmacy. IV x 2 removed and pt tolerated well. Transport placed to take patient to front door. All questions and concerns answered   
Patient refused morning medication (synthroid) after writer instructed on importance of education. Patient noted confused at intervals but know her medications she supposed to take.   0700 Patient had envelope with $940  dollars refusing to give to security or staff for safe keeping until security locks it up. States \" I am going to give it back to the girl who gave it to me.\" Patient confused but defiant and uncooperative. Patient placed money back in her plastic bag. Communicated to primary nurse, Hilda and last night charge nurseRosette and CCL, BRANDIE Kong  
Plavix an ASA held Pt is scheduled for a procedure.   
Pt found during bedside shift report to have wallet with all contents spread across bed. Pt is found to have over $900 in cash in a plastic baggy. Educated to patient that we can have security come and place her belongings in a safe place until she is discharged, pt refuses at this time. Will inform caregiver of belongings and ask they remove them and take them home for safe keeping.   
Pt refusing medications that were prescribed by discharging MD. Pt states that she has the medication at home. Educated patient that the dose may have changed and she states again that she has enough at home.   
Pt was educated on sips of water with medications. Pt was refusing an this nurse an her off going nurse reeducated an encouraged her that we were doing the right thing. She was not to sure that we should give her medications. I rechecked the orders an gave her the medications. Pt held the medications in her hand an I encouraged her to take them an they I was not able to leave until she took them. Ask another nurse to assist with explaining the meds with sips of water. Pt rejected teaching but took the meds at all. .      Pt stated \"How old are you\" Informed the pt that my age had nothing to do with her care an that I assured her that I am qualified for the job I do as a nurse. She stated I am sorry I offended you. I ask her if she did not need anything else in reference to her care I would see her on my next rounds.   
Report received for pt coming from cath lab   
Sp balloon angioplasty of mRCA 95% to less than 10% GAY III to GAY III flow  
Spoke with pt torrey Salinas regarding money found at patient bedside. Requested she have someone come remove the money and take it home. States that if she does not discharge today she will come get it.     
TRANSFER - IN REPORT:    Verbal report received from Maryam DIEGO(name) on Grace Lyons  being received from [address] [address](unit) for ordered procedure   Report consisted of patient’s Situation, Background, Assessment and   Recommendations(SBAR).   Information from the following report(s) SBAR, MAR, Recent Results, Quality Measures, and Dual Neuro Assessment was reviewed with the receiving nurse.  Opportunity for questions and clarification was provided.    Assessment completed upon patient’s arrival to unit and care assumed.     
TRANSFER - IN REPORT:    Verbal report received from Nalini RN (name) on Grace Lyons  being received from [address] [address] (unit) for ordered procedure   Report consisted of patient’s Situation, Background, Assessment and   Recommendations(SBAR).   Information from the following report(s) MAR, Med Rec Status, Quality Measures, and Dual Neuro Assessment was reviewed with the receiving nurse.  Opportunity for questions and clarification was provided.    Assessment completed upon patient’s arrival to unit and care assumed.     
This RN assumed care of patient.   She is A&O x4, post cath. Right femoral access was angiosealed.she is on flat bedrest at this time.    TRANSFER - OUT REPORT:    Verbal report given to BRANDIE rebolledo(name) on Grace Lyons  being transferred to  (unit) for routine progression of patient care  Report consisted of patient’s Situation, Background, Assessment and   Recommendations(SBAR).   Information from the following report(s) SBAR was reviewed with the receiving nurse.  Lines:   Peripheral IV 06/26/24 Posterior;Right Hand (Active)   Site Assessment Clean, dry & intact 07/08/24 0800   Line Status Infusing 07/08/24 0800   Line Care Connections checked and tightened 07/08/24 0800   Phlebitis Assessment No symptoms 07/08/24 0800   Infiltration Assessment 0 07/08/24 0800   Alcohol Cap Used Yes 07/08/24 0800   Dressing Status Clean, dry & intact 07/08/24 0800   Dressing Type Transparent 07/08/24 0800   Dressing Intervention Dressing changed 07/04/24 2052       Peripheral IV 07/08/24 Left;Anterior Forearm (Active)     Opportunity for questions and clarification was provided.    Patient transported with:  Tech,chart,iv   
Upon pt's arrival to [facility], pt notified this RN that she drank half to three quarters of a bottle of ensure with her medications because \"my meds sometimes upset my stomach so I have to drink some ensure with them\". RN notified Dr. Nguyen.  
Assessment and Recommendations (SBAR). Information from the following report(s) Event Log was reviewed with the receiving nurse. Opportunity for questions and clarification was provided. Patient transported with: Tech  
Continuous    [Held by provider] amLODIPine (NORVASC) tablet 5 mg  5 mg Oral Nightly    insulin lispro (HUMALOG,ADMELOG) injection vial 0-4 Units  0-4 Units SubCUTAneous TID WC    insulin lispro (HUMALOG,ADMELOG) injection vial 0-4 Units  0-4 Units SubCUTAneous Nightly    levothyroxine (SYNTHROID) tablet 137 mcg  137 mcg Oral QAM    ranolazine (RANEXA) extended release tablet 500 mg  500 mg Oral BID    sodium chloride flush 0.9 % injection 5-40 mL  5-40 mL IntraVENous 2 times per day    sodium chloride flush 0.9 % injection 5-40 mL  5-40 mL IntraVENous PRN    0.9 % sodium chloride infusion   IntraVENous PRN    potassium chloride (KLOR-CON M) extended release tablet 40 mEq  40 mEq Oral PRN    Or    potassium bicarb-citric acid (EFFER-K) effervescent tablet 40 mEq  40 mEq Oral PRN    Or    potassium chloride 10 mEq/100 mL IVPB (Peripheral Line)  10 mEq IntraVENous PRN    magnesium sulfate 2000 mg in 50 mL IVPB premix  2,000 mg IntraVENous PRN    ondansetron (ZOFRAN-ODT) disintegrating tablet 4 mg  4 mg Oral Q8H PRN    Or    ondansetron (ZOFRAN) injection 4 mg  4 mg IntraVENous Q6H PRN    polyethylene glycol (GLYCOLAX) packet 17 g  17 g Oral Daily PRN    glucose chewable tablet 16 g  4 tablet Oral PRN    dextrose bolus 10% 125 mL  125 mL IntraVENous PRN    Or    dextrose bolus 10% 250 mL  250 mL IntraVENous PRN    glucagon (rDNA) injection 1 mg  1 mg SubCUTAneous PRN    dextrose 10 % infusion   IntraVENous Continuous PRN    metoprolol tartrate (LOPRESSOR) tablet 25 mg  25 mg Oral BID    hydrALAZINE (APRESOLINE) injection 10 mg  10 mg IntraVENous Q6H PRN    furosemide (LASIX) tablet 20 mg  20 mg Oral Daily        Labs:    Recent Results (from the past 24 hour(s))   POCT Glucose    Collection Time: 06/27/24 11:28 AM   Result Value Ref Range    POC Glucose 88 70 - 110 mg/dL   POCT Glucose    Collection Time: 06/27/24  3:42 PM   Result Value Ref Range    POC Glucose 97 70 - 110 mg/dL   Urinalysis    Collection Time: 
Daily    bisacodyl (DULCOLAX) suppository 10 mg  10 mg Rectal Daily PRN    isosorbide mononitrate (IMDUR) extended release tablet 30 mg  30 mg Oral Daily    aspirin chewable tablet 81 mg  81 mg Oral Daily    melatonin tablet 3 mg  3 mg Oral Nightly PRN    acetaminophen (TYLENOL) tablet 650 mg  650 mg Oral Q6H PRN    fluticasone (FLONASE) 50 MCG/ACT nasal spray 1 spray  1 spray Each Nostril Daily    [Held by provider] furosemide (LASIX) tablet 40 mg  40 mg Oral Daily    metoprolol tartrate (LOPRESSOR) tablet 12.5 mg  12.5 mg Oral BID    meclizine (ANTIVERT) tablet 25 mg  25 mg Oral TID PRN    thiamine mononitrate tablet 100 mg  100 mg Oral Daily    insulin lispro (HUMALOG,ADMELOG) injection vial 0-4 Units  0-4 Units SubCUTAneous TID WC    insulin lispro (HUMALOG,ADMELOG) injection vial 0-4 Units  0-4 Units SubCUTAneous Nightly    levothyroxine (SYNTHROID) tablet 137 mcg  137 mcg Oral QAM    ranolazine (RANEXA) extended release tablet 500 mg  500 mg Oral BID    sodium chloride flush 0.9 % injection 5-40 mL  5-40 mL IntraVENous 2 times per day    sodium chloride flush 0.9 % injection 5-40 mL  5-40 mL IntraVENous PRN    0.9 % sodium chloride infusion   IntraVENous PRN    potassium chloride (KLOR-CON M) extended release tablet 40 mEq  40 mEq Oral PRN    Or    potassium bicarb-citric acid (EFFER-K) effervescent tablet 40 mEq  40 mEq Oral PRN    Or    potassium chloride 10 mEq/100 mL IVPB (Peripheral Line)  10 mEq IntraVENous PRN    magnesium sulfate 2000 mg in 50 mL IVPB premix  2,000 mg IntraVENous PRN    ondansetron (ZOFRAN-ODT) disintegrating tablet 4 mg  4 mg Oral Q8H PRN    Or    ondansetron (ZOFRAN) injection 4 mg  4 mg IntraVENous Q6H PRN    glucose chewable tablet 16 g  4 tablet Oral PRN    dextrose bolus 10% 125 mL  125 mL IntraVENous PRN    Or    dextrose bolus 10% 250 mL  250 mL IntraVENous PRN    glucagon (rDNA) injection 1 mg  1 mg SubCUTAneous PRN    dextrose 10 % infusion   IntraVENous Continuous PRN    
Fluid Requirements: 1 ml/kcal  Fluid (ml/day): or per MD    Nutrition Diagnosis:   Unintended weight loss related to inadequate protein-energy intake, other (comment) (reduced appetite) as evidenced by weight loss greater than or equal to 5% in 1 month, poor intake prior to admission    Nutrition Interventions:   Food and/or Nutrient Delivery: Continue Current Diet, Vitamin Supplement, Start Oral Nutrition Supplement  Nutrition Education/Counseling: No recommendation at this time  Coordination of Nutrition Care: Continue to monitor while inpatient       Goals:     Goals: Meet at least 75% of estimated needs, PO intake 75% or greater, by next RD assessment       Nutrition Monitoring and Evaluation:   Behavioral-Environmental Outcomes: None Identified  Food/Nutrient Intake Outcomes: Food and Nutrient Intake, Supplement Intake  Physical Signs/Symptoms Outcomes: Biochemical Data, Meal Time Behavior, Nutrition Focused Physical Findings, Weight    Discharge Planning:    Continue current diet, Continue Oral Nutrition Supplement     Sera Arias RD  Contact: [phone]    
clopidogrel (PLAVIX) tablet 75 mg  75 mg Oral Daily    senna (SENOKOT) tablet 8.6 mg  1 tablet Oral BID    polyethylene glycol (GLYCOLAX) packet 17 g  17 g Oral Daily    bisacodyl (DULCOLAX) suppository 10 mg  10 mg Rectal Daily PRN    isosorbide mononitrate (IMDUR) extended release tablet 30 mg  30 mg Oral Daily    aspirin chewable tablet 81 mg  81 mg Oral Daily    melatonin tablet 3 mg  3 mg Oral Nightly PRN    acetaminophen (TYLENOL) tablet 650 mg  650 mg Oral Q6H PRN    fluticasone (FLONASE) 50 MCG/ACT nasal spray 1 spray  1 spray Each Nostril Daily    [Held by provider] furosemide (LASIX) tablet 40 mg  40 mg Oral Daily    metoprolol tartrate (LOPRESSOR) tablet 12.5 mg  12.5 mg Oral BID    meclizine (ANTIVERT) tablet 25 mg  25 mg Oral TID PRN    thiamine mononitrate tablet 100 mg  100 mg Oral Daily    insulin lispro (HUMALOG,ADMELOG) injection vial 0-4 Units  0-4 Units SubCUTAneous TID WC    insulin lispro (HUMALOG,ADMELOG) injection vial 0-4 Units  0-4 Units SubCUTAneous Nightly    levothyroxine (SYNTHROID) tablet 137 mcg  137 mcg Oral QAM    ranolazine (RANEXA) extended release tablet 500 mg  500 mg Oral BID    sodium chloride flush 0.9 % injection 5-40 mL  5-40 mL IntraVENous 2 times per day    sodium chloride flush 0.9 % injection 5-40 mL  5-40 mL IntraVENous PRN    0.9 % sodium chloride infusion   IntraVENous PRN    potassium chloride (KLOR-CON M) extended release tablet 40 mEq  40 mEq Oral PRN    Or    potassium bicarb-citric acid (EFFER-K) effervescent tablet 40 mEq  40 mEq Oral PRN    Or    potassium chloride 10 mEq/100 mL IVPB (Peripheral Line)  10 mEq IntraVENous PRN    magnesium sulfate 2000 mg in 50 mL IVPB premix  2,000 mg IntraVENous PRN    ondansetron (ZOFRAN-ODT) disintegrating tablet 4 mg  4 mg Oral Q8H PRN    Or    ondansetron (ZOFRAN) injection 4 mg  4 mg IntraVENous Q6H PRN    glucose chewable tablet 16 g  4 tablet Oral PRN    dextrose bolus 10% 125 mL  125 mL IntraVENous PRN    Or    
°C)    Intake/Output Summary (Last 24 hours) at 7/2/2024 1642  Last data filed at 7/2/2024 1153  Gross per 24 hour   Intake 585.54 ml   Output 1660 ml   Net -1074.46 ml         Gen:  In NAD.  Lungs: Clear, no wheezes  Effort nonlabored.  CV: RRR.  Abdomen: Soft, NTTP.  Nondistended.  Extremities: Warm, no pitting edema or ischemia.  Neuro:  Awake and alert, moves extremities spontaneously.    Current Facility-Administered Medications   Medication Dose Route Frequency    amLODIPine (NORVASC) tablet 5 mg  5 mg Oral Nightly    levoFLOXacin (LEVAQUIN) tablet 500 mg  500 mg Oral Daily    melatonin tablet 3 mg  3 mg Oral Nightly PRN    acetaminophen (TYLENOL) tablet 650 mg  650 mg Oral Q6H PRN    fluticasone (FLONASE) 50 MCG/ACT nasal spray 1 spray  1 spray Each Nostril Daily    furosemide (LASIX) tablet 40 mg  40 mg Oral Daily    metoprolol tartrate (LOPRESSOR) tablet 12.5 mg  12.5 mg Oral BID    meclizine (ANTIVERT) tablet 25 mg  25 mg Oral TID PRN    thiamine mononitrate tablet 100 mg  100 mg Oral Daily    heparin (porcine) injection 4,000 Units  4,000 Units IntraVENous PRN    heparin (porcine) injection 2,000 Units  2,000 Units IntraVENous PRN    [Held by provider] heparin 25,000 units in dextrose 5% 250 mL (premix) infusion  5-30 Units/kg/hr IntraVENous Continuous    insulin lispro (HUMALOG,ADMELOG) injection vial 0-4 Units  0-4 Units SubCUTAneous TID WC    insulin lispro (HUMALOG,ADMELOG) injection vial 0-4 Units  0-4 Units SubCUTAneous Nightly    levothyroxine (SYNTHROID) tablet 137 mcg  137 mcg Oral QAM    ranolazine (RANEXA) extended release tablet 500 mg  500 mg Oral BID    sodium chloride flush 0.9 % injection 5-40 mL  5-40 mL IntraVENous 2 times per day    sodium chloride flush 0.9 % injection 5-40 mL  5-40 mL IntraVENous PRN    0.9 % sodium chloride infusion   IntraVENous PRN    potassium chloride (KLOR-CON M) extended release tablet 40 mEq  40 mEq Oral PRN    Or    potassium bicarb-citric acid (EFFER-K) 
  POCT Glucose    Collection Time: 06/29/24 11:13 AM   Result Value Ref Range    POC Glucose 159 (H) 70 - 110 mg/dL   Basic Metabolic Panel    Collection Time: 06/29/24 11:28 AM   Result Value Ref Range    Sodium 138 136 - 145 mmol/L    Potassium 3.5 3.5 - 5.5 mmol/L    Chloride 105 100 - 111 mmol/L    CO2 24 21 - 32 mmol/L    Anion Gap 9 3.0 - 18 mmol/L    Glucose 151 (H) 74 - 99 mg/dL    BUN 15 7.0 - 18 MG/DL    Creatinine 1.11 0.6 - 1.3 MG/DL    BUN/Creatinine Ratio 14 12 - 20      Est, Glom Filt Rate 48 (L) >60 ml/min/1.73m2    Calcium 9.2 8.5 - 10.1 MG/DL   POCT Glucose    Collection Time: 06/29/24  3:57 PM   Result Value Ref Range    POC Glucose 136 (H) 70 - 110 mg/dL   APTT    Collection Time: 06/29/24  5:42 PM   Result Value Ref Range    APTT 36.8 (H) 23.0 - 36.4 SEC   POCT Glucose    Collection Time: 06/29/24  8:39 PM   Result Value Ref Range    POC Glucose 156 (H) 70 - 110 mg/dL   APTT    Collection Time: 06/30/24 12:16 AM   Result Value Ref Range    APTT 61.9 (H) 23.0 - 36.4 SEC   Basic Metabolic Panel    Collection Time: 06/30/24  3:00 AM   Result Value Ref Range    Sodium 138 136 - 145 mmol/L    Potassium 3.7 3.5 - 5.5 mmol/L    Chloride 104 100 - 111 mmol/L    CO2 28 21 - 32 mmol/L    Anion Gap 6 3.0 - 18 mmol/L    Glucose 133 (H) 74 - 99 mg/dL    BUN 17 7.0 - 18 MG/DL    Creatinine 1.16 0.6 - 1.3 MG/DL    BUN/Creatinine Ratio 15 12 - 20      Est, Glom Filt Rate 46 (L) >60 ml/min/1.73m2    Calcium 10.1 8.5 - 10.1 MG/DL   POCT Glucose    Collection Time: 06/30/24  7:06 AM   Result Value Ref Range    POC Glucose 150 (H) 70 - 110 mg/dL         Signed By: Jose Griffin MD          
% injection 5-40 mL  5-40 mL IntraVENous PRN    0.9 % sodium chloride infusion   IntraVENous PRN    potassium chloride (KLOR-CON M) extended release tablet 40 mEq  40 mEq Oral PRN    Or    potassium bicarb-citric acid (EFFER-K) effervescent tablet 40 mEq  40 mEq Oral PRN    Or    potassium chloride 10 mEq/100 mL IVPB (Peripheral Line)  10 mEq IntraVENous PRN    magnesium sulfate 2000 mg in 50 mL IVPB premix  2,000 mg IntraVENous PRN    ondansetron (ZOFRAN-ODT) disintegrating tablet 4 mg  4 mg Oral Q8H PRN    Or    ondansetron (ZOFRAN) injection 4 mg  4 mg IntraVENous Q6H PRN    glucose chewable tablet 16 g  4 tablet Oral PRN    dextrose bolus 10% 125 mL  125 mL IntraVENous PRN    Or    dextrose bolus 10% 250 mL  250 mL IntraVENous PRN    glucagon (rDNA) injection 1 mg  1 mg SubCUTAneous PRN    dextrose 10 % infusion   IntraVENous Continuous PRN    hydrALAZINE (APRESOLINE) injection 10 mg  10 mg IntraVENous Q6H PRN        Labs:    Recent Results (from the past 24 hour(s))   POCT Glucose    Collection Time: 07/07/24  3:52 PM   Result Value Ref Range    POC Glucose 205 (H) 70 - 110 mg/dL   POCT Glucose    Collection Time: 07/07/24  8:31 PM   Result Value Ref Range    POC Glucose 196 (H) 70 - 110 mg/dL   Basic Metabolic Panel    Collection Time: 07/08/24  3:08 AM   Result Value Ref Range    Sodium 140 136 - 145 mmol/L    Potassium 3.5 3.5 - 5.5 mmol/L    Chloride 107 100 - 111 mmol/L    CO2 27 21 - 32 mmol/L    Anion Gap 6 3.0 - 18 mmol/L    Glucose 138 (H) 74 - 99 mg/dL    BUN 7 7.0 - 18 MG/DL    Creatinine 0.57 (L) 0.6 - 1.3 MG/DL    BUN/Creatinine Ratio 12 12 - 20      Est, Glom Filt Rate 88 >60 ml/min/1.73m2    Calcium 8.9 8.5 - 10.1 MG/DL   CBC with Auto Differential    Collection Time: 07/08/24  3:08 AM   Result Value Ref Range    WBC 5.3 4.6 - 13.2 K/uL    RBC 3.00 (L) 4.20 - 5.30 M/uL    Hemoglobin 9.9 (L) 12.0 - 16.0 g/dL    Hematocrit 30.6 (L) 35.0 - 45.0 %    .0 (H) 78.0 - 100.0 FL    MCH 33.0 24.0 - 
PRN    Or    ondansetron (ZOFRAN) injection 4 mg  4 mg IntraVENous Q6H PRN    glucose chewable tablet 16 g  4 tablet Oral PRN    dextrose bolus 10% 125 mL  125 mL IntraVENous PRN    Or    dextrose bolus 10% 250 mL  250 mL IntraVENous PRN    glucagon (rDNA) injection 1 mg  1 mg SubCUTAneous PRN    dextrose 10 % infusion   IntraVENous Continuous PRN    hydrALAZINE (APRESOLINE) injection 10 mg  10 mg IntraVENous Q6H PRN         Intake/Output Summary (Last 24 hours) at 7/9/2024 0927  Last data filed at 7/9/2024 0910  Gross per 24 hour   Intake 220 ml   Output 310 ml   Net -90 ml         Physical Exam:  General:  alert, appears stated age, and cooperative  Neck:  nontender  Lungs:  clear to auscultation bilaterally  Heart:  regular rate and rhythm, S1, S2 normal, no murmur, click, rub or gallop  Abdomen:  abdomen is soft, +BS   Extremities:  extremities normal, atraumatic, no cyanosis or edema    Visit Vitals  /76   Pulse 78   Temp 97.9 °F (36.6 °C) (Oral)   Resp 20   Ht 1.702 m (5' 7\")   Wt 97.1 kg (214 lb)   SpO2 99%   BMI 33.52 kg/m²       Data Review:     Labs: Results:       Chemistry Recent Labs     07/07/24  0111 07/07/24  1307 07/08/24  0308     --  140   K 3.7  --  3.5     --  107   CO2 26  --  27   BUN 12  --  7   CREATININE 0.75  --  0.57*   MG  --   --  1.8   GLOB  --  4.2*  --         CBC w/Diff Recent Labs     07/07/24  1307 07/08/24  0308   WBC 5.2 5.3   RBC 3.01* 3.00*   HGB 9.9* 9.9*   HCT 31.3* 30.6*    168        Cardiac Enzymes Lab Results   Component Value Date/Time    TROPHS 569 06/28/2024 08:14 PM    TROPHS 612 06/28/2024 12:53 AM    TROPHS 533 06/27/2024 09:47 AM      Coagulation No results for input(s): \"INR\", \"APTT\" in the last 72 hours.    Lipid Panel Lab Results   Component Value Date/Time    CHOL 207 06/29/2024 07:14 AM    HDL 36 06/29/2024 07:14 AM    .8 06/29/2024 07:14 AM    .6 02/22/2023 01:59 AM    VLDL 16.2 06/29/2024 07:14 AM      BNP Lab Results 
g    LV Mass 2D Index 96.4 (A) 43 - 95 g/m2    MV E/A 0.67     E/E' Ratio (Averaged) 9.90     E/E' Lateral 8.80     E/E' Septal 11.00     LA Volume Index A/L 20 16 - 34 mL/m2    LVOT Stroke Volume Index 30.5 mL/m2    LVOT Area 3.8 cm2    LA Volume Index A-L A2C 19 16 - 34 mL/m2    LA Volume Index A-L A4C 21 16 - 34 mL/m2    LA Volume Index MOD A2C 19 16 - 34 ml/m2    LA Volume Index MOD A4C 20 16 - 34 ml/m2    Ao Root Index 1.49 cm/m2    Ascending Aorta Index 1.49 cm/m2    AV Velocity Ratio 0.75     GABRIEL/BSA Peak Velocity 1.3 cm2/m2    IVSd 1.1 (A) 0.6 - 0.9 cm    LVIDd 4.9 3.9 - 5.3 cm    LVIDs 4.3 cm    LVOT Diameter 2.2 cm    LVPWd 1.1 (A) 0.6 - 0.9 cm    LVOT Peak Gradient 3 mmHg    LVOT Mean Gradient 2 mmHg    LVOT SV 63.4 ml    LVOT Peak Velocity 0.9 m/s    LVOT VTI 16.7 cm    LA Volume A/L 42 mL    LA Volume A-L A4C 39 22 - 52 mL    LA Volume A-L A4C 44 22 - 52 mL    LA Volume MOD A2C 39 22 - 52 mL    LA Volume MOD A4C 41 22 - 52 mL    AV Area by Peak Velocity 2.8 cm2    AV Peak Gradient 6 mmHg    AV Peak Velocity 1.2 m/s    MV A Velocity 0.66 m/s    MV E Wave Deceleration Time 180.3 ms    MV E Velocity 0.44 m/s    LV E' Lateral Velocity 5 cm/s    LV E' Septal Velocity 4 cm/s    Ascending Aorta 3.1 cm    Aortic Root 3.1 cm   POCT Glucose    Collection Time: 06/28/24 12:15 PM   Result Value Ref Range    POC Glucose 110 70 - 110 mg/dL   APTT    Collection Time: 06/28/24  1:56 PM   Result Value Ref Range    APTT 61.1 (H) 23.0 - 36.4 SEC   POCT Glucose    Collection Time: 06/28/24  4:12 PM   Result Value Ref Range    POC Glucose 99 70 - 110 mg/dL   Troponin    Collection Time: 06/28/24  8:14 PM   Result Value Ref Range    Troponin, High Sensitivity 569 (HH) 0 - 54 ng/L   POCT Glucose    Collection Time: 06/28/24  9:26 PM   Result Value Ref Range    POC Glucose 122 (H) 70 - 110 mg/dL   CBC    Collection Time: 06/28/24  9:28 PM   Result Value Ref Range    WBC 5.7 4.6 - 13.2 K/uL    RBC 3.66 (L) 4.20 - 5.30 M/uL    
  Result Value Ref Range    WBC 5.5 4.6 - 13.2 K/uL    RBC 3.24 (L) 4.20 - 5.30 M/uL    Hemoglobin 10.5 (L) 12.0 - 16.0 g/dL    Hematocrit 34.1 (L) 35.0 - 45.0 %    .2 (H) 78.0 - 100.0 FL    MCH 32.4 24.0 - 34.0 PG    MCHC 30.8 (L) 31.0 - 37.0 g/dL    RDW 11.9 11.6 - 14.5 %    Platelets 161 135 - 420 K/uL    MPV 11.9 (H) 9.2 - 11.8 FL    Nucleated RBCs 0.0 0  WBC    nRBC 0.00 0.00 - 0.01 K/uL    Neutrophils % 41 40 - 73 %    Lymphocytes % 45 21 - 52 %    Monocytes % 9 3 - 10 %    Eosinophils % 4 0 - 5 %    Basophils % 1 0 - 2 %    Immature Granulocytes % 0 0.0 - 0.5 %    Neutrophils Absolute 2.3 1.8 - 8.0 K/UL    Lymphocytes Absolute 2.5 0.9 - 3.6 K/UL    Monocytes Absolute 0.5 0.05 - 1.2 K/UL    Eosinophils Absolute 0.2 0.0 - 0.4 K/UL    Basophils Absolute 0.0 0.0 - 0.1 K/UL    Immature Granulocytes Absolute 0.0 0.00 - 0.04 K/UL    Differential Type AUTOMATED     Magnesium    Collection Time: 07/06/24  5:14 AM   Result Value Ref Range    Magnesium 1.9 1.6 - 2.6 mg/dL   POCT Glucose    Collection Time: 07/06/24  8:24 AM   Result Value Ref Range    POC Glucose 126 (H) 70 - 110 mg/dL   POCT Glucose    Collection Time: 07/06/24 11:17 AM   Result Value Ref Range    POC Glucose 155 (H) 70 - 110 mg/dL         Signed By: Danita Ernandez MD          
NTPROBNP 413 06/26/2024 08:50 PM      Liver Enzymes Lab Results   Component Value Date    ALT 13 06/26/2024    AST 15 06/26/2024    ALKPHOS 76 06/26/2024    BILITOT 0.7 06/26/2024      Thyroid Studies Lab Results   Component Value Date/Time    TSH 1.83 05/24/2022 02:00 PM          Signed By: Kelly Hunter PA-C     July 5, 2024       
Neutrophils % 58 40 - 73 %    Lymphocytes % 33 21 - 52 %    Monocytes % 8 3 - 10 %    Eosinophils % 1 0 - 5 %    Basophils % 0 0 - 2 %    Immature Granulocytes % 0 0.0 - 0.5 %    Neutrophils Absolute 3.6 1.8 - 8.0 K/UL    Lymphocytes Absolute 2.0 0.9 - 3.6 K/UL    Monocytes Absolute 0.5 0.05 - 1.2 K/UL    Eosinophils Absolute 0.0 0.0 - 0.4 K/UL    Basophils Absolute 0.0 0.0 - 0.1 K/UL    Immature Granulocytes Absolute 0.0 0.00 - 0.04 K/UL    Differential Type AUTOMATED     Magnesium    Collection Time: 07/04/24  3:02 AM   Result Value Ref Range    Magnesium 2.0 1.6 - 2.6 mg/dL   POCT Glucose    Collection Time: 07/04/24  6:18 AM   Result Value Ref Range    POC Glucose 176 (H) 70 - 110 mg/dL   POCT Glucose    Collection Time: 07/04/24 11:01 AM   Result Value Ref Range    POC Glucose 150 (H) 70 - 110 mg/dL         Signed By: Danita Ernandez MD          
deficits  Initiation: Does not require cues  Sequencing: Requires cues for some    Skin: Intact  Edema: None noted    Vision/Perceptual:    Vision  Vision: Within Functional Limits       Coordination: BUE  Coordination: Generally decreased, functional    Balance:  Balance  Sitting: Impaired  Sitting - Static: Good (unsupported)  Sitting - Dynamic: Fair (occasional)  Standing: Impaired  Standing - Static: Fair;Occasional  Standing - Dynamic: Poor    Strength: BUE  Strength: Generally decreased, functional    Tone & Sensation: BUE  Tone: Normal  Sensation: Intact    Range of Motion: BUE  AROM: Generally decreased, functional    Functional Mobility and Transfers for ADLs:  Bed Mobility:  Bed Mobility Training  Bed Mobility Training: Yes  Supine to Sit: Stand-by assistance;Additional time  Scooting: Stand-by assistance;Additional time      Transfers:  Transfer Training  Transfer Training: Yes  Sit to Stand: Contact-guard assistance  Stand to Sit: Contact-guard assistance  Stand Pivot Transfers: Contact-guard assistance  Toilet Transfer: Contact-guard assistance (BSC)    ADL Assessment:   Feeding: Independent  Grooming: Stand by assistance  UE Bathing: Stand by assistance  LE Bathing: Minimal assistance  UE Dressing: Stand by assistance  LE Dressing: Minimal assistance  Toileting: Contact guard assistance    Pain:  Intensity Pre-treatment: 0/10   Intensity Post-treatment: 0/10    Activity Tolerance:   Activity Tolerance: Patient Tolerated treatment well  Please refer to the flowsheet for vital signs taken during this treatment.    After treatment:   [x] Patient left in no apparent distress sitting up in chair  [] Patient left in no apparent distress in bed  [x] Call bell left within reach  [x] Nursing notified  [] Caregiver present  [] Bed alarm activated    COMMUNICATION/EDUCATION:   Patient Education  Education Given To: Patient  Education Provided: Role of Therapy;Plan of Care;Energy Conservation;Fall Prevention

## 2024-07-09 NOTE — DISCHARGE INSTRUCTIONS
Discharge Instructions    Patient: Grace Lyons MRN: [id number]  CSN: [id number]    YOB: 1935  Age: 89 y.o.  Sex: female    DOA: 6/26/2024       DIET:  cardiac diet and diabetic diet    ACTIVITY: activity as tolerated  Home health care for Skilled care for CHF management, DM, Hypertension and medication management       PT/OT consult      ADDITIONAL INFORMATION: If you experience any of the following symptoms but not limited to Fever, chills, nausea, vomiting, diarrhea, change in mentation, falling, bleeding, shortness of breath, chest pain, please call your primary care physician or return to the emergency room if you cannot get hold of your doctor:     FOLLOW UP CARE:   Follow-up Information     [facility].    Specialty: Home Health Services  Why: Your preferred agency.  Anticipated Start of Care is 07/01/2024.  Contact information:  [facility], [address]  [place] [address]  [phone]           Trent Talamantes MD. Schedule an appointment as soon as possible for a   visit in 1 week(s).    Specialty: Family Medicine  Contact information:  [address] [place] [id number] [phone]             Chucho Nguyen MD. Schedule an appointment as soon as possible for a   visit in 2 week(s).    Specialties: Cardiology, Internal Medicine, Interventional Cardiology  Contact information:  [address] [facility] [address]  [facility] [id number] [phone]             Homero Shah MD. Schedule an appointment as soon as possible for a   visit in 2 week(s).    Specialty: Urology  Contact information:  [address] [place] [address] [phone]      Danita Ernandez MD  7/9/2024 10:41 AM       DISCHARGE SUMMARY from Nurse    PATIENT INSTRUCTIONS:    After general anesthesia or intravenous sedation, for 24 hours or while taking prescription Narcotics:  Limit your activities  Do not drive and operate

## 2024-08-20 ENCOUNTER — OFFICE VISIT (OUTPATIENT)
Age: 89
End: 2024-08-20
Payer: MEDICARE

## 2024-08-20 VITALS
DIASTOLIC BLOOD PRESSURE: 70 MMHG | WEIGHT: 214 LBS | OXYGEN SATURATION: 96 % | BODY MASS INDEX: 33.59 KG/M2 | HEART RATE: 97 BPM | HEIGHT: 67 IN | SYSTOLIC BLOOD PRESSURE: 123 MMHG

## 2024-08-20 DIAGNOSIS — I25.118 ATHEROSCLEROTIC HEART DISEASE OF NATIVE CORONARY ARTERY WITH OTHER FORMS OF ANGINA PECTORIS (HCC): Primary | ICD-10-CM

## 2024-08-20 DIAGNOSIS — R42 DIZZINESS: ICD-10-CM

## 2024-08-20 DIAGNOSIS — I25.118 ATHEROSCLEROTIC HEART DISEASE OF NATIVE CORONARY ARTERY WITH OTHER FORMS OF ANGINA PECTORIS (HCC): ICD-10-CM

## 2024-08-20 DIAGNOSIS — I48.0 PAROXYSMAL ATRIAL FIBRILLATION (HCC): ICD-10-CM

## 2024-08-20 DIAGNOSIS — I50.32 CHRONIC DIASTOLIC (CONGESTIVE) HEART FAILURE (HCC): ICD-10-CM

## 2024-08-20 DIAGNOSIS — Z95.5 PRESENCE OF CORONARY ANGIOPLASTY IMPLANT AND GRAFT: ICD-10-CM

## 2024-08-20 DIAGNOSIS — I10 ESSENTIAL (PRIMARY) HYPERTENSION: ICD-10-CM

## 2024-08-20 DIAGNOSIS — E78.2 MIXED HYPERLIPIDEMIA: ICD-10-CM

## 2024-08-20 PROCEDURE — 1123F ACP DISCUSS/DSCN MKR DOCD: CPT | Performed by: NURSE PRACTITIONER

## 2024-08-20 PROCEDURE — 99214 OFFICE O/P EST MOD 30 MIN: CPT | Performed by: NURSE PRACTITIONER

## 2024-08-20 RX ORDER — FAMOTIDINE 20 MG/1
20 TABLET, FILM COATED ORAL 2 TIMES DAILY
Qty: 3 TABLET | Refills: 0 | Status: SHIPPED | OUTPATIENT
Start: 2024-08-20 | End: 2024-08-22

## 2024-08-20 RX ORDER — PREDNISONE 20 MG/1
20 TABLET ORAL 2 TIMES DAILY
Qty: 3 TABLET | Refills: 0 | Status: SHIPPED | OUTPATIENT
Start: 2024-08-20 | End: 2024-08-22

## 2024-08-20 RX ORDER — FUROSEMIDE 20 MG/1
20 TABLET ORAL DAILY
Qty: 90 TABLET | Refills: 2 | Status: SHIPPED | OUTPATIENT
Start: 2024-08-20

## 2024-08-20 RX ORDER — DIPHENHYDRAMINE HCL 25 MG
50 CAPSULE ORAL 2 TIMES DAILY
Status: SHIPPED | OUTPATIENT
Start: 2024-08-20 | End: 2024-08-22

## 2024-08-20 RX ORDER — MECLIZINE HYDROCHLORIDE 25 MG/1
25 TABLET ORAL 3 TIMES DAILY PRN
Qty: 30 TABLET | Refills: 1 | Status: SHIPPED | OUTPATIENT
Start: 2024-08-20

## 2024-08-20 RX ORDER — ASPIRIN 81 MG/1
81 TABLET, CHEWABLE ORAL DAILY
Qty: 90 TABLET | Refills: 3 | Status: SHIPPED | OUTPATIENT
Start: 2024-08-20

## 2024-08-20 ASSESSMENT — PATIENT HEALTH QUESTIONNAIRE - PHQ9
2. FEELING DOWN, DEPRESSED OR HOPELESS: NOT AT ALL
SUM OF ALL RESPONSES TO PHQ QUESTIONS 1-9: 0
1. LITTLE INTEREST OR PLEASURE IN DOING THINGS: NOT AT ALL
SUM OF ALL RESPONSES TO PHQ9 QUESTIONS 1 & 2: 0
SUM OF ALL RESPONSES TO PHQ QUESTIONS 1-9: 0

## 2024-08-20 NOTE — PROGRESS NOTES
1. Have you been to the ER, urgent care clinic since your last visit?  Hospitalized since your last visit?    [facility]  for heart attack    2. Have you seen or consulted any other health care providers outside of the [facility] since your last visit?  Include any pap smears or colon screening.       Yes pcp

## 2024-08-21 ENCOUNTER — TELEPHONE (OUTPATIENT)
Age: 89
End: 2024-08-21

## 2024-08-21 NOTE — TELEPHONE ENCOUNTER
----- Message from Mojgan HOLDER sent at 8/20/2024  1:33 PM EDT -----  Regarding: case cath  Please schedule case cath request per Shellie

## 2024-08-22 NOTE — TELEPHONE ENCOUNTER
Authorization obtained. Awaiting date from cath lab.    Authorization number: [id number] 8/22 thru 11/19

## 2024-08-26 PROBLEM — I20.0 UNSTABLE ANGINA (HCC): Status: ACTIVE | Noted: 2024-08-20

## 2024-08-27 ENCOUNTER — HOSPITAL ENCOUNTER (INPATIENT)
Facility: HOSPITAL | Age: 89
LOS: 14 days | Discharge: NURSING, REHAB OR LONG-TERM CARE | DRG: 322 | End: 2024-09-10
Attending: EMERGENCY MEDICINE | Admitting: INTERNAL MEDICINE
Payer: MEDICARE

## 2024-08-27 ENCOUNTER — APPOINTMENT (OUTPATIENT)
Facility: HOSPITAL | Age: 89
DRG: 322 | End: 2024-08-27
Attending: EMERGENCY MEDICINE
Payer: MEDICARE

## 2024-08-27 DIAGNOSIS — E11.42 DIABETIC POLYNEUROPATHY ASSOCIATED WITH TYPE 2 DIABETES MELLITUS (HCC): ICD-10-CM

## 2024-08-27 DIAGNOSIS — R07.9 CHEST PAIN, UNSPECIFIED TYPE: ICD-10-CM

## 2024-08-27 DIAGNOSIS — R06.00 DYSPNEA, UNSPECIFIED TYPE: ICD-10-CM

## 2024-08-27 DIAGNOSIS — I48.91 ATRIAL FIBRILLATION, UNSPECIFIED TYPE (HCC): Primary | ICD-10-CM

## 2024-08-27 DIAGNOSIS — I10 HYPERTENSION, UNSPECIFIED TYPE: ICD-10-CM

## 2024-08-27 DIAGNOSIS — R07.9 CHEST PAIN: ICD-10-CM

## 2024-08-27 PROBLEM — R07.89 ATYPICAL CHEST PAIN: Status: ACTIVE | Noted: 2024-08-27

## 2024-08-27 LAB
ANION GAP SERPL CALC-SCNC: 5 MMOL/L (ref 3–18)
APPEARANCE UR: ABNORMAL
BACTERIA URNS QL MICRO: ABNORMAL /HPF
BASOPHILS # BLD: 0 K/UL (ref 0–0.1)
BASOPHILS NFR BLD: 1 % (ref 0–2)
BILIRUB UR QL: NEGATIVE
BUN SERPL-MCNC: 11 MG/DL (ref 7–18)
BUN/CREAT SERPL: 14 (ref 12–20)
CALCIUM SERPL-MCNC: 9.1 MG/DL (ref 8.5–10.1)
CHLORIDE SERPL-SCNC: 105 MMOL/L (ref 100–111)
CO2 SERPL-SCNC: 29 MMOL/L (ref 21–32)
COLOR UR: YELLOW
CREAT SERPL-MCNC: 0.76 MG/DL (ref 0.6–1.3)
DIFFERENTIAL METHOD BLD: ABNORMAL
EKG DIAGNOSIS: NORMAL
EKG Q-T INTERVAL: 348 MS
EKG QRS DURATION: 88 MS
EKG QTC CALCULATION (BAZETT): 466 MS
EKG R AXIS: -18 DEGREES
EKG T AXIS: 48 DEGREES
EKG VENTRICULAR RATE: 108 BPM
EOSINOPHIL # BLD: 0.3 K/UL (ref 0–0.4)
EOSINOPHIL NFR BLD: 6 % (ref 0–5)
EPITH CASTS URNS QL MICRO: ABNORMAL /LPF (ref 0–5)
ERYTHROCYTE [DISTWIDTH] IN BLOOD BY AUTOMATED COUNT: 12.1 % (ref 11.6–14.5)
GLUCOSE BLD STRIP.AUTO-MCNC: 127 MG/DL (ref 70–110)
GLUCOSE BLD STRIP.AUTO-MCNC: 164 MG/DL (ref 70–110)
GLUCOSE SERPL-MCNC: 148 MG/DL (ref 74–99)
GLUCOSE UR STRIP.AUTO-MCNC: NEGATIVE MG/DL
HCT VFR BLD AUTO: 38 % (ref 35–45)
HGB BLD-MCNC: 12.1 G/DL (ref 12–16)
HGB UR QL STRIP: NEGATIVE
IMM GRANULOCYTES # BLD AUTO: 0 K/UL (ref 0–0.04)
IMM GRANULOCYTES NFR BLD AUTO: 0 % (ref 0–0.5)
KETONES UR QL STRIP.AUTO: NEGATIVE MG/DL
LACTATE BLD-SCNC: 1.5 MMOL/L (ref 0.4–2)
LEUKOCYTE ESTERASE UR QL STRIP.AUTO: ABNORMAL
LYMPHOCYTES # BLD: 2.2 K/UL (ref 0.9–3.6)
LYMPHOCYTES NFR BLD: 36 % (ref 21–52)
MCH RBC QN AUTO: 32.2 PG (ref 24–34)
MCHC RBC AUTO-ENTMCNC: 31.8 G/DL (ref 31–37)
MCV RBC AUTO: 101.1 FL (ref 78–100)
MONOCYTES # BLD: 0.4 K/UL (ref 0.05–1.2)
MONOCYTES NFR BLD: 6 % (ref 3–10)
NEUTS SEG # BLD: 3.2 K/UL (ref 1.8–8)
NEUTS SEG NFR BLD: 52 % (ref 40–73)
NITRITE UR QL STRIP.AUTO: POSITIVE
NRBC # BLD: 0 K/UL (ref 0–0.01)
NRBC BLD-RTO: 0 PER 100 WBC
NT PRO BNP: 162 PG/ML (ref 0–1800)
PH UR STRIP: 6 (ref 5–8)
PLATELET # BLD AUTO: 206 K/UL (ref 135–420)
PMV BLD AUTO: 11 FL (ref 9.2–11.8)
POTASSIUM SERPL-SCNC: 5 MMOL/L (ref 3.5–5.5)
PROT UR STRIP-MCNC: 30 MG/DL
RBC # BLD AUTO: 3.76 M/UL (ref 4.2–5.3)
RBC #/AREA URNS HPF: ABNORMAL /HPF (ref 0–5)
SODIUM SERPL-SCNC: 139 MMOL/L (ref 136–145)
SP GR UR REFRACTOMETRY: 1.02 (ref 1–1.03)
TROPONIN I SERPL HS-MCNC: 165 NG/L (ref 0–54)
TROPONIN I SERPL HS-MCNC: 168 NG/L (ref 0–54)
UROBILINOGEN UR QL STRIP.AUTO: 1 EU/DL (ref 0.2–1)
WBC # BLD AUTO: 6.2 K/UL (ref 4.6–13.2)
WBC URNS QL MICRO: ABNORMAL /HPF (ref 0–4)

## 2024-08-27 PROCEDURE — 81001 URINALYSIS AUTO W/SCOPE: CPT

## 2024-08-27 PROCEDURE — 82962 GLUCOSE BLOOD TEST: CPT

## 2024-08-27 PROCEDURE — 96360 HYDRATION IV INFUSION INIT: CPT

## 2024-08-27 PROCEDURE — 99222 1ST HOSP IP/OBS MODERATE 55: CPT | Performed by: HOSPITALIST

## 2024-08-27 PROCEDURE — 80048 BASIC METABOLIC PNL TOTAL CA: CPT

## 2024-08-27 PROCEDURE — 99285 EMERGENCY DEPT VISIT HI MDM: CPT

## 2024-08-27 PROCEDURE — 93010 ELECTROCARDIOGRAM REPORT: CPT | Performed by: INTERNAL MEDICINE

## 2024-08-27 PROCEDURE — 84484 ASSAY OF TROPONIN QUANT: CPT

## 2024-08-27 PROCEDURE — 6360000002 HC RX W HCPCS: Performed by: HOSPITALIST

## 2024-08-27 PROCEDURE — 83880 ASSAY OF NATRIURETIC PEPTIDE: CPT

## 2024-08-27 PROCEDURE — 71045 X-RAY EXAM CHEST 1 VIEW: CPT

## 2024-08-27 PROCEDURE — 36415 COLL VENOUS BLD VENIPUNCTURE: CPT

## 2024-08-27 PROCEDURE — 93005 ELECTROCARDIOGRAM TRACING: CPT | Performed by: EMERGENCY MEDICINE

## 2024-08-27 PROCEDURE — 6370000000 HC RX 637 (ALT 250 FOR IP): Performed by: HOSPITALIST

## 2024-08-27 PROCEDURE — 6370000000 HC RX 637 (ALT 250 FOR IP): Performed by: EMERGENCY MEDICINE

## 2024-08-27 PROCEDURE — 2580000003 HC RX 258: Performed by: EMERGENCY MEDICINE

## 2024-08-27 PROCEDURE — 1100000003 HC PRIVATE W/ TELEMETRY

## 2024-08-27 PROCEDURE — 2580000003 HC RX 258: Performed by: HOSPITALIST

## 2024-08-27 PROCEDURE — 85025 COMPLETE CBC W/AUTO DIFF WBC: CPT

## 2024-08-27 PROCEDURE — 83605 ASSAY OF LACTIC ACID: CPT

## 2024-08-27 RX ORDER — CLOPIDOGREL BISULFATE 75 MG/1
75 TABLET ORAL
Status: COMPLETED | OUTPATIENT
Start: 2024-08-27 | End: 2024-08-27

## 2024-08-27 RX ORDER — FUROSEMIDE 20 MG
20 TABLET ORAL DAILY
Status: DISCONTINUED | OUTPATIENT
Start: 2024-08-28 | End: 2024-09-10 | Stop reason: HOSPADM

## 2024-08-27 RX ORDER — ASPIRIN 81 MG/1
81 TABLET, CHEWABLE ORAL DAILY
Status: DISCONTINUED | OUTPATIENT
Start: 2024-08-28 | End: 2024-09-10 | Stop reason: HOSPADM

## 2024-08-27 RX ORDER — LEVOFLOXACIN 5 MG/ML
750 INJECTION, SOLUTION INTRAVENOUS EVERY 24 HOURS
Status: DISCONTINUED | OUTPATIENT
Start: 2024-08-27 | End: 2024-09-01

## 2024-08-27 RX ORDER — SODIUM CHLORIDE 9 MG/ML
INJECTION, SOLUTION INTRAVENOUS PRN
Status: DISCONTINUED | OUTPATIENT
Start: 2024-08-27 | End: 2024-08-31

## 2024-08-27 RX ORDER — CIPROFLOXACIN 2 MG/ML
400 INJECTION, SOLUTION INTRAVENOUS EVERY 12 HOURS
Status: DISCONTINUED | OUTPATIENT
Start: 2024-08-27 | End: 2024-08-27

## 2024-08-27 RX ORDER — DEXTROSE MONOHYDRATE 100 MG/ML
INJECTION, SOLUTION INTRAVENOUS CONTINUOUS PRN
Status: DISCONTINUED | OUTPATIENT
Start: 2024-08-27 | End: 2024-08-31

## 2024-08-27 RX ORDER — POLYETHYLENE GLYCOL 3350 17 G/17G
17 POWDER, FOR SOLUTION ORAL DAILY PRN
Status: DISCONTINUED | OUTPATIENT
Start: 2024-08-27 | End: 2024-09-02

## 2024-08-27 RX ORDER — ONDANSETRON 4 MG/1
4 TABLET, ORALLY DISINTEGRATING ORAL EVERY 8 HOURS PRN
Status: DISCONTINUED | OUTPATIENT
Start: 2024-08-27 | End: 2024-09-10 | Stop reason: HOSPADM

## 2024-08-27 RX ORDER — MAGNESIUM SULFATE IN WATER 40 MG/ML
2000 INJECTION, SOLUTION INTRAVENOUS PRN
Status: DISCONTINUED | OUTPATIENT
Start: 2024-08-27 | End: 2024-09-10 | Stop reason: HOSPADM

## 2024-08-27 RX ORDER — ONDANSETRON 2 MG/ML
4 INJECTION INTRAMUSCULAR; INTRAVENOUS EVERY 6 HOURS PRN
Status: DISCONTINUED | OUTPATIENT
Start: 2024-08-27 | End: 2024-09-10 | Stop reason: HOSPADM

## 2024-08-27 RX ORDER — 0.9 % SODIUM CHLORIDE 0.9 %
500 INTRAVENOUS SOLUTION INTRAVENOUS ONCE
Status: COMPLETED | OUTPATIENT
Start: 2024-08-27 | End: 2024-08-27

## 2024-08-27 RX ORDER — ALBUTEROL SULFATE 90 UG/1
1 AEROSOL, METERED RESPIRATORY (INHALATION) EVERY 6 HOURS PRN
Status: DISCONTINUED | OUTPATIENT
Start: 2024-08-27 | End: 2024-08-27 | Stop reason: CLARIF

## 2024-08-27 RX ORDER — POTASSIUM CHLORIDE 1500 MG/1
40 TABLET, EXTENDED RELEASE ORAL PRN
Status: DISCONTINUED | OUTPATIENT
Start: 2024-08-27 | End: 2024-09-10 | Stop reason: HOSPADM

## 2024-08-27 RX ORDER — ENOXAPARIN SODIUM 100 MG/ML
40 INJECTION SUBCUTANEOUS DAILY
Status: DISCONTINUED | OUTPATIENT
Start: 2024-08-28 | End: 2024-09-10 | Stop reason: HOSPADM

## 2024-08-27 RX ORDER — ISOSORBIDE MONONITRATE 30 MG/1
30 TABLET, EXTENDED RELEASE ORAL EVERY MORNING
Status: DISCONTINUED | OUTPATIENT
Start: 2024-08-28 | End: 2024-08-30

## 2024-08-27 RX ORDER — GLUCAGON 1 MG
1 KIT INJECTION PRN
Status: DISCONTINUED | OUTPATIENT
Start: 2024-08-27 | End: 2024-08-31 | Stop reason: SDUPTHER

## 2024-08-27 RX ORDER — SODIUM CHLORIDE 0.9 % (FLUSH) 0.9 %
5-40 SYRINGE (ML) INJECTION PRN
Status: DISCONTINUED | OUTPATIENT
Start: 2024-08-27 | End: 2024-08-31 | Stop reason: SDUPTHER

## 2024-08-27 RX ORDER — ASPIRIN 81 MG/1
81 TABLET, CHEWABLE ORAL
Status: COMPLETED | OUTPATIENT
Start: 2024-08-27 | End: 2024-08-27

## 2024-08-27 RX ORDER — RANOLAZINE 500 MG/1
500 TABLET, EXTENDED RELEASE ORAL 2 TIMES DAILY
Status: DISCONTINUED | OUTPATIENT
Start: 2024-08-27 | End: 2024-09-10 | Stop reason: HOSPADM

## 2024-08-27 RX ORDER — POTASSIUM CHLORIDE 7.45 MG/ML
10 INJECTION INTRAVENOUS PRN
Status: DISCONTINUED | OUTPATIENT
Start: 2024-08-27 | End: 2024-09-10 | Stop reason: HOSPADM

## 2024-08-27 RX ORDER — SODIUM CHLORIDE 0.9 % (FLUSH) 0.9 %
5-40 SYRINGE (ML) INJECTION EVERY 12 HOURS SCHEDULED
Status: DISCONTINUED | OUTPATIENT
Start: 2024-08-27 | End: 2024-08-31 | Stop reason: SDUPTHER

## 2024-08-27 RX ORDER — ALBUTEROL SULFATE 0.83 MG/ML
2.5 SOLUTION RESPIRATORY (INHALATION) EVERY 6 HOURS PRN
Status: DISCONTINUED | OUTPATIENT
Start: 2024-08-27 | End: 2024-09-10 | Stop reason: HOSPADM

## 2024-08-27 RX ORDER — MONTELUKAST SODIUM 10 MG/1
10 TABLET ORAL DAILY
Status: DISCONTINUED | OUTPATIENT
Start: 2024-08-28 | End: 2024-09-10 | Stop reason: HOSPADM

## 2024-08-27 RX ORDER — CLOPIDOGREL BISULFATE 75 MG/1
75 TABLET ORAL DAILY
Status: DISCONTINUED | OUTPATIENT
Start: 2024-08-28 | End: 2024-09-10 | Stop reason: HOSPADM

## 2024-08-27 RX ORDER — MECLIZINE HCL 12.5 MG 12.5 MG/1
12.5 TABLET ORAL 3 TIMES DAILY PRN
Status: DISCONTINUED | OUTPATIENT
Start: 2024-08-27 | End: 2024-09-10 | Stop reason: HOSPADM

## 2024-08-27 RX ORDER — INSULIN GLARGINE 100 [IU]/ML
36 INJECTION, SOLUTION SUBCUTANEOUS 2 TIMES DAILY
Status: DISCONTINUED | OUTPATIENT
Start: 2024-08-27 | End: 2024-08-29

## 2024-08-27 RX ADMIN — ASPIRIN 81 MG CHEWABLE TABLET 81 MG: 81 TABLET CHEWABLE at 12:01

## 2024-08-27 RX ADMIN — SODIUM CHLORIDE 500 ML: 9 INJECTION, SOLUTION INTRAVENOUS at 11:58

## 2024-08-27 RX ADMIN — NITROGLYCERIN 0.5 INCH: 20 OINTMENT TOPICAL at 12:01

## 2024-08-27 RX ADMIN — CLOPIDOGREL BISULFATE 75 MG: 75 TABLET ORAL at 13:43

## 2024-08-27 RX ADMIN — MECLIZINE 12.5 MG: 12.5 TABLET ORAL at 21:20

## 2024-08-27 RX ADMIN — SODIUM CHLORIDE, PRESERVATIVE FREE 10 ML: 5 INJECTION INTRAVENOUS at 21:21

## 2024-08-27 RX ADMIN — RANOLAZINE 500 MG: 500 TABLET, EXTENDED RELEASE ORAL at 21:20

## 2024-08-27 RX ADMIN — LEVOFLOXACIN 750 MG: 5 INJECTION, SOLUTION INTRAVENOUS at 18:36

## 2024-08-27 ASSESSMENT — PAIN - FUNCTIONAL ASSESSMENT: PAIN_FUNCTIONAL_ASSESSMENT: 0-10

## 2024-08-27 ASSESSMENT — PAIN SCALES - GENERAL
PAINLEVEL_OUTOF10: 6
PAINLEVEL_OUTOF10: 0
PAINLEVEL_OUTOF10: 5

## 2024-08-28 ENCOUNTER — APPOINTMENT (OUTPATIENT)
Facility: HOSPITAL | Age: 89
DRG: 322 | End: 2024-08-28
Attending: HOSPITALIST
Payer: MEDICARE

## 2024-08-28 PROBLEM — R07.9 CHEST PAIN: Status: RESOLVED | Noted: 2024-08-27 | Resolved: 2024-08-28

## 2024-08-28 PROBLEM — R07.89 ATYPICAL CHEST PAIN: Status: RESOLVED | Noted: 2024-08-27 | Resolved: 2024-08-28

## 2024-08-28 PROBLEM — I48.91 ATRIAL FIBRILLATION (HCC): Status: RESOLVED | Noted: 2024-06-27 | Resolved: 2024-08-28

## 2024-08-28 PROBLEM — I20.9 ANGINA PECTORIS (HCC): Status: ACTIVE | Noted: 2024-08-20

## 2024-08-28 PROBLEM — E11.42 TYPE 2 DIABETES MELLITUS WITH DIABETIC POLYNEUROPATHY, WITHOUT LONG-TERM CURRENT USE OF INSULIN (HCC): Status: ACTIVE | Noted: 2020-01-13

## 2024-08-28 PROBLEM — D53.9 MACROCYTIC ANEMIA: Status: ACTIVE | Noted: 2024-08-28

## 2024-08-28 LAB
ANION GAP SERPL CALC-SCNC: 5 MMOL/L (ref 3–18)
BASOPHILS # BLD: 0 K/UL (ref 0–0.1)
BASOPHILS NFR BLD: 1 % (ref 0–2)
BUN SERPL-MCNC: 7 MG/DL (ref 7–18)
BUN/CREAT SERPL: 10 (ref 12–20)
CALCIUM SERPL-MCNC: 9.1 MG/DL (ref 8.5–10.1)
CHLORIDE SERPL-SCNC: 105 MMOL/L (ref 100–111)
CO2 SERPL-SCNC: 27 MMOL/L (ref 21–32)
CREAT SERPL-MCNC: 0.7 MG/DL (ref 0.6–1.3)
DIFFERENTIAL METHOD BLD: ABNORMAL
ECHO AO ASC DIAM: 3.5 CM
ECHO AO ASCENDING AORTA INDEX: 1.67 CM/M2
ECHO AO ROOT DIAM: 3.1 CM
ECHO AO ROOT INDEX: 1.48 CM/M2
ECHO AV PEAK GRADIENT: 7 MMHG
ECHO AV PEAK VELOCITY: 1.3 M/S
ECHO BSA: 2.16 M2
ECHO EST RA PRESSURE: 8 MMHG
ECHO LA DIAMETER INDEX: 1.82 CM/M2
ECHO LA DIAMETER: 3.8 CM
ECHO LA TO AORTIC ROOT RATIO: 1.23
ECHO LA VOL A-L A2C: 67 ML (ref 22–52)
ECHO LA VOL A-L A4C: 62 ML (ref 22–52)
ECHO LA VOL BP: 59 ML (ref 22–52)
ECHO LA VOL MOD A2C: 63 ML (ref 22–52)
ECHO LA VOL MOD A4C: 58 ML (ref 22–52)
ECHO LA VOL/BSA BIPLANE: 28 ML/M2 (ref 16–34)
ECHO LA VOLUME AREA LENGTH: 69 ML
ECHO LA VOLUME INDEX A-L A2C: 32 ML/M2 (ref 16–34)
ECHO LA VOLUME INDEX A-L A4C: 30 ML/M2 (ref 16–34)
ECHO LA VOLUME INDEX AREA LENGTH: 33 ML/M2 (ref 16–34)
ECHO LA VOLUME INDEX MOD A2C: 30 ML/M2 (ref 16–34)
ECHO LA VOLUME INDEX MOD A4C: 28 ML/M2 (ref 16–34)
ECHO LV EF PHYSICIAN: 55 %
ECHO LV FRACTIONAL SHORTENING: 21 % (ref 28–44)
ECHO LV INTERNAL DIMENSION DIASTOLE INDEX: 2.01 CM/M2
ECHO LV INTERNAL DIMENSION DIASTOLIC: 4.2 CM (ref 3.9–5.3)
ECHO LV INTERNAL DIMENSION SYSTOLIC INDEX: 1.58 CM/M2
ECHO LV INTERNAL DIMENSION SYSTOLIC: 3.3 CM
ECHO LV IVSD: 1.1 CM (ref 0.6–0.9)
ECHO LV MASS 2D: 157.1 G (ref 67–162)
ECHO LV MASS INDEX 2D: 75.1 G/M2 (ref 43–95)
ECHO LV POSTERIOR WALL DIASTOLIC: 1.1 CM (ref 0.6–0.9)
ECHO LV RELATIVE WALL THICKNESS RATIO: 0.52
ECHO LVOT AREA: 3.1 CM2
ECHO LVOT DIAM: 2 CM
ECHO PV MAX VELOCITY: 0.8 M/S
ECHO PV PEAK GRADIENT: 3 MMHG
ECHO RA VOLUME: 26 ML
ECHO RA VOLUME: 27 ML
ECHO RIGHT VENTRICULAR SYSTOLIC PRESSURE (RVSP): 29 MMHG
ECHO RV BASAL DIMENSION: 2.5 CM
ECHO RV TAPSE: 1.5 CM (ref 1.7–?)
ECHO TV REGURGITANT MAX VELOCITY: 2.3 M/S
ECHO TV REGURGITANT PEAK GRADIENT: 21 MMHG
EOSINOPHIL # BLD: 0.3 K/UL (ref 0–0.4)
EOSINOPHIL NFR BLD: 7 % (ref 0–5)
ERYTHROCYTE [DISTWIDTH] IN BLOOD BY AUTOMATED COUNT: 12 % (ref 11.6–14.5)
GLUCOSE BLD STRIP.AUTO-MCNC: 116 MG/DL (ref 70–110)
GLUCOSE BLD STRIP.AUTO-MCNC: 76 MG/DL (ref 70–110)
GLUCOSE BLD STRIP.AUTO-MCNC: 85 MG/DL (ref 70–110)
GLUCOSE BLD STRIP.AUTO-MCNC: 95 MG/DL (ref 70–110)
GLUCOSE SERPL-MCNC: 93 MG/DL (ref 74–99)
HCT VFR BLD AUTO: 37 % (ref 35–45)
HGB BLD-MCNC: 11.8 G/DL (ref 12–16)
IMM GRANULOCYTES # BLD AUTO: 0 K/UL (ref 0–0.04)
IMM GRANULOCYTES NFR BLD AUTO: 0 % (ref 0–0.5)
LYMPHOCYTES # BLD: 1.8 K/UL (ref 0.9–3.6)
LYMPHOCYTES NFR BLD: 39 % (ref 21–52)
MCH RBC QN AUTO: 32.8 PG (ref 24–34)
MCHC RBC AUTO-ENTMCNC: 31.9 G/DL (ref 31–37)
MCV RBC AUTO: 102.8 FL (ref 78–100)
MONOCYTES # BLD: 0.4 K/UL (ref 0.05–1.2)
MONOCYTES NFR BLD: 8 % (ref 3–10)
NEUTS SEG # BLD: 2 K/UL (ref 1.8–8)
NEUTS SEG NFR BLD: 45 % (ref 40–73)
NRBC # BLD: 0 K/UL (ref 0–0.01)
NRBC BLD-RTO: 0 PER 100 WBC
PLATELET # BLD AUTO: 189 K/UL (ref 135–420)
PMV BLD AUTO: 10.9 FL (ref 9.2–11.8)
POTASSIUM SERPL-SCNC: 4.1 MMOL/L (ref 3.5–5.5)
RBC # BLD AUTO: 3.6 M/UL (ref 4.2–5.3)
SODIUM SERPL-SCNC: 137 MMOL/L (ref 136–145)
WBC # BLD AUTO: 4.5 K/UL (ref 4.6–13.2)

## 2024-08-28 PROCEDURE — 85025 COMPLETE CBC W/AUTO DIFF WBC: CPT

## 2024-08-28 PROCEDURE — 99232 SBSQ HOSP IP/OBS MODERATE 35: CPT | Performed by: STUDENT IN AN ORGANIZED HEALTH CARE EDUCATION/TRAINING PROGRAM

## 2024-08-28 PROCEDURE — 80048 BASIC METABOLIC PNL TOTAL CA: CPT

## 2024-08-28 PROCEDURE — 2580000003 HC RX 258: Performed by: HOSPITALIST

## 2024-08-28 PROCEDURE — 99223 1ST HOSP IP/OBS HIGH 75: CPT | Performed by: INTERNAL MEDICINE

## 2024-08-28 PROCEDURE — 6370000000 HC RX 637 (ALT 250 FOR IP): Performed by: PHYSICIAN ASSISTANT

## 2024-08-28 PROCEDURE — 6360000004 HC RX CONTRAST MEDICATION: Performed by: STUDENT IN AN ORGANIZED HEALTH CARE EDUCATION/TRAINING PROGRAM

## 2024-08-28 PROCEDURE — 93306 TTE W/DOPPLER COMPLETE: CPT | Performed by: INTERNAL MEDICINE

## 2024-08-28 PROCEDURE — 82962 GLUCOSE BLOOD TEST: CPT

## 2024-08-28 PROCEDURE — 94761 N-INVAS EAR/PLS OXIMETRY MLT: CPT

## 2024-08-28 PROCEDURE — 6370000000 HC RX 637 (ALT 250 FOR IP): Performed by: HOSPITALIST

## 2024-08-28 PROCEDURE — 6360000002 HC RX W HCPCS: Performed by: HOSPITALIST

## 2024-08-28 PROCEDURE — 6370000000 HC RX 637 (ALT 250 FOR IP): Performed by: STUDENT IN AN ORGANIZED HEALTH CARE EDUCATION/TRAINING PROGRAM

## 2024-08-28 PROCEDURE — 36415 COLL VENOUS BLD VENIPUNCTURE: CPT

## 2024-08-28 PROCEDURE — C8929 TTE W OR WO FOL WCON,DOPPLER: HCPCS

## 2024-08-28 PROCEDURE — 1100000003 HC PRIVATE W/ TELEMETRY

## 2024-08-28 RX ORDER — DEXTROSE MONOHYDRATE 100 MG/ML
INJECTION, SOLUTION INTRAVENOUS CONTINUOUS PRN
Status: DISCONTINUED | OUTPATIENT
Start: 2024-08-28 | End: 2024-09-10 | Stop reason: HOSPADM

## 2024-08-28 RX ORDER — ACETAMINOPHEN 325 MG/1
650 TABLET ORAL EVERY 6 HOURS PRN
Status: DISCONTINUED | OUTPATIENT
Start: 2024-08-28 | End: 2024-09-10 | Stop reason: HOSPADM

## 2024-08-28 RX ORDER — METOPROLOL SUCCINATE 25 MG/1
25 TABLET, EXTENDED RELEASE ORAL DAILY
Status: DISCONTINUED | OUTPATIENT
Start: 2024-08-29 | End: 2024-09-10 | Stop reason: HOSPADM

## 2024-08-28 RX ORDER — INSULIN LISPRO 100 [IU]/ML
0-4 INJECTION, SOLUTION INTRAVENOUS; SUBCUTANEOUS
Status: DISCONTINUED | OUTPATIENT
Start: 2024-08-28 | End: 2024-09-10 | Stop reason: HOSPADM

## 2024-08-28 RX ORDER — GLUCAGON 1 MG
1 KIT INJECTION PRN
Status: DISCONTINUED | OUTPATIENT
Start: 2024-08-28 | End: 2024-09-10 | Stop reason: HOSPADM

## 2024-08-28 RX ORDER — INSULIN LISPRO 100 [IU]/ML
0-4 INJECTION, SOLUTION INTRAVENOUS; SUBCUTANEOUS NIGHTLY
Status: DISCONTINUED | OUTPATIENT
Start: 2024-08-28 | End: 2024-09-10 | Stop reason: HOSPADM

## 2024-08-28 RX ORDER — DILTIAZEM HYDROCHLORIDE 120 MG/1
120 CAPSULE, COATED, EXTENDED RELEASE ORAL DAILY
Status: DISCONTINUED | OUTPATIENT
Start: 2024-08-28 | End: 2024-09-10 | Stop reason: HOSPADM

## 2024-08-28 RX ORDER — AMLODIPINE BESYLATE 5 MG/1
2.5 TABLET ORAL DAILY
Status: DISCONTINUED | OUTPATIENT
Start: 2024-08-28 | End: 2024-08-30

## 2024-08-28 RX ADMIN — FUROSEMIDE 20 MG: 20 TABLET ORAL at 08:33

## 2024-08-28 RX ADMIN — CLOPIDOGREL BISULFATE 75 MG: 75 TABLET ORAL at 08:33

## 2024-08-28 RX ADMIN — MECLIZINE 12.5 MG: 12.5 TABLET ORAL at 15:42

## 2024-08-28 RX ADMIN — RANOLAZINE 500 MG: 500 TABLET, EXTENDED RELEASE ORAL at 08:33

## 2024-08-28 RX ADMIN — DILTIAZEM HYDROCHLORIDE 120 MG: 120 CAPSULE, COATED, EXTENDED RELEASE ORAL at 08:33

## 2024-08-28 RX ADMIN — SODIUM CHLORIDE, PRESERVATIVE FREE 10 ML: 5 INJECTION INTRAVENOUS at 08:38

## 2024-08-28 RX ADMIN — ASPIRIN 81 MG CHEWABLE TABLET 81 MG: 81 TABLET CHEWABLE at 08:33

## 2024-08-28 RX ADMIN — LEVOFLOXACIN 750 MG: 5 INJECTION, SOLUTION INTRAVENOUS at 17:22

## 2024-08-28 RX ADMIN — MONTELUKAST 10 MG: 10 TABLET, FILM COATED ORAL at 08:33

## 2024-08-28 RX ADMIN — INSULIN GLARGINE 36 UNITS: 100 INJECTION, SOLUTION SUBCUTANEOUS at 08:33

## 2024-08-28 RX ADMIN — ISOSORBIDE MONONITRATE 30 MG: 30 TABLET, EXTENDED RELEASE ORAL at 08:33

## 2024-08-28 RX ADMIN — ACETAMINOPHEN 325MG 650 MG: 325 TABLET ORAL at 15:42

## 2024-08-28 RX ADMIN — AMLODIPINE BESYLATE 2.5 MG: 2.5 TABLET ORAL at 15:42

## 2024-08-28 RX ADMIN — MECLIZINE 12.5 MG: 12.5 TABLET ORAL at 08:33

## 2024-08-28 RX ADMIN — ENOXAPARIN SODIUM 40 MG: 100 INJECTION SUBCUTANEOUS at 08:33

## 2024-08-28 RX ADMIN — PERFLUTREN 2 ML: 6.52 INJECTION, SUSPENSION INTRAVENOUS at 14:54

## 2024-08-28 RX ADMIN — LEVOTHYROXINE SODIUM 137 MCG: 0.05 TABLET ORAL at 06:34

## 2024-08-28 RX ADMIN — ACETAMINOPHEN 325MG 650 MG: 325 TABLET ORAL at 02:53

## 2024-08-28 ASSESSMENT — PAIN SCALES - GENERAL
PAINLEVEL_OUTOF10: 0
PAINLEVEL_OUTOF10: 5
PAINLEVEL_OUTOF10: 0

## 2024-08-28 ASSESSMENT — PAIN DESCRIPTION - LOCATION: LOCATION: HEAD

## 2024-08-28 ASSESSMENT — PAIN DESCRIPTION - DESCRIPTORS: DESCRIPTORS: ACHING

## 2024-08-29 PROBLEM — I20.0 UNSTABLE ANGINA (HCC): Status: ACTIVE | Noted: 2024-08-20

## 2024-08-29 LAB
ACT BLD: 305 SECS (ref 79–138)
ALBUMIN SERPL-MCNC: 3.3 G/DL (ref 3.4–5)
ALBUMIN/GLOB SERPL: 0.6 (ref 0.8–1.7)
ALP SERPL-CCNC: 86 U/L (ref 45–117)
ALT SERPL-CCNC: 13 U/L (ref 13–56)
ANION GAP BLD CALC-SCNC: 13 (ref 10–20)
ANION GAP SERPL CALC-SCNC: 8 MMOL/L (ref 3–18)
ANION GAP SERPL CALC-SCNC: 8 MMOL/L (ref 3–18)
AST SERPL-CCNC: 33 U/L (ref 10–38)
BASOPHILS # BLD: 0 K/UL (ref 0–0.1)
BASOPHILS NFR BLD: 1 % (ref 0–2)
BILIRUB SERPL-MCNC: 0.9 MG/DL (ref 0.2–1)
BUN SERPL-MCNC: 13 MG/DL (ref 7–18)
BUN SERPL-MCNC: 8 MG/DL (ref 7–18)
BUN/CREAT SERPL: 14 (ref 12–20)
BUN/CREAT SERPL: 8 (ref 12–20)
CA-I BLD-MCNC: 0.96 MMOL/L (ref 1.12–1.32)
CALCIUM SERPL-MCNC: 9.1 MG/DL (ref 8.5–10.1)
CALCIUM SERPL-MCNC: 9.6 MG/DL (ref 8.5–10.1)
CHLORIDE BLD-SCNC: 94 MMOL/L (ref 100–108)
CHLORIDE SERPL-SCNC: 105 MMOL/L (ref 100–111)
CHLORIDE SERPL-SCNC: 96 MMOL/L (ref 100–111)
CO2 BLD-SCNC: 23 MMOL/L (ref 19–24)
CO2 SERPL-SCNC: 24 MMOL/L (ref 21–32)
CO2 SERPL-SCNC: 26 MMOL/L (ref 21–32)
CREAT SERPL-MCNC: 0.91 MG/DL (ref 0.6–1.3)
CREAT SERPL-MCNC: 1.01 MG/DL (ref 0.6–1.3)
CREAT UR-MCNC: 0.5 MG/DL (ref 0.6–1.3)
DIFFERENTIAL METHOD BLD: ABNORMAL
ECHO BSA: 2.16 M2
EOSINOPHIL # BLD: 0.2 K/UL (ref 0–0.4)
EOSINOPHIL NFR BLD: 4 % (ref 0–5)
ERYTHROCYTE [DISTWIDTH] IN BLOOD BY AUTOMATED COUNT: 12 % (ref 11.6–14.5)
FOLATE SERPL-MCNC: 9.4 NG/ML (ref 3.1–17.5)
GLOBULIN SER CALC-MCNC: 5.5 G/DL (ref 2–4)
GLUCOSE BLD STRIP.AUTO-MCNC: 106 MG/DL (ref 70–110)
GLUCOSE BLD STRIP.AUTO-MCNC: 269 MG/DL (ref 70–110)
GLUCOSE BLD STRIP.AUTO-MCNC: 70 MG/DL (ref 70–110)
GLUCOSE BLD STRIP.AUTO-MCNC: 79 MG/DL (ref 70–110)
GLUCOSE BLD STRIP.AUTO-MCNC: 91 MG/DL (ref 70–110)
GLUCOSE BLD STRIP.AUTO-MCNC: >700 MG/DL (ref 74–99)
GLUCOSE SERPL-MCNC: 499 MG/DL (ref 74–99)
GLUCOSE SERPL-MCNC: 62 MG/DL (ref 74–99)
HCT VFR BLD AUTO: 35.4 % (ref 35–45)
HGB BLD-MCNC: 11.2 G/DL (ref 12–16)
IMM GRANULOCYTES # BLD AUTO: 0 K/UL (ref 0–0.04)
IMM GRANULOCYTES NFR BLD AUTO: 0 % (ref 0–0.5)
LYMPHOCYTES # BLD: 1.6 K/UL (ref 0.9–3.6)
LYMPHOCYTES NFR BLD: 33 % (ref 21–52)
MAGNESIUM SERPL-MCNC: 1.4 MG/DL (ref 1.6–2.6)
MCH RBC QN AUTO: 32.7 PG (ref 24–34)
MCHC RBC AUTO-ENTMCNC: 31.6 G/DL (ref 31–37)
MCV RBC AUTO: 103.5 FL (ref 78–100)
MONOCYTES # BLD: 0.4 K/UL (ref 0.05–1.2)
MONOCYTES NFR BLD: 9 % (ref 3–10)
NEUTS SEG # BLD: 2.5 K/UL (ref 1.8–8)
NEUTS SEG NFR BLD: 53 % (ref 40–73)
NRBC # BLD: 0 K/UL (ref 0–0.01)
NRBC BLD-RTO: 0 PER 100 WBC
PLATELET # BLD AUTO: 185 K/UL (ref 135–420)
PMV BLD AUTO: 11.6 FL (ref 9.2–11.8)
POTASSIUM BLD-SCNC: 2.7 MMOL/L (ref 3.5–5.5)
POTASSIUM SERPL-SCNC: 3.2 MMOL/L (ref 3.5–5.5)
POTASSIUM SERPL-SCNC: 4.2 MMOL/L (ref 3.5–5.5)
PROT SERPL-MCNC: 8.8 G/DL (ref 6.4–8.2)
RBC # BLD AUTO: 3.42 M/UL (ref 4.2–5.3)
SODIUM BLD-SCNC: 130 MMOL/L (ref 136–145)
SODIUM SERPL-SCNC: 128 MMOL/L (ref 136–145)
SODIUM SERPL-SCNC: 139 MMOL/L (ref 136–145)
VIT B12 SERPL-MCNC: 343 PG/ML (ref 211–911)
WBC # BLD AUTO: 4.7 K/UL (ref 4.6–13.2)

## 2024-08-29 PROCEDURE — 4A023N7 MEASUREMENT OF CARDIAC SAMPLING AND PRESSURE, LEFT HEART, PERCUTANEOUS APPROACH: ICD-10-PCS | Performed by: INTERNAL MEDICINE

## 2024-08-29 PROCEDURE — C9600 PERC DRUG-EL COR STENT SING: HCPCS | Performed by: INTERNAL MEDICINE

## 2024-08-29 PROCEDURE — 76000 FLUOROSCOPY <1 HR PHYS/QHP: CPT | Performed by: INTERNAL MEDICINE

## 2024-08-29 PROCEDURE — C1713 ANCHOR/SCREW BN/BN,TIS/BN: HCPCS | Performed by: INTERNAL MEDICINE

## 2024-08-29 PROCEDURE — 80053 COMPREHEN METABOLIC PANEL: CPT

## 2024-08-29 PROCEDURE — 83735 ASSAY OF MAGNESIUM: CPT

## 2024-08-29 PROCEDURE — 92979 ENDOLUMINL IVUS OCT C EA: CPT | Performed by: INTERNAL MEDICINE

## 2024-08-29 PROCEDURE — 92928 PRQ TCAT PLMT NTRAC ST 1 LES: CPT | Performed by: INTERNAL MEDICINE

## 2024-08-29 PROCEDURE — 85347 COAGULATION TIME ACTIVATED: CPT

## 2024-08-29 PROCEDURE — 2580000003 HC RX 258: Performed by: INTERNAL MEDICINE

## 2024-08-29 PROCEDURE — 2580000003 HC RX 258: Performed by: HOSPITALIST

## 2024-08-29 PROCEDURE — 027134Z DILATION OF CORONARY ARTERY, TWO ARTERIES WITH DRUG-ELUTING INTRALUMINAL DEVICE, PERCUTANEOUS APPROACH: ICD-10-PCS | Performed by: INTERNAL MEDICINE

## 2024-08-29 PROCEDURE — 6370000000 HC RX 637 (ALT 250 FOR IP): Performed by: HOSPITALIST

## 2024-08-29 PROCEDURE — C1753 CATH, INTRAVAS ULTRASOUND: HCPCS | Performed by: INTERNAL MEDICINE

## 2024-08-29 PROCEDURE — 92978 ENDOLUMINL IVUS OCT C 1ST: CPT | Performed by: INTERNAL MEDICINE

## 2024-08-29 PROCEDURE — 93458 L HRT ARTERY/VENTRICLE ANGIO: CPT | Performed by: INTERNAL MEDICINE

## 2024-08-29 PROCEDURE — 6360000002 HC RX W HCPCS: Performed by: INTERNAL MEDICINE

## 2024-08-29 PROCEDURE — 2580000003 HC RX 258: Performed by: STUDENT IN AN ORGANIZED HEALTH CARE EDUCATION/TRAINING PROGRAM

## 2024-08-29 PROCEDURE — 82607 VITAMIN B-12: CPT

## 2024-08-29 PROCEDURE — 99232 SBSQ HOSP IP/OBS MODERATE 35: CPT | Performed by: STUDENT IN AN ORGANIZED HEALTH CARE EDUCATION/TRAINING PROGRAM

## 2024-08-29 PROCEDURE — C1874 STENT, COATED/COV W/DEL SYS: HCPCS | Performed by: INTERNAL MEDICINE

## 2024-08-29 PROCEDURE — B241ZZ3 ULTRASONOGRAPHY OF MULTIPLE CORONARY ARTERIES, INTRAVASCULAR: ICD-10-PCS | Performed by: INTERNAL MEDICINE

## 2024-08-29 PROCEDURE — 6370000000 HC RX 637 (ALT 250 FOR IP): Performed by: STUDENT IN AN ORGANIZED HEALTH CARE EDUCATION/TRAINING PROGRAM

## 2024-08-29 PROCEDURE — 99222 1ST HOSP IP/OBS MODERATE 55: CPT | Performed by: INTERNAL MEDICINE

## 2024-08-29 PROCEDURE — 2709999900 HC NON-CHARGEABLE SUPPLY: Performed by: INTERNAL MEDICINE

## 2024-08-29 PROCEDURE — C1769 GUIDE WIRE: HCPCS | Performed by: INTERNAL MEDICINE

## 2024-08-29 PROCEDURE — 6370000000 HC RX 637 (ALT 250 FOR IP): Performed by: INTERNAL MEDICINE

## 2024-08-29 PROCEDURE — B2111ZZ FLUOROSCOPY OF MULTIPLE CORONARY ARTERIES USING LOW OSMOLAR CONTRAST: ICD-10-PCS | Performed by: INTERNAL MEDICINE

## 2024-08-29 PROCEDURE — 2140000001 HC CVICU INTERMEDIATE R&B

## 2024-08-29 PROCEDURE — C1894 INTRO/SHEATH, NON-LASER: HCPCS | Performed by: INTERNAL MEDICINE

## 2024-08-29 PROCEDURE — 36415 COLL VENOUS BLD VENIPUNCTURE: CPT

## 2024-08-29 PROCEDURE — 80047 BASIC METABLC PNL IONIZED CA: CPT

## 2024-08-29 PROCEDURE — 82962 GLUCOSE BLOOD TEST: CPT

## 2024-08-29 PROCEDURE — 85025 COMPLETE CBC W/AUTO DIFF WBC: CPT

## 2024-08-29 PROCEDURE — C1725 CATH, TRANSLUMIN NON-LASER: HCPCS | Performed by: INTERNAL MEDICINE

## 2024-08-29 PROCEDURE — 02HV33Z INSERTION OF INFUSION DEVICE INTO SUPERIOR VENA CAVA, PERCUTANEOUS APPROACH: ICD-10-PCS | Performed by: INTERNAL MEDICINE

## 2024-08-29 PROCEDURE — 6360000002 HC RX W HCPCS: Performed by: HOSPITALIST

## 2024-08-29 PROCEDURE — 93005 ELECTROCARDIOGRAM TRACING: CPT | Performed by: INTERNAL MEDICINE

## 2024-08-29 PROCEDURE — C1760 CLOSURE DEV, VASC: HCPCS | Performed by: INTERNAL MEDICINE

## 2024-08-29 PROCEDURE — 2500000003 HC RX 250 WO HCPCS: Performed by: INTERNAL MEDICINE

## 2024-08-29 PROCEDURE — 82746 ASSAY OF FOLIC ACID SERUM: CPT

## 2024-08-29 PROCEDURE — C1887 CATHETER, GUIDING: HCPCS | Performed by: INTERNAL MEDICINE

## 2024-08-29 DEVICE — STENT ONYXNG27515UX ONYX 2.75X15RX
Type: IMPLANTABLE DEVICE | Site: WRIST | Status: FUNCTIONAL
Brand: ONYX FRONTIER™

## 2024-08-29 DEVICE — STENT CORONARY ONYX FRONTIER RX 3X15 MM ZOTAROLIMUS ELUT: Type: IMPLANTABLE DEVICE | Site: WRIST | Status: FUNCTIONAL

## 2024-08-29 RX ORDER — DIPHENHYDRAMINE HYDROCHLORIDE 50 MG/ML
INJECTION INTRAMUSCULAR; INTRAVENOUS PRN
Status: DISCONTINUED | OUTPATIENT
Start: 2024-08-29 | End: 2024-08-29 | Stop reason: HOSPADM

## 2024-08-29 RX ORDER — SODIUM CHLORIDE 0.9 % (FLUSH) 0.9 %
5-40 SYRINGE (ML) INJECTION PRN
Status: DISCONTINUED | OUTPATIENT
Start: 2024-08-29 | End: 2024-09-10 | Stop reason: HOSPADM

## 2024-08-29 RX ORDER — SODIUM CHLORIDE 0.9 % (FLUSH) 0.9 %
5-40 SYRINGE (ML) INJECTION EVERY 12 HOURS SCHEDULED
Status: DISCONTINUED | OUTPATIENT
Start: 2024-08-29 | End: 2024-08-31 | Stop reason: SDUPTHER

## 2024-08-29 RX ORDER — CLOPIDOGREL 300 MG/1
TABLET, FILM COATED ORAL PRN
Status: DISCONTINUED | OUTPATIENT
Start: 2024-08-29 | End: 2024-08-29 | Stop reason: HOSPADM

## 2024-08-29 RX ORDER — SODIUM CHLORIDE 0.9 % (FLUSH) 0.9 %
5-40 SYRINGE (ML) INJECTION PRN
Status: DISCONTINUED | OUTPATIENT
Start: 2024-08-29 | End: 2024-08-31 | Stop reason: SDUPTHER

## 2024-08-29 RX ORDER — SODIUM CHLORIDE 9 MG/ML
INJECTION, SOLUTION INTRAVENOUS CONTINUOUS
Status: DISCONTINUED | OUTPATIENT
Start: 2024-08-29 | End: 2024-08-29 | Stop reason: HOSPADM

## 2024-08-29 RX ORDER — SODIUM CHLORIDE 9 MG/ML
INJECTION, SOLUTION INTRAVENOUS PRN
Status: DISCONTINUED | OUTPATIENT
Start: 2024-08-29 | End: 2024-09-10 | Stop reason: HOSPADM

## 2024-08-29 RX ORDER — DEXTROSE MONOHYDRATE 100 MG/ML
INJECTION, SOLUTION INTRAVENOUS CONTINUOUS
Status: DISCONTINUED | OUTPATIENT
Start: 2024-08-29 | End: 2024-08-31

## 2024-08-29 RX ORDER — METHYLPREDNISOLONE SODIUM SUCCINATE 125 MG/2ML
INJECTION, POWDER, LYOPHILIZED, FOR SOLUTION INTRAMUSCULAR; INTRAVENOUS PRN
Status: DISCONTINUED | OUTPATIENT
Start: 2024-08-29 | End: 2024-08-29 | Stop reason: HOSPADM

## 2024-08-29 RX ORDER — INSULIN GLARGINE 100 [IU]/ML
25 INJECTION, SOLUTION SUBCUTANEOUS 2 TIMES DAILY
Status: DISCONTINUED | OUTPATIENT
Start: 2024-08-29 | End: 2024-09-05

## 2024-08-29 RX ORDER — SODIUM CHLORIDE 9 MG/ML
INJECTION, SOLUTION INTRAVENOUS PRN
Status: DISCONTINUED | OUTPATIENT
Start: 2024-08-29 | End: 2024-08-31

## 2024-08-29 RX ORDER — SODIUM CHLORIDE 0.9 % (FLUSH) 0.9 %
5-40 SYRINGE (ML) INJECTION EVERY 12 HOURS SCHEDULED
Status: DISCONTINUED | OUTPATIENT
Start: 2024-08-29 | End: 2024-09-10 | Stop reason: HOSPADM

## 2024-08-29 RX ORDER — POTASSIUM CHLORIDE 1500 MG/1
40 TABLET, EXTENDED RELEASE ORAL ONCE
Status: COMPLETED | OUTPATIENT
Start: 2024-08-29 | End: 2024-08-29

## 2024-08-29 RX ORDER — HEPARIN SODIUM 1000 [USP'U]/ML
INJECTION, SOLUTION INTRAVENOUS; SUBCUTANEOUS PRN
Status: DISCONTINUED | OUTPATIENT
Start: 2024-08-29 | End: 2024-08-29 | Stop reason: HOSPADM

## 2024-08-29 RX ORDER — DEXTROSE MONOHYDRATE 25 G/50ML
12.5 INJECTION, SOLUTION INTRAVENOUS ONCE
Status: DISCONTINUED | OUTPATIENT
Start: 2024-08-29 | End: 2024-08-29

## 2024-08-29 RX ORDER — LANOLIN ALCOHOL/MO/W.PET/CERES
400 CREAM (GRAM) TOPICAL ONCE
Status: COMPLETED | OUTPATIENT
Start: 2024-08-29 | End: 2024-08-29

## 2024-08-29 RX ORDER — NITROGLYCERIN 40 MG/100ML
INJECTION INTRAVENOUS CONTINUOUS PRN
Status: COMPLETED | OUTPATIENT
Start: 2024-08-29 | End: 2024-08-29

## 2024-08-29 RX ADMIN — RANOLAZINE 500 MG: 500 TABLET, EXTENDED RELEASE ORAL at 21:11

## 2024-08-29 RX ADMIN — DEXTROSE MONOHYDRATE: 100 INJECTION, SOLUTION INTRAVENOUS at 08:53

## 2024-08-29 RX ADMIN — SODIUM CHLORIDE, PRESERVATIVE FREE 10 ML: 5 INJECTION INTRAVENOUS at 21:23

## 2024-08-29 RX ADMIN — Medication 400 MG: at 08:54

## 2024-08-29 RX ADMIN — POTASSIUM CHLORIDE 40 MEQ: 1500 TABLET, EXTENDED RELEASE ORAL at 12:09

## 2024-08-29 RX ADMIN — CLOPIDOGREL BISULFATE 75 MG: 75 TABLET ORAL at 11:09

## 2024-08-29 RX ADMIN — LEVOTHYROXINE SODIUM 137 MCG: 0.05 TABLET ORAL at 07:17

## 2024-08-29 RX ADMIN — ASPIRIN 81 MG CHEWABLE TABLET 81 MG: 81 TABLET CHEWABLE at 11:09

## 2024-08-29 RX ADMIN — POTASSIUM CHLORIDE 40 MEQ: 1500 TABLET, EXTENDED RELEASE ORAL at 13:48

## 2024-08-29 RX ADMIN — LEVOFLOXACIN 750 MG: 5 INJECTION, SOLUTION INTRAVENOUS at 18:31

## 2024-08-29 RX ADMIN — SODIUM CHLORIDE, PRESERVATIVE FREE 10 ML: 5 INJECTION INTRAVENOUS at 09:02

## 2024-08-29 RX ADMIN — ISOSORBIDE MONONITRATE 30 MG: 30 TABLET, EXTENDED RELEASE ORAL at 08:57

## 2024-08-29 RX ADMIN — FUROSEMIDE 20 MG: 20 TABLET ORAL at 09:00

## 2024-08-29 RX ADMIN — Medication 16 G: at 07:13

## 2024-08-29 RX ADMIN — INSULIN GLARGINE 25 UNITS: 100 INJECTION, SOLUTION SUBCUTANEOUS at 21:12

## 2024-08-29 ASSESSMENT — PAIN SCALES - GENERAL
PAINLEVEL_OUTOF10: 0

## 2024-08-30 DIAGNOSIS — Z95.5 STENTED CORONARY ARTERY: Primary | ICD-10-CM

## 2024-08-30 LAB
ALBUMIN SERPL-MCNC: 3.1 G/DL (ref 3.4–5)
ALBUMIN/GLOB SERPL: 0.6 (ref 0.8–1.7)
ALP SERPL-CCNC: 79 U/L (ref 45–117)
ALT SERPL-CCNC: 13 U/L (ref 13–56)
ANION GAP SERPL CALC-SCNC: 5 MMOL/L (ref 3–18)
AST SERPL-CCNC: 13 U/L (ref 10–38)
BILIRUB SERPL-MCNC: 0.6 MG/DL (ref 0.2–1)
BUN SERPL-MCNC: 12 MG/DL (ref 7–18)
BUN/CREAT SERPL: 12 (ref 12–20)
CALCIUM SERPL-MCNC: 9.6 MG/DL (ref 8.5–10.1)
CHLORIDE SERPL-SCNC: 103 MMOL/L (ref 100–111)
CO2 SERPL-SCNC: 25 MMOL/L (ref 21–32)
CREAT SERPL-MCNC: 0.97 MG/DL (ref 0.6–1.3)
ERYTHROCYTE [DISTWIDTH] IN BLOOD BY AUTOMATED COUNT: 12 % (ref 11.6–14.5)
GLOBULIN SER CALC-MCNC: 4.8 G/DL (ref 2–4)
GLUCOSE BLD STRIP.AUTO-MCNC: 222 MG/DL (ref 70–110)
GLUCOSE BLD STRIP.AUTO-MCNC: 248 MG/DL (ref 70–110)
GLUCOSE BLD STRIP.AUTO-MCNC: 255 MG/DL (ref 70–110)
GLUCOSE BLD STRIP.AUTO-MCNC: 292 MG/DL (ref 70–110)
GLUCOSE SERPL-MCNC: 281 MG/DL (ref 74–99)
HCT VFR BLD AUTO: 37.8 % (ref 35–45)
HGB BLD-MCNC: 11.8 G/DL (ref 12–16)
MCH RBC QN AUTO: 31.3 PG (ref 24–34)
MCHC RBC AUTO-ENTMCNC: 31.2 G/DL (ref 31–37)
MCV RBC AUTO: 100.3 FL (ref 78–100)
NRBC # BLD: 0 K/UL (ref 0–0.01)
NRBC BLD-RTO: 0 PER 100 WBC
PLATELET # BLD AUTO: 220 K/UL (ref 135–420)
PMV BLD AUTO: 11.4 FL (ref 9.2–11.8)
POTASSIUM SERPL-SCNC: 4.1 MMOL/L (ref 3.5–5.5)
PROT SERPL-MCNC: 7.9 G/DL (ref 6.4–8.2)
RBC # BLD AUTO: 3.77 M/UL (ref 4.2–5.3)
SODIUM SERPL-SCNC: 133 MMOL/L (ref 136–145)
WBC # BLD AUTO: 3.9 K/UL (ref 4.6–13.2)

## 2024-08-30 PROCEDURE — 82962 GLUCOSE BLOOD TEST: CPT

## 2024-08-30 PROCEDURE — 2580000003 HC RX 258: Performed by: HOSPITALIST

## 2024-08-30 PROCEDURE — 6370000000 HC RX 637 (ALT 250 FOR IP): Performed by: STUDENT IN AN ORGANIZED HEALTH CARE EDUCATION/TRAINING PROGRAM

## 2024-08-30 PROCEDURE — 36415 COLL VENOUS BLD VENIPUNCTURE: CPT

## 2024-08-30 PROCEDURE — 93005 ELECTROCARDIOGRAM TRACING: CPT | Performed by: STUDENT IN AN ORGANIZED HEALTH CARE EDUCATION/TRAINING PROGRAM

## 2024-08-30 PROCEDURE — 6370000000 HC RX 637 (ALT 250 FOR IP): Performed by: HOSPITALIST

## 2024-08-30 PROCEDURE — 94761 N-INVAS EAR/PLS OXIMETRY MLT: CPT

## 2024-08-30 PROCEDURE — 2140000001 HC CVICU INTERMEDIATE R&B

## 2024-08-30 PROCEDURE — 6360000002 HC RX W HCPCS: Performed by: HOSPITALIST

## 2024-08-30 PROCEDURE — 85027 COMPLETE CBC AUTOMATED: CPT

## 2024-08-30 PROCEDURE — 2580000003 HC RX 258: Performed by: INTERNAL MEDICINE

## 2024-08-30 PROCEDURE — 99233 SBSQ HOSP IP/OBS HIGH 50: CPT | Performed by: INTERNAL MEDICINE

## 2024-08-30 PROCEDURE — 99232 SBSQ HOSP IP/OBS MODERATE 35: CPT | Performed by: STUDENT IN AN ORGANIZED HEALTH CARE EDUCATION/TRAINING PROGRAM

## 2024-08-30 PROCEDURE — 6360000002 HC RX W HCPCS: Performed by: INTERNAL MEDICINE

## 2024-08-30 PROCEDURE — 6370000000 HC RX 637 (ALT 250 FOR IP): Performed by: PHYSICIAN ASSISTANT

## 2024-08-30 PROCEDURE — 80053 COMPREHEN METABOLIC PANEL: CPT

## 2024-08-30 RX ORDER — AMLODIPINE BESYLATE 5 MG/1
5 TABLET ORAL DAILY
Status: DISCONTINUED | OUTPATIENT
Start: 2024-08-30 | End: 2024-09-10 | Stop reason: HOSPADM

## 2024-08-30 RX ORDER — NITROGLYCERIN 0.4 MG/1
0.4 TABLET SUBLINGUAL EVERY 5 MIN PRN
Status: DISCONTINUED | OUTPATIENT
Start: 2024-08-30 | End: 2024-09-10 | Stop reason: HOSPADM

## 2024-08-30 RX ORDER — PREGABALIN 25 MG/1
25 CAPSULE ORAL 2 TIMES DAILY
Status: DISCONTINUED | OUTPATIENT
Start: 2024-08-30 | End: 2024-08-31

## 2024-08-30 RX ORDER — ISOSORBIDE MONONITRATE 60 MG/1
60 TABLET, EXTENDED RELEASE ORAL EVERY MORNING
Status: DISCONTINUED | OUTPATIENT
Start: 2024-08-31 | End: 2024-09-10 | Stop reason: HOSPADM

## 2024-08-30 RX ORDER — INSULIN LISPRO 100 [IU]/ML
7 INJECTION, SOLUTION INTRAVENOUS; SUBCUTANEOUS
Status: DISCONTINUED | OUTPATIENT
Start: 2024-08-30 | End: 2024-09-05

## 2024-08-30 RX ORDER — AMLODIPINE BESYLATE 10 MG/1
10 TABLET ORAL DAILY
Status: DISCONTINUED | OUTPATIENT
Start: 2024-08-30 | End: 2024-08-30

## 2024-08-30 RX ORDER — FUROSEMIDE 10 MG/ML
20 INJECTION INTRAMUSCULAR; INTRAVENOUS ONCE
Status: COMPLETED | OUTPATIENT
Start: 2024-08-30 | End: 2024-08-30

## 2024-08-30 RX ORDER — AMLODIPINE BESYLATE 10 MG/1
10 TABLET ORAL DAILY
Status: DISCONTINUED | OUTPATIENT
Start: 2024-08-31 | End: 2024-08-30

## 2024-08-30 RX ADMIN — SODIUM CHLORIDE, PRESERVATIVE FREE 10 ML: 5 INJECTION INTRAVENOUS at 20:49

## 2024-08-30 RX ADMIN — METOPROLOL SUCCINATE 25 MG: 25 TABLET, EXTENDED RELEASE ORAL at 08:37

## 2024-08-30 RX ADMIN — LEVOTHYROXINE SODIUM 137 MCG: 0.05 TABLET ORAL at 06:22

## 2024-08-30 RX ADMIN — INSULIN GLARGINE 25 UNITS: 100 INJECTION, SOLUTION SUBCUTANEOUS at 08:56

## 2024-08-30 RX ADMIN — DILTIAZEM HYDROCHLORIDE 120 MG: 120 CAPSULE, COATED, EXTENDED RELEASE ORAL at 08:43

## 2024-08-30 RX ADMIN — SODIUM CHLORIDE, PRESERVATIVE FREE 10 ML: 5 INJECTION INTRAVENOUS at 08:57

## 2024-08-30 RX ADMIN — PREGABALIN 25 MG: 25 CAPSULE ORAL at 16:13

## 2024-08-30 RX ADMIN — NITROGLYCERIN 0.4 MG: 0.4 TABLET, ORALLY DISINTEGRATING SUBLINGUAL at 12:29

## 2024-08-30 RX ADMIN — RANOLAZINE 500 MG: 500 TABLET, EXTENDED RELEASE ORAL at 12:09

## 2024-08-30 RX ADMIN — CLOPIDOGREL BISULFATE 75 MG: 75 TABLET ORAL at 08:39

## 2024-08-30 RX ADMIN — INSULIN LISPRO 7 UNITS: 100 INJECTION, SOLUTION INTRAVENOUS; SUBCUTANEOUS at 17:01

## 2024-08-30 RX ADMIN — MONTELUKAST 10 MG: 10 TABLET, FILM COATED ORAL at 12:08

## 2024-08-30 RX ADMIN — ASPIRIN 81 MG CHEWABLE TABLET 81 MG: 81 TABLET CHEWABLE at 08:38

## 2024-08-30 RX ADMIN — FUROSEMIDE 20 MG: 20 TABLET ORAL at 08:38

## 2024-08-30 RX ADMIN — RANOLAZINE 500 MG: 500 TABLET, EXTENDED RELEASE ORAL at 20:49

## 2024-08-30 RX ADMIN — ISOSORBIDE MONONITRATE 30 MG: 30 TABLET, EXTENDED RELEASE ORAL at 08:39

## 2024-08-30 RX ADMIN — AMLODIPINE BESYLATE 5 MG: 5 TABLET ORAL at 16:13

## 2024-08-30 RX ADMIN — INSULIN LISPRO 1 UNITS: 100 INJECTION, SOLUTION INTRAVENOUS; SUBCUTANEOUS at 17:00

## 2024-08-30 RX ADMIN — LEVOFLOXACIN 750 MG: 5 INJECTION, SOLUTION INTRAVENOUS at 18:30

## 2024-08-30 RX ADMIN — INSULIN LISPRO 2 UNITS: 100 INJECTION, SOLUTION INTRAVENOUS; SUBCUTANEOUS at 08:57

## 2024-08-30 RX ADMIN — PREGABALIN 25 MG: 25 CAPSULE ORAL at 21:00

## 2024-08-30 RX ADMIN — INSULIN LISPRO 2 UNITS: 100 INJECTION, SOLUTION INTRAVENOUS; SUBCUTANEOUS at 12:21

## 2024-08-30 RX ADMIN — ENOXAPARIN SODIUM 40 MG: 100 INJECTION SUBCUTANEOUS at 08:56

## 2024-08-30 RX ADMIN — NITROGLYCERIN 0.4 MG: 0.4 TABLET, ORALLY DISINTEGRATING SUBLINGUAL at 12:18

## 2024-08-30 RX ADMIN — INSULIN LISPRO 7 UNITS: 100 INJECTION, SOLUTION INTRAVENOUS; SUBCUTANEOUS at 12:21

## 2024-08-30 RX ADMIN — INSULIN GLARGINE 25 UNITS: 100 INJECTION, SOLUTION SUBCUTANEOUS at 20:48

## 2024-08-30 RX ADMIN — FUROSEMIDE 20 MG: 10 INJECTION, SOLUTION INTRAMUSCULAR; INTRAVENOUS at 16:09

## 2024-08-30 ASSESSMENT — PAIN SCALES - WONG BAKER
WONGBAKER_NUMERICALRESPONSE: NO HURT
WONGBAKER_NUMERICALRESPONSE: HURTS A LITTLE BIT
WONGBAKER_NUMERICALRESPONSE: HURTS A LITTLE BIT

## 2024-08-30 ASSESSMENT — PAIN SCALES - GENERAL
PAINLEVEL_OUTOF10: 0
PAINLEVEL_OUTOF10: 6
PAINLEVEL_OUTOF10: 5
PAINLEVEL_OUTOF10: 0

## 2024-08-30 ASSESSMENT — PAIN DESCRIPTION - LOCATION
LOCATION: CHEST
LOCATION: CHEST

## 2024-08-31 ENCOUNTER — APPOINTMENT (OUTPATIENT)
Facility: HOSPITAL | Age: 89
DRG: 322 | End: 2024-08-31
Payer: MEDICARE

## 2024-08-31 LAB
ANION GAP SERPL CALC-SCNC: 6 MMOL/L (ref 3–18)
BUN SERPL-MCNC: 21 MG/DL (ref 7–18)
BUN/CREAT SERPL: 21 (ref 12–20)
CALCIUM SERPL-MCNC: 9.3 MG/DL (ref 8.5–10.1)
CHLORIDE SERPL-SCNC: 106 MMOL/L (ref 100–111)
CO2 SERPL-SCNC: 26 MMOL/L (ref 21–32)
CREAT SERPL-MCNC: 0.99 MG/DL (ref 0.6–1.3)
ERYTHROCYTE [DISTWIDTH] IN BLOOD BY AUTOMATED COUNT: 11.9 % (ref 11.6–14.5)
GLUCOSE BLD STRIP.AUTO-MCNC: 103 MG/DL (ref 70–110)
GLUCOSE BLD STRIP.AUTO-MCNC: 107 MG/DL (ref 70–110)
GLUCOSE BLD STRIP.AUTO-MCNC: 111 MG/DL (ref 70–110)
GLUCOSE BLD STRIP.AUTO-MCNC: 114 MG/DL (ref 70–110)
GLUCOSE SERPL-MCNC: 123 MG/DL (ref 74–99)
HCT VFR BLD AUTO: 34.2 % (ref 35–45)
HGB BLD-MCNC: 10.8 G/DL (ref 12–16)
MAGNESIUM SERPL-MCNC: 2 MG/DL (ref 1.6–2.6)
MCH RBC QN AUTO: 32.6 PG (ref 24–34)
MCHC RBC AUTO-ENTMCNC: 31.6 G/DL (ref 31–37)
MCV RBC AUTO: 103.3 FL (ref 78–100)
NRBC # BLD: 0 K/UL (ref 0–0.01)
NRBC BLD-RTO: 0 PER 100 WBC
PLATELET # BLD AUTO: 194 K/UL (ref 135–420)
PMV BLD AUTO: 11.7 FL (ref 9.2–11.8)
POTASSIUM SERPL-SCNC: 4.1 MMOL/L (ref 3.5–5.5)
RBC # BLD AUTO: 3.31 M/UL (ref 4.2–5.3)
SODIUM SERPL-SCNC: 138 MMOL/L (ref 136–145)
WBC # BLD AUTO: 8.1 K/UL (ref 4.6–13.2)

## 2024-08-31 PROCEDURE — 2580000003 HC RX 258: Performed by: HOSPITALIST

## 2024-08-31 PROCEDURE — 2580000003 HC RX 258: Performed by: INTERNAL MEDICINE

## 2024-08-31 PROCEDURE — 70450 CT HEAD/BRAIN W/O DYE: CPT

## 2024-08-31 PROCEDURE — 36415 COLL VENOUS BLD VENIPUNCTURE: CPT

## 2024-08-31 PROCEDURE — 6370000000 HC RX 637 (ALT 250 FOR IP): Performed by: HOSPITALIST

## 2024-08-31 PROCEDURE — 82962 GLUCOSE BLOOD TEST: CPT

## 2024-08-31 PROCEDURE — 80048 BASIC METABOLIC PNL TOTAL CA: CPT

## 2024-08-31 PROCEDURE — 97530 THERAPEUTIC ACTIVITIES: CPT

## 2024-08-31 PROCEDURE — 97166 OT EVAL MOD COMPLEX 45 MIN: CPT

## 2024-08-31 PROCEDURE — 6370000000 HC RX 637 (ALT 250 FOR IP): Performed by: PHYSICIAN ASSISTANT

## 2024-08-31 PROCEDURE — 94761 N-INVAS EAR/PLS OXIMETRY MLT: CPT

## 2024-08-31 PROCEDURE — 83735 ASSAY OF MAGNESIUM: CPT

## 2024-08-31 PROCEDURE — 6360000002 HC RX W HCPCS: Performed by: HOSPITALIST

## 2024-08-31 PROCEDURE — 97535 SELF CARE MNGMENT TRAINING: CPT

## 2024-08-31 PROCEDURE — 2140000001 HC CVICU INTERMEDIATE R&B

## 2024-08-31 PROCEDURE — 97162 PT EVAL MOD COMPLEX 30 MIN: CPT

## 2024-08-31 PROCEDURE — 6370000000 HC RX 637 (ALT 250 FOR IP): Performed by: STUDENT IN AN ORGANIZED HEALTH CARE EDUCATION/TRAINING PROGRAM

## 2024-08-31 PROCEDURE — 99231 SBSQ HOSP IP/OBS SF/LOW 25: CPT | Performed by: STUDENT IN AN ORGANIZED HEALTH CARE EDUCATION/TRAINING PROGRAM

## 2024-08-31 PROCEDURE — 85027 COMPLETE CBC AUTOMATED: CPT

## 2024-08-31 PROCEDURE — 6370000000 HC RX 637 (ALT 250 FOR IP): Performed by: INTERNAL MEDICINE

## 2024-08-31 RX ORDER — METOPROLOL SUCCINATE 25 MG/1
25 TABLET, EXTENDED RELEASE ORAL DAILY
Qty: 30 TABLET | Refills: 3 | Status: SHIPPED | OUTPATIENT
Start: 2024-09-01 | End: 2024-09-09

## 2024-08-31 RX ORDER — AMLODIPINE BESYLATE 5 MG/1
5 TABLET ORAL DAILY
Qty: 30 TABLET | Refills: 3 | Status: SHIPPED | OUTPATIENT
Start: 2024-08-31 | End: 2024-09-09

## 2024-08-31 RX ORDER — DILTIAZEM HYDROCHLORIDE 120 MG/1
120 CAPSULE, COATED, EXTENDED RELEASE ORAL DAILY
Qty: 30 CAPSULE | Refills: 3 | Status: SHIPPED | OUTPATIENT
Start: 2024-09-01 | End: 2024-09-09

## 2024-08-31 RX ORDER — PREGABALIN 25 MG/1
25 CAPSULE ORAL 2 TIMES DAILY
Qty: 60 CAPSULE | Refills: 0 | Status: CANCELLED | OUTPATIENT
Start: 2024-08-31 | End: 2024-09-30

## 2024-08-31 RX ORDER — ISOSORBIDE MONONITRATE 60 MG/1
60 TABLET, EXTENDED RELEASE ORAL EVERY MORNING
Qty: 30 TABLET | Refills: 3 | Status: SHIPPED | OUTPATIENT
Start: 2024-09-01

## 2024-08-31 RX ORDER — LIDOCAINE 4 G/G
1 PATCH TOPICAL DAILY PRN
Status: DISCONTINUED | OUTPATIENT
Start: 2024-08-31 | End: 2024-09-10 | Stop reason: HOSPADM

## 2024-08-31 RX ADMIN — MONTELUKAST 10 MG: 10 TABLET, FILM COATED ORAL at 10:56

## 2024-08-31 RX ADMIN — INSULIN GLARGINE 25 UNITS: 100 INJECTION, SOLUTION SUBCUTANEOUS at 09:39

## 2024-08-31 RX ADMIN — ACETAMINOPHEN 325MG 650 MG: 325 TABLET ORAL at 15:03

## 2024-08-31 RX ADMIN — ASPIRIN 81 MG CHEWABLE TABLET 81 MG: 81 TABLET CHEWABLE at 09:42

## 2024-08-31 RX ADMIN — RANOLAZINE 500 MG: 500 TABLET, EXTENDED RELEASE ORAL at 20:30

## 2024-08-31 RX ADMIN — ISOSORBIDE MONONITRATE 60 MG: 60 TABLET, EXTENDED RELEASE ORAL at 09:42

## 2024-08-31 RX ADMIN — SODIUM CHLORIDE, PRESERVATIVE FREE 10 ML: 5 INJECTION INTRAVENOUS at 09:46

## 2024-08-31 RX ADMIN — LEVOTHYROXINE SODIUM 137 MCG: 0.05 TABLET ORAL at 05:27

## 2024-08-31 RX ADMIN — ENOXAPARIN SODIUM 40 MG: 100 INJECTION SUBCUTANEOUS at 09:40

## 2024-08-31 RX ADMIN — PREGABALIN 25 MG: 25 CAPSULE ORAL at 10:56

## 2024-08-31 RX ADMIN — CLOPIDOGREL BISULFATE 75 MG: 75 TABLET ORAL at 09:42

## 2024-08-31 RX ADMIN — LEVOFLOXACIN 750 MG: 5 INJECTION, SOLUTION INTRAVENOUS at 19:00

## 2024-08-31 RX ADMIN — DILTIAZEM HYDROCHLORIDE 120 MG: 120 CAPSULE, COATED, EXTENDED RELEASE ORAL at 09:42

## 2024-08-31 RX ADMIN — RANOLAZINE 500 MG: 500 TABLET, EXTENDED RELEASE ORAL at 10:56

## 2024-08-31 RX ADMIN — FUROSEMIDE 20 MG: 20 TABLET ORAL at 09:42

## 2024-08-31 RX ADMIN — AMLODIPINE BESYLATE 5 MG: 5 TABLET ORAL at 10:56

## 2024-08-31 RX ADMIN — METOPROLOL SUCCINATE 25 MG: 25 TABLET, EXTENDED RELEASE ORAL at 09:41

## 2024-08-31 ASSESSMENT — PAIN SCALES - GENERAL
PAINLEVEL_OUTOF10: 0
PAINLEVEL_OUTOF10: 0
PAINLEVEL_OUTOF10: 6
PAINLEVEL_OUTOF10: 0
PAINLEVEL_OUTOF10: 0

## 2024-08-31 ASSESSMENT — PAIN DESCRIPTION - FREQUENCY: FREQUENCY: INTERMITTENT

## 2024-08-31 ASSESSMENT — PAIN DESCRIPTION - ORIENTATION: ORIENTATION: LEFT

## 2024-08-31 ASSESSMENT — PAIN DESCRIPTION - PAIN TYPE: TYPE: ACUTE PAIN

## 2024-08-31 ASSESSMENT — PAIN SCALES - WONG BAKER
WONGBAKER_NUMERICALRESPONSE: NO HURT

## 2024-08-31 ASSESSMENT — PAIN DESCRIPTION - LOCATION: LOCATION: NECK

## 2024-08-31 ASSESSMENT — PAIN DESCRIPTION - ONSET: ONSET: PROGRESSIVE

## 2024-08-31 ASSESSMENT — PAIN - FUNCTIONAL ASSESSMENT: PAIN_FUNCTIONAL_ASSESSMENT: ACTIVITIES ARE NOT PREVENTED

## 2024-08-31 ASSESSMENT — PAIN DESCRIPTION - DESCRIPTORS: DESCRIPTORS: ACHING;TIGHTNESS

## 2024-09-01 LAB
ANION GAP SERPL CALC-SCNC: 6 MMOL/L (ref 3–18)
BUN SERPL-MCNC: 25 MG/DL (ref 7–18)
BUN/CREAT SERPL: 23 (ref 12–20)
CALCIUM SERPL-MCNC: 9.5 MG/DL (ref 8.5–10.1)
CHLORIDE SERPL-SCNC: 106 MMOL/L (ref 100–111)
CO2 SERPL-SCNC: 26 MMOL/L (ref 21–32)
CREAT SERPL-MCNC: 1.09 MG/DL (ref 0.6–1.3)
EKG ATRIAL RATE: 80 BPM
EKG ATRIAL RATE: 94 BPM
EKG DIAGNOSIS: NORMAL
EKG DIAGNOSIS: NORMAL
EKG P AXIS: 74 DEGREES
EKG P AXIS: 81 DEGREES
EKG P-R INTERVAL: 138 MS
EKG P-R INTERVAL: 144 MS
EKG Q-T INTERVAL: 378 MS
EKG Q-T INTERVAL: 396 MS
EKG QRS DURATION: 82 MS
EKG QRS DURATION: 86 MS
EKG QTC CALCULATION (BAZETT): 456 MS
EKG QTC CALCULATION (BAZETT): 472 MS
EKG R AXIS: -27 DEGREES
EKG R AXIS: -34 DEGREES
EKG T AXIS: -23 DEGREES
EKG T AXIS: 21 DEGREES
EKG VENTRICULAR RATE: 80 BPM
EKG VENTRICULAR RATE: 94 BPM
ERYTHROCYTE [DISTWIDTH] IN BLOOD BY AUTOMATED COUNT: 12.2 % (ref 11.6–14.5)
GLUCOSE BLD STRIP.AUTO-MCNC: 106 MG/DL (ref 70–110)
GLUCOSE BLD STRIP.AUTO-MCNC: 114 MG/DL (ref 70–110)
GLUCOSE BLD STRIP.AUTO-MCNC: 122 MG/DL (ref 70–110)
GLUCOSE BLD STRIP.AUTO-MCNC: 153 MG/DL (ref 70–110)
GLUCOSE BLD STRIP.AUTO-MCNC: 68 MG/DL (ref 70–110)
GLUCOSE SERPL-MCNC: 87 MG/DL (ref 74–99)
HCT VFR BLD AUTO: 38.9 % (ref 35–45)
HGB BLD-MCNC: 11.7 G/DL (ref 12–16)
MCH RBC QN AUTO: 31.5 PG (ref 24–34)
MCHC RBC AUTO-ENTMCNC: 30.1 G/DL (ref 31–37)
MCV RBC AUTO: 104.6 FL (ref 78–100)
NRBC # BLD: 0 K/UL (ref 0–0.01)
NRBC BLD-RTO: 0 PER 100 WBC
PLATELET # BLD AUTO: 159 K/UL (ref 135–420)
PMV BLD AUTO: 11.9 FL (ref 9.2–11.8)
POTASSIUM SERPL-SCNC: 3.5 MMOL/L (ref 3.5–5.5)
RBC # BLD AUTO: 3.72 M/UL (ref 4.2–5.3)
SODIUM SERPL-SCNC: 138 MMOL/L (ref 136–145)
WBC # BLD AUTO: 6.3 K/UL (ref 4.6–13.2)

## 2024-09-01 PROCEDURE — 36415 COLL VENOUS BLD VENIPUNCTURE: CPT

## 2024-09-01 PROCEDURE — 6370000000 HC RX 637 (ALT 250 FOR IP): Performed by: HOSPITALIST

## 2024-09-01 PROCEDURE — 2580000003 HC RX 258: Performed by: INTERNAL MEDICINE

## 2024-09-01 PROCEDURE — 93010 ELECTROCARDIOGRAM REPORT: CPT | Performed by: INTERNAL MEDICINE

## 2024-09-01 PROCEDURE — 80048 BASIC METABOLIC PNL TOTAL CA: CPT

## 2024-09-01 PROCEDURE — 6370000000 HC RX 637 (ALT 250 FOR IP): Performed by: PHYSICIAN ASSISTANT

## 2024-09-01 PROCEDURE — 94761 N-INVAS EAR/PLS OXIMETRY MLT: CPT

## 2024-09-01 PROCEDURE — 85027 COMPLETE CBC AUTOMATED: CPT

## 2024-09-01 PROCEDURE — 6360000002 HC RX W HCPCS: Performed by: HOSPITALIST

## 2024-09-01 PROCEDURE — 2140000001 HC CVICU INTERMEDIATE R&B

## 2024-09-01 PROCEDURE — 6370000000 HC RX 637 (ALT 250 FOR IP): Performed by: STUDENT IN AN ORGANIZED HEALTH CARE EDUCATION/TRAINING PROGRAM

## 2024-09-01 PROCEDURE — 6370000000 HC RX 637 (ALT 250 FOR IP): Performed by: INTERNAL MEDICINE

## 2024-09-01 PROCEDURE — 82962 GLUCOSE BLOOD TEST: CPT

## 2024-09-01 PROCEDURE — 99231 SBSQ HOSP IP/OBS SF/LOW 25: CPT | Performed by: STUDENT IN AN ORGANIZED HEALTH CARE EDUCATION/TRAINING PROGRAM

## 2024-09-01 RX ORDER — LEVOFLOXACIN 5 MG/ML
750 INJECTION, SOLUTION INTRAVENOUS
Status: COMPLETED | OUTPATIENT
Start: 2024-09-02 | End: 2024-09-02

## 2024-09-01 RX ADMIN — Medication 4 TABLET: at 08:26

## 2024-09-01 RX ADMIN — ISOSORBIDE MONONITRATE 60 MG: 60 TABLET, EXTENDED RELEASE ORAL at 08:44

## 2024-09-01 RX ADMIN — DILTIAZEM HYDROCHLORIDE 120 MG: 120 CAPSULE, COATED, EXTENDED RELEASE ORAL at 08:44

## 2024-09-01 RX ADMIN — METOPROLOL SUCCINATE 25 MG: 25 TABLET, EXTENDED RELEASE ORAL at 08:44

## 2024-09-01 RX ADMIN — AMLODIPINE BESYLATE 5 MG: 5 TABLET ORAL at 08:44

## 2024-09-01 RX ADMIN — ASPIRIN 81 MG CHEWABLE TABLET 81 MG: 81 TABLET CHEWABLE at 08:44

## 2024-09-01 RX ADMIN — ENOXAPARIN SODIUM 40 MG: 100 INJECTION SUBCUTANEOUS at 08:44

## 2024-09-01 RX ADMIN — ACETAMINOPHEN, ASPIRIN, CAFFEINE 1 TABLET: 250; 65; 250 TABLET, FILM COATED ORAL at 21:45

## 2024-09-01 RX ADMIN — RANOLAZINE 500 MG: 500 TABLET, EXTENDED RELEASE ORAL at 21:40

## 2024-09-01 RX ADMIN — MONTELUKAST 10 MG: 10 TABLET, FILM COATED ORAL at 08:44

## 2024-09-01 RX ADMIN — CLOPIDOGREL BISULFATE 75 MG: 75 TABLET ORAL at 08:44

## 2024-09-01 RX ADMIN — SODIUM CHLORIDE, PRESERVATIVE FREE 10 ML: 5 INJECTION INTRAVENOUS at 08:56

## 2024-09-01 RX ADMIN — RANOLAZINE 500 MG: 500 TABLET, EXTENDED RELEASE ORAL at 08:44

## 2024-09-01 RX ADMIN — ACETAMINOPHEN 325MG 650 MG: 325 TABLET ORAL at 13:36

## 2024-09-01 RX ADMIN — FUROSEMIDE 20 MG: 20 TABLET ORAL at 08:44

## 2024-09-01 RX ADMIN — LEVOTHYROXINE SODIUM 137 MCG: 0.05 TABLET ORAL at 05:07

## 2024-09-01 RX ADMIN — SODIUM CHLORIDE, PRESERVATIVE FREE 10 ML: 5 INJECTION INTRAVENOUS at 21:42

## 2024-09-01 ASSESSMENT — PAIN DESCRIPTION - LOCATION
LOCATION: NECK;HEAD
LOCATION: HEAD

## 2024-09-01 ASSESSMENT — PAIN DESCRIPTION - ORIENTATION: ORIENTATION: RIGHT

## 2024-09-01 ASSESSMENT — PAIN SCALES - GENERAL
PAINLEVEL_OUTOF10: 0
PAINLEVEL_OUTOF10: 0
PAINLEVEL_OUTOF10: 8
PAINLEVEL_OUTOF10: 0
PAINLEVEL_OUTOF10: 5
PAINLEVEL_OUTOF10: 0
PAINLEVEL_OUTOF10: 4

## 2024-09-01 ASSESSMENT — PAIN - FUNCTIONAL ASSESSMENT: PAIN_FUNCTIONAL_ASSESSMENT: PREVENTS OR INTERFERES SOME ACTIVE ACTIVITIES AND ADLS

## 2024-09-01 ASSESSMENT — PAIN SCALES - WONG BAKER: WONGBAKER_NUMERICALRESPONSE: NO HURT

## 2024-09-01 ASSESSMENT — PAIN DESCRIPTION - DESCRIPTORS
DESCRIPTORS: ACHING;SORE;SHARP
DESCRIPTORS: TIGHTNESS;ACHING

## 2024-09-01 ASSESSMENT — PAIN DESCRIPTION - PAIN TYPE: TYPE: ACUTE PAIN

## 2024-09-02 LAB
GLUCOSE BLD STRIP.AUTO-MCNC: 120 MG/DL (ref 70–110)
GLUCOSE BLD STRIP.AUTO-MCNC: 131 MG/DL (ref 70–110)
GLUCOSE BLD STRIP.AUTO-MCNC: 133 MG/DL (ref 70–110)
GLUCOSE BLD STRIP.AUTO-MCNC: 163 MG/DL (ref 70–110)

## 2024-09-02 PROCEDURE — 6360000002 HC RX W HCPCS: Performed by: STUDENT IN AN ORGANIZED HEALTH CARE EDUCATION/TRAINING PROGRAM

## 2024-09-02 PROCEDURE — 6370000000 HC RX 637 (ALT 250 FOR IP): Performed by: HOSPITALIST

## 2024-09-02 PROCEDURE — 99232 SBSQ HOSP IP/OBS MODERATE 35: CPT | Performed by: HOSPITALIST

## 2024-09-02 PROCEDURE — 6370000000 HC RX 637 (ALT 250 FOR IP): Performed by: INTERNAL MEDICINE

## 2024-09-02 PROCEDURE — 97530 THERAPEUTIC ACTIVITIES: CPT

## 2024-09-02 PROCEDURE — 6370000000 HC RX 637 (ALT 250 FOR IP): Performed by: STUDENT IN AN ORGANIZED HEALTH CARE EDUCATION/TRAINING PROGRAM

## 2024-09-02 PROCEDURE — 97535 SELF CARE MNGMENT TRAINING: CPT

## 2024-09-02 PROCEDURE — 2580000003 HC RX 258: Performed by: INTERNAL MEDICINE

## 2024-09-02 PROCEDURE — 6370000000 HC RX 637 (ALT 250 FOR IP): Performed by: PHYSICIAN ASSISTANT

## 2024-09-02 PROCEDURE — 2140000001 HC CVICU INTERMEDIATE R&B

## 2024-09-02 PROCEDURE — 82962 GLUCOSE BLOOD TEST: CPT

## 2024-09-02 PROCEDURE — 94761 N-INVAS EAR/PLS OXIMETRY MLT: CPT

## 2024-09-02 PROCEDURE — 6360000002 HC RX W HCPCS: Performed by: HOSPITALIST

## 2024-09-02 RX ORDER — POLYETHYLENE GLYCOL 3350 17 G/17G
17 POWDER, FOR SOLUTION ORAL DAILY
Status: DISCONTINUED | OUTPATIENT
Start: 2024-09-02 | End: 2024-09-10 | Stop reason: HOSPADM

## 2024-09-02 RX ORDER — LANOLIN ALCOHOL/MO/W.PET/CERES
1000 CREAM (GRAM) TOPICAL DAILY
Status: DISCONTINUED | OUTPATIENT
Start: 2024-09-03 | End: 2024-09-10 | Stop reason: HOSPADM

## 2024-09-02 RX ORDER — BISACODYL 10 MG
10 SUPPOSITORY, RECTAL RECTAL
Status: COMPLETED | OUTPATIENT
Start: 2024-09-02 | End: 2024-09-02

## 2024-09-02 RX ORDER — FOLIC ACID 1 MG/1
1 TABLET ORAL DAILY
Status: DISCONTINUED | OUTPATIENT
Start: 2024-09-03 | End: 2024-09-10 | Stop reason: HOSPADM

## 2024-09-02 RX ADMIN — LEVOTHYROXINE SODIUM 137 MCG: 0.05 TABLET ORAL at 05:53

## 2024-09-02 RX ADMIN — BISACODYL 10 MG: 10 SUPPOSITORY RECTAL at 13:12

## 2024-09-02 RX ADMIN — CLOPIDOGREL BISULFATE 75 MG: 75 TABLET ORAL at 09:22

## 2024-09-02 RX ADMIN — METOPROLOL SUCCINATE 25 MG: 25 TABLET, EXTENDED RELEASE ORAL at 09:22

## 2024-09-02 RX ADMIN — SODIUM CHLORIDE, PRESERVATIVE FREE 10 ML: 5 INJECTION INTRAVENOUS at 09:23

## 2024-09-02 RX ADMIN — DILTIAZEM HYDROCHLORIDE 120 MG: 120 CAPSULE, COATED, EXTENDED RELEASE ORAL at 09:22

## 2024-09-02 RX ADMIN — LEVOFLOXACIN 750 MG: 5 INJECTION, SOLUTION INTRAVENOUS at 17:44

## 2024-09-02 RX ADMIN — SODIUM CHLORIDE, PRESERVATIVE FREE 10 ML: 5 INJECTION INTRAVENOUS at 20:58

## 2024-09-02 RX ADMIN — RANOLAZINE 500 MG: 500 TABLET, EXTENDED RELEASE ORAL at 20:57

## 2024-09-02 RX ADMIN — MONTELUKAST 10 MG: 10 TABLET, FILM COATED ORAL at 09:22

## 2024-09-02 RX ADMIN — ISOSORBIDE MONONITRATE 60 MG: 60 TABLET, EXTENDED RELEASE ORAL at 09:22

## 2024-09-02 RX ADMIN — POLYETHYLENE GLYCOL 3350 17 G: 17 POWDER, FOR SOLUTION ORAL at 12:30

## 2024-09-02 RX ADMIN — RANOLAZINE 500 MG: 500 TABLET, EXTENDED RELEASE ORAL at 09:22

## 2024-09-02 RX ADMIN — AMLODIPINE BESYLATE 5 MG: 5 TABLET ORAL at 09:22

## 2024-09-02 RX ADMIN — POLYETHYLENE GLYCOL 3350 17 G: 17 POWDER, FOR SOLUTION ORAL at 06:02

## 2024-09-02 RX ADMIN — FUROSEMIDE 20 MG: 20 TABLET ORAL at 09:22

## 2024-09-02 RX ADMIN — ASPIRIN 81 MG CHEWABLE TABLET 81 MG: 81 TABLET CHEWABLE at 09:22

## 2024-09-02 RX ADMIN — ENOXAPARIN SODIUM 40 MG: 100 INJECTION SUBCUTANEOUS at 09:21

## 2024-09-02 ASSESSMENT — PAIN SCALES - GENERAL
PAINLEVEL_OUTOF10: 0

## 2024-09-03 LAB
EKG ATRIAL RATE: 72 BPM
EKG DIAGNOSIS: NORMAL
EKG P AXIS: 64 DEGREES
EKG P-R INTERVAL: 138 MS
EKG Q-T INTERVAL: 432 MS
EKG QRS DURATION: 90 MS
EKG QTC CALCULATION (BAZETT): 473 MS
EKG R AXIS: -20 DEGREES
EKG T AXIS: 20 DEGREES
EKG VENTRICULAR RATE: 72 BPM
GLUCOSE BLD STRIP.AUTO-MCNC: 120 MG/DL (ref 70–110)
GLUCOSE BLD STRIP.AUTO-MCNC: 146 MG/DL (ref 70–110)
GLUCOSE BLD STRIP.AUTO-MCNC: 154 MG/DL (ref 70–110)
GLUCOSE BLD STRIP.AUTO-MCNC: 197 MG/DL (ref 70–110)

## 2024-09-03 PROCEDURE — 99232 SBSQ HOSP IP/OBS MODERATE 35: CPT | Performed by: HOSPITALIST

## 2024-09-03 PROCEDURE — 93010 ELECTROCARDIOGRAM REPORT: CPT | Performed by: INTERNAL MEDICINE

## 2024-09-03 PROCEDURE — 2140000001 HC CVICU INTERMEDIATE R&B

## 2024-09-03 PROCEDURE — 97110 THERAPEUTIC EXERCISES: CPT

## 2024-09-03 PROCEDURE — 6370000000 HC RX 637 (ALT 250 FOR IP): Performed by: HOSPITALIST

## 2024-09-03 PROCEDURE — 6370000000 HC RX 637 (ALT 250 FOR IP): Performed by: PHYSICIAN ASSISTANT

## 2024-09-03 PROCEDURE — 97530 THERAPEUTIC ACTIVITIES: CPT

## 2024-09-03 PROCEDURE — 6360000002 HC RX W HCPCS: Performed by: HOSPITALIST

## 2024-09-03 PROCEDURE — 6370000000 HC RX 637 (ALT 250 FOR IP): Performed by: STUDENT IN AN ORGANIZED HEALTH CARE EDUCATION/TRAINING PROGRAM

## 2024-09-03 PROCEDURE — 93005 ELECTROCARDIOGRAM TRACING: CPT | Performed by: HOSPITALIST

## 2024-09-03 PROCEDURE — 94761 N-INVAS EAR/PLS OXIMETRY MLT: CPT

## 2024-09-03 PROCEDURE — 6370000000 HC RX 637 (ALT 250 FOR IP): Performed by: INTERNAL MEDICINE

## 2024-09-03 PROCEDURE — 2580000003 HC RX 258: Performed by: INTERNAL MEDICINE

## 2024-09-03 PROCEDURE — 82962 GLUCOSE BLOOD TEST: CPT

## 2024-09-03 RX ORDER — SIMETHICONE 80 MG
80 TABLET,CHEWABLE ORAL EVERY 6 HOURS PRN
Status: DISCONTINUED | OUTPATIENT
Start: 2024-09-03 | End: 2024-09-10 | Stop reason: HOSPADM

## 2024-09-03 RX ORDER — FAMOTIDINE 20 MG/1
20 TABLET, FILM COATED ORAL DAILY
Status: DISCONTINUED | OUTPATIENT
Start: 2024-09-03 | End: 2024-09-10 | Stop reason: HOSPADM

## 2024-09-03 RX ADMIN — RANOLAZINE 500 MG: 500 TABLET, EXTENDED RELEASE ORAL at 09:52

## 2024-09-03 RX ADMIN — FAMOTIDINE 20 MG: 20 TABLET ORAL at 12:54

## 2024-09-03 RX ADMIN — METOPROLOL SUCCINATE 25 MG: 25 TABLET, EXTENDED RELEASE ORAL at 09:53

## 2024-09-03 RX ADMIN — FUROSEMIDE 20 MG: 20 TABLET ORAL at 09:53

## 2024-09-03 RX ADMIN — ISOSORBIDE MONONITRATE 60 MG: 60 TABLET, EXTENDED RELEASE ORAL at 09:52

## 2024-09-03 RX ADMIN — LEVOTHYROXINE SODIUM 137 MCG: 0.05 TABLET ORAL at 05:03

## 2024-09-03 RX ADMIN — POLYETHYLENE GLYCOL 3350 17 G: 17 POWDER, FOR SOLUTION ORAL at 09:53

## 2024-09-03 RX ADMIN — DILTIAZEM HYDROCHLORIDE 120 MG: 120 CAPSULE, COATED, EXTENDED RELEASE ORAL at 09:52

## 2024-09-03 RX ADMIN — ASPIRIN 81 MG CHEWABLE TABLET 81 MG: 81 TABLET CHEWABLE at 09:52

## 2024-09-03 RX ADMIN — NITROGLYCERIN 0.4 MG: 0.4 TABLET, ORALLY DISINTEGRATING SUBLINGUAL at 08:47

## 2024-09-03 RX ADMIN — ENOXAPARIN SODIUM 40 MG: 100 INJECTION SUBCUTANEOUS at 09:53

## 2024-09-03 RX ADMIN — SODIUM CHLORIDE, PRESERVATIVE FREE 10 ML: 5 INJECTION INTRAVENOUS at 21:02

## 2024-09-03 RX ADMIN — SODIUM CHLORIDE, PRESERVATIVE FREE 10 ML: 5 INJECTION INTRAVENOUS at 09:54

## 2024-09-03 RX ADMIN — ACETAMINOPHEN 325MG 650 MG: 325 TABLET ORAL at 00:01

## 2024-09-03 RX ADMIN — FOLIC ACID 1 MG: 1 TABLET ORAL at 09:52

## 2024-09-03 RX ADMIN — RANOLAZINE 500 MG: 500 TABLET, EXTENDED RELEASE ORAL at 21:04

## 2024-09-03 RX ADMIN — CLOPIDOGREL BISULFATE 75 MG: 75 TABLET ORAL at 09:52

## 2024-09-03 RX ADMIN — MONTELUKAST 10 MG: 10 TABLET, FILM COATED ORAL at 09:52

## 2024-09-03 RX ADMIN — POTASSIUM CHLORIDE 40 MEQ: 1500 TABLET, EXTENDED RELEASE ORAL at 15:11

## 2024-09-03 RX ADMIN — AMLODIPINE BESYLATE 5 MG: 5 TABLET ORAL at 09:53

## 2024-09-03 RX ADMIN — CYANOCOBALAMIN TAB 1000 MCG 1000 MCG: 1000 TAB at 09:52

## 2024-09-03 ASSESSMENT — PAIN DESCRIPTION - ORIENTATION
ORIENTATION: LEFT
ORIENTATION: LEFT;MID

## 2024-09-03 ASSESSMENT — PAIN DESCRIPTION - DESCRIPTORS
DESCRIPTORS: ACHING
DESCRIPTORS: ACHING

## 2024-09-03 ASSESSMENT — PAIN DESCRIPTION - ONSET
ONSET: ON-GOING
ONSET: PROGRESSIVE

## 2024-09-03 ASSESSMENT — PAIN - FUNCTIONAL ASSESSMENT
PAIN_FUNCTIONAL_ASSESSMENT: ACTIVITIES ARE NOT PREVENTED
PAIN_FUNCTIONAL_ASSESSMENT: ACTIVITIES ARE NOT PREVENTED

## 2024-09-03 ASSESSMENT — PAIN DESCRIPTION - FREQUENCY
FREQUENCY: INTERMITTENT
FREQUENCY: INTERMITTENT

## 2024-09-03 ASSESSMENT — PAIN DESCRIPTION - PAIN TYPE
TYPE: ACUTE PAIN
TYPE: ACUTE PAIN

## 2024-09-03 ASSESSMENT — PAIN SCALES - WONG BAKER
WONGBAKER_NUMERICALRESPONSE: NO HURT
WONGBAKER_NUMERICALRESPONSE: NO HURT

## 2024-09-03 ASSESSMENT — PAIN SCALES - GENERAL
PAINLEVEL_OUTOF10: 7
PAINLEVEL_OUTOF10: 0
PAINLEVEL_OUTOF10: 3
PAINLEVEL_OUTOF10: 0
PAINLEVEL_OUTOF10: 8
PAINLEVEL_OUTOF10: 8

## 2024-09-03 ASSESSMENT — PAIN DESCRIPTION - LOCATION
LOCATION: CHEST
LOCATION: BACK;SHOULDER

## 2024-09-04 LAB
GLUCOSE BLD STRIP.AUTO-MCNC: 101 MG/DL (ref 70–110)
GLUCOSE BLD STRIP.AUTO-MCNC: 117 MG/DL (ref 70–110)
GLUCOSE BLD STRIP.AUTO-MCNC: 126 MG/DL (ref 70–110)
GLUCOSE BLD STRIP.AUTO-MCNC: 162 MG/DL (ref 70–110)
GLUCOSE BLD STRIP.AUTO-MCNC: 178 MG/DL (ref 70–110)
GLUCOSE BLD STRIP.AUTO-MCNC: 73 MG/DL (ref 70–110)

## 2024-09-04 PROCEDURE — 6370000000 HC RX 637 (ALT 250 FOR IP): Performed by: PHYSICIAN ASSISTANT

## 2024-09-04 PROCEDURE — 6370000000 HC RX 637 (ALT 250 FOR IP): Performed by: HOSPITALIST

## 2024-09-04 PROCEDURE — 82962 GLUCOSE BLOOD TEST: CPT

## 2024-09-04 PROCEDURE — 6370000000 HC RX 637 (ALT 250 FOR IP): Performed by: INTERNAL MEDICINE

## 2024-09-04 PROCEDURE — 99232 SBSQ HOSP IP/OBS MODERATE 35: CPT | Performed by: HOSPITALIST

## 2024-09-04 PROCEDURE — 97535 SELF CARE MNGMENT TRAINING: CPT

## 2024-09-04 PROCEDURE — 2580000003 HC RX 258: Performed by: INTERNAL MEDICINE

## 2024-09-04 PROCEDURE — 94760 N-INVAS EAR/PLS OXIMETRY 1: CPT

## 2024-09-04 PROCEDURE — 6360000002 HC RX W HCPCS: Performed by: HOSPITALIST

## 2024-09-04 PROCEDURE — 6370000000 HC RX 637 (ALT 250 FOR IP): Performed by: STUDENT IN AN ORGANIZED HEALTH CARE EDUCATION/TRAINING PROGRAM

## 2024-09-04 PROCEDURE — 2140000001 HC CVICU INTERMEDIATE R&B

## 2024-09-04 RX ADMIN — DILTIAZEM HYDROCHLORIDE 120 MG: 120 CAPSULE, COATED, EXTENDED RELEASE ORAL at 08:44

## 2024-09-04 RX ADMIN — MECLIZINE 12.5 MG: 12.5 TABLET ORAL at 16:55

## 2024-09-04 RX ADMIN — METOPROLOL SUCCINATE 25 MG: 25 TABLET, EXTENDED RELEASE ORAL at 08:44

## 2024-09-04 RX ADMIN — FUROSEMIDE 20 MG: 20 TABLET ORAL at 08:43

## 2024-09-04 RX ADMIN — CYANOCOBALAMIN TAB 1000 MCG 1000 MCG: 1000 TAB at 08:44

## 2024-09-04 RX ADMIN — CLOPIDOGREL BISULFATE 75 MG: 75 TABLET ORAL at 08:44

## 2024-09-04 RX ADMIN — AMLODIPINE BESYLATE 5 MG: 5 TABLET ORAL at 08:44

## 2024-09-04 RX ADMIN — LEVOTHYROXINE SODIUM 137 MCG: 0.05 TABLET ORAL at 05:40

## 2024-09-04 RX ADMIN — ENOXAPARIN SODIUM 40 MG: 100 INJECTION SUBCUTANEOUS at 08:43

## 2024-09-04 RX ADMIN — FOLIC ACID 1 MG: 1 TABLET ORAL at 08:44

## 2024-09-04 RX ADMIN — SODIUM CHLORIDE, PRESERVATIVE FREE 10 ML: 5 INJECTION INTRAVENOUS at 21:21

## 2024-09-04 RX ADMIN — POLYETHYLENE GLYCOL 3350 17 G: 17 POWDER, FOR SOLUTION ORAL at 08:47

## 2024-09-04 RX ADMIN — ONDANSETRON 4 MG: 2 INJECTION INTRAMUSCULAR; INTRAVENOUS at 11:03

## 2024-09-04 RX ADMIN — MONTELUKAST 10 MG: 10 TABLET, FILM COATED ORAL at 08:43

## 2024-09-04 RX ADMIN — INSULIN LISPRO 7 UNITS: 100 INJECTION, SOLUTION INTRAVENOUS; SUBCUTANEOUS at 12:12

## 2024-09-04 RX ADMIN — ISOSORBIDE MONONITRATE 60 MG: 60 TABLET, EXTENDED RELEASE ORAL at 08:43

## 2024-09-04 RX ADMIN — ASPIRIN 81 MG CHEWABLE TABLET 81 MG: 81 TABLET CHEWABLE at 08:44

## 2024-09-04 RX ADMIN — INSULIN LISPRO 7 UNITS: 100 INJECTION, SOLUTION INTRAVENOUS; SUBCUTANEOUS at 08:46

## 2024-09-04 RX ADMIN — SODIUM CHLORIDE, PRESERVATIVE FREE 10 ML: 5 INJECTION INTRAVENOUS at 08:51

## 2024-09-04 RX ADMIN — RANOLAZINE 500 MG: 500 TABLET, EXTENDED RELEASE ORAL at 08:44

## 2024-09-04 RX ADMIN — RANOLAZINE 500 MG: 500 TABLET, EXTENDED RELEASE ORAL at 21:15

## 2024-09-04 RX ADMIN — FAMOTIDINE 20 MG: 20 TABLET ORAL at 08:43

## 2024-09-04 ASSESSMENT — PAIN SCALES - GENERAL
PAINLEVEL_OUTOF10: 0

## 2024-09-04 ASSESSMENT — PAIN SCALES - WONG BAKER: WONGBAKER_NUMERICALRESPONSE: NO HURT

## 2024-09-05 LAB
ERYTHROCYTE [DISTWIDTH] IN BLOOD BY AUTOMATED COUNT: 12.3 % (ref 11.6–14.5)
GLUCOSE BLD STRIP.AUTO-MCNC: 130 MG/DL (ref 70–110)
GLUCOSE BLD STRIP.AUTO-MCNC: 165 MG/DL (ref 70–110)
GLUCOSE BLD STRIP.AUTO-MCNC: 166 MG/DL (ref 70–110)
GLUCOSE BLD STRIP.AUTO-MCNC: 172 MG/DL (ref 70–110)
HCT VFR BLD AUTO: 32.6 % (ref 35–45)
HGB BLD-MCNC: 10.2 G/DL (ref 12–16)
MCH RBC QN AUTO: 32.2 PG (ref 24–34)
MCHC RBC AUTO-ENTMCNC: 31.3 G/DL (ref 31–37)
MCV RBC AUTO: 102.8 FL (ref 78–100)
NRBC # BLD: 0 K/UL (ref 0–0.01)
NRBC BLD-RTO: 0 PER 100 WBC
PLATELET # BLD AUTO: 179 K/UL (ref 135–420)
PMV BLD AUTO: 10.9 FL (ref 9.2–11.8)
RBC # BLD AUTO: 3.17 M/UL (ref 4.2–5.3)
WBC # BLD AUTO: 5.4 K/UL (ref 4.6–13.2)

## 2024-09-05 PROCEDURE — 2140000001 HC CVICU INTERMEDIATE R&B

## 2024-09-05 PROCEDURE — 6360000002 HC RX W HCPCS: Performed by: HOSPITALIST

## 2024-09-05 PROCEDURE — 97535 SELF CARE MNGMENT TRAINING: CPT

## 2024-09-05 PROCEDURE — 6370000000 HC RX 637 (ALT 250 FOR IP): Performed by: HOSPITALIST

## 2024-09-05 PROCEDURE — 6370000000 HC RX 637 (ALT 250 FOR IP): Performed by: STUDENT IN AN ORGANIZED HEALTH CARE EDUCATION/TRAINING PROGRAM

## 2024-09-05 PROCEDURE — 82962 GLUCOSE BLOOD TEST: CPT

## 2024-09-05 PROCEDURE — 94761 N-INVAS EAR/PLS OXIMETRY MLT: CPT

## 2024-09-05 PROCEDURE — 6370000000 HC RX 637 (ALT 250 FOR IP): Performed by: PHYSICIAN ASSISTANT

## 2024-09-05 PROCEDURE — 2580000003 HC RX 258: Performed by: INTERNAL MEDICINE

## 2024-09-05 PROCEDURE — 97530 THERAPEUTIC ACTIVITIES: CPT

## 2024-09-05 PROCEDURE — 97110 THERAPEUTIC EXERCISES: CPT

## 2024-09-05 PROCEDURE — 85027 COMPLETE CBC AUTOMATED: CPT

## 2024-09-05 PROCEDURE — 6370000000 HC RX 637 (ALT 250 FOR IP): Performed by: INTERNAL MEDICINE

## 2024-09-05 PROCEDURE — 99232 SBSQ HOSP IP/OBS MODERATE 35: CPT | Performed by: INTERNAL MEDICINE

## 2024-09-05 PROCEDURE — 36415 COLL VENOUS BLD VENIPUNCTURE: CPT

## 2024-09-05 RX ORDER — INSULIN GLARGINE 100 [IU]/ML
10 INJECTION, SOLUTION SUBCUTANEOUS EVERY MORNING
Status: DISCONTINUED | OUTPATIENT
Start: 2024-09-06 | End: 2024-09-08

## 2024-09-05 RX ADMIN — MECLIZINE 12.5 MG: 12.5 TABLET ORAL at 08:59

## 2024-09-05 RX ADMIN — LEVOTHYROXINE SODIUM 137 MCG: 0.05 TABLET ORAL at 06:44

## 2024-09-05 RX ADMIN — ENOXAPARIN SODIUM 40 MG: 100 INJECTION SUBCUTANEOUS at 08:56

## 2024-09-05 RX ADMIN — AMLODIPINE BESYLATE 5 MG: 5 TABLET ORAL at 12:30

## 2024-09-05 RX ADMIN — CYANOCOBALAMIN TAB 1000 MCG 1000 MCG: 1000 TAB at 12:30

## 2024-09-05 RX ADMIN — INSULIN LISPRO 7 UNITS: 100 INJECTION, SOLUTION INTRAVENOUS; SUBCUTANEOUS at 08:55

## 2024-09-05 RX ADMIN — ALBUTEROL SULFATE 2.5 MG: 2.5 SOLUTION RESPIRATORY (INHALATION) at 09:48

## 2024-09-05 RX ADMIN — MONTELUKAST 10 MG: 10 TABLET, FILM COATED ORAL at 12:30

## 2024-09-05 RX ADMIN — ASPIRIN 81 MG CHEWABLE TABLET 81 MG: 81 TABLET CHEWABLE at 08:59

## 2024-09-05 RX ADMIN — CLOPIDOGREL BISULFATE 75 MG: 75 TABLET ORAL at 08:59

## 2024-09-05 RX ADMIN — FAMOTIDINE 20 MG: 20 TABLET ORAL at 08:59

## 2024-09-05 RX ADMIN — SODIUM CHLORIDE, PRESERVATIVE FREE 10 ML: 5 INJECTION INTRAVENOUS at 09:01

## 2024-09-05 RX ADMIN — ISOSORBIDE MONONITRATE 60 MG: 60 TABLET, EXTENDED RELEASE ORAL at 09:03

## 2024-09-05 RX ADMIN — SODIUM CHLORIDE, PRESERVATIVE FREE 10 ML: 5 INJECTION INTRAVENOUS at 21:27

## 2024-09-05 RX ADMIN — FUROSEMIDE 20 MG: 20 TABLET ORAL at 08:59

## 2024-09-05 RX ADMIN — METOPROLOL SUCCINATE 25 MG: 25 TABLET, EXTENDED RELEASE ORAL at 12:30

## 2024-09-05 RX ADMIN — DILTIAZEM HYDROCHLORIDE 120 MG: 120 CAPSULE, COATED, EXTENDED RELEASE ORAL at 08:58

## 2024-09-05 RX ADMIN — INSULIN LISPRO 7 UNITS: 100 INJECTION, SOLUTION INTRAVENOUS; SUBCUTANEOUS at 12:31

## 2024-09-05 RX ADMIN — POLYETHYLENE GLYCOL 3350 17 G: 17 POWDER, FOR SOLUTION ORAL at 08:55

## 2024-09-05 RX ADMIN — FOLIC ACID 1 MG: 1 TABLET ORAL at 12:31

## 2024-09-05 RX ADMIN — RANOLAZINE 500 MG: 500 TABLET, EXTENDED RELEASE ORAL at 09:00

## 2024-09-05 ASSESSMENT — PAIN SCALES - GENERAL
PAINLEVEL_OUTOF10: 0

## 2024-09-06 LAB
GLUCOSE BLD STRIP.AUTO-MCNC: 150 MG/DL (ref 70–110)
GLUCOSE BLD STRIP.AUTO-MCNC: 163 MG/DL (ref 70–110)
GLUCOSE BLD STRIP.AUTO-MCNC: 171 MG/DL (ref 70–110)
GLUCOSE BLD STRIP.AUTO-MCNC: 182 MG/DL (ref 70–110)
GLUCOSE BLD STRIP.AUTO-MCNC: 189 MG/DL (ref 70–110)

## 2024-09-06 PROCEDURE — 94761 N-INVAS EAR/PLS OXIMETRY MLT: CPT

## 2024-09-06 PROCEDURE — 82962 GLUCOSE BLOOD TEST: CPT

## 2024-09-06 PROCEDURE — 6370000000 HC RX 637 (ALT 250 FOR IP): Performed by: HOSPITALIST

## 2024-09-06 PROCEDURE — 97164 PT RE-EVAL EST PLAN CARE: CPT

## 2024-09-06 PROCEDURE — 97530 THERAPEUTIC ACTIVITIES: CPT

## 2024-09-06 PROCEDURE — 6370000000 HC RX 637 (ALT 250 FOR IP): Performed by: INTERNAL MEDICINE

## 2024-09-06 PROCEDURE — 1100000003 HC PRIVATE W/ TELEMETRY

## 2024-09-06 PROCEDURE — 6360000002 HC RX W HCPCS: Performed by: HOSPITALIST

## 2024-09-06 PROCEDURE — 6370000000 HC RX 637 (ALT 250 FOR IP): Performed by: PHYSICIAN ASSISTANT

## 2024-09-06 PROCEDURE — 2580000003 HC RX 258: Performed by: INTERNAL MEDICINE

## 2024-09-06 PROCEDURE — 6370000000 HC RX 637 (ALT 250 FOR IP): Performed by: STUDENT IN AN ORGANIZED HEALTH CARE EDUCATION/TRAINING PROGRAM

## 2024-09-06 PROCEDURE — 99232 SBSQ HOSP IP/OBS MODERATE 35: CPT | Performed by: INTERNAL MEDICINE

## 2024-09-06 RX ADMIN — RANOLAZINE 500 MG: 500 TABLET, EXTENDED RELEASE ORAL at 08:39

## 2024-09-06 RX ADMIN — ENOXAPARIN SODIUM 40 MG: 100 INJECTION SUBCUTANEOUS at 08:41

## 2024-09-06 RX ADMIN — AMLODIPINE BESYLATE 5 MG: 5 TABLET ORAL at 08:39

## 2024-09-06 RX ADMIN — ISOSORBIDE MONONITRATE 60 MG: 60 TABLET, EXTENDED RELEASE ORAL at 08:39

## 2024-09-06 RX ADMIN — SODIUM CHLORIDE, PRESERVATIVE FREE 10 ML: 5 INJECTION INTRAVENOUS at 08:42

## 2024-09-06 RX ADMIN — POLYETHYLENE GLYCOL 3350 17 G: 17 POWDER, FOR SOLUTION ORAL at 08:42

## 2024-09-06 RX ADMIN — FAMOTIDINE 20 MG: 20 TABLET ORAL at 08:40

## 2024-09-06 RX ADMIN — RANOLAZINE 500 MG: 500 TABLET, EXTENDED RELEASE ORAL at 20:45

## 2024-09-06 RX ADMIN — ASPIRIN 81 MG CHEWABLE TABLET 81 MG: 81 TABLET CHEWABLE at 08:40

## 2024-09-06 RX ADMIN — MONTELUKAST 10 MG: 10 TABLET, FILM COATED ORAL at 08:40

## 2024-09-06 RX ADMIN — SODIUM CHLORIDE, PRESERVATIVE FREE 10 ML: 5 INJECTION INTRAVENOUS at 20:45

## 2024-09-06 RX ADMIN — FOLIC ACID 1 MG: 1 TABLET ORAL at 08:39

## 2024-09-06 RX ADMIN — DILTIAZEM HYDROCHLORIDE 120 MG: 120 CAPSULE, COATED, EXTENDED RELEASE ORAL at 08:40

## 2024-09-06 RX ADMIN — LEVOTHYROXINE SODIUM 137 MCG: 0.05 TABLET ORAL at 06:11

## 2024-09-06 RX ADMIN — FUROSEMIDE 20 MG: 20 TABLET ORAL at 08:40

## 2024-09-06 RX ADMIN — CYANOCOBALAMIN TAB 1000 MCG 1000 MCG: 1000 TAB at 08:39

## 2024-09-06 RX ADMIN — CLOPIDOGREL BISULFATE 75 MG: 75 TABLET ORAL at 08:40

## 2024-09-06 RX ADMIN — METOPROLOL SUCCINATE 25 MG: 25 TABLET, EXTENDED RELEASE ORAL at 08:40

## 2024-09-06 ASSESSMENT — PAIN SCALES - GENERAL
PAINLEVEL_OUTOF10: 0

## 2024-09-07 LAB
GLUCOSE BLD STRIP.AUTO-MCNC: 155 MG/DL (ref 70–110)
GLUCOSE BLD STRIP.AUTO-MCNC: 159 MG/DL (ref 70–110)
GLUCOSE BLD STRIP.AUTO-MCNC: 178 MG/DL (ref 70–110)
GLUCOSE BLD STRIP.AUTO-MCNC: 196 MG/DL (ref 70–110)

## 2024-09-07 PROCEDURE — 2580000003 HC RX 258: Performed by: INTERNAL MEDICINE

## 2024-09-07 PROCEDURE — 6370000000 HC RX 637 (ALT 250 FOR IP): Performed by: HOSPITALIST

## 2024-09-07 PROCEDURE — 94761 N-INVAS EAR/PLS OXIMETRY MLT: CPT

## 2024-09-07 PROCEDURE — 6370000000 HC RX 637 (ALT 250 FOR IP): Performed by: STUDENT IN AN ORGANIZED HEALTH CARE EDUCATION/TRAINING PROGRAM

## 2024-09-07 PROCEDURE — 82962 GLUCOSE BLOOD TEST: CPT

## 2024-09-07 PROCEDURE — 1100000003 HC PRIVATE W/ TELEMETRY

## 2024-09-07 PROCEDURE — 6370000000 HC RX 637 (ALT 250 FOR IP): Performed by: INTERNAL MEDICINE

## 2024-09-07 PROCEDURE — 6370000000 HC RX 637 (ALT 250 FOR IP): Performed by: PHYSICIAN ASSISTANT

## 2024-09-07 PROCEDURE — 99232 SBSQ HOSP IP/OBS MODERATE 35: CPT | Performed by: INTERNAL MEDICINE

## 2024-09-07 PROCEDURE — 6360000002 HC RX W HCPCS: Performed by: HOSPITALIST

## 2024-09-07 RX ORDER — MECLIZINE HCL 12.5 MG 12.5 MG/1
12.5 TABLET ORAL EVERY 6 HOURS SCHEDULED
Status: DISPENSED | OUTPATIENT
Start: 2024-09-07 | End: 2024-09-10

## 2024-09-07 RX ADMIN — MONTELUKAST 10 MG: 10 TABLET, FILM COATED ORAL at 08:44

## 2024-09-07 RX ADMIN — LEVOTHYROXINE SODIUM 137 MCG: 0.05 TABLET ORAL at 06:37

## 2024-09-07 RX ADMIN — CYANOCOBALAMIN TAB 1000 MCG 1000 MCG: 1000 TAB at 08:44

## 2024-09-07 RX ADMIN — SODIUM CHLORIDE, PRESERVATIVE FREE 10 ML: 5 INJECTION INTRAVENOUS at 08:41

## 2024-09-07 RX ADMIN — AMLODIPINE BESYLATE 5 MG: 5 TABLET ORAL at 08:44

## 2024-09-07 RX ADMIN — DILTIAZEM HYDROCHLORIDE 120 MG: 120 CAPSULE, COATED, EXTENDED RELEASE ORAL at 08:44

## 2024-09-07 RX ADMIN — ENOXAPARIN SODIUM 40 MG: 100 INJECTION SUBCUTANEOUS at 08:45

## 2024-09-07 RX ADMIN — ISOSORBIDE MONONITRATE 60 MG: 60 TABLET, EXTENDED RELEASE ORAL at 08:44

## 2024-09-07 RX ADMIN — METOPROLOL SUCCINATE 25 MG: 25 TABLET, EXTENDED RELEASE ORAL at 08:44

## 2024-09-07 RX ADMIN — RANOLAZINE 500 MG: 500 TABLET, EXTENDED RELEASE ORAL at 08:44

## 2024-09-07 RX ADMIN — FOLIC ACID 1 MG: 1 TABLET ORAL at 08:44

## 2024-09-07 RX ADMIN — SODIUM CHLORIDE, PRESERVATIVE FREE 10 ML: 5 INJECTION INTRAVENOUS at 20:01

## 2024-09-07 RX ADMIN — MECLIZINE 12.5 MG: 12.5 TABLET ORAL at 18:01

## 2024-09-07 RX ADMIN — POLYETHYLENE GLYCOL 3350 17 G: 17 POWDER, FOR SOLUTION ORAL at 08:54

## 2024-09-07 RX ADMIN — CLOPIDOGREL BISULFATE 75 MG: 75 TABLET ORAL at 08:43

## 2024-09-07 RX ADMIN — FAMOTIDINE 20 MG: 20 TABLET ORAL at 08:44

## 2024-09-07 RX ADMIN — ASPIRIN 81 MG CHEWABLE TABLET 81 MG: 81 TABLET CHEWABLE at 08:44

## 2024-09-07 RX ADMIN — RANOLAZINE 500 MG: 500 TABLET, EXTENDED RELEASE ORAL at 20:01

## 2024-09-07 RX ADMIN — ONDANSETRON 4 MG: 2 INJECTION INTRAMUSCULAR; INTRAVENOUS at 08:41

## 2024-09-07 RX ADMIN — MECLIZINE 12.5 MG: 12.5 TABLET ORAL at 12:51

## 2024-09-07 RX ADMIN — FUROSEMIDE 20 MG: 20 TABLET ORAL at 08:44

## 2024-09-07 ASSESSMENT — PAIN SCALES - GENERAL
PAINLEVEL_OUTOF10: 0

## 2024-09-08 LAB
GLUCOSE BLD STRIP.AUTO-MCNC: 160 MG/DL (ref 70–110)
GLUCOSE BLD STRIP.AUTO-MCNC: 163 MG/DL (ref 70–110)
GLUCOSE BLD STRIP.AUTO-MCNC: 184 MG/DL (ref 70–110)
GLUCOSE BLD STRIP.AUTO-MCNC: 194 MG/DL (ref 70–110)

## 2024-09-08 PROCEDURE — 6370000000 HC RX 637 (ALT 250 FOR IP): Performed by: HOSPITALIST

## 2024-09-08 PROCEDURE — 82962 GLUCOSE BLOOD TEST: CPT

## 2024-09-08 PROCEDURE — 1100000003 HC PRIVATE W/ TELEMETRY

## 2024-09-08 PROCEDURE — 99232 SBSQ HOSP IP/OBS MODERATE 35: CPT | Performed by: INTERNAL MEDICINE

## 2024-09-08 PROCEDURE — 6370000000 HC RX 637 (ALT 250 FOR IP): Performed by: INTERNAL MEDICINE

## 2024-09-08 PROCEDURE — 97530 THERAPEUTIC ACTIVITIES: CPT

## 2024-09-08 PROCEDURE — 6360000002 HC RX W HCPCS: Performed by: HOSPITALIST

## 2024-09-08 PROCEDURE — 2580000003 HC RX 258: Performed by: INTERNAL MEDICINE

## 2024-09-08 PROCEDURE — 6370000000 HC RX 637 (ALT 250 FOR IP): Performed by: STUDENT IN AN ORGANIZED HEALTH CARE EDUCATION/TRAINING PROGRAM

## 2024-09-08 PROCEDURE — 6370000000 HC RX 637 (ALT 250 FOR IP): Performed by: PHYSICIAN ASSISTANT

## 2024-09-08 PROCEDURE — 94761 N-INVAS EAR/PLS OXIMETRY MLT: CPT

## 2024-09-08 RX ORDER — LUBIPROSTONE 8 UG/1
16 CAPSULE ORAL 2 TIMES DAILY WITH MEALS
Status: DISPENSED | OUTPATIENT
Start: 2024-09-08 | End: 2024-09-10

## 2024-09-08 RX ORDER — INSULIN GLARGINE 100 [IU]/ML
6 INJECTION, SOLUTION SUBCUTANEOUS EVERY MORNING
Status: DISCONTINUED | OUTPATIENT
Start: 2024-09-09 | End: 2024-09-10 | Stop reason: HOSPADM

## 2024-09-08 RX ADMIN — DILTIAZEM HYDROCHLORIDE 120 MG: 120 CAPSULE, COATED, EXTENDED RELEASE ORAL at 09:07

## 2024-09-08 RX ADMIN — SODIUM CHLORIDE, PRESERVATIVE FREE 10 ML: 5 INJECTION INTRAVENOUS at 20:52

## 2024-09-08 RX ADMIN — POLYETHYLENE GLYCOL 3350 17 G: 17 POWDER, FOR SOLUTION ORAL at 09:04

## 2024-09-08 RX ADMIN — CYANOCOBALAMIN TAB 1000 MCG 1000 MCG: 1000 TAB at 09:07

## 2024-09-08 RX ADMIN — FOLIC ACID 1 MG: 1 TABLET ORAL at 09:07

## 2024-09-08 RX ADMIN — CLOPIDOGREL BISULFATE 75 MG: 75 TABLET ORAL at 09:07

## 2024-09-08 RX ADMIN — ASPIRIN 81 MG CHEWABLE TABLET 81 MG: 81 TABLET CHEWABLE at 09:08

## 2024-09-08 RX ADMIN — MECLIZINE 12.5 MG: 12.5 TABLET ORAL at 05:51

## 2024-09-08 RX ADMIN — MONTELUKAST 10 MG: 10 TABLET, FILM COATED ORAL at 09:07

## 2024-09-08 RX ADMIN — ISOSORBIDE MONONITRATE 60 MG: 60 TABLET, EXTENDED RELEASE ORAL at 09:07

## 2024-09-08 RX ADMIN — ENOXAPARIN SODIUM 40 MG: 100 INJECTION SUBCUTANEOUS at 09:09

## 2024-09-08 RX ADMIN — LEVOTHYROXINE SODIUM 137 MCG: 0.05 TABLET ORAL at 05:51

## 2024-09-08 RX ADMIN — LUBIPROSTONE 16 MCG: 8 CAPSULE, GELATIN COATED ORAL at 18:03

## 2024-09-08 RX ADMIN — RANOLAZINE 500 MG: 500 TABLET, EXTENDED RELEASE ORAL at 20:53

## 2024-09-08 RX ADMIN — SODIUM CHLORIDE, PRESERVATIVE FREE 10 ML: 5 INJECTION INTRAVENOUS at 09:18

## 2024-09-08 ASSESSMENT — PAIN SCALES - GENERAL
PAINLEVEL_OUTOF10: 0

## 2024-09-09 LAB
GLUCOSE BLD STRIP.AUTO-MCNC: 146 MG/DL (ref 70–110)
GLUCOSE BLD STRIP.AUTO-MCNC: 168 MG/DL (ref 70–110)
GLUCOSE BLD STRIP.AUTO-MCNC: 176 MG/DL (ref 70–110)
GLUCOSE BLD STRIP.AUTO-MCNC: 192 MG/DL (ref 70–110)

## 2024-09-09 PROCEDURE — 6370000000 HC RX 637 (ALT 250 FOR IP): Performed by: PHYSICIAN ASSISTANT

## 2024-09-09 PROCEDURE — 97530 THERAPEUTIC ACTIVITIES: CPT

## 2024-09-09 PROCEDURE — 6360000002 HC RX W HCPCS: Performed by: HOSPITALIST

## 2024-09-09 PROCEDURE — 6370000000 HC RX 637 (ALT 250 FOR IP): Performed by: STUDENT IN AN ORGANIZED HEALTH CARE EDUCATION/TRAINING PROGRAM

## 2024-09-09 PROCEDURE — 1100000003 HC PRIVATE W/ TELEMETRY

## 2024-09-09 PROCEDURE — 6370000000 HC RX 637 (ALT 250 FOR IP): Performed by: HOSPITALIST

## 2024-09-09 PROCEDURE — 97168 OT RE-EVAL EST PLAN CARE: CPT

## 2024-09-09 PROCEDURE — 82962 GLUCOSE BLOOD TEST: CPT

## 2024-09-09 PROCEDURE — 97112 NEUROMUSCULAR REEDUCATION: CPT

## 2024-09-09 PROCEDURE — 6370000000 HC RX 637 (ALT 250 FOR IP): Performed by: INTERNAL MEDICINE

## 2024-09-09 PROCEDURE — 2580000003 HC RX 258: Performed by: INTERNAL MEDICINE

## 2024-09-09 PROCEDURE — 99232 SBSQ HOSP IP/OBS MODERATE 35: CPT | Performed by: INTERNAL MEDICINE

## 2024-09-09 PROCEDURE — 94761 N-INVAS EAR/PLS OXIMETRY MLT: CPT

## 2024-09-09 PROCEDURE — 97535 SELF CARE MNGMENT TRAINING: CPT

## 2024-09-09 RX ORDER — INSULIN GLARGINE 100 [IU]/ML
6 INJECTION, SOLUTION SUBCUTANEOUS EVERY MORNING
Qty: 10 ML | Refills: 3
Start: 2024-09-09

## 2024-09-09 RX ORDER — AMLODIPINE BESYLATE 5 MG/1
5 TABLET ORAL DAILY
Qty: 30 TABLET | Refills: 0
Start: 2024-09-09

## 2024-09-09 RX ORDER — DILTIAZEM HYDROCHLORIDE 120 MG/1
120 CAPSULE, COATED, EXTENDED RELEASE ORAL DAILY
Qty: 30 CAPSULE | Refills: 0
Start: 2024-09-09

## 2024-09-09 RX ORDER — POLYETHYLENE GLYCOL 3350 17 G/17G
17 POWDER, FOR SOLUTION ORAL DAILY
Qty: 30 PACKET | Refills: 1 | Status: SHIPPED | OUTPATIENT
Start: 2024-09-09 | End: 2024-11-08

## 2024-09-09 RX ORDER — FAMOTIDINE 20 MG/1
20 TABLET, FILM COATED ORAL DAILY
Qty: 60 TABLET | Refills: 3
Start: 2024-09-09

## 2024-09-09 RX ORDER — METOPROLOL SUCCINATE 25 MG/1
25 TABLET, EXTENDED RELEASE ORAL DAILY
Qty: 30 TABLET | Refills: 3
Start: 2024-09-09

## 2024-09-09 RX ADMIN — ASPIRIN 81 MG CHEWABLE TABLET 81 MG: 81 TABLET CHEWABLE at 09:19

## 2024-09-09 RX ADMIN — FAMOTIDINE 20 MG: 20 TABLET ORAL at 09:18

## 2024-09-09 RX ADMIN — CLOPIDOGREL BISULFATE 75 MG: 75 TABLET ORAL at 09:20

## 2024-09-09 RX ADMIN — AMLODIPINE BESYLATE 5 MG: 5 TABLET ORAL at 09:17

## 2024-09-09 RX ADMIN — MECLIZINE 12.5 MG: 12.5 TABLET ORAL at 13:01

## 2024-09-09 RX ADMIN — RANOLAZINE 500 MG: 500 TABLET, EXTENDED RELEASE ORAL at 21:21

## 2024-09-09 RX ADMIN — ISOSORBIDE MONONITRATE 60 MG: 60 TABLET, EXTENDED RELEASE ORAL at 09:20

## 2024-09-09 RX ADMIN — SODIUM CHLORIDE, PRESERVATIVE FREE 10 ML: 5 INJECTION INTRAVENOUS at 09:20

## 2024-09-09 RX ADMIN — SODIUM CHLORIDE, PRESERVATIVE FREE 10 ML: 5 INJECTION INTRAVENOUS at 21:21

## 2024-09-09 RX ADMIN — METOPROLOL SUCCINATE 25 MG: 25 TABLET, EXTENDED RELEASE ORAL at 09:21

## 2024-09-09 RX ADMIN — MONTELUKAST 10 MG: 10 TABLET, FILM COATED ORAL at 09:18

## 2024-09-09 RX ADMIN — CYANOCOBALAMIN TAB 1000 MCG 1000 MCG: 1000 TAB at 09:19

## 2024-09-09 RX ADMIN — MECLIZINE 12.5 MG: 12.5 TABLET ORAL at 17:34

## 2024-09-09 RX ADMIN — DILTIAZEM HYDROCHLORIDE 120 MG: 120 CAPSULE, COATED, EXTENDED RELEASE ORAL at 09:19

## 2024-09-09 RX ADMIN — ENOXAPARIN SODIUM 40 MG: 100 INJECTION SUBCUTANEOUS at 09:18

## 2024-09-09 RX ADMIN — FOLIC ACID 1 MG: 1 TABLET ORAL at 09:18

## 2024-09-09 ASSESSMENT — PAIN SCALES - GENERAL
PAINLEVEL_OUTOF10: 0

## 2024-09-10 VITALS
BODY MASS INDEX: 34.08 KG/M2 | DIASTOLIC BLOOD PRESSURE: 73 MMHG | HEIGHT: 67 IN | TEMPERATURE: 98 F | OXYGEN SATURATION: 95 % | SYSTOLIC BLOOD PRESSURE: 154 MMHG | WEIGHT: 217.15 LBS | RESPIRATION RATE: 18 BRPM | HEART RATE: 68 BPM

## 2024-09-10 LAB
GLUCOSE BLD STRIP.AUTO-MCNC: 169 MG/DL (ref 70–110)
GLUCOSE BLD STRIP.AUTO-MCNC: 185 MG/DL (ref 70–110)

## 2024-09-10 PROCEDURE — 6370000000 HC RX 637 (ALT 250 FOR IP): Performed by: INTERNAL MEDICINE

## 2024-09-10 PROCEDURE — 82962 GLUCOSE BLOOD TEST: CPT

## 2024-09-10 PROCEDURE — 2580000003 HC RX 258: Performed by: INTERNAL MEDICINE

## 2024-09-10 PROCEDURE — 6370000000 HC RX 637 (ALT 250 FOR IP): Performed by: HOSPITALIST

## 2024-09-10 PROCEDURE — 94760 N-INVAS EAR/PLS OXIMETRY 1: CPT

## 2024-09-10 PROCEDURE — 6370000000 HC RX 637 (ALT 250 FOR IP): Performed by: STUDENT IN AN ORGANIZED HEALTH CARE EDUCATION/TRAINING PROGRAM

## 2024-09-10 PROCEDURE — 99239 HOSP IP/OBS DSCHRG MGMT >30: CPT | Performed by: INTERNAL MEDICINE

## 2024-09-10 PROCEDURE — 6370000000 HC RX 637 (ALT 250 FOR IP): Performed by: PHYSICIAN ASSISTANT

## 2024-09-10 PROCEDURE — 6360000002 HC RX W HCPCS: Performed by: HOSPITALIST

## 2024-09-10 RX ADMIN — FAMOTIDINE 20 MG: 20 TABLET ORAL at 08:54

## 2024-09-10 RX ADMIN — AMLODIPINE BESYLATE 5 MG: 5 TABLET ORAL at 08:57

## 2024-09-10 RX ADMIN — ASPIRIN 81 MG CHEWABLE TABLET 81 MG: 81 TABLET CHEWABLE at 08:55

## 2024-09-10 RX ADMIN — SODIUM CHLORIDE, PRESERVATIVE FREE 10 ML: 5 INJECTION INTRAVENOUS at 09:07

## 2024-09-10 RX ADMIN — CYANOCOBALAMIN TAB 1000 MCG 1000 MCG: 1000 TAB at 08:55

## 2024-09-10 RX ADMIN — CLOPIDOGREL BISULFATE 75 MG: 75 TABLET ORAL at 08:56

## 2024-09-10 RX ADMIN — FOLIC ACID 1 MG: 1 TABLET ORAL at 08:56

## 2024-09-10 RX ADMIN — LEVOTHYROXINE SODIUM 137 MCG: 0.05 TABLET ORAL at 05:25

## 2024-09-10 RX ADMIN — ENOXAPARIN SODIUM 40 MG: 100 INJECTION SUBCUTANEOUS at 08:56

## 2024-09-10 RX ADMIN — DILTIAZEM HYDROCHLORIDE 120 MG: 120 CAPSULE, COATED, EXTENDED RELEASE ORAL at 08:55

## 2024-09-10 RX ADMIN — MECLIZINE 12.5 MG: 12.5 TABLET ORAL at 05:25

## 2024-09-10 RX ADMIN — METOPROLOL SUCCINATE 25 MG: 25 TABLET, EXTENDED RELEASE ORAL at 08:55

## 2024-09-10 RX ADMIN — ISOSORBIDE MONONITRATE 60 MG: 60 TABLET, EXTENDED RELEASE ORAL at 08:55

## 2024-09-10 ASSESSMENT — PAIN SCALES - GENERAL
PAINLEVEL_OUTOF10: 0

## 2024-09-19 ENCOUNTER — TELEPHONE (OUTPATIENT)
Facility: HOSPITAL | Age: 89
End: 2024-09-19

## 2024-10-22 ENCOUNTER — HOSPITAL ENCOUNTER (EMERGENCY)
Facility: HOSPITAL | Age: 89
Discharge: HOME | End: 2024-10-23
Attending: EMERGENCY MEDICINE
Payer: MEDICARE

## 2024-10-22 ENCOUNTER — APPOINTMENT (OUTPATIENT)
Facility: HOSPITAL | Age: 89
End: 2024-10-22
Payer: MEDICARE

## 2024-10-22 DIAGNOSIS — M25.512 CHRONIC LEFT SHOULDER PAIN: Primary | ICD-10-CM

## 2024-10-22 DIAGNOSIS — R06.02 SHORTNESS OF BREATH: ICD-10-CM

## 2024-10-22 DIAGNOSIS — G89.29 CHRONIC LEFT SHOULDER PAIN: Primary | ICD-10-CM

## 2024-10-22 DIAGNOSIS — M25.562 ACUTE PAIN OF LEFT KNEE: ICD-10-CM

## 2024-10-22 LAB
ANION GAP SERPL CALC-SCNC: 4 MMOL/L (ref 3–18)
BASOPHILS # BLD: 0 K/UL (ref 0–0.1)
BASOPHILS NFR BLD: 1 % (ref 0–2)
BUN SERPL-MCNC: 9 MG/DL (ref 7–18)
BUN/CREAT SERPL: 11 (ref 12–20)
CALCIUM SERPL-MCNC: 9.6 MG/DL (ref 8.5–10.1)
CHLORIDE SERPL-SCNC: 109 MMOL/L (ref 100–111)
CO2 SERPL-SCNC: 30 MMOL/L (ref 21–32)
CREAT SERPL-MCNC: 0.83 MG/DL (ref 0.6–1.3)
DIFFERENTIAL METHOD BLD: ABNORMAL
ECHO BSA: 2.1 M2
EOSINOPHIL # BLD: 0.2 K/UL (ref 0–0.4)
EOSINOPHIL NFR BLD: 4 % (ref 0–5)
ERYTHROCYTE [DISTWIDTH] IN BLOOD BY AUTOMATED COUNT: 12.2 % (ref 11.6–14.5)
GLUCOSE SERPL-MCNC: 147 MG/DL (ref 74–99)
HCT VFR BLD AUTO: 35 % (ref 35–45)
HGB BLD-MCNC: 11 G/DL (ref 12–16)
IMM GRANULOCYTES # BLD AUTO: 0 K/UL (ref 0–0.04)
IMM GRANULOCYTES NFR BLD AUTO: 0 % (ref 0–0.5)
INR PPP: 1 (ref 0.9–1.1)
LIPASE SERPL-CCNC: 14 U/L (ref 13–75)
LYMPHOCYTES # BLD: 1.7 K/UL (ref 0.9–3.6)
LYMPHOCYTES NFR BLD: 30 % (ref 21–52)
MAGNESIUM SERPL-MCNC: 1.9 MG/DL (ref 1.6–2.6)
MCH RBC QN AUTO: 32.2 PG (ref 24–34)
MCHC RBC AUTO-ENTMCNC: 31.4 G/DL (ref 31–37)
MCV RBC AUTO: 102.3 FL (ref 78–100)
MONOCYTES # BLD: 0.5 K/UL (ref 0.05–1.2)
MONOCYTES NFR BLD: 9 % (ref 3–10)
NEUTS SEG # BLD: 3.1 K/UL (ref 1.8–8)
NEUTS SEG NFR BLD: 56 % (ref 40–73)
NRBC # BLD: 0 K/UL (ref 0–0.01)
NRBC BLD-RTO: 0 PER 100 WBC
NT PRO BNP: 243 PG/ML (ref 0–1800)
PLATELET # BLD AUTO: 199 K/UL (ref 135–420)
PMV BLD AUTO: 10.6 FL (ref 9.2–11.8)
POTASSIUM SERPL-SCNC: 4.2 MMOL/L (ref 3.5–5.5)
PROTHROMBIN TIME: 13.2 SEC (ref 11.9–14.9)
RBC # BLD AUTO: 3.42 M/UL (ref 4.2–5.3)
SODIUM SERPL-SCNC: 143 MMOL/L (ref 136–145)
T4 FREE SERPL-MCNC: 2 NG/DL (ref 0.7–1.5)
TROPONIN I SERPL HS-MCNC: 155 NG/L (ref 0–54)
TROPONIN I SERPL HS-MCNC: 169 NG/L (ref 0–54)
TROPONIN I SERPL HS-MCNC: 172 NG/L (ref 0–54)
TSH SERPL DL<=0.05 MIU/L-ACNC: 0.02 UIU/ML (ref 0.36–3.74)
WBC # BLD AUTO: 5.5 K/UL (ref 4.6–13.2)

## 2024-10-22 PROCEDURE — 83690 ASSAY OF LIPASE: CPT

## 2024-10-22 PROCEDURE — 83735 ASSAY OF MAGNESIUM: CPT

## 2024-10-22 PROCEDURE — 6360000002 HC RX W HCPCS

## 2024-10-22 PROCEDURE — 73562 X-RAY EXAM OF KNEE 3: CPT

## 2024-10-22 PROCEDURE — 96366 THER/PROPH/DIAG IV INF ADDON: CPT

## 2024-10-22 PROCEDURE — 85025 COMPLETE CBC W/AUTO DIFF WBC: CPT

## 2024-10-22 PROCEDURE — 93005 ELECTROCARDIOGRAM TRACING: CPT | Performed by: EMERGENCY MEDICINE

## 2024-10-22 PROCEDURE — 84443 ASSAY THYROID STIM HORMONE: CPT

## 2024-10-22 PROCEDURE — 84484 ASSAY OF TROPONIN QUANT: CPT

## 2024-10-22 PROCEDURE — 83880 ASSAY OF NATRIURETIC PEPTIDE: CPT

## 2024-10-22 PROCEDURE — 80048 BASIC METABOLIC PNL TOTAL CA: CPT

## 2024-10-22 PROCEDURE — 71046 X-RAY EXAM CHEST 2 VIEWS: CPT

## 2024-10-22 PROCEDURE — 93971 EXTREMITY STUDY: CPT

## 2024-10-22 PROCEDURE — 93971 EXTREMITY STUDY: CPT | Performed by: INTERNAL MEDICINE

## 2024-10-22 PROCEDURE — 96365 THER/PROPH/DIAG IV INF INIT: CPT

## 2024-10-22 PROCEDURE — 85610 PROTHROMBIN TIME: CPT

## 2024-10-22 PROCEDURE — 84439 ASSAY OF FREE THYROXINE: CPT

## 2024-10-22 PROCEDURE — 2580000003 HC RX 258

## 2024-10-22 PROCEDURE — 99285 EMERGENCY DEPT VISIT HI MDM: CPT

## 2024-10-22 RX ORDER — MAGNESIUM SULFATE IN WATER 40 MG/ML
2000 INJECTION, SOLUTION INTRAVENOUS
Status: COMPLETED | OUTPATIENT
Start: 2024-10-22 | End: 2024-10-22

## 2024-10-22 RX ORDER — 0.9 % SODIUM CHLORIDE 0.9 %
500 INTRAVENOUS SOLUTION INTRAVENOUS ONCE
Status: COMPLETED | OUTPATIENT
Start: 2024-10-22 | End: 2024-10-22

## 2024-10-22 RX ADMIN — SODIUM CHLORIDE 500 ML: 9 INJECTION, SOLUTION INTRAVENOUS at 17:46

## 2024-10-22 RX ADMIN — MAGNESIUM SULFATE HEPTAHYDRATE 2000 MG: 40 INJECTION, SOLUTION INTRAVENOUS at 17:45

## 2024-10-22 NOTE — DISCHARGE INSTRUCTIONS
Your evaluated in the emergency department today for lightheadedness, generalized weakness, left shoulder pain, shortness of breath, and right knee pain.  Your ED workup was reassuring for no acute abnormality requiring emergent intervention at this time.  I recommend you follow-up with your primary care physician as soon as possible discuss your ED visit today.  Please return to the emergency department if you develop worsening of your symptoms or fever/chills, chest pain with associated nausea or body sweats, difficulty breathing, severe vomiting or diarrhea.

## 2024-10-22 NOTE — ED PROVIDER NOTES
EMERGENCY DEPARTMENT HISTORY AND PHYSICAL EXAM      Date: 10/22/2024  Patient Name: Grace Lyons    History of Presenting Illness     Chief Complaint   Patient presents with    Shortness of Breath    Shoulder Pain       History (Context): Grace Lyons is a 89 y.o. female with history of A-fib, CAD, hypertension, hyperlipidemia, diabetes, hypothyroidism presents to the ED today via EMS sent from her PCP office for complaint of generalized weakness, lightheadedness, left shoulder pain, shortness of breath, and findings of A-fib with heart rate in the 90s to 120s.  Patient currently denies shortness of breath.  States she has chronic left shoulder pain that she applies lidocaine patches to, however today's shoulder pain is \"different\".  Additionally notes that a couple days ago patient had a mechanical slip and landed hard on her right foot causing pain and swelling  to her right knee.  Denies head strike or LOC.  Denies fever/chills, URI symptoms, chest pain, palpitations, abdominal pain, nausea/vomiting/diarrhea/constipation, paresthesias, unilateral weakness.      PCP: Trent Talamantes MD    No current facility-administered medications for this encounter.     Current Outpatient Medications   Medication Sig Dispense Refill    insulin glargine (LANTUS) 100 UNIT/ML injection vial Inject 6 Units into the skin every morning 10 mL 3    metoprolol succinate (TOPROL XL) 25 MG extended release tablet Take 1 tablet by mouth daily 30 tablet 3    amLODIPine (NORVASC) 5 MG tablet Take 1 tablet by mouth daily 30 tablet 0    dilTIAZem (CARDIZEM CD) 120 MG extended release capsule Take 1 capsule by mouth daily 30 capsule 0    polyethylene glycol (GLYCOLAX) 17 g packet Take 1 packet by mouth daily 30 packet 1    famotidine (PEPCID) 20 MG tablet Take 1 tablet by mouth daily 60 tablet 3    isosorbide mononitrate (IMDUR) 60 MG extended release tablet Take 1 tablet by mouth every morning 30 tablet 3    aspirin 81 MG

## 2024-10-22 NOTE — ED TRIAGE NOTES
Pt arrived via EMS for reported weakness ans shortness of breath.  Per EMS patient comes for her doctors offices for abnormal labs, shortness of breath, weakness and low BP.  Pt states he had labs done yesterday and was told her thyroid levels were high.  Pt also reports shortness of breath times 2 days and states she fell 1 week ago and having right knee pain and swelling. Pt also reports left shoulder pain as well.

## 2024-10-23 VITALS
RESPIRATION RATE: 19 BRPM | TEMPERATURE: 98.5 F | DIASTOLIC BLOOD PRESSURE: 84 MMHG | WEIGHT: 205 LBS | HEART RATE: 81 BPM | SYSTOLIC BLOOD PRESSURE: 146 MMHG | OXYGEN SATURATION: 97 % | HEIGHT: 67 IN | BODY MASS INDEX: 32.18 KG/M2

## 2024-10-23 LAB — TROPONIN I SERPL HS-MCNC: 160 NG/L (ref 0–54)

## 2024-10-23 PROCEDURE — 84484 ASSAY OF TROPONIN QUANT: CPT

## 2024-10-23 PROCEDURE — 94761 N-INVAS EAR/PLS OXIMETRY MLT: CPT

## 2024-10-23 NOTE — ED PROVIDER NOTES
ED continued care note    8:44 PM EDT  Signed out to me by Dr. Dominguez attending signout, patient awaiting repeat troponin, DVT study of the leg is negative.  The second troponin is minimally elevated, discussion with the patient she feels generally fatigued and has been for quite some time but no shortness of breath or chest pain or alarming symptoms.  Will get a third troponin.    Reviewed at 8:44 PM EDT  Patient Vitals for the past 12 hrs:   Temp Pulse Resp BP SpO2   10/22/24 2000 -- (!) 101 26 (!) 168/78 97 %   10/22/24 1900 -- 100 13 127/71 93 %   10/22/24 1845 -- 93 17 127/65 97 %   10/22/24 1747 -- 100 19 (!) 141/71 98 %   10/22/24 1745 -- 97 17 (!) 141/71 96 %   10/22/24 1730 -- (!) 108 22 137/74 97 %   10/22/24 1715 -- 93 19 (!) 143/83 96 %   10/22/24 1707 98.3 °F (36.8 °C) (!) 101 18 (!) 147/82 96 %   10/22/24 1700 -- (!) 105 24 (!) 143/90 97 %       ED Course as of 10/23/24 0220   Tue Oct 22, 2024   1711 EKG 12 Lead  EKG shows sinus tachycardia at 106, normal OR, normal QRS normal QTc, normal axis.  Multiple PVCs, nonspecific ST and T wave changes without obvious acute ischemia as interpreted by me. [LK]   1820 Basic Metabolic Panel(!):    Sodium 143   Potassium 4.2   Chloride 109   CARBON DIOXIDE 30   Anion Gap 4   Glucose 147(!)   BUN,BUNPL 9   Creatinine 0.83   Bun/Cre 11(!)   Est, Glom Filt Rate 68   Calcium 9.6  BMP unremarkable for acute abnormalities. [MS]   1821 Lipase:    Lipase 14  Lipase within normal limits. [MS]   1824 TSH(!):    TSH, 3rd Generation 0.02(!)  TSH low with associated T4 elevation 2.0. [MS]   1825 Brain Natriuretic Peptide:    NT Pro-  BMP within normal limits. [MS]   1826 Troponin Now and Q 90 Min(!!):    Troponin, High Sensitivity 155(!!)  Initial troponin 155 [MS]   1843 Protime-INR:    Prothrombin Time 13.2   INR 1.0  Coags within normal limits [MS]   1844 Magnesium:    Magnesium 1.9  Mag within normal limits [MS]   1844 CBC with Auto Differential(!):    WBC 5.5   RBC

## 2024-10-23 NOTE — ED NOTES
Patient pain on discharge 0.  Discharge instructions Transition record discussed with patient/caregiver and provided this record in hard copy or electronically .  [facility]  [facility] Method [facility].  Discharged with patient.

## 2024-10-24 LAB
EKG ATRIAL RATE: 106 BPM
EKG DIAGNOSIS: NORMAL
EKG P AXIS: 91 DEGREES
EKG P-R INTERVAL: 132 MS
EKG Q-T INTERVAL: 366 MS
EKG QRS DURATION: 84 MS
EKG QTC CALCULATION (BAZETT): 486 MS
EKG R AXIS: -23 DEGREES
EKG T AXIS: 0 DEGREES
EKG VENTRICULAR RATE: 106 BPM

## 2024-10-24 PROCEDURE — 93010 ELECTROCARDIOGRAM REPORT: CPT | Performed by: INTERNAL MEDICINE

## 2024-11-03 NOTE — PROGRESS NOTES
HISTORY OF PRESENT ILLNESS  Grace Lyons is a 89 y.o. female.    PMH Coronary artery disease, Chronic heart failure preserved ejection fraction, HTN, Morbid obesity  ----  CARDIAC STUDIES  ----  Vasscular duplex lower extremity venous right 10/22/2024    No evidence of deep vein thrombosis in the right lower extremity.    For comparison purposes, the left common femoral vein was briefly interrogated. The vein demonstrates normal color filling and compressibility. Doppler flow was phasic and spontaneous.    Right posterior tibial artery Doppler waveforms not identified.  ----  [facility] 8/29/2024  Coronary Artery Disease  LM 0% focal  mLAD 70-80%, tortuous distally  mLCFX 70-90% with subsequent 30-40% stenosis  mRCA 0% luminal irregularities with ectasia  RCA Dominant System  LVEDP 1mmHg within normal limits  Successful balloon angioplasty and TAM of the mLAD 70-80% stenosis to 0% stenosis GAY III to GAY III flow utilizing 3.0x15mm TAM postdilated with 3.25mm NC balloon  Successful balloon angioplasty and TAM of the mLCFX 70-90% stenosis to 0% stenosis GAY III to GAY III flow utilizing [id number] TAM postdilated with 2.75mm NC and 3.25mm NC balloons  Plan: Continue with dual antiplatelet therapy aspirin 81mg po daily plavix 75mg po daily, statin as can tolerate is allergic, bb as can tolerate, cardiac rehab.  ACC provisions diet and exercise.    ----  2d echo 8/29/2024    Image quality is technically difficult. Contrast used: Definity. Technically difficult study with poor endocardial visualization, technically difficult study due to patient's body habitus, technically difficult Doppler study, limited study due to patient's ability to tolerate test and technically difficult study due to patient's heart rhythm.    Left Ventricle: Normal left ventricular systolic function. EF by visual approximation is 55%. Left ventricle size is normal. Increased wall thickness. Findings consistent with mild concentric

## 2024-11-13 ENCOUNTER — OFFICE VISIT (OUTPATIENT)
Age: 89
End: 2024-11-13

## 2024-11-13 VITALS
BODY MASS INDEX: 30.92 KG/M2 | OXYGEN SATURATION: 98 % | WEIGHT: 197 LBS | DIASTOLIC BLOOD PRESSURE: 77 MMHG | HEIGHT: 67 IN | SYSTOLIC BLOOD PRESSURE: 146 MMHG | HEART RATE: 95 BPM

## 2024-11-13 DIAGNOSIS — I25.10 CORONARY ARTERY DISEASE DUE TO LIPID RICH PLAQUE: Primary | ICD-10-CM

## 2024-11-13 DIAGNOSIS — I50.32 CHRONIC HEART FAILURE WITH PRESERVED EJECTION FRACTION (HCC): ICD-10-CM

## 2024-11-13 DIAGNOSIS — I25.83 CORONARY ARTERY DISEASE DUE TO LIPID RICH PLAQUE: Primary | ICD-10-CM

## 2024-11-13 DIAGNOSIS — R60.0 LOCALIZED EDEMA: ICD-10-CM

## 2024-11-13 DIAGNOSIS — I10 HYPERTENSION, UNSPECIFIED TYPE: ICD-10-CM

## 2024-11-13 ASSESSMENT — ENCOUNTER SYMPTOMS
COLOR CHANGE: 0
ABDOMINAL PAIN: 0
CHEST TIGHTNESS: 0
WHEEZING: 0
DIARRHEA: 0
SHORTNESS OF BREATH: 1
APNEA: 0
COUGH: 0
BLOOD IN STOOL: 0
CONSTIPATION: 0
NAUSEA: 0

## 2024-11-13 ASSESSMENT — PATIENT HEALTH QUESTIONNAIRE - PHQ9
1. LITTLE INTEREST OR PLEASURE IN DOING THINGS: NOT AT ALL
SUM OF ALL RESPONSES TO PHQ9 QUESTIONS 1 & 2: 0
SUM OF ALL RESPONSES TO PHQ QUESTIONS 1-9: 0
SUM OF ALL RESPONSES TO PHQ QUESTIONS 1-9: 0
DEPRESSION UNABLE TO ASSESS: FUNCTIONAL CAPACITY MOTIVATION LIMITS ACCURACY
SUM OF ALL RESPONSES TO PHQ QUESTIONS 1-9: 0
SUM OF ALL RESPONSES TO PHQ QUESTIONS 1-9: 0
2. FEELING DOWN, DEPRESSED OR HOPELESS: NOT AT ALL

## 2024-11-13 NOTE — PATIENT INSTRUCTIONS
Mediterranean diet may also include red wine with your meal--1 glass each day for women and up to 2 glasses a day for men.  Tips for eating at home  Use herbs, spices, garlic, lemon zest, and citrus juice instead of salt to add flavor to foods.  Add avocado slices to your sandwich instead of mirza.  Have fish for lunch or dinner instead of red meat. Brush the fish with olive oil, and broil or grill it.  Sprinkle your salad with seeds or nuts instead of cheese.  Cook with olive or canola oil instead of butter or oils that are high in saturated fat.  Switch from 2% milk or whole milk to 1% or fat-free milk.  Dip raw vegetables in a vinaigrette dressing or hummus instead of dips made from mayonnaise or sour cream.  Have a piece of fruit for dessert instead of a piece of cake. Try baked apples, or have some dried fruit.  Tips for eating out  Try broiled, grilled, baked, or poached fish instead of having it fried or breaded.  Ask your  to have your meals prepared with olive oil instead of butter.  Order dishes made with marinara sauce or sauces made from olive oil. Avoid sauces made from cream or mayonnaise.  Choose whole-grain breads, whole wheat pasta and pizza crust, brown rice, beans, and lentils.  Cut back on butter or margarine on bread. Instead, you can dip your bread in a small amount of olive oil.  Ask for a side salad or grilled vegetables instead of french fries or chips.  Where can you learn more?  Go to https://www.[employer].net/patientEd and enter O407 to learn more about \"Learning About the Mediterranean Diet.\"  Current as of: May 9, 2022               Content Version: 13.5  © 6810-5767 [employer].   Care instructions adapted under license by [employer]. If you have questions about a medical condition or this instruction, always ask your healthcare professional. [employer] disclaims any warranty or liability for your use of this information.

## 2024-11-13 NOTE — PROGRESS NOTES
Have you had Fatigue?  Yes if so how long 2 weeks how bad mild  2.   Have you had have you had Chest Pain? No  3.   Have you had Dyspnea (SOB) ? Yes if so how long 6 months  can you walk 2 blocks or a flight of stairs without SOB No  4.   Have you had Orthopnea? No   5.   Have you had PND? No  6.   Have you had leg swelling? Yes if so how long 1 months how frequent every day how   bad mild   7.    Have you had any weight gain? No   8. Have you had any palpitations? No    9. Have you had any syncope? No  10. Do you have any wounds on legs?No

## 2024-12-13 ENCOUNTER — APPOINTMENT (OUTPATIENT)
Facility: HOSPITAL | Age: 89
End: 2024-12-13
Payer: MEDICARE

## 2024-12-13 ENCOUNTER — HOSPITAL ENCOUNTER (EMERGENCY)
Facility: HOSPITAL | Age: 89
Discharge: HOME | End: 2024-12-13
Attending: STUDENT IN AN ORGANIZED HEALTH CARE EDUCATION/TRAINING PROGRAM
Payer: MEDICARE

## 2024-12-13 VITALS
OXYGEN SATURATION: 96 % | HEIGHT: 67 IN | DIASTOLIC BLOOD PRESSURE: 93 MMHG | SYSTOLIC BLOOD PRESSURE: 133 MMHG | WEIGHT: 214.6 LBS | TEMPERATURE: 98.3 F | BODY MASS INDEX: 33.68 KG/M2 | RESPIRATION RATE: 16 BRPM | HEART RATE: 91 BPM

## 2024-12-13 DIAGNOSIS — R42 DIZZINESS: ICD-10-CM

## 2024-12-13 DIAGNOSIS — R00.2 PALPITATIONS: Primary | ICD-10-CM

## 2024-12-13 LAB
ALBUMIN SERPL-MCNC: 3 G/DL (ref 3.4–5)
ALBUMIN/GLOB SERPL: 0.7 (ref 0.8–1.7)
ALP SERPL-CCNC: 96 U/L (ref 45–117)
ALT SERPL-CCNC: 13 U/L (ref 13–56)
ANION GAP SERPL CALC-SCNC: 7 MMOL/L (ref 3–18)
AST SERPL-CCNC: 13 U/L (ref 10–38)
BASOPHILS # BLD: 0 K/UL (ref 0–0.1)
BASOPHILS NFR BLD: 1 % (ref 0–2)
BILIRUB SERPL-MCNC: 0.5 MG/DL (ref 0.2–1)
BUN SERPL-MCNC: 9 MG/DL (ref 7–18)
BUN/CREAT SERPL: 13 (ref 12–20)
CALCIUM SERPL-MCNC: 9 MG/DL (ref 8.5–10.1)
CHLORIDE SERPL-SCNC: 108 MMOL/L (ref 100–111)
CO2 SERPL-SCNC: 27 MMOL/L (ref 21–32)
CREAT SERPL-MCNC: 0.72 MG/DL (ref 0.6–1.3)
D DIMER PPP FEU-MCNC: 1.04 UG/ML(FEU)
DIFFERENTIAL METHOD BLD: ABNORMAL
EOSINOPHIL # BLD: 0.3 K/UL (ref 0–0.4)
EOSINOPHIL NFR BLD: 5 % (ref 0–5)
ERYTHROCYTE [DISTWIDTH] IN BLOOD BY AUTOMATED COUNT: 12.4 % (ref 11.6–14.5)
GLOBULIN SER CALC-MCNC: 4.2 G/DL (ref 2–4)
GLUCOSE SERPL-MCNC: 172 MG/DL (ref 74–99)
HCT VFR BLD AUTO: 33.5 % (ref 35–45)
HGB BLD-MCNC: 10.9 G/DL (ref 12–16)
IMM GRANULOCYTES # BLD AUTO: 0 K/UL (ref 0–0.04)
IMM GRANULOCYTES NFR BLD AUTO: 0 % (ref 0–0.5)
LYMPHOCYTES # BLD: 2.6 K/UL (ref 0.9–3.6)
LYMPHOCYTES NFR BLD: 41 % (ref 21–52)
MAGNESIUM SERPL-MCNC: 2 MG/DL (ref 1.6–2.6)
MCH RBC QN AUTO: 31.8 PG (ref 24–34)
MCHC RBC AUTO-ENTMCNC: 32.5 G/DL (ref 31–37)
MCV RBC AUTO: 97.7 FL (ref 78–100)
MONOCYTES # BLD: 0.5 K/UL (ref 0.05–1.2)
MONOCYTES NFR BLD: 7 % (ref 3–10)
NEUTS SEG # BLD: 2.9 K/UL (ref 1.8–8)
NEUTS SEG NFR BLD: 46 % (ref 40–73)
NRBC # BLD: 0 K/UL (ref 0–0.01)
NRBC BLD-RTO: 0 PER 100 WBC
NT PRO BNP: 113 PG/ML (ref 0–1800)
PLATELET # BLD AUTO: 228 K/UL (ref 135–420)
PMV BLD AUTO: 10.4 FL (ref 9.2–11.8)
POTASSIUM SERPL-SCNC: 3.8 MMOL/L (ref 3.5–5.5)
PROT SERPL-MCNC: 7.2 G/DL (ref 6.4–8.2)
RBC # BLD AUTO: 3.43 M/UL (ref 4.2–5.3)
SODIUM SERPL-SCNC: 142 MMOL/L (ref 136–145)
T4 FREE SERPL-MCNC: 0.9 NG/DL (ref 0.7–1.5)
TROPONIN I SERPL HS-MCNC: 49 NG/L (ref 0–54)
TROPONIN I SERPL HS-MCNC: 54 NG/L (ref 0–54)
TSH SERPL DL<=0.05 MIU/L-ACNC: 5.39 UIU/ML (ref 0.36–3.74)
WBC # BLD AUTO: 6.2 K/UL (ref 4.6–13.2)

## 2024-12-13 PROCEDURE — 85379 FIBRIN DEGRADATION QUANT: CPT

## 2024-12-13 PROCEDURE — 99285 EMERGENCY DEPT VISIT HI MDM: CPT

## 2024-12-13 PROCEDURE — 96375 TX/PRO/DX INJ NEW DRUG ADDON: CPT

## 2024-12-13 PROCEDURE — 6360000002 HC RX W HCPCS: Performed by: STUDENT IN AN ORGANIZED HEALTH CARE EDUCATION/TRAINING PROGRAM

## 2024-12-13 PROCEDURE — 83880 ASSAY OF NATRIURETIC PEPTIDE: CPT

## 2024-12-13 PROCEDURE — 96374 THER/PROPH/DIAG INJ IV PUSH: CPT

## 2024-12-13 PROCEDURE — 6360000004 HC RX CONTRAST MEDICATION: Performed by: STUDENT IN AN ORGANIZED HEALTH CARE EDUCATION/TRAINING PROGRAM

## 2024-12-13 PROCEDURE — 2580000003 HC RX 258: Performed by: STUDENT IN AN ORGANIZED HEALTH CARE EDUCATION/TRAINING PROGRAM

## 2024-12-13 PROCEDURE — 84439 ASSAY OF FREE THYROXINE: CPT

## 2024-12-13 PROCEDURE — 84443 ASSAY THYROID STIM HORMONE: CPT

## 2024-12-13 PROCEDURE — 83735 ASSAY OF MAGNESIUM: CPT

## 2024-12-13 PROCEDURE — 80053 COMPREHEN METABOLIC PANEL: CPT

## 2024-12-13 PROCEDURE — 71275 CT ANGIOGRAPHY CHEST: CPT

## 2024-12-13 PROCEDURE — 6370000000 HC RX 637 (ALT 250 FOR IP): Performed by: STUDENT IN AN ORGANIZED HEALTH CARE EDUCATION/TRAINING PROGRAM

## 2024-12-13 PROCEDURE — 84484 ASSAY OF TROPONIN QUANT: CPT

## 2024-12-13 PROCEDURE — 93005 ELECTROCARDIOGRAM TRACING: CPT | Performed by: STUDENT IN AN ORGANIZED HEALTH CARE EDUCATION/TRAINING PROGRAM

## 2024-12-13 PROCEDURE — 71045 X-RAY EXAM CHEST 1 VIEW: CPT

## 2024-12-13 PROCEDURE — 85025 COMPLETE CBC W/AUTO DIFF WBC: CPT

## 2024-12-13 PROCEDURE — 70450 CT HEAD/BRAIN W/O DYE: CPT

## 2024-12-13 RX ORDER — DIPHENHYDRAMINE HYDROCHLORIDE 50 MG/ML
25 INJECTION INTRAMUSCULAR; INTRAVENOUS ONCE
Status: DISCONTINUED | OUTPATIENT
Start: 2024-12-13 | End: 2024-12-13

## 2024-12-13 RX ORDER — MECLIZINE HCL 12.5 MG 12.5 MG/1
12.5 TABLET ORAL
Status: COMPLETED | OUTPATIENT
Start: 2024-12-13 | End: 2024-12-13

## 2024-12-13 RX ORDER — IOPAMIDOL 755 MG/ML
80 INJECTION, SOLUTION INTRAVASCULAR
Status: COMPLETED | OUTPATIENT
Start: 2024-12-13 | End: 2024-12-13

## 2024-12-13 RX ORDER — DIPHENHYDRAMINE HYDROCHLORIDE 50 MG/ML
50 INJECTION INTRAMUSCULAR; INTRAVENOUS ONCE
Status: COMPLETED | OUTPATIENT
Start: 2024-12-13 | End: 2024-12-13

## 2024-12-13 RX ADMIN — DIPHENHYDRAMINE HYDROCHLORIDE 50 MG: 50 INJECTION INTRAMUSCULAR; INTRAVENOUS at 16:25

## 2024-12-13 RX ADMIN — MECLIZINE 12.5 MG: 12.5 TABLET ORAL at 14:14

## 2024-12-13 RX ADMIN — METHYLPREDNISOLONE SODIUM SUCCINATE 40 MG: 40 INJECTION INTRAMUSCULAR; INTRAVENOUS at 13:14

## 2024-12-13 RX ADMIN — IOPAMIDOL 80 ML: 755 INJECTION, SOLUTION INTRAVENOUS at 18:02

## 2024-12-13 ASSESSMENT — PAIN DESCRIPTION - ORIENTATION: ORIENTATION: MID

## 2024-12-13 ASSESSMENT — PAIN SCALES - GENERAL: PAINLEVEL_OUTOF10: 8

## 2024-12-13 ASSESSMENT — PAIN DESCRIPTION - DESCRIPTORS: DESCRIPTORS: ACHING

## 2024-12-13 ASSESSMENT — PAIN DESCRIPTION - LOCATION: LOCATION: CHEST

## 2024-12-13 ASSESSMENT — PAIN - FUNCTIONAL ASSESSMENT
PAIN_FUNCTIONAL_ASSESSMENT: PREVENTS OR INTERFERES SOME ACTIVE ACTIVITIES AND ADLS
PAIN_FUNCTIONAL_ASSESSMENT: 0-10
PAIN_FUNCTIONAL_ASSESSMENT: NONE - DENIES PAIN

## 2024-12-13 ASSESSMENT — PAIN DESCRIPTION - FREQUENCY: FREQUENCY: INTERMITTENT

## 2024-12-13 ASSESSMENT — PAIN DESCRIPTION - PAIN TYPE: TYPE: ACUTE PAIN

## 2024-12-13 NOTE — FLOWSHEET NOTE
12/13/24 1245   Vital Signs   Pulse 89   Respirations 22   Orthostatic B/P and Pulse? Yes   Blood Pressure Lying 136/68   Pulse Lying 92 PER MINUTE   Blood Pressure Sitting 154/68   Pulse Sitting 97 PER MINUTE   Blood Pressure Standing 132/105   Pulse Standing 132 PER MINUTE   Oxygen Therapy   SpO2 97 %   Pulse via Oximetry 88 beats per minute     Patient was unable to stand the whole bp checks.

## 2024-12-13 NOTE — ED PROVIDER NOTES
[facility] EMERGENCY DEPT  EMERGENCY DEPARTMENT ENCOUNTER      Pt Name: Grace Lyons  MRN: [id number]  Birthdate 1935  Date of evaluation: 12/13/2024  Provider: Leonora Alexander MD    CHIEF COMPLAINT       Chief Complaint   Patient presents with    Chest Pain    Shortness of Breath         HISTORY OF PRESENT ILLNESS   (Location/Symptom, Timing/Onset, Context/Setting, Quality, Duration, Modifying Factors, Severity)  Note limiting factors.   Grace Lyons is a 89 y.o. female who presents to the emergency department for sensation that her heart was racing.  States that it started last night.  It is since improved after she took the medications.  She does not know what that medication is.  She states that she takes most of her medication but does not take the 1 that she does not know with they are for.  She states takes a blood thinner but is not on the name of it.  She states that she did have some chest discomfort last night when this was really bad.  She does not have chest discomfort now.  She also got more winded than her baseline when this was occurring but currently feels at her baseline.  She also complains of dizziness.  This been going on for over a year.  States that she seen some specialist for it.  This is unchanged from her baseline but she does feel dizzy now.  She took some meclizine earlier which helped a little bit.     Nursing Notes were reviewed.    REVIEW OF SYSTEMS    (2-9 systems for level 4, 10 or more for level 5)     Constitutional: No fever  HENT: No ear pain  Eyes: No change in vision  Respiratory: Positive shortness of breath  Cardio: Positive for chest pain  GI: No blood in stool  : No hematuria  MSK: No back pain  Skin: No rashes  Neuro: No headache    Except as noted above the remainder of the review of systems was reviewed and negative.       PAST MEDICAL HISTORY     Past Medical History:   Diagnosis Date    Acetabulum fracture (HCC) 1981    Anemia     Anxiety     Asthma   discussed with the patient.  At this point she states that she is still dizzy although because this has been going on for a year and is unchanged from her baseline do not think this requires further intervention at this time.  Discussed following up with ENT or her PCP.  Discussed following up with cardiology for her episode of flutter however she has been in the emergency department for 6 hours has not had any recurrent episodes and therefore do not think this requires admission at this time.    Patient stable for discharge at this time.  Patient is in agreement with the plan to be discharged at this time.  All the patient's questions were answered.  Patient was given written instructions on the diagnosis, and states understanding of the plan moving forward.  We did discuss important signs and symptoms that should prompt quick return to the emergency department.        CRITICAL CARE TIME   Total Critical Care time was 0 minutes, excluding separately reportable procedures.  There was a high probability of clinically significant/life threatening deterioration in the patient's condition which required my urgent intervention.     CONSULTS:  None    PROCEDURES:  Unless otherwise noted below, none     Procedures      FINAL IMPRESSION      1. Palpitations    2. Dizziness          DISPOSITION/PLAN   DISPOSITION        PATIENT REFERRED TO:  Trent Talamantes MD  [facility] [address] [address] [phone]    Schedule an appointment as soon as possible for a visit       Chucho Nguyen MD  [address] [address] [phone]    Schedule an appointment as soon as possible for a visit         DISCHARGE MEDICATIONS:  New Prescriptions    No medications on file     Controlled Substances Monitoring:          No data to display                (Please note that portions of this note were completed with a voice recognition program.  Efforts were made to edit the dictations but

## 2024-12-14 LAB
EKG ATRIAL RATE: 120 BPM
EKG DIAGNOSIS: NORMAL
EKG P AXIS: 82 DEGREES
EKG P-R INTERVAL: 122 MS
EKG Q-T INTERVAL: 340 MS
EKG QRS DURATION: 84 MS
EKG QTC CALCULATION (BAZETT): 480 MS
EKG R AXIS: -30 DEGREES
EKG T AXIS: 68 DEGREES
EKG VENTRICULAR RATE: 120 BPM

## 2024-12-14 PROCEDURE — 93010 ELECTROCARDIOGRAM REPORT: CPT | Performed by: INTERNAL MEDICINE

## 2024-12-19 ENCOUNTER — TELEPHONE (OUTPATIENT)
Age: 89
End: 2024-12-19

## 2024-12-20 RX ORDER — CLOPIDOGREL BISULFATE 75 MG/1
75 TABLET ORAL DAILY
Qty: 90 TABLET | Refills: 3 | Status: SHIPPED | OUTPATIENT
Start: 2024-12-20

## 2025-01-09 ENCOUNTER — OFFICE VISIT (OUTPATIENT)
Age: 89
End: 2025-01-09
Payer: MEDICARE

## 2025-01-09 VITALS
WEIGHT: 214 LBS | SYSTOLIC BLOOD PRESSURE: 120 MMHG | DIASTOLIC BLOOD PRESSURE: 73 MMHG | OXYGEN SATURATION: 98 % | HEIGHT: 67 IN | BODY MASS INDEX: 33.59 KG/M2 | HEART RATE: 102 BPM

## 2025-01-09 DIAGNOSIS — E78.2 MIXED HYPERLIPIDEMIA: ICD-10-CM

## 2025-01-09 DIAGNOSIS — I50.32 CHRONIC HEART FAILURE WITH PRESERVED EJECTION FRACTION (HCC): ICD-10-CM

## 2025-01-09 DIAGNOSIS — I25.83 CORONARY ARTERY DISEASE DUE TO LIPID RICH PLAQUE: Primary | ICD-10-CM

## 2025-01-09 DIAGNOSIS — I25.10 CORONARY ARTERY DISEASE DUE TO LIPID RICH PLAQUE: Primary | ICD-10-CM

## 2025-01-09 DIAGNOSIS — I48.0 PAROXYSMAL ATRIAL FIBRILLATION (HCC): ICD-10-CM

## 2025-01-09 DIAGNOSIS — I10 PRIMARY HYPERTENSION: ICD-10-CM

## 2025-01-09 PROCEDURE — 1160F RVW MEDS BY RX/DR IN RCRD: CPT | Performed by: NURSE PRACTITIONER

## 2025-01-09 PROCEDURE — 1123F ACP DISCUSS/DSCN MKR DOCD: CPT | Performed by: NURSE PRACTITIONER

## 2025-01-09 PROCEDURE — 1125F AMNT PAIN NOTED PAIN PRSNT: CPT | Performed by: NURSE PRACTITIONER

## 2025-01-09 PROCEDURE — 1159F MED LIST DOCD IN RCRD: CPT | Performed by: NURSE PRACTITIONER

## 2025-01-09 PROCEDURE — 99214 OFFICE O/P EST MOD 30 MIN: CPT | Performed by: NURSE PRACTITIONER

## 2025-01-09 ASSESSMENT — PATIENT HEALTH QUESTIONNAIRE - PHQ9
2. FEELING DOWN, DEPRESSED OR HOPELESS: NOT AT ALL
SUM OF ALL RESPONSES TO PHQ QUESTIONS 1-9: 0
DEPRESSION UNABLE TO ASSESS: FUNCTIONAL CAPACITY MOTIVATION LIMITS ACCURACY
SUM OF ALL RESPONSES TO PHQ QUESTIONS 1-9: 0
1. LITTLE INTEREST OR PLEASURE IN DOING THINGS: NOT AT ALL
SUM OF ALL RESPONSES TO PHQ QUESTIONS 1-9: 0
SUM OF ALL RESPONSES TO PHQ QUESTIONS 1-9: 0
SUM OF ALL RESPONSES TO PHQ9 QUESTIONS 1 & 2: 0

## 2025-01-09 ASSESSMENT — ENCOUNTER SYMPTOMS
APNEA: 0
NAUSEA: 0
COLOR CHANGE: 0
ABDOMINAL PAIN: 0
DIARRHEA: 0
BLOOD IN STOOL: 0
CHEST TIGHTNESS: 0
COUGH: 0
SHORTNESS OF BREATH: 1
CONSTIPATION: 0
WHEEZING: 0

## 2025-01-09 NOTE — PROGRESS NOTES
1. Have you been to the ER, urgent care clinic since your last visit?  Hospitalized since your last visit?     Yes, [facility]    2. Have you seen or consulted any other health care providers outside of the [facility] since your last visit?  Include any pap smears or colon screening.      No     
hypertrophy. Normal wall motion.    Tricuspid Valve: Normal RVSP. The estimated RVSP is 29 mmHg.  ----  [facility] 7/2/2024  Severe 3V CAD  LM 0% focal  mLAD 70-80%  mLCFX 70-90%  mRCA 95%  RCA Dominant System  LVEDP 1mmHg within normal limits     Plan: Hear team approach, CT surgical evaluation versus PCI versus medical management.  GDMT, Optimal medical management, risk factor modification.    ----  [facility] 7/9/2024  Successful balloon angioplasty of the mRCA 95% to less than 10% utilizing 3.0x15mm NC balloon GAY III to GAY III flow  Guide used: AL 0.75     Plan: Evaluate trend for anemia with consideration of staged PCI of the LAD and LCFX, may be done as outpatient.  Continue with DAPT including aspirin 81mg po daily, plavix 75mg po daily.    ----  Cardiac event monitor 3/14/2023  Sinus rhythm with average heart rate 83 bpm   Frequent PACs noted.    Poor quality data.  Very brief episodes of atrial fibrillation cannot be completely excluded  No obvious evidence of atrial fibrillation noted.  Patient monitored for 12d 17h 3m  36 events were transmitted.  0 patient triggered; 36 auto triggered  NSVT occurred 4 time(s) with fastest run 181 BPM, max 4 beats  81,234 PACs with PAC burden of 5%  102,955 PVCs with PVC burden of 7%  ----  Cardiac duplex carotid 2/21/2023    Bilateral internal carotid arteries demonstrated mild (<50%) plaque without evidence of significant stenosis.    Bilateral external carotid arteries were patent.    Bilateral vertebral arteries were antegrade.    Bilateral subclavian arteries were biphasic.  ----  2d echo 2/21/2023    Contrast used: Definity.    Technical qualifiers: Color flow Doppler was performed and pulse wave and/or continuous wave Doppler was performed.    Left Ventricle: Low normal left ventricular systolic function with a visually estimated EF of 50 - 55%. Left ventricle size is normal. Moderate septal thickening. Mild posterior thickening. Normal wall motion.    Right Ventricle: Right

## 2025-01-16 RX ORDER — NITROGLYCERIN 0.4 MG/1
0.4 TABLET SUBLINGUAL EVERY 5 MIN PRN
Qty: 90 TABLET | Refills: 3 | Status: SHIPPED | OUTPATIENT
Start: 2025-01-16

## 2025-01-16 NOTE — TELEPHONE ENCOUNTER
Pt called requesting refill on nitro, she doesn't have nitro on her list but I called her and she said it was an old order she thinks from [facility].     Requested Prescriptions     Pending Prescriptions Disp Refills    nitroGLYCERIN (NITROSTAT) 0.4 MG SL tablet 90 tablet 3     Sig: Place 1 tablet under the tongue every 5 minutes as needed for Chest pain up to max of 3 total doses. If no relief after 1 dose, call 911.

## 2025-01-27 ENCOUNTER — HOSPITAL ENCOUNTER (EMERGENCY)
Facility: HOSPITAL | Age: 89
Discharge: HOME | End: 2025-01-28
Attending: EMERGENCY MEDICINE
Payer: MEDICARE

## 2025-01-27 ENCOUNTER — APPOINTMENT (OUTPATIENT)
Facility: HOSPITAL | Age: 89
End: 2025-01-27
Attending: EMERGENCY MEDICINE
Payer: MEDICARE

## 2025-01-27 DIAGNOSIS — I48.20 CHRONIC ATRIAL FIBRILLATION WITH RVR (HCC): Primary | ICD-10-CM

## 2025-01-27 LAB
ALBUMIN SERPL-MCNC: 3.3 G/DL (ref 3.4–5)
ALBUMIN/GLOB SERPL: 0.7 (ref 0.8–1.7)
ALP SERPL-CCNC: 105 U/L (ref 45–117)
ALT SERPL-CCNC: 14 U/L (ref 13–56)
ANION GAP SERPL CALC-SCNC: 6 MMOL/L (ref 3–18)
AST SERPL-CCNC: 14 U/L (ref 10–38)
BASOPHILS # BLD: 0.03 K/UL (ref 0–0.1)
BASOPHILS NFR BLD: 0.5 % (ref 0–2)
BILIRUB SERPL-MCNC: 0.5 MG/DL (ref 0.2–1)
BUN SERPL-MCNC: 21 MG/DL (ref 7–18)
BUN/CREAT SERPL: 20 (ref 12–20)
CALCIUM SERPL-MCNC: 10 MG/DL (ref 8.5–10.1)
CHLORIDE SERPL-SCNC: 107 MMOL/L (ref 100–111)
CO2 SERPL-SCNC: 27 MMOL/L (ref 21–32)
CREAT SERPL-MCNC: 1.04 MG/DL (ref 0.6–1.3)
DIFFERENTIAL METHOD BLD: ABNORMAL
EOSINOPHIL # BLD: 0.21 K/UL (ref 0–0.4)
EOSINOPHIL NFR BLD: 3.4 % (ref 0–5)
ERYTHROCYTE [DISTWIDTH] IN BLOOD BY AUTOMATED COUNT: 12.6 % (ref 11.6–14.5)
GLOBULIN SER CALC-MCNC: 5 G/DL (ref 2–4)
GLUCOSE SERPL-MCNC: 183 MG/DL (ref 74–99)
HCT VFR BLD AUTO: 37.8 % (ref 35–45)
HGB BLD-MCNC: 12 G/DL (ref 12–16)
IMM GRANULOCYTES # BLD AUTO: 0.01 K/UL (ref 0–0.04)
IMM GRANULOCYTES NFR BLD AUTO: 0.2 % (ref 0–0.5)
LYMPHOCYTES # BLD: 2.36 K/UL (ref 0.9–3.6)
LYMPHOCYTES NFR BLD: 38.7 % (ref 21–52)
MAGNESIUM SERPL-MCNC: 2.2 MG/DL (ref 1.6–2.6)
MCH RBC QN AUTO: 31.7 PG (ref 24–34)
MCHC RBC AUTO-ENTMCNC: 31.7 G/DL (ref 31–37)
MCV RBC AUTO: 99.7 FL (ref 78–100)
MONOCYTES # BLD: 0.42 K/UL (ref 0.05–1.2)
MONOCYTES NFR BLD: 6.9 % (ref 3–10)
NEUTS SEG # BLD: 3.07 K/UL (ref 1.8–8)
NEUTS SEG NFR BLD: 50.3 % (ref 40–73)
NRBC # BLD: 0 K/UL (ref 0–0.01)
NRBC BLD-RTO: 0 PER 100 WBC
NT PRO BNP: 263 PG/ML (ref 0–1800)
PHOSPHATE SERPL-MCNC: 4 MG/DL (ref 2.5–4.9)
PLATELET # BLD AUTO: 218 K/UL (ref 135–420)
PMV BLD AUTO: 10.6 FL (ref 9.2–11.8)
POTASSIUM SERPL-SCNC: 4.3 MMOL/L (ref 3.5–5.5)
PROT SERPL-MCNC: 8.3 G/DL (ref 6.4–8.2)
RBC # BLD AUTO: 3.79 M/UL (ref 4.2–5.3)
SODIUM SERPL-SCNC: 140 MMOL/L (ref 136–145)
TROPONIN I SERPL HS-MCNC: 63 NG/L (ref 0–54)
TROPONIN I SERPL HS-MCNC: 70 NG/L (ref 0–54)
TSH SERPL DL<=0.05 MIU/L-ACNC: 2.7 UIU/ML (ref 0.36–3.74)
WBC # BLD AUTO: 6.1 K/UL (ref 4.6–13.2)

## 2025-01-27 PROCEDURE — 2500000003 HC RX 250 WO HCPCS: Performed by: EMERGENCY MEDICINE

## 2025-01-27 PROCEDURE — 84100 ASSAY OF PHOSPHORUS: CPT

## 2025-01-27 PROCEDURE — 84443 ASSAY THYROID STIM HORMONE: CPT

## 2025-01-27 PROCEDURE — 84484 ASSAY OF TROPONIN QUANT: CPT

## 2025-01-27 PROCEDURE — 83735 ASSAY OF MAGNESIUM: CPT

## 2025-01-27 PROCEDURE — 71045 X-RAY EXAM CHEST 1 VIEW: CPT

## 2025-01-27 PROCEDURE — 6370000000 HC RX 637 (ALT 250 FOR IP): Performed by: EMERGENCY MEDICINE

## 2025-01-27 PROCEDURE — 83880 ASSAY OF NATRIURETIC PEPTIDE: CPT

## 2025-01-27 PROCEDURE — 96374 THER/PROPH/DIAG INJ IV PUSH: CPT

## 2025-01-27 PROCEDURE — 93005 ELECTROCARDIOGRAM TRACING: CPT | Performed by: EMERGENCY MEDICINE

## 2025-01-27 PROCEDURE — 99285 EMERGENCY DEPT VISIT HI MDM: CPT

## 2025-01-27 PROCEDURE — 85025 COMPLETE CBC W/AUTO DIFF WBC: CPT

## 2025-01-27 PROCEDURE — 80053 COMPREHEN METABOLIC PANEL: CPT

## 2025-01-27 RX ORDER — DILTIAZEM HYDROCHLORIDE 5 MG/ML
20 INJECTION INTRAVENOUS
Status: COMPLETED | OUTPATIENT
Start: 2025-01-27 | End: 2025-01-27

## 2025-01-27 RX ORDER — ACETAMINOPHEN 500 MG
1000 TABLET ORAL ONCE
Status: COMPLETED | OUTPATIENT
Start: 2025-01-27 | End: 2025-01-27

## 2025-01-27 RX ADMIN — ACETAMINOPHEN 1000 MG: 500 TABLET ORAL at 22:34

## 2025-01-27 RX ADMIN — DILTIAZEM HYDROCHLORIDE 20 MG: 5 INJECTION, SOLUTION INTRAVENOUS at 20:16

## 2025-01-27 ASSESSMENT — PAIN SCALES - GENERAL
PAINLEVEL_OUTOF10: 7
PAINLEVEL_OUTOF10: 10

## 2025-01-27 ASSESSMENT — PAIN DESCRIPTION - LOCATION
LOCATION: HEAD
LOCATION: HEAD;NECK;ARM

## 2025-01-27 ASSESSMENT — PAIN DESCRIPTION - ORIENTATION: ORIENTATION: RIGHT

## 2025-01-27 ASSESSMENT — PAIN DESCRIPTION - DESCRIPTORS
DESCRIPTORS: ACHING
DESCRIPTORS: ACHING

## 2025-01-27 ASSESSMENT — PAIN - FUNCTIONAL ASSESSMENT: PAIN_FUNCTIONAL_ASSESSMENT: 0-10

## 2025-01-28 VITALS
DIASTOLIC BLOOD PRESSURE: 59 MMHG | TEMPERATURE: 98.9 F | OXYGEN SATURATION: 98 % | BODY MASS INDEX: 33.59 KG/M2 | WEIGHT: 214 LBS | HEIGHT: 67 IN | HEART RATE: 89 BPM | RESPIRATION RATE: 14 BRPM | SYSTOLIC BLOOD PRESSURE: 133 MMHG

## 2025-01-28 LAB
EKG DIAGNOSIS: NORMAL
EKG DIAGNOSIS: NORMAL
EKG Q-T INTERVAL: 320 MS
EKG Q-T INTERVAL: 380 MS
EKG QRS DURATION: 126 MS
EKG QRS DURATION: 82 MS
EKG QTC CALCULATION (BAZETT): 443 MS
EKG QTC CALCULATION (BAZETT): 502 MS
EKG R AXIS: -17 DEGREES
EKG R AXIS: -41 DEGREES
EKG T AXIS: -14 DEGREES
EKG T AXIS: 8 DEGREES
EKG VENTRICULAR RATE: 148 BPM
EKG VENTRICULAR RATE: 82 BPM

## 2025-01-28 NOTE — ED TRIAGE NOTES
Patient presenting with worsening SOB and concerns for \"being in Afib. Denies current CP. States she took a nitro early this morning and has been dizzy since and did not take her meclizine because it makes her sleepy.

## 2025-01-28 NOTE — ED PROVIDER NOTES
[facility] EMERGENCY DEPARTMENT  EMERGENCY DEPARTMENT ENCOUNTER    Patient Name: Grace Lyons  MRN: [id number]  YOB: 1935  Provider: Andrey Guevara DO  PCP: Trent Talamantes MD   Time/Date of evaluation: 10:03 PM EST on 1/27/25    History of Presenting Illness     History Provided by: Patient  History is limited by: Nothing     HISTORY:   Grace Lyons is a 89 y.o. female with history of anxiety, asthma, atrial fibrillation, CHF, CAD, hypertension presents to the ED with a chief complaint of palpitations, and shortness of breath.  She denies any fever, sore throat, headache, abdominal pain, nausea, vomiting, diarrhea, back pain, flank pain, rash.    Nursing Notes were all reviewed and agreed with or any disagreements were addressed in the HPI.    Past History     PAST MEDICAL HISTORY:  Past Medical History:   Diagnosis Date    Acetabulum fracture (HCC) 1981    Anemia     Anxiety     Asthma     Benign hypertensive heart disease without heart failure     Elevated today, usually normal at home, currently significant joint pains    BMI 38.0-38.9,adult 06/07/2017    Bronchitis     Bursitis of left shoulder     CAD (coronary artery disease)     Cervical spinal stenosis     Cholelithiasis     Chronic diastolic heart failure (HCC)     Stable, edema better, uses PRN Lasix    Chronic pain     right leg    Congestive heart failure (HCC)     Coronary atherosclerosis of native coronary artery     9/10 Non critical LAD and RCA disease    Degenerative joint disease of left knee     Diverticulosis     Diverticulosis     DJD (degenerative joint disease)     Dyspepsia     Dysuria     HTN (hypertension)     Hypothyroidism     IC (interstitial cystitis)     Kidney stone     Kidney stones     Left shoulder pain     Low back pain     LVH (left ventricular hypertrophy)     Morbid obesity     Weight loss has been strongly encouraged by following dietary restrictions and an exercise

## 2025-01-31 ENCOUNTER — OFFICE VISIT (OUTPATIENT)
Age: 89
End: 2025-01-31

## 2025-01-31 VITALS
HEIGHT: 67 IN | DIASTOLIC BLOOD PRESSURE: 76 MMHG | BODY MASS INDEX: 30.61 KG/M2 | WEIGHT: 195 LBS | HEART RATE: 81 BPM | SYSTOLIC BLOOD PRESSURE: 126 MMHG | OXYGEN SATURATION: 98 %

## 2025-01-31 DIAGNOSIS — Z09 HOSPITAL DISCHARGE FOLLOW-UP: ICD-10-CM

## 2025-01-31 DIAGNOSIS — I50.32 CHRONIC HEART FAILURE WITH PRESERVED EJECTION FRACTION (HCC): ICD-10-CM

## 2025-01-31 DIAGNOSIS — I25.83 CORONARY ARTERY DISEASE DUE TO LIPID RICH PLAQUE: ICD-10-CM

## 2025-01-31 DIAGNOSIS — I10 PRIMARY HYPERTENSION: ICD-10-CM

## 2025-01-31 DIAGNOSIS — I25.10 CORONARY ARTERY DISEASE DUE TO LIPID RICH PLAQUE: ICD-10-CM

## 2025-01-31 DIAGNOSIS — I48.0 PAROXYSMAL ATRIAL FIBRILLATION (HCC): Primary | ICD-10-CM

## 2025-01-31 DIAGNOSIS — E78.2 MIXED HYPERLIPIDEMIA: ICD-10-CM

## 2025-01-31 RX ORDER — ALBUTEROL SULFATE 90 UG/1
2 INHALANT RESPIRATORY (INHALATION) EVERY 4 HOURS PRN
Qty: 18 G | Refills: 2 | Status: SHIPPED | OUTPATIENT
Start: 2025-01-31

## 2025-01-31 ASSESSMENT — ENCOUNTER SYMPTOMS
BLOOD IN STOOL: 0
COLOR CHANGE: 0
CHEST TIGHTNESS: 0
ABDOMINAL PAIN: 0
WHEEZING: 0
NAUSEA: 0
APNEA: 0
SHORTNESS OF BREATH: 1
COUGH: 0
DIARRHEA: 0
CONSTIPATION: 0

## 2025-01-31 ASSESSMENT — PATIENT HEALTH QUESTIONNAIRE - PHQ9
SUM OF ALL RESPONSES TO PHQ QUESTIONS 1-9: 0
SUM OF ALL RESPONSES TO PHQ9 QUESTIONS 1 & 2: 0
2. FEELING DOWN, DEPRESSED OR HOPELESS: NOT AT ALL
1. LITTLE INTEREST OR PLEASURE IN DOING THINGS: NOT AT ALL
SUM OF ALL RESPONSES TO PHQ QUESTIONS 1-9: 0

## 2025-01-31 NOTE — PROGRESS NOTES
HISTORY OF PRESENT ILLNESS  Grace Lyons is a 89 y.o. female.    PMH Coronary artery disease, Chronic heart failure preserved ejection fraction, HTN, Morbid obesity  1/31/2025  Seen following recent ER visit for new onset atrial fibrillation.  Converted to SR with Diltiazem.  Not currently on anticoagulation  Denies further episodes of chest pain, shortness of breath, palpitaitons or edema.    ----  CARDIAC STUDIES  ----  Vasscular duplex lower extremity venous right 10/22/2024    No evidence of deep vein thrombosis in the right lower extremity.    For comparison purposes, the left common femoral vein was briefly interrogated. The vein demonstrates normal color filling and compressibility. Doppler flow was phasic and spontaneous.    Right posterior tibial artery Doppler waveforms not identified.  ----  [facility] 8/29/2024  Coronary Artery Disease  LM 0% focal  mLAD 70-80%, tortuous distally  mLCFX 70-90% with subsequent 30-40% stenosis  mRCA 0% luminal irregularities with ectasia  RCA Dominant System  LVEDP 1mmHg within normal limits  Successful balloon angioplasty and TAM of the mLAD 70-80% stenosis to 0% stenosis GAY III to GAY III flow utilizing 3.0x15mm TAM postdilated with 3.25mm NC balloon  Successful balloon angioplasty and TAM of the mLCFX 70-90% stenosis to 0% stenosis GAY III to GAY III flow utilizing [id number] TAM postdilated with 2.75mm NC and 3.25mm NC balloons  Plan: Continue with dual antiplatelet therapy aspirin 81mg po daily plavix 75mg po daily, statin as can tolerate is allergic, bb as can tolerate, cardiac rehab.  ACC provisions diet and exercise.    ----  2d echo 8/29/2024    Image quality is technically difficult. Contrast used: Definity. Technically difficult study with poor endocardial visualization, technically difficult study due to patient's body habitus, technically difficult Doppler study, limited study due to patient's ability to tolerate test and technically difficult study due

## 2025-01-31 NOTE — PROGRESS NOTES
1. Have you been to the ER, urgent care clinic since your last visit?  Hospitalized since your last visit?     Yes [facility] for afib dizziness    2. Have you seen or consulted any other health care providers outside of the [facility] since your last visit?  Include any pap smears or colon screening.      no

## 2025-01-31 NOTE — PATIENT INSTRUCTIONS
Begin Eliquis 5 mg - 1 tab twice daily    When start Eliquis - may d//c Aspirin  Continue Plavix.    Continue all other medications.

## 2025-02-02 ENCOUNTER — APPOINTMENT (OUTPATIENT)
Facility: HOSPITAL | Age: 89
End: 2025-02-02
Payer: MEDICARE

## 2025-02-02 ENCOUNTER — HOSPITAL ENCOUNTER (EMERGENCY)
Facility: HOSPITAL | Age: 89
Discharge: HOME | End: 2025-02-03
Attending: STUDENT IN AN ORGANIZED HEALTH CARE EDUCATION/TRAINING PROGRAM
Payer: MEDICARE

## 2025-02-02 DIAGNOSIS — J06.9 VIRAL URI WITH COUGH: Primary | ICD-10-CM

## 2025-02-02 LAB
ALBUMIN SERPL-MCNC: 3 G/DL (ref 3.4–5)
ALBUMIN/GLOB SERPL: 0.7 (ref 0.8–1.7)
ALP SERPL-CCNC: 97 U/L (ref 45–117)
ALT SERPL-CCNC: 14 U/L (ref 13–56)
ANION GAP SERPL CALC-SCNC: 4 MMOL/L (ref 3–18)
AST SERPL-CCNC: 20 U/L (ref 10–38)
BASOPHILS # BLD: 0.04 K/UL (ref 0–0.1)
BASOPHILS NFR BLD: 0.4 % (ref 0–2)
BILIRUB SERPL-MCNC: 0.6 MG/DL (ref 0.2–1)
BUN SERPL-MCNC: 17 MG/DL (ref 7–18)
BUN/CREAT SERPL: 21 (ref 12–20)
CALCIUM SERPL-MCNC: 8.7 MG/DL (ref 8.5–10.1)
CHLORIDE SERPL-SCNC: 105 MMOL/L (ref 100–111)
CO2 SERPL-SCNC: 27 MMOL/L (ref 21–32)
CREAT SERPL-MCNC: 0.82 MG/DL (ref 0.6–1.3)
DIFFERENTIAL METHOD BLD: ABNORMAL
EOSINOPHIL # BLD: 0.19 K/UL (ref 0–0.4)
EOSINOPHIL NFR BLD: 2 % (ref 0–5)
ERYTHROCYTE [DISTWIDTH] IN BLOOD BY AUTOMATED COUNT: 12.4 % (ref 11.6–14.5)
GLOBULIN SER CALC-MCNC: 4.4 G/DL (ref 2–4)
GLUCOSE SERPL-MCNC: 184 MG/DL (ref 74–99)
HCT VFR BLD AUTO: 31 % (ref 35–45)
HGB BLD-MCNC: 10.1 G/DL (ref 12–16)
IMM GRANULOCYTES # BLD AUTO: 0.02 K/UL (ref 0–0.04)
IMM GRANULOCYTES NFR BLD AUTO: 0.2 % (ref 0–0.5)
LYMPHOCYTES # BLD: 2.05 K/UL (ref 0.9–3.6)
LYMPHOCYTES NFR BLD: 22.1 % (ref 21–52)
MAGNESIUM SERPL-MCNC: 2 MG/DL (ref 1.6–2.6)
MCH RBC QN AUTO: 32.6 PG (ref 24–34)
MCHC RBC AUTO-ENTMCNC: 32.6 G/DL (ref 31–37)
MCV RBC AUTO: 100 FL (ref 78–100)
MONOCYTES # BLD: 0.84 K/UL (ref 0.05–1.2)
MONOCYTES NFR BLD: 9.1 % (ref 3–10)
NEUTS SEG # BLD: 6.14 K/UL (ref 1.8–8)
NEUTS SEG NFR BLD: 66.2 % (ref 40–73)
NRBC # BLD: 0 K/UL (ref 0–0.01)
NRBC BLD-RTO: 0 PER 100 WBC
NT PRO BNP: 322 PG/ML (ref 0–1800)
PLATELET # BLD AUTO: 202 K/UL (ref 135–420)
PMV BLD AUTO: 10.9 FL (ref 9.2–11.8)
POTASSIUM SERPL-SCNC: 4.6 MMOL/L (ref 3.5–5.5)
PROT SERPL-MCNC: 7.4 G/DL (ref 6.4–8.2)
RBC # BLD AUTO: 3.1 M/UL (ref 4.2–5.3)
SODIUM SERPL-SCNC: 136 MMOL/L (ref 136–145)
TROPONIN I SERPL HS-MCNC: 108 NG/L (ref 0–54)
WBC # BLD AUTO: 9.3 K/UL (ref 4.6–13.2)

## 2025-02-02 PROCEDURE — 83735 ASSAY OF MAGNESIUM: CPT

## 2025-02-02 PROCEDURE — 85025 COMPLETE CBC W/AUTO DIFF WBC: CPT

## 2025-02-02 PROCEDURE — 83880 ASSAY OF NATRIURETIC PEPTIDE: CPT

## 2025-02-02 PROCEDURE — 71045 X-RAY EXAM CHEST 1 VIEW: CPT

## 2025-02-02 PROCEDURE — 80053 COMPREHEN METABOLIC PANEL: CPT

## 2025-02-02 PROCEDURE — 87636 SARSCOV2 & INF A&B AMP PRB: CPT

## 2025-02-02 PROCEDURE — 99285 EMERGENCY DEPT VISIT HI MDM: CPT

## 2025-02-02 PROCEDURE — 93005 ELECTROCARDIOGRAM TRACING: CPT | Performed by: STUDENT IN AN ORGANIZED HEALTH CARE EDUCATION/TRAINING PROGRAM

## 2025-02-02 PROCEDURE — 84484 ASSAY OF TROPONIN QUANT: CPT

## 2025-02-02 PROCEDURE — 6370000000 HC RX 637 (ALT 250 FOR IP): Performed by: STUDENT IN AN ORGANIZED HEALTH CARE EDUCATION/TRAINING PROGRAM

## 2025-02-02 RX ORDER — ACETAMINOPHEN 500 MG
1000 TABLET ORAL
Status: COMPLETED | OUTPATIENT
Start: 2025-02-02 | End: 2025-02-02

## 2025-02-02 RX ORDER — IPRATROPIUM BROMIDE AND ALBUTEROL SULFATE 2.5; .5 MG/3ML; MG/3ML
1 SOLUTION RESPIRATORY (INHALATION)
Status: COMPLETED | OUTPATIENT
Start: 2025-02-03 | End: 2025-02-02

## 2025-02-02 RX ADMIN — ACETAMINOPHEN 1000 MG: 500 TABLET ORAL at 23:05

## 2025-02-02 RX ADMIN — IPRATROPIUM BROMIDE AND ALBUTEROL SULFATE 1 DOSE: .5; 3 SOLUTION RESPIRATORY (INHALATION) at 23:48

## 2025-02-02 ASSESSMENT — PAIN DESCRIPTION - LOCATION: LOCATION: CHEST

## 2025-02-02 ASSESSMENT — PAIN - FUNCTIONAL ASSESSMENT
PAIN_FUNCTIONAL_ASSESSMENT: 0-10
PAIN_FUNCTIONAL_ASSESSMENT: ACTIVITIES ARE NOT PREVENTED

## 2025-02-02 ASSESSMENT — PAIN SCALES - GENERAL: PAINLEVEL_OUTOF10: 7

## 2025-02-02 ASSESSMENT — PAIN DESCRIPTION - ORIENTATION: ORIENTATION: MID

## 2025-02-02 ASSESSMENT — PAIN DESCRIPTION - DESCRIPTORS: DESCRIPTORS: DULL

## 2025-02-02 ASSESSMENT — PAIN DESCRIPTION - PAIN TYPE: TYPE: ACUTE PAIN

## 2025-02-03 VITALS
DIASTOLIC BLOOD PRESSURE: 55 MMHG | HEART RATE: 97 BPM | SYSTOLIC BLOOD PRESSURE: 120 MMHG | RESPIRATION RATE: 23 BRPM | BODY MASS INDEX: 30.61 KG/M2 | OXYGEN SATURATION: 99 % | TEMPERATURE: 99.8 F | WEIGHT: 195 LBS | HEIGHT: 67 IN

## 2025-02-03 LAB
FLUAV RNA SPEC QL NAA+PROBE: NOT DETECTED
FLUBV RNA SPEC QL NAA+PROBE: NOT DETECTED
SARS-COV-2 RNA RESP QL NAA+PROBE: NOT DETECTED
SOURCE: NORMAL
TROPONIN I SERPL HS-MCNC: 111 NG/L (ref 0–54)

## 2025-02-03 NOTE — DISCHARGE INSTRUCTIONS
Consider having a humidifier at bedside as this can help with viral symptoms that help to decrease the spread to your family members.  Also consider using a sinus rinse or salt water in your nose as this has been shown to decrease the duration of your symptoms and also prevent the spread to your family.

## 2025-02-03 NOTE — ED PROVIDER NOTES
[facility] EMERGENCY DEPARTMENT  EMERGENCY DEPARTMENT ENCOUNTER      Pt Name: Grace Lyons  MRN: [id number]  Birthdate 1935  Date of evaluation: 2/2/2025  Provider: Leonora Alexander MD    CHIEF COMPLAINT       Chief Complaint   Patient presents with    Cough     Pt to ED reports cough and congestion, pt also endorses SOB . Pt arrived to triage via wheelchair, no respiratory distress noted          HISTORY OF PRESENT ILLNESS   (Location/Symptom, Timing/Onset, Context/Setting, Quality, Duration, Modifying Factors, Severity)  Note limiting factors.   Grace Lyons is a 89 y.o. female who presents to the emergency department for chest congestion.  Feels it on the right side.  Denies any significant coughing.  There is some mild nasal congestion denies any sore throat or fever.  Used her inhaler with no seeming improvement in her symptoms.  She has been taking her medication.  Denies any swelling or lower extremities.  She is been compliant with her blood thinner.  Has been taking over-the-counter cold and flu medications and she states it feels like she has a cold but none of them have been helping.     Nursing Notes were reviewed.    REVIEW OF SYSTEMS    (2-9 systems for level 4, 10 or more for level 5)     Constitutional: No fever  HENT: No ear pain  Eyes: No change in vision  Respiratory: No SOB  Cardio: No chest pain  GI: No blood in stool  : No hematuria  MSK: No back pain  Skin: No rashes  Neuro: No headache    Except as noted above the remainder of the review of systems was reviewed and negative.       PAST MEDICAL HISTORY     Past Medical History:   Diagnosis Date    Acetabulum fracture (HCC) 1981    Anemia     Anxiety     Asthma     Benign hypertensive heart disease without heart failure     Elevated today, usually normal at home, currently significant joint pains    BMI 38.0-38.9,adult 06/07/2017    Bronchitis     Bursitis of left shoulder     CAD (coronary artery disease)

## 2025-02-03 NOTE — ED TRIAGE NOTES
Pt to ED reports cough and congestion, pt also endorses SOB . Pt arrived to triage via wheelchair, no respiratory distress noted

## 2025-02-04 LAB
EKG ATRIAL RATE: 120 BPM
EKG DIAGNOSIS: NORMAL
EKG P AXIS: 33 DEGREES
EKG P-R INTERVAL: 128 MS
EKG Q-T INTERVAL: 330 MS
EKG QRS DURATION: 82 MS
EKG QTC CALCULATION (BAZETT): 466 MS
EKG R AXIS: -26 DEGREES
EKG T AXIS: 5 DEGREES
EKG VENTRICULAR RATE: 120 BPM

## 2025-02-04 PROCEDURE — 93010 ELECTROCARDIOGRAM REPORT: CPT | Performed by: INTERNAL MEDICINE

## 2025-03-05 NOTE — PROGRESS NOTES
Hospitalist was paged to inform of admission. Awaiting call back. At 2132, Dr. Gustavo Hanson called back, was notified of admission. At 2330, patient states that she takes Lantus 32 units in the morning and 36 units at HS. Dr. Gustavo Hanson was informed. New orders were received. You can access the FollowMyHealth Patient Portal offered by [facility] by registering at the following website: [email or web address]. By joining [facility]’s FollowMyHealth portal, you will also be able to view your health information using other applications (apps) compatible with our system.

## 2025-03-17 ENCOUNTER — HOSPITAL ENCOUNTER (EMERGENCY)
Facility: HOSPITAL | Age: 89
Discharge: HOME | End: 2025-03-17
Attending: STUDENT IN AN ORGANIZED HEALTH CARE EDUCATION/TRAINING PROGRAM
Payer: MEDICARE

## 2025-03-17 ENCOUNTER — APPOINTMENT (OUTPATIENT)
Facility: HOSPITAL | Age: 89
End: 2025-03-17
Payer: MEDICARE

## 2025-03-17 VITALS
BODY MASS INDEX: 35 KG/M2 | OXYGEN SATURATION: 98 % | HEIGHT: 67 IN | RESPIRATION RATE: 17 BRPM | TEMPERATURE: 98.6 F | WEIGHT: 223 LBS | DIASTOLIC BLOOD PRESSURE: 60 MMHG | HEART RATE: 89 BPM | SYSTOLIC BLOOD PRESSURE: 133 MMHG

## 2025-03-17 DIAGNOSIS — R51.9 ACUTE NONINTRACTABLE HEADACHE, UNSPECIFIED HEADACHE TYPE: Primary | ICD-10-CM

## 2025-03-17 DIAGNOSIS — R09.81 HEAD CONGESTION: ICD-10-CM

## 2025-03-17 LAB
ALBUMIN SERPL-MCNC: 3.3 G/DL (ref 3.4–5)
ALBUMIN/GLOB SERPL: 0.6 (ref 0.8–1.7)
ALP SERPL-CCNC: 98 U/L (ref 45–117)
ALT SERPL-CCNC: 13 U/L (ref 13–56)
ANION GAP SERPL CALC-SCNC: 2 MMOL/L (ref 3–18)
AST SERPL-CCNC: 16 U/L (ref 10–38)
BASOPHILS # BLD: 0.05 K/UL (ref 0–0.1)
BASOPHILS NFR BLD: 0.6 % (ref 0–2)
BILIRUB SERPL-MCNC: 0.4 MG/DL (ref 0.2–1)
BUN SERPL-MCNC: 15 MG/DL (ref 7–18)
BUN/CREAT SERPL: 17 (ref 12–20)
CALCIUM SERPL-MCNC: 9.4 MG/DL (ref 8.5–10.1)
CHLORIDE SERPL-SCNC: 108 MMOL/L (ref 100–111)
CO2 SERPL-SCNC: 29 MMOL/L (ref 21–32)
CREAT SERPL-MCNC: 0.87 MG/DL (ref 0.6–1.3)
DIFFERENTIAL METHOD BLD: ABNORMAL
EKG ATRIAL RATE: 109 BPM
EKG DIAGNOSIS: NORMAL
EKG P AXIS: 76 DEGREES
EKG P-R INTERVAL: 128 MS
EKG Q-T INTERVAL: 366 MS
EKG QRS DURATION: 132 MS
EKG QTC CALCULATION (BAZETT): 492 MS
EKG R AXIS: -37 DEGREES
EKG T AXIS: 15 DEGREES
EKG VENTRICULAR RATE: 109 BPM
EOSINOPHIL # BLD: 0.25 K/UL (ref 0–0.4)
EOSINOPHIL NFR BLD: 3.1 % (ref 0–5)
ERYTHROCYTE [DISTWIDTH] IN BLOOD BY AUTOMATED COUNT: 12.2 % (ref 11.6–14.5)
FLUAV RNA SPEC QL NAA+PROBE: NOT DETECTED
FLUBV RNA SPEC QL NAA+PROBE: NOT DETECTED
GLOBULIN SER CALC-MCNC: 5.4 G/DL (ref 2–4)
GLUCOSE SERPL-MCNC: 100 MG/DL (ref 74–99)
HCT VFR BLD AUTO: 34.5 % (ref 35–45)
HGB BLD-MCNC: 11 G/DL (ref 12–16)
IMM GRANULOCYTES # BLD AUTO: 0.02 K/UL (ref 0–0.04)
IMM GRANULOCYTES NFR BLD AUTO: 0.2 % (ref 0–0.5)
LYMPHOCYTES # BLD: 2.81 K/UL (ref 0.9–3.6)
LYMPHOCYTES NFR BLD: 34.6 % (ref 21–52)
MAGNESIUM SERPL-MCNC: 2 MG/DL (ref 1.6–2.6)
MCH RBC QN AUTO: 32.3 PG (ref 24–34)
MCHC RBC AUTO-ENTMCNC: 31.9 G/DL (ref 31–37)
MCV RBC AUTO: 101.2 FL (ref 78–100)
MONOCYTES # BLD: 0.49 K/UL (ref 0.05–1.2)
MONOCYTES NFR BLD: 6 % (ref 3–10)
NEUTS SEG # BLD: 4.49 K/UL (ref 1.8–8)
NEUTS SEG NFR BLD: 55.5 % (ref 40–73)
NRBC # BLD: 0 K/UL (ref 0–0.01)
NRBC BLD-RTO: 0 PER 100 WBC
NT PRO BNP: 121 PG/ML (ref 0–1800)
PLATELET # BLD AUTO: 255 K/UL (ref 135–420)
PMV BLD AUTO: 10.6 FL (ref 9.2–11.8)
POTASSIUM SERPL-SCNC: 4 MMOL/L (ref 3.5–5.5)
PROT SERPL-MCNC: 8.7 G/DL (ref 6.4–8.2)
RBC # BLD AUTO: 3.41 M/UL (ref 4.2–5.3)
SARS-COV-2 RNA RESP QL NAA+PROBE: NOT DETECTED
SODIUM SERPL-SCNC: 139 MMOL/L (ref 136–145)
SOURCE: NORMAL
TROPONIN I SERPL HS-MCNC: 39 NG/L (ref 0–54)
WBC # BLD AUTO: 8.1 K/UL (ref 4.6–13.2)

## 2025-03-17 PROCEDURE — 80053 COMPREHEN METABOLIC PANEL: CPT

## 2025-03-17 PROCEDURE — 99285 EMERGENCY DEPT VISIT HI MDM: CPT

## 2025-03-17 PROCEDURE — 83735 ASSAY OF MAGNESIUM: CPT

## 2025-03-17 PROCEDURE — 85025 COMPLETE CBC W/AUTO DIFF WBC: CPT

## 2025-03-17 PROCEDURE — 71045 X-RAY EXAM CHEST 1 VIEW: CPT

## 2025-03-17 PROCEDURE — 93010 ELECTROCARDIOGRAM REPORT: CPT | Performed by: INTERNAL MEDICINE

## 2025-03-17 PROCEDURE — 83880 ASSAY OF NATRIURETIC PEPTIDE: CPT

## 2025-03-17 PROCEDURE — 84484 ASSAY OF TROPONIN QUANT: CPT

## 2025-03-17 PROCEDURE — 93005 ELECTROCARDIOGRAM TRACING: CPT | Performed by: STUDENT IN AN ORGANIZED HEALTH CARE EDUCATION/TRAINING PROGRAM

## 2025-03-17 PROCEDURE — 87636 SARSCOV2 & INF A&B AMP PRB: CPT

## 2025-03-17 RX ORDER — HYDROXYZINE PAMOATE 25 MG/1
CAPSULE ORAL
COMMUNITY
Start: 2025-02-26

## 2025-03-17 ASSESSMENT — PAIN - FUNCTIONAL ASSESSMENT: PAIN_FUNCTIONAL_ASSESSMENT: 0-10

## 2025-03-17 ASSESSMENT — PAIN SCALES - GENERAL: PAINLEVEL_OUTOF10: 9

## 2025-03-17 NOTE — ED NOTES
Discharge instructions reviewed with patient.  Patient verbalized understanding.  Patient advised to follow up as directed on discharge instructions.  Patient denies questions or concerns at this time.  Patient verbalized understanding. No s/sx of distress noted.  Patient is getting dressed and calling for her ride.

## 2025-03-17 NOTE — ED PROVIDER NOTES
EMERGENCY DEPARTMENT HISTORY AND PHYSICAL EXAM      Date: 3/17/2025  Patient Name: Grace Lyons    History of Presenting Illness     Chief Complaint   Patient presents with    Shortness of Breath    left ear pain    Headache       89-year-old female with history as below presenting to the emergency department for evaluation of shortness of breath, and headache.  Patient reports that this has been a chronic issue now for \"years\" patient also reports having diminished hearing out of both ears but worse on the left.  States that this has been an ongoing issue ever since she had a CT done 3 years ago.  Patient states that she has been taking Tylenol but having little relief.  She has followed up with her primary care doctor regarding the symptoms but was referred to the emergency department.  No new symptoms.              PCP: Trent Talamantes MD    No current facility-administered medications for this encounter.     Current Outpatient Medications   Medication Sig Dispense Refill    hydrOXYzine pamoate (VISTARIL) 25 MG capsule TAKE 1 CAPSULE BY MOUTH THREE TIMES DAILY AS NEEDED FOR ANXIETY      estradiol (ESTRACE VAGINAL) 0.1 MG/GM vaginal cream Apply pea sized (0.25 g)  amount to urethra 3x a week 42.5 g 3    apixaban (ELIQUIS) 5 MG TABS tablet Take 1 tablet by mouth 2 times daily 180 tablet 3    albuterol sulfate HFA (PROVENTIL;VENTOLIN;PROAIR) 108 (90 Base) MCG/ACT inhaler Inhale 2 puffs into the lungs every 4 hours as needed for Wheezing 18 g 2    nitroGLYCERIN (NITROSTAT) 0.4 MG SL tablet Place 1 tablet under the tongue every 5 minutes as needed for Chest pain up to max of 3 total doses. If no relief after 1 dose, call 911. 90 tablet 3    clopidogrel (PLAVIX) 75 MG tablet Take 1 tablet by mouth daily 90 tablet 3    insulin glargine (LANTUS) 100 UNIT/ML injection vial Inject 6 Units into the skin every morning 10 mL 3    metoprolol succinate (TOPROL XL) 25 MG extended release tablet Take 1 tablet by mouth

## 2025-03-19 ASSESSMENT — ENCOUNTER SYMPTOMS
ABDOMINAL PAIN: 0
COUGH: 0
NAUSEA: 0
CHEST TIGHTNESS: 0
SINUS PAIN: 0
VOMITING: 0
SINUS PRESSURE: 0
STRIDOR: 0
WHEEZING: 0
DIARRHEA: 0
SHORTNESS OF BREATH: 1

## 2025-05-06 DIAGNOSIS — R42 DIZZINESS: ICD-10-CM

## 2025-05-08 RX ORDER — MECLIZINE HYDROCHLORIDE 25 MG/1
25 TABLET ORAL 3 TIMES DAILY PRN
Qty: 30 TABLET | Refills: 1 | Status: SHIPPED | OUTPATIENT
Start: 2025-05-08

## 2025-05-23 ENCOUNTER — HOSPITAL ENCOUNTER (EMERGENCY)
Age: 89
Discharge: HOME | End: 2025-05-24
Attending: EMERGENCY MEDICINE
Payer: MEDICARE

## 2025-05-23 ENCOUNTER — APPOINTMENT (OUTPATIENT)
Age: 89
End: 2025-05-23
Payer: MEDICARE

## 2025-05-23 DIAGNOSIS — R00.2 PALPITATIONS: Primary | ICD-10-CM

## 2025-05-23 DIAGNOSIS — R91.1 PULMONARY NODULE: ICD-10-CM

## 2025-05-23 LAB — TROPONIN T SERPL HS-MCNC: 24.5 NG/L (ref 0–14)

## 2025-05-23 PROCEDURE — 93005 ELECTROCARDIOGRAM TRACING: CPT

## 2025-05-23 PROCEDURE — 71045 X-RAY EXAM CHEST 1 VIEW: CPT

## 2025-05-23 PROCEDURE — 80053 COMPREHEN METABOLIC PANEL: CPT

## 2025-05-23 PROCEDURE — 96365 THER/PROPH/DIAG IV INF INIT: CPT

## 2025-05-23 PROCEDURE — 84443 ASSAY THYROID STIM HORMONE: CPT

## 2025-05-23 PROCEDURE — 85025 COMPLETE CBC W/AUTO DIFF WBC: CPT

## 2025-05-23 PROCEDURE — 84484 ASSAY OF TROPONIN QUANT: CPT

## 2025-05-23 PROCEDURE — 83735 ASSAY OF MAGNESIUM: CPT

## 2025-05-23 PROCEDURE — 83880 ASSAY OF NATRIURETIC PEPTIDE: CPT

## 2025-05-23 PROCEDURE — 6360000002 HC RX W HCPCS: Performed by: EMERGENCY MEDICINE

## 2025-05-23 PROCEDURE — 99285 EMERGENCY DEPT VISIT HI MDM: CPT

## 2025-05-23 PROCEDURE — 82077 ASSAY SPEC XCP UR&BREATH IA: CPT

## 2025-05-23 RX ORDER — MAGNESIUM SULFATE IN WATER 40 MG/ML
2000 INJECTION, SOLUTION INTRAVENOUS ONCE
Status: COMPLETED | OUTPATIENT
Start: 2025-05-23 | End: 2025-05-24

## 2025-05-23 RX ORDER — FUROSEMIDE 10 MG/ML
20 INJECTION INTRAMUSCULAR; INTRAVENOUS
Status: COMPLETED | OUTPATIENT
Start: 2025-05-23 | End: 2025-05-24

## 2025-05-23 RX ORDER — DILTIAZEM HYDROCHLORIDE 5 MG/ML
15 INJECTION INTRAVENOUS
Status: COMPLETED | OUTPATIENT
Start: 2025-05-23 | End: 2025-05-24

## 2025-05-23 RX ADMIN — MAGNESIUM SULFATE HEPTAHYDRATE 2000 MG: 40 INJECTION, SOLUTION INTRAVENOUS at 22:55

## 2025-05-23 ASSESSMENT — PAIN DESCRIPTION - LOCATION: LOCATION: HEAD

## 2025-05-23 ASSESSMENT — PAIN SCALES - GENERAL: PAINLEVEL_OUTOF10: 8

## 2025-05-23 ASSESSMENT — PAIN DESCRIPTION - DESCRIPTORS: DESCRIPTORS: ACHING

## 2025-05-23 ASSESSMENT — PAIN - FUNCTIONAL ASSESSMENT: PAIN_FUNCTIONAL_ASSESSMENT: 0-10

## 2025-05-24 ENCOUNTER — APPOINTMENT (OUTPATIENT)
Age: 89
End: 2025-05-24
Payer: MEDICARE

## 2025-05-24 VITALS
HEART RATE: 90 BPM | TEMPERATURE: 98.9 F | HEIGHT: 67 IN | BODY MASS INDEX: 34.73 KG/M2 | RESPIRATION RATE: 20 BRPM | WEIGHT: 221.3 LBS | SYSTOLIC BLOOD PRESSURE: 182 MMHG | DIASTOLIC BLOOD PRESSURE: 82 MMHG | OXYGEN SATURATION: 96 %

## 2025-05-24 LAB
ALBUMIN SERPL-MCNC: 2.7 G/DL (ref 3.4–5)
ALBUMIN/GLOB SERPL: 0.6 (ref 1–1.9)
ALP SERPL-CCNC: 83 U/L (ref 45–117)
ALT SERPL-CCNC: 11 U/L (ref 10–35)
ANION GAP BLD CALC-SCNC: 11 (ref 10–20)
ANION GAP SERPL CALC-SCNC: 11 MMOL/L (ref 7–16)
ANION GAP SERPL CALC-SCNC: 7 MMOL/L (ref 7–16)
AST SERPL-CCNC: 38 U/L (ref 10–38)
BASOPHILS # BLD: 0.05 K/UL (ref 0–0.1)
BASOPHILS NFR BLD: 0.8 % (ref 0–2)
BILIRUB SERPL-MCNC: 0.4 MG/DL (ref 0.2–1)
BUN SERPL-MCNC: 11 MG/DL (ref 6–23)
BUN SERPL-MCNC: 11 MG/DL (ref 6–23)
BUN/CREAT SERPL: 17
BUN/CREAT SERPL: 18
CA-I BLD-MCNC: 1.25 MMOL/L (ref 1.15–1.33)
CALCIUM SERPL-MCNC: 8.5 MG/DL (ref 8.5–10.1)
CALCIUM SERPL-MCNC: 8.8 MG/DL (ref 8.5–10.1)
CHLORIDE BLD-SCNC: 102 MMOL/L (ref 100–111)
CHLORIDE SERPL-SCNC: 102 MMOL/L (ref 98–107)
CHLORIDE SERPL-SCNC: 91 MMOL/L (ref 98–107)
CO2 BLD-SCNC: 31 MMOL/L (ref 22–29)
CO2 SERPL-SCNC: 23 MMOL/L (ref 21–32)
CO2 SERPL-SCNC: 23 MMOL/L (ref 21–32)
CREAT SERPL-MCNC: 0.6 MG/DL (ref 0.6–1.3)
CREAT SERPL-MCNC: 0.64 MG/DL (ref 0.6–1.3)
CREAT UR-MCNC: 0.5 MG/DL (ref 0.6–1.3)
D DIMER PPP FEU-MCNC: 0.78 UG/ML(FEU)
DIFFERENTIAL METHOD BLD: ABNORMAL
EKG ATRIAL RATE: 120 BPM
EKG ATRIAL RATE: 250 BPM
EKG DIAGNOSIS: NORMAL
EKG DIAGNOSIS: NORMAL
EKG P AXIS: 99 DEGREES
EKG P-R INTERVAL: 118 MS
EKG Q-T INTERVAL: 394 MS
EKG Q-T INTERVAL: 424 MS
EKG QRS DURATION: 84 MS
EKG QRS DURATION: 88 MS
EKG QTC CALCULATION (BAZETT): 568 MS
EKG QTC CALCULATION (BAZETT): 599 MS
EKG R AXIS: -24 DEGREES
EKG R AXIS: -34 DEGREES
EKG T AXIS: -30 DEGREES
EKG T AXIS: -64 DEGREES
EKG VENTRICULAR RATE: 120 BPM
EKG VENTRICULAR RATE: 125 BPM
EOSINOPHIL # BLD: 0.24 K/UL (ref 0–0.4)
EOSINOPHIL NFR BLD: 3.9 % (ref 0–5)
ERYTHROCYTE [DISTWIDTH] IN BLOOD BY AUTOMATED COUNT: 11.9 % (ref 11.6–14.5)
ETHANOL SERPL-MCNC: <11 MG/DL (ref 0–0.08)
GLOBULIN SER CALC-MCNC: 4.6 G/DL (ref 2.3–3.5)
GLUCOSE BLD STRIP.AUTO-MCNC: 165 MG/DL (ref 74–99)
GLUCOSE SERPL-MCNC: 147 MG/DL (ref 74–108)
GLUCOSE SERPL-MCNC: 183 MG/DL (ref 74–108)
HCT VFR BLD AUTO: 35.1 % (ref 35–45)
HGB BLD-MCNC: 11.2 G/DL (ref 12–16)
IMM GRANULOCYTES # BLD AUTO: 0.02 K/UL (ref 0–0.04)
IMM GRANULOCYTES NFR BLD AUTO: 0.3 % (ref 0–0.5)
LYMPHOCYTES # BLD: 2.03 K/UL (ref 0.9–3.6)
LYMPHOCYTES NFR BLD: 33.1 % (ref 21–52)
MAGNESIUM SERPL-MCNC: 2 MG/DL (ref 1.6–2.6)
MAGNESIUM SERPL-MCNC: >9.7 MG/DL (ref 1.6–2.6)
MAGNESIUM SERPL-MCNC: >9.7 MG/DL (ref 1.6–2.6)
MCH RBC QN AUTO: 31.9 PG (ref 24–34)
MCHC RBC AUTO-ENTMCNC: 31.9 G/DL (ref 31–37)
MCV RBC AUTO: 100 FL (ref 78–100)
MONOCYTES # BLD: 0.48 K/UL (ref 0.05–1.2)
MONOCYTES NFR BLD: 7.8 % (ref 3–10)
NEUTS SEG # BLD: 3.31 K/UL (ref 1.8–8)
NEUTS SEG NFR BLD: 54.1 % (ref 40–73)
NRBC # BLD: 0 K/UL (ref 0–0.01)
NRBC BLD-RTO: 0 PER 100 WBC
NT PRO BNP: 106 PG/ML (ref 36–1800)
PLATELET # BLD AUTO: 205 K/UL (ref 135–420)
PMV BLD AUTO: 10.8 FL (ref 9.2–11.8)
POTASSIUM BLD-SCNC: 4.9 MMOL/L (ref 3.5–5.5)
POTASSIUM SERPL-SCNC: 3.6 MMOL/L (ref 3.5–5.5)
POTASSIUM SERPL-SCNC: 4.5 MMOL/L (ref 3.5–5.5)
PROT SERPL-MCNC: 7.3 G/DL (ref 6.4–8.2)
RBC # BLD AUTO: 3.51 M/UL (ref 4.2–5.3)
SODIUM BLD-SCNC: 144 MMOL/L (ref 136–145)
SODIUM SERPL-SCNC: 122 MMOL/L (ref 136–145)
SODIUM SERPL-SCNC: 137 MMOL/L (ref 136–145)
TROPONIN T SERPL HS-MCNC: 23.3 NG/L (ref 0–14)
TSH, 3RD GENERATION: 0.67 UIU/ML (ref 0.27–4.2)
WBC # BLD AUTO: 6.1 K/UL (ref 4.6–13.2)

## 2025-05-24 PROCEDURE — 96366 THER/PROPH/DIAG IV INF ADDON: CPT

## 2025-05-24 PROCEDURE — 96375 TX/PRO/DX INJ NEW DRUG ADDON: CPT

## 2025-05-24 PROCEDURE — 6360000002 HC RX W HCPCS: Performed by: EMERGENCY MEDICINE

## 2025-05-24 PROCEDURE — 71275 CT ANGIOGRAPHY CHEST: CPT

## 2025-05-24 PROCEDURE — 85379 FIBRIN DEGRADATION QUANT: CPT

## 2025-05-24 PROCEDURE — 80047 BASIC METABLC PNL IONIZED CA: CPT

## 2025-05-24 PROCEDURE — 83735 ASSAY OF MAGNESIUM: CPT

## 2025-05-24 PROCEDURE — 80048 BASIC METABOLIC PNL TOTAL CA: CPT

## 2025-05-24 PROCEDURE — 84484 ASSAY OF TROPONIN QUANT: CPT

## 2025-05-24 PROCEDURE — 2500000003 HC RX 250 WO HCPCS: Performed by: EMERGENCY MEDICINE

## 2025-05-24 PROCEDURE — 6360000004 HC RX CONTRAST MEDICATION: Performed by: EMERGENCY MEDICINE

## 2025-05-24 RX ORDER — KETOROLAC TROMETHAMINE 15 MG/ML
15 INJECTION, SOLUTION INTRAMUSCULAR; INTRAVENOUS
Status: COMPLETED | OUTPATIENT
Start: 2025-05-24 | End: 2025-05-24

## 2025-05-24 RX ORDER — IOPAMIDOL 755 MG/ML
100 INJECTION, SOLUTION INTRAVASCULAR
Status: COMPLETED | OUTPATIENT
Start: 2025-05-24 | End: 2025-05-24

## 2025-05-24 RX ORDER — DIPHENHYDRAMINE HYDROCHLORIDE 50 MG/ML
50 INJECTION, SOLUTION INTRAMUSCULAR; INTRAVENOUS
Status: COMPLETED | OUTPATIENT
Start: 2025-05-24 | End: 2025-05-24

## 2025-05-24 RX ADMIN — FUROSEMIDE 20 MG: 10 INJECTION, SOLUTION INTRAMUSCULAR; INTRAVENOUS at 00:24

## 2025-05-24 RX ADMIN — KETOROLAC TROMETHAMINE 15 MG: 15 INJECTION, SOLUTION INTRAMUSCULAR; INTRAVENOUS at 01:12

## 2025-05-24 RX ADMIN — METHYLPREDNISOLONE SODIUM SUCCINATE 40 MG: 40 INJECTION INTRAMUSCULAR; INTRAVENOUS at 01:11

## 2025-05-24 RX ADMIN — DILTIAZEM HYDROCHLORIDE 15 MG: 5 INJECTION, SOLUTION INTRAVENOUS at 00:27

## 2025-05-24 RX ADMIN — IOPAMIDOL 100 ML: 755 INJECTION, SOLUTION INTRAVENOUS at 05:49

## 2025-05-24 RX ADMIN — DIPHENHYDRAMINE HYDROCHLORIDE 50 MG: 50 INJECTION INTRAMUSCULAR; INTRAVENOUS at 04:04

## 2025-05-24 ASSESSMENT — PAIN SCALES - GENERAL: PAINLEVEL_OUTOF10: 0

## 2025-05-24 NOTE — ED NOTES
Call to CT for updated time on Benadryl for CT prep. Pt is due to be scanned at 5 am- Prep at 4 am with Benadryl.

## 2025-05-24 NOTE — ED PROVIDER NOTES
EMERGENCY DEPARTMENT HISTORY AND PHYSICAL EXAM      Date: 5/23/2025  Patient Name: Grace Lyons    History of Presenting Illness     Chief Complaint   Patient presents with   • Tachycardia   • Headache       History (Context): Grace Lyons is a 89 y.o. female  w/ pmhx of CAD status post multiple stent placements, NSTEMI, DVT, A-fib, CVA, CHF, hypertension, diabetes, asthma, presents to the hospital for intermittent palpitations associated with chest discomfort.  Patient states she intermittently gets chest palpitations and whenever she does it she does have some chest discomfort though denies pain    States she was not doing anything in particular when the chest palpitations started tonight.    States she did not take all of her medications today, patient is prescribed metoprolol and diltiazem and Lasix and does not recall taking those medications today.    Patient states that her right lower leg has been swollen for a very long time    Denies fever, cough  Denies chest pain  Denies any other acute complaints at this time    PCP: Trent Talamantes MD    Current Facility-Administered Medications   Medication Dose Route Frequency Provider Last Rate Last Admin   • magnesium sulfate 2000 mg in 50 mL IVPB premix  2,000 mg IntraVENous Once Herbert Fisher MD         Current Outpatient Medications   Medication Sig Dispense Refill   • meclizine (ANTIVERT) 25 MG tablet TAKE 1 TABLET BY MOUTH THREE TIMES DAILY AS NEEDED FOR DIZZINESS 30 tablet 1   • hydrOXYzine pamoate (VISTARIL) 25 MG capsule TAKE 1 CAPSULE BY MOUTH THREE TIMES DAILY AS NEEDED FOR ANXIETY     • estradiol (ESTRACE VAGINAL) 0.1 MG/GM vaginal cream Apply pea sized (0.25 g)  amount to urethra 3x a week 42.5 g 3   • apixaban (ELIQUIS) 5 MG TABS tablet Take 1 tablet by mouth 2 times daily 180 tablet 3   • albuterol sulfate HFA (PROVENTIL;VENTOLIN;PROAIR) 108 (90 Base) MCG/ACT inhaler Inhale 2 puffs into the lungs every 4 hours as needed for  Eliquis  Take 1 tablet by mouth 2 times daily     clopidogrel 75 MG tablet  Commonly known as: PLAVIX  Take 1 tablet by mouth daily     dilTIAZem 120 MG extended release capsule  Commonly known as: CARDIZEM CD  Take 1 capsule by mouth daily     estradiol 0.1 MG/GM vaginal cream  Commonly known as: ESTRACE VAGINAL  Apply pea sized (0.25 g)  amount to urethra 3x a week     famotidine 20 MG tablet  Commonly known as: PEPCID  Take 1 tablet by mouth daily     furosemide 20 MG tablet  Commonly known as: LASIX  Take 1 tablet by mouth daily     hydrOXYzine pamoate 25 MG capsule  Commonly known as: VISTARIL     insulin glargine 100 UNIT/ML injection vial  Commonly known as: LANTUS  Inject 6 Units into the skin every morning     isosorbide mononitrate 60 MG extended release tablet  Commonly known as: IMDUR  Take 1 tablet by mouth every morning     levothyroxine 137 MCG tablet  Commonly known as: SYNTHROID     meclizine 25 MG tablet  Commonly known as: ANTIVERT  TAKE 1 TABLET BY MOUTH THREE TIMES DAILY AS NEEDED FOR DIZZINESS     methenamine 1 g tablet  Commonly known as: Hiprex  Take 1 tablet by mouth 2 times daily (with meals)     metoprolol succinate 25 MG extended release tablet  Commonly known as: TOPROL XL  Take 1 tablet by mouth daily     montelukast 10 MG tablet  Commonly known as: SINGULAIR  TAKE 1 TABLET BY MOUTH DAILY     nitroGLYCERIN 0.4 MG SL tablet  Commonly known as: Nitrostat  Place 1 tablet under the tongue every 5 minutes as needed for Chest pain up to max of 3 total doses. If no relief after 1 dose, call 911.     OneTouch Ultra strip  Generic drug: blood glucose test strips     ranolazine 500 MG extended release tablet  Commonly known as: RANEXA  TAKE 1 TABLET BY MOUTH TWICE DAILY              Disposition: HOME    Patient condition at time of disposition: Stable     DISCHARGE NOTE:   Pt has been reexamined. Patient has no new complaints, changes, or physical findings.  Care plan outlined and precautions

## 2025-05-24 NOTE — ED NOTES
Third magnesium level drawn and sent to laboratory.    Pt resting quietly in bed, on cardiac monitor. No discomfort noted.

## 2025-05-24 NOTE — ED NOTES
D/C instructions reviewed with pt. Pt states she doesn't have a ride home and is unable to get in trucks. Pt requested us to find her transportation home. Pt is aware if her insurance doesn't pay then she will be liable. Pt states she has a ramp and has had transportation home before.

## 2025-05-24 NOTE — ED TRIAGE NOTES
Patient states she's been having a headaches and rapid heart rate. States she feels as though her medication for A fib is not working

## 2025-05-24 NOTE — ED NOTES
Patient premedicated prior to CTA scan for iodinated contrast allergy.   After 4 hour prep, 100cc isovue 370 injected into RAC. IV infiltrated, RN notified. RN suggested we use 22G in left arm. Advised this is not protocol. Per MD patient is a difficult stick and ultrasound would need to be used to get a new IV. Injected using 22G in left arm to obtain CTA.  ED tech called to help move patient back to stretcher, RN unavailable but told her heat pack needs to be applied for right arm.

## 2025-05-24 NOTE — ED NOTES
Pt returned from CT and per CT tech in room. Ct Tech informed provider \"She has a lot of contrast in her arm. I don't know how much the scan got. I got the other IV to work. The patient will need a heat pack\"     This nurse removed PIV to Right arm. Swollen area noted. Bleeding controlled.    New cardiac leads applied to patient and patient placed back on monitor.

## 2025-05-28 ENCOUNTER — OFFICE VISIT (OUTPATIENT)
Age: 89
End: 2025-05-28
Payer: MEDICARE

## 2025-05-28 VITALS
OXYGEN SATURATION: 98 % | SYSTOLIC BLOOD PRESSURE: 174 MMHG | DIASTOLIC BLOOD PRESSURE: 80 MMHG | HEART RATE: 97 BPM | HEIGHT: 67 IN | WEIGHT: 221 LBS | BODY MASS INDEX: 34.69 KG/M2

## 2025-05-28 DIAGNOSIS — Z09 HOSPITAL DISCHARGE FOLLOW-UP: ICD-10-CM

## 2025-05-28 DIAGNOSIS — I50.32 CHRONIC HEART FAILURE WITH PRESERVED EJECTION FRACTION (HCC): ICD-10-CM

## 2025-05-28 DIAGNOSIS — I48.0 PAROXYSMAL ATRIAL FIBRILLATION (HCC): Primary | ICD-10-CM

## 2025-05-28 DIAGNOSIS — I10 PRIMARY HYPERTENSION: ICD-10-CM

## 2025-05-28 DIAGNOSIS — E78.2 MIXED HYPERLIPIDEMIA: ICD-10-CM

## 2025-05-28 PROCEDURE — 1124F ACP DISCUSS-NO DSCNMKR DOCD: CPT | Performed by: NURSE PRACTITIONER

## 2025-05-28 PROCEDURE — 99214 OFFICE O/P EST MOD 30 MIN: CPT | Performed by: NURSE PRACTITIONER

## 2025-05-28 PROCEDURE — 1111F DSCHRG MED/CURRENT MED MERGE: CPT | Performed by: NURSE PRACTITIONER

## 2025-05-28 RX ORDER — DILTIAZEM HYDROCHLORIDE 240 MG/1
240 CAPSULE, COATED, EXTENDED RELEASE ORAL DAILY
Qty: 90 CAPSULE | Refills: 2 | Status: SHIPPED | OUTPATIENT
Start: 2025-05-28

## 2025-05-28 ASSESSMENT — PATIENT HEALTH QUESTIONNAIRE - PHQ9
SUM OF ALL RESPONSES TO PHQ QUESTIONS 1-9: 0
2. FEELING DOWN, DEPRESSED OR HOPELESS: NOT AT ALL
SUM OF ALL RESPONSES TO PHQ QUESTIONS 1-9: 0
SUM OF ALL RESPONSES TO PHQ QUESTIONS 1-9: 0
1. LITTLE INTEREST OR PLEASURE IN DOING THINGS: NOT AT ALL
SUM OF ALL RESPONSES TO PHQ QUESTIONS 1-9: 0

## 2025-05-28 ASSESSMENT — ENCOUNTER SYMPTOMS
BLOOD IN STOOL: 0
COUGH: 0
DIARRHEA: 0
COLOR CHANGE: 0
WHEEZING: 0
CHEST TIGHTNESS: 0
SHORTNESS OF BREATH: 1
CONSTIPATION: 0
ABDOMINAL PAIN: 0
APNEA: 0
NAUSEA: 0

## 2025-05-28 NOTE — PROGRESS NOTES
1. Have you been to the ER, urgent care clinic since your last visit?  Hospitalized since your last visit?     Yes headache/afib    2. Have you seen or consulted any other health care providers outside of the [facility] since your last visit?  Include any pap smears or colon screening.      no

## 2025-05-28 NOTE — PROGRESS NOTES
HISTORY OF PRESENT ILLNESS  Grace Lyons is a 89 y.o. female.    PMH Coronary artery disease, Chronic heart failure preserved ejection fraction, HTN, Morbid obesity  1/31/2025  Seen following recent ER visit for new onset atrial fibrillation.  Converted to SR with Diltiazem.  Not currently on anticoagulation  Denies further episodes of chest pain, shortness of breath, palpitaitons or edema.  5/28/2025  Complains of headache with increased blood pressure has not taken any medications as of today recent emergency room visit for atypical chest discomfort thought was in atrial fibrillation EKG reviewed.  Sinus rhythm sinus tach.  Patient with increased forgetfulness during office visit often forgetting words and repeating stories    ----  CARDIAC STUDIES  ----  Vasscular duplex lower extremity venous right 10/22/2024    No evidence of deep vein thrombosis in the right lower extremity.    For comparison purposes, the left common femoral vein was briefly interrogated. The vein demonstrates normal color filling and compressibility. Doppler flow was phasic and spontaneous.    Right posterior tibial artery Doppler waveforms not identified.  ----  [facility] 8/29/2024  Coronary Artery Disease  LM 0% focal  mLAD 70-80%, tortuous distally  mLCFX 70-90% with subsequent 30-40% stenosis  mRCA 0% luminal irregularities with ectasia  RCA Dominant System  LVEDP 1mmHg within normal limits  Successful balloon angioplasty and TAM of the mLAD 70-80% stenosis to 0% stenosis GAY III to GAY III flow utilizing 3.0x15mm TAM postdilated with 3.25mm NC balloon  Successful balloon angioplasty and TAM of the mLCFX 70-90% stenosis to 0% stenosis GAY III to GAY III flow utilizing 2.34q50dw TAM postdilated with 2.75mm NC and 3.25mm NC balloons  Plan: Continue with dual antiplatelet therapy aspirin 81mg po daily plavix 75mg po daily, statin as can tolerate is allergic, bb as can tolerate, cardiac rehab.  ACC provisions diet and exercise.

## 2025-05-28 NOTE — PATIENT INSTRUCTIONS
Increase Diltiazem to 240 mg / day    Continue all other medications    To emergency room for new or worsening symptoms.

## 2025-07-09 ENCOUNTER — OFFICE VISIT (OUTPATIENT)
Age: 89
End: 2025-07-09
Payer: MEDICARE

## 2025-07-09 VITALS
WEIGHT: 221 LBS | DIASTOLIC BLOOD PRESSURE: 83 MMHG | BODY MASS INDEX: 34.69 KG/M2 | HEIGHT: 67 IN | SYSTOLIC BLOOD PRESSURE: 135 MMHG | HEART RATE: 99 BPM | OXYGEN SATURATION: 94 %

## 2025-07-09 DIAGNOSIS — I50.32 CHRONIC HEART FAILURE WITH PRESERVED EJECTION FRACTION (HCC): ICD-10-CM

## 2025-07-09 DIAGNOSIS — I25.83 CORONARY ARTERY DISEASE DUE TO LIPID RICH PLAQUE: Primary | ICD-10-CM

## 2025-07-09 DIAGNOSIS — Z98.61 H/O CORONARY ANGIOPLASTY: ICD-10-CM

## 2025-07-09 DIAGNOSIS — I25.10 CORONARY ARTERY DISEASE DUE TO LIPID RICH PLAQUE: Primary | ICD-10-CM

## 2025-07-09 DIAGNOSIS — E78.2 MIXED HYPERLIPIDEMIA: ICD-10-CM

## 2025-07-09 DIAGNOSIS — I48.0 PAROXYSMAL ATRIAL FIBRILLATION (HCC): ICD-10-CM

## 2025-07-09 DIAGNOSIS — I10 PRIMARY HYPERTENSION: ICD-10-CM

## 2025-07-09 PROCEDURE — 99214 OFFICE O/P EST MOD 30 MIN: CPT | Performed by: INTERNAL MEDICINE

## 2025-07-09 PROCEDURE — 1124F ACP DISCUSS-NO DSCNMKR DOCD: CPT | Performed by: INTERNAL MEDICINE

## 2025-07-09 RX ORDER — POTASSIUM CHLORIDE 750 MG/1
10 TABLET, EXTENDED RELEASE ORAL DAILY
COMMUNITY

## 2025-07-09 RX ORDER — TRAZODONE HYDROCHLORIDE 50 MG/1
50 TABLET ORAL NIGHTLY
COMMUNITY
Start: 2025-04-30

## 2025-07-09 RX ORDER — LANOLIN ALCOHOL/MO/W.PET/CERES
1000 CREAM (GRAM) TOPICAL DAILY
COMMUNITY

## 2025-07-09 ASSESSMENT — PATIENT HEALTH QUESTIONNAIRE - PHQ9
SUM OF ALL RESPONSES TO PHQ QUESTIONS 1-9: 0
1. LITTLE INTEREST OR PLEASURE IN DOING THINGS: NOT AT ALL
SUM OF ALL RESPONSES TO PHQ QUESTIONS 1-9: 0
2. FEELING DOWN, DEPRESSED OR HOPELESS: NOT AT ALL

## 2025-07-09 NOTE — PROGRESS NOTES
1. Have you been to the ER, urgent care clinic since your last visit?  Hospitalized since your last visit?     No    2. Have you seen or consulted any other health care providers outside of the [facility] since your last visit?  Include any pap smears or colon screening.      No
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
## 2025-08-11 ENCOUNTER — APPOINTMENT (OUTPATIENT)
Age: 89
End: 2025-08-11
Payer: MEDICARE

## 2025-08-11 ENCOUNTER — HOSPITAL ENCOUNTER (EMERGENCY)
Age: 89
Discharge: HOME | End: 2025-08-12
Attending: STUDENT IN AN ORGANIZED HEALTH CARE EDUCATION/TRAINING PROGRAM
Payer: MEDICARE

## 2025-08-11 DIAGNOSIS — G62.9 PERIPHERAL POLYNEUROPATHY: ICD-10-CM

## 2025-08-11 DIAGNOSIS — R00.2 PALPITATIONS: Primary | ICD-10-CM

## 2025-08-11 LAB
ALBUMIN SERPL-MCNC: 3.3 G/DL (ref 3.4–5)
ALBUMIN/GLOB SERPL: 0.8 (ref 1–1.9)
ALP SERPL-CCNC: 66 U/L (ref 45–117)
ALT SERPL-CCNC: 9 U/L (ref 10–35)
ANION GAP SERPL CALC-SCNC: 11 MMOL/L (ref 7–16)
AST SERPL-CCNC: 17 U/L (ref 10–38)
BASOPHILS # BLD: 0.03 K/UL (ref 0–0.1)
BASOPHILS NFR BLD: 0.3 % (ref 0–2)
BILIRUB SERPL-MCNC: 0.4 MG/DL (ref 0.2–1)
BUN SERPL-MCNC: 12 MG/DL (ref 6–23)
BUN/CREAT SERPL: 16
CALCIUM SERPL-MCNC: 9.5 MG/DL (ref 8.5–10.1)
CHLORIDE SERPL-SCNC: 103 MMOL/L (ref 98–107)
CO2 SERPL-SCNC: 28 MMOL/L (ref 21–32)
CREAT SERPL-MCNC: 0.77 MG/DL (ref 0.6–1.3)
DIFFERENTIAL METHOD BLD: ABNORMAL
EOSINOPHIL # BLD: 0.02 K/UL (ref 0–0.4)
EOSINOPHIL NFR BLD: 0.2 % (ref 0–5)
ERYTHROCYTE [DISTWIDTH] IN BLOOD BY AUTOMATED COUNT: 12.1 % (ref 11.6–14.5)
GLOBULIN SER CALC-MCNC: 4.1 G/DL (ref 2.3–3.5)
GLUCOSE SERPL-MCNC: 85 MG/DL (ref 74–108)
HCT VFR BLD AUTO: 34.9 % (ref 35–45)
HGB BLD-MCNC: 10.9 G/DL (ref 12–16)
IMM GRANULOCYTES # BLD AUTO: 0.04 K/UL (ref 0–0.04)
IMM GRANULOCYTES NFR BLD AUTO: 0.5 % (ref 0–0.5)
LYMPHOCYTES # BLD: 1.19 K/UL (ref 0.9–3.6)
LYMPHOCYTES NFR BLD: 13.5 % (ref 21–52)
MAGNESIUM SERPL-MCNC: 2 MG/DL (ref 1.6–2.6)
MCH RBC QN AUTO: 32.2 PG (ref 24–34)
MCHC RBC AUTO-ENTMCNC: 31.2 G/DL (ref 31–37)
MCV RBC AUTO: 103.3 FL (ref 78–100)
MONOCYTES # BLD: 0.41 K/UL (ref 0.05–1.2)
MONOCYTES NFR BLD: 4.7 % (ref 3–10)
NEUTS SEG # BLD: 7.11 K/UL (ref 1.8–8)
NEUTS SEG NFR BLD: 80.8 % (ref 40–73)
NRBC # BLD: 0 K/UL (ref 0–0.01)
NRBC BLD-RTO: 0 PER 100 WBC
PLATELET # BLD AUTO: 231 K/UL (ref 135–420)
PMV BLD AUTO: 10.6 FL (ref 9.2–11.8)
POTASSIUM SERPL-SCNC: 4.1 MMOL/L (ref 3.5–5.5)
PROT SERPL-MCNC: 7.4 G/DL (ref 6.4–8.2)
RBC # BLD AUTO: 3.38 M/UL (ref 4.2–5.3)
SODIUM SERPL-SCNC: 142 MMOL/L (ref 136–145)
TROPONIN T SERPL HS-MCNC: 25.6 NG/L (ref 0–14)
TROPONIN T SERPL HS-MCNC: 26.5 NG/L (ref 0–14)
TROPONIN T SERPL HS-MCNC: 27 NG/L (ref 0–14)
WBC # BLD AUTO: 8.8 K/UL (ref 4.6–13.2)

## 2025-08-11 PROCEDURE — 93005 ELECTROCARDIOGRAM TRACING: CPT

## 2025-08-11 PROCEDURE — 99285 EMERGENCY DEPT VISIT HI MDM: CPT

## 2025-08-11 PROCEDURE — 71045 X-RAY EXAM CHEST 1 VIEW: CPT

## 2025-08-11 PROCEDURE — 84484 ASSAY OF TROPONIN QUANT: CPT

## 2025-08-11 PROCEDURE — 83735 ASSAY OF MAGNESIUM: CPT

## 2025-08-11 PROCEDURE — 85025 COMPLETE CBC W/AUTO DIFF WBC: CPT

## 2025-08-11 PROCEDURE — 80053 COMPREHEN METABOLIC PANEL: CPT

## 2025-08-11 ASSESSMENT — PAIN SCALES - GENERAL
PAINLEVEL_OUTOF10: 0

## 2025-08-11 ASSESSMENT — PAIN - FUNCTIONAL ASSESSMENT
PAIN_FUNCTIONAL_ASSESSMENT: 0-10
PAIN_FUNCTIONAL_ASSESSMENT: 0-10

## 2025-08-11 ASSESSMENT — LIFESTYLE VARIABLES
HOW OFTEN DO YOU HAVE A DRINK CONTAINING ALCOHOL: NEVER
HOW MANY STANDARD DRINKS CONTAINING ALCOHOL DO YOU HAVE ON A TYPICAL DAY: PATIENT DOES NOT DRINK

## 2025-08-12 VITALS
BODY MASS INDEX: 36.52 KG/M2 | WEIGHT: 232.7 LBS | TEMPERATURE: 98.4 F | OXYGEN SATURATION: 96 % | HEART RATE: 89 BPM | SYSTOLIC BLOOD PRESSURE: 163 MMHG | RESPIRATION RATE: 18 BRPM | DIASTOLIC BLOOD PRESSURE: 77 MMHG | HEIGHT: 67 IN

## 2025-08-12 LAB
EKG ATRIAL RATE: 92 BPM
EKG DIAGNOSIS: NORMAL
EKG P AXIS: 50 DEGREES
EKG P-R INTERVAL: 132 MS
EKG Q-T INTERVAL: 378 MS
EKG QRS DURATION: 90 MS
EKG QTC CALCULATION (BAZETT): 467 MS
EKG R AXIS: -23 DEGREES
EKG T AXIS: -5 DEGREES
EKG VENTRICULAR RATE: 92 BPM

## 2025-08-12 PROCEDURE — 6370000000 HC RX 637 (ALT 250 FOR IP): Performed by: EMERGENCY MEDICINE

## 2025-08-12 RX ORDER — CYCLOBENZAPRINE HCL 10 MG
5 TABLET ORAL
Status: DISCONTINUED | OUTPATIENT
Start: 2025-08-12 | End: 2025-08-12 | Stop reason: HOSPADM

## 2025-08-12 RX ORDER — ACETAMINOPHEN 500 MG
1000 TABLET ORAL
Status: COMPLETED | OUTPATIENT
Start: 2025-08-12 | End: 2025-08-12

## 2025-08-12 RX ORDER — PREGABALIN 75 MG/1
75 CAPSULE ORAL 2 TIMES DAILY
Qty: 60 CAPSULE | Refills: 1 | Status: SHIPPED | OUTPATIENT
Start: 2025-08-12 | End: 2025-09-11

## 2025-08-12 RX ADMIN — ACETAMINOPHEN 1000 MG: 500 TABLET ORAL at 02:54

## 2025-08-12 ASSESSMENT — PAIN DESCRIPTION - ORIENTATION: ORIENTATION: RIGHT

## 2025-08-12 ASSESSMENT — PAIN - FUNCTIONAL ASSESSMENT: PAIN_FUNCTIONAL_ASSESSMENT: 0-10

## 2025-08-12 ASSESSMENT — PAIN SCALES - GENERAL: PAINLEVEL_OUTOF10: 9

## 2025-08-12 ASSESSMENT — PAIN DESCRIPTION - LOCATION: LOCATION: HEAD

## 2025-08-12 ASSESSMENT — PAIN DESCRIPTION - DESCRIPTORS: DESCRIPTORS: ACHING

## (undated) DEVICE — CATHETER 5FR JL4 CORDIS 100 CM

## (undated) DEVICE — INTRODUCER SHTH 4FR CANN L11CM DIL TIP 25MM RED TUNGSTEN

## (undated) DEVICE — COVER US PRB W15XL120CM W/ GEL RUBBERBAND TAPE STRP FLD GEN

## (undated) DEVICE — PACK PROCEDURE SURG VASC CATH 161 MMC LF

## (undated) DEVICE — 3M™ BAIR PAWS FLEX™ WARMING GOWN, STANDARD, 20 PER CASE 81003: Brand: BAIR PAWS™

## (undated) DEVICE — 4FR MICROPUNCTURE KIT

## (undated) DEVICE — CATHETER 6FR JL 4 CORDIS 100CM

## (undated) DEVICE — BIT DRL L180MM DIA4.3MM QUIK CPL W/O STP REUSE

## (undated) DEVICE — INTENDED FOR TISSUE SEPARATION, AND OTHER PROCEDURES THAT REQUIRE A SHARP SURGICAL BLADE TO PUNCTURE OR CUT.: Brand: BARD-PARKER SAFETY BLADES SIZE 10, STERILE

## (undated) DEVICE — MEDTRONIC JL3.5 DIAGNOSTIC CATHETER

## (undated) DEVICE — WIRE .035 EXCHANGE J 3MM 260CM

## (undated) DEVICE — MULTI-LINK RX VISION CORONARY STENT SYSTEM 3.00 MM X 15 MM
Type: IMPLANTABLE DEVICE | Status: NON-FUNCTIONAL
Brand: MULTI-LINK VISION

## (undated) DEVICE — ANGIO-SEAL VIP VASCULAR CLOSURE DEVICE: Brand: ANGIO-SEAL

## (undated) DEVICE — CATH BLLN ANGIO 3.25X12MM NC EUPHORIA RX

## (undated) DEVICE — BLADE ASSEMB CLP HAIR FINE --

## (undated) DEVICE — PROCEDURE KIT FLUID MGMT 10 FR CUST MAINFOLD

## (undated) DEVICE — BAG WST COLL CLR DISP PVC W/ ROTICULATING LUER L BOR TBNG

## (undated) DEVICE — PINNACLE INTRODUCER SHEATH: Brand: PINNACLE

## (undated) DEVICE — CATHETER GUID AD 6FR L100CM COR PERIPH GRN NYL PTFE AL 0.75

## (undated) DEVICE — CATHETER GUID EXTRA BACKUP 3.5 0.070IN 6FR 100CM VISTA BRITE TIP

## (undated) DEVICE — RUNTHROUGH NS EXTRA FLOPPY PTCA GUIDEWIRE: Brand: RUNTHROUGH

## (undated) DEVICE — CATHETER 5FR JR4 CORDIS 100CM

## (undated) DEVICE — SOLUTION IV 1000ML 0.9% SOD CHL

## (undated) DEVICE — CATHETER ANGIO 4FR L100CM S STL NYL JL4 3 SEG BRAID SFT

## (undated) DEVICE — PACK SURG BSHR TOT KNEE LF

## (undated) DEVICE — GLIDESHEATH SLENDER STAINLESS STEEL KIT: Brand: GLIDESHEATH SLENDER

## (undated) DEVICE — CATHETER DIAG 5FR L100CM LUMN ID0.047IN JL3.5 CRV 0 SIDE H

## (undated) DEVICE — KIT CLN UP BON SECOURS MARYV

## (undated) DEVICE — SET FLD ADMIN 3 W STPCOCK FIX FEM L BOR 1IN

## (undated) DEVICE — AIRLIFE™ NASAL OXYGEN CANNULA CURVED, NONFLARED TIP WITH 14 FOOT (4.3 M) CRUSH-RESISTANT TUBING, OVER-THE-EAR STYLE: Brand: AIRLIFE™

## (undated) DEVICE — CATHETER DIAG AD 4FR L100CM STD NYL JUDKINS R 4 TRULUMEN

## (undated) DEVICE — DEVICE INFL W ACCS + HEMSTAS VLV INSRT TOOL AND TORQ BASIX

## (undated) DEVICE — CATHETER 6FR JR4 CORDIS 100CM

## (undated) DEVICE — Device: Brand: EAGLE EYE PLATINUM ST RX DIGITAL IVUS CATHETER

## (undated) DEVICE — PREP CHLORAPREP 10.5 ML ORG --

## (undated) DEVICE — PRESSURE MONITORING SET: Brand: TRUWAVE

## (undated) DEVICE — REM POLYHESIVE ADULT PATIENT RETURN ELECTRODE: Brand: VALLEYLAB

## (undated) DEVICE — CATH BLLN ANGIO 2.75X12MM NC EUPHORIA RX

## (undated) DEVICE — ABDOMINAL PAD: Brand: DERMACEA

## (undated) DEVICE — CATHETER GUID 6FR 0.071IN COR AMPLATZ L 1 SIDE H MID

## (undated) DEVICE — Device: Brand: EAGLE EYE PLATINUM RX DIGITAL IVUS CATHETER

## (undated) DEVICE — GAUZE SPONGES,16 PLY: Brand: CURITY

## (undated) DEVICE — ANGIOGRAPHY KIT CUST VASC

## (undated) DEVICE — 260 CM J TIP WIRE .035

## (undated) DEVICE — RADIFOCUS GLIDEWIRE ADVANTAGE GUIDEWIRE: Brand: GLIDEWIRE ADVANTAGE

## (undated) DEVICE — CATHETER ANGIO 4FR L100CM COR NYL AR MOD W/O SIDE H RADPQ

## (undated) DEVICE — CATH BLLN ANGIO 3X15MM NC EUPHORIA RX

## (undated) DEVICE — DRAPE,ANGIO,BRACH,STERILE,38X44: Brand: MEDLINE

## (undated) DEVICE — BAND COMPR L21CM SHT CLR PLAS HEMSTAT EXT HK AND LOOP RETEN

## (undated) DEVICE — FLEX ADVANTAGE 3000CC: Brand: FLEX ADVANTAGE

## (undated) DEVICE — CATHETER 5FR CORDIS PIG 145DEG 110CM

## (undated) DEVICE — GUIDEWIRE ORTH DIA2.5MM LOK SMOOTH DRL TIP FLX THRD FOR

## (undated) DEVICE — GUIDEWIRE VASC L260CM DIA0.035IN TIP L7CM PTFE S STL STR

## (undated) DEVICE — DRAPE XR C ARM 41X74IN LF --

## (undated) DEVICE — HEX-LOCKING BLADE ELECTRODE: Brand: EDGE

## (undated) DEVICE — LIGHT HANDLE: Brand: DEVON

## (undated) DEVICE — SLIM BODY SKIN STAPLER: Brand: APPOSE ULC

## (undated) DEVICE — GUIDEWIRE VASC L260CM DIA0.035IN RAD 3MM J TIP L7CM PTFE

## (undated) DEVICE — VASCULAR CATH-PACK

## (undated) DEVICE — BIT DRL L145MM DIA3.2MM QUIK CPL W/O STP REUSE

## (undated) DEVICE — 4FR MICROPUNCTURE KIT STIFFEN